# Patient Record
Sex: MALE | Race: WHITE | NOT HISPANIC OR LATINO | Employment: OTHER | URBAN - METROPOLITAN AREA
[De-identification: names, ages, dates, MRNs, and addresses within clinical notes are randomized per-mention and may not be internally consistent; named-entity substitution may affect disease eponyms.]

---

## 2017-01-02 ENCOUNTER — PATIENT OUTREACH (OUTPATIENT)
Dept: HEALTH INFORMATION MANAGEMENT | Facility: OTHER | Age: 77
End: 2017-01-02

## 2017-01-02 NOTE — PROGRESS NOTES
"1-17-16  Attempted to contact patient regarding TCM and discharge outreach.  He was gone to his dialysis treatment.  Spoke with his wife Crys.  States he is \"doing pretty well\".  Remains very tired.  They are both concerned about not having the Tobramycin yet.  They believe it is to be shipped to them tomorrow.  iGlberto is taking all the other medications as directed at discharge.  Confirmed appointment on Thursday 1-4-17 with Dana at the discharge clinic.  They will have no difficulties getting there.      "

## 2017-01-04 ENCOUNTER — OFFICE VISIT (OUTPATIENT)
Dept: MEDICAL GROUP | Facility: CLINIC | Age: 77
End: 2017-01-04
Payer: MEDICARE

## 2017-01-04 VITALS
OXYGEN SATURATION: 97 % | SYSTOLIC BLOOD PRESSURE: 118 MMHG | TEMPERATURE: 98.2 F | DIASTOLIC BLOOD PRESSURE: 64 MMHG | HEART RATE: 88 BPM | RESPIRATION RATE: 16 BRPM

## 2017-01-04 DIAGNOSIS — N18.6 ESRD ON DIALYSIS (HCC): ICD-10-CM

## 2017-01-04 DIAGNOSIS — Z09 HOSPITAL DISCHARGE FOLLOW-UP: ICD-10-CM

## 2017-01-04 DIAGNOSIS — Z99.2 ESRD ON DIALYSIS (HCC): ICD-10-CM

## 2017-01-04 DIAGNOSIS — D84.9 IMMUNOSUPPRESSED STATUS (HCC): ICD-10-CM

## 2017-01-04 DIAGNOSIS — J18.9 HCAP (HEALTHCARE-ASSOCIATED PNEUMONIA): ICD-10-CM

## 2017-01-04 PROCEDURE — 99496 TRANSJ CARE MGMT HIGH F2F 7D: CPT | Performed by: NURSE PRACTITIONER

## 2017-01-04 ASSESSMENT — ENCOUNTER SYMPTOMS
DEPRESSION: 0
DIARRHEA: 1
COUGH: 1
VOMITING: 0
WHEEZING: 0
SPUTUM PRODUCTION: 1
FOCAL WEAKNESS: 0
ABDOMINAL PAIN: 0
HEADACHES: 0
WEAKNESS: 1
NAUSEA: 0
DIZZINESS: 0
SORE THROAT: 0
FEVER: 0
HEMOPTYSIS: 0
DIAPHORESIS: 1
FALLS: 0
PALPITATIONS: 0

## 2017-01-04 ASSESSMENT — PATIENT HEALTH QUESTIONNAIRE - PHQ9: CLINICAL INTERPRETATION OF PHQ2 SCORE: 2

## 2017-01-04 NOTE — PATIENT INSTRUCTIONS
If you need further assistance, or have any questions; concerns or lingering symptoms before seeing your Primary Care Provider or specialist.     Do not hesitate to contact us.     Please contact us at the Post-Hospital Follow Up Program at [phone].   Our offices hours are Monday-Friday 8 am-5 pm.

## 2017-01-04 NOTE — PROGRESS NOTES
POST DISCHARGE CALL    Discharge Date:12/30/2016   Date of Outreach Call: 1/2/2017 10:46 AM  Now that you're home, how are you doing? Good  Comment:remains tired.  breathing fine.    Do you have questions about your medications? Yes  Comment:very concerned about not having the Tobramycin  yet.  It is being shipped to them.    Resource Provided: *    Did you fill your medications? Yes  Comment:they have all but the Tobramycin.    Resource Provided: *    Do you have a follow-up appointment scheduled?Yes  Comment:multiple.  with arthitis MD, ID, and pulmonary.   also infusion of Rituxin  Action Taken:*    Discharging Department: Telemetry 7  Select all areas that apply to this patient:   *    *  *    *   *   Transitional Care    While you had Home Health, did your clinical team act in a professional manner? *    Have you quit smoking because of the COPD Program? *  If you quit smoking prior to the program, are you still not smoking? *    Have you had a fever since you've left our care? *  Is the surgical site bleeding? *    Have you had a fall since you've been home? *  You will be receiving a phone call regarding additional aspects of your care. Please provide a good call back number: *    Number of Attempts: 1  Current or previous attempts completed within two business days of discharge? Yes  Provided education regarding treatment plan, medication, self-management, ADLs? Yes  Comment:patient and his wife are well versed in [facility]  diagnosis and medical plan.    Has patient completed Advance Directive? If yes, advise them to bring to appointment. Yes  Comment:on file  Care Manager phone number provided? Yes  Comment:discharge outreach [phone]  Is there anything else I can help you with? No  Comment:denies additional needs at this time.

## 2017-01-04 NOTE — PROGRESS NOTES
Subjective:     Gilberto Brown is a 76 y.o. male with underlying disease of Wegener's granulomatosis, immunosuppressed status, ESRD on HD MWF, who presents for Hospital Follow-up.  Chart and discharge summary reviewed.     HPI: Recently hospitalized for HCAP with recurrent pseudomonas PNA, hospitalized from 12/23 to 12/30, was receiving IV tobramycin while awaiting inhaled tobramycin but now pt has his inhaled tobromycin already. 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  Upcoming appiontment: 1/17 for rheumatologist; 1/19 pulomonologist; 1/25 ID.    Since returning home, patient reports feeling much better. Pt has been having 4th recurrent pna in a year and he is very familiar about his disease and on top of the treatment plan.      The patient has questions regarding hospitalization or discharge: no   The patient does not feel more weakness, the same fatigue and energy level; overall no difficulty taking care of self at home but sometimes needs help from his wife to push him up a little bit when he is climbing stairs. Declined HH and PT because pt has lived with his own disease on his own way. He is still trying to fight but at the same time enjoying his life. He has no fall incident and ambulating with cane without difficulty at this time.  Patient reports taking medications as instructed.    1. Hospital discharge follow-up    2. HCAP (healthcare-associated pneumonia)    3. Immunosuppressed status (HCC)    4. ESRD on dialysis (HCC)          Allergies:   Doxycycline; Erythromycin; and Rituximab    Social History:  Social History   Substance Use Topics   • Smoking status: Former Smoker -- 30 years     Types: Pipe     Quit date: 01/01/1990   • Smokeless tobacco: Former User     Types: Chew   • Alcohol Use: 4.8 oz/week     7 Glasses of wine, 1 Standard drinks or equivalent per week       Comment: Red wine nightly        ROS:  Review of Systems   Constitutional: Positive for malaise/fatigue and diaphoresis ( night sweat for years, pt reported the only possible explanation that he was told is a cyst/fibrosis in his lung.). Negative for fever.   HENT: Negative for congestion and sore throat.    Respiratory: Positive for cough (  residual coughing ) and sputum production. Negative for hemoptysis and wheezing. Shortness of breath:  on exertion but rest can allevaite that, no oxygen needed, sat 97% on room air today.    Cardiovascular: Negative for chest pain, palpitations and leg swelling.   Gastrointestinal: Positive for diarrhea ( comes and goes, old problem, no diarrhea today or yesterday). Negative for nausea, vomiting and abdominal pain.   Musculoskeletal: Negative for falls.   Skin: Negative for rash.   Neurological: Positive for weakness. Negative for dizziness, focal weakness and headaches.   Psychiatric/Behavioral: Negative for depression.        Objective:     Blood pressure 118/64, pulse 88, temperature 36.8 °C (98.2 °F), resp. rate 16, SpO2 97 %.     Physical Exam:  Physical Exam   Constitutional: He is oriented to person, place, and time and well-developed, well-nourished, and in no distress.   HENT:   Head: Normocephalic and atraumatic.   Eyes: Conjunctivae are normal.   Neck: Neck supple. No JVD present.   Cardiovascular: Normal rate and regular rhythm.    No murmur heard.  Pulmonary/Chest: Effort normal. No respiratory distress. He has no wheezes. He has rales.   Fine crackles at lower lung fields, diminished upper lung fields but clear   Abdominal: Soft. Bowel sounds are normal. He exhibits no distension. There is no tenderness. There is no rebound.   Musculoskeletal: Normal range of motion. He exhibits no edema or tenderness.   Muscle strength 5/5 at four extremities   Neurological: He is alert and oriented to person, place, and time. No cranial nerve deficit. Gait normal.   Gait stable  with cane   Skin: Skin is warm.   Vitals reviewed.          Assessment and Plan:     Hospital follow-up:   Hospitalization and results reviewed with patient. High risk conditions requiring teaching or care coordination were identified and addressed.The patient demonstrate understanding of admission and underlying conditions. The patient understands discharge instructions and when to seek medical attention. Medications reviewed including instructions regarding high risk medications, dosing and side effects.    The patient is able to safely adhere to ADL/IADL, treatment and medication regimen, self-manage of high-risk conditions? Yes   The patient requires physical therapy/home health/DME referral? No Declined  The patient requires referral to care coordination/behavioral health/social work?  No   Patient requires referral for pharmacy consult? No   Advance directive/POLST on file?  Yes   Counseled on advance directive?  No     2. 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  - Follow up with ID on 1/25. Pulmonologist on 1/19; PCP on 2/2/2017     3. Immunosuppressed status (HCC)  - secondary to his underlying disease of Wegener's granulomatosis.   - Specialist appointment as above, also rheumatologist on 1/19/2017  - on steroid (was treated with multiple treatments including CellCept, rituximab, and steroids. Most recent ANCA negative on 12/24/2016, has been in remission since last treatment of rituximab about a year ago.)    4. ESRD on HD MWF  - going to have HD after this visit around 11 o'clock    Medication Reconciliation  Medication list at end of encounter:   Current Outpatient Prescriptions   Medication Sig Dispense Refill   • ciprofloxacin (CIPRO) 500 MG Tab Take 1 Tab by mouth every 24 hours for 8 days. 8 Tab 0   • voriconazole  (VFEND) 200 MG Tab Take 1 Tab by mouth every 12 hours for 90 days. 180 Tab 0   • predniSONE (DELTASONE) 10 MG Tab TAKE 1 AND 1/2 TABLETS BY MOUTH EVERY DAY 45 Tab 2   • tobramycin 60 mg/mL in sterile water Inhale 5 mL by mouth 2 times a day. 280 mL 6   • sterile water SOLN 2.1 mL with sodium chloride 4 mEq/mL SOLN 3.6 mEq 3 mL by Nebulization route 2 Times a Day. 60 Ampule 6   • fluticasone-salmeterol (ADVAIR DISKUS) 500-50 MCG/DOSE AEROSOL POWDER, BREATH ACTIVATED Inhale 1 Puff by mouth 2 times a day. Rinse mouth after each use. 1 Inhaler 6   • ipratropium-albuterol (DUONEB) 0.5-2.5 (3) MG/3ML nebulizer solution 3 mL by Nebulization route 2 Times a Day for 90 days. Shortness of Breath 540 mL 0   • B Complex-C-Biotin-E-Min-FA (DIALYVITE 3000) 3 MG Tab Take 1 Tab by mouth every day.     • albuterol 108 (90 BASE) MCG/ACT Aero Soln inhalation aerosol Inhale 2 Puffs by mouth every four hours as needed for Shortness of Breath.     • ranitidine (ZANTAC) 150 MG Tab TAKE 1 TABLET BY MOUTH EVERY NIGHT AT BEDTIME 30 Tab 0   • finasteride (PROSCAR) 5 MG Tab Take 5 mg by mouth every day.     • simvastatin (ZOCOR) 40 MG TABS Take 40 mg by mouth every evening.     • epoetin lucina (EPOGEN,PROCRIT) 3000 UNIT/ML SOLN Inject 2,200 Units as instructed every Monday, Wednesday, and Friday. With dialysis     • Cholecalciferol (VITAMIN D) 2000 UNIT TABS Take 2,000 Units by mouth every day.     • sevelamer (RENAGEL) 800 MG TABS Take 800 mg by mouth 3 times a day, with meals.     • tamsulosin (FLOMAX) 0.4 MG capsule Take 0.4 mg by mouth every day.     • aspirin EC (ECOTRIN) 81 MG TBEC Take 81 mg by mouth every day.       No current facility-administered medications for this visit.       Primary care follow-up:  New health conditions identified during hospitalization? Yes   Labs/pathology/imaging requires future PCP follow-up?  No   Changes to medications during hospitalization or today? No     Recommended followup: No Follow-up on file. with  Christine Zamudio M.D.   Future Appointments       Provider Department [place]    1/17/2017 11:00 AM RN 1 Infusion Services [facility]    1/17/2017 3:00 PM Cintia Ariza M.D. [facility]     1/19/2017 10:40 AM A Rotation [facility]     1/25/2017 11:00 AM Ruma Bowser M.D. [address]    1/31/2017 11:00 AM RN 5 Infusion Services [facility]          Patient Instruction  Patient provided with educational material on discharge diagnosis and management of symptoms/red flags. Patient instructed to keep follow-up appointments and to bring written questions and and actual medications to each office visit. Patient instructed to call PCP/specialist with any problems/questions/concerns. Patient verbalizes understanding and has no further questions at this time.    Face-to-face transitional care management services with high medical decision complexity were provided.   Total time spent was 40 minutes with >50% of time spent on counseling and coordination of care.

## 2017-01-04 NOTE — MR AVS SNAPSHOT
Gilberto CHANCE Brown   2017 8:20 AM   Office Visit   MRN: [id number]    Department:  [facility]   Dept Phone:  [phone]    Description:  Male : 1940   Provider:  ELSI Munoz           Reason for Visit     Hospital Follow-up           Allergies as of 2017     Allergen Noted Reactions    Doxycycline 2014   Unspecified    Abdominal pain.  DFA=[id number]    Erythromycin 2014   Unspecified    Abdominal pain.  RXN=before     Rituximab 08/15/2016       Flu like syndrome      You were diagnosed with     Hospital discharge follow-up   [983009]       HCAP (healthcare-associated pneumonia)   [937060]       Immunosuppressed status (HCC)   [839001]       ESRD on dialysis ([facility])   [926929]         Vital Signs     Blood Pressure Pulse Temperature Respirations Oxygen Saturation       118/64 mmHg 88 36.8 °C (98.2 °F) 16 97%     Smoking Status                   Former Smoker           Basic Information     Date Of Birth Sex Race Ethnicity Preferred Language    1940 Male White Non- English      Your appointments     2017 11:00 AM   Est Chemo 3 with RN 1   Infusion Services ([facility])    [address]  [facility] [id number] [phone]            2017  3:00 PM   Follow Up Visit with Cintia Ariza M.D.   [facility] (--)    [address], [address]  [place] [address] [phone]           You will be receiving a confirmation call a few days before your appointment from our automated call confirmation system.            2017 10:40 AM   Established Patient Pul with A Rotation   [facility] (--)    [address]  [place] [address] [phone]            2017 11:00 AM   Follow Up Visit with Ruma Bowser M.D.   [facility] ([address] [place] [address])    [address]  [place] [address] [phone]           You will be receiving a confirmation call a few  days before your appointment from our automated call confirmation system.            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 Low  7/7/2011 - Present    Dysphagia  Medium  9/27/2011 - Present    Odynophagia R13.10 Medium  9/27/2011 - Present    Esophageal stricture K22.2 Medium  9/27/2011 - Present    Chronic respiratory infection J98.8 High  10/11/2011 - Present    Protein-calorie malnutrition, severe ([facility]) E43 Medium  10/11/2011 - Present    Debility R53.81 High  10/12/2011 - Present    HTN (hypertension) I10 Low  10/12/2011 - Present    Abnormal thyroid function test R94.6   10/12/2011 - Present    TIMOTHY (obstructive sleep apnea) G47.33   10/12/2011 - Present    Hyperlipidemia E78.5   10/12/2011 - Present    ESRD on dialysis ([facility]) N18.6, Z99.2 Medium  11/8/2011 - Present    Vitamin d deficiency    11/8/2011 - Present    Anemia of chronic renal failure, stage 5 (HCC) (Chronic) N18.5, D63.1 Medium  12/14/2011 - Present    BPH (benign prostatic hyperplasia) N40.0 Low  12/14/2011 - Present    GERD (gastroesophageal reflux disease) K21.9   12/14/2011 - Present    Weight loss R63.4   12/14/2011 - Present    Pneumonia J18.9 High  12/14/2011 - Present    Wegeners 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 Medium  12/14/2011 - Present    Adult failure to thrive R62.7 High  1/13/2012 - Present    Personal history of other malignant neoplasm of skin Z85.828   7/16/2013 - Present    AV block I44.30 Low  2/6/2014 - Present    Pacemaker Z95.0 Medium  2/6/2014 - Present    Immunosuppressed status (HCC) D89.9 Medium  2/6/2014 - Present    Hypotension I95.9 High  2/1/2015 - Present    Macrocytosis D75.89 Medium  2/1/2015 - Present    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   4/12/2016 -  Present    HCAP (healthcare-associated pneumonia) J18.9 High  10/4/2016 - Present    Advance care planning Z71.89 Low  10/5/2016 - Present    On rituximab therapy Z79.899   10/14/2016 - Present    Abnormal CT scan of lung R91.8   10/25/2016 - Present    Thrush B37.0   10/25/2016 - Present      Health Maintenance        Date Due Completion Dates    IMM DTaP/Tdap/Td Vaccine (1 - Tdap) 6/23/1958 ---    IMM ZOSTER VACCINE 6/23/1999 ---    IMM PNEUMOCOCCAL 65+ (ADULT) HIGH/HIGHEST RISK SERIES (2 of 2 - PCV13) 12/19/2012 12/19/2011    COLONOSCOPY 12/1/2025 12/1/2015 (Postponed)    Override on 12/1/2015: Postponed (Patient will discuss scheduling with PCP)            Current Immunizations     Influenza TIV (IM) 9/15/2016, 11/3/2014, 10/10/2013, 9/27/2011  3:15 AM    Pneumococcal polysaccharide vaccine (PPSV-23) 12/19/2011  4:47 PM    Tuberculin Skin Test  Incomplete      Below and/or attached are the medications your provider expects you to take. Review all of your home medications and newly ordered medications with your provider and/or pharmacist. Follow medication instructions as directed by your provider and/or pharmacist. Please keep your medication list with you and share with your provider. Update the information when medications are discontinued, doses are changed, or new medications (including over-the-counter products) are added; and carry medication information at all times in the event of emergency situations     Allergies:  DOXYCYCLINE - Unspecified     ERYTHROMYCIN - Unspecified     RITUXIMAB - (reactions not documented)               Medications  Valid as of: January 04, 2017 - 11:15 AM    Generic Name Brand Name Tablet Size Instructions for use    Albuterol Sulfate (Aero Soln) albuterol 108 (90 BASE) MCG/ACT Inhale 2 Puffs by mouth every four hours as needed for Shortness of Breath.        Aspirin (Tablet Delayed Response) ECOTRIN 81 MG Take 81 mg by mouth every day.        B Complex-C-Biotin-E-Min-FA (Tab)  DIALYVITE 3000 3 MG Take 1 Tab by mouth every day.        Cholecalciferol (Tab) vitamin D 2000 UNIT Take 2,000 Units by mouth every day.        Ciprofloxacin HCl (Tab) CIPRO 500 MG Take 1 Tab by mouth every 24 hours for 8 days.        Epoetin Jorgito (Solution) EPOGEN,PROCRIT 3000 UNIT/ML Inject 2,200 Units as instructed every Monday, Wednesday, and Friday. With dialysis        Finasteride (Tab) PROSCAR 5 MG Take 5 mg by mouth every day.        Fluticasone-Salmeterol (AEROSOL POWDER, BREATH ACTIVATED) ADVAIR 500-50 MCG/DOSE Inhale 1 Puff by mouth 2 times a day. Rinse mouth after each use.        Ipratropium-Albuterol (Solution) DUONEB 0.5-2.5 (3) MG/3ML 3 mL by Nebulization route 2 Times a Day for 90 days. Shortness of Breath        PredniSONE (Tab) DELTASONE 10 MG TAKE 1 AND 1/2 TABLETS BY MOUTH EVERY DAY        RaNITidine HCl (Tab) ZANTAC 150 MG TAKE 1 TABLET BY MOUTH EVERY NIGHT AT BEDTIME        Sevelamer HCl (Tab) RENAGEL 800 MG Take 800 mg by mouth 3 times a day, with meals.        Simvastatin (Tab) ZOCOR 40 MG Take 40 mg by mouth every evening.        sterile water SOLN 2.1 mL with sodium chloride 4 mEq/mL SOLN 3.6 mEq   3 mL by Nebulization route 2 Times a Day.        Tamsulosin HCl (Cap) FLOMAX 0.4 MG Take 0.4 mg by mouth every day.        tobramycin 60 mg/mL in sterile water CANELO  Inhale 5 mL by mouth 2 times a day.        Voriconazole (Tab) VFEND 200 MG Take 1 Tab by mouth every 12 hours for 90 days.        .                 Medicines prescribed today were sent to:     [facility] @ [facility] - [place], [place] - [address] AT [facility]    [address] [place] [address]    Phone: [phone] Fax: [phone]    Open 24 Hours?: No    [facility] [address] [place] [address] [place] [address] [place] AT [facility] RE[facility]    [address] [place], [place] [address] [address]    Phone: [phone] Fax: [phone]    Open 24 Hours?: No      Medication refill  instructions:       If your prescription bottle indicates you have medication refills left, it is not necessary to call your provider’s office. Please contact your pharmacy and they will refill your medication.    If your prescription bottle indicates you do not have any refills left, you may request refills at any time through one of the following ways: The online [employer] system (except Urgent Care), by calling your provider’s office, or by asking your pharmacy to contact your provider’s office with a refill request. Medication refills are processed only during regular business hours and may not be available until the next business day. Your provider may request additional information or to have a follow-up visit with you prior to refilling your medication.   *Please Note: Medication refills are assigned a new Rx number when refilled electronically. Your pharmacy may indicate that no refills were authorized even though a new prescription for the same medication is available at the pharmacy. Please request the medicine by name with the pharmacy before contacting your provider for a refill.        Instructions    If you need further assistance, or have any questions; concerns or lingering symptoms before seeing your Primary Care Provider or specialist.     Do not hesitate to contact us.     Please contact us at the Post-Hospital Follow Up Program at [phone].   Our offices hours are Monday-Friday 8 am-5 pm.             Other Notes About Your Plan     [facility] Ph. [phone] Fax. [phone]             [employer] Access Code: Activation code not generated  Current [employer] Status: Active

## 2017-01-10 ENCOUNTER — OFFICE VISIT (OUTPATIENT)
Dept: PULMONOLOGY | Facility: HOSPICE | Age: 77
End: 2017-01-10
Payer: MEDICARE

## 2017-01-10 DIAGNOSIS — M31.30 WEGENER'S DISEASE, PULMONARY: ICD-10-CM

## 2017-01-10 DIAGNOSIS — G47.33 OSA (OBSTRUCTIVE SLEEP APNEA): ICD-10-CM

## 2017-01-10 PROCEDURE — 94729 DIFFUSING CAPACITY: CPT | Performed by: INTERNAL MEDICINE

## 2017-01-10 PROCEDURE — 94726 PLETHYSMOGRAPHY LUNG VOLUMES: CPT | Performed by: INTERNAL MEDICINE

## 2017-01-10 PROCEDURE — 94060 EVALUATION OF WHEEZING: CPT | Performed by: INTERNAL MEDICINE

## 2017-01-10 ASSESSMENT — PULMONARY FUNCTION TESTS
FEV1/FVC: 73
FVC_PERCENT_PREDICTED: 60
FEV1_PERCENT_PREDICTED: 61
FEV1_PERCENT_PREDICTED: 61
FVC_PREDICTED: 3.56
FEV1: 1.57
FEV1/FVC_PERCENT_PREDICTED: 105
FEV1/FVC_PERCENT_PREDICTED: 102
FEV1: 1.55
FEV1/FVC_PERCENT_PREDICTED: 71
FVC: 2.15
FVC: 2.09
FEV1_PERCENT_CHANGE: 0
FVC_PERCENT_PREDICTED: 58
FEV1/FVC: 74.16
FEV1_PERCENT_CHANGE: -2
FEV1_PREDICTED: 2.53
FEV1/FVC_PERCENT_CHANGE: 0

## 2017-01-10 NOTE — MR AVS SNAPSHOT
Gilbertoayan Andrez   1/10/2017 9:00 AM   Appointment   MRN: [id number]    Department:  [facility]   Dept Phone:  [phone]    Description:  Male : 1940   Provider:  [id number]           Allergies as of 1/10/2017     Allergen Noted Reactions    Doxycycline 2014   Unspecified    Abdominal pain.  HWK=[id number]    Erythromycin 2014   Unspecified    Abdominal pain.  RXN=before     Rituximab 08/15/2016       Flu like syndrome      Vital Signs     Smoking Status                   Former Smoker           Basic Information     Date Of Birth Sex Race Ethnicity Preferred Language    1940 Male White Non- English      Your appointments     2017  3:00 PM   Follow Up Visit with Cintia Ariza M.D.   [facility] (--)    [address], [address]  [place] [address] [phone]           You will be receiving a confirmation call a few days before your appointment from our automated call confirmation system.            2017 10:40 AM   Established Patient Pul with A Rotation   [facility] (--)    [address]  [address]  [place] [id number] [phone]            2017  9:30 AM   Follow Up Visit with Ruma Bowser M.D.   [facility] ([address] [place] [address])    [address]  [place] [id number] [phone]           You will be receiving a confirmation call a few days before your appointment from our automated call confirmation system.              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 Low  2011 - Present    Dysphagia  Medium  2011 - Present    Odynophagia R13.10 Medium  2011 - Present    Esophageal stricture K22.2 Medium  2011 - Present    Chronic respiratory infection J98.8 High  10/11/2011 - Present    Protein-calorie malnutrition, severe (HCC) E43 Medium  10/11/2011  - Present    Debility R53.81 High  10/12/2011 - Present    HTN (hypertension) I10 Low  10/12/2011 - Present    Abnormal thyroid function test R94.6   10/12/2011 - Present    TIMOTHY (obstructive sleep apnea) G47.33   10/12/2011 - Present    Hyperlipidemia E78.5   10/12/2011 - Present    ESRD on dialysis (HCC) N18.6, Z99.2 Medium  11/8/2011 - Present    Vitamin d deficiency    11/8/2011 - Present    Anemia of chronic renal failure, stage 5 (HCC) (Chronic) N18.5, D63.1 Medium  12/14/2011 - Present    BPH (benign prostatic hyperplasia) N40.0 Low  12/14/2011 - Present    GERD (gastroesophageal reflux disease) K21.9   12/14/2011 - Present    Weight loss R63.4   12/14/2011 - Present    Pneumonia J18.9 High  12/14/2011 - Present    Wegeners 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 Medium  12/14/2011 - Present    Adult failure to thrive R62.7 High  1/13/2012 - Present    Personal history of other malignant neoplasm of skin Z85.828   7/16/2013 - Present    AV block I44.30 Low  2/6/2014 - Present    Pacemaker Z95.0 Medium  2/6/2014 - Present    Immunosuppressed status (HCC) D89.9 Medium  2/6/2014 - Present    Hypotension I95.9 High  2/1/2015 - Present    Macrocytosis D75.89 Medium  2/1/2015 - Present    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   4/12/2016 - Present    HCAP (healthcare-associated pneumonia) J18.9 High  10/4/2016 - Present    Advance care planning Z71.89 Low  10/5/2016 - Present    On rituximab therapy Z79.899   10/14/2016 - Present    Abnormal CT scan of lung R91.8   10/25/2016 - Present    Thrush B37.0   10/25/2016 - Present      Health Maintenance        Date Due Completion Dates    IMM DTaP/Tdap/Td Vaccine (1 - Tdap) 6/23/1958 ---    IMM ZOSTER VACCINE 6/23/1999 ---    IMM PNEUMOCOCCAL 65+ (ADULT) HIGH/HIGHEST RISK SERIES (2 of 2 - PCV13) 12/19/2012 12/19/2011    COLONOSCOPY 12/1/2025 12/1/2015 (Postponed)    Override on 12/1/2015: Postponed (Patient will discuss scheduling with PCP)            Current Immunizations     Influenza  TIV (IM) 9/15/2016, 11/3/2014, 10/10/2013, 9/27/2011  3:15 AM    Pneumococcal polysaccharide vaccine (PPSV-23) 12/19/2011  4:47 PM    Tuberculin Skin Test  Incomplete      Below and/or attached are the medications your provider expects you to take. Review all of your home medications and newly ordered medications with your provider and/or pharmacist. Follow medication instructions as directed by your provider and/or pharmacist. Please keep your medication list with you and share with your provider. Update the information when medications are discontinued, doses are changed, or new medications (including over-the-counter products) are added; and carry medication information at all times in the event of emergency situations     Allergies:  DOXYCYCLINE - Unspecified     ERYTHROMYCIN - Unspecified     RITUXIMAB - (reactions not documented)               Medications  Valid as of: January 10, 2017 -  9:59 AM    Generic Name Brand Name Tablet Size Instructions for use    Albuterol Sulfate (Aero Soln) albuterol 108 (90 BASE) MCG/ACT Inhale 2 Puffs by mouth every four hours as needed for Shortness of Breath.        Aspirin (Tablet Delayed Response) ECOTRIN 81 MG Take 81 mg by mouth every day.        B Complex-C-Biotin-E-Min-FA (Tab) DIALYVITE 3000 3 MG Take 1 Tab by mouth every day.        Cholecalciferol (Tab) vitamin D 2000 UNIT Take 2,000 Units by mouth every day.        Epoetin Jorgito (Solution) EPOGEN,PROCRIT 3000 UNIT/ML Inject 2,200 Units as instructed every Monday, Wednesday, and Friday. With dialysis        Finasteride (Tab) PROSCAR 5 MG Take 5 mg by mouth every day.        Fluticasone-Salmeterol (AEROSOL POWDER, BREATH ACTIVATED) ADVAIR 500-50 MCG/DOSE Inhale 1 Puff by mouth 2 times a day. Rinse mouth after each use.        PredniSONE (Tab) DELTASONE 10 MG TAKE 1 AND 1/2 TABLETS BY MOUTH EVERY DAY        RaNITidine HCl (Tab) ZANTAC 150 MG TAKE 1 TABLET BY MOUTH EVERY NIGHT AT BEDTIME        Sevelamer HCl (Tab)  RENAGEL 800 MG Take 800 mg by mouth 3 times a day, with meals.        Simvastatin (Tab) ZOCOR 40 MG Take 40 mg by mouth every evening.        sterile water SOLN 2.1 mL with sodium chloride 4 mEq/mL SOLN 3.6 mEq   3 mL by Nebulization route 2 Times a Day.        Tamsulosin HCl (Cap) FLOMAX 0.4 MG Take 0.4 mg by mouth every day.        tobramycin 60 mg/mL in sterile water CANELO  Inhale 5 mL by mouth 2 times a day.        Voriconazole (Tab) VFEND 200 MG Take 1 Tab by mouth every 12 hours for 90 days.        .                 Medicines prescribed today were sent to:     [facility] @ [facility] - [place], [place] - [address] AT [facility]    [address] [facility] [address] [facility] [address]    Phone: [phone] Fax: [phone]    Open 24 Hours?: No    [facility] [id number] [place] [address] - [address] [place] [address] AT [facility] & [address] [facility] [address]    Phone: [phone] Fax: [phone]    Open 24 Hours?: No      Medication refill instructions:       If your prescription bottle indicates you have medication refills left, it is not necessary to call your provider’s office. Please contact your pharmacy and they will refill your medication.    If your prescription bottle indicates you do not have any refills left, you may request refills at any time through one of the following ways: The online [email or web address] system (except Urgent Care), by calling your provider’s office, or by asking your pharmacy to contact your provider’s office with a refill request. Medication refills are processed only during regular business hours and may not be available until the next business day. Your provider may request additional information or to have a follow-up visit with you prior to refilling your medication.   *Please Note: Medication refills are assigned a new Rx number when refilled electronically. Your pharmacy may indicate that no refills were authorized even though a new  prescription for the same medication is available at the pharmacy. Please request the medicine by name with the pharmacy before contacting your provider for a refill.        Other Notes About Your Plan     [facility] Ph. [phone] Fax. [phone]             MyChart Access Code: Activation code not generated  Current MyChart Status: Active

## 2017-01-10 NOTE — PROCEDURES
Technician: Jaclyn Torres, RRT  Technician Comments:  Good patient effort & cooperation.  Test was performed on the [employer] Body Plethysmograph- Elite DX system.  The results of this test meet the ATS/ERS standards for acceptability and repeatability.    Predicted equations for Spirometry are NHanes, DLCO- [facility].  The DLCO was uncorrected for Hgb.  A bronchodilator of Ventolin HFA- 2puffs via spacer were administered.    The FVC is 2.09 L or 58%, FEV1 1.55 L or 61%, FEV1/FVC 74%. TLC 76%. DLCO 68%. No bronchodilator response.    Interpretation:  Mild restrictive ventilatory defect with total lung capacity 76%.  Mild diffusion impairment consistent with loss of alveolar capillary units, DLCO 68%.

## 2017-01-17 ENCOUNTER — OFFICE VISIT (OUTPATIENT)
Dept: RHEUMATOLOGY | Facility: PHYSICIAN GROUP | Age: 77
End: 2017-01-17
Payer: MEDICARE

## 2017-01-17 ENCOUNTER — APPOINTMENT (OUTPATIENT)
Dept: ONCOLOGY | Facility: MEDICAL CENTER | Age: 77
End: 2017-01-17
Attending: INTERNAL MEDICINE
Payer: MEDICARE

## 2017-01-17 ENCOUNTER — RX ONLY (OUTPATIENT)
Age: 77
Setting detail: RX ONLY
End: 2017-01-17

## 2017-01-17 VITALS — WEIGHT: 140 LBS | BODY MASS INDEX: 21.29 KG/M2 | SYSTOLIC BLOOD PRESSURE: 120 MMHG | DIASTOLIC BLOOD PRESSURE: 60 MMHG

## 2017-01-17 DIAGNOSIS — N18.6 ESRD ON DIALYSIS (HCC): ICD-10-CM

## 2017-01-17 DIAGNOSIS — G47.33 OSA (OBSTRUCTIVE SLEEP APNEA): ICD-10-CM

## 2017-01-17 DIAGNOSIS — I10 ESSENTIAL HYPERTENSION: ICD-10-CM

## 2017-01-17 DIAGNOSIS — Z95.0 PACEMAKER: ICD-10-CM

## 2017-01-17 DIAGNOSIS — M31.30 WEGENERS GRANULOMATOSIS: ICD-10-CM

## 2017-01-17 DIAGNOSIS — Z99.2 ESRD ON DIALYSIS (HCC): ICD-10-CM

## 2017-01-17 DIAGNOSIS — Z22.39 PSEUDOMONAS AERUGINOSA COLONIZATION: ICD-10-CM

## 2017-01-17 PROCEDURE — 99214 OFFICE O/P EST MOD 30 MIN: CPT | Performed by: INTERNAL MEDICINE

## 2017-01-17 NOTE — MR AVS SNAPSHOT
Gilberto Brown   2017 3:00 PM   Office Visit   MRN: [id number]    Department:  Rheumatology [facility]   Dept Phone:  [phone]    Description:  Male : 1940   Provider:  Cintia Ariza M.D.           Reason for Visit     Follow-Up           Allergies as of 2017     Allergen Noted Reactions    Doxycycline 2014   Unspecified    Abdominal pain.  MIF=[id number]    Erythromycin 2014   Unspecified    Abdominal pain.  RXN=before     Rituximab 08/15/2016       Flu like syndrome      You were diagnosed with     Wegeners granulomatosis (CMS-HCC)   [298939]         Vital Signs     Blood Pressure Weight Smoking Status             120/60 mmHg 63.504 kg (140 lb) Former Smoker         Basic Information     Date Of Birth Sex Race Ethnicity Preferred Language    1940 Male White Non- English      Your appointments     2017 10:40 AM   Established Patient Pul with A Rotation   [facility] (--)    [address]  [address]  [place] [id number] [phone]            2017  9:30 AM   Follow Up Visit with Ruma Bowser M.D.   [facility] ([address] [place] [address])    [address]  [place] [address] [phone]           You will be receiving a confirmation call a few days before your appointment from our automated call confirmation system.            2017  1:00 PM   Follow Up Visit with Cintia Ariza M.D.   [facility] (--)    [address]  [place] [address] [phone]           You will be receiving a confirmation call a few days before your appointment from our automated call confirmation system.              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 Low  2011 - Present    Dysphagia  Medium  2011 - Present    Odynophagia R13.10 Medium  2011 - Present    Esophageal stricture K22.2 Medium  9/27/2011 - Present    Chronic respiratory infection J98.8 High  10/11/2011 - Present    Protein-calorie malnutrition, severe (HCC) E43 Medium  10/11/2011 - Present    Debility R53.81 High  10/12/2011 - Present    HTN (hypertension) I10 Low  10/12/2011 - Present    Abnormal thyroid function test R94.6   10/12/2011 - Present    TIMOTHY (obstructive sleep apnea) G47.33   10/12/2011 - Present    Hyperlipidemia E78.5   10/12/2011 - Present    ESRD on dialysis (CMS-HCC) N18.6, Z99.2 Medium  11/8/2011 - Present    Vitamin d deficiency    11/8/2011 - Present    Anemia of chronic renal failure, stage 5 (CMS-HCC) (Chronic) N18.5, D63.1 Medium  12/14/2011 - Present    BPH (benign prostatic hyperplasia) N40.0 Low  12/14/2011 - Present    GERD (gastroesophageal reflux disease) K21.9   12/14/2011 - Present    Weight loss R63.4   12/14/2011 - Present    Pneumonia J18.9 High  12/14/2011 - Present    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 Medium  12/14/2011 - Present    Adult failure to thrive R62.7 High  1/13/2012 - Present    Personal history of other malignant neoplasm of skin Z85.828   7/16/2013 - Present    AV block I44.30 Low  2/6/2014 - Present    Pacemaker Z95.0 Medium  2/6/2014 - Present    Immunosuppressed status (HCC) D89.9 Medium  2/6/2014 - Present    Hypotension I95.9 High  2/1/2015 - Present    Macrocytosis D75.89 Medium  2/1/2015 - Present    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   4/12/2016 - Present    HCAP (healthcare-associated pneumonia) J18.9 High  10/4/2016 - Present    Advance care planning Z71.89 Low  10/5/2016 - Present    On rituximab therapy Z79.899   10/14/2016 - Present    Abnormal CT scan of lung R91.8   10/25/2016 - Present    Thrush B37.0   10/25/2016 - Present      Health Maintenance        Date Due Completion Dates    IMM DTaP/Tdap/Td Vaccine (1 - Tdap) 6/23/1958 ---    IMM ZOSTER VACCINE 6/23/1999 ---    IMM PNEUMOCOCCAL 65+ (ADULT) HIGH/HIGHEST RISK SERIES (2 of  2 - PCV13) 12/19/2012 12/19/2011    COLONOSCOPY 12/1/2025 12/1/2015 (Postponed)    Override on 12/1/2015: Postponed (Patient will discuss scheduling with PCP)            Current Immunizations     Influenza TIV (IM) 9/15/2016, 11/3/2014, 10/10/2013, 9/27/2011  3:15 AM    Pneumococcal polysaccharide vaccine (PPSV-23) 12/19/2011  4:47 PM    Tuberculin Skin Test  Incomplete      Below and/or attached are the medications your provider expects you to take. Review all of your home medications and newly ordered medications with your provider and/or pharmacist. Follow medication instructions as directed by your provider and/or pharmacist. Please keep your medication list with you and share with your provider. Update the information when medications are discontinued, doses are changed, or new medications (including over-the-counter products) are added; and carry medication information at all times in the event of emergency situations     Allergies:  DOXYCYCLINE - Unspecified     ERYTHROMYCIN - Unspecified     RITUXIMAB - (reactions not documented)               Medications  Valid as of: January 17, 2017 -  3:46 PM    Generic Name Brand Name Tablet Size Instructions for use    Albuterol Sulfate (Aero Soln) albuterol 108 (90 BASE) MCG/ACT Inhale 2 Puffs by mouth every four hours as needed for Shortness of Breath.        Aspirin (Tablet Delayed Response) ECOTRIN 81 MG Take 81 mg by mouth every day.        B Complex-C-Biotin-E-Min-FA (Tab) DIALYVITE 3000 3 MG Take 1 Tab by mouth every day.        Cholecalciferol (Tab) vitamin D 2000 UNIT Take 2,000 Units by mouth every day.        Epoetin Jorgito (Solution) EPOGEN,PROCRIT 3000 UNIT/ML Inject 2,200 Units as instructed every Monday, Wednesday, and Friday. With dialysis        Finasteride (Tab) PROSCAR 5 MG Take 5 mg by mouth every day.        Fluticasone-Salmeterol (AEROSOL POWDER, BREATH ACTIVATED) ADVAIR 500-50 MCG/DOSE Inhale 1 Puff by mouth 2 times a day. Rinse mouth after each  use.        PredniSONE (Tab) DELTASONE 10 MG TAKE 1 AND 1/2 TABLETS BY MOUTH EVERY DAY        RaNITidine HCl (Tab) ZANTAC 150 MG TAKE 1 TABLET BY MOUTH EVERY NIGHT AT BEDTIME        Sevelamer HCl (Tab) RENAGEL 800 MG Take 800 mg by mouth 3 times a day, with meals.        Simvastatin (Tab) ZOCOR 40 MG Take 40 mg by mouth every evening.        sterile water SOLN 2.1 mL with sodium chloride 4 mEq/mL SOLN 3.6 mEq   3 mL by Nebulization route 2 Times a Day.        Tamsulosin HCl (Cap) FLOMAX 0.4 MG Take 0.4 mg by mouth every day.        tobramycin 60 mg/mL in sterile water CANELO  Inhale 5 mL by mouth 2 times a day.        Voriconazole (Tab) VFEND 200 MG Take 1 Tab by mouth every 12 hours for 90 days.        .                 Medicines prescribed today were sent to:     [facility] @ [facility] - [place], [place] - [address] [facility]    [address] [facility] [address] [facility] [address]    Phone: [phone] Fax: [phone]    Open 24 Hours?: No    [facility] [address] [place] [address] AT [address] [address]    Phone: [phone] Fax: [phone]    Open 24 Hours?: No      Medication refill instructions:       If your prescription bottle indicates you have medication refills left, it is not necessary to call your provider’s office. Please contact your pharmacy and they will refill your medication.    If your prescription bottle indicates you do not have any refills left, you may request refills at any time through one of the following ways: The online [employer] system (except Urgent Care), by calling your provider’s office, or by asking your pharmacy to contact your provider’s office with a refill request. Medication refills are processed only during regular business hours and may not be available until the next business day. Your provider may request additional information or to have a follow-up visit with you prior  to refilling your medication.   *Please Note: Medication refills are assigned a new Rx number when refilled electronically. Your pharmacy may indicate that no refills were authorized even though a new prescription for the same medication is available at the pharmacy. Please request the medicine by name with the pharmacy before contacting your provider for a refill.        Your To Do List     Future Labs/Procedures Complete By Expires    ANTI-NEUTROPHIL CYTOPLASMIC AB  As directed 1/18/2018      Other Notes About Your Plan     [facility] Ph. [phone] Fax. [phone]             MyChart Access Code: Activation code not generated  Current Cross Current Status: Active

## 2017-01-17 NOTE — Clinical Note
[facility]   [address], [address]  [place] [address]  Phone: [phone]  Fax: [phone]              Encounter Date: 1/17/2017    Dear Dr. Forrest ref. provider found,    It was a pleasure seeing your patient, Gilberto Brown, on 1/17/2017. Diagnoses of Wegeners granulomatosis (CMS-[facility]), Pseudomonas aeruginosa colonization, Pacemaker, ESRD on dialysis (CMS-HCC), Essential hypertension, and TIMOTHY (obstructive sleep apnea) were pertinent to this visit.     Please find attached progress note which includes the history I obtained from Mr. Brown, my physical examination findings, my impression and recommendations.      Once again, it was a pleasure participating in your patient's care.  Please feel free to contact me if you have any questions or if I can be of any further assistance to your patients.      Sincerely,    Cintia Ariza M.D.  Electronically Signed          PROGRESS NOTE:  No notes on file

## 2017-01-18 NOTE — PROGRESS NOTES
Chief Complaint- joint pain    Subjective:   Gilberto Brown is a 76 y.o. male here today for follow up of rheumatological issues    Extremely complicated patient is being seen for Wegener's granulomatosis, Initially diagnosed Wegeners 9/2011 with renal biopsy with unexplained weight loss and loss of renal function over the course 2 weeks, had had 6 months of worsening renal function, currently on dialysis s/p bronchoscopy with dx bronchiectasis, COPD, started with Dr Rodriguez since October 2011and had been on CellCept 750 mg by mouth daily, patient had a negative ANCA test January 2015. Patient  status post rituximab infusions January 14, 2016 and January 28, 2016 and did really quite well with a negative ANCA May 2016 Lung involvement? Pulmonary states not classic for Wegeners? CT of sinuses  ENT Dr Quintanilla who found Wegeners in sinuses patient. Patient denies any fevers of unknown etiology, denies any conjunctivitis, denies any nasal ulcers or oral ulcers, denies any joint pain or swelling, denies any new rashes. Complicating factors, patient has a chronic lung infection of Pseudomonas with recent CT scans of lungs and chest x-ray of lungs indicating chronic colonization of Pseudomonas. Since last visit patient has been hospitalized 3 times for pneumonias requiring aggressive antibiotics therapy. Also of note patient continues to be on prednisone 15 mg by mouth daily.    S/p Cytoxan-n/v  Off of Cellcept 5/2016  Rituximab infusions 1/14/2016, 1/28/2016    GBM neg 1/2012  ANCA neg 1/2015; ANCA 1:20  11/2015; ANCA neg 2/2016; ANCA neg 5/2016; ANCA neg 12/2016  C3 90 normal 10/2011  C4 21 10/2011  OFELIA neg 10/2011  Uric acid 4.4 1/2016  Hep B neg 2/2015  Hep C neg 2/2015     Of note  Dr Coral Corona nephrology  Dr Juan F Rubio pulmonology [phone]  Dr Chavarria Cardiologist [phone]  Dr Nunez Opthalmologist [phone]         Current medicines (including changes today)  Current Outpatient Prescriptions    Medication Sig Dispense Refill   • voriconazole (VFEND) 200 MG Tab Take 1 Tab by mouth every 12 hours for 90 days. 180 Tab 0   • predniSONE (DELTASONE) 10 MG Tab TAKE 1 AND 1/2 TABLETS BY MOUTH EVERY DAY 45 Tab 2   • tobramycin 60 mg/mL in sterile water Inhale 5 mL by mouth 2 times a day. 280 mL 6   • sterile water SOLN 2.1 mL with sodium chloride 4 mEq/mL SOLN 3.6 mEq 3 mL by Nebulization route 2 Times a Day. 60 Ampule 6   • fluticasone-salmeterol (ADVAIR DISKUS) 500-50 MCG/DOSE AEROSOL POWDER, BREATH ACTIVATED Inhale 1 Puff by mouth 2 times a day. Rinse mouth after each use. 1 Inhaler 6   • B Complex-C-Biotin-E-Min-FA (DIALYVITE 3000) 3 MG Tab Take 1 Tab by mouth every day.     • ranitidine (ZANTAC) 150 MG Tab TAKE 1 TABLET BY MOUTH EVERY NIGHT AT BEDTIME 30 Tab 0   • finasteride (PROSCAR) 5 MG Tab Take 5 mg by mouth every day.     • simvastatin (ZOCOR) 40 MG TABS Take 40 mg by mouth every evening.     • epoetin lucina (EPOGEN,PROCRIT) 3000 UNIT/ML SOLN Inject 2,200 Units as instructed every Monday, Wednesday, and Friday. With dialysis     • Cholecalciferol (VITAMIN D) 2000 UNIT TABS Take 2,000 Units by mouth every day.     • sevelamer (RENAGEL) 800 MG TABS Take 800 mg by mouth 3 times a day, with meals.     • tamsulosin (FLOMAX) 0.4 MG capsule Take 0.4 mg by mouth every day.     • aspirin EC (ECOTRIN) 81 MG TBEC Take 81 mg by mouth every day.     • albuterol 108 (90 BASE) MCG/ACT Aero Soln inhalation aerosol Inhale 2 Puffs by mouth every four hours as needed for Shortness of Breath.       No current facility-administered medications for this visit.     He  has a past medical history of HTN; Dyslipidemia; Allergy; COPD; GERD (gastroesophageal reflux disease); Dialysis; Urinary bladder disorder; Snoring; Unspecified hemorrhagic conditions (CMS-HCC); Pneumonia (2011); Bronchitis; Chronic bronchitis with productive mucopurulent cough (CMS-HCC); Indigestion; ASTHMA; EMPHYSEMA; CA in situ resp sys; Other specified  disorder of intestines; Unspecified urinary incontinence; Sick sinus syndrome (CMS-HCC); Pacemaker (1988); Renal disorder; Wegener's disease, pulmonary (CMS-HCC); Hip pain; Heart burn; Pain (7/16/13); Sleep apnea; BPH (benign prostatic hyperplasia); Arthritis; Hypertension; Coughing blood (2011); Breath shortness; Pulmonary histoplasmosis (CMS-HCC); and TIMOTHY (obstructive sleep apnea).    ROS   Other than what is mentioned in HPI or physical exam, there is no history of headaches, double vision or blurred vision. No temporal tenderness or jaw claudication. No history of cataracts or glaucoma. No trouble swallowing difficulties or sore throats. No history of thyroid disease. No chest complaints including chest pain, cough or sputum production. No shortness of breath. No GI complaints including nausea, vomiting, change in bowel habits, or past peptic ulcer disease. No history of blood in the stools. No urinary complaints including dysuria or frequency. No history of rash including psoriasis. No history of alopecia, photosensitivity, oral ulcerations, Raynaud's phenomena, or swollen joints. No history of gout. No back complaints. No history of low blood counts.       Objective:     Blood pressure 120/60, weight 63.504 kg (140 lb). Body mass index is 21.29 kg/(m^2).   Physical Exam:  Constitutional: Alert and oriented X3, no distress, thin appearing but talkative, talks in full sentences without evidence of shortness of breath, wife in room with patient Skin: Warm, dry, good turgor, no rashes in visible areas.Eye: Equal, round and reactive, conjunctiva clear, lids normal, no conjunctivitis, no scleral injectionENMT: Lips without lesions, good dentition, oropharynx clear, no oropharyngeal ulcerations, no nasal ulcers, no oral ulcers, Neck: Trachea midline, no masses, no thyromegaly, no carotid bruits, no subclavian bruitsLymph: No supraclavicular lymphadenopathy, no cervical lymphadenopathy, no axillary  lymphadenopathy.Respiratory: Unlabored respiratory effort, mild crackles lower lung basesCardiovascular: Normal S1, S2, no murmur, no edema, pacemaker in left chestAbdomen: Soft, non-tender, no masses, no hepatosplenomegaly, no abdominal bruits, no inguinal bruitsPsych: Alert and oriented x3, normal affect and mood.Neuro: Cranial nerves 2-12 are grossly intactExt:Did not appreciate any joint effusions, no Raynaud's, patient had 2+ pulses both upper extremities radial and ulnar pulses and 1+ pulses bilateral lower extremities and bilateral dorsalis pedis pulses, patient had a vascular shunt in the right upper arm  No evidence of vasculitis noted in upper or lower extremities    Lab Results   Component Value Date/Time    HEPATITIS B CORS AB,IGM Negative 12/07/2016 07:55 AM    HEPATITIS B SURFACE ANTIGEN Negative 12/07/2016 07:55 AM     Lab Results   Component Value Date/Time    HEPATITIS C ANTIBODY Negative 12/07/2016 07:55 AM     Lab Results   Component Value Date/Time    SODIUM 131* 12/30/2016 02:26 AM    POTASSIUM 5.3 12/30/2016 02:26 AM    CHLORIDE 98 12/30/2016 02:26 AM    CO2 22 12/30/2016 02:26 AM    GLUCOSE 144* 12/30/2016 02:26 AM    BUN 61* 12/30/2016 02:26 AM    CREATININE 4.24* 12/30/2016 02:26 AM      Lab Results   Component Value Date/Time    WBC 5.2 12/30/2016 02:26 AM    RBC 3.02* 12/30/2016 02:26 AM    HEMOGLOBIN 9.3* 12/30/2016 02:26 AM    HEMATOCRIT 29.1* 12/30/2016 02:26 AM    MCV 96.4 12/30/2016 02:26 AM    MCH 30.8 12/30/2016 02:26 AM    MCHC 32.0* 12/30/2016 02:26 AM    MPV 9.1 12/30/2016 02:26 AM    NEUTROPHILS-POLYS 91.10* 12/24/2016 12:15 AM    LYMPHOCYTES 2.90* 12/24/2016 12:15 AM    MONOCYTES 5.00 12/24/2016 12:15 AM    EOSINOPHILS 0.10 12/24/2016 12:15 AM    BASOPHILS 0.10 12/24/2016 12:15 AM    HYPOCHROMIA 1+ 10/28/2011 05:35 AM    ANISOCYTOSIS 1+ 10/04/2016 09:36 AM      Lab Results   Component Value Date/Time    CALCIUM 9.7 12/30/2016 02:26 AM    AST(SGOT) 14 12/24/2016 12:15 AM     ALT(SGPT) 17 12/24/2016 12:15 AM    ALKALINE PHOSPHATASE 54 12/24/2016 12:15 AM    TOTAL BILIRUBIN 0.7 12/24/2016 12:15 AM    ALBUMIN 3.2 12/30/2016 02:26 AM    TOTAL PROTEIN 7.0 12/24/2016 12:15 AM     Lab Results   Component Value Date/Time    URIC ACID 4.4 01/21/2016 08:36 AM    ANTINUCLEAR ANTIBODY None Detected 10/02/2011 04:30 AM     Lab Results   Component Value Date/Time    C3 COMPLEMENT 90 10/02/2011 04:30 AM    COMPLEMENT C4 21 10/02/2011 04:30 AM     Lab Results   Component Value Date/Time    GBM AB IGG Negative 01/18/2012 03:50 AM    GBM AB IGA Negative 01/18/2012 03:50 AM    ANCA IGG <1:20 12/24/2016 12:15 AM    C3 COMPLEMENT 90 10/02/2011 04:30 AM     Lab Results   Component Value Date/Time    COLOR Lt. Yellow 12/24/2016 12:33 AM    SPECIFIC GRAVITY 1.007 12/24/2016 12:33 AM    PH 8.5* 12/24/2016 12:33 AM    GLUCOSE 70* 12/24/2016 12:33 AM    KETONES Negative 12/24/2016 12:33 AM    PROTEIN 50* 12/24/2016 12:33 AM     Lab Results   Component Value Date/Time    CPK TOTAL 18 09/27/2011 05:05 AM     Lab Results   Component Value Date/Time    FLUID TYPE Stool 09/22/2006 01:04 PM     Results for orders placed in visit on 05/02/13   CT-CHEST, HIGH RESOLUTION LUNG    Impression 1. Bilateral lower lobe bronchiectasis, grossly unchanged compared with the prior conventional chest CT 1/14/12.  2. Minimal bilateral lower lobe interstitial opacities as well as confluent opacities in those areas, greater on the left, suggesting active airspace disease and/or atelectasis.  3. Probable uncalcified left lower lobe nodule, an equivocal finding with high-resolution technique.  Conventional chest CT would be needed for confirmation.  Note that no similar lesion was identified on the prior chest scan 1/14/12.            INTERPRETING LOCATION: [address] [place] [address], [id number]     Results for orders placed during the hospital encounter of 11/04/16   CT-CHEST (THORAX) W/O    Impression 1.  Findings are consistent with interstitial  lung disease which is most prominent in the periphery of the lower pulmonary lobes bilaterally but also present medially and laterally within the right middle pulmonary lobe. Areas of consolidation near the   pleural surface are unchanged and likely due to fibrosis. Mild degree of honeycombing is present.    2.  Bronchiectasis is prominent in the lower pulmonary lobes and unchanged. Limited bronchiectasis is again noted medially in the right middle pulmonary lobe.    3.  No new consolidations or masses are identified.    4.  Minimal pleural thickening and loculated pleural fluid at the posterior costophrenic angle at the base of the right hemithorax. This is slightly more prominent than on the prior CT.    5.  Calcified granulomas are noted in within lymph nodes as well as the liver and spleen.           Assessment and Plan:     1. Wegeners granulomatosis (CMS-HCC)  Without symptoms and negative ANCA, currently in remission, at this point will treat predominantly with prednisone, patient has a chronic Pseudomonas colonization in lungs if put on any further immunosuppressive medications at risk of causing significant pneumonia issues. At this point, patient on prednisone per pulmonary for decreased inflammation from Pseudomonas, patient can decrease prednisone. We will recheck ANCA about March 2017 to monitor for any evidence of Wegener's, if ANCA positive then will adjust prednisone accordingly.  - ANTI-NEUTROPHIL CYTOPLASMIC AB; Future    2. Pseudomonas aeruginosa colonization  In process of getting doses of tobramycin, being followed by infectious disease and pulmonary.    3. Pacemaker  Stable    4. ESRD on dialysis (CMS-HCC)    5. Essential hypertension  May impact the type of medications we can use for this patient's arthritis. We will have to keep this under advisement.    6. TIMOTHY (obstructive sleep apnea)    Followup: Return in about 3 months (around 4/17/2017). or sooner prn    Patient was seen 30 minutes  face-to-face of which more than 50% of the time was spent counseling the patient (excluding time for procedures)  regarding  rheumatological condition and care. Therapy was discussed in detail.    Please note that this dictation was created using voice recognition software. I have made every reasonable attempt to correct obvious errors, but I expect that there are errors of grammar and possibly content that I did not discover before finalizing the note.

## 2017-01-19 ENCOUNTER — OFFICE VISIT (OUTPATIENT)
Dept: PULMONOLOGY | Facility: HOSPICE | Age: 77
End: 2017-01-19
Payer: MEDICARE

## 2017-01-19 VITALS
OXYGEN SATURATION: 97 % | RESPIRATION RATE: 16 BRPM | TEMPERATURE: 98.3 F | BODY MASS INDEX: 21.22 KG/M2 | HEIGHT: 68 IN | DIASTOLIC BLOOD PRESSURE: 60 MMHG | WEIGHT: 140 LBS | SYSTOLIC BLOOD PRESSURE: 110 MMHG | HEART RATE: 80 BPM

## 2017-01-19 DIAGNOSIS — R06.09 DYSPNEA ON EXERTION: ICD-10-CM

## 2017-01-19 DIAGNOSIS — J47.9 BRONCHIECTASIS WITHOUT COMPLICATION (HCC): ICD-10-CM

## 2017-01-19 DIAGNOSIS — A31.0 NON-TUBERCULOUS MYCOBACTERIAL PNEUMONIA (HCC): ICD-10-CM

## 2017-01-19 DIAGNOSIS — G47.33 OSA (OBSTRUCTIVE SLEEP APNEA): ICD-10-CM

## 2017-01-19 DIAGNOSIS — M31.30 WEGENER'S DISEASE, PULMONARY: ICD-10-CM

## 2017-01-19 PROCEDURE — 99214 OFFICE O/P EST MOD 30 MIN: CPT | Performed by: INTERNAL MEDICINE

## 2017-01-19 RX ORDER — IPRATROPIUM BROMIDE AND ALBUTEROL SULFATE 2.5; .5 MG/3ML; MG/3ML
3 SOLUTION RESPIRATORY (INHALATION) 4 TIMES DAILY
COMMUNITY
End: 2017-04-03 | Stop reason: SDUPTHER

## 2017-01-19 RX ORDER — SEVELAMER CARBONATE 800 MG/1
TABLET, FILM COATED ORAL
COMMUNITY
Start: 2017-01-05 | End: 2017-04-13

## 2017-01-19 RX ORDER — CIPROFLOXACIN 500 MG/1
250 TABLET, FILM COATED ORAL 2 TIMES DAILY
Qty: 28 TAB | Refills: 0 | Status: SHIPPED | OUTPATIENT
Start: 2017-01-19 | End: 2017-04-13

## 2017-01-19 NOTE — Clinical Note
Abisai Weeks M.D.  [facility]   [address] [place] [id number]  Phone: [phone] - Fax: [phone]           Encounter Date: 1/19/2017  Provider: Abisai Weeks M.D.  Location of Care: [facility]MON[facility]      Patient:   Gilberto Brown   MR Number: [id number]   YOB: 1940     PROGRESS NOTE:  No notes on file      Electronically signed by Abisai Weeks M.D.  on 01/19/2017    No Recipients

## 2017-01-19 NOTE — PROGRESS NOTES
Chief Complaint   Patient presents with   • Shortness of Breath       HPI:  The patient is a 76-year-old male who is followed  in our office  for    recurrent pneumonia and Wegener's granulomatosis.  He was diagnosed with this    condition 6-7 years ago and had a biopsy of his kidney that revealed    necrotizing granulomatous disease. He has renal failure and is on dialysis.  He has been treated with multiple treatments    including CellCept, rituximab, and steroids.  He has been in remission since    rituximab a little over a year ago, but had some intolerance with his fourth    dose.  Most recent ANCA titers have been negative. He continues on prednisone. He has been on and off    antibiotics as guided by physicians here locally, including Dr. Nunez and    Dr. Martinez in the past.  He has had multiple organisms isolated including pseudomonas.     Most recently, he had a right lower lobe pneumonia and had a two species of    Pseudomonas aeruginosa, one resistant to ticarcillin. 2 years ago he was using nebulized tobramycin but apparently the Pseudomonas became quickly resistant. The patient has been treated with colistin in the past. The patient was in    dialysis in December and went home afterwards and had some fevers and chills for   worsening shortness of breath and increasing sputum production without chest    pain, hemoptysis and presented for evaluation and found to have    radiographically worsening right lower lobe pneumonia and was admitted for    treatment.  He was started on Zosyn  as well as inhaled tobramycin.   AFB cultures were obtained and results are still pending.   The patient has a history of DANIEL disease treated 10 years ago at the [facility] in [place]. 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 Currently he is using nebulizer treatments and Advair as well as chest percussion with the VEST.  The patient gave me his complete history including a diagnosis of histoplasmosis at [facility]  "when he was in college. In 1985 he said he was diagnosed with \"an  thing\". At that time he says he was seen by the [facility]. He is also been seen at [facility] and by Dr. Lyman at [facility]. His treatment at [facility] last 18 months. He has seen ENT for his sinus disease.    On today's visit he actually has some improvement with decreased cough and sputum production. He is not having any fevers or chills since he was hospitalized. He is currently on voriconazole and inhaled CANELO.   He doesn't tolerate to intensive check his ANCA  q3 months.    His compliance report shows an average usage of 4 hours 28 minutes with resulting AHI of 0.9 events per hour. He is on auto CPAP in the range of 4-12 cm of water pressure. His median pressure is 10.3 cm of water. 95th percentile is 12.6 cm water pressure.    He uses a walker. He cannot carry heavy objects.        Past Medical History   Diagnosis Date   • HTN    • Dyslipidemia    • Allergy    • COPD    • GERD (gastroesophageal reflux disease)    • Dialysis      on hemodialysis   • Urinary bladder disorder    • Snoring    • Unspecified hemorrhagic conditions (CMS-[facility])      bruises easily   • Pneumonia 2011   • Bronchitis      chronic   • Chronic bronchitis with productive mucopurulent cough (CMS-[facility])    • Indigestion    • ASTHMA    • EMPHYSEMA    • CA in situ resp sys    • Other specified disorder of intestines    • Unspecified urinary incontinence    • Sick sinus syndrome (CMS-[facility])    • Pacemaker 1988   • Renal disorder      dialysis 3 days a week   • Wegener's disease, pulmonary (CMS-[facility])    • Hip pain    • Heart burn    • Pain 7/16/13     leg's and hand's   • Sleep apnea      uses cpap at noc   • BPH (benign prostatic hyperplasia)    • Arthritis      left knee, hands   • Hypertension    • Coughing blood 2011   • Breath shortness      WITH EXERTION   • Pulmonary histoplasmosis (CMS-[facility])    • TIMOTHY (obstructive sleep apnea)        ROS:  Constitutional: " Denies fevers, chills, night sweats, fatigue or weight loss at this time  Eyes: Denies vision loss, pain, drainage, double vision  Ears, Nose, Throat: Denies earache, tinnitus, hoarseness  Cardiovascular: Denies chest pain, tightness, palpitations  Respiratory: See history of present illness  Sleep: Denies, snoring, apnea  GI: Denies abdominal pain, nausea, vomiting, diarrhea  : Denies frequent urination, hematuria, painful urination  Musculoskeletal: Denies back pain, painful joints, sore muscles  Neurological: Denies headaches, seizures  Skin: Denies rashes, color changes  Psychiatric: Denies depression or thoughts of suicide  Hematologic: Denies bleeding tendency or clotting tendency  Allergic/Immunologic: Denies rhinitis, skin sensitivity    Social History     Social History   • Marital Status:      Spouse Name: N/A   • Number of Children: N/A   • Years of Education: N/A     Occupational History   • Not on file.     Social History Main Topics   • Smoking status: Former Smoker -- 30 years     Types: Pipe     Quit date: 01/01/1990   • Smokeless tobacco: Former User     Types: Chew   • Alcohol Use: 4.8 oz/week     7 Glasses of wine, 1 Standard drinks or equivalent per week      Comment: Red wine nightly   • Drug Use: No   • Sexual Activity:     Partners: Female     Other Topics Concern   • Not on file     Social History Narrative     Doxycycline; Erythromycin; and Rituximab  Current Outpatient Prescriptions on File Prior to Visit   Medication Sig Dispense Refill   • voriconazole (VFEND) 200 MG Tab Take 1 Tab by mouth every 12 hours for 90 days. 180 Tab 0   • predniSONE (DELTASONE) 10 MG Tab TAKE 1 AND 1/2 TABLETS BY MOUTH EVERY DAY 45 Tab 2   • tobramycin 60 mg/mL in sterile water Inhale 5 mL by mouth 2 times a day. 280 mL 6   • fluticasone-salmeterol (ADVAIR DISKUS) 500-50 MCG/DOSE AEROSOL POWDER, BREATH ACTIVATED Inhale 1 Puff by mouth 2 times a day. Rinse mouth after each use. 1 Inhaler 6   • B  "Complex-C-Biotin-E-Min-FA (DIALYVITE 3000) 3 MG Tab Take 1 Tab by mouth every day.     • albuterol 108 (90 BASE) MCG/ACT Aero Soln inhalation aerosol Inhale 2 Puffs by mouth every four hours as needed for Shortness of Breath.     • ranitidine (ZANTAC) 150 MG Tab TAKE 1 TABLET BY MOUTH EVERY NIGHT AT BEDTIME 30 Tab 0   • finasteride (PROSCAR) 5 MG Tab Take 5 mg by mouth every day.     • simvastatin (ZOCOR) 40 MG TABS Take 40 mg by mouth every evening.     • epoetin lucina (EPOGEN,PROCRIT) 3000 UNIT/ML SOLN Inject 2,200 Units as instructed every Monday, Wednesday, and Friday. With dialysis     • Cholecalciferol (VITAMIN D) 2000 UNIT TABS Take 2,000 Units by mouth every day.     • sevelamer (RENAGEL) 800 MG TABS Take 800 mg by mouth 3 times a day, with meals.     • tamsulosin (FLOMAX) 0.4 MG capsule Take 0.4 mg by mouth every day.     • aspirin EC (ECOTRIN) 81 MG TBEC Take 81 mg by mouth every day.     • sterile water SOLN 2.1 mL with sodium chloride 4 mEq/mL SOLN 3.6 mEq 3 mL by Nebulization route 2 Times a Day. 60 Ampule 6     No current facility-administered medications on file prior to visit.     Blood pressure 110/60, pulse 80, temperature 36.8 °C (98.3 °F), resp. rate 16, height 1.727 m (5' 7.99\"), weight 63.504 kg (140 lb), SpO2 97 %.    Physical Exam:    HEENT: PERRLA, EOMI, no scleral icterus, no nasal or oral lesions  Neck: No thyromegaly, no adenopathy, no bruits  Mallampatti: Grade II  Lungs: Distant breath sounds, he has no current wheezes or crackles  Heart: Regular rate and rhythm, no gallops or murmurs  Abdomen: Soft, benign, no organomegaly  Extremities: No clubbing, cyanosis, or edema  Neurologic: Intact    1. Wegener's disease, pulmonary (CMS-HCC)    2. Bronchiectasis without complication (CMS-HCC)    3. Non-tuberculous mycobacterial pneumonia (CMS-HCC)    4. TIMOTHY (obstructive sleep apnea)    5. Dyspnea on exertion        Continue inhaled CANELO for 28 day cycle and then off 28 days before restarting.  We " will give him a prescription for ciprofloxacin 250 mg twice a day in case he develops acute symptoms at home. The reduced doses because of his renal function.  We will continue vest therapy and flutter valve  Await final AFB culture results  Continue autoCPAP  Follow-up in 3 months or sooner if he develops symptoms

## 2017-01-19 NOTE — MR AVS SNAPSHOT
"        Gilberto Brown   2017 10:40 AM   Office Visit   MRN: [id number]    Department:  [facility]   Dept Phone:  [phone]    Description:  Male : 1940   Provider:  Abisai Weeks M.D.           Allergies as of 2017     Allergen Noted Reactions    Doxycycline 2014   Unspecified    Abdominal pain.  FHT=3301    Erythromycin 2014   Unspecified    Abdominal pain.  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[facility])   [226869]       Bronchiectasis without complication (CMS-HCC)   [562080]       Non-tuberculous mycobacterial pneumonia (CMS-HCC)   [584745]       TIMOTHY (obstructive sleep apnea)   [784045]       Dyspnea on exertion   [346983]         Vital Signs     Blood Pressure Pulse Temperature Respirations Height Weight    110/60 mmHg 80 36.8 °C (98.3 °F) 16 1.727 m (5' 7.99\") 63.504 kg (140 lb)    Body Mass Index Oxygen Saturation Smoking Status             21.29 kg/m2 97% Former Smoker         Basic Information     Date Of Birth Sex Race Ethnicity Preferred Language    1940 Male White Non- English      Your appointments     2017  9:30 AM   Follow Up Visit with Ruma Bowser M.D.   [address])    [address] [place] [address]  [facility] [address] [phone]           You will be receiving a confirmation call a few days before your appointment from our automated call confirmation system.            2017  3:00 PM   Established Patient Pul with Eric Vaughan M.D.   [facility] (--)    [address]  [address]  [facility] [address] [phone]            2017  1:00 PM   Follow Up Visit with Cintia Ariza M.D.   [facility] (--)    [address]  [facility] [address] [phone]           You will be receiving a confirmation call a few days before your appointment from our " automated call confirmation system.              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 Low  7/7/2011 - Present    Dysphagia  Medium  9/27/2011 - Present    Odynophagia R13.10 Medium  9/27/2011 - Present    Esophageal stricture K22.2 Medium  9/27/2011 - Present    Chronic respiratory infection J98.8 High  10/11/2011 - Present    Protein-calorie malnutrition, severe (HCC) E43 Medium  10/11/2011 - Present    Debility R53.81 High  10/12/2011 - Present    HTN (hypertension) I10 Low  10/12/2011 - Present    Abnormal thyroid function test R94.6   10/12/2011 - Present    TIMOTHY (obstructive sleep apnea) G47.33   10/12/2011 - Present    Hyperlipidemia E78.5   10/12/2011 - Present    ESRD on dialysis (CMS-HCC) N18.6, Z99.2 Medium  11/8/2011 - Present    Vitamin d deficiency    11/8/2011 - Present    Anemia of chronic renal failure, stage 5 (CMS-HCC) (Chronic) N18.5, D63.1 Medium  12/14/2011 - Present    BPH (benign prostatic hyperplasia) N40.0 Low  12/14/2011 - Present    GERD (gastroesophageal reflux disease) K21.9   12/14/2011 - Present    Weight loss R63.4   12/14/2011 - Present    Pneumonia J18.9 High  12/14/2011 - Present    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 Medium  12/14/2011 - Present    Adult failure to thrive R62.7 High  1/13/2012 - Present    Personal history of other malignant neoplasm of skin Z85.828   7/16/2013 - Present    AV block I44.30 Low  2/6/2014 - Present    Pacemaker Z95.0 Medium  2/6/2014 - Present    Immunosuppressed status (HCC) D89.9 Medium  2/6/2014 - Present    Hypotension I95.9 High  2/1/2015 - Present    Macrocytosis D75.89 Medium  2/1/2015 - Present    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   4/12/2016 - Present    HCAP (healthcare-associated pneumonia) J18.9 High  10/4/2016 - Present    Advance care planning Z71.89 Low  10/5/2016 - Present    Abnormal CT scan of lung R91.8    10/25/2016 - Present    Thrush B37.0   10/25/2016 - Present      Health Maintenance        Date Due Completion Dates    IMM DTaP/Tdap/Td Vaccine (1 - Tdap) 6/23/1958 ---    IMM ZOSTER VACCINE 6/23/1999 ---    IMM PNEUMOCOCCAL 65+ (ADULT) HIGH/HIGHEST RISK SERIES (2 of 2 - PCV13) 12/19/2012 12/19/2011    COLONOSCOPY 12/1/2025 12/1/2015 (Postponed)    Override on 12/1/2015: Postponed (Patient will discuss scheduling with PCP)            Current Immunizations     Influenza TIV (IM) 9/15/2016, 11/3/2014, 10/10/2013, 9/27/2011  3:15 AM    Pneumococcal polysaccharide vaccine (PPSV-23) 12/19/2011  4:47 PM    Tuberculin Skin Test  Incomplete      Below and/or attached are the medications your provider expects you to take. Review all of your home medications and newly ordered medications with your provider and/or pharmacist. Follow medication instructions as directed by your provider and/or pharmacist. Please keep your medication list with you and share with your provider. Update the information when medications are discontinued, doses are changed, or new medications (including over-the-counter products) are added; and carry medication information at all times in the event of emergency situations     Allergies:  DOXYCYCLINE - Unspecified     ERYTHROMYCIN - Unspecified     RITUXIMAB - (reactions not documented)               Medications  Valid as of: January 19, 2017 - 11:48 AM    Generic Name Brand Name Tablet Size Instructions for use    Albuterol Sulfate (Aero Soln) albuterol 108 (90 BASE) MCG/ACT Inhale 2 Puffs by mouth every four hours as needed for Shortness of Breath.        Aspirin (Tablet Delayed Response) ECOTRIN 81 MG Take 81 mg by mouth every day.        B Complex-C-Biotin-E-Min-FA (Tab) DIALYVITE 3000 3 MG Take 1 Tab by mouth every day.        Cholecalciferol (Tab) vitamin D 2000 UNIT Take 2,000 Units by mouth every day.        Ciprofloxacin HCl (Tab) CIPRO 500 MG Take 0.5 Tabs by mouth 2 times a day. Take as  prescribed until gone.        Epoetin Jorgito (Solution) EPOGEN,PROCRIT 3000 UNIT/ML Inject 2,200 Units as instructed every Monday, Wednesday, and Friday. With dialysis        Finasteride (Tab) PROSCAR 5 MG Take 5 mg by mouth every day.        Flunisolide   Inhale  by mouth.        Fluticasone-Salmeterol (AEROSOL POWDER, BREATH ACTIVATED) ADVAIR 500-50 MCG/DOSE Inhale 1 Puff by mouth 2 times a day. Rinse mouth after each use.        Ipratropium-Albuterol (Solution) DUONEB 0.5-2.5 (3) MG/3ML 3 mL by Nebulization route 4 times a day.        Nystatin (Suspension) MYCOSTATIN 817689 UNIT/ML SWISH AND SWALLOW 1 TEASPOONFUL QID        PredniSONE (Tab) DELTASONE 10 MG TAKE 1 AND 1/2 TABLETS BY MOUTH EVERY DAY        RaNITidine HCl (Tab) ZANTAC 150 MG TAKE 1 TABLET BY MOUTH EVERY NIGHT AT BEDTIME        Sevelamer Carbonate (Tab) RENVELA 800 MG         Sevelamer HCl (Tab) RENAGEL 800 MG Take 800 mg by mouth 3 times a day, with meals.        Simvastatin (Tab) ZOCOR 40 MG Take 40 mg by mouth every evening.        sterile water SOLN 2.1 mL with sodium chloride 4 mEq/mL SOLN 3.6 mEq   3 mL by Nebulization route 2 Times a Day.        Tamsulosin HCl (Cap) FLOMAX 0.4 MG Take 0.4 mg by mouth every day.        tobramycin 60 mg/mL in sterile water CANELO  Inhale 5 mL by mouth 2 times a day.        Voriconazole (Tab) VFEND 200 MG Take 1 Tab by mouth every 12 hours for 90 days.        .                 Medicines prescribed today were sent to:     [facility] @ [facility] [place], [place] - [address] AT [facility]    [address] [facility] [address] [facility] [address]    Phone: [phone] Fax: [phone]    Open 24 Hours?: No    [facility] [address] [place] [address] [place] - [address] [place] [address] [place] AT [address] [place] [address]    Phone: [phone] Fax: [phone]    Open 24 Hours?: No      Medication refill instructions:       If your prescription bottle indicates you have  medication refills left, it is not necessary to call your provider’s office. Please contact your pharmacy and they will refill your medication.    If your prescription bottle indicates you do not have any refills left, you may request refills at any time through one of the following ways: The online CatchFree system (except Urgent Care), by calling your provider’s office, or by asking your pharmacy to contact your provider’s office with a refill request. Medication refills are processed only during regular business hours and may not be available until the next business day. Your provider may request additional information or to have a follow-up visit with you prior to refilling your medication.   *Please Note: Medication refills are assigned a new Rx number when refilled electronically. Your pharmacy may indicate that no refills were authorized even though a new prescription for the same medication is available at the pharmacy. Please request the medicine by name with the pharmacy before contacting your provider for a refill.        Other Notes About Your Plan     [facility][note 3] Ph. [phone] Fax. [phone]             CatchFree Access Code: Activation code not generated  Current CatchFree Status: Active

## 2017-01-26 ENCOUNTER — OFFICE VISIT (OUTPATIENT)
Dept: INFECTIOUS DISEASES | Facility: MEDICAL CENTER | Age: 77
End: 2017-01-26
Payer: MEDICARE

## 2017-01-26 VITALS
SYSTOLIC BLOOD PRESSURE: 120 MMHG | HEART RATE: 93 BPM | TEMPERATURE: 97.8 F | DIASTOLIC BLOOD PRESSURE: 62 MMHG | BODY MASS INDEX: 22.35 KG/M2 | WEIGHT: 142.4 LBS | OXYGEN SATURATION: 90 % | HEIGHT: 67 IN

## 2017-01-26 DIAGNOSIS — J15.1 PNEUMONIA DUE TO PSEUDOMONAS SPECIES, UNSPECIFIED LATERALITY, UNSPECIFIED PART OF LUNG (HCC): ICD-10-CM

## 2017-01-26 DIAGNOSIS — J16.8 FUNGAL PNEUMONIA: ICD-10-CM

## 2017-01-26 DIAGNOSIS — M31.30 WEGENERS GRANULOMATOSIS: ICD-10-CM

## 2017-01-26 DIAGNOSIS — B49 FUNGAL PNEUMONIA: ICD-10-CM

## 2017-01-26 PROCEDURE — 4040F PNEUMOC VAC/ADMIN/RCVD: CPT | Performed by: INTERNAL MEDICINE

## 2017-01-26 PROCEDURE — G8482 FLU IMMUNIZE ORDER/ADMIN: HCPCS | Performed by: INTERNAL MEDICINE

## 2017-01-26 PROCEDURE — G8419 CALC BMI OUT NRM PARAM NOF/U: HCPCS | Performed by: INTERNAL MEDICINE

## 2017-01-26 PROCEDURE — 1101F PT FALLS ASSESS-DOCD LE1/YR: CPT | Performed by: INTERNAL MEDICINE

## 2017-01-26 PROCEDURE — 1111F DSCHRG MED/CURRENT MED MERGE: CPT | Performed by: INTERNAL MEDICINE

## 2017-01-26 PROCEDURE — G8432 DEP SCR NOT DOC, RNG: HCPCS | Performed by: INTERNAL MEDICINE

## 2017-01-26 PROCEDURE — 1036F TOBACCO NON-USER: CPT | Performed by: INTERNAL MEDICINE

## 2017-01-26 PROCEDURE — 99214 OFFICE O/P EST MOD 30 MIN: CPT | Performed by: INTERNAL MEDICINE

## 2017-01-26 NOTE — MR AVS SNAPSHOT
"        Gilberto Brown   2017 9:30 AM   Office Visit   MRN: [id number]    Department:  [facility]   Dept Phone:  [phone]    Description:  Male : 1940   Provider:  Ruma Bowser M.D.           Reason for Visit     Hospital Follow-up uri ESRD on HD admitted with suspected PNA      Allergies as of 2017     Allergen Noted Reactions    Doxycycline 2014   Unspecified    Abdominal pain.  BVG=2508    Erythromycin 2014   Unspecified    Abdominal pain.  RXN=before     Rituximab 08/15/2016       Flu like syndrome      You were diagnosed with     Pneumonia due to Pseudomonas species, unspecified laterality, unspecified part of lung (CMS-[facility])   [1560394]         Vital Signs     Blood Pressure Pulse Temperature Height Weight Body Mass Index    120/62 mmHg 93 36.6 °C (97.8 °F) 1.702 m (5' 7\") 64.592 kg (142 lb 6.4 oz) 22.30 kg/m2    Oxygen Saturation Smoking Status                90% Former Smoker          Basic Information     Date Of Birth Sex Race Ethnicity Preferred Language    1940 Male White Non- English      Your appointments     Mar 07, 2017 10:00 AM   Follow Up Visit with Christine Manzo M.D.   [address])    [address] [facility] [address]  [place] [address] [phone]           You will be receiving a confirmation call a few days before your appointment from our automated call confirmation system.            2017  3:00 PM   Established Patient Pul with Eric Vaughan M.D.   [facility]monary Medicine (--)    [address]  [address]  [place] [id number] [phone]            2017  1:00 PM   Follow Up Visit with Cintia Ariza M.D.   [facility] (--)    [address]  [place] [address] [phone]           You will be receiving a confirmation call a few days before your appointment from our automated call confirmation system.              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  7/7/2011 - Present    Dysphagia  Medium  9/27/2011 - Present    Odynophagia R13.10 Medium  9/27/2011 - Present    Esophageal stricture K22.2 Medium  9/27/2011 - Present    Chronic respiratory infection J98.8 High  10/11/2011 - Present    Protein-calorie malnutrition, severe (HCC) E43 Medium  10/11/2011 - Present    Debility R53.81 High  10/12/2011 - Present    HTN (hypertension) I10 Low  10/12/2011 - Present    Abnormal thyroid function test R94.6   10/12/2011 - Present    TIMOTHY (obstructive sleep apnea) G47.33   10/12/2011 - Present    Hyperlipidemia E78.5   10/12/2011 - Present    ESRD on dialysis (CMS-HCC) N18.6, Z99.2 Medium  11/8/2011 - Present    Vitamin d deficiency    11/8/2011 - Present    Anemia of chronic renal failure, stage 5 (CMS-HCC) (Chronic) N18.5, D63.1 Medium  12/14/2011 - Present    BPH (benign prostatic hyperplasia) N40.0 Low  12/14/2011 - Present    GERD (gastroesophageal reflux disease) K21.9   12/14/2011 - Present    Weight loss R63.4   12/14/2011 - Present    Pneumonia J18.9 High  12/14/2011 - Present    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 Medium  12/14/2011 - Present    Adult failure to thrive R62.7 High  1/13/2012 - Present    Personal history of other malignant neoplasm of skin Z85.828   7/16/2013 - Present    AV block I44.30 Low  2/6/2014 - Present    Pacemaker Z95.0 Medium  2/6/2014 - Present    Immunosuppressed status (HCC) D89.9 Medium  2/6/2014 - Present    Hypotension I95.9 High  2/1/2015 - Present    Macrocytosis D75.89 Medium  2/1/2015 - Present    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   4/12/2016 - Present    HCAP (healthcare-associated pneumonia) J18.9 High  10/4/2016 - Present    Advance care planning Z71.89 Low  10/5/2016 - Present    Abnormal CT scan of lung R91.8   10/25/2016 - Present    Thrush B37.0   10/25/2016 - Present      Health  Maintenance        Date Due Completion Dates    IMM DTaP/Tdap/Td Vaccine (1 - Tdap) 6/23/1958 ---    IMM ZOSTER VACCINE 6/23/1999 ---    IMM PNEUMOCOCCAL 65+ (ADULT) HIGH/HIGHEST RISK SERIES (2 of 2 - PCV13) 12/19/2012 12/19/2011    COLONOSCOPY 12/1/2025 12/1/2015 (Postponed)    Override on 12/1/2015: Postponed (Patient will discuss scheduling with PCP)            Current Immunizations     Influenza TIV (IM) 9/15/2016, 11/3/2014, 10/10/2013, 9/27/2011  3:15 AM    Pneumococcal polysaccharide vaccine (PPSV-23) 12/19/2011  4:47 PM    Tuberculin Skin Test  Incomplete      Below and/or attached are the medications your provider expects you to take. Review all of your home medications and newly ordered medications with your provider and/or pharmacist. Follow medication instructions as directed by your provider and/or pharmacist. Please keep your medication list with you and share with your provider. Update the information when medications are discontinued, doses are changed, or new medications (including over-the-counter products) are added; and carry medication information at all times in the event of emergency situations     Allergies:  DOXYCYCLINE - Unspecified     ERYTHROMYCIN - Unspecified     RITUXIMAB - (reactions not documented)               Medications  Valid as of: January 26, 2017 - 10:11 AM    Generic Name Brand Name Tablet Size Instructions for use    Albuterol Sulfate (Aero Soln) albuterol 108 (90 BASE) MCG/ACT Inhale 2 Puffs by mouth every four hours as needed for Shortness of Breath.        Aspirin (Tablet Delayed Response) ECOTRIN 81 MG Take 81 mg by mouth every day.        B Complex-C-Biotin-E-Min-FA (Tab) DIALYVITE 3000 3 MG Take 1 Tab by mouth every day.        Cholecalciferol (Tab) vitamin D 2000 UNIT Take 2,000 Units by mouth every day.        Ciprofloxacin HCl (Tab) CIPRO 500 MG Take 0.5 Tabs by mouth 2 times a day. Take as prescribed until gone.        Epoetin Jorgito (Solution) EPOGEN,PROCRIT 3000  UNIT/ML Inject 2,200 Units as instructed every Monday, Wednesday, and Friday. With dialysis        Finasteride (Tab) PROSCAR 5 MG Take 5 mg by mouth every day.        Flunisolide   Inhale  by mouth.        Fluticasone-Salmeterol (AEROSOL POWDER, BREATH ACTIVATED) ADVAIR 500-50 MCG/DOSE Inhale 1 Puff by mouth 2 times a day. Rinse mouth after each use.        Ipratropium-Albuterol (Solution) DUONEB 0.5-2.5 (3) MG/3ML 3 mL by Nebulization route 4 times a day.        Nystatin (Suspension) MYCOSTATIN 778349 UNIT/ML SWISH AND SWALLOW 1 TEASPOONFUL QID        PredniSONE (Tab) DELTASONE 10 MG TAKE 1 AND 1/2 TABLETS BY MOUTH EVERY DAY        RaNITidine HCl (Tab) ZANTAC 150 MG TAKE 1 TABLET BY MOUTH EVERY NIGHT AT BEDTIME        Sevelamer Carbonate (Tab) RENVELA 800 MG         Sevelamer HCl (Tab) RENAGEL 800 MG Take 800 mg by mouth 3 times a day, with meals.        Simvastatin (Tab) ZOCOR 40 MG Take 40 mg by mouth every evening.        sterile water SOLN 2.1 mL with sodium chloride 4 mEq/mL SOLN 3.6 mEq   3 mL by Nebulization route 2 Times a Day.        Tamsulosin HCl (Cap) FLOMAX 0.4 MG Take 0.4 mg by mouth every day.        tobramycin 60 mg/mL in sterile water CANELO  Inhale 5 mL by mouth 2 times a day.        Voriconazole (Tab) VFEND 200 MG Take 1 Tab by mouth every 12 hours for 90 days.        .                 Medicines prescribed today were sent to:     [facility] @ [facility] [place], [place] - [address] AT [facility]    [address] [facility] [address] [facility] [address]    Phone: [phone] Fax: [phone]    Open 24 Hours?: No    [facility] [address] [place] [address] [place] - [address] [place] [address] [place] AT [address] [address]    Phone: [phone] Fax: [phone]    Open 24 Hours?: No      Medication refill instructions:       If your prescription bottle indicates you have medication refills left, it is not necessary to call your provider’s office.  Please contact your pharmacy and they will refill your medication.    If your prescription bottle indicates you do not have any refills left, you may request refills at any time through one of the following ways: The online Sensdata system (except Urgent Care), by calling your provider’s office, or by asking your pharmacy to contact your provider’s office with a refill request. Medication refills are processed only during regular business hours and may not be available until the next business day. Your provider may request additional information or to have a follow-up visit with you prior to refilling your medication.   *Please Note: Medication refills are assigned a new Rx number when refilled electronically. Your pharmacy may indicate that no refills were authorized even though a new prescription for the same medication is available at the pharmacy. Please request the medicine by name with the pharmacy before contacting your provider for a refill.        Other Notes About Your Plan     [facility][note 3] Ph. [phone] Fax. [phone]             Sensdata Access Code: Activation code not generated  Current Sensdata Status: Active

## 2017-01-26 NOTE — PROGRESS NOTES
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  Stop date 1/7/2017. Cannot get til Jose 3-will dose IV tobra today to cover til then  Continue PO cipro- tolerated well and has tolerated in the past. 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 Continue voriconazole    Plan for 3 month course    Hospital records reviewed    Here for follow up. He continues on inhaled tobra being managed by . He will complete his course on 1/28 and then resume the course in March. He has completed cipro and continues on vori. He is tolerating it well aside from some visual haziness but he states it's not that worrisome. No diarrhea or fevers. Endorses night sweats x 2-3 months and chronic dry hack. Some voice change. He will follow up with Pulm in April      Past Medical History   Diagnosis Date   • HTN    • Dyslipidemia    • Allergy    • COPD    • GERD (gastroesophageal reflux disease)    • Dialysis      on hemodialysis   • Urinary bladder disorder    • Snoring    • Unspecified hemorrhagic conditions (CMS-HCC)      bruises easily   • Pneumonia 2011   • Bronchitis      chronic   • Chronic bronchitis with productive mucopurulent cough (CMS-HCC)    • Indigestion    • ASTHMA    • EMPHYSEMA    • CA in situ resp sys    • Other specified disorder of intestines    • Unspecified urinary incontinence    • Sick sinus syndrome (CMS-[facility])    • Pacemaker 1988   • Renal disorder      dialysis 3 days a week   • Wegener's disease, pulmonary (CMS-[facility])    • Hip pain    • Heart burn    • Pain 7/16/13     leg's and hand's   • Sleep apnea      uses cpap at noc   • BPH (benign  prostatic hyperplasia)    • Arthritis      left knee, hands   • Hypertension    • Coughing blood 2011   • Breath shortness      WITH EXERTION   • Pulmonary histoplasmosis (CMS-HCC)    • TIMOTHY (obstructive sleep apnea)        Allergies:   Allergies   Allergen Reactions   • Doxycycline Unspecified     Abdominal pain.  ECS=1233   • Erythromycin Unspecified     Abdominal pain.  RXN=before 2011   • Rituximab      Flu like syndrome         Medications:  Current Outpatient Prescriptions on File Prior to Visit   Medication Sig Dispense Refill   • voriconazole (VFEND) 200 MG Tab Take 1 Tab by mouth every 12 hours for 90 days. 180 Tab 0   • tobramycin 60 mg/mL in sterile water Inhale 5 mL by mouth 2 times a day. 280 mL 6   • ranitidine (ZANTAC) 150 MG Tab TAKE 1 TABLET BY MOUTH EVERY NIGHT AT BEDTIME 30 Tab 0   • aspirin EC (ECOTRIN) 81 MG TBEC Take 81 mg by mouth every day.     • nystatin (MYCOSTATIN) 399653 UNIT/ML Suspension SWISH AND SWALLOW 1 TEASPOONFUL QID  0   • RENVELA 800 MG Tab      • Flunisolide (AEROBID INH) Inhale  by mouth.     • ipratropium-albuterol (DUONEB) 0.5-2.5 (3) MG/3ML nebulizer solution 3 mL by Nebulization route 4 times a day.     • ciprofloxacin (CIPRO) 500 MG Tab Take 0.5 Tabs by mouth 2 times a day. Take as prescribed until gone. 28 Tab 0   • predniSONE (DELTASONE) 10 MG Tab TAKE 1 AND 1/2 TABLETS BY MOUTH EVERY DAY 45 Tab 2   • sterile water SOLN 2.1 mL with sodium chloride 4 mEq/mL SOLN 3.6 mEq 3 mL by Nebulization route 2 Times a Day. 60 Ampule 6   • fluticasone-salmeterol (ADVAIR DISKUS) 500-50 MCG/DOSE AEROSOL POWDER, BREATH ACTIVATED Inhale 1 Puff by mouth 2 times a day. Rinse mouth after each use. 1 Inhaler 6   • B Complex-C-Biotin-E-Min-FA (DIALYVITE 3000) 3 MG Tab Take 1 Tab by mouth every day.     • albuterol 108 (90 BASE) MCG/ACT Aero Soln inhalation aerosol Inhale 2 Puffs by mouth every four hours as needed for Shortness of Breath.     • finasteride (PROSCAR) 5 MG Tab Take 5 mg by mouth  "every day.     • simvastatin (ZOCOR) 40 MG TABS Take 40 mg by mouth every evening.     • epoetin lucina (EPOGEN,PROCRIT) 3000 UNIT/ML SOLN Inject 2,200 Units as instructed every Monday, Wednesday, and Friday. With dialysis     • Cholecalciferol (VITAMIN D) 2000 UNIT TABS Take 2,000 Units by mouth every day.     • sevelamer (RENAGEL) 800 MG TABS Take 800 mg by mouth 3 times a day, with meals.     • tamsulosin (FLOMAX) 0.4 MG capsule Take 0.4 mg by mouth every day.       No current facility-administered medications on file prior to visit.         ROS  As documented above in my HPI       Objective:     PE:  /62 mmHg  Pulse 93  Temp(Src) 36.6 °C (97.8 °F)  Ht 1.702 m (5' 7\")  Wt 64.592 kg (142 lb 6.4 oz)  BMI 22.30 kg/m2  SpO2 90%     Vital signs reviewed  Constitutional: patient is oriented to person, place, and time. Appears well-developed and well-nourished. No distress. Walks with wheeled walker  Eyes: Conjunctivae normal and EOM are normal. Pupils are equal, round, and reactive to light.   Mouth/Throat: Lips without lesions, good dentition, oropharynx is clear and moist.  Neck: Trachea midline. Normal range of motion. Neck supple. No masses  Cardiovascular: Normal rate, regular rhythm, normal heart sounds and intact distal pulses. No murmur, gallop, or friction rub. No edema.  Pulmonary/Chest: No respiratory distress. Unlabored respiratory effort, lungs clear to auscultation. No wheezes or rales.   Abdominal: Soft, non tender. BS + x 4. No masses or hepatosplenomegaly.   Musculoskeletal: Normal range of motion. No tenderness, swelling, erythema, deformity noted.  Neurological: alert and oriented to person, place, and time. No cranial nerve deficit. Coordination normal.   Skin: Skin is warm and dry. Good turgor. No rashes visable.  Psychiatric: Normal mood and affect. Behavior is normal.     LABS:  WBC   Date/Time Value Ref Range Status   12/30/2016 02:26 AM 5.2 4.8 - 10.8 K/uL Final   03/19/2012 12:00 AM " 7.6 4.0 - 10.5 x10E3/uL Final     RBC   Date/Time Value Ref Range Status   12/30/2016 02:26 AM 3.02* 4.70 - 6.10 M/uL Final   03/19/2012 12:00 AM 2.25* 4.20 - 6.00 x10E6/uL Final     HEMOGLOBIN   Date/Time Value Ref Range Status   12/30/2016 02:26 AM 9.3* 14.0 - 18.0 g/dL Final     HEMATOCRIT   Date/Time Value Ref Range Status   12/30/2016 02:26 AM 29.1* 42.0 - 52.0 % Final     MCV   Date/Time Value Ref Range Status   12/30/2016 02:26 AM 96.4 81.4 - 97.8 fL Final   03/19/2012 12:00 * 80 - 98 fL Final     MCH   Date/Time Value Ref Range Status   12/30/2016 02:26 AM 30.8 27.0 - 33.0 pg Final   03/19/2012 12:00 AM 33.8 27.0 - 34.0 pg Final     MCHC   Date/Time Value Ref Range Status   12/30/2016 02:26 AM 32.0* 33.7 - 35.3 g/dL Final   03/19/2012 12:00 AM 33.6 32.0 - 36.0 g/dL Final     MPV   Date/Time Value Ref Range Status   12/30/2016 02:26 AM 9.1 9.0 - 12.9 fL Final        SODIUM   Date/Time Value Ref Range Status   12/30/2016 02:26 * 135 - 145 mmol/L Final     POTASSIUM   Date/Time Value Ref Range Status   12/30/2016 02:26 AM 5.3 3.6 - 5.5 mmol/L Final     CHLORIDE   Date/Time Value Ref Range Status   12/30/2016 02:26 AM 98 96 - 112 mmol/L Final     CO2   Date/Time Value Ref Range Status   12/30/2016 02:26 AM 22 20 - 33 mmol/L Final     GLUCOSE   Date/Time Value Ref Range Status   12/30/2016 02:26 * 65 - 99 mg/dL Final     BUN   Date/Time Value Ref Range Status   12/30/2016 02:26 AM 61* 8 - 22 mg/dL Final     CREATININE   Date/Time Value Ref Range Status   12/30/2016 02:26 AM 4.24* 0.50 - 1.40 mg/dL Final   09/23/2008 09:36 AM 1.0 0.5 - 1.4 mg/dL Final     ALKALINE PHOSPHATASE   Date/Time Value Ref Range Status   12/24/2016 12:15 AM 54 30 - 99 U/L Final     AST(SGOT)   Date/Time Value Ref Range Status   12/24/2016 12:15 AM 14 12 - 45 U/L Final     ALT(SGPT)   Date/Time Value Ref Range Status   12/24/2016 12:15 AM 17 2 - 50 U/L Final     TOTAL BILIRUBIN   Date/Time Value Ref Range Status    12/24/2016 12:15 AM 0.7 0.1 - 1.5 mg/dL Final        CPK TOTAL   Date/Time Value Ref Range Status   09/27/2011 05:05 AM 18 0 - 154 U/L Final        MICRO:  BLOOD CULTURE   Date Value Ref Range Status   12/24/2016 No growth after 5 days of incubation.  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  Stop date 1/7/2017.   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 Continue voriconazole. Stop date in March 2017        Plan:     Problem List Items Addressed This Visit     Pneumonia - due to PSAR    Wegeners granulomatosis (CMS-HCC)      Other Visit Diagnoses     Fungal pneumonia     Likely due to aspergillus        Clinically at baseline  Continue inhaled tobra per pulm  Completed cipro  Continue voriconazole. Stop date in March 2017  FU with Pulm as scheduled    Follow up: 6 weeks, RTC sooner if needed. FU with PCP for ongoing chronic medical conditions.     Ruma Bowser M.D.  1/26/2017

## 2017-01-27 DIAGNOSIS — J47.9 BRONCHIECTASIS WITHOUT COMPLICATION (HCC): ICD-10-CM

## 2017-01-27 DIAGNOSIS — J47.0 BRONCHIECTASIS WITH ACUTE LOWER RESPIRATORY INFECTION (HCC): ICD-10-CM

## 2017-01-31 ENCOUNTER — APPOINTMENT (OUTPATIENT)
Dept: ONCOLOGY | Facility: MEDICAL CENTER | Age: 77
End: 2017-01-31
Attending: INTERNAL MEDICINE
Payer: MEDICARE

## 2017-02-01 ENCOUNTER — APPOINTMENT (OUTPATIENT)
Dept: ONCOLOGY | Facility: MEDICAL CENTER | Age: 77
End: 2017-02-01
Attending: INTERNAL MEDICINE
Payer: MEDICARE

## 2017-02-03 ENCOUNTER — HOSPITAL ENCOUNTER (OUTPATIENT)
Dept: LAB | Facility: MEDICAL CENTER | Age: 77
End: 2017-02-03
Attending: INTERNAL MEDICINE
Payer: MEDICARE

## 2017-02-03 DIAGNOSIS — J47.9 BRONCHIECTASIS WITHOUT COMPLICATION (HCC): ICD-10-CM

## 2017-02-03 LAB
ANION GAP SERPL CALC-SCNC: 15 MMOL/L (ref 0–11.9)
BUN SERPL-MCNC: 53 MG/DL (ref 8–22)
CALCIUM SERPL-MCNC: 9.5 MG/DL (ref 8.5–10.5)
CHLORIDE SERPL-SCNC: 100 MMOL/L (ref 96–112)
CO2 SERPL-SCNC: 24 MMOL/L (ref 20–33)
CREAT SERPL-MCNC: 3.87 MG/DL (ref 0.5–1.4)
GLUCOSE SERPL-MCNC: 162 MG/DL (ref 65–99)
POTASSIUM SERPL-SCNC: 3.6 MMOL/L (ref 3.6–5.5)
SODIUM SERPL-SCNC: 139 MMOL/L (ref 135–145)

## 2017-02-03 PROCEDURE — 36415 COLL VENOUS BLD VENIPUNCTURE: CPT

## 2017-02-03 PROCEDURE — 80048 BASIC METABOLIC PNL TOTAL CA: CPT

## 2017-02-08 DIAGNOSIS — M31.30 WEGENERS GRANULOMATOSIS: ICD-10-CM

## 2017-02-15 ENCOUNTER — OFFICE VISIT (OUTPATIENT)
Dept: PULMONOLOGY | Facility: HOSPICE | Age: 77
End: 2017-02-15
Payer: MEDICARE

## 2017-02-15 VITALS
OXYGEN SATURATION: 93 % | TEMPERATURE: 98.6 F | HEART RATE: 101 BPM | RESPIRATION RATE: 16 BRPM | SYSTOLIC BLOOD PRESSURE: 122 MMHG | HEIGHT: 67 IN | BODY MASS INDEX: 22.29 KG/M2 | DIASTOLIC BLOOD PRESSURE: 66 MMHG | WEIGHT: 142 LBS

## 2017-02-15 DIAGNOSIS — M31.30 WEGENER'S DISEASE, PULMONARY: ICD-10-CM

## 2017-02-15 DIAGNOSIS — J84.9 ILD (INTERSTITIAL LUNG DISEASE) (HCC): ICD-10-CM

## 2017-02-15 DIAGNOSIS — J47.9 BRONCHIECTASIS WITHOUT COMPLICATION (HCC): ICD-10-CM

## 2017-02-15 DIAGNOSIS — A31.0 MAIC (MYCOBACTERIUM AVIUM-INTRACELLULARE COMPLEX) (HCC): ICD-10-CM

## 2017-02-15 DIAGNOSIS — N18.6 ESRD (END STAGE RENAL DISEASE) (HCC): ICD-10-CM

## 2017-02-15 PROCEDURE — G8419 CALC BMI OUT NRM PARAM NOF/U: HCPCS | Performed by: INTERNAL MEDICINE

## 2017-02-15 PROCEDURE — G8432 DEP SCR NOT DOC, RNG: HCPCS | Performed by: INTERNAL MEDICINE

## 2017-02-15 PROCEDURE — 1101F PT FALLS ASSESS-DOCD LE1/YR: CPT | Performed by: INTERNAL MEDICINE

## 2017-02-15 PROCEDURE — 99214 OFFICE O/P EST MOD 30 MIN: CPT | Performed by: INTERNAL MEDICINE

## 2017-02-15 PROCEDURE — G8482 FLU IMMUNIZE ORDER/ADMIN: HCPCS | Performed by: INTERNAL MEDICINE

## 2017-02-15 PROCEDURE — 4040F PNEUMOC VAC/ADMIN/RCVD: CPT | Performed by: INTERNAL MEDICINE

## 2017-02-15 PROCEDURE — 1036F TOBACCO NON-USER: CPT | Performed by: INTERNAL MEDICINE

## 2017-02-15 RX ORDER — TOBRAMYCIN INHALATION SOLUTION 300 MG/5ML
INHALANT RESPIRATORY (INHALATION)
Refills: 6 | COMMUNITY
Start: 2017-02-07 | End: 2017-04-12

## 2017-02-15 RX ORDER — METOPROLOL SUCCINATE 25 MG/1
TABLET, EXTENDED RELEASE ORAL
Refills: 5 | COMMUNITY
Start: 2017-02-02 | End: 2017-05-13

## 2017-02-15 RX ORDER — CIPROFLOXACIN 250 MG/1
TABLET, FILM COATED ORAL
Refills: 3 | COMMUNITY
Start: 2017-01-19 | End: 2017-04-13

## 2017-02-15 RX ORDER — IBUPROFEN 200 MG
CAPSULE ORAL
Refills: 0 | COMMUNITY
Start: 2017-02-02 | End: 2017-04-13

## 2017-02-15 NOTE — PATIENT INSTRUCTIONS
1. We have scheduled a CT scan of the chest  2. We will arrange for the Aerobika device  3. Otherwise resume all treatments and medications as we discussed  4. Recommend follow-up in 3 months

## 2017-02-15 NOTE — MR AVS SNAPSHOT
"        Gilberto Brown   2/15/2017 9:40 AM   Office Visit   MRN: [id number]    Department:  [facility]   Dept Phone:  [phone]    Description:  Male : 1940   Provider:  Eric Vaughan M.D.           Reason for Visit     Follow-Up           Allergies as of 2/15/2017     Allergen Noted Reactions    Doxycycline 2014   Unspecified    Abdominal pain.  XHV=9963    Erythromycin 2014   Unspecified    Abdominal pain.  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HCC)   [564270]       ESRD (end stage renal disease) (CMS-HCC)   [392052]       Bronchiectasis without complication (CMS-HCC)   [395732]       MAIC (mycobacterium avium-intracellulare complex) (CMS-HCC)   [310478]       ILD (interstitial lung disease) (CMS-HCC)   [183932]         Vital Signs     Blood Pressure Pulse Temperature Respirations Height Weight    122/66 mmHg 101 37 °C (98.6 °F) 16 1.702 m (5' 7.01\") 64.411 kg (142 lb)    Body Mass Index Oxygen Saturation Smoking Status             22.24 kg/m2 93% Former Smoker         Basic Information     Date Of Birth Sex Race Ethnicity Preferred Language    1940 Male White Non- English      Your appointments     Mar 07, 2017 10:00 AM   Follow Up Visit with Christine Manzo M.D.   [address])    [address] [place] [address] [phone]           You will be receiving a confirmation call a few days before your appointment from our automated call confirmation system.            2017  1:00 PM   Follow Up Visit with Cintia Ariza M.D.   [facility] (--)    [address] [facility] [address]  [place] [address] [phone]           You will be receiving a confirmation call a few days before your appointment from our automated call confirmation system.              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 Low  7/7/2011 - Present    Dysphagia  Medium  9/27/2011 - Present    Odynophagia R13.10 Medium  9/27/2011 - Present    Esophageal stricture K22.2 Medium  9/27/2011 - Present    Chronic respiratory infection J98.8 High  10/11/2011 - Present    Protein-calorie malnutrition, severe (HCC) E43 Medium  10/11/2011 - Present    Debility R53.81 High  10/12/2011 - Present    HTN (hypertension) I10 Low  10/12/2011 - Present    Abnormal thyroid function test R94.6   10/12/2011 - Present    TIMOTHY (obstructive sleep apnea) G47.33   10/12/2011 - Present    Hyperlipidemia E78.5   10/12/2011 - Present    ESRD on dialysis (CMS-HCC) N18.6, Z99.2 Medium  11/8/2011 - Present    Vitamin d deficiency    11/8/2011 - Present    Anemia of chronic renal failure, stage 5 (CMS-HCC) (Chronic) N18.5, D63.1 Medium  12/14/2011 - Present    BPH (benign prostatic hyperplasia) N40.0 Low  12/14/2011 - Present    GERD (gastroesophageal reflux disease) K21.9   12/14/2011 - Present    Weight loss R63.4   12/14/2011 - Present    Pneumonia J18.9 High  12/14/2011 - Present    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 Medium  12/14/2011 - Present    Adult failure to thrive R62.7 High  1/13/2012 - Present    Personal history of other malignant neoplasm of skin Z85.828   7/16/2013 - Present    AV block I44.30 Low  2/6/2014 - Present    Pacemaker Z95.0 Medium  2/6/2014 - Present    Immunosuppressed status (HCC) D89.9 Medium  2/6/2014 - Present    Hypotension I95.9 High  2/1/2015 - Present    Macrocytosis D75.89 Medium  2/1/2015 - Present    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   4/12/2016 - Present    HCAP (healthcare-associated pneumonia) J18.9 High  10/4/2016 - Present    Advance care planning Z71.89 Low  10/5/2016 - Present    Abnormal CT scan of lung R91.8   10/25/2016 - Present    Thrush B37.0   10/25/2016 - Present      Health Maintenance        Date Due Completion Dates       IMM DTaP/Tdap/Td Vaccine (1 - Tdap) 6/23/1958 ---    IMM ZOSTER VACCINE 6/23/1999 ---    IMM PNEUMOCOCCAL 65+ (ADULT) HIGH/HIGHEST RISK SERIES (2 of 2 - PCV13) 12/19/2012 12/19/2011    COLONOSCOPY 12/1/2025 12/1/2015 (Postponed)    Override on 12/1/2015: Postponed (Patient will discuss scheduling with PCP)            Current Immunizations     Influenza TIV (IM) 9/15/2016, 11/3/2014, 10/10/2013, 9/27/2011  3:15 AM    Pneumococcal polysaccharide vaccine (PPSV-23) 12/19/2011  4:47 PM    Tuberculin Skin Test  Incomplete      Below and/or attached are the medications your provider expects you to take. Review all of your home medications and newly ordered medications with your provider and/or pharmacist. Follow medication instructions as directed by your provider and/or pharmacist. Please keep your medication list with you and share with your provider. Update the information when medications are discontinued, doses are changed, or new medications (including over-the-counter products) are added; and carry medication information at all times in the event of emergency situations     Allergies:  DOXYCYCLINE - Unspecified     ERYTHROMYCIN - Unspecified     RITUXIMAB - (reactions not documented)               Medications  Valid as of: February 15, 2017 - 10:20 AM    Generic Name Brand Name Tablet Size Instructions for use    Albuterol Sulfate (Aero Soln) albuterol 108 (90 BASE) MCG/ACT Inhale 2 Puffs by mouth every four hours as needed for Shortness of Breath.        Aspirin (Tablet Delayed Response) ECOTRIN 81 MG Take 81 mg by mouth every day.        B Complex-C-Biotin-E-Min-FA (Tab) DIALYVITE 3000 3 MG Take 1 Tab by mouth every day.        Bacitracin-Polymyxin B (Ointment) WAL-SPORIN 500-204457 UNIT/GM APPLY TID UTD        Cholecalciferol (Tab) vitamin D 2000 UNIT Take 2,000 Units by mouth every day.        Ciprofloxacin HCl (Tab) CIPRO 500 MG Take 0.5 Tabs by mouth 2 times a day. Take as prescribed until gone.         Ciprofloxacin HCl (Tab) CIPRO 250 MG TK 1 T PO BID FOR 7 DAYS        Epoetin Jorgito (Solution) EPOGEN,PROCRIT 3000 UNIT/ML Inject 2,200 Units as instructed every Monday, Wednesday, and Friday. With dialysis        Finasteride (Tab) PROSCAR 5 MG Take 5 mg by mouth every day.        Flunisolide   Inhale  by mouth.        Fluticasone-Salmeterol (AEROSOL POWDER, BREATH ACTIVATED) ADVAIR 500-50 MCG/DOSE Inhale 1 Puff by mouth 2 times a day. Rinse mouth after each use.        Ipratropium-Albuterol (Solution) DUONEB 0.5-2.5 (3) MG/3ML 3 mL by Nebulization route 4 times a day.        Metoprolol Succinate (TABLET SR 24 HR) TOPROL XL 25 MG TK 1 T PO BID        Nystatin (Suspension) MYCOSTATIN 880610 UNIT/ML SWISH AND SWALLOW 1 TEASPOONFUL QID        PredniSONE (Tab) DELTASONE 10 MG TAKE 1 AND 1/2 TABLETS BY MOUTH EVERY DAY        RaNITidine HCl (Tab) ZANTAC 150 MG TAKE 1 TABLET BY MOUTH EVERY NIGHT AT BEDTIME        Sevelamer Carbonate (Tab) RENVELA 800 MG         Sevelamer HCl (Tab) RENAGEL 800 MG Take 800 mg by mouth 3 times a day, with meals.        Simvastatin (Tab) ZOCOR 40 MG Take 40 mg by mouth every evening.        sterile water SOLN 2.1 mL with sodium chloride 4 mEq/mL SOLN 3.6 mEq   3 mL by Nebulization route 2 Times a Day.        Tamsulosin HCl (Cap) FLOMAX 0.4 MG Take 0.4 mg by mouth every day.        Tobramycin (Nebu Soln) CANELO 300 MG/5ML USE 1 VIAL VIA NEBULIZER BID        tobramycin 60 mg/mL in sterile water CANELO  Inhale 5 mL by mouth 2 times a day.        Voriconazole (Tab) VFEND 200 MG Take 1 Tab by mouth every 12 hours for 90 days.        .                 Medicines prescribed today were sent to:     [facility] @ [facility] - [place], [place] - [address] AT [facility]    [address] [facility] [address] [place] [address]    Phone: [phone] Fax: [phone]    Open 24 Hours?: No    [id number] - [place], [place] - [address] AT [facility] [address]  [place] [address]    Phone: [phone] Fax: [phone]    Open 24 Hours?: No      Medication refill instructions:       If your prescription bottle indicates you have medication refills left, it is not necessary to call your provider’s office. Please contact your pharmacy and they will refill your medication.    If your prescription bottle indicates you do not have any refills left, you may request refills at any time through one of the following ways: The online [employer] system (except Urgent Care), by calling your provider’s office, or by asking your pharmacy to contact your provider’s office with a refill request. Medication refills are processed only during regular business hours and may not be available until the next business day. Your provider may request additional information or to have a follow-up visit with you prior to refilling your medication.   *Please Note: Medication refills are assigned a new Rx number when refilled electronically. Your pharmacy may indicate that no refills were authorized even though a new prescription for the same medication is available at the pharmacy. Please request the medicine by name with the pharmacy before contacting your provider for a refill.        Other Notes About Your Plan     [facility] Ph. [phone] Fax. [phone]             P[employer]el Access Code: Activation code not generated  Current [employer] Status: Active

## 2017-02-15 NOTE — PROGRESS NOTES
Gilberto Brown is a 76 y.o. male here for Wegener's.    History of Present Illness:    The patient is a 76-year-old male who has Wegener's but complicated by end-stage renal disease and chronic immunosuppression. He is on Rituxan and prednisone. He has bronchiectasis. He has interstitial lung disease. He has a history of atypical mycobacterium. He currently is on inhaled tobramycin 28 days on and then 28 days off. 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 His most recent AFB sputum culture was negative for mycobacterium. His last CAT scan of the chest was in October which showed evidence of interstitial disease. For the bronchiectasis he is using saline nebulizers, the Vest, Advair, inhaled tobramycin, and the flutter device. He is using an Aerobika but unfortunately it is now broken. He needs a new one. He feels about the same. He is still having congestion. He feels like the inhaled tobramycin has helped prevent hospitalizations. He has a history of sleep apnea and is on an AutoPap machine. He averages about 4-6 hours a night with the machine. The average pressure is about 10 cm of water. The apnea hypopnea index is 1.0 per hour. His oxygen saturation on room air at rest is 93% today. He denies any new complaints. He continues his received dialysis.    Constitutional:  Negative for fever, chills, sweats, and fatigue.  Eyes:  Negative for eye pain and visual changes.  HENT:  Negative for tinnitus and hoarse voice.  Cardiovascular:  Negative for chest pain, leg swelling, syncope and orthopnea.  Respiratory:  See HPI for pertinent negatives  Sleep:  Negative for somnolence, loud snoring, sleep disturbance due to breathing, insomnia.  Gastrointestinal:  Negative for dysphagia, nausea and abdominal pain.  Heme/lymph:  Denies easy bruising, blood clots.  Musculoskeletal:  Negative for arthralgias, sore muscles and back pain.  Skin:  Negative for rash and color change.  Neurological:  Negative for  headaches, lightheadedness and weakness.  Psychiatric:  Denies depression.    Current Outpatient Prescriptions   Medication Sig Dispense Refill   • ciprofloxacin (CIPRO) 250 MG Tab TK 1 T PO BID FOR 7 DAYS  3   • tobramycin, PF, (CANELO) 300 MG/5ML Nebu Soln USE 1 VIAL VIA NEBULIZER BID  6   • metoprolol SR (TOPROL XL) 25 MG TABLET SR 24 HR TK 1 T PO BID  5   • Bacitracin-Polymyxin B (WAL-SPORIN) 500-152214 UNIT/GM Ointment APPLY TID UTD  0   • tobramycin 60 mg/mL in sterile water Inhale 5 mL by mouth 2 times a day. 280 mL 6   • nystatin (MYCOSTATIN) 785296 UNIT/ML Suspension SWISH AND SWALLOW 1 TEASPOONFUL QID  0   • RENVELA 800 MG Tab      • Flunisolide (AEROBID INH) Inhale  by mouth.     • ipratropium-albuterol (DUONEB) 0.5-2.5 (3) MG/3ML nebulizer solution 3 mL by Nebulization route 4 times a day.     • ciprofloxacin (CIPRO) 500 MG Tab Take 0.5 Tabs by mouth 2 times a day. Take as prescribed until gone. 28 Tab 0   • voriconazole (VFEND) 200 MG Tab Take 1 Tab by mouth every 12 hours for 90 days. 180 Tab 0   • predniSONE (DELTASONE) 10 MG Tab TAKE 1 AND 1/2 TABLETS BY MOUTH EVERY DAY 45 Tab 2   • sterile water SOLN 2.1 mL with sodium chloride 4 mEq/mL SOLN 3.6 mEq 3 mL by Nebulization route 2 Times a Day. 60 Ampule 6   • fluticasone-salmeterol (ADVAIR DISKUS) 500-50 MCG/DOSE AEROSOL POWDER, BREATH ACTIVATED Inhale 1 Puff by mouth 2 times a day. Rinse mouth after each use. 1 Inhaler 6   • B Complex-C-Biotin-E-Min-FA (DIALYVITE 3000) 3 MG Tab Take 1 Tab by mouth every day.     • albuterol 108 (90 BASE) MCG/ACT Aero Soln inhalation aerosol Inhale 2 Puffs by mouth every four hours as needed for Shortness of Breath.     • ranitidine (ZANTAC) 150 MG Tab TAKE 1 TABLET BY MOUTH EVERY NIGHT AT BEDTIME 30 Tab 0   • finasteride (PROSCAR) 5 MG Tab Take 5 mg by mouth every day.     • simvastatin (ZOCOR) 40 MG TABS Take 40 mg by mouth every evening.     • epoetin lucina (EPOGEN,PROCRIT) 3000 UNIT/ML SOLN Inject 2,200 Units as  instructed every Monday, Wednesday, and Friday. With dialysis     • Cholecalciferol (VITAMIN D) 2000 UNIT TABS Take 2,000 Units by mouth every day.     • sevelamer (RENAGEL) 800 MG TABS Take 800 mg by mouth 3 times a day, with meals.     • tamsulosin (FLOMAX) 0.4 MG capsule Take 0.4 mg by mouth every day.     • aspirin EC (ECOTRIN) 81 MG TBEC Take 81 mg by mouth every day.       No current facility-administered medications for this visit.       Social History   Substance Use Topics   • Smoking status: Former Smoker -- 30 years     Types: Pipe     Quit date: 01/01/1990   • Smokeless tobacco: Former User     Types: Chew   • Alcohol Use: 4.8 oz/week     7 Glasses of wine, 1 Standard drinks or equivalent per week      Comment: Red wine nightly       Past Medical History   Diagnosis Date   • HTN    • Dyslipidemia    • Allergy    • COPD    • GERD (gastroesophageal reflux disease)    • Dialysis      on hemodialysis   • Urinary bladder disorder    • Snoring    • Unspecified hemorrhagic conditions (CMS-[facility])      bruises easily   • Pneumonia 2011   • Bronchitis      chronic   • Chronic bronchitis with productive mucopurulent cough (CMS-[facility])    • Indigestion    • ASTHMA    • EMPHYSEMA    • CA in situ resp sys    • Other specified disorder of intestines    • Unspecified urinary incontinence    • Sick sinus syndrome (CMS-[facility])    • Pacemaker 1988   • Renal disorder      dialysis 3 days a week   • Wegener's disease, pulmonary (CMS-[facility])    • Hip pain    • Heart burn    • Pain 7/16/13     leg's and hand's   • Sleep apnea      uses cpap at noc   • BPH (benign prostatic hyperplasia)    • Arthritis      left knee, hands   • Hypertension    • Coughing blood 2011   • Breath shortness      WITH EXERTION   • Pulmonary histoplasmosis (CMS-[facility])    • TIMOTHY (obstructive sleep apnea)        Past Surgical History   Procedure Laterality Date   • Pacemaker insertion  4/2009   • Hernia repair  1990     right inguinal hernia   • Gastroscopy with  "balloon dilatation  9/28/2011     Performed by KT FERNANDEZ at SURGERY [facility]   • Cath placement  10/8/2011     Performed by NESSA JOHANSEN at [facility]   • Gastroscopy with biopsy  1/17/2012     Performed by ZURDO HARRISON at ENDOSCOPY [facility]   • Hip replacement, total       right   • Av fistula creation  3/8/2012     Performed by NESSA JOHANSEN at [facility]   • Recovery  4/19/2012     Performed by SURGERY, IR-RECOVERY at [facility] SAME DAY [facility]   • Av fistula revision  6/9/2012     Performed by NESSA JOHANSEN at [facility]   • Rotator cuff repair  2003     right   • Recovery  7/17/2013     Performed by Ir-Recovery Surgery at [facility] SAME DAY [facility]   • Bronchoscopy-endo  7/18/2013     Performed by Juan F Rubio M.D. at [facility]   • Recovery  12/17/2013     Performed by Ir-Recovery Surgery at [facility] SAME DAY [facility]   • Recovery  8/7/2014     Performed by Ir-Recovery Surgery at [facility]   • Recovery  10/3/2014     Performed by Ir-Recovery Surgery at SURGERY SAME DAY [facility]   • Recovery  2/26/2015     Performed by Ir-Recovery Surgery at [facility] SAME DAY [facility]   • Recovery  9/3/2015     Procedure: IR1 VASCULAR CASE-ELENO RIGHT DIALYSIS FISTULOGRAM, POSSIBLE INTERVENTION;  Surgeon: Recoveryonly Surgery;  Location: SURGERY PRE-POST PROC UNIT [facility];  Service:    • Av fistulogram Right 4/12/2016     Procedure: AV FISTULOGRAM , VENOPLASTY X 2;  Surgeon: Nessa Johansen M.D.;  Location: [facility];  Service:    • Tonsillectomy         Allergies:  Doxycycline; Erythromycin; and Rituximab    Family History   Problem Relation Age of Onset   • Stroke Father    • Heart Disease Father    • Cancer     • Stroke Mother        Physical Examination    Filed Vitals:    02/15/17 0940   Height: 1.702 m (5' 7.01\")   Weight: 64.411 kg (142 lb)   Weight % change since last entry.: 0 % "   BP: 122/66   Pulse: 101   BMI (Calculated): 22.24   Resp: 16   Temp: 37 °C (98.6 °F)       Physical Exam:  Constitutional:  Well developed and well nourished.  Head:  Normocephalic and atraumatic.  Nose:  Nose normal.  Mouth/Throat:  Oropharynx is clear and moist, no lesions.    Neck:  Normal range of motion.  Supple.  No JVD.  Cardiovascular:  Normal rate, regular rhythm, normal heart sounds. No edema  Pulmonary/Chest: No wheezing, rales or rhonchi.  Respiratory effort non labored  Musculoskeletal.  No muscular atrophy.  Lymphadenopathy:  No cervical or supraclavicular adenopathy  Neurological:  Alert and oriented.  Cranial nerves intact.  No focal deficits  Skin:  No rashes or ulcers.  Psyciatric:  Normal mood and affect.      Assessment and Plan:  1. Wegener's disease, pulmonary (CMS-[facility])  He is on Rituxan and prednisone    2. ESRD (end stage renal disease) (CMS-HCC)  Currently receiving dialysis    3. Bronchiectasis without complication (CMS-HCC)  He will continue with his regimen of inhaled saline nebulizers, inhaled tobramycin alternating every other month, Advair, chest percussion with the Vest, and the use of a flutter device. We will arrange for him to get a new Aerobika.  - DME OTHER    4. MAIC (mycobacterium avium-intracellulare complex) (CMS-HCC)  The patient has a history of atypical Mycobacterium to include DANIEL and Mycobacterium gordonae. I ordered repeat CT scan of the chest to see if there is any evidence of worsening reticulonodular infiltrates. If so then we may need to consider repeating a bronchoscopy for AFB cultures.  - CT-CHEST (THORAX) W/O; Future    5. ILD (interstitial lung disease) (CMS-HCC)  He has evidence of interstitial lung disease from his Wegener's. Again we will repeat the CT scan of the chest for follow-up. His last pulmonary function test was in January 2017 which showed a total lung capacity of 4.8 L which is 76% predicted and the diffusion capacity test is 68% predicted. We  will continue to watch his lung function over time.  - CT-CHEST (THORAX) W/O; Future        Followup Return in about 3 months (around 5/15/2017) for follow up visit with Eric Vaughan MD.

## 2017-02-23 ENCOUNTER — HOSPITAL ENCOUNTER (OUTPATIENT)
Dept: RADIOLOGY | Facility: MEDICAL CENTER | Age: 77
End: 2017-02-23
Attending: INTERNAL MEDICINE
Payer: MEDICARE

## 2017-02-23 DIAGNOSIS — J84.9 ILD (INTERSTITIAL LUNG DISEASE) (HCC): ICD-10-CM

## 2017-02-23 DIAGNOSIS — A31.0 MAIC (MYCOBACTERIUM AVIUM-INTRACELLULARE COMPLEX) (HCC): ICD-10-CM

## 2017-02-23 PROCEDURE — 71250 CT THORAX DX C-: CPT

## 2017-02-24 ENCOUNTER — PATIENT MESSAGE (OUTPATIENT)
Dept: PULMONOLOGY | Facility: HOSPICE | Age: 77
End: 2017-02-24

## 2017-02-24 ENCOUNTER — TELEPHONE (OUTPATIENT)
Dept: PULMONOLOGY | Facility: HOSPICE | Age: 77
End: 2017-02-24

## 2017-02-25 NOTE — TELEPHONE ENCOUNTER
Called in saline solution 7% 1 vial BID #60 with 6 refills. Pt is to take before he does CANELO NPPB. Replied to pts my chart message that this was called in. They will contact pt once this is filled.

## 2017-02-27 ENCOUNTER — TELEPHONE (OUTPATIENT)
Dept: NEPHROLOGY | Facility: MEDICAL CENTER | Age: 77
End: 2017-02-27

## 2017-02-27 NOTE — TELEPHONE ENCOUNTER
----- Message from Your Healthcare Team sent at 2/25/2017  3:58 AM PST -----  Regarding: Non-Urgent Medical Question  Contact: [phone]    [facility], Dr. Eric Vaughan, says update CTScan this week shows lungs stable or bit improved.  that report should be available on the [facility] system somewhere.  i begin second 28 day round of CANELO on March 1st.   will do the ANCA update test around March 14.

## 2017-03-06 DIAGNOSIS — M31.30 WEGENER'S GRANULOMATOSIS (GRANULOMATOSIS WITH POLYANGIITIS): ICD-10-CM

## 2017-03-07 ENCOUNTER — OFFICE VISIT (OUTPATIENT)
Dept: INFECTIOUS DISEASES | Facility: MEDICAL CENTER | Age: 77
End: 2017-03-07
Payer: MEDICARE

## 2017-03-07 ENCOUNTER — TELEPHONE (OUTPATIENT)
Dept: PULMONOLOGY | Facility: HOSPICE | Age: 77
End: 2017-03-07

## 2017-03-07 VITALS
SYSTOLIC BLOOD PRESSURE: 124 MMHG | HEART RATE: 89 BPM | TEMPERATURE: 97.7 F | OXYGEN SATURATION: 95 % | HEIGHT: 67 IN | DIASTOLIC BLOOD PRESSURE: 56 MMHG | BODY MASS INDEX: 21.66 KG/M2 | WEIGHT: 138 LBS

## 2017-03-07 DIAGNOSIS — B44.9 ASPERGILLUS PNEUMONIA (HCC): ICD-10-CM

## 2017-03-07 DIAGNOSIS — M31.30 WEGENERS GRANULOMATOSIS: Chronic | ICD-10-CM

## 2017-03-07 DIAGNOSIS — J15.1 PNEUMONIA OF BOTH LUNGS DUE TO PSEUDOMONAS SPECIES, UNSPECIFIED PART OF LUNG (HCC): Chronic | ICD-10-CM

## 2017-03-07 PROCEDURE — 1101F PT FALLS ASSESS-DOCD LE1/YR: CPT | Performed by: INTERNAL MEDICINE

## 2017-03-07 PROCEDURE — G8432 DEP SCR NOT DOC, RNG: HCPCS | Performed by: INTERNAL MEDICINE

## 2017-03-07 PROCEDURE — 4040F PNEUMOC VAC/ADMIN/RCVD: CPT | Performed by: INTERNAL MEDICINE

## 2017-03-07 PROCEDURE — G8482 FLU IMMUNIZE ORDER/ADMIN: HCPCS | Performed by: INTERNAL MEDICINE

## 2017-03-07 PROCEDURE — 99214 OFFICE O/P EST MOD 30 MIN: CPT | Performed by: INTERNAL MEDICINE

## 2017-03-07 PROCEDURE — G8419 CALC BMI OUT NRM PARAM NOF/U: HCPCS | Performed by: INTERNAL MEDICINE

## 2017-03-07 PROCEDURE — 1036F TOBACCO NON-USER: CPT | Performed by: INTERNAL MEDICINE

## 2017-03-07 NOTE — MR AVS SNAPSHOT
"Gilberto Brown   3/7/2017 10:00 AM   Office Visit   MRN: [id number]    Department:  [facility]   Dept Phone:  [phone]    Description:  Male : 1940   Provider:  Christine Manzo M.D.           Reason for Visit     Follow-Up Recurrent pneumonia due PSAR      Allergies as of 3/7/2017     Allergen Noted Reactions    Doxycycline 2014   Unspecified    Abdominal pain.  [id number]    Erythromycin 2014   Unspecified    Abdominal pain.  RXN=before     Rituximab 08/15/2016       Flu like syndrome      Vital Signs     Blood Pressure Pulse Temperature Height Weight Body Mass Index    124/56 mmHg 89 36.5 °C (97.7 °F) 1.702 m (5' 7\") 62.596 kg (138 lb) 21.61 kg/m2    Oxygen Saturation Smoking Status                95% Former Smoker          Basic Information     Date Of Birth Sex Race Ethnicity Preferred Language    1940 Male White Non- English      Your appointments     2017  1:00 PM   Follow Up Visit with Cintia Ariza M.D.   [facility] (--)    [address]  [facility] [address] [phone]           You will be receiving a confirmation call a few days before your appointment from our automated call confirmation system.            2017 11:45 AM   Follow Up Visit with Christine Manzo M.D.   [address])    [address] [place] [address] [phone]           You will be receiving a confirmation call a few days before your appointment from our automated call confirmation system.              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 Low  2011 - Present    Dysphagia  Medium  2011 - Present    Odynophagia R13.10 Medium  2011 - Present    Esophageal stricture K22.2 Medium  2011 - Present    Chronic respiratory infection J98.8 High  10/11/2011 - Present   "    Protein-calorie malnutrition, severe (HCC) E43 Medium  10/11/2011 - Present    Debility R53.81 High  10/12/2011 - Present    HTN (hypertension) I10 Low  10/12/2011 - Present    Abnormal thyroid function test R94.6   10/12/2011 - Present    TIMOTHY (obstructive sleep apnea) G47.33   10/12/2011 - Present    Hyperlipidemia E78.5   10/12/2011 - Present    ESRD on dialysis (CMS-HCC) N18.6, Z99.2 Medium  11/8/2011 - Present    Vitamin d deficiency    11/8/2011 - Present    Anemia of chronic renal failure, stage 5 (CMS-HCC) (Chronic) N18.5, D63.1 Medium  12/14/2011 - Present    BPH (benign prostatic hyperplasia) N40.0 Low  12/14/2011 - Present    GERD (gastroesophageal reflux disease) K21.9   12/14/2011 - Present    Weight loss R63.4   12/14/2011 - Present    Pneumonia J18.9 High  12/14/2011 - Present    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   4/12/2016 - Present    HCAP (healthcare-associated pneumonia) J18.9 High  10/4/2016 - Present    Advance care planning Z71.89 Low  10/5/2016 - Present    Abnormal CT scan of lung R91.8   10/25/2016 - Present    Thrush B37.0   10/25/2016 - Present      Health Maintenance        Date Due Completion Dates    IMM DTaP/Tdap/Td Vaccine (1 - Tdap) 6/23/1958 ---    IMM ZOSTER VACCINE 6/23/1999 ---    IMM PNEUMOCOCCAL 65+ (ADULT) HIGH/HIGHEST RISK SERIES (2 of 2 - PCV13) 12/19/2012 12/19/2011    COLONOSCOPY 12/1/2025 12/1/2015 (Postponed)    Override on 12/1/2015: Postponed (Patient will discuss scheduling with PCP)             Current Immunizations     Influenza TIV (IM) 9/15/2016, 11/3/2014, 10/10/2013, 9/27/2011  3:15 AM    Pneumococcal polysaccharide vaccine (PPSV-23) 12/19/2011  4:47 PM    Tuberculin Skin Test  Incomplete      Below and/or attached are the medications your provider expects you to take. Review all of your home medications and newly ordered medications with your provider and/or pharmacist. Follow medication instructions as directed by your provider and/or pharmacist. Please keep your medication list with you and share with your provider. Update the information when medications are discontinued, doses are changed, or new medications (including over-the-counter products) are added; and carry medication information at all times in the event of emergency situations     Allergies:  DOXYCYCLINE - Unspecified     ERYTHROMYCIN - Unspecified     RITUXIMAB - (reactions not documented)               Medications  Valid as of: March 07, 2017 - 10:23 AM    Generic Name Brand Name Tablet Size Instructions for use    Albuterol Sulfate (Aero Soln) albuterol 108 (90 BASE) MCG/ACT Inhale 2 Puffs by mouth every four hours as needed for Shortness of Breath.        Aspirin (Tablet Delayed Response) ECOTRIN 81 MG Take 81 mg by mouth every day.        B Complex-C-Biotin-E-Min-FA (Tab) DIALYVITE 3000 3 MG Take 1 Tab by mouth every day.        Bacitracin-Polymyxin B (Ointment) WAL-SPORIN 500-737053 UNIT/GM APPLY TID UTD        Cholecalciferol (Tab) vitamin D 2000 UNIT Take 2,000 Units by mouth every day.        Ciprofloxacin HCl (Tab) CIPRO 500 MG Take 0.5 Tabs by mouth 2 times a day. Take as prescribed until gone.        Ciprofloxacin HCl (Tab) CIPRO 250 MG TK 1 T PO BID FOR 7 DAYS        Epoetin Jorgito (Solution) EPOGEN,PROCRIT 3000 UNIT/ML Inject 2,200 Units as instructed every Monday, Wednesday, and Friday. With dialysis        Finasteride (Tab) PROSCAR 5 MG Take 5 mg by mouth every day.        Flunisolide   Inhale  by mouth.         Fluticasone-Salmeterol (AEROSOL POWDER, BREATH ACTIVATED) ADVAIR 500-50 MCG/DOSE Inhale 1 Puff by mouth 2 times a day. Rinse mouth after each use.        Ipratropium-Albuterol (Solution) DUONEB 0.5-2.5 (3) MG/3ML 3 mL by Nebulization route 4 times a day.        Metoprolol Succinate (TABLET SR 24 HR) TOPROL XL 25 MG TK 1 T PO BID        Nystatin (Suspension) MYCOSTATIN 273319 UNIT/ML SWISH AND SWALLOW 1 TEASPOONFUL QID        PredniSONE (Tab) DELTASONE 10 MG TAKE 1 AND 1/2 TABLETS BY MOUTH EVERY DAY        RaNITidine HCl (Tab) ZANTAC 150 MG TAKE 1 TABLET BY MOUTH EVERY NIGHT AT BEDTIME        Sevelamer Carbonate (Tab) RENVELA 800 MG         Sevelamer HCl (Tab) RENAGEL 800 MG Take 800 mg by mouth 3 times a day, with meals.        Simvastatin (Tab) ZOCOR 40 MG Take 40 mg by mouth every evening.        sterile water SOLN 2.1 mL with sodium chloride 4 mEq/mL SOLN 3.6 mEq   3 mL by Nebulization route 2 Times a Day.        Tamsulosin HCl (Cap) FLOMAX 0.4 MG Take 0.4 mg by mouth every day.        Tobramycin (Nebu Soln) CANELO 300 MG/5ML USE 1 VIAL VIA NEBULIZER BID        tobramycin 60 mg/mL in sterile water CANELO  Inhale 5 mL by mouth 2 times a day.        Voriconazole (Tab) VFEND 200 MG Take 1 Tab by mouth every 12 hours for 90 days.        .                 Medicines prescribed today were sent to:     [facility] @ [place] [facility], [place] - [address] [facility]    [address] [facility] [address]    Phone: [phone] Fax: [phone]    Open 24 Hours?: No    [facility] [address] [place] [address] [place] [address] [place] AT [address] [address]    Phone: [phone] Fax: [phone]    Open 24 Hours?: No      Medication refill instructions:       If your prescription bottle indicates you have medication refills left, it is not necessary to call your provider’s office. Please contact your pharmacy and they will refill your  medication.    If your prescription bottle indicates you do not have any refills left, you may request refills at any time through one of the following ways: The online [facility] system (except Urgent Care), by calling your provider’s office, or by asking your pharmacy to contact your provider’s office with a refill request. Medication refills are processed only during regular business hours and may not be available until the next business day. Your provider may request additional information or to have a follow-up visit with you prior to refilling your medication.   *Please Note: Medication refills are assigned a new Rx number when refilled electronically. Your pharmacy may indicate that no refills were authorized even though a new prescription for the same medication is available at the pharmacy. Please request the medicine by name with the pharmacy before contacting your provider for a refill.        Other Notes About Your Plan     [facility] Ph. [phone] Fax. [phone]             [employer] Access Code: Activation code not generated  Current [facility] Status: Active

## 2017-03-07 NOTE — PROGRESS NOTES
"Chief Complaint   Patient presents with   • Follow-Up     Recurrent pneumonia due PSAR       HISTORY OF PRESENT ILLNESS: Patient is a 76 y.o. male established patient who presents today for FU fungal PNA  h/oo Wegners, H/o DANIEL s/p treatment, H/o histo, ESRD on HD   C/s 12/6 (2 strains, both sensi to cipro) and 12/25 are PSAR  Continue inh samreen- intermediate to gent only.  Stop date 1/7/2017 and PO cipro-  Stop date also 1/7/2017  Sputum also with mold-likely Aspergillus. On voriconazole    Plan for 3 month course  1/26 Seen in follow up. On inhaled tobra being managed by . He will complete his course on 1/28 and then resume the course in March. He has completed cipro and continues on vori. He is tolerating it well aside from some visual haziness but he states it's not that worrisome. No diarrhea or fevers. Endorses night sweats x 2-3 months and chronic dry hack. Some voice change. He will follow up with Pulm in April    3/7  Here for f/u Aspergillus PNA-on 3 month course voriconazole-started vori end of Dec 2016  Starting to have some problems with \"flashing lights\"especially when its bright outside  On another round of samreen  Wants to know his \"longterm prognosis\"  Lungs OK-no hosp since last visit      Patient Active Problem List    Diagnosis Date Noted   • HCAP (healthcare-associated pneumonia) 10/04/2016     Priority: High   • Hypotension 02/01/2015     Priority: High   • Adult failure to thrive 01/13/2012     Priority: High   • Pneumonia 12/14/2011     Priority: High   • Debility 10/12/2011     Priority: High   • Chronic respiratory infection 10/11/2011     Priority: High   • Macrocytosis 02/01/2015     Priority: Medium   • Pacemaker 02/06/2014     Priority: Medium   • Immunosuppressed status (HCC) 02/06/2014     Priority: Medium   • Anemia of chronic renal failure, stage 5 (CMS-HCC) 12/14/2011     Priority: Medium   • Wegeners granulomatosis (CMS-HCC) 12/14/2011     Priority: Medium   • ESRD on dialysis " (CMS-HCC) 11/08/2011     Priority: Medium   • Protein-calorie malnutrition, severe (HCC) 10/11/2011     Priority: Medium   • Dysphagia 09/27/2011     Priority: Medium   • Odynophagia 09/27/2011     Priority: Medium   • Esophageal stricture 09/27/2011     Priority: Medium   • Advance care planning 10/05/2016     Priority: Low   • AV block 02/06/2014     Priority: Low   • BPH (benign prostatic hyperplasia) 12/14/2011     Priority: Low   • HTN (hypertension) 10/12/2011     Priority: Low   • AV block, 3rd degree ([facility]) 07/07/2011     Priority: Low   • Abnormal CT scan of lung 10/25/2016   • Thrush 10/25/2016   • AV fistula stenosis (CMS-HCC) 04/12/2016   • Personal history of other malignant neoplasm of skin 07/16/2013   • GERD (gastroesophageal reflux disease) 12/14/2011   • Weight loss 12/14/2011   • Vitamin d deficiency 11/08/2011   • Abnormal thyroid function test 10/12/2011   • TIMOTHY (obstructive sleep apnea) 10/12/2011   • Hyperlipidemia 10/12/2011   • Hip pain        Allergies:Doxycycline; Erythromycin; and Rituximab    Current Outpatient Prescriptions   Medication Sig Dispense Refill   • ciprofloxacin (CIPRO) 250 MG Tab TK 1 T PO BID FOR 7 DAYS  3   • tobramycin, PF, (CANELO) 300 MG/5ML Nebu Soln USE 1 VIAL VIA NEBULIZER BID  6   • metoprolol SR (TOPROL XL) 25 MG TABLET SR 24 HR TK 1 T PO BID  5   • Bacitracin-Polymyxin B (WAL-SPORIN) 500-058673 UNIT/GM Ointment APPLY TID UTD  0   • tobramycin 60 mg/mL in sterile water Inhale 5 mL by mouth 2 times a day. 280 mL 6   • nystatin (MYCOSTATIN) 943714 UNIT/ML Suspension SWISH AND SWALLOW 1 TEASPOONFUL QID  0   • RENVELA 800 MG Tab      • Flunisolide (AEROBID INH) Inhale  by mouth.     • ipratropium-albuterol (DUONEB) 0.5-2.5 (3) MG/3ML nebulizer solution 3 mL by Nebulization route 4 times a day.     • ciprofloxacin (CIPRO) 500 MG Tab Take 0.5 Tabs by mouth 2 times a day. Take as prescribed until gone. 28 Tab 0   • voriconazole (VFEND) 200 MG Tab Take 1 Tab by mouth every  12 hours for 90 days. 180 Tab 0   • predniSONE (DELTASONE) 10 MG Tab TAKE 1 AND 1/2 TABLETS BY MOUTH EVERY DAY 45 Tab 2   • sterile water SOLN 2.1 mL with sodium chloride 4 mEq/mL SOLN 3.6 mEq 3 mL by Nebulization route 2 Times a Day. 60 Ampule 6   • fluticasone-salmeterol (ADVAIR DISKUS) 500-50 MCG/DOSE AEROSOL POWDER, BREATH ACTIVATED Inhale 1 Puff by mouth 2 times a day. Rinse mouth after each use. 1 Inhaler 6   • B Complex-C-Biotin-E-Min-FA (DIALYVITE 3000) 3 MG Tab Take 1 Tab by mouth every day.     • albuterol 108 (90 BASE) MCG/ACT Aero Soln inhalation aerosol Inhale 2 Puffs by mouth every four hours as needed for Shortness of Breath.     • ranitidine (ZANTAC) 150 MG Tab TAKE 1 TABLET BY MOUTH EVERY NIGHT AT BEDTIME 30 Tab 0   • finasteride (PROSCAR) 5 MG Tab Take 5 mg by mouth every day.     • simvastatin (ZOCOR) 40 MG TABS Take 40 mg by mouth every evening.     • epoetin lucina (EPOGEN,PROCRIT) 3000 UNIT/ML SOLN Inject 2,200 Units as instructed every Monday, Wednesday, and Friday. With dialysis     • Cholecalciferol (VITAMIN D) 2000 UNIT TABS Take 2,000 Units by mouth every day.     • sevelamer (RENAGEL) 800 MG TABS Take 800 mg by mouth 3 times a day, with meals.     • tamsulosin (FLOMAX) 0.4 MG capsule Take 0.4 mg by mouth every day.     • aspirin EC (ECOTRIN) 81 MG TBEC Take 81 mg by mouth every day.       No current facility-administered medications for this visit.       Social History   Substance Use Topics   • Smoking status: Former Smoker -- 30 years     Types: Pipe     Quit date: 01/01/1990   • Smokeless tobacco: Former User     Types: Chew   • Alcohol Use: 4.8 oz/week     7 Glasses of wine, 1 Standard drinks or equivalent per week      Comment: Red wine nightly       Family History   Problem Relation Age of Onset   • Stroke Father    • Heart Disease Father    • Cancer     • Stroke Mother        ROS:  Review of Systems   Constitutional: Negative for fever, chills  Eyes: Negative for blurred vision.  "+scotomas  Respiratory: + for cough, sputum production, shortness of breath intermittent  Cardiovascular: Negative for chest pain, palpitations, orthopnea and leg swelling.    Skin: Negative for rash and itching.   a.    All other systems reviewed and are negative except as in HPI.      Exam:  Blood pressure 124/56, pulse 89, temperature 36.5 °C (97.7 °F), height 1.702 m (5' 7\"), weight 62.596 kg (138 lb), SpO2 95 %.  General:  Well nourished, well developed male in NAD. Frail. Pleasant and cooperative Walks with cane  Head: NCAT, Pupils are equal round reactive to light. Extraocular movements intact. Oropharynx is clear.  Neck: Supple.  No LAD  Pulmonary:  +rhonchi, no wheezing. Unlabored  Cardiovascular: Regular rate and rhythm without murmur. No ectopy  Abdominal: Soft, nontender, nondistended.   Skin: No rashes.ecchymosis  Extremities: no clubbing, cyanosis, or edema.  Neurologic: Alert and oriented x4. Speech is fluent without dysarthria. Cranial nerves intact. Moving all extremities. .  Please note that this dictation was created using voice recognition software. I have made every reasonable attempt to correct obvious errors, but I expect that there are errors of grammar and possibly content that I did not discover before finalizing the note.      Assessment/Plan:  1. 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 Pneumonia of both lungs due to Pseudomonas species, unspecified part of lung (CMS-HCC)      Chronically colonized with exacerbations-Not curable. Currently tolerating samreen. Future treatment options discussed if samreen fails   3. Wegeners granulomatosis (CMS-HCC)      Did not tolerate cellcept or rituximab-immunocompromised. Not able to clear pseudomonas due underlying lung disease        Christine Manzo M.D.      "

## 2017-03-08 NOTE — TELEPHONE ENCOUNTER
Faxed over chart note (12/13/16) and medication list to [employer] [phone].

## 2017-03-14 ENCOUNTER — HOSPITAL ENCOUNTER (OUTPATIENT)
Dept: LAB | Facility: MEDICAL CENTER | Age: 77
End: 2017-03-14
Attending: INTERNAL MEDICINE
Payer: MEDICARE

## 2017-03-14 ENCOUNTER — PATIENT MESSAGE (OUTPATIENT)
Dept: PULMONOLOGY | Facility: HOSPICE | Age: 77
End: 2017-03-14

## 2017-03-14 DIAGNOSIS — M31.30 WEGENERS GRANULOMATOSIS: ICD-10-CM

## 2017-03-14 PROCEDURE — 86255 FLUORESCENT ANTIBODY SCREEN: CPT

## 2017-03-14 PROCEDURE — 36415 COLL VENOUS BLD VENIPUNCTURE: CPT

## 2017-03-16 DIAGNOSIS — M31.30 WEGENER'S DISEASE, PULMONARY: ICD-10-CM

## 2017-03-16 LAB — ANCA IGG TITR SER IF: NORMAL {TITER}

## 2017-03-22 ENCOUNTER — TELEPHONE (OUTPATIENT)
Dept: NEPHROLOGY | Facility: MEDICAL CENTER | Age: 77
End: 2017-03-22

## 2017-03-22 RX ORDER — ASCORBIC ACID, THIAMINE, RIBOFLAVIN, NIACINAMIDE, PYRIDOXINE, FOLIC ACID, COBALAMIN, BIOTIN, PANTOTHENIC ACID 100; 1.5; 1.7; 20; 10; 1; 6; 300; 1 MG/1; MG/1; MG/1; MG/1; MG/1; MG/1; UG/1; UG/1; MG/1
TABLET, COATED ORAL
Qty: 90 TAB | Refills: 3 | Status: SHIPPED | OUTPATIENT
Start: 2017-03-22 | End: 2019-03-01 | Stop reason: CLARIF

## 2017-03-22 NOTE — TELEPHONE ENCOUNTER
Was the patient seen in the last year in this department? No  Chhaya PT     Does patient have an active prescription for medications requested? No     Received Request Via: Pharmacy   Drug Name -Dialyvite     Instructions-take 1 By mouth daily   Quantity-90 Max Refills-2  Day Supply-90

## 2017-03-29 ENCOUNTER — TELEPHONE (OUTPATIENT)
Dept: NEPHROLOGY | Facility: MEDICAL CENTER | Age: 77
End: 2017-03-29

## 2017-03-29 NOTE — TELEPHONE ENCOUNTER
Was the patient seen in the last year in this department? No  Kevenita PT     Does patient have an active prescription for medications requested? Yes     Received Request Via: Pharmacy

## 2017-04-03 DIAGNOSIS — J98.8 CHRONIC RESPIRATORY INFECTION: ICD-10-CM

## 2017-04-03 DIAGNOSIS — J44.9 CHRONIC OBSTRUCTIVE PULMONARY DISEASE, UNSPECIFIED COPD TYPE (HCC): ICD-10-CM

## 2017-04-03 RX ORDER — IPRATROPIUM BROMIDE AND ALBUTEROL SULFATE 2.5; .5 MG/3ML; MG/3ML
3 SOLUTION RESPIRATORY (INHALATION) 4 TIMES DAILY
Qty: 1080 ML | Refills: 3 | Status: SHIPPED | OUTPATIENT
Start: 2017-04-03 | End: 2018-01-10 | Stop reason: SDUPTHER

## 2017-04-03 RX ORDER — IPRATROPIUM BROMIDE AND ALBUTEROL SULFATE 2.5; .5 MG/3ML; MG/3ML
SOLUTION RESPIRATORY (INHALATION)
Qty: 3240 ML | Refills: 3 | OUTPATIENT
Start: 2017-04-03

## 2017-04-08 ENCOUNTER — PATIENT MESSAGE (OUTPATIENT)
Dept: INFECTIOUS DISEASES | Facility: MEDICAL CENTER | Age: 77
End: 2017-04-08

## 2017-04-10 NOTE — TELEPHONE ENCOUNTER
"From: Gilberto Brown  To: Christine Manzo M.D.  Sent: 4/8/2017 6:23 AM PDT  Subject: Non-Urgent Medical Question      completed the three months of VORICONAZOLE 200 MG TABLETS Friday, April 7.    comment : dialysis patients (a) take a \"binder\" with food, often RENAGEL, which written-cautions include no other medications within three hours because RENAGEL \"wipes them out\" ... and (b) dialysis process itself, removes many drugs from blood so timing of any medication needs allow time ( some say six hours ) for such as VORICONAZOLE to accommodate that time period.  "

## 2017-04-12 ENCOUNTER — APPOINTMENT (OUTPATIENT)
Dept: RADIOLOGY | Facility: MEDICAL CENTER | Age: 77
DRG: 193 | End: 2017-04-12
Attending: EMERGENCY MEDICINE
Payer: MEDICARE

## 2017-04-12 ENCOUNTER — HOSPITAL ENCOUNTER (INPATIENT)
Facility: MEDICAL CENTER | Age: 77
LOS: 1 days | DRG: 193 | End: 2017-04-14
Attending: EMERGENCY MEDICINE | Admitting: INTERNAL MEDICINE
Payer: MEDICARE

## 2017-04-12 DIAGNOSIS — R79.89 ELEVATED TROPONIN: ICD-10-CM

## 2017-04-12 DIAGNOSIS — R19.7 DIARRHEA, UNSPECIFIED TYPE: ICD-10-CM

## 2017-04-12 DIAGNOSIS — R41.82 ALTERED MENTAL STATUS, UNSPECIFIED ALTERED MENTAL STATUS TYPE: ICD-10-CM

## 2017-04-12 DIAGNOSIS — J18.9 PNEUMONIA DUE TO INFECTIOUS ORGANISM, UNSPECIFIED LATERALITY, UNSPECIFIED PART OF LUNG: ICD-10-CM

## 2017-04-12 LAB
ALBUMIN SERPL BCP-MCNC: 3.8 G/DL (ref 3.2–4.9)
ALBUMIN/GLOB SERPL: 1.5 G/DL
ALP SERPL-CCNC: 64 U/L (ref 30–99)
ALT SERPL-CCNC: 21 U/L (ref 2–50)
ANION GAP SERPL CALC-SCNC: 8 MMOL/L (ref 0–11.9)
APPEARANCE UR: CLEAR
APTT PPP: 26.3 SEC (ref 24.7–36)
AST SERPL-CCNC: 14 U/L (ref 12–45)
BASOPHILS # BLD AUTO: 0.3 % (ref 0–1.8)
BASOPHILS # BLD: 0.03 K/UL (ref 0–0.12)
BILIRUB SERPL-MCNC: 0.6 MG/DL (ref 0.1–1.5)
BILIRUB UR QL STRIP.AUTO: NEGATIVE
BUN SERPL-MCNC: 17 MG/DL (ref 8–22)
CALCIUM SERPL-MCNC: 8.9 MG/DL (ref 8.5–10.5)
CHLORIDE SERPL-SCNC: 98 MMOL/L (ref 96–112)
CHOLEST SERPL-MCNC: 166 MG/DL (ref 100–199)
CO2 SERPL-SCNC: 30 MMOL/L (ref 20–33)
COLOR UR: ABNORMAL
CREAT SERPL-MCNC: 1.53 MG/DL (ref 0.5–1.4)
EKG IMPRESSION: NORMAL
EOSINOPHIL # BLD AUTO: 0.08 K/UL (ref 0–0.51)
EOSINOPHIL NFR BLD: 0.9 % (ref 0–6.9)
EPI CELLS #/AREA URNS HPF: ABNORMAL /HPF
ERYTHROCYTE [DISTWIDTH] IN BLOOD BY AUTOMATED COUNT: 61.2 FL (ref 35.9–50)
GFR SERPL CREATININE-BSD FRML MDRD: 44 ML/MIN/1.73 M 2
GLOBULIN SER CALC-MCNC: 2.5 G/DL (ref 1.9–3.5)
GLUCOSE SERPL-MCNC: 80 MG/DL (ref 65–99)
GLUCOSE UR STRIP.AUTO-MCNC: ABNORMAL MG/DL
HCT VFR BLD AUTO: 36.7 % (ref 42–52)
HDLC SERPL-MCNC: 76 MG/DL
HGB BLD-MCNC: 11.4 G/DL (ref 14–18)
IMM GRANULOCYTES # BLD AUTO: 0.1 K/UL (ref 0–0.11)
IMM GRANULOCYTES NFR BLD AUTO: 1.1 % (ref 0–0.9)
INR PPP: 0.93 (ref 0.87–1.13)
KETONES UR STRIP.AUTO-MCNC: NEGATIVE MG/DL
LACTATE BLD-SCNC: 0.8 MMOL/L (ref 0.5–2)
LACTATE BLD-SCNC: 1.2 MMOL/L (ref 0.5–2)
LDLC SERPL CALC-MCNC: 58 MG/DL
LEUKOCYTE ESTERASE UR QL STRIP.AUTO: NEGATIVE
LYMPHOCYTES # BLD AUTO: 0.62 K/UL (ref 1–4.8)
LYMPHOCYTES NFR BLD: 6.9 % (ref 22–41)
MAGNESIUM SERPL-MCNC: 1.9 MG/DL (ref 1.5–2.5)
MCH RBC QN AUTO: 31.1 PG (ref 27–33)
MCHC RBC AUTO-ENTMCNC: 31.1 G/DL (ref 33.7–35.3)
MCV RBC AUTO: 100 FL (ref 81.4–97.8)
MICRO URNS: ABNORMAL
MONOCYTES # BLD AUTO: 0.76 K/UL (ref 0–0.85)
MONOCYTES NFR BLD AUTO: 8.4 % (ref 0–13.4)
NEUTROPHILS # BLD AUTO: 7.42 K/UL (ref 1.82–7.42)
NEUTROPHILS NFR BLD: 82.4 % (ref 44–72)
NITRITE UR QL STRIP.AUTO: NEGATIVE
NRBC # BLD AUTO: 0 K/UL
NRBC BLD AUTO-RTO: 0 /100 WBC
PH UR STRIP.AUTO: 8.5 [PH]
PHOSPHATE SERPL-MCNC: 2.7 MG/DL (ref 2.5–4.5)
PLATELET # BLD AUTO: 188 K/UL (ref 164–446)
PMV BLD AUTO: 8.5 FL (ref 9–12.9)
POTASSIUM SERPL-SCNC: 3.5 MMOL/L (ref 3.6–5.5)
PROT SERPL-MCNC: 6.3 G/DL (ref 6–8.2)
PROT UR QL STRIP: 30 MG/DL
PROTHROMBIN TIME: 12.8 SEC (ref 12–14.6)
RBC # BLD AUTO: 3.67 M/UL (ref 4.7–6.1)
RBC # URNS HPF: ABNORMAL /HPF
RBC UR QL AUTO: NEGATIVE
SODIUM SERPL-SCNC: 136 MMOL/L (ref 135–145)
SP GR UR STRIP.AUTO: 1.01
TRIGL SERPL-MCNC: 162 MG/DL (ref 0–149)
TROPONIN I SERPL-MCNC: 0.06 NG/ML (ref 0–0.04)
WBC # BLD AUTO: 9 K/UL (ref 4.8–10.8)
WBC #/AREA URNS HPF: ABNORMAL /HPF

## 2017-04-12 PROCEDURE — 74176 CT ABD & PELVIS W/O CONTRAST: CPT

## 2017-04-12 PROCEDURE — 85730 THROMBOPLASTIN TIME PARTIAL: CPT

## 2017-04-12 PROCEDURE — 83735 ASSAY OF MAGNESIUM: CPT

## 2017-04-12 PROCEDURE — 87086 URINE CULTURE/COLONY COUNT: CPT

## 2017-04-12 PROCEDURE — 87186 SC STD MICRODIL/AGAR DIL: CPT

## 2017-04-12 PROCEDURE — 87077 CULTURE AEROBIC IDENTIFY: CPT

## 2017-04-12 PROCEDURE — 85610 PROTHROMBIN TIME: CPT

## 2017-04-12 PROCEDURE — 94760 N-INVAS EAR/PLS OXIMETRY 1: CPT

## 2017-04-12 PROCEDURE — 80053 COMPREHEN METABOLIC PANEL: CPT

## 2017-04-12 PROCEDURE — 93005 ELECTROCARDIOGRAM TRACING: CPT | Performed by: EMERGENCY MEDICINE

## 2017-04-12 PROCEDURE — 36415 COLL VENOUS BLD VENIPUNCTURE: CPT

## 2017-04-12 PROCEDURE — 80061 LIPID PANEL: CPT

## 2017-04-12 PROCEDURE — 84484 ASSAY OF TROPONIN QUANT: CPT

## 2017-04-12 PROCEDURE — 71010 DX-CHEST-PORTABLE (1 VIEW): CPT

## 2017-04-12 PROCEDURE — 83605 ASSAY OF LACTIC ACID: CPT

## 2017-04-12 PROCEDURE — 700105 HCHG RX REV CODE 258: Performed by: EMERGENCY MEDICINE

## 2017-04-12 PROCEDURE — 84100 ASSAY OF PHOSPHORUS: CPT

## 2017-04-12 PROCEDURE — 81001 URINALYSIS AUTO W/SCOPE: CPT

## 2017-04-12 PROCEDURE — 99285 EMERGENCY DEPT VISIT HI MDM: CPT

## 2017-04-12 PROCEDURE — 85025 COMPLETE CBC W/AUTO DIFF WBC: CPT

## 2017-04-12 PROCEDURE — 87040 BLOOD CULTURE FOR BACTERIA: CPT | Mod: 91

## 2017-04-12 PROCEDURE — 70450 CT HEAD/BRAIN W/O DYE: CPT

## 2017-04-12 PROCEDURE — 96360 HYDRATION IV INFUSION INIT: CPT

## 2017-04-12 RX ORDER — AMOXICILLIN 250 MG
2 CAPSULE ORAL 2 TIMES DAILY
Status: DISCONTINUED | OUTPATIENT
Start: 2017-04-13 | End: 2017-04-14 | Stop reason: HOSPADM

## 2017-04-12 RX ORDER — BISACODYL 10 MG
10 SUPPOSITORY, RECTAL RECTAL
Status: DISCONTINUED | OUTPATIENT
Start: 2017-04-12 | End: 2017-04-14 | Stop reason: HOSPADM

## 2017-04-12 RX ORDER — LEVOFLOXACIN 5 MG/ML
500 INJECTION, SOLUTION INTRAVENOUS ONCE
Status: COMPLETED | OUTPATIENT
Start: 2017-04-13 | End: 2017-04-13

## 2017-04-12 RX ORDER — METOPROLOL SUCCINATE 25 MG/1
25 TABLET, EXTENDED RELEASE ORAL
Status: DISCONTINUED | OUTPATIENT
Start: 2017-04-13 | End: 2017-04-14 | Stop reason: HOSPADM

## 2017-04-12 RX ORDER — SEVELAMER HYDROCHLORIDE 800 MG/1
800 TABLET, FILM COATED ORAL
Status: DISCONTINUED | OUTPATIENT
Start: 2017-04-13 | End: 2017-04-14 | Stop reason: HOSPADM

## 2017-04-12 RX ORDER — POLYETHYLENE GLYCOL 3350 17 G/17G
1 POWDER, FOR SOLUTION ORAL
Status: DISCONTINUED | OUTPATIENT
Start: 2017-04-12 | End: 2017-04-14 | Stop reason: HOSPADM

## 2017-04-12 RX ORDER — SEVELAMER CARBONATE 800 MG/1
1 TABLET, FILM COATED ORAL DAILY
Status: DISCONTINUED | OUTPATIENT
Start: 2017-04-13 | End: 2017-04-13

## 2017-04-12 RX ORDER — HEPARIN SODIUM 5000 [USP'U]/ML
5000 INJECTION, SOLUTION INTRAVENOUS; SUBCUTANEOUS EVERY 8 HOURS
Status: DISCONTINUED | OUTPATIENT
Start: 2017-04-13 | End: 2017-04-14 | Stop reason: HOSPADM

## 2017-04-12 RX ORDER — ALBUTEROL SULFATE 90 UG/1
2 AEROSOL, METERED RESPIRATORY (INHALATION) EVERY 4 HOURS PRN
Status: DISCONTINUED | OUTPATIENT
Start: 2017-04-12 | End: 2017-04-14 | Stop reason: HOSPADM

## 2017-04-12 RX ORDER — IPRATROPIUM BROMIDE AND ALBUTEROL SULFATE 2.5; .5 MG/3ML; MG/3ML
3 SOLUTION RESPIRATORY (INHALATION) 4 TIMES DAILY
Status: DISCONTINUED | OUTPATIENT
Start: 2017-04-13 | End: 2017-04-14

## 2017-04-12 RX ORDER — ASPIRIN 81 MG/1
324 TABLET, CHEWABLE ORAL DAILY
Status: DISCONTINUED | OUTPATIENT
Start: 2017-04-13 | End: 2017-04-14 | Stop reason: HOSPADM

## 2017-04-12 RX ORDER — FINASTERIDE 5 MG/1
5 TABLET, FILM COATED ORAL DAILY
Status: DISCONTINUED | OUTPATIENT
Start: 2017-04-13 | End: 2017-04-14 | Stop reason: HOSPADM

## 2017-04-12 RX ORDER — SODIUM CHLORIDE 9 MG/ML
1000 INJECTION, SOLUTION INTRAVENOUS CONTINUOUS
Status: DISCONTINUED | OUTPATIENT
Start: 2017-04-12 | End: 2017-04-14

## 2017-04-12 RX ORDER — ASPIRIN 325 MG
325 TABLET ORAL DAILY
Status: DISCONTINUED | OUTPATIENT
Start: 2017-04-13 | End: 2017-04-14 | Stop reason: HOSPADM

## 2017-04-12 RX ORDER — TAMSULOSIN HYDROCHLORIDE 0.4 MG/1
0.4 CAPSULE ORAL DAILY
Status: DISCONTINUED | OUTPATIENT
Start: 2017-04-13 | End: 2017-04-14 | Stop reason: HOSPADM

## 2017-04-12 RX ORDER — ASCORBIC ACID, THIAMINE, RIBOFLAVIN, NIACINAMIDE, PYRIDOXINE, FOLIC ACID, COBALAMIN, BIOTIN, PANTOTHENIC ACID 100; 1.5; 1.7; 20; 10; 1; 6; 300; 1 MG/1; MG/1; MG/1; MG/1; MG/1; MG/1; UG/1; UG/1; MG/1
3 TABLET, COATED ORAL DAILY
Status: DISCONTINUED | OUTPATIENT
Start: 2017-04-13 | End: 2017-04-13

## 2017-04-12 RX ORDER — ASPIRIN 300 MG/1
300 SUPPOSITORY RECTAL DAILY
Status: DISCONTINUED | OUTPATIENT
Start: 2017-04-13 | End: 2017-04-14 | Stop reason: HOSPADM

## 2017-04-12 RX ORDER — RANITIDINE 150 MG/1
150 TABLET ORAL NIGHTLY
Status: DISCONTINUED | OUTPATIENT
Start: 2017-04-13 | End: 2017-04-14 | Stop reason: HOSPADM

## 2017-04-12 RX ORDER — SIMVASTATIN 40 MG
40 TABLET ORAL NIGHTLY
Status: DISCONTINUED | OUTPATIENT
Start: 2017-04-13 | End: 2017-04-14 | Stop reason: HOSPADM

## 2017-04-12 RX ORDER — ASCORBIC ACID, TOCOPHERYL ACID SUCCINATE, THIAMINE, RIBOFLAVIN, NIACINAMIDE, PYRIDOXINE, FOLIC ACID, COBALAMIN, BIOTIN, PANTOTHENIC ACID, ZINC, SELENIUM 100; 1.5; 1.7; 20; 25; 3; 1; 300; 10; 15; 30; 7 MG/1; MG/1; MG/1; MG/1; MG/1; MG/1; MG/1; UG/1; MG/1; MG/1; [IU]/1; UG/1
1 TABLET, COATED ORAL DAILY
Status: DISCONTINUED | OUTPATIENT
Start: 2017-04-13 | End: 2017-04-13

## 2017-04-12 RX ADMIN — SODIUM CHLORIDE 1000 ML: 9 INJECTION, SOLUTION INTRAVENOUS at 20:06

## 2017-04-12 ASSESSMENT — LIFESTYLE VARIABLES
TOTAL SCORE: 0
AVERAGE NUMBER OF DAYS PER WEEK YOU HAVE A DRINK CONTAINING ALCOHOL: 7
ON A TYPICAL DAY WHEN YOU DRINK ALCOHOL HOW MANY DRINKS DO YOU HAVE: .5
EVER HAD A DRINK FIRST THING IN THE MORNING TO STEADY YOUR NERVES TO GET RID OF A HANGOVER: NO
HAVE YOU EVER FELT YOU SHOULD CUT DOWN ON YOUR DRINKING: NO
HOW MANY TIMES IN THE PAST YEAR HAVE YOU HAD 5 OR MORE DRINKS IN A DAY: 0
TOTAL SCORE: 0
HAVE PEOPLE ANNOYED YOU BY CRITICIZING YOUR DRINKING: NO
EVER FELT BAD OR GUILTY ABOUT YOUR DRINKING: NO
DO YOU DRINK ALCOHOL: YES
TOTAL SCORE: 0
CONSUMPTION TOTAL: NEGATIVE

## 2017-04-12 ASSESSMENT — PAIN SCALES - GENERAL
PAINLEVEL_OUTOF10: 5
PAINLEVEL_OUTOF10: 5

## 2017-04-12 NOTE — IP AVS SNAPSHOT
4/14/2017    Gilberto Brown  [address] [place] [address]    Dear Gilberto:    [facility] wants to ensure your discharge home is safe and you or your loved ones have had all of your questions answered regarding your care after you leave the hospital.    Below is a list of resources and contact information should you have any questions regarding your hospital stay, follow-up instructions, or active medical symptoms.    Questions or Concerns Regarding… Contact   Medical Questions Related to Your Discharge  (7 days a week, 8am-5pm) Contact a Nurse Care Coordinator   [phone]   Medical Questions Not Related to Your Discharge  (24 hours a day / 7 days a week)  Contact the Nurse Health Line   [phone]    Medications or Discharge Instructions Refer to your discharge packet   or contact your [facility] Primary Care Provider   [phone]   Follow-up Appointment(s) Schedule your appointment via Nimbula   or contact Scheduling [phone]   Billing Review your statement via Nimbula  or contact Billing [phone]   Medical Records Review your records via Nimbula   or contact Medical Records [phone]     You may receive a telephone call within two days of discharge. This call is to make certain you understand your discharge instructions and have the opportunity to have any questions answered. You can also easily access your medical information, test results and upcoming appointments via the Nimbula free online health management tool. You can learn more and sign up at [employer]/Nimbula. For assistance setting up your Nimbula account, please call [phone].    Once again, we want to ensure your discharge home is safe and that you have a clear understanding of any next steps in your care. If you have any questions or concerns, please do not hesitate to contact us, we are here for you. Thank you for choosing [facility] for your healthcare needs.    Sincerely,    Your [facility] Healthcare Team

## 2017-04-12 NOTE — IP AVS SNAPSHOT
VALOREM Access Code: Activation code not generated  Current VALOREM Status: Active    [employer]  A secure, online tool to manage your health information     [employer] VALOREM® is a secure, online tool that connects you to your personalized health information from the privacy of your home -- day or night - making it very easy for you to manage your healthcare. Once the activation process is completed, you can even access your medical information using the VALOREM will, which is available for free in the Apple Will store or Google Play store.     VALOREM provides the following levels of access (as shown below):   My Chart Features   [facility] Primary Care Doctor [facility]  Specialists [facility]  Urgent  Care Non-[facility]  Primary Care  Doctor   Email your healthcare team securely and privately 24/7 X X X X   Manage appointments: schedule your next appointment; view details of past/upcoming appointments X      Request prescription refills. X      View recent personal medical records, including lab and immunizations X X X X   View health record, including health history, allergies, medications X X X X   Read reports about your outpatient visits, procedures, consult and ER notes X X X X   See your discharge summary, which is a recap of your hospital and/or ER visit that includes your diagnosis, lab results, and care plan. X X       How to register for VALOREM:  1. Go to  [email or web address].  2. Click on the Sign Up Now box, which takes you to the New Member Sign Up page. You will need to provide the following information:  a. Enter your VALOREM Access Code exactly as it appears at the top of this page. (You will not need to use this code after you’ve completed the sign-up process. If you do not sign up before the expiration date, you must request a new code.)   b. Enter your date of birth.   c. Enter your home email address.   d. Click Submit, and follow the next screen’s instructions.  3. Create a VALOREM ID. This will  be your Vitals [email or web address]) login ID and cannot be changed, so think of one that is secure and easy to remember.  4. Create a Vitals ([email or web address]) password. You can change your password at any time.  5. Enter your Password Reset Question and Answer. This can be used at a later time if you forget your password.   6. Enter your e-mail address. This allows you to receive e-mail notifications when new information is available in Vitals ([email or web address]).  7. Click Sign Up. You can now view your health information.    For assistance activating your Vitals ([email or web address]) account, call [phone]

## 2017-04-12 NOTE — IP AVS SNAPSHOT
" <p align=\"LEFT\"><IMG SRC=\"//[email or web address]\" alt=\"Image\" WIDTH=\"50%\" HEIGHT=\"200\" BORDER=\"\"></p>                   Name:Gilberto Brown  Medical Record Number:[id number]  CSN: [id number]    YOB: 1940   Age: 76 y.o.  Sex: male  HT:1.702 m (5' 7\") WT: 64.2 kg (141 lb 8.6 oz)          Admit Date: 4/12/2017     Discharge Date:   Today's Date: 4/14/2017  Attending Doctor:  Sarah Mc M.D.                  Allergies:  Doxycycline; Erythromycin; and Rituximab          Your appointments     Apr 18, 2017  1:00 PM   Follow Up Visit with Cintia Ariza M.D.   [facility] (--)    [address], [address]  [place] [address] [phone]           You will be receiving a confirmation call a few days before your appointment from our automated call confirmation system.            May 16, 2017  8:40 AM   Established Patient Pul with Eric Vaughan M.D.   [facility] (--)    [address]  [address]  [place] [address] [phone]            Jun 27, 2017 11:45 AM   Follow Up Visit with Christine Manzo M.D.   [address])    [address] [facility] [address] [facility] [address]  [place] [address] [phone]           You will be receiving a confirmation call a few days before your appointment from our automated call confirmation system.              Follow-up Information     1. Follow up with Christine Zamudio M.D.. Schedule an appointment as soon as possible for a visit in 2 weeks.    Specialty:  Internal Medicine    Why:  Hospital follow-up appointment with PCP    Contact information    [address]  [place] [address] [phone]          2. Follow up with Wilberto Salomon M.D.. Call in 1 week.    Specialty:  Nephrology    Contact information    [address]  [address]  [place] [address] [phone]           Medication List      Take these Medications        Instructions    albuterol 108 (90 BASE) MCG/ACT Aers inhalation aerosol    Inhale 2 " Puffs by mouth every four hours as needed for Shortness of Breath.   Dose:  2 Puff       aspirin EC 81 MG Tbec   Commonly known as:  ECOTRIN    Take 81 mg by mouth every day.   Dose:  81 mg       DIALYVITE TABLET Tabs    Dialyvite -nehro vit B complex-C-folic acid 1 tablet po QD Dialysis Pt       epoetin lucina 3000 UNIT/ML Soln   Commonly known as:  EPOGEN,PROCRIT    Inject 2,200 Units as instructed every Monday, Wednesday, and Friday. With dialysis   Dose:  2200 Units       finasteride 5 MG Tabs   Commonly known as:  PROSCAR    Take 5 mg by mouth every day.   Dose:  5 mg       fluticasone-salmeterol 500-50 MCG/DOSE Aepb   Commonly known as:  ADVAIR DISKUS    Inhale 1 Puff by mouth 2 times a day. Rinse mouth after each use.   Dose:  1 Puff       ipratropium-albuterol 0.5-2.5 (3) MG/3ML nebulizer solution   Commonly known as:  DUONEB    3 mL by Nebulization route 4 times a day.   Dose:  3 mL       levofloxacin 500 MG tablet   Commonly known as:  LEVAQUIN    Take 1 Tab by mouth every day.   Dose:  500 mg       metoprolol SR 25 MG Tb24   Commonly known as:  TOPROL XL    TK 1 T PO BID       predniSONE 10 MG Tabs   Commonly known as:  DELTASONE    TAKE 1 AND 1/2 TABLETS BY MOUTH EVERY DAY       ranitidine 150 MG Tabs   Commonly known as:  ZANTAC    TAKE 1 TABLET BY MOUTH EVERY NIGHT AT BEDTIME       sevelamer 800 MG Tabs   Commonly known as:  RENAGEL    Take 800 mg by mouth 3 times a day, with meals.   Dose:  800 mg       simvastatin 40 MG Tabs   Commonly known as:  ZOCOR    Take 40 mg by mouth every evening.   Dose:  40 mg       tamsulosin 0.4 MG capsule   Commonly known as:  FLOMAX    Take 0.4 mg by mouth every day.   Dose:  0.4 mg

## 2017-04-12 NOTE — IP AVS SNAPSHOT
" Home Care Instructions                                                                                                                  Name:Gilberto Brown  Medical Record Number:[id number]  CSN: [id number]    YOB: 1940   Age: 76 y.o.  Sex: male  HT:1.702 m (5' 7\") WT: 64.2 kg (141 lb 8.6 oz)          Admit Date: 4/12/2017     Discharge Date:   Today's Date: 4/14/2017  Attending Doctor:  Sarah Mc M.D.                  Allergies:  Doxycycline; Erythromycin; and Rituximab            Discharge Instructions       Discharge Instructions    Discharged to home by car with relative. Discharged via wheelchair, hospital escort: Yes.  Special equipment needed: Not Applicable    Be sure to schedule a follow-up appointment with your primary care doctor or any specialists as instructed.     Discharge Plan:   Influenza Vaccine Indication: Not indicated: Previously immunized this influenza season and > 8 years of age    I understand that a diet low in cholesterol, fat, and sodium is recommended for good health. Unless I have been given specific instructions below for another diet, I accept this instruction as my diet prescription.   Other diet: Renal    Special Instructions:Follow up with PCP as outpatient.  Follow up with nephrology.  Call to schedule your appointments.     · Is patient discharged on Warfarin / Coumadin?   No     · Is patient Post Blood Transfusion?  No    Depression / Suicide Risk    As you are discharged from this [facility] facility, it is important to learn how to keep safe from harming yourself.    Recognize the warning signs:  · Abrupt changes in personality, positive or negative- including increase in energy   · Giving away possessions  · Change in eating patterns- significant weight changes-  positive or negative  · Change in sleeping patterns- unable to sleep or sleeping all the time   · Unwillingness or inability to communicate  · Depression  · Unusual sadness, discouragement and " loneliness  · Talk of wanting to die  · Neglect of personal appearance   · Rebelliousness- reckless behavior  · Withdrawal from people/activities they love  · Confusion- inability to concentrate     If you or a loved one observes any of these behaviors or has concerns about self-harm, here's what you can do:  · Talk about it- your feelings and reasons for harming yourself  · Remove any means that you might use to hurt yourself (examples: pills, rope, extension cords, firearm)  · Get professional help from the community (Mental Health, Substance Abuse, psychological counseling)  · Do not be alone:Call your Safe Contact- someone whom you trust who will be there for you.  · Call your local CRISIS HOTLINE 185-5514 or 588-581-4772  · Call your local Children's Mobile Crisis Response Team [place] (437) 559-2999 or www.[employer]  · Call the toll free National Suicide Prevention Hotlines   · National Suicide Prevention Lifeline 994-239-OBZA (7529)  · [employer] Line Network 800-SUICIDE (218-8637)    Confusion  Confusion is the inability to think with your usual speed or clarity. Confusion may come on quickly or slowly over time. How quickly the confusion comes on depends on the cause. Confusion can be due to any number of causes.  CAUSES   · Concussion, head injury, or head trauma.  · Seizures.  · Stroke.  · Fever.  · Brain tumor.  · Age related decreased brain function (dementia).  · Heightened emotional states like rage or terror.  · Mental illness in which the person loses the ability to determine what is real and what is not (hallucinations).  · Infections such as a urinary tract infection (UTI).  · Toxic effects from alcohol, drugs, or prescription medicines.  · Dehydration and an imbalance of salts in the body (electrolytes).  · Lack of sleep.  · Low blood sugar (diabetes).  · Low levels of oxygen from conditions such as chronic lung disorders.  · Drug interactions or other medicine side  effects.  · Nutritional deficiencies, especially niacin, thiamine, vitamin C, or vitamin B.  · Sudden drop in body temperature (hypothermia).  · Change in routine, such as when traveling or hospitalized.  SIGNS AND SYMPTOMS   People often describe their thinking as cloudy or unclear when they are confused. Confusion can also include feeling disoriented. That means you are unaware of where or who you are. You may also not know what the date or time is. If confused, you may also have difficulty paying attention, remembering, and making decisions. Some people also act aggressively when they are confused.   DIAGNOSIS   The medical evaluation of confusion may include:  · Blood and urine tests.  · X-rays.  · Brain and nervous system tests.  · Analyzing your brain waves (electroencephalogram or EEG).  · Magnetic resonance imaging (MRI) of your head.  · Computed tomography (CT) scan of your head.  · Mental status tests in which your health care provider may ask many questions. Some of these questions may seem silly or strange, but they are a very important test to help diagnose and treat confusion.  TREATMENT   An admission to the hospital may not be needed, but a person with confusion should not be left alone. Stay with a family member or friend until the confusion clears. Avoid alcohol, pain relievers, or sedative drugs until you have fully recovered. Do not drive until directed by your health care provider.  HOME CARE INSTRUCTIONS   What family and friends can do:  · To find out if someone is confused, ask the person to state his or her name, age, and the date. If the person is unsure or answers incorrectly, he or she is confused.  · Always introduce yourself, no matter how well the person knows you.  · Often remind the person of his or her location.  · Place a calendar and clock near the confused person.  · Help the person with his or her medicines. You may want to use a pill box, an alarm as a reminder, or give the  person each dose as prescribed.  · Talk about current events and plans for the day.  · Try to keep the environment calm, quiet, and peaceful.  · Make sure the person keeps follow-up visits with his or her health care provider.  PREVENTION   Ways to prevent confusion:  · Avoid alcohol.  · Eat a balanced diet.  · Get enough sleep.  · Take medicine only as directed by your health care provider.  · Do not become isolated. Spend time with other people and make plans for your days.  · Keep careful watch on your blood sugar levels if you are diabetic.  SEEK IMMEDIATE MEDICAL CARE IF:   · You develop severe headaches, repeated vomiting, seizures, blackouts, or slurred speech.  · There is increasing confusion, weakness, numbness, restlessness, or personality changes.  · You develop a loss of balance, have marked dizziness, feel uncoordinated, or fall.  · You have delusions, hallucinations, or develop severe anxiety.  · Your family members think you need to be rechecked.     This information is not intended to replace advice given to you by your health care provider. Make sure you discuss any questions you have with your health care provider.     Document Released: 01/25/2006 Document Revised: 01/08/2016 Document Reviewed: 01/23/2015  [employer] Interactive Patient Education ©2016 [employer]      Your appointments     Apr 18, 2017  1:00 PM   Follow Up Visit with Cintia Ariza M.D.   [facility] (--)    [address], [address]  [facility] [id number] [phone]           You will be receiving a confirmation call a few days before your appointment from our automated call confirmation system.            May 16, 2017  8:40 AM   Established Patient Pul with Eric Vaughan M.D.   [facility] (--)    [address]  [address]  [facility] [address] [phone]            Jun 27, 2017 11:45 AM   Follow Up Visit with Christine Manzo M.D.   [facility] ([address] [place] [address])    [address]  [place] [id number] [phone]           You will be receiving a confirmation call a few days before your appointment from our automated call confirmation system.              Follow-up Information     1. Follow up with Christine Zamudio M.D.. Schedule an appointment as soon as possible for a visit in 2 weeks.    Specialty:  Internal Medicine    Why:  Hospital follow-up appointment with PCP    Contact information    [address]  [place] [address] [phone]          2. Follow up with Wilberto Salomon M.D.. Call in 1 week.    Specialty:  Nephrology    Contact information    [address]  [address]  [place] [address] [phone]           Discharge Medication Instructions:    Below are the medications your physician expects you to take upon discharge:    Review all your home medications and newly ordered medications with your doctor and/or pharmacist. Follow medication instructions as directed by your doctor and/or pharmacist.    Please keep your medication list with you and share with your physician.               Medication List      START taking these medications        Instructions    Morning Afternoon Evening Bedtime    levofloxacin 500 MG tablet   Commonly known as:  LEVAQUIN   Next Dose Due:  4/15        Take 1 Tab by mouth every day.   Dose:  500 mg                          CONTINUE taking these medications        Instructions    Morning Afternoon Evening Bedtime    albuterol 108 (90 BASE) MCG/ACT Aers inhalation aerosol   Next Dose Due:  As needed        Inhale 2 Puffs by mouth every four hours as needed for Shortness of Breath.   Dose:  2 Puff                        aspirin EC 81 MG Tbec   Commonly known as:  ECOTRIN   Next Dose Due:  4/15         Take 81 mg by mouth every day.   Dose:  81 mg                        DIALYVITE TABLET Tabs   Next Dose Due:  As previously directed         Dialyvite -nehro vit B complex-C-folic acid 1 tablet po QD Dialysis Pt                         epoetin lucina 3000 UNIT/ML Soln   Commonly known as:  EPOGEN,PROCRIT   Next Dose Due:  As previously directed         Inject 2,200 Units as instructed every Monday, Wednesday, and Friday. With dialysis   Dose:  2200 Units                        finasteride 5 MG Tabs   Last time this was given:  5 mg on 4/13/2017  8:24 AM   Commonly known as:  PROSCAR   Next Dose Due:  4/15        Take 5 mg by mouth every day.   Dose:  5 mg                        fluticasone-salmeterol 500-50 MCG/DOSE Aepb   Commonly known as:  ADVAIR DISKUS   Next Dose Due:  As previously directed         Inhale 1 Puff by mouth 2 times a day. Rinse mouth after each use.   Dose:  1 Puff                        ipratropium-albuterol 0.5-2.5 (3) MG/3ML nebulizer solution   Last time this was given:  3 mL on 4/14/2017  6:32 AM   Commonly known as:  DUONEB   Next Dose Due:  As previously directed         3 mL by Nebulization route 4 times a day.   Dose:  3 mL                        metoprolol SR 25 MG Tb24   Last time this was given:  25 mg on 4/13/2017  8:24 AM   Commonly known as:  TOPROL XL   Next Dose Due:  4/15         TK 1 T PO BID                        predniSONE 10 MG Tabs   Commonly known as:  DELTASONE   Next Dose Due:  4/15        TAKE 1 AND 1/2 TABLETS BY MOUTH EVERY DAY                        ranitidine 150 MG Tabs   Last time this was given:  150 mg on 4/13/2017 10:08 PM   Commonly known as:  ZANTAC   Next Dose Due:  As previously directed         TAKE 1 TABLET BY MOUTH EVERY NIGHT AT BEDTIME                        sevelamer 800 MG Tabs   Last time this was given:  800 mg on 4/14/2017 10:34 AM   Commonly known as:  RENAGEL   Next Dose Due:  As previously directed         Take 800 mg by mouth 3 times a day, with meals.   Dose:  800 mg                        simvastatin 40 MG Tabs   Last time this was given:  40 mg on 4/13/2017 10:06 PM   Commonly known as:  ZOCOR   Next Dose Due:  As previously directed         Take 40 mg by mouth  every evening.   Dose:  40 mg                        tamsulosin 0.4 MG capsule   Last time this was given:  0.4 mg on 4/13/2017  8:24 AM   Commonly known as:  FLOMAX   Next Dose Due:  4/15         Take 0.4 mg by mouth every day.   Dose:  0.4 mg                             Where to Get Your Medications      These medications were sent to [facility] @ [facility] [place] [facility] - [place], [place] - [address] [facility]  [address] [address], [place] [address]     Phone:  [phone]    - levofloxacin 500 MG tablet            Instructions           Diet / Nutrition:    Follow any diet instructions given to you by your doctor or the dietician, including how much salt (sodium) you are allowed each day.    If you are overweight, talk to your doctor about a weight reduction plan.    Activity:    Remain physically active following your doctor's instructions about exercise and activity.    Rest often.     Any time you become even a little tired or short of breath, SIT DOWN and rest.    Worsening Symptoms:    Report any of the following signs and symptoms to the doctor's office immediately:    *Pain of jaw, arm, or neck  *Chest pain not relieved by medication                               *Dizziness or loss of consciousness  *Difficulty breathing even when at rest   *More tired than usual                                       *Bleeding drainage or swelling of surgical site  *Swelling of feet, ankles, legs or stomach                 *Fever (>100ºF)  *Pink or blood tinged sputum  *Weight gain (3lbs/day or 5lbs /week)           *Shock from internal defibrillator (if applicable)  *Palpitations or irregular heartbeats                *Cool and/or numb extremities    Stroke Awareness    Common Risk Factors for Stroke include:    Age  Atrial Fibrillation  Carotid Artery Stenosis  Diabetes Mellitus  Excessive alcohol consumption  High blood pressure  Overweight   Physical inactivity  Smoking    Warning signs  and symptoms of a stroke include:    *Sudden numbness or weakness of the face, arm or leg (especially on one side of the body).  *Sudden confusion, trouble speaking or understanding.  *Sudden trouble seeing in one or both eyes.  *Sudden trouble walking, dizziness, loss of balance or coordination.Sudden severe headache with no known cause.    It is very important to get treatment quickly when a stroke occurs. If you experience any of the above warning signs, call 911 immediately.                   Disclaimer         Quit Smoking / Tobacco Use:    I understand the use of any tobacco products increases my chance of suffering from future heart disease or stroke and could cause other illnesses which may shorten my life. Quitting the use of tobacco products is the single most important thing I can do to improve my health. For further information on smoking / tobacco cessation call a Toll Free Quit Line at 1-654.757.3121 (*National Cancer [place]) or 1-207.920.9923 (American Lung Association) or you can access the web based program at www.lungTrustev.org.    [place] Tobacco Users Help Line:  (812) 342-1673       Toll Free: 1-725.100.2234  Quit Tobacco Program [facility] (041)498-8047    Crisis Hotline:    [place] Crisis Hotline:  4-937-WPXYZHZ or 1-876.671.9135    [place] Crisis Hotline:    1-275.593.4055 or 329-873-6341    Discharge Survey:   Thank you for choosing [facility]. We hope we did everything we could to make your hospital stay a pleasant one. You may be receiving a phone survey and we would appreciate your time and participation in answering the questions. Your input is very valuable to us in our efforts to improve our service to our patients and their families.        My signature on this form indicates that:    1. I have reviewed and understand the above information.  2. My questions regarding this information have been answered to my satisfaction.  3. I have formulated a plan with my  discharge nurse to obtain my prescribed medications for home.                  Disclaimer         __________________________________                     __________       ________                       Patient Signature                                                 Date                    Time

## 2017-04-13 ENCOUNTER — RESOLUTE PROFESSIONAL BILLING HOSPITAL PROF FEE (OUTPATIENT)
Dept: HOSPITALIST | Facility: MEDICAL CENTER | Age: 77
End: 2017-04-13
Payer: MEDICARE

## 2017-04-13 PROBLEM — R41.0 CONFUSION: Status: ACTIVE | Noted: 2017-04-13

## 2017-04-13 LAB
BASOPHILS # BLD AUTO: 0.3 % (ref 0–1.8)
BASOPHILS # BLD: 0.03 K/UL (ref 0–0.12)
CRP SERPL HS-MCNC: 3.38 MG/DL (ref 0–0.75)
EOSINOPHIL # BLD AUTO: 0.09 K/UL (ref 0–0.51)
EOSINOPHIL NFR BLD: 1 % (ref 0–6.9)
ERYTHROCYTE [DISTWIDTH] IN BLOOD BY AUTOMATED COUNT: 58.8 FL (ref 35.9–50)
ERYTHROCYTE [SEDIMENTATION RATE] IN BLOOD BY WESTERGREN METHOD: 48 MM/HOUR (ref 0–20)
HCT VFR BLD AUTO: 31.6 % (ref 42–52)
HGB BLD-MCNC: 10.1 G/DL (ref 14–18)
IMM GRANULOCYTES # BLD AUTO: 0.09 K/UL (ref 0–0.11)
IMM GRANULOCYTES NFR BLD AUTO: 1 % (ref 0–0.9)
LV EJECT FRACT  99904: 65
LV EJECT FRACT MOD 2C 99903: 79.46
LV EJECT FRACT MOD 4C 99902: 67.35
LV EJECT FRACT MOD BP 99901: 71.07
LYMPHOCYTES # BLD AUTO: 0.4 K/UL (ref 1–4.8)
LYMPHOCYTES NFR BLD: 4.4 % (ref 22–41)
MCH RBC QN AUTO: 31.1 PG (ref 27–33)
MCHC RBC AUTO-ENTMCNC: 32 G/DL (ref 33.7–35.3)
MCV RBC AUTO: 97.2 FL (ref 81.4–97.8)
MONOCYTES # BLD AUTO: 0.74 K/UL (ref 0–0.85)
MONOCYTES NFR BLD AUTO: 8.2 % (ref 0–13.4)
NEUTROPHILS # BLD AUTO: 7.71 K/UL (ref 1.82–7.42)
NEUTROPHILS NFR BLD: 85.1 % (ref 44–72)
NRBC # BLD AUTO: 0 K/UL
NRBC BLD AUTO-RTO: 0 /100 WBC
PLATELET # BLD AUTO: 177 K/UL (ref 164–446)
PMV BLD AUTO: 8.6 FL (ref 9–12.9)
RBC # BLD AUTO: 3.25 M/UL (ref 4.7–6.1)
TROPONIN I SERPL-MCNC: 0.07 NG/ML (ref 0–0.04)
WBC # BLD AUTO: 9.1 K/UL (ref 4.8–10.8)

## 2017-04-13 PROCEDURE — 94760 N-INVAS EAR/PLS OXIMETRY 1: CPT

## 2017-04-13 PROCEDURE — 99223 1ST HOSP IP/OBS HIGH 75: CPT | Performed by: INTERNAL MEDICINE

## 2017-04-13 PROCEDURE — G8978 MOBILITY CURRENT STATUS: HCPCS | Mod: CI

## 2017-04-13 PROCEDURE — 85025 COMPLETE CBC W/AUTO DIFF WBC: CPT

## 2017-04-13 PROCEDURE — 97161 PT EVAL LOW COMPLEX 20 MIN: CPT

## 2017-04-13 PROCEDURE — 770020 HCHG ROOM/CARE - TELE (206)

## 2017-04-13 PROCEDURE — 93306 TTE W/DOPPLER COMPLETE: CPT | Mod: 26 | Performed by: INTERNAL MEDICINE

## 2017-04-13 PROCEDURE — A9270 NON-COVERED ITEM OR SERVICE: HCPCS | Performed by: INTERNAL MEDICINE

## 2017-04-13 PROCEDURE — 700111 HCHG RX REV CODE 636 W/ 250 OVERRIDE (IP): Performed by: INTERNAL MEDICINE

## 2017-04-13 PROCEDURE — G8989 SELF CARE D/C STATUS: HCPCS | Mod: CI

## 2017-04-13 PROCEDURE — 86140 C-REACTIVE PROTEIN: CPT

## 2017-04-13 PROCEDURE — G8987 SELF CARE CURRENT STATUS: HCPCS | Mod: CI

## 2017-04-13 PROCEDURE — 36415 COLL VENOUS BLD VENIPUNCTURE: CPT

## 2017-04-13 PROCEDURE — G8988 SELF CARE GOAL STATUS: HCPCS | Mod: CI

## 2017-04-13 PROCEDURE — 94640 AIRWAY INHALATION TREATMENT: CPT

## 2017-04-13 PROCEDURE — 82784 ASSAY IGA/IGD/IGG/IGM EACH: CPT

## 2017-04-13 PROCEDURE — 85652 RBC SED RATE AUTOMATED: CPT

## 2017-04-13 PROCEDURE — 93306 TTE W/DOPPLER COMPLETE: CPT

## 2017-04-13 PROCEDURE — 700105 HCHG RX REV CODE 258: Performed by: EMERGENCY MEDICINE

## 2017-04-13 PROCEDURE — G8980 MOBILITY D/C STATUS: HCPCS | Mod: CI

## 2017-04-13 PROCEDURE — 700102 HCHG RX REV CODE 250 W/ 637 OVERRIDE(OP): Performed by: INTERNAL MEDICINE

## 2017-04-13 PROCEDURE — 84484 ASSAY OF TROPONIN QUANT: CPT

## 2017-04-13 PROCEDURE — 700101 HCHG RX REV CODE 250: Performed by: INTERNAL MEDICINE

## 2017-04-13 PROCEDURE — 87086 URINE CULTURE/COLONY COUNT: CPT

## 2017-04-13 PROCEDURE — 97165 OT EVAL LOW COMPLEX 30 MIN: CPT

## 2017-04-13 PROCEDURE — G8979 MOBILITY GOAL STATUS: HCPCS | Mod: CI

## 2017-04-13 RX ORDER — SODIUM CHLORIDE FOR INHALATION 7 %
4 VIAL, NEBULIZER (ML) INHALATION
Status: DISCONTINUED | OUTPATIENT
Start: 2017-04-13 | End: 2017-04-13

## 2017-04-13 RX ORDER — METRONIDAZOLE 500 MG/1
500 TABLET ORAL EVERY 8 HOURS
Status: DISCONTINUED | OUTPATIENT
Start: 2017-04-13 | End: 2017-04-14 | Stop reason: HOSPADM

## 2017-04-13 RX ORDER — BUDESONIDE AND FORMOTEROL FUMARATE DIHYDRATE 160; 4.5 UG/1; UG/1
2 AEROSOL RESPIRATORY (INHALATION)
Status: DISCONTINUED | OUTPATIENT
Start: 2017-04-13 | End: 2017-04-14 | Stop reason: HOSPADM

## 2017-04-13 RX ORDER — PREDNISONE 20 MG/1
20 TABLET ORAL
Status: DISCONTINUED | OUTPATIENT
Start: 2017-04-14 | End: 2017-04-14 | Stop reason: HOSPADM

## 2017-04-13 RX ORDER — LEVOFLOXACIN 5 MG/ML
250 INJECTION, SOLUTION INTRAVENOUS EVERY 24 HOURS
Status: DISCONTINUED | OUTPATIENT
Start: 2017-04-13 | End: 2017-04-14 | Stop reason: HOSPADM

## 2017-04-13 RX ADMIN — METOPROLOL SUCCINATE 25 MG: 25 TABLET, EXTENDED RELEASE ORAL at 08:24

## 2017-04-13 RX ADMIN — RENAGEL 800 MG: 800 TABLET ORAL at 15:06

## 2017-04-13 RX ADMIN — RENAGEL 800 MG: 800 TABLET ORAL at 18:42

## 2017-04-13 RX ADMIN — LEVOFLOXACIN 500 MG: 500 INJECTION, SOLUTION INTRAVENOUS at 02:15

## 2017-04-13 RX ADMIN — METRONIDAZOLE 500 MG: 500 TABLET ORAL at 15:06

## 2017-04-13 RX ADMIN — IPRATROPIUM BROMIDE AND ALBUTEROL SULFATE 3 ML: .5; 3 SOLUTION RESPIRATORY (INHALATION) at 06:46

## 2017-04-13 RX ADMIN — HEPARIN SODIUM 5000 UNITS: 5000 INJECTION, SOLUTION INTRAVENOUS; SUBCUTANEOUS at 22:06

## 2017-04-13 RX ADMIN — RANITIDINE HYDROCHLORIDE 150 MG: 150 TABLET, FILM COATED ORAL at 22:08

## 2017-04-13 RX ADMIN — HEPARIN SODIUM 5000 UNITS: 5000 INJECTION, SOLUTION INTRAVENOUS; SUBCUTANEOUS at 05:01

## 2017-04-13 RX ADMIN — IPRATROPIUM BROMIDE AND ALBUTEROL SULFATE 3 ML: .5; 3 SOLUTION RESPIRATORY (INHALATION) at 15:23

## 2017-04-13 RX ADMIN — TAMSULOSIN HYDROCHLORIDE 0.4 MG: 0.4 CAPSULE ORAL at 08:24

## 2017-04-13 RX ADMIN — FINASTERIDE 5 MG: 5 TABLET, FILM COATED ORAL at 08:24

## 2017-04-13 RX ADMIN — SODIUM CHLORIDE 1000 ML: 9 INJECTION, SOLUTION INTRAVENOUS at 08:24

## 2017-04-13 RX ADMIN — IPRATROPIUM BROMIDE AND ALBUTEROL SULFATE 3 ML: .5; 3 SOLUTION RESPIRATORY (INHALATION) at 19:23

## 2017-04-13 RX ADMIN — Medication 4 ML: at 19:23

## 2017-04-13 RX ADMIN — HEPARIN SODIUM 5000 UNITS: 5000 INJECTION, SOLUTION INTRAVENOUS; SUBCUTANEOUS at 15:07

## 2017-04-13 RX ADMIN — METRONIDAZOLE 500 MG: 500 TABLET ORAL at 22:06

## 2017-04-13 RX ADMIN — BUDESONIDE AND FORMOTEROL FUMARATE DIHYDRATE 2 PUFF: 160; 4.5 AEROSOL RESPIRATORY (INHALATION) at 19:22

## 2017-04-13 RX ADMIN — METRONIDAZOLE 500 MG: 500 TABLET ORAL at 05:01

## 2017-04-13 RX ADMIN — SIMVASTATIN 40 MG: 40 TABLET, FILM COATED ORAL at 22:06

## 2017-04-13 RX ADMIN — SIMVASTATIN 40 MG: 40 TABLET, FILM COATED ORAL at 02:15

## 2017-04-13 RX ADMIN — ASPIRIN 325 MG: 325 TABLET, COATED ORAL at 08:23

## 2017-04-13 RX ADMIN — VITAMIN D, TAB 1000IU (100/BT) 2000 UNITS: 25 TAB at 08:24

## 2017-04-13 ASSESSMENT — PAIN SCALES - GENERAL
PAINLEVEL_OUTOF10: 0

## 2017-04-13 ASSESSMENT — PATIENT HEALTH QUESTIONNAIRE - PHQ9
2. FEELING DOWN, DEPRESSED, IRRITABLE, OR HOPELESS: NOT AT ALL
1. LITTLE INTEREST OR PLEASURE IN DOING THINGS: NOT AT ALL
SUM OF ALL RESPONSES TO PHQ9 QUESTIONS 1 AND 2: 0
1. LITTLE INTEREST OR PLEASURE IN DOING THINGS: NOT AT ALL
2. FEELING DOWN, DEPRESSED, IRRITABLE, OR HOPELESS: NOT AT ALL
SUM OF ALL RESPONSES TO PHQ QUESTIONS 1-9: 0
SUM OF ALL RESPONSES TO PHQ QUESTIONS 1-9: 0
SUM OF ALL RESPONSES TO PHQ9 QUESTIONS 1 AND 2: 0

## 2017-04-13 ASSESSMENT — LIFESTYLE VARIABLES: EVER_SMOKED: YES

## 2017-04-13 ASSESSMENT — GAIT ASSESSMENTS
DISTANCE (FEET): 250
GAIT LEVEL OF ASSIST: SUPERVISED
ASSISTIVE DEVICE: FRONT WHEEL WALKER

## 2017-04-13 ASSESSMENT — ACTIVITIES OF DAILY LIVING (ADL): TOILETING: INDEPENDENT

## 2017-04-13 NOTE — PROGRESS NOTES
Pt arrived on floor. Placed on monitor. Assessed pt. Discussed plan of care. AOx4 Call light in reach. Fall precautions in place. Bed in lowest position, bed locked, RN and CNA numbers provided. Provided water. Hourly rounding in practice.

## 2017-04-13 NOTE — THERAPY
"Occupational Therapy Evaluation completed.   Functional Status:  Pt seen today for OT eval. Pt is currently at [facility] for basic self cares and functional mobility/txfs with FWW. Pt reports he feels back to baseline, just upset stomach, and denies any further deficits in ADLs at this time. Pt will have assist from his wife for IADLs as needed.  Plan of Care: Patient with no further skilled OT needs in the acute care setting at this time  Discharge Recommendations:  Equipment: No Equipment Needed. Post-acute therapy Discharge to home with outpatient or home health for additional skilled therapy services.    See \"Rehab Therapy-Acute\" Patient Summary Report for complete documentation.    "

## 2017-04-13 NOTE — ED NOTES
"Pt to triage via w/c.    Chief Complaint   Patient presents with   • Fatigue     dialysis today, slept the whole time, pt reports \"i can't stay awake.\"   • Abdominal Pain     started this afternoon; pain comes and goes.   • Nausea/Vomiting/Diarrhea     no blood in stool or vomit; pt reports he just finish dose of abx     Pt Informed regarding triage process and verbalized understanding to inform triage tech or RN for any changes in condition. Placed in [facility].       "

## 2017-04-13 NOTE — THERAPY
"Physical Therapy Evaluation completed.   Bed Mobility:  Supine to Sit: Supervised  Transfers: Sit to Stand: Supervised  Gait: Level Of Assist: Supervised with Front-Wheel Walker       Plan of Care: Patient with no further skilled PT needs in the acute care setting at this time  Discharge Recommendations: Equipment: No Equipment Needed.    See \"Rehab Therapy-Acute\" Patient Summary Report for complete documentation.     "

## 2017-04-13 NOTE — H&P
"HOSPITAL MEDICINE ADMISSION NOTE    Date of Service: 2017   Name: Gilberto Brown   : 1940  MRN: [id number]  Primary Care Physician: Christine Zamudio M.D.    Chief Complaint  Chief Complaint   Patient presents with   • Fatigue     dialysis today, slept the whole time, pt reports \"i can't stay awake.\"   • Abdominal Pain     started this afternoon; pain comes and goes.   • Nausea/Vomiting/Diarrhea     no blood in stool or vomit; pt reports he just finish dose of abx       History of Present Illness  Gilberto Brown is a 76 y.o. male with Wegener's ganulomatosis, bronchiectasis, end stage renal disease on MWF dialysis, presents with complaints of confusion, somnolence, malaise, abdominal pain, nausea and vomiting after dialysis session today. He was told by the dialysis nurse to go to the ED. His wife was at bedside. He mainly is concerned about being tired and somnolent all the time and his wife noticed he has been very weak after dialysis, that he can hardly stand up. His abdominal pain, nausea and vomiting had improved at the ED. He has had diarrhea of note however he had just completed tobramycin course for Pseudomonal pneumonia and bronchiectatic complications. He did complain of subjective fevers and productive coughing of clear sputum. Overall he said that his pneumonia was \"better\". He also complained of poor PO intake. He was not having any chest pain or acute dyspnea and did not complain of new or worsening exertional symptoms.  At the ED, he is afebrile and hemodynamically stable. Head CT showed no acute process other than sinusitis. CT Ab/P showed no acute intrabdominal process however did see lung markings that seemend to look worse than previous. Electrolytes stable on labs. Troponin slightly elevated at 0.06. Urinalysis not indicative of UTI. Nephrology was called and alerted about the admission. ED physician requested admission for altered mentationand elevated troponin.    Home " Medications  No current facility-administered medications on file prior to encounter.     Current Outpatient Prescriptions on File Prior to Encounter   Medication Sig Dispense Refill   • ipratropium-albuterol (DUONEB) 0.5-2.5 (3) MG/3ML nebulizer solution 3 mL by Nebulization route 4 times a day. 1080 mL 3   • B Complex-C-Folic Acid (DIALYVITE TABLET) Tab Dialyvite -nehro vit B complex-C-folic acid 1 tablet po QD Dialysis Pt 90 Tab 3   • predniSONE (DELTASONE) 10 MG Tab TAKE 1 AND 1/2 TABLETS BY MOUTH EVERY  Tab 0   • metoprolol SR (TOPROL XL) 25 MG TABLET SR 24 HR TK 1 T PO BID  5   • fluticasone-salmeterol (ADVAIR DISKUS) 500-50 MCG/DOSE AEROSOL POWDER, BREATH ACTIVATED Inhale 1 Puff by mouth 2 times a day. Rinse mouth after each use. 1 Inhaler 6   • albuterol 108 (90 BASE) MCG/ACT Aero Soln inhalation aerosol Inhale 2 Puffs by mouth every four hours as needed for Shortness of Breath.     • ranitidine (ZANTAC) 150 MG Tab TAKE 1 TABLET BY MOUTH EVERY NIGHT AT BEDTIME 30 Tab 0   • finasteride (PROSCAR) 5 MG Tab Take 5 mg by mouth every day.     • simvastatin (ZOCOR) 40 MG TABS Take 40 mg by mouth every evening.     • epoetin lucina (EPOGEN,PROCRIT) 3000 UNIT/ML SOLN Inject 2,200 Units as instructed every Monday, Wednesday, and Friday. With dialysis     • sevelamer (RENAGEL) 800 MG TABS Take 800 mg by mouth 3 times a day, with meals.     • tamsulosin (FLOMAX) 0.4 MG capsule Take 0.4 mg by mouth every day.     • aspirin EC (ECOTRIN) 81 MG TBEC Take 81 mg by mouth every day.         Allergies  Doxycycline; Erythromycin; and Rituximab    Past Medical and Surgical History  Past Medical History   Diagnosis Date   • HTN    • Dyslipidemia    • Allergy    • COPD    • GERD (gastroesophageal reflux disease)    • Dialysis      on hemodialysis   • Urinary bladder disorder    • Snoring    • Unspecified hemorrhagic conditions (CMS-HCC)      bruises easily   • Pneumonia 2011   • Bronchitis      chronic   • Chronic bronchitis  with productive mucopurulent cough (CMS-HCC)    • Indigestion    • ASTHMA    • EMPHYSEMA    • CA in situ resp sys    • Other specified disorder of intestines    • Unspecified urinary incontinence    • Sick sinus syndrome (CMS-HCC)    • Pacemaker 1988   • Renal disorder      dialysis 3 days a week   • Wegener's disease, pulmonary (CMS-HCC)    • Hip pain    • Heart burn    • Pain 7/16/13     leg's and hand's   • Sleep apnea      uses cpap at noc   • BPH (benign prostatic hyperplasia)    • Arthritis      left knee, hands   • Hypertension    • Coughing blood 2011   • Breath shortness      WITH EXERTION   • Pulmonary histoplasmosis (CMS-HCC)    • TIMOTHY (obstructive sleep apnea)      Past Surgical History   Procedure Laterality Date   • Pacemaker insertion  4/2009   • Hernia repair  1990     right inguinal hernia   • Gastroscopy with balloon dilatation  9/28/2011     Performed by KT FERNANDEZ at [facility]   • Cath placement  10/8/2011     Performed by NESSA JOHANSEN at [facility]   • Gastroscopy with biopsy  1/17/2012     Performed by ZURDO HARRISON at ENDOSCOPY [facility]   • Hip replacement, total       right   • Av fistula creation  3/8/2012     Performed by NESSA JOHANSEN at SURGERY [facility]   • Recovery  4/19/2012     Performed by SURGERY, IR-RECOVERY at [facility] SAME DAY [facility]   • Av fistula revision  6/9/2012     Performed by NESSA JOHANSEN at [facility]   • Rotator cuff repair  2003     right   • Recovery  7/17/2013     Performed by Ir-Recovery Surgery at [facility] SAME DAY [facility]   • Bronchoscopy-endo  7/18/2013     Performed by Juan F Rubio M.D. at [facility]   • Recovery  12/17/2013     Performed by Ir-Recovery Surgery at [facility] SAME DAY [facility]   • Recovery  8/7/2014     Performed by Ir-Recovery Surgery at [facility]   • Recovery  10/3/2014     Performed by Ir-Recovery Surgery at [facility]  DAY [facility]   • Recovery  2/26/2015     Performed by Ir-Recovery Surgery at SURGERY SAME DAY [facility]   • Recovery  9/3/2015     Procedure: IR1 VASCULAR CASE-ELENO RIGHT DIALYSIS FISTULOGRAM, POSSIBLE INTERVENTION;  Surgeon: Recoveryonly Surgery;  [place] [facility]  Service:    • Av fistulogram Right 4/12/2016     Procedure: AV FISTULOGRAM , VENOPLASTY X 2;  Surgeon: Nate Syed M.D.;  Location: SURGERY [facility];  Service:    • Tonsillectomy         Family History  Family History   Problem Relation Age of Onset   • Stroke Father    • Heart Disease Father    • Cancer     • Stroke Mother        Social History  Social History     Social History   • Marital Status:      Spouse Name: N/A   • Number of Children: N/A   • Years of Education: N/A     Occupational History   • Not on file.     Social History Main Topics   • Smoking status: Former Smoker -- 30 years     Types: Pipe     Quit date: 01/01/1990   • Smokeless tobacco: Former User     Types: Chew   • Alcohol Use: 4.8 oz/week     7 Glasses of wine, 1 Standard drinks or equivalent per week      Comment: Red wine nightly   • Drug Use: No   • Sexual Activity:     Partners: Female     Other Topics Concern   • Not on file     Social History Narrative       Review of Systems  GENERAL: Subjective fevers  HEENT: No nasal congestion, epistaxis. No hearing loss.  EYES: No vision loss.  SKIN: No new or obvious rashes.  RESPIRATORY: No shortness of breath, coughing, wheezing, hemoptysis.  CARDIOVASCULAR: No chest pain, dyspnea on exertion, palpitations, murmur or claudications.  GASTROINTESTINAL: No nausea, vomiting, diarrhea, constipation. No abdominal pain.  GENITOURINARY: No dysuria. No incontinence.  MUSOSKELETAL: No falls, myalgias or arthralgias.  NEUROLOGIC: No headaches, loss of consciousness or seizure activity.  PSYCHIATRIC: Stable mood. Not currently anxious or depressed.    Physical Exam  Filed Vitals:    04/12/17  1907 04/12/17 2000 04/12/17 2100 04/12/17 2200   BP:       Pulse: 66 90 92 101   Temp:       TempSrc:       Resp: 24 7 23 0   Height:       Weight:       SpO2: 76% 92% 91% 91%   No intake or output data in the 24 hours ending 04/13/17 0229  Vitals Reviewed  General/Constitutional: No acute distress.   Head: Normocephalic, atraumatic  ENT: Oral mucosa is moist. No obvious pharyngeal exudates  Eyes: Pink conjunctiva, no scleral icterus  Neck: Supple, no lymphadenopathy  Cardiovascular: Normal rate and regular rhythm. S1,2 noted. No murmurs, gallops or rubs.  Pulmonary: Occ wheezes and crackles  Abdominal: Soft, nontender, not distended, bowel sounds normoactive.  Musculoskeletal: No tenderness to palpation of chest wall.  Neurologic: Grossly nonfocal, moving all extremities.  Genitourinary: No gross hematuria  Skin: No obvious rash.  Psychiatric: Anxious  Labs Reviewed  Imaging reviewed       Labs  Lab Results   Component Value Date/Time    WBC 9.0 04/12/2017 07:04 PM    RBC 3.67* 04/12/2017 07:04 PM    HEMOGLOBIN 11.4* 04/12/2017 07:04 PM    HEMATOCRIT 36.7* 04/12/2017 07:04 PM    .0* 04/12/2017 07:04 PM    MCH 31.1 04/12/2017 07:04 PM    MCHC 31.1* 04/12/2017 07:04 PM    MPV 8.5* 04/12/2017 07:04 PM    NEUTROPHILS-POLYS 82.40* 04/12/2017 07:04 PM    LYMPHOCYTES 6.90* 04/12/2017 07:04 PM    MONOCYTES 8.40 04/12/2017 07:04 PM    EOSINOPHILS 0.90 04/12/2017 07:04 PM    BASOPHILS 0.30 04/12/2017 07:04 PM    HYPOCHROMIA 1+ 10/28/2011 05:35 AM    ANISOCYTOSIS 1+ 10/04/2016 09:36 AM      Lab Results   Component Value Date/Time    SODIUM 136 04/12/2017 07:04 PM    POTASSIUM 3.5* 04/12/2017 07:04 PM    CHLORIDE 98 04/12/2017 07:04 PM    CO2 30 04/12/2017 07:04 PM    GLUCOSE 80 04/12/2017 07:04 PM    BUN 17 04/12/2017 07:04 PM    CREATININE 1.53* 04/12/2017 07:04 PM      Lab Results   Component Value Date/Time    PT 12.8 04/12/2017 07:04 PM    INR 0.93 04/12/2017 07:04 PM        Imaging  Ct-abdomen-pelvis  W/o    4/12/2017 4/12/2017 7:09 PM HISTORY/REASON FOR EXAM:  Pain. TECHNIQUE/EXAM DESCRIPTION: CT scan of the abdomen and pelvis without contrast. Noncontrast helical scanning was obtained from the diaphragmatic domes through the pubic symphysis. Low dose optimization technique was utilized for this CT exam including automated exposure control and adjustment of the mA and/or kV according to patient size. COMPARISON: 12/12/2011 FINDINGS: Patchy bibasilar airspace disease has increased.. Some areas of bronchiectasis are also identified. Bibasilar pleural thickening is present posteriorly. CT Abdomen: The liver is normal in size and contains tiny calcifications which are likely healed granulomas. The spleen is normal in size and contains granulomas. The adrenal glands, pancreas, and gallbladder are normal and unchanged in appearance. The kidneys are unchanged in size. There is left lower pole renal parenchymal thinning which is stable. Exophytic cyst extending from the lower pole of the right kidney is also unchanged. No hydronephrosis is appreciated. No upper abdominal adenopathy or free fluid are observed. Peripheral calcification is present in the aorta. CT Pelvis: Surgical clips are present in the anterior right lower quadrant and right femoral region. No acute right lower quadrant inflammatory process is appreciated. Diverticula are present in the distal descending and sigmoid colon. No adenopathy or free fluid are observed. Degenerative changes are present in the bony structures.     4/12/2017  1.  Right basilar airspace disease and areas of bronchiectasis. Airspace disease is in a similar distribution to that seen on previous exam, however is overall more extensive. 2.   Healed granulomas in the liver and spleen. 3.  Stable exophytic right lower pole renal cyst. 4.  Colonic diverticula. No findings suspicious for diverticulitis appreciated. 5.  Atherosclerotic vascular disease.    Ct-head W/o    4/12/2017   4/12/2017 7:09 PM HISTORY/REASON FOR EXAM:  Altered Mental Status. Fatigue. Mental status change. TECHNIQUE/EXAM DESCRIPTION AND NUMBER OF VIEWS: CT of the head without contrast. The study was performed on a helical multidetector CT scanner. Contiguous 2.5 mm axial sections were obtained from the skull base through the vertex. Up to date radiation dose reduction adjustments have been utilized to meet ALARA standards for radiation dose reduction. COMPARISON:  9/22/2010 FINDINGS: The calvariae and skull base are unremarkable. There are no extraaxial fluid collections. The ventricular system and basal cisterns are within normal limits. There are areas of hypodensity in the white matter most consistent with small vessel ischemic change versus demyelination or gliosis. There are no hemorrhagic lesions. There are no mass effects or shift of midline structures. Paranasal sinus mucosal thickening especially sphenoid and left frontal air cells consistent with sinusitis. Marked arthritic changes involving the TMJs. Mastoids in the field of view are unremarkable.     4/12/2017  1.  White matter lucencies most consistent with small vessel ischemic change versus demyelination or gliosis. 2.  Otherwise, Head CT without contrast with no acute findings. No evidence of acute cerebral hemorrhage or mass lesion. 3. Sinusitis.    Dx-chest-portable (1 View)    4/12/2017 4/12/2017 7:30 PM HISTORY/REASON FOR EXAM:  Sepsis. TECHNIQUE/EXAM DESCRIPTION AND NUMBER OF VIEWS: Single portable view of the chest. COMPARISON: 12/24/2016 FINDINGS: Heart size is within normal limits.  Calcified granulomas are stable. Streaky linear opacities are present in the left retrocardiac region which may represent infiltrate or atelectasis. The left costophrenic angle is minimally blunted. No pleural abnormalities are noted. Pacemaker battery and leads are unchanged in position.    4/12/2017  Left retrocardiac opacities which may represent infiltrate or  atelectasis. Minimal left costophrenic angle blunting could indicate small pleural effusion.      Assessment and Plan  Admit to telemetry, observation but expect more than two midnight stay.    Toxic-metabolic encephalopathy  Multifactorial. Seems to be resolved as he is appropriate with me.     Deconditioning  Encourage proper sleep hygiene, encourage PO intake, PT/OT ordered.    Sinusitis  In the setting of Wegener's, on chronic prednisone. Obtain immunoglobulin studies which can be followed up in the outpatient.  Has doxycycline allergy. Will give levaquin.    Complicated pneumonia versus  Wegener's flare  Worsening lung findings on CT, subjective fevers, cough  Recent pseudomonal pneumonia sensitive to floxacin  On chronic prednisone.   Obtain ESR, CRP; meanwhile will cover both by increasing prednisone and giving Levaquin empirically.    Abdominal pain, nausea and vomiting resolved.  Diarrhea  Probably antibiotic associated. C. Diff studies pending. Meanwhile, will cover empirically with Flagyl.    ESRD on dialysis  Nephrology consulted and will follow. Currently stable. Renally dose medications    Pharmacologic DVT prophylaxis  Heparin SQ, low threshold to discontinue if he develops bleeding or hemoptysis. Currently no evidence.    I spent 73 minutes evaluating Gilberto Brown, reviewing the chart, vitals, labs and imaging, discussing the case with ED physician, medication reconciliation, placing orders and enacting the plan above. I spoke with him and his wife extensively.

## 2017-04-13 NOTE — ED PROVIDER NOTES
"ED Provider Note    Scribed for Eric Davidson M.D. by Luis Meadows. 4/12/2017, 6:54 PM.    Primary care provider: Christine Zamudio M.D.  Means of arrival: walk-in  History obtained from: Patient  History limited by: poor historian    CHIEF COMPLAINT  Chief Complaint   Patient presents with   • Fatigue     dialysis today, slept the whole time, pt reports \"i can't stay awake.\"   • Abdominal Pain     started this afternoon; pain comes and goes.   • Nausea/Vomiting/Diarrhea     no blood in stool or vomit; pt reports he just finish dose of abx       HPI  Gilberto Brown is a 76 y.o. Male, with a history of dialysis who presents to the Emergency Department complaining of chills and fever. His last recorded temperature at home was approximately 104 °F, onset yesterday. He also complains of fatigue worse than baseline, onset last night. Patient confirms chronic diarrhea, onset 6 months ago. Patient denied abdominal pain and then later confirmed abdominal pain. He reports he is nauseous and that he vomited in the parking lot of the ED just before entering the building.   HPI is limited secondary to poor historian.    REVIEW OF SYSTEMS  See HPI for further details.  ROS is limited secondary to poor historian.     PAST MEDICAL HISTORY   has a past medical history of HTN; Dyslipidemia; Allergy; COPD; GERD (gastroesophageal reflux disease); Dialysis; Urinary bladder disorder; Snoring; Unspecified hemorrhagic conditions (CMS-HCC); Pneumonia (2011); Bronchitis; Chronic bronchitis with productive mucopurulent cough (CMS-HCC); Indigestion; ASTHMA; EMPHYSEMA; CA in situ resp sys; Other specified disorder of intestines; Unspecified urinary incontinence; Sick sinus syndrome (CMS-HCC); Pacemaker (1988); Renal disorder; Wegener's disease, pulmonary (CMS-HCC); Hip pain; Heart burn; Pain (7/16/13); Sleep apnea; BPH (benign prostatic hyperplasia); Arthritis; Hypertension; Coughing blood (2011); Breath shortness; Pulmonary " "histoplasmosis (CMS-HCC); and TIMOTHY (obstructive sleep apnea).    SURGICAL HISTORY   has past surgical history that includes pacemaker insertion (4/2009); hernia repair (1990); gastroscopy with balloon dilatation (9/28/2011); cath placement (10/8/2011); gastroscopy with biopsy (1/17/2012); hip replacement, total; av fistula creation (3/8/2012); recovery (4/19/2012); av fistula revision (6/9/2012); rotator cuff repair (2003); recovery (7/17/2013); bronchoscopy-endo (7/18/2013); recovery (12/17/2013); recovery (8/7/2014); recovery (10/3/2014); recovery (2/26/2015); recovery (9/3/2015); av fistulogram (Right, 4/12/2016); and tonsillectomy.    SOCIAL HISTORY  Social History   Substance Use Topics   • Smoking status: Former Smoker -- 30 years     Types: Pipe     Quit date: 01/01/1990   • Smokeless tobacco: Former User     Types: Chew   • Alcohol Use: 4.8 oz/week     7 Glasses of wine, 1 Standard drinks or equivalent per week      Comment: Red wine nightly      History   Drug Use No       FAMILY HISTORY  Family History   Problem Relation Age of Onset   • Stroke Father    • Heart Disease Father    • Cancer     • Stroke Mother        CURRENT MEDICATIONS  Reviewed.  See Encounter Summary.     ALLERGIES  Allergies   Allergen Reactions   • Doxycycline Unspecified     Abdominal pain.  EIU=4728   • Erythromycin Unspecified     Abdominal pain.  RXN=before 2011   • Rituximab      Flu like syndrome       PHYSICAL EXAM  VITAL SIGNS: /64 mmHg  Pulse 94  Temp(Src) 37.3 °C (99.2 °F) (Temporal)  Resp 18  Ht 1.702 m (5' 7\")  Wt 62.596 kg (138 lb)  BMI 21.61 kg/m2  SpO2 95%    Constitutional: Alert in no apparent distress.  HENT: No signs of trauma, Bilateral external ears normal, Nose normal.   Eyes: Pupils are equal and reactive, Conjunctiva normal, Non-icteric.   Neck: Normal range of motion, No tenderness, Supple, No stridor.   Lymphatic: No lymphadenopathy noted.   Cardiovascular: Regular rate and rhythm, no murmurs. "   Thorax & Lungs: Normal breath sounds, No respiratory distress, No wheezing, No chest tenderness.   Abdomen: Bowel sounds normal, Soft, No tenderness, No masses, No pulsatile masses. No peritoneal signs.  Skin: Warm, Dry, No erythema, No rash.   Back: No bony tenderness, No CVA tenderness.   Extremities: Intact distal pulses, No edema, No tenderness, No cyanosis  Musculoskeletal: Good range of motion in all major joints. No tenderness to palpation or major deformities noted.   Neurologic: Alert , Normal motor function, Normal sensory function, No focal deficits noted.   Psychiatric: Affect normal, Judgment normal, Mood normal.     DIAGNOSTIC STUDIES / PROCEDURES     LABS  Results for orders placed or performed during the hospital encounter of 04/12/17   CBC WITH DIFFERENTIAL   Result Value Ref Range    WBC 9.0 4.8 - 10.8 K/uL    RBC 3.67 (L) 4.70 - 6.10 M/uL    Hemoglobin 11.4 (L) 14.0 - 18.0 g/dL    Hematocrit 36.7 (L) 42.0 - 52.0 %    .0 (H) 81.4 - 97.8 fL    MCH 31.1 27.0 - 33.0 pg    MCHC 31.1 (L) 33.7 - 35.3 g/dL    RDW 61.2 (H) 35.9 - 50.0 fL    Platelet Count 188 164 - 446 K/uL    MPV 8.5 (L) 9.0 - 12.9 fL    Neutrophils-Polys 82.40 (H) 44.00 - 72.00 %    Lymphocytes 6.90 (L) 22.00 - 41.00 %    Monocytes 8.40 0.00 - 13.40 %    Eosinophils 0.90 0.00 - 6.90 %    Basophils 0.30 0.00 - 1.80 %    Immature Granulocytes 1.10 (H) 0.00 - 0.90 %    Nucleated RBC 0.00 /100 WBC    Neutrophils (Absolute) 7.42 1.82 - 7.42 K/uL    Lymphs (Absolute) 0.62 (L) 1.00 - 4.80 K/uL    Monos (Absolute) 0.76 0.00 - 0.85 K/uL    Eos (Absolute) 0.08 0.00 - 0.51 K/uL    Baso (Absolute) 0.03 0.00 - 0.12 K/uL    Immature Granulocytes (abs) 0.10 0.00 - 0.11 K/uL    NRBC (Absolute) 0.00 K/uL   COMP METABOLIC PANEL   Result Value Ref Range    Sodium 136 135 - 145 mmol/L    Potassium 3.5 (L) 3.6 - 5.5 mmol/L    Chloride 98 96 - 112 mmol/L    Co2 30 20 - 33 mmol/L    Anion Gap 8.0 0.0 - 11.9    Glucose 80 65 - 99 mg/dL    Bun 17 8 - 22  mg/dL    Creatinine 1.53 (H) 0.50 - 1.40 mg/dL    Calcium 8.9 8.5 - 10.5 mg/dL    AST(SGOT) 14 12 - 45 U/L    ALT(SGPT) 21 2 - 50 U/L    Alkaline Phosphatase 64 30 - 99 U/L    Total Bilirubin 0.6 0.1 - 1.5 mg/dL    Albumin 3.8 3.2 - 4.9 g/dL    Total Protein 6.3 6.0 - 8.2 g/dL    Globulin 2.5 1.9 - 3.5 g/dL    A-G Ratio 1.5 g/dL   LACTIC ACID   Result Value Ref Range    Lactic Acid 1.2 0.5 - 2.0 mmol/L   LACTIC ACID   Result Value Ref Range    Lactic Acid 0.8 0.5 - 2.0 mmol/L   URINALYSIS   Result Value Ref Range    Micro Urine Req Microscopic     Color Lt. Yellow     Character Clear     Specific Gravity 1.007 <1.035    Ph 8.5 (A) 5.0-8.0    Glucose Trace (A) Negative mg/dL    Ketones Negative Negative mg/dL    Protein 30 (A) Negative mg/dL    Bilirubin Negative Negative    Nitrite Negative Negative    Leukocyte Esterase Negative Negative    Occult Blood Negative Negative   TROPONIN   Result Value Ref Range    Troponin I 0.06 (H) 0.00 - 0.04 ng/mL   PROTHROMBIN TIME   Result Value Ref Range    PT 12.8 12.0 - 14.6 sec    INR 0.93 0.87 - 1.13   APTT   Result Value Ref Range    APTT 26.3 24.7 - 36.0 sec   MAGNESIUM   Result Value Ref Range    Magnesium 1.9 1.5 - 2.5 mg/dL   PHOSPHORUS   Result Value Ref Range    Phosphorus 2.7 2.5 - 4.5 mg/dL   URINE MICROSCOPIC (W/UA)   Result Value Ref Range    WBC 0-2 (A) /hpf    RBC 0-2 (A) /hpf    Epithelial Cells Few /hpf   ESTIMATED GFR   Result Value Ref Range    GFR If  54 (A) >60 mL/min/1.73 m 2    GFR If Non  44 (A) >60 mL/min/1.73 m 2   LIPID PROFILE   Result Value Ref Range    Cholesterol,Tot 166 100 - 199 mg/dL    Triglycerides 162 (H) 0 - 149 mg/dL    HDL 76 >=40 mg/dL    LDL 58 <100 mg/dL   EKG (NOW)   Result Value Ref Range    Report       [facility] Emergency Dept.    Test Date:  2017-04-12  Pt Name:    ALETA BUCK                  Department: ER  MRN:        [id number]                      Room:       [facility]  Gender:      TONG                            Technician: [id number]  :        [id number]                   Requested By:RAMSEY CRUZ  Order #:    [id number]                    Reading MD:    Measurements  Intervals                                Axis  Rate:       95                           P:          102  WY:         140                          QRS:        8  QRSD:       140                          T:          10  QT:         436  QTc:        548    Interpretive Statements  UNKNOWN RHYTHM, IRREGULAR RATE    RIGHT ATRIAL ABNORMALITY  RIGHT BUNDLE BRANCH BLOCK  LATERAL INFARCT, OLD  ARTIFACT IN LEAD(S) I,II,aVR,aVL,aVF,V3,V4,V5,V6  Compared to ECG 10/04/2016 09:16:19  Atrial abnormality now present  Right bundle-branch block now present  Myocardial infarct finding now present  Sinus tachycardia  no longer present  Incomplete right bundle-branch block no longer present         All labs were reviewed by me.    EKG  12 Lead EKG interpreted by me to show:  Sinus rhythm  Rate 95  Axis: Normal  Intervals: Normal  Conduction: Right bundle branch block.   Poor baseline due to artifact  No acute ST-T wave changes  No prior EKG for comparison.     RADIOLOGY  DX-CHEST-PORTABLE (1 VIEW)   Final Result      Left retrocardiac opacities which may represent infiltrate or atelectasis. Minimal left costophrenic angle blunting could indicate small pleural effusion.      CT-ABDOMEN-PELVIS W/O   Final Result      1.  Right basilar airspace disease and areas of bronchiectasis. Airspace disease is in a similar distribution to that seen on previous exam, however is overall more extensive.      2.   Healed granulomas in the liver and spleen.      3.  Stable exophytic right lower pole renal cyst.      4.  Colonic diverticula. No findings suspicious for diverticulitis appreciated.      5.  Atherosclerotic vascular disease.      CT-HEAD W/O   Final Result      1.  White matter lucencies most consistent with small vessel ischemic change versus  demyelination or gliosis.      2.  Otherwise, Head CT without contrast with no acute findings. No evidence of acute cerebral hemorrhage or mass lesion.   3. Sinusitis.        The radiologist's interpretation of all radiological studies and images have been reviewed by me.    COURSE & MEDICAL DECISION MAKING  Pertinent Labs & Imaging studies reviewed. (See chart for details)        6:54 PM - Patient seen and examined at bedside. Patient will be treated with IV fluids for dehydration. Ordered CT head, Ct abdomen, DX chest, Troponin, CBC with differential, Lipid profile to evaluate his symptoms. Need to perform imaging test on his bowels. Will order stool samples and blood tests.     9:23 PM Patient reevaluated at bedside. Patient is resting and hydrated. Discussed the imaging and lab results with the patient. His Troponin is elevated, which could indicate heart damage. However, his Troponin has been elevated before. Discussed the patient being seen by the Cardiologist to further evaluate his case. Depending on how the patient is feeling     10:53 PM Paged Dr. Daniels (Hospitalist)    11:00 PM - I discussed the patient's case and the above findings with Dr. Daniels (Hospitalist) who agrees to admit the patient. He also recommends consulting with Nephrology in the event the patient requires dialysis.    11:10 PM Paged Nephrology    11:21 PM - repaged Nephrology    11:59 PM - I discussed the patient's case and the above findings with Dr. Corona (Nephology).     CRITICAL CARE  The very real possibility of a deterioration of this patient's condition required the highest level of my preparedness for sudden, emergent intervention.  I provided critical care services, which included medication orders, frequent reevaluations of the patient's condition and response to treatment, ordering and reviewing test results, and discussing the case with various consultants.  The critical care time associated with the care of the patient was  35 minutes. Review chart for interventions. This time is exclusive of any other billable procedures.     Decision Making:  This is a 76 y.o. year old male who presents with altered mentation, abdominal pain. Patient is a dialysis patient. Patient was at dialysis today Wednesday as he is a Monday Wednesday Friday dialysis schedule. Limited history from patient given the fact that he is a poor historian. Laboratory evaluation as noted above no significant changes from baseline overall though there is slight elevation in troponin. On chart review does reveal that the patient has had prior elevated troponins even double what it is today however given his vague complaint of abdominal pain as well as altered mentation I do believe there is a consideration of underlying ACS etiology for symptoms. Additionally the patient and wife note that he has had chronic diarrhea for approximately 6 months. Outpatient stool samples were ordered however he had not completed this as he was waiting to get off of this antibiotic regimen from his lung disease. Stool samples were given ordered some I although none provided. There is a possibility given his chronic antibiotics that he does have underlying C. diff. No significant evidence of toxic megacolon on CT imaging. CT imaging of his abdomen as well as his head are largely unremarkable. At this time the patient has been kept on maintenance fluids and will be evaluated will also service for ongoing inpatient care and ACS rule out. Nephrology has been counseled up for ongoing inpatient consultation if needed.    DISPOSITION:  Patient will be admitted to Dr. Daniels in critical condition.      FINAL IMPRESSION  1. Elevated troponin    2. Diarrhea, unspecified type    3. Altered mental status, unspecified altered mental status type    4. Pneumonia due to infectious organism, unspecified laterality, unspecified part of lung         Performed critical care time of 35 minutes. This time is  exclusive of any other billable procedures.      ILuis (Scribe), am scribing for, and in the presence of, Eric Davidson M.D..    Electronically signed by: Luis Meadows (Celestinaibjaney), 4/12/2017    IEric M.D. personally performed the services described in this documentation, as scribed by Luis Meadows in my presence, and it is both accurate and complete.    The note accurately reflects work and decisions made by me.  Eric Davidson  4/13/2017  1:38 AM

## 2017-04-13 NOTE — DISCHARGE PLANNING
Care Transition Team Assessment    Information Source  Orientation : Oriented x 4  Information Given By: Patient  Informant's Name:  (Gilberto)  Who is responsible for making decisions for patient? : Patient    Readmission Evaluation  Is this a readmission?: No    Elopement Risk  Legal Hold: No    Interdisciplinary Discharge Planning  Does Admitting Nurse Feel This Could be a Complex Discharge?: No  Primary Care Physician:  (Christine Zamudio MD)  Lives with - Patient's Self Care Capacity: Spouse  Support Systems: Spouse / Significant Other  Housing / Facility: 2 Story House  Do You Take your Prescribed Medications Regularly: Yes  Able to Return to Previous ADL's: Yes  Mobility Issues: Yes (cane and walker after Dialysis)  Prior Services: None  Patient Expects to be Discharged to::  (Home)  Assistance Needed: Unknown at this Time    Discharge Preparedness  What is your plan after discharge?: Uncertain - pending medical team collaboration  What are your discharge supports?: Spouse  Prior Functional Level: Ambulatory, Drives Self, Needs Assist with Activities of Daily Living, Uses Cane, Uses Walker    Functional Assesment  Prior Functional Level: Ambulatory, Drives Self, Needs Assist with Activities of Daily Living, Uses Cane, Uses Walker    Finances  Financial Barriers to Discharge: No  Prescription Coverage: Yes (Medicare/AARP)    Vision / Hearing Impairment  Vision Impairment :  (reading glasses)  Hearing Impairment : No    Values / Beliefs / Concerns  Values / Beliefs Concerns : No ([facility] on M-W-F)    Advance Directive  Advance Directive?: Living Will    Domestic Abuse  Have you ever been the victim of abuse or violence?: No    Psychological Assessment  History of Substance Abuse: None  History of Psychiatric Problems: No    Discharge Risks or Barriers  Discharge risks or barriers?: Complex medical needs  Patient risk factors: Vulnerable adult (fragile)    Anticipated Discharge Information  Anticipated  discharge disposition: Home  Discharge Address:  ([address])  Discharge Contact Phone Number:  (Crys (wife) [phone])

## 2017-04-13 NOTE — ED NOTES
Med rec complete per pt and wife at bedside. Pt completed 3 month treatment of Voriconazole approximately 3 days ago.

## 2017-04-13 NOTE — PROGRESS NOTES
Dr. Mc notified that patient has had formed stool, per microbiology, will not run c. Dif test on formed stool; order to cancel c. Dif test.  Charge rn, Margoth notified.

## 2017-04-13 NOTE — PROGRESS NOTES
2 RN skin assessment completed. Sacrum is red but blanching. Pt has an AV fistula on the upper right arm. Otherwise skin is clean dry and intact.

## 2017-04-13 NOTE — CARE PLAN
Problem: Communication  Goal: The ability to communicate needs accurately and effectively will improve  Intervention: Educate patient and significant other/support system about the plan of care, procedures, treatments, medications and allow for questions  [place] pt to floor. Discuss plan of care. Address concerns and questions.       Problem: Safety  Goal: Will remain free from injury  Intervention: Provide assistance with mobility  Educate pt regarding safety precautions and to call for assistance.       Problem: Mobility  Goal: Risk for activity intolerance will decrease  Intervention: Assess and monitor signs of activity intolerance  Assist pt with mobility.

## 2017-04-14 VITALS
DIASTOLIC BLOOD PRESSURE: 67 MMHG | OXYGEN SATURATION: 97 % | HEIGHT: 67 IN | WEIGHT: 141.54 LBS | RESPIRATION RATE: 16 BRPM | TEMPERATURE: 98.1 F | SYSTOLIC BLOOD PRESSURE: 143 MMHG | BODY MASS INDEX: 22.21 KG/M2 | HEART RATE: 94 BPM

## 2017-04-14 PROBLEM — R41.0 CONFUSION: Status: RESOLVED | Noted: 2017-04-13 | Resolved: 2017-04-14

## 2017-04-14 LAB
ANION GAP SERPL CALC-SCNC: 8 MMOL/L (ref 0–11.9)
ANION GAP SERPL CALC-SCNC: 9 MMOL/L (ref 0–11.9)
BACTERIA UR CULT: ABNORMAL
BACTERIA UR CULT: ABNORMAL
BUN SERPL-MCNC: 29 MG/DL (ref 8–22)
BUN SERPL-MCNC: 31 MG/DL (ref 8–22)
CALCIUM SERPL-MCNC: 8.4 MG/DL (ref 8.5–10.5)
CALCIUM SERPL-MCNC: 8.7 MG/DL (ref 8.5–10.5)
CHLORIDE SERPL-SCNC: 105 MMOL/L (ref 96–112)
CHLORIDE SERPL-SCNC: 107 MMOL/L (ref 96–112)
CO2 SERPL-SCNC: 21 MMOL/L (ref 20–33)
CO2 SERPL-SCNC: 24 MMOL/L (ref 20–33)
CREAT SERPL-MCNC: 3.07 MG/DL (ref 0.5–1.4)
CREAT SERPL-MCNC: 3.21 MG/DL (ref 0.5–1.4)
GFR SERPL CREATININE-BSD FRML MDRD: 19 ML/MIN/1.73 M 2
GFR SERPL CREATININE-BSD FRML MDRD: 20 ML/MIN/1.73 M 2
GLUCOSE SERPL-MCNC: 135 MG/DL (ref 65–99)
GLUCOSE SERPL-MCNC: 137 MG/DL (ref 65–99)
HAV IGM SERPL QL IA: NEGATIVE
HBV CORE IGM SER QL: NEGATIVE
HBV SURFACE AB SERPL IA-ACNC: <3.1 MIU/ML (ref 0–10)
HBV SURFACE AG SER QL: NEGATIVE
HCV AB SER QL: NEGATIVE
IGA SERPL-MCNC: 38 MG/DL (ref 68–408)
POTASSIUM SERPL-SCNC: 3.7 MMOL/L (ref 3.6–5.5)
POTASSIUM SERPL-SCNC: 3.8 MMOL/L (ref 3.6–5.5)
SIGNIFICANT IND 70042: ABNORMAL
SITE SITE: ABNORMAL
SODIUM SERPL-SCNC: 136 MMOL/L (ref 135–145)
SODIUM SERPL-SCNC: 138 MMOL/L (ref 135–145)
SOURCE SOURCE: ABNORMAL

## 2017-04-14 PROCEDURE — 99239 HOSP IP/OBS DSCHRG MGMT >30: CPT | Performed by: INTERNAL MEDICINE

## 2017-04-14 PROCEDURE — 700101 HCHG RX REV CODE 250: Performed by: INTERNAL MEDICINE

## 2017-04-14 PROCEDURE — 700111 HCHG RX REV CODE 636 W/ 250 OVERRIDE (IP): Performed by: INTERNAL MEDICINE

## 2017-04-14 PROCEDURE — 90471 IMMUNIZATION ADMIN: CPT

## 2017-04-14 PROCEDURE — 3E0234Z INTRODUCTION OF SERUM, TOXOID AND VACCINE INTO MUSCLE, PERCUTANEOUS APPROACH: ICD-10-PCS | Performed by: INTERNAL MEDICINE

## 2017-04-14 PROCEDURE — 86706 HEP B SURFACE ANTIBODY: CPT

## 2017-04-14 PROCEDURE — 90935 HEMODIALYSIS ONE EVALUATION: CPT

## 2017-04-14 PROCEDURE — 90670 PCV13 VACCINE IM: CPT | Performed by: INTERNAL MEDICINE

## 2017-04-14 PROCEDURE — 700102 HCHG RX REV CODE 250 W/ 637 OVERRIDE(OP): Performed by: INTERNAL MEDICINE

## 2017-04-14 PROCEDURE — 94640 AIRWAY INHALATION TREATMENT: CPT

## 2017-04-14 PROCEDURE — 700105 HCHG RX REV CODE 258: Performed by: EMERGENCY MEDICINE

## 2017-04-14 PROCEDURE — 80048 BASIC METABOLIC PNL TOTAL CA: CPT

## 2017-04-14 PROCEDURE — 94760 N-INVAS EAR/PLS OXIMETRY 1: CPT

## 2017-04-14 PROCEDURE — 80074 ACUTE HEPATITIS PANEL: CPT

## 2017-04-14 PROCEDURE — 5A1D00Z PERFORMANCE OF URINARY FILTRATION, SINGLE: ICD-10-PCS | Performed by: INTERNAL MEDICINE

## 2017-04-14 PROCEDURE — 36415 COLL VENOUS BLD VENIPUNCTURE: CPT

## 2017-04-14 PROCEDURE — A9270 NON-COVERED ITEM OR SERVICE: HCPCS | Performed by: INTERNAL MEDICINE

## 2017-04-14 RX ORDER — IPRATROPIUM BROMIDE AND ALBUTEROL SULFATE 2.5; .5 MG/3ML; MG/3ML
3 SOLUTION RESPIRATORY (INHALATION)
Status: DISCONTINUED | OUTPATIENT
Start: 2017-04-14 | End: 2017-04-14 | Stop reason: HOSPADM

## 2017-04-14 RX ORDER — LIDOCAINE AND PRILOCAINE 25; 25 MG/G; MG/G
CREAM TOPICAL ONCE
Status: DISCONTINUED | OUTPATIENT
Start: 2017-04-14 | End: 2017-04-14

## 2017-04-14 RX ORDER — HEPARIN SODIUM 1000 [USP'U]/ML
1500 INJECTION, SOLUTION INTRAVENOUS; SUBCUTANEOUS
Status: DISCONTINUED | OUTPATIENT
Start: 2017-04-14 | End: 2017-04-14 | Stop reason: HOSPADM

## 2017-04-14 RX ORDER — LIDOCAINE AND PRILOCAINE 25; 25 MG/G; MG/G
CREAM TOPICAL PRN
Status: DISCONTINUED | OUTPATIENT
Start: 2017-04-14 | End: 2017-04-14

## 2017-04-14 RX ORDER — LEVOFLOXACIN 500 MG/1
500 TABLET, FILM COATED ORAL DAILY
Qty: 5 TAB | Refills: 0 | Status: SHIPPED | OUTPATIENT
Start: 2017-04-14 | End: 2017-05-13

## 2017-04-14 RX ADMIN — LIDOCAINE AND PRILOCAINE 1 APPLICATION: 25; 25 CREAM TOPICAL at 11:45

## 2017-04-14 RX ADMIN — BUDESONIDE AND FORMOTEROL FUMARATE DIHYDRATE 2 PUFF: 160; 4.5 AEROSOL RESPIRATORY (INHALATION) at 06:34

## 2017-04-14 RX ADMIN — HEPARIN SODIUM 1500 UNITS: 1000 INJECTION, SOLUTION INTRAVENOUS; SUBCUTANEOUS at 12:31

## 2017-04-14 RX ADMIN — IPRATROPIUM BROMIDE AND ALBUTEROL SULFATE 3 ML: .5; 3 SOLUTION RESPIRATORY (INHALATION) at 06:32

## 2017-04-14 RX ADMIN — LEVOFLOXACIN 250 MG: 250 INJECTION, SOLUTION INTRAVENOUS at 00:00

## 2017-04-14 RX ADMIN — SODIUM CHLORIDE 1000 ML: 9 INJECTION, SOLUTION INTRAVENOUS at 01:35

## 2017-04-14 RX ADMIN — PNEUMOCOCCAL 13-VALENT CONJUGATE VACCINE 0.5 ML: 2.2; 2.2; 2.2; 2.2; 2.2; 4.4; 2.2; 2.2; 2.2; 2.2; 2.2; 2.2; 2.2 INJECTION, SUSPENSION INTRAMUSCULAR at 13:43

## 2017-04-14 RX ADMIN — EPOETIN ALFA 4000 UNITS: 4000 SOLUTION INTRAVENOUS; SUBCUTANEOUS at 13:00

## 2017-04-14 RX ADMIN — RENAGEL 800 MG: 800 TABLET ORAL at 10:34

## 2017-04-14 RX ADMIN — IPRATROPIUM BROMIDE AND ALBUTEROL SULFATE 3 ML: .5; 3 SOLUTION RESPIRATORY (INHALATION) at 12:54

## 2017-04-14 RX ADMIN — HEPARIN SODIUM 5000 UNITS: 5000 INJECTION, SOLUTION INTRAVENOUS; SUBCUTANEOUS at 05:08

## 2017-04-14 RX ADMIN — METRONIDAZOLE 500 MG: 500 TABLET ORAL at 05:07

## 2017-04-14 ASSESSMENT — PAIN SCALES - GENERAL
PAINLEVEL_OUTOF10: 0
PAINLEVEL_OUTOF10: 0

## 2017-04-14 ASSESSMENT — ENCOUNTER SYMPTOMS
DIZZINESS: 0
FEVER: 0
BLURRED VISION: 0
DIARRHEA: 0
COUGH: 0
CHILLS: 0
BACK PAIN: 0
HEADACHES: 0
NAUSEA: 0
SHORTNESS OF BREATH: 0

## 2017-04-14 ASSESSMENT — LIFESTYLE VARIABLES: EVER_SMOKED: YES

## 2017-04-14 NOTE — PROGRESS NOTES
Pt states he wants to forego AM meds and do dialysis first. Student RN called and left message for dialysis to confirm if they can do it AM or afternoon. Student RN and reference RN advised pt on both options to take meds prior or wait until after dialysis; pt stated he thinks dialysis will come in AM and then he'll take meds after. Pt states understanding delay of med pass.

## 2017-04-14 NOTE — DISCHARGE PLANNING
Patient is on hemodialysis at [facility] Mon, Wed, Fri at 1100. Clinic has been notified of hospitalization.

## 2017-04-14 NOTE — RESPIRATORY CARE
COPD EDUCATION by COPD CLINICAL EDUCATOR  4/14/2017 at 6:52 AM by Margoth Hobbs     Patient reviewed by COPD education team. Patient does not qualify for COPD program.

## 2017-04-14 NOTE — PROGRESS NOTES
Patient resting comfortably in bed. No needs at this time. Will continue to conduct hourly rounds.

## 2017-04-14 NOTE — PROGRESS NOTES
Nephrology Progress Note, Adult, Complex               Author: Ana Sarabia Date & Time created: 4/14/2017  11:24 AM     Interval History:  Doing better. Diarrhea and nausea and vomiting improved     Review of Systems:  Review of Systems   Constitutional: Positive for malaise/fatigue. Negative for fever and chills.   Eyes: Negative for blurred vision.   Respiratory: Negative for cough and shortness of breath.    Cardiovascular: Negative for chest pain.   Gastrointestinal: Negative for nausea and diarrhea.   Genitourinary: Positive for frequency. Negative for dysuria.   Musculoskeletal: Negative for back pain.   Skin: Negative for rash.   Neurological: Negative for dizziness and headaches.       Physical Exam:  Physical Exam   Constitutional: He is oriented to person, place, and time.   HENT:   Head: Normocephalic and atraumatic.   Eyes: EOM are normal.   Neck: Normal range of motion. Neck supple.   Cardiovascular: Normal rate.    Pulmonary/Chest: Effort normal and breath sounds normal.   Abdominal: Soft. Bowel sounds are normal.   Musculoskeletal: He exhibits no edema.   Neurological: He is alert and oriented to person, place, and time.   Skin: Skin is warm and dry.   Vitals reviewed.      Labs:        Invalid input(s): [id number]  Recent Labs      04/12/17 1904 04/13/17 0325   TROPONINI  0.06*  0.07*     Recent Labs      04/12/17 1904   SODIUM  136   POTASSIUM  3.5*   CHLORIDE  98   CO2  30   BUN  17   CREATININE  1.53*   MAGNESIUM  1.9   PHOSPHORUS  2.7   CALCIUM  8.9     Recent Labs      04/12/17   1904   ALTSGPT  21   ASTSGOT  14   ALKPHOSPHAT  64   TBILIRUBIN  0.6   GLUCOSE  80     Recent Labs      04/12/17 1904 04/13/17   0325   RBC  3.67*  3.25*   HEMOGLOBIN  11.4*  10.1*   HEMATOCRIT  36.7*  31.6*   PLATELETCT  188  177   PROTHROMBTM  12.8   --    APTT  26.3   --    INR  0.93   --      Recent Labs      04/12/17 1904 04/13/17   0325   WBC  9.0  9.1   NEUTSPOLYS  82.40*  85.10*   LYMPHOCYTES   6.90*  4.40*   MONOCYTES  8.40  8.20   EOSINOPHILS  0.90  1.00   BASOPHILS  0.30  0.30   ASTSGOT  14   --    ALTSGPT  21   --    ALKPHOSPHAT  64   --    TBILIRUBIN  0.6   --            Hemodynamics:  Temp (24hrs), Av.2 °C (98.9 °F), Min:36.8 °C (98.2 °F), Max:38 °C (100.4 °F)  Temperature: 37.2 °C (99 °F)  Pulse  Av.3  Min: 66  Max: 107   Blood Pressure : 131/77 mmHg     Respiratory:    Respiration: 16, Pulse Oximetry: 94 %, O2 Daily Delivery Respiratory : Room Air with O2 Available     Given By:: Mouthpiece, #MDI/DPI Given: MDI/DPI x 1, Work Of Breathing / Effort: Mild  RUL Breath Sounds: Crackles, RML Breath Sounds: Crackles, RLL Breath Sounds: Diminished, DEBORA Breath Sounds: Crackles, LLL Breath Sounds: Diminished  Fluids:    Intake/Output Summary (Last 24 hours) at 17 1124  Last data filed at 17 0500   Gross per 24 hour   Intake    600 ml   Output   1245 ml   Net   -645 ml     Weight: 64.2 kg (141 lb 8.6 oz)  GI/Nutrition:  Orders Placed This Encounter   Procedures   • DIET ORDER     Standing Status: Standing      Number of Occurrences: 1      Standing Expiration Date:      Order Specific Question:  Diet:     Answer:  Renal [8]     Medical Decision Making, by Problem:  There are no hospital problems to display for this patient.    Imp/Plan  1. ESRD- HD MWF. Plan treatment today  2. Anemia- Epogen with HD. Monitor H/H  3.Volume status- stable  4. Electrolytes-in acceptable range. Repeat BMP today    Labs reviewed and Medications reviewed

## 2017-04-14 NOTE — CONSULTS
DATE OF SERVICE:  04/13/2017    REQUESTING PHYSICIAN:  Consult at the request of Dr. Eric Davidson.    REASON FOR CONSULTATION:  To evaluate and provide dialysis for the patient   with end-stage renal disease.    HISTORY OF PRESENT ILLNESS:  The patient is an 76-year-old male patient of   mine with end-stage renal disease, on hemodialysis who has been receiving his   dialysis treatments on Monday, Wednesday, and Friday at [facility].  He was admitted yesterday with fever, chills, and fatigue as well as   chronic diarrhea.  Currently, patient is doing much better.  He no complaints,   no diarrhea, no shortness of breath, no chest pain, no headache, or   dizziness.  No fever or chills.  No edema.    REVIEW OF SYSTEMS:  All 12 points reveal negative except the history of   present illness per AMA and CMS criteria.    PAST MEDICAL HISTORY:  End-stage renal disease on hemodialysis, Wegener's   granulomatosis, chronic obstructive pulmonary disease, hyperlipidemia,   hypertension, pneumonia, sick sinus syndrome, sleep apnea, BPH, pulmonary   histoplasmosis, and obstructive sleep apnea.    PAST SURGICAL HISTORY:  Pacemaker placement, hernia repair, gastroscopy, AV   fistula creation, hip replacement, and bronchoscopy.    SOCIAL HISTORY:  No tobacco, alcohol, or drug use.    FAMILY HISTORY:  Positive for stroke, heart disease, and cancer.    ALLERGIES:  ALLERGIC TO DOXYCYCLINE ERYTHROMYCIN, AND RITUXIMAB.    OUTPATIENT MEDICATIONS:  Reviewed in the chart.    PHYSICAL EXAMINATION:  VITAL SIGNS:  Blood pressure 135/60, heart rate 80, and temperature 36.8   Celsius.  GENERAL APPEARANCE:  This is a well-developed and well-nourished male, in no   acute distress.  HEENT:  Normocephalic and atraumatic.  Pupils are equal, round, and reactive   to light.  Extraocular movements are intact.  Oropharynx is clear.  Moist   mucosa.  No erythema or exudate.  NECK:  Supple.  No lymphadenopathy and no thyromegaly  appreciated.  LUNGS:  Coarse breath sounds bilaterally.  Bilateral rhonchi.  HEART:  Regular rate.  No rubs or gallops.  ABDOMEN:  Soft, nontender, and distended.  Bowel sounds are present.  EXTREMITIES:  No cyanosis, clubbing, or edema.  NEUROLOGICAL:  Alert and oriented x3.  No focal deficits.    LABORATORY RESULTS:  Reviewed, revealed hemoglobin level 10.1.  Sodium 136,   potassium 3.5    ASSESSMENT AND PLAN:  The patient is a very pleasant 76-year-old male with   end-stage renal disease, on hemodialysis, admitted with fever and chills.  1.  End-stage renal disease.  We will continue dialysis per patient's schedule   on Monday, Wednesday, and Friday.  No need for emergent dialysis today.  2.  Electrolytes are well controlled.  3.  Hypertension.  Blood pressure is well controlled.  4.  Anemia.  Hemoglobin is at goal.    RECOMMENDATIONS:  Hemodialysis on Monday, Wednesday, and Friday, renal diet.    All medications to adjust renal doses.    Thank you for the consult.       ____________________________________     MD MIRIAM COSTA / SHARRON    DD:  04/13/2017 16:31:58  DT:  04/13/2017 20:46:09    D#:  [id number]  Job#:  [id number]

## 2017-04-14 NOTE — PROGRESS NOTES
Handoff report received. Assume patient care. Patient resting in bed. Patient not in pain. Patient not in distress, AAOX 4. Bed alarm is on. Safety precautions in place. Call light and personal belongings within reach. Educated to call for assistance if needed. Wife at bedside and patient is requesting tea. Will return with tea.

## 2017-04-14 NOTE — CARE PLAN
Problem: Communication  Goal: The ability to communicate needs accurately and effectively will improve  Intervention: Educate patient and significant other/support system about the plan of care, procedures, treatments, medications and allow for questions  Pt and Wife educated on unit protocols regarding personal medications and plan of care. Pt educated on calling for assistance and expressing needs and concerns.       Problem: Mobility  Goal: Risk for activity intolerance will decrease  Intervention: Provide rest periods  Pt educated and offered repositioning and its importance. Pt resting comfortably and expresses desire to be left alone to sleep.

## 2017-04-14 NOTE — PROGRESS NOTES
[facility]    Hemodialysis ordered by Dr. Sarabia.    Start time 1243.  End time 1544.      Net UF of 0 mL.      VSS throughout treatment, w/ no complaints.  RUE AVF w/ +B/T.  Pressure held for 10 minutes w/o S/S of bleed through or hematoma.  No S/S of infection.  Dressing CDI.       Report given to LIZETH Bermudez.  See dialysis flowsheet for further details.

## 2017-04-14 NOTE — PROGRESS NOTES
Discharge instructions reviewed with patient and his wife, states understanding, IV discontinued, heart monitor removed; patient ready for discharge.

## 2017-04-14 NOTE — DISCHARGE INSTRUCTIONS
Discharge Instructions    Discharged to home by car with relative. Discharged via wheelchair, hospital escort: Yes.  Special equipment needed: Not Applicable    Be sure to schedule a follow-up appointment with your primary care doctor or any specialists as instructed.     Discharge Plan:   Influenza Vaccine Indication: Not indicated: Previously immunized this influenza season and > 8 years of age    I understand that a diet low in cholesterol, fat, and sodium is recommended for good health. Unless I have been given specific instructions below for another diet, I accept this instruction as my diet prescription.   Other diet: Renal    Special Instructions:Follow up with PCP as outpatient.  Follow up with nephrology.  Call to schedule your appointments.     · Is patient discharged on Warfarin / Coumadin?   No     · Is patient Post Blood Transfusion?  No    Depression / Suicide Risk    As you are discharged from this [facility] facility, it is important to learn how to keep safe from harming yourself.    Recognize the warning signs:  · Abrupt changes in personality, positive or negative- including increase in energy   · Giving away possessions  · Change in eating patterns- significant weight changes-  positive or negative  · Change in sleeping patterns- unable to sleep or sleeping all the time   · Unwillingness or inability to communicate  · Depression  · Unusual sadness, discouragement and loneliness  · Talk of wanting to die  · Neglect of personal appearance   · Rebelliousness- reckless behavior  · Withdrawal from people/activities they love  · Confusion- inability to concentrate     If you or a loved one observes any of these behaviors or has concerns about self-harm, here's what you can do:  · Talk about it- your feelings and reasons for harming yourself  · Remove any means that you might use to hurt yourself (examples: pills, rope, extension cords, firearm)  · Get professional help from the community (Mental  Health, Substance Abuse, psychological counseling)  · Do not be alone:Call your Safe Contact- someone whom you trust who will be there for you.  · Call your local CRISIS HOTLINE 865-8719 or 264-194-1269  · Call your local Children's Mobile Crisis Response Team [place] (331) 858-2092 or www.[employer]  · Call the toll free National Suicide Prevention Hotlines   · National Suicide Prevention Lifeline 806-354-MCDP (4233)  · [employer] Hope Line Network 800-SUICIDE (724-0693)    Confusion  Confusion is the inability to think with your usual speed or clarity. Confusion may come on quickly or slowly over time. How quickly the confusion comes on depends on the cause. Confusion can be due to any number of causes.  CAUSES   · Concussion, head injury, or head trauma.  · Seizures.  · Stroke.  · Fever.  · Brain tumor.  · Age related decreased brain function (dementia).  · Heightened emotional states like rage or terror.  · Mental illness in which the person loses the ability to determine what is real and what is not (hallucinations).  · Infections such as a urinary tract infection (UTI).  · Toxic effects from alcohol, drugs, or prescription medicines.  · Dehydration and an imbalance of salts in the body (electrolytes).  · Lack of sleep.  · Low blood sugar (diabetes).  · Low levels of oxygen from conditions such as chronic lung disorders.  · Drug interactions or other medicine side effects.  · Nutritional deficiencies, especially niacin, thiamine, vitamin C, or vitamin B.  · Sudden drop in body temperature (hypothermia).  · Change in routine, such as when traveling or hospitalized.  SIGNS AND SYMPTOMS   People often describe their thinking as cloudy or unclear when they are confused. Confusion can also include feeling disoriented. That means you are unaware of where or who you are. You may also not know what the date or time is. If confused, you may also have difficulty paying attention, remembering, and making  decisions. Some people also act aggressively when they are confused.   DIAGNOSIS   The medical evaluation of confusion may include:  · Blood and urine tests.  · X-rays.  · Brain and nervous system tests.  · Analyzing your brain waves (electroencephalogram or EEG).  · Magnetic resonance imaging (MRI) of your head.  · Computed tomography (CT) scan of your head.  · Mental status tests in which your health care provider may ask many questions. Some of these questions may seem silly or strange, but they are a very important test to help diagnose and treat confusion.  TREATMENT   An admission to the hospital may not be needed, but a person with confusion should not be left alone. Stay with a family member or friend until the confusion clears. Avoid alcohol, pain relievers, or sedative drugs until you have fully recovered. Do not drive until directed by your health care provider.  HOME CARE INSTRUCTIONS   What family and friends can do:  · To find out if someone is confused, ask the person to state his or her name, age, and the date. If the person is unsure or answers incorrectly, he or she is confused.  · Always introduce yourself, no matter how well the person knows you.  · Often remind the person of his or her location.  · Place a calendar and clock near the confused person.  · Help the person with his or her medicines. You may want to use a pill box, an alarm as a reminder, or give the person each dose as prescribed.  · Talk about current events and plans for the day.  · Try to keep the environment calm, quiet, and peaceful.  · Make sure the person keeps follow-up visits with his or her health care provider.  PREVENTION   Ways to prevent confusion:  · Avoid alcohol.  · Eat a balanced diet.  · Get enough sleep.  · Take medicine only as directed by your health care provider.  · Do not become isolated. Spend time with other people and make plans for your days.  · Keep careful watch on your blood sugar levels if you are  diabetic.  SEEK IMMEDIATE MEDICAL CARE IF:   · You develop severe headaches, repeated vomiting, seizures, blackouts, or slurred speech.  · There is increasing confusion, weakness, numbness, restlessness, or personality changes.  · You develop a loss of balance, have marked dizziness, feel uncoordinated, or fall.  · You have delusions, hallucinations, or develop severe anxiety.  · Your family members think you need to be rechecked.     This information is not intended to replace advice given to you by your health care provider. Make sure you discuss any questions you have with your health care provider.     Document Released: 01/25/2006 Document Revised: 01/08/2016 Document Reviewed: 01/23/2015  [employer] Interactive Patient Education ©2016 [employer]

## 2017-04-15 ENCOUNTER — PATIENT OUTREACH (OUTPATIENT)
Dept: HEALTH INFORMATION MANAGEMENT | Facility: OTHER | Age: 77
End: 2017-04-15

## 2017-04-15 LAB
BACTERIA UR CULT: NORMAL
SIGNIFICANT IND 70042: NORMAL
SITE SITE: NORMAL
SOURCE SOURCE: NORMAL

## 2017-04-15 NOTE — PROGRESS NOTES
04/15/2017 1227 - Patient called back and informed that [facility] in [place] was able to contact [facility] and get his Rx transferred to [place]. No further questions.

## 2017-04-15 NOTE — PROGRESS NOTES
Called in rx to [facility] pharmacy on [address] [phone]    Levofloxacin 500mg 1 tab po daily, total #5 (5 days) no refills

## 2017-04-17 LAB
BACTERIA BLD CULT: NORMAL
BACTERIA BLD CULT: NORMAL
SIGNIFICANT IND 70042: NORMAL
SIGNIFICANT IND 70042: NORMAL
SITE SITE: NORMAL
SITE SITE: NORMAL
SOURCE SOURCE: NORMAL
SOURCE SOURCE: NORMAL

## 2017-04-17 NOTE — DISCHARGE SUMMARY
Hospital Medicine Discharge Note     Admit Date:  4/12/2017       Discharge Date:   4/14/2017  LOS: 1 day     Primary Care Provider:    Christine Zamudio M.D.    Attending Physician:  Sarah Mc     Discharge Diagnoses:   Generalized weakness   Nausea vomiting  Toxic metabolic encephalopathy  pneumonia    Chronic Medical Problems:  Asthma, Carolina's disease, ESRD, HTN     Consultants:      Nephrology: Dr. Corona     Studies:  Wbc 9.1, Hemoglobin 10.1, Hematocrit 31.6, Platelet 177, Sodium 136, Potassium 3.8, Chloride 107, Bicarb 21, Glucose 135, BUN 31, Creatinine 3.07.     Imaging/ Testing:      ECHOCARDIOGRAM COMP W/O CONT   Final Result   Compared to the images of the prior study done onLeft ventricular   ejection fraction is visually estimated to be 65%.  Mild concentric left ventricular hypertrophy.  Normal left ventricular size and systolic function.  Grade I diastolic dysfunction.  Mild aortic stenosis.  Transvalvular gradients are - Peak: 26 mmHg, Mean: 15 mmHg.  Trace aortic insufficiency.  Moderate tricuspid regurgitation.  Estimated right ventricular systolic pressure  is 55 mmHg.  Mild mitral regurgitation.  The aortic root is normal.   DX-CHEST-PORTABLE (1 VIEW)   Final Result      Left retrocardiac opacities which may represent infiltrate or atelectasis. Minimal left costophrenic angle blunting could indicate small pleural effusion.      CT-ABDOMEN-PELVIS W/O   Final Result      1.  Right basilar airspace disease and areas of bronchiectasis. Airspace disease is in a similar distribution to that seen on previous exam, however is overall more extensive.      2.   Healed granulomas in the liver and spleen.      3.  Stable exophytic right lower pole renal cyst.      4.  Colonic diverticula. No findings suspicious for diverticulitis appreciated.      5.  Atherosclerotic vascular disease.      CT-HEAD W/O   Final Result      1.  White matter lucencies most consistent with small vessel ischemic change versus  demyelination or gliosis.      2.  Otherwise, Head CT without contrast with no acute findings. No evidence of acute cerebral hemorrhage or mass lesion.   3. Sinusitis.            Procedures:        None    Hospital Summary (Brief Narrative):         For H and P on admission, please refer to full H and P dictated by Dr. Daniels    In brief, Gilberto Brown is a 76 y.o. male who was admitted 4/12/2017 after presenting to ER for generalized weakness and also some confusion. Pt was also found to have possible pneumonia, he also complained of some nausea or vomiting. Pt was started on hydration, for his ESRD nephrology was consulted and he was also started on Levaquin for his PNA. Pt symptoms improved, he is now feeling better, his confusion has resolved.   Pt wants to go home. He will be discharged home today.       Disposition:   Discharge home    Condition:  Stable    Activity:   As tolerated     Diet:   Heart Healthy Diet / Diabetic Diet    Discharge Medications:         levofloxacin 500 MG tablet   Commonly known as:  LEVAQUIN   Next Dose Due:  4/15    Take 1 Tab by mouth every day.   Dose:  500 mg         Instructions    albuterol 108 (90 BASE) MCG/ACT Aers inhalation aerosol   Next Dose Due:  As needed    Inhale 2 Puffs by mouth every four hours as needed for Shortness of Breath.   Dose:  2 Puff       aspirin EC 81 MG Tbec   Commonly known as:  ECOTRIN   Next Dose Due:  4/15     Take 81 mg by mouth every day.   Dose:  81 mg       DIALYVITE TABLET Tabs   Next Dose Due:  As previously directed     Dialyvite -nehro vit B complex-C-folic acid 1 tablet po QD Dialysis Pt       epoetin lucina 3000 UNIT/ML Soln   Commonly known as:  EPOGEN,PROCRIT   Next Dose Due:  As previously directed     Inject 2,200 Units as instructed every Monday, Wednesday, and Friday. With dialysis   Dose:  2200 Units       finasteride 5 MG Tabs   Last time this was given:  5 mg on 4/13/2017  8:24 AM   Commonly known as:  PROSCAR   Next Dose Due:   4/15    Take 5 mg by mouth every day.   Dose:  5 mg       fluticasone-salmeterol 500-50 MCG/DOSE Aepb   Commonly known as:  ADVAIR DISKUS   Next Dose Due:  As previously directed     Inhale 1 Puff by mouth 2 times a day. Rinse mouth after each use.   Dose:  1 Puff       ipratropium-albuterol 0.5-2.5 (3) MG/3ML nebulizer solution   Last time this was given:  3 mL on 4/14/2017 12:54 PM   Commonly known as:  DUONEB   Next Dose Due:  As previously directed     3 mL by Nebulization route 4 times a day.   Dose:  3 mL       metoprolol SR 25 MG Tb24   Last time this was given:  25 mg on 4/13/2017  8:24 AM   Commonly known as:  TOPROL XL   Next Dose Due:  4/15     TK 1 T PO BID       predniSONE 10 MG Tabs   Commonly known as:  DELTASONE   Next Dose Due:  4/15    TAKE 1 AND 1/2 TABLETS BY MOUTH EVERY DAY       ranitidine 150 MG Tabs   Last time this was given:  150 mg on 4/13/2017 10:08 PM   Commonly known as:  ZANTAC   Next Dose Due:  As previously directed     TAKE 1 TABLET BY MOUTH EVERY NIGHT AT BEDTIME       sevelamer 800 MG Tabs   Last time this was given:  800 mg on 4/14/2017 10:34 AM   Commonly known as:  RENAGEL   Next Dose Due:  As previously directed     Take 800 mg by mouth 3 times a day, with meals.   Dose:  800 mg       simvastatin 40 MG Tabs   Last time this was given:  40 mg on 4/13/2017 10:06 PM   Commonly known as:  ZOCOR   Next Dose Due:  As previously directed     Take 40 mg by mouth every evening.   Dose:  40 mg       tamsulosin 0.4 MG capsule   Last time this was given:  0.4 mg on 4/13/2017  8:24 AM   Commonly known as:  FLOMAX   Next Dose Due:  4/15     Take 0.4 mg by mouth every day.   Dose:  0.4 mg               Follow up appointment details :        Future Appointments  Date Time Provider Department Center   4/18/2017 1:00 PM Cintia Ariza M.D. [facility] None   5/16/2017 8:40 AM Eric Vaughan M.D. [facility] None   6/27/2017 11:45 AM Christine Manzo M.D. [facility] [address]         Instructions:      The were  given instructions to return to the ER if patient's condition worsens      Time Spent on Discharge:     Discharge instructions were discussed with the patient at bedside. Patient  expressed understanding and agreed to comply with all discharge instructions.    39 minutes were spent in the discharge planning and management of this  patient, including more than 50% of the time spent face to face in   Counseling.

## 2017-04-18 ENCOUNTER — OFFICE VISIT (OUTPATIENT)
Dept: RHEUMATOLOGY | Facility: PHYSICIAN GROUP | Age: 77
End: 2017-04-18
Payer: MEDICARE

## 2017-04-18 VITALS
RESPIRATION RATE: 16 BRPM | HEART RATE: 98 BPM | DIASTOLIC BLOOD PRESSURE: 60 MMHG | WEIGHT: 143 LBS | BODY MASS INDEX: 22.39 KG/M2 | OXYGEN SATURATION: 93 % | TEMPERATURE: 98.6 F | SYSTOLIC BLOOD PRESSURE: 120 MMHG

## 2017-04-18 DIAGNOSIS — Z22.39 PSEUDOMONAS AERUGINOSA COLONIZATION: ICD-10-CM

## 2017-04-18 DIAGNOSIS — Z95.0 PACEMAKER: ICD-10-CM

## 2017-04-18 DIAGNOSIS — M31.30 WEGENERS GRANULOMATOSIS: ICD-10-CM

## 2017-04-18 DIAGNOSIS — D84.9 IMMUNOSUPPRESSED STATUS (HCC): ICD-10-CM

## 2017-04-18 DIAGNOSIS — I48.91 ATRIAL FIBRILLATION, UNSPECIFIED TYPE (HCC): ICD-10-CM

## 2017-04-18 DIAGNOSIS — G47.33 OSA (OBSTRUCTIVE SLEEP APNEA): ICD-10-CM

## 2017-04-18 DIAGNOSIS — I10 ESSENTIAL HYPERTENSION: ICD-10-CM

## 2017-04-18 DIAGNOSIS — N18.6 ESRD ON DIALYSIS (HCC): ICD-10-CM

## 2017-04-18 DIAGNOSIS — Z99.2 ESRD ON DIALYSIS (HCC): ICD-10-CM

## 2017-04-18 PROCEDURE — 1111F DSCHRG MED/CURRENT MED MERGE: CPT | Performed by: INTERNAL MEDICINE

## 2017-04-18 PROCEDURE — 4040F PNEUMOC VAC/ADMIN/RCVD: CPT | Performed by: INTERNAL MEDICINE

## 2017-04-18 PROCEDURE — 1101F PT FALLS ASSESS-DOCD LE1/YR: CPT | Performed by: INTERNAL MEDICINE

## 2017-04-18 PROCEDURE — 99214 OFFICE O/P EST MOD 30 MIN: CPT | Performed by: INTERNAL MEDICINE

## 2017-04-18 PROCEDURE — G8420 CALC BMI NORM PARAMETERS: HCPCS | Performed by: INTERNAL MEDICINE

## 2017-04-18 PROCEDURE — G8432 DEP SCR NOT DOC, RNG: HCPCS | Performed by: INTERNAL MEDICINE

## 2017-04-18 PROCEDURE — 1036F TOBACCO NON-USER: CPT | Performed by: INTERNAL MEDICINE

## 2017-04-18 RX ORDER — PREDNISONE 1 MG/1
TABLET ORAL
Qty: 120 TAB | Refills: 2 | Status: SHIPPED | OUTPATIENT
Start: 2017-04-18 | End: 2017-07-22 | Stop reason: SDUPTHER

## 2017-04-18 NOTE — Clinical Note
[facility]   [address], [address]  [place] [address]  Phone: [phone]  Fax: [phone]              Encounter Date: 4/18/2017    Dear Dr. Forrest ref. provider found,    It was a pleasure seeing your patient, Gilberto Brown, on 4/18/2017. Diagnoses of Wegeners granulomatosis (CMS-[facility]), Immunosuppressed status ([facility]), ESRD on dialysis (CMS-[facility]), Pseudomonas aeruginosa colonization, Atrial fibrillation, unspecified type (CMS-[facility]), Pacemaker, TIMOTHY (obstructive sleep apnea), and Essential hypertension were pertinent to this visit.     Please find attached progress note which includes the history I obtained from Mr. Brown, my physical examination findings, my impression and recommendations.      Once again, it was a pleasure participating in your patient's care.  Please feel free to contact me if you have any questions or if I can be of any further assistance to your patients.      Sincerely,    Cintia Ariza M.D.  Electronically Signed          PROGRESS NOTE:  No notes on file

## 2017-04-18 NOTE — PROGRESS NOTES
Chief Complaint- joint pain    Subjective:   Gilberto Brown is a 76 y.o. male here today for follow up of rheumatological issues    Extremely complicated patient is being seen for Wegener's granulomatosis, Initially diagnosed Wegeners 9/2011 with renal biopsy with unexplained weight loss and loss of renal function over the course 2 weeks, had had 6 months of worsening renal function, currently on dialysis s/p bronchoscopy with dx bronchiectasis, COPD, started with Dr Rodriguez since October 2011and had been on CellCept 750 mg by mouth daily, patient had a negative ANCA test January 2015. 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  Patient denies any fevers of unknown etiology, denies any conjunctivitis, denies any nasal ulcers or oral ulcers, denies any joint pain or swelling, denies any new rashes. Complicating factors, patient has a chronic lung infection of Pseudomonas with recent CT scans of lungs and chest x-ray of lungs indicating chronic colonization of Pseudomonas. Unfortunately patient's had to be hospitalized many times now for treatment of lung infections, patient continues to be off of cytotoxic agents and continues to be on only prednisone 15 mg by mouth daily. Latest ANCA is negative March 2017. Patient also with atrial fibrillation and has a pacemaker in place. Patient states main complaint is feeling tired without a lot of energy.      S/p Cytoxan-n/v  Off of Cellcept 5/2016  Rituximab infusions 1/14/2016, 1/28/2016    GBM neg 1/2012  ANCA neg 1/2015; ANCA 1:20  11/2015; ANCA neg 2/2016; ANCA neg 5/2016; ANCA neg 12/2016; ANCA neg 3/2017  C3 90 normal 10/2011  C4 21 10/2011  OFELIA neg 10/2011  Uric acid 4.4 1/2016  Hep B neg 2/2015; Hep B neg 4/2017  Hep C neg 2/2015     Of note    [facility]  Dr Juan F Rubio pulmonology [phone]  Dr Chavarria Cardiologist [phone]  Dr Nunez Opthalmologist [phone]          Current medicines (including changes today)  Current Outpatient Prescriptions   Medication Sig Dispense Refill   • predniSONE (DELTASONE) 1 MG Tab 4 tabs po qday with one 10 mg tab prednisone qday 120 Tab 2   • levofloxacin (LEVAQUIN) 500 MG tablet Take 1 Tab by mouth every day. 5 Tab 0   • ipratropium-albuterol (DUONEB) 0.5-2.5 (3) MG/3ML nebulizer solution 3 mL by Nebulization route 4 times a day. 1080 mL 3   • B Complex-C-Folic Acid (DIALYVITE TABLET) Tab Dialyvite -nehro vit B complex-C-folic acid 1 tablet po QD Dialysis Pt 90 Tab 3   • predniSONE (DELTASONE) 10 MG Tab TAKE 1 AND 1/2 TABLETS BY MOUTH EVERY  Tab 0   • metoprolol SR (TOPROL XL) 25 MG TABLET SR 24 HR TK 1 T PO BID  5   • fluticasone-salmeterol (ADVAIR DISKUS) 500-50 MCG/DOSE AEROSOL POWDER, BREATH ACTIVATED Inhale 1 Puff by mouth 2 times a day. Rinse mouth after each use. 1 Inhaler 6   • ranitidine (ZANTAC) 150 MG Tab TAKE 1 TABLET BY MOUTH EVERY NIGHT AT BEDTIME 30 Tab 0   • finasteride (PROSCAR) 5 MG Tab Take 5 mg by mouth every day.     • simvastatin (ZOCOR) 40 MG TABS Take 40 mg by mouth every evening.     • epoetin lucina (EPOGEN,PROCRIT) 3000 UNIT/ML SOLN Inject 2,200 Units as instructed every Monday, Wednesday, and Friday. With dialysis     • sevelamer (RENAGEL) 800 MG TABS Take 800 mg by mouth 3 times a day, with meals.     • tamsulosin (FLOMAX) 0.4 MG capsule Take 0.4 mg by mouth every day.     • aspirin EC (ECOTRIN) 81 MG TBEC Take 81 mg by mouth every day.     • albuterol 108 (90 BASE) MCG/ACT Aero Soln inhalation aerosol Inhale 2 Puffs by mouth every four hours as needed for Shortness of Breath.       No current facility-administered medications for this visit.     He  has a past medical history of HTN; Dyslipidemia; Allergy; COPD; GERD (gastroesophageal reflux disease); Dialysis;  Urinary bladder disorder; Snoring; Unspecified hemorrhagic conditions (CMS-HCC); Pneumonia (2011); Bronchitis; Chronic bronchitis with productive mucopurulent cough (CMS-HCC); Indigestion; ASTHMA; EMPHYSEMA; CA in situ resp sys; Other specified disorder of intestines; Unspecified urinary incontinence; Sick sinus syndrome (CMS-HCC); Pacemaker (1988); Renal disorder; Wegener's disease, pulmonary (CMS-HCC); Hip pain; Heart burn; Pain (7/16/13); Sleep apnea; BPH (benign prostatic hyperplasia); Arthritis; Hypertension; Coughing blood (2011); Breath shortness; Pulmonary histoplasmosis (CMS-HCC); and TIMOTHY (obstructive sleep apnea).    ROS   Other than what is mentioned in HPI or physical exam, there is no history of headaches, double vision or blurred vision. No temporal tenderness or jaw claudication. No history of cataracts or glaucoma. No trouble swallowing difficulties or sore throats. No history of thyroid disease. No chest complaints including chest pain, cough or sputum production. No shortness of breath. No GI complaints including nausea, vomiting, change in bowel habits, or past peptic ulcer disease. No history of blood in the stools. No urinary complaints including dysuria or frequency. No history of rash including psoriasis. No history of alopecia, photosensitivity, oral ulcerations, Raynaud's phenomena, or swollen joints. No history of gout. No back complaints. No history of low blood counts.       Objective:     Blood pressure 120/60, pulse 98, temperature 37 °C (98.6 °F), resp. rate 16, weight 64.864 kg (143 lb), SpO2 93 %. Body mass index is 22.39 kg/(m^2).   Physical Exam:    Constitutional: Alert and oriented X3, no distress, thin appearing but talkative, talks in full sentences without evidence of shortness of breath Skin: Warm, dry, good turgor, no rashes in visible areas.Eye: Equal, round and reactive, patient has an injected right lateral conjunctiva secondary to broken blood vessel, no vision changes,  lids normal, no conjunctivitis, ENMT: Lips without lesions, good dentition, oropharynx clear, no oropharyngeal ulcerations, no nasal ulcers, no oral ulcers, Neck: Trachea midline, no masses, no thyromegaly, no carotid bruits, no subclavian bruitsLymph: No supraclavicular lymphadenopathy, no cervical lymphadenopathy, no axillary lymphadenopathy.Respiratory: Unlabored respiratory effort, mild crackles lower lung basesCardiovascular: Normal S1, S2, no murmur, no edema, pacemaker in left chestAbdomen: Soft, non-tender, no masses, no hepatosplenomegaly, no abdominal bruits, no inguinal bruitsPsych: Alert and oriented x3, normal affect and mood.Neuro: Cranial nerves 2-12 are grossly intactExt:Did not appreciate any joint effusions, no Raynaud's, patient had 2+ pulses both upper extremities radial and ulnar pulses and 1+ pulses bilateral lower extremities and bilateral dorsalis pedis pulses, patient had a vascular shunt in the right upper arm  No evidence of vasculitis noted in upper or lower extremities    Lab Results   Component Value Date/Time    HEPATITIS B CORS AB,IGM Negative 04/14/2017 12:30 PM    HEPATITIS B SURFACE ANTIGEN Negative 04/14/2017 12:30 PM     Lab Results   Component Value Date/Time    HEPATITIS C ANTIBODY Negative 04/14/2017 12:30 PM     Lab Results   Component Value Date/Time    SODIUM 136 04/14/2017 12:30 PM    POTASSIUM 3.8 04/14/2017 12:30 PM    CHLORIDE 107 04/14/2017 12:30 PM    CO2 21 04/14/2017 12:30 PM    GLUCOSE 135* 04/14/2017 12:30 PM    BUN 31* 04/14/2017 12:30 PM    CREATININE 3.07* 04/14/2017 12:30 PM      Lab Results   Component Value Date/Time    WBC 9.1 04/13/2017 03:25 AM    RBC 3.25* 04/13/2017 03:25 AM    HEMOGLOBIN 10.1* 04/13/2017 03:25 AM    HEMATOCRIT 31.6* 04/13/2017 03:25 AM    MCV 97.2 04/13/2017 03:25 AM    MCH 31.1 04/13/2017 03:25 AM    MCHC 32.0* 04/13/2017 03:25 AM    MPV 8.6* 04/13/2017 03:25 AM    NEUTROPHILS-POLYS 85.10* 04/13/2017 03:25 AM    LYMPHOCYTES 4.40*  04/13/2017 03:25 AM    MONOCYTES 8.20 04/13/2017 03:25 AM    EOSINOPHILS 1.00 04/13/2017 03:25 AM    BASOPHILS 0.30 04/13/2017 03:25 AM    HYPOCHROMIA 1+ 10/28/2011 05:35 AM    ANISOCYTOSIS 1+ 10/04/2016 09:36 AM      Lab Results   Component Value Date/Time    CALCIUM 8.4* 04/14/2017 12:30 PM    AST(SGOT) 14 04/12/2017 07:04 PM    ALT(SGPT) 21 04/12/2017 07:04 PM    ALKALINE PHOSPHATASE 64 04/12/2017 07:04 PM    TOTAL BILIRUBIN 0.6 04/12/2017 07:04 PM    ALBUMIN 3.8 04/12/2017 07:04 PM    TOTAL PROTEIN 6.3 04/12/2017 07:04 PM     Lab Results   Component Value Date/Time    URIC ACID 4.4 01/21/2016 08:36 AM    ANTINUCLEAR ANTIBODY None Detected 10/02/2011 04:30 AM     Lab Results   Component Value Date/Time    C3 COMPLEMENT 90 10/02/2011 04:30 AM    COMPLEMENT C4 21 10/02/2011 04:30 AM     Lab Results   Component Value Date/Time    GBM AB IGG Negative 01/18/2012 03:50 AM    GBM AB IGA Negative 01/18/2012 03:50 AM    ANCA IGG <1:20 03/14/2017 12:16 PM    C3 COMPLEMENT 90 10/02/2011 04:30 AM     Lab Results   Component Value Date/Time    SED RATE WESTERGREN 48* 04/13/2017 03:25 AM     Lab Results   Component Value Date/Time    CPK TOTAL 18 09/27/2011 05:05 AM     Lab Results   Component Value Date/Time    FLUID TYPE Stool 09/22/2006 01:04 PM      CT-CHEST, HIGH RESOLUTION LUNG    Impression 1. Bilateral lower lobe bronchiectasis, grossly unchanged compared with the prior conventional chest CT 1/14/12.  2. Minimal bilateral lower lobe interstitial opacities as well as confluent opacities in those areas, greater on the left, suggesting active airspace disease and/or atelectasis.  3. Probable uncalcified left lower lobe nodule, an equivocal finding with high-resolution technique.  Conventional chest CT would be needed for confirmation.  Note that no similar lesion was identified on the prior chest scan 1/14/12.            INTERPRETING LOCATION: [address] [place] [address], [id number]     Results for orders placed during the hospital  encounter of 02/23/17   CT-CHEST (THORAX) W/O    Impression 1.  Overall, there is no significant interval change in the diffuse peripheral subpleural interstitial lung disease with a basilar predominance.  2.  There is no significant change in posterior bibasilar bronchiectasis, medial segment right middle lobe and inferior lingular bronchiectasis. The basilar predominance suggests postinfectious or post aspiration change, while the right middle lobe and   lingular predominance may be sequela of previous DANIEL infection.  3.  There is slight improvement in basilar dependent airspace disease and right major fissure pleural thickening.  4.  No change in calcified granulomata in the lungs, mediastinum, hilar regions, liver and spleen consistent with the prior history of histoplasmosis.  5.  There is no evidence of new acute pneumonia.           Assessment and Plan:     1. Wegeners granulomatosis (CMS-HCC)  Currently in remission on prednisone at 15 mg by mouth daily, we will decrease prednisone to slightly down to 14 mg by mouth daily and try to find the lowest dose of prednisone maintaining Wegener's in remission. We will recheck ANCA end of May 2017.  - predniSONE (DELTASONE) 1 MG Tab; 4 tabs po qday with one 10 mg tab prednisone qday  Dispense: 120 Tab; Refill: 2  - ANTI-NEUTROPHIL CYTOPLASMIC AB; Future    2. Immunosuppressed status ([facility])  - predniSONE (DELTASONE) 1 MG Tab; 4 tabs po qday with one 10 mg tab prednisone qday  Dispense: 120 Tab; Refill: 2    3. ESRD on dialysis (CMS-HCC)  May impact the type of medications we can use for this patient's arthritis. We will have to keep this under advisement.    4. Pseudomonas aeruginosa colonization  Followed by Dr. Weeks in pulmonology, patient treated periodically with tobramycin    5. Atrial fibrillation, unspecified type (CMS-HCC)  With pacemaker in place    6. Pacemaker    7. TIMOTHY (obstructive sleep apnea)    8. Essential hypertension  May impact the type of  medications we can use for this patient's arthritis. We will have to keep this under advisement.    Followup: Return in about 6 weeks (around 5/30/2017). or sooner prn    Patient was seen 30 minutes face-to-face of which more than 50% of the time was spent counseling the patient (excluding time for procedures)  regarding  rheumatological condition and care. Therapy was discussed in detail.    Please note that this dictation was created using voice recognition software. I have made every reasonable attempt to correct obvious errors, but I expect that there are errors of grammar and possibly content that I did not discover before finalizing the note.

## 2017-04-18 NOTE — MR AVS SNAPSHOT
Gilberto Brown   2017 1:00 PM   Office Visit   MRN: [id number]    Department:  Rheumatology Med [facility]   Dept Phone:  [phone]    Description:  Male : 1940   Provider:  Cintia Ariza M.D.           Reason for Visit     Follow-Up follow up      Allergies as of 2017     Allergen Noted Reactions    Doxycycline 2014   Unspecified    Abdominal pain.  ZLA=[id number]    Erythromycin 2014   Unspecified    Abdominal pain.  RXN=before     Rituximab 08/15/2016       Flu like syndrome      You were diagnosed with     Wegeners granulomatosis (CMS-HCC)   [077343]       Immunosuppressed status (CMS-HCC)   [173793]       ESRD on dialysis (CMS-HCC)   [933674]       Pseudomonas aeruginosa colonization   [927658]       Atrial fibrillation, unspecified type (CMS-HCC)   [8266807]       Pacemaker   [651904]       TIMOTHY (obstructive sleep apnea)   [676803]       Essential hypertension   [4478824]         Vital Signs     Blood Pressure Pulse Temperature Respirations Weight Oxygen Saturation    120/60 mmHg 98 37 °C (98.6 °F) 16 64.864 kg (143 lb) 93%    Smoking Status                   Former Smoker           Basic Information     Date Of Birth Sex Race Ethnicity Preferred Language    1940 Male White Non- English      Your appointments     May 16, 2017  8:40 AM   Established Patient Pul with Eric Vaughan M.D.   [facility] Pulmonary Medicine (--)    [address]  [facility] [id number] [phone]            May 25, 2017  1:00 PM   Follow Up Visit with Cintia Ariza M.D.   [facility]-Arthritis (--)    [address]  [facility] [address] [phone]           You will be receiving a confirmation call a few days before your appointment from our automated call confirmation system.            2017 11:45 AM   Follow Up Visit with Christine Manzo M.D.   [facility] ([address] [place] [address])    [address] [facility] [address]  [facility] [address]      [phone]           You will be receiving a confirmation call a few days before your appointment from our automated call confirmation system.              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 Low  7/7/2011 - Present    Dysphagia  Medium  9/27/2011 - Present    Odynophagia R13.10 Medium  9/27/2011 - Present    Esophageal stricture K22.2 Medium  9/27/2011 - Present    Chronic respiratory infection J98.8 High  10/11/2011 - Present    Protein-calorie malnutrition, severe (HCC) E43 Medium  10/11/2011 - Present    Debility R53.81 High  10/12/2011 - Present    HTN (hypertension) I10 Low  10/12/2011 - Present    Abnormal thyroid function test R94.6   10/12/2011 - Present    TIMOTHY (obstructive sleep apnea) G47.33   10/12/2011 - Present    Hyperlipidemia E78.5   10/12/2011 - Present    ESRD on dialysis (CMS-HCC) N18.6, Z99.2 Medium  11/8/2011 - Present    Vitamin d deficiency    11/8/2011 - Present    Anemia of chronic renal failure, stage 5 (CMS-HCC) (Chronic) N18.5, D63.1 Medium  12/14/2011 - Present    BPH (benign prostatic hyperplasia) N40.0 Low  12/14/2011 - Present    GERD (gastroesophageal reflux disease) K21.9   12/14/2011 - Present    Weight loss R63.4   12/14/2011 - Present    Pneumonia J18.9 High  12/14/2011 - Present    Wegeners granulomatosis (CMS-[facility]) M31.30 Medium  12/14/2011 - Present    Adult failure to thrive R62.7 High  1/13/2012 - Present    Personal history of other malignant neoplasm of skin Z85.828   7/16/2013 - Present    AV block I44.30 Low  2/6/2014 - Present    Pacemaker Z95.0 Medium  2/6/2014 - Present    Immunosuppressed status (HCC) D89.9 Medium  2/6/2014 - Present    Hypotension I95.9 High  2/1/2015 - Present    Macrocytosis D75.89 Medium  2/1/2015 - Present    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   4/12/2016 - Present    HCAP (healthcare-associated pneumonia) J18.9 High   10/4/2016 - Present    Advance care planning Z71.89 Low  10/5/2016 - Present    Abnormal CT scan of lung R91.8   10/25/2016 - Present    Thrush B37.0   10/25/2016 - Present    Pseudomonas aeruginosa colonization Z22.39   4/18/2017 - Present    Atrial fibrillation (CMS-[facility]) I48.91   4/18/2017 - Present      Health Maintenance        Date Due Completion Dates    IMM DTaP/Tdap/Td Vaccine (1 - Tdap) 6/23/1958 ---    IMM ZOSTER VACCINE 6/23/1999 ---    COLONOSCOPY 12/1/2025 12/1/2015 (Postponed)    Override on 12/1/2015: Postponed (Patient will discuss scheduling with PCP)            Current Immunizations     13-VALENT PCV PREVNAR 4/14/2017  1:43 PM    Influenza TIV (IM) 9/15/2016, 11/3/2014, 10/10/2013, 9/27/2011  3:15 AM    Pneumococcal polysaccharide vaccine (PPSV-23) 12/19/2011  4:47 PM    Tuberculin Skin Test  Incomplete      Below and/or attached are the medications your provider expects you to take. Review all of your home medications and newly ordered medications with your provider and/or pharmacist. Follow medication instructions as directed by your provider and/or pharmacist. Please keep your medication list with you and share with your provider. Update the information when medications are discontinued, doses are changed, or new medications (including over-the-counter products) are added; and carry medication information at all times in the event of emergency situations     Allergies:  DOXYCYCLINE - Unspecified     ERYTHROMYCIN - Unspecified     RITUXIMAB - (reactions not documented)               Medications  Valid as of: April 18, 2017 -  2:11 PM    Generic Name Brand Name Tablet Size Instructions for use    Albuterol Sulfate (Aero Soln) albuterol 108 (90 BASE) MCG/ACT Inhale 2 Puffs by mouth every four hours as needed for Shortness of Breath.        Aspirin (Tablet Delayed Response) ECOTRIN 81 MG Take 81 mg by mouth every day.        B Complex-C-Folic Acid (Tab) DIALYVITE TABLET  Dialyvite -nehro vit B  complex-C-folic acid 1 tablet po QD Dialysis Pt        Epoetin Jorgito (Solution) EPOGEN,PROCRIT 3000 UNIT/ML Inject 2,200 Units as instructed every Monday, Wednesday, and Friday. With dialysis        Finasteride (Tab) PROSCAR 5 MG Take 5 mg by mouth every day.        Fluticasone-Salmeterol (AEROSOL POWDER, BREATH ACTIVATED) ADVAIR 500-50 MCG/DOSE Inhale 1 Puff by mouth 2 times a day. Rinse mouth after each use.        Ipratropium-Albuterol (Solution) DUONEB 0.5-2.5 (3) MG/3ML 3 mL by Nebulization route 4 times a day.        LevoFLOXacin (Tab) LEVAQUIN 500 MG Take 1 Tab by mouth every day.        Metoprolol Succinate (TABLET SR 24 HR) TOPROL XL 25 MG TK 1 T PO BID        PredniSONE (Tab) DELTASONE 10 MG TAKE 1 AND 1/2 TABLETS BY MOUTH EVERY DAY        PredniSONE (Tab) DELTASONE 1 MG 4 tabs po qday with one 10 mg tab prednisone qday        RaNITidine HCl (Tab) ZANTAC 150 MG TAKE 1 TABLET BY MOUTH EVERY NIGHT AT BEDTIME        Sevelamer HCl (Tab) RENAGEL 800 MG Take 800 mg by mouth 3 times a day, with meals.        Simvastatin (Tab) ZOCOR 40 MG Take 40 mg by mouth every evening.        Tamsulosin HCl (Cap) FLOMAX 0.4 MG Take 0.4 mg by mouth every day.        .                 Medicines prescribed today were sent to:     [facility] [place] - [address] [facility]    [address] [facility] [address] [facility] [address]    Phone: [phone] Fax: [phone]    Open 24 Hours?: No    [facility] [address] [place] [address] [place] - [address] [place] [address] [place] AT [facility]    [address] [place] [address] [place] [address]    Phone: [phone] Fax: [phone]    Open 24 Hours?: No      Medication refill instructions:       If your prescription bottle indicates you have medication refills left, it is not necessary to call your provider’s office. Please contact your pharmacy and they will refill your medication.    If your prescription bottle indicates you do not have any refills  left, you may request refills at any time through one of the following ways: The online [employer] system (except Urgent Care), by calling your provider’s office, or by asking your pharmacy to contact your provider’s office with a refill request. Medication refills are processed only during regular business hours and may not be available until the next business day. Your provider may request additional information or to have a follow-up visit with you prior to refilling your medication.   *Please Note: Medication refills are assigned a new Rx number when refilled electronically. Your pharmacy may indicate that no refills were authorized even though a new prescription for the same medication is available at the pharmacy. Please request the medicine by name with the pharmacy before contacting your provider for a refill.        Your To Do List     Future Labs/Procedures Complete By Expires    ANTI-NEUTROPHIL CYTOPLASMIC AB  As directed 4/19/2018      Other Notes About Your Plan     [facility] Ph. [phone] Fax. [phone]             [employer] Access Code: Activation code not generated  Current [employer] Status: Active

## 2017-05-13 ENCOUNTER — RESOLUTE PROFESSIONAL BILLING HOSPITAL PROF FEE (OUTPATIENT)
Dept: HOSPITALIST | Facility: MEDICAL CENTER | Age: 77
End: 2017-05-13
Payer: MEDICARE

## 2017-05-13 ENCOUNTER — APPOINTMENT (OUTPATIENT)
Dept: RADIOLOGY | Facility: MEDICAL CENTER | Age: 77
DRG: 871 | End: 2017-05-13
Attending: EMERGENCY MEDICINE
Payer: MEDICARE

## 2017-05-13 ENCOUNTER — HOSPITAL ENCOUNTER (INPATIENT)
Facility: MEDICAL CENTER | Age: 77
LOS: 4 days | DRG: 871 | End: 2017-05-17
Attending: EMERGENCY MEDICINE | Admitting: INTERNAL MEDICINE
Payer: MEDICARE

## 2017-05-13 DIAGNOSIS — N18.6 ESRD (END STAGE RENAL DISEASE) (HCC): ICD-10-CM

## 2017-05-13 DIAGNOSIS — J18.9 PNEUMONIA OF BOTH LOWER LOBES DUE TO INFECTIOUS ORGANISM: ICD-10-CM

## 2017-05-13 DIAGNOSIS — R50.9 FEVER, UNSPECIFIED FEVER CAUSE: ICD-10-CM

## 2017-05-13 LAB
ALBUMIN SERPL BCP-MCNC: 4 G/DL (ref 3.2–4.9)
ALBUMIN/GLOB SERPL: 1.4 G/DL
ALP SERPL-CCNC: 60 U/L (ref 30–99)
ALT SERPL-CCNC: 16 U/L (ref 2–50)
ANION GAP SERPL CALC-SCNC: 11 MMOL/L (ref 0–11.9)
APPEARANCE UR: CLEAR
AST SERPL-CCNC: 13 U/L (ref 12–45)
BASOPHILS # BLD AUTO: 0.4 % (ref 0–1.8)
BASOPHILS # BLD: 0.04 K/UL (ref 0–0.12)
BILIRUB SERPL-MCNC: 0.8 MG/DL (ref 0.1–1.5)
BILIRUB UR QL STRIP.AUTO: NEGATIVE
BNP SERPL-MCNC: 420 PG/ML (ref 0–100)
BUN SERPL-MCNC: 31 MG/DL (ref 8–22)
CALCIUM SERPL-MCNC: 9.2 MG/DL (ref 8.5–10.5)
CHLORIDE SERPL-SCNC: 98 MMOL/L (ref 96–112)
CO2 SERPL-SCNC: 31 MMOL/L (ref 20–33)
COLOR UR: YELLOW
CREAT SERPL-MCNC: 2.84 MG/DL (ref 0.5–1.4)
EOSINOPHIL # BLD AUTO: 0.03 K/UL (ref 0–0.51)
EOSINOPHIL NFR BLD: 0.3 % (ref 0–6.9)
EPI CELLS #/AREA URNS HPF: ABNORMAL /HPF
ERYTHROCYTE [DISTWIDTH] IN BLOOD BY AUTOMATED COUNT: 62.2 FL (ref 35.9–50)
FLUAV H1 2009 PAND RNA SPEC QL NAA+PROBE: NOT DETECTED
FLUAV RNA SPEC QL NAA+PROBE: NEGATIVE
FLUBV RNA SPEC QL NAA+PROBE: POSITIVE
GFR SERPL CREATININE-BSD FRML MDRD: 22 ML/MIN/1.73 M 2
GLOBULIN SER CALC-MCNC: 2.9 G/DL (ref 1.9–3.5)
GLUCOSE SERPL-MCNC: 124 MG/DL (ref 65–99)
GLUCOSE UR STRIP.AUTO-MCNC: NEGATIVE MG/DL
HCT VFR BLD AUTO: 35.7 % (ref 42–52)
HGB BLD-MCNC: 11.5 G/DL (ref 14–18)
IMM GRANULOCYTES # BLD AUTO: 0.1 K/UL (ref 0–0.11)
IMM GRANULOCYTES NFR BLD AUTO: 0.9 % (ref 0–0.9)
KETONES UR STRIP.AUTO-MCNC: NEGATIVE MG/DL
LACTATE BLD-SCNC: 1 MMOL/L (ref 0.5–2)
LACTATE BLD-SCNC: 1.2 MMOL/L (ref 0.5–2)
LACTATE BLD-SCNC: 2.2 MMOL/L (ref 0.5–2)
LEUKOCYTE ESTERASE UR QL STRIP.AUTO: NEGATIVE
LYMPHOCYTES # BLD AUTO: 0.36 K/UL (ref 1–4.8)
LYMPHOCYTES NFR BLD: 3.2 % (ref 22–41)
MCH RBC QN AUTO: 31.3 PG (ref 27–33)
MCHC RBC AUTO-ENTMCNC: 32.2 G/DL (ref 33.7–35.3)
MCV RBC AUTO: 97.3 FL (ref 81.4–97.8)
MICRO URNS: ABNORMAL
MONOCYTES # BLD AUTO: 0.51 K/UL (ref 0–0.85)
MONOCYTES NFR BLD AUTO: 4.5 % (ref 0–13.4)
NEUTROPHILS # BLD AUTO: 10.23 K/UL (ref 1.82–7.42)
NEUTROPHILS NFR BLD: 90.7 % (ref 44–72)
NITRITE UR QL STRIP.AUTO: NEGATIVE
NRBC # BLD AUTO: 0 K/UL
NRBC BLD AUTO-RTO: 0 /100 WBC
PH UR STRIP.AUTO: 8.5 [PH]
PLATELET # BLD AUTO: 228 K/UL (ref 164–446)
PMV BLD AUTO: 8.8 FL (ref 9–12.9)
POTASSIUM SERPL-SCNC: 3.9 MMOL/L (ref 3.6–5.5)
PROT SERPL-MCNC: 6.9 G/DL (ref 6–8.2)
PROT UR QL STRIP: 70 MG/DL
RBC # BLD AUTO: 3.67 M/UL (ref 4.7–6.1)
RBC # URNS HPF: ABNORMAL /HPF
RBC UR QL AUTO: NEGATIVE
SODIUM SERPL-SCNC: 140 MMOL/L (ref 135–145)
SP GR UR STRIP.AUTO: 1.01
TROPONIN I SERPL-MCNC: 0.14 NG/ML (ref 0–0.04)
TROPONIN I SERPL-MCNC: 0.15 NG/ML (ref 0–0.04)
WBC # BLD AUTO: 11.3 K/UL (ref 4.8–10.8)
WBC #/AREA URNS HPF: ABNORMAL /HPF

## 2017-05-13 PROCEDURE — 94667 MNPJ CHEST WALL 1ST: CPT

## 2017-05-13 PROCEDURE — 94640 AIRWAY INHALATION TREATMENT: CPT

## 2017-05-13 PROCEDURE — 96365 THER/PROPH/DIAG IV INF INIT: CPT

## 2017-05-13 PROCEDURE — 700105 HCHG RX REV CODE 258

## 2017-05-13 PROCEDURE — 96367 TX/PROPH/DG ADDL SEQ IV INF: CPT

## 2017-05-13 PROCEDURE — 83880 ASSAY OF NATRIURETIC PEPTIDE: CPT

## 2017-05-13 PROCEDURE — 84484 ASSAY OF TROPONIN QUANT: CPT

## 2017-05-13 PROCEDURE — 700101 HCHG RX REV CODE 250: Performed by: INTERNAL MEDICINE

## 2017-05-13 PROCEDURE — 700111 HCHG RX REV CODE 636 W/ 250 OVERRIDE (IP): Performed by: INTERNAL MEDICINE

## 2017-05-13 PROCEDURE — 83605 ASSAY OF LACTIC ACID: CPT | Mod: 91

## 2017-05-13 PROCEDURE — 71010 DX-CHEST-PORTABLE (1 VIEW): CPT

## 2017-05-13 PROCEDURE — 700101 HCHG RX REV CODE 250: Performed by: EMERGENCY MEDICINE

## 2017-05-13 PROCEDURE — 700102 HCHG RX REV CODE 250 W/ 637 OVERRIDE(OP): Performed by: INTERNAL MEDICINE

## 2017-05-13 PROCEDURE — 700111 HCHG RX REV CODE 636 W/ 250 OVERRIDE (IP): Performed by: EMERGENCY MEDICINE

## 2017-05-13 PROCEDURE — A9270 NON-COVERED ITEM OR SERVICE: HCPCS | Performed by: EMERGENCY MEDICINE

## 2017-05-13 PROCEDURE — A9270 NON-COVERED ITEM OR SERVICE: HCPCS | Performed by: INTERNAL MEDICINE

## 2017-05-13 PROCEDURE — 700102 HCHG RX REV CODE 250 W/ 637 OVERRIDE(OP): Performed by: PHARMACIST

## 2017-05-13 PROCEDURE — 87040 BLOOD CULTURE FOR BACTERIA: CPT

## 2017-05-13 PROCEDURE — 80053 COMPREHEN METABOLIC PANEL: CPT

## 2017-05-13 PROCEDURE — 700102 HCHG RX REV CODE 250 W/ 637 OVERRIDE(OP): Performed by: EMERGENCY MEDICINE

## 2017-05-13 PROCEDURE — 87186 SC STD MICRODIL/AGAR DIL: CPT

## 2017-05-13 PROCEDURE — 85025 COMPLETE CBC W/AUTO DIFF WBC: CPT

## 2017-05-13 PROCEDURE — 700105 HCHG RX REV CODE 258: Performed by: EMERGENCY MEDICINE

## 2017-05-13 PROCEDURE — 99223 1ST HOSP IP/OBS HIGH 75: CPT | Mod: AI | Performed by: INTERNAL MEDICINE

## 2017-05-13 PROCEDURE — 770020 HCHG ROOM/CARE - TELE (206)

## 2017-05-13 PROCEDURE — 87503 INFLUENZA DNA AMP PROB ADDL: CPT

## 2017-05-13 PROCEDURE — 96372 THER/PROPH/DIAG INJ SC/IM: CPT

## 2017-05-13 PROCEDURE — 700105 HCHG RX REV CODE 258: Performed by: INTERNAL MEDICINE

## 2017-05-13 PROCEDURE — 87086 URINE CULTURE/COLONY COUNT: CPT

## 2017-05-13 PROCEDURE — A9270 NON-COVERED ITEM OR SERVICE: HCPCS | Performed by: PHARMACIST

## 2017-05-13 PROCEDURE — 96361 HYDRATE IV INFUSION ADD-ON: CPT

## 2017-05-13 PROCEDURE — 99285 EMERGENCY DEPT VISIT HI MDM: CPT

## 2017-05-13 PROCEDURE — 81001 URINALYSIS AUTO W/SCOPE: CPT

## 2017-05-13 PROCEDURE — 87077 CULTURE AEROBIC IDENTIFY: CPT

## 2017-05-13 PROCEDURE — 87502 INFLUENZA DNA AMP PROBE: CPT

## 2017-05-13 RX ORDER — CIPROFLOXACIN 250 MG/1
250 TABLET, FILM COATED ORAL ONCE
Status: ON HOLD | COMMUNITY
Start: 2017-05-12 | End: 2017-05-17

## 2017-05-13 RX ORDER — ONDANSETRON 4 MG/1
4 TABLET, ORALLY DISINTEGRATING ORAL EVERY 4 HOURS PRN
Status: DISCONTINUED | OUTPATIENT
Start: 2017-05-13 | End: 2017-05-17 | Stop reason: HOSPADM

## 2017-05-13 RX ORDER — DOXYCYCLINE 100 MG/1
100 TABLET ORAL EVERY 12 HOURS
Status: DISCONTINUED | OUTPATIENT
Start: 2017-05-13 | End: 2017-05-17 | Stop reason: HOSPADM

## 2017-05-13 RX ORDER — FAMOTIDINE 20 MG/1
20 TABLET, FILM COATED ORAL 2 TIMES DAILY
Status: DISCONTINUED | OUTPATIENT
Start: 2017-05-13 | End: 2017-05-17 | Stop reason: HOSPADM

## 2017-05-13 RX ORDER — SIMVASTATIN 40 MG
40 TABLET ORAL NIGHTLY
Status: DISCONTINUED | OUTPATIENT
Start: 2017-05-13 | End: 2017-05-17 | Stop reason: HOSPADM

## 2017-05-13 RX ORDER — BISACODYL 10 MG
10 SUPPOSITORY, RECTAL RECTAL
Status: DISCONTINUED | OUTPATIENT
Start: 2017-05-13 | End: 2017-05-17 | Stop reason: HOSPADM

## 2017-05-13 RX ORDER — FINASTERIDE 5 MG/1
5 TABLET, FILM COATED ORAL DAILY
Status: DISCONTINUED | OUTPATIENT
Start: 2017-05-13 | End: 2017-05-17 | Stop reason: HOSPADM

## 2017-05-13 RX ORDER — ACETAMINOPHEN 325 MG/1
650 TABLET ORAL ONCE
Status: COMPLETED | OUTPATIENT
Start: 2017-05-13 | End: 2017-05-13

## 2017-05-13 RX ORDER — IPRATROPIUM BROMIDE AND ALBUTEROL SULFATE 2.5; .5 MG/3ML; MG/3ML
3 SOLUTION RESPIRATORY (INHALATION)
Status: DISCONTINUED | OUTPATIENT
Start: 2017-05-13 | End: 2017-05-15

## 2017-05-13 RX ORDER — SODIUM CHLORIDE 9 MG/ML
500 INJECTION, SOLUTION INTRAVENOUS
Status: DISCONTINUED | OUTPATIENT
Start: 2017-05-13 | End: 2017-05-17 | Stop reason: HOSPADM

## 2017-05-13 RX ORDER — ONDANSETRON 2 MG/ML
4 INJECTION INTRAMUSCULAR; INTRAVENOUS EVERY 4 HOURS PRN
Status: DISCONTINUED | OUTPATIENT
Start: 2017-05-13 | End: 2017-05-17 | Stop reason: HOSPADM

## 2017-05-13 RX ORDER — SEVELAMER HYDROCHLORIDE 800 MG/1
800 TABLET, FILM COATED ORAL
Status: DISCONTINUED | OUTPATIENT
Start: 2017-05-13 | End: 2017-05-17 | Stop reason: HOSPADM

## 2017-05-13 RX ORDER — OSELTAMIVIR PHOSPHATE 30 MG/1
30 CAPSULE ORAL
Status: COMPLETED | OUTPATIENT
Start: 2017-05-13 | End: 2017-05-15

## 2017-05-13 RX ORDER — CEFTRIAXONE 2 G/1
2 INJECTION, POWDER, FOR SOLUTION INTRAMUSCULAR; INTRAVENOUS ONCE
Status: COMPLETED | OUTPATIENT
Start: 2017-05-13 | End: 2017-05-13

## 2017-05-13 RX ORDER — POLYETHYLENE GLYCOL 3350 17 G/17G
1 POWDER, FOR SOLUTION ORAL
Status: DISCONTINUED | OUTPATIENT
Start: 2017-05-13 | End: 2017-05-17 | Stop reason: HOSPADM

## 2017-05-13 RX ORDER — AMOXICILLIN 250 MG
2 CAPSULE ORAL 2 TIMES DAILY
Status: DISCONTINUED | OUTPATIENT
Start: 2017-05-13 | End: 2017-05-17 | Stop reason: HOSPADM

## 2017-05-13 RX ORDER — ACETAMINOPHEN 325 MG/1
650 TABLET ORAL EVERY 6 HOURS PRN
Status: DISCONTINUED | OUTPATIENT
Start: 2017-05-13 | End: 2017-05-17 | Stop reason: HOSPADM

## 2017-05-13 RX ORDER — SODIUM CHLORIDE 9 MG/ML
30 INJECTION, SOLUTION INTRAVENOUS
Status: DISCONTINUED | OUTPATIENT
Start: 2017-05-13 | End: 2017-05-17 | Stop reason: HOSPADM

## 2017-05-13 RX ORDER — HEPARIN SODIUM 5000 [USP'U]/ML
5000 INJECTION, SOLUTION INTRAVENOUS; SUBCUTANEOUS EVERY 8 HOURS
Status: DISCONTINUED | OUTPATIENT
Start: 2017-05-13 | End: 2017-05-17 | Stop reason: HOSPADM

## 2017-05-13 RX ORDER — ALBUTEROL SULFATE 90 UG/1
2 AEROSOL, METERED RESPIRATORY (INHALATION) EVERY 4 HOURS PRN
Status: DISCONTINUED | OUTPATIENT
Start: 2017-05-13 | End: 2017-05-17 | Stop reason: HOSPADM

## 2017-05-13 RX ORDER — TAMSULOSIN HYDROCHLORIDE 0.4 MG/1
0.4 CAPSULE ORAL DAILY
Status: DISCONTINUED | OUTPATIENT
Start: 2017-05-13 | End: 2017-05-17 | Stop reason: HOSPADM

## 2017-05-13 RX ORDER — PREDNISONE 5 MG/1
15 TABLET ORAL
Status: DISCONTINUED | OUTPATIENT
Start: 2017-05-13 | End: 2017-05-17 | Stop reason: HOSPADM

## 2017-05-13 RX ORDER — BUDESONIDE AND FORMOTEROL FUMARATE DIHYDRATE 160; 4.5 UG/1; UG/1
2 AEROSOL RESPIRATORY (INHALATION)
Status: DISCONTINUED | OUTPATIENT
Start: 2017-05-13 | End: 2017-05-17 | Stop reason: HOSPADM

## 2017-05-13 RX ORDER — SODIUM CHLORIDE 9 MG/ML
30 INJECTION, SOLUTION INTRAVENOUS ONCE
Status: COMPLETED | OUTPATIENT
Start: 2017-05-13 | End: 2017-05-13

## 2017-05-13 RX ORDER — SODIUM CHLORIDE 9 MG/ML
INJECTION, SOLUTION INTRAVENOUS CONTINUOUS
Status: DISCONTINUED | OUTPATIENT
Start: 2017-05-13 | End: 2017-05-15

## 2017-05-13 RX ORDER — OSELTAMIVIR PHOSPHATE 75 MG/1
75 CAPSULE ORAL EVERY 12 HOURS
Status: DISCONTINUED | OUTPATIENT
Start: 2017-05-13 | End: 2017-05-13

## 2017-05-13 RX ORDER — PREDNISONE 1 MG/1
14 TABLET ORAL DAILY
Status: ON HOLD | COMMUNITY
End: 2018-01-12

## 2017-05-13 RX ORDER — SODIUM CHLORIDE 9 MG/ML
1000 INJECTION, SOLUTION INTRAVENOUS ONCE
Status: COMPLETED | OUTPATIENT
Start: 2017-05-13 | End: 2017-05-13

## 2017-05-13 RX ORDER — RANITIDINE 150 MG/1
150 TABLET ORAL 2 TIMES DAILY
Status: DISCONTINUED | OUTPATIENT
Start: 2017-05-13 | End: 2017-05-13

## 2017-05-13 RX ORDER — METOPROLOL SUCCINATE 25 MG/1
25 TABLET, EXTENDED RELEASE ORAL
Status: DISCONTINUED | OUTPATIENT
Start: 2017-05-13 | End: 2017-05-17 | Stop reason: HOSPADM

## 2017-05-13 RX ORDER — PREDNISONE 10 MG/1
14 TABLET ORAL DAILY
Status: ON HOLD | COMMUNITY
End: 2017-05-17

## 2017-05-13 RX ADMIN — SODIUM CHLORIDE 1000 ML: 9 INJECTION, SOLUTION INTRAVENOUS at 09:18

## 2017-05-13 RX ADMIN — SODIUM CHLORIDE 1000 ML: 9 INJECTION, SOLUTION INTRAVENOUS at 10:16

## 2017-05-13 RX ADMIN — FAMOTIDINE 20 MG: 20 TABLET, FILM COATED ORAL at 12:51

## 2017-05-13 RX ADMIN — IPRATROPIUM BROMIDE AND ALBUTEROL SULFATE 3 ML: .5; 3 SOLUTION RESPIRATORY (INHALATION) at 20:45

## 2017-05-13 RX ADMIN — OSELTAMIVIR PHOSPHATE 30 MG: 30 CAPSULE ORAL at 17:14

## 2017-05-13 RX ADMIN — ACETAMINOPHEN 650 MG: 325 TABLET, FILM COATED ORAL at 10:21

## 2017-05-13 RX ADMIN — DOXYCYCLINE 100 MG: 100 TABLET ORAL at 13:46

## 2017-05-13 RX ADMIN — ACETAMINOPHEN 650 MG: 325 TABLET, FILM COATED ORAL at 22:30

## 2017-05-13 RX ADMIN — IPRATROPIUM BROMIDE AND ALBUTEROL SULFATE 3 ML: .5; 3 SOLUTION RESPIRATORY (INHALATION) at 15:00

## 2017-05-13 RX ADMIN — SODIUM CHLORIDE: 9 INJECTION, SOLUTION INTRAVENOUS at 12:54

## 2017-05-13 RX ADMIN — RENAGEL 800 MG: 800 TABLET ORAL at 17:45

## 2017-05-13 RX ADMIN — TAMSULOSIN HYDROCHLORIDE 0.4 MG: 0.4 CAPSULE ORAL at 12:51

## 2017-05-13 RX ADMIN — HEPARIN SODIUM 5000 UNITS: 5000 INJECTION, SOLUTION INTRAVENOUS; SUBCUTANEOUS at 13:47

## 2017-05-13 RX ADMIN — HEPARIN SODIUM 5000 UNITS: 5000 INJECTION, SOLUTION INTRAVENOUS; SUBCUTANEOUS at 22:30

## 2017-05-13 RX ADMIN — FINASTERIDE 5 MG: 5 TABLET, FILM COATED ORAL at 12:51

## 2017-05-13 RX ADMIN — METOPROLOL SUCCINATE 25 MG: 25 TABLET, EXTENDED RELEASE ORAL at 12:51

## 2017-05-13 RX ADMIN — DOXYCYCLINE 100 MG: 100 TABLET ORAL at 22:30

## 2017-05-13 RX ADMIN — ASPIRIN 81 MG: 81 TABLET ORAL at 12:51

## 2017-05-13 RX ADMIN — FAMOTIDINE 20 MG: 20 TABLET, FILM COATED ORAL at 22:29

## 2017-05-13 RX ADMIN — PREDNISONE 15 MG: 5 TABLET ORAL at 13:46

## 2017-05-13 RX ADMIN — SIMVASTATIN 40 MG: 40 TABLET, FILM COATED ORAL at 22:30

## 2017-05-13 RX ADMIN — IPRATROPIUM BROMIDE AND ALBUTEROL SULFATE 3 ML: .5; 3 SOLUTION RESPIRATORY (INHALATION) at 12:02

## 2017-05-13 RX ADMIN — DOXYCYCLINE 100 MG: 100 INJECTION, POWDER, LYOPHILIZED, FOR SOLUTION INTRAVENOUS at 11:10

## 2017-05-13 RX ADMIN — CEFTRIAXONE SODIUM 2 G: 2 INJECTION, POWDER, FOR SOLUTION INTRAMUSCULAR; INTRAVENOUS at 10:17

## 2017-05-13 ASSESSMENT — COPD QUESTIONNAIRES
DO YOU EVER COUGH UP ANY MUCUS OR PHLEGM?: YES, EVERY DAY
COPD SCREENING SCORE: 8
DURING THE PAST 4 WEEKS HOW MUCH DID YOU FEEL SHORT OF BREATH: MOST  OR ALL OF THE TIME
HAVE YOU SMOKED AT LEAST 100 CIGARETTES IN YOUR ENTIRE LIFE: NO/DON'T KNOW

## 2017-05-13 ASSESSMENT — LIFESTYLE VARIABLES
HAVE PEOPLE ANNOYED YOU BY CRITICIZING YOUR DRINKING: NO
HOW MANY TIMES IN THE PAST YEAR HAVE YOU HAD 5 OR MORE DRINKS IN A DAY: 0
EVER_SMOKED: YES
ON A TYPICAL DAY WHEN YOU DRINK ALCOHOL HOW MANY DRINKS DO YOU HAVE: 1
ALCOHOL_USE: YES
EVER FELT BAD OR GUILTY ABOUT YOUR DRINKING: NO
TOTAL SCORE: 0
AVERAGE NUMBER OF DAYS PER WEEK YOU HAVE A DRINK CONTAINING ALCOHOL: 7
EVER HAD A DRINK FIRST THING IN THE MORNING TO STEADY YOUR NERVES TO GET RID OF A HANGOVER: NO
HAVE YOU EVER FELT YOU SHOULD CUT DOWN ON YOUR DRINKING: NO
CONSUMPTION TOTAL: NEGATIVE
TOTAL SCORE: 0
TOTAL SCORE: 0
EVER_SMOKED: YES

## 2017-05-13 ASSESSMENT — PAIN SCALES - GENERAL
PAINLEVEL_OUTOF10: 8
PAINLEVEL_OUTOF10: 7

## 2017-05-13 NOTE — IP AVS SNAPSHOT
" Home Care Instructions                                                                                                                  Name:Gilberto Brown  Medical Record Number:[id number]  CSN: [id number]    YOB: 1940   Age: 76 y.o.  Sex: male  HT:1.753 m (5' 9\") WT: 61.9 kg (136 lb 7.4 oz)          Admit Date: 5/13/2017     Discharge Date:   Today's Date: 5/17/2017  Attending Doctor:  Nyasia Forrester M.D.                  Allergies:  Doxycycline; Erythromycin; and Rituximab            Discharge Instructions       Discharge Instructions    Discharged to home by car with relative. Discharged via wheelchair, hospital escort: Yes.  Special equipment needed: Cane    Be sure to schedule a follow-up appointment with your primary care doctor or any specialists as instructed.     Discharge Plan:   Influenza Vaccine Indication: Not indicated: Previously immunized this influenza season and > 8 years of age    I understand that a diet low in cholesterol, fat, and sodium is recommended for good health. Unless I have been given specific instructions below for another diet, I accept this instruction as my diet prescription.   Other diet:     Special Instructions: None    · Is patient discharged on Warfarin / Coumadin?   No     · Is patient Post Blood Transfusion?  No    Depression / Suicide Risk    As you are discharged from this [facility] facility, it is important to learn how to keep safe from harming yourself.    Recognize the warning signs:  · Abrupt changes in personality, positive or negative- including increase in energy   · Giving away possessions  · Change in eating patterns- significant weight changes-  positive or negative  · Change in sleeping patterns- unable to sleep or sleeping all the time   · Unwillingness or inability to communicate  · Depression  · Unusual sadness, discouragement and loneliness  · Talk of wanting to die  · Neglect of personal appearance   · Rebelliousness- reckless " behavior  · Withdrawal from people/activities they love  · Confusion- inability to concentrate     If you or a loved one observes any of these behaviors or has concerns about self-harm, here's what you can do:  · Talk about it- your feelings and reasons for harming yourself  · Remove any means that you might use to hurt yourself (examples: pills, rope, extension cords, firearm)  · Get professional help from the community (Mental Health, Substance Abuse, psychological counseling)  · Do not be alone:Call your Safe Contact- someone whom you trust who will be there for you.  · Call your local CRISIS HOTLINE 210-5727 or 518-336-8636  · Call your local Children's Mobile Crisis Response Team [place] (252) 875-9618 or www.[employer]  · Call the toll free National Suicide Prevention Hotlines   · National Suicide Prevention Lifeline 252-933-DLME (8355)  · [employer] 800-SUICIDE (445-6518)        Your appointments     May 25, 2017  1:00 PM   Follow Up Visit with Cintia Ariza M.D.   [facility] (--)    [address], [address]  [place] [address] [phone]           You will be receiving a confirmation call a few days before your appointment from our automated call confirmation system.            Jun 27, 2017 11:45 AM   Follow Up Visit with Christine Manzo M.D.   [facility] ([address] [place] [address])    [address]  [place] [address] [phone]           You will be receiving a confirmation call a few days before your appointment from our automated call confirmation system.            Jun 28, 2017  1:40 PM   Established Patient Pul with Eric Vaughan M.D.   [facility] (--)    [address]  [address]  [place] [address] [phone]              Follow-up Information     1. Follow up with Christine Zamudio M.D.. Go on 5/26/2017.    Specialty:  Internal Medicine    Why:  Please arrive at 2:45 pm for your appointment.  Thank you.    Contact information    [address] [address]  [place] [address]  [phone]           Discharge Medication Instructions:    Below are the medications your physician expects you to take upon discharge:    Review all your home medications and newly ordered medications with your doctor and/or pharmacist. Follow medication instructions as directed by your doctor and/or pharmacist.    Please keep your medication list with you and share with your physician.               Medication List      START taking these medications        Instructions    Morning Afternoon Evening Bedtime    doxycycline monohydrate 100 MG tablet   Last time this was given:  100 mg on 5/17/2017 11:42 AM   Commonly known as:  ADOXA        Take 1 Tab by mouth every 12 hours for 5 days.   Dose:  100 mg                          CHANGE how you take these medications        Instructions    Morning Afternoon Evening Bedtime    predniSONE 1 MG Tabs   What changed:  Another medication with the same name was removed. Continue taking this medication, and follow the directions you see here.   Last time this was given:  15 mg on 5/17/2017 11:49 AM   Commonly known as:  DELTASONE        Take 14 mg by mouth every day. Pt takes x1 10mg tabs and x4 1mg tabs for total dose = 14mg QD   Dose:  14 mg                          CONTINUE taking these medications        Instructions    Morning Afternoon Evening Bedtime    albuterol 108 (90 BASE) MCG/ACT Aers inhalation aerosol   Last time this was given:  2 Puffs on 5/16/2017  9:44 PM        Inhale 2 Puffs by mouth every four hours as needed for Shortness of Breath.   Dose:  2 Puff                        aspirin EC 81 MG Tbec   Last time this was given:  81 mg on 5/17/2017 11:43 AM   Commonly known as:  ECOTRIN        Take 81 mg by mouth every day.   Dose:  81 mg                        DIALYVITE TABLET Tabs        Dialyvite -nehro vit B complex-C-folic acid 1 tablet po QD Dialysis Pt                        epoetin  lucina 3000 UNIT/ML Soln   Commonly known as:  EPOGEN,PROCRIT        Inject 2,200 Units as instructed every Monday, Wednesday, and Friday. With dialysis   Dose:  2200 Units                        finasteride 5 MG Tabs   Last time this was given:  5 mg on 5/17/2017 11:42 AM   Commonly known as:  PROSCAR        Take 5 mg by mouth every day.   Dose:  5 mg                        fluticasone-salmeterol 500-50 MCG/DOSE Aepb   Commonly known as:  ADVAIR DISKUS        Inhale 1 Puff by mouth 2 times a day. Rinse mouth after each use.   Dose:  1 Puff                        ipratropium-albuterol 0.5-2.5 (3) MG/3ML nebulizer solution   Last time this was given:  3 mL on 5/16/2017  7:15 PM   Commonly known as:  DUONEB        3 mL by Nebulization route 4 times a day.   Dose:  3 mL                        metoprolol 25 MG Tabs   Commonly known as:  LOPRESSOR        Take 25 mg by mouth 2 times a day.   Dose:  25 mg                        ranitidine 150 MG Tabs   Commonly known as:  ZANTAC        TAKE 1 TABLET BY MOUTH EVERY NIGHT AT BEDTIME                        sevelamer 800 MG Tabs   Last time this was given:  800 mg on 5/17/2017 11:44 AM   Commonly known as:  RENAGEL        Take 800 mg by mouth 3 times a day, with meals.   Dose:  800 mg                        simvastatin 40 MG Tabs   Last time this was given:  40 mg on 5/16/2017  9:43 PM   Commonly known as:  ZOCOR        Take 40 mg by mouth every evening.   Dose:  40 mg                        tamsulosin 0.4 MG capsule   Last time this was given:  0.4 mg on 5/17/2017 11:42 AM   Commonly known as:  FLOMAX        Take 0.4 mg by mouth every day.   Dose:  0.4 mg                          STOP taking these medications     ciprofloxacin 250 MG Tabs   Commonly known as:  CIPRO                    Where to Get Your Medications      These medications were sent to [id number] - [place] - [address] [facility] [address] AT [facility] [address]  [address]  [address]     Phone:  [phone]    - doxycycline monohydrate 100 MG tablet            Instructions           Diet / Nutrition:    Follow any diet instructions given to you by your doctor or the dietician, including how much salt (sodium) you are allowed each day.    If you are overweight, talk to your doctor about a weight reduction plan.    Activity:    Remain physically active following your doctor's instructions about exercise and activity.    Rest often.     Any time you become even a little tired or short of breath, SIT DOWN and rest.    Worsening Symptoms:    Report any of the following signs and symptoms to the doctor's office immediately:    *Pain of jaw, arm, or neck  *Chest pain not relieved by medication                               *Dizziness or loss of consciousness  *Difficulty breathing even when at rest   *More tired than usual                                       *Bleeding drainage or swelling of surgical site  *Swelling of feet, ankles, legs or stomach                 *Fever (>100ºF)  *Pink or blood tinged sputum  *Weight gain (3lbs/day or 5lbs /week)           *Shock from internal defibrillator (if applicable)  *Palpitations or irregular heartbeats                *Cool and/or numb extremities    Stroke Awareness    Common Risk Factors for Stroke include:    Age  Atrial Fibrillation  Carotid Artery Stenosis  Diabetes Mellitus  Excessive alcohol consumption  High blood pressure  Overweight   Physical inactivity  Smoking    Warning signs and symptoms of a stroke include:    *Sudden numbness or weakness of the face, arm or leg (especially on one side of the body).  *Sudden confusion, trouble speaking or understanding.  *Sudden trouble seeing in one or both eyes.  *Sudden trouble walking, dizziness, loss of balance or coordination.Sudden severe headache with no known cause.    It is very important to get treatment quickly when a stroke occurs. If you experience any of the above warning signs,  call [phone] immediately.                   Disclaimer         Quit Smoking / Tobacco Use:    I understand the use of any tobacco products increases my chance of suffering from future heart disease or stroke and could cause other illnesses which may shorten my life. Quitting the use of tobacco products is the single most important thing I can do to improve my health. For further information on smoking / tobacco cessation call a Toll Free Quit Line at 1-497.369.5197 (*National Cancer [place]) or 1-108.901.9273 (American Lung Association) or you can access the web based program at www.lungusa.org.    [place] Tobacco Users Help Line:  (570) 793-5276       Toll Free: 1-676.124.7859  Quit Tobacco Program [facility] (178)144-3253    Crisis Hotline:    [place] Crisis Hotline:  4-660-TQQJSBE or 1-928.577.1650    [place] Crisis Hotline:    1-470.207.6575 or 912-932-8827    Discharge Survey:   Thank you for choosing [facility]. We hope we did everything we could to make your hospital stay a pleasant one. You may be receiving a phone survey and we would appreciate your time and participation in answering the questions. Your input is very valuable to us in our efforts to improve our service to our patients and their families.        My signature on this form indicates that:    1. I have reviewed and understand the above information.  2. My questions regarding this information have been answered to my satisfaction.  3. I have formulated a plan with my discharge nurse to obtain my prescribed medications for home.                  Disclaimer         __________________________________                     __________       ________                       Patient Signature                                                 Date                    Time

## 2017-05-13 NOTE — ED PROVIDER NOTES
ED Provider Note    Scribed for Duglas Graham M.D. by Shayan Yates. 5/13/2017, 9:57 AM.    Primary care provider: Christine Zamudio M.D.  Means of arrival: Wheel Chair  History obtained from: Patient and patient's wife  History limited by: None    CHIEF COMPLAINT  Chief Complaint   Patient presents with   • Cough     x3 days, was productive with yellow mucus   • Fever     since las night       HPI  Gilberto Brown is a 76 y.o. male who presents to the Emergency Department for evaluation of fever which began last night. The patient has had a cough as well which began 3-4 days ago. This has been productive of yellow sputum. The patient was diagnosed with pneumonia 2 weeks ago, and has been taking Cipro for this. The patient is a dialysis patient, and has been compliant with treatment.     REVIEW OF SYSTEMS  Pertinent positives include fever, cough, sputum production. Pertinent negatives include no chest pain. As above, all other systems reviewed and are negative.   See HPI for further details.     PAST MEDICAL HISTORY   has a past medical history of HTN; Dyslipidemia; Allergy; COPD; GERD (gastroesophageal reflux disease); Dialysis; Urinary bladder disorder; Snoring; Unspecified hemorrhagic conditions; Pneumonia (2011); Bronchitis; Chronic bronchitis with productive mucopurulent cough (CMS-[facility]); Indigestion; ASTHMA; EMPHYSEMA; CA in situ resp sys; Other specified disorder of intestines; Unspecified urinary incontinence; Sick sinus syndrome (CMS-[facility]); Pacemaker (1988); Renal disorder; Wegener's disease, pulmonary (CMS-[facility]); Hip pain; Heart burn; Pain (7/16/13); Sleep apnea; BPH (benign prostatic hyperplasia); Arthritis; Hypertension; Coughing blood (2011); Breath shortness; Pulmonary histoplasmosis (CMS-[facility]); and TIMOTHY (obstructive sleep apnea).    SURGICAL HISTORY   has past surgical history that includes pacemaker insertion (4/2009); hernia repair (1990); gastroscopy with balloon dilatation (9/28/2011);  cath placement (10/8/2011); gastroscopy with biopsy (1/17/2012); hip replacement, total; av fistula creation (3/8/2012); recovery (4/19/2012); av fistula revision (6/9/2012); rotator cuff repair (2003); recovery (7/17/2013); bronchoscopy-endo (7/18/2013); recovery (12/17/2013); recovery (8/7/2014); recovery (10/3/2014); recovery (2/26/2015); recovery (9/3/2015); av fistulogram (Right, 4/12/2016); and tonsillectomy.    SOCIAL HISTORY  Social History   Substance Use Topics   • Smoking status: Former Smoker -- 30 years     Types: Pipe     Quit date: 01/01/1990   • Smokeless tobacco: Former User     Types: Chew   • Alcohol Use: 4.8 oz/week     7 Glasses of wine, 1 Standard drinks or equivalent per week      Comment: Red wine nightly      History   Drug Use No       FAMILY HISTORY  Family History   Problem Relation Age of Onset   • Stroke Father    • Heart Disease Father    • Cancer     • Stroke Mother        CURRENT MEDICATIONS  No current facility-administered medications on file prior to encounter.     Current Outpatient Prescriptions on File Prior to Encounter   Medication Sig Dispense Refill   • predniSONE (DELTASONE) 1 MG Tab 4 tabs po qday with one 10 mg tab prednisone qday 120 Tab 2   • levofloxacin (LEVAQUIN) 500 MG tablet Take 1 Tab by mouth every day. 5 Tab 0   • ipratropium-albuterol (DUONEB) 0.5-2.5 (3) MG/3ML nebulizer solution 3 mL by Nebulization route 4 times a day. 1080 mL 3   • B Complex-C-Folic Acid (DIALYVITE TABLET) Tab Dialyvite -nehro vit B complex-C-folic acid 1 tablet po QD Dialysis Pt 90 Tab 3   • predniSONE (DELTASONE) 10 MG Tab TAKE 1 AND 1/2 TABLETS BY MOUTH EVERY  Tab 0   • metoprolol SR (TOPROL XL) 25 MG TABLET SR 24 HR TK 1 T PO BID  5   • fluticasone-salmeterol (ADVAIR DISKUS) 500-50 MCG/DOSE AEROSOL POWDER, BREATH ACTIVATED Inhale 1 Puff by mouth 2 times a day. Rinse mouth after each use. 1 Inhaler 6   • albuterol 108 (90 BASE) MCG/ACT Aero Soln inhalation aerosol Inhale 2 Puffs  "by mouth every four hours as needed for Shortness of Breath.     • ranitidine (ZANTAC) 150 MG Tab TAKE 1 TABLET BY MOUTH EVERY NIGHT AT BEDTIME 30 Tab 0   • finasteride (PROSCAR) 5 MG Tab Take 5 mg by mouth every day.     • simvastatin (ZOCOR) 40 MG TABS Take 40 mg by mouth every evening.     • epoetin lucina (EPOGEN,PROCRIT) 3000 UNIT/ML SOLN Inject 2,200 Units as instructed every Monday, Wednesday, and Friday. With dialysis     • sevelamer (RENAGEL) 800 MG TABS Take 800 mg by mouth 3 times a day, with meals.     • tamsulosin (FLOMAX) 0.4 MG capsule Take 0.4 mg by mouth every day.     • aspirin EC (ECOTRIN) 81 MG TBEC Take 81 mg by mouth every day.         ALLERGIES  Allergies   Allergen Reactions   • Doxycycline Unspecified     Abdominal pain.  UQY=2004   • Erythromycin Unspecified     Abdominal pain.  RXN=before 2011   • Rituximab      Flu like syndrome       PHYSICAL EXAM  VITAL SIGNS: /59 mmHg  Pulse 98  Temp(Src) 38.6 °C (101.5 °F)  Resp 22  Ht 1.753 m (5' 9\")  Wt 62.596 kg (138 lb)  BMI 20.37 kg/m2  SpO2 98%  Vitals reviewed.  Constitutional: Alert in no apparent distress.  HENT: No signs of trauma, Bilateral external ears normal, Nose normal.   Eyes: Pupils are equal and reactive, Conjunctiva normal, Non-icteric.   Neck: Normal range of motion, No tenderness, Supple, No stridor.   Lymphatic: No lymphadenopathy noted.   Cardiovascular: Tachycardic, Regular rhythm, no murmurs.   Thorax & Lungs: Crackles in both bases, No respiratory distress, No wheezing, No chest tenderness.   Abdomen: Bowel sounds normal, Soft, No tenderness, No peritoneal signs, No masses, No pulsatile masses.   Skin: Warm, Dry, No erythema, No rash.   Back: No bony tenderness, No CVA tenderness.   Extremities: Intact distal pulses, No edema, No tenderness, No cyanosis  Musculoskeletal: Good range of motion in all major joints. No tenderness to palpation or major deformities noted.   Neurologic: Alert , Normal motor function, " "Normal sensory function, No focal deficits noted.   Psychiatric: Affect normal, Judgment normal, Mood normal.     DIAGNOSTIC STUDIES / PROCEDURES    LABS  Labs Reviewed   LACTIC ACID - Abnormal; Notable for the following:     Lactic Acid 2.2 (*)     All other components within normal limits   CBC WITH DIFFERENTIAL - Abnormal; Notable for the following:     WBC 11.3 (*)     RBC 3.67 (*)     Hemoglobin 11.5 (*)     Hematocrit 35.7 (*)     MCHC 32.2 (*)     RDW 62.2 (*)     MPV 8.8 (*)     Neutrophils-Polys 90.70 (*)     Lymphocytes 3.20 (*)     Neutrophils (Absolute) 10.23 (*)     Lymphs (Absolute) 0.36 (*)     All other components within normal limits   COMP METABOLIC PANEL - Abnormal; Notable for the following:     Glucose 124 (*)     Bun 31 (*)     Creatinine 2.84 (*)     All other components within normal limits   TROPONIN - Abnormal; Notable for the following:     Troponin I 0.14 (*)     All other components within normal limits   BTYPE NATRIURETIC PEPTIDE - Abnormal; Notable for the following:     B Natriuretic Peptide 420 (*)     All other components within normal limits   ESTIMATED GFR - Abnormal; Notable for the following:     GFR If  26 (*)     GFR If Non  22 (*)     All other components within normal limits   LACTIC ACID   BLOOD CULTURE    Narrative:     Per Hospital Policy: Only change Specimen Src: to \"Line\" if  specified by physician order.   URINALYSIS   URINE CULTURE(NEW)   BLOOD CULTURE   BLOOD CULTURE   BLOOD CULTURE   URINALYSIS   CULTURE RESPIRATORY W/ GRM STN   LACTIC ACID   INFLUENZA BY PCR, A/B/H1N1      All labs reviewed by me.      RADIOLOGY  DX-CHEST-PORTABLE (1 VIEW)   Final Result      1.  Cardiomegaly with mild interstitial edema.      2.  Bibasilar atelectasis and small bilateral pleural effusions.        The radiologist's interpretation of all radiological studies have been reviewed by me.    COURSE & MEDICAL DECISION MAKING  Nursing notes, VS, PMSFHx " reviewed in chart.  Differential diagnoses include but not limited to: pneumonia, bronchitis, CHF.      9:57 AM Patient seen and examined at bedside. Ordered for lactic acid, troponin, BNP, CBC with differentials, CMP, UA, urine culture, blood culture to evaluate. The patient will be resuscitated with 1,878 NS IV. Patient will be treated with 2g of Rocephin, 100mg Vibramycin in 100mL IVPB for his symptoms. The patient was informed that I would order for labs to evaluate his symptoms at this time. He was informed that he would likely need to be admitted to the hospital for IV antibiotics at this time, and he verbalizes understanding. Clinically the patient has pneumonia and has risk.      10:57 AM Paged Hospitalist.     11:11 AM Consulted with Dr. Barry, Hospitalist regarding patient's case. She was informed of the results of the patient's diagnostic studies, as documented above, and agrees to admit the patient to her services. She recommends consulting with Nephrology at this time.     11:13 AM Paged Dr. Corona, Nephrology.     11:32 AM Consulted with Dr. Corona, Nephrology regarding the patient's case. They agree to see the patient at this time.          FINAL IMPRESSION  1. Fever, unspecified fever cause    2. Pneumonia of both lower lobes due to infectious organism    3. ESRD (end stage renal disease) (CMS-[facility])          Shayan QUEZADA ([place]), am scribing for, and in the presence of, Duglas Graham M.D..    Electronically signed by: Shayan Yates ([place]), 5/13/2017    Duglsa QUEZADA M.D. personally performed the services described in this documentation, as scribed by Shayan Yates in my presence, and it is both accurate and complete.    The note accurately reflects work and decisions made by me.  Duglas Graham  5/13/2017  11:34 AM

## 2017-05-13 NOTE — ED NOTES
Wheeled to triage  Chief Complaint   Patient presents with   • Cough     x3 days, was productive with yellow mucus   • Fever     since las night     Sepsis score of 4, charge RN aware.  Pt had pneumonia 2 weeks ago.

## 2017-05-13 NOTE — IP AVS SNAPSHOT
" <p align=\"LEFT\"><IMG SRC=\"[email or web address]\" alt=\"Image\" WIDTH=\"50%\" HEIGHT=\"200\" BORDER=\"\"></p>                   Name:Gilberto Brown  Medical Record Number:[id number]  CSN: [id number]    YOB: 1940   Age: 76 y.o.  Sex: male  HT:1.753 m (5' 9\") WT: 61.9 kg (136 lb 7.4 oz)          Admit Date: 5/13/2017     Discharge Date:   Today's Date: 5/17/2017  Attending Doctor:  Nyasia Forrester M.D.                  Allergies:  Doxycycline; Erythromycin; and Rituximab          Your appointments     May 25, 2017  1:00 PM   Follow Up Visit with Cintia Ariza M.D.   [facility] (--)    [address] [facility] [address]  [facility] [address] [phone]           You will be receiving a confirmation call a few days before your appointment from our automated call confirmation system.            Jun 27, 2017 11:45 AM   Follow Up Visit with Christine Manzo M.D.   [address])    [address]  [facility] [address] [phone]           You will be receiving a confirmation call a few days before your appointment from our automated call confirmation system.            Jun 28, 2017  1:40 PM   Established Patient Pul with Eric Vaughan M.D.   [facility] (--)    [address]  [facility] [address] [phone]              Follow-up Information     1. Follow up with Christine Zamudio M.D.. Go on 5/26/2017.    Specialty:  Internal Medicine    Why:  Please arrive at 2:45 pm for your appointment. Thank you.    Contact information    [address] [facility] [address] [facility] [address]  [facility] [address] [phone]           Medication List      Take these Medications        Instructions    albuterol 108 (90 BASE) MCG/ACT Aers inhalation aerosol    Inhale 2 Puffs by mouth every four hours as needed for Shortness of Breath.   Dose:  2 Puff       aspirin EC 81 MG Tbec   Commonly known as:  ECOTRIN    Take 81 mg by mouth every day.   Dose:  81 mg       DIALYVITE TABLET " Tabs    Dialyvite -nehro vit B complex-C-folic acid 1 tablet po QD Dialysis Pt       doxycycline monohydrate 100 MG tablet   Commonly known as:  ADOXA    Take 1 Tab by mouth every 12 hours for 5 days.   Dose:  100 mg       epoetin lucina 3000 UNIT/ML Soln   Commonly known as:  EPOGEN,PROCRIT    Inject 2,200 Units as instructed every Monday, Wednesday, and Friday. With dialysis   Dose:  2200 Units       finasteride 5 MG Tabs   Commonly known as:  PROSCAR    Take 5 mg by mouth every day.   Dose:  5 mg       fluticasone-salmeterol 500-50 MCG/DOSE Aepb   Commonly known as:  ADVAIR DISKUS    Inhale 1 Puff by mouth 2 times a day. Rinse mouth after each use.   Dose:  1 Puff       ipratropium-albuterol 0.5-2.5 (3) MG/3ML nebulizer solution   Commonly known as:  DUONEB    3 mL by Nebulization route 4 times a day.   Dose:  3 mL       metoprolol 25 MG Tabs   Commonly known as:  LOPRESSOR    Take 25 mg by mouth 2 times a day.   Dose:  25 mg       predniSONE 1 MG Tabs   What changed:  Another medication with the same name was removed. Continue taking this medication, and follow the directions you see here.   Commonly known as:  DELTASONE    Take 14 mg by mouth every day. Pt takes x1 10mg tabs and x4 1mg tabs for total dose = 14mg QD   Dose:  14 mg       ranitidine 150 MG Tabs   Commonly known as:  ZANTAC    TAKE 1 TABLET BY MOUTH EVERY NIGHT AT BEDTIME       sevelamer 800 MG Tabs   Commonly known as:  RENAGEL    Take 800 mg by mouth 3 times a day, with meals.   Dose:  800 mg       simvastatin 40 MG Tabs   Commonly known as:  ZOCOR    Take 40 mg by mouth every evening.   Dose:  40 mg       tamsulosin 0.4 MG capsule   Commonly known as:  FLOMAX    Take 0.4 mg by mouth every day.   Dose:  0.4 mg

## 2017-05-13 NOTE — ED NOTES
Med rec complete per pt with wife at bedside  Allergies reviewed  Pt took one dose of Cipro 250mg last night as instructed  By his MD who told him to take one when his fever was >102  And then to come to the ER

## 2017-05-13 NOTE — H&P
PRIMARY CARE PHYSICIAN:  Christine Zamudio MD    NEPHROLOGIST:  uHma Corona MD    CHIEF COMPLAINT:  Cough, fever.    HISTORY OF PRESENT ILLNESS:  This is a 76-year-old male with a past medical   history significant for end-stage renal disease, on hemodialysis Monday,   Wednesday, Friday; Wegener's granulomatosis who presents today for evaluation   of fever of 105 that began yesterday.  The patient states that last night he   noticed he had a fever of 105.  At that time, he took Tylenol and he was   instructed by his pulmonologist to start on Cipro in cases of emergency, so he   took one dose of Cipro p.o. yesterday night.  This morning, he noted that he   continued to have a fever of 101.5 and decided to come to the ER for further   evaluation.  He complains of a wet cough, but denies any sputum production.    Denies any chest pain, but complains of shortness of breath associated with   coughing.  Complains of generalized fatigue and malaise.  He denies any   abdominal pain, dysuria, pyuria, hematuria, hematochezia, diarrhea,   constipation, any urinary or bowel symptoms.  Denies any recent sick contacts.    REVIEW OF SYSTEMS:  A comprehensive review of systems was conducted and all   pertinent positive and negative are documented in the HPI.    PAST MEDICAL HISTORY:  Significant for end-stage renal disease, on   hemodialysis Monday, Wednesday, Friday; Wegener's granulomatosis, COPD,   hyperlipidemia, hypertension, sick sinus syndrome, status post pacemaker   placement, history of histoplasmosis, obstructive sleep apnea, BPH, history of   pneumonia.    PAST SURGICAL HISTORY:  Significant for pacemaker placement, AV fistula   creation, right hip replacement, right rotator cuff repair, right inguinal   hernia repair, gastroscopy.    SOCIAL HISTORY:  The patient is a former smoker, used to smoke a pipe for   approximately 10 years, quit 40 years ago.  He drinks 1 glass of wine with   dinner every night.  Denies  any illicit drug use.    FAMILY HISTORY:  Significant for mother who had stroke and father who had MI   at age 90.    ALLERGIES:  THE PATIENT IS ALLERGIC TO DOXYCYCLINE, ERYTHROMYCIN, RITUXIMAB.    HOME MEDICATIONS:  Albuterol 2 puffs every 4 hours p.r.n., aspirin 81 mg   daily, vitamin B complex, folic acid 1 tablet daily, Epo 2200 units Monday,   Wednesday, and Friday with dialysis, Proscar 5 mg daily, Advair 1 puff twice a   day, DuoNeb q.i.d., metoprolol 25 mg twice a day, prednisone 10 mg daily,   Zantac 150 mg twice a day, sevelamer 800 mg t.i.d., Zocor 40 mg daily, Flomax   0.4 mg daily.    PHYSICAL EXAMINATION:  VITAL SIGNS:  Temperature of 38.1, heart rate of 105, respirations of 19,   blood pressure 143/59, O2 saturation 98% on 3 liters nasal cannula.  GENERAL:  This is a 76-year-old chronically ill-appearing male, in no acute   distress.  HEENT:  Normocephalic, atraumatic.  Pupils are equal and reactive to light.    Extraocular motions intact.  Moist oral mucosa noted.  No oral exudate or   erythema noted.  NECK:  Supple.  No lymphadenopathy or JVD noted.  CARDIOVASCULAR:  Regular rate and rhythm.  No murmurs, rubs or gallops noted.  LUNGS:  Coarse breath sounds diffusely, bibasilar rhonchi noted.  No wheezing   heard.  ABDOMEN:  Soft, nontender, nondistended.  Bowel sounds present.  Obese.  EXTREMITIES:  No clubbing, cyanosis or edema noted.  Right upper extremity   fistula noted.  NEUROLOGIC:  Alert, awake, oriented x3.  No focal deficits noted.  PSYCHIATRIC:  Normal mood, normal affect, normal judgment.    LABORATORY DATA:  WBC of 11.3, hemoglobin of 11.5, hematocrit 35.7, MCV of   97.3.  Lactic acid of 2.2.  Glucose of 124, BUN of 31, creatinine of 2.84.    Troponin 0.14.  BNP of 420.  Repeat lactic acid of 1.2.  GFR of 22.    IMAGING STUDIES:  Chest x-ray shows cardiomegaly with mild interstitial edema,   bibasilar atelectasis and small bilateral pleural effusion.    ASSESSMENT AND PLAN:  1.  For the  patient's healthcare-acquired pneumonia, blood, urine, and sputum   cultures are ordered.  The patient is started on doxycycline and ceftriaxone.    We will continue to monitor the patient's cultures and deescalate antibiotics   based on culture results.  2.  Sepsis secondary to healthcare-acquired pneumonia.  We will continue to   treat the patient with IV fluids and trend the patient's lactic acid and   antibiotic therapy as above.  3.  End-stage renal disease, on hemodialysis Monday, Wednesday, Friday.  Dr. Corona from nephrology has been consulted.  4.  Benign prostatic hypertrophy.  The patient continued on Proscar and   Flomax.  5.  Hyperlipidemia.  The patient continued on Zocor 40 mg daily.  6.  Chronic obstructive pulmonary disease.  The patient continued on home   regimen.  RT protocol is ordered.  7.  Hypertension.  The patient continued on metoprolol XL 25 mg daily with   holding parameters.    DISPOSITION:  Inpatient, the patient will likely require greater than 2   midnights of care.    PROPHYLAXIS:  The patient started on heparin subQ for DVT prophylaxis.  The   patient is on Zantac for GI prophylaxis.  Bowel protocol initiated.    CODE STATUS:  DNR.    Plan of care was discussed with the patient and wife at bedside and all   questions were answered.       ____________________________________     DAKSHA MONTENEGRO MD MS / NTS    DD:  05/13/2017 11:56:51  DT:  05/13/2017 12:22:06    D#:  [id number]  Job#:  [id number]

## 2017-05-13 NOTE — IP AVS SNAPSHOT
ioGenetics Access Code: Activation code not generated  Current ioGenetics Status: Active    [email or web address]  A secure, online tool to manage your health information     [employer]’s ioGenetics® is a secure, online tool that connects you to your personalized health information from the privacy of your home -- day or night - making it very easy for you to manage your healthcare. Once the activation process is completed, you can even access your medical information using the ioGenetics will, which is available for free in the Apple Will store or Google Play store.     ioGenetics provides the following levels of access (as shown below):   My Chart Features   [facility] Primary Care Doctor [facility]  Specialists [facility]  Urgent  Care Non-[facility]  Primary Care  Doctor   Email your healthcare team securely and privately 24/7 X X X X   Manage appointments: schedule your next appointment; view details of past/upcoming appointments X      Request prescription refills. X      View recent personal medical records, including lab and immunizations X X X X   View health record, including health history, allergies, medications X X X X   Read reports about your outpatient visits, procedures, consult and ER notes X X X X   See your discharge summary, which is a recap of your hospital and/or ER visit that includes your diagnosis, lab results, and care plan. X X       How to register for ioGenetics:  1. Go to  [email or web address].  2. Click on the Sign Up Now box, which takes you to the New Member Sign Up page. You will need to provide the following information:  a. Enter your ioGenetics Access Code exactly as it appears at the top of this page. (You will not need to use this code after you’ve completed the sign-up process. If you do not sign up before the expiration date, you must request a new code.)   b. Enter your date of birth.   c. Enter your home email address.   d. Click Submit, and follow the next screen’s instructions.  3. Create a ioGenetics ID. This will  be your [employer].com login ID and cannot be changed, so think of one that is secure and easy to remember.  4. Create a [employer]com password. You can change your password at any time.  5. Enter your Password Reset Question and Answer. This can be used at a later time if you forget your password.   6. Enter your e-mail address. This allows you to receive e-mail notifications when new information is available in [email or web address].  7. Click Sign Up. You can now view your health information.    For assistance activating your [employer] account, call [phone]

## 2017-05-13 NOTE — IP AVS SNAPSHOT
5/17/2017    Gilberto Brown  [address] [place] [address]    Dear Gilberto:    [facility] wants to ensure your discharge home is safe and you or your loved ones have had all of your questions answered regarding your care after you leave the hospital.    Below is a list of resources and contact information should you have any questions regarding your hospital stay, follow-up instructions, or active medical symptoms.    Questions or Concerns Regarding… Contact   Medical Questions Related to Your Discharge  (7 days a week, 8am-5pm) Contact a Nurse Care Coordinator   [phone]   Medical Questions Not Related to Your Discharge  (24 hours a day / 7 days a week)  Contact the Nurse Health Line   [phone]    Medications or Discharge Instructions Refer to your discharge packet   or contact your [facility] Primary Care Provider   [phone]   Follow-up Appointment(s) Schedule your appointment via iLoop Mobile   or contact Scheduling [phone]   Billing Review your statement via iLoop Mobile  or contact Billing [phone]   Medical Records Review your records via iLoop Mobile   or contact Medical Records [phone]     You may receive a telephone call within two days of discharge. This call is to make certain you understand your discharge instructions and have the opportunity to have any questions answered. You can also easily access your medical information, test results and upcoming appointments via the iLoop Mobile free online health management tool. You can learn more and sign up at [employer]/iLoop Mobile. For assistance setting up your iLoop Mobile account, please call [phone].    Once again, we want to ensure your discharge home is safe and that you have a clear understanding of any next steps in your care. If you have any questions or concerns, please do not hesitate to contact us, we are here for you. Thank you for choosing [facility] for your healthcare needs.    Sincerely,    Your [facility] Healthcare Team

## 2017-05-13 NOTE — H&P
PRIMARY CARE PHYSICIAN:  Crhistine Zamudio MD    NEPHROLOGIST:  Huma Corona MD    CHIEF COMPLAINT:  Cough, fever.    HISTORY OF PRESENT ILLNESS:  This is a 76-year-old male with a past medical   history significant for end-stage renal disease, on hemodialysis Monday,   Wednesday, Friday; Wegener's granulomatosis who presents today for evaluation   of fever of 105 that began yesterday.  The patient states that last night he   noticed he had a fever of 105.  At that time, he took Tylenol and he was   instructed by his pulmonologist to start on Cipro in cases of emergency, so he   took one dose of Cipro p.o. yesterday night.  This morning, he noted that he   continued to have a fever of 101.5 and decided to come to the ER for further   evaluation.  He complains of a wet cough, but denies any sputum production.    Denies any chest pain, but complains of shortness of breath associated with   coughing.  Complains of generalized fatigue and malaise.  He denies any   abdominal pain, dysuria, pyuria, hematuria, hematochezia, diarrhea,   constipation, any urinary or bowel symptoms.  Denies any recent sick contacts.    REVIEW OF SYSTEMS:  A comprehensive review of systems was conducted and all   pertinent positive and negative are documented in the HPI.    PAST MEDICAL HISTORY:  Significant for end-stage renal disease, on   hemodialysis Monday, Wednesday, Friday; Wegener's granulomatosis, COPD,   hyperlipidemia, hypertension, sick sinus syndrome, status post pacemaker   placement, history of histoplasmosis, obstructive sleep apnea, BPH, history of   pneumonia.    PAST SURGICAL HISTORY:  Significant for pacemaker placement, AV fistula   creation, right hip replacement, right rotator cuff repair, right inguinal   hernia repair, gastroscopy.    SOCIAL HISTORY:  The patient is a former smoker, used to smoke a pipe for   approximately 10 years, quit 40 years ago.  He drinks 1 glass of wine with   dinner every night.  Denies  any illicit drug use.    FAMILY HISTORY:  Significant for mother who had stroke and father who had MI   at age 90.    ALLERGIES:  THE PATIENT IS ALLERGIC TO DOXYCYCLINE, ERYTHROMYCIN, RITUXIMAB.    HOME MEDICATIONS:  Albuterol 2 puffs every 4 hours p.r.n., aspirin 81 mg   daily, vitamin B complex, folic acid 1 tablet daily, Epo 2200 units Monday,   Wednesday, and Friday with dialysis, Proscar 5 mg daily, Advair 1 puff twice a   day, DuoNeb q.i.d., metoprolol 25 mg twice a day, prednisone 10 mg daily,   Zantac 150 mg twice a day, sevelamer 800 mg t.i.d., Zocor 40 mg daily, Flomax   0.4 mg daily.    PHYSICAL EXAMINATION:  VITAL SIGNS:  Temperature of 38.1, heart rate of 105, respirations of 19,   blood pressure 143/59, O2 saturation 98% on 3 liters nasal cannula.  GENERAL:  This is a 76-year-old chronically ill-appearing male, in no acute   distress.  HEENT:  Normocephalic, atraumatic.  Pupils are equal and reactive to light.    Extraocular motions intact.  Moist oral mucosa noted.  No oral exudate or   erythema noted.  NECK:  Supple.  No lymphadenopathy or JVD noted.  CARDIOVASCULAR:  Regular rate and rhythm.  No murmurs, rubs or gallops noted.  LUNGS:  Coarse breath sounds diffusely, bibasilar rhonchi noted.  No wheezing   heard.  ABDOMEN:  Soft, nontender, nondistended.  Bowel sounds present.  Obese.  EXTREMITIES:  No clubbing, cyanosis or edema noted.  Right upper extremity   fistula noted.  NEUROLOGIC:  Alert, awake, oriented x3.  No focal deficits noted.  PSYCHIATRIC:  Normal mood, normal affect, normal judgment.    LABORATORY DATA:  WBC of 11.3, hemoglobin of 11.5, hematocrit 35.7, MCV of   97.3.  Lactic acid of 2.2.  Glucose of 124, BUN of 31, creatinine of 2.84.    Troponin 0.14.  BNP of 420.  Repeat lactic acid of 1.2.  GFR of 22.    IMAGING STUDIES:  Chest x-ray shows cardiomegaly with mild interstitial edema,   bibasilar atelectasis and small bilateral pleural effusion.    ASSESSMENT AND PLAN:  1.  For the  patient's healthcare-acquired pneumonia, blood, urine, and sputum   cultures are ordered.  The patient is started on doxycycline and ceftriaxone.    We will continue to monitor the patient's cultures and deescalate antibiotics   based on culture results.  2.  Sepsis secondary to healthcare-acquired pneumonia.  We will continue to   treat the patient with IV fluids and trend the patient's lactic acid and   antibiotic therapy as above.  3.  End-stage renal disease, on hemodialysis Monday, Wednesday, Friday.  Dr. Corona from nephrology has been consulted.  4.  Benign prostatic hypertrophy.  The patient continued on Proscar and   Flomax.  5.  Hyperlipidemia.  The patient continued on Zocor 40 mg daily.  6.  Chronic obstructive pulmonary disease.  The patient continued on home   regimen.  RT protocol is ordered.  7.  Hypertension.  The patient continued on metoprolol XL 25 mg daily with   holding parameters.  8. Influenza B. Patient started on Tamiflu.     DISPOSITION:  Inpatient, the patient will likely require greater than 2   midnights of care.    PROPHYLAXIS:  The patient started on heparin subQ for DVT prophylaxis.  The   patient is on Zantac for GI prophylaxis.  Bowel protocol initiated.    CODE STATUS:  DNR.    Plan of care was discussed with the patient and wife at bedside and all   questions were answered.       ____________________________________     DAKSHA MONTENEGRO MD MS / NTS    DD:  05/13/2017 11:56:51  DT:  05/13/2017 12:22:06    D#:  [id number]  Job#:  [id number]

## 2017-05-14 PROBLEM — J96.21 ACUTE ON CHRONIC RESPIRATORY FAILURE WITH HYPOXIA (HCC): Status: ACTIVE | Noted: 2017-05-14

## 2017-05-14 PROBLEM — J10.00 PNEUMONIA OF BOTH LUNGS DUE TO INFLUENZA A VIRUS: Status: ACTIVE | Noted: 2017-05-14

## 2017-05-14 PROBLEM — J44.9 COPD (CHRONIC OBSTRUCTIVE PULMONARY DISEASE) (HCC): Status: ACTIVE | Noted: 2017-05-14

## 2017-05-14 PROBLEM — A41.9 SEPSIS, UNSPECIFIED: Status: ACTIVE | Noted: 2017-05-14

## 2017-05-14 LAB
ANION GAP SERPL CALC-SCNC: 11 MMOL/L (ref 0–11.9)
BASOPHILS # BLD AUTO: 0.3 % (ref 0–1.8)
BASOPHILS # BLD: 0.03 K/UL (ref 0–0.12)
BUN SERPL-MCNC: 41 MG/DL (ref 8–22)
CALCIUM SERPL-MCNC: 7.9 MG/DL (ref 8.5–10.5)
CHLORIDE SERPL-SCNC: 107 MMOL/L (ref 96–112)
CO2 SERPL-SCNC: 22 MMOL/L (ref 20–33)
CREAT SERPL-MCNC: 3.46 MG/DL (ref 0.5–1.4)
CRP SERPL HS-MCNC: 18.38 MG/DL (ref 0–0.75)
EOSINOPHIL # BLD AUTO: 0.01 K/UL (ref 0–0.51)
EOSINOPHIL NFR BLD: 0.1 % (ref 0–6.9)
ERYTHROCYTE [DISTWIDTH] IN BLOOD BY AUTOMATED COUNT: 62.9 FL (ref 35.9–50)
GFR SERPL CREATININE-BSD FRML MDRD: 17 ML/MIN/1.73 M 2
GLUCOSE SERPL-MCNC: 104 MG/DL (ref 65–99)
HCT VFR BLD AUTO: 32.2 % (ref 42–52)
HGB BLD-MCNC: 10.1 G/DL (ref 14–18)
IMM GRANULOCYTES # BLD AUTO: 0.04 K/UL (ref 0–0.11)
IMM GRANULOCYTES NFR BLD AUTO: 0.5 % (ref 0–0.9)
LYMPHOCYTES # BLD AUTO: 0.23 K/UL (ref 1–4.8)
LYMPHOCYTES NFR BLD: 2.6 % (ref 22–41)
MCH RBC QN AUTO: 31.1 PG (ref 27–33)
MCHC RBC AUTO-ENTMCNC: 31.4 G/DL (ref 33.7–35.3)
MCV RBC AUTO: 99.1 FL (ref 81.4–97.8)
MONOCYTES # BLD AUTO: 0.4 K/UL (ref 0–0.85)
MONOCYTES NFR BLD AUTO: 4.5 % (ref 0–13.4)
NEUTROPHILS # BLD AUTO: 8.1 K/UL (ref 1.82–7.42)
NEUTROPHILS NFR BLD: 92 % (ref 44–72)
NRBC # BLD AUTO: 0 K/UL
NRBC BLD AUTO-RTO: 0 /100 WBC
PLATELET # BLD AUTO: 175 K/UL (ref 164–446)
PMV BLD AUTO: 8.4 FL (ref 9–12.9)
POTASSIUM SERPL-SCNC: 4 MMOL/L (ref 3.6–5.5)
RBC # BLD AUTO: 3.25 M/UL (ref 4.7–6.1)
SODIUM SERPL-SCNC: 140 MMOL/L (ref 135–145)
WBC # BLD AUTO: 8.8 K/UL (ref 4.8–10.8)

## 2017-05-14 PROCEDURE — A9270 NON-COVERED ITEM OR SERVICE: HCPCS | Performed by: INTERNAL MEDICINE

## 2017-05-14 PROCEDURE — 86140 C-REACTIVE PROTEIN: CPT

## 2017-05-14 PROCEDURE — 85025 COMPLETE CBC W/AUTO DIFF WBC: CPT

## 2017-05-14 PROCEDURE — 700101 HCHG RX REV CODE 250: Performed by: INTERNAL MEDICINE

## 2017-05-14 PROCEDURE — 700111 HCHG RX REV CODE 636 W/ 250 OVERRIDE (IP): Performed by: INTERNAL MEDICINE

## 2017-05-14 PROCEDURE — 94760 N-INVAS EAR/PLS OXIMETRY 1: CPT

## 2017-05-14 PROCEDURE — 700105 HCHG RX REV CODE 258: Performed by: INTERNAL MEDICINE

## 2017-05-14 PROCEDURE — 700102 HCHG RX REV CODE 250 W/ 637 OVERRIDE(OP): Performed by: INTERNAL MEDICINE

## 2017-05-14 PROCEDURE — 99233 SBSQ HOSP IP/OBS HIGH 50: CPT | Performed by: INTERNAL MEDICINE

## 2017-05-14 PROCEDURE — 80048 BASIC METABOLIC PNL TOTAL CA: CPT

## 2017-05-14 PROCEDURE — 94668 MNPJ CHEST WALL SBSQ: CPT

## 2017-05-14 PROCEDURE — 84145 PROCALCITONIN (PCT): CPT

## 2017-05-14 PROCEDURE — 36415 COLL VENOUS BLD VENIPUNCTURE: CPT

## 2017-05-14 PROCEDURE — 770020 HCHG ROOM/CARE - TELE (206)

## 2017-05-14 PROCEDURE — 94640 AIRWAY INHALATION TREATMENT: CPT

## 2017-05-14 RX ORDER — BENZONATATE 100 MG/1
100 CAPSULE ORAL 3 TIMES DAILY PRN
Status: DISCONTINUED | OUTPATIENT
Start: 2017-05-14 | End: 2017-05-17 | Stop reason: HOSPADM

## 2017-05-14 RX ORDER — ETHYL CHLORIDE 100 %
AEROSOL, SPRAY (ML) TOPICAL
Status: DISCONTINUED | OUTPATIENT
Start: 2017-05-14 | End: 2017-05-17 | Stop reason: HOSPADM

## 2017-05-14 RX ORDER — GUAIFENESIN 600 MG/1
600 TABLET, EXTENDED RELEASE ORAL EVERY 12 HOURS
Status: DISCONTINUED | OUTPATIENT
Start: 2017-05-14 | End: 2017-05-17 | Stop reason: HOSPADM

## 2017-05-14 RX ADMIN — HEPARIN SODIUM 5000 UNITS: 5000 INJECTION, SOLUTION INTRAVENOUS; SUBCUTANEOUS at 21:26

## 2017-05-14 RX ADMIN — BUDESONIDE AND FORMOTEROL FUMARATE DIHYDRATE 2 PUFF: 160; 4.5 AEROSOL RESPIRATORY (INHALATION) at 06:29

## 2017-05-14 RX ADMIN — IPRATROPIUM BROMIDE AND ALBUTEROL SULFATE 3 ML: .5; 3 SOLUTION RESPIRATORY (INHALATION) at 10:30

## 2017-05-14 RX ADMIN — TAMSULOSIN HYDROCHLORIDE 0.4 MG: 0.4 CAPSULE ORAL at 08:32

## 2017-05-14 RX ADMIN — BUDESONIDE AND FORMOTEROL FUMARATE DIHYDRATE 2 PUFF: 160; 4.5 AEROSOL RESPIRATORY (INHALATION) at 20:12

## 2017-05-14 RX ADMIN — IPRATROPIUM BROMIDE AND ALBUTEROL SULFATE 3 ML: .5; 3 SOLUTION RESPIRATORY (INHALATION) at 06:29

## 2017-05-14 RX ADMIN — HEPARIN SODIUM 5000 UNITS: 5000 INJECTION, SOLUTION INTRAVENOUS; SUBCUTANEOUS at 05:06

## 2017-05-14 RX ADMIN — BENZOCAINE AND MENTHOL 1 LOZENGE: 15; 3.6 LOZENGE ORAL at 12:22

## 2017-05-14 RX ADMIN — DOXYCYCLINE 100 MG: 100 TABLET ORAL at 21:25

## 2017-05-14 RX ADMIN — GUAIFENESIN 600 MG: 600 TABLET, EXTENDED RELEASE ORAL at 12:20

## 2017-05-14 RX ADMIN — PREDNISONE 15 MG: 5 TABLET ORAL at 12:20

## 2017-05-14 RX ADMIN — CEFTRIAXONE SODIUM 2 G: 2 INJECTION, POWDER, FOR SOLUTION INTRAMUSCULAR; INTRAVENOUS at 08:31

## 2017-05-14 RX ADMIN — ASPIRIN 81 MG: 81 TABLET ORAL at 08:31

## 2017-05-14 RX ADMIN — IPRATROPIUM BROMIDE AND ALBUTEROL SULFATE 3 ML: .5; 3 SOLUTION RESPIRATORY (INHALATION) at 14:16

## 2017-05-14 RX ADMIN — RENAGEL 800 MG: 800 TABLET ORAL at 12:20

## 2017-05-14 RX ADMIN — SODIUM CHLORIDE: 9 INJECTION, SOLUTION INTRAVENOUS at 21:33

## 2017-05-14 RX ADMIN — FINASTERIDE 5 MG: 5 TABLET, FILM COATED ORAL at 08:32

## 2017-05-14 RX ADMIN — IPRATROPIUM BROMIDE AND ALBUTEROL SULFATE 3 ML: .5; 3 SOLUTION RESPIRATORY (INHALATION) at 20:12

## 2017-05-14 RX ADMIN — SIMVASTATIN 40 MG: 40 TABLET, FILM COATED ORAL at 21:25

## 2017-05-14 RX ADMIN — ACETAMINOPHEN 650 MG: 325 TABLET, FILM COATED ORAL at 12:20

## 2017-05-14 RX ADMIN — METOPROLOL SUCCINATE 25 MG: 25 TABLET, EXTENDED RELEASE ORAL at 08:32

## 2017-05-14 RX ADMIN — RENAGEL 800 MG: 800 TABLET ORAL at 19:42

## 2017-05-14 RX ADMIN — HEPARIN SODIUM 5000 UNITS: 5000 INJECTION, SOLUTION INTRAVENOUS; SUBCUTANEOUS at 14:05

## 2017-05-14 RX ADMIN — BENZOCAINE AND MENTHOL 1 LOZENGE: 15; 3.6 LOZENGE ORAL at 18:20

## 2017-05-14 RX ADMIN — FAMOTIDINE 20 MG: 20 TABLET, FILM COATED ORAL at 08:31

## 2017-05-14 RX ADMIN — FAMOTIDINE 20 MG: 20 TABLET, FILM COATED ORAL at 21:26

## 2017-05-14 RX ADMIN — GUAIFENESIN 600 MG: 600 TABLET, EXTENDED RELEASE ORAL at 21:25

## 2017-05-14 RX ADMIN — SODIUM CHLORIDE: 9 INJECTION, SOLUTION INTRAVENOUS at 07:09

## 2017-05-14 RX ADMIN — DOXYCYCLINE 100 MG: 100 TABLET ORAL at 08:32

## 2017-05-14 ASSESSMENT — PAIN SCALES - GENERAL
PAINLEVEL_OUTOF10: 0

## 2017-05-14 ASSESSMENT — ENCOUNTER SYMPTOMS
SHORTNESS OF BREATH: 0
WHEEZING: 0
SPUTUM PRODUCTION: 1
BACK PAIN: 0
COUGH: 1
SENSORY CHANGE: 0
FOCAL WEAKNESS: 0
PALPITATIONS: 0
VOMITING: 0
ORTHOPNEA: 0
FEVER: 0
MYALGIAS: 0
ABDOMINAL PAIN: 0
CHILLS: 0
DIZZINESS: 0
NAUSEA: 0
EYES NEGATIVE: 1

## 2017-05-14 NOTE — PROGRESS NOTES
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 Labs consistent with ESRD. Lactate 2.2. Trop 0.14. . WBC 11.3. Flu B positive. CXR showed mild interstitial edema. Started on tamiflu, and empiric antibiotics with CTX and doxycycline.  Nephrology contacted for HD.     Interval Problem Update  5/14/2017 - uneventful night. VSS. Had fevers last night and this morning. On 2.5L O2 NC. Tachycardic. WBC now normal. Hgb 10.1. Na, K WNL. Crea 3.46. Trop 0.15.    > Seen and examined. Sitting at edge of bed. Still with productive cough. Breathing better. No nausea, vomiting, abd pain.    Consultants/Specialty  Nephrology.     Disposition  Monitor on telemetry.        ROS     Pertinent positives/negatives as mentioned above.     A complete review of systems was done. All other systems were negative.      Physical Exam Laboratory/Imaging   Filed Vitals:    05/14/17 0630 05/14/17 0820 05/14/17 1025 05/14/17 1200   BP:  138/69  122/66   Pulse: 94 103 97 114   Temp:  36.4 °C (97.5 °F)  38.7 °C (101.6 °F)   Resp: 18 18 17 18   Height:       Weight:       SpO2: 95% 88% 93% 97%     Physical Exam   Constitutional: He appears well-developed. No distress.   HENT:   Head: Normocephalic and atraumatic.   Mouth/Throat: Oropharynx is clear and moist. No oropharyngeal exudate.   Eyes: EOM are normal. Pupils are equal, round, and reactive to light. Right eye exhibits no discharge. Left eye exhibits no discharge. No scleral icterus.   Neck: Normal range of motion. Neck supple. No thyromegaly present.   Cardiovascular: Normal rate and regular rhythm.  Exam reveals no gallop and no friction rub.    No murmur heard.  Pulmonary/Chest: Effort normal. He has no wheezes. He has no rales. He exhibits no tenderness.   Diminished air entry B/L bases    Abdominal: Soft. Bowel sounds are normal. There is no tenderness. There is no rebound and no guarding.   Musculoskeletal: Normal range of motion. He exhibits no edema or tenderness.   Lymphadenopathy:     He has no cervical adenopathy.   Neurological: He is alert.   Skin: Skin is warm and dry. No rash noted. He is not diaphoretic. No erythema.   Psychiatric: He has a normal mood and affect. His behavior is normal. Thought content normal.   Vitals reviewed.   Lab Results   Component Value Date/Time    WBC 8.8 05/14/2017 03:21 AM    HEMOGLOBIN 10.1* 05/14/2017 03:21 AM    HEMATOCRIT 32.2* 05/14/2017 03:21 AM    PLATELET COUNT 175 05/14/2017 03:21 AM     Lab Results   Component Value Date/Time    SODIUM 140 05/14/2017 03:21 AM    POTASSIUM 4.0 05/14/2017 03:21 AM    CHLORIDE 107 05/14/2017 03:21 AM    CO2 22 05/14/2017 03:21 AM    GLUCOSE 104* 05/14/2017 03:21 AM    BUN 41* 05/14/2017 03:21 AM    CREATININE 3.46* 05/14/2017 03:21 AM      Assessment/Plan    Sepsis due to influenza B viral pneumonia, possible HCAP (CMS-HCC) (present on admission)  Assessment & Plan  - holding off on IVF as HD dependent. Continue to monitor hemodynamics closely. Trend WBC. Monitor for fevers.   - continue tamiflu for influenza B. Continue empiric antibiotics. Check CRP.     Pneumonia of both lungs due to influenza B , possible HCAP (present on admission)  Assessment & Plan  - continue tamiflu. Continue rocephin and doxycycline. Check CRP, and if low will consider stopping antibiotics. Repeat CXR in AM.  - continue RT protocol. Continue O2 to keep sats>88%. Start mucinex, and PRN tessalon for cough.     Acute on chronic respiratory failure with hypoxia due to influenza B, not due to sepsis(CMS-HCC) (present on admission)  Assessment & Plan  - continue RT protocol, O2. Continue home dose inhalers (symbicort).     Hyperlipidemia (present on admission)  Assessment & Plan  - continue statin.     HTN (hypertension) (present on  admission)  Assessment & Plan  - continue metoprolol.    TIMOTHY (obstructive sleep apnea) (present on admission)  Assessment & Plan  .    ESRD on dialysis (CMS-HCC) (present on admission)  Assessment & Plan  - nephrology on-board. HD while in-house.   - continue sevalamer.     BPH (benign prostatic hyperplasia) (present on admission)  Assessment & Plan  - continue flomax and finasteride.     Wegeners granulomatosis (CMS-HCC) (present on admission)  Assessment & Plan  - continue chronic prednisone. Continue HD per nephrology. Follows outpatient pulmonology as well.     COPD (chronic obstructive pulmonary disease) (CMS-HCC) (present on admission)  Assessment & Plan  - continue symbicort, prednisone, RT protocol. Continue O2.      Labs reviewed, Medications reviewed and Radiology images reviewed        DVT Prophylaxis: Heparin    Ulcer prophylaxis: Not indicated  Antibiotics: Treating active infection/contamination beyond 24 hours perioperative coverage  Assessed for rehab: Patient returned to prior level of function, rehabilitation not indicated at this time

## 2017-05-14 NOTE — ASSESSMENT & PLAN NOTE
- continue chronic prednisone. Continue HD per nephrology. Follows outpatient pulmonology as well.

## 2017-05-14 NOTE — ED NOTES
Update, on several of patient VS validated at 200 and 2100 pt resp showed 30-36 but resp are from 20 to 25 , pt was coughing during those moments which showed increase in resp

## 2017-05-14 NOTE — ASSESSMENT & PLAN NOTE
- continue tamiflu. Continue rocephin and doxycycline given high CRP, continue to trend CRP.   - continue RT protocol. Continue O2 to keep sats>88%. Continue mucinex, and PRN tessalon for cough.

## 2017-05-14 NOTE — PROGRESS NOTES
Nephrology Progress Note, Adult, Complex               Author: Huma Herzogeleanormelonie Date & Time created: 5/14/2017  2:10 PM     Interval History:  75 y/o male with ESRD/HD, [place]'s granulomatosis admitted with flu, PNA  Doing better  No fever/chills  Cough better    Review of Systems:  Review of Systems   Constitutional: Positive for malaise/fatigue. Negative for fever and chills.   HENT: Negative.    Eyes: Negative.    Respiratory: Positive for cough and sputum production. Negative for shortness of breath and wheezing.    Cardiovascular: Negative for chest pain, palpitations, orthopnea and leg swelling.   Gastrointestinal: Negative for nausea, vomiting and abdominal pain.   Genitourinary: Negative for dysuria.   Musculoskeletal: Negative for myalgias, back pain and joint pain.   Skin: Negative.    Neurological: Negative for dizziness, sensory change and focal weakness.       Physical Exam:  Physical Exam   Constitutional: He is oriented to person, place, and time. He appears well-developed and well-nourished. No distress.   HENT:   Head: Normocephalic and atraumatic.   Mouth/Throat: Oropharynx is clear and moist.   Eyes: Conjunctivae and EOM are normal. Pupils are equal, round, and reactive to light. No scleral icterus.   Neck: Normal range of motion. Neck supple. No thyromegaly present.   Cardiovascular: Normal rate and regular rhythm.  Exam reveals no gallop and no friction rub.    Pulmonary/Chest: Effort normal and breath sounds normal. No respiratory distress. He has no wheezes. He has no rales.   Coarse BS   Abdominal: Soft. Bowel sounds are normal. He exhibits no distension. There is no tenderness.   Musculoskeletal: He exhibits no edema.   Lymphadenopathy:     He has no cervical adenopathy.   Neurological: He is alert and oriented to person, place, and time. No cranial nerve deficit.   Skin: Skin is warm. No rash noted. No erythema.   Nursing note and vitals reviewed.      Labs:        Invalid input(s):  [id number]  Recent Labs      17   0910  17   2150   TROPONINI  0.14*  0.15*   BNPBTYPENAT  420*   --      Recent Labs      17   0917   0321   SODIUM  140  140   POTASSIUM  3.9  4.0   CHLORIDE  98  107   CO2  31  22   BUN  31*  41*   CREATININE  2.84*  3.46*   CALCIUM  9.2  7.9*     Recent Labs      17   0321   ALTSGPT  16   --    ASTSGOT  13   --    ALKPHOSPHAT  60   --    TBILIRUBIN  0.8   --    GLUCOSE  124*  104*     Recent Labs      17   0917   0321   RBC  3.67*  3.25*   HEMOGLOBIN  11.5*  10.1*   HEMATOCRIT  35.7*  32.2*   PLATELETCT  228  175     Recent Labs      17   0917   0321   WBC  11.3*  8.8   NEUTSPOLYS  90.70*  92.00*   LYMPHOCYTES  3.20*  2.60*   MONOCYTES  4.50  4.50   EOSINOPHILS  0.30  0.10   BASOPHILS  0.40  0.30   ASTSGOT  13   --    ALTSGPT  16   --    ALKPHOSPHAT  60   --    TBILIRUBIN  0.8   --            Hemodynamics:  Temp (24hrs), Av.9 °C (100.2 °F), Min:36.4 °C (97.5 °F), Max:38.7 °C (101.6 °F)  Temperature: (!) 38.7 °C (101.6 °F) (rn notified)  Pulse  Av.4  Min: 86  Max: 115Heart Rate (Monitored): (!) 116  Blood Pressure : 122/66 mmHg, NIBP: 133/57 mmHg     Respiratory:    Respiration: 18, Pulse Oximetry: 97 %, O2 Daily Delivery Respiratory : Silicone Nasal Cannula     Given By:: Mouthpiece, PEP/CPT Method: Positive Airway Pressure Device, Work Of Breathing / Effort: Mild  RUL Breath Sounds: Crackles, RML Breath Sounds: Crackles, RLL Breath Sounds: Diminished, DEBORA Breath Sounds: Crackles, LLL Breath Sounds: Diminished  Fluids:  No intake or output data in the 24 hours ending 17 1410  Weight: 62.8 kg (138 lb 7.2 oz)  GI/Nutrition:  Orders Placed This Encounter   Procedures   • Diet Order     Standing Status: Standing      Number of Occurrences: 1      Standing Expiration Date:      Order Specific Question:  Diet:     Answer:  Renal [8]     Order Specific Question:  Diet:     Answer:   Cardiac [6]     Medical Decision Making, by Problem:  Active Hospital Problems    Diagnosis   • Sepsis due to influenza B viral pneumonia, possible HCAP (CMS-HCC) [A41.9]   • Pneumonia of both lungs due to influenza B , possible HCAP [J11.00]   • Acute on chronic respiratory failure with hypoxia due to influenza B, not due to sepsis(CMS-HCC) [J96.21]   • COPD (chronic obstructive pulmonary disease) (CMS-HCC) [J44.9]   • BPH (benign prostatic hyperplasia) [N40.0]   • Wegeners granulomatosis (CMS-HCC) [M31.30]   • ESRD on dialysis (CMS-HCC) [N18.6, Z99.2]   • HTN (hypertension) [I10]   • TIMOTHY (obstructive sleep apnea) [G47.33]   • Hyperlipidemia [E78.5]       Labs reviewed, Medications reviewed and Radiology images reviewed                      Assessment and Plan    1.ESRD/HD MWF  2.HTN: BP well controlled  3.Electrolytes: well controlled.  4.Anemia: Hb at goal  5.PNA  6.Volume:well controlled    Recs: HD tomorrow, diet renal , all meds to renal doses

## 2017-05-14 NOTE — PROGRESS NOTES
2 RN skin assessment complete. Pt sacrum is red but blanching. Waffle cushion placed under pt. Pt Right knee is red but blanching. Scab on left lateral leg. Otherwise skin is clean dry and intact.

## 2017-05-14 NOTE — CARE PLAN
Problem: Communication  Goal: The ability to communicate needs accurately and effectively will improve  Intervention: Educate patient and significant other/support system about the plan of care, procedures, treatments, medications and allow for questions  [place] pt to floor. Discuss plan of care. Address concerns and questions.       Problem: Safety  Goal: Will remain free from injury  Intervention: Provide assistance with mobility  Educate pt regarding safety precautions and to call for assistance.       Problem: Pain Management  Goal: Pain level will decrease to patient’s comfort goal  Intervention: Follow pain managment plan developed in collaboration with patient and Interdisciplinary Team  Discuss pain management. Med per MAR.

## 2017-05-14 NOTE — PROGRESS NOTES
Handoff report received. Assume patient care. Patient resting in bed. Patient not in pain. Patient not in distress, AAOX 4. Bed alarm is on. Safety precautions in place. Call light and personal belongings within reach. Educated to call for assistance if needed. Hot tea provided to Patient.

## 2017-05-14 NOTE — ASSESSMENT & PLAN NOTE
- holding off on IVF as HD dependent and hemodynamically stable.   - continue tamiflu for influenza B. Continue antibiotics for post-influenza pneumonia as CRP high.

## 2017-05-14 NOTE — CARE PLAN
Problem: Oxygenation:  Goal: Maintain adequate oxygenation dependent on patient condition  2.5 L NC will titrate as tolerated             Problem: Bronchoconstriction:  Goal: Improve in air movement and diminished wheezing  Pt reassessed to DuoNeb BID which is his home regiment per chart              Problem: Hyperinflation:  Goal: Prevent or improve atelectasis  IS value -  1350  PEP Q4, pt has improving IS value with great effort with PEP therapy

## 2017-05-14 NOTE — ED NOTES
Spoke with param Magana nurse, already received report from day shift nurse, no questions at this time.

## 2017-05-14 NOTE — RESPIRATORY CARE
COPD Education by COPD Clinical Educator  5/14/2017 at 1:13 PM by Margoth Hobbs    Patient interviewed by COPD education team.  Patient refused full COPD program at this time, but agreed to short intervention.  A comprehensive packet including information about COPD, treatments, and smoking cessation given.

## 2017-05-14 NOTE — CONSULTS
DATE OF SERVICE:  05/13/2017    DATE OF SERVICE:  05/13/2017    REQUESTING PHYSICIAN:  Duglas Preciado MD    REASON FOR CONSULTATION:  To evaluate and provide dialysis for patient with   end-stage renal disease.    HISTORY OF PRESENT ILLNESS:  The patient is a 76-year-old male patient of mine   with end-stage renal disease, on hemodialysis, who has been receiving acute   treatments in [facility] on Monday, Wednesday, Friday.  The   patient suffers from Wegener's granulomatosis on immunosuppression, noticed   multiple hospitalization with upper respiratory tract infection.  Last   dialysis completed yesterday.  Last night, he experienced fever for 105, this   morning still 101, decided to come to the emergency room.  Also, complains of   worsening shortness of breath and cough with sputum production.  Currently, in   the emergency room, feels better, complains of fatigue, low energy level.  No   chest pain, no abdominal pain, no nausea or vomiting, no diarrhea, no edema,   no recent sick contacts.    REVIEW OF SYSTEMS:  All 12 points reveal negative except history of present   illness per CMS/AMA criteria.    PAST MEDICAL HISTORY:  End-stage renal disease, on hemodialysis, Wegener's   granulomatosis, chronic obstructive pulmonary disease, hyperlipidemia,   hypertension, sick sinus syndrome, histoplasmosis, obstructive sleep apnea,   benign prostatic hypertrophy, pneumonia.    PAST SURGICAL HISTORY:  Pacemaker placement, AV fistula creation, right hip   replacement, right rotator cuff repair, right inguinal hernia repair,   gastroscopy.    SOCIAL HISTORY:  Used to smoke tobacco, quit long time ago.  Drinks a glass of   wine with dinner.  No drug use.    FAMILY HISTORY:  Significant for stroke and myocardial infarction.    ALLERGIES:  ALLERGIC TO DOXYCYCLINE, ERYTHROMYCIN, AND RITUXIMAB.    OUTPATIENT MEDICATIONS:  Reviewed in the chart.    PHYSICAL EXAMINATION:  VITAL SIGNS:  Blood pressure 112/46,  heart rate 91, temperature 38.1 Celsius.  GENERAL APPEARANCE:  Well-developed, well-nourished male in no acute distress.  HEENT:  Normocephalic, atraumatic.  Pupils equal, round, reactive to light.    Extraocular movements intact.  Nares patent.  Oropharynx clear.  Moist mucosa,   no erythema or exudate.  NECK:  Supple, no lymphadenopathy, no thyromegaly appreciated.  LUNGS:  Coarse breath sounds bilaterally.  Bilateral rhonchi.  HEART:  Regular rate.  No rub or gallop.  ABDOMEN:  Soft, nontender, and nondistended.  Bowel sounds present.  EXTREMITIES:  No cyanosis, no clubbing, no edema.  NEUROLOGIC:  Alert, oriented x3, no focal deficits.    LABORATORY RESULTS:  Reviewed, revealed hemoglobin level 11.5.  Sodium 140,   potassium 3.9, BUN 31, creatinine 2.84.    ASSESSMENT AND PLAN:  The patient is a 77-year-old male with end-stage renal   disease, on hemodialysis, Wegener's granulomatosis admitted with pneumonia.  1.  End-stage renal disease.  We will continue dialysis per patient's schedule   Monday, Wednesday, Friday.  No need for emergent dialysis today.  2.  Electrolytes remains well controlled, to monitor.  3.  Hemoglobin.  4.  Anemia.  Hemoglobin level of 12, monitor.  5.  Volume overloaded.  6.  Hypertension.  Blood pressure remains well controlled.  7.  Pneumonia to adjust all antibiotics to renal doses.  8.  Recommendations dialysis Monday, Wednesday, Friday.    DIET:  Renal.    Home medications adjust to renal doses.  We will follow up patient closely.    Thank you for the consult.       ____________________________________     MD MIRIAM COSTA / SHARRON    DD:  05/13/2017 17:11:19  DT:  05/13/2017 19:37:16    D#:  [id number]  Job#:  [id number]

## 2017-05-14 NOTE — ED NOTES
Pt lying in supine position, cardiac monitor in place, sinus tach showing on monitor, pt has pain 4/10 abd/chest from coughing, pt has no complaints at this time and verbalizes understanding of poc and admission status.  Call bell within reach and bed in lowest position, resp even and non labored.

## 2017-05-14 NOTE — PROGRESS NOTES
Received report. Pt arrived on floor. Placed on monitor.  Assessed pt. Discussed plan of care and pain management. AOx4 Call light in reach. Fall precautions in place. Bed in lowest position, bed locked, RN and CNA numbers provided. Provided water. Hourly rounding in practice.

## 2017-05-15 ENCOUNTER — TELEPHONE (OUTPATIENT)
Dept: PULMONOLOGY | Facility: HOSPICE | Age: 77
End: 2017-05-15

## 2017-05-15 ENCOUNTER — APPOINTMENT (OUTPATIENT)
Dept: RADIOLOGY | Facility: MEDICAL CENTER | Age: 77
DRG: 871 | End: 2017-05-15
Attending: INTERNAL MEDICINE
Payer: MEDICARE

## 2017-05-15 LAB
ANION GAP SERPL CALC-SCNC: 13 MMOL/L (ref 0–11.9)
BACTERIA UR CULT: ABNORMAL
BACTERIA UR CULT: ABNORMAL
BASOPHILS # BLD AUTO: 0.2 % (ref 0–1.8)
BASOPHILS # BLD: 0.01 K/UL (ref 0–0.12)
BUN SERPL-MCNC: 50 MG/DL (ref 8–22)
CALCIUM SERPL-MCNC: 8 MG/DL (ref 8.5–10.5)
CHLORIDE SERPL-SCNC: 107 MMOL/L (ref 96–112)
CO2 SERPL-SCNC: 19 MMOL/L (ref 20–33)
CREAT SERPL-MCNC: 3.56 MG/DL (ref 0.5–1.4)
CRP SERPL HS-MCNC: 17.35 MG/DL (ref 0–0.75)
EOSINOPHIL # BLD AUTO: 0 K/UL (ref 0–0.51)
EOSINOPHIL NFR BLD: 0 % (ref 0–6.9)
ERYTHROCYTE [DISTWIDTH] IN BLOOD BY AUTOMATED COUNT: 61.8 FL (ref 35.9–50)
GFR SERPL CREATININE-BSD FRML MDRD: 17 ML/MIN/1.73 M 2
GLUCOSE SERPL-MCNC: 153 MG/DL (ref 65–99)
HBV SURFACE AG SER QL: NEGATIVE
HCT VFR BLD AUTO: 28.4 % (ref 42–52)
HGB BLD-MCNC: 8.9 G/DL (ref 14–18)
IMM GRANULOCYTES # BLD AUTO: 0.03 K/UL (ref 0–0.11)
IMM GRANULOCYTES NFR BLD AUTO: 0.6 % (ref 0–0.9)
LYMPHOCYTES # BLD AUTO: 0.16 K/UL (ref 1–4.8)
LYMPHOCYTES NFR BLD: 3.2 % (ref 22–41)
MCH RBC QN AUTO: 30.8 PG (ref 27–33)
MCHC RBC AUTO-ENTMCNC: 31.3 G/DL (ref 33.7–35.3)
MCV RBC AUTO: 98.3 FL (ref 81.4–97.8)
MONOCYTES # BLD AUTO: 0.31 K/UL (ref 0–0.85)
MONOCYTES NFR BLD AUTO: 6.1 % (ref 0–13.4)
NEUTROPHILS # BLD AUTO: 4.55 K/UL (ref 1.82–7.42)
NEUTROPHILS NFR BLD: 89.9 % (ref 44–72)
NRBC # BLD AUTO: 0 K/UL
NRBC BLD AUTO-RTO: 0 /100 WBC
PLATELET # BLD AUTO: 156 K/UL (ref 164–446)
PMV BLD AUTO: 9.2 FL (ref 9–12.9)
POTASSIUM SERPL-SCNC: 3.6 MMOL/L (ref 3.6–5.5)
POTASSIUM SERPL-SCNC: 4 MMOL/L (ref 3.6–5.5)
RBC # BLD AUTO: 2.89 M/UL (ref 4.7–6.1)
SIGNIFICANT IND 70042: ABNORMAL
SITE SITE: ABNORMAL
SODIUM SERPL-SCNC: 139 MMOL/L (ref 135–145)
SOURCE SOURCE: ABNORMAL
WBC # BLD AUTO: 5.1 K/UL (ref 4.8–10.8)

## 2017-05-15 PROCEDURE — 90935 HEMODIALYSIS ONE EVALUATION: CPT

## 2017-05-15 PROCEDURE — 700111 HCHG RX REV CODE 636 W/ 250 OVERRIDE (IP): Performed by: INTERNAL MEDICINE

## 2017-05-15 PROCEDURE — 700101 HCHG RX REV CODE 250: Performed by: INTERNAL MEDICINE

## 2017-05-15 PROCEDURE — 84132 ASSAY OF SERUM POTASSIUM: CPT

## 2017-05-15 PROCEDURE — 94668 MNPJ CHEST WALL SBSQ: CPT

## 2017-05-15 PROCEDURE — 5A1D60Z PERFORMANCE OF URINARY FILTRATION, MULTIPLE: ICD-10-PCS | Performed by: INTERNAL MEDICINE

## 2017-05-15 PROCEDURE — A9270 NON-COVERED ITEM OR SERVICE: HCPCS | Performed by: INTERNAL MEDICINE

## 2017-05-15 PROCEDURE — A9270 NON-COVERED ITEM OR SERVICE: HCPCS | Performed by: PHARMACIST

## 2017-05-15 PROCEDURE — 87340 HEPATITIS B SURFACE AG IA: CPT

## 2017-05-15 PROCEDURE — 94760 N-INVAS EAR/PLS OXIMETRY 1: CPT

## 2017-05-15 PROCEDURE — 99233 SBSQ HOSP IP/OBS HIGH 50: CPT | Performed by: INTERNAL MEDICINE

## 2017-05-15 PROCEDURE — 36415 COLL VENOUS BLD VENIPUNCTURE: CPT

## 2017-05-15 PROCEDURE — 700102 HCHG RX REV CODE 250 W/ 637 OVERRIDE(OP): Performed by: PHARMACIST

## 2017-05-15 PROCEDURE — 700102 HCHG RX REV CODE 250 W/ 637 OVERRIDE(OP): Performed by: INTERNAL MEDICINE

## 2017-05-15 PROCEDURE — 86140 C-REACTIVE PROTEIN: CPT

## 2017-05-15 PROCEDURE — 80048 BASIC METABOLIC PNL TOTAL CA: CPT

## 2017-05-15 PROCEDURE — 94640 AIRWAY INHALATION TREATMENT: CPT

## 2017-05-15 PROCEDURE — 85025 COMPLETE CBC W/AUTO DIFF WBC: CPT

## 2017-05-15 PROCEDURE — 700105 HCHG RX REV CODE 258: Performed by: INTERNAL MEDICINE

## 2017-05-15 PROCEDURE — 770020 HCHG ROOM/CARE - TELE (206)

## 2017-05-15 PROCEDURE — 71010 DX-CHEST-PORTABLE (1 VIEW): CPT

## 2017-05-15 RX ORDER — SODIUM CHLORIDE FOR INHALATION 7 %
4 VIAL, NEBULIZER (ML) INHALATION EVERY 4 HOURS PRN
Status: DISCONTINUED | OUTPATIENT
Start: 2017-05-15 | End: 2017-05-17 | Stop reason: HOSPADM

## 2017-05-15 RX ORDER — LIDOCAINE HYDROCHLORIDE 10 MG/ML
1 INJECTION, SOLUTION INFILTRATION; PERINEURAL PRN
Status: DISCONTINUED | OUTPATIENT
Start: 2017-05-15 | End: 2017-05-17 | Stop reason: HOSPADM

## 2017-05-15 RX ORDER — IPRATROPIUM BROMIDE AND ALBUTEROL SULFATE 2.5; .5 MG/3ML; MG/3ML
3 SOLUTION RESPIRATORY (INHALATION)
Status: DISCONTINUED | OUTPATIENT
Start: 2017-05-15 | End: 2017-05-17 | Stop reason: HOSPADM

## 2017-05-15 RX ORDER — FINASTERIDE 5 MG/1
5 TABLET, FILM COATED ORAL ONCE
Status: ACTIVE | OUTPATIENT
Start: 2017-05-15 | End: 2017-05-16

## 2017-05-15 RX ORDER — HEPARIN SODIUM 1000 [USP'U]/ML
1500 INJECTION, SOLUTION INTRAVENOUS; SUBCUTANEOUS
Status: DISCONTINUED | OUTPATIENT
Start: 2017-05-15 | End: 2017-05-17 | Stop reason: HOSPADM

## 2017-05-15 RX ADMIN — HEPARIN SODIUM 5000 UNITS: 5000 INJECTION, SOLUTION INTRAVENOUS; SUBCUTANEOUS at 05:35

## 2017-05-15 RX ADMIN — TAMSULOSIN HYDROCHLORIDE 0.4 MG: 0.4 CAPSULE ORAL at 10:03

## 2017-05-15 RX ADMIN — Medication: at 10:49

## 2017-05-15 RX ADMIN — BENZONATATE 100 MG: 100 CAPSULE ORAL at 22:21

## 2017-05-15 RX ADMIN — FAMOTIDINE 20 MG: 20 TABLET, FILM COATED ORAL at 22:21

## 2017-05-15 RX ADMIN — ALBUTEROL SULFATE 2 PUFF: 90 AEROSOL, METERED RESPIRATORY (INHALATION) at 02:54

## 2017-05-15 RX ADMIN — IPRATROPIUM BROMIDE AND ALBUTEROL SULFATE 3 ML: .5; 3 SOLUTION RESPIRATORY (INHALATION) at 06:39

## 2017-05-15 RX ADMIN — DOXYCYCLINE 100 MG: 100 TABLET ORAL at 22:20

## 2017-05-15 RX ADMIN — BUDESONIDE AND FORMOTEROL FUMARATE DIHYDRATE 2 PUFF: 160; 4.5 AEROSOL RESPIRATORY (INHALATION) at 22:21

## 2017-05-15 RX ADMIN — ASPIRIN 81 MG: 81 TABLET ORAL at 10:05

## 2017-05-15 RX ADMIN — METOPROLOL SUCCINATE 25 MG: 25 TABLET, EXTENDED RELEASE ORAL at 10:05

## 2017-05-15 RX ADMIN — OSELTAMIVIR PHOSPHATE 30 MG: 30 CAPSULE ORAL at 10:03

## 2017-05-15 RX ADMIN — FAMOTIDINE 20 MG: 20 TABLET, FILM COATED ORAL at 10:05

## 2017-05-15 RX ADMIN — BUDESONIDE AND FORMOTEROL FUMARATE DIHYDRATE 2 PUFF: 160; 4.5 AEROSOL RESPIRATORY (INHALATION) at 06:39

## 2017-05-15 RX ADMIN — DOXYCYCLINE 100 MG: 100 TABLET ORAL at 10:05

## 2017-05-15 RX ADMIN — HEPARIN SODIUM 5000 UNITS: 5000 INJECTION, SOLUTION INTRAVENOUS; SUBCUTANEOUS at 22:24

## 2017-05-15 RX ADMIN — IPRATROPIUM BROMIDE AND ALBUTEROL SULFATE 3 ML: .5; 3 SOLUTION RESPIRATORY (INHALATION) at 20:37

## 2017-05-15 RX ADMIN — SIMVASTATIN 40 MG: 40 TABLET, FILM COATED ORAL at 22:21

## 2017-05-15 RX ADMIN — CEFTRIAXONE SODIUM 2 G: 2 INJECTION, POWDER, FOR SOLUTION INTRAMUSCULAR; INTRAVENOUS at 10:03

## 2017-05-15 RX ADMIN — GUAIFENESIN 600 MG: 600 TABLET, EXTENDED RELEASE ORAL at 22:20

## 2017-05-15 RX ADMIN — HEPARIN SODIUM 1500 UNITS: 1000 INJECTION, SOLUTION INTRAVENOUS; SUBCUTANEOUS at 14:51

## 2017-05-15 RX ADMIN — BUDESONIDE AND FORMOTEROL FUMARATE DIHYDRATE 2 PUFF: 160; 4.5 AEROSOL RESPIRATORY (INHALATION) at 20:39

## 2017-05-15 RX ADMIN — GUAIFENESIN 600 MG: 600 TABLET, EXTENDED RELEASE ORAL at 10:05

## 2017-05-15 RX ADMIN — ACETAMINOPHEN 650 MG: 325 TABLET, FILM COATED ORAL at 18:03

## 2017-05-15 ASSESSMENT — ENCOUNTER SYMPTOMS
COUGH: 1
CHILLS: 0
VOMITING: 0
FEVER: 0
NAUSEA: 0
ABDOMINAL PAIN: 0

## 2017-05-15 ASSESSMENT — PAIN SCALES - GENERAL
PAINLEVEL_OUTOF10: 0

## 2017-05-15 NOTE — TELEPHONE ENCOUNTER
1. Caller Name: Gilberto                                         [facility] Back [facility]       Patient approves a detailed voicemail message: N\A    Patient called to inform Dr. Vaughan he is currently in the hospital on dialysis, flu and pneumonia. States has been there since three days and had to cancel appt for tomorrow and reschedule for June. [facility] just want to inform our clinic. If any questions to contact [facility] room 719

## 2017-05-15 NOTE — PROGRESS NOTES
Nephrology Progress Note, Adult, Complex               Author: Matty Najjar Date & Time created: 5/15/2017  1:47 PM     Interval History:  75 y/o male with ESRD/HD, [place]'s granulomatosis admitted with flu, PNA  Doing better  No fever/chills  Cough better  Seen and examined while getting HD.    Review of Systems:  Review of Systems   Constitutional: Negative for fever and chills.   Respiratory: Positive for cough.    Cardiovascular: Negative for chest pain and leg swelling.   Gastrointestinal: Negative for nausea, vomiting and abdominal pain.       Physical Exam:  Physical Exam   Constitutional: He appears well-developed and well-nourished. No distress.   HENT:   Head: Normocephalic and atraumatic.   Mouth/Throat: Oropharynx is clear and moist.   Cardiovascular: Normal rate and regular rhythm.  Exam reveals no gallop and no friction rub.    Pulmonary/Chest: Effort normal and breath sounds normal. No respiratory distress.   Abdominal: Soft. Bowel sounds are normal. He exhibits no distension. There is no tenderness.   Musculoskeletal: He exhibits no edema.   Neurological: He is alert. No cranial nerve deficit.   Nursing note and vitals reviewed.      Labs:        Invalid input(s): [id number]  Recent Labs      05/13/17   0910  05/13/17   2150   TROPONINI  0.14*  0.15*   BNPBTYPENAT  420*   --      Recent Labs      05/13/17   0912  05/14/17   0321  05/15/17   0222  05/15/17   1150   SODIUM  140  140  139   --    POTASSIUM  3.9  4.0  4.0  3.6   CHLORIDE  98  107  107   --    CO2  31  22  19*   --    BUN  31*  41*  50*   --    CREATININE  2.84*  3.46*  3.56*   --    CALCIUM  9.2  7.9*  8.0*   --      Recent Labs      05/13/17   0912  05/14/17   0321  05/15/17   0222   ALTSGPT  16   --    --    ASTSGOT  13   --    --    ALKPHOSPHAT  60   --    --    TBILIRUBIN  0.8   --    --    GLUCOSE  124*  104*  153*     Recent Labs      05/13/17   0912  05/14/17   0321  05/15/17   0222   RBC  3.67*  3.25*  2.89*   HEMOGLOBIN   11.5*  10.1*  8.9*   HEMATOCRIT  35.7*  32.2*  28.4*   PLATELETCT  228  175  156*     Recent Labs      17   0912  17   0321  05/15/17   0222   WBC  11.3*  8.8  5.1   NEUTSPOLYS  90.70*  92.00*  89.90*   LYMPHOCYTES  3.20*  2.60*  3.20*   MONOCYTES  4.50  4.50  6.10   EOSINOPHILS  0.30  0.10  0.00   BASOPHILS  0.40  0.30  0.20   ASTSGOT  13   --    --    ALTSGPT  16   --    --    ALKPHOSPHAT  60   --    --    TBILIRUBIN  0.8   --    --            Hemodynamics:  Temp (24hrs), Av.9 °C (98.5 °F), Min:36.4 °C (97.5 °F), Max:37.6 °C (99.6 °F)  Temperature: 36.4 °C (97.5 °F)  Pulse  Av  Min: 80  Max: 115  Blood Pressure : 136/86 mmHg     Respiratory:    Respiration: 18, Pulse Oximetry: 94 %, O2 Daily Delivery Respiratory : Silicone Nasal Cannula     Given By:: Mouthpiece, PEP/CPT Method: Positive Airway Pressure Device, Work Of Breathing / Effort: Mild  RUL Breath Sounds: Crackles, RML Breath Sounds: Crackles, RLL Breath Sounds: Diminished, DEBORA Breath Sounds: Crackles, LLL Breath Sounds: Diminished  Fluids:    Intake/Output Summary (Last 24 hours) at 05/15/17 1347  Last data filed at 05/15/17 1300   Gross per 24 hour   Intake    820 ml   Output    525 ml   Net    295 ml     Weight: 68.4 kg (150 lb 12.7 oz)  GI/Nutrition:  Orders Placed This Encounter   Procedures   • Diet Order     Standing Status: Standing      Number of Occurrences: 1      Standing Expiration Date:      Order Specific Question:  Diet:     Answer:  Renal [8]     Order Specific Question:  Diet:     Answer:  Cardiac [6]     Medical Decision Making, by Problem:  Active Hospital Problems    Diagnosis   • Sepsis due to influenza B viral pneumonia, possible HCAP (CMS-HCC) [A41.9]   • Pneumonia of both lungs due to influenza B , possible HCAP [J11.00]   • Acute on chronic respiratory failure with hypoxia due to influenza B, not due to sepsis(CMS-HCC) [J96.21]   • COPD (chronic obstructive pulmonary disease) (CMS-HCC) [J44.9]   • BPH (benign  prostatic hyperplasia) [N40.0]   • Wegeners granulomatosis (CMS-HCC) [M31.30]   • ESRD on dialysis (CMS-HCC) [N18.6, Z99.2]   • HTN (hypertension) [I10]   • TIMOTHY (obstructive sleep apnea) [G47.33]   • Hyperlipidemia [E78.5]       Labs reviewed and Medications reviewed                      Assessment and Plan    1.ESRD/HD MWF  2.HTN: BP well controlled  3.Electrolytes: well controlled.  4.Anemia: Hb at goal  5.PNA  6.Volume:well controlled    Recs: HD  diet renal   Renal dose all meds  Avoid nephrotoxins

## 2017-05-15 NOTE — PROGRESS NOTES
Hemodialysis ordered by Dr Najjar for 3hrs on 4k acid bath. Treatment initiated at 1151 and ended at 1451.   Pt tolerated tx well. Net UF 3000ml. Please see flowsheet for details.  Report given to staff RNFAY.    Pre and post tx, RUAAVF positive for bruit and thrill. Manual pressure held for 10mins; then sites covered with clean and dry pressure dressing, CDI. Primary RN and CNA asked to monitor access site for any breakthrough bleeding.

## 2017-05-15 NOTE — CARE PLAN
Problem: Communication  Goal: The ability to communicate needs accurately and effectively will improve  Intervention: Educate patient and significant other/support system about the plan of care, procedures, treatments, medications and allow for questions  Discuss plan of care. Address concerns and questions.       Problem: Safety  Goal: Will remain free from injury  Intervention: Provide assistance with mobility  Educate pt regarding safety precautions and to call for assistance.       Problem: Mobility  Goal: Risk for activity intolerance will decrease  Intervention: Assess and monitor signs of activity intolerance  Assist pt with mobility.

## 2017-05-15 NOTE — DISCHARGE PLANNING
Pt has OP hemodialysis at [facility], MWF 1130.  Clinic has been notified of hospitalization.

## 2017-05-15 NOTE — CARE PLAN
Problem: Infection  Goal: Will remain free from infection  Patient admitted with HCAP and is positive for Influenza B.  Afebrile.  Receiving IV and oral antibiotics.    Problem: Venous Thromboembolism (VTW)/Deep Vein Thrombosis (DVT) Prevention:  Goal: Patient will participate in Venous Thrombosis (VTE)/Deep Vein Thrombosis (DVT)Prevention Measures  Patient receiving heparin subq.    Problem: Bowel/Gastric:  Goal: Normal bowel function is maintained or improved  Patient reports normal bowel function with bowel movement yesterday.  Declines stool softeners.    Problem: Discharge Barriers/Planning  Goal: Patient’s continuum of care needs will be met  Patient has worsening pulmonary edema.  Not medically cleared for discharge at this time.  Receiving dialysis today.

## 2017-05-15 NOTE — TELEPHONE ENCOUNTER
Pt called under the impression that he had an appt tomorrow but I'm not showing a reflection of that in his appts, I called pt and left message on vm for him to cb [phone] so that we could further assist him.

## 2017-05-15 NOTE — PROGRESS NOTES
Hospital Medicine Interval Note  Date of Service:  5/15/2017    Chief Complaint  76/M with ESRD on HD, wegeners granulomatosis on immunosuppresion, COPD, HLD, HTN, SSS, TIMOTHY, admitted for fever, and worsening SOB, with cough productive of sputum. Labs consistent with ESRD. Lactate 2.2. Trop 0.14. . WBC 11.3. Flu B positive. CXR showed mild interstitial edema. Started on tamiflu, and empiric antibiotics with CTX and doxycycline.  Nephrology contacted for HD.     Interval Problem Update  5/15/2017 - no overnight events. Remains hemodynamically stable and afebrile. No fevers since yesterday afternoon. On 1L O2 NC. No leukocytosis. Hgb 8.9. Na, K WNL. Crea 3.56. CRP elevated at 17.35. Blood cultures remain negative. CXR showed increased pulmonary edema (my read).     > Seen and examined. Getting HD. (+) SOB, (+) productive cough with yellow sputum, but less frequent and violent. No nausea, vomiting, abd pain, CP.     Consultants/Specialty  Nephrology.     Disposition  Monitor on telemetry.        ROS     Pertinent positives/negatives as mentioned above.     A complete review of systems was done. All other systems were negative.      Physical Exam Laboratory/Imaging   Filed Vitals:    05/14/17 2200 05/15/17 0000 05/15/17 0420 05/15/17 0639   BP: 118/62 135/67 132/74    Pulse: 99 98 93 89   Temp: 37.6 °C (99.6 °F) 37 °C (98.6 °F) 37.1 °C (98.7 °F)    Resp: 20 18 18 17   Height:       Weight: 68.4 kg (150 lb 12.7 oz)      SpO2: 98% 97% 97% 95%     Physical Exam   Constitutional: He appears well-developed. No distress.   HENT:   Head: Normocephalic and atraumatic.   Mouth/Throat: Oropharynx is clear and moist. No oropharyngeal exudate.   Eyes: EOM are normal. Pupils are equal, round, and reactive to light. Right eye exhibits no discharge. Left eye exhibits no discharge. No scleral icterus.   Neck: Normal range of motion. Neck supple. No thyromegaly present.   Cardiovascular: Normal rate and regular rhythm.  Exam  reveals no gallop and no friction rub.    No murmur heard.  Pulmonary/Chest: Effort normal. He has no wheezes. He has no rales. He exhibits no tenderness.   Diminished air entry B/L bases   Abdominal: Soft. Bowel sounds are normal. There is no tenderness. There is no rebound and no guarding.   Musculoskeletal: Normal range of motion. He exhibits no edema or tenderness.   Lymphadenopathy:     He has no cervical adenopathy.   Neurological: He is alert.   Skin: Skin is warm and dry. No rash noted. He is not diaphoretic. No erythema.   Psychiatric: He has a normal mood and affect. His behavior is normal. Thought content normal.   Vitals reviewed.   Lab Results   Component Value Date/Time    WBC 5.1 05/15/2017 02:22 AM    HEMOGLOBIN 8.9* 05/15/2017 02:22 AM    HEMATOCRIT 28.4* 05/15/2017 02:22 AM    PLATELET COUNT 156* 05/15/2017 02:22 AM     Lab Results   Component Value Date/Time    SODIUM 139 05/15/2017 02:22 AM    POTASSIUM 4.0 05/15/2017 02:22 AM    CHLORIDE 107 05/15/2017 02:22 AM    CO2 19* 05/15/2017 02:22 AM    GLUCOSE 153* 05/15/2017 02:22 AM    BUN 50* 05/15/2017 02:22 AM    CREATININE 3.56* 05/15/2017 02:22 AM      Assessment/Plan    Sepsis due to influenza B viral pneumonia, possible HCAP (CMS-HCC) (present on admission)  Assessment & Plan  - holding off on IVF as HD dependent and hemodynamically stable.   - continue tamiflu for influenza B. Continue antibiotics for post-influenza pneumonia as CRP high.      Pneumonia of both lungs due to influenza B , post-influenza pneumonia/HCAP (present on admission)  Assessment & Plan  - continue tamiflu. Continue rocephin and doxycycline given high CRP, continue to trend CRP.   - continue RT protocol. Continue O2 to keep sats>88%. Continue mucinex, and PRN tessalon for cough.     Acute on chronic respiratory failure with hypoxia due to influenza B, not due to sepsis(CMS-HCC) (present on admission)  Assessment & Plan  - continue RT protocol, O2. Continue home dose  inhalers (symbicort).     Hyperlipidemia (present on admission)  Assessment & Plan  - continue statin.     HTN (hypertension) (present on admission)  Assessment & Plan  - continue metoprolol.    TIMOTHY (obstructive sleep apnea) (present on admission)  Assessment & Plan  .    ESRD on dialysis (CMS-HCC) (present on admission)  Assessment & Plan  - nephrology on-board. HD while in-house.   - continue sevalamer.     BPH (benign prostatic hyperplasia) (present on admission)  Assessment & Plan  - continue flomax and finasteride.     Wegeners granulomatosis (CMS-HCC) (present on admission)  Assessment & Plan  - continue chronic prednisone. Continue HD per nephrology. Follows outpatient pulmonology as well.     COPD (chronic obstructive pulmonary disease) (CMS-HCC) (present on admission)  Assessment & Plan  - continue symbicort, prednisone, RT protocol. Continue O2.        Labs reviewed, Medications reviewed and Radiology images reviewed        DVT Prophylaxis: Heparin    Ulcer prophylaxis: Not indicated  Antibiotics: Treating active infection/contamination beyond 24 hours perioperative coverage  Assessed for rehab: Patient returned to prior level of function, rehabilitation not indicated at this time

## 2017-05-15 NOTE — DISCHARGE PLANNING
Care Transition Team Assessment    Information Source  Orientation : Oriented x 4  Information Given By: Patient  Who is responsible for making decisions for patient? : Patient    Readmission Evaluation  Is this a readmission?: No    Elopement Risk  Legal Hold: No  Ambulatory or Self Mobile in Wheelchair: Yes  Disoriented: No  Psychiatric Symptoms: None  History of Wandering: No  Elopement this Admit: No  Vocalizing Wanting to Leave: No  Displays Behaviors, Body Language Wanting to Leave: No-Not at Risk for Elopement  Elopement Risk: Not at Risk for Elopement    Interdisciplinary Discharge Planning  Does Admitting Nurse Feel This Could be a Complex Discharge?: No  Primary Care Physician: Jules López  Lives with - Patient's Self Care Capacity: Spouse  Patient or legal guardian wants to designate a caregiver (see row info): No  Support Systems: Family Member(s), Friends / Neighbors, [employer] [facility] [employer] [facility]  Housing / Facility: 2 Story House  Do You Take your Prescribed Medications Regularly: Yes  Able to Return to Previous ADL's: Yes  Mobility Issues: Yes  Prior Services: None  Patient Expects to be Discharged to:: Home  Assistance Needed: Yes  Durable Medical Equipment: Walker, Home Oxygen    Discharge Preparedness  What is your plan after discharge?: Home with help  Prior Functional Level: Ambulatory, Independent with Activities of Daily Living, Independent with Medication Management  Difficulity with ADLs: None  Difficulity with IADLs: None    Functional Assesment  Prior Functional Level: Ambulatory, Independent with Activities of Daily Living, Independent with Medication Management    Finances  Financial Barriers to Discharge: No  Prescription Coverage: Yes    Vision / Hearing Impairment  Vision Impairment : No  Hearing Impairment : No    Values / Beliefs / Concerns  Values / Beliefs Concerns : No    Advance Directive  Advance Directive?: Living Will, DPOA for Health Care    Domestic Abuse  Have you ever been the  victim of abuse or violence?: No  Physical Abuse or Sexual Abuse: No  Verbal Abuse or Emotional Abuse: No  Possible Abuse Reported to:: Not Applicable    Psychological Assessment  History of Substance Abuse: None  History of Psychiatric Problems: No  Non-compliant with Treatment: No  Newly Diagnosed Illness: No         Anticipated Discharge Information  Anticipated discharge disposition: Home

## 2017-05-15 NOTE — PROGRESS NOTES
Received report. Assessed pt. Discussed plan of care with pt and wife. Discussed medications. AOx4 Call light in reach. Fall precautions in place. Bed in lowest position, bed locked, RN and CNA numbers provided. Provided water. Hourly rounding in practice.

## 2017-05-16 ENCOUNTER — APPOINTMENT (OUTPATIENT)
Dept: PULMONOLOGY | Facility: HOSPICE | Age: 77
End: 2017-05-16
Payer: MEDICARE

## 2017-05-16 LAB
ANION GAP SERPL CALC-SCNC: 7 MMOL/L (ref 0–11.9)
BASOPHILS # BLD AUTO: 0.3 % (ref 0–1.8)
BASOPHILS # BLD: 0.01 K/UL (ref 0–0.12)
BUN SERPL-MCNC: 26 MG/DL (ref 8–22)
CALCIUM SERPL-MCNC: 8.6 MG/DL (ref 8.5–10.5)
CHLORIDE SERPL-SCNC: 103 MMOL/L (ref 96–112)
CO2 SERPL-SCNC: 26 MMOL/L (ref 20–33)
CREAT SERPL-MCNC: 2.66 MG/DL (ref 0.5–1.4)
CRP SERPL HS-MCNC: 14.01 MG/DL (ref 0–0.75)
EOSINOPHIL # BLD AUTO: 0.04 K/UL (ref 0–0.51)
EOSINOPHIL NFR BLD: 1 % (ref 0–6.9)
ERYTHROCYTE [DISTWIDTH] IN BLOOD BY AUTOMATED COUNT: 60.3 FL (ref 35.9–50)
GFR SERPL CREATININE-BSD FRML MDRD: 23 ML/MIN/1.73 M 2
GLUCOSE SERPL-MCNC: 107 MG/DL (ref 65–99)
HCT VFR BLD AUTO: 31.7 % (ref 42–52)
HGB BLD-MCNC: 9.9 G/DL (ref 14–18)
IMM GRANULOCYTES # BLD AUTO: 0.02 K/UL (ref 0–0.11)
IMM GRANULOCYTES NFR BLD AUTO: 0.5 % (ref 0–0.9)
LYMPHOCYTES # BLD AUTO: 0.21 K/UL (ref 1–4.8)
LYMPHOCYTES NFR BLD: 5.5 % (ref 22–41)
MCH RBC QN AUTO: 30.4 PG (ref 27–33)
MCHC RBC AUTO-ENTMCNC: 31.2 G/DL (ref 33.7–35.3)
MCV RBC AUTO: 97.2 FL (ref 81.4–97.8)
MONOCYTES # BLD AUTO: 0.27 K/UL (ref 0–0.85)
MONOCYTES NFR BLD AUTO: 7.1 % (ref 0–13.4)
NEUTROPHILS # BLD AUTO: 3.27 K/UL (ref 1.82–7.42)
NEUTROPHILS NFR BLD: 85.6 % (ref 44–72)
NRBC # BLD AUTO: 0 K/UL
NRBC BLD AUTO-RTO: 0 /100 WBC
PLATELET # BLD AUTO: 154 K/UL (ref 164–446)
PMV BLD AUTO: 9.1 FL (ref 9–12.9)
POTASSIUM SERPL-SCNC: 3.8 MMOL/L (ref 3.6–5.5)
PROCALCITONIN SERPL-MCNC: 1.3 NG/ML
RBC # BLD AUTO: 3.26 M/UL (ref 4.7–6.1)
SODIUM SERPL-SCNC: 136 MMOL/L (ref 135–145)
WBC # BLD AUTO: 3.8 K/UL (ref 4.8–10.8)

## 2017-05-16 PROCEDURE — 85025 COMPLETE CBC W/AUTO DIFF WBC: CPT

## 2017-05-16 PROCEDURE — 700102 HCHG RX REV CODE 250 W/ 637 OVERRIDE(OP): Performed by: INTERNAL MEDICINE

## 2017-05-16 PROCEDURE — 36415 COLL VENOUS BLD VENIPUNCTURE: CPT

## 2017-05-16 PROCEDURE — A9270 NON-COVERED ITEM OR SERVICE: HCPCS | Performed by: INTERNAL MEDICINE

## 2017-05-16 PROCEDURE — 94760 N-INVAS EAR/PLS OXIMETRY 1: CPT

## 2017-05-16 PROCEDURE — 97161 PT EVAL LOW COMPLEX 20 MIN: CPT

## 2017-05-16 PROCEDURE — 700111 HCHG RX REV CODE 636 W/ 250 OVERRIDE (IP): Performed by: INTERNAL MEDICINE

## 2017-05-16 PROCEDURE — G8980 MOBILITY D/C STATUS: HCPCS | Mod: CI

## 2017-05-16 PROCEDURE — 770020 HCHG ROOM/CARE - TELE (206)

## 2017-05-16 PROCEDURE — G8979 MOBILITY GOAL STATUS: HCPCS | Mod: CI

## 2017-05-16 PROCEDURE — 700105 HCHG RX REV CODE 258: Performed by: INTERNAL MEDICINE

## 2017-05-16 PROCEDURE — 80048 BASIC METABOLIC PNL TOTAL CA: CPT

## 2017-05-16 PROCEDURE — 94640 AIRWAY INHALATION TREATMENT: CPT

## 2017-05-16 PROCEDURE — G8978 MOBILITY CURRENT STATUS: HCPCS | Mod: CI

## 2017-05-16 PROCEDURE — 99232 SBSQ HOSP IP/OBS MODERATE 35: CPT | Performed by: INTERNAL MEDICINE

## 2017-05-16 PROCEDURE — 700101 HCHG RX REV CODE 250: Performed by: INTERNAL MEDICINE

## 2017-05-16 PROCEDURE — 86140 C-REACTIVE PROTEIN: CPT

## 2017-05-16 RX ADMIN — GUAIFENESIN 600 MG: 600 TABLET, EXTENDED RELEASE ORAL at 21:43

## 2017-05-16 RX ADMIN — ASPIRIN 81 MG: 81 TABLET ORAL at 08:07

## 2017-05-16 RX ADMIN — BUDESONIDE AND FORMOTEROL FUMARATE DIHYDRATE 2 PUFF: 160; 4.5 AEROSOL RESPIRATORY (INHALATION) at 15:30

## 2017-05-16 RX ADMIN — TAMSULOSIN HYDROCHLORIDE 0.4 MG: 0.4 CAPSULE ORAL at 08:07

## 2017-05-16 RX ADMIN — IPRATROPIUM BROMIDE AND ALBUTEROL SULFATE 3 ML: .5; 3 SOLUTION RESPIRATORY (INHALATION) at 08:52

## 2017-05-16 RX ADMIN — SIMVASTATIN 40 MG: 40 TABLET, FILM COATED ORAL at 21:43

## 2017-05-16 RX ADMIN — CEFTRIAXONE SODIUM 2 G: 2 INJECTION, POWDER, FOR SOLUTION INTRAMUSCULAR; INTRAVENOUS at 08:07

## 2017-05-16 RX ADMIN — FAMOTIDINE 20 MG: 20 TABLET, FILM COATED ORAL at 21:43

## 2017-05-16 RX ADMIN — DOXYCYCLINE 100 MG: 100 TABLET ORAL at 21:43

## 2017-05-16 RX ADMIN — FAMOTIDINE 20 MG: 20 TABLET, FILM COATED ORAL at 08:08

## 2017-05-16 RX ADMIN — DOXYCYCLINE 100 MG: 100 TABLET ORAL at 08:08

## 2017-05-16 RX ADMIN — HEPARIN SODIUM 5000 UNITS: 5000 INJECTION, SOLUTION INTRAVENOUS; SUBCUTANEOUS at 06:07

## 2017-05-16 RX ADMIN — PREDNISONE 15 MG: 5 TABLET ORAL at 11:47

## 2017-05-16 RX ADMIN — RENAGEL 800 MG: 800 TABLET ORAL at 17:42

## 2017-05-16 RX ADMIN — BENZONATATE 100 MG: 100 CAPSULE ORAL at 22:00

## 2017-05-16 RX ADMIN — IPRATROPIUM BROMIDE AND ALBUTEROL SULFATE 3 ML: .5; 3 SOLUTION RESPIRATORY (INHALATION) at 19:15

## 2017-05-16 RX ADMIN — STANDARDIZED SENNA CONCENTRATE AND DOCUSATE SODIUM 2 TABLET: 8.6; 5 TABLET, FILM COATED ORAL at 08:08

## 2017-05-16 RX ADMIN — HEPARIN SODIUM 5000 UNITS: 5000 INJECTION, SOLUTION INTRAVENOUS; SUBCUTANEOUS at 21:43

## 2017-05-16 RX ADMIN — RENAGEL 800 MG: 800 TABLET ORAL at 11:47

## 2017-05-16 RX ADMIN — HEPARIN SODIUM 5000 UNITS: 5000 INJECTION, SOLUTION INTRAVENOUS; SUBCUTANEOUS at 14:16

## 2017-05-16 RX ADMIN — METOPROLOL SUCCINATE 25 MG: 25 TABLET, EXTENDED RELEASE ORAL at 08:07

## 2017-05-16 RX ADMIN — FINASTERIDE 5 MG: 5 TABLET, FILM COATED ORAL at 08:07

## 2017-05-16 RX ADMIN — ACETAMINOPHEN 650 MG: 325 TABLET, FILM COATED ORAL at 00:25

## 2017-05-16 RX ADMIN — ALBUTEROL SULFATE 2 PUFF: 90 AEROSOL, METERED RESPIRATORY (INHALATION) at 21:44

## 2017-05-16 RX ADMIN — GUAIFENESIN 600 MG: 600 TABLET, EXTENDED RELEASE ORAL at 08:07

## 2017-05-16 RX ADMIN — BUDESONIDE AND FORMOTEROL FUMARATE DIHYDRATE 2 PUFF: 160; 4.5 AEROSOL RESPIRATORY (INHALATION) at 19:16

## 2017-05-16 ASSESSMENT — ENCOUNTER SYMPTOMS
COUGH: 0
HEADACHES: 0
VOMITING: 0
WHEEZING: 0
SPUTUM PRODUCTION: 0
ABDOMINAL PAIN: 0
EYE PAIN: 0
WEIGHT LOSS: 0
SHORTNESS OF BREATH: 0
NAUSEA: 0
FEVER: 0
TREMORS: 0
CONSTIPATION: 0
DIZZINESS: 0
BLURRED VISION: 0
PALPITATIONS: 0
COUGH: 1
BACK PAIN: 0
WEAKNESS: 0
FALLS: 0
SPEECH CHANGE: 0
DEPRESSION: 0
HEARTBURN: 0
DIARRHEA: 0
CHILLS: 0
NECK PAIN: 0
SEIZURES: 0
PND: 0

## 2017-05-16 ASSESSMENT — GAIT ASSESSMENTS
DEVIATION: BRADYKINETIC
ASSISTIVE DEVICE: SINGLE POINT CANE
GAIT LEVEL OF ASSIST: SUPERVISED
DISTANCE (FEET): 50

## 2017-05-16 ASSESSMENT — PAIN SCALES - GENERAL
PAINLEVEL_OUTOF10: 0

## 2017-05-16 ASSESSMENT — LIFESTYLE VARIABLES: SUBSTANCE_ABUSE: 0

## 2017-05-16 NOTE — PROGRESS NOTES
Pt in no distress, sitting up in bed & reading handouts re: pneumonia. Bed in low and locked position. Call light and belongings within reach. Rounding hourly. No significant change in condition. No complaints of pain or discomfort. Will continue to monitor.

## 2017-05-16 NOTE — PROGRESS NOTES
Hospital Medicine Interval Note  Date of Service:  5/16/2017    Chief Complaint  76/M with ESRD on HD, wegeners granulomatosis on immunosuppresion, COPD, HLD, HTN, SSS, TIMOTHY, admitted for fever, and worsening SOB, with cough productive of sputum. Labs consistent with ESRD. Lactate 2.2. Trop 0.14. . WBC 11.3. Flu B positive. CXR showed mild interstitial edema. Started on tamiflu, and empiric antibiotics with CTX and doxycycline.  Nephrology contacted for HD.     Interval Problem Update  5/15/2017 - no overnight events. Remains hemodynamically stable and afebrile. No fevers since yesterday afternoon. On 1L O2 NC. No leukocytosis. Hgb 8.9. Na, K WNL. Crea 3.56. CRP elevated at 17.35. Blood cultures remain negative. CXR showed increased pulmonary edema (my read).     5/16 still SOB but otherwise stable, no fever.     > Seen and examined. Getting HD. (+) SOB, (+) productive cough with yellow sputum, but less frequent and violent. No nausea, vomiting, abd pain, CP.     Consultants/Specialty  Nephrology.     Disposition  Monitor on telemetry.        Review of Systems   Constitutional: Negative for fever, chills and weight loss.   HENT: Negative for congestion, ear discharge, ear pain, hearing loss and nosebleeds.    Eyes: Negative for blurred vision and pain.   Respiratory: Negative for cough, sputum production, shortness of breath and wheezing.    Cardiovascular: Negative for chest pain, palpitations, leg swelling and PND.   Gastrointestinal: Negative for heartburn, nausea, vomiting, abdominal pain, diarrhea and constipation.   Genitourinary: Negative for dysuria, frequency and hematuria.   Musculoskeletal: Negative for back pain, joint pain, falls and neck pain.   Skin: Negative for rash.   Neurological: Negative for dizziness, tremors, speech change, seizures, weakness and headaches.   Psychiatric/Behavioral: Negative for depression, suicidal ideas and substance abuse.        Pertinent positives/negatives as  mentioned above.     A complete review of systems was done. All other systems were negative.      Physical Exam Laboratory/Imaging   Filed Vitals:    05/16/17 0500 05/16/17 0744 05/16/17 0853 05/16/17 1200   BP: 106/58 113/63  103/64   Pulse: 84 91 90 83   Temp: 37.2 °C (99 °F) 37.3 °C (99.1 °F)  36.8 °C (98.2 °F)   Resp: 18 18 16 18   Height:       Weight:       SpO2: 97% 98% 98% 97%     Physical Exam   Constitutional: He appears well-developed and well-nourished. No distress.   HENT:   Head: Normocephalic and atraumatic.   Mouth/Throat: Oropharynx is clear and moist. No oropharyngeal exudate.   Eyes: Conjunctivae are normal. Pupils are equal, round, and reactive to light. Right eye exhibits no discharge. Left eye exhibits no discharge. No scleral icterus.   Neck: Neck supple.   Cardiovascular: Normal rate, regular rhythm and normal heart sounds.  Exam reveals no gallop.    No murmur heard.  Pulmonary/Chest: Effort normal. He has no wheezes. He exhibits no tenderness.   Diminished air entry B/L bases   Abdominal: Soft. Bowel sounds are normal. He exhibits no distension. There is no tenderness. There is no guarding.   Musculoskeletal: Normal range of motion. He exhibits no edema or tenderness.   Lymphadenopathy:     He has no cervical adenopathy.   Neurological: He is alert.   Skin: Skin is warm and dry. No rash noted. He is not diaphoretic. No erythema.   Psychiatric: He has a normal mood and affect. His behavior is normal. Thought content normal.   Vitals reviewed.   Lab Results   Component Value Date/Time    WBC 3.8* 05/16/2017 03:40 AM    HEMOGLOBIN 9.9* 05/16/2017 03:40 AM    HEMATOCRIT 31.7* 05/16/2017 03:40 AM    PLATELET COUNT 154* 05/16/2017 03:40 AM     Lab Results   Component Value Date/Time    SODIUM 136 05/16/2017 03:40 AM    POTASSIUM 3.8 05/16/2017 03:40 AM    CHLORIDE 103 05/16/2017 03:40 AM    CO2 26 05/16/2017 03:40 AM    GLUCOSE 107* 05/16/2017 03:40 AM    BUN 26* 05/16/2017 03:40 AM    CREATININE  2.66* 05/16/2017 03:40 AM      Assessment/Plan    Sepsis due to influenza B viral pneumonia, possible HCAP (CMS-HCC) (present on admission)  Assessment & Plan  - hemodynamically stable.    - finished tamiflu for influenza B. Continue antibiotics for post-influenza pneumonia as CRP high.      Pneumonia of both lungs due to influenza B , post-influenza pneumonia/HCAP (present on admission)  Assessment & Plan  - continue tamiflu. Continue rocephin and doxycycline given high CRP, continue to trend CRP. possible home tmr if stable  - continue RT protocol. Continue O2 to keep sats>88%. Continue mucinex, and PRN tessalon for cough.     Acute on chronic respiratory failure with hypoxia due to influenza B, not due to sepsis(CMS-HCC) (present on admission)  Assessment & Plan  - continue RT protocol, O2. Continue home dose inhalers (symbicort).     Hyperlipidemia (present on admission)  Assessment & Plan  - continue statin.     HTN (hypertension) (present on admission)  Assessment & Plan  - continue metoprolol.    TIMOTHY (obstructive sleep apnea) (present on admission)  Assessment & Plan  .stable    ESRD on dialysis (CMS-HCC) (present on admission)  Assessment & Plan  - nephrology on-board. HD while in-house.   - continue sevalamer.     BPH (benign prostatic hyperplasia) (present on admission)  Assessment & Plan  - continue flomax and finasteride.     Wegeners granulomatosis (CMS-HCC) (present on admission)  Assessment & Plan  - continue chronic prednisone. Continue HD per nephrology. Follows outpatient pulmonology as well.     COPD (chronic obstructive pulmonary disease) (CMS-HCC) (present on admission)  Assessment & Plan  - continue symbicort, prednisone, RT protocol. Continue O2.  Labs reviewed, Medications reviewed and Radiology images reviewed  Emmanuel catheter: No Emmanuel      DVT Prophylaxis: Heparin    Ulcer prophylaxis: Not indicated  Antibiotics: Treating active infection/contamination beyond 24 hours perioperative  coverage  Assessed for rehab: Patient returned to prior level of function, rehabilitation not indicated at this time    For complexity-based billing, please refer to the history, exam, and decison making above. In addition, I spent 35 minutes caring for the patient today. More than 50% of the time was spent counseling and coordinating care.    I have discussed with RN and LISA and SW and other consultants about patient's plan.

## 2017-05-16 NOTE — PROGRESS NOTES
Assumed care of patient.  Report obtained from previous shift nurse.  Patient with no apparent signs of distress noted.  Bed in low position. Call light and personal belongings within reach.  Patient remains in droplet isolation.

## 2017-05-16 NOTE — CARE PLAN
Problem: Safety  Goal: Will remain free from falls  Outcome: PROGRESSING AS EXPECTED  Call light within reach.  Patient calls for assistance.     Problem: Respiratory:  Goal: Respiratory status will improve  Outcome: PROGRESSING AS EXPECTED  Respiratory status monitored.

## 2017-05-16 NOTE — PROGRESS NOTES
Monitor Summary:    NSR  80-99    0.16 / 0.10 / 0.36    Rare PVC, Occ PAC &  14 beat run of VT Noted.

## 2017-05-16 NOTE — CARE PLAN
Problem: Safety  Goal: Will remain free from falls  Outcome: PROGRESSING AS EXPECTED  Intervention: Implement fall precautions  Safety precautions in place, skid-free socks on. Bed in low & locked position. Call light & personal belongings within reach.      Problem: Knowledge Deficit  Goal: Knowledge of disease process/condition, treatment plan, diagnostic tests, and medications will improve  Outcome: PROGRESSING AS EXPECTED  Intervention: Explain information regarding disease process/condition, treatment plan, diagnostic tests, and medications and document in education  POC discussed with pt. Given handouts re: pneumonia. Verbalized understanding.

## 2017-05-16 NOTE — THERAPY
"Physical Therapy Evaluation completed.   Bed Mobility:  Supine to Sit: Supervised  Transfers: Sit to Stand: Supervised  Gait: Level Of Assist: Supervised with Single Point Cane       Plan of Care: Patient with no further skilled PT needs in the acute care setting at this time  Discharge Recommendations: Equipment: No Equipment Needed. Post-acute therapy Currently anticipate no further skilled therapy needs once patient is discharged from the inpatient setting.    See \"Rehab Therapy-Acute\" Patient Summary Report for complete documentation.   Pt reprots he is at his baseline and feels he can go home and do all necessary mobility. pt has all needed equipmnet at home and has help from spouse as needed. pt plans to sleep on 1st floor of home for about a month until he feels stronger to do stairs. pt 93% w/o o2 at rest but then desat down to 86% w/o o2 with walking w/ SOB. pt will need o2 at home. pt emotional about loosing a lot of friends who also get dialysis with him and emotional support provided. no further need for skilled acute PT services.   "

## 2017-05-16 NOTE — PROGRESS NOTES
Nephrology Progress Note, Adult, Complex               Author: Fadi Najjar Date & Time created: 5/16/2017  12:52 PM     Interval History:  75 y/o male with ESRD/HD, [place]'s granulomatosis admitted with flu, PNA  Doing better  No fever/chills  Cough is better    Review of Systems:  Review of Systems   Constitutional: Negative for fever and chills.   Respiratory: Positive for cough.    Cardiovascular: Negative for chest pain and leg swelling.   Gastrointestinal: Negative for nausea, vomiting and abdominal pain.       Physical Exam:  Physical Exam   Constitutional: He appears well-developed and well-nourished. No distress.   HENT:   Head: Normocephalic and atraumatic.   Mouth/Throat: Oropharynx is clear and moist.   Cardiovascular: Normal rate and regular rhythm.  Exam reveals no gallop and no friction rub.    Pulmonary/Chest: Effort normal and breath sounds normal. No respiratory distress.   Abdominal: Soft. Bowel sounds are normal. He exhibits no distension. There is no tenderness.   Musculoskeletal: He exhibits no edema.   Neurological: He is alert. No cranial nerve deficit.   Nursing note and vitals reviewed.      Labs:        Invalid input(s): [id number]  Recent Labs      05/13/17   2150   TROPONINI  0.15*     Recent Labs      05/14/17   0321  05/15/17   0222  05/15/17   1150  05/16/17   0340   SODIUM  140  139   --   136   POTASSIUM  4.0  4.0  3.6  3.8   CHLORIDE  107  107   --   103   CO2  22  19*   --   26   BUN  41*  50*   --   26*   CREATININE  3.46*  3.56*   --   2.66*   CALCIUM  7.9*  8.0*   --   8.6     Recent Labs      05/14/17   0321  05/15/17   0222  05/16/17   0340   GLUCOSE  104*  153*  107*     Recent Labs      05/14/17   0321  05/15/17   0222  05/16/17   0340   RBC  3.25*  2.89*  3.26*   HEMOGLOBIN  10.1*  8.9*  9.9*   HEMATOCRIT  32.2*  28.4*  31.7*   PLATELETCT  175  156*  154*     Recent Labs      05/14/17   0321  05/15/17   0222  05/16/17   0340   WBC  8.8  5.1  3.8*   NEUTSPOLYS  92.00*   89.90*  85.60*   LYMPHOCYTES  2.60*  3.20*  5.50*   MONOCYTES  4.50  6.10  7.10   EOSINOPHILS  0.10  0.00  1.00   BASOPHILS  0.30  0.20  0.30           Hemodynamics:  Temp (24hrs), Av.9 °C (100.3 °F), Min:36.4 °C (97.5 °F), Max:40.4 °C (104.8 °F)  Temperature: 36.8 °C (98.2 °F)  Pulse  Av.5  Min: 80  Max: 115  Blood Pressure : 103/64 mmHg     Respiratory:    Respiration: 18, Pulse Oximetry: 97 %, O2 Daily Delivery Respiratory : Room Air with O2 Available     Given By:: Mouthpiece, Work Of Breathing / Effort: Mild  RUL Breath Sounds: Diminished, RML Breath Sounds: Diminished, RLL Breath Sounds: Diminished, DEBORA Breath Sounds: Diminished, LLL Breath Sounds: Diminished  Fluids:    Intake/Output Summary (Last 24 hours) at 17 1252  Last data filed at 17 0800   Gross per 24 hour   Intake    760 ml   Output   3700 ml   Net  -2940 ml     Weight: 65.3 kg (143 lb 15.4 oz)  GI/Nutrition:  Orders Placed This Encounter   Procedures   • Diet Order     Standing Status: Standing      Number of Occurrences: 1      Standing Expiration Date:      Order Specific Question:  Diet:     Answer:  Renal [8]     Order Specific Question:  Diet:     Answer:  Cardiac [6]     Medical Decision Making, by Problem:  Active Hospital Problems    Diagnosis   • Sepsis due to influenza B viral pneumonia, possible HCAP (CMS-HCC) [A41.9]   • Pneumonia of both lungs due to influenza B , possible HCAP [J11.00]   • Acute on chronic respiratory failure with hypoxia due to influenza B, not due to sepsis(CMS-HCC) [J96.21]   • COPD (chronic obstructive pulmonary disease) (CMS-HCC) [J44.9]   • BPH (benign prostatic hyperplasia) [N40.0]   • Wegeners granulomatosis (CMS-HCC) [M31.30]   • ESRD on dialysis (CMS-HCC) [N18.6, Z99.2]   • HTN (hypertension) [I10]   • TIMOTHY (obstructive sleep apnea) [G47.33]   • Hyperlipidemia [E78.5]       Labs reviewed and Medications reviewed                      Assessment and Plan    1.ESRD/HD MWF  2.HTN: BP well  controlled  3.Electrolytes: well controlled.  4.Anemia: Hb at goal  5.PNA  6.Volume:well controlled    Recs:   No acute need for HD  diet renal   Renal dose all meds  Avoid nephrotoxins

## 2017-05-16 NOTE — PROGRESS NOTES
Bedside report completed.  Assumed pt care. Patient sitting in chair, relaxed, awake, & in no apparent distress.  AAOx4. Not in pain. Alarms on, safety precautions in place. Call light & personal belongings within reach.  Educated to call for assistance when needed.

## 2017-05-16 NOTE — PROGRESS NOTES
Patient found sideways in bed.  Repositioned patient.  During repositioned, patient felt hot.  Took patient's temperature.  Temperature was 104.8F temporal.  Immediately administered Tylenol and removed covers.  Pt's blood pressure 131/66.  95% on room air.

## 2017-05-17 ENCOUNTER — PATIENT OUTREACH (OUTPATIENT)
Dept: HEALTH INFORMATION MANAGEMENT | Facility: OTHER | Age: 77
End: 2017-05-17

## 2017-05-17 VITALS
HEIGHT: 69 IN | HEART RATE: 94 BPM | TEMPERATURE: 98.8 F | DIASTOLIC BLOOD PRESSURE: 74 MMHG | OXYGEN SATURATION: 95 % | RESPIRATION RATE: 18 BRPM | BODY MASS INDEX: 20.21 KG/M2 | WEIGHT: 136.47 LBS | SYSTOLIC BLOOD PRESSURE: 126 MMHG

## 2017-05-17 LAB
ANION GAP SERPL CALC-SCNC: 9 MMOL/L (ref 0–11.9)
BASOPHILS # BLD AUTO: 0 % (ref 0–1.8)
BASOPHILS # BLD: 0 K/UL (ref 0–0.12)
BUN SERPL-MCNC: 40 MG/DL (ref 8–22)
CALCIUM SERPL-MCNC: 8.4 MG/DL (ref 8.5–10.5)
CHLORIDE SERPL-SCNC: 103 MMOL/L (ref 96–112)
CO2 SERPL-SCNC: 24 MMOL/L (ref 20–33)
CREAT SERPL-MCNC: 3.48 MG/DL (ref 0.5–1.4)
EOSINOPHIL # BLD AUTO: 0 K/UL (ref 0–0.51)
EOSINOPHIL NFR BLD: 0 % (ref 0–6.9)
ERYTHROCYTE [DISTWIDTH] IN BLOOD BY AUTOMATED COUNT: 60.1 FL (ref 35.9–50)
GFR SERPL CREATININE-BSD FRML MDRD: 17 ML/MIN/1.73 M 2
GLUCOSE SERPL-MCNC: 122 MG/DL (ref 65–99)
HCT VFR BLD AUTO: 29.5 % (ref 42–52)
HGB BLD-MCNC: 9.3 G/DL (ref 14–18)
IMM GRANULOCYTES # BLD AUTO: 0 K/UL (ref 0–0.11)
IMM GRANULOCYTES NFR BLD AUTO: 0 % (ref 0–0.9)
LYMPHOCYTES # BLD AUTO: 0.23 K/UL (ref 1–4.8)
LYMPHOCYTES NFR BLD: 9.5 % (ref 22–41)
MCH RBC QN AUTO: 30.4 PG (ref 27–33)
MCHC RBC AUTO-ENTMCNC: 31.5 G/DL (ref 33.7–35.3)
MCV RBC AUTO: 96.4 FL (ref 81.4–97.8)
MONOCYTES # BLD AUTO: 0.27 K/UL (ref 0–0.85)
MONOCYTES NFR BLD AUTO: 11.1 % (ref 0–13.4)
NEUTROPHILS # BLD AUTO: 1.93 K/UL (ref 1.82–7.42)
NEUTROPHILS NFR BLD: 79.4 % (ref 44–72)
NRBC # BLD AUTO: 0 K/UL
NRBC BLD AUTO-RTO: 0 /100 WBC
PLATELET # BLD AUTO: 157 K/UL (ref 164–446)
PMV BLD AUTO: 8.8 FL (ref 9–12.9)
POTASSIUM SERPL-SCNC: 4.2 MMOL/L (ref 3.6–5.5)
RBC # BLD AUTO: 3.06 M/UL (ref 4.7–6.1)
SODIUM SERPL-SCNC: 136 MMOL/L (ref 135–145)
WBC # BLD AUTO: 2.4 K/UL (ref 4.8–10.8)

## 2017-05-17 PROCEDURE — A9270 NON-COVERED ITEM OR SERVICE: HCPCS | Performed by: INTERNAL MEDICINE

## 2017-05-17 PROCEDURE — 80048 BASIC METABOLIC PNL TOTAL CA: CPT

## 2017-05-17 PROCEDURE — 700111 HCHG RX REV CODE 636 W/ 250 OVERRIDE (IP): Performed by: INTERNAL MEDICINE

## 2017-05-17 PROCEDURE — 36415 COLL VENOUS BLD VENIPUNCTURE: CPT

## 2017-05-17 PROCEDURE — 700102 HCHG RX REV CODE 250 W/ 637 OVERRIDE(OP): Performed by: INTERNAL MEDICINE

## 2017-05-17 PROCEDURE — 99239 HOSP IP/OBS DSCHRG MGMT >30: CPT | Performed by: INTERNAL MEDICINE

## 2017-05-17 PROCEDURE — 700105 HCHG RX REV CODE 258: Performed by: INTERNAL MEDICINE

## 2017-05-17 PROCEDURE — 90935 HEMODIALYSIS ONE EVALUATION: CPT

## 2017-05-17 PROCEDURE — 85025 COMPLETE CBC W/AUTO DIFF WBC: CPT

## 2017-05-17 PROCEDURE — 97535 SELF CARE MNGMENT TRAINING: CPT

## 2017-05-17 RX ORDER — DOXYCYCLINE 100 MG/1
100 TABLET ORAL EVERY 12 HOURS
Qty: 10 TAB | Refills: 0 | Status: SHIPPED | OUTPATIENT
Start: 2017-05-17 | End: 2017-05-23

## 2017-05-17 RX ADMIN — PREDNISONE 15 MG: 5 TABLET ORAL at 11:49

## 2017-05-17 RX ADMIN — METOPROLOL SUCCINATE 25 MG: 25 TABLET, EXTENDED RELEASE ORAL at 11:43

## 2017-05-17 RX ADMIN — CEFTRIAXONE SODIUM 2 G: 2 INJECTION, POWDER, FOR SOLUTION INTRAMUSCULAR; INTRAVENOUS at 11:48

## 2017-05-17 RX ADMIN — DOXYCYCLINE 100 MG: 100 TABLET ORAL at 11:42

## 2017-05-17 RX ADMIN — RENAGEL 800 MG: 800 TABLET ORAL at 11:44

## 2017-05-17 RX ADMIN — FAMOTIDINE 20 MG: 20 TABLET, FILM COATED ORAL at 11:42

## 2017-05-17 RX ADMIN — GUAIFENESIN 600 MG: 600 TABLET, EXTENDED RELEASE ORAL at 11:43

## 2017-05-17 RX ADMIN — FINASTERIDE 5 MG: 5 TABLET, FILM COATED ORAL at 11:42

## 2017-05-17 RX ADMIN — BENZOCAINE AND MENTHOL 1 LOZENGE: 15; 3.6 LOZENGE ORAL at 08:02

## 2017-05-17 RX ADMIN — RENAGEL 800 MG: 800 TABLET ORAL at 08:02

## 2017-05-17 RX ADMIN — BENZONATATE 100 MG: 100 CAPSULE ORAL at 03:49

## 2017-05-17 RX ADMIN — TAMSULOSIN HYDROCHLORIDE 0.4 MG: 0.4 CAPSULE ORAL at 11:42

## 2017-05-17 RX ADMIN — ERYTHROPOIETIN 3400 UNITS: 10000 INJECTION, SOLUTION INTRAVENOUS; SUBCUTANEOUS at 07:52

## 2017-05-17 RX ADMIN — HEPARIN SODIUM 5000 UNITS: 5000 INJECTION, SOLUTION INTRAVENOUS; SUBCUTANEOUS at 06:23

## 2017-05-17 RX ADMIN — HEPARIN SODIUM 1500 UNITS: 1000 INJECTION, SOLUTION INTRAVENOUS; SUBCUTANEOUS at 07:40

## 2017-05-17 RX ADMIN — ASPIRIN 81 MG: 81 TABLET ORAL at 11:43

## 2017-05-17 ASSESSMENT — PAIN SCALES - GENERAL
PAINLEVEL_OUTOF10: 0
PAINLEVEL_OUTOF10: 0

## 2017-05-17 ASSESSMENT — ENCOUNTER SYMPTOMS
FEVER: 0
NAUSEA: 0
VOMITING: 0
CHILLS: 0
COUGH: 1
ABDOMINAL PAIN: 0

## 2017-05-17 ASSESSMENT — ACTIVITIES OF DAILY LIVING (ADL): TOILETING: INDEPENDENT

## 2017-05-17 NOTE — DISCHARGE SUMMARY
[facility] Medicine Discharge Note     Admit Date:  5/13/2017       Discharge Date:   5/17/2017    Attending Physician:  Nyasia Forrester     Diagnoses (includes active and resolved):       Sepsis due to influenza B viral pneumonia, possible HCAP (CMS-HCC) POA: Yes    Pneumonia of both lungs due to influenza B , post-influenza pneumonia/HCAP POA: Yes    Acute on chronic respiratory failure with hypoxia due to influenza B, not due to sepsis(CMS-HCC) POA: Yes    HTN (hypertension) POA: Yes    TIMOTHY (obstructive sleep apnea) POA: Yes    ESRD on dialysis (CMS-HCC) POA: Yes    BPH (benign prostatic hyperplasia) POA: Yes    [place]ers granulomatosis (CMS-HCC) POA: Yes        COPD (chronic obstructive pulmonary disease) (CMS-HCC) POA: Yes    Hyperlipidemia POA: Yes      Chief Complain  Cough, fever.     Hosiery of Present Illness  This is a 76-year-old male with a past medical    history significant for end-stage renal disease, on hemodialysis Monday,    Wednesday, Friday; [place]'s granulomatosis who presents today for evaluation    of fever of 105  .   Detailed info pls see H&P by Dr. Barry    [facility] Summary (Brief Narrative):       Patient was admitted for pneumonia. The patient was treated with IV Rocephin and Tamiflu and also doxycycline.   Patient was also treated with hemodialysis. Patient also was treated with home steroid chronically. The patient was   monitored closely and improved clinically. The patient was discharged with oral antibiotics (see below) and is to   follow up closely with their PCP.        Consultants:      none    Procedures:        none    Discharge Medications:        (x)  Medication Reconciliation Completed       Medication List      START taking these medications       Instructions    doxycycline monohydrate 100 MG tablet   Last time this was given:  100 mg on 5/17/2017 11:42 AM   Commonly known as:  ADOXA    Take 1 Tab by mouth every 12 hours for 5 days.   Dose:  100 mg         CHANGE how you take these  medications       Instructions    predniSONE 1 MG Tabs   What changed:  Another medication with the same name was removed. Continue taking this medication, and follow the directions you see here.   Last time this was given:  15 mg on 5/17/2017 11:49 AM   Commonly known as:  DELTASONE    Take 14 mg by mouth every day. Pt takes x1 10mg tabs and x4 1mg tabs for total dose = 14mg QD   Dose:  14 mg         CONTINUE taking these medications       Instructions    albuterol 108 (90 BASE) MCG/ACT Aers inhalation aerosol   Last time this was given:  2 Puffs on 5/16/2017  9:44 PM    Inhale 2 Puffs by mouth every four hours as needed for Shortness of Breath.   Dose:  2 Puff       aspirin EC 81 MG Tbec   Last time this was given:  81 mg on 5/17/2017 11:43 AM   Commonly known as:  ECOTRIN    Take 81 mg by mouth every day.   Dose:  81 mg       DIALYVITE TABLET Tabs    Dialyvite -nehro vit B complex-C-folic acid 1 tablet po QD Dialysis Pt       epoetin lucina 3000 UNIT/ML Soln   Commonly known as:  EPOGEN,PROCRIT    Inject 2,200 Units as instructed every Monday, Wednesday, and Friday. With dialysis   Dose:  2200 Units       finasteride 5 MG Tabs   Last time this was given:  5 mg on 5/17/2017 11:42 AM   Commonly known as:  PROSCAR    Take 5 mg by mouth every day.   Dose:  5 mg       fluticasone-salmeterol 500-50 MCG/DOSE Aepb   Commonly known as:  ADVAIR DISKUS    Inhale 1 Puff by mouth 2 times a day. Rinse mouth after each use.   Dose:  1 Puff       ipratropium-albuterol 0.5-2.5 (3) MG/3ML nebulizer solution   Last time this was given:  3 mL on 5/16/2017  7:15 PM   Commonly known as:  DUONEB    3 mL by Nebulization route 4 times a day.   Dose:  3 mL       metoprolol 25 MG Tabs   Commonly known as:  LOPRESSOR    Take 25 mg by mouth 2 times a day.   Dose:  25 mg       ranitidine 150 MG Tabs   Commonly known as:  ZANTAC    TAKE 1 TABLET BY MOUTH EVERY NIGHT AT BEDTIME       sevelamer 800 MG Tabs   Last time this was given:  800 mg on  5/17/2017 11:44 AM   Commonly known as:  RENAGEL    Take 800 mg by mouth 3 times a day, with meals.   Dose:  800 mg       simvastatin 40 MG Tabs   Last time this was given:  40 mg on 5/16/2017  9:43 PM   Commonly known as:  ZOCOR    Take 40 mg by mouth every evening.   Dose:  40 mg       tamsulosin 0.4 MG capsule   Last time this was given:  0.4 mg on 5/17/2017 11:42 AM   Commonly known as:  FLOMAX    Take 0.4 mg by mouth every day.   Dose:  0.4 mg         STOP taking these medications          ciprofloxacin 250 MG Tabs   Commonly known as:  CIPRO             Disposition:   Discharge home    Diet:   Healthy    Activity:   As tolerated    Code status:   DNR    Primary Care Provider:    Christine Zamudio M.D.    Follow up appointment details :      PCP in 2 weeks  Christine Zamudio M.D.  [address]  [facility] [address]  [phone]    Schedule an appointment as soon as possible for a visit in 2 weeks  Follow Up    Future Appointments  Date Time Provider Department Center   5/25/2017 1:00 PM Cintia Ariza M.D. RHEU None   6/27/2017 11:45 AM Christine Manzo M.D. [facility] [address]   6/28/2017 1:40 PM Eric Vaughan M.D. PULM None       Pending Studies:        None    Time spent on discharge day patient visit: >35 minutes    #################################################    Interval history/exam for day of discharge:    Filed Vitals:    05/16/17 2300 05/17/17 0400 05/17/17 0705 05/17/17 1054   BP: 120/61 118/61 130/63 127/59   Pulse: 100 97 83 80   Temp: 37 °C (98.6 °F) 36.6 °C (97.8 °F) 37.1 °C (98.8 °F) 36.6 °C (97.8 °F)   Resp: 17 18 17 18   Height:       Weight:       SpO2: 93% 96% 92%      Weight/BMI: Body mass index is 20.14 kg/(m^2).  Pulse Oximetry: 92 %, O2 (LPM): 0, O2 Delivery: None (Room Air)    Gen: AAOx3, NAD  Eyes: PELLA  Neck: no JVD, no lymphadenopathy  Cardia: RRR, no mrg  Lungs: CTAB, no rales, rhonci or wheezing  Abd: NABS, soft, non extended, no mass  EXT: No C/C/E, peripheral pulse  2+ b/l  Neuro: CN II-XII intact, non focal, reflex 2+ symmetrical  Skin: Intact, no lesion, warm  Psych: Appropriate.    Most Recent Labs:    Lab Results   Component Value Date/Time    WBC 2.4* 05/17/2017 03:09 AM    RBC 3.06* 05/17/2017 03:09 AM    HEMOGLOBIN 9.3* 05/17/2017 03:09 AM    HEMATOCRIT 29.5* 05/17/2017 03:09 AM    MCV 96.4 05/17/2017 03:09 AM    MCH 30.4 05/17/2017 03:09 AM    MCHC 31.5* 05/17/2017 03:09 AM    MPV 8.8* 05/17/2017 03:09 AM    NEUTROPHILS-POLYS 79.40* 05/17/2017 03:09 AM    LYMPHOCYTES 9.50* 05/17/2017 03:09 AM    MONOCYTES 11.10 05/17/2017 03:09 AM    EOSINOPHILS 0.00 05/17/2017 03:09 AM    BASOPHILS 0.00 05/17/2017 03:09 AM    HYPOCHROMIA 1+ 10/28/2011 05:35 AM    ANISOCYTOSIS 1+ 10/04/2016 09:36 AM      Lab Results   Component Value Date/Time    SODIUM 136 05/17/2017 03:09 AM    POTASSIUM 4.2 05/17/2017 03:09 AM    CHLORIDE 103 05/17/2017 03:09 AM    CO2 24 05/17/2017 03:09 AM    GLUCOSE 122* 05/17/2017 03:09 AM    BUN 40* 05/17/2017 03:09 AM    CREATININE 3.48* 05/17/2017 03:09 AM      Lab Results   Component Value Date/Time    ALT(SGPT) 16 05/13/2017 09:12 AM    AST(SGOT) 13 05/13/2017 09:12 AM    ALKALINE PHOSPHATASE 60 05/13/2017 09:12 AM    TOTAL BILIRUBIN 0.8 05/13/2017 09:12 AM    DIRECT BILIRUBIN 0.1 01/08/2015 09:16 AM    LIPASE 35 11/01/2012 08:36 AM    ALBUMIN 4.0 05/13/2017 09:12 AM    GLOBULIN 2.9 05/13/2017 09:12 AM    PRE-ALBUMIN 19.7 01/15/2012 05:31 AM    INR 0.93 04/12/2017 07:04 PM    MACROCYTOSIS 1+ 10/04/2016 09:36 AM     Lab Results   Component Value Date/Time    PT 12.8 04/12/2017 07:04 PM    INR 0.93 04/12/2017 07:04 PM        Imaging/ Testing:      DX-CHEST-PORTABLE (1 VIEW)   Final Result      1.  Increased pulmonary edema   2.  Possible small BILATERAL pleural effusions   3.  Persistently enlarged cardiac silhouette      DX-CHEST-PORTABLE (1 VIEW)   Final Result      1.  Cardiomegaly with mild interstitial edema.      2.  Bibasilar atelectasis and small  bilateral pleural effusions.          Instructions:      The patient was instructed to return to the ER in the event of worsening symptoms. I have counseled the patient on the importance of compliance and the patient has agreed to proceed with all medical recommendations and follow up plan indicated above.   The patient understands that all medications come with benefits and risks. Risks may include permanent injury or death and these risks can be minimized with close reassessment and monitoring.

## 2017-05-17 NOTE — PROGRESS NOTES
Nephrology Progress Note, Adult, Complex               Author: Fadi Najjar Date & Time created: 5/17/2017  11:59 AM     Interval History:  75 y/o male with ESRD/HD, [place]'s granulomatosis admitted with flu, PNA  Doing better  No fever/chills  Cough is better  Seen and examined while getting HD.    Review of Systems:  Review of Systems   Constitutional: Negative for fever and chills.   Respiratory: Positive for cough.    Cardiovascular: Negative for chest pain and leg swelling.   Gastrointestinal: Negative for nausea, vomiting and abdominal pain.       Physical Exam:  Physical Exam   Constitutional: He appears well-developed and well-nourished. No distress.   HENT:   Head: Normocephalic and atraumatic.   Mouth/Throat: Oropharynx is clear and moist.   Cardiovascular: Normal rate and regular rhythm.  Exam reveals no gallop and no friction rub.    Pulmonary/Chest: Effort normal and breath sounds normal. No respiratory distress.   Abdominal: Soft. Bowel sounds are normal. He exhibits no distension. There is no tenderness.   Musculoskeletal: He exhibits no edema.   Neurological: He is alert. No cranial nerve deficit.   Nursing note and vitals reviewed.      Labs:        Invalid input(s): [id number]      Recent Labs      05/15/17   0222  05/15/17   1150  05/16/17   0340  05/17/17   0309   SODIUM  139   --   136  136   POTASSIUM  4.0  3.6  3.8  4.2   CHLORIDE  107   --   103  103   CO2  19*   --   26  24   BUN  50*   --   26*  40*   CREATININE  3.56*   --   2.66*  3.48*   CALCIUM  8.0*   --   8.6  8.4*     Recent Labs      05/15/17   0222  05/16/17   0340  05/17/17   0309   GLUCOSE  153*  107*  122*     Recent Labs      05/15/17   0222  05/16/17   0340  05/17/17   0309   RBC  2.89*  3.26*  3.06*   HEMOGLOBIN  8.9*  9.9*  9.3*   HEMATOCRIT  28.4*  31.7*  29.5*   PLATELETCT  156*  154*  157*     Recent Labs      05/15/17   0222  05/16/17   0340  05/17/17   0309   WBC  5.1  3.8*  2.4*   NEUTSPOLYS  89.90*  85.60*  79.40*    LYMPHOCYTES  3.20*  5.50*  9.50*   MONOCYTES  6.10  7.10  11.10   EOSINOPHILS  0.00  1.00  0.00   BASOPHILS  0.20  0.30  0.00           Hemodynamics:  Temp (24hrs), Av.9 °C (98.4 °F), Min:36.6 °C (97.8 °F), Max:37.2 °C (98.9 °F)  Temperature: 36.6 °C (97.8 °F)  Pulse  Av.4  Min: 80  Max: 115  Blood Pressure : 127/59 mmHg     Respiratory:    Respiration: 18, Pulse Oximetry: 92 %, O2 Daily Delivery Respiratory : Silicone Nasal Cannula     Given By:: Mouthpiece, #MDI/DPI Given: MDI/DPI x 1, Work Of Breathing / Effort: Mild  RUL Breath Sounds: Diminished, RML Breath Sounds: Diminished, RLL Breath Sounds: Diminished, DEBORA Breath Sounds: Diminished, LLL Breath Sounds: Diminished  Fluids:    Intake/Output Summary (Last 24 hours) at 17 1159  Last data filed at 17 1053   Gross per 24 hour   Intake    500 ml   Output   3500 ml   Net  -3000 ml     Weight: 61.9 kg (136 lb 7.4 oz)  GI/Nutrition:  Orders Placed This Encounter   Procedures   • Diet Order     Standing Status: Standing      Number of Occurrences: 1      Standing Expiration Date:      Order Specific Question:  Diet:     Answer:  Renal [8]     Order Specific Question:  Diet:     Answer:  Cardiac [6]     Medical Decision Making, by Problem:  Active Hospital Problems    Diagnosis   • Sepsis due to influenza B viral pneumonia, possible HCAP (CMS-HCC) [A41.9]   • Pneumonia of both lungs due to influenza B , possible HCAP [J11.00]   • Acute on chronic respiratory failure with hypoxia due to influenza B, not due to sepsis(CMS-HCC) [J96.21]   • COPD (chronic obstructive pulmonary disease) (CMS-HCC) [J44.9]   • BPH (benign prostatic hyperplasia) [N40.0]   • Wegeners granulomatosis (CMS-HCC) [M31.30]   • ESRD on dialysis (CMS-HCC) [N18.6, Z99.2]   • HTN (hypertension) [I10]   • TIMOTHY (obstructive sleep apnea) [G47.33]   • Hyperlipidemia [E78.5]       Labs reviewed and Medications reviewed                      Assessment and Plan    1.ESRD/HD MWF  2.HTN: BP  well controlled  3.Electrolytes: well controlled.  4.Anemia: Hb at goal  5.PNA  6.Volume:well controlled    Recs:   HD today  renal diet  Renal dose all meds  Avoid nephrotoxins

## 2017-05-17 NOTE — PROGRESS NOTES
HEMODIALYSIS NOTES:     HD today x 3 hours per Dr. Najjar. Initiated at 0738 and ended at 1038. Patient tolerated treatment. Afebrile, no SOB , slight cough noted. Please see paper flowsheet for details.    UF Net: 2,000 mL    Blood returned. Applied gauze and held R AVF site for 15 minutes. Verified no bleeding. Bruit and thrill present post dialysis. Instructions given to Primary RN that if bleeding occurs on the AVF site, change dressing and held the site with pressure. Report given to DORITA Marino RN.

## 2017-05-17 NOTE — THERAPY
Occupational Therapy Note: Pt is a 77 y/o male admitted with pneumonia. Plan for discharge this afternoon. Spouse to assist at discharge. Pt has all needed DME. Pt educated in / reviewed home safety, activity pacing, and DME for bathing. Pt verbalized understanding of all education and declined need for OT eval at this time. Pt denies concerns. OT eval not indicated/completed at this time as pt denies need and is to D/C home. Please re-order OT eval if pt condition changes or plan of care changes delaying discharge.

## 2017-05-17 NOTE — DISCHARGE INSTRUCTIONS
Discharge Instructions    Discharged to home by car with relative. Discharged via wheelchair, hospital escort: Yes.  Special equipment needed: Cane    Be sure to schedule a follow-up appointment with your primary care doctor or any specialists as instructed.     Discharge Plan:   Influenza Vaccine Indication: Not indicated: Previously immunized this influenza season and > 8 years of age    I understand that a diet low in cholesterol, fat, and sodium is recommended for good health. Unless I have been given specific instructions below for another diet, I accept this instruction as my diet prescription.   Other diet:     Special Instructions: None    · Is patient discharged on Warfarin / Coumadin?   No     · Is patient Post Blood Transfusion?  No    Depression / Suicide Risk    As you are discharged from this [facility], it is important to learn how to keep safe from harming yourself.    Recognize the warning signs:  · Abrupt changes in personality, positive or negative- including increase in energy   · Giving away possessions  · Change in eating patterns- significant weight changes-  positive or negative  · Change in sleeping patterns- unable to sleep or sleeping all the time   · Unwillingness or inability to communicate  · Depression  · Unusual sadness, discouragement and loneliness  · Talk of wanting to die  · Neglect of personal appearance   · Rebelliousness- reckless behavior  · Withdrawal from people/activities they love  · Confusion- inability to concentrate     If you or a loved one observes any of these behaviors or has concerns about self-harm, here's what you can do:  · Talk about it- your feelings and reasons for harming yourself  · Remove any means that you might use to hurt yourself (examples: pills, rope, extension cords, firearm)  · Get professional help from the community (Mental Health, Substance Abuse, psychological counseling)  · Do not be alone:Call your Safe Contact- someone whom you  trust who will be there for you.  · Call your local CRISIS HOTLINE 671-9961 or 795-115-4904  · Call your local Children's Mobile Crisis Response Team [place] (463) 385-4621 or www.[employer]  · Call the toll free National Suicide Prevention Hotlines   · National Suicide Prevention Lifeline 596-439-ZMWF (8709)  · National [employer] Line Network 800-SUICIDE (287-4402)

## 2017-05-17 NOTE — CARE PLAN
Problem: Communication  Goal: The ability to communicate needs accurately and effectively will improve  Outcome: PROGRESSING AS EXPECTED  Pat able to make needs known.  Call light within reach.  Calls appropriately.     Problem: Safety  Goal: Will remain free from falls  Outcome: PROGRESSING AS EXPECTED  Bed alarm in use. Patient assisted with ambulation.

## 2017-05-17 NOTE — PROGRESS NOTES
No complaints after dialysis. Up on the chair having conversations w/ visitor. Pt states ride home available this pm.

## 2017-05-17 NOTE — PROGRESS NOTES
SLOAN from Lab called with critical result of WBC 2.4 at 0432. Critical lab result read back to SLOAN.    ZAK Justin notified of critical lab result at 0435.  Critical lab result read back by  ZAK Justin.

## 2017-05-17 NOTE — PROGRESS NOTES
1500 No new complaints. States that wife will be in before 4pm. Voiced concerns about wife not feeling good d/t flu. Checked w/ pt if they have assistance at home if needed and pt said yes. Otherwise pt states readiness for discharge home.

## 2017-05-18 ENCOUNTER — PATIENT OUTREACH (OUTPATIENT)
Dept: HEALTH INFORMATION MANAGEMENT | Facility: OTHER | Age: 77
End: 2017-05-18

## 2017-05-18 NOTE — PROGRESS NOTES
"· Placed discharge outreach phone call to patient s/p hospital discharge 5/17/17.  Left voicemail with my contact information and instructions to return my phone call.    · 5/22/17 at 9:41 AM--Received phone call from patient s/p hospital discharge 5/17/17.  Spoke with pt and wife Crys.  Pt states that he is having a \"hard time recuperating\" from hospital admission.  Pt is interested in home health services.  He states that he is unable to get an appt with his PCP, Dr. Zamudio.  Scheduled patient for discharge clinic appointment on 5/23/17 at 11:20 AM.  Pt attends dialysis M-W-F.    "

## 2017-05-18 NOTE — PROGRESS NOTES
DC to home with wife in stable condition. VSS, afebrile. RA sats >95% on discharge. Pt states that he usually feels fatigue after dialysis and it's the norm for him. DC instructions given to wife and pt. Verbalized understanding.

## 2017-05-23 ENCOUNTER — HOSPITAL ENCOUNTER (OUTPATIENT)
Dept: LAB | Facility: MEDICAL CENTER | Age: 77
End: 2017-05-23
Attending: NURSE PRACTITIONER
Payer: MEDICARE

## 2017-05-23 ENCOUNTER — HOSPITAL ENCOUNTER (OUTPATIENT)
Dept: LAB | Facility: MEDICAL CENTER | Age: 77
End: 2017-05-23
Attending: INTERNAL MEDICINE
Payer: MEDICARE

## 2017-05-23 ENCOUNTER — OFFICE VISIT (OUTPATIENT)
Dept: MEDICAL GROUP | Facility: CLINIC | Age: 77
End: 2017-05-23
Payer: MEDICARE

## 2017-05-23 ENCOUNTER — HOME HEALTH ADMISSION (OUTPATIENT)
Dept: HOME HEALTH SERVICES | Facility: HOME HEALTHCARE | Age: 77
End: 2017-05-23
Payer: MEDICARE

## 2017-05-23 VITALS
RESPIRATION RATE: 16 BRPM | HEART RATE: 108 BPM | DIASTOLIC BLOOD PRESSURE: 56 MMHG | OXYGEN SATURATION: 88 % | BODY MASS INDEX: 21.66 KG/M2 | SYSTOLIC BLOOD PRESSURE: 100 MMHG | WEIGHT: 138 LBS | HEIGHT: 67 IN | TEMPERATURE: 98.1 F

## 2017-05-23 DIAGNOSIS — M31.30 WEGENERS GRANULOMATOSIS: ICD-10-CM

## 2017-05-23 DIAGNOSIS — J10.00 PNEUMONIA OF BOTH LUNGS DUE TO INFLUENZA A VIRUS, UNSPECIFIED PART OF LUNG: ICD-10-CM

## 2017-05-23 DIAGNOSIS — Z09 HOSPITAL DISCHARGE FOLLOW-UP: ICD-10-CM

## 2017-05-23 DIAGNOSIS — J96.21 ACUTE ON CHRONIC RESPIRATORY FAILURE WITH HYPOXIA (HCC): ICD-10-CM

## 2017-05-23 DIAGNOSIS — Z71.89 ADVANCE CARE PLANNING: ICD-10-CM

## 2017-05-23 DIAGNOSIS — R62.7 ADULT FAILURE TO THRIVE: ICD-10-CM

## 2017-05-23 PROBLEM — A41.9 SEPSIS, UNSPECIFIED: Status: RESOLVED | Noted: 2017-05-14 | Resolved: 2017-05-23

## 2017-05-23 LAB
ANION GAP SERPL CALC-SCNC: 15 MMOL/L (ref 0–11.9)
BASOPHILS # BLD AUTO: 0.4 % (ref 0–1.8)
BASOPHILS # BLD: 0.07 K/UL (ref 0–0.12)
BUN SERPL-MCNC: 38 MG/DL (ref 8–22)
CALCIUM SERPL-MCNC: 9.4 MG/DL (ref 8.5–10.5)
CHLORIDE SERPL-SCNC: 91 MMOL/L (ref 96–112)
CO2 SERPL-SCNC: 31 MMOL/L (ref 20–33)
CREAT SERPL-MCNC: 3.71 MG/DL (ref 0.5–1.4)
EOSINOPHIL # BLD AUTO: 0.06 K/UL (ref 0–0.51)
EOSINOPHIL NFR BLD: 0.4 % (ref 0–6.9)
ERYTHROCYTE [DISTWIDTH] IN BLOOD BY AUTOMATED COUNT: 59.9 FL (ref 35.9–50)
GFR SERPL CREATININE-BSD FRML MDRD: 16 ML/MIN/1.73 M 2
GLUCOSE SERPL-MCNC: 154 MG/DL (ref 65–99)
HCT VFR BLD AUTO: 36.7 % (ref 42–52)
HGB BLD-MCNC: 11.5 G/DL (ref 14–18)
IMM GRANULOCYTES # BLD AUTO: 0.19 K/UL (ref 0–0.11)
IMM GRANULOCYTES NFR BLD AUTO: 1.1 % (ref 0–0.9)
LYMPHOCYTES # BLD AUTO: 0.46 K/UL (ref 1–4.8)
LYMPHOCYTES NFR BLD: 2.7 % (ref 22–41)
MCH RBC QN AUTO: 30.3 PG (ref 27–33)
MCHC RBC AUTO-ENTMCNC: 31.3 G/DL (ref 33.7–35.3)
MCV RBC AUTO: 96.8 FL (ref 81.4–97.8)
MONOCYTES # BLD AUTO: 0.82 K/UL (ref 0–0.85)
MONOCYTES NFR BLD AUTO: 4.8 % (ref 0–13.4)
NEUTROPHILS # BLD AUTO: 15.37 K/UL (ref 1.82–7.42)
NEUTROPHILS NFR BLD: 90.6 % (ref 44–72)
NRBC # BLD AUTO: 0 K/UL
NRBC BLD AUTO-RTO: 0 /100 WBC
PLATELET # BLD AUTO: 347 K/UL (ref 164–446)
PMV BLD AUTO: 9.4 FL (ref 9–12.9)
POTASSIUM SERPL-SCNC: 3.7 MMOL/L (ref 3.6–5.5)
RBC # BLD AUTO: 3.79 M/UL (ref 4.7–6.1)
SODIUM SERPL-SCNC: 137 MMOL/L (ref 135–145)
WBC # BLD AUTO: 17 K/UL (ref 4.8–10.8)

## 2017-05-23 PROCEDURE — 80048 BASIC METABOLIC PNL TOTAL CA: CPT

## 2017-05-23 PROCEDURE — 87040 BLOOD CULTURE FOR BACTERIA: CPT

## 2017-05-23 PROCEDURE — 1111F DSCHRG MED/CURRENT MED MERGE: CPT | Performed by: NURSE PRACTITIONER

## 2017-05-23 PROCEDURE — 99215 OFFICE O/P EST HI 40 MIN: CPT | Performed by: NURSE PRACTITIONER

## 2017-05-23 PROCEDURE — 85025 COMPLETE CBC W/AUTO DIFF WBC: CPT

## 2017-05-23 PROCEDURE — 36415 COLL VENOUS BLD VENIPUNCTURE: CPT

## 2017-05-23 RX ORDER — DOXYCYCLINE HYCLATE 100 MG
100 TABLET ORAL 2 TIMES DAILY
COMMUNITY
End: 2017-05-23

## 2017-05-23 ASSESSMENT — ENCOUNTER SYMPTOMS
ABDOMINAL PAIN: 0
CHILLS: 0
HEARTBURN: 0
WHEEZING: 1
FOCAL WEAKNESS: 0
FEVER: 1
SHORTNESS OF BREATH: 1
SPUTUM PRODUCTION: 1
PALPITATIONS: 0
NAUSEA: 0
MYALGIAS: 0
DIZZINESS: 1
FALLS: 0
PHOTOPHOBIA: 1
CONSTIPATION: 1
HEADACHES: 0
VOMITING: 0
ROS GI COMMENTS: POOR APPETITE
COUGH: 1
EYE PAIN: 0
BLURRED VISION: 1
WEAKNESS: 1

## 2017-05-23 NOTE — PROGRESS NOTES
Subjective:     Gilberto Brown is a 76 y.o. male who presents for Hospital Follow-up.  Chart reviewed. Discharge summary available for review: Yes   Date of discharge 5/17/2017.  48- hour post discharge RN call completed on 5/22 (first attempt on 5/19) and documented in the medical record by Manuel Godoy.    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 Discharge with doxycycline through 5/22.  Future appointment: 5/25 Rheumatologist (follow up ANCA, order in place); 6/27 ID; 6/28 pulmonologist. 5/26 PCP (but pt will call to reschedule because he needs to have HD that day)    Since returning home, patient reports not feeling well. He has no energy, poor appetite , sob, persistent fever after discharge. Pt does not know that he is discharged with doxycycline so that he did not  medication until two days ago and he is taking it. He is noted to have saturation drop to 86-88% after walking from parking lot to clinic. He walks with walker due to the weakness and he reports that he feels that his leg would give up when he tries to get on and off his car. Otherwise, he is going ok with walker or cane at home. He has difficulty taking care of self at home now.    He is here with wife today. They have power of  document on file and discussed about advance directive before but never made the decision.     Patient Active Problem List    Diagnosis Date Noted   • Hypotension 02/01/2015     Priority: High   • Adult failure to thrive 01/13/2012     Priority: High   • Pneumonia 12/14/2011     Priority: High   • Debility 10/12/2011     Priority: High   • Chronic respiratory infection 10/11/2011     Priority: High   • Pneumonia of both lungs due to  influenza B , post-influenza pneumonia/HCAP 05/14/2017     Priority: Medium   • Acute on chronic respiratory failure with hypoxia due to influenza B, not due to sepsis(CMS-HCC) 05/14/2017     Priority: Medium   • Macrocytosis 02/01/2015     Priority: Medium   • Pacemaker 02/06/2014     Priority: Medium   • Immunosuppressed status ([facility]) 02/06/2014     Priority: Medium   • Anemia of chronic renal failure, stage 5 (CMS-HCC) 12/14/2011     Priority: Medium   • Protein-calorie malnutrition, severe ([facility]) 10/11/2011     Priority: Medium   • Dysphagia 09/27/2011     Priority: Medium   • Odynophagia 09/27/2011     Priority: Medium   • Esophageal stricture 09/27/2011     Priority: Medium   • COPD (chronic obstructive pulmonary disease) (CMS-HCC) 05/14/2017     Priority: Low   • Advance care planning 10/05/2016     Priority: Low   • AV block 02/06/2014     Priority: Low   • BPH (benign prostatic hyperplasia) 12/14/2011     Priority: Low   • Wegeners granulomatosis (CMS-HCC) 12/14/2011     Priority: Low   • ESRD on dialysis (CMS-HCC) 11/08/2011     Priority: Low   • HTN (hypertension) 10/12/2011     Priority: Low   • TIMOTHY (obstructive sleep apnea) 10/12/2011     Priority: Low   • AV block, 3rd degree ([facility]) 07/07/2011     Priority: Low   • 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   • Atrial fibrillation (CMS-HCC) 04/18/2017   • Abnormal CT scan of lung 10/25/2016   • Thrush 10/25/2016   • AV fistula stenosis (CMS-HCC) 04/12/2016   • Personal history of other malignant neoplasm of skin 07/16/2013   • GERD (gastroesophageal reflux disease) 12/14/2011   • Weight loss 12/14/2011   • Vitamin d deficiency 11/08/2011   • Abnormal thyroid function test 10/12/2011   • Hyperlipidemia 10/12/2011   • Hip pain          Allergies:   Doxycycline; Erythromycin; and Rituximab    Social History:  Social History   Substance Use Topics   • Smoking status: Former Smoker -- 30 years     Types: Pipe     Quit date: 01/01/1990   • Smokeless  "tobacco: Former User     Types: Chew   • Alcohol Use: 4.8 oz/week     7 Glasses of wine, 1 Standard drinks or equivalent per week      Comment: Red wine nightly        ROS:  Review of Systems   Constitutional: Positive for fever (still having fever everyday, last one this morning 101) and malaise/fatigue. Negative for chills.   HENT: Negative for hearing loss and tinnitus.    Eyes: Positive for blurred vision (thought to be voriconazole side effect. ) and photophobia. Negative for pain.   Respiratory: Positive for cough, sputum production, shortness of breath and wheezing.    Cardiovascular: Negative for chest pain, palpitations and leg swelling.   Gastrointestinal: Positive for constipation (has metamucil which not helps too much at this time but pt also has poor appetite). Negative for heartburn, nausea, vomiting and abdominal pain.        Poor appetite   Genitourinary: Negative for dysuria, urgency and frequency.   Musculoskeletal: Negative for myalgias and falls.   Skin: Negative for rash.   Neurological: Positive for dizziness and weakness. Negative for focal weakness and headaches.        Objective:     Blood pressure 100/56, pulse 108, temperature 36.7 °C (98.1 °F), resp. rate 16, height 1.702 m (5' 7\"), weight 62.596 kg (138 lb), SpO2 88 %.     Physical Exam:  Physical Exam   Constitutional: He is oriented to person, place, and time and well-developed, well-nourished, and in no distress.   HENT:   Head: Normocephalic and atraumatic.   Eyes: Conjunctivae are normal.   Neck: Neck supple. No JVD present.   Cardiovascular: Regular rhythm.    No murmur heard.  tachycardia   Pulmonary/Chest: Effort normal. No respiratory distress. He has wheezes ( upper lung fields ). He has rales (overall, diffused).   Hypoxia without oxygen   Abdominal: Soft. Bowel sounds are normal. He exhibits no distension. There is no tenderness. There is no rebound.   Musculoskeletal: Normal range of motion. He exhibits no edema or " tenderness.   Neurological: He is alert and oriented to person, place, and time.   Unsteady gait   Skin: Skin is dry.   Vitals reviewed.        Assessment and Plan:     1. Hospital discharge follow-up  Hospitalization and results reviewed with patient. High risk conditions requiring teaching or care coordination were identified and addressed.The patient demonstrate understanding of admission and underlying conditions. The patient understands discharge instructions and when to seek medical attention. Medications reviewed including instructions regarding high risk medications, dosing and side effects.    The patient is able to safely adhere to ADL/IADL, treatment and medication regimen, self-manage of high-risk conditions? No   The patient requires physical therapy/home health/DME referral? Yes HH  The patient requires referral to care coordination/behavioral health/social work?  No   Patient requires referral for pharmacy consult? No   Advance directive/POLST on file?  Yes Power of  only.   Required counseled on advance directive?  Yes  They are going to follow up with pulmonologist and rheumatologist. Guard prognosis. They are interested in filling out the POLST but would like to discuss with kids first. Blank form given to them.     - DME Oxygen New Set Up  - REFERRAL TO HOME HEALTH, RN, PT,  for advance care planning. Aide to help wife per pt's request.  - Instructed pt to call use back if he would like to have wheelchair. At this time, he is ok with walker or cane. He just needs extra help from getting on and off car. Will call back in the next few days for test report and follow up to see if he would like to have a wheelchair which is recommended given his weakness and progressive disease.     2. Pneumonia of both lungs due to influenza A virus, unspecified part of lung  - on doxycycline, has not completed the course, just picked up medication two days ago.   - DME Oxygen New Set Up  - CBC  WITH DIFFERENTIAL  - BASIC METABOLIC PANEL; Future  - BLOOD CULTURE; Future  - BLOOD CULTURE; Future  - CULTURE RESP W/O GRAM STAIN    3. Wegeners granulomatosis (CMS-HCC)  - DME Oxygen New Set Up  - CBC WITH DIFFERENTIAL  - BASIC METABOLIC PANEL; Future  - BLOOD CULTURE; Future  - BLOOD CULTURE; Future  - CULTURE RESP W/O GRAM STAIN  - ANCA order is printed and have pt have it done along with above labs. Follow up with rheumatologist on 5/25 and pulmonologist on 6/28.  - continue prednisone     4. Adult failure to thrive  - DME Oxygen New Set Up    5. Advance care planning  - POLST form given to pt. Wife reports that they will discuss with specialist. Instructed them to make another appointment with either PCP or our attending to fill out the POLST if they want. They verbalized understanding. Pt reports that he told his kids that if he got stroke, then go ahead to stop HD. Wife reports that if they are told that his lung condition is going to get worse and poor prognosis. They will also consider to stop HD. Pt was still holding hopes last visit but since he has been in and out hospital for 5-6 times in the past year, he feels the reality has pull him back.     6. Acute on chronic respiratory failure with hypoxia due to influenza B, not due to sepsis(CMS-HCC)  - DME Oxygen New Set Up  - Has both inhaler and nebulizer.         Medication Reconciliation  Medication list at end of encounter:   Current Outpatient Prescriptions   Medication Sig Dispense Refill   • doxycycline (VIBRAMYCIN) 100 MG Tab Take 100 mg by mouth 2 times a day.     • doxycycline monohydrate (ADOXA) 100 MG tablet Take 1 Tab by mouth every 12 hours for 5 days. 10 Tab 0   • metoprolol (LOPRESSOR) 25 MG Tab Take 25 mg by mouth 2 times a day.     • predniSONE (DELTASONE) 1 MG Tab Take 14 mg by mouth every day. Pt takes x1 10mg tabs and x4 1mg tabs for total dose = 14mg QD     • ipratropium-albuterol (DUONEB) 0.5-2.5 (3) MG/3ML nebulizer solution 3 mL  by Nebulization route 4 times a day. 1080 mL 3   • fluticasone-salmeterol (ADVAIR DISKUS) 500-50 MCG/DOSE AEROSOL POWDER, BREATH ACTIVATED Inhale 1 Puff by mouth 2 times a day. Rinse mouth after each use. 1 Inhaler 6   • albuterol 108 (90 BASE) MCG/ACT Aero Soln inhalation aerosol Inhale 2 Puffs by mouth every four hours as needed for Shortness of Breath.     • ranitidine (ZANTAC) 150 MG Tab TAKE 1 TABLET BY MOUTH EVERY NIGHT AT BEDTIME 30 Tab 0   • finasteride (PROSCAR) 5 MG Tab Take 5 mg by mouth every day.     • simvastatin (ZOCOR) 40 MG TABS Take 40 mg by mouth every evening.     • epoetin lucina (EPOGEN,PROCRIT) 3000 UNIT/ML SOLN Inject 2,200 Units as instructed every Monday, Wednesday, and Friday. With dialysis     • sevelamer (RENAGEL) 800 MG TABS Take 800 mg by mouth 3 times a day, with meals.     • tamsulosin (FLOMAX) 0.4 MG capsule Take 0.4 mg by mouth every day.     • aspirin EC (ECOTRIN) 81 MG TBEC Take 81 mg by mouth every day.     • B Complex-C-Folic Acid (DIALYVITE TABLET) Tab Dialyvite -nehro vit B complex-C-folic acid 1 tablet po QD Dialysis Pt 90 Tab 3     No current facility-administered medications for this visit.       Primary care follow-up:  New health conditions identified during hospitalization? Yes   Labs/pathology/imaging requires future PCP follow-up?  Yes   Changes to medications during hospitalization or today? No     Recommended followup: No Follow-up on file. with Christine Zamudio M.D.   Future Appointments       Provider Department [facility]    5/23/2017 11:20 AM ELSI Munoz [facility]    5/25/2017 1:00 PM Cintia Ariza M.D. [facility]     6/27/2017 11:45 AM Christine Manzo M.D. [address]    6/28/2017 1:40 PM Eric Vaughan M.D. [facility] Pulmonary Medicine           Patient Instruction  Patient offered educational material on discharge diagnosis and management of symptoms/red flags.  Patient instructed to keep follow-up appointments and to bring written questions and and actual medications to each office visit. Patient instructed to call PCP/specialist with any problems/questions/concerns. Patient verbalizes understanding and has no further questions at this time.    Face-to-face transitional care management services with high complexity medical decision making.

## 2017-05-23 NOTE — Clinical Note
Dr. House, Will need cosign for home health order and also supervising physician note. This pt has underlying disease of Wegeners granulomatosis, ESRD on HD, hospitalized for flu and pneumonia. Progressive disease, oxygen required.  Thank you so much. Dana

## 2017-05-23 NOTE — PROGRESS NOTES
POST DISCHARGE CALL MADE BY Manuel Godoy  Discharge Date:5/17/2017   Date of Outreach Call: 5/22/2017  9:41 AM  Now that you're home, how are you doing? Fair  Comment:Pt states he is having a hard time recuperating  from hospital admission, still feels weak.  Would like home  health services.  Dialysis M-W-F.  Do you have questions about your medications? No    Did you fill your medications? Yes  Comment:Pt states he is taking all meds as prescribed.    Do you have a follow-up appointment scheduled?Yes  Comment:Scheduled pt for discharge clinic appt on  5/23/17 at 11:20 AM.  Pt's wife Crys will drive and  accompany pt to appt.  Instructed pt and wife on location of  [facility].  Pt will also f/u at scheduled appt at [facility] on 5/25.    Discharging Department: Telemetry 7    Number of Attempts: 1  Current or previous attempts completed within two business days of discharge? Yes  Comment:Initial discharge outreach attempt made on  5/19/17.  Provided education regarding treatment plan, medication, self-management, ADLs? No  Comment:Pt states he understands his d/c instructions  and meds and has no questions.  Has patient completed Advance Directive? If yes, advise them to bring to appointment. Yes  Comment:adv dir onfile and scanned into [facility] record  Care Manager phone number provided? Yes  Comment:Nica at [phone]  Is there anything else I can help you with? No    Face to Face Supporting Documentation - Home Health    The encounter with this patient was in whole or in part the primary reason for home health admission.    Date of encounter:   Patient:                    MRN:                       YOB: 2018  Gilberto D Stephanie  [id number]  1940     Home health to see patient for:  Skilled Nursing care for assessment, interventions & education and Physical Therapy evaluation and treatment    Skilled need for:  Exacerbation of Chronic Disease State Wegeners granulomatosis     Skilled  nursing interventions to include:  Comment: respiratory care    Homebound status evidenced by:  Need the aid of supportive devices such as crutches, canes, wheelchairs or walkers or Needs the assistance of another person in order to leave the home. Leaving home requires a considerable and taxing effort. There is a normal inability to leave the home.    Community Physician to provide follow up care: Christine Zamudio M.D.     Optional Interventions? No      I certify the face to face encounter for this home health care referral meets the CMS requirements and the encounter/clinical assessment with the patient was, in whole, or in part, for the medical condition(s) listed above, which is the primary reason for home health care. Based on my clinical findings: the service(s) are medically necessary, support the need for home health care, and the homebound criteria are met.  I certify that this patient has had a face to face encounter by myself.  RADHA Munoz.P.R.N. - NPI: [id number]  Face to Face Note  -  Durable Medical Equipment    RADHA Munoz.P.R.N. - NPI: [id number]  I certify that this patient is under my care and that they had a durable medical equipment(DME)face to face encounter by myself that meets the physician DME face-to-face encounter requirements with this patient on:    Date of encounter:   Patient:                    MRN:                       YOB: 2018  Gilberto Brown  [id number]  1940     The encounter with the patient was in whole, or in part, for the following medical condition, which is the primary reason for durable medical equipment:  Other - Wegeners granulomatosis     I certify that, based on my findings, the following durable medical equipment is medically necessary:  Oxygen.    HOME O2 Saturation Measurements:(Values must be present for Home Oxygen orders)         ,     ,         My Clinical findings support the need for the above equipment due  to:  Abnormal Gait, Hypoxia    Supporting Symptoms: risk for fall, adult failure to thrive

## 2017-05-23 NOTE — PATIENT INSTRUCTIONS
If you need further assistance, or have any questions; concerns or lingering symptoms before seeing your Primary Care Provider or specialist.     Do not hesitate to contact us.     Please contact us at the Post-Hospital Follow Up Program at [phone].   Our offices hours are Monday-Friday 8 am-5 pm.

## 2017-05-23 NOTE — MR AVS SNAPSHOT
"        Gilberto Brown   2017 11:20 AM   Office Visit   MRN: [id number]    Department:  [facility]   Dept Phone:  [phone]    Description:  Male : 1940   Provider:  ELSI Munoz           Reason for Visit     Hospital Follow-up           Allergies as of 2017     Allergen Noted Reactions    Erythromycin 2014   Unspecified    Abdominal pain.  RXN=before     Rituximab 08/15/2016       Flu like syndrome      You were diagnosed with     Hospital discharge follow-up   [851822]       Pneumonia of both lungs due to influenza A virus, unspecified part of lung   [4212121]       Wegeners granulomatosis (CMS-HCC)   [011363]       Adult failure to thrive   [783.7.ICD-9-CM]         Vital Signs     Blood Pressure Pulse Temperature Respirations Height Weight    100/56 mmHg 108 36.7 °C (98.1 °F) 16 1.702 m (5' 7\") 62.596 kg (138 lb)    Body Mass Index Oxygen Saturation Smoking Status             21.61 kg/m2 88% Former Smoker         Basic Information     Date Of Birth Sex Race Ethnicity Preferred Language    1940 Male White Non- English      Your appointments     May 25, 2017  1:00 PM   Follow Up Visit with Cintia Ariza M.D.   [facility] (--)    [address], [address]  [facility] [address] [phone]           You will be receiving a confirmation call a few days before your appointment from our automated call confirmation system.            2017 11:45 AM   Follow Up Visit with Christine Manzo M.D.   [facility] ([address] [place] [address])    [address]  [place] [address] [phone]           You will be receiving a confirmation call a few days before your appointment from our automated call confirmation system.            2017  1:40 PM   Established Patient Pul with Eric Vaughan M.D.   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 Low  7/7/2011 - Present    Dysphagia  Medium  9/27/2011 - Present    Odynophagia R13.10 Medium  9/27/2011 - Present    Esophageal stricture K22.2 Medium  9/27/2011 - Present    Chronic respiratory infection J98.8 High  10/11/2011 - Present    Protein-calorie malnutrition, severe ([facility]) E43 Medium  10/11/2011 - Present    Debility R53.81 High  10/12/2011 - Present    HTN (hypertension) I10 Low  10/12/2011 - Present    Abnormal thyroid function test R94.6   10/12/2011 - Present    TIMOTHY (obstructive sleep apnea) G47.33 Low  10/12/2011 - Present    Hyperlipidemia E78.5   10/12/2011 - Present    ESRD on dialysis (CMS-HCC) N18.6, Z99.2 Low  11/8/2011 - Present    Vitamin d deficiency    11/8/2011 - Present    Anemia of chronic renal failure, stage 5 (CMS-HCC) (Chronic) N18.5, D63.1 Medium  12/14/2011 - Present    BPH (benign prostatic hyperplasia) N40.0 Low  12/14/2011 - Present    GERD (gastroesophageal reflux disease) K21.9   12/14/2011 - Present    Weight loss R63.4   12/14/2011 - Present    Pneumonia J18.9 High  12/14/2011 - Present    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 Low  12/14/2011 - Present    Adult failure to thrive R62.7 High  1/13/2012 - Present    Personal history of other malignant neoplasm of skin Z85.828   7/16/2013 - Present    AV block I44.30 Low  2/6/2014 - Present    Pacemaker Z95.0 Medium  2/6/2014 - Present    Immunosuppressed status (HCC) D89.9 Medium  2/6/2014 - Present    Hypotension I95.9 High  2/1/2015 - Present    Macrocytosis D75.89 Medium  2/1/2015 - Present    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   4/12/2016 - Present    Advance care planning Z71.89 Low  10/5/2016 - Present    Abnormal CT scan of lung R91.8   10/25/2016 - Present    Thrush B37.0   10/25/2016 - Present    Pseudomonas aeruginosa colonization Z22.39   4/18/2017 -  Present    Atrial fibrillation (CMS-HCC) I48.91   4/18/2017 - Present    Pneumonia of both lungs due to influenza B , post-influenza pneumonia/HCAP J11.00 Medium  5/14/2017 - Present    COPD (chronic obstructive pulmonary disease) (CMS-HCC) J44.9 Low  5/14/2017 - Present    Acute on chronic respiratory failure with hypoxia due to influenza B, not due to sepsis(CMS-HCC) J96.21 Medium  5/14/2017 - Present      Health Maintenance        Date Due Completion Dates    IMM DTaP/Tdap/Td Vaccine (1 - Tdap) 6/23/1958 ---    IMM ZOSTER VACCINE 6/23/1999 ---    COLONOSCOPY 12/1/2025 12/1/2015 (Postponed)    Override on 12/1/2015: Postponed (Patient will discuss scheduling with PCP)            Current Immunizations     13-VALENT PCV PREVNAR 4/14/2017  1:43 PM    Influenza TIV (IM) 9/15/2016, 11/3/2014, 10/10/2013, 9/27/2011  3:15 AM    Pneumococcal polysaccharide vaccine (PPSV-23) 12/19/2011  4:47 PM    Tuberculin Skin Test  Incomplete      Below and/or attached are the medications your provider expects you to take. Review all of your home medications and newly ordered medications with your provider and/or pharmacist. Follow medication instructions as directed by your provider and/or pharmacist. Please keep your medication list with you and share with your provider. Update the information when medications are discontinued, doses are changed, or new medications (including over-the-counter products) are added; and carry medication information at all times in the event of emergency situations     Allergies:  ERYTHROMYCIN - Unspecified     RITUXIMAB - (reactions not documented)               Medications  Valid as of: May 23, 2017 -  1:11 PM    Generic Name Brand Name Tablet Size Instructions for use    Albuterol Sulfate (Aero Soln) albuterol 108 (90 BASE) MCG/ACT Inhale 2 Puffs by mouth every four hours as needed for Shortness of Breath.        Aspirin (Tablet Delayed Response) ECOTRIN 81 MG Take 81 mg by mouth every day.        B  Complex-C-Folic Acid (Tab) DIALYVITE TABLET  Dialyvite -nehro vit B complex-C-folic acid 1 tablet po QD Dialysis Pt        Doxycycline Hyclate (Tab) VIBRAMYCIN 100 MG Take 100 mg by mouth 2 times a day.        Doxycycline Monohydrate (Tab) ADOXA 100 MG Take 1 Tab by mouth every 12 hours for 5 days.        Epoetin Jorgito (Solution) EPOGEN,PROCRIT 3000 UNIT/ML Inject 2,200 Units as instructed every Monday, Wednesday, and Friday. With dialysis        Finasteride (Tab) PROSCAR 5 MG Take 5 mg by mouth every day.        Fluticasone-Salmeterol (AEROSOL POWDER, BREATH ACTIVATED) ADVAIR 500-50 MCG/DOSE Inhale 1 Puff by mouth 2 times a day. Rinse mouth after each use.        Ipratropium-Albuterol (Solution) DUONEB 0.5-2.5 (3) MG/3ML 3 mL by Nebulization route 4 times a day.        Metoprolol Tartrate (Tab) LOPRESSOR 25 MG Take 25 mg by mouth 2 times a day.        PredniSONE (Tab) DELTASONE 1 MG Take 14 mg by mouth every day. Pt takes x1 10mg tabs and x4 1mg tabs for total dose = 14mg QD        RaNITidine HCl (Tab) ZANTAC 150 MG TAKE 1 TABLET BY MOUTH EVERY NIGHT AT BEDTIME        Sevelamer HCl (Tab) RENAGEL 800 MG Take 800 mg by mouth 3 times a day, with meals.        Simvastatin (Tab) ZOCOR 40 MG Take 40 mg by mouth every evening.        Tamsulosin HCl (Cap) FLOMAX 0.4 MG Take 0.4 mg by mouth every day.        .                 Medicines prescribed today were sent to:     [facility] [id number] [place] [address], [place] - [address] [place] [address] AT [address] [facility] [address]    Phone: [phone] Fax: [phone]    Open 24 Hours?: No      Medication refill instructions:       If your prescription bottle indicates you have medication refills left, it is not necessary to call your provider’s office. Please contact your pharmacy and they will refill your medication.    If your prescription bottle indicates you do not have any refills left, you may request refills at any time through one of the  "following ways: The online [employer] system (except Urgent Care), by calling your provider’s office, or by asking your pharmacy to contact your provider’s office with a refill request. Medication refills are processed only during regular business hours and may not be available until the next business day. Your provider may request additional information or to have a follow-up visit with you prior to refilling your medication.   *Please Note: Medication refills are assigned a new Rx number when refilled electronically. Your pharmacy may indicate that no refills were authorized even though a new prescription for the same medication is available at the pharmacy. Please request the medicine by name with the pharmacy before contacting your provider for a refill.        Your To Do List     Future Labs/Procedures Complete By Expires    BASIC METABOLIC PANEL  As directed 5/23/2018    BLOOD CULTURE  As directed 5/23/2018    Comments:    Per Hospital Policy: Only change Specimen Src: to \"Line\" if specified by physician order.        BLOOD CULTURE  As directed 5/23/2018    Comments:    Per Hospital Policy: Only change Specimen Src: to \"Line\" if specified by physician order.          Instructions    If you need further assistance, or have any questions; concerns or lingering symptoms before seeing your Primary Care Provider or specialist.     Do not hesitate to contact us.     Please contact us at the Post-Hospital Follow Up Program at [phone].   Our offices hours are Monday-Friday 8 am-5 pm.             Other Notes About Your Plan     [facility] [phone] Fax. [phone]             [employer] Access Code: Activation code not generated  Current [employer] Status: Active          "

## 2017-05-24 ENCOUNTER — HOSPITAL ENCOUNTER (OUTPATIENT)
Facility: MEDICAL CENTER | Age: 77
End: 2017-05-24
Attending: NURSE PRACTITIONER
Payer: MEDICARE

## 2017-05-24 PROCEDURE — 87181 SC STD AGAR DILUTION PER AGT: CPT

## 2017-05-24 PROCEDURE — 87070 CULTURE OTHR SPECIMN AEROBIC: CPT

## 2017-05-24 PROCEDURE — 87205 SMEAR GRAM STAIN: CPT

## 2017-05-24 PROCEDURE — 87077 CULTURE AEROBIC IDENTIFY: CPT

## 2017-05-24 PROCEDURE — 87186 SC STD MICRODIL/AGAR DIL: CPT

## 2017-05-25 ENCOUNTER — OFFICE VISIT (OUTPATIENT)
Dept: RHEUMATOLOGY | Facility: PHYSICIAN GROUP | Age: 77
End: 2017-05-25
Payer: MEDICARE

## 2017-05-25 ENCOUNTER — TELEPHONE (OUTPATIENT)
Dept: RHEUMATOLOGY | Facility: PHYSICIAN GROUP | Age: 77
End: 2017-05-25

## 2017-05-25 VITALS
OXYGEN SATURATION: 87 % | HEART RATE: 121 BPM | SYSTOLIC BLOOD PRESSURE: 110 MMHG | WEIGHT: 127.43 LBS | RESPIRATION RATE: 16 BRPM | BODY MASS INDEX: 19.95 KG/M2 | DIASTOLIC BLOOD PRESSURE: 50 MMHG | TEMPERATURE: 101.1 F

## 2017-05-25 DIAGNOSIS — J98.8 CHRONIC RESPIRATORY INFECTION: ICD-10-CM

## 2017-05-25 DIAGNOSIS — M31.30 WEGENER'S GRANULOMATOSIS: ICD-10-CM

## 2017-05-25 DIAGNOSIS — N18.6 ESRD ON DIALYSIS (HCC): ICD-10-CM

## 2017-05-25 DIAGNOSIS — I10 ESSENTIAL HYPERTENSION: ICD-10-CM

## 2017-05-25 DIAGNOSIS — Z99.2 ESRD ON DIALYSIS (HCC): ICD-10-CM

## 2017-05-25 DIAGNOSIS — M31.30 WEGENERS GRANULOMATOSIS: ICD-10-CM

## 2017-05-25 DIAGNOSIS — Z22.39 PSEUDOMONAS AERUGINOSA COLONIZATION: ICD-10-CM

## 2017-05-25 DIAGNOSIS — J44.9 CHRONIC OBSTRUCTIVE PULMONARY DISEASE, UNSPECIFIED COPD TYPE (HCC): ICD-10-CM

## 2017-05-25 LAB
ANCA IGG TITR SER IF: NORMAL {TITER}
GRAM STN SPEC: NORMAL
SIGNIFICANT IND 70042: NORMAL
SITE SITE: NORMAL
SOURCE SOURCE: NORMAL

## 2017-05-25 PROCEDURE — 1101F PT FALLS ASSESS-DOCD LE1/YR: CPT | Performed by: INTERNAL MEDICINE

## 2017-05-25 PROCEDURE — G8420 CALC BMI NORM PARAMETERS: HCPCS | Performed by: INTERNAL MEDICINE

## 2017-05-25 PROCEDURE — 99214 OFFICE O/P EST MOD 30 MIN: CPT | Performed by: INTERNAL MEDICINE

## 2017-05-25 PROCEDURE — 1036F TOBACCO NON-USER: CPT | Performed by: INTERNAL MEDICINE

## 2017-05-25 PROCEDURE — 4040F PNEUMOC VAC/ADMIN/RCVD: CPT | Performed by: INTERNAL MEDICINE

## 2017-05-25 PROCEDURE — 1111F DSCHRG MED/CURRENT MED MERGE: CPT | Performed by: INTERNAL MEDICINE

## 2017-05-25 PROCEDURE — G8432 DEP SCR NOT DOC, RNG: HCPCS | Performed by: INTERNAL MEDICINE

## 2017-05-25 RX ORDER — DOXYCYCLINE HYCLATE 100 MG
100 TABLET ORAL 2 TIMES DAILY
Status: ON HOLD | COMMUNITY
End: 2017-06-06

## 2017-05-25 NOTE — PROGRESS NOTES
Chief Complaint- joint pain    Subjective:   Gilberto Brown is a 76 y.o. male here today for follow up of rheumatological issues    Extremely complicated patient is being seen for Wegener's granulomatosis, Initially diagnosed Wegeners 9/2011 by Dr Rodriguez with renal biopsy with unexplained weight loss and loss of renal function over the course 2 weeks, had had 6 months of worsening renal function, currently on dialysis s/p bronchoscopy with dx bronchiectasis, COPD, Patient had been on CellCept 750 mg by mouth daily, patient had a negative ANCA test January 2015, however ANCA became positive again November 2015 Patient  status post rituximab infusions January 14, 2016 and January 28, 2016 and did really quite well with a negative ANCA May 2016  however patient subsequently 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 Patient denies any fevers of unknown etiology, denies any conjunctivitis, denies any nasal ulcers or oral ulcers, denies any joint pain or swelling, denies any new rashes. Complicating factors, patient has a chronic lung infection of Pseudomonas with recent CT scans of lungs and chest x-ray of lungs indicating chronic colonization of Pseudomonas. Unfortunately patient's had to be hospitalized many times now for treatment of lung infections, patient continues to be off of cytotoxic agents and continues to be on only prednisone 14 mg by mouth daily. Latest ANCA is negative March 2017 and a another ANCA is pending. Patient also with atrial fibrillation and has a pacemaker in place. Patient states main complaint is feeling tired without a lot of energy.      S/p Cytoxan-n/v  Off of Cellcept 5/2016  Rituximab infusions 1/14/2016, 1/28/2016    GBM neg 1/2012  ANCA neg 1/2015; ANCA 1:20  11/2015; ANCA neg 2/2016; ANCA neg 5/2016; ANCA neg 12/2016; ANCA neg 3/2017  C3 90 normal 10/2011  C4 21 10/2011  OFELIA neg 10/2011  Uric acid 4.4 1/2016  Hep B neg 2/2015; Hep  B neg 4/2017  Hep C neg 2/2015     Of note  Dr Coral Corona nephrology  Dr Juan F Rubio pulmonology [phone]  Dr Chavarria Cardiologist [phone]  Dr Nunez Opthalmologist [phone]           Current medicines (including changes today)  Current Outpatient Prescriptions   Medication Sig Dispense Refill   • doxycycline (VIBRAMYCIN) 100 MG Tab Take 100 mg by mouth 2 times a day.     • metoprolol (LOPRESSOR) 25 MG Tab Take 25 mg by mouth 2 times a day.     • predniSONE (DELTASONE) 1 MG Tab Take 14 mg by mouth every day. Pt takes x1 10mg tabs and x4 1mg tabs for total dose = 14mg QD     • ipratropium-albuterol (DUONEB) 0.5-2.5 (3) MG/3ML nebulizer solution 3 mL by Nebulization route 4 times a day. 1080 mL 3   • B Complex-C-Folic Acid (DIALYVITE TABLET) Tab Dialyvite -nehro vit B complex-C-folic acid 1 tablet po QD Dialysis Pt 90 Tab 3   • fluticasone-salmeterol (ADVAIR DISKUS) 500-50 MCG/DOSE AEROSOL POWDER, BREATH ACTIVATED Inhale 1 Puff by mouth 2 times a day. Rinse mouth after each use. 1 Inhaler 6   • albuterol 108 (90 BASE) MCG/ACT Aero Soln inhalation aerosol Inhale 2 Puffs by mouth every four hours as needed for Shortness of Breath.     • ranitidine (ZANTAC) 150 MG Tab TAKE 1 TABLET BY MOUTH EVERY NIGHT AT BEDTIME 30 Tab 0   • finasteride (PROSCAR) 5 MG Tab Take 5 mg by mouth every day.     • simvastatin (ZOCOR) 40 MG TABS Take 40 mg by mouth every evening.     • epoetin lucina (EPOGEN,PROCRIT) 3000 UNIT/ML SOLN Inject 2,200 Units as instructed every Monday, Wednesday, and Friday. With dialysis     • sevelamer (RENAGEL) 800 MG TABS Take 800 mg by mouth 3 times a day, with meals.     • tamsulosin (FLOMAX) 0.4 MG capsule Take 0.4 mg by mouth every day.     • aspirin EC (ECOTRIN) 81 MG TBEC Take 81 mg by mouth every day.       No current facility-administered medications for this visit.     He  has a past medical history of HTN; Dyslipidemia; Allergy; COPD; GERD (gastroesophageal reflux disease); Dialysis;  Urinary bladder disorder; Snoring; Unspecified hemorrhagic conditions; Pneumonia (2011); Bronchitis; Chronic bronchitis with productive mucopurulent cough (CMS-HCC); Indigestion; ASTHMA; EMPHYSEMA; CA in situ resp sys; Other specified disorder of intestines; Unspecified urinary incontinence; Sick sinus syndrome (CMS-HCC); Pacemaker (1988); Renal disorder; Wegener's disease, pulmonary (CMS-HCC); Hip pain; Heart burn; Pain (7/16/13); Sleep apnea; BPH (benign prostatic hyperplasia); Arthritis; Hypertension; Coughing blood (2011); Breath shortness; Pulmonary histoplasmosis (CMS-HCC); and TIMOTHY (obstructive sleep apnea).    ROS   Other than what is mentioned in HPI or physical exam, there is no history of headaches, double vision or blurred vision. No temporal tenderness or jaw claudication. No history of cataracts or glaucoma. No trouble swallowing difficulties or sore throats. No history of thyroid disease. No chest complaints including chest pain, cough or sputum production. No shortness of breath. No GI complaints including nausea, vomiting, change in bowel habits, or past peptic ulcer disease. No history of blood in the stools. No urinary complaints including dysuria or frequency. No history of rash including psoriasis. No history of alopecia, photosensitivity, oral ulcerations, Raynaud's phenomena, or swollen joints. No history of gout. No back complaints. No history of low blood counts.       Objective:     Blood pressure 110/50, pulse 121, temperature 38.4 °C (101.1 °F), resp. rate 16, weight 57.8 kg (127 lb 6.8 oz), SpO2 87 %. Body mass index is 19.95 kg/(m^2).   Physical Exam:  Constitutional: Alert and oriented X3, but very tired looking and less energy than previous visits, talks in full sentences without evidence of shortness of breath Skin: Warm, dry, good turgor, no rashes in visible areas.Eye: Equal, round and reactive, no vision changes, lids normal, no conjunctivitis, ENMT: Lips without lesions, good  dentition, oropharynx clear, no oropharyngeal ulcerations, no nasal ulcers, no oral ulcers, Neck: Trachea midline, no masses, no thyromegaly, no carotid bruits, no subclavian bruitsLymph: No supraclavicular lymphadenopathy, no cervical lymphadenopathy, no axillary lymphadenopathy.Respiratory: Unlabored respiratory effort, mild crackles lower lung basesCardiovascular: Normal S1, S2, no murmur, no edema, pacemaker in left chestAbdomen: Soft, non-tender, no masses, no hepatosplenomegaly, no abdominal bruits, no inguinal bruitsPsych: Alert and oriented x3, normal affect and mood.Neuro: Cranial nerves 2-12 are grossly intactExt:Did not appreciate any joint effusions, no Raynaud's, patient had 2+ pulses both upper extremities radial and ulnar pulses and 1+ pulses bilateral lower extremities and bilateral dorsalis pedis pulses, patient had a vascular shunt in the right upper arm  No evidence of vasculitis noted in upper or lower extremities, no nasal ulcers, no temporal artery enlargement or temporal artery bruits, no carotid bruits    Lab Results   Component Value Date/Time    HEPATITIS B CORS AB,IGM Negative 04/14/2017 12:30 PM    HEPATITIS B SURFACE ANTIGEN Negative 05/15/2017 09:58 AM     Lab Results   Component Value Date/Time    HEPATITIS C ANTIBODY Negative 04/14/2017 12:30 PM     Lab Results   Component Value Date/Time    SODIUM 137 05/23/2017 01:00 PM    POTASSIUM 3.7 05/23/2017 01:00 PM    CHLORIDE 91* 05/23/2017 01:00 PM    CO2 31 05/23/2017 01:00 PM    GLUCOSE 154* 05/23/2017 01:00 PM    BUN 38* 05/23/2017 01:00 PM    CREATININE 3.71* 05/23/2017 01:00 PM      Lab Results   Component Value Date/Time    WBC 17.0* 05/23/2017 01:00 PM    RBC 3.79* 05/23/2017 01:00 PM    HEMOGLOBIN 11.5* 05/23/2017 01:00 PM    HEMATOCRIT 36.7* 05/23/2017 01:00 PM    MCV 96.8 05/23/2017 01:00 PM    MCH 30.3 05/23/2017 01:00 PM    MCHC 31.3* 05/23/2017 01:00 PM    MPV 9.4 05/23/2017 01:00 PM    NEUTROPHILS-POLYS 90.60* 05/23/2017  01:00 PM    LYMPHOCYTES 2.70* 05/23/2017 01:00 PM    MONOCYTES 4.80 05/23/2017 01:00 PM    EOSINOPHILS 0.40 05/23/2017 01:00 PM    BASOPHILS 0.40 05/23/2017 01:00 PM    HYPOCHROMIA 1+ 10/28/2011 05:35 AM    ANISOCYTOSIS 1+ 10/04/2016 09:36 AM      Lab Results   Component Value Date/Time    CALCIUM 9.4 05/23/2017 01:00 PM    AST(SGOT) 13 05/13/2017 09:12 AM    ALT(SGPT) 16 05/13/2017 09:12 AM    ALKALINE PHOSPHATASE 60 05/13/2017 09:12 AM    TOTAL BILIRUBIN 0.8 05/13/2017 09:12 AM    ALBUMIN 4.0 05/13/2017 09:12 AM    TOTAL PROTEIN 6.9 05/13/2017 09:12 AM     Lab Results   Component Value Date/Time    URIC ACID 4.4 01/21/2016 08:36 AM    ANTINUCLEAR ANTIBODY None Detected 10/02/2011 04:30 AM     Lab Results   Component Value Date/Time    C3 COMPLEMENT 90 10/02/2011 04:30 AM    COMPLEMENT C4 21 10/02/2011 04:30 AM     Lab Results   Component Value Date/Time    GBM AB IGG Negative 01/18/2012 03:50 AM    GBM AB IGA Negative 01/18/2012 03:50 AM    ANCA IGG <1:20 03/14/2017 12:16 PM    C3 COMPLEMENT 90 10/02/2011 04:30 AM     Lab Results   Component Value Date/Time    SED RATE WESTERGREN 48* 04/13/2017 03:25 AM     Lab Results   Component Value Date/Time    CPK TOTAL 18 09/27/2011 05:05 AM     Lab Results   Component Value Date/Time    FLUID TYPE Stool 09/22/2006 01:04 PM      Results for orders placed in visit on 05/02/13   CT-CHEST, HIGH RESOLUTION LUNG    Impression 1. Bilateral lower lobe bronchiectasis, grossly unchanged compared with the prior conventional chest CT 1/14/12.  2. Minimal bilateral lower lobe interstitial opacities as well as confluent opacities in those areas, greater on the left, suggesting active airspace disease and/or atelectasis.  3. Probable uncalcified left lower lobe nodule, an equivocal finding with high-resolution technique.  Conventional chest CT would be needed for confirmation.  Note that no similar lesion was identified on the prior chest scan 1/14/12.            INTERPRETING LOCATION:  [address] [facility], [address]     Results for orders placed during the hospital encounter of 02/23/17   CT-CHEST (THORAX) W/O    Impression 1.  Overall, there is no significant interval change in the diffuse peripheral subpleural interstitial lung disease with a basilar predominance.  2.  There is no significant change in posterior bibasilar bronchiectasis, medial segment right middle lobe and inferior lingular bronchiectasis. The basilar predominance suggests postinfectious or post aspiration change, while the right middle lobe and   lingular predominance may be sequela of previous DANIEL infection.  3.  There is slight improvement in basilar dependent airspace disease and right major fissure pleural thickening.  4.  No change in calcified granulomata in the lungs, mediastinum, hilar regions, liver and spleen consistent with the prior history of histoplasmosis.  5.  There is no evidence of new acute pneumonia.           Assessment and Plan:     1. Wegeners granulomatosis (CMS-[facility])  Clinically seems to be in remission, currently on prednisone 14 mg by mouth daily, repeat ANCA pending if negative then we'll start decreasing prednisone even further    2. Pseudomonas aeruginosa colonization  On recurrent and continuous antibiotics, however infection seems to be getting worse despite of aggressive treatment    3. Chronic respiratory infection  With chronic colonization of Pseudomonas    4. Chronic obstructive pulmonary disease, unspecified COPD type (CMS-[facility])    5. ESRD on dialysis (CMS-HCC)  On dialysis 3 times a week    6. Essential hypertension  May impact the type of medications we can use for this patient's arthritis. We will have to keep this under advisement.    Followup: Return in about 3 months (around 8/25/2017). or sooner prn    Patient was seen 30 minutes face-to-face of which more than 50% of the time was spent counseling the patient (excluding time for procedures)  regarding  rheumatological condition and  care. Therapy was discussed in detail.    Please note that this dictation was created using voice recognition software. I have made every reasonable attempt to correct obvious errors, but I expect that there are errors of grammar and possibly content that I did not discover before finalizing the note.

## 2017-05-25 NOTE — MR AVS SNAPSHOT
Gilberto Brown   2017 1:00 PM   Office Visit   MRN: [id number]    Department:  Rheumatology Med [facility]   Dept Phone:  [phone]    Description:  Male : 1940   Provider:  Cintia Ariza M.D.           Reason for Visit     Follow-Up           Allergies as of 2017     Allergen Noted Reactions    Erythromycin 2014   Unspecified    Abdominal pain.  RXN=before     Rituximab 08/15/2016       Flu like syndrome      You were diagnosed with     Wegeners granulomatosis (CMS-HCC)   [065528]       Pseudomonas aeruginosa colonization   [989971]       Chronic respiratory infection   [386014]       Chronic obstructive pulmonary disease, unspecified COPD type (CMS-HCC)   [9046733]       ESRD on dialysis (CMS-HCC)   [561953]       Essential hypertension   [5818330]         Vital Signs     Blood Pressure Pulse Temperature Respirations Weight Oxygen Saturation    110/50 mmHg 121 38.4 °C (101.1 °F) 16 57.8 kg (127 lb 6.8 oz) 87%    Smoking Status                   Former Smoker           Basic Information     Date Of Birth Sex Race Ethnicity Preferred Language    1940 Male White Non- English      Your appointments     May 27, 2017  9:00 AM   SN-HH-[facility] START OF CARE with Beverley Peña R.N.   [facility] (--)    [address] [place] [address].  [place] [address]   [phone]            2017 11:45 AM   Follow Up Visit with Christine Manzo M.D.   [address])    [address] [facility] [address]  [place] [id number] [phone]           You will be receiving a confirmation call a few days before your appointment from our automated call confirmation system.            2017  1:40 PM   Established Patient Pul with Eric Vaughan M.D.   [facility] (--)    [address]  [address]  [place] [id number] [phone]            Aug 29, 2017  2:00 PM   Follow Up Visit with Cintia Ariza M.D.   [facility] (--)    [address]  [facility], [address]  [facility] [address] [phone]           You will be receiving a confirmation call a few days before your appointment from our automated call confirmation system.              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 Low  7/7/2011 - Present    Dysphagia  Medium  9/27/2011 - Present    Odynophagia R13.10 Medium  9/27/2011 - Present    Esophageal stricture K22.2 Medium  9/27/2011 - Present    Chronic respiratory infection J98.8 High  10/11/2011 - Present    Protein-calorie malnutrition, severe (HCC) E43 Medium  10/11/2011 - Present    Debility R53.81 High  10/12/2011 - Present    HTN (hypertension) I10 Low  10/12/2011 - Present    Abnormal thyroid function test R94.6   10/12/2011 - Present    TIMOTHY (obstructive sleep apnea) G47.33 Low  10/12/2011 - Present    Hyperlipidemia E78.5   10/12/2011 - Present    ESRD on dialysis (CMS-[facility]) N18.6, Z99.2 Low  11/8/2011 - Present    Vitamin d deficiency    11/8/2011 - Present    Anemia of chronic renal failure, stage 5 (CMS-HCC) (Chronic) N18.5, D63.1 Medium  12/14/2011 - Present    BPH (benign prostatic hyperplasia) N40.0 Low  12/14/2011 - Present    GERD (gastroesophageal reflux disease) K21.9   12/14/2011 - Present    Weight loss R63.4   12/14/2011 - Present    Pneumonia J18.9 High  12/14/2011 - Present    Wegeners granulomatosis (CMS-[facility]) M31.30 Low  12/14/2011 - Present    Adult failure to thrive R62.7 High  1/13/2012 - Present    Personal history of other malignant neoplasm of skin Z85.828   7/16/2013 - Present    AV block I44.30 Low  2/6/2014 - Present    Pacemaker Z95.0 Medium  2/6/2014 - Present    Immunosuppressed status (HCC) D89.9 Medium  2/6/2014 - Present    Hypotension I95.9 High  2/1/2015 - Present    Macrocytosis D75.89 Medium  2/1/2015 - Present    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   4/12/2016 - Present    Advance care  planning Z71.89 Low  10/5/2016 - Present    Abnormal CT scan of lung R91.8   10/25/2016 - Present    Thrush B37.0   10/25/2016 - Present    Pseudomonas aeruginosa colonization Z22.39   4/18/2017 - Present    Atrial fibrillation (CMS-HCC) I48.91   4/18/2017 - Present    Pneumonia of both lungs due to influenza B , post-influenza pneumonia/HCAP J11.00 Medium  5/14/2017 - Present    COPD (chronic obstructive pulmonary disease) (CMS-HCC) J44.9 Low  5/14/2017 - Present    Acute on chronic respiratory failure with hypoxia due to influenza B, not due to sepsis(CMS-HCC) J96.21 Medium  5/14/2017 - Present      Health Maintenance        Date Due Completion Dates    IMM DTaP/Tdap/Td Vaccine (1 - Tdap) 6/23/1958 ---    IMM ZOSTER VACCINE 6/23/1999 ---    COLONOSCOPY 12/1/2025 12/1/2015 (Postponed)    Override on 12/1/2015: Postponed (Patient will discuss scheduling with PCP)            Current Immunizations     13-VALENT PCV PREVNAR 4/14/2017  1:43 PM    Influenza TIV (IM) 9/15/2016, 11/3/2014, 10/10/2013, 9/27/2011  3:15 AM    Pneumococcal polysaccharide vaccine (PPSV-23) 12/19/2011  4:47 PM    Tuberculin Skin Test  Incomplete      Below and/or attached are the medications your provider expects you to take. Review all of your home medications and newly ordered medications with your provider and/or pharmacist. Follow medication instructions as directed by your provider and/or pharmacist. Please keep your medication list with you and share with your provider. Update the information when medications are discontinued, doses are changed, or new medications (including over-the-counter products) are added; and carry medication information at all times in the event of emergency situations     Allergies:  ERYTHROMYCIN - Unspecified     RITUXIMAB - (reactions not documented)               Medications  Valid as of: May 25, 2017 -  1:39 PM    Generic Name Brand Name Tablet Size Instructions for use    Albuterol Sulfate (Aero Soln)  albuterol 108 (90 BASE) MCG/ACT Inhale 2 Puffs by mouth every four hours as needed for Shortness of Breath.        Aspirin (Tablet Delayed Response) ECOTRIN 81 MG Take 81 mg by mouth every day.        B Complex-C-Folic Acid (Tab) DIALYVITE TABLET  Dialyvite -nehro vit B complex-C-folic acid 1 tablet po QD Dialysis Pt        Doxycycline Hyclate (Tab) VIBRAMYCIN 100 MG Take 100 mg by mouth 2 times a day.        Epoetin Jorgito (Solution) EPOGEN,PROCRIT 3000 UNIT/ML Inject 2,200 Units as instructed every Monday, Wednesday, and Friday. With dialysis        Finasteride (Tab) PROSCAR 5 MG Take 5 mg by mouth every day.        Fluticasone-Salmeterol (AEROSOL POWDER, BREATH ACTIVATED) ADVAIR 500-50 MCG/DOSE Inhale 1 Puff by mouth 2 times a day. Rinse mouth after each use.        Ipratropium-Albuterol (Solution) DUONEB 0.5-2.5 (3) MG/3ML 3 mL by Nebulization route 4 times a day.        Metoprolol Tartrate (Tab) LOPRESSOR 25 MG Take 25 mg by mouth 2 times a day.        PredniSONE (Tab) DELTASONE 1 MG Take 14 mg by mouth every day. Pt takes x1 10mg tabs and x4 1mg tabs for total dose = 14mg QD        RaNITidine HCl (Tab) ZANTAC 150 MG TAKE 1 TABLET BY MOUTH EVERY NIGHT AT BEDTIME        Sevelamer HCl (Tab) RENAGEL 800 MG Take 800 mg by mouth 3 times a day, with meals.        Simvastatin (Tab) ZOCOR 40 MG Take 40 mg by mouth every evening.        Tamsulosin HCl (Cap) FLOMAX 0.4 MG Take 0.4 mg by mouth every day.        .                 Medicines prescribed today were sent to:     [facility] [id number] [place] [address] [place] [address] [place] [address]    Phone: [phone] Fax: [phone]    Open 24 Hours?: No      Medication refill instructions:       If your prescription bottle indicates you have medication refills left, it is not necessary to call your provider’s office. Please contact your pharmacy and they will refill your medication.    If your prescription  bottle indicates you do not have any refills left, you may request refills at any time through one of the following ways: The online [employer] system (except Urgent Care), by calling your provider’s office, or by asking your pharmacy to contact your provider’s office with a refill request. Medication refills are processed only during regular business hours and may not be available until the next business day. Your provider may request additional information or to have a follow-up visit with you prior to refilling your medication.   *Please Note: Medication refills are assigned a new Rx number when refilled electronically. Your pharmacy may indicate that no refills were authorized even though a new prescription for the same medication is available at the pharmacy. Please request the medicine by name with the pharmacy before contacting your provider for a refill.        Other Notes About Your Plan     [facility] Ph. [phone] Fax. [phone]             [employer] Access Code: Activation code not generated  Current [employer] Status: Active

## 2017-05-25 NOTE — Clinical Note
[facility]   [address] [facility] [address] [facility], [address]  [place] [address]  Phone: [phone]  Fax: [phone]              Encounter Date: 5/25/2017    Dear Dr. Forrest ref. provider found,    It was a pleasure seeing your patient, Gilberto Brown, on 5/25/2017. Diagnoses of Wegeners granulomatosis (CMS-HCC), Pseudomonas aeruginosa colonization, Chronic respiratory infection, Chronic obstructive pulmonary disease, unspecified COPD type (CMS-HCC), ESRD on dialysis (CMS-HCC), and Essential hypertension were pertinent to this visit.     Please find attached progress note which includes the history I obtained from Mr. Brown, my physical examination findings, my impression and recommendations.      Once again, it was a pleasure participating in your patient's care.  Please feel free to contact me if you have any questions or if I can be of any further assistance to your patients.      Sincerely,    Cintia Ariza M.D.  Electronically Signed          PROGRESS NOTE:  No notes on file

## 2017-05-25 NOTE — TELEPHONE ENCOUNTER
Please notify patient that his ANCA has come back negative, please decrease his prednisone down to 13 mg  We will check again in one month, labs ordered in the computer.

## 2017-05-26 ENCOUNTER — TELEPHONE (OUTPATIENT)
Dept: MEDICAL GROUP | Facility: CLINIC | Age: 77
End: 2017-05-26

## 2017-05-26 NOTE — TELEPHONE ENCOUNTER
----- Message from ELSI Munoz sent at 5/26/2017  3:55 PM PDT -----  Result reviewed. Please call patient to let him know that his sputum culture shows light growth of pseudomonas again. 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  ID appointment is moved to from 6/27 to 6/6. Have pt to follow up with ID at this sooner appointment. Should he have worsening respiratory symptoms, he needs to go to ER.     Thanks  CLARA

## 2017-05-27 ENCOUNTER — HOME CARE VISIT (OUTPATIENT)
Dept: HOME HEALTH SERVICES | Facility: HOME HEALTHCARE | Age: 77
End: 2017-05-27
Payer: MEDICARE

## 2017-05-27 PROCEDURE — 665001 SOC-HOME HEALTH

## 2017-05-27 PROCEDURE — 665998 HH PPS REVENUE CREDIT

## 2017-05-27 PROCEDURE — G0162 HHC RN E&M PLAN SVS, 15 MIN: HCPCS

## 2017-05-27 PROCEDURE — 665999 HH PPS REVENUE DEBIT

## 2017-05-27 SDOH — ECONOMIC STABILITY: HOUSING INSECURITY: UNSAFE COOKING RANGE AREA: 0

## 2017-05-27 SDOH — ECONOMIC STABILITY: HOUSING INSECURITY: HOME SAFETY: F SMOKING MATERIALS PRESENT IN THE HOME.

## 2017-05-27 SDOH — ECONOMIC STABILITY: HOUSING INSECURITY: UNSAFE APPLIANCES: 0

## 2017-05-27 SDOH — ECONOMIC STABILITY: HOUSING INSECURITY
HOME SAFETY: PLACED O2 SIGN BY THE WINDOW- SHOWED HOW TO REFILL O2 TANK, HOW TO USE O2 TANK AND ABLE TO REPEAT BACK INSTRUCTIONOXYGEN SAFETY RISK ASSESSMENT PERFORMED. PATIENT DOES HAVE A NO SMOKING SIGN POSTED IN THE HOME. PATIENT DOES HAVE A WORKING FIRE EXTING

## 2017-05-27 SDOH — ECONOMIC STABILITY: HOUSING INSECURITY
HOME SAFETY: UISHER PRESENT IN THE HOME. SMOKE ALARMS ARE PRESENT AND FUNCTIONAL ON EACH LEVEL OF THE HOME. PATIENT DOES HAVE A FIRE ESCAPE PLAN DEVELOPED. PATIENT DOES NOT HAVE FLAMMABLE MATERIALS PRESENT IN THE HOME PRESENTING A FIRE HAZARD. NO EVIDENCE FOUND O

## 2017-05-27 ASSESSMENT — ENCOUNTER SYMPTOMS: SEVERE DYSPNEA: 1

## 2017-05-28 PROCEDURE — 665999 HH PPS REVENUE DEBIT

## 2017-05-28 PROCEDURE — 665998 HH PPS REVENUE CREDIT

## 2017-05-28 ASSESSMENT — ACTIVITIES OF DAILY LIVING (ADL)
HOME_HEALTH_OASIS: 01
OASIS_M1830: 02

## 2017-05-28 ASSESSMENT — ENCOUNTER SYMPTOMS
NAUSEA: DENIES ANY
SHORTNESS OF BREATH: T
VOMITING: DENIES ANY

## 2017-05-28 ASSESSMENT — PATIENT HEALTH QUESTIONNAIRE - PHQ9
2. FEELING DOWN, DEPRESSED, IRRITABLE, OR HOPELESS: 00
1. LITTLE INTEREST OR PLEASURE IN DOING THINGS: 00

## 2017-05-29 LAB
BACTERIA SPEC RESP CULT: ABNORMAL
ETEST SENSITIVITY ETEST: NORMAL
GRAM STN SPEC: ABNORMAL
SIGNIFICANT IND 70042: ABNORMAL
SITE SITE: ABNORMAL
SOURCE SOURCE: ABNORMAL

## 2017-05-29 PROCEDURE — 665998 HH PPS REVENUE CREDIT

## 2017-05-29 PROCEDURE — 665999 HH PPS REVENUE DEBIT

## 2017-05-30 ENCOUNTER — HOME CARE VISIT (OUTPATIENT)
Dept: HOME HEALTH SERVICES | Facility: HOME HEALTHCARE | Age: 77
End: 2017-05-30
Payer: MEDICARE

## 2017-05-30 PROCEDURE — 665999 HH PPS REVENUE DEBIT

## 2017-05-30 PROCEDURE — G0495 RN CARE TRAIN/EDU IN HH: HCPCS

## 2017-05-30 PROCEDURE — 665998 HH PPS REVENUE CREDIT

## 2017-05-31 VITALS
HEART RATE: 90 BPM | DIASTOLIC BLOOD PRESSURE: 62 MMHG | BODY MASS INDEX: 19.88 KG/M2 | RESPIRATION RATE: 20 BRPM | SYSTOLIC BLOOD PRESSURE: 113 MMHG | TEMPERATURE: 98.3 F | WEIGHT: 126.98 LBS

## 2017-05-31 PROCEDURE — 665998 HH PPS REVENUE CREDIT

## 2017-05-31 PROCEDURE — 665999 HH PPS REVENUE DEBIT

## 2017-05-31 SDOH — ECONOMIC STABILITY: HOUSING INSECURITY: UNSAFE COOKING RANGE AREA: 0

## 2017-05-31 SDOH — ECONOMIC STABILITY: HOUSING INSECURITY: UNSAFE APPLIANCES: 0

## 2017-05-31 ASSESSMENT — ENCOUNTER SYMPTOMS
SHORTNESS OF BREATH: T
NAUSEA: DENIES
VOMITING: DENIES

## 2017-06-01 ENCOUNTER — HOME CARE VISIT (OUTPATIENT)
Dept: HOME HEALTH SERVICES | Facility: HOME HEALTHCARE | Age: 77
End: 2017-06-01
Payer: MEDICARE

## 2017-06-01 VITALS
BODY MASS INDEX: 20.23 KG/M2 | DIASTOLIC BLOOD PRESSURE: 52 MMHG | TEMPERATURE: 98.2 F | SYSTOLIC BLOOD PRESSURE: 118 MMHG | RESPIRATION RATE: 21 BRPM | WEIGHT: 129.19 LBS

## 2017-06-01 PROCEDURE — 665997 HH PPS REVENUE ADJ

## 2017-06-01 PROCEDURE — 665998 HH PPS REVENUE CREDIT

## 2017-06-01 PROCEDURE — G0152 HHCP-SERV OF OT,EA 15 MIN: HCPCS

## 2017-06-01 PROCEDURE — G0299 HHS/HOSPICE OF RN EA 15 MIN: HCPCS

## 2017-06-01 PROCEDURE — 665999 HH PPS REVENUE DEBIT

## 2017-06-01 PROCEDURE — G0151 HHCP-SERV OF PT,EA 15 MIN: HCPCS

## 2017-06-02 VITALS
RESPIRATION RATE: 22 BRPM | TEMPERATURE: 98.6 F | DIASTOLIC BLOOD PRESSURE: 60 MMHG | SYSTOLIC BLOOD PRESSURE: 108 MMHG | WEIGHT: 126.98 LBS | BODY MASS INDEX: 19.93 KG/M2 | HEIGHT: 67 IN

## 2017-06-02 VITALS
SYSTOLIC BLOOD PRESSURE: 118 MMHG | TEMPERATURE: 98.2 F | DIASTOLIC BLOOD PRESSURE: 52 MMHG | RESPIRATION RATE: 19 BRPM | HEART RATE: 110 BPM

## 2017-06-02 VITALS
DIASTOLIC BLOOD PRESSURE: 52 MMHG | SYSTOLIC BLOOD PRESSURE: 118 MMHG | RESPIRATION RATE: 22 BRPM | HEART RATE: 98 BPM | TEMPERATURE: 98.2 F

## 2017-06-02 PROCEDURE — 665999 HH PPS REVENUE DEBIT

## 2017-06-02 PROCEDURE — 665998 HH PPS REVENUE CREDIT

## 2017-06-02 ASSESSMENT — ACTIVITIES OF DAILY LIVING (ADL)
HOUSEKEEPING_ASSISTANCE: 6
DRESSING_LB_ASSISTANCE: 0
TELEPHONE_ASSISTANCE: 0
EATING_ASSISTANCE: 0
MEAL_PREP_ASSISTANCE: 0
IADLS_COMMENTS: <!--EPICS-->SEE OT EVALUATION<!--EPICE-->
TRANSPORTATION_ASSISTANCE: 6
ADLS_COMMENTS: <!--EPICS-->SEE OT EVALUATION<!--EPICE-->
SHOPPING_ASSISTANCE: 6
LAUNDRY_ASSISTANCE: 1
TOILETING_ASSISTANCE: 0
GROOMING_ASSISTANCE: 0
BATHING_ASSISTANCE: 1
ORAL_CARE_ASSISTANCE: 0
DRESSING_UB_ASSISTANCE: 0

## 2017-06-03 PROCEDURE — 665998 HH PPS REVENUE CREDIT

## 2017-06-03 PROCEDURE — 665999 HH PPS REVENUE DEBIT

## 2017-06-04 PROCEDURE — 665998 HH PPS REVENUE CREDIT

## 2017-06-04 PROCEDURE — 665999 HH PPS REVENUE DEBIT

## 2017-06-04 SDOH — ECONOMIC STABILITY: HOUSING INSECURITY: UNSAFE COOKING RANGE AREA: 0

## 2017-06-04 SDOH — ECONOMIC STABILITY: HOUSING INSECURITY: UNSAFE APPLIANCES: 0

## 2017-06-04 ASSESSMENT — ENCOUNTER SYMPTOMS
RESPIRATORY SYMPTOMS COMMENTS: OXYGEN SAFETY RISK ASSESSMENT PERFORMED. PATIENT DOES HAVE A NO SMOKING SIGN POSTED IN THE HOME. PATIENT DOES HAVE A WORKING FIRE EXTINGUISHER PRESENT IN THE HOME. SMOKE ALARMS ARE PRESENT AND FUNCTIONAL ON EACH LEVEL OF THE HOME. PATIENT DOES HAVE A
NAUSEA: DENIES ANY

## 2017-06-05 ENCOUNTER — TELEPHONE (OUTPATIENT)
Dept: NEPHROLOGY | Facility: MEDICAL CENTER | Age: 77
End: 2017-06-05

## 2017-06-05 PROCEDURE — 665999 HH PPS REVENUE DEBIT

## 2017-06-05 PROCEDURE — 665998 HH PPS REVENUE CREDIT

## 2017-06-05 ASSESSMENT — ACTIVITIES OF DAILY LIVING (ADL): BATHING_ASSISTIVE_EQUIPMENT_NEEDED: GRAB BARS

## 2017-06-05 NOTE — TELEPHONE ENCOUNTER
1. Caller Name: [facility]                      Call Back Number: -    2. Message: Pt is requesting a letter for [employer] requesting a refund for fare. He is scheduled for flight to [place] for granddaughters wedding on 6/13 or 6/14 and is unable to fly due to health.     3. Patient approves office to leave a detailed voicemail/MyChart message: N/A

## 2017-06-06 ENCOUNTER — TELEPHONE (OUTPATIENT)
Dept: INFECTIOUS DISEASES | Facility: MEDICAL CENTER | Age: 77
End: 2017-06-06

## 2017-06-06 ENCOUNTER — HOME CARE VISIT (OUTPATIENT)
Dept: HOME HEALTH SERVICES | Facility: HOME HEALTHCARE | Age: 77
End: 2017-06-06
Payer: MEDICARE

## 2017-06-06 ENCOUNTER — OFFICE VISIT (OUTPATIENT)
Dept: INFECTIOUS DISEASES | Facility: MEDICAL CENTER | Age: 77
End: 2017-06-06
Payer: MEDICARE

## 2017-06-06 ENCOUNTER — HOSPITAL ENCOUNTER (INPATIENT)
Facility: MEDICAL CENTER | Age: 77
LOS: 6 days | DRG: 177 | End: 2017-06-12
Attending: INTERNAL MEDICINE | Admitting: HOSPITALIST
Payer: MEDICARE

## 2017-06-06 ENCOUNTER — RESOLUTE PROFESSIONAL BILLING HOSPITAL PROF FEE (OUTPATIENT)
Dept: HOSPITALIST | Facility: MEDICAL CENTER | Age: 77
End: 2017-06-06
Payer: MEDICARE

## 2017-06-06 VITALS
SYSTOLIC BLOOD PRESSURE: 90 MMHG | TEMPERATURE: 97.9 F | DIASTOLIC BLOOD PRESSURE: 40 MMHG | WEIGHT: 128.75 LBS | OXYGEN SATURATION: 93 % | BODY MASS INDEX: 20.21 KG/M2 | HEART RATE: 84 BPM | HEIGHT: 67 IN

## 2017-06-06 DIAGNOSIS — J98.8 CHRONIC RESPIRATORY INFECTION: ICD-10-CM

## 2017-06-06 DIAGNOSIS — J15.1 PNEUMONIA DUE TO PSEUDOMONAS SPECIES, UNSPECIFIED LATERALITY, UNSPECIFIED PART OF LUNG (HCC): ICD-10-CM

## 2017-06-06 PROCEDURE — 665998 HH PPS REVENUE CREDIT

## 2017-06-06 PROCEDURE — G8420 CALC BMI NORM PARAMETERS: HCPCS | Performed by: INTERNAL MEDICINE

## 2017-06-06 PROCEDURE — 700105 HCHG RX REV CODE 258: Performed by: INTERNAL MEDICINE

## 2017-06-06 PROCEDURE — 99215 OFFICE O/P EST HI 40 MIN: CPT | Performed by: INTERNAL MEDICINE

## 2017-06-06 PROCEDURE — 665999 HH PPS REVENUE DEBIT

## 2017-06-06 PROCEDURE — A9270 NON-COVERED ITEM OR SERVICE: HCPCS | Performed by: INTERNAL MEDICINE

## 2017-06-06 PROCEDURE — 700111 HCHG RX REV CODE 636 W/ 250 OVERRIDE (IP): Performed by: INTERNAL MEDICINE

## 2017-06-06 PROCEDURE — 700101 HCHG RX REV CODE 250: Performed by: INTERNAL MEDICINE

## 2017-06-06 PROCEDURE — 1036F TOBACCO NON-USER: CPT | Performed by: INTERNAL MEDICINE

## 2017-06-06 PROCEDURE — 770020 HCHG ROOM/CARE - TELE (206)

## 2017-06-06 PROCEDURE — 94760 N-INVAS EAR/PLS OXIMETRY 1: CPT

## 2017-06-06 PROCEDURE — 99223 1ST HOSP IP/OBS HIGH 75: CPT | Mod: AI | Performed by: INTERNAL MEDICINE

## 2017-06-06 PROCEDURE — 700102 HCHG RX REV CODE 250 W/ 637 OVERRIDE(OP): Performed by: INTERNAL MEDICINE

## 2017-06-06 PROCEDURE — 1111F DSCHRG MED/CURRENT MED MERGE: CPT | Performed by: INTERNAL MEDICINE

## 2017-06-06 PROCEDURE — 1101F PT FALLS ASSESS-DOCD LE1/YR: CPT | Performed by: INTERNAL MEDICINE

## 2017-06-06 PROCEDURE — 4040F PNEUMOC VAC/ADMIN/RCVD: CPT | Performed by: INTERNAL MEDICINE

## 2017-06-06 PROCEDURE — G8432 DEP SCR NOT DOC, RNG: HCPCS | Performed by: INTERNAL MEDICINE

## 2017-06-06 PROCEDURE — 94640 AIRWAY INHALATION TREATMENT: CPT

## 2017-06-06 RX ORDER — POLYETHYLENE GLYCOL 3350 17 G/17G
1 POWDER, FOR SOLUTION ORAL
Status: DISCONTINUED | OUTPATIENT
Start: 2017-06-06 | End: 2017-06-12 | Stop reason: HOSPADM

## 2017-06-06 RX ORDER — ONDANSETRON 2 MG/ML
4 INJECTION INTRAMUSCULAR; INTRAVENOUS EVERY 4 HOURS PRN
Status: DISCONTINUED | OUTPATIENT
Start: 2017-06-06 | End: 2017-06-12 | Stop reason: HOSPADM

## 2017-06-06 RX ORDER — BISACODYL 10 MG
10 SUPPOSITORY, RECTAL RECTAL
Status: DISCONTINUED | OUTPATIENT
Start: 2017-06-06 | End: 2017-06-12 | Stop reason: HOSPADM

## 2017-06-06 RX ORDER — AMOXICILLIN 250 MG
2 CAPSULE ORAL 2 TIMES DAILY
Status: DISCONTINUED | OUTPATIENT
Start: 2017-06-06 | End: 2017-06-12 | Stop reason: HOSPADM

## 2017-06-06 RX ORDER — BUDESONIDE AND FORMOTEROL FUMARATE DIHYDRATE 160; 4.5 UG/1; UG/1
2 AEROSOL RESPIRATORY (INHALATION) 2 TIMES DAILY
Status: DISCONTINUED | OUTPATIENT
Start: 2017-06-06 | End: 2017-06-12 | Stop reason: HOSPADM

## 2017-06-06 RX ORDER — FINASTERIDE 5 MG/1
5 TABLET, FILM COATED ORAL DAILY
Status: DISCONTINUED | OUTPATIENT
Start: 2017-06-07 | End: 2017-06-12 | Stop reason: HOSPADM

## 2017-06-06 RX ORDER — GUAIFENESIN/DEXTROMETHORPHAN 100-10MG/5
10 SYRUP ORAL EVERY 6 HOURS PRN
Status: DISCONTINUED | OUTPATIENT
Start: 2017-06-06 | End: 2017-06-12 | Stop reason: HOSPADM

## 2017-06-06 RX ORDER — VORICONAZOLE 200 MG/1
200 TABLET, FILM COATED ORAL EVERY 12 HOURS
Status: DISCONTINUED | OUTPATIENT
Start: 2017-06-06 | End: 2017-06-12 | Stop reason: HOSPADM

## 2017-06-06 RX ORDER — SIMVASTATIN 40 MG
40 TABLET ORAL NIGHTLY
Status: DISCONTINUED | OUTPATIENT
Start: 2017-06-06 | End: 2017-06-12 | Stop reason: HOSPADM

## 2017-06-06 RX ORDER — ENALAPRILAT 1.25 MG/ML
1.25 INJECTION INTRAVENOUS EVERY 6 HOURS PRN
Status: DISCONTINUED | OUTPATIENT
Start: 2017-06-06 | End: 2017-06-12 | Stop reason: HOSPADM

## 2017-06-06 RX ORDER — ONDANSETRON 4 MG/1
4 TABLET, ORALLY DISINTEGRATING ORAL EVERY 4 HOURS PRN
Status: DISCONTINUED | OUTPATIENT
Start: 2017-06-06 | End: 2017-06-12 | Stop reason: HOSPADM

## 2017-06-06 RX ORDER — TAMSULOSIN HYDROCHLORIDE 0.4 MG/1
0.4 CAPSULE ORAL DAILY
Status: DISCONTINUED | OUTPATIENT
Start: 2017-06-07 | End: 2017-06-12 | Stop reason: HOSPADM

## 2017-06-06 RX ORDER — FAMOTIDINE 20 MG/1
20 TABLET, FILM COATED ORAL 2 TIMES DAILY
Status: DISCONTINUED | OUTPATIENT
Start: 2017-06-06 | End: 2017-06-12 | Stop reason: HOSPADM

## 2017-06-06 RX ORDER — SEVELAMER HYDROCHLORIDE 800 MG/1
800 TABLET, FILM COATED ORAL
Status: DISCONTINUED | OUTPATIENT
Start: 2017-06-06 | End: 2017-06-12 | Stop reason: HOSPADM

## 2017-06-06 RX ORDER — IPRATROPIUM BROMIDE AND ALBUTEROL SULFATE 2.5; .5 MG/3ML; MG/3ML
3 SOLUTION RESPIRATORY (INHALATION) 4 TIMES DAILY
Status: DISCONTINUED | OUTPATIENT
Start: 2017-06-06 | End: 2017-06-07

## 2017-06-06 RX ORDER — ALBUTEROL SULFATE 90 UG/1
2 AEROSOL, METERED RESPIRATORY (INHALATION) EVERY 4 HOURS PRN
Status: DISCONTINUED | OUTPATIENT
Start: 2017-06-06 | End: 2017-06-12 | Stop reason: HOSPADM

## 2017-06-06 RX ORDER — HEPARIN SODIUM 5000 [USP'U]/ML
5000 INJECTION, SOLUTION INTRAVENOUS; SUBCUTANEOUS EVERY 8 HOURS
Status: DISCONTINUED | OUTPATIENT
Start: 2017-06-06 | End: 2017-06-12 | Stop reason: HOSPADM

## 2017-06-06 RX ADMIN — MEROPENEM 500 MG: 500 INJECTION, POWDER, FOR SOLUTION INTRAVENOUS at 17:22

## 2017-06-06 RX ADMIN — SIMVASTATIN 40 MG: 40 TABLET, FILM COATED ORAL at 21:59

## 2017-06-06 RX ADMIN — VORICONAZOLE 200 MG: 200 TABLET, FILM COATED ORAL at 21:58

## 2017-06-06 RX ADMIN — IPRATROPIUM BROMIDE AND ALBUTEROL SULFATE 3 ML: .5; 3 SOLUTION RESPIRATORY (INHALATION) at 19:39

## 2017-06-06 RX ADMIN — HEPARIN SODIUM 5000 UNITS: 5000 INJECTION, SOLUTION INTRAVENOUS; SUBCUTANEOUS at 21:58

## 2017-06-06 RX ADMIN — METOPROLOL TARTRATE 25 MG: 25 TABLET, FILM COATED ORAL at 21:59

## 2017-06-06 RX ADMIN — FAMOTIDINE 20 MG: 20 TABLET, FILM COATED ORAL at 21:00

## 2017-06-06 RX ADMIN — RENAGEL 800 MG: 800 TABLET ORAL at 17:22

## 2017-06-06 ASSESSMENT — COGNITIVE AND FUNCTIONAL STATUS - GENERAL
PERSONAL GROOMING: A LITTLE
SUGGESTED CMS G CODE MODIFIER DAILY ACTIVITY: CK
MOVING FROM LYING ON BACK TO SITTING ON SIDE OF FLAT BED: A LITTLE
HELP NEEDED FOR BATHING: A LITTLE
WALKING IN HOSPITAL ROOM: A LITTLE
TOILETING: A LITTLE
MOBILITY SCORE: 17
DAILY ACTIVITIY SCORE: 19
MOVING TO AND FROM BED TO CHAIR: A LITTLE
CLIMB 3 TO 5 STEPS WITH RAILING: A LOT
SUGGESTED CMS G CODE MODIFIER MOBILITY: CK
DRESSING REGULAR LOWER BODY CLOTHING: A LITTLE
DRESSING REGULAR UPPER BODY CLOTHING: A LITTLE
TURNING FROM BACK TO SIDE WHILE IN FLAT BAD: A LITTLE
STANDING UP FROM CHAIR USING ARMS: A LITTLE

## 2017-06-06 ASSESSMENT — PAIN SCALES - GENERAL
PAINLEVEL_OUTOF10: 0
PAINLEVEL_OUTOF10: 0

## 2017-06-06 ASSESSMENT — LIFESTYLE VARIABLES
DO YOU DRINK ALCOHOL: NO
EVER_SMOKED: YES

## 2017-06-06 NOTE — PROGRESS NOTES
Chief Complaint   Patient presents with   • Hospital Follow-up     Aspergillus pneumonia        HISTORY OF PRESENT ILLNESS:  Patient is a 76 y.o. male established patient who presents today for FU. He has a h/o Wegners, H/o DANIEL s/p treatment, H/o histo, fungal PNA and ESRD on HD.     Sputum C/s 12/6 (2 strains, both sensi to cipro) and 12/25/16 were PSAR. 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 However, he states he developed blurred vision while on voriconazole.    Pt last seen in the clinic in April. He is here today for worsening shortness of breath and cough. He was recently hospitalized in May for worsening SOB, productive cough, chills and fevers. He was found to have influenza B and treated with tamiflu, ceftriaxone and doxycycline. He was discharged to complete a course of doxy. Since then he continues to feel poorly with more trouble breathing, ongoing cough and decreased energy. He was started on home oxygen a few weeks ago and is now on 2 LNC all day and throughout the night, however he is not using supplemental oxygen at this time. Recent sputum cultures from 05/24/17 are growing MDR pseudomonas and mold. He is currently not only any abx but states he has inhaled tobramycin at home.      Patient Active Problem List    Diagnosis Date Noted   • Hypotension 02/01/2015     Priority: High   • Adult failure to thrive 01/13/2012     Priority: High   • Pneumonia 12/14/2011     Priority: High   • Debility 10/12/2011     Priority: High   • Chronic respiratory infection 10/11/2011     Priority: High   • Pneumonia of both lungs due to influenza B , post-influenza pneumonia/HCAP 05/14/2017     Priority: Medium   • Acute on chronic respiratory failure with hypoxia due to influenza B, not due to sepsis(CMS-[facility]) 05/14/2017     Priority: Medium   • Macrocytosis 02/01/2015     Priority: Medium   • Pacemaker 02/06/2014     Priority:  Medium   • Immunosuppressed status ([facility]) 02/06/2014     Priority: Medium   • Anemia of chronic renal failure, stage 5 (CMS-HCC) 12/14/2011     Priority: Medium   • Protein-calorie malnutrition, severe ([facility]) 10/11/2011     Priority: Medium   • Dysphagia 09/27/2011     Priority: Medium   • Odynophagia 09/27/2011     Priority: Medium   • Esophageal stricture 09/27/2011     Priority: Medium   • COPD (chronic obstructive pulmonary disease) (CMS-HCC) 05/14/2017     Priority: Low   • Advance care planning 10/05/2016     Priority: Low   • AV block 02/06/2014     Priority: Low   • BPH (benign prostatic hyperplasia) 12/14/2011     Priority: Low   • Wegeners granulomatosis (CMS-HCC) 12/14/2011     Priority: Low   • ESRD on dialysis (CMS-HCC) 11/08/2011     Priority: Low   • HTN (hypertension) 10/12/2011     Priority: Low   • TIMOTHY (obstructive sleep apnea) 10/12/2011     Priority: Low   • AV block, 3rd degree ([facility]) 07/07/2011     Priority: Low   • 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   • Atrial fibrillation (CMS-HCC) 04/18/2017   • Abnormal CT scan of lung 10/25/2016   • Thrush 10/25/2016   • 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     • metoprolol (LOPRESSOR) 25 MG Tab Take 25 mg by mouth 2 times a day.     • predniSONE (DELTASONE) 1 MG Tab Take 14 mg by mouth every day. Pt takes x1 10mg tabs and x4 1mg tabs for total dose = 14mg QD     • ipratropium-albuterol (DUONEB) 0.5-2.5 (3) MG/3ML nebulizer solution 3 mL by Nebulization route 4 times a day.  1080 mL 3   • B Complex-C-Folic Acid (DIALYVITE TABLET) Tab Dialyvite -nehro vit B complex-C-folic acid 1 tablet po QD Dialysis Pt 90 Tab 3   • fluticasone-salmeterol (ADVAIR DISKUS) 500-50 MCG/DOSE AEROSOL POWDER, BREATH ACTIVATED Inhale 1 Puff by mouth 2 times a day. Rinse mouth after each use. 1 Inhaler 6   • albuterol 108 (90 BASE) MCG/ACT Aero Soln inhalation aerosol Inhale 2 Puffs by mouth every four hours as needed for Shortness of Breath.     • ranitidine (ZANTAC) 150 MG Tab TAKE 1 TABLET BY MOUTH EVERY NIGHT AT BEDTIME 30 Tab 0   • finasteride (PROSCAR) 5 MG Tab Take 5 mg by mouth every day.     • simvastatin (ZOCOR) 40 MG TABS Take 40 mg by mouth every evening.     • epoetin lucina (EPOGEN,PROCRIT) 3000 UNIT/ML SOLN Inject 2,200 Units as instructed every Monday, Wednesday, and Friday. With dialysis     • sevelamer (RENAGEL) 800 MG TABS Take 800 mg by mouth 3 times a day, with meals.     • tamsulosin (FLOMAX) 0.4 MG capsule Take 0.4 mg by mouth every day.     • aspirin EC (ECOTRIN) 81 MG TBEC Take 81 mg by mouth every day.       No current facility-administered medications for this visit.       Social History   Substance Use Topics   • Smoking status: Former Smoker -- 30 years     Types: Pipe     Quit date: 01/01/1990   • Smokeless tobacco: Former User     Types: Chew   • Alcohol Use: 4.8 oz/week     7 Glasses of wine, 1 Standard drinks or equivalent per week      Comment: Red wine nightly       Family History   Problem Relation Age of Onset   • Stroke Father    • Heart Disease Father    • Cancer     • Stroke Mother        ROS:  Review of Systems   Constitutional: Negative for fever, but +chills, weakness  Eyes: Negative for blurred vision.  Respiratory: + for cough, sputum production, shortness of breath   Cardiovascular: Negative for chest pain, palpitations, orthopnea and leg swelling.    Skin: Negative for rash and itching.   All other systems reviewed and are negative except as in HPI.      Exam:  Blood  "pressure 90/40, pulse 84, temperature 36.6 °C (97.9 °F), height 1.702 m (5' 7.01\"), weight 58.4 kg (128 lb 12 oz), SpO2 93 %.  General:  Well developed male in NAD. Frail. Pleasant and cooperative, elderly, chronically ill appearing, but nontoxic  Head: NCAT, Pupils are equal round reactive to light. Extraocular movements intact. Oropharynx is clear.  Neck: Supple.  No LAD  Pulmonary:  +rhonchi and crackles bilaterally, no wheezing. Unlabored  Cardiovascular: Regular rate and rhythm without murmur. No ectopy  Abdominal: Soft, nontender, nondistended.   Skin: dry skin  Extremities: no clubbing, cyanosis, or edema.   Neurologic: Alert and oriented x4. Speech is fluent without dysarthria. Cranial nerves intact. Moving all extremities. .    MICRO:  5/24/17 sputum cx  Pseudomonas aeruginosa   Light growth   P.aeruginosa may develop resistance during prolonged therapy   with all antibiotics. Isolates that are initially susceptible   may become resistant within three to four days after   initiation of therapy. Testing of repeat isolates may be   warranted.         Respiratory Culture (Abnormal)     Mould   Light growth       PSEUDOMONAS AERUGINOSA      Antibiotic Sensitivity Microscan Unit Status     Amikacin Resistant >256 mcg/mL Final     Aztreonam Sensitive 4 mcg/mL Final     Ceftazidime Sensitive 8 mcg/mL Final     Ciprofloxacin Resistant 6 mcg/mL Final     Gentamicin Resistant 16 mcg/mL Final     Imipenem Sensitive 0.75 mcg/mL Final     Ticarcillin/CA Resistant >256 mcg/mL Final     Tobramycin Intermediate 8 mcg/mL Final                 Imaging:  CT a/p 04/12/17  Right basilar airspace disease and areas of bronchiectasis. Airspace disease is in a similar distribution to that seen on previous exam, however is overall more extensive.    Assessment/Plan:  1. Pneumonia due to Pseudomonas species, unspecified laterality, unspecified part of lung (CMS-[facility])     2. Chronic respiratory infection       Given clinical " presentation with worsening cough, shortness of breath and chills in the setting of recent MDR PSAR and mold in his respiratory cultures, pt will be admitted for IV abx (meropenem and inhaled tobramycin). He also needs a CT chest as last one done was back in April and showed progression of airspace disease; concern for worsening.  Recent sputum cultures from 05/24/17 with MDR PSAR and mold  Requested micro to ID mould and add zerbaxa and avicaz susceptibilities to PSAR  Spoke with Dr. Denson who has accepted the admission.     ID will follow inpatient.    >45 min spent face to face with patient, >50% of time spent counseling, coordinating care, reviewing records, discussing POC, educating patient and arranging hospital admission.      Ruma Bowser M.D.

## 2017-06-06 NOTE — IP AVS SNAPSHOT
" <p align=\"LEFT\"><IMG SRC=\"//[email or web address]\" alt=\"Image\" WIDTH=\"50%\" HEIGHT=\"200\" BORDER=\"\"></p>                   Name:Gilberto Brown  Medical Record Number:[id number]  CSN: [id number]    YOB: 1940   Age: 76 y.o.  Sex: male  HT:1.702 m (5' 7.01\") WT: 58.4 kg (128 lb 12 oz)          Admit Date: 6/6/2017     Discharge Date:   Today's Date: 6/12/2017  Attending Doctor:  Donald Beck M.D.                  Allergies:  Erythromycin and Rituximab          Your appointments     Jun 13, 2017  5:30 PM   New Antibiotics with INFUSION QUICK INJECT   [facility] ([facility])    [address] [address]  [place] [address]   [phone]            Jun 14, 2017  5:30 PM   Est Antibiotics with INFUSION QUICK INJECT   [facility] ([facility])    [address] [address]  [place] [address]   [phone]            Marcellus 15, 2017  5:30 PM   Est Antibiotics with INFUSION QUICK INJECT   [facility] ([facility])    [address] [address]  [place] [address]   [phone]            Jun 16, 2017  5:30 PM   Est Antibiotics with INFUSION QUICK INJECT   [facility] ([facility])    [address] [address]  [place] [address]   [phone]            Jun 17, 2017  5:30 PM   Est Antibiotics with RN 5   [facility] ([facility])    [address] [address]  [place] [address]   [phone]            Jun 18, 2017  5:30 PM   Est Antibiotics with RN 1   [facility] ([facility])    [address] [address]  [place] [address]   [phone]            Jun 19, 2017  5:00 PM   Est Antibiotics with RN 4   [facility] ([facility])    [address] [address]  [place] [address]   [phone]            Jun 20, 2017  5:00 PM   Est Antibiotics with RN 4   [facility] ([facility])    [address] [address]  [place] [address]   [phone]            Jun 21, 2017  5:00 PM   Est Antibiotics with RN 6   [facility] ([facility])    [address] [address]  [place] [address]   [phone]      "     Jun 22, 2017  5:00 PM   Est Antibiotics with RN 6   [facility] ([facility])    [address] [facility] [address]  [place] [address]   [phone]            Jun 23, 2017  6:00 PM   Est Antibiotics with RN 1   [facility] ([facility])    [address] [facility] [address]  [place] [address]   [phone]            Jun 24, 2017  5:00 PM   Est Antibiotics with RN 1   [facility] ([facility])    [address] [facility] [address]  [place] [address]   [phone]            Jun 25, 2017  5:00 PM   Est Antibiotics with RN 1   [facility] ([facility])    [address] [facility] [address]  [place] [address]   [phone]            Jun 26, 2017  5:00 PM   Est Antibiotics with INFUSION QUICK INJECT   [facility] ([facility])    [address] [facility] [address]  [place] [address]   [phone]            Jun 27, 2017  5:00 PM   Est Antibiotics with INFUSION QUICK INJECT   [facility] ([facility])    [address]  [place] [address]   [phone]            Jun 28, 2017  5:00 PM   Est Antibiotics with INFUSION QUICK INJECT   [facility] ([facility])    [address] [facility] [address]  [place] [address]   [phone]            Jun 29, 2017  5:00 PM   Est Antibiotics with RN 6   [facility] ([facility])    [address] [facility] [address]  [place] [address]   [phone]            Jun 30, 2017  5:00 PM   Est Antibiotics with RN 6   [facility] ([facility])    [address] [facility] [address]  [place] [address]   [phone]            Jul 01, 2017  5:00 PM   Est Antibiotics with RN 1   [facility] ([facility])    [address] [facility] [address]  [place] [address]   [phone]            Jul 02, 2017  5:00 PM   Est Antibiotics with RN 1   [facility] ([facility])    [address] [facility] [address]  [place] [address]   [phone]            Jul 03, 2017  5:00 PM   Est Antibiotics with RN 6   [facility] ([facility])    [address] [facility] [address]  [place] [address]   [phone]            Jul 04, 2017  5:00 PM   Est Antibiotics with  RN 6   Infusion [facility] ([facility])    [address] [facility] [address]  [facility] [address] [phone]            Aug 22, 2017  8:00 AM   Established Patient Pul with Eric Vaughan M.D.   [facility] (--)    [address]  [address]  [facility] [address] [phone]            Aug 29, 2017  2:00 PM   Follow Up Visit with Cintia Ariza M.D.   [facility] (--)    [address]  [facility] [address] [phone]           You will be receiving a confirmation call a few days before your appointment from our automated call confirmation system.              Follow-up Information     1. Follow up with Christine Zamudio M.D..    Specialty:  Internal Medicine    Contact information    [address] [facility]  [facility] [address]  [phone]           Medication List      Take these Medications        Instructions    albuterol 108 (90 BASE) MCG/ACT Aers inhalation aerosol    Inhale 2 Puffs by mouth every four hours as needed for Shortness of Breath.   Dose:  2 Puff       aspirin EC 81 MG Tbec   Commonly known as:  ECOTRIN    Take 81 mg by mouth every day.   Dose:  81 mg       colistimethate 150 MG Solr   Commonly known as:  COLY-MYCIN M    Inhale 150 mg by mouth 2 Times a Day for 28 days.   Dose:  150 mg       DIALYVITE TABLET Tabs    Dialyvite -nehro vit B complex-C-folic acid 1 tablet po QD Dialysis Pt       epoetin lucina 3000 UNIT/ML Soln   Commonly known as:  EPOGEN,PROCRIT    Inject 2,200 Units as instructed every Monday, Wednesday, and Friday. With dialysis   Dose:  2200 Units       finasteride 5 MG Tabs   Commonly known as:  PROSCAR    Take 5 mg by mouth every day.   Dose:  5 mg       fluticasone-salmeterol 500-50 MCG/DOSE Aepb   Commonly known as:  ADVAIR DISKUS    Inhale 1 Puff by mouth 2 times a day. Rinse mouth after each use.   Dose:  1 Puff       ipratropium-albuterol 0.5-2.5 (3) MG/3ML nebulizer solution   Commonly known as:  DUONEB    3 mL by Nebulization route  4 times a day.   Dose:  3 mL       metoprolol 25 MG Tabs   Commonly known as:  LOPRESSOR    Take 25 mg by mouth 2 times a day.   Dose:  25 mg       non-formulary med    Inhale 2 L by mouth every bedtime. OXYGEN AT 2L/NC AS NEEDED AND AT NIGHT  Indications: copd   Dose:  2 L       NS SOLN 100 mL with meropenem 500 MG SOLR 500 mg    500 mg by Intravenous route every 24 hours for 28 days.   Dose:  500 mg       predniSONE 1 MG Tabs   Commonly known as:  DELTASONE    Take 14 mg by mouth every day. Pt takes x1 10mg tabs and x4 1mg tabs for total dose = 14mg QD   Dose:  14 mg       ranitidine 150 MG Tabs   Commonly known as:  ZANTAC    TAKE 1 TABLET BY MOUTH EVERY NIGHT AT BEDTIME       sevelamer 800 MG Tabs   Commonly known as:  RENAGEL    Take 800 mg by mouth 3 times a day, with meals.   Dose:  800 mg       simvastatin 40 MG Tabs   Commonly known as:  ZOCOR    Take 40 mg by mouth every evening.   Dose:  40 mg       sterile water Soln    10 mL by Injection route 2 Times a Day for 28 days.   Dose:  10 mL       tamsulosin 0.4 MG capsule   Commonly known as:  FLOMAX    Take 0.4 mg by mouth every day.   Dose:  0.4 mg       voriconazole 200 MG Tabs   Commonly known as:  VFEND    Take 1 Tab by mouth every 12 hours for 28 days.   Dose:  200 mg

## 2017-06-06 NOTE — PROGRESS NOTES
2 RN skin check with Shakila STANLEY. Patient with abrasion/scab on right shoulder. Bilateral heels dry, flaky. Bottom red, blanching.

## 2017-06-06 NOTE — CONSULTS
ID consult from Dr Ferreira  RE: PNA  76 y.o. male who presented today for FU in clinic.  C/o malaise and worsening SOB and cough  h/o Wegners, H/o DANIEL s/p treatment, H/o histo, fungal PNA and ESRD on HD.   Recent sputum cultures from 05/24/17 with MDR PSAR and mold  Requested micro to ID mold and add Zerbaxa and Avicaz susceptibilities to PSAR  Start meropenem and inh colistin (Intermed resistance to tobra) for PSAR  Start voriconazole for mold pending ID  Full consult to follow

## 2017-06-06 NOTE — PROGRESS NOTES
Patient arrived to floor, direct admit. Consent for treatment signed, Dr. Ferreira notified of arrival. Patient placed on telemetry monitor, oriented to room. Vital signs obtained. Awaiting further orders from MD.

## 2017-06-06 NOTE — TELEPHONE ENCOUNTER
Called and spoke with pt's wife. Informed her of the direct admit room number to take her  to. She is to report to room T-822. She verbally understood all information/instructions and will comply with all given. CORY

## 2017-06-06 NOTE — IP AVS SNAPSHOT
" Home Care Instructions                                                                                                                  Name:Gilberto Brown  Medical Record Number:[id number]  CSN: [id number]    YOB: 1940   Age: 76 y.o.  Sex: male  HT:1.702 m (5' 7.01\") WT: 58.4 kg (128 lb 12 oz)          Admit Date: 6/6/2017     Discharge Date:   Today's Date: 6/12/2017  Attending Doctor:  Donald Beck M.D.                  Allergies:  Erythromycin and Rituximab            Discharge Instructions       Discharge Instructions    Discharged to home by car with relative. Discharged via wheelchair, hospital escort: Refused.  Special equipment needed: Not Applicable    Be sure to schedule a follow-up appointment with your primary care doctor or any specialists as instructed.     Discharge Plan:   Diet Plan: Discussed  Activity Level: Discussed  Confirmed Follow up Appointment: Appointment Scheduled  Confirmed Symptoms Management: Discussed  Medication Reconciliation Updated: Yes  Influenza Vaccine Indication: Not indicated: Previously immunized this influenza season and > 8 years of age    I understand that a diet low in cholesterol, fat, and sodium is recommended for good health. Unless I have been given specific instructions below for another diet, I accept this instruction as my diet prescription.   Other diet: Regular with supplements    Special Instructions: None    · Is patient discharged on Warfarin / Coumadin?   No     · Is patient Post Blood Transfusion?  No    Depression / Suicide Risk    As you are discharged from this [facility] facility, it is important to learn how to keep safe from harming yourself.    Recognize the warning signs:  · Abrupt changes in personality, positive or negative- including increase in energy   · Giving away possessions  · Change in eating patterns- significant weight changes-  positive or negative  · Change in sleeping patterns- unable to sleep or sleeping all the " time   · Unwillingness or inability to communicate  · Depression  · Unusual sadness, discouragement and loneliness  · Talk of wanting to die  · Neglect of personal appearance   · Rebelliousness- reckless behavior  · Withdrawal from people/activities they love  · Confusion- inability to concentrate     If you or a loved one observes any of these behaviors or has concerns about self-harm, here's what you can do:  · Talk about it- your feelings and reasons for harming yourself  · Remove any means that you might use to hurt yourself (examples: pills, rope, extension cords, firearm)  · Get professional help from the community (Mental Health, Substance Abuse, psychological counseling)  · Do not be alone:Call your Safe Contact- someone whom you trust who will be there for you.  · Call your local CRISIS HOTLINE 410-8815 or 088-016-1693  · Call your local Children's Mobile Crisis Response Team [place] (131) 454-1571 or www.[employer]  · Call the toll free National Suicide Prevention Hotlines   · National Suicide Prevention Lifeline 380-481-KRRQ (3791)  · National Hope Line Network 800-SUICIDE (508-1449)              Your appointments     Jun 13, 2017  5:30 PM   New Antibiotics with INFUSION QUICK INJECT   Infusion Services ([facility])    [id number]  [place] [address]   [phone]            Jun 14, 2017  5:30 PM   Est Antibiotics with INFUSION QUICK INJECT   Infusion Services ([facility])    [address]  [place] [address]   [phone]            Marcellus 15, 2017  5:30 PM   Est Antibiotics with INFUSION QUICK INJECT   Infusion Services ([facility])    [address]  [place] [address]   [phone]            Jun 16, 2017  5:30 PM   Est Antibiotics with INFUSION QUICK INJECT   Infusion Services ([facility])    [address]  [place] [address]   [phone]            Jun 17, 2017  5:30 PM   Est Antibiotics with RN 5   Infusion Services ([facility])    [address] [facility]  [address]  [place] [address]   [phone]            Jun 18, 2017  5:30 PM   Est Antibiotics with RN 1   Infusion Services ([facility])    [address]  [place] [address]   [phone]            Jun 19, 2017  5:00 PM   Est Antibiotics with RN 4   Infusion Services ([facility])    [address]  [place] [address]   [phone]            Jun 20, 2017  5:00 PM   Est Antibiotics with RN 4   Infusion Services ([facility])    [address] [facility] [address]  [place] [address]   [phone]            Jun 21, 2017  5:00 PM   Est Antibiotics with RN 6   Infusion Services ([facility])    [address] [facility] [address]  [place] [address]   [phone]            Jun 22, 2017  5:00 PM   Est Antibiotics with RN 6   Infusion Services ([facility])    [address] [facility] [address]  [place] [address]   [phone]            Jun 23, 2017  6:00 PM   Est Antibiotics with RN 1   Infusion Services ([facility])    [address]  [place] [address]   [phone]            Jun 24, 2017  5:00 PM   Est Antibiotics with RN 1   Infusion Services ([facility])    [address] [facility] [address]  [place] [address]   [phone]            Jun 25, 2017  5:00 PM   Est Antibiotics with RN 1   Infusion Services ([facility])    [address] [facility] [address]  [place] [address]   [phone]            Jun 26, 2017  5:00 PM   Est Antibiotics with INFUSION QUICK INJECT   Infusion Services ([facility])    [address] [facility] [address]  [place] [address]   [phone]            Jun 27, 2017  5:00 PM   Est Antibiotics with INFUSION QUICK INJECT   Infusion Services ([facility])    [address] [facility] [address]  [place] [address]   [phone]            Jun 28, 2017  5:00 PM   Est Antibiotics with INFUSION QUICK INJECT   Infusion Services ([facility])    [address] [facility] [address]  [place] [address]   [phone]            Jun 29, 2017  5:00 PM   Est Antibiotics with RN 6   Infusion Services ([facility])    [address] [facility] [address]  [place] [address]   [phone]             Jun 30, 2017  5:00 PM   Est Antibiotics with RN 6   Infusion Services ([facility])    [address] [facility] [address]  [place] [address]   [phone]            Jul 01, 2017  5:00 PM   Est Antibiotics with RN 1   Infusion Services ([facility])    [address] [facility] [address]  [place] [address]   [phone]            Jul 02, 2017  5:00 PM   Est Antibiotics with RN 1   Infusion Services ([facility])    [address]  [place] [address]   [phone]            Jul 03, 2017  5:00 PM   Est Antibiotics with RN 6   Infusion Services ([facility])    [address]  [place] [address]   [phone]            Jul 04, 2017  5:00 PM   Est Antibiotics with RN 6   Infusion Services ([facility])    [address]  [place] [address]   [phone]            Aug 22, 2017  8:00 AM   Established Patient Pul with Eric Vaughan M.D.   [facility] (--)    [address]  [place] [id number] [phone]            Aug 29, 2017  2:00 PM   Follow Up Visit with Cintia Ariza M.D.   [facility] (--)    [address]  [place] [address] [phone]           You will be receiving a confirmation call a few days before your appointment from our automated call confirmation system.              Follow-up Information     1. Follow up with Christine Zamudio M.D..    Specialty:  Internal Medicine    Contact information    [address]  [place] [address]  [phone]           Discharge Medication Instructions:    Below are the medications your physician expects you to take upon discharge:    Review all your home medications and newly ordered medications with your doctor and/or pharmacist. Follow medication instructions as directed by your doctor and/or pharmacist.    Please keep your medication list with you and share with your physician.               Medication List      START taking these medications        Instructions    Morning Afternoon Evening  Bedtime    colistimethate 150 MG Solr   Last time this was given:  150 mg on 6/12/2017  6:56 AM   Commonly known as:  COLY-MYCIN M        Inhale 150 mg by mouth 2 Times a Day for 28 days.   Dose:  150 mg                        NS SOLN 100 mL with meropenem 500 MG SOLR 500 mg   Last time this was given:  500 mg on 6/11/2017  7:58 PM        500 mg by Intravenous route every 24 hours for 28 days.   Dose:  500 mg                        sterile water Soln        10 mL by Injection route 2 Times a Day for 28 days.   Dose:  10 mL                        voriconazole 200 MG Tabs   Last time this was given:  200 mg on 6/12/2017 10:13 AM   Commonly known as:  VFEND        Take 1 Tab by mouth every 12 hours for 28 days.   Dose:  200 mg                          CONTINUE taking these medications        Instructions    Morning Afternoon Evening Bedtime    albuterol 108 (90 BASE) MCG/ACT Aers inhalation aerosol        Inhale 2 Puffs by mouth every four hours as needed for Shortness of Breath.   Dose:  2 Puff                        aspirin EC 81 MG Tbec   Last time this was given:  81 mg on 6/12/2017 10:14 AM   Commonly known as:  ECOTRIN        Take 81 mg by mouth every day.   Dose:  81 mg                        DIALYVITE TABLET Tabs        Dialyvite -nehro vit B complex-C-folic acid 1 tablet po QD Dialysis Pt                        epoetin lucina 3000 UNIT/ML Soln   Last time this was given:  3,000 Units on 6/12/2017  7:20 AM   Commonly known as:  EPOGEN,PROCRIT        Inject 2,200 Units as instructed every Monday, Wednesday, and Friday. With dialysis   Dose:  2200 Units                        finasteride 5 MG Tabs   Last time this was given:  5 mg on 6/12/2017  9:00 AM   Commonly known as:  PROSCAR        Take 5 mg by mouth every day.   Dose:  5 mg                        fluticasone-salmeterol 500-50 MCG/DOSE Aepb   Commonly known as:  ADVAIR DISKUS        Inhale 1 Puff by mouth 2 times a day. Rinse mouth after each use.   Dose:  1  Puff                        ipratropium-albuterol 0.5-2.5 (3) MG/3ML nebulizer solution   Last time this was given:  3 mL on 6/12/2017  2:37 PM   Commonly known as:  DUONEB        3 mL by Nebulization route 4 times a day.   Dose:  3 mL                        metoprolol 25 MG Tabs   Last time this was given:  25 mg on 6/11/2017  7:59 PM   Commonly known as:  LOPRESSOR        Take 25 mg by mouth 2 times a day.   Dose:  25 mg                        non-formulary med        Inhale 2 L by mouth every bedtime. OXYGEN AT 2L/NC AS NEEDED AND AT NIGHT  Indications: copd   Dose:  2 L                        predniSONE 1 MG Tabs   Last time this was given:  14 mg on 6/11/2017 10:37 AM   Commonly known as:  DELTASONE        Take 14 mg by mouth every day. Pt takes x1 10mg tabs and x4 1mg tabs for total dose = 14mg QD   Dose:  14 mg                        ranitidine 150 MG Tabs   Commonly known as:  ZANTAC        TAKE 1 TABLET BY MOUTH EVERY NIGHT AT BEDTIME                        sevelamer 800 MG Tabs   Last time this was given:  800 mg on 6/12/2017 12:48 PM   Commonly known as:  RENAGEL        Take 800 mg by mouth 3 times a day, with meals.   Dose:  800 mg                        simvastatin 40 MG Tabs   Last time this was given:  40 mg on 6/11/2017  7:59 PM   Commonly known as:  ZOCOR        Take 40 mg by mouth every evening.   Dose:  40 mg                        tamsulosin 0.4 MG capsule   Last time this was given:  0.4 mg on 6/12/2017 10:14 AM   Commonly known as:  FLOMAX        Take 0.4 mg by mouth every day.   Dose:  0.4 mg                             Where to Get Your Medications      These medications were sent to [id number] - [place] - [address] [facility] [address]  [facility] [place] [address]     Phone:  [phone]    - sterile water Soln  - voriconazole 200 MG Tabs      Information about where to get these medications is not yet available     ! Ask your nurse or  doctor about these medications    - colistimethate 150 MG Solr  - NS SOLN 100 mL with meropenem 500 MG SOLR 500 mg            Instructions           Diet / Nutrition:    Follow any diet instructions given to you by your doctor or the dietician, including how much salt (sodium) you are allowed each day.    If you are overweight, talk to your doctor about a weight reduction plan.    Activity:    Remain physically active following your doctor's instructions about exercise and activity.    Rest often.     Any time you become even a little tired or short of breath, SIT DOWN and rest.    Worsening Symptoms:    Report any of the following signs and symptoms to the doctor's office immediately:    *Pain of jaw, arm, or neck  *Chest pain not relieved by medication                               *Dizziness or loss of consciousness  *Difficulty breathing even when at rest   *More tired than usual                                       *Bleeding drainage or swelling of surgical site  *Swelling of feet, ankles, legs or stomach                 *Fever (>100ºF)  *Pink or blood tinged sputum  *Weight gain (3lbs/day or 5lbs /week)           *Shock from internal defibrillator (if applicable)  *Palpitations or irregular heartbeats                *Cool and/or numb extremities    Stroke Awareness    Common Risk Factors for Stroke include:    Age  Atrial Fibrillation  Carotid Artery Stenosis  Diabetes Mellitus  Excessive alcohol consumption  High blood pressure  Overweight   Physical inactivity  Smoking    Warning signs and symptoms of a stroke include:    *Sudden numbness or weakness of the face, arm or leg (especially on one side of the body).  *Sudden confusion, trouble speaking or understanding.  *Sudden trouble seeing in one or both eyes.  *Sudden trouble walking, dizziness, loss of balance or coordination.Sudden severe headache with no known cause.    It is very important to get treatment quickly when a stroke occurs. If you experience  any of the above warning signs, call 911 immediately.                   Disclaimer         Quit Smoking / Tobacco Use:    I understand the use of any tobacco products increases my chance of suffering from future heart disease or stroke and could cause other illnesses which may shorten my life. Quitting the use of tobacco products is the single most important thing I can do to improve my health. For further information on smoking / tobacco cessation call a Toll Free Quit Line at 1-483.789.1767 (*National Cancer [employer]) or 1-966.648.2932 (American Lung Association) or you can access the web based program at www.lungusa.org.    [place] Tobacco Users Help Line:  (412) 264-5025       Toll Free: 1-866.330.8516  Quit Tobacco Program [facility] (747)218-4429    Crisis Hotline:    [place] Crisis Hotline:  1-059-XLPUQZN or 1-460.162.5816    [place] Crisis Hotline:    1-528.537.5380 or 984-222-4132    Discharge Survey:   Thank you for choosing [facility]. We hope we did everything we could to make your hospital stay a pleasant one. You may be receiving a phone survey and we would appreciate your time and participation in answering the questions. Your input is very valuable to us in our efforts to improve our service to our patients and their families.        My signature on this form indicates that:    1. I have reviewed and understand the above information.  2. My questions regarding this information have been answered to my satisfaction.  3. I have formulated a plan with my discharge nurse to obtain my prescribed medications for home.                  Disclaimer         __________________________________                     __________       ________                       Patient Signature                                                 Date                    Time

## 2017-06-06 NOTE — DISCHARGE PLANNING
[facility]. Please write home care orders upon discharge to home for continuum of care.[facility] at  /[phone] if there are any questions.

## 2017-06-06 NOTE — IP AVS SNAPSHOT
[employer] Access Code: Activation code not generated  Current [employer] Status: Active    [employer]  A secure, online tool to manage your health information     [employer]® is a secure, online tool that connects you to your personalized health information from the privacy of your home -- day or night - making it very easy for you to manage your healthcare. Once the activation process is completed, you can even access your medical information using the [employer] will, which is available for free in the Apple Will store or Google Play store.     [employer] provides the following levels of access (as shown below):   My Chart Features   [facility] Primary Care Doctor [facility]  Specialists [facility]  Urgent  Care Non-[facility]  Primary Care  Doctor   Email your healthcare team securely and privately 24/7 X X X X   Manage appointments: schedule your next appointment; view details of past/upcoming appointments X      Request prescription refills. X      View recent personal medical records, including lab and immunizations X X X X   View health record, including health history, allergies, medications X X X X   Read reports about your outpatient visits, procedures, consult and ER notes X X X X   See your discharge summary, which is a recap of your hospital and/or ER visit that includes your diagnosis, lab results, and care plan. X X       How to register for [employer]:  1. Go to  [email or web address].  2. Click on the Sign Up Now box, which takes you to the New Member Sign Up page. You will need to provide the following information:  a. Enter your [employer] Access Code exactly as it appears at the top of this page. (You will not need to use this code after you’ve completed the sign-up process. If you do not sign up before the expiration date, you must request a new code.)   b. Enter your date of birth.   c. Enter your home email address.   d. Click Submit, and follow the next screen’s instructions.  3. Create a [employer] ID. This will  be your "[employer] login ID and cannot be changed, so think of one that is secure and easy to remember.  4. Create a "[employer] password. You can change your password at any time.  5. Enter your Password Reset Question and Answer. This can be used at a later time if you forget your password.   6. Enter your e-mail address. This allows you to receive e-mail notifications when new information is available in "[employer].  7. Click Sign Up. You can now view your health information.    For assistance activating your "[employer] account, call [phone]

## 2017-06-06 NOTE — MR AVS SNAPSHOT
"Modestoayan Brown   2017 9:45 AM   Office Visit   MRN: [id number]    Department:  [facility]   Dept Phone:  [phone]    Description:  Male : 1940   Provider:  Ruma Bowser M.D.           Reason for Visit     Hospital Follow-up Aspergillus pneumonia       Allergies as of 2017     Allergen Noted Reactions    Erythromycin 2014   Unspecified    Abdominal pain.  RXN=before     Rituximab 08/15/2016       Flu like syndrome      You were diagnosed with     Pneumonia due to Pseudomonas species, unspecified laterality, unspecified part of lung (CMS-[facility])   [[id number]]       Chronic respiratory infection   [[id number]]         Vital Signs     Blood Pressure Pulse Temperature Height Weight Body Mass Index    90/40 mmHg 84 36.6 °C (97.9 °F) 1.702 m (5' 7.01\") 58.4 kg (128 lb 12 oz) 20.16 kg/m2    Oxygen Saturation Smoking Status                93% Former Smoker          Basic Information     Date Of Birth Sex Race Ethnicity Preferred Language    1940 Male White Non- English      Your appointments     2017  2:00 PM   PT-HH-HOME VISIT with Kasia Pérez P.T.A.   [facility] (--)    [address]  [place] [address]   [phone]            2017 To Be Determined   MSW-HH-HOME VISIT with BAILEY Young   [facility] (--)    [address]  [place] [address]   [phone]            2017 To Be Determined   SN-HH-HOME VISIT with Molina Campbell R.N.   [facility] (--)    [address]  [place] [address]   [phone]            2017  3:30 PM   PT-HH-HOME VISIT with Kasia Pérez P.T.A.   [facility] (--)    [address]  [place] [address]   [phone]            2017 To Be Determined   SN-HH-HOME VISIT with [facility] TEAM ADWOA   [facility] (--)    [address]  [place] [address]   [phone]            2017 To Be Determined   PT-HH-HOME VISIT with Kasia Pérez P.T.A.   "   [facility] (--)    [address]  [place] [address]   [phone]            Marcellus 15, 2017 To Be Determined   SN-HH-HOME VISIT with LiREBECCA Hunter.NVeronika   [facility] (--)    [address]  [place] [address]   [phone]            Marcellus 15, 2017 To Be Determined   PT-HH-HOME VISIT with Kasia Pérez P.T.A.   [facility] (--)    [address]  [place] [address]   [phone]            Jun 20, 2017 To Be Determined   PT-HH-HOME VISIT with Kasia Pérez P.T.A.   [facility] (--)    [address]  [place] [address]   [phone]            Jun 20, 2017 To Be Determined   SN-HH-HOME VISIT with [facility] DULCE CampbellNVeronika   [facility] (--)    [address]  [place] [address]   [phone]            Jun 22, 2017 To Be Determined   SN-HH-HOME VISIT with Liwayway Adrian R.NVeronika   [facility] (--)    [address]  [place] [address]   [phone]            Jun 23, 2017  1:00 PM   EO-NJ-RTHAQLOIFT DISCHARGE with Toña Shah P.T.   [facility] (--)    [address]  [place] [address]   [phone]            Jun 29, 2017 To Be Determined   SN-HH-HOME VISIT with Liwayway Adrian R.NVeronika   [facility] (--)    [address]  [place] [address]   [phone]            Jul 06, 2017 To Be Determined   SN-HH-HOME VISIT with Liwayway Adrian R.N.   [facility] (--)    [address]  [place] [address]   [phone]            Jul 13, 2017 To Be Determined   [facility]   [facility] (--)    [address]  [facility] [address]   [phone]            Jul 20, 2017 To Be Determined   SN-HH-HOME VISIT with [facility] (--)    [address]  [facility] [address]   [phone]            Aug 22, 2017  8:00 AM   Established Patient Pul with Eric Vaughan M.D.   [facility] (--)    [address]  [facility] [address]    [phone]            Aug 29, 2017  2:00 PM   Follow Up Visit with Cintia Ariza M.D.   [facility] (--)    [address], [address]  [place] [address] [phone]           You will be receiving a confirmation call a few days before your appointment from our automated call confirmation system.              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 Low  7/7/2011 - Present    Dysphagia  Medium  9/27/2011 - Present    Odynophagia R13.10 Medium  9/27/2011 - Present    Esophageal stricture K22.2 Medium  9/27/2011 - Present    Chronic respiratory infection J98.8 High  10/11/2011 - Present    Protein-calorie malnutrition, severe (HCC) E43 Medium  10/11/2011 - Present    Debility R53.81 High  10/12/2011 - Present    HTN (hypertension) I10 Low  10/12/2011 - Present    Abnormal thyroid function test R94.6   10/12/2011 - Present    TIMOTHY (obstructive sleep apnea) G47.33 Low  10/12/2011 - Present    Hyperlipidemia E78.5   10/12/2011 - Present    ESRD on dialysis (CMS-HCC) N18.6, Z99.2 Low  11/8/2011 - Present    Vitamin d deficiency    11/8/2011 - Present    Anemia of chronic renal failure, stage 5 (CMS-HCC) (Chronic) N18.5, D63.1 Medium  12/14/2011 - Present    BPH (benign prostatic hyperplasia) N40.0 Low  12/14/2011 - Present    GERD (gastroesophageal reflux disease) K21.9   12/14/2011 - Present    Weight loss R63.4   12/14/2011 - Present    Pneumonia J18.9 High  12/14/2011 - Present    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 Low  12/14/2011 - Present    Adult failure to thrive R62.7 High  1/13/2012 - Present    Personal history of other malignant neoplasm of skin Z85.828   7/16/2013 - Present    AV block I44.30 Low  2/6/2014 - Present    Pacemaker Z95.0 Medium  2/6/2014 - Present    Immunosuppressed status (HCC) D89.9 Medium  2/6/2014 - Present    Hypotension I95.9 High  2/1/2015 -  Present    Macrocytosis D75.89 Medium  2/1/2015 - Present    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   4/12/2016 - Present    Advance care planning Z71.89 Low  10/5/2016 - Present    Abnormal CT scan of lung R91.8   10/25/2016 - Present    Thrush B37.0   10/25/2016 - Present    Pseudomonas aeruginosa colonization Z22.39   4/18/2017 - Present    Atrial fibrillation (CMS-HCC) I48.91   4/18/2017 - Present    Pneumonia of both lungs due to influenza B , post-influenza pneumonia/HCAP J11.00 Medium  5/14/2017 - Present    COPD (chronic obstructive pulmonary disease) (CMS-HCC) J44.9 Low  5/14/2017 - Present    Acute on chronic respiratory failure with hypoxia due to influenza B, not due to sepsis(CMS-HCC) J96.21 Medium  5/14/2017 - Present      Health Maintenance        Date Due Completion Dates    IMM DTaP/Tdap/Td Vaccine (1 - Tdap) 6/23/1958 ---    IMM ZOSTER VACCINE 6/23/1999 ---    COLONOSCOPY 12/1/2025 12/1/2015 (Postponed)    Override on 12/1/2015: Postponed (Patient will discuss scheduling with PCP)            Current Immunizations     13-VALENT PCV PREVNAR 4/14/2017  1:43 PM    Influenza TIV (IM) 9/15/2016, 11/3/2014, 10/10/2013, 9/27/2011  3:15 AM    Pneumococcal polysaccharide vaccine (PPSV-23) 12/19/2011  4:47 PM    Tuberculin Skin Test  Incomplete      Below and/or attached are the medications your provider expects you to take. Review all of your home medications and newly ordered medications with your provider and/or pharmacist. Follow medication instructions as directed by your provider and/or pharmacist. Please keep your medication list with you and share with your provider. Update the information when medications are discontinued, doses are changed, or new medications (including over-the-counter products) are added; and carry medication information at all times in the event of emergency situations     Allergies:  ERYTHROMYCIN - Unspecified     RITUXIMAB - (reactions not documented)               Medications   Valid as of: June 06, 2017 - 11:44 AM    Generic Name Brand Name Tablet Size Instructions for use    Albuterol Sulfate (Aero Soln) albuterol 108 (90 BASE) MCG/ACT Inhale 2 Puffs by mouth every four hours as needed for Shortness of Breath.        Aspirin (Tablet Delayed Response) ECOTRIN 81 MG Take 81 mg by mouth every day.        B Complex-C-Folic Acid (Tab) DIALYVITE TABLET  Dialyvite -nehro vit B complex-C-folic acid 1 tablet po QD Dialysis Pt        Doxycycline Hyclate (Tab) VIBRAMYCIN 100 MG Take 100 mg by mouth 2 times a day.        Epoetin Jorgito (Solution) EPOGEN,PROCRIT 3000 UNIT/ML Inject 2,200 Units as instructed every Monday, Wednesday, and Friday. With dialysis        Finasteride (Tab) PROSCAR 5 MG Take 5 mg by mouth every day.        Fluticasone-Salmeterol (AEROSOL POWDER, BREATH ACTIVATED) ADVAIR 500-50 MCG/DOSE Inhale 1 Puff by mouth 2 times a day. Rinse mouth after each use.        Ipratropium-Albuterol (Solution) DUONEB 0.5-2.5 (3) MG/3ML 3 mL by Nebulization route 4 times a day.        Metoprolol Tartrate (Tab) LOPRESSOR 25 MG Take 25 mg by mouth 2 times a day.        PredniSONE (Tab) DELTASONE 1 MG Take 14 mg by mouth every day. Pt takes x1 10mg tabs and x4 1mg tabs for total dose = 14mg QD        RaNITidine HCl (Tab) ZANTAC 150 MG TAKE 1 TABLET BY MOUTH EVERY NIGHT AT BEDTIME        Sevelamer HCl (Tab) RENAGEL 800 MG Take 800 mg by mouth 3 times a day, with meals.        Simvastatin (Tab) ZOCOR 40 MG Take 40 mg by mouth every evening.        Tamsulosin HCl (Cap) FLOMAX 0.4 MG Take 0.4 mg by mouth every day.        .                 Medicines prescribed today were sent to:     [id number] - [place] - [address] [place] [address] [place] AT [address] [place] [address]    Phone: [phone] Fax: [phone]    Open 24 Hours?: No      Medication refill instructions:       If your prescription bottle indicates you have medication refills left, it is not  necessary to call your provider’s office. Please contact your pharmacy and they will refill your medication.    If your prescription bottle indicates you do not have any refills left, you may request refills at any time through one of the following ways: The online [employer] system (except Urgent Care), by calling your provider’s office, or by asking your pharmacy to contact your provider’s office with a refill request. Medication refills are processed only during regular business hours and may not be available until the next business day. Your provider may request additional information or to have a follow-up visit with you prior to refilling your medication.   *Please Note: Medication refills are assigned a new Rx number when refilled electronically. Your pharmacy may indicate that no refills were authorized even though a new prescription for the same medication is available at the pharmacy. Please request the medicine by name with the pharmacy before contacting your provider for a refill.        Other Notes About Your Plan     [facility] Ph. [phone] Fax. [phone]             [employer] Access Code: Activation code not generated  Current [employer] Status: Active

## 2017-06-06 NOTE — PROGRESS NOTES
Direct admit from [facility]. Accepted by Dr. Enio Wilcox for drug resistant pseudomonas PNA.  ADT signed & held @ 1100, needs to be released upon pt arrival.  No written orders received.  Pt coming by private car.

## 2017-06-06 NOTE — IP AVS SNAPSHOT
6/12/2017    Gilberto Brown  [address] [place] [address]    Dear Gilberto:    [facility] wants to ensure your discharge home is safe and you or your loved ones have had all of your questions answered regarding your care after you leave the hospital.    Below is a list of resources and contact information should you have any questions regarding your hospital stay, follow-up instructions, or active medical symptoms.    Questions or Concerns Regarding… Contact   Medical Questions Related to Your Discharge  (7 days a week, 8am-5pm) Contact a Nurse Care Coordinator   [phone]   Medical Questions Not Related to Your Discharge  (24 hours a day / 7 days a week)  Contact the Nurse Health Line   [phone]    Medications or Discharge Instructions Refer to your discharge packet   or contact your [facility] Primary Care Provider   [phone]   Follow-up Appointment(s) Schedule your appointment via [employer]   or contact Scheduling [phone]   Billing Review your statement via [employer]  or contact Billing [phone]   Medical Records Review your records via [employer]   or contact Medical Records [phone]     You may receive a telephone call within two days of discharge. This call is to make certain you understand your discharge instructions and have the opportunity to have any questions answered. You can also easily access your medical information, test results and upcoming appointments via the [employer] free online health management tool. You can learn more and sign up at [employer]. For assistance setting up your [employer] account, please call [phone].    Once again, we want to ensure your discharge home is safe and that you have a clear understanding of any next steps in your care. If you have any questions or concerns, please do not hesitate to contact us, we are here for you. Thank you for choosing [facility] for your healthcare needs.    Sincerely,    Your [facility] Healthcare Team

## 2017-06-07 LAB
ANION GAP SERPL CALC-SCNC: 13 MMOL/L (ref 0–11.9)
BASOPHILS # BLD AUTO: 0.3 % (ref 0–1.8)
BASOPHILS # BLD: 0.03 K/UL (ref 0–0.12)
BUN SERPL-MCNC: 62 MG/DL (ref 8–22)
CALCIUM SERPL-MCNC: 8.6 MG/DL (ref 8.5–10.5)
CHLORIDE SERPL-SCNC: 102 MMOL/L (ref 96–112)
CO2 SERPL-SCNC: 26 MMOL/L (ref 20–33)
CREAT SERPL-MCNC: 3.06 MG/DL (ref 0.5–1.4)
EOSINOPHIL # BLD AUTO: 0.14 K/UL (ref 0–0.51)
EOSINOPHIL NFR BLD: 1.6 % (ref 0–6.9)
ERYTHROCYTE [DISTWIDTH] IN BLOOD BY AUTOMATED COUNT: 61.5 FL (ref 35.9–50)
GFR SERPL CREATININE-BSD FRML MDRD: 20 ML/MIN/1.73 M 2
GLUCOSE SERPL-MCNC: 89 MG/DL (ref 65–99)
HCT VFR BLD AUTO: 27 % (ref 42–52)
HGB BLD-MCNC: 8.5 G/DL (ref 14–18)
IMM GRANULOCYTES # BLD AUTO: 0.16 K/UL (ref 0–0.11)
IMM GRANULOCYTES NFR BLD AUTO: 1.8 % (ref 0–0.9)
LYMPHOCYTES # BLD AUTO: 0.93 K/UL (ref 1–4.8)
LYMPHOCYTES NFR BLD: 10.7 % (ref 22–41)
MCH RBC QN AUTO: 30.5 PG (ref 27–33)
MCHC RBC AUTO-ENTMCNC: 31.5 G/DL (ref 33.7–35.3)
MCV RBC AUTO: 96.8 FL (ref 81.4–97.8)
MONOCYTES # BLD AUTO: 0.65 K/UL (ref 0–0.85)
MONOCYTES NFR BLD AUTO: 7.5 % (ref 0–13.4)
NEUTROPHILS # BLD AUTO: 6.75 K/UL (ref 1.82–7.42)
NEUTROPHILS NFR BLD: 78.1 % (ref 44–72)
NRBC # BLD AUTO: 0 K/UL
NRBC BLD AUTO-RTO: 0 /100 WBC
PLATELET # BLD AUTO: 308 K/UL (ref 164–446)
PMV BLD AUTO: 8.8 FL (ref 9–12.9)
POTASSIUM SERPL-SCNC: 3.5 MMOL/L (ref 3.6–5.5)
POTASSIUM SERPL-SCNC: 3.5 MMOL/L (ref 3.6–5.5)
RBC # BLD AUTO: 2.79 M/UL (ref 4.7–6.1)
SODIUM SERPL-SCNC: 141 MMOL/L (ref 135–145)
WBC # BLD AUTO: 8.7 K/UL (ref 4.8–10.8)

## 2017-06-07 PROCEDURE — A9270 NON-COVERED ITEM OR SERVICE: HCPCS | Performed by: INTERNAL MEDICINE

## 2017-06-07 PROCEDURE — 700101 HCHG RX REV CODE 250: Performed by: INTERNAL MEDICINE

## 2017-06-07 PROCEDURE — 700102 HCHG RX REV CODE 250 W/ 637 OVERRIDE(OP): Performed by: INTERNAL MEDICINE

## 2017-06-07 PROCEDURE — 84132 ASSAY OF SERUM POTASSIUM: CPT

## 2017-06-07 PROCEDURE — 85025 COMPLETE CBC W/AUTO DIFF WBC: CPT

## 2017-06-07 PROCEDURE — 36415 COLL VENOUS BLD VENIPUNCTURE: CPT

## 2017-06-07 PROCEDURE — 770020 HCHG ROOM/CARE - TELE (206)

## 2017-06-07 PROCEDURE — 87186 SC STD MICRODIL/AGAR DIL: CPT

## 2017-06-07 PROCEDURE — 5A1D60Z PERFORMANCE OF URINARY FILTRATION, MULTIPLE: ICD-10-PCS | Performed by: HOSPITALIST

## 2017-06-07 PROCEDURE — 87070 CULTURE OTHR SPECIMN AEROBIC: CPT

## 2017-06-07 PROCEDURE — 80048 BASIC METABOLIC PNL TOTAL CA: CPT

## 2017-06-07 PROCEDURE — 700102 HCHG RX REV CODE 250 W/ 637 OVERRIDE(OP): Performed by: HOSPITALIST

## 2017-06-07 PROCEDURE — 94640 AIRWAY INHALATION TREATMENT: CPT

## 2017-06-07 PROCEDURE — 700111 HCHG RX REV CODE 636 W/ 250 OVERRIDE (IP): Performed by: INTERNAL MEDICINE

## 2017-06-07 PROCEDURE — A9270 NON-COVERED ITEM OR SERVICE: HCPCS | Performed by: HOSPITALIST

## 2017-06-07 PROCEDURE — 700105 HCHG RX REV CODE 258: Performed by: INTERNAL MEDICINE

## 2017-06-07 PROCEDURE — 90935 HEMODIALYSIS ONE EVALUATION: CPT

## 2017-06-07 PROCEDURE — 87077 CULTURE AEROBIC IDENTIFY: CPT

## 2017-06-07 PROCEDURE — 87205 SMEAR GRAM STAIN: CPT

## 2017-06-07 PROCEDURE — 665999 HH PPS REVENUE DEBIT

## 2017-06-07 PROCEDURE — 700101 HCHG RX REV CODE 250

## 2017-06-07 PROCEDURE — 665998 HH PPS REVENUE CREDIT

## 2017-06-07 PROCEDURE — 99233 SBSQ HOSP IP/OBS HIGH 50: CPT | Performed by: HOSPITALIST

## 2017-06-07 PROCEDURE — 94760 N-INVAS EAR/PLS OXIMETRY 1: CPT

## 2017-06-07 RX ORDER — HEPARIN SODIUM 1000 [USP'U]/ML
1500 INJECTION, SOLUTION INTRAVENOUS; SUBCUTANEOUS
Status: DISCONTINUED | OUTPATIENT
Start: 2017-06-07 | End: 2017-06-12 | Stop reason: HOSPADM

## 2017-06-07 RX ORDER — LIDOCAINE HYDROCHLORIDE 10 MG/ML
1 INJECTION, SOLUTION INFILTRATION; PERINEURAL PRN
Status: DISCONTINUED | OUTPATIENT
Start: 2017-06-07 | End: 2017-06-12 | Stop reason: HOSPADM

## 2017-06-07 RX ORDER — IPRATROPIUM BROMIDE AND ALBUTEROL SULFATE 2.5; .5 MG/3ML; MG/3ML
3 SOLUTION RESPIRATORY (INHALATION)
Status: DISCONTINUED | OUTPATIENT
Start: 2017-06-07 | End: 2017-06-12

## 2017-06-07 RX ORDER — ACETAMINOPHEN 325 MG/1
650 TABLET ORAL EVERY 6 HOURS
Status: DISCONTINUED | OUTPATIENT
Start: 2017-06-07 | End: 2017-06-08

## 2017-06-07 RX ADMIN — HEPARIN SODIUM 1500 UNITS: 1000 INJECTION, SOLUTION INTRAVENOUS; SUBCUTANEOUS at 17:36

## 2017-06-07 RX ADMIN — MEROPENEM 500 MG: 500 INJECTION, POWDER, FOR SOLUTION INTRAVENOUS at 12:02

## 2017-06-07 RX ADMIN — BUDESONIDE AND FORMOTEROL FUMARATE DIHYDRATE 2 PUFF: 160; 4.5 AEROSOL RESPIRATORY (INHALATION) at 07:25

## 2017-06-07 RX ADMIN — COLISTIMETHATE SODIUM 150 MG: 150 INJECTION, POWDER, LYOPHILIZED, FOR SOLUTION INTRAMUSCULAR; INTRAVENOUS at 21:19

## 2017-06-07 RX ADMIN — ASPIRIN 81 MG: 81 TABLET ORAL at 09:00

## 2017-06-07 RX ADMIN — HEPARIN SODIUM 5000 UNITS: 5000 INJECTION, SOLUTION INTRAVENOUS; SUBCUTANEOUS at 05:09

## 2017-06-07 RX ADMIN — SENNOSIDES AND DOCUSATE SODIUM 2 TABLET: 8.6; 5 TABLET ORAL at 21:14

## 2017-06-07 RX ADMIN — IPRATROPIUM BROMIDE AND ALBUTEROL SULFATE 3 ML: .5; 3 SOLUTION RESPIRATORY (INHALATION) at 07:24

## 2017-06-07 RX ADMIN — IPRATROPIUM BROMIDE AND ALBUTEROL SULFATE 3 ML: .5; 3 SOLUTION RESPIRATORY (INHALATION) at 14:24

## 2017-06-07 RX ADMIN — MEROPENEM 500 MG: 500 INJECTION, POWDER, FOR SOLUTION INTRAVENOUS at 05:08

## 2017-06-07 RX ADMIN — VORICONAZOLE 200 MG: 200 TABLET, FILM COATED ORAL at 21:13

## 2017-06-07 RX ADMIN — IPRATROPIUM BROMIDE AND ALBUTEROL SULFATE 3 ML: .5; 3 SOLUTION RESPIRATORY (INHALATION) at 21:19

## 2017-06-07 RX ADMIN — SIMVASTATIN 40 MG: 40 TABLET, FILM COATED ORAL at 21:28

## 2017-06-07 RX ADMIN — METOPROLOL TARTRATE 25 MG: 25 TABLET, FILM COATED ORAL at 21:14

## 2017-06-07 RX ADMIN — ACETAMINOPHEN 650 MG: 325 TABLET, FILM COATED ORAL at 23:00

## 2017-06-07 RX ADMIN — BUDESONIDE AND FORMOTEROL FUMARATE DIHYDRATE 2 PUFF: 160; 4.5 AEROSOL RESPIRATORY (INHALATION) at 21:20

## 2017-06-07 RX ADMIN — RENAGEL 800 MG: 800 TABLET ORAL at 08:39

## 2017-06-07 RX ADMIN — TAMSULOSIN HYDROCHLORIDE 0.4 MG: 0.4 CAPSULE ORAL at 09:00

## 2017-06-07 RX ADMIN — FAMOTIDINE 20 MG: 20 TABLET, FILM COATED ORAL at 21:14

## 2017-06-07 RX ADMIN — FAMOTIDINE 20 MG: 20 TABLET, FILM COATED ORAL at 09:00

## 2017-06-07 RX ADMIN — PREDNISONE 14 MG: 10 TABLET ORAL at 09:00

## 2017-06-07 RX ADMIN — IPRATROPIUM BROMIDE AND ALBUTEROL SULFATE 3 ML: .5; 3 SOLUTION RESPIRATORY (INHALATION) at 10:35

## 2017-06-07 RX ADMIN — MEROPENEM 500 MG: 500 INJECTION, POWDER, FOR SOLUTION INTRAVENOUS at 21:18

## 2017-06-07 RX ADMIN — COLISTIMETHATE SODIUM 150 MG: 150 INJECTION, POWDER, LYOPHILIZED, FOR SOLUTION INTRAMUSCULAR; INTRAVENOUS at 07:23

## 2017-06-07 RX ADMIN — MEROPENEM 500 MG: 500 INJECTION, POWDER, FOR SOLUTION INTRAVENOUS at 00:46

## 2017-06-07 RX ADMIN — HEPARIN SODIUM 5000 UNITS: 5000 INJECTION, SOLUTION INTRAVENOUS; SUBCUTANEOUS at 21:15

## 2017-06-07 ASSESSMENT — ENCOUNTER SYMPTOMS
CARDIOVASCULAR NEGATIVE: 1
CHILLS: 0
WHEEZING: 0
DOUBLE VISION: 0
SHORTNESS OF BREATH: 1
BLOOD IN STOOL: 0
HEADACHES: 0
HEARTBURN: 0
CONSTIPATION: 0
FEVER: 0
FOCAL WEAKNESS: 0
ABDOMINAL PAIN: 0
PALPITATIONS: 0
EYES NEGATIVE: 1
NAUSEA: 0
BRUISES/BLEEDS EASILY: 0
SEIZURES: 0
DIARRHEA: 0
LOSS OF CONSCIOUSNESS: 0
MUSCULOSKELETAL NEGATIVE: 1
COUGH: 1
DIAPHORESIS: 0
DIZZINESS: 0
PSYCHIATRIC NEGATIVE: 1
NERVOUS/ANXIOUS: 0
WEAKNESS: 1
VOMITING: 0
HEMOPTYSIS: 0
GASTROINTESTINAL NEGATIVE: 1

## 2017-06-07 ASSESSMENT — PAIN SCALES - GENERAL
PAINLEVEL_OUTOF10: 0
PAINLEVEL_OUTOF10: 0

## 2017-06-07 NOTE — RESPIRATORY CARE
COPD EDUCATION by COPD CLINICAL EDUCATOR  6/7/2017 at 6:43 AM by Margoth Hobbs     Patient reviewed by COPD education team. Patient does not qualify for COPD program.

## 2017-06-07 NOTE — DIETARY
Nutrition Services: Poor PO, Supplements on Nutrition Admit Screen   76 year old male with admit dx of multi drug resistant pseudomonas PNA, pseudomonas pneumonia  Past Pertinent Med Hx: pseudomonas pulmonary infections, anemia of chronic disease, CKD on dialysis, severe protein-calorie malnutrition, sick sinus syndrome, GERD, vitamin D deficiency, chronic atrial fibrillation, essential HTN, obstructive sleep apnea, Wegener's granulomatosis, benign prostatic hypertrophy    Pt states he is eating quite well. Pt states his dry wt for dialysis is 128 lbs (58.2 kg). Pt states he usually drinks Boost 1-2 times per day, added BID. Obtained food and snack preferences, added snacks. No PO documented in chart yet.     Ht: 170.2 cm   Wt 6/6: 62.3 kg via bed scale  BMI: 21.51  Diet: Regular  Pertinent Labs: K 3.5, BUN 62, Creatinine 3.06  Pertinent Meds: pepcid, proscar, merrem, lopressor, deltasone, pericolace, renagel, zocor, vfend  Fluids: No IV fluids noted in MAR  Skin: No skin breakdown noted in chart  GI: Last BM 6/6    PLAN/RECOMMEND -   1) Nutrition rep to see patient daily for meal and snack preferences.  2) Encourage PO  3) Weekly weights to monitor fluid and nutrition status  4) Please document PO intake for each meal as percentage of meals consumed    RD following

## 2017-06-07 NOTE — CARE PLAN
Problem: Safety  Goal: Will remain free from injury  Outcome: PROGRESSING AS EXPECTED  Intervention: Educate patient and significant other/support system about adaptive mobility strategies and safe transfers  Pt aware of need to use call light , not getting out of bed without assistance, pt remaining injury free      Problem: Venous Thromboembolism (VTW)/Deep Vein Thrombosis (DVT) Prevention:  Goal: Patient will participate in Venous Thrombosis (VTE)/Deep Vein Thrombosis (DVT)Prevention Measures  Outcome: PROGRESSING AS EXPECTED  Pt on sq heparin and educted on importance of moving feet ankles while in bed

## 2017-06-07 NOTE — CARE PLAN
Problem: Bronchoconstriction:  Goal: Improve in air movement and diminished wheezing  Outcome: PROGRESSING AS EXPECTED  Continue with bronchodilators as indicated.

## 2017-06-07 NOTE — CONSULTS
DATE OF SERVICE:  06/07/2017    REQUESTING PHYSICIAN:  Danielle Ferreira MD    REASON FOR CONSULTATION:  Management of chronic kidney disease assessing the   need for urgent dialysis.    HISTORY OF PRESENT ILLNESS:  The patient is a very pleasant but unfortunate   76-year-old gentleman who is very well known to our service.  He has end-stage   renal disease secondary to Wegener's granulomatosis, he undergoes   hemodialysis on Monday, Wednesday, and Friday at [facility], recently had   multiple hospitalizations for refractory Pseudomonas pneumonia, patient   presented to the hospital yesterday with shortness of breath.  Patient has   been admitted for adjusting his antibiotic treatment to an IV treatment.    Patient has no fever, no chills.  He is feeling slightly weak, dyspnea on   exertion and worsening shortness of breath.  He also has chronic cough with   sputum production occasionally.    Patient has no nausea, no vomiting.  He has decreased p.o. intake.    Patient's last dialysis was on Monday, 06/06/2017.    PAST MEDICAL HISTORY:  1.  Wegener granulomatosis.  2.  End-stage renal disease.  3.  Malnutrition.  4.  Atelectasis.  5.  Anemia.    ALLERGIES:  The list was reviewed.    HOME MEDICATIONS:  Reviewed.    SOCIAL HISTORY:  Patient is .  He lives with his wife.    FAMILY HISTORY:  No known renal disease.    REVIEW OF SYSTEMS:  All systems were reviewed, total of 10 were negative   except outlined acidosis.    PHYSICAL EXAMINATION:  GENERAL:  Patient is in no apparent distress.  VITAL SIGNS:  Show blood pressure of 108/54, heart rate was 72.  HEENT:  Normocephalic, atraumatic.  Sclerae is anicteric.  NECK:  No lymphadenopathy.  CHEST:  Normal.  LUNGS:  Clear to auscultation.  HEART:  S1, S2.  ABDOMEN:  Soft, nontender.  EXTREMITIES:  There is no lower extremity edema.  SKIN:  No skin rashes.  NEUROLOGIC:  Patient is alert and oriented x3.    LABORATORY DATA:  His labs were reviewed.    ASSESSMENT AND  PLAN:  1.  End-stage renal disease.  His last dialysis was on Monday, 06/05/2017.  2.  Recurrent pneumonia.  3.  Anemia.  4.  Hypokalemia.    PLAN:  1.  We will plan on dialysis to manage his uremia.  2.  As for the anemia, we will check iron panel and we will continue patient   on erythropoietin.  3.  Renal dose all medications.  4.  Avoid nephrotoxins.  5.  Prognosis is guarded.    Plan discussed in detail with Dr. Abraham from the infectious disease service.       ____________________________________     FADI NAJJAR, MD FN / SHARRON    DD:  06/07/2017 11:06:05  DT:  06/07/2017 15:49:43    D#:  [id number]  Job#:  [id number]

## 2017-06-07 NOTE — DISCHARGE PLANNING
Care Transition Team Assessment    SW met with pt at bedside to complete assessment.  Pt had recent admission here 5/13/17 to 5/17/17 for HCAP.  Pt was discharged home and was on service with [facility] for PT/OT.  Pt states he has completes his therapy and states he does NOT need home health resumed upon discharge.  Pt plans to d/c home with spouse when medically cleared.       Information Source  Orientation : Oriented x 4  Information Given By: Patient    Readmission Evaluation  Is this a readmission?: Yes - unplanned readmission    Elopement Risk  Legal Hold: No  Ambulatory or Self Mobile in Wheelchair: Yes  Disoriented: No  Psychiatric Symptoms: None  History of Wandering: No  Elopement this Admit: No  Vocalizing Wanting to Leave: No  Displays Behaviors, Body Language Wanting to Leave: No-Not at Risk for Elopement  Elopement Risk: Not at Risk for Elopement    Interdisciplinary Discharge Planning  Does Admitting Nurse Feel This Could be a Complex Discharge?: Yes  Primary Care Physician: Dr. Christine Zamudio   Lives with - Patient's Self Care Capacity: Spouse  Support Systems: Spouse / Significant Other, Family Member(s), Friends / Neighbors  Housing / Facility: [address]  Do You Take your Prescribed Medications Regularly: Yes  Able to Return to Previous ADL's: Future Time w/Therapy  Mobility Issues: Yes  Prior Services: Home With Outpatient Therapy, Skilled Home Health Services ([facility])  Patient Expects to be Discharged to:: Home  Assistance Needed: Unknown at this Time  Durable Medical Equipment: Home Oxygen, Walker (2L O2 nocturnal )  DME Provider / Phone: [facility]     Discharge Preparedness  What is your plan after discharge?: Home with help  Prior Functional Level: Ambulatory, Independent with Activities of Daily Living, Independent with Medication Management, Uses Walker    Functional Assesment  Prior Functional Level: Ambulatory, Independent with Activities of Daily Living,  Independent with Medication Management, Uses Walker    Finances  Financial Barriers to Discharge: No  Prescription Coverage: Yes    Vision / Hearing Impairment  Vision Impairment : No  Hearing Impairment : Yes  Hearing Impairment: Both Ears  Does Pt Need Special Equipment for the Hearing Impaired?: No    Values / Beliefs / Concerns  Values / Beliefs Concerns : No    Advance Directive  Advance Directive?: DPOA for Health Care    Domestic Abuse  Have you ever been the victim of abuse or violence?: No  Physical Abuse or Sexual Abuse: No  Verbal Abuse or Emotional Abuse: No  Possible Abuse Reported to:: Not Applicable    Psychological Assessment  History of Substance Abuse: None  History of Psychiatric Problems: No  Non-compliant with Treatment: No  Newly Diagnosed Illness: No         Anticipated Discharge Information  Anticipated discharge disposition: Home  Discharge Address: [place] [address]  Discharge Contact Phone Number: [phone]

## 2017-06-07 NOTE — H&P
IDENTIFICATION:  The patient is a 76-year-old male patient of Dr. Christine Zamudio as well as infectious disease, Dr. Manzo.    CHIEF COMPLAINT:  Refractory pseudomonas pneumonia.    HISTORY OF PRESENT ILLNESS:  The patient is a 76-year-old male who has had   pneumonia with pseudomonas in culture which has become a chronic problem.  He   was followed up with infectious disease for treatment of this and sputum   culture from 05/24/2017 did again grow pseudomonas in culture and patient was   having increasing shortness of breath, so he did follow up with infectious   disease today who did recommend admission to the hospital for intravenous   antibiotic therapy.  Patient denies any fevers or chills; however, he is   feeling weaker with worsening shortness of breath, mostly with exertion and   cough which he does produce some sputum occasionally.  He denies any   lightheadedness, dizziness, chest pain, chest palpitations, swelling in his   legs, rash, itching, diarrhea, or dysuria.    REVIEW OF SYSTEMS:  A 10-point review of systems is reviewed and otherwise   negative.    PAST MEDICAL HISTORY:  Pseudomonas pulmonary infections, anemia of chronic   disease, chronic kidney disease with patient on dialysis with nephrology, Dr. Corona, severe protein-calorie malnutrition, sick sinus syndrome with pacemaker   in place, gastroesophageal reflux disease, vitamin D deficiency, chronic   atrial fibrillation, essential hypertension, and obstructive sleep apnea,   Wegener's granulomatosis, and benign prostatic hypertrophy.    OUTPATIENT MEDICATIONS:  Metoprolol 25 mg twice daily, prednisone 14 mg daily,   albuterol 2 puffs every 4 hours as needed for shortness of breath, Proscar 5   mg daily, Zocor 40 mg daily,  Epogen every Monday, Wednesday, Friday with   dialysis, Renagel 800 mg 3 times daily, Flomax 0.4 mg daily, aspirin 81 mg   daily, DuoNeb nebulizer solution 4 times daily, multivitamin once daily,   Advair Diskus  500/50 mcg 1 puff twice daily, and Zantac 150 mg daily.    DRUG ALLERGIES:  ERYTHROMYCIN AND RITUXIMAB.    SOCIAL HISTORY:  Patient smoked a pipe and states he stopped smoking in the   1990s, he no longer smokes.  He does drink 1 glass of wine per night and   denies any drug use.    FAMILY HISTORY:  Father had a stroke and heart disease.    PHYSICAL EXAMINATION:  VITAL SIGNS:  Temperature 98.1, blood pressure 113/67, heart rate 103,   respiratory rate 18, oxygen saturation 94% on room air.  GENERAL:  The patient is a very pleasant gentleman who is very thin and has   diffuse muscle wasting.  He is sitting comfortably in no apparent distress.  HEENT:  Head atraumatic.  Sclerae clear, conjunctivae pink.  Mucous membranes   moist.  NECK:  Supple.  No jugular venous distention.  Trachea midline.  No anterior,   posterior cervical or supraclavicular lymphadenopathy.  HEART:  Regular rate and rhythm with no murmur.  Point of maximal impulse is   diffuse.  LUNGS:  Clear to auscultation bilaterally.  Good breath sounds to bases.  CHEST:  Symmetric with respiration.  ABDOMEN:  Soft, nontender, nondistended with normoactive bowel sounds and no   palpable hepatosplenomegaly.  EXTREMITIES:  No clubbing, cyanosis or edema.  Pedal pulses 2+ bilaterally.  NEUROLOGIC:  Patient is alert and oriented x3, and moving all 4 extremities   equally.    LABORATORY DATA:  Sputum culture from 05/24/2017 did grow Pseudomonas   aeruginosa.    ASSESSMENT AND PLAN:  1.  Resistant pseudomonas pulmonary infection.  The patient will be admitted   to the hospital.  Infectious disease, Dr. Manzo has been consulted on the   case and will see the patient.  Acutely, he will be started on meropenem   intravenously.  2.  Chronic respiratory infection.  I will continue the patient on prednisone.  3.  Mold, which did grow on patient's previous sputum culture.  Patient will   be started on voriconazole for coverage of fungal infection as well.  4.  End-stage  renal disease, on dialysis Monday, Wednesday, Friday.    Nephrology, Dr. Corona has been consulted on the case.  Patient will be set for   dialysis while in the hospital and continue his sevelamer while in house.  5.  Hypertension.  I will continue his metoprolol and monitor blood pressure.  6.  Enlarged prostate.  We will continue his finasteride to assist with his   bladder emptying.  7.  Patient's code status is Do Not Resuscitate in compliance with his wishes.    Patient will need greater than 2 midnights stay for treatment and workup of   his complicated pulmonary infection.       ____________________________________     MD YARELIS MONTERO / SHARRON    DD:  06/06/2017 17:33:52  DT:  06/06/2017 19:46:21    D#:  [id number]  Job#:  [id number]

## 2017-06-08 LAB
GRAM STN SPEC: NORMAL
SIGNIFICANT IND 70042: NORMAL
SITE SITE: NORMAL
SOURCE SOURCE: NORMAL

## 2017-06-08 PROCEDURE — 94760 N-INVAS EAR/PLS OXIMETRY 1: CPT

## 2017-06-08 PROCEDURE — A9270 NON-COVERED ITEM OR SERVICE: HCPCS | Performed by: INTERNAL MEDICINE

## 2017-06-08 PROCEDURE — 700105 HCHG RX REV CODE 258: Performed by: INTERNAL MEDICINE

## 2017-06-08 PROCEDURE — 99233 SBSQ HOSP IP/OBS HIGH 50: CPT | Performed by: HOSPITALIST

## 2017-06-08 PROCEDURE — 700111 HCHG RX REV CODE 636 W/ 250 OVERRIDE (IP): Performed by: INTERNAL MEDICINE

## 2017-06-08 PROCEDURE — 770020 HCHG ROOM/CARE - TELE (206)

## 2017-06-08 PROCEDURE — 665999 HH PPS REVENUE DEBIT

## 2017-06-08 PROCEDURE — 700102 HCHG RX REV CODE 250 W/ 637 OVERRIDE(OP): Performed by: INTERNAL MEDICINE

## 2017-06-08 PROCEDURE — 700101 HCHG RX REV CODE 250

## 2017-06-08 PROCEDURE — 665998 HH PPS REVENUE CREDIT

## 2017-06-08 PROCEDURE — 94640 AIRWAY INHALATION TREATMENT: CPT

## 2017-06-08 RX ORDER — ACETAMINOPHEN 325 MG/1
650 TABLET ORAL EVERY 4 HOURS PRN
Status: DISCONTINUED | OUTPATIENT
Start: 2017-06-08 | End: 2017-06-12 | Stop reason: HOSPADM

## 2017-06-08 RX ADMIN — IPRATROPIUM BROMIDE AND ALBUTEROL SULFATE 3 ML: .5; 3 SOLUTION RESPIRATORY (INHALATION) at 19:43

## 2017-06-08 RX ADMIN — VORICONAZOLE 200 MG: 200 TABLET, FILM COATED ORAL at 21:06

## 2017-06-08 RX ADMIN — VORICONAZOLE 200 MG: 200 TABLET, FILM COATED ORAL at 11:44

## 2017-06-08 RX ADMIN — HEPARIN SODIUM 5000 UNITS: 5000 INJECTION, SOLUTION INTRAVENOUS; SUBCUTANEOUS at 05:17

## 2017-06-08 RX ADMIN — BUDESONIDE AND FORMOTEROL FUMARATE DIHYDRATE 2 PUFF: 160; 4.5 AEROSOL RESPIRATORY (INHALATION) at 12:14

## 2017-06-08 RX ADMIN — PREDNISONE 14 MG: 10 TABLET ORAL at 11:46

## 2017-06-08 RX ADMIN — FAMOTIDINE 20 MG: 20 TABLET, FILM COATED ORAL at 11:46

## 2017-06-08 RX ADMIN — RENAGEL 800 MG: 800 TABLET ORAL at 07:24

## 2017-06-08 RX ADMIN — HEPARIN SODIUM 5000 UNITS: 5000 INJECTION, SOLUTION INTRAVENOUS; SUBCUTANEOUS at 21:05

## 2017-06-08 RX ADMIN — COLISTIMETHATE SODIUM 150 MG: 150 INJECTION, POWDER, LYOPHILIZED, FOR SOLUTION INTRAMUSCULAR; INTRAVENOUS at 19:43

## 2017-06-08 RX ADMIN — METOPROLOL TARTRATE 25 MG: 25 TABLET, FILM COATED ORAL at 21:06

## 2017-06-08 RX ADMIN — IPRATROPIUM BROMIDE AND ALBUTEROL SULFATE 3 ML: .5; 3 SOLUTION RESPIRATORY (INHALATION) at 12:18

## 2017-06-08 RX ADMIN — COLISTIMETHATE SODIUM 150 MG: 150 INJECTION, POWDER, LYOPHILIZED, FOR SOLUTION INTRAMUSCULAR; INTRAVENOUS at 12:19

## 2017-06-08 RX ADMIN — METOPROLOL TARTRATE 25 MG: 25 TABLET, FILM COATED ORAL at 11:48

## 2017-06-08 RX ADMIN — HEPARIN SODIUM 5000 UNITS: 5000 INJECTION, SOLUTION INTRAVENOUS; SUBCUTANEOUS at 16:53

## 2017-06-08 RX ADMIN — VORICONAZOLE 200 MG: 200 TABLET, FILM COATED ORAL at 11:51

## 2017-06-08 RX ADMIN — MEROPENEM 500 MG: 500 INJECTION, POWDER, FOR SOLUTION INTRAVENOUS at 21:05

## 2017-06-08 RX ADMIN — RENAGEL 800 MG: 800 TABLET ORAL at 16:53

## 2017-06-08 RX ADMIN — TAMSULOSIN HYDROCHLORIDE 0.4 MG: 0.4 CAPSULE ORAL at 11:43

## 2017-06-08 RX ADMIN — IPRATROPIUM BROMIDE AND ALBUTEROL SULFATE 3 ML: .5; 3 SOLUTION RESPIRATORY (INHALATION) at 16:58

## 2017-06-08 RX ADMIN — BUDESONIDE AND FORMOTEROL FUMARATE DIHYDRATE 2 PUFF: 160; 4.5 AEROSOL RESPIRATORY (INHALATION) at 19:43

## 2017-06-08 RX ADMIN — FINASTERIDE 5 MG: 5 TABLET, FILM COATED ORAL at 11:44

## 2017-06-08 RX ADMIN — FAMOTIDINE 20 MG: 20 TABLET, FILM COATED ORAL at 21:06

## 2017-06-08 RX ADMIN — SIMVASTATIN 40 MG: 40 TABLET, FILM COATED ORAL at 21:05

## 2017-06-08 RX ADMIN — ASPIRIN 81 MG: 81 TABLET ORAL at 11:45

## 2017-06-08 RX ADMIN — FINASTERIDE 5 MG: 5 TABLET, FILM COATED ORAL at 11:53

## 2017-06-08 ASSESSMENT — ENCOUNTER SYMPTOMS
EYES NEGATIVE: 1
DEPRESSION: 0
WEAKNESS: 1
ABDOMINAL PAIN: 0
CONSTIPATION: 0
GASTROINTESTINAL NEGATIVE: 1
CARDIOVASCULAR NEGATIVE: 1
DIAPHORESIS: 0
FLANK PAIN: 0
BACK PAIN: 0
TREMORS: 0
EYE DISCHARGE: 0
BRUISES/BLEEDS EASILY: 0
HEMOPTYSIS: 0
FEVER: 1
DIZZINESS: 0
ORTHOPNEA: 0
SPUTUM PRODUCTION: 0
COUGH: 1
NAUSEA: 0
VOMITING: 0
CLAUDICATION: 0
WEIGHT LOSS: 0
FEVER: 0
DIARRHEA: 0
CHILLS: 0
TINGLING: 0
SPUTUM PRODUCTION: 1
PHOTOPHOBIA: 0
NECK PAIN: 0
MUSCULOSKELETAL NEGATIVE: 1
MYALGIAS: 0
PSYCHIATRIC NEGATIVE: 1
EYE PAIN: 0
SHORTNESS OF BREATH: 1

## 2017-06-08 ASSESSMENT — LIFESTYLE VARIABLES: SUBSTANCE_ABUSE: 0

## 2017-06-08 ASSESSMENT — PAIN SCALES - GENERAL
PAINLEVEL_OUTOF10: 0
PAINLEVEL_OUTOF10: 0

## 2017-06-08 NOTE — PROGRESS NOTES
Infectious Disease Progress Note    Author: Christine Manzo M.D. Date & Time created: 2017  1:56 PM    Chief Complaint:  FU PNA    Interval History:  76-year-old white male with Wegener's granulomatosis admitted with worsening shortness of breath, weakness, fever, and cough.     Tmax 101.6, WBC 8.7 comfortable at rest. Denies SE abx.  Labs Reviewed, Medications Reviewed and Radiology Reviewed.    Review of Systems:  Review of Systems   Constitutional: Positive for fever and malaise/fatigue. Negative for chills.   Respiratory: Positive for cough and sputum production. Negative for hemoptysis.    Gastrointestinal: Negative for nausea, vomiting, abdominal pain and diarrhea.   Skin: Negative for rash.       Hemodynamics:  Temp (24hrs), Av.6 °C (99.6 °F), Min:36.8 °C (98.2 °F), Max:38.7 °C (101.6 °F)  Temperature: 36.8 °C (98.2 °F)  Pulse  Av.7  Min: 76  Max: 116   Blood Pressure : 102/54 mmHg       Physical Exam:  Physical Exam   Constitutional: He is oriented to person, place, and time. He appears well-developed. No distress.   Chronically ill   HENT:   Head: Normocephalic and atraumatic.   Eyes: EOM are normal. Pupils are equal, round, and reactive to light.   Cardiovascular: Normal rate.    Murmur heard.  Pulmonary/Chest: Effort normal. No respiratory distress. He has no wheezes. He has rales.   Abdominal: Soft. He exhibits no distension. There is no tenderness.   Musculoskeletal: He exhibits no edema.   RUE AVF +thrill and bruit   Neurological: He is alert and oriented to person, place, and time.   Skin: No rash noted.   Nursing note and vitals reviewed.      Meds:    Current facility-administered medications:   •  acetaminophen (TYLENOL) tablet 650 mg, 650 mg, Oral, Q4HRS PRN, Donald Beck M.D.  •  ipratropium-albuterol (DUONEB) nebulizer solution 3 mL, 3 mL, Nebulization, 4X/DAY (RT), Mario Lundberg, PHARMD, 3 mL at 17 1218  •  lidocaine (XYLOCAINE) 2 % jelly, , Topical, PRN, Pluen Ziu,  M.D.  •  lidocaine (XYLOCAINE) 1 % solution, 1 mL, Intradermal, PRN, Fadi Najjar, M.D.  •  epoetin lucina (EPOGEN,PROCRIT) injection 3,000 Units, 3,000 Units, Subcutaneous, MO, WE + FR, Fadi Najjar, M.D.  •  heparin 1000 units/mL injection 1,500 Units, 1,500 Units, Intravenous, DIALYSIS PRN, Fadi Najjar, M.D., 1,500 Units at 06/07/17 1736  •  voriconazole (VFEND) tablet 200 mg, 200 mg, Oral, Q12HRS, Christine Manzo M.D., 200 mg at 06/08/17 1151  •  [COMPLETED] meropenem (MERREM) 500 mg in  mL IVPB, 500 mg, Intravenous, Q6HRS, Stopped at 06/07/17 1232 **FOLLOWED BY** meropenem (MERREM) 500 mg in  mL IVPB, 500 mg, Intravenous, Q24HRS, Christine Manzo M.D., Stopped at 06/07/17 2148  •  colistimethate (COLY-MYCIN M) 150 mg, 150 mg, Inhalation, BID (RT), Christine Manzo M.D., 150 mg at 06/08/17 1219  •  albuterol inhaler 2 Puff, 2 Puff, Inhalation, Q4HRS PRN, Danielle Ferreira M.D.  •  aspirin EC (ECOTRIN) tablet 81 mg, 81 mg, Oral, DAILY, Danielle Ferreira M.D., 81 mg at 06/08/17 1145  •  finasteride (PROSCAR) tablet 5 mg, 5 mg, Oral, DAILY, Danielle Ferreira M.D., 5 mg at 06/08/17 1153  •  budesonide-formoterol (SYMBICORT) 160-4.5 MCG/ACT inhaler 2 Puff, 2 Puff, Inhalation, BID, Danielle Ferreira M.D., 2 Puff at 06/08/17 1214  •  metoprolol (LOPRESSOR) tablet 25 mg, 25 mg, Oral, BID, Danielle Ferreira M.D., 25 mg at 06/08/17 1148  •  predniSONE (DELTASONE) tablet 14 mg, 14 mg, Oral, DAILY, Danielle Ferreira M.D., 14 mg at 06/08/17 1146  •  famotidine (PEPCID) tablet 20 mg, 20 mg, Oral, BID, Danielle Ferreira M.D., 20 mg at 06/08/17 1146  •  sevelamer (RENAGEL) tablet 800 mg, 800 mg, Oral, TID WITH MEALS, Danielle Ferreira M.D., 800 mg at 06/08/17 0724  •  simvastatin (ZOCOR) tablet 40 mg, 40 mg, Oral, Nightly, Danielle Ferreira M.D., 40 mg at 06/07/17 2128  •  tamsulosin (FLOMAX) capsule 0.4 mg, 0.4 mg, Oral, DAILY, Danielle Ferreira M.D., 0.4 mg at 06/08/17 1143  •  senna-docusate (PERICOLACE or SENOKOT S) 8.6-50 MG  per tablet 2 Tab, 2 Tab, Oral, BID, 2 Tab at 06/07/17 2114 **AND** polyethylene glycol/lytes (MIRALAX) PACKET 1 Packet, 1 Packet, Oral, QDAY PRN **AND** magnesium hydroxide (MILK OF MAGNESIA) suspension 30 mL, 30 mL, Oral, QDAY PRN **AND** bisacodyl (DULCOLAX) suppository 10 mg, 10 mg, Rectal, QDAY PRN, Danielle Ferreira M.D.  •  enalaprilat (VASOTEC) injection 1.25 mg, 1.25 mg, Intravenous, Q6HRS PRN, Danielle Ferreira M.D.  •  ondansetron (ZOFRAN) syringe/vial injection 4 mg, 4 mg, Intravenous, Q4HRS PRN, Danielle Ferreira M.D.  •  ondansetron (ZOFRAN ODT) dispertab 4 mg, 4 mg, Oral, Q4HRS PRN, Danielle Ferreira M.D.  •  guaifenesin DM (ROBITUSSIN DM) 100-10 MG/5ML syrup 10 mL, 10 mL, Oral, Q6HRS PRN, Danielle Ferreira M.D.  •  heparin injection 5,000 Units, 5,000 Units, Subcutaneous, Q8HRS, Danielle Ferreira M.D., 5,000 Units at 06/08/17 0517    Labs:  Recent Labs      06/07/17 0229   WBC  8.7   RBC  2.79*   HEMOGLOBIN  8.5*   HEMATOCRIT  27.0*   MCV  96.8   MCH  30.5   RDW  61.5*   PLATELETCT  308   MPV  8.8*   NEUTSPOLYS  78.10*   LYMPHOCYTES  10.70*   MONOCYTES  7.50   EOSINOPHILS  1.60   BASOPHILS  0.30     Recent Labs      06/07/17 0229 06/07/17   1017   SODIUM  141   --    POTASSIUM  3.5*  3.5*   CHLORIDE  102   --    CO2  26   --    GLUCOSE  89   --    BUN  62*   --      Recent Labs      06/07/17 0229   CREATININE  3.06*       Imaging:  Dx-chest-portable (1 View)  5/15/2017  1.  Increased pulmonary edema 2.  Possible small BILATERAL pleural effusions 3.  Persistently enlarged cardiac silhouette    Micro:  BLOOD CULTURE   Date Value Ref Range Status   05/23/2017 No growth after 5 days of incubation.  Final   12/30/2013 Final report  Final      Results     Procedure Component Value Units Date/Time    CULTURE RESPIRATORY W/ GRM STN [[id number]] Collected:  06/07/17 1428    Order Status:  Completed Specimen Information:  Respirate from Sputum Updated:  06/08/17 1114     Significant Indicator NEG      Source RESP       Site SPUTUM      Respiratory Culture Light growth usual upper respiratory matt      Gram Stain Result --      Result:        Few WBCs.  Rare epithelial cells.  Few mixed bacteria, no predominant organism seen.  Specimen Quality Score: 1+      Narrative:      [id number] TING DIANNE C    GRAM STAIN [[id number]] Collected:  06/07/17 1428    Order Status:  Completed Specimen Information:  Respirate Updated:  06/08/17 1022     Significant Indicator .      Source RESP      Site SPUTUM      Gram Stain Result --      Result:        Few WBCs.  Rare epithelial cells.  Few mixed bacteria, no predominant organism seen.  Specimen Quality Score: 1+      Narrative:      [id number] TING DIANNE C    AFB CULTURE [[id number]]     Order Status:  No result Specimen Information:  Sputum from Sputum     AFB CULTURE [[id number]]     Order Status:  No result Specimen Information:  Sputum from Sputum     AFB CULTURE [[id number]]     Order Status:  No result Specimen Information:  Sputum from Sputum           Assessment:  Active Hospital Problems    Diagnosis   • *Pneumonia of both lungs due to Pseudomonas species (CMS-HCC) [J15.1]   • Atrial fibrillation (CMS-HCC) [I48.91]   • Protein-calorie malnutrition, severe ([facility]) [E43]   • ESRD on dialysis (CMS-HCC) [N18.6, Z99.2]       Plan:  PNA due to MDR pseudomonas  Resp improved at rest  Remains febrile  CT chest  Tunneled catheter as ESRD  Continue meropenem and inhaled colistin    Fungal PNA, IPA vs CPA  Continue voriconazole  Check CT chest    ESRD on HD  Dose adjusted abx  No PICC in extremity    Wgeners granulomatosis  Immunocompromised  Chronically colonized with multiple pathogens  Poor longterm prognosis    H/O MAC, pulmomary  He is concerned about relapse of his MAC  Check sputum AFB X3  No treament at this time      Discussed with internal Medicine/Kiran

## 2017-06-08 NOTE — CONSULTS
DATE OF SERVICE:  06/07/2017    REASON FOR CONSULT:  Refractory pneumonia.    CONSULTING PHYSICIAN:  Dr. Ferreira.    HISTORY OF PRESENT ILLNESS:  This is a very pleasant 76-year-old white male   who is well known to the ID service from multiple admissions due to pneumonia.    He has severe underlying Carolina's granulomatosis and as a result has chronic   lung disease, and end-stage renal disease, on hemodialysis.  He has grown   multiple pathogens in the past including multidrug resistant pseudomonas,   mold, and MAC.  He recently completed a course of treatment in May for   influenza B when he was treated with Tamiflu, ceftriaxone and doxycycline.  As   far as that treatment, he continued to feel poorly, sputum grew a multidrug   resistant pseudomonas and mold.  He was seen in clinic and sent to the   hospital for admission due to worsening dyspnea and fever.  Unfortunately, those culture plates were thrown away   and additional testing was not done.  He is currently requesting discharge by   Saturday as he has family events that he wishes to attend.  He is currently   denying any fever, chills, nausea, vomiting or diarrhea.  He does have   persistent productive cough that is worse in the morning.  No hemoptysis.    Generalized malaise and dyspnea on exertion.    REVIEW OF SYSTEMS:  Negative except for stated in the HPI.    ALLERGIES:  HE IS INTOLERANT TO ERYTHROMYCIN AND RITUXIMAB.    PAST MEDICAL HISTORY:  Carolina's granulomatosis, recurrent pneumonias including   pseudomonas, mold, and MAC.  He was previously treated for MAC for 18 months   at [facility]; end-stage renal disease, on hemodialysis, pacemaker for   sick sinus syndrome, atrial fibrillation, hypertension, and hyperlipidemia.    FAMILY HISTORY:  Stroke and heart disease.    SOCIAL HISTORY:  He used to smoke a pipe, but he quit that in the 90s.  He   does drink 1 glass of wine per evening.  Denies binge drinking, no illicit   drug use.  No new animal  exposures or travel.    PHYSICAL EXAMINATION:  GENERAL:  He is a well-nourished, well-developed male, chronically ill, but in   no acute distress.  VITAL SIGNS:  Temperature is 97.8, blood pressure 108/54, pulse 86, and oxygen   saturation is 92-96% on no oxygen.  He weighs 62 kilos.  HEENT:  Normocephalic and atraumatic.  Pupils are equal, round, and reactive   to light.  Extraocular movements are intact.  Oropharynx is clear.  NECK:  Supple.  CARDIOVASCULAR:  Regular rate and rhythm.  Able to auscultate murmurs.  He   does have ectopy.  CHEST:  Has scattered rhonchi bilaterally.  No wheezing.  Respirations are   unlabored.  ABDOMEN:  Soft, nontender, and nondistended.  EXTREMITIES:  Show AV fistula on the right, positive thrill, positive bruit.  NEUROLOGIC:  He is awake, alert, and oriented.  Speech is fluent without   dysarthria.  Cranial nerves are intact.    CURRENT LABORATORY DATA:  White blood cell count last month was 17, on   admission was 8.7, H and H 8.5 and 27, and platelets of 308.  Sodium 141,   potassium 3.5, chloride 102, bicarbonate 26, glucose 89, BUN 62, and   creatinine 3.  Most recent sputum on May 24th, he did have respiratory matt,   pseudomonas light growth, mold light growth.  Pseudomonas was _____ to Cipro,   gentamicin, intermediate to tobramycin, resistant to Timentin, questionable   susceptibility to aztreonam, Ceptaz and meropenem.    Chest x-ray was not done yet.    ASSESSMENT AND PLAN:  A 76-year-old white male with known Wegener's   granulomatosis, chronic colonization with resistant pseudomonas presents with   worsening shortness of breath, weakness, fever, and cough.  He is concerned about   relapse of his MAC in addition to his other known pathogens.  He has been   started on meropenem and inhaled colistin due to concern for subclinical   extended spectrum beta lactamases and he will likely fail beta-lactam therapy.    Add voriconazole to cover for mold.  We will repeat the  sputum culture to   see if we need additional susceptibility testing.  In addition to that, we   will check sputum for AFB to see if he has had a relapse of his MAC.  We will   repeat CT scan of the chest, unclear duration of therapy at this time, but if   he does require treatment for the pseudomonas he will need a tunneled PICC   line placement in anticipation of a prolonged course of IV antibiotics in   addition to the inhaled colistin.  Further recommendations per culture results   and clinical course.  Case was discussed with internal medicine.    Thank you and we will follow with you.       ____________________________________     MD BARI HOUSTON / SHARRON    DD:  06/07/2017 14:14:44  DT:  06/07/2017 18:49:22    D#:  [id number]  Job#:  [id number]

## 2017-06-08 NOTE — ASSESSMENT & PLAN NOTE
-metoprolol 25 mg po BID continued  -rate controlled  -no anticoagulation with wegener's as high chance of bleeding

## 2017-06-08 NOTE — CARE PLAN
Problem: Infection  Goal: Will remain free from infection  Outcome: NOT MET  Intervention: Assess for removal of potential routes of infection, such as IV, central line, intra-arterial or urinary catheters  Pt spiked fever this evening hospitalist notified , gave tylenol no orders received for blood cultures at this time will continue to monitor

## 2017-06-08 NOTE — PROGRESS NOTES
Spoke with hospitalist orders for tylenol received, no orders for  Blood cultures at this time will monitor patient condition

## 2017-06-08 NOTE — PROGRESS NOTES
[facility] Hospitalist Progress Note    Date of Service: 2017    Chief Complaint  76 y.o. male admitted 2017 with shortness of breath    Interval Problem Update  Mr Brown is a 76 year old male with recurrent pneumonia with resistant pseudomonas and he is once again become extremely sick with it, he had fevers in the 103 range at home, resolved now with antibiotic management. He would like to get home by the weekend for his family reunion.     Consultants/Specialty  ID    Disposition  Home when stable with OP antibiotic management long term        Review of Systems   Constitutional: Negative for fever, chills and diaphoresis.   HENT: Negative.    Eyes: Negative.  Negative for double vision.   Respiratory: Positive for cough and shortness of breath. Negative for hemoptysis and wheezing.    Cardiovascular: Negative.  Negative for chest pain, palpitations and leg swelling.   Gastrointestinal: Negative.  Negative for heartburn, nausea, vomiting, abdominal pain, diarrhea, constipation and blood in stool.   Genitourinary: Negative.  Negative for urgency, frequency and hematuria.   Musculoskeletal: Negative.  Negative for joint pain.   Skin: Negative.  Negative for itching and rash.   Neurological: Positive for weakness. Negative for dizziness, focal weakness, seizures, loss of consciousness and headaches.   Endo/Heme/Allergies: Negative.  Does not bruise/bleed easily.   Psychiatric/Behavioral: Negative.  Negative for suicidal ideas. The patient is not nervous/anxious.       Physical Exam  Laboratory/Imaging   Hemodynamics  Temp (24hrs), Av.6 °C (97.9 °F), Min:36.2 °C (97.2 °F), Max:36.9 °C (98.4 °F)   Temperature: 36.6 °C (97.9 °F)  Pulse  Av.4  Min: 76  Max: 103    Blood Pressure : 113/55 mmHg      Respiratory      Respiration: 16, Pulse Oximetry: 95 %, O2 Daily Delivery Respiratory : Room Air with O2 Available     Given By:: Mouthpiece, #MDI/DPI Given: MDI/DPI x 1, Work Of Breathing / Effort: Mild  RUL  Breath Sounds: Clear, RML Breath Sounds: Clear, RLL Breath Sounds: Fine Crackles, DEBORA Breath Sounds: Clear, LLL Breath Sounds: Fine Crackles    Fluids  No intake or output data in the 24 hours ending 06/07/17 1738    Nutrition  Orders Placed This Encounter   Procedures   • Diet Order     Standing Status: Standing      Number of Occurrences: 1      Standing Expiration Date:      Order Specific Question:  Diet:     Answer:  Regular [1]     Physical Exam   Constitutional: He is oriented to person, place, and time. He appears well-developed and well-nourished. He is not intubated.   HENT:   Head: Normocephalic and atraumatic.   Right Ear: External ear normal.   Left Ear: External ear normal.   Nose: Nose normal.   Mouth/Throat: Oropharynx is clear and moist.   Eyes: Conjunctivae and EOM are normal. Pupils are equal, round, and reactive to light.   Neck: Normal range of motion. Neck supple. No JVD present. No thyromegaly present.   Cardiovascular: Normal rate and regular rhythm.  Exam reveals no gallop and no friction rub.    Murmur heard.  Pulmonary/Chest: Accessory muscle usage present. No apnea, no tachypnea and no bradypnea. He is not intubated. No respiratory distress. He has decreased breath sounds in the right middle field, the right lower field, the left middle field and the left lower field. He has rales in the right middle field, the right lower field, the left middle field and the left lower field. He exhibits no tenderness.   Abdominal: He exhibits no distension and no mass. There is no tenderness. There is no rebound and no guarding.   Genitourinary:   Emmanuel catheter   Musculoskeletal: Normal range of motion. He exhibits no edema or tenderness.   Lymphadenopathy:     He has no cervical adenopathy.   Neurological: He is alert and oriented to person, place, and time. He has normal reflexes. No cranial nerve deficit.   Skin: Skin is warm and dry. No rash noted. No erythema. No pallor.   Psychiatric: He has a  normal mood and affect. His behavior is normal. Judgment and thought content normal.   Nursing note and vitals reviewed.      Recent Labs      06/07/17 0229   WBC  8.7   RBC  2.79*   HEMOGLOBIN  8.5*   HEMATOCRIT  27.0*   MCV  96.8   MCH  30.5   MCHC  31.5*   RDW  61.5*   PLATELETCT  308   MPV  8.8*     Recent Labs      06/07/17 0229 06/07/17   1017   SODIUM  141   --    POTASSIUM  3.5*  3.5*   CHLORIDE  102   --    CO2  26   --    GLUCOSE  89   --    BUN  62*   --    CREATININE  3.06*   --    CALCIUM  8.6   --                       Assessment/Plan     * Pneumonia of both lungs due to Pseudomonas species (CMS-[facility]) (present on admission)  Assessment & Plan  Patient has recurrent pseudomonas pneumonia and the patient is with meropenem, voriconazole and colistimethate for antibiotic management    Protein-calorie malnutrition, severe (HCC) (present on admission)  Assessment & Plan  Nutritional support    Atrial fibrillation (CMS-[facility]) (present on admission)  Assessment & Plan  Rate control with metoprolol    ESRD on dialysis (CMS-[facility]) (present on admission)  Assessment & Plan  Continued with HD    EKG reviewed, Radiology images reviewed, Labs reviewed and Medications reviewed  Emmanuel catheter: No Emmanuel      DVT Prophylaxis: Heparin    Ulcer prophylaxis: Yes  Antibiotics: Treating active infection/contamination beyond 24 hours perioperative coverage  Assessed for rehab: Patient was assess for and/or received rehabilitation services during this hospitalization        I spent a total of 40 minutes during this clinical encounter of which > 50% was devoted to counseling and coordinating care including review of records, pertinent lab data and studies, as well as discussing diagnostic evaluation and work up, planned therapeutic interventions and future disposition of care. Where indicated, the assessment and plan reflect discussion of patient with consultants, other healthcare providers, family members, and additional  research needed to obtain further information in formulating the plan of care of this patient.

## 2017-06-08 NOTE — PROGRESS NOTES
Went into patients room to answer call light and check on him as his heart rate was 115- found to have fever of 101.6 hospitlaist paged awaiting call back

## 2017-06-08 NOTE — ASSESSMENT & PLAN NOTE
-cultures are positive for pseudomonas  -patient ready for discharge  -has PICC  -has infusion orders  -no spot at this time on infusion center

## 2017-06-08 NOTE — PROGRESS NOTES
Hospital Medicine Progress Note, Adult, Complex               Author: Matty Najjar Date & Time created: 2017  2:15 PM     Interval History:  Pt with ESRD, recurrent pneumonia  doin better today    Review of Systems:  Review of Systems   Constitutional: Negative for fever and chills.   Cardiovascular: Negative for chest pain and leg swelling.   Gastrointestinal: Negative for nausea and vomiting.       Physical Exam:  Physical Exam   Constitutional: He is oriented to person, place, and time. He appears well-developed and well-nourished. No distress.   Eyes: Right eye exhibits no discharge. Left eye exhibits no discharge. No scleral icterus.   Cardiovascular: Regular rhythm.    Pulmonary/Chest: Effort normal. He has no rales.   Musculoskeletal: He exhibits no edema.   Neurological: He is alert and oriented to person, place, and time.   Skin: He is not diaphoretic.       Labs:        Invalid input(s): [id number]      Recent Labs      17   1017   SODIUM  141   --    POTASSIUM  3.5*  3.5*   CHLORIDE  102   --    CO2  26   --    BUN  62*   --    CREATININE  3.06*   --    CALCIUM  8.6   --      Recent Labs      17   GLUCOSE  89     Recent Labs      17   RBC  2.79*   HEMOGLOBIN  8.5*   HEMATOCRIT  27.0*   PLATELETCT  308     Recent Labs      17   WBC  8.7   NEUTSPOLYS  78.10*   LYMPHOCYTES  10.70*   MONOCYTES  7.50   EOSINOPHILS  1.60   BASOPHILS  0.30           Hemodynamics:  Temp (24hrs), Av.6 °C (99.6 °F), Min:36.8 °C (98.2 °F), Max:38.7 °C (101.6 °F)  Temperature: 36.8 °C (98.2 °F)  Pulse  Av.7  Min: 76  Max: 116   Blood Pressure : 102/54 mmHg     Respiratory:    Respiration: 18, Pulse Oximetry: 91 %, O2 Daily Delivery Respiratory : Room Air with O2 Available     Given By:: Mouthpiece, #MDI/DPI Given: MDI/DPI x 1, Work Of Breathing / Effort: Mild  RUL Breath Sounds: Clear, RML Breath Sounds: Clear, RLL Breath Sounds: Fine Crackles, DEBORA Breath  Sounds: Clear, LLL Breath Sounds: Clear  Fluids:    Intake/Output Summary (Last 24 hours) at 06/08/17 1415  Last data filed at 06/08/17 0000   Gross per 24 hour   Intake    400 ml   Output   2525 ml   Net  -2125 ml     Weight: 60.8 kg (134 lb 0.6 oz)  GI/Nutrition:  Orders Placed This Encounter   Procedures   • Diet Order     Standing Status: Standing      Number of Occurrences: 1      Standing Expiration Date:      Order Specific Question:  Diet:     Answer:  Regular [1]     Medical Decision Making, by Problem:  Active Hospital Problems    Diagnosis   • *Pneumonia of both lungs due to Pseudomonas species (CMS-HCC) [J15.1]   • Atrial fibrillation (CMS-HCC) [I48.91]   • Protein-calorie malnutrition, severe ([facility]) [E43]   • ESRD on dialysis (CMS-HCC) [N18.6, Z99.2]       Labs reviewed and Medications reviewed                  1 ESRD: had HD yesterday  2 recurrent pneumonia: needd long term IV atb, OK for PICC line  3 Anemia  4 hypokalemia  no acute need for HD today  Renal dose all meds  Avoid nephrotoxins  Prognosis guarded.

## 2017-06-09 ENCOUNTER — APPOINTMENT (OUTPATIENT)
Dept: RADIOLOGY | Facility: MEDICAL CENTER | Age: 77
DRG: 177 | End: 2017-06-09
Attending: HOSPITALIST
Payer: MEDICARE

## 2017-06-09 LAB
INR BLD: 0.9
RHODAMINE-AURAMINE STN SPEC: NORMAL
SIGNIFICANT IND 70042: NORMAL
SITE SITE: NORMAL
SOURCE SOURCE: NORMAL

## 2017-06-09 PROCEDURE — 87015 SPECIMEN INFECT AGNT CONCNTJ: CPT

## 2017-06-09 PROCEDURE — 90935 HEMODIALYSIS ONE EVALUATION: CPT

## 2017-06-09 PROCEDURE — 700111 HCHG RX REV CODE 636 W/ 250 OVERRIDE (IP): Performed by: RADIOLOGY

## 2017-06-09 PROCEDURE — 87116 MYCOBACTERIA CULTURE: CPT

## 2017-06-09 PROCEDURE — 700102 HCHG RX REV CODE 250 W/ 637 OVERRIDE(OP): Performed by: INTERNAL MEDICINE

## 2017-06-09 PROCEDURE — 94640 AIRWAY INHALATION TREATMENT: CPT

## 2017-06-09 PROCEDURE — 36558 INSERT TUNNELED CV CATH: CPT

## 2017-06-09 PROCEDURE — B548ZZA ULTRASONOGRAPHY OF SUPERIOR VENA CAVA, GUIDANCE: ICD-10-PCS | Performed by: RADIOLOGY

## 2017-06-09 PROCEDURE — 700111 HCHG RX REV CODE 636 W/ 250 OVERRIDE (IP): Performed by: INTERNAL MEDICINE

## 2017-06-09 PROCEDURE — 77001 FLUOROGUIDE FOR VEIN DEVICE: CPT

## 2017-06-09 PROCEDURE — 99153 MOD SED SAME PHYS/QHP EA: CPT

## 2017-06-09 PROCEDURE — 87206 SMEAR FLUORESCENT/ACID STAI: CPT

## 2017-06-09 PROCEDURE — 700101 HCHG RX REV CODE 250

## 2017-06-09 PROCEDURE — 76937 US GUIDE VASCULAR ACCESS: CPT

## 2017-06-09 PROCEDURE — 700111 HCHG RX REV CODE 636 W/ 250 OVERRIDE (IP)

## 2017-06-09 PROCEDURE — 665999 HH PPS REVENUE DEBIT

## 2017-06-09 PROCEDURE — B518YZA FLUOROSCOPY OF SUPERIOR VENA CAVA USING OTHER CONTRAST, GUIDANCE: ICD-10-PCS | Performed by: RADIOLOGY

## 2017-06-09 PROCEDURE — 99232 SBSQ HOSP IP/OBS MODERATE 35: CPT | Performed by: HOSPITALIST

## 2017-06-09 PROCEDURE — 85610 PROTHROMBIN TIME: CPT

## 2017-06-09 PROCEDURE — 770020 HCHG ROOM/CARE - TELE (206)

## 2017-06-09 PROCEDURE — A9270 NON-COVERED ITEM OR SERVICE: HCPCS | Performed by: INTERNAL MEDICINE

## 2017-06-09 PROCEDURE — 700105 HCHG RX REV CODE 258: Performed by: INTERNAL MEDICINE

## 2017-06-09 PROCEDURE — 02HV33Z INSERTION OF INFUSION DEVICE INTO SUPERIOR VENA CAVA, PERCUTANEOUS APPROACH: ICD-10-PCS | Performed by: RADIOLOGY

## 2017-06-09 PROCEDURE — 94760 N-INVAS EAR/PLS OXIMETRY 1: CPT

## 2017-06-09 PROCEDURE — 665998 HH PPS REVENUE CREDIT

## 2017-06-09 RX ORDER — ONDANSETRON 2 MG/ML
4 INJECTION INTRAMUSCULAR; INTRAVENOUS PRN
Status: ACTIVE | OUTPATIENT
Start: 2017-06-09 | End: 2017-06-09

## 2017-06-09 RX ORDER — LIDOCAINE HYDROCHLORIDE 10 MG/ML
INJECTION, SOLUTION INFILTRATION; PERINEURAL
Status: COMPLETED
Start: 2017-06-09 | End: 2017-06-09

## 2017-06-09 RX ORDER — LIDOCAINE HYDROCHLORIDE AND EPINEPHRINE BITARTRATE 20; .01 MG/ML; MG/ML
INJECTION, SOLUTION SUBCUTANEOUS
Status: COMPLETED
Start: 2017-06-09 | End: 2017-06-09

## 2017-06-09 RX ORDER — MIDAZOLAM HYDROCHLORIDE 1 MG/ML
INJECTION INTRAMUSCULAR; INTRAVENOUS
Status: COMPLETED
Start: 2017-06-09 | End: 2017-06-09

## 2017-06-09 RX ORDER — CEFAZOLIN SODIUM 1 G/3ML
INJECTION, POWDER, FOR SOLUTION INTRAMUSCULAR; INTRAVENOUS
Status: COMPLETED
Start: 2017-06-09 | End: 2017-06-09

## 2017-06-09 RX ORDER — SODIUM CHLORIDE 9 MG/ML
500 INJECTION, SOLUTION INTRAVENOUS
Status: ACTIVE | OUTPATIENT
Start: 2017-06-09 | End: 2017-06-09

## 2017-06-09 RX ORDER — MIDAZOLAM HYDROCHLORIDE 1 MG/ML
.5-2 INJECTION INTRAMUSCULAR; INTRAVENOUS PRN
Status: ACTIVE | OUTPATIENT
Start: 2017-06-09 | End: 2017-06-09

## 2017-06-09 RX ADMIN — MIDAZOLAM HYDROCHLORIDE 0.5 MG: 1 INJECTION, SOLUTION INTRAMUSCULAR; INTRAVENOUS at 10:43

## 2017-06-09 RX ADMIN — MIDAZOLAM HYDROCHLORIDE 1 MG: 1 INJECTION, SOLUTION INTRAMUSCULAR; INTRAVENOUS at 10:33

## 2017-06-09 RX ADMIN — LIDOCAINE HYDROCHLORIDE AND EPINEPHRINE: 20; 10 INJECTION, SOLUTION INFILTRATION; PERINEURAL at 09:30

## 2017-06-09 RX ADMIN — RENAGEL 800 MG: 800 TABLET ORAL at 12:42

## 2017-06-09 RX ADMIN — IPRATROPIUM BROMIDE AND ALBUTEROL SULFATE 3 ML: .5; 3 SOLUTION RESPIRATORY (INHALATION) at 08:57

## 2017-06-09 RX ADMIN — SIMVASTATIN 40 MG: 40 TABLET, FILM COATED ORAL at 21:47

## 2017-06-09 RX ADMIN — IPRATROPIUM BROMIDE AND ALBUTEROL SULFATE 3 ML: .5; 3 SOLUTION RESPIRATORY (INHALATION) at 15:22

## 2017-06-09 RX ADMIN — CEFAZOLIN 1000 MG: 1 INJECTION, POWDER, FOR SOLUTION INTRAVENOUS at 10:27

## 2017-06-09 RX ADMIN — FENTANYL CITRATE 25 MCG: 50 INJECTION, SOLUTION INTRAMUSCULAR; INTRAVENOUS at 10:46

## 2017-06-09 RX ADMIN — VORICONAZOLE 200 MG: 200 TABLET, FILM COATED ORAL at 21:47

## 2017-06-09 RX ADMIN — FINASTERIDE 5 MG: 5 TABLET, FILM COATED ORAL at 08:31

## 2017-06-09 RX ADMIN — HEPARIN SODIUM 1500 UNITS: 1000 INJECTION, SOLUTION INTRAVENOUS; SUBCUTANEOUS at 12:43

## 2017-06-09 RX ADMIN — PREDNISONE 14 MG: 10 TABLET ORAL at 08:31

## 2017-06-09 RX ADMIN — BUDESONIDE AND FORMOTEROL FUMARATE DIHYDRATE 2 PUFF: 160; 4.5 AEROSOL RESPIRATORY (INHALATION) at 19:58

## 2017-06-09 RX ADMIN — FAMOTIDINE 20 MG: 20 TABLET, FILM COATED ORAL at 21:47

## 2017-06-09 RX ADMIN — COLISTIMETHATE SODIUM 150 MG: 150 INJECTION, POWDER, LYOPHILIZED, FOR SOLUTION INTRAMUSCULAR; INTRAVENOUS at 09:07

## 2017-06-09 RX ADMIN — MIDAZOLAM 1 MG: 1 INJECTION INTRAMUSCULAR; INTRAVENOUS at 10:33

## 2017-06-09 RX ADMIN — FAMOTIDINE 20 MG: 20 TABLET, FILM COATED ORAL at 08:32

## 2017-06-09 RX ADMIN — TAMSULOSIN HYDROCHLORIDE 0.4 MG: 0.4 CAPSULE ORAL at 08:30

## 2017-06-09 RX ADMIN — RENAGEL 800 MG: 800 TABLET ORAL at 16:59

## 2017-06-09 RX ADMIN — EPOETIN ALFA 3000 UNITS: 3000 SOLUTION INTRAVENOUS; SUBCUTANEOUS at 12:44

## 2017-06-09 RX ADMIN — ASPIRIN 81 MG: 81 TABLET ORAL at 08:32

## 2017-06-09 RX ADMIN — LIDOCAINE HYDROCHLORIDE: 10 INJECTION, SOLUTION INFILTRATION; PERINEURAL at 09:30

## 2017-06-09 RX ADMIN — IPRATROPIUM BROMIDE AND ALBUTEROL SULFATE 3 ML: .5; 3 SOLUTION RESPIRATORY (INHALATION) at 19:58

## 2017-06-09 RX ADMIN — METOPROLOL TARTRATE 25 MG: 25 TABLET, FILM COATED ORAL at 21:48

## 2017-06-09 RX ADMIN — BUDESONIDE AND FORMOTEROL FUMARATE DIHYDRATE 2 PUFF: 160; 4.5 AEROSOL RESPIRATORY (INHALATION) at 08:32

## 2017-06-09 RX ADMIN — HEPARIN SODIUM 5000 UNITS: 5000 INJECTION, SOLUTION INTRAVENOUS; SUBCUTANEOUS at 21:46

## 2017-06-09 RX ADMIN — MEROPENEM 500 MG: 500 INJECTION, POWDER, FOR SOLUTION INTRAVENOUS at 21:46

## 2017-06-09 RX ADMIN — VORICONAZOLE 200 MG: 200 TABLET, FILM COATED ORAL at 08:31

## 2017-06-09 RX ADMIN — COLISTIMETHATE SODIUM 150 MG: 150 INJECTION, POWDER, LYOPHILIZED, FOR SOLUTION INTRAMUSCULAR; INTRAVENOUS at 19:58

## 2017-06-09 RX ADMIN — HEPARIN SODIUM 5000 UNITS: 5000 INJECTION, SOLUTION INTRAVENOUS; SUBCUTANEOUS at 14:35

## 2017-06-09 ASSESSMENT — ENCOUNTER SYMPTOMS
GASTROINTESTINAL NEGATIVE: 1
CARDIOVASCULAR NEGATIVE: 1
DIARRHEA: 0
COUGH: 1
SORE THROAT: 0
DEPRESSION: 0
SPEECH CHANGE: 0
FALLS: 0
CHILLS: 0
FEVER: 0
NERVOUS/ANXIOUS: 0
STRIDOR: 0
VOMITING: 0
HEADACHES: 0
INSOMNIA: 0
WHEEZING: 0
SEIZURES: 0
FOCAL WEAKNESS: 0
BLURRED VISION: 0
DOUBLE VISION: 0
MUSCULOSKELETAL NEGATIVE: 1
EYES NEGATIVE: 1
HEARTBURN: 0
PALPITATIONS: 0
BRUISES/BLEEDS EASILY: 0
SHORTNESS OF BREATH: 1
NAUSEA: 0
ABDOMINAL PAIN: 0
SPUTUM PRODUCTION: 1
PSYCHIATRIC NEGATIVE: 1
HEMOPTYSIS: 0

## 2017-06-09 ASSESSMENT — PAIN SCALES - GENERAL
PAINLEVEL_OUTOF10: 0

## 2017-06-09 NOTE — PROGRESS NOTES
Assumed care at 1915. Bedside report received from Day RN Los. Patient's chart and MAR reviewed. 12 hour chart check complete. Pt is awake in bed. Wife at bedside. Patient was updated on plan of care for the night. Questions answered and concerns addressed.  Pt denies any additional needs at this time. White board updated. Call light, phone and personal belongings within reach. Vital signs stable

## 2017-06-09 NOTE — PROGRESS NOTES
[facility] Hospitalist Progress Note    Date of Service: 2017    Chief Complaint  76 y.o. male admitted 2017 with shortness of breath    Interval Problem Update  Mr Brown is a 76 year old male with recurrent pneumonia with resistant pseudomonas pneumonia. Today we were able to get him long term access with a central PICC line that does not  Run through the arm after several discussions between radiology and nephrology. He is positive on his cultures as expected for pseudomonas.     Consultants/Specialty  ID    Disposition  Home when stable with OP antibiotic management long term        Review of Systems   Constitutional: Positive for malaise/fatigue. Negative for fever and chills.   HENT: Negative.  Negative for sore throat.    Eyes: Negative.  Negative for blurred vision and double vision.   Respiratory: Positive for cough and shortness of breath. Negative for wheezing and stridor.    Cardiovascular: Negative.  Negative for chest pain and palpitations.   Gastrointestinal: Negative.  Negative for heartburn, nausea, vomiting and abdominal pain.   Genitourinary: Negative.  Negative for dysuria, urgency and frequency.   Musculoskeletal: Negative.  Negative for joint pain and falls.   Skin: Negative.  Negative for itching and rash.   Neurological: Negative for speech change, focal weakness, seizures and headaches.   Endo/Heme/Allergies: Negative.  Does not bruise/bleed easily.   Psychiatric/Behavioral: Negative.  Negative for depression and suicidal ideas. The patient is not nervous/anxious and does not have insomnia.       Physical Exam  Laboratory/Imaging   Hemodynamics  Temp (24hrs), Av.2 °C (97.2 °F), Min:31.8 °C (89.3 °F), Max:37.6 °C (99.6 °F)   Temperature: 36.4 °C (97.6 °F)  Pulse  Av.7  Min: 76  Max: 116 Heart Rate (Monitored): 66  Blood Pressure : 109/55 mmHg, NIBP: 107/48 mmHg      Respiratory      Respiration: 14, Pulse Oximetry: 96 %, O2 Daily Delivery Respiratory : Silicone Nasal Cannula      Given By:: Mouthpiece, #MDI/DPI Given: MDI/DPI x 1, Work Of Breathing / Effort: Mild;Moderate  RUL Breath Sounds: Clear, RML Breath Sounds: Clear, RLL Breath Sounds: Diminished, DEBORA Breath Sounds: Clear, LLL Breath Sounds: Diminished    Fluids    Intake/Output Summary (Last 24 hours) at 06/09/17 1532  Last data filed at 06/08/17 1600   Gross per 24 hour   Intake    120 ml   Output    200 ml   Net    -80 ml       Nutrition  Orders Placed This Encounter   Procedures   • DIET ORDER     Standing Status: Standing      Number of Occurrences: 1      Standing Expiration Date:      Order Specific Question:  Diet:     Answer:  Regular [1]     Physical Exam   Constitutional: He is oriented to person, place, and time. He appears well-developed and well-nourished. He is not intubated.   HENT:   Head: Normocephalic and atraumatic.   Right Ear: External ear normal.   Left Ear: External ear normal.   Eyes: Conjunctivae and EOM are normal. Pupils are equal, round, and reactive to light. No scleral icterus.   Neck: Normal range of motion. Neck supple. No JVD present. No thyromegaly present.   Cardiovascular: Normal rate and regular rhythm.    Murmur heard.  Pulmonary/Chest: Accessory muscle usage present. No apnea, no tachypnea and no bradypnea. He is not intubated. He has decreased breath sounds in the right upper field, the right middle field, the right lower field, the left upper field, the left middle field and the left lower field. He has rhonchi in the right upper field, the right middle field, the right lower field, the left upper field, the left middle field and the left lower field. He has no rales. He exhibits no tenderness.   Abdominal: Soft. Bowel sounds are normal. He exhibits no distension and no mass. There is no guarding.   Genitourinary:   Emmanuel catheter   Musculoskeletal: Normal range of motion. He exhibits no edema or tenderness.   Lymphadenopathy:     He has no cervical adenopathy.   Neurological: He is alert  and oriented to person, place, and time. He has normal reflexes. He displays normal reflexes. He exhibits normal muscle tone.   Skin: Skin is warm and dry. No rash noted. No erythema. No pallor.   Psychiatric: He has a normal mood and affect. His behavior is normal. Judgment and thought content normal.   Nursing note and vitals reviewed.      Recent Labs      06/07/17 0229   WBC  8.7   RBC  2.79*   HEMOGLOBIN  8.5*   HEMATOCRIT  27.0*   MCV  96.8   MCH  30.5   MCHC  31.5*   RDW  61.5*   PLATELETCT  308   MPV  8.8*     Recent Labs      06/07/17 0229 06/07/17   1017   SODIUM  141   --    POTASSIUM  3.5*  3.5*   CHLORIDE  102   --    CO2  26   --    GLUCOSE  89   --    BUN  62*   --    CREATININE  3.06*   --    CALCIUM  8.6   --                       Assessment/Plan     * Pneumonia of both lungs due to Pseudomonas species (CMS-[facility]) (present on admission)  Assessment & Plan  -cultures are positive for pseudomonas again  -will need long term colistimethate and meropenem   -PICC placed by radiology in central location adn thus will have no impact on his future fistula site  -RT protocol    Protein-calorie malnutrition, severe (HCC) (present on admission)  Assessment & Plan  -optimize nutritional intake    Atrial fibrillation (CMS-HCC) (present on admission)  Assessment & Plan  -metoprolol 25 mg po BID  -rate controlled at mid 80's  -not anticoagulated with high risk for bleeding    ESRD on dialysis (CMS-HCC) (present on admission)  Assessment & Plan  -Continued with HD today  -remains on monitoring of phosphorus and magnesium as well as potassium      EKG reviewed, Radiology images reviewed, Labs reviewed and Medications reviewed  Emmanuel catheter: No Emmanuel      DVT Prophylaxis: Heparin    Ulcer prophylaxis: Yes  Antibiotics: Treating active infection/contamination beyond 24 hours perioperative coverage  Assessed for rehab: Patient was assess for and/or received rehabilitation services during this hospitalization         I spent a total of 42 minutes during this clinical encounter of which > 50% was devoted to counseling and coordinating care including review of records, pertinent lab data and studies, as well as discussing diagnostic evaluation and work up, planned therapeutic interventions and future disposition of care. Where indicated, the assessment and plan reflect discussion of patient with consultants, other healthcare providers, family members, and additional research needed to obtain further information in formulating the plan of care of this patient.

## 2017-06-09 NOTE — PROGRESS NOTES
Hospital Medicine Progress Note, Adult, Complex               Author: Matty Najjar Date & Time created: 2017  1:56 PM     Interval History:  Pt with ESRD, recurrent pneumonia  doin better today  Had PICC line placed  Seen and examined while getting HD.    Review of Systems:  Review of Systems   Constitutional: Negative for fever and chills.   Cardiovascular: Negative for chest pain and leg swelling.   Gastrointestinal: Negative for nausea and vomiting.       Physical Exam:  Physical Exam   Constitutional: He is oriented to person, place, and time. He appears well-developed and well-nourished. No distress.   Eyes: Right eye exhibits no discharge. Left eye exhibits no discharge. No scleral icterus.   Cardiovascular: Regular rhythm.    Pulmonary/Chest: Effort normal. He has no rales.   Musculoskeletal: He exhibits no edema.   Neurological: He is alert and oriented to person, place, and time.   Skin: He is not diaphoretic.       Labs:        Invalid input(s): [id number]      Recent Labs      17   1017   SODIUM  141   --    POTASSIUM  3.5*  3.5*   CHLORIDE  102   --    CO2  26   --    BUN  62*   --    CREATININE  3.06*   --    CALCIUM  8.6   --      Recent Labs      17   GLUCOSE  89     Recent Labs      17   RBC  2.79*   HEMOGLOBIN  8.5*   HEMATOCRIT  27.0*   PLATELETCT  308     Recent Labs      17   WBC  8.7   NEUTSPOLYS  78.10*   LYMPHOCYTES  10.70*   MONOCYTES  7.50   EOSINOPHILS  1.60   BASOPHILS  0.30           Hemodynamics:  Temp (24hrs), Av.2 °C (97.1 °F), Min:31.8 °C (89.3 °F), Max:37.6 °C (99.6 °F)  Temperature: 36.9 °C (98.4 °F)  Pulse  Av  Min: 76  Max: 116Heart Rate (Monitored): 66  Blood Pressure : (!) 98/57 mmHg, NIBP: 107/48 mmHg     Respiratory:    Respiration: 18, Pulse Oximetry: 96 %, O2 Daily Delivery Respiratory : Room Air with O2 Available     Given By:: Mouthpiece, #MDI/DPI Given: MDI/DPI x 1, Work Of Breathing / Effort:  Mild  RUL Breath Sounds: Clear, RML Breath Sounds: Clear, RLL Breath Sounds: Diminished, DEBORA Breath Sounds: Clear, LLL Breath Sounds: Diminished  Fluids:    Intake/Output Summary (Last 24 hours) at 06/09/17 1356  Last data filed at 06/08/17 1600   Gross per 24 hour   Intake    120 ml   Output    200 ml   Net    -80 ml     Weight: 61.8 kg (136 lb 3.9 oz)  GI/Nutrition:  Orders Placed This Encounter   Procedures   • DIET ORDER     Standing Status: Standing      Number of Occurrences: 1      Standing Expiration Date:      Order Specific Question:  Diet:     Answer:  Regular [1]     Medical Decision Making, by Problem:  Active Hospital Problems    Diagnosis   • *Pneumonia of both lungs due to Pseudomonas species (CMS-HCC) [J15.1]   • Atrial fibrillation (CMS-HCC) [I48.91]   • Protein-calorie malnutrition, severe ([facility]) [E43]   • ESRD on dialysis (CMS-HCC) [N18.6, Z99.2]       Labs reviewed and Medications reviewed                  1 ESRD: HD  2 recurrent pneumonia.  3 Anemia  4 hypokalemia  HD today  Renal dose all meds  Avoid nephrotoxins  Prognosis guarded.

## 2017-06-09 NOTE — FLOWSHEET NOTE
06/08/17 1944   Interdisciplinary Plan of Care-Goals (Indications)   Obstructive Ventilatory Defect or Pulmonary Disease without Obvious Obstruction History / Diagnosis   Interdisciplinary Plan of Care-Outcomes    Bronchodilator Outcome Patient at Stable Baseline   Education   Education Yes - Pt. / Family has been Instructed in use of Respiratory Equipment   RT Assessment of Delivered Medications   Evaluation of Medication Delivery Daily Yes-- Pt /Family has been Instructed in use of Respiratory Medications and Adverse Reactions   SVN Group   #SVN Performed Yes   Given By: Mouthpiece   MDI/DPI Group   #MDI/DPI Given MDI/DPI x 1   Respiratory WDL   Respiratory (WDL) WDL   Chest Exam   Work Of Breathing / Effort Mild   Respiration 18   Pulse 93   Breath Sounds   Pre/Post Intervention Pre Intervention Assessment   RUL Breath Sounds Clear   RML Breath Sounds Clear   RLL Breath Sounds Diminished   DEBORA Breath Sounds Clear   LLL Breath Sounds Diminished   Oximetry   #Pulse Oximetry (Single Determination) Yes   Oxygen   Pulse Oximetry 93 %   O2 (LPM) 0   O2 (FiO2) 21   O2 Daily Delivery Respiratory  Room Air with O2 Available

## 2017-06-09 NOTE — PROGRESS NOTES
[facility] Progress Note    Hemodialysis treatment ordered today per Dr. Najjar x 3 hours. Treatment initiated at 1340, ended at 1640.     Patient tolerated tx; see paper flow sheet for details.     Net UF 1000 mL.     Needles removed from access site. Dressings applied and sites held x 10 minutes; verified no bleeding. Positive bruit/thrill post tx. Staff RN instructed to monitor AVF for breakthrough bleeding. Should breakthrough bleeding occur staff RN instructed to apply pressure to access sites until bleeding resolved. Notify Dialysis and Nephrologist for follow-up.    Report given to Primary RN.

## 2017-06-09 NOTE — DISCHARGE PLANNING
"KARLO received orders for outpatient infusion center.  KARLO faxed orders to [facility] and called and spoke to Daiana.  Daiana states they are completely overbooked and \"not accepting new patients.\"  Daiana requested pt be referred to [facility].  KARLO informed Daiana that pt has Medicare insurance which will not cover home infusion.  Daiana states she has referred case to her supervisor Elena Fontenot who is working on finding appointment for pt.  Daiana requested KARLO contact Elena tomorrow AM to inquire about appointment.  Pt expected to d/c home tomorrow if infusion appointment arranged for Sunday.     Plan: KARLO to call Infusion Center supervisor Elena carvajal [phone] tomorrow AM for appointment time.   "

## 2017-06-09 NOTE — PROGRESS NOTES
[facility] Hospitalist Progress Note    Date of Service: 2017    Chief Complaint  76 y.o. male admitted 2017 with shortness of breath    Interval Problem Update  Mr Brown is a 76 year old male with recurrent pneumonia with resistant pseudomonas pneumonia. He is now improved with the colistimethate management, will need long term antibiotics and will need a tunneled catheter.     Consultants/Specialty  ID    Disposition  Home when stable with OP antibiotic management long term        Review of Systems   Constitutional: Negative for weight loss and diaphoresis.   HENT: Negative.  Negative for ear discharge and nosebleeds.    Eyes: Negative.  Negative for photophobia, pain and discharge.   Respiratory: Positive for cough and shortness of breath. Negative for hemoptysis and sputum production.    Cardiovascular: Negative.  Negative for orthopnea, claudication and leg swelling.   Gastrointestinal: Negative.  Negative for vomiting, abdominal pain, diarrhea, constipation and melena.   Genitourinary: Negative.  Negative for dysuria and flank pain.   Musculoskeletal: Negative.  Negative for myalgias, back pain and neck pain.   Skin: Negative.  Negative for itching.   Neurological: Positive for weakness. Negative for dizziness, tingling and tremors.   Endo/Heme/Allergies: Negative.  Does not bruise/bleed easily.   Psychiatric/Behavioral: Negative.  Negative for depression, suicidal ideas and substance abuse.      Physical Exam  Laboratory/Imaging   Hemodynamics  Temp (24hrs), Av.5 °C (99.5 °F), Min:36.8 °C (98.2 °F), Max:38.7 °C (101.6 °F)   Temperature: 37.2 °C (98.9 °F)  Pulse  Av.6  Min: 76  Max: 116    Blood Pressure : 103/53 mmHg      Respiratory      Respiration: 18, Pulse Oximetry: 93 %, O2 Daily Delivery Respiratory : Room Air with O2 Available     Given By:: Mouthpiece, #MDI/DPI Given: MDI/DPI x 1, Work Of Breathing / Effort: Mild  RUL Breath Sounds: Clear, RML Breath Sounds: Clear, RLL Breath Sounds:  Fine Crackles, DEBORA Breath Sounds: Clear, LLL Breath Sounds: Clear    Fluids    Intake/Output Summary (Last 24 hours) at 06/08/17 1741  Last data filed at 06/08/17 1600   Gross per 24 hour   Intake    880 ml   Output   2725 ml   Net  -1845 ml       Nutrition  Orders Placed This Encounter   Procedures   • Diet Order     Standing Status: Standing      Number of Occurrences: 1      Standing Expiration Date:      Order Specific Question:  Diet:     Answer:  Regular [1]     Physical Exam   Constitutional: He is oriented to person, place, and time. He appears well-developed and well-nourished. No distress. He is not intubated.   HENT:   Head: Normocephalic and atraumatic.   Right Ear: External ear normal.   Left Ear: External ear normal.   Eyes: Conjunctivae and EOM are normal. Pupils are equal, round, and reactive to light. Right eye exhibits no discharge. Left eye exhibits no discharge.   Neck: Normal range of motion. Neck supple. No tracheal deviation present.   Cardiovascular: Normal rate and regular rhythm.  Exam reveals no gallop and no friction rub.    Murmur heard.  Pulmonary/Chest: Accessory muscle usage present. No stridor. No apnea, no tachypnea and no bradypnea. He is not intubated. No respiratory distress. He has decreased breath sounds in the right upper field, the right middle field, the right lower field, the left upper field, the left middle field and the left lower field. He has rhonchi in the right upper field, the right middle field, the right lower field, the left upper field, the left middle field and the left lower field.   Abdominal: Soft. Bowel sounds are normal. He exhibits no distension. There is no tenderness. There is no rebound.   Genitourinary:   Emmanuel catheter   Musculoskeletal: Normal range of motion. He exhibits no edema or tenderness.   Neurological: He is alert and oriented to person, place, and time. He has normal reflexes. No cranial nerve deficit. Coordination normal.   Skin: Skin is  warm and dry. He is not diaphoretic.   Psychiatric: He has a normal mood and affect. His behavior is normal. Judgment and thought content normal.   Nursing note and vitals reviewed.      Recent Labs      06/07/17 0229   WBC  8.7   RBC  2.79*   HEMOGLOBIN  8.5*   HEMATOCRIT  27.0*   MCV  96.8   MCH  30.5   MCHC  31.5*   RDW  61.5*   PLATELETCT  308   MPV  8.8*     Recent Labs      06/07/17 0229 06/07/17   1017   SODIUM  141   --    POTASSIUM  3.5*  3.5*   CHLORIDE  102   --    CO2  26   --    GLUCOSE  89   --    BUN  62*   --    CREATININE  3.06*   --    CALCIUM  8.6   --                       Assessment/Plan     * Pneumonia of both lungs due to Pseudomonas species (CMS-HCC) (present on admission)  Assessment & Plan  -reccurent Pseudomonas with resistance  -on colistimethate and meropenem   -on COPD management  -will need a tunneled port    Protein-calorie malnutrition, severe ([facility]) (present on admission)  Assessment & Plan  -continued with nutritional support    Atrial fibrillation (CMS-HCC) (present on admission)  Assessment & Plan  -metoprolol 25 mg po BID    ESRD on dialysis (CMS-HCC) (present on admission)  Assessment & Plan  On HD  -no PICC  -follow lytes      EKG reviewed, Radiology images reviewed, Labs reviewed and Medications reviewed  Emmanuel catheter: No Emmanuel      DVT Prophylaxis: Heparin    Ulcer prophylaxis: Yes  Antibiotics: Treating active infection/contamination beyond 24 hours perioperative coverage  Assessed for rehab: Patient was assess for and/or received rehabilitation services during this hospitalization        I spent a total of 38 minutes during this clinical encounter of which > 50% was devoted to counseling and coordinating care including review of records, pertinent lab data and studies, as well as discussing diagnostic evaluation and work up, planned therapeutic interventions and future disposition of care. Where indicated, the assessment and plan reflect discussion of patient with  consultants, other healthcare providers, family members, and additional research needed to obtain further information in formulating the plan of care of this patient.

## 2017-06-09 NOTE — CARE PLAN
Problem: Venous Thromboembolism (VTW)/Deep Vein Thrombosis (DVT) Prevention:  Goal: Patient will participate in Venous Thrombosis (VTE)/Deep Vein Thrombosis (DVT)Prevention Measures  Outcome: PROGRESSING AS EXPECTED  Pt. Educated on prescribed heparin.    Problem: Knowledge Deficit  Goal: Knowledge of disease process/condition, treatment plan, diagnostic tests, and medications will improve  Outcome: PROGRESSING AS EXPECTED  Pt educated regarding plan of care for the night, activity, diet, and medications. Verbalized understanding.

## 2017-06-09 NOTE — PROGRESS NOTES
IR Note:  Dr Fong completed placement of tunneled central venous catheter.  Left IJ accessed.  Patient tolerated procedure well and remained hemodynamically stable.  Post procedure, report given to LIZETH Lopez and patient taken to room in stable condition.      BARD Power[employer] 6F REF [id number] at 28cm

## 2017-06-09 NOTE — PROGRESS NOTES
Infectious Disease Progress Note    Author: Christine Manzo M.D. Date & Time created: 2017  2:07 PM    Chief Complaint:  FU PNA    Interval History:  76-year-old white male with Wegener's granulomatosis admitted with worsening shortness of breath, weakness, fever, and cough.     Tmax 101.6, WBC 8.7 comfortable at rest. Denies SE abx.   Tmax 99.6, WBC not done Getting tunneled PICC today  Labs Reviewed, Medications Reviewed and Radiology Reviewed.    Review of Systems:  Review of Systems   Constitutional: Positive for malaise/fatigue. Negative for fever and chills.   Respiratory: Positive for cough and sputum production. Negative for hemoptysis.    Gastrointestinal: Negative for nausea, vomiting, abdominal pain and diarrhea.   Skin: Negative for rash.       Hemodynamics:  Temp (24hrs), Av.2 °C (97.1 °F), Min:31.8 °C (89.3 °F), Max:37.6 °C (99.6 °F)  Temperature: 36.9 °C (98.4 °F)  Pulse  Av  Min: 76  Max: 116Heart Rate (Monitored): 66  Blood Pressure : (!) 98/57 mmHg, NIBP: 107/48 mmHg       Physical Exam:  Physical Exam   Constitutional: He is oriented to person, place, and time. He appears well-developed. No distress.   Chronically ill   HENT:   Head: Normocephalic and atraumatic.   Eyes: EOM are normal. Pupils are equal, round, and reactive to light.   Cardiovascular: Normal rate.    Murmur heard.  Pulmonary/Chest: Effort normal. No respiratory distress. He has no wheezes. He has rales.   Abdominal: Soft. He exhibits no distension. There is no tenderness.   Musculoskeletal: He exhibits no edema.   RUE AVF +thrill and bruit   Neurological: He is alert and oriented to person, place, and time.   Skin: No rash noted.   Nursing note and vitals reviewed.      Meds:    Current facility-administered medications:   •  acetaminophen (TYLENOL) tablet 650 mg, 650 mg, Oral, Q4HRS PRN, Donald Beck M.D.  •  ipratropium-albuterol (DUONEB) nebulizer solution 3 mL, 3 mL, Nebulization, 4X/DAY (RT), Mario FERNANDEZ  Lundberg, PHARMD, Stopped at 06/09/17 1100  •  lidocaine (XYLOCAINE) 2 % jelly, , Topical, PRN, Sarah Mc M.D.  •  lidocaine (XYLOCAINE) 1 % solution, 1 mL, Intradermal, PRN, Fadi Najjar, M.D.  •  epoetin lucina (EPOGEN,PROCRIT) injection 3,000 Units, 3,000 Units, Subcutaneous, MO, WE + FR, Fadi Najjar, M.D., 3,000 Units at 06/09/17 1244  •  heparin 1000 units/mL injection 1,500 Units, 1,500 Units, Intravenous, DIALYSIS PRN, Fadi Najjar, M.D., 1,500 Units at 06/09/17 1243  •  voriconazole (VFEND) tablet 200 mg, 200 mg, Oral, Q12HRS, Christine Manzo M.D., 200 mg at 06/09/17 0831  •  [COMPLETED] meropenem (MERREM) 500 mg in  mL IVPB, 500 mg, Intravenous, Q6HRS, Stopped at 06/07/17 1232 **FOLLOWED BY** meropenem (MERREM) 500 mg in  mL IVPB, 500 mg, Intravenous, Q24HRS, Christine Manzo M.D., Stopped at 06/08/17 2135  •  colistimethate (COLY-MYCIN M) 150 mg, 150 mg, Inhalation, BID (RT), Christine Manzo M.D., 150 mg at 06/09/17 0907  •  albuterol inhaler 2 Puff, 2 Puff, Inhalation, Q4HRS PRN, Danielle Ferreira M.D.  •  aspirin EC (ECOTRIN) tablet 81 mg, 81 mg, Oral, DAILY, Danielle Ferreira M.D., 81 mg at 06/09/17 0832  •  finasteride (PROSCAR) tablet 5 mg, 5 mg, Oral, DAILY, Danielle Ferreira M.D., 5 mg at 06/09/17 0831  •  budesonide-formoterol (SYMBICORT) 160-4.5 MCG/ACT inhaler 2 Puff, 2 Puff, Inhalation, BID, Danielle Ferreira M.D., 2 Puff at 06/09/17 0832  •  metoprolol (LOPRESSOR) tablet 25 mg, 25 mg, Oral, BID, Danielle Ferreira M.D., Stopped at 06/09/17 0900  •  predniSONE (DELTASONE) tablet 14 mg, 14 mg, Oral, DAILY, Danielle Ferreira M.D., 14 mg at 06/09/17 0831  •  famotidine (PEPCID) tablet 20 mg, 20 mg, Oral, BID, Danielle Ferreira M.D., 20 mg at 06/09/17 0832  •  sevelamer (RENAGEL) tablet 800 mg, 800 mg, Oral, TID WITH MEALS, Danielle Ferreira M.D., 800 mg at 06/09/17 1242  •  simvastatin (ZOCOR) tablet 40 mg, 40 mg, Oral, Nightly, Danielle Ferreira M.D., 40 mg at 06/08/17 2105  •  tamsulosin (FLOMAX)  capsule 0.4 mg, 0.4 mg, Oral, DAILY, Danielle Ferreira M.D., 0.4 mg at 06/09/17 0830  •  senna-docusate (PERICOLACE or SENOKOT S) 8.6-50 MG per tablet 2 Tab, 2 Tab, Oral, BID, 2 Tab at 06/07/17 2114 **AND** polyethylene glycol/lytes (MIRALAX) PACKET 1 Packet, 1 Packet, Oral, QDAY PRN **AND** magnesium hydroxide (MILK OF MAGNESIA) suspension 30 mL, 30 mL, Oral, QDAY PRN **AND** bisacodyl (DULCOLAX) suppository 10 mg, 10 mg, Rectal, QDAY PRN, Danielle Ferreira M.D.  •  enalaprilat (VASOTEC) injection 1.25 mg, 1.25 mg, Intravenous, Q6HRS PRN, Danielle Ferreira M.D.  •  ondansetron (ZOFRAN) syringe/vial injection 4 mg, 4 mg, Intravenous, Q4HRS PRN, Danielle Ferreira M.D.  •  ondansetron (ZOFRAN ODT) dispertab 4 mg, 4 mg, Oral, Q4HRS PRN, Danielle Ferreira M.D.  •  guaifenesin DM (ROBITUSSIN DM) 100-10 MG/5ML syrup 10 mL, 10 mL, Oral, Q6HRS PRN, Danielle Ferreira M.D.  •  heparin injection 5,000 Units, 5,000 Units, Subcutaneous, Q8HRS, Danielle Ferreira M.D., Stopped at 06/09/17 0600    Labs:  Recent Labs      06/07/17   0229   WBC  8.7   RBC  2.79*   HEMOGLOBIN  8.5*   HEMATOCRIT  27.0*   MCV  96.8   MCH  30.5   RDW  61.5*   PLATELETCT  308   MPV  8.8*   NEUTSPOLYS  78.10*   LYMPHOCYTES  10.70*   MONOCYTES  7.50   EOSINOPHILS  1.60   BASOPHILS  0.30     Recent Labs      06/07/17   0229 06/07/17   1017   SODIUM  141   --    POTASSIUM  3.5*  3.5*   CHLORIDE  102   --    CO2  26   --    GLUCOSE  89   --    BUN  62*   --      Recent Labs      06/07/17   0229   CREATININE  3.06*       Imaging:  Dx-chest-portable (1 View)  5/15/2017  1.  Increased pulmonary edema 2.  Possible small BILATERAL pleural effusions 3.  Persistently enlarged cardiac silhouette    Micro:  BLOOD CULTURE   Date Value Ref Range Status   05/23/2017 No growth after 5 days of incubation.  Final   12/30/2013 Final report  Final      Results     Procedure Component Value Units Date/Time    CULTURE RESPIRATORY W/ GRM STN [[id number]]  (Abnormal) Collected:  06/07/17 1428     Order Status:  Completed Specimen Information:  Respirate from Sputum Updated:  06/09/17 0938     Significant Indicator POS (POS)      Source RESP      Site SPUTUM      Respiratory Culture        Result:        Light growth usual upper respiratory matt , including yeast. (A)     Gram Stain Result --      Result:        Few WBCs.  Rare epithelial cells.  Few mixed bacteria, no predominant organism seen.  Specimen Quality Score: 1+       Respiratory Culture -- (A)      Result:        Pseudomonas species  Rare growth      Narrative:      [id number] TING DIANNE C    AFB CULTURE [[id number]] Collected:  06/09/17 0840    Order Status:  Completed Specimen Information:  Sputum from Sputum Updated:  06/09/17 0848    Narrative:      [id number] HOANG DUNN    GRAM STAIN [[id number]] Collected:  06/07/17 1428    Order Status:  Completed Specimen Information:  Respirate Updated:  06/08/17 1022     Significant Indicator .      Source RESP      Site SPUTUM      Gram Stain Result --      Result:        Few WBCs.  Rare epithelial cells.  Few mixed bacteria, no predominant organism seen.  Specimen Quality Score: 1+      Narrative:      [id number] TING DIANNE C    AFB CULTURE [[id number]]     Order Status:  No result Specimen Information:  Sputum from Sputum     AFB CULTURE [[id number]]     Order Status:  No result Specimen Information:  Sputum from Sputum           Assessment:  Active Hospital Problems    Diagnosis   • *Pneumonia of both lungs due to Pseudomonas species (CMS-HCC) [J15.1]   • Atrial fibrillation (CMS-HCC) [I48.91]   • Protein-calorie malnutrition, severe ([facility]) [E43]   • ESRD on dialysis (CMS-HCC) [N18.6, Z99.2]       Plan:  PNA due to MDR pseudomonas  Resp improved at rest  Remains febrile  CT chest  Tunneled catheter to be placed today  Continue meropenem and inhaled colistin for 4 week course  Stop date    Fungal PNA, IPA vs CPA  Continue voriconazole  Check CT chest    ESRD on HD  Dose adjusted abx  No  PICC in extremity    Wgeners granulomatosis  Immunocompromised  Chronically colonized with multiple pathogens  Poor longterm prognosis    H/O MAC, pulmomary  He is concerned about relapse of his MAC  Check sputum AFB X3  No treatment recommended at this time      Discussed with Nephrology

## 2017-06-09 NOTE — OR SURGEON
Immediate Post- Operative Note        PostOp Diagnosis: NEEDS LONG TERM ANTIBIOTICS      Procedure(s):TUNNELLED LEFT IJ PICC      Estimated Blood Loss: Less than 5 ml        Complications: None            6/9/2017     11:02 AM     Kaushik Fong

## 2017-06-10 PROBLEM — M31.30 WEGENER'S GRANULOMATOSIS: Status: ACTIVE | Noted: 2017-06-10

## 2017-06-10 LAB
BACTERIA SPEC RESP CULT: ABNORMAL
BACTERIA SPEC RESP CULT: ABNORMAL
GRAM STN SPEC: ABNORMAL
SIGNIFICANT IND 70042: ABNORMAL
SITE SITE: ABNORMAL
SOURCE SOURCE: ABNORMAL

## 2017-06-10 PROCEDURE — A9270 NON-COVERED ITEM OR SERVICE: HCPCS | Performed by: INTERNAL MEDICINE

## 2017-06-10 PROCEDURE — 700101 HCHG RX REV CODE 250

## 2017-06-10 PROCEDURE — 665999 HH PPS REVENUE DEBIT

## 2017-06-10 PROCEDURE — 700111 HCHG RX REV CODE 636 W/ 250 OVERRIDE (IP): Performed by: INTERNAL MEDICINE

## 2017-06-10 PROCEDURE — 700105 HCHG RX REV CODE 258: Performed by: INTERNAL MEDICINE

## 2017-06-10 PROCEDURE — 700102 HCHG RX REV CODE 250 W/ 637 OVERRIDE(OP): Performed by: INTERNAL MEDICINE

## 2017-06-10 PROCEDURE — 94760 N-INVAS EAR/PLS OXIMETRY 1: CPT

## 2017-06-10 PROCEDURE — 770020 HCHG ROOM/CARE - TELE (206)

## 2017-06-10 PROCEDURE — 700105 HCHG RX REV CODE 258

## 2017-06-10 PROCEDURE — 665998 HH PPS REVENUE CREDIT

## 2017-06-10 PROCEDURE — 99232 SBSQ HOSP IP/OBS MODERATE 35: CPT | Performed by: HOSPITALIST

## 2017-06-10 PROCEDURE — 94640 AIRWAY INHALATION TREATMENT: CPT

## 2017-06-10 RX ORDER — SODIUM CHLORIDE 9 MG/ML
INJECTION, SOLUTION INTRAVENOUS
Status: COMPLETED
Start: 2017-06-10 | End: 2017-06-10

## 2017-06-10 RX ORDER — VORICONAZOLE 200 MG/1
200 TABLET, FILM COATED ORAL EVERY 12 HOURS
Qty: 28 TAB | Refills: 0 | Status: SHIPPED | OUTPATIENT
Start: 2017-06-10 | End: 2017-06-10

## 2017-06-10 RX ORDER — VORICONAZOLE 200 MG/1
200 TABLET, FILM COATED ORAL EVERY 12 HOURS
Qty: 56 TAB | Refills: 0 | Status: SHIPPED | OUTPATIENT
Start: 2017-06-10 | End: 2017-07-08

## 2017-06-10 RX ADMIN — VORICONAZOLE 200 MG: 200 TABLET, FILM COATED ORAL at 09:31

## 2017-06-10 RX ADMIN — VORICONAZOLE 200 MG: 200 TABLET, FILM COATED ORAL at 20:47

## 2017-06-10 RX ADMIN — HEPARIN SODIUM 5000 UNITS: 5000 INJECTION, SOLUTION INTRAVENOUS; SUBCUTANEOUS at 21:15

## 2017-06-10 RX ADMIN — BUDESONIDE AND FORMOTEROL FUMARATE DIHYDRATE 2 PUFF: 160; 4.5 AEROSOL RESPIRATORY (INHALATION) at 08:43

## 2017-06-10 RX ADMIN — HEPARIN SODIUM 5000 UNITS: 5000 INJECTION, SOLUTION INTRAVENOUS; SUBCUTANEOUS at 05:44

## 2017-06-10 RX ADMIN — FINASTERIDE 5 MG: 5 TABLET, FILM COATED ORAL at 09:29

## 2017-06-10 RX ADMIN — MEROPENEM 500 MG: 500 INJECTION, POWDER, FOR SOLUTION INTRAVENOUS at 20:47

## 2017-06-10 RX ADMIN — PREDNISONE 14 MG: 10 TABLET ORAL at 09:30

## 2017-06-10 RX ADMIN — BUDESONIDE AND FORMOTEROL FUMARATE DIHYDRATE 2 PUFF: 160; 4.5 AEROSOL RESPIRATORY (INHALATION) at 18:40

## 2017-06-10 RX ADMIN — IPRATROPIUM BROMIDE AND ALBUTEROL SULFATE 3 ML: .5; 3 SOLUTION RESPIRATORY (INHALATION) at 08:43

## 2017-06-10 RX ADMIN — IPRATROPIUM BROMIDE AND ALBUTEROL SULFATE 3 ML: .5; 3 SOLUTION RESPIRATORY (INHALATION) at 18:40

## 2017-06-10 RX ADMIN — TAMSULOSIN HYDROCHLORIDE 0.4 MG: 0.4 CAPSULE ORAL at 09:29

## 2017-06-10 RX ADMIN — RENAGEL 800 MG: 800 TABLET ORAL at 09:30

## 2017-06-10 RX ADMIN — COLISTIMETHATE SODIUM 150 MG: 150 INJECTION, POWDER, LYOPHILIZED, FOR SOLUTION INTRAMUSCULAR; INTRAVENOUS at 08:43

## 2017-06-10 RX ADMIN — SIMVASTATIN 40 MG: 40 TABLET, FILM COATED ORAL at 20:47

## 2017-06-10 RX ADMIN — ASPIRIN 81 MG: 81 TABLET ORAL at 09:29

## 2017-06-10 RX ADMIN — IPRATROPIUM BROMIDE AND ALBUTEROL SULFATE 3 ML: .5; 3 SOLUTION RESPIRATORY (INHALATION) at 10:55

## 2017-06-10 RX ADMIN — METOPROLOL TARTRATE 25 MG: 25 TABLET, FILM COATED ORAL at 20:47

## 2017-06-10 RX ADMIN — COLISTIMETHATE SODIUM 150 MG: 150 INJECTION, POWDER, LYOPHILIZED, FOR SOLUTION INTRAMUSCULAR; INTRAVENOUS at 18:39

## 2017-06-10 RX ADMIN — HEPARIN SODIUM 5000 UNITS: 5000 INJECTION, SOLUTION INTRAVENOUS; SUBCUTANEOUS at 13:36

## 2017-06-10 RX ADMIN — METOPROLOL TARTRATE 25 MG: 25 TABLET, FILM COATED ORAL at 09:31

## 2017-06-10 RX ADMIN — FAMOTIDINE 20 MG: 20 TABLET, FILM COATED ORAL at 09:29

## 2017-06-10 RX ADMIN — IPRATROPIUM BROMIDE AND ALBUTEROL SULFATE 3 ML: .5; 3 SOLUTION RESPIRATORY (INHALATION) at 16:00

## 2017-06-10 RX ADMIN — SODIUM CHLORIDE: 9 INJECTION, SOLUTION INTRAVENOUS at 21:00

## 2017-06-10 RX ADMIN — RENAGEL 800 MG: 800 TABLET ORAL at 16:55

## 2017-06-10 RX ADMIN — FAMOTIDINE 20 MG: 20 TABLET, FILM COATED ORAL at 20:47

## 2017-06-10 ASSESSMENT — ENCOUNTER SYMPTOMS
VOMITING: 0
COUGH: 1
EYES NEGATIVE: 1
CARDIOVASCULAR NEGATIVE: 1
GASTROINTESTINAL NEGATIVE: 1
TREMORS: 0
EYE PAIN: 0
NAUSEA: 0
PSYCHIATRIC NEGATIVE: 1
WEAKNESS: 1
WEIGHT LOSS: 0
HEADACHES: 0
FLANK PAIN: 0
EYE DISCHARGE: 0
NECK PAIN: 0
CONSTIPATION: 0
HEMOPTYSIS: 0
MUSCULOSKELETAL NEGATIVE: 1
TINGLING: 0
SPUTUM PRODUCTION: 1
ABDOMINAL PAIN: 0
SPUTUM PRODUCTION: 0
MYALGIAS: 0
SHORTNESS OF BREATH: 1
BRUISES/BLEEDS EASILY: 0
BACK PAIN: 0
DIARRHEA: 0
FEVER: 0
DIZZINESS: 0
FALLS: 0
DEPRESSION: 0
CHILLS: 0

## 2017-06-10 ASSESSMENT — PAIN SCALES - GENERAL
PAINLEVEL_OUTOF10: 0

## 2017-06-10 ASSESSMENT — LIFESTYLE VARIABLES: SUBSTANCE_ABUSE: 0

## 2017-06-10 NOTE — PROGRESS NOTES
Hospital Medicine Progress Note, Adult, Complex               Author: Fadi Najjar Date & Time created: 6/10/2017  12:23 PM     Interval History:  Pt with ESRD, recurrent pneumonia  doing better today      Review of Systems:  Review of Systems   Constitutional: Negative for fever and chills.   Cardiovascular: Negative for chest pain and leg swelling.   Gastrointestinal: Negative for nausea and vomiting.       Physical Exam:  Physical Exam   Constitutional: He is oriented to person, place, and time. He appears well-developed and well-nourished. No distress.   Eyes: Right eye exhibits no discharge. Left eye exhibits no discharge. No scleral icterus.   Cardiovascular: Regular rhythm.    Pulmonary/Chest: Effort normal. He has no rales.   Musculoskeletal: He exhibits no edema.   Neurological: He is alert and oriented to person, place, and time.   Skin: He is not diaphoretic.       Labs:        Invalid input(s): [id number]      No results for input(s): SODIUM, POTASSIUM, CHLORIDE, CO2, BUN, CREATININE, MAGNESIUM, PHOSPHORUS, CALCIUM in the last 72 hours.  No results for input(s): ALTSGPT, ASTSGOT, ALKPHOSPHAT, TBILIRUBIN, DBILIRUBIN, GAMMAGT, AMYLASE, LIPASE, ALB, PREALBUMIN, GLUCOSE in the last 72 hours.  No results for input(s): RBC, HEMOGLOBIN, HEMATOCRIT, PLATELETCT, PROTHROMBTM, APTT, INR, IRON, FERRITIN, TOTIRONBC in the last 72 hours.            Hemodynamics:  Temp (24hrs), Av.9 °C (98.4 °F), Min:36.7 °C (98 °F), Max:37.1 °C (98.8 °F)  Temperature: 36.9 °C (98.4 °F)  Pulse  Av.2  Min: 76  Max: 116   Blood Pressure : (!) 99/54 mmHg     Respiratory:    Respiration: 16, Pulse Oximetry: 93 %, O2 Daily Delivery Respiratory : Room Air with O2 Available     Given By:: Mouthpiece, #MDI/DPI Given: MDI/DPI x 1, Work Of Breathing / Effort: Mild  RUL Breath Sounds: Clear, RML Breath Sounds: Clear, RLL Breath Sounds: Diminished, DEBORA Breath Sounds: Clear, LLL Breath Sounds: Diminished  Fluids:    Intake/Output  Summary (Last 24 hours) at 06/10/17 1223  Last data filed at 06/10/17 0546   Gross per 24 hour   Intake    500 ml   Output   1725 ml   Net  -1225 ml     Weight: 58.4 kg (128 lb 12 oz)  GI/Nutrition:  Orders Placed This Encounter   Procedures   • DIET ORDER     Standing Status: Standing      Number of Occurrences: 1      Standing Expiration Date:      Order Specific Question:  Diet:     Answer:  Regular [1]     Medical Decision Making, by Problem:  Active Hospital Problems    Diagnosis   • *Pneumonia of both lungs due to Pseudomonas species (CMS-HCC) [J15.1]   • Atrial fibrillation (CMS-HCC) [I48.91]   • Protein-calorie malnutrition, severe ([facility]) [E43]   • ESRD on dialysis (CMS-HCC) [N18.6, Z99.2]       Labs reviewed and Medications reviewed                  1 ESRD: HD  2 recurrent pneumonia.  3 Anemia  4 hypokalemia  No need for HD today  Renal dose all meds  Avoid nephrotoxins  Prognosis guarded.

## 2017-06-10 NOTE — PROGRESS NOTES
Infectious Disease Progress Note    Author: Christine Manzo M.D. Date & Time created: 6/10/2017  2:40 PM    Chief Complaint:  FU PNA    Interval History:  76-year-old white male with Wegener's granulomatosis admitted with worsening shortness of breath, weakness, fever, and cough.     Tmax 101.6, WBC 8.7 comfortable at rest. Denies SE abx.   Tmax 99.6, WBC not done Getting tunneled PICC today  6/10 AF SOB with getting out of bed today-wants to know if he should wear a mask around his grandchildren  Labs Reviewed, Medications Reviewed and Radiology Reviewed.    Review of Systems:  Review of Systems   Constitutional: Positive for malaise/fatigue. Negative for fever and chills.   Respiratory: Positive for cough, sputum production and shortness of breath. Negative for hemoptysis.         Decreased cough  SOB with activity   Gastrointestinal: Negative for nausea, vomiting, abdominal pain and diarrhea.   Skin: Negative for rash.       Hemodynamics:  Temp (24hrs), Av.9 °C (98.4 °F), Min:36.7 °C (98 °F), Max:37.1 °C (98.8 °F)  Temperature: 37.1 °C (98.7 °F)  Pulse  Av.4  Min: 76  Max: 116   Blood Pressure : 100/55 mmHg       Physical Exam:  Physical Exam   Constitutional: He is oriented to person, place, and time. He appears well-developed. No distress.   Chronically ill   HENT:   Head: Normocephalic and atraumatic.   Eyes: EOM are normal. Pupils are equal, round, and reactive to light.   Cardiovascular: Normal rate.    Murmur heard.  Pulmonary/Chest: Effort normal. No respiratory distress. He has no wheezes. He has rales.   Abdominal: Soft. He exhibits no distension. There is no tenderness.   Musculoskeletal: He exhibits no edema.   RUE AVF +thrill and bruit   Neurological: He is alert and oriented to person, place, and time.   Skin: No rash noted.   Nursing note and vitals reviewed.      Meds:    Current facility-administered medications:   •  acetaminophen (TYLENOL) tablet 650 mg, 650 mg, Oral, Q4HRS  PRN, Donald Beck M.D.  •  ipratropium-albuterol (DUONEB) nebulizer solution 3 mL, 3 mL, Nebulization, 4X/DAY (RT), Mario Lundberg, MAGDALENE, 3 mL at 06/10/17 1055  •  lidocaine (XYLOCAINE) 2 % jelly, , Topical, PRN, Sarah Mc M.D.  •  lidocaine (XYLOCAINE) 1 % solution, 1 mL, Intradermal, PRN, Fadi Najjar, M.D.  •  epoetin lucina (EPOGEN,PROCRIT) injection 3,000 Units, 3,000 Units, Subcutaneous, MO, WE + FR, Fadi Najjar, M.D., 3,000 Units at 06/09/17 1244  •  heparin 1000 units/mL injection 1,500 Units, 1,500 Units, Intravenous, DIALYSIS PRN, Fadi Najjar, M.D., 1,500 Units at 06/09/17 1243  •  voriconazole (VFEND) tablet 200 mg, 200 mg, Oral, Q12HRS, Christine Manzo M.D., 200 mg at 06/10/17 0931  •  [COMPLETED] meropenem (MERREM) 500 mg in  mL IVPB, 500 mg, Intravenous, Q6HRS, Stopped at 06/07/17 1232 **FOLLOWED BY** meropenem (MERREM) 500 mg in  mL IVPB, 500 mg, Intravenous, Q24HRS, Christine Manzo M.D., Stopped at 06/09/17 2216  •  colistimethate (COLY-MYCIN M) 150 mg, 150 mg, Inhalation, BID (RT), Christine Manzo M.D., 150 mg at 06/10/17 0843  •  albuterol inhaler 2 Puff, 2 Puff, Inhalation, Q4HRS PRN, Danielle Ferreira M.D.  •  aspirin EC (ECOTRIN) tablet 81 mg, 81 mg, Oral, DAILY, Danielle Ferreira M.D., 81 mg at 06/10/17 0929  •  finasteride (PROSCAR) tablet 5 mg, 5 mg, Oral, DAILY, Danielle Ferreira M.D., 5 mg at 06/10/17 0929  •  budesonide-formoterol (SYMBICORT) 160-4.5 MCG/ACT inhaler 2 Puff, 2 Puff, Inhalation, BID, Danielle Ferreira M.D., 2 Puff at 06/10/17 0843  •  metoprolol (LOPRESSOR) tablet 25 mg, 25 mg, Oral, BID, Danielle Ferreira M.D., 25 mg at 06/10/17 0931  •  predniSONE (DELTASONE) tablet 14 mg, 14 mg, Oral, DAILY, Danielle Ferreira M.D., 14 mg at 06/10/17 0930  •  famotidine (PEPCID) tablet 20 mg, 20 mg, Oral, BID, Danielle Ferreira M.D., 20 mg at 06/10/17 0929  •  sevelamer (RENAGEL) tablet 800 mg, 800 mg, Oral, TID WITH MEALS, Danielle Ferreira M.D., 800 mg at 06/10/17 0930  •   simvastatin (ZOCOR) tablet 40 mg, 40 mg, Oral, Nightly, Dainelle Ferreira M.D., 40 mg at 06/09/17 2147  •  tamsulosin (FLOMAX) capsule 0.4 mg, 0.4 mg, Oral, DAILY, Danielle Ferreira M.D., 0.4 mg at 06/10/17 0929  •  senna-docusate (PERICOLACE or SENOKOT S) 8.6-50 MG per tablet 2 Tab, 2 Tab, Oral, BID, 2 Tab at 06/07/17 2114 **AND** polyethylene glycol/lytes (MIRALAX) PACKET 1 Packet, 1 Packet, Oral, QDAY PRN **AND** magnesium hydroxide (MILK OF MAGNESIA) suspension 30 mL, 30 mL, Oral, QDAY PRN **AND** bisacodyl (DULCOLAX) suppository 10 mg, 10 mg, Rectal, QDAY PRN, Danielle Ferreira M.D.  •  enalaprilat (VASOTEC) injection 1.25 mg, 1.25 mg, Intravenous, Q6HRS PRN, Danielle Ferreira M.D.  •  ondansetron (ZOFRAN) syringe/vial injection 4 mg, 4 mg, Intravenous, Q4HRS PRN, Danielle Ferreira M.D.  •  ondansetron (ZOFRAN ODT) dispertab 4 mg, 4 mg, Oral, Q4HRS PRN, Danielle Ferreira M.D.  •  guaifenesin DM (ROBITUSSIN DM) 100-10 MG/5ML syrup 10 mL, 10 mL, Oral, Q6HRS PRN, Danielle Ferreira M.D.  •  heparin injection 5,000 Units, 5,000 Units, Subcutaneous, Q8HRS, Danielle Ferreira M.D., 5,000 Units at 06/10/17 1336    Labs:  No results for input(s): WBC, RBC, HEMOGLOBIN, HEMATOCRIT, MCV, MCH, RDW, PLATELETCT, MPV, NEUTSPOLYS, LYMPHOCYTES, MONOCYTES, EOSINOPHILS, BASOPHILS, RBCMORPHOLO in the last 72 hours.  No results for input(s): SODIUM, POTASSIUM, CHLORIDE, CO2, GLUCOSE, BUN, CPKTOTAL in the last 72 hours.  No results for input(s): ALBUMIN, TBILIRUBIN, ALKPHOSPHAT, TOTPROTEIN, ALTSGPT, ASTSGOT, CREATININE in the last 72 hours.    Imaging:  Dx-chest-portable (1 View)  5/15/2017  1.  Increased pulmonary edema 2.  Possible small BILATERAL pleural effusions 3.  Persistently enlarged cardiac silhouette    Micro:  BLOOD CULTURE   Date Value Ref Range Status   05/23/2017 No growth after 5 days of incubation.  Final   12/30/2013 Final report  Final      Results     Procedure Component Value Units Date/Time    CULTURE RESPIRATORY W/ GRM STN  [[id number]]  (Abnormal)  (Susceptibility) Collected:  06/07/17 1428    Order Status:  Completed Specimen Information:  Respirate from Sputum Updated:  06/10/17 0929     Gram Stain Result --      Result:        Few WBCs.  Rare epithelial cells.  Few mixed bacteria, no predominant organism seen.  Specimen Quality Score: 1+       Significant Indicator POS (POS)      Source RESP      Site SPUTUM      Respiratory Culture        Result:        Light growth usual upper respiratory matt , including yeast. (A)     Respiratory Culture -- (A)      Result:        Pseudomonas aeruginosa  Rare growth  P.aeruginosa may develop resistance during prolonged therapy  with all antibiotics. Isolates that are initially susceptible  may become resistant within three to four days after  initiation of therapy. Testing of repeat isolates may be  warranted.      Narrative:      [id number] TING DIANNE C    Culture & Susceptibility     PSEUDOMONAS AERUGINOSA     Antibiotic Sensitivity Microscan Unit Status    Amikacin Sensitive <=16 mcg/mL Final    Cefepime Intermediate 16 mcg/mL Final    Ceftazidime Sensitive 4 mcg/mL Final    Ciprofloxacin Sensitive <=1 mcg/mL Final    Gentamicin Sensitive <=4 mcg/mL Final    Imipenem Sensitive <=1 mcg/mL Final    Meropenem Sensitive <=1 mcg/mL Final    Pip/Tazobactam Sensitive <=16 mcg/mL Final    Tobramycin Sensitive <=4 mcg/mL Final                       ACID FAST STAIN [[id number]] Collected:  06/09/17 0840    Order Status:  Completed Specimen Information:  Respirate Updated:  06/09/17 1754     Significant Indicator NEG      Source RESP      Site SPUTUM      AFB Smear Results No acid fast bacilli seen.     Narrative:      [id number] BOLTONSA DUNN    AFB CULTURE [[id number]] Collected:  06/09/17 0840    Order Status:  Completed Specimen Information:  Respirate from Sputum Updated:  06/09/17 1754     Significant Indicator NEG      Source RESP      Site SPUTUM      AFB Culture Culture in progress.       AFB Smear Results No acid fast bacilli seen.     Narrative:      [id number] HOANG DUNN    GRAM STAIN [[id number]] Collected:  06/07/17 1428    Order Status:  Completed Specimen Information:  Respirate Updated:  06/08/17 1022     Significant Indicator .      Source RESP      Site SPUTUM      Gram Stain Result --      Result:        Few WBCs.  Rare epithelial cells.  Few mixed bacteria, no predominant organism seen.  Specimen Quality Score: 1+      Narrative:      [id number] TING DIANNE C    AFB CULTURE [[id number]]     Order Status:  No result Specimen Information:  Sputum from Sputum     AFB CULTURE [[id number]]     Order Status:  No result Specimen Information:  Sputum from Sputum           Assessment:  Active Hospital Problems    Diagnosis   • *Pneumonia of both lungs due to Pseudomonas species (CMS-HCC) [J15.1]   • Atrial fibrillation (CMS-HCC) [I48.91]   • Protein-calorie malnutrition, severe ([facility]) [E43]   • ESRD on dialysis (CMS-HCC) [N18.6, Z99.2]       Plan:  PNA due to MDR pseudomonas  Resp improved at rest-poor exercise tolerance  Afebrile  CT chest  Tunneled catheter placed   Continue meropenem and inhaled colistin for 4 week course  Stop date 7/4/2017  Orders done and scanned 6/9    Fungal PNA, IPA vs CPA  Continue voriconazole  Check CT chest when feasible    ESRD on HD  Dose adjusted abx  Tunneled cath placed    Wegeners granulomatosis  Immunocompromised  Chronically colonized with multiple pathogens  Poor longterm prognosis    H/O MAC, pulmomary  He is concerned about relapse of his MAC  Check sputum AFB X3-2 neg so far  No treatment recommended      Discussed with DOTTIE/Kiran

## 2017-06-10 NOTE — CARE PLAN
Problem: Safety  Goal: Will remain free from injury  Bed locked and in lowest position. Patient up self, refusing bed alarm. Treaded socks. Call light and belongings within reach. Patient educated to call for assistance. Pt verbalized understanding. Hourly rounding in place.     Problem: Knowledge Deficit  Goal: Knowledge of disease process/condition, treatment plan, diagnostic tests, and medications will improve  Discussed POC and medications with patient, pt. verbalized understanding.

## 2017-06-10 NOTE — PROGRESS NOTES
Assumed care at 1915. Bedside report received from Day RN Jessica. Patient's chart and MAR reviewed. 12 hour chart check complete. Pt is asleep in bed. White board updated. Call light, phone and personal belongings within reach. Vital signs stable

## 2017-06-10 NOTE — CARE PLAN
Problem: Venous Thromboembolism (VTW)/Deep Vein Thrombosis (DVT) Prevention:  Goal: Patient will participate in Venous Thrombosis (VTE)/Deep Vein Thrombosis (DVT)Prevention Measures  Outcome: PROGRESSING AS EXPECTED  Pt. Educated on prescribed heparin medication.     Problem: Knowledge Deficit  Goal: Knowledge of disease process/condition, treatment plan, diagnostic tests, and medications will improve  Outcome: PROGRESSING AS EXPECTED  Pt educated regarding plan of care for the night, activity, diet, and medications. Verbalized understanding.

## 2017-06-10 NOTE — PROGRESS NOTES
Patient's pharmacy called and said they do not carry the inhalation form of colistimathate. Will update Dr. Beck. Also page sent out to on-call  regarding status on out patient antibiotic infusions.

## 2017-06-10 NOTE — PROGRESS NOTES
Bedside report received. No overnight events. Patient A&O x 4. RA. No complaints of pain or SOB at this time. Patient has many questions regarding out patient antibiotic therapy. POC discussed with patient. Pt verbalizes understanding. Call light and belongings with in reach. Bed locked and in lowest position, alarm and fall precautions in place.

## 2017-06-10 NOTE — PROGRESS NOTES
[facility] Hospitalist Progress Note    Date of Service: 6/10/2017    Chief Complaint  76 y.o. male admitted 2017 with shortness of breath    Interval Problem Update  Mr Brown is a 76 year old male with recurrent pneumonia with resistant pseudomonas pneumonia. He is ready to transition out of the acute care setting. He has to make a decision into home with outpatient infusion and dialysis or LTAC where it will be done for him but he will remain an inpatient.     Consultants/Specialty  ID    Disposition  Home when stable with OP antibiotic management long term        Review of Systems   Constitutional: Positive for malaise/fatigue. Negative for chills and weight loss.   HENT: Negative.  Negative for hearing loss and nosebleeds.    Eyes: Negative.  Negative for pain and discharge.   Respiratory: Positive for cough and shortness of breath. Negative for sputum production.    Cardiovascular: Negative.  Negative for chest pain and leg swelling.   Gastrointestinal: Negative.  Negative for nausea, vomiting, abdominal pain, diarrhea and constipation.   Genitourinary: Negative.  Negative for hematuria and flank pain.   Musculoskeletal: Negative.  Negative for myalgias, back pain, joint pain, falls and neck pain.   Skin: Negative.    Neurological: Positive for weakness. Negative for dizziness, tingling, tremors and headaches.   Endo/Heme/Allergies: Negative.  Does not bruise/bleed easily.   Psychiatric/Behavioral: Negative.  Negative for depression, suicidal ideas and substance abuse.      Physical Exam  Laboratory/Imaging   Hemodynamics  Temp (24hrs), Av.9 °C (98.4 °F), Min:36.7 °C (98 °F), Max:37.1 °C (98.8 °F)   Temperature: 37.1 °C (98.7 °F)  Pulse  Av.4  Min: 76  Max: 116    Blood Pressure : 100/55 mmHg      Respiratory      Respiration: 18, Pulse Oximetry: 96 %, O2 Daily Delivery Respiratory : Room Air with O2 Available     Given By:: Mouthpiece, #MDI/DPI Given: MDI/DPI x 1, Work Of Breathing / Effort: Mild  RUL  Breath Sounds: Clear, RML Breath Sounds: Clear, RLL Breath Sounds: Diminished, DEBORA Breath Sounds: Clear, LLL Breath Sounds: Diminished    Fluids    Intake/Output Summary (Last 24 hours) at 06/10/17 1522  Last data filed at 06/10/17 0546   Gross per 24 hour   Intake    500 ml   Output   1725 ml   Net  -1225 ml       Nutrition  Orders Placed This Encounter   Procedures   • DIET ORDER     Standing Status: Standing      Number of Occurrences: 1      Standing Expiration Date:      Order Specific Question:  Diet:     Answer:  Regular [1]     Physical Exam   Constitutional: He is oriented to person, place, and time. He appears well-developed and well-nourished. No distress. He is not intubated.   HENT:   Head: Normocephalic and atraumatic.   Right Ear: External ear normal.   Left Ear: External ear normal.   Eyes: Conjunctivae and EOM are normal. Pupils are equal, round, and reactive to light.   Neck: Normal range of motion. Neck supple. No tracheal deviation present.   Cardiovascular: Normal rate and regular rhythm.    Murmur heard.  Pulmonary/Chest: Accessory muscle usage present. No stridor. No apnea, no tachypnea and no bradypnea. He is not intubated. No respiratory distress. He has decreased breath sounds in the right upper field, the right middle field, the right lower field, the left upper field, the left middle field and the left lower field. He has no wheezes. He has rhonchi in the right upper field, the right middle field, the right lower field, the left upper field, the left middle field and the left lower field.   Abdominal: Soft. Bowel sounds are normal. He exhibits no distension. There is no tenderness. There is no rebound.   Genitourinary:   Emmanuel catheter   Musculoskeletal: Normal range of motion. He exhibits no edema or tenderness.   Neurological: He is alert and oriented to person, place, and time. He has normal reflexes. No cranial nerve deficit. Coordination normal.   Skin: Skin is warm and dry. He is  not diaphoretic.   Psychiatric: He has a normal mood and affect. His behavior is normal. Judgment and thought content normal.   Nursing note and vitals reviewed.                               Assessment/Plan     * Pneumonia of both lungs due to Pseudomonas species (CMS-[facility]) (present on admission)  Assessment & Plan  -cultures are positive for pseudomonas  -needs colistimethate and meropenem for 4 more weeks  -patient deciding if he can do this at home or if he wants to do it in LTAC      Protein-calorie malnutrition, severe ([facility]) (present on admission)  Assessment & Plan  -continued with nutritional support    Atrial fibrillation (CMS-[facility]) (present on admission)  Assessment & Plan  -metoprolol 25 mg po BID and optimized rate control for now    Wegener's granulomatosis (CMS-HCC)  Assessment & Plan  -continues to be severely immunocompromised     ESRD on dialysis (CMS-HCC) (present on admission)  Assessment & Plan  -remains on chronic HD and he does not think he can do HD and infusion clinic as outpatient      EKG reviewed, Radiology images reviewed, Labs reviewed and Medications reviewed  Emmanuel catheter: No Emmanuel      DVT Prophylaxis: Heparin    Ulcer prophylaxis: Yes  Antibiotics: Treating active infection/contamination beyond 24 hours perioperative coverage  Assessed for rehab: Patient was assess for and/or received rehabilitation services during this hospitalization        I spent a total of 42 minutes during this clinical encounter of which > 50% was devoted to counseling and coordinating care including review of records, pertinent lab data and studies, as well as discussing diagnostic evaluation and work up, planned therapeutic interventions and future disposition of care. Where indicated, the assessment and plan reflect discussion of patient with consultants, other healthcare providers, family members, and additional research needed to obtain further information in formulating the plan of care of this patient.

## 2017-06-11 PROCEDURE — 700111 HCHG RX REV CODE 636 W/ 250 OVERRIDE (IP): Performed by: INTERNAL MEDICINE

## 2017-06-11 PROCEDURE — 770020 HCHG ROOM/CARE - TELE (206)

## 2017-06-11 PROCEDURE — 665998 HH PPS REVENUE CREDIT

## 2017-06-11 PROCEDURE — 99233 SBSQ HOSP IP/OBS HIGH 50: CPT | Performed by: HOSPITALIST

## 2017-06-11 PROCEDURE — 700101 HCHG RX REV CODE 250

## 2017-06-11 PROCEDURE — 665999 HH PPS REVENUE DEBIT

## 2017-06-11 PROCEDURE — A9270 NON-COVERED ITEM OR SERVICE: HCPCS | Performed by: INTERNAL MEDICINE

## 2017-06-11 PROCEDURE — 700102 HCHG RX REV CODE 250 W/ 637 OVERRIDE(OP): Performed by: INTERNAL MEDICINE

## 2017-06-11 PROCEDURE — 700105 HCHG RX REV CODE 258: Performed by: INTERNAL MEDICINE

## 2017-06-11 PROCEDURE — 94640 AIRWAY INHALATION TREATMENT: CPT

## 2017-06-11 PROCEDURE — 94760 N-INVAS EAR/PLS OXIMETRY 1: CPT

## 2017-06-11 RX ADMIN — MEROPENEM 500 MG: 500 INJECTION, POWDER, FOR SOLUTION INTRAVENOUS at 19:58

## 2017-06-11 RX ADMIN — HEPARIN SODIUM 5000 UNITS: 5000 INJECTION, SOLUTION INTRAVENOUS; SUBCUTANEOUS at 19:58

## 2017-06-11 RX ADMIN — PREDNISONE 14 MG: 10 TABLET ORAL at 10:37

## 2017-06-11 RX ADMIN — METOPROLOL TARTRATE 25 MG: 25 TABLET, FILM COATED ORAL at 19:59

## 2017-06-11 RX ADMIN — SENNOSIDES AND DOCUSATE SODIUM 2 TABLET: 8.6; 5 TABLET ORAL at 10:42

## 2017-06-11 RX ADMIN — RENAGEL 800 MG: 800 TABLET ORAL at 12:30

## 2017-06-11 RX ADMIN — HEPARIN SODIUM 5000 UNITS: 5000 INJECTION, SOLUTION INTRAVENOUS; SUBCUTANEOUS at 06:03

## 2017-06-11 RX ADMIN — IPRATROPIUM BROMIDE AND ALBUTEROL SULFATE 3 ML: .5; 3 SOLUTION RESPIRATORY (INHALATION) at 19:38

## 2017-06-11 RX ADMIN — BUDESONIDE AND FORMOTEROL FUMARATE DIHYDRATE 2 PUFF: 160; 4.5 AEROSOL RESPIRATORY (INHALATION) at 06:21

## 2017-06-11 RX ADMIN — COLISTIMETHATE SODIUM 150 MG: 150 INJECTION, POWDER, LYOPHILIZED, FOR SOLUTION INTRAMUSCULAR; INTRAVENOUS at 06:21

## 2017-06-11 RX ADMIN — RENAGEL 800 MG: 800 TABLET ORAL at 08:13

## 2017-06-11 RX ADMIN — COLISTIMETHATE SODIUM 150 MG: 150 INJECTION, POWDER, LYOPHILIZED, FOR SOLUTION INTRAMUSCULAR; INTRAVENOUS at 19:38

## 2017-06-11 RX ADMIN — FINASTERIDE 5 MG: 5 TABLET, FILM COATED ORAL at 10:38

## 2017-06-11 RX ADMIN — METOPROLOL TARTRATE 25 MG: 25 TABLET, FILM COATED ORAL at 10:38

## 2017-06-11 RX ADMIN — IPRATROPIUM BROMIDE AND ALBUTEROL SULFATE 3 ML: .5; 3 SOLUTION RESPIRATORY (INHALATION) at 10:15

## 2017-06-11 RX ADMIN — IPRATROPIUM BROMIDE AND ALBUTEROL SULFATE 3 ML: .5; 3 SOLUTION RESPIRATORY (INHALATION) at 14:21

## 2017-06-11 RX ADMIN — SIMVASTATIN 40 MG: 40 TABLET, FILM COATED ORAL at 19:59

## 2017-06-11 RX ADMIN — VORICONAZOLE 200 MG: 200 TABLET, FILM COATED ORAL at 19:58

## 2017-06-11 RX ADMIN — ASPIRIN 81 MG: 81 TABLET ORAL at 10:36

## 2017-06-11 RX ADMIN — FAMOTIDINE 20 MG: 20 TABLET, FILM COATED ORAL at 10:38

## 2017-06-11 RX ADMIN — VORICONAZOLE 200 MG: 200 TABLET, FILM COATED ORAL at 10:38

## 2017-06-11 RX ADMIN — HEPARIN SODIUM 5000 UNITS: 5000 INJECTION, SOLUTION INTRAVENOUS; SUBCUTANEOUS at 14:42

## 2017-06-11 RX ADMIN — IPRATROPIUM BROMIDE AND ALBUTEROL SULFATE 3 ML: .5; 3 SOLUTION RESPIRATORY (INHALATION) at 06:21

## 2017-06-11 RX ADMIN — RENAGEL 800 MG: 800 TABLET ORAL at 17:25

## 2017-06-11 RX ADMIN — BUDESONIDE AND FORMOTEROL FUMARATE DIHYDRATE 2 PUFF: 160; 4.5 AEROSOL RESPIRATORY (INHALATION) at 19:38

## 2017-06-11 RX ADMIN — FAMOTIDINE 20 MG: 20 TABLET, FILM COATED ORAL at 19:59

## 2017-06-11 RX ADMIN — TAMSULOSIN HYDROCHLORIDE 0.4 MG: 0.4 CAPSULE ORAL at 10:37

## 2017-06-11 ASSESSMENT — ENCOUNTER SYMPTOMS
SENSORY CHANGE: 0
DOUBLE VISION: 0
SHORTNESS OF BREATH: 1
ORTHOPNEA: 0
PHOTOPHOBIA: 0
VOMITING: 0
MYALGIAS: 0
MUSCULOSKELETAL NEGATIVE: 1
DEPRESSION: 0
STRIDOR: 0
SPUTUM PRODUCTION: 1
ABDOMINAL PAIN: 0
DIARRHEA: 0
BLOOD IN STOOL: 0
FEVER: 0
CARDIOVASCULAR NEGATIVE: 1
FOCAL WEAKNESS: 0
PSYCHIATRIC NEGATIVE: 1
SPEECH CHANGE: 0
EYES NEGATIVE: 1
CHILLS: 0
SPUTUM PRODUCTION: 0
BACK PAIN: 0
PALPITATIONS: 0
INSOMNIA: 0
COUGH: 1
WEAKNESS: 1
GASTROINTESTINAL NEGATIVE: 1
NAUSEA: 0
HEMOPTYSIS: 0
BRUISES/BLEEDS EASILY: 0

## 2017-06-11 NOTE — DISCHARGE PLANNING
Medical SW    Referral: Valente received report from Saturday Chelsy Victor. Valente waiting for ID Md to complete orders for OP Infusion.     Intervention: Valente spoke to Dr Beck regarding above. Dr. Manzo completed the order and it is attached to the media tab.    aVlente called [facility] and spoke to , Melvin. He indicates he can support established pts on the weekends. If a person needs to get authorization and an appointment, they have to contact the call center (Bed day) which is open M-F only. Pt may be able to take 1st dose in hospital then acquire appointment for Tuesday for second dose.     Valente updated Dr Beck as above. Pt will have to wait until Monday to d/c.           Plan: Valente to assist w/ d/c planning as needed.

## 2017-06-11 NOTE — PROGRESS NOTES
[facility] Hospitalist Progress Note    Date of Service: 2017    Chief Complaint  76 y.o. male admitted 2017 with shortness of breath    Interval Problem Update  Mr Brown is a 76 year old male with recurrent pneumonia with resistant pseudomonas pneumonia. Patient needs 4 more weeks of IV meropenem therapy, also needs colistimethate and voriconazole. He will need insurance approval for the antibiotics, will need insurance approval for the infusion. He will get early dialysis and we will have  in am work with insurance once they are open to get everything arranged.     Consultants/Specialty  ID    Disposition  Home when stable with OP antibiotic management long term        Review of Systems   Constitutional: Positive for malaise/fatigue. Negative for fever and chills.   HENT: Negative.  Negative for congestion.    Eyes: Negative.  Negative for double vision and photophobia.   Respiratory: Positive for cough and shortness of breath. Negative for hemoptysis, sputum production and stridor.    Cardiovascular: Negative.  Negative for palpitations and orthopnea.   Gastrointestinal: Negative.  Negative for nausea, vomiting, abdominal pain, diarrhea, blood in stool and melena.   Genitourinary: Negative.  Negative for dysuria, urgency and hematuria.   Musculoskeletal: Negative.  Negative for myalgias and back pain.   Skin: Negative.  Negative for itching and rash.   Neurological: Positive for weakness. Negative for sensory change, speech change and focal weakness.   Endo/Heme/Allergies: Negative.  Does not bruise/bleed easily.   Psychiatric/Behavioral: Negative.  Negative for depression and suicidal ideas. The patient does not have insomnia.       Physical Exam  Laboratory/Imaging   Hemodynamics  Temp (24hrs), Av.2 °C (98.9 °F), Min:36.6 °C (97.8 °F), Max:37.3 °C (99.2 °F)   Temperature: 37.3 °C (99.2 °F)  Pulse  Av.6  Min: 76  Max: 116    Blood Pressure : 143/65 mmHg      Respiratory       Respiration: 20, Pulse Oximetry: 95 %, O2 Daily Delivery Respiratory : Room Air with O2 Available     Given By:: Mouthpiece, #MDI/DPI Given: MDI/DPI x 1, Work Of Breathing / Effort: Mild  RUL Breath Sounds: Clear, RML Breath Sounds: Rhonchi, RLL Breath Sounds: Crackles, DEBORA Breath Sounds: Clear, LLL Breath Sounds: Crackles    Fluids  No intake or output data in the 24 hours ending 06/11/17 1318    Nutrition  Orders Placed This Encounter   Procedures   • DIET ORDER     Standing Status: Standing      Number of Occurrences: 1      Standing Expiration Date:      Order Specific Question:  Diet:     Answer:  Regular [1]     Physical Exam   Constitutional: He is oriented to person, place, and time. He appears well-developed and well-nourished. He is not intubated.   HENT:   Head: Normocephalic and atraumatic.   Right Ear: External ear normal.   Left Ear: External ear normal.   Nose: Nose normal.   Mouth/Throat: Oropharynx is clear and moist.   Eyes: Conjunctivae and EOM are normal. Pupils are equal, round, and reactive to light. No scleral icterus.   Neck: Normal range of motion. Neck supple. No JVD present. No thyromegaly present.   Cardiovascular: Normal rate and regular rhythm.  Exam reveals no gallop and no friction rub.    Murmur heard.  Pulmonary/Chest: Accessory muscle usage present. No stridor. No apnea, no tachypnea and no bradypnea. He is not intubated. No respiratory distress. He has decreased breath sounds in the right upper field, the right middle field, the right lower field, the left upper field, the left middle field and the left lower field. He has rhonchi in the right upper field, the right middle field, the right lower field, the left upper field, the left middle field and the left lower field. He exhibits no tenderness.   Abdominal: Soft. Bowel sounds are normal. He exhibits no distension and no mass. There is no tenderness.   Genitourinary:   Emmanuel catheter   Musculoskeletal: Normal range of motion. He  exhibits no edema or tenderness.   Lymphadenopathy:     He has no cervical adenopathy.   Neurological: He is alert and oriented to person, place, and time. He has normal reflexes. He displays normal reflexes. He exhibits normal muscle tone.   Skin: Skin is warm and dry. No rash noted. No erythema. No pallor.   Psychiatric: He has a normal mood and affect. His behavior is normal. Judgment and thought content normal.   Nursing note and vitals reviewed.                               Assessment/Plan     * Pneumonia of both lungs due to Pseudomonas species (CMS-HCC) (present on admission)  Assessment & Plan  -cultures are positive for pseudomonas  -needs colistimethate and meropenem for 4 more weeks, the meropenem needs to be approved by insurance and thus will not be available today, the colistimethate may be an out of town pharmacy and we will have to have  work on the details through insurance        Protein-calorie malnutrition, severe ([facility]) (present on admission)  Assessment & Plan  -eating better    Atrial fibrillation (CMS-HCC) (present on admission)  Assessment & Plan  -metoprolol 25 mg po BID continued    Wegener's granulomatosis (CMS-HCC)  Assessment & Plan  -remains on chronic prednisone therapy 14 mg every day    ESRD on dialysis (CMS-HCC) (present on admission)  Assessment & Plan  -HD early in the am tomorrow      EKG reviewed, Radiology images reviewed, Labs reviewed and Medications reviewed  Emmanuel catheter: No Emmanuel      DVT Prophylaxis: Heparin    Ulcer prophylaxis: Yes  Antibiotics: Treating active infection/contamination beyond 24 hours perioperative coverage  Assessed for rehab: Patient was assess for and/or received rehabilitation services during this hospitalization        I spent a total of 42 minutes during this clinical encounter of which > 50% was devoted to counseling and coordinating care including review of records, pertinent lab data and studies, as well as discussing diagnostic  evaluation and work up, planned therapeutic interventions and future disposition of care. Where indicated, the assessment and plan reflect discussion of patient with consultants, other healthcare providers, family members, and additional research needed to obtain further information in formulating the plan of care of this patient.

## 2017-06-11 NOTE — CARE PLAN
Problem: Bronchoconstriction:  Goal: Improve in air movement and diminished wheezing  Outcome: PROGRESSING AS EXPECTED

## 2017-06-11 NOTE — PROGRESS NOTES
Infectious Disease Progress Note    Author: Christine Manzo M.D. Date & Time created: 2017  1:46 PM    Chief Complaint:  FU PNA    Interval History:  76-year-old white male with Wegener's granulomatosis admitted with worsening shortness of breath, weakness, fever to 103, and cough.     Tmax 101.6, WBC 8.7 comfortable at rest. Denies SE abx.   Tmax 99.6, WBC not done Getting tunneled PICC today  6/10 AF SOB with getting out of bed today-wants to know if he should wear a mask around his grandchildren   AF sleeping  Labs Reviewed, Medications Reviewed and Radiology Reviewed.    Review of Systems:  Review of Systems   Constitutional: Negative for fever and chills.   Respiratory: Positive for cough, sputum production and shortness of breath. Negative for hemoptysis.           SOB with activity   Gastrointestinal: Negative for nausea, vomiting, abdominal pain and diarrhea.   Skin: Negative for rash.       Hemodynamics:  Temp (24hrs), Av.1 °C (98.8 °F), Min:36.6 °C (97.8 °F), Max:37.3 °C (99.2 °F)  Temperature: 36.8 °C (98.2 °F)  Pulse  Av.8  Min: 76  Max: 116   Blood Pressure : 140/65 mmHg       Physical Exam:  Physical Exam   Constitutional: He appears well-developed. No distress.   Chronically ill   HENT:   Head: Normocephalic and atraumatic.   Cardiovascular: Normal rate.    Murmur heard.  Pulmonary/Chest: Effort normal. No respiratory distress. He has no wheezes. He has rales.   Abdominal: Soft. He exhibits no distension. There is no tenderness.   Musculoskeletal: He exhibits no edema.   RUE AVF +thrill and bruit   Neurological:   sleepy   Skin: No rash noted.   Nursing note and vitals reviewed.      Meds:    Current facility-administered medications:   •  acetaminophen (TYLENOL) tablet 650 mg, 650 mg, Oral, Q4HRS PRN, Donald Beck M.D.  •  ipratropium-albuterol (DUONEB) nebulizer solution 3 mL, 3 mL, Nebulization, 4X/DAY (RT), Mario Lundberg, PHARMD, 3 mL at 17 1015  •  lidocaine  (XYLOCAINE) 2 % jelly, , Topical, PRN, Sarah Mc M.D.  •  lidocaine (XYLOCAINE) 1 % solution, 1 mL, Intradermal, PRN, Fadi Najjar, M.D.  •  epoetin lucina (EPOGEN,PROCRIT) injection 3,000 Units, 3,000 Units, Subcutaneous, MO, WE + FR, Fadi Najjar, M.D., 3,000 Units at 06/09/17 1244  •  heparin 1000 units/mL injection 1,500 Units, 1,500 Units, Intravenous, DIALYSIS PRN, Fadi Najjar, M.D., 1,500 Units at 06/09/17 1243  •  voriconazole (VFEND) tablet 200 mg, 200 mg, Oral, Q12HRS, Christine Manzo M.D., 200 mg at 06/11/17 1038  •  [COMPLETED] meropenem (MERREM) 500 mg in  mL IVPB, 500 mg, Intravenous, Q6HRS, Stopped at 06/07/17 1232 **FOLLOWED BY** meropenem (MERREM) 500 mg in  mL IVPB, 500 mg, Intravenous, Q24HRS, Christine Manzo M.D., Stopped at 06/10/17 2117  •  colistimethate (COLY-MYCIN M) 150 mg, 150 mg, Inhalation, BID (RT), Christine Manzo M.D., 150 mg at 06/11/17 0621  •  albuterol inhaler 2 Puff, 2 Puff, Inhalation, Q4HRS PRN, Danielle Ferreira M.D.  •  aspirin EC (ECOTRIN) tablet 81 mg, 81 mg, Oral, DAILY, Danielle Ferreira M.D., 81 mg at 06/11/17 1036  •  finasteride (PROSCAR) tablet 5 mg, 5 mg, Oral, DAILY, Danielle Ferreira M.D., 5 mg at 06/11/17 1038  •  budesonide-formoterol (SYMBICORT) 160-4.5 MCG/ACT inhaler 2 Puff, 2 Puff, Inhalation, BID, Danielle Ferreira M.D., 2 Puff at 06/11/17 0621  •  metoprolol (LOPRESSOR) tablet 25 mg, 25 mg, Oral, BID, Danielle Ferreira M.D., 25 mg at 06/11/17 1038  •  predniSONE (DELTASONE) tablet 14 mg, 14 mg, Oral, DAILY, Danielle Ferreira M.D., 14 mg at 06/11/17 1037  •  famotidine (PEPCID) tablet 20 mg, 20 mg, Oral, BID, Danielle Ferreira M.D., 20 mg at 06/11/17 1038  •  sevelamer (RENAGEL) tablet 800 mg, 800 mg, Oral, TID WITH MEALS, Danielle Ferreira M.D., 800 mg at 06/11/17 1230  •  simvastatin (ZOCOR) tablet 40 mg, 40 mg, Oral, Nightly, Danielle Ferreira M.D., 40 mg at 06/10/17 2047  •  tamsulosin (FLOMAX) capsule 0.4 mg, 0.4 mg, Oral, DAILY, Danielle Ferreira  M.D., 0.4 mg at 06/11/17 1037  •  senna-docusate (PERICOLACE or SENOKOT S) 8.6-50 MG per tablet 2 Tab, 2 Tab, Oral, BID, 2 Tab at 06/11/17 1042 **AND** polyethylene glycol/lytes (MIRALAX) PACKET 1 Packet, 1 Packet, Oral, QDAY PRN **AND** magnesium hydroxide (MILK OF MAGNESIA) suspension 30 mL, 30 mL, Oral, QDAY PRN **AND** bisacodyl (DULCOLAX) suppository 10 mg, 10 mg, Rectal, QDAY PRN, Danielle Ferreira M.D.  •  enalaprilat (VASOTEC) injection 1.25 mg, 1.25 mg, Intravenous, Q6HRS PRN, Danielle Ferreira M.D.  •  ondansetron (ZOFRAN) syringe/vial injection 4 mg, 4 mg, Intravenous, Q4HRS PRN, Danielle Ferreira M.D.  •  ondansetron (ZOFRAN ODT) dispertab 4 mg, 4 mg, Oral, Q4HRS PRN, Danielle Ferreira M.D.  •  guaifenesin DM (ROBITUSSIN DM) 100-10 MG/5ML syrup 10 mL, 10 mL, Oral, Q6HRS PRN, Danielle Ferreira M.D.  •  heparin injection 5,000 Units, 5,000 Units, Subcutaneous, Q8HRS, Danielle Ferreira M.D., 5,000 Units at 06/11/17 0603    Labs:  No results for input(s): WBC, RBC, HEMOGLOBIN, HEMATOCRIT, MCV, MCH, RDW, PLATELETCT, MPV, NEUTSPOLYS, LYMPHOCYTES, MONOCYTES, EOSINOPHILS, BASOPHILS, RBCMORPHOLO in the last 72 hours.  No results for input(s): SODIUM, POTASSIUM, CHLORIDE, CO2, GLUCOSE, BUN, CPKTOTAL in the last 72 hours.  No results for input(s): ALBUMIN, TBILIRUBIN, ALKPHOSPHAT, TOTPROTEIN, ALTSGPT, ASTSGOT, CREATININE in the last 72 hours.    Imaging:  Dx-chest-portable (1 View)  5/15/2017  1.  Increased pulmonary edema 2.  Possible small BILATERAL pleural effusions 3.  Persistently enlarged cardiac silhouette    Micro:  BLOOD CULTURE   Date Value Ref Range Status   05/23/2017 No growth after 5 days of incubation.  Final   12/30/2013 Final report  Final      Results     Procedure Component Value Units Date/Time    CULTURE RESPIRATORY W/ GRM STN [[id number]]  (Abnormal)  (Susceptibility) Collected:  06/07/17 1428    Order Status:  Completed Specimen Information:  Respirate from Sputum Updated:  06/10/17 0929     Gram Stain  Result --      Result:        Few WBCs.  Rare epithelial cells.  Few mixed bacteria, no predominant organism seen.  Specimen Quality Score: 1+       Significant Indicator POS (POS)      Source RESP      Site SPUTUM      Respiratory Culture        Result:        Light growth usual upper respiratory matt , including yeast. (A)     Respiratory Culture -- (A)      Result:        Pseudomonas aeruginosa  Rare growth  P.aeruginosa may develop resistance during prolonged therapy  with all antibiotics. Isolates that are initially susceptible  may become resistant within three to four days after  initiation of therapy. Testing of repeat isolates may be  warranted.      Narrative:      [id number] [facility]    Culture & Susceptibility     PSEUDOMONAS AERUGINOSA     Antibiotic Sensitivity Microscan Unit Status    Amikacin Sensitive <=16 mcg/mL Final    Cefepime Intermediate 16 mcg/mL Final    Ceftazidime Sensitive 4 mcg/mL Final    Ciprofloxacin Sensitive <=1 mcg/mL Final    Gentamicin Sensitive <=4 mcg/mL Final    Imipenem Sensitive <=1 mcg/mL Final    Meropenem Sensitive <=1 mcg/mL Final    Pip/Tazobactam Sensitive <=16 mcg/mL Final    Tobramycin Sensitive <=4 mcg/mL Final                       ACID FAST STAIN [[id number]] Collected:  06/09/17 0840    Order Status:  Completed Specimen Information:  Respirate Updated:  06/09/17 1754     Significant Indicator NEG      Source RESP      Site SPUTUM      AFB Smear Results No acid fast bacilli seen.     Narrative:      [id number] HOANG DUNN    AFB CULTURE [[id number]] Collected:  06/09/17 0840    Order Status:  Completed Specimen Information:  Respirate from Sputum Updated:  06/09/17 1754     Significant Indicator NEG      Source RESP      Site SPUTUM      AFB Culture Culture in progress.      AFB Smear Results No acid fast bacilli seen.     Narrative:      [id number] HOANG DUNN    GRAM STAIN [[id number]] Collected:  06/07/17 1428    Order Status:  Completed  Specimen Information:  Respirate Updated:  06/08/17 1022     Significant Indicator .      Source RESP      Site SPUTUM      Gram Stain Result --      Result:        Few WBCs.  Rare epithelial cells.  Few mixed bacteria, no predominant organism seen.  Specimen Quality Score: 1+      Narrative:      [id number] TING DIANNE C    AFB CULTURE [[id number]]     Order Status:  No result Specimen Information:  Sputum from Sputum     AFB CULTURE [[id number]]     Order Status:  No result Specimen Information:  Sputum from Sputum           Assessment:  Active Hospital Problems    Diagnosis   • *Pneumonia of both lungs due to Pseudomonas species (CMS-HCC) [J15.1]   • Atrial fibrillation (CMS-HCC) [I48.91]   • Protein-calorie malnutrition, severe ([facility]) [E43]   • ESRD on dialysis (CMS-HCC) [N18.6, Z99.2]       Plan:  PNA due to MDR pseudomonas  Resp status improved at rest-poor exercise tolerance  Afebrile  Continue meropenem and inhaled colistin for 4 week course  Stop date 7/4/2017  Orders done and scanned 6/9    Fungal PNA, IPA vs CPA  Continue voriconazole  Check CT chest when feasible    ESRD on HD  Dose adjusted abx  Tunneled cath placed    Wegeners granulomatosis  Immunocompromised  Chronically colonized with multiple pathogens  Poor longterm prognosis    H/O MAC, pulmomary  He is concerned about relapse of his MAC  Check sputum AFB X3-2 neg so far  No treatment recommended      Discussed with DOTTIE/Kiran

## 2017-06-11 NOTE — PROGRESS NOTES
Assumed care at 1900. Bedside report received from Raimundo. Patient's chart and MAR reviewed. Pt complains of chronic joint pain at this time. Pt is A & O 4. Patient was updated on plan of care for the day. Questions answered and concerns addressed.  Pt denies any additional needs at this time. White board updated. Call light, phone and personal belongings within reach.

## 2017-06-11 NOTE — CARE PLAN
Problem: Safety  Goal: Will remain free from falls  Intervention: Assess risk factors for falls  Pt educated on the importance fall prevention methods, such as treaded sock and the bed alarm. Pt stated they will use the call light prior to any attempts of ambulation. Ambulatory ability assessed, treaded socks in place, bed locked and in low position, frequent trips to bathroom offered, and call light and phone within reach.      Problem: Knowledge Deficit  Goal: Knowledge of disease process/condition, treatment plan, diagnostic tests, and medications will improve  Outcome: PROGRESSING AS EXPECTED  Pt educated regarding plan of care and medications. All questions answered.

## 2017-06-11 NOTE — CARE PLAN
Problem: Safety  Goal: Will remain free from injury  Fall risk assessed every shift and fall precautions in place. Verbalized understanding.     Problem: Knowledge Deficit  Goal: Knowledge of disease process/condition, treatment plan, diagnostic tests, and medications will improve  Intervention: Assess knowledge level of disease process/condition, treatment plan, diagnostic tests, and medications  Pt educated regarding activity, diet, meds and plan of care. Verbalized understanding.

## 2017-06-12 VITALS
TEMPERATURE: 98.4 F | RESPIRATION RATE: 16 BRPM | OXYGEN SATURATION: 95 % | WEIGHT: 128.75 LBS | SYSTOLIC BLOOD PRESSURE: 126 MMHG | HEART RATE: 96 BPM | HEIGHT: 67 IN | DIASTOLIC BLOOD PRESSURE: 61 MMHG | BODY MASS INDEX: 20.21 KG/M2

## 2017-06-12 LAB
ANION GAP SERPL CALC-SCNC: 13 MMOL/L (ref 0–11.9)
BUN SERPL-MCNC: 80 MG/DL (ref 8–22)
CALCIUM SERPL-MCNC: 9.6 MG/DL (ref 8.5–10.5)
CHLORIDE SERPL-SCNC: 103 MMOL/L (ref 96–112)
CO2 SERPL-SCNC: 18 MMOL/L (ref 20–33)
CREAT SERPL-MCNC: 4.59 MG/DL (ref 0.5–1.4)
GFR SERPL CREATININE-BSD FRML MDRD: 12 ML/MIN/1.73 M 2
GLUCOSE SERPL-MCNC: 97 MG/DL (ref 65–99)
POTASSIUM SERPL-SCNC: 6.2 MMOL/L (ref 3.6–5.5)
SODIUM SERPL-SCNC: 134 MMOL/L (ref 135–145)

## 2017-06-12 PROCEDURE — 700111 HCHG RX REV CODE 636 W/ 250 OVERRIDE (IP): Performed by: INTERNAL MEDICINE

## 2017-06-12 PROCEDURE — 665999 HH PPS REVENUE DEBIT

## 2017-06-12 PROCEDURE — A9270 NON-COVERED ITEM OR SERVICE: HCPCS | Performed by: INTERNAL MEDICINE

## 2017-06-12 PROCEDURE — 99239 HOSP IP/OBS DSCHRG MGMT >30: CPT | Performed by: HOSPITALIST

## 2017-06-12 PROCEDURE — 80048 BASIC METABOLIC PNL TOTAL CA: CPT

## 2017-06-12 PROCEDURE — 90935 HEMODIALYSIS ONE EVALUATION: CPT

## 2017-06-12 PROCEDURE — 94760 N-INVAS EAR/PLS OXIMETRY 1: CPT

## 2017-06-12 PROCEDURE — 700105 HCHG RX REV CODE 258: Performed by: INTERNAL MEDICINE

## 2017-06-12 PROCEDURE — 665998 HH PPS REVENUE CREDIT

## 2017-06-12 PROCEDURE — 700101 HCHG RX REV CODE 250

## 2017-06-12 PROCEDURE — 700102 HCHG RX REV CODE 250 W/ 637 OVERRIDE(OP): Performed by: INTERNAL MEDICINE

## 2017-06-12 PROCEDURE — 94640 AIRWAY INHALATION TREATMENT: CPT

## 2017-06-12 RX ORDER — IPRATROPIUM BROMIDE AND ALBUTEROL SULFATE 2.5; .5 MG/3ML; MG/3ML
3 SOLUTION RESPIRATORY (INHALATION)
Status: DISCONTINUED | OUTPATIENT
Start: 2017-06-13 | End: 2017-06-12 | Stop reason: HOSPADM

## 2017-06-12 RX ORDER — WATER 10 ML/10ML
10 INJECTION INTRAMUSCULAR; INTRAVENOUS; SUBCUTANEOUS 2 TIMES DAILY
Qty: 560 ML | Refills: 0 | Status: SHIPPED | OUTPATIENT
Start: 2017-06-12 | End: 2017-07-10

## 2017-06-12 RX ADMIN — MEROPENEM 500 MG: 500 INJECTION, POWDER, FOR SOLUTION INTRAVENOUS at 16:54

## 2017-06-12 RX ADMIN — BUDESONIDE AND FORMOTEROL FUMARATE DIHYDRATE 2 PUFF: 160; 4.5 AEROSOL RESPIRATORY (INHALATION) at 09:00

## 2017-06-12 RX ADMIN — EPOETIN ALFA 3000 UNITS: 3000 SOLUTION INTRAVENOUS; SUBCUTANEOUS at 07:20

## 2017-06-12 RX ADMIN — TAMSULOSIN HYDROCHLORIDE 0.4 MG: 0.4 CAPSULE ORAL at 10:14

## 2017-06-12 RX ADMIN — HEPARIN SODIUM 5000 UNITS: 5000 INJECTION, SOLUTION INTRAVENOUS; SUBCUTANEOUS at 05:11

## 2017-06-12 RX ADMIN — COLISTIMETHATE SODIUM 150 MG: 150 INJECTION, POWDER, LYOPHILIZED, FOR SOLUTION INTRAMUSCULAR; INTRAVENOUS at 06:56

## 2017-06-12 RX ADMIN — ASPIRIN 81 MG: 81 TABLET ORAL at 10:14

## 2017-06-12 RX ADMIN — FINASTERIDE 5 MG: 5 TABLET, FILM COATED ORAL at 09:00

## 2017-06-12 RX ADMIN — IPRATROPIUM BROMIDE AND ALBUTEROL SULFATE 3 ML: .5; 3 SOLUTION RESPIRATORY (INHALATION) at 14:37

## 2017-06-12 RX ADMIN — VORICONAZOLE 200 MG: 200 TABLET, FILM COATED ORAL at 10:13

## 2017-06-12 RX ADMIN — SENNOSIDES AND DOCUSATE SODIUM 2 TABLET: 8.6; 5 TABLET ORAL at 10:15

## 2017-06-12 RX ADMIN — RENAGEL 800 MG: 800 TABLET ORAL at 12:48

## 2017-06-12 RX ADMIN — FAMOTIDINE 20 MG: 20 TABLET, FILM COATED ORAL at 10:14

## 2017-06-12 RX ADMIN — HEPARIN SODIUM 1500 UNITS: 1000 INJECTION, SOLUTION INTRAVENOUS; SUBCUTANEOUS at 06:05

## 2017-06-12 RX ADMIN — IPRATROPIUM BROMIDE AND ALBUTEROL SULFATE 3 ML: .5; 3 SOLUTION RESPIRATORY (INHALATION) at 06:56

## 2017-06-12 ASSESSMENT — ENCOUNTER SYMPTOMS
PND: 0
NECK PAIN: 0
MYALGIAS: 0
NAUSEA: 0
TINGLING: 0
COUGH: 1
PALPITATIONS: 0
WEAKNESS: 1
SORE THROAT: 0
SENSORY CHANGE: 0
VOMITING: 0
MUSCULOSKELETAL NEGATIVE: 1
EYE DISCHARGE: 0
BLURRED VISION: 0
GASTROINTESTINAL NEGATIVE: 1
CARDIOVASCULAR NEGATIVE: 1
PSYCHIATRIC NEGATIVE: 1
COUGH: 0
HEMOPTYSIS: 0
SHORTNESS OF BREATH: 0
FEVER: 0
DIZZINESS: 0
EYES NEGATIVE: 1
FLANK PAIN: 0
HEADACHES: 0
BACK PAIN: 0
TREMORS: 0
FOCAL WEAKNESS: 0
ABDOMINAL PAIN: 0
ORTHOPNEA: 0
SHORTNESS OF BREATH: 1
EYE PAIN: 0
WHEEZING: 0
CHILLS: 0
WEIGHT LOSS: 0
DIAPHORESIS: 0
BRUISES/BLEEDS EASILY: 0

## 2017-06-12 ASSESSMENT — COPD QUESTIONNAIRES
DO YOU EVER COUGH UP ANY MUCUS OR PHLEGM?: YES, A FEW DAYS A WEEK OR MONTH
HAVE YOU SMOKED AT LEAST 100 CIGARETTES IN YOUR ENTIRE LIFE: YES
DURING THE PAST 4 WEEKS HOW MUCH DID YOU FEEL SHORT OF BREATH: SOME OF THE TIME
COPD SCREENING SCORE: 6

## 2017-06-12 ASSESSMENT — PAIN SCALES - GENERAL: PAINLEVEL_OUTOF10: 0

## 2017-06-12 ASSESSMENT — LIFESTYLE VARIABLES: SUBSTANCE_ABUSE: 0

## 2017-06-12 NOTE — PROGRESS NOTES
Nephrology Progress Note, Adult, Complex               Author: Huma Chloe Date & Time created: 6/12/2017  11:54 AM     Interval History:  Pt with ESRD/HD, recurrent pneumonia  doing better today  awaiting insurance approval for infusion center  Completed HD this morning -tolerated well  Review of Systems:  Review of Systems   Constitutional: Positive for malaise/fatigue. Negative for fever and chills.   HENT: Negative for congestion, nosebleeds and sore throat.    Eyes: Negative.    Respiratory: Negative for cough, hemoptysis, shortness of breath and wheezing.    Cardiovascular: Negative for chest pain, palpitations, orthopnea and leg swelling.   Gastrointestinal: Negative for nausea, vomiting and abdominal pain.   Musculoskeletal: Negative for myalgias, back pain and joint pain.   Skin: Negative for itching and rash.   Neurological: Negative for dizziness, sensory change, focal weakness and headaches.       Physical Exam:  Physical Exam   Constitutional: He is oriented to person, place, and time. He appears well-developed and well-nourished. No distress.   HENT:   Head: Normocephalic and atraumatic.   Mouth/Throat: Oropharynx is clear and moist.   Eyes: Conjunctivae and EOM are normal. Pupils are equal, round, and reactive to light. Right eye exhibits no discharge. Left eye exhibits no discharge. No scleral icterus.   Neck: Normal range of motion. Neck supple.   Cardiovascular: Regular rhythm.  Exam reveals no gallop and no friction rub.    Pulmonary/Chest: Effort normal and breath sounds normal. No respiratory distress. He has no wheezes. He has no rales.   Abdominal: Soft. Bowel sounds are normal. He exhibits no distension. There is no tenderness.   Musculoskeletal: He exhibits no edema.   Neurological: He is alert and oriented to person, place, and time.   Skin: He is not diaphoretic.       Labs:        Invalid input(s): [id number]      Recent Labs      06/12/17   0605   SODIUM  134*   POTASSIUM  6.2*    CHLORIDE  103   CO2  18*   BUN  80*   CREATININE  4.59*   CALCIUM  9.6     Recent Labs      17   0605   GLUCOSE  97     No results for input(s): RBC, HEMOGLOBIN, HEMATOCRIT, PLATELETCT, PROTHROMBTM, APTT, INR, IRON, FERRITIN, TOTIRONBC in the last 72 hours.            Hemodynamics:  Temp (24hrs), Av.9 °C (98.4 °F), Min:36.7 °C (98.1 °F), Max:37.2 °C (98.9 °F)  Temperature: 36.7 °C (98.1 °F)  Pulse  Av.1  Min: 76  Max: 116   Blood Pressure : 126/59 mmHg     Respiratory:    Respiration: 20, Pulse Oximetry: 95 %, O2 Daily Delivery Respiratory : Room Air with O2 Available     Given By:: Mouthpiece, #MDI/DPI Given: MDI/DPI x 1, Work Of Breathing / Effort: Mild  RUL Breath Sounds: Coarse Crackles, RML Breath Sounds: Diminished, RLL Breath Sounds: Diminished, DEBORA Breath Sounds: Coarse Crackles, LLL Breath Sounds: Diminished  Fluids:    Intake/Output Summary (Last 24 hours) at 17 1154  Last data filed at 17 0900   Gross per 24 hour   Intake    480 ml   Output   1000 ml   Net   -520 ml        GI/Nutrition:  Orders Placed This Encounter   Procedures   • DIET ORDER     Standing Status: Standing      Number of Occurrences: 1      Standing Expiration Date:      Order Specific Question:  Diet:     Answer:  Regular [1]     Medical Decision Making, by Problem:  Active Hospital Problems    Diagnosis   • *Pneumonia of both lungs due to Pseudomonas species (CMS-HCC) [J15.1]   • Atrial fibrillation (CMS-HCC) [I48.91]   • Protein-calorie malnutrition, severe ([facility]) [E43]   • ESRD on dialysis (CMS-HCC) [N18.6, Z99.2]       Labs reviewed and Medications reviewed                  1 ESRD/HD -MWF  2 recurrent pneumonia.  3 Anemia ; Hb to monitor, epogen with HD  4 hyperkalemia ;correcting with HD    Recs;  HD MWF  Renal dose all meds  Avoid nephrotoxins  Prognosis guarded.

## 2017-06-12 NOTE — DISCHARGE INSTRUCTIONS
Discharge Instructions    Discharged to home by car with relative. Discharged via wheelchair, hospital escort: Refused.  Special equipment needed: Not Applicable    Be sure to schedule a follow-up appointment with your primary care doctor or any specialists as instructed.     Discharge Plan:   Diet Plan: Discussed  Activity Level: Discussed  Confirmed Follow up Appointment: Appointment Scheduled  Confirmed Symptoms Management: Discussed  Medication Reconciliation Updated: Yes  Influenza Vaccine Indication: Not indicated: Previously immunized this influenza season and > 8 years of age    I understand that a diet low in cholesterol, fat, and sodium is recommended for good health. Unless I have been given specific instructions below for another diet, I accept this instruction as my diet prescription.   Other diet: Regular with supplements    Special Instructions: None    · Is patient discharged on Warfarin / Coumadin?   No     · Is patient Post Blood Transfusion?  No    Depression / Suicide Risk    As you are discharged from this [facility] facility, it is important to learn how to keep safe from harming yourself.    Recognize the warning signs:  · Abrupt changes in personality, positive or negative- including increase in energy   · Giving away possessions  · Change in eating patterns- significant weight changes-  positive or negative  · Change in sleeping patterns- unable to sleep or sleeping all the time   · Unwillingness or inability to communicate  · Depression  · Unusual sadness, discouragement and loneliness  · Talk of wanting to die  · Neglect of personal appearance   · Rebelliousness- reckless behavior  · Withdrawal from people/activities they love  · Confusion- inability to concentrate     If you or a loved one observes any of these behaviors or has concerns about self-harm, here's what you can do:  · Talk about it- your feelings and reasons for harming yourself  · Remove any means that you might use to  hurt yourself (examples: pills, rope, extension cords, firearm)  · Get professional help from the community (Mental Health, Substance Abuse, psychological counseling)  · Do not be alone:Call your Safe Contact- someone whom you trust who will be there for you.  · Call your local CRISIS HOTLINE 661-9090 or 107-076-3203  · Call your local Children's Mobile Crisis Response Team [place] (176) 737-0635 or www.CollegeSolved  · Call the toll free National Suicide Prevention Hotlines   · National Suicide Prevention Lifeline 107-617-KGKR (9006)  · National Hope Line Network 800-SUICIDE (539-1397)

## 2017-06-12 NOTE — PROGRESS NOTES
HD treatment today per routine order.Stat BMP drawn prior to start of treatment,took 2 hours for result.Run patient on a 3 K bath to start with and 1 K bath in the last hour of treatment,Dr Corona notified.Net UF removed 1000 ml.Report given to primary Rn.

## 2017-06-12 NOTE — PROGRESS NOTES
Hospital Medicine Progress Note, Adult, Complex               Author: Fadi Najjar Date & Time created: 2017  6:29 PM     Interval History:  Pt with ESRD, recurrent pneumonia  doing better today  awaiting insurance approval for infusion center    Review of Systems:  Review of Systems   Constitutional: Negative for fever and chills.   Cardiovascular: Negative for chest pain and leg swelling.   Gastrointestinal: Negative for nausea and vomiting.       Physical Exam:  Physical Exam   Constitutional: He is oriented to person, place, and time. He appears well-developed and well-nourished. No distress.   Eyes: Right eye exhibits no discharge. Left eye exhibits no discharge. No scleral icterus.   Cardiovascular: Regular rhythm.    Pulmonary/Chest: Effort normal. He has no rales.   Musculoskeletal: He exhibits no edema.   Neurological: He is alert and oriented to person, place, and time.   Skin: He is not diaphoretic.       Labs:        Invalid input(s): [id number]      No results for input(s): SODIUM, POTASSIUM, CHLORIDE, CO2, BUN, CREATININE, MAGNESIUM, PHOSPHORUS, CALCIUM in the last 72 hours.  No results for input(s): ALTSGPT, ASTSGOT, ALKPHOSPHAT, TBILIRUBIN, DBILIRUBIN, GAMMAGT, AMYLASE, LIPASE, ALB, PREALBUMIN, GLUCOSE in the last 72 hours.  No results for input(s): RBC, HEMOGLOBIN, HEMATOCRIT, PLATELETCT, PROTHROMBTM, APTT, INR, IRON, FERRITIN, TOTIRONBC in the last 72 hours.            Hemodynamics:  Temp (24hrs), Av °C (98.6 °F), Min:36.6 °C (97.8 °F), Max:37.3 °C (99.2 °F)  Temperature: 36.9 °C (98.4 °F)  Pulse  Av.8  Min: 76  Max: 116   Blood Pressure : 130/65 mmHg     Respiratory:    Respiration: 20, Pulse Oximetry: 94 %, O2 Daily Delivery Respiratory : Room Air with O2 Available     Given By:: Mouthpiece, #MDI/DPI Given: MDI/DPI x 1, Work Of Breathing / Effort: Mild  RUL Breath Sounds: Coarse Crackles, RML Breath Sounds: Diminished, RLL Breath Sounds: Diminished, DEBORA Breath Sounds: Coarse  Crackles, LLL Breath Sounds: Diminished  Fluids:    Intake/Output Summary (Last 24 hours) at 06/11/17 1829  Last data filed at 06/11/17 1400   Gross per 24 hour   Intake    720 ml   Output      0 ml   Net    720 ml        GI/Nutrition:  Orders Placed This Encounter   Procedures   • DIET ORDER     Standing Status: Standing      Number of Occurrences: 1      Standing Expiration Date:      Order Specific Question:  Diet:     Answer:  Regular [1]     Medical Decision Making, by Problem:  Active Hospital Problems    Diagnosis   • *Pneumonia of both lungs due to Pseudomonas species (CMS-HCC) [J15.1]   • Atrial fibrillation (CMS-HCC) [I48.91]   • Protein-calorie malnutrition, severe ([facility]) [E43]   • ESRD on dialysis (CMS-HCC) [N18.6, Z99.2]       Labs reviewed and Medications reviewed                  1 ESRD: HD  2 recurrent pneumonia.  3 Anemia  4 hypokalemia  No need for HD today  Renal dose all meds  Avoid nephrotoxins  Prognosis guarded.  D/W Dr Beck

## 2017-06-12 NOTE — CARE PLAN
Problem: Nutritional:  Goal: Achieve adequate nutritional intake  Patient will consume >50% of meals   Outcome: MET Date Met:  06/12/17

## 2017-06-12 NOTE — PROGRESS NOTES
[facility] Hospitalist Progress Note    Date of Service: 2017    Chief Complaint  76 y.o. male admitted 2017 with shortness of breath    Interval Problem Update  Mr Brown is a 76 year old male with recurrent pneumonia with resistant pseudomonas pneumonia. PICC was placed, we are trying to discharge him since Friday, there is no spot in the infusion clinic and he needs 28 days OP meropenem. He also needs colistimethate and voriconazole. Pending discharge home, he had HD this am.     Consultants/Specialty  ID    Disposition  Home when stable with OP antibiotic management long term        Review of Systems   Constitutional: Negative for fever, weight loss and diaphoresis.   HENT: Negative.  Negative for nosebleeds and sore throat.    Eyes: Negative for blurred vision, pain and discharge.   Respiratory: Positive for cough and shortness of breath. Negative for wheezing.    Cardiovascular: Negative.  Negative for chest pain, palpitations, leg swelling and PND.   Gastrointestinal: Negative.  Negative for nausea, vomiting and abdominal pain.   Genitourinary: Negative.  Negative for dysuria and flank pain.   Musculoskeletal: Negative.  Negative for myalgias, back pain and neck pain.   Skin: Negative.    Neurological: Positive for weakness. Negative for dizziness, tingling, tremors and headaches.   Endo/Heme/Allergies: Negative.  Does not bruise/bleed easily.   Psychiatric/Behavioral: Negative.  Negative for suicidal ideas and substance abuse.      Physical Exam  Laboratory/Imaging   Hemodynamics  Temp (24hrs), Av.9 °C (98.5 °F), Min:36.7 °C (98.1 °F), Max:37.2 °C (98.9 °F)   Temperature: 36.9 °C (98.4 °F)  Pulse  Av.3  Min: 76  Max: 116    Blood Pressure : 126/61 mmHg      Respiratory      Respiration: 16, Pulse Oximetry: 95 %, O2 Daily Delivery Respiratory : Room Air with O2 Available     Given By:: Mouthpiece, #MDI/DPI Given: MDI/DPI x 1, Work Of Breathing / Effort: Mild  RUL Breath Sounds: Coarse Crackles,  RML Breath Sounds: Diminished, RLL Breath Sounds: Diminished, DEBORA Breath Sounds: Coarse Crackles, LLL Breath Sounds: Diminished    Fluids    Intake/Output Summary (Last 24 hours) at 06/12/17 1516  Last data filed at 06/12/17 0900   Gross per 24 hour   Intake      0 ml   Output   1000 ml   Net  -1000 ml       Nutrition  Orders Placed This Encounter   Procedures   • DIET ORDER     Standing Status: Standing      Number of Occurrences: 1      Standing Expiration Date:      Order Specific Question:  Diet:     Answer:  Regular [1]     Physical Exam   Constitutional: He is oriented to person, place, and time. He appears well-developed and well-nourished. No distress. He is not intubated.   HENT:   Head: Normocephalic and atraumatic.   Right Ear: External ear normal.   Left Ear: External ear normal.   Eyes: Conjunctivae and EOM are normal. Pupils are equal, round, and reactive to light. Right eye exhibits no discharge. Left eye exhibits no discharge. No scleral icterus.   Neck: Normal range of motion. Neck supple. No JVD present. No thyromegaly present.   Cardiovascular: Normal rate and regular rhythm.    Murmur heard.  Pulmonary/Chest: Accessory muscle usage present. No apnea, no tachypnea and no bradypnea. He is not intubated. No respiratory distress. He has decreased breath sounds in the right upper field, the right middle field, the right lower field, the left upper field, the left middle field and the left lower field. He has no wheezes. He has rhonchi in the right upper field, the right middle field, the right lower field, the left upper field, the left middle field and the left lower field. He has no rales.   Abdominal: Soft. Bowel sounds are normal. He exhibits no distension. There is no rebound and no guarding.   Genitourinary:   Emmanuel catheter   Musculoskeletal: Normal range of motion. He exhibits no edema or tenderness.   Neurological: He is alert and oriented to person, place, and time. He has normal reflexes.  No cranial nerve deficit. He exhibits normal muscle tone. Coordination normal.   Skin: Skin is warm and dry. He is not diaphoretic.   Psychiatric: He has a normal mood and affect. His behavior is normal. Judgment and thought content normal.   Nursing note and vitals reviewed.          Recent Labs      06/12/17   0605   SODIUM  134*   POTASSIUM  6.2*   CHLORIDE  103   CO2  18*   GLUCOSE  97   BUN  80*   CREATININE  4.59*   CALCIUM  9.6                      Assessment/Plan     * Pneumonia of both lungs due to Pseudomonas species (CMS-HCC) (present on admission)  Assessment & Plan  -cultures are positive for pseudomonas  -patient ready for discharge  -has PICC  -has infusion orders  -no spot at this time on infusion center        Protein-calorie malnutrition, severe ([facility]) (present on admission)  Assessment & Plan  -nutritional support    Atrial fibrillation (CMS-HCC) (present on admission)  Assessment & Plan  -metoprolol 25 mg po BID continued  -rate controlled  -no anticoagulation with wegener's as high chance of bleeding    Wegener's granulomatosis (CMS-HCC)  Assessment & Plan  -continued with prednisone 14 mg daily    ESRD on dialysis (CMS-HCC) (present on admission)  Assessment & Plan  -HD three times weekly      EKG reviewed, Radiology images reviewed, Labs reviewed and Medications reviewed  Emmanuel catheter: No Emmanuel      DVT Prophylaxis: Heparin    Ulcer prophylaxis: Yes  Antibiotics: Treating active infection/contamination beyond 24 hours perioperative coverage  Assessed for rehab: Patient was assess for and/or received rehabilitation services during this hospitalization        I spent a total of 42 minutes during this clinical encounter of which > 50% was devoted to counseling and coordinating care including review of records, pertinent lab data and studies, as well as discussing diagnostic evaluation and work up, planned therapeutic interventions and future disposition of care. Where indicated, the assessment  and plan reflect discussion of patient with consultants, other healthcare providers, family members, and additional research needed to obtain further information in formulating the plan of care of this patient.

## 2017-06-13 ENCOUNTER — OUTPATIENT INFUSION SERVICES (OUTPATIENT)
Dept: ONCOLOGY | Facility: MEDICAL CENTER | Age: 77
End: 2017-06-13
Attending: INTERNAL MEDICINE
Payer: MEDICARE

## 2017-06-13 VITALS
BODY MASS INDEX: 20.73 KG/M2 | OXYGEN SATURATION: 93 % | WEIGHT: 132.06 LBS | RESPIRATION RATE: 18 BRPM | HEART RATE: 108 BPM | HEIGHT: 67 IN | DIASTOLIC BLOOD PRESSURE: 55 MMHG | SYSTOLIC BLOOD PRESSURE: 139 MMHG | TEMPERATURE: 98.5 F

## 2017-06-13 DIAGNOSIS — J15.1 PNEUMONIA OF BOTH LUNGS DUE TO PSEUDOMONAS SPECIES, UNSPECIFIED PART OF LUNG (HCC): ICD-10-CM

## 2017-06-13 LAB
ANISOCYTOSIS BLD QL SMEAR: ABNORMAL
BASOPHILS # BLD AUTO: 0.2 % (ref 0–1.8)
BASOPHILS # BLD: 0.02 K/UL (ref 0–0.12)
BUN BLD-MCNC: 62 MG/DL (ref 8–22)
CA-I BLD ISE-SCNC: 1.19 MMOL/L (ref 1.1–1.3)
CHLORIDE BLD-SCNC: 105 MMOL/L (ref 96–112)
CO2 BLD-SCNC: 25 MMOL/L (ref 20–33)
COMMENT 1642: NORMAL
CREAT BLD-MCNC: 4 MG/DL (ref 0.5–1.4)
EOSINOPHIL # BLD AUTO: 0.04 K/UL (ref 0–0.51)
EOSINOPHIL NFR BLD: 0.4 % (ref 0–6.9)
ERYTHROCYTE [DISTWIDTH] IN BLOOD BY AUTOMATED COUNT: 66.1 FL (ref 35.9–50)
GLUCOSE BLD-MCNC: 116 MG/DL (ref 65–99)
HCT VFR BLD AUTO: 29 % (ref 42–52)
HGB BLD-MCNC: 9.1 G/DL (ref 14–18)
IMM GRANULOCYTES # BLD AUTO: 0.16 K/UL (ref 0–0.11)
IMM GRANULOCYTES NFR BLD AUTO: 1.7 % (ref 0–0.9)
LYMPHOCYTES # BLD AUTO: 0.61 K/UL (ref 1–4.8)
LYMPHOCYTES NFR BLD: 6.4 % (ref 22–41)
MACROCYTES BLD QL SMEAR: ABNORMAL
MCH RBC QN AUTO: 31.1 PG (ref 27–33)
MCHC RBC AUTO-ENTMCNC: 31.4 G/DL (ref 33.7–35.3)
MCV RBC AUTO: 99 FL (ref 81.4–97.8)
MICROCYTES BLD QL SMEAR: ABNORMAL
MONOCYTES # BLD AUTO: 0.4 K/UL (ref 0–0.85)
MONOCYTES NFR BLD AUTO: 4.2 % (ref 0–13.4)
MORPHOLOGY BLD-IMP: NORMAL
NEUTROPHILS # BLD AUTO: 8.28 K/UL (ref 1.82–7.42)
NEUTROPHILS NFR BLD: 87.1 % (ref 44–72)
NRBC # BLD AUTO: 0 K/UL
NRBC BLD AUTO-RTO: 0 /100 WBC
OVALOCYTES BLD QL SMEAR: NORMAL
PLATELET # BLD AUTO: 264 K/UL (ref 164–446)
PLATELET BLD QL SMEAR: NORMAL
PMV BLD AUTO: 9 FL (ref 9–12.9)
POIKILOCYTOSIS BLD QL SMEAR: NORMAL
POTASSIUM BLD-SCNC: 6.2 MMOL/L (ref 3.6–5.5)
RBC # BLD AUTO: 2.93 M/UL (ref 4.7–6.1)
RBC BLD AUTO: PRESENT
SODIUM BLD-SCNC: 137 MMOL/L (ref 135–145)
WBC # BLD AUTO: 9.5 K/UL (ref 4.8–10.8)

## 2017-06-13 PROCEDURE — 36593 DECLOT VASCULAR DEVICE: CPT

## 2017-06-13 PROCEDURE — 700111 HCHG RX REV CODE 636 W/ 250 OVERRIDE (IP): Performed by: INTERNAL MEDICINE

## 2017-06-13 PROCEDURE — 96365 THER/PROPH/DIAG IV INF INIT: CPT

## 2017-06-13 PROCEDURE — 85025 COMPLETE CBC W/AUTO DIFF WBC: CPT

## 2017-06-13 PROCEDURE — 665999 HH PPS REVENUE DEBIT

## 2017-06-13 PROCEDURE — 700105 HCHG RX REV CODE 258: Performed by: INTERNAL MEDICINE

## 2017-06-13 PROCEDURE — 36415 COLL VENOUS BLD VENIPUNCTURE: CPT

## 2017-06-13 PROCEDURE — 700111 HCHG RX REV CODE 636 W/ 250 OVERRIDE (IP)

## 2017-06-13 PROCEDURE — 665998 HH PPS REVENUE CREDIT

## 2017-06-13 PROCEDURE — 80047 BASIC METABLC PNL IONIZED CA: CPT

## 2017-06-13 RX ADMIN — MEROPENEM 1 G: 1 INJECTION, POWDER, FOR SOLUTION INTRAVENOUS at 18:05

## 2017-06-13 RX ADMIN — HEPARIN 500 UNITS: 100 SYRINGE at 19:16

## 2017-06-13 RX ADMIN — ALTEPLASE 2 MG: 2.2 INJECTION, POWDER, LYOPHILIZED, FOR SOLUTION INTRAVENOUS at 18:23

## 2017-06-13 RX ADMIN — ALTEPLASE 2 MG: 2.2 INJECTION, POWDER, LYOPHILIZED, FOR SOLUTION INTRAVENOUS at 18:41

## 2017-06-13 ASSESSMENT — PAIN SCALES - GENERAL: PAINLEVEL: 3=SLIGHT PAIN

## 2017-06-13 NOTE — PROGRESS NOTES
Pt discharged to home PT states they understand discharge instructions with no further question. Tele monitor and PIV removed. All belongings with pt. Discharged by transport via wheelchair. Called pharmacy to reverify that patient is able to receive his medications.

## 2017-06-13 NOTE — DISCHARGE SUMMARY
DATE OF ADMISSION:  6/6/2017    DATE OF DISCHARGE:  6/12/2017    PRINCIPAL DIAGNOSES:  1.  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  2.  Wegener's granulomatosis.  3.  Atrial fibrillation.  4.  End-stage renal disease, on hemodialysis.  5.  Protein-calorie malnutrition, severe.  6.  Hyponatremia.  7.  Hyperkalemia fluctuating with hypokalemia depending on dialysis.  8.  Elevated anion gap.  9.  Normochromic normocytic anemia.    CONSULTATIONS DONE ON THIS HOSPITAL STAY:  Include ID consultation with Dr. Christine Manzo and then nephrology consultation, Dr. Fadi Najjar.    INTERVENTIONS DONE ON THIS HOSPITAL STAY:  Tunneled left IJ PICC line   placement in the left side of the chest wall.    BLOOD OR BLOOD PRODUCTS RECEIVED ON THIS HOSPITAL STAY:  None.    DIAGNOSTIC EVALUATIONS DONE ON THIS HOSPITAL STAY:  Includes a chest x-ray   after a Marks/Groshong placement which shows placement of left internal   jugular 6-Belizean PICC tunneled catheter, which is in appropriate location and   may be utilized immediately.    COURSE OF HOSPITALIZATION:  The patient is a 76-year-old gentleman who was   admitted by my partner, Dr. Danielle Ferreira, from Dr. Manzo's office.  The   patient is well known to Dr. Manzo, with the patient having recurrent   episodes of resistant pseudomonas infection.  The patient was seen by home   health and the patient was not doing well, so they recommend that the patient   follow up with primary care.  The patient then had sputum cultures done as an   outpatient and his sputum cultures once again came back with pseudomonas   species as well as yeast and the patient thus was referred to Dr. Manzo's   office.  Once Dr. Manzo saw the patient, she immediately recommended that he   be admitted to the hospital and was sent over for admission.  The patient was   admitted to the hospital by Dr. Ferreira.  Please refer to Dr. Ferreira's H and P   for complete details.  Patient was  begun on meropenem as well as   colistimethate as well as yeast coverage with voriconazole.  The patient   started to improve.  The patient at this point has been doing the   colistimethate inhalation twice daily, the meropenem daily and the patient's   condition at this point slowly got to the point where he is back to his   baseline.  At this point, Dr. Manzo is recommending an additional 28 days of   the antibiotic treatment as an outpatient.  We have at this point set the   patient up with a tunneled chest wall catheter with his dialysis, a PICC line   of regular nature is not feasible and the patient has been set up with   outpatient infusion clinic as well.  I have spoken with his pharmacy and   arranged for his medications as an outpatient.  The patient at this point does   have end-stage renal disease.  He is on dialysis every Monday, Wednesday,   Friday.  He was continued with this while in house and he will resume this as   an outpatient.  Blood pressure was well managed.  He does have chronic   prostate issues with treatment with finasteride and this has been stable.  The   patient does have at this point fluctuating potassium and sodium levels as   well as CO2 levels with his dialysis, but overall they range in about the same   level.  There has been no major fluctuations of his electrolytes.  Overall,   patient is at this point stabilized, but he will need continued outpatient 28   days of management and will be discharged home with outpatient management and   followups.    DISPOSITION:  At this point, discharge home.    DIET:  At this point, regular.    ACTIVITIES:  As tolerated.    FOLLOWUP:  Followups will be with his primary care physician, Dr. Zamudio in   7-10 days and then patient will follow up with Dr. Christine Manzo in her   office in about 2 weeks and then reappointments will be made by Dr. Manzo.    MEDICATIONS:  The patient's medications at the time of discharge will include    colistimethate 150 mg nebulized twice daily, then he should continue with   meropenem 500 mg IV every 24 hours and this is for 28 days and then   voriconazole 200 mg every 12 hours for 28 days, albuterol 2 puffs inhaled   every 4 hours p.r.n. for shortness of breath, aspirin 81 mg p.o. daily,   multivitamin daily, epoetin 2200 units subcutaneously every Monday, Wednesday,   and Friday with dialysis, finasteride 5 mg daily, Advair 500/50 inhaled twice   daily, DuoNeb 3 mL nebulized 4 times daily, metoprolol 25 mg twice daily,   oxygen 2 liters by nasal cannula at nighttime, prednisone 14 mg every day,   Zantac 1 tablet by mouth at bedtime and then Renagel 800 mg 3 times daily with   meals, simvastatin 40 mg daily, and Flomax 0.4 mg p.o. daily.    Patient is not on anticoagulation despite of atrial fibrillation as with his   Wegener's granulomatosis, he is at very high risk of bleeding.    DISCHARGE INSTRUCTIONS:  At this point, patient is advised no smoking, no   alcohol, no caffeine, wear seatbelt in a motorized vehicle, take medications   as prescribed, keep appointments as scheduled.  If symptoms worsen or new ones   develop, call the primary care physician's office, 911, or the nearest urgent   care facility.    This discharge process today lasted 75 minutes and this included arranging of   his antibiotics for outpatient management, discussion with ,   pharmacy, respiratory, his outpatient infusion services, his outpatient   pharmacy and family.       ____________________________________     MD ANA FERNANDEZ / SHARRON    DD:  06/12/2017 16:09:06  DT:  06/12/2017 23:58:28    D#:  [id number]  Job#:  [id number]    cc: WILL VELAZQUEZ MD, WILL DESIR MD, GOLDY ARREDONDO MD

## 2017-06-14 ENCOUNTER — PATIENT MESSAGE (OUTPATIENT)
Dept: INFECTIOUS DISEASES | Facility: MEDICAL CENTER | Age: 77
End: 2017-06-14

## 2017-06-14 ENCOUNTER — OUTPATIENT INFUSION SERVICES (OUTPATIENT)
Dept: ONCOLOGY | Facility: MEDICAL CENTER | Age: 77
End: 2017-06-14
Attending: INTERNAL MEDICINE
Payer: MEDICARE

## 2017-06-14 VITALS
OXYGEN SATURATION: 91 % | TEMPERATURE: 99.2 F | RESPIRATION RATE: 18 BRPM | DIASTOLIC BLOOD PRESSURE: 55 MMHG | SYSTOLIC BLOOD PRESSURE: 118 MMHG | HEART RATE: 109 BPM

## 2017-06-14 PROCEDURE — 700111 HCHG RX REV CODE 636 W/ 250 OVERRIDE (IP): Performed by: INTERNAL MEDICINE

## 2017-06-14 PROCEDURE — 700105 HCHG RX REV CODE 258: Performed by: INTERNAL MEDICINE

## 2017-06-14 PROCEDURE — 96365 THER/PROPH/DIAG IV INF INIT: CPT

## 2017-06-14 PROCEDURE — 665999 HH PPS REVENUE DEBIT

## 2017-06-14 PROCEDURE — 665998 HH PPS REVENUE CREDIT

## 2017-06-14 PROCEDURE — 700111 HCHG RX REV CODE 636 W/ 250 OVERRIDE (IP)

## 2017-06-14 RX ADMIN — MEROPENEM 1 G: 1 INJECTION, POWDER, FOR SOLUTION INTRAVENOUS at 17:47

## 2017-06-14 RX ADMIN — HEPARIN 1000 UNITS: 100 SYRINGE at 18:22

## 2017-06-14 ASSESSMENT — PAIN SCALES - GENERAL: PAINLEVEL: 2=MINIMAL-SLIGHT

## 2017-06-14 NOTE — TELEPHONE ENCOUNTER
From: Gilberto Brown  To: Christine Manzo M.D.  Sent: 6/14/2017 8:25 AM PDT  Subject: Prescription Question      Dr. Beck prescribed 28 days of Colistimethate 150 mg inj vial to be reconstituted w/ sterile water 2X day. Please write prescription for 4 syringe needles to mix per day - 112 total. Minimum 3 ml size syringe, and we need with needles to woods caps.  Unable to reach Dr. Beck.  [facility] [phone]

## 2017-06-14 NOTE — PROGRESS NOTES
Pt arrived to [facility], ambulatory with cane and son, for new Meropenem infusion. Pt with L-tunneled PICC line. Line flushed, no blood return noted from either lumen. Order entered for alteplase. Call placed to MD regarding labs from 6/12, orders received for repeat BMP and call with results. Labs drawn peripherally with 23g butterfly needle. Call placed to MD regarding potassium of 6.2. Per MD, she is going to place a call to Dr. Corona (nephrology) as patient is on hemodialysis MW, to determine a plan. Call received from Dr. Corona stating that patient's potassium will be corrected in dialysis tomorrow. Meropenem infused with no s/sx of adverse reaction. Alteplase instilled in both lumens, successful blood return noted x2 lumens. PICC flushed and heparin locked per policy. Pt left [facility] with no s/sx of distress. Follow up appointment confirmed.

## 2017-06-15 ENCOUNTER — OUTPATIENT INFUSION SERVICES (OUTPATIENT)
Dept: ONCOLOGY | Facility: MEDICAL CENTER | Age: 77
End: 2017-06-15
Attending: INTERNAL MEDICINE
Payer: MEDICARE

## 2017-06-15 VITALS
HEART RATE: 106 BPM | TEMPERATURE: 98.2 F | OXYGEN SATURATION: 92 % | RESPIRATION RATE: 18 BRPM | BODY MASS INDEX: 21 KG/M2 | DIASTOLIC BLOOD PRESSURE: 52 MMHG | WEIGHT: 132.06 LBS | SYSTOLIC BLOOD PRESSURE: 121 MMHG

## 2017-06-15 PROCEDURE — 700105 HCHG RX REV CODE 258: Performed by: INTERNAL MEDICINE

## 2017-06-15 PROCEDURE — 96365 THER/PROPH/DIAG IV INF INIT: CPT

## 2017-06-15 PROCEDURE — 665998 HH PPS REVENUE CREDIT

## 2017-06-15 PROCEDURE — 700111 HCHG RX REV CODE 636 W/ 250 OVERRIDE (IP)

## 2017-06-15 PROCEDURE — 700111 HCHG RX REV CODE 636 W/ 250 OVERRIDE (IP): Performed by: INTERNAL MEDICINE

## 2017-06-15 PROCEDURE — 665999 HH PPS REVENUE DEBIT

## 2017-06-15 RX ADMIN — HEPARIN: 100 SYRINGE at 18:10

## 2017-06-15 RX ADMIN — MEROPENEM 1 G: 1 INJECTION, POWDER, FOR SOLUTION INTRAVENOUS at 17:36

## 2017-06-15 ASSESSMENT — PAIN SCALES - GENERAL: PAINLEVEL: 3=SLIGHT PAIN

## 2017-06-15 NOTE — PROGRESS NOTES
"Pt ambulated into department using his cane for his daily Meropenem infusion for Pneumonia. Pt told RN that he had just finished dialysis, and reported feeling \"exhausted.\" Declined having any other new symptoms since yesterday. Tunneled IJ had + brisk, blood return prior from both lumens, flushed well. Meropenem infused as prescribed, Pt tolerated well. IJ had + blood return from both lumens after, flushed per [facility] policy (Heparin instilled), swab caps applied. Future appointments confirmed with Pt prior to leaving, left department with wife appearing in NAD.  "

## 2017-06-16 ENCOUNTER — OUTPATIENT INFUSION SERVICES (OUTPATIENT)
Dept: ONCOLOGY | Facility: MEDICAL CENTER | Age: 77
End: 2017-06-16
Attending: INTERNAL MEDICINE
Payer: MEDICARE

## 2017-06-16 VITALS
OXYGEN SATURATION: 93 % | BODY MASS INDEX: 21 KG/M2 | HEART RATE: 83 BPM | RESPIRATION RATE: 16 BRPM | TEMPERATURE: 98.8 F | DIASTOLIC BLOOD PRESSURE: 50 MMHG | SYSTOLIC BLOOD PRESSURE: 107 MMHG | WEIGHT: 132.06 LBS

## 2017-06-16 PROCEDURE — 700105 HCHG RX REV CODE 258

## 2017-06-16 PROCEDURE — 700111 HCHG RX REV CODE 636 W/ 250 OVERRIDE (IP)

## 2017-06-16 PROCEDURE — 665998 HH PPS REVENUE CREDIT

## 2017-06-16 PROCEDURE — 96365 THER/PROPH/DIAG IV INF INIT: CPT

## 2017-06-16 PROCEDURE — 665999 HH PPS REVENUE DEBIT

## 2017-06-16 RX ORDER — LOPERAMIDE HYDROCHLORIDE 2 MG/1
CAPSULE ORAL
COMMUNITY
Start: 2017-05-18 | End: 2018-04-13

## 2017-06-16 RX ORDER — AMOXICILLIN 500 MG/1
CAPSULE ORAL
Refills: 1 | COMMUNITY
Start: 2017-04-21 | End: 2017-06-16

## 2017-06-16 RX ORDER — METOPROLOL SUCCINATE 25 MG/1
TABLET, EXTENDED RELEASE ORAL
Refills: 5 | COMMUNITY
Start: 2017-04-30 | End: 2017-06-16

## 2017-06-16 RX ORDER — LEVOFLOXACIN 500 MG/1
TABLET, FILM COATED ORAL
Refills: 0 | COMMUNITY
Start: 2017-04-15 | End: 2017-06-16

## 2017-06-16 RX ORDER — TOBRAMYCIN INHALATION SOLUTION 300 MG/5ML
INHALANT RESPIRATORY (INHALATION)
Refills: 6 | COMMUNITY
Start: 2017-04-06 | End: 2017-06-16

## 2017-06-16 RX ORDER — SEVELAMER CARBONATE 800 MG/1
TABLET, FILM COATED ORAL
COMMUNITY
Start: 2017-06-05 | End: 2017-06-16

## 2017-06-16 RX ADMIN — MEROPENEM 1 G: 1 INJECTION, POWDER, FOR SOLUTION INTRAVENOUS at 17:51

## 2017-06-16 RX ADMIN — HEPARIN 500 UNITS: 100 SYRINGE at 18:28

## 2017-06-16 RX ADMIN — HEPARIN 500 UNITS: 100 SYRINGE at 18:27

## 2017-06-16 NOTE — PROGRESS NOTES
Patient presents for IV antibiotics.  Upper right chest tunneled PICC flushes well with blood return from both lumens. Meropenem infused as ordered, line flushed clear. PICC flushed per protocol and site secured. Patient returns tomorrow and released in no acute distress with his wife.

## 2017-06-17 ENCOUNTER — OUTPATIENT INFUSION SERVICES (OUTPATIENT)
Dept: ONCOLOGY | Facility: MEDICAL CENTER | Age: 77
End: 2017-06-17
Attending: INTERNAL MEDICINE
Payer: MEDICARE

## 2017-06-17 VITALS
HEART RATE: 118 BPM | TEMPERATURE: 98.6 F | RESPIRATION RATE: 19 BRPM | DIASTOLIC BLOOD PRESSURE: 55 MMHG | SYSTOLIC BLOOD PRESSURE: 127 MMHG | BODY MASS INDEX: 21 KG/M2 | WEIGHT: 132.06 LBS | OXYGEN SATURATION: 92 %

## 2017-06-17 DIAGNOSIS — J15.1 PNEUMONIA OF BOTH LUNGS DUE TO PSEUDOMONAS SPECIES, UNSPECIFIED PART OF LUNG (HCC): ICD-10-CM

## 2017-06-17 PROCEDURE — 700111 HCHG RX REV CODE 636 W/ 250 OVERRIDE (IP)

## 2017-06-17 PROCEDURE — 36593 DECLOT VASCULAR DEVICE: CPT

## 2017-06-17 PROCEDURE — 665998 HH PPS REVENUE CREDIT

## 2017-06-17 PROCEDURE — 665999 HH PPS REVENUE DEBIT

## 2017-06-17 PROCEDURE — 700111 HCHG RX REV CODE 636 W/ 250 OVERRIDE (IP): Performed by: INTERNAL MEDICINE

## 2017-06-17 PROCEDURE — 96365 THER/PROPH/DIAG IV INF INIT: CPT

## 2017-06-17 PROCEDURE — 700105 HCHG RX REV CODE 258: Performed by: INTERNAL MEDICINE

## 2017-06-17 RX ADMIN — HEPARIN 500 UNITS: 100 SYRINGE at 18:55

## 2017-06-17 RX ADMIN — ALTEPLASE 2 MG: 2.2 INJECTION, POWDER, LYOPHILIZED, FOR SOLUTION INTRAVENOUS at 17:51

## 2017-06-17 RX ADMIN — HEPARIN 500 UNITS: 100 SYRINGE at 18:57

## 2017-06-17 RX ADMIN — MEROPENEM 1 G: 1 INJECTION, POWDER, FOR SOLUTION INTRAVENOUS at 17:37

## 2017-06-17 RX ADMIN — ALTEPLASE 2 MG: 2.2 INJECTION, POWDER, LYOPHILIZED, FOR SOLUTION INTRAVENOUS at 17:52

## 2017-06-17 NOTE — PROGRESS NOTES
Pt returns for daily IV Meropenem for pneumonia, wife escorting. Left chest double lumen tunneled PICC in place, both lumens flushed with sluggish blood return observed. Meropenem infused as prescribed. Dressing to site changed in sterile field. Unable to observe blood return at completion of infusion in either lumen. Pt had dialysis today and c/o profound fatigue. Educated pt that alteplase will need to be instilled tomorrow if satisfactory blood return to either lumen cannot be visualized. Pt verbalized understanding. Lines both flushed with 20 ml NS and heparin instilled per [facility] policy. Pt discharged home under care of SO in no apparent distress. Returns tomorrow.

## 2017-06-18 ENCOUNTER — OUTPATIENT INFUSION SERVICES (OUTPATIENT)
Dept: ONCOLOGY | Facility: MEDICAL CENTER | Age: 77
End: 2017-06-18
Attending: INTERNAL MEDICINE
Payer: MEDICARE

## 2017-06-18 VITALS
RESPIRATION RATE: 18 BRPM | DIASTOLIC BLOOD PRESSURE: 60 MMHG | BODY MASS INDEX: 21 KG/M2 | TEMPERATURE: 99.1 F | OXYGEN SATURATION: 91 % | HEART RATE: 108 BPM | SYSTOLIC BLOOD PRESSURE: 124 MMHG | WEIGHT: 132.06 LBS

## 2017-06-18 PROCEDURE — 700111 HCHG RX REV CODE 636 W/ 250 OVERRIDE (IP)

## 2017-06-18 PROCEDURE — 665998 HH PPS REVENUE CREDIT

## 2017-06-18 PROCEDURE — 665999 HH PPS REVENUE DEBIT

## 2017-06-18 PROCEDURE — 700105 HCHG RX REV CODE 258

## 2017-06-18 PROCEDURE — 700105 HCHG RX REV CODE 258: Performed by: INTERNAL MEDICINE

## 2017-06-18 PROCEDURE — 96365 THER/PROPH/DIAG IV INF INIT: CPT

## 2017-06-18 PROCEDURE — 700111 HCHG RX REV CODE 636 W/ 250 OVERRIDE (IP): Performed by: INTERNAL MEDICINE

## 2017-06-18 RX ADMIN — MEROPENEM 1 G: 1 INJECTION, POWDER, FOR SOLUTION INTRAVENOUS at 17:34

## 2017-06-18 RX ADMIN — HEPARIN 500 UNITS: 100 SYRINGE at 18:07

## 2017-06-18 ASSESSMENT — PAIN SCALES - GENERAL: PAINLEVEL: 2=MINIMAL-SLIGHT

## 2017-06-18 NOTE — PROGRESS NOTES
Pt returns for daily IV Meropenem for pneumonia, wife escorting. Left chest double lumen tunneled PICC in place, both lumens flush easily no blood return observed. Discussed with pt plan of care and pt amenable to PIV being started for abx to be administered while alteplase is instilled. IV access established. Meropenem infused as prescribed. Alteplase instilled and allowed to dwell for 1 hour. Blood return verified to both lumens however, only scant amt. Chest x-ray reviewed and central line appears to be curled near the end.  Both lumens flushed with 20 ml NS and heparin instilled per [facility] policy. Discussed with pt that placement appears certain however, if blood return remains an issue, MD will need to be called to discuss plan of care. Pt verbalized understanding. Pt discharged home under care of SO in no apparent distress. Returns tomorrow.

## 2017-06-19 ENCOUNTER — OUTPATIENT INFUSION SERVICES (OUTPATIENT)
Dept: ONCOLOGY | Facility: MEDICAL CENTER | Age: 77
End: 2017-06-19
Attending: INTERNAL MEDICINE
Payer: MEDICARE

## 2017-06-19 VITALS
OXYGEN SATURATION: 91 % | SYSTOLIC BLOOD PRESSURE: 95 MMHG | BODY MASS INDEX: 20.35 KG/M2 | TEMPERATURE: 97.5 F | HEART RATE: 113 BPM | HEIGHT: 67 IN | WEIGHT: 129.63 LBS | DIASTOLIC BLOOD PRESSURE: 50 MMHG | RESPIRATION RATE: 18 BRPM

## 2017-06-19 DIAGNOSIS — J15.1 PNEUMONIA OF BOTH LUNGS DUE TO PSEUDOMONAS SPECIES, UNSPECIFIED PART OF LUNG (HCC): ICD-10-CM

## 2017-06-19 LAB
ALBUMIN SERPL BCP-MCNC: 3.3 G/DL (ref 3.2–4.9)
ALP SERPL-CCNC: 65 U/L (ref 30–99)
ALT SERPL-CCNC: 12 U/L (ref 2–50)
AST SERPL-CCNC: 19 U/L (ref 12–45)
BASOPHILS # BLD AUTO: 0.1 % (ref 0–1.8)
BASOPHILS # BLD: 0.01 K/UL (ref 0–0.12)
BILIRUB CONJ SERPL-MCNC: <0.1 MG/DL (ref 0.1–0.5)
BILIRUB INDIRECT SERPL-MCNC: NORMAL MG/DL (ref 0–1)
BILIRUB SERPL-MCNC: 0.4 MG/DL (ref 0.1–1.5)
BUN SERPL-MCNC: 20 MG/DL (ref 8–22)
CALCIUM SERPL-MCNC: 8.5 MG/DL (ref 8.5–10.5)
CHLORIDE SERPL-SCNC: 96 MMOL/L (ref 96–112)
CO2 SERPL-SCNC: 29 MMOL/L (ref 20–33)
CREAT SERPL-MCNC: 1.56 MG/DL (ref 0.5–1.4)
EOSINOPHIL # BLD AUTO: 0.71 K/UL (ref 0–0.51)
EOSINOPHIL NFR BLD: 10.2 % (ref 0–6.9)
ERYTHROCYTE [DISTWIDTH] IN BLOOD BY AUTOMATED COUNT: 63.6 FL (ref 35.9–50)
GFR SERPL CREATININE-BSD FRML MDRD: 43 ML/MIN/1.73 M 2
GLUCOSE SERPL-MCNC: 134 MG/DL (ref 65–99)
HCT VFR BLD AUTO: 26.8 % (ref 42–52)
HGB BLD-MCNC: 8.8 G/DL (ref 14–18)
IMM GRANULOCYTES # BLD AUTO: 0.07 K/UL (ref 0–0.11)
IMM GRANULOCYTES NFR BLD AUTO: 1 % (ref 0–0.9)
INR PPP: 0.96 (ref 0.87–1.13)
LYMPHOCYTES # BLD AUTO: 0.42 K/UL (ref 1–4.8)
LYMPHOCYTES NFR BLD: 6 % (ref 22–41)
MCH RBC QN AUTO: 31.4 PG (ref 27–33)
MCHC RBC AUTO-ENTMCNC: 32.8 G/DL (ref 33.7–35.3)
MCV RBC AUTO: 95.7 FL (ref 81.4–97.8)
MONOCYTES # BLD AUTO: 0.57 K/UL (ref 0–0.85)
MONOCYTES NFR BLD AUTO: 8.2 % (ref 0–13.4)
NEUTROPHILS # BLD AUTO: 5.21 K/UL (ref 1.82–7.42)
NEUTROPHILS NFR BLD: 74.5 % (ref 44–72)
NRBC # BLD AUTO: 0 K/UL
NRBC BLD AUTO-RTO: 0 /100 WBC
PHOSPHATE SERPL-MCNC: 2.8 MG/DL (ref 2.5–4.5)
PLATELET # BLD AUTO: 244 K/UL (ref 164–446)
PMV BLD AUTO: 8.7 FL (ref 9–12.9)
POTASSIUM SERPL-SCNC: 3 MMOL/L (ref 3.6–5.5)
PROT SERPL-MCNC: 6 G/DL (ref 6–8.2)
PROTHROMBIN TIME: 13.1 SEC (ref 12–14.6)
RBC # BLD AUTO: 2.8 M/UL (ref 4.7–6.1)
SODIUM SERPL-SCNC: 133 MMOL/L (ref 135–145)
WBC # BLD AUTO: 7 K/UL (ref 4.8–10.8)

## 2017-06-19 PROCEDURE — 700105 HCHG RX REV CODE 258: Performed by: INTERNAL MEDICINE

## 2017-06-19 PROCEDURE — 700105 HCHG RX REV CODE 258

## 2017-06-19 PROCEDURE — 85610 PROTHROMBIN TIME: CPT

## 2017-06-19 PROCEDURE — 85025 COMPLETE CBC W/AUTO DIFF WBC: CPT

## 2017-06-19 PROCEDURE — 96365 THER/PROPH/DIAG IV INF INIT: CPT

## 2017-06-19 PROCEDURE — 36415 COLL VENOUS BLD VENIPUNCTURE: CPT

## 2017-06-19 PROCEDURE — 665999 HH PPS REVENUE DEBIT

## 2017-06-19 PROCEDURE — 700111 HCHG RX REV CODE 636 W/ 250 OVERRIDE (IP): Performed by: INTERNAL MEDICINE

## 2017-06-19 PROCEDURE — 80069 RENAL FUNCTION PANEL: CPT

## 2017-06-19 PROCEDURE — 665998 HH PPS REVENUE CREDIT

## 2017-06-19 PROCEDURE — 80076 HEPATIC FUNCTION PANEL: CPT | Mod: 91

## 2017-06-19 PROCEDURE — 700111 HCHG RX REV CODE 636 W/ 250 OVERRIDE (IP)

## 2017-06-19 RX ADMIN — MEROPENEM 1 G: 1 INJECTION, POWDER, FOR SOLUTION INTRAVENOUS at 18:04

## 2017-06-19 RX ADMIN — HEPARIN 500 UNITS: 100 SYRINGE at 18:04

## 2017-06-19 NOTE — PROGRESS NOTES
Pt returns to infusion center for IV antibiotics.  Lt chest power line in place; blood return noted with multiple flushes and pt repositioning.  Pt tolerated infusion without incident.  Power line flushed with saline and heparin per policy, orange caps placed.  Pt left infusion center ambulatory with cane and in good condition.  Returns daily.

## 2017-06-20 ENCOUNTER — HOSPITAL ENCOUNTER (OUTPATIENT)
Dept: RADIOLOGY | Facility: MEDICAL CENTER | Age: 77
End: 2017-06-20
Attending: INTERNAL MEDICINE
Payer: MEDICARE

## 2017-06-20 ENCOUNTER — OUTPATIENT INFUSION SERVICES (OUTPATIENT)
Dept: ONCOLOGY | Facility: MEDICAL CENTER | Age: 77
End: 2017-06-20
Attending: INTERNAL MEDICINE
Payer: MEDICARE

## 2017-06-20 VITALS
RESPIRATION RATE: 18 BRPM | HEART RATE: 105 BPM | SYSTOLIC BLOOD PRESSURE: 109 MMHG | BODY MASS INDEX: 20.61 KG/M2 | OXYGEN SATURATION: 93 % | WEIGHT: 129.63 LBS | DIASTOLIC BLOOD PRESSURE: 55 MMHG | TEMPERATURE: 98.6 F

## 2017-06-20 DIAGNOSIS — J15.1 PNEUMONIA OF BOTH LUNGS DUE TO PSEUDOMONAS SPECIES, UNSPECIFIED PART OF LUNG (HCC): ICD-10-CM

## 2017-06-20 PROCEDURE — 700111 HCHG RX REV CODE 636 W/ 250 OVERRIDE (IP)

## 2017-06-20 PROCEDURE — 700111 HCHG RX REV CODE 636 W/ 250 OVERRIDE (IP): Performed by: INTERNAL MEDICINE

## 2017-06-20 PROCEDURE — 700105 HCHG RX REV CODE 258: Performed by: INTERNAL MEDICINE

## 2017-06-20 PROCEDURE — 96365 THER/PROPH/DIAG IV INF INIT: CPT | Mod: XU

## 2017-06-20 PROCEDURE — 665999 HH PPS REVENUE DEBIT

## 2017-06-20 PROCEDURE — 700105 HCHG RX REV CODE 258

## 2017-06-20 PROCEDURE — 665998 HH PPS REVENUE CREDIT

## 2017-06-20 RX ORDER — LIDOCAINE HYDROCHLORIDE 10 MG/ML
INJECTION, SOLUTION INFILTRATION; PERINEURAL
Status: DISPENSED
Start: 2017-06-20 | End: 2017-06-20

## 2017-06-20 RX ADMIN — HEPARIN 500 UNITS: 100 SYRINGE at 17:41

## 2017-06-20 RX ADMIN — MEROPENEM 1 G: 1 INJECTION, POWDER, FOR SOLUTION INTRAVENOUS at 17:09

## 2017-06-20 NOTE — OR SURGEON
Immediate Post- Operative Note        PostOp Diagnosis: displaced SBCC      Procedure(s): SBCC replacement      Estimated Blood Loss: Less than 5 ml        Complications: None            6/20/2017     12:21 PM     Aaron Montemayor

## 2017-06-20 NOTE — PROGRESS NOTES
"Pt ambulated into department using his cane with wife \"Crys\" for his daily Meropenem infusion for Pneumonia. Pt told RN that he had just finished dialysis, and appeared extremely fatigued. Denied having any new or acute symptoms. RN noticed a 1 cm dark and raised area of skin on Pt's left lateral neck. RN asked Pt what this was from, and both Pt and wife weren't sure, reported it grew very quickly in the last week. RN encouraged wife and Pt to call his dermatologist tomorrow to get it evaluated. Tunneled IJ did not have blood return from either lumen. Negar STANLEY attempted to get blood return with repositioning, and was unsuccessful. Primary RN called PICC nurse and asked if she could come to department to evaluate line. Ivon APONTE PICC RN spent 20 minutes troubleshooting PICC, and eventually determined that Pt would need to get a PICC check with possible exchange. Ivon APONTE RN called on-call MD Dr. Manzo and received orders to do procedure tomorrow (6/20/17). RN started PIV, blood drawn per today's weekly orders and for tomorrow's procedure. Meropenem infused as prescribed, Pt tolerated well. IV removed per Pt's request, bleeding controlled with gauze and coban. IJ flushed per [facility] policy (Heparin instilled), swab caps applied. Future appointments confirmed with Pt prior to leaving, left department using [facility] wheelchair with wife appearing in NAD.  "

## 2017-06-21 ENCOUNTER — OUTPATIENT INFUSION SERVICES (OUTPATIENT)
Dept: ONCOLOGY | Facility: MEDICAL CENTER | Age: 77
End: 2017-06-21
Attending: INTERNAL MEDICINE
Payer: MEDICARE

## 2017-06-21 VITALS
HEART RATE: 105 BPM | DIASTOLIC BLOOD PRESSURE: 47 MMHG | TEMPERATURE: 97 F | RESPIRATION RATE: 20 BRPM | OXYGEN SATURATION: 92 % | SYSTOLIC BLOOD PRESSURE: 97 MMHG

## 2017-06-21 PROCEDURE — 665998 HH PPS REVENUE CREDIT

## 2017-06-21 PROCEDURE — 665999 HH PPS REVENUE DEBIT

## 2017-06-21 PROCEDURE — 700111 HCHG RX REV CODE 636 W/ 250 OVERRIDE (IP)

## 2017-06-21 PROCEDURE — 96365 THER/PROPH/DIAG IV INF INIT: CPT

## 2017-06-21 PROCEDURE — 700105 HCHG RX REV CODE 258

## 2017-06-21 PROCEDURE — 700105 HCHG RX REV CODE 258: Performed by: INTERNAL MEDICINE

## 2017-06-21 PROCEDURE — 700111 HCHG RX REV CODE 636 W/ 250 OVERRIDE (IP): Performed by: INTERNAL MEDICINE

## 2017-06-21 RX ADMIN — MEROPENEM 1 G: 1 INJECTION, POWDER, FOR SOLUTION INTRAVENOUS at 17:05

## 2017-06-21 RX ADMIN — HEPARIN 300 UNITS: 100 SYRINGE at 17:46

## 2017-06-21 RX ADMIN — HEPARIN 300 UNITS: 100 SYRINGE at 17:45

## 2017-06-21 ASSESSMENT — PAIN SCALES - GENERAL: PAINLEVEL: 4=SLIGHT-MODERATE PAIN

## 2017-06-21 NOTE — PROGRESS NOTES
Pt ambulated into department using his cane with wife Crys for his daily Meropenem infusion for Pneumonia. Pt and wife reported that Pt had a PICC exchange this morning. Pt stated that he continues to be extremely fatigued, denied having any GI distress at this time. RN observed that the skin tag that had developed on Pt's left lateral neck was now removed. RN asked Pt and wife if it was removed in IR during the procedure, and neither were aware of when it was removed. Pt's wife Crys said that Pt is still supposed to see his Dermatologist every 3 months, so they will keep their appointment. Tunneled IJ had + brisk, blood return prior from both lumens, flushed well. Meropenem infused as prescribed, Pt tolerated well. IJ had + blood return from both lumens after, flushed per [facility] policy (Heparin instilled), swab caps applied. Future appointments confirmed with Pt prior to leaving, left department with angel Spnagler appearing in stable condition.

## 2017-06-22 ENCOUNTER — OUTPATIENT INFUSION SERVICES (OUTPATIENT)
Dept: ONCOLOGY | Facility: MEDICAL CENTER | Age: 77
End: 2017-06-22
Attending: INTERNAL MEDICINE
Payer: MEDICARE

## 2017-06-22 ENCOUNTER — PATIENT MESSAGE (OUTPATIENT)
Dept: INFECTIOUS DISEASES | Facility: MEDICAL CENTER | Age: 77
End: 2017-06-22

## 2017-06-22 VITALS
TEMPERATURE: 98.2 F | HEART RATE: 104 BPM | SYSTOLIC BLOOD PRESSURE: 99 MMHG | RESPIRATION RATE: 18 BRPM | DIASTOLIC BLOOD PRESSURE: 46 MMHG | WEIGHT: 129.63 LBS | BODY MASS INDEX: 20.61 KG/M2 | OXYGEN SATURATION: 91 %

## 2017-06-22 PROCEDURE — 665999 HH PPS REVENUE DEBIT

## 2017-06-22 PROCEDURE — 665998 HH PPS REVENUE CREDIT

## 2017-06-22 PROCEDURE — 700111 HCHG RX REV CODE 636 W/ 250 OVERRIDE (IP)

## 2017-06-22 PROCEDURE — 96365 THER/PROPH/DIAG IV INF INIT: CPT

## 2017-06-22 PROCEDURE — 700105 HCHG RX REV CODE 258

## 2017-06-22 RX ADMIN — HEPARIN 500 UNITS: 100 SYRINGE at 17:34

## 2017-06-22 RX ADMIN — MEROPENEM 1 G: 1 INJECTION, POWDER, FOR SOLUTION INTRAVENOUS at 16:57

## 2017-06-22 ASSESSMENT — PAIN SCALES - GENERAL: PAINLEVEL: NO PAIN

## 2017-06-22 NOTE — PROGRESS NOTES
"Patient arrived ambulatory with a cane to the [facility] for Meropenem. Reviewed labs, vital signs, and orders. Patient states, \" He just came from dialysis, .7 removed, BP low during treatment\". Patient drinking water, sitting comfortably in the chair. Obtained blood return from both lumens of the left chest double lumen PICC. Administered IV antibiotic, patient tolerated with no issue or reaction. Patient and spouse aware of upcoming appointment date and time. PICC line flushed with saline and heparin per protocol, orange protective cap placed to each lumen.  Re-checked vital signs, see doc flowsheets. Patient utilized wheelchair to transfer from [facility] to car left at 1800.  "

## 2017-06-23 ENCOUNTER — OUTPATIENT INFUSION SERVICES (OUTPATIENT)
Dept: ONCOLOGY | Facility: MEDICAL CENTER | Age: 77
End: 2017-06-23
Attending: INTERNAL MEDICINE
Payer: MEDICARE

## 2017-06-23 VITALS
DIASTOLIC BLOOD PRESSURE: 43 MMHG | SYSTOLIC BLOOD PRESSURE: 92 MMHG | WEIGHT: 129.63 LBS | TEMPERATURE: 98.7 F | OXYGEN SATURATION: 92 % | BODY MASS INDEX: 20.61 KG/M2 | RESPIRATION RATE: 18 BRPM | HEART RATE: 97 BPM

## 2017-06-23 PROCEDURE — 700105 HCHG RX REV CODE 258: Performed by: INTERNAL MEDICINE

## 2017-06-23 PROCEDURE — 96365 THER/PROPH/DIAG IV INF INIT: CPT

## 2017-06-23 PROCEDURE — 700111 HCHG RX REV CODE 636 W/ 250 OVERRIDE (IP): Performed by: INTERNAL MEDICINE

## 2017-06-23 PROCEDURE — 700105 HCHG RX REV CODE 258

## 2017-06-23 PROCEDURE — 665999 HH PPS REVENUE DEBIT

## 2017-06-23 PROCEDURE — 700111 HCHG RX REV CODE 636 W/ 250 OVERRIDE (IP)

## 2017-06-23 PROCEDURE — 665998 HH PPS REVENUE CREDIT

## 2017-06-23 RX ADMIN — MEROPENEM 1 G: 1 INJECTION, POWDER, FOR SOLUTION INTRAVENOUS at 18:10

## 2017-06-23 RX ADMIN — HEPARIN 500 UNITS: 100 SYRINGE at 18:45

## 2017-06-23 ASSESSMENT — PAIN SCALES - GENERAL: PAINLEVEL: NO PAIN

## 2017-06-23 NOTE — PROGRESS NOTES
Gilberto returns for IVABX. Meropenem infused as ordered. Gilberto tolerated well and without incident. Tunneled PICC line in good condition and has positive blood return. PICC line flushed with saline and heparin per protocol. Gilberto DC'd home in good condition. Returns daily.

## 2017-06-23 NOTE — TELEPHONE ENCOUNTER
From: Gilberto Brown  To: Christine Manzo M.D.  Sent: 6/22/2017 7:51 AM PDT  Subject: Procedure Question      .a change is plaguing me. simply walking is difficult re energy. get very short winded rapidly, breathing in gasps. weakness in muscles. cough returned yesterday. nose began running this morning. we are about [facility] 28 days. can someone in your office give me a look-over?

## 2017-06-24 ENCOUNTER — OUTPATIENT INFUSION SERVICES (OUTPATIENT)
Dept: ONCOLOGY | Facility: MEDICAL CENTER | Age: 77
End: 2017-06-24
Attending: INTERNAL MEDICINE
Payer: MEDICARE

## 2017-06-24 VITALS
DIASTOLIC BLOOD PRESSURE: 61 MMHG | SYSTOLIC BLOOD PRESSURE: 133 MMHG | TEMPERATURE: 99 F | HEART RATE: 118 BPM | OXYGEN SATURATION: 91 % | RESPIRATION RATE: 18 BRPM

## 2017-06-24 PROCEDURE — 700111 HCHG RX REV CODE 636 W/ 250 OVERRIDE (IP): Performed by: INTERNAL MEDICINE

## 2017-06-24 PROCEDURE — 665998 HH PPS REVENUE CREDIT

## 2017-06-24 PROCEDURE — 96365 THER/PROPH/DIAG IV INF INIT: CPT

## 2017-06-24 PROCEDURE — 700105 HCHG RX REV CODE 258

## 2017-06-24 PROCEDURE — 700105 HCHG RX REV CODE 258: Performed by: INTERNAL MEDICINE

## 2017-06-24 PROCEDURE — 665999 HH PPS REVENUE DEBIT

## 2017-06-24 PROCEDURE — 700111 HCHG RX REV CODE 636 W/ 250 OVERRIDE (IP)

## 2017-06-24 RX ADMIN — MEROPENEM 1 G: 1 INJECTION, POWDER, FOR SOLUTION INTRAVENOUS at 16:56

## 2017-06-24 RX ADMIN — HEPARIN 500 UNITS: 100 SYRINGE at 17:28

## 2017-06-24 ASSESSMENT — PAIN SCALES - GENERAL: PAINLEVEL: NO PAIN

## 2017-06-25 ENCOUNTER — OUTPATIENT INFUSION SERVICES (OUTPATIENT)
Dept: ONCOLOGY | Facility: MEDICAL CENTER | Age: 77
End: 2017-06-25
Attending: INTERNAL MEDICINE
Payer: MEDICARE

## 2017-06-25 VITALS
DIASTOLIC BLOOD PRESSURE: 46 MMHG | TEMPERATURE: 98.6 F | SYSTOLIC BLOOD PRESSURE: 117 MMHG | OXYGEN SATURATION: 90 % | RESPIRATION RATE: 18 BRPM | HEART RATE: 101 BPM

## 2017-06-25 PROCEDURE — 700105 HCHG RX REV CODE 258: Performed by: INTERNAL MEDICINE

## 2017-06-25 PROCEDURE — 96365 THER/PROPH/DIAG IV INF INIT: CPT

## 2017-06-25 PROCEDURE — 665999 HH PPS REVENUE DEBIT

## 2017-06-25 PROCEDURE — 700105 HCHG RX REV CODE 258

## 2017-06-25 PROCEDURE — 700111 HCHG RX REV CODE 636 W/ 250 OVERRIDE (IP): Performed by: INTERNAL MEDICINE

## 2017-06-25 PROCEDURE — 700111 HCHG RX REV CODE 636 W/ 250 OVERRIDE (IP)

## 2017-06-25 PROCEDURE — 665998 HH PPS REVENUE CREDIT

## 2017-06-25 RX ADMIN — MEROPENEM 1 G: 1 INJECTION, POWDER, FOR SOLUTION INTRAVENOUS at 14:52

## 2017-06-25 RX ADMIN — HEPARIN 1000 UNITS: 100 SYRINGE at 15:26

## 2017-06-25 ASSESSMENT — PAIN SCALES - GENERAL: PAINLEVEL: NO PAIN

## 2017-06-25 NOTE — PROGRESS NOTES
"Pt is here for his scheduled IVABX. Fatigued   - has dialysis MWF in addition to daily antibiotic infusion. Fragile - ambulates using the cane. He tries to walk as much as he tolerates to keep his strength up. No oxygen in use. No cough. Breathing non-labored at rest. Wife present. No new concerns. Blood return verifed from both PICC lumens - IVABX delivered via \"red\" lumen. Heparin locked per protocol. Discharged home to self care. Returns tomorrow.   "

## 2017-06-26 ENCOUNTER — OUTPATIENT INFUSION SERVICES (OUTPATIENT)
Dept: ONCOLOGY | Facility: MEDICAL CENTER | Age: 77
End: 2017-06-26
Attending: INTERNAL MEDICINE
Payer: MEDICARE

## 2017-06-26 VITALS
RESPIRATION RATE: 18 BRPM | BODY MASS INDEX: 20.83 KG/M2 | OXYGEN SATURATION: 90 % | DIASTOLIC BLOOD PRESSURE: 44 MMHG | WEIGHT: 129.63 LBS | HEART RATE: 98 BPM | SYSTOLIC BLOOD PRESSURE: 100 MMHG | HEIGHT: 66 IN | TEMPERATURE: 98.2 F

## 2017-06-26 DIAGNOSIS — J15.1 PNEUMONIA OF BOTH LUNGS DUE TO PSEUDOMONAS SPECIES, UNSPECIFIED PART OF LUNG (HCC): ICD-10-CM

## 2017-06-26 LAB
ALBUMIN SERPL BCP-MCNC: 3.5 G/DL (ref 3.2–4.9)
ALP SERPL-CCNC: 78 U/L (ref 30–99)
ALT SERPL-CCNC: 41 U/L (ref 2–50)
AST SERPL-CCNC: 44 U/L (ref 12–45)
BASOPHILS # BLD AUTO: 0.4 % (ref 0–1.8)
BASOPHILS # BLD: 0.03 K/UL (ref 0–0.12)
BILIRUB CONJ SERPL-MCNC: <0.1 MG/DL (ref 0.1–0.5)
BILIRUB INDIRECT SERPL-MCNC: NORMAL MG/DL (ref 0–1)
BILIRUB SERPL-MCNC: 0.4 MG/DL (ref 0.1–1.5)
BUN SERPL-MCNC: 18 MG/DL (ref 8–22)
CALCIUM SERPL-MCNC: 9 MG/DL (ref 8.5–10.5)
CHLORIDE SERPL-SCNC: 98 MMOL/L (ref 96–112)
CO2 SERPL-SCNC: 33 MMOL/L (ref 20–33)
CREAT SERPL-MCNC: 1.46 MG/DL (ref 0.5–1.4)
EOSINOPHIL # BLD AUTO: 0.32 K/UL (ref 0–0.51)
EOSINOPHIL NFR BLD: 4 % (ref 0–6.9)
ERYTHROCYTE [DISTWIDTH] IN BLOOD BY AUTOMATED COUNT: 62.2 FL (ref 35.9–50)
GFR SERPL CREATININE-BSD FRML MDRD: 47 ML/MIN/1.73 M 2
GLUCOSE SERPL-MCNC: 93 MG/DL (ref 65–99)
HCT VFR BLD AUTO: 27.4 % (ref 42–52)
HGB BLD-MCNC: 8.8 G/DL (ref 14–18)
IMM GRANULOCYTES # BLD AUTO: 0.09 K/UL (ref 0–0.11)
IMM GRANULOCYTES NFR BLD AUTO: 1.1 % (ref 0–0.9)
LYMPHOCYTES # BLD AUTO: 0.64 K/UL (ref 1–4.8)
LYMPHOCYTES NFR BLD: 7.9 % (ref 22–41)
MCH RBC QN AUTO: 31.3 PG (ref 27–33)
MCHC RBC AUTO-ENTMCNC: 32.1 G/DL (ref 33.7–35.3)
MCV RBC AUTO: 97.5 FL (ref 81.4–97.8)
MONOCYTES # BLD AUTO: 0.69 K/UL (ref 0–0.85)
MONOCYTES NFR BLD AUTO: 8.5 % (ref 0–13.4)
NEUTROPHILS # BLD AUTO: 6.32 K/UL (ref 1.82–7.42)
NEUTROPHILS NFR BLD: 78.1 % (ref 44–72)
NRBC # BLD AUTO: 0 K/UL
NRBC BLD AUTO-RTO: 0 /100 WBC
PHOSPHATE SERPL-MCNC: 2.3 MG/DL (ref 2.5–4.5)
PLATELET # BLD AUTO: 277 K/UL (ref 164–446)
PMV BLD AUTO: 8.9 FL (ref 9–12.9)
POTASSIUM SERPL-SCNC: 3.4 MMOL/L (ref 3.6–5.5)
PROT SERPL-MCNC: 6.2 G/DL (ref 6–8.2)
RBC # BLD AUTO: 2.81 M/UL (ref 4.7–6.1)
SODIUM SERPL-SCNC: 139 MMOL/L (ref 135–145)
WBC # BLD AUTO: 8.1 K/UL (ref 4.8–10.8)

## 2017-06-26 PROCEDURE — 80076 HEPATIC FUNCTION PANEL: CPT | Mod: 91

## 2017-06-26 PROCEDURE — 665999 HH PPS REVENUE DEBIT

## 2017-06-26 PROCEDURE — 700111 HCHG RX REV CODE 636 W/ 250 OVERRIDE (IP)

## 2017-06-26 PROCEDURE — 700105 HCHG RX REV CODE 258: Performed by: INTERNAL MEDICINE

## 2017-06-26 PROCEDURE — 80069 RENAL FUNCTION PANEL: CPT

## 2017-06-26 PROCEDURE — 85025 COMPLETE CBC W/AUTO DIFF WBC: CPT

## 2017-06-26 PROCEDURE — 700105 HCHG RX REV CODE 258

## 2017-06-26 PROCEDURE — 96365 THER/PROPH/DIAG IV INF INIT: CPT

## 2017-06-26 PROCEDURE — 36592 COLLECT BLOOD FROM PICC: CPT

## 2017-06-26 PROCEDURE — 700111 HCHG RX REV CODE 636 W/ 250 OVERRIDE (IP): Performed by: INTERNAL MEDICINE

## 2017-06-26 PROCEDURE — 665998 HH PPS REVENUE CREDIT

## 2017-06-26 RX ADMIN — MEROPENEM 1 G: 1 INJECTION, POWDER, FOR SOLUTION INTRAVENOUS at 16:59

## 2017-06-26 RX ADMIN — HEPARIN 500 UNITS: 100 SYRINGE at 17:34

## 2017-06-26 NOTE — PROGRESS NOTES
Pt presented to infusion center for daily Meropenem. Pt reports no significant changes since yesterday. Left IJ tunneled PICC line in place, both lumens flushed and brisk blood return observed. Meropenem infused with no s/s of adverse reaction. PICC line flushed, brisk blood return again observed, heparinized per protocol, swab caps placed. Has next appt, left on foot in care of wife in good spirits.

## 2017-06-27 ENCOUNTER — OUTPATIENT INFUSION SERVICES (OUTPATIENT)
Dept: ONCOLOGY | Facility: MEDICAL CENTER | Age: 77
End: 2017-06-27
Attending: INTERNAL MEDICINE
Payer: MEDICARE

## 2017-06-27 VITALS
SYSTOLIC BLOOD PRESSURE: 112 MMHG | TEMPERATURE: 98.3 F | OXYGEN SATURATION: 90 % | HEART RATE: 110 BPM | RESPIRATION RATE: 18 BRPM | DIASTOLIC BLOOD PRESSURE: 50 MMHG

## 2017-06-27 PROCEDURE — 700105 HCHG RX REV CODE 258

## 2017-06-27 PROCEDURE — 700111 HCHG RX REV CODE 636 W/ 250 OVERRIDE (IP)

## 2017-06-27 PROCEDURE — 665998 HH PPS REVENUE CREDIT

## 2017-06-27 PROCEDURE — 96365 THER/PROPH/DIAG IV INF INIT: CPT

## 2017-06-27 PROCEDURE — 665999 HH PPS REVENUE DEBIT

## 2017-06-27 PROCEDURE — 700111 HCHG RX REV CODE 636 W/ 250 OVERRIDE (IP): Performed by: INTERNAL MEDICINE

## 2017-06-27 PROCEDURE — 700105 HCHG RX REV CODE 258: Performed by: INTERNAL MEDICINE

## 2017-06-27 RX ADMIN — HEPARIN: 100 SYRINGE at 17:30

## 2017-06-27 RX ADMIN — MEROPENEM 1 G: 1 INJECTION, POWDER, FOR SOLUTION INTRAVENOUS at 16:52

## 2017-06-27 ASSESSMENT — PAIN SCALES - GENERAL: PAINLEVEL: NO PAIN

## 2017-06-27 NOTE — PROGRESS NOTES
Pt is here for his scheduled IVABX. Exhausted - receives dialysis MWF/IVABX daily. Wife present. Labs drawn. Infusion completed without an incident. Pt sleeps through his infusion. Line heparin locked per protocol. Escorted out with a wheel chair. Discharged home to self care. Returns daily.

## 2017-06-28 ENCOUNTER — OUTPATIENT INFUSION SERVICES (OUTPATIENT)
Dept: ONCOLOGY | Facility: MEDICAL CENTER | Age: 77
End: 2017-06-28
Attending: INTERNAL MEDICINE
Payer: MEDICARE

## 2017-06-28 VITALS
TEMPERATURE: 98.6 F | SYSTOLIC BLOOD PRESSURE: 100 MMHG | DIASTOLIC BLOOD PRESSURE: 49 MMHG | RESPIRATION RATE: 18 BRPM | OXYGEN SATURATION: 90 % | HEART RATE: 105 BPM

## 2017-06-28 PROCEDURE — 665999 HH PPS REVENUE DEBIT

## 2017-06-28 PROCEDURE — 700111 HCHG RX REV CODE 636 W/ 250 OVERRIDE (IP)

## 2017-06-28 PROCEDURE — 700105 HCHG RX REV CODE 258

## 2017-06-28 PROCEDURE — 700105 HCHG RX REV CODE 258: Performed by: INTERNAL MEDICINE

## 2017-06-28 PROCEDURE — 665998 HH PPS REVENUE CREDIT

## 2017-06-28 PROCEDURE — 700111 HCHG RX REV CODE 636 W/ 250 OVERRIDE (IP): Performed by: INTERNAL MEDICINE

## 2017-06-28 PROCEDURE — 96365 THER/PROPH/DIAG IV INF INIT: CPT

## 2017-06-28 RX ADMIN — MEROPENEM 1 G: 1 INJECTION, POWDER, FOR SOLUTION INTRAVENOUS at 17:15

## 2017-06-28 RX ADMIN — HEPARIN 500 UNITS: 100 SYRINGE at 17:55

## 2017-06-28 ASSESSMENT — PAIN SCALES - GENERAL: PAINLEVEL: NO PAIN

## 2017-06-29 ENCOUNTER — OUTPATIENT INFUSION SERVICES (OUTPATIENT)
Dept: ONCOLOGY | Facility: MEDICAL CENTER | Age: 77
End: 2017-06-29
Attending: INTERNAL MEDICINE
Payer: MEDICARE

## 2017-06-29 VITALS
OXYGEN SATURATION: 90 % | DIASTOLIC BLOOD PRESSURE: 61 MMHG | SYSTOLIC BLOOD PRESSURE: 137 MMHG | HEART RATE: 102 BPM | TEMPERATURE: 98.6 F | RESPIRATION RATE: 18 BRPM

## 2017-06-29 PROCEDURE — 700111 HCHG RX REV CODE 636 W/ 250 OVERRIDE (IP)

## 2017-06-29 PROCEDURE — 665998 HH PPS REVENUE CREDIT

## 2017-06-29 PROCEDURE — 700105 HCHG RX REV CODE 258: Performed by: INTERNAL MEDICINE

## 2017-06-29 PROCEDURE — 96365 THER/PROPH/DIAG IV INF INIT: CPT

## 2017-06-29 PROCEDURE — 700111 HCHG RX REV CODE 636 W/ 250 OVERRIDE (IP): Performed by: INTERNAL MEDICINE

## 2017-06-29 PROCEDURE — 665999 HH PPS REVENUE DEBIT

## 2017-06-29 PROCEDURE — 700105 HCHG RX REV CODE 258

## 2017-06-29 RX ADMIN — HEPARIN 500 UNITS: 100 SYRINGE at 17:47

## 2017-06-29 RX ADMIN — MEROPENEM 1 G: 1 INJECTION, POWDER, FOR SOLUTION INTRAVENOUS at 17:01

## 2017-06-29 RX ADMIN — HEPARIN 500 UNITS: 100 SYRINGE at 17:45

## 2017-06-29 ASSESSMENT — PAIN SCALES - GENERAL: PAINLEVEL: NO PAIN

## 2017-06-29 NOTE — PROGRESS NOTES
Gilberto returns for IVABX. Meropenem infused as ordered. Gilberto tolerated well and without incident. Tunneled PICC line in good condition and has positive blood return. PICC line flushed with saline and heparin per protocol. Gilberto DC'd home with wife in good condition. Returns daily.

## 2017-06-30 ENCOUNTER — OUTPATIENT INFUSION SERVICES (OUTPATIENT)
Dept: ONCOLOGY | Facility: MEDICAL CENTER | Age: 77
End: 2017-06-30
Attending: INTERNAL MEDICINE
Payer: MEDICARE

## 2017-06-30 VITALS
TEMPERATURE: 98.8 F | DIASTOLIC BLOOD PRESSURE: 50 MMHG | RESPIRATION RATE: 20 BRPM | SYSTOLIC BLOOD PRESSURE: 104 MMHG | OXYGEN SATURATION: 92 % | HEART RATE: 101 BPM

## 2017-06-30 PROCEDURE — 700105 HCHG RX REV CODE 258

## 2017-06-30 PROCEDURE — 665998 HH PPS REVENUE CREDIT

## 2017-06-30 PROCEDURE — 700111 HCHG RX REV CODE 636 W/ 250 OVERRIDE (IP)

## 2017-06-30 PROCEDURE — 700105 HCHG RX REV CODE 258: Performed by: INTERNAL MEDICINE

## 2017-06-30 PROCEDURE — 700111 HCHG RX REV CODE 636 W/ 250 OVERRIDE (IP): Performed by: INTERNAL MEDICINE

## 2017-06-30 PROCEDURE — 665999 HH PPS REVENUE DEBIT

## 2017-06-30 PROCEDURE — 96365 THER/PROPH/DIAG IV INF INIT: CPT

## 2017-06-30 RX ADMIN — HEPARIN 300 UNITS: 100 SYRINGE at 18:01

## 2017-06-30 RX ADMIN — MEROPENEM 1 G: 1 INJECTION, POWDER, FOR SOLUTION INTRAVENOUS at 17:21

## 2017-06-30 RX ADMIN — HEPARIN 300 UNITS: 100 SYRINGE at 18:00

## 2017-06-30 ASSESSMENT — PAIN SCALES - GENERAL: PAINLEVEL: 6=MODERATE PAIN

## 2017-06-30 NOTE — PROGRESS NOTES
Pt returns for daily IV Meropenem for pneumonia, wife escorting. Left chest double lumen tunneled PICC in place, both lumens flushed with brisk blood return observed. Meropenem infused as prescribed. Lines both flushed with 20 ml NS and heparin instilled per [facility] policy. Pt discharged home under care of SO in no apparent distress. Returns tomorrow.

## 2017-07-01 ENCOUNTER — OUTPATIENT INFUSION SERVICES (OUTPATIENT)
Dept: ONCOLOGY | Facility: MEDICAL CENTER | Age: 77
End: 2017-07-01
Attending: INTERNAL MEDICINE
Payer: MEDICARE

## 2017-07-01 VITALS
WEIGHT: 129.63 LBS | RESPIRATION RATE: 18 BRPM | BODY MASS INDEX: 20.71 KG/M2 | DIASTOLIC BLOOD PRESSURE: 49 MMHG | HEART RATE: 105 BPM | OXYGEN SATURATION: 90 % | TEMPERATURE: 98.2 F | SYSTOLIC BLOOD PRESSURE: 119 MMHG

## 2017-07-01 PROCEDURE — 700105 HCHG RX REV CODE 258

## 2017-07-01 PROCEDURE — 700105 HCHG RX REV CODE 258: Performed by: INTERNAL MEDICINE

## 2017-07-01 PROCEDURE — 96365 THER/PROPH/DIAG IV INF INIT: CPT

## 2017-07-01 PROCEDURE — 700111 HCHG RX REV CODE 636 W/ 250 OVERRIDE (IP): Performed by: INTERNAL MEDICINE

## 2017-07-01 PROCEDURE — 665999 HH PPS REVENUE DEBIT

## 2017-07-01 PROCEDURE — 665998 HH PPS REVENUE CREDIT

## 2017-07-01 PROCEDURE — 700111 HCHG RX REV CODE 636 W/ 250 OVERRIDE (IP)

## 2017-07-01 RX ADMIN — MEROPENEM 1 G: 1 INJECTION, POWDER, FOR SOLUTION INTRAVENOUS at 17:09

## 2017-07-01 ASSESSMENT — PAIN SCALES - GENERAL: PAINLEVEL: 5=MODERATE PAIN

## 2017-07-01 NOTE — PROGRESS NOTES
"Pt to infusion services ambulatory per self with cane and accompanied by spouse.  Pt here for scheduled IV abx for pneumonia.  Pt comes from dialysis today and O2 sat = 89% upon arrival.  Pt tells me he wears O2 at noc at home and prn, however did not bring portable oxygen with him today and states \"it's too heavy\".  Pt tells me they have oxygen for him at dialysis and requesting O2 at 2 lpm via NC today.  Oxygen applied and O2 sat = 97% on 2 lpm via nc.  Pt encouraged to bring portable oxygen with him for future appointments and verbalizes understanding.  Assessment complete.  Pt with left chest dual lumen tunneled PICC.  PICC flushed with normal saline.  Both lumens of PICC flush easily; + blood return verified from both lumens.  IV Meropenem administered per MD orders.  IV Meropenem infusing at this time.  Pt resting in chair.  Spouse at chairside.  Call light at hand.  "

## 2017-07-01 NOTE — PROGRESS NOTES
IV Meropenem infusion completed without incident.  Line flushed clear with normal saline  Both lumens of dual lumen PICC flushed with normal saline and heparinized per policy for PICC with clamps.  Oxygen removed and O2 sat checked on room air.  O2 sat = 92% on RA.  Pt released to self care and care of spouse in no apparent distress after completion of treatment, ambulatory with cane.  Pt returns daily; next appointment scheduled.

## 2017-07-02 ENCOUNTER — OUTPATIENT INFUSION SERVICES (OUTPATIENT)
Dept: ONCOLOGY | Facility: MEDICAL CENTER | Age: 77
End: 2017-07-02
Attending: INTERNAL MEDICINE
Payer: MEDICARE

## 2017-07-02 VITALS
DIASTOLIC BLOOD PRESSURE: 59 MMHG | RESPIRATION RATE: 18 BRPM | WEIGHT: 129.63 LBS | OXYGEN SATURATION: 90 % | BODY MASS INDEX: 20.71 KG/M2 | SYSTOLIC BLOOD PRESSURE: 135 MMHG | TEMPERATURE: 98.4 F | HEART RATE: 98 BPM

## 2017-07-02 PROCEDURE — 665999 HH PPS REVENUE DEBIT

## 2017-07-02 PROCEDURE — 700105 HCHG RX REV CODE 258: Performed by: INTERNAL MEDICINE

## 2017-07-02 PROCEDURE — 96365 THER/PROPH/DIAG IV INF INIT: CPT

## 2017-07-02 PROCEDURE — 700111 HCHG RX REV CODE 636 W/ 250 OVERRIDE (IP): Performed by: INTERNAL MEDICINE

## 2017-07-02 PROCEDURE — 700105 HCHG RX REV CODE 258

## 2017-07-02 PROCEDURE — 700111 HCHG RX REV CODE 636 W/ 250 OVERRIDE (IP)

## 2017-07-02 PROCEDURE — 665998 HH PPS REVENUE CREDIT

## 2017-07-02 RX ADMIN — HEPARIN 500 UNITS: 100 SYRINGE at 17:37

## 2017-07-02 RX ADMIN — MEROPENEM 1 G: 1 INJECTION, POWDER, FOR SOLUTION INTRAVENOUS at 17:07

## 2017-07-02 ASSESSMENT — PAIN SCALES - GENERAL: PAINLEVEL: 5=MODERATE PAIN

## 2017-07-02 NOTE — PROGRESS NOTES
"Pt ambulated into department for his daily Meropenem infusion for Pneumonia. Pt appeared to have more energy today, stated it was because it was his day \"off\" of dialysis. Reported that his \"breathing\" and cough is getting better. Left chest tunneled PICC had + blood return prior from both lumens, flushed briskly. Meropenem infused as prescribed, Pt tolerated well. PICC had + blood return from both lumens after, flushed per [facility] policy (heparin instilled in both lumens), swab caps applied, lines clamped. Future appointments confirmed with Pt prior to leaving, left department with wife \"Crys\" appearing in good spirits and NAD.  "

## 2017-07-03 ENCOUNTER — OUTPATIENT INFUSION SERVICES (OUTPATIENT)
Dept: ONCOLOGY | Facility: MEDICAL CENTER | Age: 77
End: 2017-07-03
Attending: INTERNAL MEDICINE
Payer: MEDICARE

## 2017-07-03 ENCOUNTER — HOSPITAL ENCOUNTER (OUTPATIENT)
Facility: MEDICAL CENTER | Age: 77
End: 2017-07-03
Attending: INTERNAL MEDICINE | Admitting: INTERNAL MEDICINE
Payer: MEDICARE

## 2017-07-03 ENCOUNTER — OFFICE VISIT (OUTPATIENT)
Dept: INFECTIOUS DISEASES | Facility: MEDICAL CENTER | Age: 77
End: 2017-07-03
Payer: MEDICARE

## 2017-07-03 VITALS
HEART RATE: 98 BPM | WEIGHT: 131.39 LBS | DIASTOLIC BLOOD PRESSURE: 56 MMHG | RESPIRATION RATE: 18 BRPM | SYSTOLIC BLOOD PRESSURE: 111 MMHG | TEMPERATURE: 97.5 F | BODY MASS INDEX: 20.62 KG/M2 | HEIGHT: 67 IN | OXYGEN SATURATION: 91 %

## 2017-07-03 VITALS
SYSTOLIC BLOOD PRESSURE: 120 MMHG | HEIGHT: 67 IN | OXYGEN SATURATION: 94 % | HEART RATE: 84 BPM | DIASTOLIC BLOOD PRESSURE: 62 MMHG | WEIGHT: 132.4 LBS | RESPIRATION RATE: 20 BRPM | BODY MASS INDEX: 20.78 KG/M2 | TEMPERATURE: 98.4 F

## 2017-07-03 DIAGNOSIS — J15.1 PNEUMONIA OF BOTH LUNGS DUE TO PSEUDOMONAS SPECIES, UNSPECIFIED PART OF LUNG (HCC): ICD-10-CM

## 2017-07-03 DIAGNOSIS — J15.1 PNEUMONIA OF BOTH LUNGS DUE TO PSEUDOMONAS SPECIES, UNSPECIFIED PART OF LUNG (HCC): Primary | ICD-10-CM

## 2017-07-03 LAB
ALBUMIN SERPL BCP-MCNC: 3.3 G/DL (ref 3.2–4.9)
ALP SERPL-CCNC: 82 U/L (ref 30–99)
ALT SERPL-CCNC: 20 U/L (ref 2–50)
ANISOCYTOSIS BLD QL SMEAR: ABNORMAL
AST SERPL-CCNC: 25 U/L (ref 12–45)
BASOPHILS # BLD AUTO: 0.2 % (ref 0–1.8)
BASOPHILS # BLD: 0.01 K/UL (ref 0–0.12)
BILIRUB CONJ SERPL-MCNC: <0.1 MG/DL (ref 0.1–0.5)
BILIRUB INDIRECT SERPL-MCNC: NORMAL MG/DL (ref 0–1)
BILIRUB SERPL-MCNC: 0.4 MG/DL (ref 0.1–1.5)
BUN SERPL-MCNC: 21 MG/DL (ref 8–22)
CALCIUM SERPL-MCNC: 8.5 MG/DL (ref 8.5–10.5)
CHLORIDE SERPL-SCNC: 98 MMOL/L (ref 96–112)
CO2 SERPL-SCNC: 33 MMOL/L (ref 20–33)
COMMENT 1642: NORMAL
CREAT SERPL-MCNC: 1.52 MG/DL (ref 0.5–1.4)
EOSINOPHIL # BLD AUTO: 0.11 K/UL (ref 0–0.51)
EOSINOPHIL NFR BLD: 1.7 % (ref 0–6.9)
ERYTHROCYTE [DISTWIDTH] IN BLOOD BY AUTOMATED COUNT: 65.9 FL (ref 35.9–50)
GFR SERPL CREATININE-BSD FRML MDRD: 45 ML/MIN/1.73 M 2
GLUCOSE SERPL-MCNC: 116 MG/DL (ref 65–99)
HCT VFR BLD AUTO: 28.9 % (ref 42–52)
HGB BLD-MCNC: 9.2 G/DL (ref 14–18)
IMM GRANULOCYTES # BLD AUTO: 0.03 K/UL (ref 0–0.11)
IMM GRANULOCYTES NFR BLD AUTO: 0.5 % (ref 0–0.9)
LYMPHOCYTES # BLD AUTO: 0.53 K/UL (ref 1–4.8)
LYMPHOCYTES NFR BLD: 8.2 % (ref 22–41)
MCH RBC QN AUTO: 31.1 PG (ref 27–33)
MCHC RBC AUTO-ENTMCNC: 31.8 G/DL (ref 33.7–35.3)
MCV RBC AUTO: 97.6 FL (ref 81.4–97.8)
MICROCYTES BLD QL SMEAR: ABNORMAL
MONOCYTES # BLD AUTO: 0.46 K/UL (ref 0–0.85)
MONOCYTES NFR BLD AUTO: 7.1 % (ref 0–13.4)
MORPHOLOGY BLD-IMP: NORMAL
NEUTROPHILS # BLD AUTO: 5.36 K/UL (ref 1.82–7.42)
NEUTROPHILS NFR BLD: 82.3 % (ref 44–72)
NRBC # BLD AUTO: 0 K/UL
NRBC BLD AUTO-RTO: 0 /100 WBC
OVALOCYTES BLD QL SMEAR: NORMAL
PHOSPHATE SERPL-MCNC: 3 MG/DL (ref 2.5–4.5)
PLATELET # BLD AUTO: 299 K/UL (ref 164–446)
PLATELET BLD QL SMEAR: NORMAL
PMV BLD AUTO: 8.9 FL (ref 9–12.9)
POIKILOCYTOSIS BLD QL SMEAR: NORMAL
POTASSIUM SERPL-SCNC: 3.7 MMOL/L (ref 3.6–5.5)
PROT SERPL-MCNC: 6 G/DL (ref 6–8.2)
RBC # BLD AUTO: 2.96 M/UL (ref 4.7–6.1)
RBC BLD AUTO: PRESENT
SODIUM SERPL-SCNC: 138 MMOL/L (ref 135–145)
WBC # BLD AUTO: 6.5 K/UL (ref 4.8–10.8)

## 2017-07-03 PROCEDURE — 700105 HCHG RX REV CODE 258: Performed by: INTERNAL MEDICINE

## 2017-07-03 PROCEDURE — 36592 COLLECT BLOOD FROM PICC: CPT

## 2017-07-03 PROCEDURE — 80076 HEPATIC FUNCTION PANEL: CPT | Mod: 59

## 2017-07-03 PROCEDURE — 665999 HH PPS REVENUE DEBIT

## 2017-07-03 PROCEDURE — 665998 HH PPS REVENUE CREDIT

## 2017-07-03 PROCEDURE — 99214 OFFICE O/P EST MOD 30 MIN: CPT | Performed by: INTERNAL MEDICINE

## 2017-07-03 PROCEDURE — 700111 HCHG RX REV CODE 636 W/ 250 OVERRIDE (IP)

## 2017-07-03 PROCEDURE — 700111 HCHG RX REV CODE 636 W/ 250 OVERRIDE (IP): Performed by: INTERNAL MEDICINE

## 2017-07-03 PROCEDURE — 80069 RENAL FUNCTION PANEL: CPT

## 2017-07-03 PROCEDURE — 96365 THER/PROPH/DIAG IV INF INIT: CPT

## 2017-07-03 PROCEDURE — 85025 COMPLETE CBC W/AUTO DIFF WBC: CPT

## 2017-07-03 RX ADMIN — MEROPENEM 1 G: 1 INJECTION, POWDER, FOR SOLUTION INTRAVENOUS at 17:23

## 2017-07-03 RX ADMIN — HEPARIN 600 UNITS: 100 SYRINGE at 18:02

## 2017-07-03 ASSESSMENT — PAIN SCALES - GENERAL: PAINLEVEL: 6=MODERATE PAIN

## 2017-07-03 NOTE — PROGRESS NOTES
"Pt ambulated into department for his daily Meropenem infusion for Pneumonia. Pt declined having any new symptoms since yesterday's infusion. Reported that he is more \"worn out\" today, and stated this is typical for him to feel like this the day before he is due for dialysis. Left chest tunneled PICC had + blood return prior from both lumens, flushed briskly. Meropenem infused as prescribed, Pt tolerated well. PICC had + blood return from both lumens after, flushed per [facility] policy (heparin instilled in both lumens), swab caps applied, lines clamped. Future appointments confirmed with Pt prior to leaving, left department with wife \"Crys\" appearing in good spirits and NAD.  "

## 2017-07-03 NOTE — PROGRESS NOTES
Chief Complaint   Patient presents with   • Follow-Up     PICC check.       HISTORY OF PRESENT ILLNESS:  Patient is a 76 y.o. male established patient who presents today for FU. He has a h/o Wegners, H/o DANIEL s/p treatment, H/o histo, fungal PNA and ESRD on HD.     Sputum C/s 12/6 (2 strains, both sensi to cipro) and 12/25/16 were PSAR. 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 However, he states he developed blurred vision while on voriconazole.    Pt here for follow up. He was recently hospitalized from clinic for acute exacerbation. He continues on meropenem through 7/4/17, inhaled colistin through 7/10/17 and voriconazole through 7/08/17. He has been tolerating the medications. Coughing has improved but he continues to feel SOB with exertion. He has trouble walking from his living room to his kitchen without having to stop and catch his breath. No abd pain or diarrhea. No fevers or chills.     Patient Active Problem List    Diagnosis Date Noted   • Pneumonia of both lungs due to Pseudomonas species (CMS-HCC) 06/06/2017     Priority: High   • Hypotension 02/01/2015     Priority: High   • Adult failure to thrive 01/13/2012     Priority: High   • Pneumonia 12/14/2011     Priority: High   • Debility 10/12/2011     Priority: High   • Chronic respiratory infection 10/11/2011     Priority: High   • Wegener's granulomatosis (CMS-HCC) 06/10/2017     Priority: Medium   • Pneumonia of both lungs due to influenza B , post-influenza pneumonia/HCAP 05/14/2017     Priority: Medium   • Acute on chronic respiratory failure with hypoxia due to influenza B, not due to sepsis(CMS-HCC) 05/14/2017     Priority: Medium   • Atrial fibrillation (CMS-HCC) 04/18/2017     Priority: Medium   • Macrocytosis 02/01/2015     Priority: Medium   • Pacemaker 02/06/2014     Priority: Medium   • Immunosuppressed status (HCC) 02/06/2014     Priority: Medium   • Anemia  of chronic renal failure, stage 5 (CMS-HCC) 12/14/2011     Priority: Medium   • Protein-calorie malnutrition, severe ([facility]) 10/11/2011     Priority: Medium   • Dysphagia 09/27/2011     Priority: Medium   • Odynophagia 09/27/2011     Priority: Medium   • Esophageal stricture 09/27/2011     Priority: Medium   • COPD (chronic obstructive pulmonary disease) (CMS-HCC) 05/14/2017     Priority: Low   • Advance care planning 10/05/2016     Priority: Low   • AV block 02/06/2014     Priority: Low   • BPH (benign prostatic hyperplasia) 12/14/2011     Priority: Low   • Wegeners granulomatosis (CMS-HCC) 12/14/2011     Priority: Low   • ESRD on dialysis (CMS-HCC) 11/08/2011     Priority: Low   • HTN (hypertension) 10/12/2011     Priority: Low   • TIMOTHY (obstructive sleep apnea) 10/12/2011     Priority: Low   • AV block, 3rd degree ([facility]) 07/07/2011     Priority: Low   • 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   • Abnormal CT scan of lung 10/25/2016   • Thrush 10/25/2016   • AV fistula stenosis (CMS-HCC) 04/12/2016   • Personal history of other malignant neoplasm of skin 07/16/2013   • GERD (gastroesophageal reflux disease) 12/14/2011   • Weight loss 12/14/2011   • Vitamin d deficiency 11/08/2011   • Abnormal thyroid function test 10/12/2011   • Hyperlipidemia 10/12/2011   • Hip pain        Allergies:Doxycycline; Erythromycin; and Rituximab    Current Outpatient Prescriptions   Medication Sig Dispense Refill   • loperamide (IMODIUM) 2 MG Cap      • colistimethate (COLY-MYCIN M) 150 MG Recon Soln Inhale 150 mg by mouth 2 Times a Day for 28 days. 8400 mg 0   • Water For Injection Sterile (STERILE WATER) Solution 10 mL by Injection route 2 Times a Day for 28 days. 560 mL 0   • voriconazole (VFEND) 200 MG Tab Take 1 Tab by mouth every 12 hours for 28 days. 56 Tab 0   • non-formulary med Inhale 2 L by mouth every bedtime. OXYGEN AT 2L/NC AS NEEDED AND AT NIGHT  Indications: copd     • metoprolol (LOPRESSOR) 25 MG Tab Take 25 mg by  mouth 2 times a day.     • predniSONE (DELTASONE) 1 MG Tab Take 14 mg by mouth every day. Pt takes x1 10mg tabs and x4 1mg tabs for total dose = 14mg QD     • ipratropium-albuterol (DUONEB) 0.5-2.5 (3) MG/3ML nebulizer solution 3 mL by Nebulization route 4 times a day. 1080 mL 3   • B Complex-C-Folic Acid (DIALYVITE TABLET) Tab Dialyvite -nehro vit B complex-C-folic acid 1 tablet po QD Dialysis Pt 90 Tab 3   • fluticasone-salmeterol (ADVAIR DISKUS) 500-50 MCG/DOSE AEROSOL POWDER, BREATH ACTIVATED Inhale 1 Puff by mouth 2 times a day. Rinse mouth after each use. 1 Inhaler 6   • albuterol 108 (90 BASE) MCG/ACT Aero Soln inhalation aerosol Inhale 2 Puffs by mouth every four hours as needed for Shortness of Breath.     • ranitidine (ZANTAC) 150 MG Tab TAKE 1 TABLET BY MOUTH EVERY NIGHT AT BEDTIME 30 Tab 0   • finasteride (PROSCAR) 5 MG Tab Take 5 mg by mouth every day.     • simvastatin (ZOCOR) 40 MG TABS Take 40 mg by mouth every evening.     • epoetin lucina (EPOGEN,PROCRIT) 3000 UNIT/ML SOLN Inject 2,200 Units as instructed every Monday, Wednesday, and Friday. With dialysis     • sevelamer (RENAGEL) 800 MG TABS Take 800 mg by mouth 3 times a day, with meals.     • tamsulosin (FLOMAX) 0.4 MG capsule Take 0.4 mg by mouth every day.     • aspirin EC (ECOTRIN) 81 MG TBEC Take 81 mg by mouth every day.       No current facility-administered medications for this visit.     Facility-Administered Medications Ordered in Other Visits   Medication Dose Route Frequency Provider Last Rate Last Dose   • meropenem (MERREM) 1 g in  mL IVPB  1,000 mg Intravenous Q24HRS Christine Manzo M.D.           Social History   Substance Use Topics   • Smoking status: Former Smoker -- 30 years     Types: Pipe     Quit date: 01/01/1990   • Smokeless tobacco: Former User     Types: Chew   • Alcohol Use: 4.8 oz/week     7 Glasses of wine, 1 Standard drinks or equivalent per week      Comment: Red wine nightly       Family History   Problem  "Relation Age of Onset   • Stroke Father    • Heart Disease Father    • Cancer     • Stroke Mother        ROS:  Review of Systems   Constitutional: Negative for fever, and chills, weakness  Eyes: Negative for blurred vision.  Respiratory: + for cough, sputum production, shortness of breath   Cardiovascular: Negative for chest pain, palpitations, orthopnea and leg swelling.    Skin: Negative for rash and itching.   All other systems reviewed and are negative except as in HPI.      Exam:  Blood pressure 120/62, pulse 84, temperature 36.9 °C (98.4 °F), resp. rate 20, height 1.702 m (5' 7\"), weight 60.056 kg (132 lb 6.4 oz), SpO2 94 %.  General:  Well developed male in NAD. Frail. Pleasant and cooperative, elderly, chronically ill appearing, but nontoxic  Head: NCAT, Pupils are equal round reactive to light. Extraocular movements intact. Oropharynx is clear.  Neck: Supple.  No LAD  Pulmonary:  +few rhonchi on the right, clear on left side, no wheezing. Unlabored. Left upper chest tunneled PICC nontender  Cardiovascular: Regular rate and rhythm without murmur. No ectopy  Abdominal: Soft, nontender, nondistended.   Skin: dry skin  Extremities: no clubbing, cyanosis, or edema.   Neurologic: Alert and oriented x4. Speech is fluent without dysarthria. Cranial nerves intact. Moving all extremities. .    MICRO:  6/7 sputum cx  PSEUDOMONAS AERUGINOSA      Antibiotic Sensitivity Microscan Unit Status     Amikacin Sensitive <=16 mcg/mL Final     Cefepime Intermediate 16 mcg/mL Final     Ceftazidime Sensitive 4 mcg/mL Final     Ciprofloxacin Sensitive <=1 mcg/mL Final     Gentamicin Sensitive <=4 mcg/mL Final     Imipenem Sensitive <=1 mcg/mL Final     Meropenem Sensitive <=1 mcg/mL Final     Pip/Tazobactam Sensitive <=16 mcg/mL Final     Tobramycin Sensitive <=4 mcg/mL Final                 5/24/17 sputum cx  Pseudomonas aeruginosa   Light growth   P.aeruginosa may develop resistance during prolonged therapy   with all " antibiotics. Isolates that are initially susceptible   may become resistant within three to four days after   initiation of therapy. Testing of repeat isolates may be   warranted.         Respiratory Culture (Abnormal)     Mould   Light growth       PSEUDOMONAS AERUGINOSA      Antibiotic Sensitivity Microscan Unit Status     Amikacin Resistant >256 mcg/mL Final     Aztreonam Sensitive 4 mcg/mL Final     Ceftazidime Sensitive 8 mcg/mL Final     Ciprofloxacin Resistant 6 mcg/mL Final     Gentamicin Resistant 16 mcg/mL Final     Imipenem Sensitive 0.75 mcg/mL Final     Ticarcillin/CA Resistant >256 mcg/mL Final     Tobramycin Intermediate 8 mcg/mL Final                 Imaging:  CT a/p 04/12/17  Right basilar airspace disease and areas of bronchiectasis. Airspace disease is in a similar distribution to that seen on previous exam, however is overall more extensive.    Assessment/Plan:  1. Pneumonia of both lungs due to Pseudomonas species, unspecified part of lung (CMS-HCC)  IR-CVC TUNNEL WITH PORT REMOVAL   Continue elisa through 7/4/17  Continue inhaled colistin through 7/10/17  Continue voriconazole through 7/8/17.  DC tunneled PICC after IV abx course. Order placed  FU with pulm as scheduled     FU ID clinic as needed        Ruma oBwser M.D.

## 2017-07-04 ENCOUNTER — OUTPATIENT INFUSION SERVICES (OUTPATIENT)
Dept: ONCOLOGY | Facility: MEDICAL CENTER | Age: 77
End: 2017-07-04
Attending: INTERNAL MEDICINE
Payer: MEDICARE

## 2017-07-04 VITALS
BODY MASS INDEX: 20.57 KG/M2 | HEART RATE: 97 BPM | SYSTOLIC BLOOD PRESSURE: 109 MMHG | RESPIRATION RATE: 18 BRPM | OXYGEN SATURATION: 92 % | WEIGHT: 131.39 LBS | DIASTOLIC BLOOD PRESSURE: 52 MMHG | TEMPERATURE: 98.3 F

## 2017-07-04 PROCEDURE — 700105 HCHG RX REV CODE 258

## 2017-07-04 PROCEDURE — 700111 HCHG RX REV CODE 636 W/ 250 OVERRIDE (IP)

## 2017-07-04 PROCEDURE — 665999 HH PPS REVENUE DEBIT

## 2017-07-04 PROCEDURE — 96365 THER/PROPH/DIAG IV INF INIT: CPT

## 2017-07-04 PROCEDURE — 665998 HH PPS REVENUE CREDIT

## 2017-07-04 PROCEDURE — 700105 HCHG RX REV CODE 258: Performed by: INTERNAL MEDICINE

## 2017-07-04 PROCEDURE — 700111 HCHG RX REV CODE 636 W/ 250 OVERRIDE (IP): Performed by: INTERNAL MEDICINE

## 2017-07-04 RX ADMIN — MEROPENEM 1 G: 1 INJECTION, POWDER, FOR SOLUTION INTRAVENOUS at 17:02

## 2017-07-04 RX ADMIN — HEPARIN 500 UNITS: 100 SYRINGE at 17:39

## 2017-07-04 ASSESSMENT — PAIN SCALES - GENERAL: PAINLEVEL: 5=MODERATE PAIN

## 2017-07-04 NOTE — PROGRESS NOTES
Late entry for 1805:  Pt to infusion services ambulatory per self with cane and accompanied by spouse.  Pt here for scheduled IV abx.  Plan of care reviewed.  Pt verbalizes understanding.  Pt reports having dialysis today.  Pt also reports seeing Dr. Bowser today and tells me appointment is scheduled for tunneled PICC removal next week on 07/11/17.  Pt reports + fatigue.  Pt with left chest dual lumen tunneled PICC.  Both lumens of PICC flushed with normal saline.  Both lumens flush easily; + blood return verified from each lumen.  Labs drawn per MD orders.  Blood samples sent to lab.  IV Meropenem administered per MD orders.  IV Meropenem infusion completed without incident.  No adverse effects observed or expressed.  Line flushed clear with normal saline.  PICC flushed with normal saline and heparin per policy for PICC with clamps.  Pt released to self care and care of spouse in no apparent distress after completion of treatment, ambulatory with cane.  Pt returns tomorrow; appointment scheduled.

## 2017-07-05 PROCEDURE — 665998 HH PPS REVENUE CREDIT

## 2017-07-05 PROCEDURE — 665999 HH PPS REVENUE DEBIT

## 2017-07-05 NOTE — PROGRESS NOTES
Patient presents for last dose of Meropenem IV antibiotics. Reviewed plan of care, patient and wife verbalize understanding. Patient states that he is getting his tunneled PICC removed 7/11/2017, confirmed appointment in Epic. Infusion completed with no new patient complaints, line flushed clear. PICC flushed per protocol and site secured. Patient released in no acute distress with his wife.

## 2017-07-06 PROCEDURE — 665998 HH PPS REVENUE CREDIT

## 2017-07-06 PROCEDURE — 665999 HH PPS REVENUE DEBIT

## 2017-07-07 PROCEDURE — 665998 HH PPS REVENUE CREDIT

## 2017-07-07 PROCEDURE — 665999 HH PPS REVENUE DEBIT

## 2017-07-08 ENCOUNTER — PATIENT MESSAGE (OUTPATIENT)
Dept: PULMONOLOGY | Facility: HOSPICE | Age: 77
End: 2017-07-08

## 2017-07-08 PROCEDURE — 665998 HH PPS REVENUE CREDIT

## 2017-07-08 PROCEDURE — 665999 HH PPS REVENUE DEBIT

## 2017-07-09 PROCEDURE — 665999 HH PPS REVENUE DEBIT

## 2017-07-09 PROCEDURE — 665998 HH PPS REVENUE CREDIT

## 2017-07-10 PROCEDURE — 665998 HH PPS REVENUE CREDIT

## 2017-07-10 PROCEDURE — 665999 HH PPS REVENUE DEBIT

## 2017-07-10 NOTE — TELEPHONE ENCOUNTER
From: Gilberto Brown  To: Eric Vaughan M.D.  Sent: 7/8/2017 7:28 AM PDT  Subject: Non-Urgent Medical Question    for Sobia Guevara : i see you 13 July. 28th day Infusion completed 4 July re Infectious Diseases. catheter be removed Tuesday July 11. see Dr. Vaughan August 22nd. this was 4th round at knocking-down Pseudomonas, this time with recurring mould. 10 pneumonia and 9 hospital stays within 6 - 7 months. Infectious Diseases asked what Pulmonary saying. Nephrology wondering what Pulmonary doing.. what is next? see you 13 July.

## 2017-07-10 NOTE — TELEPHONE ENCOUNTER
This patient needs follow up with MD only... Does Dr. Vaughan or any other pulmonary MD have openings? Patient is currently scheduled with Sobia 7/13/17.

## 2017-07-11 ENCOUNTER — HOSPITAL ENCOUNTER (OUTPATIENT)
Dept: RADIOLOGY | Facility: MEDICAL CENTER | Age: 77
End: 2017-07-11
Attending: INTERNAL MEDICINE
Payer: MEDICARE

## 2017-07-11 ENCOUNTER — HOSPITAL ENCOUNTER (OUTPATIENT)
Dept: LAB | Facility: MEDICAL CENTER | Age: 77
End: 2017-07-11
Attending: INTERNAL MEDICINE
Payer: MEDICARE

## 2017-07-11 DIAGNOSIS — J15.1 PNEUMONIA OF BOTH LUNGS DUE TO PSEUDOMONAS SPECIES, UNSPECIFIED PART OF LUNG (HCC): ICD-10-CM

## 2017-07-11 DIAGNOSIS — M31.30 WEGENER'S GRANULOMATOSIS: ICD-10-CM

## 2017-07-11 PROCEDURE — 665999 HH PPS REVENUE DEBIT

## 2017-07-11 PROCEDURE — 700101 HCHG RX REV CODE 250

## 2017-07-11 PROCEDURE — 665998 HH PPS REVENUE CREDIT

## 2017-07-11 PROCEDURE — 36589 REMOVAL TUNNELED CV CATH: CPT

## 2017-07-11 RX ORDER — BUPIVACAINE HYDROCHLORIDE AND EPINEPHRINE 5; 5 MG/ML; UG/ML
INJECTION, SOLUTION EPIDURAL; INTRACAUDAL; PERINEURAL
Status: DISPENSED
Start: 2017-07-11 | End: 2017-07-11

## 2017-07-11 NOTE — TELEPHONE ENCOUNTER
Opening for Dr. Vaughan Friday 7/14/17 at 8:40    I spoke to the wife explained we will like him to see Dr. Vaughan on Friday she stated he wasn't home and to call his cell phone number conformed number listed. I spoke to the patient he agreed to come in Friday and see Dr. Vaughan he did inform me he is currently goes to dialysis on Fridays. I advised the patient if the OV  will interfere with dialysis he stated no he will call to schedule it at a later time and he will make it in Friday with Dr. Vaughan.

## 2017-07-12 PROCEDURE — 665998 HH PPS REVENUE CREDIT

## 2017-07-12 PROCEDURE — 665999 HH PPS REVENUE DEBIT

## 2017-07-13 ENCOUNTER — APPOINTMENT (OUTPATIENT)
Dept: PULMONOLOGY | Facility: HOSPICE | Age: 77
End: 2017-07-13
Payer: MEDICARE

## 2017-07-13 LAB — ANCA IGG TITR SER IF: NORMAL {TITER}

## 2017-07-13 PROCEDURE — 665998 HH PPS REVENUE CREDIT

## 2017-07-13 PROCEDURE — 665999 HH PPS REVENUE DEBIT

## 2017-07-14 ENCOUNTER — OFFICE VISIT (OUTPATIENT)
Dept: PULMONOLOGY | Facility: HOSPICE | Age: 77
End: 2017-07-14
Payer: MEDICARE

## 2017-07-14 VITALS
OXYGEN SATURATION: 92 % | BODY MASS INDEX: 20.56 KG/M2 | DIASTOLIC BLOOD PRESSURE: 60 MMHG | TEMPERATURE: 98.1 F | HEART RATE: 96 BPM | RESPIRATION RATE: 16 BRPM | WEIGHT: 131 LBS | SYSTOLIC BLOOD PRESSURE: 116 MMHG | HEIGHT: 67 IN

## 2017-07-14 DIAGNOSIS — G47.33 OBSTRUCTIVE SLEEP APNEA: ICD-10-CM

## 2017-07-14 DIAGNOSIS — M31.30 WEGENER'S DISEASE, PULMONARY: ICD-10-CM

## 2017-07-14 DIAGNOSIS — J47.9 BRONCHIECTASIS WITHOUT COMPLICATION (HCC): ICD-10-CM

## 2017-07-14 PROCEDURE — 99214 OFFICE O/P EST MOD 30 MIN: CPT | Performed by: INTERNAL MEDICINE

## 2017-07-14 PROCEDURE — 665999 HH PPS REVENUE DEBIT

## 2017-07-14 PROCEDURE — 665998 HH PPS REVENUE CREDIT

## 2017-07-14 RX ORDER — SEVELAMER CARBONATE 800 MG/1
TABLET, FILM COATED ORAL
COMMUNITY
Start: 2017-07-10 | End: 2018-04-13

## 2017-07-14 NOTE — MR AVS SNAPSHOT
"Gilberto Brown   2017 8:40 AM   Office Visit   MRN: [id number]    Department:  [facility]   Dept Phone:  [phone]    Description:  Male : 1940   Provider:  Eric Vaughan M.D.           Reason for Visit     Follow-Up Was in Hospital      Allergies as of 2017     Allergen Noted Reactions    Erythromycin 2014   Unspecified    Abdominal pain.  RXN=before     Rituximab 08/15/2016       Flu like syndrome      You were diagnosed with     Bronchiectasis without complication (CMS-HCC)   [653267]       Wegener's disease, pulmonary (CMS-HCC)   [280763]       Obstructive sleep apnea   [111617]         Vital Signs     Blood Pressure Pulse Temperature Respirations Height Weight    116/60 mmHg 96 36.7 °C (98.1 °F) 16 1.702 m (5' 7.01\") 59.421 kg (131 lb)    Body Mass Index Oxygen Saturation Smoking Status             20.51 kg/m2 92% Former Smoker         Basic Information     Date Of Birth Sex Race Ethnicity Preferred Language    1940 Male White Non- English      Your appointments     Aug 22, 2017  8:00 AM   Established Patient Pul with Eric Vaughan M.D.   [facility] (--)    [address]  [address]  [facility] [address] [phone]            Aug 29, 2017  2:00 PM   Follow Up Visit with Cintia Ariza M.D.   [facility] (--)    [address], [address]  [facility] [address] [phone]           You will be receiving a confirmation call a few days before your appointment from our automated call confirmation system.              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 Low  2011 - Present    Dysphagia  Medium  2011 - Present    Odynophagia R13.10 Medium  2011 - Present    Esophageal stricture K22.2 Medium  2011 - Present    Chronic respiratory infection J98.8 High  10/11/2011 - Present   " Protein-calorie malnutrition, severe (HCC) E43 Medium  10/11/2011 - Present    Debility R53.81 High  10/12/2011 - Present    HTN (hypertension) I10 Low  10/12/2011 - Present    Abnormal thyroid function test R94.6   10/12/2011 - Present    TIMOTHY (obstructive sleep apnea) G47.33 Low  10/12/2011 - Present    Hyperlipidemia E78.5   10/12/2011 - Present    ESRD on dialysis (CMS-HCC) N18.6, Z99.2 Low  11/8/2011 - Present    Vitamin d deficiency    11/8/2011 - Present    Anemia of chronic renal failure, stage 5 (CMS-HCC) (Chronic) N18.5, D63.1 Medium  12/14/2011 - Present    BPH (benign prostatic hyperplasia) N40.0 Low  12/14/2011 - Present    GERD (gastroesophageal reflux disease) K21.9   12/14/2011 - Present    Weight loss R63.4   12/14/2011 - Present    Pneumonia J18.9 High  12/14/2011 - Present    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 Low  12/14/2011 - Present    Adult failure to thrive R62.7 High  1/13/2012 - Present    Personal history of other malignant neoplasm of skin Z85.828   7/16/2013 - Present    AV block I44.30 Low  2/6/2014 - Present    Pacemaker Z95.0 Medium  2/6/2014 - Present    Immunosuppressed status ([facility]) D89.9 Medium  2/6/2014 - Present    Hypotension I95.9 High  2/1/2015 - Present    Macrocytosis D75.89 Medium  2/1/2015 - Present    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   4/12/2016 - Present    Advance care planning Z71.89 Low  10/5/2016 - Present    Abnormal CT scan of lung R91.8   10/25/2016 - Present    Thrush B37.0   10/25/2016 - Present    Pseudomonas aeruginosa colonization Z22.39   4/18/2017 - Present    Atrial fibrillation (CMS-HCC) I48.91 Medium  4/18/2017 - Present    Pneumonia of both lungs due to influenza B , post-influenza pneumonia/HCAP J11.00 Medium  5/14/2017 - Present    COPD (chronic obstructive pulmonary disease) (CMS-HCC) J44.9 Low  5/14/2017 - Present    Acute on chronic respiratory failure with hypoxia due to influenza B, not due to sepsis(CMS-[facility]) J96.21 Medium  5/14/2017 -  Present    Pneumonia of both lungs due to Pseudomonas species (CMS-HCC) J15.1 High  6/6/2017 - Present    Wegener's granulomatosis (CMS-HCC) M31.30 Medium  6/10/2017 - Present      Health Maintenance        Date Due Completion Dates    IMM DTaP/Tdap/Td Vaccine (1 - Tdap) 6/23/1958 ---    IMM ZOSTER VACCINE 6/23/1999 ---    IMM INFLUENZA (1) 9/1/2017 9/15/2016, 11/3/2014, 10/10/2013, 9/27/2011    COLONOSCOPY 12/1/2025 12/1/2015 (Postponed)    Override on 12/1/2015: Postponed (Patient will discuss scheduling with PCP)            Current Immunizations     13-VALENT PCV PREVNAR 4/14/2017  1:43 PM    Influenza TIV (IM) 9/15/2016, 11/3/2014, 10/10/2013, 9/27/2011  3:15 AM    Pneumococcal polysaccharide vaccine (PPSV-23) 12/19/2011  4:47 PM    Tuberculin Skin Test  Incomplete      Below and/or attached are the medications your provider expects you to take. Review all of your home medications and newly ordered medications with your provider and/or pharmacist. Follow medication instructions as directed by your provider and/or pharmacist. Please keep your medication list with you and share with your provider. Update the information when medications are discontinued, doses are changed, or new medications (including over-the-counter products) are added; and carry medication information at all times in the event of emergency situations     Allergies:  ERYTHROMYCIN - Unspecified     RITUXIMAB - (reactions not documented)               Medications  Valid as of: July 14, 2017 -  9:23 AM    Generic Name Brand Name Tablet Size Instructions for use    Albuterol Sulfate (Aero Soln) albuterol 108 (90 BASE) MCG/ACT Inhale 2 Puffs by mouth every four hours as needed for Shortness of Breath.        Aspirin (Tablet Delayed Response) ECOTRIN 81 MG Take 81 mg by mouth every day.        B Complex-C-Folic Acid (Tab) DIALYVITE TABLET  Dialyvite -nehro vit B complex-C-folic acid 1 tablet po QD Dialysis Pt        Epoetin Jorgito (Solution)  EPOGEN,PROCRIT 3000 UNIT/ML Inject 2,200 Units as instructed every Monday, Wednesday, and Friday. With dialysis        Finasteride (Tab) PROSCAR 5 MG Take 5 mg by mouth every day.        Fluticasone-Salmeterol (AEROSOL POWDER, BREATH ACTIVATED) ADVAIR 500-50 MCG/DOSE Inhale 1 Puff by mouth 2 times a day. Rinse mouth after each use.        Ipratropium-Albuterol (Solution) DUONEB 0.5-2.5 (3) MG/3ML 3 mL by Nebulization route 4 times a day.        Loperamide HCl (Cap) IMODIUM 2 MG         Metoprolol Tartrate (Tab) LOPRESSOR 25 MG Take 25 mg by mouth 2 times a day.        non-formulary med non-formulary med  Inhale 2 L by mouth every bedtime. OXYGEN AT 2L/NC AS NEEDED AND AT NIGHT  Indications: copd        PredniSONE (Tab) DELTASONE 1 MG Take 14 mg by mouth every day. Pt takes x1 10mg tabs and x4 1mg tabs for total dose = 14mg QD        RaNITidine HCl (Tab) ZANTAC 150 MG TAKE 1 TABLET BY MOUTH EVERY NIGHT AT BEDTIME        Sevelamer Carbonate (Tab) RENVELA 800 MG         Sevelamer HCl (Tab) RENAGEL 800 MG Take 800 mg by mouth 3 times a day, with meals.        Simvastatin (Tab) ZOCOR 40 MG Take 40 mg by mouth every evening.        Tamsulosin HCl (Cap) FLOMAX 0.4 MG Take 0.4 mg by mouth every day.        .                 Medicines prescribed today were sent to:     [facility] [address] [place] [address] AT [address] [address]    Phone: [phone] Fax: [phone]    Open 24 Hours?: No      Medication refill instructions:       If your prescription bottle indicates you have medication refills left, it is not necessary to call your provider’s office. Please contact your pharmacy and they will refill your medication.    If your prescription bottle indicates you do not have any refills left, you may request refills at any time through one of the following ways: The online [employer] system (except Urgent Care), by calling your provider’s office, or by  asking your pharmacy to contact your provider’s office with a refill request. Medication refills are processed only during regular business hours and may not be available until the next business day. Your provider may request additional information or to have a follow-up visit with you prior to refilling your medication.   *Please Note: Medication refills are assigned a new Rx number when refilled electronically. Your pharmacy may indicate that no refills were authorized even though a new prescription for the same medication is available at the pharmacy. Please request the medicine by name with the pharmacy before contacting your provider for a refill.        Your To Do List     Future Labs/Procedures Complete By Expires    CT-CHEST (THORAX) W/O  As directed 7/14/2018      Instructions    1. We have scheduled a CT scan of the chest  2. Recommend using the flutter device 4 times a day  3. Recommend using the chest percussion 4 times a day  4. Recommend using saline nebulizers at least twice a day  5. We have made adjustments to your CPAP machine  6. Recommend follow-up in 4 weeks       Other Notes About Your Plan     [facility] [phone] Fax. [phone]             [email or web address] Access Code: Activation code not generated  Current [employer] Status: Active

## 2017-07-14 NOTE — PROGRESS NOTES
Gilberto Brown is a 77 y.o. male here for Wegener's, bronchiectasis and sleep apnea.    History of Present Illness:    The patient is a 77-year-old male who has Wegener's granulomatosis and this is complicated by end-stage renal disease and chronic immunosuppression. He is on Rituxan and prednisone. He has bronchiectasis and there is evidence of interstitial lung disease. He also has a history of atypical mycobacterium. He was on inhaled tobramycin but he developed resistant Pseudomonas and now is taking inhaled colistin. He's had frequent infections. He was unable to use the chest percussive device because he had a catheter. In addition to that he was not using his flutter device. He was using his nebulizer with duo nebs and his Advair but was not compliant using his saline nebulizers. He receives dialysis. He has a AutoPap machine between 4 and 12 cm water but he complains that the pressure is too high and wants to lower the pressure. His last CT scan of the chest was done in February 2017 and overall was not significantly changed from the CT scan of the chest done in November 2016 with evidence of diffuse peripheral subpleural interstitial densities with a basilar predominance. In addition the patient has posterior bibasilar bronchiectasis and medial segment right middle lobe and inferior lingular bronchiectasis. He has calcified granulomata in the lungs mediastinum and hilar regions as well as liver and spleen. His last pulmonary function test showed an FEV1 of 1.57 L which is 61% predicted, total lung capacity of 4.88 L which is 76% predicted and diffusion capacity of 68% predicted.    Constitutional:  Negative for fever, chills, sweats, and fatigue.  Eyes:  Negative for eye pain and visual changes.  HENT:  Negative for tinnitus and hoarse voice.  Cardiovascular:  Negative for chest pain, leg swelling, syncope and orthopnea.  Respiratory:  See HPI for pertinent negatives  Sleep:  Negative for somnolence, loud  snoring, sleep disturbance due to breathing, insomnia.  Gastrointestinal:  Negative for dysphagia, nausea and abdominal pain.  Heme/lymph:  Denies easy bruising, blood clots.  Musculoskeletal:  Negative for arthralgias, sore muscles and back pain.  Skin:  Negative for rash and color change.  Neurological:  Negative for headaches, lightheadedness and weakness.  Psychiatric:  Denies depression.    Current Outpatient Prescriptions   Medication Sig Dispense Refill   • RENVELA 800 MG Tab      • loperamide (IMODIUM) 2 MG Cap      • non-formulary med Inhale 2 L by mouth every bedtime. OXYGEN AT 2L/NC AS NEEDED AND AT NIGHT  Indications: copd     • metoprolol (LOPRESSOR) 25 MG Tab Take 25 mg by mouth 2 times a day.     • predniSONE (DELTASONE) 1 MG Tab Take 14 mg by mouth every day. Pt takes x1 10mg tabs and x4 1mg tabs for total dose = 14mg QD     • ipratropium-albuterol (DUONEB) 0.5-2.5 (3) MG/3ML nebulizer solution 3 mL by Nebulization route 4 times a day. 1080 mL 3   • B Complex-C-Folic Acid (DIALYVITE TABLET) Tab Dialyvite -nehro vit B complex-C-folic acid 1 tablet po QD Dialysis Pt 90 Tab 3   • fluticasone-salmeterol (ADVAIR DISKUS) 500-50 MCG/DOSE AEROSOL POWDER, BREATH ACTIVATED Inhale 1 Puff by mouth 2 times a day. Rinse mouth after each use. 1 Inhaler 6   • albuterol 108 (90 BASE) MCG/ACT Aero Soln inhalation aerosol Inhale 2 Puffs by mouth every four hours as needed for Shortness of Breath.     • ranitidine (ZANTAC) 150 MG Tab TAKE 1 TABLET BY MOUTH EVERY NIGHT AT BEDTIME 30 Tab 0   • finasteride (PROSCAR) 5 MG Tab Take 5 mg by mouth every day.     • simvastatin (ZOCOR) 40 MG TABS Take 40 mg by mouth every evening.     • epoetin lucina (EPOGEN,PROCRIT) 3000 UNIT/ML SOLN Inject 2,200 Units as instructed every Monday, Wednesday, and Friday. With dialysis     • sevelamer (RENAGEL) 800 MG TABS Take 800 mg by mouth 3 times a day, with meals.     • tamsulosin (FLOMAX) 0.4 MG capsule Take 0.4 mg by mouth every day.     •  aspirin EC (ECOTRIN) 81 MG TBEC Take 81 mg by mouth every day.       No current facility-administered medications for this visit.       Social History   Substance Use Topics   • Smoking status: Former Smoker -- 30 years     Types: Pipe     Quit date: 01/01/1990   • Smokeless tobacco: Former User     Types: Chew   • Alcohol Use: 4.8 oz/week     7 Glasses of wine, 1 Standard drinks or equivalent per week      Comment: Red wine nightly       Past Medical History   Diagnosis Date   • HTN    • Dyslipidemia    • Allergy    • COPD    • GERD (gastroesophageal reflux disease)    • Dialysis      on hemodialysis   • Urinary bladder disorder    • Snoring    • Unspecified hemorrhagic conditions      bruises easily   • Pneumonia 2011   • Bronchitis      chronic   • Chronic bronchitis with productive mucopurulent cough (CMS-[facility])    • Indigestion    • ASTHMA    • EMPHYSEMA    • CA in situ resp sys    • Other specified disorder of intestines    • Unspecified urinary incontinence    • Sick sinus syndrome (CMS-[facility])    • Pacemaker 1988   • Renal disorder      dialysis 3 days a week   • Wegener's disease, pulmonary (CMS-[facility])    • Hip pain    • Heart burn    • Pain 7/16/13     leg's and hand's   • Sleep apnea      uses cpap at noc   • BPH (benign prostatic hyperplasia)    • Arthritis      left knee, hands   • Hypertension    • Coughing blood 2011   • Breath shortness      WITH EXERTION   • Pulmonary histoplasmosis (CMS-[facility])    • TIMOTHY (obstructive sleep apnea)        Past Surgical History   Procedure Laterality Date   • Pacemaker insertion  4/2009   • Hernia repair  1990     right inguinal hernia   • Gastroscopy with balloon dilatation  9/28/2011     Performed by KT FERNANDEZ at SURGERY [facility] ORS   • Cath placement  10/8/2011     Performed by NESSA JOHANSEN at SURGERY [facility] ORS   • Gastroscopy with biopsy  1/17/2012     Performed by ZURDO HARRISON at ENDOSCOPY [facility] ORS   • Hip replacement, total        "right   • Av fistula creation  3/8/2012     Performed by NESSA SYED at SURGERY [facility] ORS   • Recovery  4/19/2012     Performed by SURGERY, IR-RECOVERY at SURGERY SAME DAY [facility]   • Av fistula revision  6/9/2012     Performed by NESSA SYED at SURGERY [facility]   • Rotator cuff repair  2003     right   • Recovery  7/17/2013     Performed by Ir-Recovery Surgery at SURGERY SAME DAY [facility]   • Bronchoscopy-endo  7/18/2013     Performed by Juan F Rubio M.D. at SURGERY [facility] ORS   • Recovery  12/17/2013     Performed by Ir-Recovery Surgery at SURGERY SAME DAY [facility] ORS   • Recovery  8/7/2014     Performed by Ir-Recovery Surgery at SURGERY [facility]   • Recovery  10/3/2014     Performed by Ir-Recovery Surgery at SURGERY SAME DAY [facility] ORS   • Recovery  2/26/2015     Performed by Ir-Recovery Surgery at SURGERY SAME DAY [facility] ORS   • Recovery  9/3/2015     Procedure: IR1 VASCULAR CASE-ELENO RIGHT DIALYSIS FISTULOGRAM, POSSIBLE INTERVENTION;  Surgeon: Mayi Surgery;  Location: SURGERY PRE-POST PROC UNIT [facility];  Service:    • Av fistulogram Right 4/12/2016     Procedure: AV FISTULOGRAM , VENOPLASTY X 2;  Surgeon: Nessa Syed M.D.;  Location: SURGERY [facility];  Service:    • Tonsillectomy         Allergies:  Erythromycin and Rituximab    Family History   Problem Relation Age of Onset   • Stroke Father    • Heart Disease Father    • Cancer     • Stroke Mother        Physical Examination    Filed Vitals:    07/14/17 0838   Height: 1.702 m (5' 7.01\")   Weight: 59.421 kg (131 lb)   Weight % change since last entry.: 0 %   BP: 116/60   Pulse: 96   BMI (Calculated): 20.51   Resp: 16   Temp: 36.7 °C (98.1 °F)   O2 sat % room air: 92 %       Physical Exam:  Constitutional:  Well developed and well nourished.  Head:  Normocephalic and atraumatic.  Nose:  Nose normal.  Mouth/Throat:  Oropharynx is clear and moist, no lesions.    Neck:  Normal range of motion.  " Supple.  No JVD.  Cardiovascular:  Normal rate, regular rhythm, normal heart sounds. No edema  Pulmonary/Chest: No wheezing, rales or rhonchi.  Respiratory effort non labored  Musculoskeletal.  No muscular atrophy.  Lymphadenopathy:  No cervical or supraclavicular adenopathy  Neurological:  Alert and oriented.  Cranial nerves intact.  No focal deficits  Skin:  No rashes or ulcers.  Psyciatric:  Normal mood and affect.    Assessment and Plan:  1. Bronchiectasis without complication (CMS-HCC)  The patient continues to gets bronchiectasis exacerbations and infections with pseudomonas. The Pseudomonas is now multidrug resistant. The patient has not been compliant with pulmonary clearance regimen and has not been doing the saline nebulizers, flutter or the percussion vest. I think this is one of the main reasons that the patient continues to gets recurring infections. I have strongly counseled the patient about the importance of using the flutter, the percussion vest and the saline nebulizers. We will go ahead and request that he receive another flutter device from his DME company. He can continue using the DuoNeb's and the Advair.  - DME OTHER    2. Wegener's disease, pulmonary (CMS-HCC)  I have ordered a repeat CT scan of the chest to follow-up on his Wegener's disease. He is on prednisone and Rituxan at this time.  - CT-CHEST (THORAX) W/O; Future    3. Obstructive sleep apnea  The patient complains of intolerance to the air pressure on his machine. I have made an adjustment to decrease his AutoPap settings from between 4 and 12 seconds water down to between 4 and 10 cm of water.  - DME CPAP        Followup Return in about 4 weeks (around 8/11/2017) for follow up visit with Eric Vaughan MD.

## 2017-07-14 NOTE — PATIENT INSTRUCTIONS
1. We have scheduled a CT scan of the chest  2. Recommend using the flutter device 4 times a day  3. Recommend using the chest percussion 4 times a day  4. Recommend using saline nebulizers at least twice a day  5. We have made adjustments to your CPAP machine  6. Recommend follow-up in 4 weeks

## 2017-07-15 PROCEDURE — 665999 HH PPS REVENUE DEBIT

## 2017-07-15 PROCEDURE — 665998 HH PPS REVENUE CREDIT

## 2017-07-16 PROCEDURE — 665998 HH PPS REVENUE CREDIT

## 2017-07-16 PROCEDURE — 665999 HH PPS REVENUE DEBIT

## 2017-07-17 PROCEDURE — 665999 HH PPS REVENUE DEBIT

## 2017-07-17 PROCEDURE — 665998 HH PPS REVENUE CREDIT

## 2017-07-18 PROCEDURE — 665999 HH PPS REVENUE DEBIT

## 2017-07-18 PROCEDURE — 665998 HH PPS REVENUE CREDIT

## 2017-07-19 PROCEDURE — 665998 HH PPS REVENUE CREDIT

## 2017-07-19 PROCEDURE — 665999 HH PPS REVENUE DEBIT

## 2017-07-20 ENCOUNTER — TELEPHONE (OUTPATIENT)
Dept: PULMONOLOGY | Facility: HOSPICE | Age: 77
End: 2017-07-20

## 2017-07-20 DIAGNOSIS — G47.33 OBSTRUCTIVE SLEEP APNEA: ICD-10-CM

## 2017-07-20 PROCEDURE — 665998 HH PPS REVENUE CREDIT

## 2017-07-20 PROCEDURE — 665999 HH PPS REVENUE DEBIT

## 2017-07-20 NOTE — TELEPHONE ENCOUNTER
[employer] [employer] called and stated that the order you sent for the CPAP says for the 02 bleed -in that it has a range 4-10 and it cannot be that.  It needs to be a set number.  It is sitting on your desk so you can see it.  Please call Lian at [phone].  Thanks.

## 2017-07-20 NOTE — TELEPHONE ENCOUNTER
This is NOT the o2 bleed in this is the autoPAP range.  Per the notes it is a pressure change not the bleed in    Please sign the corrected order

## 2017-07-21 PROCEDURE — 665998 HH PPS REVENUE CREDIT

## 2017-07-21 PROCEDURE — 665999 HH PPS REVENUE DEBIT

## 2017-07-22 PROCEDURE — 665999 HH PPS REVENUE DEBIT

## 2017-07-22 PROCEDURE — 665998 HH PPS REVENUE CREDIT

## 2017-07-23 PROCEDURE — 665998 HH PPS REVENUE CREDIT

## 2017-07-23 PROCEDURE — 665999 HH PPS REVENUE DEBIT

## 2017-07-24 PROCEDURE — 665999 HH PPS REVENUE DEBIT

## 2017-07-24 PROCEDURE — 665998 HH PPS REVENUE CREDIT

## 2017-07-25 PROCEDURE — 665998 HH PPS REVENUE CREDIT

## 2017-07-25 PROCEDURE — 665999 HH PPS REVENUE DEBIT

## 2017-07-25 PROCEDURE — 665997 HH PPS REVENUE ADJ

## 2017-07-26 RX ORDER — ETHYL CHLORIDE 100 %
AEROSOL, SPRAY (ML) TOPICAL
Qty: 103.5 ML | Refills: 11 | Status: SHIPPED | OUTPATIENT
Start: 2017-07-26 | End: 2018-04-13

## 2017-07-27 ENCOUNTER — HOSPITAL ENCOUNTER (OUTPATIENT)
Dept: RADIOLOGY | Facility: MEDICAL CENTER | Age: 77
End: 2017-07-27
Attending: INTERNAL MEDICINE
Payer: MEDICARE

## 2017-07-27 DIAGNOSIS — M31.30 WEGENER'S DISEASE, PULMONARY: ICD-10-CM

## 2017-07-27 PROCEDURE — 71250 CT THORAX DX C-: CPT

## 2017-07-28 ENCOUNTER — PATIENT MESSAGE (OUTPATIENT)
Dept: PULMONOLOGY | Facility: HOSPICE | Age: 77
End: 2017-07-28

## 2017-07-28 DIAGNOSIS — J44.9 CHRONIC OBSTRUCTIVE PULMONARY DISEASE, UNSPECIFIED COPD TYPE (HCC): ICD-10-CM

## 2017-07-28 DIAGNOSIS — R06.89 CHRONIC RESPIRATORY INSUFFICIENCY: ICD-10-CM

## 2017-08-01 ENCOUNTER — TELEPHONE (OUTPATIENT)
Dept: PULMONOLOGY | Facility: HOSPICE | Age: 77
End: 2017-08-01

## 2017-08-01 NOTE — TELEPHONE ENCOUNTER
[facility] does not carry Aerobika device. I will forward to [facility] and advise patient

## 2017-08-04 LAB
MYCOBACTERIUM SPEC CULT: NORMAL
RHODAMINE-AURAMINE STN SPEC: NORMAL
SIGNIFICANT IND 70042: NORMAL
SITE SITE: NORMAL
SOURCE SOURCE: NORMAL

## 2017-08-10 ENCOUNTER — PATIENT MESSAGE (OUTPATIENT)
Dept: PULMONOLOGY | Facility: HOSPICE | Age: 77
End: 2017-08-10

## 2017-08-11 ENCOUNTER — PATIENT MESSAGE (OUTPATIENT)
Dept: PULMONOLOGY | Facility: HOSPICE | Age: 77
End: 2017-08-11

## 2017-08-14 ENCOUNTER — PATIENT MESSAGE (OUTPATIENT)
Dept: PULMONOLOGY | Facility: HOSPICE | Age: 77
End: 2017-08-14

## 2017-08-18 ENCOUNTER — TELEPHONE (OUTPATIENT)
Dept: RHEUMATOLOGY | Facility: PHYSICIAN GROUP | Age: 77
End: 2017-08-18

## 2017-08-18 NOTE — TELEPHONE ENCOUNTER
Call patient in regards to prednisone dosing, currently at prednisone 13 mg by mouth daily, patient with bad Pseudomonas infection and ANCA has been negative will decrease prednisone down to 12 mg by mouth daily.

## 2017-08-22 ENCOUNTER — OFFICE VISIT (OUTPATIENT)
Dept: PULMONOLOGY | Facility: HOSPICE | Age: 77
End: 2017-08-22
Payer: MEDICARE

## 2017-08-22 VITALS
TEMPERATURE: 98.4 F | HEART RATE: 94 BPM | RESPIRATION RATE: 16 BRPM | BODY MASS INDEX: 20.56 KG/M2 | HEIGHT: 67 IN | OXYGEN SATURATION: 95 % | SYSTOLIC BLOOD PRESSURE: 120 MMHG | DIASTOLIC BLOOD PRESSURE: 72 MMHG | WEIGHT: 131 LBS

## 2017-08-22 DIAGNOSIS — J47.9 BRONCHIECTASIS WITHOUT COMPLICATION (HCC): ICD-10-CM

## 2017-08-22 DIAGNOSIS — J84.9 ILD (INTERSTITIAL LUNG DISEASE) (HCC): ICD-10-CM

## 2017-08-22 DIAGNOSIS — G47.33 OBSTRUCTIVE SLEEP APNEA: ICD-10-CM

## 2017-08-22 PROCEDURE — 99214 OFFICE O/P EST MOD 30 MIN: CPT | Performed by: INTERNAL MEDICINE

## 2017-08-22 RX ORDER — METOPROLOL SUCCINATE 25 MG/1
TABLET, EXTENDED RELEASE ORAL
Status: ON HOLD | COMMUNITY
Start: 2017-08-16 | End: 2018-01-12

## 2017-08-22 RX ORDER — SODIUM CHLORIDE FOR INHALATION 7 %
VIAL, NEBULIZER (ML) INHALATION
Refills: 6 | Status: ON HOLD | COMMUNITY
Start: 2017-08-11 | End: 2018-01-12

## 2017-08-22 NOTE — MR AVS SNAPSHOT
"Gilberto Brown   2017 8:00 AM   Office Visit   MRN: [id number]    Department:  [facility]   Dept Phone:  [phone]    Description:  Male : 1940   Provider:  Eric Vaughan M.D.           Reason for Visit     Results CT      Allergies as of 2017     Allergen Noted Reactions    Erythromycin 2014   Unspecified    Abdominal pain.  RXN=before     Rituximab 08/15/2016       Flu like syndrome      You were diagnosed with     Bronchiectasis without complication (CMS-HCC)   [270700]       ILD (interstitial lung disease) (CMS-HCC)   [625035]       Obstructive sleep apnea   [457394]         Vital Signs     Blood Pressure Pulse Temperature Respirations Height Weight    120/72 mmHg 94 36.9 °C (98.4 °F) 16 1.702 m (5' 7\") 59.421 kg (131 lb)    Body Mass Index Oxygen Saturation Smoking Status             20.51 kg/m2 95% Former Smoker         Basic Information     Date Of Birth Sex Race Ethnicity Preferred Language    1940 Male White Non- English      Your appointments     Aug 29, 2017  2:00 PM   Follow Up Visit with Cintia Ariza M.D.   [facility] (--)    [address], [address]  [place] [address] [phone]           You will be receiving a confirmation call a few days before your appointment from our automated call confirmation system.            2017  1:40 PM   Established Patient Pul with Eric Vaughan M.D.   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 Low  2011 - Present    Dysphagia  Medium  2011 - Present    Odynophagia R13.10 Medium  2011 - Present    Esophageal stricture K22.2 Medium  2011 - Present    Chronic respiratory infection J98.8 High  10/11/2011 - Present    Protein-calorie " malnutrition, severe (HCC) E43 Medium  10/11/2011 - Present    Debility R53.81 High  10/12/2011 - Present    HTN (hypertension) I10 Low  10/12/2011 - Present    Abnormal thyroid function test R94.6   10/12/2011 - Present    TIMOTHY (obstructive sleep apnea) G47.33 Low  10/12/2011 - Present    Hyperlipidemia E78.5   10/12/2011 - Present    ESRD on dialysis (CMS-HCC) N18.6, Z99.2 Low  11/8/2011 - Present    Vitamin d deficiency    11/8/2011 - Present    Anemia of chronic renal failure, stage 5 (CMS-HCC) (Chronic) N18.5, D63.1 Medium  12/14/2011 - Present    BPH (benign prostatic hyperplasia) N40.0 Low  12/14/2011 - Present    GERD (gastroesophageal reflux disease) K21.9   12/14/2011 - Present    Weight loss R63.4   12/14/2011 - Present    Pneumonia J18.9 High  12/14/2011 - Present    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 Low  12/14/2011 - Present    Adult failure to thrive R62.7 High  1/13/2012 - Present    Personal history of other malignant neoplasm of skin Z85.828   7/16/2013 - Present    AV block I44.30 Low  2/6/2014 - Present    Pacemaker Z95.0 Medium  2/6/2014 - Present    Immunosuppressed status (HCC) D89.9 Medium  2/6/2014 - Present    Hypotension I95.9 High  2/1/2015 - Present    Macrocytosis D75.89 Medium  2/1/2015 - Present    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   4/12/2016 - Present    Advance care planning Z71.89 Low  10/5/2016 - Present    Abnormal CT scan of lung R91.8   10/25/2016 - Present    Thrush B37.0   10/25/2016 - Present    Pseudomonas aeruginosa colonization Z22.39   4/18/2017 - Present    Atrial fibrillation (CMS-HCC) I48.91 Medium  4/18/2017 - Present    Pneumonia of both lungs due to influenza B , post-influenza pneumonia/HCAP J11.00 Medium  5/14/2017 - Present    COPD (chronic obstructive pulmonary disease) (CMS-HCC) J44.9 Low  5/14/2017 - Present    Acute on chronic respiratory failure with hypoxia due to influenza B, not due to sepsis(CMS-[facility]) J96.21 Medium  5/14/2017 - Present     Pneumonia of both lungs due to Pseudomonas species (CMS-HCC) J15.1 High  6/6/2017 - Present    Wegener's granulomatosis (CMS-HCC) M31.30 Medium  6/10/2017 - Present      Health Maintenance        Date Due Completion Dates    IMM DTaP/Tdap/Td Vaccine (1 - Tdap) 6/23/1958 ---    IMM ZOSTER VACCINE 6/23/1999 ---    IMM INFLUENZA (1) 9/1/2017 9/15/2016, 11/3/2014, 10/10/2013, 9/27/2011    COLONOSCOPY 12/1/2025 12/1/2015 (Postponed)    Override on 12/1/2015: Postponed (Patient will discuss scheduling with PCP)            Current Immunizations     13-VALENT PCV PREVNAR 4/14/2017  1:43 PM    Influenza TIV (IM) 9/15/2016, 11/3/2014, 10/10/2013, 9/27/2011  3:15 AM    Pneumococcal polysaccharide vaccine (PPSV-23) 12/19/2011  4:47 PM    Tuberculin Skin Test  Incomplete      Below and/or attached are the medications your provider expects you to take. Review all of your home medications and newly ordered medications with your provider and/or pharmacist. Follow medication instructions as directed by your provider and/or pharmacist. Please keep your medication list with you and share with your provider. Update the information when medications are discontinued, doses are changed, or new medications (including over-the-counter products) are added; and carry medication information at all times in the event of emergency situations     Allergies:  ERYTHROMYCIN - Unspecified     RITUXIMAB - (reactions not documented)               Medications  Valid as of: August 22, 2017 -  8:25 AM    Generic Name Brand Name Tablet Size Instructions for use    Albuterol Sulfate (Aero Soln) albuterol 108 (90 Base) MCG/ACT Inhale 2 Puffs by mouth every four hours as needed for Shortness of Breath.        Aspirin (Tablet Delayed Response) ECOTRIN 81 MG Take 81 mg by mouth every day.        B Complex-C-Folic Acid (Tab) DIALYVITE TABLET  Dialyvite -nehro vit B complex-C-folic acid 1 tablet po QD Dialysis Pt        Epoetin Jorgito (Solution) EPOGEN,PROCRIT 3000  UNIT/ML Inject 2,200 Units as instructed every Monday, Wednesday, and Friday. With dialysis        Ethyl Chloride (Aerosol) ethyl chloride  APPLY TO AV-FISTULA PRIORTO HEMODIALYSIS        Finasteride (Tab) PROSCAR 5 MG Take 5 mg by mouth every day.        Fluticasone-Salmeterol (AEROSOL POWDER, BREATH ACTIVATED) ADVAIR 500-50 MCG/DOSE Inhale 1 Puff by mouth 2 times a day. Rinse mouth after each use.        Ipratropium-Albuterol (Solution) DUONEB 0.5-2.5 (3) MG/3ML 3 mL by Nebulization route 4 times a day.        Loperamide HCl (Cap) IMODIUM 2 MG         Metoprolol Succinate (TABLET SR 24 HR) TOPROL XL 25 MG         Metoprolol Tartrate (Tab) LOPRESSOR 25 MG Take 25 mg by mouth 2 times a day.        non-formulary med non-formulary med  Inhale 2 L by mouth every bedtime. OXYGEN AT 2L/NC AS NEEDED AND AT NIGHT  Indications: copd        PredniSONE (Tab) DELTASONE 1 MG Take 14 mg by mouth every day. Pt takes x1 10mg tabs and x4 1mg tabs for total dose = 14mg QD        PredniSONE (Tab) DELTASONE 1 MG Take 3 tabs with one 10 mg tab of prednisone every day        PredniSONE (Tab) DELTASONE 10 MG 1 tab po qday with 2 one mg tabs for a total 12 mgpo qday        RaNITidine HCl (Tab) ZANTAC 150 MG TAKE 1 TABLET BY MOUTH EVERY NIGHT AT BEDTIME        Sevelamer Carbonate (Tab) RENVELA 800 MG         Sevelamer HCl (Tab) RENAGEL 800 MG Take 800 mg by mouth 3 times a day, with meals.        Simvastatin (Tab) ZOCOR 40 MG Take 40 mg by mouth every evening.        Sodium Chloride (Nebu Soln) HYPER-SAL 7 % INHALE THE CONTENTS OF ONE VIAL (4ML) TWICE A DAY VIA NEBULIZER        Tamsulosin HCl (Cap) FLOMAX 0.4 MG Take 0.4 mg by mouth every day.        .                 Medicines prescribed today were sent to:     [id number] - [place], [place] [address] [place] AT [address] [place] [address]    Phone: [phone] Fax: [phone]    Open 24 Hours?: No    [id number] -  [place], [place] - [address]    [address] [place] [address]    Phone: [phone] Fax: [phone]    Open 24 Hours?: No      Medication refill instructions:       If your prescription bottle indicates you have medication refills left, it is not necessary to call your provider’s office. Please contact your pharmacy and they will refill your medication.    If your prescription bottle indicates you do not have any refills left, you may request refills at any time through one of the following ways: The online [employer] system (except Urgent Care), by calling your provider’s office, or by asking your pharmacy to contact your provider’s office with a refill request. Medication refills are processed only during regular business hours and may not be available until the next business day. Your provider may request additional information or to have a follow-up visit with you prior to refilling your medication.   *Please Note: Medication refills are assigned a new Rx number when refilled electronically. Your pharmacy may indicate that no refills were authorized even though a new prescription for the same medication is available at the pharmacy. Please request the medicine by name with the pharmacy before contacting your provider for a refill.        Other Notes About Your Plan     [facility] Ph. [phone] Fax. [phone]             [employer] Access Code: Activation code not generated  Current [employer] Status: Active

## 2017-08-22 NOTE — PATIENT INSTRUCTIONS
1.  Continue with the flutter device, saline nebulizers and the percussion vest  2. Recommended continuing with inhalers to include Advair and DuoNeb  3. Recommend continuing with CPAP at nighttime  4. Recommend continuing follow-up with renal and rheumatology

## 2017-08-22 NOTE — PROGRESS NOTES
Gilberto Brown is a 77 y.o. male here for Wegener's.    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 He is on chronic dialysis. He was on Rituxan and prednisone but now he is just taking 12 mg of prednisone. He has bronchiectasis and interstitial lung disease. We did a repeat CT scan of the chest which showed stable findings. There was no significant interval change in the diffuse bilateral subpleural interstitial lung disease with a basilar predominance. Bronchiectasis is noted. Some emphysematous changes were also noted. The patient is now using the flutter device, percussion vest and saline nebulizers on a consistent basis. He has a history of multidrug-resistant Pseudomonas. He's been on tobramycin nebulizers and for some time was on colistin nebulizers. He is now off of all antibiotics and nebulizers. His last pulmonary function test was in January 2017 which showed a diffusion capacity of 60% predicted and total lung capacity is 76% predicted. He uses oxygen as needed but he is using it at nighttime with his CPAP. Last visit we adjusted his AutoPap to between 4 and 10 cm of water. He did not bring his data card in today for my review.    Constitutional:  Negative for fever, chills, sweats, and fatigue.  Eyes:  Negative for eye pain and visual changes.  HENT:  Negative for tinnitus and hoarse voice.  Cardiovascular:  Negative for chest pain, leg swelling, syncope and orthopnea.  Respiratory:  See HPI for pertinent negatives  Sleep:  Negative for somnolence, loud snoring, sleep disturbance due to breathing, insomnia.  Gastrointestinal:  Negative for dysphagia, nausea and abdominal pain.  Heme/lymph:  Denies easy bruising, blood clots.  Musculoskeletal:  Negative for arthralgias, sore muscles and back pain.  Skin:  Negative for rash and color change.  Neurological:  Negative for headaches, lightheadedness and weakness.  Psychiatric:  Denies  depression.    Current Outpatient Prescriptions   Medication Sig Dispense Refill   • metoprolol SR (TOPROL XL) 25 MG TABLET SR 24 HR      • sodium chloride (HYPER-SAL) 7 % Nebu Soln INHALE THE CONTENTS OF ONE VIAL (4ML) TWICE A DAY VIA NEBULIZER  6   • predniSONE (DELTASONE) 10 MG Tab 1 tab po qday with 2 one mg tabs for a total 12 mgpo qday 90 Tab 0   • ethyl chloride Aerosol APPLY TO AV-FISTULA PRIORTO HEMODIALYSIS 103.5 mL 11   • predniSONE (DELTASONE) 1 MG Tab Take 3 tabs with one 10 mg tab of prednisone every day 90 Tab 0   • RENVELA 800 MG Tab      • loperamide (IMODIUM) 2 MG Cap      • non-formulary med Inhale 2 L by mouth every bedtime. OXYGEN AT 2L/NC AS NEEDED AND AT NIGHT  Indications: copd     • metoprolol (LOPRESSOR) 25 MG Tab Take 25 mg by mouth 2 times a day.     • predniSONE (DELTASONE) 1 MG Tab Take 14 mg by mouth every day. Pt takes x1 10mg tabs and x4 1mg tabs for total dose = 14mg QD     • ipratropium-albuterol (DUONEB) 0.5-2.5 (3) MG/3ML nebulizer solution 3 mL by Nebulization route 4 times a day. 1080 mL 3   • B Complex-C-Folic Acid (DIALYVITE TABLET) Tab Dialyvite -nehro vit B complex-C-folic acid 1 tablet po QD Dialysis Pt 90 Tab 3   • fluticasone-salmeterol (ADVAIR DISKUS) 500-50 MCG/DOSE AEROSOL POWDER, BREATH ACTIVATED Inhale 1 Puff by mouth 2 times a day. Rinse mouth after each use. 1 Inhaler 6   • albuterol 108 (90 BASE) MCG/ACT Aero Soln inhalation aerosol Inhale 2 Puffs by mouth every four hours as needed for Shortness of Breath.     • ranitidine (ZANTAC) 150 MG Tab TAKE 1 TABLET BY MOUTH EVERY NIGHT AT BEDTIME 30 Tab 0   • finasteride (PROSCAR) 5 MG Tab Take 5 mg by mouth every day.     • simvastatin (ZOCOR) 40 MG TABS Take 40 mg by mouth every evening.     • epoetin lucina (EPOGEN,PROCRIT) 3000 UNIT/ML SOLN Inject 2,200 Units as instructed every Monday, Wednesday, and Friday. With dialysis     • sevelamer (RENAGEL) 800 MG TABS Take 800 mg by mouth 3 times a day, with meals.     •  tamsulosin (FLOMAX) 0.4 MG capsule Take 0.4 mg by mouth every day.     • aspirin EC (ECOTRIN) 81 MG TBEC Take 81 mg by mouth every day.       No current facility-administered medications for this visit.       Social History   Substance Use Topics   • Smoking status: Former Smoker -- 30 years     Types: Pipe     Quit date: 01/01/1990   • Smokeless tobacco: Former User     Types: Chew   • Alcohol Use: 4.8 oz/week     7 Glasses of wine, 1 Standard drinks or equivalent per week      Comment: Red wine nightly       Past Medical History   Diagnosis Date   • HTN    • Dyslipidemia    • Allergy    • COPD    • GERD (gastroesophageal reflux disease)    • Dialysis      on hemodialysis   • Urinary bladder disorder    • Snoring    • Unspecified hemorrhagic conditions      bruises easily   • Pneumonia 2011   • Bronchitis      chronic   • Chronic bronchitis with productive mucopurulent cough (CMS-HCC)    • Indigestion    • ASTHMA    • EMPHYSEMA    • CA in situ resp sys    • Other specified disorder of intestines    • Unspecified urinary incontinence    • Sick sinus syndrome (CMS-[facility])    • Pacemaker 1988   • Renal disorder      dialysis 3 days a week   • Wegener's disease, pulmonary (CMS-[facility])    • Hip pain    • Heart burn    • Pain 7/16/13     leg's and hand's   • Sleep apnea      uses cpap at noc   • BPH (benign prostatic hyperplasia)    • Arthritis      left knee, hands   • Hypertension    • Coughing blood 2011   • Breath shortness      WITH EXERTION   • Pulmonary histoplasmosis (CMS-[facility])    • TIMOTHY (obstructive sleep apnea)        Past Surgical History   Procedure Laterality Date   • Pacemaker insertion  4/2009   • Hernia repair  1990     right inguinal hernia   • Gastroscopy with balloon dilatation  9/28/2011     Performed by KT FERNANDEZ at SURGERY [facility] ORS   • Cath placement  10/8/2011     Performed by NESSA JOHANSEN at [facility] ORS   • Gastroscopy with biopsy  1/17/2012     Performed by HUNTER  "ZURDO GARCIA at ENDOSCOPY [facility] ORS   • Hip replacement, total       right   • Av fistula creation  3/8/2012     Performed by NESSA JOHANSEN at SURGERY [facility]   • Recovery  4/19/2012     Performed by SURGERY, IR-RECOVERY at SURGERY SAME DAY [facility] ORS   • Av fistula revision  6/9/2012     Performed by NESSA JOHANSEN at SURGERY [facility] ORS   • Rotator cuff repair  2003     right   • Recovery  7/17/2013     Performed by Ir-Recovery Surgery at SURGERY SAME DAY [facility] ORS   • Bronchoscopy-endo  7/18/2013     Performed by Juan F Rubio M.D. at SURGERY [facility]   • Recovery  12/17/2013     Performed by Ir-Recovery Surgery at SURGERY SAME DAY [facility] ORS   • Recovery  8/7/2014     Performed by Ir-Recovery Surgery at SURGERY [facility]   • Recovery  10/3/2014     Performed by Ir-Recovery Surgery at SURGERY SAME DAY [facility] ORS   • Recovery  2/26/2015     Performed by Ir-Recovery Surgery at SURGERY SAME DAY [facility] ORS   • Recovery  9/3/2015     Procedure: IR1 VASCULAR CASE-ELENO RIGHT DIALYSIS FISTULOGRAM, POSSIBLE INTERVENTION;  Surgeon: Recoveryonly Surgery;  [place] [facility]  [place]:    • Av fistulogram Right 4/12/2016     Procedure: AV FISTULOGRAM , VENOPLASTY X 2;  Surgeon: Nessa Johansen M.D.;  Location: SURGERY [facility];  Service:    • Tonsillectomy         Allergies:  Erythromycin and Rituximab    Family History   Problem Relation Age of Onset   • Stroke Father    • Heart Disease Father    • Cancer     • Stroke Mother        Physical Examination    Filed Vitals:    08/22/17 0757   Height: 1.702 m (5' 7\")   Weight: 59.421 kg (131 lb)   Weight % change since last entry.: 0 %   BP: 120/72   Pulse: 94   BMI (Calculated): 20.52   Resp: 16   Temp: 36.9 °C (98.4 °F)   O2 sat % room air: 95 %       Physical Exam:  Constitutional:  Well developed and well nourished.  Head:  Normocephalic and atraumatic.  Nose:  Nose normal.  Mouth/Throat:  " Oropharynx is clear and moist, no lesions.    Neck:  Normal range of motion.  Supple.  No JVD.  Cardiovascular:  Normal rate, regular rhythm, normal heart sounds. No edema  Pulmonary/Chest: No wheezing, rales or rhonchi.  Respiratory effort non labored  Musculoskeletal.  No muscular atrophy.  Lymphadenopathy:  No cervical or supraclavicular adenopathy  Neurological:  Alert and oriented.  Cranial nerves intact.  No focal deficits  Skin:  No rashes or ulcers.  Psyciatric:  Normal mood and affect.    Assessment and Plan:  1. Bronchiectasis without complication (CMS-HCC)  The patient has severe bilateral bronchiectasis. He seems to be doing much better in regards to his pulmonary hygiene and is doing the flutter, saline nebulizers and percussion vest. He is taking his inhalers. We are holding off on all antibiotics at this time because of his multidrug resistant Pseudomonas.    2. ILD (interstitial lung disease) (CMS-HCC)  CT scan of the chest is stable. He is on prednisone 12 mg daily for his Wegener's. He is now off of Rituxan. We'll have to wait and see how he does over time to see if the prednisone will hold his Wegener's by itself or he'll need additional immunosuppression.    3. Obstructive sleep apnea  I've encouraged patient to continue with the AutoPap. I would like to see his data card. He is using oxygen at night.      Recommend follow-up in 3 months    Followup No Follow-up on file.

## 2017-08-29 ENCOUNTER — APPOINTMENT (OUTPATIENT)
Dept: RHEUMATOLOGY | Facility: PHYSICIAN GROUP | Age: 77
End: 2017-08-29
Payer: MEDICARE

## 2017-09-05 ENCOUNTER — OFFICE VISIT (OUTPATIENT)
Dept: RHEUMATOLOGY | Facility: PHYSICIAN GROUP | Age: 77
End: 2017-09-05
Payer: MEDICARE

## 2017-09-05 VITALS
OXYGEN SATURATION: 93 % | DIASTOLIC BLOOD PRESSURE: 50 MMHG | RESPIRATION RATE: 14 BRPM | SYSTOLIC BLOOD PRESSURE: 120 MMHG | WEIGHT: 135 LBS | HEART RATE: 88 BPM | BODY MASS INDEX: 21.14 KG/M2 | TEMPERATURE: 98.8 F

## 2017-09-05 DIAGNOSIS — Z22.39 PSEUDOMONAS AERUGINOSA COLONIZATION: ICD-10-CM

## 2017-09-05 DIAGNOSIS — M31.30 WEGENER'S GRANULOMATOSIS: ICD-10-CM

## 2017-09-05 DIAGNOSIS — Z95.0 PACEMAKER: ICD-10-CM

## 2017-09-05 DIAGNOSIS — I10 ESSENTIAL HYPERTENSION: ICD-10-CM

## 2017-09-05 DIAGNOSIS — N18.6 ESRD ON DIALYSIS (HCC): ICD-10-CM

## 2017-09-05 DIAGNOSIS — Z99.2 ESRD ON DIALYSIS (HCC): ICD-10-CM

## 2017-09-05 DIAGNOSIS — I48.91 ATRIAL FIBRILLATION, UNSPECIFIED TYPE (HCC): ICD-10-CM

## 2017-09-05 PROCEDURE — 99214 OFFICE O/P EST MOD 30 MIN: CPT | Performed by: INTERNAL MEDICINE

## 2017-09-05 NOTE — LETTER
[facility]   [address], [address]  [place] [address]  Phone: [phone]  Fax: [phone]              Encounter Date: 9/5/2017    Dear Dr. Forrest ref. provider found,    It was a pleasure seeing your patient, Gilberto Brown, on 9/5/2017. Diagnoses of Wegener's granulomatosis (CMS-[facility]), Pseudomonas aeruginosa colonization, ESRD on dialysis (CMS-[facility]), Essential hypertension, Atrial fibrillation, unspecified type (CMS-HCC), and Pacemaker were pertinent to this visit.     Please find attached progress note which includes the history I obtained from Mr. Brown, my physical examination findings, my impression and recommendations.      Once again, it was a pleasure participating in your patient's care.  Please feel free to contact me if you have any questions or if I can be of any further assistance to your patients.      Sincerely,    Cintia Ariza M.D.  Electronically Signed          PROGRESS NOTE:  No notes on file

## 2017-09-05 NOTE — PROGRESS NOTES
Chief Complaint- joint pain    Subjective:   Gilberto Brown is a 77 y.o. male here today for follow up of rheumatological issues    Extremely complicated patient is being seen for Wegener's granulomatosis, Initially diagnosed Wegeners 9/2011 by Dr Rodriguez with renal biopsy with unexplained weight loss and loss of renal function over the course 2 weeks, had had 6 months of worsening renal function, currently on dialysis s/p bronchoscopy with dx bronchiectasis, COPD, Patient had been on CellCept 750 mg by mouth daily, patient had a negative ANCA test January 2015, however ANCA became positive again November 2015 Patient  status post rituximab infusions January 14, 2016 and January 28, 2016 and did really quite well with a negative ANCA May 2016  however patient subsequently 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 Patient now continues to be on prednisone at 12 mg by mouth daily and continues to have a negative ANCA. We are monitoring the ANCA on a regular basis and dropping the prednisone a little bit at the time to balance the chronic Pseudomonas infection and keeping the Wegener's under control. Patient denies any fevers of unknown etiology, denies any conjunctivitis, denies any nasal ulcers or oral ulcers, denies any joint pain or swelling, denies any new rashes. Recent CT scans of lungs and chest x-ray of lungs indicating chronic colonization of Pseudomonas. Unfortunately patient's had to be hospitalized many times now for treatment of lung infections, patient continues to be off of cytotoxic agents and continues to be on only prednisone 12 mg by mouth daily. Latest ANCA is negative July 2017 and a another ANCA  Pending 1/2018. Patient also with atrial fibrillation and has a pacemaker in place. Patient states main complaint is feeling tired without a lot of energy.  Patient denies any ear infections or ear pain, states he is not sure if he's got a sore in his nose  but they're not recurrent and not multiple, no oral ulcers, no hemoptysis.        S/p Cytoxan-n/v  Off of Cellcept 5/2016  Rituximab infusions 1/14/2016, 1/28/2016     GBM neg 1/2012  ANCA neg 1/2015; ANCA 1:20  11/2015; ANCA neg 2/2016; ANCA neg 5/2016; ANCA neg 12/2016; ANCA neg 3/2017; ANCA neg 7/2017  C3 90 normal 10/2011  C4 21 10/2011  OFELIA neg 10/2011  Uric acid 4.4 1/2016  Hep B neg 2/2015; Hep B neg 4/2017  Hep C neg 2/2015      Of note  Dr Coral Corona nephrology  Dr Vaughan pulmonology   Dr Chavarria Cardiologist [phone]  Dr Nunez Opthalmologist [phone]              Current medicines (including changes today)  Current Outpatient Prescriptions   Medication Sig Dispense Refill   • metoprolol SR (TOPROL XL) 25 MG TABLET SR 24 HR      • sodium chloride (HYPER-SAL) 7 % Nebu Soln INHALE THE CONTENTS OF ONE VIAL (4ML) TWICE A DAY VIA NEBULIZER  6   • predniSONE (DELTASONE) 10 MG Tab 1 tab po qday with 2 one mg tabs for a total 12 mgpo qday 90 Tab 0   • ethyl chloride Aerosol APPLY TO AV-FISTULA PRIORTO HEMODIALYSIS 103.5 mL 11   • predniSONE (DELTASONE) 1 MG Tab Take 3 tabs with one 10 mg tab of prednisone every day 90 Tab 0   • RENVELA 800 MG Tab      • non-formulary med Inhale 2 L by mouth every bedtime. OXYGEN AT 2L/NC AS NEEDED AND AT NIGHT  Indications: copd     • predniSONE (DELTASONE) 1 MG Tab Take 14 mg by mouth every day. Pt takes x1 10mg tabs and x4 1mg tabs for total dose = 14mg QD     • ipratropium-albuterol (DUONEB) 0.5-2.5 (3) MG/3ML nebulizer solution 3 mL by Nebulization route 4 times a day. 1080 mL 3   • B Complex-C-Folic Acid (DIALYVITE TABLET) Tab Dialyvite -nehro vit B complex-C-folic acid 1 tablet po QD Dialysis Pt 90 Tab 3   • fluticasone-salmeterol (ADVAIR DISKUS) 500-50 MCG/DOSE AEROSOL POWDER, BREATH ACTIVATED Inhale 1 Puff by mouth 2 times a day. Rinse mouth after each use. 1 Inhaler 6   • albuterol 108 (90 BASE) MCG/ACT Aero Soln inhalation aerosol Inhale 2 Puffs by mouth  every four hours as needed for Shortness of Breath.     • ranitidine (ZANTAC) 150 MG Tab TAKE 1 TABLET BY MOUTH EVERY NIGHT AT BEDTIME 30 Tab 0   • finasteride (PROSCAR) 5 MG Tab Take 5 mg by mouth every day.     • simvastatin (ZOCOR) 40 MG TABS Take 40 mg by mouth every evening.     • epoetin lucina (EPOGEN,PROCRIT) 3000 UNIT/ML SOLN Inject 2,200 Units as instructed every Monday, Wednesday, and Friday. With dialysis     • tamsulosin (FLOMAX) 0.4 MG capsule Take 0.4 mg by mouth every day.     • aspirin EC (ECOTRIN) 81 MG TBEC Take 81 mg by mouth every day.     • loperamide (IMODIUM) 2 MG Cap      • metoprolol (LOPRESSOR) 25 MG Tab Take 25 mg by mouth 2 times a day.     • sevelamer (RENAGEL) 800 MG TABS Take 800 mg by mouth 3 times a day, with meals.       No current facility-administered medications for this visit.      He  has a past medical history of Allergy; Arthritis; ASTHMA; BPH (benign prostatic hyperplasia); Breath shortness; Bronchitis; CA in situ resp sys; Chronic bronchitis with productive mucopurulent cough (CMS-[facility]); COPD; Coughing blood (2011); Dialysis; Dyslipidemia; EMPHYSEMA; GERD (gastroesophageal reflux disease); Heart burn; Hip pain; HTN; Hypertension; Indigestion; TIMOTHY (obstructive sleep apnea); Other specified disorder of intestines; Pacemaker (1988); Pain (7/16/13); Pneumonia (2011); Pulmonary histoplasmosis (CMS-HCC); Renal disorder; Sick sinus syndrome (CMS-HCC); Sleep apnea; Snoring; Unspecified hemorrhagic conditions; Unspecified urinary incontinence; Urinary bladder disorder; and Wegener's disease, pulmonary (CMS-HCC).    ROS   Other than what is mentioned in HPI or physical exam, there is no history of headaches, double vision or blurred vision. No temporal tenderness or jaw claudication. No history of cataracts or glaucoma. No trouble swallowing difficulties or sore throats. No history of thyroid disease. No chest complaints including chest pain, cough or sputum production. No shortness of  breath. No GI complaints including nausea, vomiting, change in bowel habits, or past peptic ulcer disease. No history of blood in the stools. No urinary complaints including dysuria or frequency. No history of rash including psoriasis. No history of alopecia, photosensitivity, oral ulcerations, Raynaud's phenomena, or swollen joints. No history of gout. No back complaints. No history of low blood counts.       Objective:     Blood pressure 120/50, pulse 88, temperature 37.1 °C (98.8 °F), resp. rate 14, weight 61.2 kg (135 lb), SpO2 93 %. Body mass index is 21.14 kg/m².   Physical Exam:  Constitutional: Alert and oriented X3, but very tired looking and less energy than previous visits, talks in full sentences without evidence of shortness of breath Skin: Warm, dry, good turgor, no rashes in visible areas.Eye: Equal, round and reactive, no vision changes, lids normal, no conjunctivitis, ENMT: Lips without lesions, good dentition, oropharynx clear, no oropharyngeal ulcerations, no nasal ulcers, no oral ulcers, Neck: Trachea midline, no masses, no thyromegaly, no carotid bruits, no subclavian bruitsLymph: No supraclavicular lymphadenopathy, no cervical lymphadenopathy, no axillary lymphadenopathy.Respiratory: Unlabored respiratory effort, mild crackles lower lung basesCardiovascular: Normal S1, S2, no murmur, no edema, pacemaker in left chestAbdomen: Soft, non-tender, no masses, no hepatosplenomegaly, no abdominal bruits, no inguinal bruitsPsych: Alert and oriented x3, normal affect and mood.Neuro: Cranial nerves 2-12 are grossly intactExt:Did not appreciate any joint effusions, no Raynaud's, patient had 2+ pulses both upper extremities radial and ulnar pulses and 1+ pulses bilateral lower extremities and bilateral dorsalis pedis pulses, patient had a vascular shunt in the right upper arm  No evidence of vasculitis noted in upper or lower extremities, no nasal ulcers, no temporal artery enlargement or temporal artery  bruits, no carotid bruits. Patient is post dialysis and has dressing on his shunt on his right upper arm.    Lab Results   Component Value Date/Time    HEPBCORIGM Negative 04/14/2017 12:30 PM    HEPBSAG Negative 05/15/2017 09:58 AM     Lab Results   Component Value Date/Time    HEPCAB Negative 04/14/2017 12:30 PM     Lab Results   Component Value Date/Time    SODIUM 138 07/03/2017 05:15 PM    POTASSIUM 3.7 07/03/2017 05:15 PM    CHLORIDE 98 07/03/2017 05:15 PM    CO2 33 07/03/2017 05:15 PM    GLUCOSE 116 (H) 07/03/2017 05:15 PM    BUN 21 07/03/2017 05:15 PM    CREATININE 1.52 (H) 07/03/2017 05:15 PM    CREATININE 1.0 09/23/2008 09:36 AM      Lab Results   Component Value Date/Time    WBC 6.5 07/03/2017 05:15 PM    WBC 7.6 03/19/2012 12:00 AM    RBC 2.96 (L) 07/03/2017 05:15 PM    RBC 2.25 (LL) 03/19/2012 12:00 AM    HEMOGLOBIN 9.2 (L) 07/03/2017 05:15 PM    HEMATOCRIT 28.9 (L) 07/03/2017 05:15 PM    MCV 97.6 07/03/2017 05:15 PM     (H) 03/19/2012 12:00 AM    MCH 31.1 07/03/2017 05:15 PM    MCH 33.8 03/19/2012 12:00 AM    MCHC 31.8 (L) 07/03/2017 05:15 PM    MPV 8.9 (L) 07/03/2017 05:15 PM    NEUTSPOLYS 82.30 (H) 07/03/2017 05:15 PM    LYMPHOCYTES 8.20 (L) 07/03/2017 05:15 PM    MONOCYTES 7.10 07/03/2017 05:15 PM    EOSINOPHILS 1.70 07/03/2017 05:15 PM    BASOPHILS 0.20 07/03/2017 05:15 PM    HYPOCHROMIA 1+ 10/28/2011 05:35 AM    ANISOCYTOSIS 1+ 07/03/2017 05:15 PM      Lab Results   Component Value Date/Time    CALCIUM 8.5 07/03/2017 05:15 PM    ASTSGOT 25 07/03/2017 05:15 PM    ALTSGPT 20 07/03/2017 05:15 PM    ALKPHOSPHAT 82 07/03/2017 05:15 PM    TBILIRUBIN 0.4 07/03/2017 05:15 PM    ALBUMIN 3.3 07/03/2017 05:15 PM    ALBUMIN 1.28 (L) 10/02/2011 04:30 AM    TOTPROTEIN 6.0 07/03/2017 05:15 PM    TOTPROTEIN 4.50 (L) 10/02/2011 04:30 AM     Lab Results   Component Value Date/Time    URICACID 4.4 01/21/2016 08:36 AM    ANTINUCAB None Detected 10/02/2011 04:30 AM     Lab Results   Component Value Date/Time     L9JXFGSPLGE 90 10/02/2011 04:30 AM    Z6ZAOGOIFYW 21 10/02/2011 04:30 AM     Lab Results   Component Value Date/Time    AGBMAB Negative 01/18/2012 03:50 AM    GBMABA Negative 01/18/2012 03:50 AM    ANCAIGG <1:20 07/11/2017 03:38 PM    [id number] 90 10/02/2011 04:30 AM     Lab Results   Component Value Date/Time    CPKTOTAL 18 09/27/2011 05:05 AM     Lab Results   Component Value Date/Time    FLTYPE Stool 09/22/2006 01:04 PM      Results for orders placed in visit on 05/02/13   CT-CHEST, HIGH RESOLUTION LUNG    Impression 1. Bilateral lower lobe bronchiectasis, grossly unchanged compared with the prior conventional chest CT 1/14/12.  2. Minimal bilateral lower lobe interstitial opacities as well as confluent opacities in those areas, greater on the left, suggesting active airspace disease and/or atelectasis.  3. Probable uncalcified left lower lobe nodule, an equivocal finding with high-resolution technique.  Conventional chest CT would be needed for confirmation.  Note that no similar lesion was identified on the prior chest scan 1/14/12.            INTERPRETING LOCATION: [address] [place] [address], [facility]     Results for orders placed during the hospital encounter of 07/27/17   CT-CHEST (THORAX) W/O    Impression 1.  There is no significant interval change in the diffuse bilateral subpleural interstitial lung disease with a basilar predominance.  2.  Bibasilar and medial segment right middle lobe bronchiectasis are again seen with minimal fluid now seen on the left within the dilated bronchi. This is most consistent with postinflammatory/infectious change.  3.  There is underlying emphysema/COPD with upper lobe paraseptal blebs.  4.  There is evidence of previous granulomatous disease with calcified granulomata, calcified nodes, and calcifications in the liver and spleen. This is consistent with the history of pulmonary histoplasmosis.  5.  There are no new suspicious findings.  6.  Enthesopathy again noted in the  thoracic spine.         Assessment and Plan:     1. Wegener's granulomatosis (CMS-HCC)  Seems to be in remission currently on prednisone at 12 mg by mouth every day, patient spent 12 mg by mouth daily for the last month, we are decreasing extremely slowly and using the ANCA levels as our monitor in addition to any other symptoms. Difficult to assert if any issues with kidneys as patient is already on dialysis, and difficult to evaluate if lungs are involved with chronic Pseudomonas infection. We'll have to determine with symptoms such as nasal ulcers otitis media complaints fevers of unknown etiology.  We will recheck ANCA about February 2018 and continued to taper prednisone extremely slowly.  - ANTI-NEUTROPHIL CYTOPLASMIC AB W/RFLX; Future    2. Pseudomonas aeruginosa colonization  Followed by pulmonology Dr Vaughan    3. ESRD on dialysis (CMS-HCC)  This may impact what medications we can use to treat this patient's rheumatological disease, we will have to continue to monitor closely.    4. Essential hypertension  May impact the type of medications we can use for this patient's arthritis. We will have to keep this under advisement.    5. Atrial fibrillation, unspecified type (CMS-HCC)    6. Pacemaker    Followup: Return in about 6 months (around 3/5/2018). or sooner prn    Patient was seen 30 minutes face-to-face of which more than 50% of the time was spent counseling the patient (excluding time for procedures)  regarding  rheumatological condition and care. Therapy was discussed in detail.    Please note that this dictation was created using voice recognition software. I have made every reasonable attempt to correct obvious errors, but I expect that there are errors of grammar and possibly content that I did not discover before finalizing the note.

## 2017-10-19 DIAGNOSIS — M31.30 WEGENER'S DISEASE, PULMONARY: ICD-10-CM

## 2017-10-24 ENCOUNTER — HOSPITAL ENCOUNTER (OUTPATIENT)
Dept: RADIOLOGY | Facility: MEDICAL CENTER | Age: 77
End: 2017-10-24
Attending: INTERNAL MEDICINE
Payer: MEDICARE

## 2017-10-24 DIAGNOSIS — M31.30 WEGENER'S DISEASE, PULMONARY: ICD-10-CM

## 2017-10-24 PROCEDURE — 71020 DX-CHEST-2 VIEWS: CPT

## 2017-10-25 DIAGNOSIS — G47.33 OSA (OBSTRUCTIVE SLEEP APNEA): ICD-10-CM

## 2017-11-02 DIAGNOSIS — M31.30 WEGENERS GRANULOMATOSIS: ICD-10-CM

## 2017-11-02 DIAGNOSIS — R91.8 ABNORMAL CT SCAN OF LUNG: ICD-10-CM

## 2017-11-02 DIAGNOSIS — R06.09 DYSPNEA ON EXERTION: ICD-10-CM

## 2017-11-02 NOTE — TELEPHONE ENCOUNTER
Caller Name: Gilberto Brown                 Call Back Number: [phone] (home) [phone] (work)        Patient approves a detailed voicemail message: N\A    Have we ever prescribed this med? Yes.  If yes, what date? 10/25/16    Last OV: 8/22/17- Eric Vaughan     Next OV: 11/28/17- Eric Vaughan     DX: Copd    Medications: Advair     Current Outpatient Prescriptions   Medication Sig Dispense Refill   • metoprolol SR (TOPROL XL) 25 MG TABLET SR 24 HR      • sodium chloride (HYPER-SAL) 7 % Nebu Soln INHALE THE CONTENTS OF ONE VIAL (4ML) TWICE A DAY VIA NEBULIZER  6   • predniSONE (DELTASONE) 10 MG Tab 1 tab po qday with 2 one mg tabs for a total 12 mgpo qday 90 Tab 0   • ethyl chloride Aerosol APPLY TO AV-FISTULA PRIORTO HEMODIALYSIS 103.5 mL 11   • predniSONE (DELTASONE) 1 MG Tab Take 3 tabs with one 10 mg tab of prednisone every day 90 Tab 0   • RENVELA 800 MG Tab      • loperamide (IMODIUM) 2 MG Cap      • non-formulary med Inhale 2 L by mouth every bedtime. OXYGEN AT 2L/NC AS NEEDED AND AT NIGHT  Indications: copd     • metoprolol (LOPRESSOR) 25 MG Tab Take 25 mg by mouth 2 times a day.     • predniSONE (DELTASONE) 1 MG Tab Take 14 mg by mouth every day. Pt takes x1 10mg tabs and x4 1mg tabs for total dose = 14mg QD     • ipratropium-albuterol (DUONEB) 0.5-2.5 (3) MG/3ML nebulizer solution 3 mL by Nebulization route 4 times a day. 1080 mL 3   • B Complex-C-Folic Acid (DIALYVITE TABLET) Tab Dialyvite -nehro vit B complex-C-folic acid 1 tablet po QD Dialysis Pt 90 Tab 3   • fluticasone-salmeterol (ADVAIR DISKUS) 500-50 MCG/DOSE AEROSOL POWDER, BREATH ACTIVATED Inhale 1 Puff by mouth 2 times a day. Rinse mouth after each use. 1 Inhaler 6   • albuterol 108 (90 BASE) MCG/ACT Aero Soln inhalation aerosol Inhale 2 Puffs by mouth every four hours as needed for Shortness of Breath.     • ranitidine (ZANTAC) 150 MG Tab TAKE 1 TABLET BY MOUTH EVERY NIGHT AT BEDTIME 30 Tab 0   • finasteride (PROSCAR) 5 MG Tab Take 5 mg by  mouth every day.     • simvastatin (ZOCOR) 40 MG TABS Take 40 mg by mouth every evening.     • epoetin lucina (EPOGEN,PROCRIT) 3000 UNIT/ML SOLN Inject 2,200 Units as instructed every Monday, Wednesday, and Friday. With dialysis     • sevelamer (RENAGEL) 800 MG TABS Take 800 mg by mouth 3 times a day, with meals.     • tamsulosin (FLOMAX) 0.4 MG capsule Take 0.4 mg by mouth every day.     • aspirin EC (ECOTRIN) 81 MG TBEC Take 81 mg by mouth every day.       No current facility-administered medications for this visit.

## 2017-11-28 ENCOUNTER — OFFICE VISIT (OUTPATIENT)
Dept: PULMONOLOGY | Facility: HOSPICE | Age: 77
End: 2017-11-28
Payer: MEDICARE

## 2017-11-28 VITALS
TEMPERATURE: 97.7 F | HEART RATE: 80 BPM | DIASTOLIC BLOOD PRESSURE: 64 MMHG | RESPIRATION RATE: 16 BRPM | BODY MASS INDEX: 21.19 KG/M2 | SYSTOLIC BLOOD PRESSURE: 138 MMHG | OXYGEN SATURATION: 96 % | WEIGHT: 135 LBS | HEIGHT: 67 IN

## 2017-11-28 DIAGNOSIS — J47.9 BRONCHIECTASIS WITHOUT COMPLICATION (HCC): ICD-10-CM

## 2017-11-28 DIAGNOSIS — J84.9 ILD (INTERSTITIAL LUNG DISEASE) (HCC): ICD-10-CM

## 2017-11-28 DIAGNOSIS — G47.33 OBSTRUCTIVE SLEEP APNEA: ICD-10-CM

## 2017-11-28 PROCEDURE — 99214 OFFICE O/P EST MOD 30 MIN: CPT | Performed by: INTERNAL MEDICINE

## 2017-11-28 NOTE — PROGRESS NOTES
Gilberto Brown is a 77 y.o. male here for Wegener's and bronchiectasis.    History of Present Illness:    The patient is a 77-year-old male who has severe Wegener's granulomatosis. He has end-stage renal disease on dialysis. He has interstitial lung disease and bronchiectasis. 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 She is being followed by rheumatology and renal. He is on prednisone 12 mg daily at this time and previously he was on Rituxan. The last CT scan of the chest was in July which showed stable interstitial disease and bronchiectasis. His last pulmonary function tests were in January 2017 which showed a diffusion capacity of 60% and a total lung capacity of 76%. He also has sleep apnea for which she is on CPAP. He is on an AutoPap between 4 and 10 cm water. He did not bring his bradycardia and for my review today. His oxygen saturation today is 96% on room air. His temperature is 97.7. He is doing airway clearance with nebulized 7% saline twice a day and is also doing DuoNeb nebs 3 times a day he uses a flutter device as well as the chest percussion at least twice a day. He is also taking Advair and Flonase. I'm overall his pulmonary status has been relatively stable. She does have a propensity of 4 pseudomonal infections and pneumonia but lately he has done fairly well. At one point we were doing nebulized aminoglycosides but given the results of his sensitivity panels it was decided to stop doing antibiotic nebulization. He has no new complaints today. He is getting his dialysis 3 times a week. He says he typically gets a fever at the end of dialysis which lasts for about 4 hours and then it breaks.    Constitutional:  Negative for fever, chills, sweats, and fatigue.  Eyes:  Negative for eye pain and visual changes.  HENT:  Negative for tinnitus and hoarse voice.  Cardiovascular:  Negative for chest pain, leg swelling, syncope and orthopnea.  Respiratory:  See HPI  for pertinent negatives  Sleep:  Negative for somnolence, loud snoring, sleep disturbance due to breathing, insomnia.  Gastrointestinal:  Negative for dysphagia, nausea and abdominal pain.  Heme/lymph:  Denies easy bruising, blood clots.  Musculoskeletal:  Negative for arthralgias, sore muscles and back pain.  Skin:  Negative for rash and color change.  Neurological:  Negative for headaches, lightheadedness and weakness.  Psychiatric:  Denies depression.    Current Outpatient Prescriptions   Medication Sig Dispense Refill   • predniSONE (DELTASONE) 10 MG Tab One tablet by mouth daily with  Two 1 mg tablets for a total of 12 mg by mouth daily 90 Tab 0   • lidocaine (XYLOCAINE) 2 % Gel APPLY TO AFFECTED AREA DAILY AS NEEDED. 2 Bottle 11   • ADVAIR DISKUS 500-50 MCG/DOSE AEROSOL POWDER, BREATH ACTIVATED INHALE 1 DOSE BY MOUTH TWICE DAILY. RINSE MOUTH AFTER USE 1 Inhaler 5   • metoprolol SR (TOPROL XL) 25 MG TABLET SR 24 HR      • sodium chloride (HYPER-SAL) 7 % Nebu Soln INHALE THE CONTENTS OF ONE VIAL (4ML) TWICE A DAY VIA NEBULIZER  6   • predniSONE (DELTASONE) 10 MG Tab 1 tab po qday with 2 one mg tabs for a total 12 mgpo qday 90 Tab 0   • ethyl chloride Aerosol APPLY TO AV-FISTULA PRIORTO HEMODIALYSIS 103.5 mL 11   • predniSONE (DELTASONE) 1 MG Tab Take 3 tabs with one 10 mg tab of prednisone every day 90 Tab 0   • RENVELA 800 MG Tab      • loperamide (IMODIUM) 2 MG Cap      • non-formulary med Inhale 2 L by mouth every bedtime. OXYGEN AT 2L/NC AS NEEDED AND AT NIGHT  Indications: copd     • metoprolol (LOPRESSOR) 25 MG Tab Take 25 mg by mouth 2 times a day.     • predniSONE (DELTASONE) 1 MG Tab Take 14 mg by mouth every day. Pt takes x1 10mg tabs and x4 1mg tabs for total dose = 14mg QD     • ipratropium-albuterol (DUONEB) 0.5-2.5 (3) MG/3ML nebulizer solution 3 mL by Nebulization route 4 times a day. 1080 mL 3   • B Complex-C-Folic Acid (DIALYVITE TABLET) Tab Dialyvite -nehro vit B complex-C-folic acid 1 tablet  po QD Dialysis Pt 90 Tab 3   • albuterol 108 (90 BASE) MCG/ACT Aero Soln inhalation aerosol Inhale 2 Puffs by mouth every four hours as needed for Shortness of Breath.     • ranitidine (ZANTAC) 150 MG Tab TAKE 1 TABLET BY MOUTH EVERY NIGHT AT BEDTIME 30 Tab 0   • finasteride (PROSCAR) 5 MG Tab Take 5 mg by mouth every day.     • simvastatin (ZOCOR) 40 MG TABS Take 40 mg by mouth every evening.     • epoetin lucina (EPOGEN,PROCRIT) 3000 UNIT/ML SOLN Inject 2,200 Units as instructed every Monday, Wednesday, and Friday. With dialysis     • sevelamer (RENAGEL) 800 MG TABS Take 800 mg by mouth 3 times a day, with meals.     • tamsulosin (FLOMAX) 0.4 MG capsule Take 0.4 mg by mouth every day.     • aspirin EC (ECOTRIN) 81 MG TBEC Take 81 mg by mouth every day.       No current facility-administered medications for this visit.        Social History   Substance Use Topics   • Smoking status: Former Smoker     Years: 30.00     Types: Pipe     Quit date: 1/1/1990   • Smokeless tobacco: Former User     Types: Chew   • Alcohol use 4.8 oz/week     7 Glasses of wine, 1 Standard drinks or equivalent per week      Comment: Red wine nightly       Past Medical History:   Diagnosis Date   • Allergy    • Arthritis     left knee, hands   • ASTHMA    • BPH (benign prostatic hyperplasia)    • Breath shortness     WITH EXERTION   • Bronchitis     chronic   • CA in situ resp sys    • Chronic bronchitis with productive mucopurulent cough (CMS-[facility])    • COPD    • Coughing blood 2011   • Dialysis     on hemodialysis   • Dyslipidemia    • EMPHYSEMA    • GERD (gastroesophageal reflux disease)    • Heart burn    • Hip pain    • HTN    • Hypertension    • Indigestion    • TIMOTHY (obstructive sleep apnea)    • Other specified disorder of intestines    • Pacemaker 1988   • Pain 7/16/13    leg's and hand's   • Pneumonia 2011   • Pulmonary histoplasmosis (CMS-[facility])    • Renal disorder     dialysis 3 days a week   • Sick sinus syndrome (CMS-[facility])    • Sleep  apnea     uses cpap at [facility]   • Snoring    • Unspecified hemorrhagic conditions     bruises easily   • Unspecified urinary incontinence    • Urinary bladder disorder    • Wegener's disease, pulmonary (CMS-[facility])        Past Surgical History:   Procedure Laterality Date   • AV FISTULOGRAM Right 4/12/2016    Procedure: AV FISTULOGRAM , VENOPLASTY X 2;  Surgeon: Nessa Johansen M.D.;  Location: SURGERY [facility];  Service:    • RECOVERY  9/3/2015    Procedure: IR1 VASCULAR CASE-JOHANSEN RIGHT DIALYSIS FISTULOGRAM, POSSIBLE INTERVENTION;  Surgeon: Recoveryonly Surgery;  Location: SURGERY PRE-POST PROC UNIT [facility];  Service:    • RECOVERY  2/26/2015    Performed by Ir-Recovery Surgery at SURGERY SAME DAY [facility]   • RECOVERY  10/3/2014    Performed by Ir-Recovery Surgery at [facility]ERY [facility]   • RECOVERY  8/7/2014    Performed by Ir-Recovery Surgery at SURGERY [facility]   • RECOVERY  12/17/2013    Performed by Ir-Recovery Surgery at SURGERY SAME DAY [facility]   • BRONCHOSCOPY-ENDO  7/18/2013    Performed by Juan F Rubio M.D. at SURGERY [facility]   • RECOVERY  7/17/2013    Performed by Ir-Recovery Surgery at SURGERY SAME DAY [facility]   • AV FISTULA REVISION  6/9/2012    Performed by NESSA JOHANSEN at SURGERY [facility]   • RECOVERY  4/19/2012    Performed by SURGERY, IR-RECOVERY at [facility] SAME DAY [facility]   • AV FISTULA CREATION  3/8/2012    Performed by NESSA JOHANSEN at SURGERY [facility]   • GASTROSCOPY WITH BIOPSY  1/17/2012    Performed by ZURDO HARRISON at ENDOSCOPY [facility]   • CATH PLACEMENT  10/8/2011    Performed by NESSA JOHANSEN at SURGERY [facility]   • GASTROSCOPY WITH BALLOON DILATATION  9/28/2011    Performed by KT FERNANDEZ at [facility]   • PACEMAKER INSERTION  4/2009   • ROTATOR CUFF REPAIR  2003    right   • HERNIA REPAIR  1990    right inguinal hernia   • HIP REPLACEMENT, TOTAL      right   •  "TONSILLECTOMY         Allergies:  Erythromycin and Rituximab    Family History   Problem Relation Age of Onset   • Stroke Father    • Heart Disease Father    • Cancer     • Stroke Mother        Physical Examination    Vitals:    11/28/17 1343   Height: 1.702 m (5' 7\")   Weight: 61.2 kg (135 lb)   Weight % change since last entry.: 0 %   BP: 138/64   Pulse: 80   BMI (Calculated): 21.14   Resp: 16   Temp: 36.5 °C (97.7 °F)   O2 sat % room air: 96 %       Physical Exam:  Constitutional:  Well developed and well nourished.  Head:  Normocephalic and atraumatic.  Nose:  Nose normal.  Mouth/Throat:  Oropharynx is clear and moist, no lesions.    Neck:  Normal range of motion.  Supple.  No JVD.  Cardiovascular:  Normal rate, regular rhythm, normal heart sounds. No edema  Pulmonary/Chest:Some loose rhonchi and some basilar crackles. Respiratory effort non labored  Musculoskeletal.  No muscular atrophy.  Lymphadenopathy:  No cervical or supraclavicular adenopathy  Neurological:  Alert and oriented.  Cranial nerves intact.  No focal deficits  Skin:  No rashes or ulcers.  Psyciatric:  Normal mood and affect.    Assessment and Plan:  1. ILD (interstitial lung disease) (CMS-[facility])  The patient is interstitial lung disease related to Wegener's granulomatosis. Overall his disease is relatively stable and he continues on prednisone 12 mg daily. His last CT scan of the chest was in July and was stable and clinically he has not had any exacerbations or infections since that time. He is being followed closely by rheumatology. He was previously on Rituxan and the plan is to wean down on the prednisone slowly as closely monitored by rheumatology.    2. Bronchiectasis without complication (CMS-[facility])  Patient has an aggressive airway clearance regimen and I would like him to continue with the 7% nebulized saline, percussion vest, flutter device and doing nebs as well as continue with the Advair and Flonase. He has chronic colonization of " multidrug-resistant pseudomonas and the best option at this point is aggressive airway clearance rather than antibiotic therapy.    3. Obstructive sleep apnea  The patient will continue AutoPap at the current settings.        Followup Return in about 3 months (around 2/28/2018) for follow up with the pulmonary physician.

## 2017-12-07 ENCOUNTER — APPOINTMENT (RX ONLY)
Dept: URBAN - METROPOLITAN AREA CLINIC 20 | Facility: CLINIC | Age: 77
Setting detail: DERMATOLOGY
End: 2017-12-07

## 2017-12-07 DIAGNOSIS — L57.0 ACTINIC KERATOSIS: ICD-10-CM

## 2017-12-07 DIAGNOSIS — L60.3 NAIL DYSTROPHY: ICD-10-CM

## 2017-12-07 PROBLEM — C44.319 BASAL CELL CARCINOMA OF SKIN OF OTHER PARTS OF FACE: Status: ACTIVE | Noted: 2017-12-07

## 2017-12-07 PROCEDURE — ? OBSERVATION

## 2017-12-07 PROCEDURE — 17003 DESTRUCT PREMALG LES 2-14: CPT

## 2017-12-07 PROCEDURE — ? COUNSELING

## 2017-12-07 PROCEDURE — 99213 OFFICE O/P EST LOW 20 MIN: CPT | Mod: 25

## 2017-12-07 PROCEDURE — ? LIQUID NITROGEN

## 2017-12-07 PROCEDURE — 17000 DESTRUCT PREMALG LESION: CPT

## 2017-12-07 ASSESSMENT — LOCATION ZONE DERM
LOCATION ZONE: EAR
LOCATION ZONE: HAND
LOCATION ZONE: FINGERNAIL

## 2017-12-07 ASSESSMENT — LOCATION SIMPLE DESCRIPTION DERM
LOCATION SIMPLE: LEFT THUMB
LOCATION SIMPLE: RIGHT EAR
LOCATION SIMPLE: LEFT EAR
LOCATION SIMPLE: LEFT HAND
LOCATION SIMPLE: RIGHT HAND

## 2017-12-07 ASSESSMENT — LOCATION DETAILED DESCRIPTION DERM
LOCATION DETAILED: LEFT THUMBNAIL
LOCATION DETAILED: LEFT RADIAL DORSAL HAND
LOCATION DETAILED: RIGHT SUPERIOR HELIX
LOCATION DETAILED: LEFT SUPERIOR HELIX
LOCATION DETAILED: RIGHT THUMBNAIL

## 2017-12-07 NOTE — PROCEDURE: LIQUID NITROGEN
Post-Care Instructions: I reviewed with the patient in detail post-care instructions. Patient is to wear sunprotection, and avoid picking at any of the treated lesions. Pt may apply Vaseline to crusted or scabbing areas.
Detail Level: Detailed
Duration Of Freeze Thaw-Cycle (Seconds): 3
Consent: The patient's consent was obtained including but not limited to risks of crusting, scabbing, blistering, scarring, darker or lighter pigmentary change, recurrence, incomplete removal and infection.
Render Post-Care Instructions In Note?: no

## 2017-12-12 DIAGNOSIS — J47.9 BRONCHIECTASIS WITHOUT COMPLICATION (HCC): ICD-10-CM

## 2017-12-12 RX ORDER — SODIUM CHLORIDE FOR INHALATION 7 %
4 VIAL, NEBULIZER (ML) INHALATION 2 TIMES DAILY
Qty: 240 ML | Refills: 2 | Status: SHIPPED | OUTPATIENT
Start: 2017-12-12 | End: 2018-03-26 | Stop reason: SDUPTHER

## 2017-12-12 NOTE — TELEPHONE ENCOUNTER
Have we ever prescribed this med? Yes.  If yes, what date? 2017    Last OV: 17-FORT    Next OV: 18-A ROT     DX: BRONCHIECTASIS     Medications:   Requested Prescriptions     Pending Prescriptions Disp Refills   • sodium chloride (HYPER-SAL) 7 % Nebu Soln  6   • sodium chloride (HYPER-SAL) 7 % Nebu Soln 240 mL 2     Si mL by Nebulization route 2 Times a Day.

## 2018-01-10 DIAGNOSIS — J98.8 CHRONIC RESPIRATORY INFECTION: ICD-10-CM

## 2018-01-11 ENCOUNTER — PATIENT MESSAGE (OUTPATIENT)
Dept: RHEUMATOLOGY | Facility: PHYSICIAN GROUP | Age: 78
End: 2018-01-11

## 2018-01-11 DIAGNOSIS — J98.8 CHRONIC RESPIRATORY INFECTION: ICD-10-CM

## 2018-01-11 RX ORDER — IPRATROPIUM BROMIDE AND ALBUTEROL SULFATE 2.5; .5 MG/3ML; MG/3ML
SOLUTION RESPIRATORY (INHALATION)
Qty: 1080 ML | Refills: 3 | Status: SHIPPED | OUTPATIENT
Start: 2018-01-11 | End: 2019-03-01 | Stop reason: CLARIF

## 2018-01-11 RX ORDER — IPRATROPIUM BROMIDE AND ALBUTEROL SULFATE 2.5; .5 MG/3ML; MG/3ML
SOLUTION RESPIRATORY (INHALATION)
Qty: 3240 ML | Refills: 3 | OUTPATIENT
Start: 2018-01-11

## 2018-01-11 NOTE — TELEPHONE ENCOUNTER
Have we ever prescribed this med? Yes.  If yes, what date? 4/3/17    Last OV: 11/28/17    Next OV: 6/21/18    DX: Chronic respiratory infection (J98.8)    Medications: ipratropium-albuterol (DUONEB) 0.5-2.5 (3) MG/3ML nebulizer solution

## 2018-01-11 NOTE — TELEPHONE ENCOUNTER
Please Deny. ipratropium-albuterol (DUONEB) 0.5-2.5 (3) MG/3ML nebulizer solution was already filled today

## 2018-01-12 ENCOUNTER — APPOINTMENT (OUTPATIENT)
Dept: RADIOLOGY | Facility: MEDICAL CENTER | Age: 78
End: 2018-01-12
Attending: SURGERY
Payer: MEDICARE

## 2018-01-12 ENCOUNTER — HOSPITAL ENCOUNTER (OUTPATIENT)
Facility: MEDICAL CENTER | Age: 78
End: 2018-01-12
Attending: SURGERY | Admitting: SURGERY
Payer: MEDICARE

## 2018-01-12 VITALS
DIASTOLIC BLOOD PRESSURE: 61 MMHG | SYSTOLIC BLOOD PRESSURE: 117 MMHG | OXYGEN SATURATION: 95 % | BODY MASS INDEX: 21.18 KG/M2 | HEART RATE: 101 BPM | HEIGHT: 67 IN | WEIGHT: 134.92 LBS | TEMPERATURE: 97.3 F | RESPIRATION RATE: 16 BRPM

## 2018-01-12 DIAGNOSIS — T82.858A: ICD-10-CM

## 2018-01-12 LAB
ANION GAP SERPL CALC-SCNC: 11 MMOL/L (ref 0–11.9)
BASOPHILS # BLD AUTO: 0.1 % (ref 0–1.8)
BASOPHILS # BLD: 0.01 K/UL (ref 0–0.12)
BUN SERPL-MCNC: 55 MG/DL (ref 8–22)
CALCIUM SERPL-MCNC: 8.9 MG/DL (ref 8.5–10.5)
CHLORIDE SERPL-SCNC: 101 MMOL/L (ref 96–112)
CO2 SERPL-SCNC: 27 MMOL/L (ref 20–33)
CREAT SERPL-MCNC: 3.75 MG/DL (ref 0.5–1.4)
EOSINOPHIL # BLD AUTO: 0.02 K/UL (ref 0–0.51)
EOSINOPHIL NFR BLD: 0.3 % (ref 0–6.9)
ERYTHROCYTE [DISTWIDTH] IN BLOOD BY AUTOMATED COUNT: 58.5 FL (ref 35.9–50)
GLUCOSE SERPL-MCNC: 80 MG/DL (ref 65–99)
HCT VFR BLD AUTO: 33.5 % (ref 42–52)
HGB BLD-MCNC: 10.7 G/DL (ref 14–18)
IMM GRANULOCYTES # BLD AUTO: 0.07 K/UL (ref 0–0.11)
IMM GRANULOCYTES NFR BLD AUTO: 1 % (ref 0–0.9)
LYMPHOCYTES # BLD AUTO: 0.47 K/UL (ref 1–4.8)
LYMPHOCYTES NFR BLD: 6.9 % (ref 22–41)
MCH RBC QN AUTO: 31.1 PG (ref 27–33)
MCHC RBC AUTO-ENTMCNC: 31.9 G/DL (ref 33.7–35.3)
MCV RBC AUTO: 97.4 FL (ref 81.4–97.8)
MONOCYTES # BLD AUTO: 0.46 K/UL (ref 0–0.85)
MONOCYTES NFR BLD AUTO: 6.7 % (ref 0–13.4)
NEUTROPHILS # BLD AUTO: 5.83 K/UL (ref 1.82–7.42)
NEUTROPHILS NFR BLD: 85 % (ref 44–72)
NRBC # BLD AUTO: 0 K/UL
NRBC BLD-RTO: 0 /100 WBC
PLATELET # BLD AUTO: 256 K/UL (ref 164–446)
PMV BLD AUTO: 8.6 FL (ref 9–12.9)
POTASSIUM SERPL-SCNC: 4.2 MMOL/L (ref 3.6–5.5)
RBC # BLD AUTO: 3.44 M/UL (ref 4.7–6.1)
SODIUM SERPL-SCNC: 139 MMOL/L (ref 135–145)
WBC # BLD AUTO: 6.9 K/UL (ref 4.8–10.8)

## 2018-01-12 PROCEDURE — 85025 COMPLETE CBC W/AUTO DIFF WBC: CPT

## 2018-01-12 PROCEDURE — 700111 HCHG RX REV CODE 636 W/ 250 OVERRIDE (IP)

## 2018-01-12 PROCEDURE — 160002 HCHG RECOVERY MINUTES (STAT)

## 2018-01-12 PROCEDURE — 80048 BASIC METABOLIC PNL TOTAL CA: CPT

## 2018-01-12 PROCEDURE — 36901 INTRO CATH DIALYSIS CIRCUIT: CPT

## 2018-01-12 RX ORDER — HYDROCODONE BITARTRATE AND ACETAMINOPHEN 5; 325 MG/1; MG/1
1 TABLET ORAL EVERY 4 HOURS PRN
Status: DISCONTINUED | OUTPATIENT
Start: 2018-01-12 | End: 2018-01-12 | Stop reason: HOSPADM

## 2018-01-12 RX ORDER — ONDANSETRON 2 MG/ML
4 INJECTION INTRAMUSCULAR; INTRAVENOUS EVERY 4 HOURS PRN
Status: DISCONTINUED | OUTPATIENT
Start: 2018-01-12 | End: 2018-01-12 | Stop reason: HOSPADM

## 2018-01-12 RX ORDER — HEPARIN SODIUM,PORCINE 1000/ML
VIAL (ML) INJECTION
Status: COMPLETED
Start: 2018-01-12 | End: 2018-01-12

## 2018-01-12 RX ORDER — ONDANSETRON 2 MG/ML
4 INJECTION INTRAMUSCULAR; INTRAVENOUS PRN
Status: DISCONTINUED | OUTPATIENT
Start: 2018-01-12 | End: 2018-01-12

## 2018-01-12 RX ORDER — MORPHINE SULFATE 4 MG/ML
1-2 INJECTION, SOLUTION INTRAMUSCULAR; INTRAVENOUS
Status: DISCONTINUED | OUTPATIENT
Start: 2018-01-12 | End: 2018-01-12 | Stop reason: HOSPADM

## 2018-01-12 RX ORDER — PROTAMINE SULFATE 10 MG/ML
20 INJECTION, SOLUTION INTRAVENOUS ONCE
Status: COMPLETED | OUTPATIENT
Start: 2018-01-12 | End: 2018-01-12

## 2018-01-12 RX ORDER — CEFAZOLIN SODIUM 1 G/3ML
INJECTION, POWDER, FOR SOLUTION INTRAMUSCULAR; INTRAVENOUS
Status: COMPLETED
Start: 2018-01-12 | End: 2018-01-12

## 2018-01-12 RX ORDER — HEPARIN SODIUM 1000 [USP'U]/ML
3000 INJECTION, SOLUTION INTRAVENOUS; SUBCUTANEOUS ONCE
Status: COMPLETED | OUTPATIENT
Start: 2018-01-12 | End: 2018-01-12

## 2018-01-12 RX ORDER — MIDAZOLAM HYDROCHLORIDE 1 MG/ML
INJECTION INTRAMUSCULAR; INTRAVENOUS
Status: COMPLETED
Start: 2018-01-12 | End: 2018-01-12

## 2018-01-12 RX ORDER — ACETAMINOPHEN 325 MG/1
325-650 TABLET ORAL EVERY 6 HOURS PRN
Status: DISCONTINUED | OUTPATIENT
Start: 2018-01-12 | End: 2018-01-12 | Stop reason: HOSPADM

## 2018-01-12 RX ORDER — PROTAMINE SULFATE 10 MG/ML
INJECTION, SOLUTION INTRAVENOUS
Status: COMPLETED
Start: 2018-01-12 | End: 2018-01-12

## 2018-01-12 RX ORDER — MIDAZOLAM HYDROCHLORIDE 1 MG/ML
.5-2 INJECTION INTRAMUSCULAR; INTRAVENOUS PRN
Status: DISCONTINUED | OUTPATIENT
Start: 2018-01-12 | End: 2018-01-12 | Stop reason: HOSPADM

## 2018-01-12 RX ADMIN — CEFAZOLIN 1 G: 330 INJECTION, POWDER, FOR SOLUTION INTRAMUSCULAR; INTRAVENOUS at 16:20

## 2018-01-12 RX ADMIN — MIDAZOLAM 2 MG: 1 INJECTION INTRAMUSCULAR; INTRAVENOUS at 16:20

## 2018-01-12 RX ADMIN — PROTAMINE SULFATE 20 MG: 10 INJECTION, SOLUTION INTRAVENOUS at 16:57

## 2018-01-12 RX ADMIN — FENTANYL CITRATE 25 MCG: 50 INJECTION, SOLUTION INTRAMUSCULAR; INTRAVENOUS at 16:20

## 2018-01-12 RX ADMIN — MIDAZOLAM HYDROCHLORIDE 2 MG: 1 INJECTION INTRAMUSCULAR; INTRAVENOUS at 16:20

## 2018-01-12 RX ADMIN — HEPARIN SODIUM 3000 UNITS: 1000 INJECTION, SOLUTION INTRAVENOUS; SUBCUTANEOUS at 16:34

## 2018-01-12 ASSESSMENT — PAIN SCALES - GENERAL
PAINLEVEL_OUTOF10: 0

## 2018-01-12 NOTE — TELEPHONE ENCOUNTER
Called patient to assess symptoms. He has a history of GPA.  Overall he notes increasing fatigue and he is feeling more if the side effects from dialysis.  Currently he denies fevers.  However as noted in his MyChart message he noted a mass on the inside of his nose.  He is wondering if he needs to be seen sooner.  Advised that we will inform Dr. Ariza upon her return

## 2018-01-13 NOTE — OR SURGEON
Immediate Post OP Note    PreOp Diagnosis: Fistula stenosis.  PostOp Diagnosis: Same.    Procedure(s):  VASCULAR CASE-ELENO-RIGHT ARM DIALYSIS FISTULOGRAM AND VENOPLASTY OF STENOSIS.    Surgeon:  Nate Syed    Type of Anesthesia:  IV sedation for 45 minutes and local with 1% Lidocaine.    IR Staff:    Specimens:  None.    Estimated Blood Loss: 5ml.    Contrast: 36ml Visipaque.    Findings: Stenosis above anastomosis, treated with venoplasty.    Complications: None.    Dictated, # [id number].        1/12/2018 5:02 PM Nate Syed

## 2018-01-13 NOTE — DISCHARGE INSTRUCTIONS
ACTIVITY: Rest and take it easy for the first 24 hours.  A responsible adult is recommended to remain with you during that time.  It is normal to feel sleepy.  We encourage you to not do anything that requires balance, judgment or coordination.    MILD FLU-LIKE SYMPTOMS ARE NORMAL. YOU MAY EXPERIENCE GENERALIZED MUSCLE ACHES, THROAT IRRITATION, HEADACHE AND/OR SOME NAUSEA.    FOR 24 HOURS DO NOT:  Drive, operate machinery or run household appliances.  Drink beer or alcoholic beverages.   Make important decisions or sign legal documents.    SPECIAL INSTRUCTIONS: No lifting using right arm next 48 hours. May use fistula for dialysis.     DIET: To avoid nausea, slowly advance diet as tolerated, avoiding spicy or greasy foods for the first day.  Add more substantial food to your diet according to your physician's instructions.  Babies can be fed formula or breast milk as soon as they are hungry.  INCREASE FLUIDS AND FIBER TO AVOID CONSTIPATION.    SURGICAL DRESSING/BATHING: May remove dressing in 48 hours. May shower in 48 hours. Do not submerge site for 3 days.      FOLLOW-UP APPOINTMENT:  A follow-up appointment should be arranged with your doctor; call to schedule.    You should CALL YOUR PHYSICIAN if you develop:  Fever greater than 101 degrees F.  Pain not relieved by medication, or persistent nausea or vomiting.  Excessive bleeding (blood soaking through dressing) or unexpected drainage from the wound.  Extreme redness or swelling around the incision site, drainage of pus or foul smelling drainage.  Inability to urinate or empty your bladder within 8 hours.  Problems with breathing or chest pain.    You should call 911 if you develop problems with breathing or chest pain.  If you are unable to contact your doctor or surgical center, you should go to the nearest emergency room or urgent care center.  Physician's telephone #: [phone]     If any questions arise, call your doctor.  If your doctor is not available,  please feel free to call the Surgical Center at [phone].  The Center is open Monday through Friday from 7AM to 7PM.  You can also call the HEALTH HOTLINE open 24 hours/day, 7 days/week and speak to a nurse at [phone], or toll free at [phone].    A registered nurse may call you a few days after your surgery to see how you are doing after your procedure.    MEDICATIONS: Resume taking daily medication.  Take prescribed pain medication with food.  If no medication is prescribed, you may take non-aspirin pain medication if needed.  PAIN MEDICATION CAN BE VERY CONSTIPATING.  Take a stool softener or laxative such as senokot, pericolace, or milk of magnesia if needed.  If your physician has prescribed pain medication that includes Acetaminophen (Tylenol), do not take additional Acetaminophen (Tylenol) while taking the prescribed medication.    Depression / Suicide Risk    As you are discharged from this [facility] facility, it is important to learn how to keep safe from harming yourself.    Recognize the warning signs:  · Abrupt changes in personality, positive or negative- including increase in energy   · Giving away possessions  · Change in eating patterns- significant weight changes-  positive or negative  · Change in sleeping patterns- unable to sleep or sleeping all the time   · Unwillingness or inability to communicate  · Depression  · Unusual sadness, discouragement and loneliness  · Talk of wanting to die  · Neglect of personal appearance   · Rebelliousness- reckless behavior  · Withdrawal from people/activities they love  · Confusion- inability to concentrate     If you or a loved one observes any of these behaviors or has concerns about self-harm, here's what you can do:  · Talk about it- your feelings and reasons for harming yourself  · Remove any means that you might use to hurt yourself (examples: pills, rope, extension cords, firearm)  · Get professional help from the community (Mental  Health, Substance Abuse, psychological counseling)  · Do not be alone:Call your Safe Contact- someone whom you trust who will be there for you.  · Call your local CRISIS HOTLINE 261-3549 or 999-568-2495  · Call your local Children's Mobile Crisis Response Team [place] (315) 558-5102 or www.[employer]  · Call the toll free National Suicide Prevention Hotlines   · National Suicide Prevention Lifeline 494-758-ZMHS (8096)  · National Hope Line Network 800-SUICIDE (348-2844)

## 2018-01-13 NOTE — PROGRESS NOTES
IR Nursing Note:    Report received from Dov STANLEY  Patient underwent a Right fistulogram with angioplasty by Dr. Syed.  Patient was placed in a Supine position. Vitals were taken every 5 minutes and remained stable during procedure (see doc flow sheet for results).  CO2 waveform capnography was monitored and remained 24 throughout procedure. 1 suture was placed in Fistula by Dr. Syed.  There was a large hematoma post procedure.   A Tegaderm and guaze dressing was placed over surgical site. Report called to Romi STANLEY. Pt transported via gurney with RN to PPU -4 in a stable condition. Wife at bedside.  Dr. Syed updated patients wife.

## 2018-01-13 NOTE — OP REPORT
DATE OF SERVICE:  01/12/2018    SURGEON:  Nate Syed MD    ANESTHESIA:  Intravenous sedation for 45 minutes and local anesthesia with 3   mL of 1% lidocaine solution.    PREOPERATIVE DIAGNOSIS:  Right upper extremity dialysis fistula stenosis.    POSTOPERATIVE DIAGNOSIS:  Right upper extremity dialysis fistula stenosis.    PROCEDURE:  1.  Percutaneous cannulation of right upper arm arteriovenous fistula.  2.  Dialysis fistulogram.  3.  Percutaneous, transluminal venoplasty of proximal fistula stenosis using   6x4 mm drug-coated InPact balloon followed by 8x40 mm [place] angioplasty   balloon.    INDICATION FOR PROCEDURE:  The patient is a pleasant gentleman with multiple   medical problems who has been dialyzed via right upper extremity arteriovenous   fistula.  He has been requiring intervention for the fistula stenosis with the  last intervention was slightly more than a year ago.  Recently, there had been   problem with fistula use.  Discussion was made with patient.  He would like   to undergo dialysis fistulogram with possible intervention, fully   understanding all risks.    PROCEDURE:  Informed consent was obtained.  Patient was taken to   interventional radiology suite and was placed in the supine position.  He was   given Ancef intravenously.  A time-out procedure was done.  Intravenous   sedation was performed with fentanyl and Versed.  Patient was closely   monitored.    Next, his right upper extremity was sterilely prepped and draped in the normal   fashion.  The fistula was cannulated above the anastomosis and directed toward   the anastomosis, using a micro puncture kit.  A dialysis fistulogram was   performed through the microcatheter.  The microcatheter was upsized to a   6-[place] sheath.  Over the guidewire, percutaneous, transluminal venoplasty of   the fistula stenosis above the anastomosis was performed using 6x40 mm   drug-coated InPact balloon after the patient was given heparin 3000 units    intravenously.  Following this, angioplasty of the same lesion was performed   using an 8x40 mm angioplasty balloon.  A followup angiogram was obtained and   showed complete restoration of luminal patency with preservation of flow   through the remaining fistula.  The 6-Sami were removed and hemostasis was   achieved by placing a purse string at the puncture site using 4-0 nylon suture.    Patient was also given protamine 20 mg intravenously.  Sterile dressing was   applied.    IMPRESSION:   Patent right upper extremity arteriovenous fistula with   moderate-to-severe stenosis a few centimeters above the anastomosis.  This was   successfully treated with percutaneous, transluminal venoplasty using 6x40 mm   drug-coated InPact and 8x40 mm [place] angioplasty balloons. The remaining   fistula is patent with one area of mild stenosis seen at the junction of the   mid to upper arm area.  The anastomosis appears to be widely patent.    ESTIMATED BLOOD LOSS:  5 mL.    CONTRAST USED:  36 mL Visipaque.    COUNTS:  Sponge and instrument count was correct x2.    Patient was then awakened and taken to recovery area in stable condition with   excellent thrills over the fistula and palpable distal radial pulses.    I met with patient's wife postoperatively and informed her of the   intraoperative findings and what were done.       ____________________________________     MD ESMER JOHNSON / SHARRON    DD:  01/12/2018 17:13:48  DT:  01/12/2018 17:38:35    D#:  [id number]  Job#:  [id number]

## 2018-01-13 NOTE — PROGRESS NOTES
IR RN note:    Site Marked and Confirmed with MD, patient and RN pre procedure   Left arm fistulogram with interventions by MD Syed assisted by RT Martin,Left arm fistula access site;  +2 RUE pulses pre procedure   Report and medications given to RN Ivon

## 2018-01-13 NOTE — PROGRESS NOTES
Report received and Pt admitted to PPU 4 s/p fistulagram. Site CDI with no s/s of bleeding or hematoma; site bruised. Pt attached to all leads and monitors. VSS with no complaints of pain or nausea. Fluids and food offered. Orders reviewed.  Family at bedside.

## 2018-01-13 NOTE — PROGRESS NOTES
Pt. fistula site CDI with no s/s of bleeding or hematoma; bruising present and has not grown in size. Pt. Meets discharge criteria. Pt. And responsible adult given discharge instructions. Pt. And responsible adult educated and all questions answered. Signed copy on chart. All lines and drains DC'd. Pt. Belongings with Pt and Pt. Transported via wheel chair to car with escort.

## 2018-01-15 ENCOUNTER — TELEPHONE (OUTPATIENT)
Dept: RHEUMATOLOGY | Facility: PHYSICIAN GROUP | Age: 78
End: 2018-01-15

## 2018-01-15 NOTE — TELEPHONE ENCOUNTER
Please notify patient that we received his message regarding mass on his nose, he needs to be seen by dermatology to see if the mass is to be removed, please see if he can get his primary care doctor to put a referral in for dermatology for him.

## 2018-01-15 NOTE — TELEPHONE ENCOUNTER
"Regarding: Non-Urgent Medical Question  Contact: [phone]  ----- Message from Sweta Rosales M.D. sent at 1/11/2018  5:29 PM PST -----       ----- Message from Gilberto Brown to Cintia Ariza M.D. sent at 1/11/2018  9:42 AM -----   yesterday dialysis nurse asked about \"scratches\" on left side outside my nose.  i told her probably oxygen line at night.  actually, inside that side of nose has appeared  a \"growth\" as has appeared before.  i have been thru 2 weeks cold or flu with twice throwing up, both ends, but no fever.  i do my 4 hours daily of nebulizers/etc.     lungs remain reasonable.  usual tiredness.  i assume is a cold/flu but wonder now about nose.  Dr. Vaughan left [place] and went to [place].  my appointment with you is 6 March.  and when i have trouble as throwing up ... it is evening of dialysis.  usual tiredness but most dialysis  folk are so.  "

## 2018-01-18 ENCOUNTER — OFFICE VISIT (OUTPATIENT)
Dept: URGENT CARE | Facility: PHYSICIAN GROUP | Age: 78
End: 2018-01-18
Payer: MEDICARE

## 2018-01-18 VITALS
HEART RATE: 114 BPM | SYSTOLIC BLOOD PRESSURE: 124 MMHG | DIASTOLIC BLOOD PRESSURE: 58 MMHG | HEIGHT: 68 IN | TEMPERATURE: 97.9 F | BODY MASS INDEX: 20.31 KG/M2 | OXYGEN SATURATION: 95 % | WEIGHT: 134 LBS
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
## 2018-01-18 DIAGNOSIS — S00.81XA FOREHEAD ABRASION, INITIAL ENCOUNTER: ICD-10-CM

## 2018-01-18 DIAGNOSIS — S51.811A SKIN TEAR OF FOREARM WITHOUT COMPLICATION, RIGHT, INITIAL ENCOUNTER: ICD-10-CM

## 2018-01-18 PROCEDURE — 90471 IMMUNIZATION ADMIN: CPT | Performed by: NURSE PRACTITIONER

## 2018-01-18 PROCEDURE — 90714 TD VACC NO PRESV 7 YRS+ IM: CPT | Performed by: NURSE PRACTITIONER

## 2018-01-18 PROCEDURE — 99214 OFFICE O/P EST MOD 30 MIN: CPT | Mod: 25 | Performed by: NURSE PRACTITIONER

## 2018-01-18 RX ORDER — CEPHALEXIN 500 MG/1
500 CAPSULE ORAL 2 TIMES DAILY
Qty: 20 CAP | Refills: 0 | Status: SHIPPED | OUTPATIENT
Start: 2018-01-18 | End: 2018-04-13

## 2018-01-18 ASSESSMENT — ENCOUNTER SYMPTOMS
SENSORY CHANGE: 0
CHILLS: 0
DIZZINESS: 0
MYALGIAS: 0
TINGLING: 0
HEADACHES: 0
BLURRED VISION: 0
FEVER: 0
DOUBLE VISION: 0

## 2018-01-19 NOTE — PROGRESS NOTES
Subjective:      Gilberto Brown is a 77 y.o. male who presents with Fall (tripped and fell, hit head on furness, no loc. Ripped skin of R forearm)            HPI New problem. 77 year old male presenting with skin tear and multiple forehead abrasions after a fall. He denies arm pain, LOC, headache or dizziness. 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 No active bleeding at this time.   Erythromycin and Rituximab  Current Outpatient Prescriptions on File Prior to Visit   Medication Sig Dispense Refill   • ipratropium-albuterol (DUONEB) 0.5-2.5 (3) MG/3ML nebulizer solution USE 3 ML VIA NEBULIZER FOUR TIMES DAILY 1080 mL 3   • sodium chloride (HYPER-SAL) 7 % Nebu Soln 4 mL by Nebulization route 2 Times a Day. 240 mL 2   • lidocaine (XYLOCAINE) 2 % Gel APPLY TO AFFECTED AREA DAILY AS NEEDED. 2 Bottle 11   • ADVAIR DISKUS 500-50 MCG/DOSE AEROSOL POWDER, BREATH ACTIVATED INHALE 1 DOSE BY MOUTH TWICE DAILY. RINSE MOUTH AFTER USE 1 Inhaler 5   • predniSONE (DELTASONE) 10 MG Tab 1 tab po qday with 2 one mg tabs for a total 12 mgpo qday 90 Tab 0   • ethyl chloride Aerosol APPLY TO AV-FISTULA PRIORTO HEMODIALYSIS 103.5 mL 11   • RENVELA 800 MG Tab      • non-formulary med Inhale 2 L by mouth every bedtime. OXYGEN AT 2L/NC AS NEEDED AND AT NIGHT  Indications: copd     • metoprolol (LOPRESSOR) 25 MG Tab Take 25 mg by mouth 2 times a day.     • B Complex-C-Folic Acid (DIALYVITE TABLET) Tab Dialyvite -nehro vit B complex-C-folic acid 1 tablet po QD Dialysis Pt 90 Tab 3   • albuterol 108 (90 BASE) MCG/ACT Aero Soln inhalation aerosol Inhale 2 Puffs by mouth every four hours as needed for Shortness of Breath.     • ranitidine (ZANTAC) 150 MG Tab TAKE 1 TABLET BY MOUTH EVERY NIGHT AT BEDTIME 30 Tab 0   • finasteride (PROSCAR) 5 MG Tab Take 5 mg by mouth every day.     • simvastatin (ZOCOR) 40 MG TABS Take 40 mg by mouth every evening.     • epoetin lucina (EPOGEN,PROCRIT) 3000  "UNIT/ML SOLN Inject 2,200 Units as instructed every Monday, Wednesday, and Friday. With dialysis     • sevelamer (RENAGEL) 800 MG TABS Take 800 mg by mouth 3 times a day, with meals.     • tamsulosin (FLOMAX) 0.4 MG capsule Take 0.4 mg by mouth every day.     • aspirin EC (ECOTRIN) 81 MG TBEC Take 81 mg by mouth every day.     • loperamide (IMODIUM) 2 MG Cap        No current facility-administered medications on file prior to visit.      Social History     Social History   • Marital status:      Spouse name: N/A   • Number of children: N/A   • Years of education: N/A     Occupational History   • Not on file.     Social History Main Topics   • Smoking status: Former Smoker     Years: 30.00     Types: Pipe     Quit date: 1/1/1990   • Smokeless tobacco: Former User     Types: Chew   • Alcohol use 4.8 oz/week     7 Glasses of wine, 1 Standard drinks or equivalent per week      Comment: Red wine nightly   • Drug use: No   • Sexual activity: Yes     Partners: Female     Other Topics Concern   • Not on file     Social History Narrative   • No narrative on file     family history includes Heart Disease in his father; Stroke in his father and mother.      Review of Systems   Constitutional: Negative for chills and fever.   Eyes: Negative for blurred vision and double vision.   Musculoskeletal: Negative for myalgias.   Skin: Negative for itching and rash.        Skin tear/abrasions   Neurological: Negative for dizziness, tingling, sensory change and headaches.          Objective:     /58   Pulse (!) 114   Temp 36.6 °C (97.9 °F)   Ht 1.715 m (5' 7.5\")   Wt 60.8 kg (134 lb)   SpO2 95%   BMI 20.68 kg/m²      Physical Exam   Constitutional: He is oriented to person, place, and time. He appears well-developed and well-nourished. No distress.   Cardiovascular: Normal rate and regular rhythm.    Murmur heard.  Pulmonary/Chest: Effort normal and breath sounds normal. No respiratory distress.   Musculoskeletal: " Normal range of motion.   Neurological: He is alert and oriented to person, place, and time.   Skin: Skin is warm and dry.   Large skin tear to right proximal forearm. Skin tear to base of 5th digit right hand. 3 abrasions to forehead.   Nursing note and vitals reviewed.              Assessment/Plan:     1. Skin tear of forearm without complication, right, initial encounter  cephALEXin (KEFLEX) 500 MG Cap    REFERRAL TO WOUND CLINIC    TD Preservative Free =>8yo IM   2. Forehead abrasion, initial encounter     3. Fall, initial encounter       Wound care done by me.  Cut dead skin from tear, irrigated with NS and dressed with non adherent gauze and coban.  Abrasions cleaned and polysporin applied.  Tetanus updated here in clinic.

## 2018-01-23 ENCOUNTER — NON-PROVIDER VISIT (OUTPATIENT)
Dept: WOUND CARE | Facility: MEDICAL CENTER | Age: 78
End: 2018-01-23
Attending: NURSE PRACTITIONER
Payer: MEDICARE

## 2018-01-23 PROCEDURE — 99203 OFFICE O/P NEW LOW 30 MIN: CPT

## 2018-01-23 PROCEDURE — 99213 OFFICE O/P EST LOW 20 MIN: CPT

## 2018-01-23 NOTE — CERTIFICATION
Advanced Wound Care  [facility]  [address]  [address]  [place] [address]  [phone] Fax: [phone]      Initial Evaluation  For Certification Period: 01/23/18 - 04/13/18      Referring Physician: ZAK Welsh at urgent care  Primary Physician: Christine Zamudio MD       Consulting Physicians: n/a        Wound(s): Right lateral arm large skin tear  Start of Care: 01/23/18       Subjective:        HPI: 78 y/o gentleman who fell at home and sustained a right lateral arm skin tear approx 1 week ago.  He was seen in urgent care on 1/18/18 and prescribed keflex for 10 days which he is half way through.  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  He is not diabetic.  He is on long term prednisone.      Pain:   Pt reports pain with palpation/cleansing of wound which subsides immediately after.         Past Medical History:  Past Medical History:   Diagnosis Date   • Allergy    • Arthritis     left knee, hands   • ASTHMA    • BPH (benign prostatic hyperplasia)    • Breath shortness     WITH EXERTION   • Bronchitis     chronic   • CA in situ resp sys    • Chronic bronchitis with productive mucopurulent cough (CMS-[facility])    • COPD    • Coughing blood 2011   • Dialysis     on hemodialysis   • Dyslipidemia    • EMPHYSEMA    • GERD (gastroesophageal reflux disease)    • Heart burn    • Hip pain    • HTN    • Hypertension    • Indigestion    • TIMOTHY (obstructive sleep apnea)    • Other specified disorder of intestines    • Pacemaker 1988   • Pain 7/16/13    leg's and hand's   • Pneumonia 2011   • Pulmonary histoplasmosis (CMS-[facility])    • Renal disorder     dialysis 3 days a week   • Sick sinus syndrome (CMS-[facility])    • Sleep apnea     uses cpap at [facility]   • Snoring    • Unspecified hemorrhagic conditions     bruises easily   • Unspecified urinary incontinence    • Urinary bladder disorder    • Wegener's disease, pulmonary  (CMS-[facility])        Current Medications:    Current Outpatient Prescriptions:   •  cephALEXin (KEFLEX) 500 MG Cap, Take 1 Cap by mouth 2 times a day., Disp: 20 Cap, Rfl: 0  •  ipratropium-albuterol (DUONEB) 0.5-2.5 (3) MG/3ML nebulizer solution, USE 3 ML VIA NEBULIZER FOUR TIMES DAILY, Disp: 1080 mL, Rfl: 3  •  sodium chloride (HYPER-SAL) 7 % Nebu Soln, 4 mL by Nebulization route 2 Times a Day., Disp: 240 mL, Rfl: 2  •  lidocaine (XYLOCAINE) 2 % Gel, APPLY TO AFFECTED AREA DAILY AS NEEDED., Disp: 2 Bottle, Rfl: 11  •  ADVAIR DISKUS 500-50 MCG/DOSE AEROSOL POWDER, BREATH ACTIVATED, INHALE 1 DOSE BY MOUTH TWICE DAILY. RINSE MOUTH AFTER USE, Disp: 1 Inhaler, Rfl: 5  •  predniSONE (DELTASONE) 10 MG Tab, 1 tab po qday with 2 one mg tabs for a total 12 mgpo qday, Disp: 90 Tab, Rfl: 0  •  ethyl chloride Aerosol, APPLY TO AV-FISTULA PRIORTO HEMODIALYSIS, Disp: 103.5 mL, Rfl: 11  •  RENVELA 800 MG Tab, , Disp: , Rfl:   •  loperamide (IMODIUM) 2 MG Cap, , Disp: , Rfl:   •  non-formulary med, Inhale 2 L by mouth every bedtime. OXYGEN AT 2L/NC AS NEEDED AND AT NIGHT  Indications: copd, Disp: , Rfl:   •  metoprolol (LOPRESSOR) 25 MG Tab, Take 25 mg by mouth 2 times a day., Disp: , Rfl:   •  B Complex-C-Folic Acid (DIALYVITE TABLET) Tab, Dialyvite -nehro vit B complex-C-folic acid 1 tablet po QD Dialysis Pt, Disp: 90 Tab, Rfl: 3  •  albuterol 108 (90 BASE) MCG/ACT Aero Soln inhalation aerosol, Inhale 2 Puffs by mouth every four hours as needed for Shortness of Breath., Disp: , Rfl:   •  ranitidine (ZANTAC) 150 MG Tab, TAKE 1 TABLET BY MOUTH EVERY NIGHT AT BEDTIME, Disp: 30 Tab, Rfl: 0  •  finasteride (PROSCAR) 5 MG Tab, Take 5 mg by mouth every day., Disp: , Rfl:   •  simvastatin (ZOCOR) 40 MG TABS, Take 40 mg by mouth every evening., Disp: , Rfl:   •  epoetin lucina (EPOGEN,PROCRIT) 3000 UNIT/ML SOLN, Inject 2,200 Units as instructed every Monday, Wednesday, and Friday. With dialysis, Disp: , Rfl:   •  sevelamer (RENAGEL) 800 MG  TABS, Take 800 mg by mouth 3 times a day, with meals., Disp: , Rfl:   •  tamsulosin (FLOMAX) 0.4 MG capsule, Take 0.4 mg by mouth every day., Disp: , Rfl:   •  aspirin EC (ECOTRIN) 81 MG TBEC, Take 81 mg by mouth every day., Disp: , Rfl:     Allergies:   Erythromycin and Rituximab    Past Surgical History:   Past Surgical History:   Procedure Laterality Date   • AV FISTULOGRAM Right 4/12/2016    Procedure: AV FISTULOGRAM , VENOPLASTY X 2;  Surgeon: Nessa Johansen M.D.;  Location: SURGERY [facility];  Service:    • RECOVERY  9/3/2015    Procedure: IR1 VASCULAR CASE-ELENO RIGHT DIALYSIS FISTULOGRAM, POSSIBLE INTERVENTION;  Surgeon: Recoveryonly Surgery;  [place] [facility] [place]:    • RECOVERY  2/26/2015    Performed by Ir-Recovery Surgery at SURGERY SAME [facility]   • RECOVERY  10/3/2014    Performed by Ir-Recovery Surgery at [facility]   • RECOVERY  8/7/2014    Performed by Ir-Recovery Surgery at [facility]   • RECOVERY  12/17/2013    Performed by Ir-Recovery Surgery at [facility] SAME DAY [facility]   • BRONCHOSCOPY-ENDO  7/18/2013    Performed by Juan F Rubio M.D. at [facility]   • RECOVERY  7/17/2013    Performed by Ir-Recovery Surgery at [facility] SAME DAY [facility]   • AV FISTULA REVISION  6/9/2012    Performed by NESSA JOHANSEN at SURGERY [facility]   • RECOVERY  4/19/2012    Performed by SURGERY, IR-RECOVERY at [facility] SAME DAY [facility]   • AV FISTULA CREATION  3/8/2012    Performed by NESSA JOHANSEN at SURGERY [facility]   • GASTROSCOPY WITH BIOPSY  1/17/2012    Performed by ZURDO HARRISON at ENDOSCOPY [facility]   • CATH PLACEMENT  10/8/2011    Performed by NESSA JOHANSEN at [facility]   • GASTROSCOPY WITH BALLOON DILATATION  9/28/2011    Performed by KT FERNANDEZ at [facility]   • PACEMAKER INSERTION  4/2009   • ROTATOR CUFF REPAIR  2003    right   •  HERNIA REPAIR  1990    right inguinal hernia   • HIP REPLACEMENT, TOTAL      right   • TONSILLECTOMY         Social History:    Social History     Social History   • Marital status:      Spouse name: N/A   • Number of children: N/A   • Years of education: N/A     Occupational History   • Not on file.     Social History Main Topics   • Smoking status: Former Smoker     Years: 30.00     Types: Pipe     Quit date: 1/1/1990   • Smokeless tobacco: Former User     Types: Chew   • Alcohol use 4.8 oz/week     7 Glasses of wine, 1 Standard drinks or equivalent per week      Comment: Red wine nightly   • Drug use: No   • Sexual activity: Yes     Partners: Female     Other Topics Concern   • Not on file     Social History Narrative   • No narrative on file           Objective:      Tests and Measures:  01/23/18: Easily palpable Right radial pulse.  Fistula noted to JAMES.  Thrill palpable.  R hand warm.    Orthotic, protective, supportive devices: cane    Fall Risk Assessment (yajaira all that apply with an X): Completed upon initial eval 01/23/18: high fall risk.              X 65 years or older                X Fall within the last 2 years, uses   X Ambulatory devices (cane)  Loss of protective sensation in feet,    Use of prostethic/orthotic, years               Presence of lower extremity/foot/toe amputation              X Taking medication that increases risk (per facility policy)       Wound Characteristics                                                    Location: Right lateral arm   Initial Evaluation  Date: 01/23/18   Tissue Type and %: 100% red moist partial thickness   Periwound: Bruising, fragile   Drainage: Mod sang   Exposed structures none   Wound Edges:   Intact, friable   Odor: none   S&S of Infection:   none   Edema: none   Sensation: intact               Measurements: Right lateral arm Initial Evaluation  Date: 01/23/18   Length (cm) 11.2   Width (cm) 3.4   Depth (cm) 0.1   Area (cm2) 38.08cm2    Tract/undermine none            Procedures:     Debridement :  none   Cleansed with:  NS/gauze                                                                    Periwound protected with: no sting skin prep   Primary dressing: steve   Secondary Dressing: large silicone border dressing x2   Other: pronet #6     Patient Education: POC taught to pt and spouse.  Dressing selection and rationale for same taught.  Advised to keep dressing CDI and let it be changed only at AWC (unless soiled or falling off at home) taught, including cover when showering.  S/sx developing infection taught, when to go to  vs contact AWC.  Pt is half way through course of keflex.  Advised to finish all doses as ordered.  He denies side effects.  Pt and spouse express understanding of instructions.    Professional Collaboration: Initial eval sent to Dr. Zamudio, PCP per [facility].        Assessment:      Wound etiology: trauma    Wound Progress:  Initial eval    Rationale for Treatment:Steve to combat inflammation and promote epithelialization.  Silicone foam dressing to absorb drainage and for gentle adhesive.  Pronet to secure all without being tight on wound or fistula.     Patient tolerance/compliance: Pt and spouse agreeable with POC.    Complicating factors: hx autoimmune disorder, chronic prednisone use    Need for ongoing Advanced Wound Care services: continued skilled wound care for debridement as needed, dressing management and skilled clinical observation to prevent complications and expedite healing.    Plan:      Treatment Plan and Recommendations:  Diagnosis/ICD10: S51.811A skin tear right forearm     Procedures/CPT: Initial eval level 3    Frequency: 2x/week      Treatment Goals: STG 2 Weeks  LTG 4 Weeks   Granulation Tissue: n/a n/a   Decrease Necrotic Tissue to: n/a n/a   Wound Phase:  proliferation proliferation   Decrease Size by: 25% 75%   Periwound:  intact intact   Decrease tracts/undermining by: n/a n/a   Decrease  Pain:  5/10 3/10       At the time of each visit a thorough assessment of the patient is completed to assure the  appropriateness of our plan of care.  The dressings or modalities may need to be adapted   from the original plan to address any significant changes in the wound environment.          Clinician Signature:_______________________________Date__________________      Physician Signature:______________________________Date:__________________

## 2018-01-30 ENCOUNTER — NON-PROVIDER VISIT (OUTPATIENT)
Dept: WOUND CARE | Facility: MEDICAL CENTER | Age: 78
End: 2018-01-30
Attending: NURSE PRACTITIONER
Payer: MEDICARE

## 2018-01-30 PROCEDURE — 99213 OFFICE O/P EST LOW 20 MIN: CPT

## 2018-01-30 NOTE — WOUND TEAM
"Advanced Wound Care  [facility]  [address]  [address]  [place] [address]  [phone] Fax: [phone]      Encounter note  For Certification Period: 01/23/18 - 04/13/18      Referring Physician: ZAK Welsh at urgent care  Primary Physician: Christine Zamudio MD       Consulting Physicians: n/a        Wound(s): Right lateral arm large skin tear  Start of Care: 01/23/18       Subjective:        HPI: 78 y/o gentleman who fell at home and sustained a right lateral arm skin tear approx 1 week ago.  He was seen in urgent care on 1/18/18 and prescribed keflex for 10 days which he is half way through.  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  He is not diabetic.  He is on long term prednisone.      1/30/2018: Pt hasmedical history of heart and lung problems and possible strokes and states he was told by an MD that he has outlived his prognosis and  therefore has decided that he will \"not go to the hospital unless my temperature is over 100.4\" so he can not live the rest of his life In the hospital.       Pain:   Pt reports pain with palpation/cleansing of wound which subsides immediately after.       Past Medical History:  Past Medical History:   Diagnosis Date   • Allergy    • Arthritis     left knee, hands   • ASTHMA    • BPH (benign prostatic hyperplasia)    • Breath shortness     WITH EXERTION   • Bronchitis     chronic   • CA in situ resp sys    • Chronic bronchitis with productive mucopurulent cough (CMS-HCC)    • COPD    • Coughing blood 2011   • Dialysis     on hemodialysis   • Dyslipidemia    • EMPHYSEMA    • GERD (gastroesophageal reflux disease)    • Heart burn    • Hip pain    • HTN    • Hypertension    • Indigestion    • TIMOTHY (obstructive sleep apnea)    • Other specified disorder of intestines    • Pacemaker 1988   • Pain 7/16/13    leg's and hand's   • Pneumonia 2011   • Pulmonary " histoplasmosis (CMS-HCC)    • Renal disorder     dialysis 3 days a week   • Sick sinus syndrome (CMS-HCC)    • Sleep apnea     uses cpap at noc   • Snoring    • Unspecified hemorrhagic conditions     bruises easily   • Unspecified urinary incontinence    • Urinary bladder disorder    • Wegener's disease, pulmonary (CMS-HCC)        Current Medications:    Current Outpatient Prescriptions:   •  cephALEXin (KEFLEX) 500 MG Cap, Take 1 Cap by mouth 2 times a day., Disp: 20 Cap, Rfl: 0  •  ipratropium-albuterol (DUONEB) 0.5-2.5 (3) MG/3ML nebulizer solution, USE 3 ML VIA NEBULIZER FOUR TIMES DAILY, Disp: 1080 mL, Rfl: 3  •  sodium chloride (HYPER-SAL) 7 % Nebu Soln, 4 mL by Nebulization route 2 Times a Day., Disp: 240 mL, Rfl: 2  •  lidocaine (XYLOCAINE) 2 % Gel, APPLY TO AFFECTED AREA DAILY AS NEEDED., Disp: 2 Bottle, Rfl: 11  •  ADVAIR DISKUS 500-50 MCG/DOSE AEROSOL POWDER, BREATH ACTIVATED, INHALE 1 DOSE BY MOUTH TWICE DAILY. RINSE MOUTH AFTER USE, Disp: 1 Inhaler, Rfl: 5  •  predniSONE (DELTASONE) 10 MG Tab, 1 tab po qday with 2 one mg tabs for a total 12 mgpo qday, Disp: 90 Tab, Rfl: 0  •  ethyl chloride Aerosol, APPLY TO AV-FISTULA PRIORTO HEMODIALYSIS, Disp: 103.5 mL, Rfl: 11  •  RENVELA 800 MG Tab, , Disp: , Rfl:   •  loperamide (IMODIUM) 2 MG Cap, , Disp: , Rfl:   •  non-formulary med, Inhale 2 L by mouth every bedtime. OXYGEN AT 2L/NC AS NEEDED AND AT NIGHT  Indications: copd, Disp: , Rfl:   •  metoprolol (LOPRESSOR) 25 MG Tab, Take 25 mg by mouth 2 times a day., Disp: , Rfl:   •  B Complex-C-Folic Acid (DIALYVITE TABLET) Tab, Dialyvite -nehro vit B complex-C-folic acid 1 tablet po QD Dialysis Pt, Disp: 90 Tab, Rfl: 3  •  albuterol 108 (90 BASE) MCG/ACT Aero Soln inhalation aerosol, Inhale 2 Puffs by mouth every four hours as needed for Shortness of Breath., Disp: , Rfl:   •  ranitidine (ZANTAC) 150 MG Tab, TAKE 1 TABLET BY MOUTH EVERY NIGHT AT BEDTIME, Disp: 30 Tab, Rfl: 0  •  finasteride (PROSCAR) 5 MG Tab,  Take 5 mg by mouth every day., Disp: , Rfl:   •  simvastatin (ZOCOR) 40 MG TABS, Take 40 mg by mouth every evening., Disp: , Rfl:   •  epoetin lucina (EPOGEN,PROCRIT) 3000 UNIT/ML SOLN, Inject 2,200 Units as instructed every Monday, Wednesday, and Friday. With dialysis, Disp: , Rfl:   •  sevelamer (RENAGEL) 800 MG TABS, Take 800 mg by mouth 3 times a day, with meals., Disp: , Rfl:   •  tamsulosin (FLOMAX) 0.4 MG capsule, Take 0.4 mg by mouth every day., Disp: , Rfl:   •  aspirin EC (ECOTRIN) 81 MG TBEC, Take 81 mg by mouth every day., Disp: , Rfl:     Allergies:   Erythromycin and Rituximab    Past Surgical History:   Past Surgical History:   Procedure Laterality Date   • AV FISTULOGRAM Right 4/12/2016    Procedure: AV FISTULOGRAM , VENOPLASTY X 2;  Surgeon: Nessa Syed M.D.;  Location: [facility];  Service:    • RECOVERY  9/3/2015    Procedure: IR1 VASCULAR CASE-ELENO RIGHT DIALYSIS FISTULOGRAM, POSSIBLE INTERVENTION;  Surgeon: Recoveryonly Surgery;  Location: SURGERY PRE-POST PROC UNIT [facility];  Service:    • RECOVERY  2/26/2015    Performed by Ir-Recovery Surgery at SURGERY [facility]   • RECOVERY  10/3/2014    Performed by Ir-Recovery Surgery at [facility]   • RECOVERY  8/7/2014    Performed by Ir-Recovery Surgery at [facility]   • RECOVERY  12/17/2013    Performed by Ir-Recovery Surgery at [facility] SAME DAY [facility]   • BRONCHOSCOPY-ENDO  7/18/2013    Performed by Juan F Rubio M.D. at [facility]   • RECOVERY  7/17/2013    Performed by Ir-Recovery Surgery at [facility] SAME DAY [facility]   • AV FISTULA REVISION  6/9/2012    Performed by NESSA SYED at [facility]   • RECOVERY  4/19/2012    Performed by SURGERY, IR-RECOVERY at [facility] SAME DAY [facility]   • AV FISTULA CREATION  3/8/2012    Performed by NESSA SYED at [facility]   • GASTROSCOPY WITH BIOPSY  1/17/2012    Performed by ZURDO HARRISON  at ENDOSCOPY [facility]   • CATH PLACEMENT  10/8/2011    Performed by NESSA JOHANSEN at SURGERY [facility]   • GASTROSCOPY WITH BALLOON DILATATION  9/28/2011    Performed by KT FERNANDEZ at SURGERY [facility]   • PACEMAKER INSERTION  4/2009   • ROTATOR CUFF REPAIR  2003    right   • HERNIA REPAIR  1990    right inguinal hernia   • HIP REPLACEMENT, TOTAL      right   • TONSILLECTOMY         Social History:    Social History     Social History   • Marital status:      Spouse name: N/A   • Number of children: N/A   • Years of education: N/A     Occupational History   • Not on file.     Social History Main Topics   • Smoking status: Former Smoker     Years: 30.00     Types: Pipe     Quit date: 1/1/1990   • Smokeless tobacco: Former User     Types: Chew   • Alcohol use 4.8 oz/week     7 Glasses of wine, 1 Standard drinks or equivalent per week      Comment: Red wine nightly   • Drug use: No   • Sexual activity: Yes     Partners: Female     Other Topics Concern   • Not on file     Social History Narrative   • No narrative on file         Objective:      Tests and Measures: 1/30/2018= Vitals: Temp oral 98.4, , /71, 93% RA, pupils reactive and equal, speaks clearly,  01/23/18: Easily palpable Right radial pulse.  Fistula noted to JAMES.  Thrill palpable.  R hand warm.    Orthotic, protective, supportive devices: cane    Fall Risk Assessment (yajaira all that apply with an X): Completed upon initial eval 01/23/18: high fall risk.              X 65 years or older                X Fall within the last 2 years, uses   X Ambulatory devices (cane)  Loss of protective sensation in feet,    Use of prostethic/orthotic, years               Presence of lower extremity/foot/toe amputation              X Taking medication that increases risk (per facility policy)       Wound Characteristics                                                    Location: Right lateral arm   Initial  "Evaluation  Date: 01/23/18 Encounter note:  Date: 1/30/2018   Tissue Type and %: 100% red moist partial thickness 40% red viable tissue, 30% yellow marbled adherent, 20% black eschar/crust to perimeter   Periwound: Bruising, fragile Bruising, intact, erythema   Drainage: Mod sang scant   Exposed structures none none   Wound Edges:   Intact, friable Closed by eschar/crust   Odor: none none   S&S of Infection:   None none   Edema: None local   Sensation: intact Intact, hypersensitive.               Measurements: Right lateral arm Initial Evaluation  Date: 01/23/18   Length (cm) 11.2   Width (cm) 3.4   Depth (cm) 0.1   Area (cm2) 38.08cm2   Tract/undermine none          Procedures:     Debridement :  none   Cleansed with:  NS/gauze                                                                    Periwound protected with: no sting skin prep, zinc barrier paste   Primary dressing: medihoney colloid non-ad   Secondary Dressing: large silicone border dressing x2   Other: none this time (prior: pronet #6)     Patient Education: 1/30/2018: educated pt on wound status and POC.  Wound very dry with crust and non viable tissue to outer edges that could not be removed due to pain. Discussed with Vielka options for new dressing selection and chose medihoney colloid. Educated pt and wife on Rational for dressing selection and function. Pt wanted covered with ad foam as well. Educated pt to leave CDI and continue 2x weekly. If problems with drainage, ok to replace with moist gauze until next visit.   Pt expressed symptoms of TIA to staff including dizziness and double vision after lunch and directly before this visit. Vitals obtained and APRN notified. ZAK Joy assessed pt and directed pt to go to ER; pt refused stating that he has already outlived all of his medical problems and diagnosis and, \"If i'm having a TIA, then I'm having a TIA and that's the way it goes.\" pt confided that if he dies while being busy living " his life, then that's ok but he doesn't want to do it in a hospital. Pt given choice to also contact primary or cardiac doctor. Pt wife contacted cardiac dr and came back into the room that office stated they would not see him for TIA and directed him to ER.pt left clinic in good spirits, walking by self A&O x 4.     Previous: Dressing selection and rationale for same taught.  Advised to keep dressing CDI and let it be changed only at A[facility] (unless soiled or falling off at home) taught, including cover when showering.  S/sx developing infection taught, when to go to  vs contact AWC.  Pt is half way through course of keflex.  Advised to finish all doses as ordered.  He denies side effects.  Pt and spouse express understanding of instructions.      Professional Collaboration: ZAK Joy for assessment of clinical presentations of TIA; instructed pt to go to ER. Pt refused.        Assessment:      Wound etiology: trauma    Wound Progress:  Initial eval    Rationale for Treatment:Pratibha to combat inflammation and promote epithelialization.  Silicone foam dressing to absorb drainage and for gentle adhesive.  Pronet to secure all without being tight on wound or fistula.     Patient tolerance/compliance: Pt and spouse agreeable with POC.    Complicating factors: hx autoimmune disorder, chronic prednisone use    Need for ongoing Advanced Wound Care services: continued skilled wound care for debridement as needed, dressing management and skilled clinical observation to prevent complications and expedite healing.    Plan:      Treatment Plan and Recommendations:  Diagnosis/ICD10: S51.811A skin tear right forearm     Procedures/CPT: Initial eval level 3    Frequency: 2x/week      Treatment Goals: STG 2 Weeks  LTG 4 Weeks   Granulation Tissue: n/a n/a   Decrease Necrotic Tissue to: n/a n/a   Wound Phase:  proliferation proliferation   Decrease Size by: 25% 75%   Periwound:  intact intact   Decrease tracts/undermining  by: n/a n/a   Decrease Pain:  5/10 3/10       At the time of each visit a thorough assessment of the patient is completed to assure the  appropriateness of our plan of care.  The dressings or modalities may need to be adapted   from the original plan to address any significant changes in the wound environment.          Clinician Signature:_______________________________Date__________________      Physician Signature:______________________________Date:__________________

## 2018-01-31 ENCOUNTER — PATIENT MESSAGE (OUTPATIENT)
Dept: INFECTIOUS DISEASES | Facility: MEDICAL CENTER | Age: 78
End: 2018-01-31

## 2018-01-31 DIAGNOSIS — M31.30 WEGENER'S GRANULOMATOSIS: ICD-10-CM

## 2018-01-31 NOTE — TELEPHONE ENCOUNTER
Patient needs to be evaluated by PCP. If any further neurological symptoms, such as dizziness, light headedness, syncope, vision changes, severe HA, numbness and tingling, difficulty speaking or swallowing he needs to go to the ER.

## 2018-02-01 ENCOUNTER — NON-PROVIDER VISIT (OUTPATIENT)
Dept: WOUND CARE | Facility: MEDICAL CENTER | Age: 78
End: 2018-02-01
Attending: INTERNAL MEDICINE
Payer: MEDICARE

## 2018-02-01 ENCOUNTER — APPOINTMENT (OUTPATIENT)
Dept: WOUND CARE | Facility: MEDICAL CENTER | Age: 78
End: 2018-02-01
Attending: NURSE PRACTITIONER
Payer: MEDICARE

## 2018-02-01 ENCOUNTER — APPOINTMENT (RX ONLY)
Dept: URBAN - METROPOLITAN AREA CLINIC 36 | Facility: CLINIC | Age: 78
Setting detail: DERMATOLOGY
End: 2018-02-01

## 2018-02-01 DIAGNOSIS — L57.0 ACTINIC KERATOSIS: ICD-10-CM

## 2018-02-01 PROBLEM — C44.91 BASAL CELL CARCINOMA OF SKIN, UNSPECIFIED: Status: ACTIVE | Noted: 2018-02-01

## 2018-02-01 PROCEDURE — 17000 DESTRUCT PREMALG LESION: CPT

## 2018-02-01 PROCEDURE — 97597 DBRDMT OPN WND 1ST 20 CM/<: CPT

## 2018-02-01 PROCEDURE — 17003 DESTRUCT PREMALG LES 2-14: CPT

## 2018-02-01 PROCEDURE — 11900 INJECT SKIN LESIONS </W 7: CPT | Mod: 59

## 2018-02-01 PROCEDURE — ? LIQUID NITROGEN

## 2018-02-01 PROCEDURE — 97602 WOUND(S) CARE NON-SELECTIVE: CPT

## 2018-02-01 PROCEDURE — ? INJECTION

## 2018-02-01 ASSESSMENT — LOCATION SIMPLE DESCRIPTION DERM
LOCATION SIMPLE: RIGHT FOREHEAD
LOCATION SIMPLE: LEFT CHEEK
LOCATION SIMPLE: LEFT FOREHEAD
LOCATION SIMPLE: NECK

## 2018-02-01 ASSESSMENT — LOCATION ZONE DERM
LOCATION ZONE: FACE
LOCATION ZONE: NECK

## 2018-02-01 ASSESSMENT — LOCATION DETAILED DESCRIPTION DERM
LOCATION DETAILED: RIGHT MEDIAL FOREHEAD
LOCATION DETAILED: RIGHT SUPERIOR FOREHEAD
LOCATION DETAILED: LEFT INFERIOR PREAURICULAR CHEEK
LOCATION DETAILED: RIGHT SUPERIOR LATERAL NECK
LOCATION DETAILED: RIGHT FOREHEAD
LOCATION DETAILED: LEFT LATERAL FOREHEAD

## 2018-02-01 ASSESSMENT — TOTAL NUMBER OF LESIONS: # OF LESIONS?: 7

## 2018-02-01 NOTE — PROCEDURE: INJECTION
Render J-Code Information In Note?: yes
Units: mg
Consent: The risks of the medication was reviewed with the patient.
units
Route: IL
Dose Administered (Numbers Only - Mg, G, Mcg, Units, Cc): 0.2
Treatment Number: 2
Dose Administered (Numbers Only - Mg, G, Mcg, Units, Cc): 0
Post-Care Instructions: I reviewed with the patient in detail post-care instructions. Patient understands to keep the injection sites clean and call the clinic if there is any redness, swelling or pain.
Detail Level: None
Medication (1) And Associated J-Code Units: Bleomycin, 15 units
Procedure Information: Please note that the numeric value listed in the Medication (1) and associated J-code units and Medication (2) and associated J-code units variables are j-code amounts and do not represent either the concentration or the total amount of the medications injected.  I strongly recommend selecting no to the Render J-code information in note question. This will allow your note to be more clear. If you are billing j-codes with your injection codes you need to document the total amount of the medication injected. This amount should match the j-code units. For example, if you are injecting Triamcinolone 40mg as an intramuscular injection you would select 40 for the dose field and mg for the units. This would allow you to document  with 4 units (40mg = 10mg x 4). The total volume is not used to calculate j-codes only the amount of the medication administered.

## 2018-02-01 NOTE — PROCEDURE: LIQUID NITROGEN
Detail Level: Detailed
Render Post-Care Instructions In Note?: no
Duration Of Freeze Thaw-Cycle (Seconds): 8
Post-Care Instructions: I reviewed with the patient in detail post-care instructions. Patient is to wear sunprotection, and avoid picking at any of the treated lesions. Pt may apply Vaseline to crusted or scabbing areas.
Number Of Freeze-Thaw Cycles: 1 freeze-thaw cycle
Consent: The patient's consent was obtained including but not limited to risks of crusting, scabbing, blistering, scarring, darker or lighter pigmentary change, recurrence, incomplete removal and infection.

## 2018-02-01 NOTE — WOUND TEAM
"Advanced Wound Care  [facility]  [address]  [address]  [place] [address]  [phone] Fax: [phone]      Encounter note  For Certification Period: 01/23/18 - 04/13/18      Referring Physician: ZAK Welsh at urgent care  Primary Physician: Christine Zamudio MD       Consulting Physicians: n/a        Wound(s): Right lateral arm large skin tear  Start of Care: 01/23/18       Subjective:        HPI: 78 y/o gentleman who fell at home and sustained a right lateral arm skin tear approx 1 week ago.  He was seen in urgent care on 1/18/18 and prescribed keflex for 10 days which he is half way through.  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  He is not diabetic.  He is on long term prednisone.      1/30/2018: Pt hasmedical history of heart and lung problems and possible strokes and states he was told by an MD that he has outlived his prognosis and  therefore has decided that he will \"not go to the hospital unless my temperature is over 100.4\" so he can not live the rest of his life In the hospital.       Pain:   Pt reports pain with palpation/cleansing of wound which subsides immediately after.         Past Medical History:  Past Medical History:   Diagnosis Date   • Allergy    • Arthritis     left knee, hands   • ASTHMA    • BPH (benign prostatic hyperplasia)    • Breath shortness     WITH EXERTION   • Bronchitis     chronic   • CA in situ resp sys    • Chronic bronchitis with productive mucopurulent cough (CMS-HCC)    • COPD    • Coughing blood 2011   • Dialysis     on hemodialysis   • Dyslipidemia    • EMPHYSEMA    • GERD (gastroesophageal reflux disease)    • Heart burn    • Hip pain    • HTN    • Hypertension    • Indigestion    • TIMOTHY (obstructive sleep apnea)    • Other specified disorder of intestines    • Pacemaker 1988   • Pain 7/16/13    leg's and hand's   • Pneumonia 2011   • Pulmonary " histoplasmosis (CMS-HCC)    • Renal disorder     dialysis 3 days a week   • Sick sinus syndrome (CMS-HCC)    • Sleep apnea     uses cpap at noc   • Snoring    • Unspecified hemorrhagic conditions     bruises easily   • Unspecified urinary incontinence    • Urinary bladder disorder    • Wegener's disease, pulmonary (CMS-HCC)        Current Medications:    Current Outpatient Prescriptions:   •  cephALEXin (KEFLEX) 500 MG Cap, Take 1 Cap by mouth 2 times a day., Disp: 20 Cap, Rfl: 0  •  ipratropium-albuterol (DUONEB) 0.5-2.5 (3) MG/3ML nebulizer solution, USE 3 ML VIA NEBULIZER FOUR TIMES DAILY, Disp: 1080 mL, Rfl: 3  •  sodium chloride (HYPER-SAL) 7 % Nebu Soln, 4 mL by Nebulization route 2 Times a Day., Disp: 240 mL, Rfl: 2  •  lidocaine (XYLOCAINE) 2 % Gel, APPLY TO AFFECTED AREA DAILY AS NEEDED., Disp: 2 Bottle, Rfl: 11  •  ADVAIR DISKUS 500-50 MCG/DOSE AEROSOL POWDER, BREATH ACTIVATED, INHALE 1 DOSE BY MOUTH TWICE DAILY. RINSE MOUTH AFTER USE, Disp: 1 Inhaler, Rfl: 5  •  predniSONE (DELTASONE) 10 MG Tab, 1 tab po qday with 2 one mg tabs for a total 12 mgpo qday, Disp: 90 Tab, Rfl: 0  •  ethyl chloride Aerosol, APPLY TO AV-FISTULA PRIORTO HEMODIALYSIS, Disp: 103.5 mL, Rfl: 11  •  RENVELA 800 MG Tab, , Disp: , Rfl:   •  loperamide (IMODIUM) 2 MG Cap, , Disp: , Rfl:   •  non-formulary med, Inhale 2 L by mouth every bedtime. OXYGEN AT 2L/NC AS NEEDED AND AT NIGHT  Indications: copd, Disp: , Rfl:   •  metoprolol (LOPRESSOR) 25 MG Tab, Take 25 mg by mouth 2 times a day., Disp: , Rfl:   •  B Complex-C-Folic Acid (DIALYVITE TABLET) Tab, Dialyvite -nehro vit B complex-C-folic acid 1 tablet po QD Dialysis Pt, Disp: 90 Tab, Rfl: 3  •  albuterol 108 (90 BASE) MCG/ACT Aero Soln inhalation aerosol, Inhale 2 Puffs by mouth every four hours as needed for Shortness of Breath., Disp: , Rfl:   •  ranitidine (ZANTAC) 150 MG Tab, TAKE 1 TABLET BY MOUTH EVERY NIGHT AT BEDTIME, Disp: 30 Tab, Rfl: 0  •  finasteride (PROSCAR) 5 MG Tab,  Take 5 mg by mouth every day., Disp: , Rfl:   •  simvastatin (ZOCOR) 40 MG TABS, Take 40 mg by mouth every evening., Disp: , Rfl:   •  epoetin lucina (EPOGEN,PROCRIT) 3000 UNIT/ML SOLN, Inject 2,200 Units as instructed every Monday, Wednesday, and Friday. With dialysis, Disp: , Rfl:   •  sevelamer (RENAGEL) 800 MG TABS, Take 800 mg by mouth 3 times a day, with meals., Disp: , Rfl:   •  tamsulosin (FLOMAX) 0.4 MG capsule, Take 0.4 mg by mouth every day., Disp: , Rfl:   •  aspirin EC (ECOTRIN) 81 MG TBEC, Take 81 mg by mouth every day., Disp: , Rfl:     Allergies:   Erythromycin and Rituximab    Past Surgical History:   Past Surgical History:   Procedure Laterality Date   • AV FISTULOGRAM Right 4/12/2016    Procedure: AV FISTULOGRAM , VENOPLASTY X 2;  Surgeon: Nessa Syed M.D.;  Location: [facility];  Service:    • RECOVERY  9/3/2015    Procedure: IR1 VASCULAR CASE-ELENO RIGHT DIALYSIS FISTULOGRAM, POSSIBLE INTERVENTION;  Surgeon: Recoveryonly Surgery;  [place] [facility] [facility];  Service:    • RECOVERY  2/26/2015    Performed by Ir-Recovery Surgery at SURGERY SAME DAY [facility]   • RECOVERY  10/3/2014    Performed by Ir-Recovery Surgery at [facility] SAME DAY [facility]   • RECOVERY  8/7/2014    Performed by Ir-Recovery Surgery at [facility]   • RECOVERY  12/17/2013    Performed by Ir-Recovery Surgery at [facility] SAME DAY [facility]   • BRONCHOSCOPY-ENDO  7/18/2013    Performed by Juan F Rubio M.D. at [facility]   • RECOVERY  7/17/2013    Performed by Ir-Recovery Surgery at [facility] SAME DAY [facility]   • AV FISTULA REVISION  6/9/2012    Performed by NESSA SYED at [facility]   • RECOVERY  4/19/2012    Performed by SURGERY, IR-RECOVERY at [facility] SAME DAY [facility]   • AV FISTULA CREATION  3/8/2012    Performed by NESSA SYED at [facility]   • GASTROSCOPY WITH BIOPSY  1/17/2012    Performed by ZURDO HARRISON  at ENDOSCOPY [facility]   • CATH PLACEMENT  10/8/2011    Performed by NESSA JOHANSEN at SURGERY [facility]   • GASTROSCOPY WITH BALLOON DILATATION  9/28/2011    Performed by KT FERNANDEZ at SURGERY [facility]   • PACEMAKER INSERTION  4/2009   • ROTATOR CUFF REPAIR  2003    right   • HERNIA REPAIR  1990    right inguinal hernia   • HIP REPLACEMENT, TOTAL      right   • TONSILLECTOMY         Social History:    Social History     Social History   • Marital status:      Spouse name: N/A   • Number of children: N/A   • Years of education: N/A     Occupational History   • Not on file.     Social History Main Topics   • Smoking status: Former Smoker     Years: 30.00     Types: Pipe     Quit date: 1/1/1990   • Smokeless tobacco: Former User     Types: Chew   • Alcohol use 4.8 oz/week     7 Glasses of wine, 1 Standard drinks or equivalent per week      Comment: Red wine nightly   • Drug use: No   • Sexual activity: Yes     Partners: Female     Other Topics Concern   • Not on file     Social History Narrative   • No narrative on file         Objective:      Tests and Measures: 1/30/2018= Vitals: Temp oral 98.4, , /71, 93% RA, pupils reactive and equal, speaks clearly,  01/23/18: Easily palpable Right radial pulse.  Fistula noted to JAMES.  Thrill palpable.  R hand warm.    Orthotic, protective, supportive devices: cane    Fall Risk Assessment (yajaira all that apply with an X): Completed upon initial eval 01/23/18: high fall risk.              X 65 years or older                X Fall within the last 2 years, uses   X Ambulatory devices (cane)  Loss of protective sensation in feet,    Use of prostethic/orthotic, years               Presence of lower extremity/foot/toe amputation              X Taking medication that increases risk (per facility policy)       Wound Characteristics                                                    Location: Right lateral arm   Initial  "Evaluation  Date: 01/23/18 Encounter note:  Date: 1/30/2018 Encounter Date: 02/01/18   Tissue Type and %: 100% red moist partial thickness 40% red viable tissue, 30% yellow marbled adherent, 20% black eschar/crust to perimeter 80% moist red with 20% marbled yellow    Periwound: Bruising, fragile Bruising, intact, erythema Fragile, mark, very mild maceration   Drainage: Mod sang Scant Mod ss; honey   Exposed structures None None none   Wound Edges:   Intact, friable Closed by eschar/crust open   Odor: None None none   S&S of Infection:   None None none   Edema: None Local none   Sensation: intact Intact, hypersensitive. intact               Measurements: Right lateral arm Initial Evaluation  Date: 01/23/18 Encounter Date: 02/01/18   Length (cm) 11.2 10   Width (cm) 3.4 3   Depth (cm) 0.1 0.1   Area (cm2) 38.08cm2 30cm2   Tract/undermine None none            Procedures:     Debridement :  none   Cleansed with:  NS/gauze                                                                    Periwound protected with: no sting skin prep   Primary dressing: medihoney colloid    Secondary Dressing: silicone border adhesive dressing x2   Other:      Patient Education: POC and wound progress reviewed with pt.  He reports he did not see a doctor after the TIA symptoms he experienced during his last [facility] visit.  Today he is wearing a mask, reports a fever of \"only 99\" and states \"I know it's my pseudomonas problem back and there's only two drips left they can give me.\".  Pt declines to seek further treatment at this time.  I encouraged him that he may respond better to treatment if it is done sooner than later, and he responds that he knows.      Professional Collaboration: None today    Assessment:      Wound etiology: trauma    Wound Progress:  Improving, measures smaller, more viable tissue.    Rationale for Treatment: Medihoney colloid to provide moist wound environment, and facilitate autolytic debridement.    Patient " tolerance/compliance: Pt and spouse agreeable with POC.    Complicating factors: hx autoimmune disorder, chronic prednisone use    Need for ongoing Advanced Wound Care services: continued skilled wound care for debridement as needed, dressing management and skilled clinical observation to prevent complications and expedite healing.    Plan:      Treatment Plan and Recommendations:  Diagnosis/ICD10: S51.811A skin tear right forearm     Procedures/CPT: selective debridement [address]3339    Frequency: 2x/week      Treatment Goals: STG 2 Weeks  LTG 4 Weeks   Granulation Tissue: n/a n/a   Decrease Necrotic Tissue to: n/a n/a   Wound Phase:  proliferation proliferation   Decrease Size by: 25% 75%   Periwound:  intact intact   Decrease tracts/undermining by: n/a n/a   Decrease Pain:  5/10 3/10       At the time of each visit a thorough assessment of the patient is completed to assure the  appropriateness of our plan of care.  The dressings or modalities may need to be adapted   from the original plan to address any significant changes in the wound environment.          Clinician Signature:_______________________________Date__________________      Physician Signature:______________________________Date:__________________

## 2018-02-06 ENCOUNTER — NON-PROVIDER VISIT (OUTPATIENT)
Dept: WOUND CARE | Facility: MEDICAL CENTER | Age: 78
End: 2018-02-06
Attending: INTERNAL MEDICINE
Payer: MEDICARE

## 2018-02-06 PROCEDURE — 97597 DBRDMT OPN WND 1ST 20 CM/<: CPT

## 2018-02-06 PROCEDURE — 97602 WOUND(S) CARE NON-SELECTIVE: CPT

## 2018-02-06 NOTE — WOUND TEAM
"Advanced Wound Care  [facility]  [address]  [address]  [place] [address]  [phone] Fax: [phone]      Encounter note  For Certification Period: 01/23/18 - 04/13/18      Referring Physician: ZAK Welsh at urgent care  Primary Physician: Christine Zamudio MD       Consulting Physicians: n/a        Wound(s): Right lateral arm large skin tear  Start of Care: 01/23/18       Subjective:        HPI: 78 y/o gentleman who fell at home and sustained a right lateral arm skin tear approx 1 week ago.  He was seen in urgent care on 1/18/18 and prescribed keflex for 10 days which he is half way through.  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  He is not diabetic.  He is on long term prednisone.      1/30/2018: Pt hasmedical history of heart and lung problems and possible strokes and states he was told by an MD that he has outlived his prognosis and  therefore has decided that he will \"not go to the hospital unless my temperature is over 100.4\" so he can not live the rest of his life In the hospital.       Pain:   Pt reports pain with palpation/cleansing of wound which subsides immediately after.         Past Medical History:      Current Medications:    Allergies:   Erythromycin and Rituximab    Past Surgical History:       Social History:          Objective:      Tests and Measures: 1/30/2018= Vitals: Temp oral 98.4, , /71, 93% RA, pupils reactive and equal, speaks clearly,  01/23/18: Easily palpable Right radial pulse.  Fistula noted to JAMES.  Thrill palpable.  R hand warm.    Orthotic, protective, supportive devices: cane    Fall Risk Assessment (yajaira all that apply with an X): Completed upon initial eval 01/23/18: high fall risk.              X 65 years or older                X Fall within the last 2 years, uses   X Ambulatory devices (cane)  Loss of protective sensation in feet,    Use of " prostethic/orthotic, years               Presence of lower extremity/foot/toe amputation              X Taking medication that increases risk (per facility policy)       Wound Characteristics                                                    Location: Right lateral arm   Initial Evaluation  Date: 01/23/18 Encounter note:  Date: 1/30/2018 Encounter Date: 02/06/18   Tissue Type and %: 100% red moist partial thickness 40% red viable tissue, 30% yellow marbled adherent, 20% black eschar/crust to perimeter 80% moist red with 20% marbled yellow    Periwound: Bruising, fragile Bruising, intact, erythema Fragile, mark, very mild maceration   Drainage: Mod sang Scant Mod ss; honey   Exposed structures None None none   Wound Edges:   Intact, friable Closed by eschar/crust open   Odor: None None none   S&S of Infection:   None None none   Edema: None Local none   Sensation: intact Intact, hypersensitive. intact               Measurements: Right lateral arm Initial Evaluation  Date: 01/23/18 Encounter Date: 02/01/18   Length (cm) 11.2 10   Width (cm) 3.4 3   Depth (cm) 0.1 0.1   Area (cm2) 38.08cm2 30cm2   Tract/undermine None none        Procedures:     Debridement :  Non select with cotton tip applicator to remove biofilm and honey   Cleansed with:  NS/gauze                                                                    Periwound protected with: no sting skin prep   Primary dressing: medihoney colloid    Secondary Dressing: silicone border adhesive dressing-large   Other:      Patient Education:  Educated pt on wound status and POC. Wound continuing to resolve at a good rate. Smaller with viable tissue. Responding well to honey, so will continue. Continue to come 2x week. Pt and wife verbalized agreement and understanding.    Prior: POC and wound progress reviewed with pt.  He reports he did not see a doctor after the TIA symptoms he experienced during his last [facility] visit.  Today he is wearing a mask, reports a  "fever of \"only 99\" and states \"I know it's my pseudomonas problem back and there's only two drips left they can give me.\".  Pt declines to seek further treatment at this time.  I encouraged him that he may respond better to treatment if it is done sooner than later, and he responds that he knows.      Professional Collaboration: None today    Assessment:      Wound etiology: trauma    Wound Progress:  Improving, measures smaller, more viable tissue.    Rationale for Treatment: Medihoney colloid to provide moist wound environment, and facilitate autolytic debridement.    Patient tolerance/compliance: Pt and spouse agreeable with POC.    Complicating factors: hx autoimmune disorder, chronic prednisone use    Need for ongoing Advanced Wound Care services: continued skilled wound care for debridement as needed, dressing management and skilled clinical observation to prevent complications and expedite healing.    Plan:      Treatment Plan and Recommendations:  Diagnosis/ICD10: S51.811A skin tear right forearm     Procedures/CPT: selective debridement [address]95    Frequency: 2x/week      Treatment Goals: STG 2 Weeks  LTG 4 Weeks   Granulation Tissue: n/a n/a   Decrease Necrotic Tissue to: n/a n/a   Wound Phase:  proliferation proliferation   Decrease Size by: 25% 75%   Periwound:  intact intact   Decrease tracts/undermining by: n/a n/a   Decrease Pain:  5/10 3/10       At the time of each visit a thorough assessment of the patient is completed to assure the  appropriateness of our plan of care.  The dressings or modalities may need to be adapted   from the original plan to address any significant changes in the wound environment.          Clinician Signature:_______________________________Date__________________      Physician Signature:______________________________Date:__________________      "

## 2018-02-08 ENCOUNTER — HOSPITAL ENCOUNTER (OUTPATIENT)
Dept: LAB | Facility: MEDICAL CENTER | Age: 78
End: 2018-02-08
Attending: INTERNAL MEDICINE
Payer: MEDICARE

## 2018-02-08 ENCOUNTER — NON-PROVIDER VISIT (OUTPATIENT)
Dept: WOUND CARE | Facility: MEDICAL CENTER | Age: 78
End: 2018-02-08
Attending: INTERNAL MEDICINE
Payer: MEDICARE

## 2018-02-08 DIAGNOSIS — M31.30 WEGENER'S GRANULOMATOSIS: ICD-10-CM

## 2018-02-08 PROCEDURE — 97602 WOUND(S) CARE NON-SELECTIVE: CPT

## 2018-02-08 PROCEDURE — 36415 COLL VENOUS BLD VENIPUNCTURE: CPT

## 2018-02-08 PROCEDURE — 86255 FLUORESCENT ANTIBODY SCREEN: CPT

## 2018-02-08 NOTE — WOUND TEAM
"Advanced Wound Care  [facility]  [address]  [address]  [place] [address]  [phone] Fax: [phone]    Encounter note  For Certification Period: 01/23/18 - 04/13/18      Referring Physician: ZAK Welsh at urgent care  Primary Physician: Christine Zamudio MD       Consulting Physicians: n/a        Wound(s): Right lateral arm large skin tear  Start of Care: 01/23/18       Subjective:        HPI: 78 y/o gentleman who fell at home and sustained a right lateral arm skin tear approx 1 week ago.  He was seen in urgent care on 1/18/18 and prescribed keflex for 10 days which he is half way through.  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  He is not diabetic.  He is on long term prednisone.      1/30/2018: Pt hasmedical history of heart and lung problems and possible strokes and states he was told by an MD that he has outlived his prognosis and  therefore has decided that he will \"not go to the hospital unless my temperature is over 100.4\" so he can not live the rest of his life In the hospital.     Pain:   Pt reports itching       Allergies: Erythromycin and Rituximab      Objective:      Tests and Measures: 1/30/2018= Vitals: Temp oral 98.4, , /71, 93% RA, pupils reactive and equal, speaks clearly,  01/23/18: Easily palpable Right radial pulse.  Fistula noted to JAMES.  Thrill palpable.  R hand warm.    Orthotic, protective, supportive devices: cane    Fall Risk Assessment (yajaira all that apply with an X): Completed upon initial eval 01/23/18: high fall risk.              X 65 years or older                X Fall within the last 2 years, uses   X Ambulatory devices (cane)  Loss of protective sensation in feet,    Use of prostethic/orthotic, years               Presence of lower extremity/foot/toe amputation              X Taking medication that increases risk (per facility policy)       Wound " Characteristics                                                    Location: Right lateral arm   Initial Evaluation  Date: 01/23/18 Encounter note:  Date: 1/30/2018 Encounter Date: 02/08/18   Tissue Type and %: 100% red moist partial thickness 40% red viable tissue, 30% yellow marbled adherent, 20% black eschar/crust to perimeter 90% moist red with 10% marbled yellow. Small distal area still open - skin bridge in between.    Periwound: Bruising, fragile Bruising, intact, erythema Fragile, mark, very mild maceration   Drainage: Mod sang Scant Minimal ss; honey   Exposed structures None None None   Wound Edges:   Intact, friable Closed by eschar/crust Open   Odor: None None None   S&S of Infection:   None None None   Edema: None Local None   Sensation: intact Intact, hypersensitive. Intact               Measurements: Right lateral arm Initial Evaluation  Date: 01/23/18 Encounter Date: 02/08/18  Main / Distal   Length (cm) 11.2 3.5    /  0.1   Width (cm) 3.4 1.4    /  0.1   Depth (cm) 0.1 <0.1  / <0.1   Area (cm2) 38.08cm2 4.9    /  0.01 cm2   Tract/undermine None None        Procedures:     Debridement: Non selective with NS and gauze to remove biofilm from wound bed   Cleansed with:  NS/gauze; no rinse foam cleanser to arm                                                                   Periwound protected with: no sting skin prep   Primary dressing: medihoney colloid    Secondary Dressing: silicone border adhesive dressing-medium   Other:      Patient Education:  Educated pt on wound status and POC. Wound significantly smaller per measurements today. Responding well to honey, so will continue. Continue to come 2x week. Pt and wife verbalized agreement and understanding.    Professional Collaboration: None today      Assessment:      Wound etiology: trauma    Wound Progress:  Improving, measures significantly smaller, more viable tissue.    Rationale for Treatment: Medihoney colloid to provide moist wound  environment, and facilitate autolytic debridement.    Patient tolerance/compliance: Pt and spouse agreeable with POC.    Complicating factors: hx autoimmune disorder, chronic prednisone use    Need for ongoing Advanced Wound Care services: continued skilled wound care for debridement as needed, dressing management and skilled clinical observation to prevent complications and expedite healing.    Plan:      Treatment Plan and Recommendations:  Diagnosis/ICD10: S51.811A skin tear right forearm     Procedures/CPT: selective debridement [address]    Frequency: 2x/week      Treatment Goals: STG 2 Weeks  LTG 4 Weeks   Granulation Tissue: n/a n/a   Decrease Necrotic Tissue to: n/a n/a   Wound Phase:  proliferation proliferation   Decrease Size by: 25% 75%   Periwound:  intact intact   Decrease tracts/undermining by: n/a n/a   Decrease Pain:  5/10 3/10       At the time of each visit a thorough assessment of the patient is completed to assure the  appropriateness of our plan of care.  The dressings or modalities may need to be adapted   from the original plan to address any significant changes in the wound environment.          Clinician Signature:_______________________________Date__________________      Physician Signature:______________________________Date:__________________

## 2018-02-10 LAB — ANCA IGG TITR SER IF: NORMAL {TITER}

## 2018-02-13 ENCOUNTER — NON-PROVIDER VISIT (OUTPATIENT)
Dept: WOUND CARE | Facility: MEDICAL CENTER | Age: 78
End: 2018-02-13
Attending: INTERNAL MEDICINE
Payer: MEDICARE

## 2018-02-13 ENCOUNTER — PATIENT MESSAGE (OUTPATIENT)
Dept: PULMONOLOGY | Facility: HOSPICE | Age: 78
End: 2018-02-13

## 2018-02-13 PROCEDURE — 97597 DBRDMT OPN WND 1ST 20 CM/<: CPT

## 2018-02-13 NOTE — WOUND TEAM
"Advanced Wound Care  [facility]  [address]  [address]  [place] [address]  [phone] Fax: [phone]    Encounter Note  For Certification Period: 01/23/18 - 04/13/18      Referring Physician: ZAK Welsh at urgent care  Primary Physician: Christine Zamudio MD       Consulting Physicians: n/a        Wound(s): Right lateral arm large skin tear  Start of Care: 01/23/18       Subjective:        HPI: 78 y/o gentleman who fell at home and sustained a right lateral arm skin tear approx 1 week ago.  He was seen in urgent care on 1/18/18 and prescribed keflex for 10 days which he is half way through.  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  He is not diabetic.  He is on long term prednisone.      1/30/2018: Pt has medical history of heart and lung problems and possible strokes and states he was told by an MD that he has outlived his prognosis and  therefore has decided that he will \"not go to the hospital unless my temperature is over 100.4\" so he can not live the rest of his life In the hospital.     Pain:   Pt reports itching       Allergies: Erythromycin and Rituximab      Objective:      Tests and Measures: 1/30/2018= Vitals: Temp oral 98.4, , /71, 93% RA, pupils reactive and equal, speaks clearly,  01/23/18: Easily palpable Right radial pulse.  Fistula noted to JAMES.  Thrill palpable.  R hand warm.    Orthotic, protective, supportive devices: cane    Fall Risk Assessment (yajaira all that apply with an X): Completed upon initial eval 01/23/18: high fall risk.              X 65 years or older                X Fall within the last 2 years, uses   X Ambulatory devices (cane)  Loss of protective sensation in feet,    Use of prostethic/orthotic, years               Presence of lower extremity/foot/toe amputation              X Taking medication that increases risk (per facility policy)       Wound " Characteristics                                                    Location: Right lateral arm   Initial Evaluation  Date: 01/23/18 Encounter note:  Date: 1/30/2018 Encounter Date: 02/13/18   Tissue Type and %: 100% red moist partial thickness 40% red viable tissue, 30% yellow marbled adherent, 20% black eschar/crust to perimeter 100% epthithelialized   Periwound: Bruising, fragile Bruising, intact, erythema Fragile   Drainage: Mod sang Scant Honey   Exposed structures None None None   Wound Edges:   Intact, friable Closed by eschar/crust Closed   Odor: None None None   S&S of Infection:   None None None   Edema: None Local None   Sensation: intact Intact, hypersensitive. Intact               Measurements: Right lateral arm Initial Evaluation  Date: 01/23/18 Encounter Date: 02/13/18  Main / Distal   Length (cm) 11.2 Resolved   Width (cm) 3.4    Depth (cm) 0.1    Area (cm2) 38.08cm2    Tract/undermine None         Procedures:     Debridement: CSWD with forceps to remove adhesive crusts around the resolved wounds ~1.5 cm2 removed.    Cleansed with:  NS/gauze; no rinse foam cleanser to arm to clean off drainage.                                                                   Periwound protected with: no sting skin prep   Primary dressing: Adhesive foam for protection   Secondary Dressing:    Other:      Patient Education:  Educated pt on wound status and POC. Wounds are resolved today with thin epithelial tissue. Discussed that we will place an adhesive foam over the new skin to protect it. Educated patient that he can remove the dressing tomorrow or the next day and shower if he'd like. Asked that he come back in one week for a skin check. Pt and wife verbalized agreement and understanding.    Professional Collaboration: None today      Assessment:      Wound etiology: trauma    Wound Progress:  Wounds resolved today    Rationale for Treatment: Adhesive foam for protection    Patient tolerance/compliance: Pt and  spouse agreeable with POC.    Complicating factors: hx autoimmune disorder, chronic prednisone use    Need for ongoing Advanced Wound Care services: continued skilled wound care for debridement as needed, dressing management and skilled clinical observation to prevent complications and expedite healing.    Plan:      Treatment Plan and Recommendations:  Diagnosis/ICD10: S51.811A skin tear right forearm     Procedures/CPT: selective debridement 70387    Frequency: 2x/week      Treatment Goals: STG 2 Weeks  LTG 4 Weeks   Granulation Tissue: n/a n/a   Decrease Necrotic Tissue to: n/a n/a   Wound Phase:  proliferation proliferation   Decrease Size by: 25% 75%   Periwound:  intact intact   Decrease tracts/undermining by: n/a n/a   Decrease Pain:  5/10 3/10       At the time of each visit a thorough assessment of the patient is completed to assure the  appropriateness of our plan of care.  The dressings or modalities may need to be adapted   from the original plan to address any significant changes in the wound environment.          Clinician Signature:_______________________________Date__________________      Physician Signature:______________________________Date:__________________

## 2018-02-13 NOTE — TELEPHONE ENCOUNTER
"From: Gilberto Brown  To: ELSI Aldrich  Sent: 2/13/2018 2:42 PM PST  Subject: Non-Urgent Medical Question      my insurance company has stopped covering ADVAIR DISKUS 500/50 ... year 2018. is there another drug which does same or similar? Dr. Eric Vaughan has left [facility]. who do i ask ?    ADVAIR is one of four morning and night per Dr. Vaughan. 1) saline nebulizer, 2) Ipard+Abuter. nebulizer,  3) vest \"which shakes me like a paint can\" and 4) ADVAIR.    my next appointment is with whomever in June. ADVAIR lasts one month. i just paid $165.00 for one month supply.   "

## 2018-02-20 ENCOUNTER — NON-PROVIDER VISIT (OUTPATIENT)
Dept: WOUND CARE | Facility: MEDICAL CENTER | Age: 78
End: 2018-02-20
Attending: INTERNAL MEDICINE
Payer: MEDICARE

## 2018-02-20 DIAGNOSIS — S51.812A SKIN TEAR OF FOREARM WITHOUT COMPLICATION, LEFT, INITIAL ENCOUNTER: ICD-10-CM

## 2018-02-20 PROCEDURE — 97602 WOUND(S) CARE NON-SELECTIVE: CPT

## 2018-02-20 NOTE — WOUND TEAM
"Advanced Wound Care  [facility]  [address]  [address]  [place] [address]  [phone] Fax: [phone]    Encounter Note  For Certification Period: 01/23/18 - 04/13/18      Referring Physician: ZAK Welsh at urgent care  Primary Physician: Christine Zamudio MD       Consulting Physicians: n/a        Wound(s): Right lateral arm large skin tear (resolved), Left forearm skin tear  Start of Care: 01/23/18       Subjective:        HPI: 76 y/o gentleman who fell at home and sustained a right lateral arm skin tear approx 1 week ago.  He was seen in urgent care on 1/18/18 and prescribed keflex for 10 days which he is half way through.  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  He is not diabetic.  He is on long term prednisone.      1/30/2018: Pt has medical history of heart and lung problems and possible strokes and states he was told by an MD that he has outlived his prognosis and  therefore has decided that he will \"not go to the hospital unless my temperature is over 100.4\" so he can not live the rest of his life In the hospital.     Pain:   Pt reports pain with treatment of Left forearm wound which subsides afterward.     Allergies: Erythromycin and Rituximab      Objective:      Tests and Measures: 1/30/2018= Vitals: Temp oral 98.4, , /71, 93% RA, pupils reactive and equal, speaks clearly,  01/23/18: Easily palpable Right radial pulse.  Fistula noted to JAMES.  Thrill palpable.  R hand warm.    Orthotic, protective, supportive devices: cane    Fall Risk Assessment (yajaira all that apply with an X): Completed upon initial eval 01/23/18: high fall risk.         Wound Characteristics                                                    Location: Right lateral arm   Initial Evaluation  Date: 01/23/18 Encounter note:  Date: 1/30/2018 Encounter Date: 02/13/18 Encounter Date: 02/20/18   Tissue Type and %: " 100% red moist partial thickness 40% red viable tissue, 30% yellow marbled adherent, 20% black eschar/crust to perimeter 100% epthithelialized Remains resolved   Periwound: Bruising, fragile Bruising, intact, erythema Fragile    Drainage: Mod sang Scant Honey    Exposed structures None None None    Wound Edges:   Intact, friable Closed by eschar/crust Closed    Odor: None None None    S&S of Infection:   None None None    Edema: None Local None    Sensation: intact Intact, hypersensitive. Intact                Measurements: Right lateral arm Initial Evaluation  Date: 01/23/18 Encounter Date: 02/13/18  Main / Distal Encounter Date: 02/20/18   Length (cm) 11.2 Resolved Remains resolved   Width (cm) 3.4     Depth (cm) 0.1     Area (cm2) 38.08cm2     Tract/undermine None         Wound Characteristics                                                    Location: left forearm   Initial Evaluation  Date: 02/20/18   Tissue Type and %: Skin tear without flap - partial thickness moist red   Periwound: Intact, bruising   Drainage: Min to mod sang   Exposed structures none   Wound Edges:   open   Odor: none   S&S of Infection:   none   Edema: none   Sensation: Intact, painful               Measurements: left forearm Initial Evaluation  Date: 02/20/18   Length (cm) 1.1   Width (cm) 1.6   Depth (cm) <0.1   Area (cm2) 1.76cm2   Tract/undermine none          Procedures:     Debridement: nonselective using ns/gauze to remove drainage   Cleansed with:  NS/gauze                                                                Periwound protected with: no sting skin prep   Primary dressing: adaptic   Secondary Dressing: medium silicone adhesive foam   Other:      Patient Education:  Had planned skin check and discharge today, but pt has new skin tear to left forearm and prefers to have it cared for here (option given for him to do his own care at home).  S/sx infection to monitor for reviewed.  Need to keep area CDI, cover with bathing  taught.  Pt expresses understanding of instructions.     Professional Collaboration: ZAK Orozco re: order for new wound care  Assessment:      Wound etiology: trauma    Wound Progress:  Right arm wound resolved.  New left forearm wound.    Rationale for Treatment: Adhesive foam for protection.  Adaptic to prevent sticking.    Patient tolerance/compliance: Pt agreeable with POC.    Complicating factors: hx autoimmune disorder, chronic prednisone use    Need for ongoing Advanced Wound Care services: continued skilled wound care for debridement as needed, dressing management and skilled clinical observation to prevent complications and expedite healing.    Plan:      Treatment Plan and Recommendations:  Diagnosis/ICD10: S51.811A skin tear right forearm     Procedures/CPT: nonselective debridement 00346    Frequency: 1x/week      Treatment Goals: STG 2 Weeks  LTG 4 Weeks   Granulation Tissue: n/a n/a   Decrease Necrotic Tissue to: n/a n/a   Wound Phase:  proliferation proliferation   Decrease Size by: 25% 75%   Periwound:  intact intact   Decrease tracts/undermining by: n/a n/a   Decrease Pain:  5/10 3/10       At the time of each visit a thorough assessment of the patient is completed to assure the  appropriateness of our plan of care.  The dressings or modalities may need to be adapted   from the original plan to address any significant changes in the wound environment.          Clinician Signature:_______________________________Date__________________      Physician Signature:______________________________Date:__________________

## 2018-02-27 ENCOUNTER — TELEPHONE (OUTPATIENT)
Dept: PULMONOLOGY | Facility: HOSPICE | Age: 78
End: 2018-02-27

## 2018-02-27 NOTE — TELEPHONE ENCOUNTER
MEDICATION PRIOR AUTHORIZATION NEEDED:    1. Name of Medication: Advair 500/50 mcg    2. Requested By (Name of Pharmacy): [facility]     3. Is insurance on file current? yes    4. What is the name & phone number of the 3rd party payor? [employer] [phone]

## 2018-03-03 NOTE — TELEPHONE ENCOUNTER
DOCUMENTATION OF PRIOR AUTH STATUS    1. Medication name and dose: Advair 500/50 mcg    2. Name and Phone # of Prescription coverage company: [employer] [phone]    3. Date Prior Auth was submitted: 2/28/2018    4. What information was given to obtain insurance decision: Clinical notes    5. Prior Auth letter Approved or Denied: Denied-APPEAL SENT 3/2/2018    6. Pharmacy notified: No    7. Patient notified: No

## 2018-03-06 ENCOUNTER — OFFICE VISIT (OUTPATIENT)
Dept: RHEUMATOLOGY | Facility: PHYSICIAN GROUP | Age: 78
End: 2018-03-06
Payer: MEDICARE

## 2018-03-06 VITALS
TEMPERATURE: 98.4 F | HEART RATE: 96 BPM | SYSTOLIC BLOOD PRESSURE: 130 MMHG | BODY MASS INDEX: 22.22 KG/M2 | OXYGEN SATURATION: 94 % | DIASTOLIC BLOOD PRESSURE: 60 MMHG | RESPIRATION RATE: 14 BRPM | WEIGHT: 144 LBS

## 2018-03-06 DIAGNOSIS — M31.30 WEGENER'S GRANULOMATOSIS: ICD-10-CM

## 2018-03-06 DIAGNOSIS — Z99.2 ESRD ON DIALYSIS (HCC): ICD-10-CM

## 2018-03-06 DIAGNOSIS — Z79.01 CHRONIC ANTICOAGULATION: ICD-10-CM

## 2018-03-06 DIAGNOSIS — I10 ESSENTIAL HYPERTENSION: ICD-10-CM

## 2018-03-06 DIAGNOSIS — Z22.39 PSEUDOMONAS AERUGINOSA COLONIZATION: ICD-10-CM

## 2018-03-06 DIAGNOSIS — I48.91 ATRIAL FIBRILLATION, UNSPECIFIED TYPE (HCC): ICD-10-CM

## 2018-03-06 DIAGNOSIS — Z95.0 PACEMAKER: ICD-10-CM

## 2018-03-06 DIAGNOSIS — J44.9 CHRONIC OBSTRUCTIVE PULMONARY DISEASE, UNSPECIFIED COPD TYPE (HCC): ICD-10-CM

## 2018-03-06 DIAGNOSIS — N18.6 ESRD ON DIALYSIS (HCC): ICD-10-CM

## 2018-03-06 PROCEDURE — 99214 OFFICE O/P EST MOD 30 MIN: CPT | Performed by: INTERNAL MEDICINE

## 2018-03-06 NOTE — LETTER
[facility]   [address], [address]  [place] [address]  Phone: [phone]  Fax: [phone]              Encounter Date: 3/6/2018    Dear Dr. Forrest ref. provider found,    It was a pleasure seeing your patient, Gilberto Brown, on 3/6/2018. Diagnoses of Wegener's granulomatosis (CMS-[facility]), Pseudomonas aeruginosa colonization, Atrial fibrillation, unspecified type (CMS-[facility]), Chronic anticoagulation, Pacemaker, Chronic obstructive pulmonary disease, unspecified COPD type (CMS-HCC), ESRD on dialysis (CMS-HCC), and Essential hypertension were pertinent to this visit.     Please find attached progress note which includes the history I obtained from Mr. Brown, my physical examination findings, my impression and recommendations.      Once again, it was a pleasure participating in your patient's care.  Please feel free to contact me if you have any questions or if I can be of any further assistance to your patients.      Sincerely,    Cintia Ariza M.D.  Electronically Signed          PROGRESS NOTE:  No notes on file

## 2018-03-07 NOTE — PROGRESS NOTES
Chief Complaint- joint pain    Subjective:   Gilberto Brown is a 77 y.o. 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     Recent labs indicate a negative ANCA July 2017 where patient prednisone was decreased from 13 mg by mouth daily to 12 mg by mouth daily, and outpatient recently again status post ANCA level which was negative February 2018.  We are monitoring the ANCA on a regular basis and dropping the prednisone a little bit at the time to balance the chronic Pseudomonas infection and keeping the Wegener's under control.     Patient denies any fevers of unknown etiology, denies any conjunctivitis, denies any nasal ulcers or oral ulcers, denies any joint pain or swelling, denies any new rashes. Recent CT scans of lungs and chest x-ray of lungs indicating chronic colonization of Pseudomonas. Unfortunately patient's had to be hospitalized many times now for treatment of lung infections, patient continues to be off of cytotoxic agents and continues to be on only prednisone 12 mg by mouth daily.    Additional comorbidities include atrial fibrillation and has a pacemaker in place. Patient also recently diagnosed with a CVA and is now on several toe for  anticoagulation. Patient is also on oxygen at home at 3-1/2 L/m.      S/p Cytoxan-n/v  Off of Cellcept 5/2016  Rituximab infusions 1/14/2016, 1/28/2016-patient developed severe infection and was hospitalized     GBM neg 1/2012  ANCA neg 1/2015; ANCA 1:20  11/2015; ANCA neg 2/2016; ANCA neg 5/2016; ANCA neg 12/2016; ANCA neg 3/2017; ANCA neg 7/2017; ANCA neg 2/2018  C3 90 normal 10/2011  C4 21 10/2011  OFELIA neg 10/2011  Uric acid 4.4 1/2016  Hep B neg 2/2015; Hep B neg 4/2017  Hep C neg 2/2015      Of note  Dr Coral Corona nephrology  Dr Vaughan pulmonology   Dr Chavarria Cardiologist [phone]  Dr Nunez Opthalmologist [phone]      Current medicines (including changes today)  Current Outpatient Prescriptions   Medication Sig Dispense Refill   • predniSONE (DELTASONE) 10 MG Tab TAKE 1 TABLET BY MOUTH DAILY WITH TWO 1MG TABLETS FOR A TOTAL OF 12MG DAILY 90 Tab 0   • predniSONE (DELTASONE) 1 MG Tab TAKE 2 TABLETS BY MOUTH WITH 1 TABLET OF THE 10 MG FOR A TOTAL OF 12 MG EVERY  Tab 0   • ipratropium-albuterol (DUONEB) 0.5-2.5 (3) MG/3ML nebulizer solution USE 3 ML VIA NEBULIZER FOUR TIMES DAILY 1080 mL 3   • lidocaine (XYLOCAINE) 2 % Gel APPLY TO AFFECTED AREA DAILY AS NEEDED. 2 Bottle 11   • ethyl chloride Aerosol APPLY TO AV-FISTULA PRIORTO HEMODIALYSIS 103.5 mL 11   • non-formulary med Inhale 2 L by mouth every bedtime. OXYGEN AT 2L/NC AS NEEDED AND AT NIGHT  Indications: copd     • metoprolol (LOPRESSOR) 25 MG Tab Take 25 mg by mouth 2 times a day.     • B Complex-C-Folic Acid (DIALYVITE TABLET) Tab Dialyvite -nehro vit B complex-C-folic acid 1 tablet po QD Dialysis Pt 90 Tab 3   • albuterol 108 (90 BASE) MCG/ACT Aero Soln inhalation aerosol Inhale 2 Puffs by mouth every four hours as needed for Shortness of Breath.     • ranitidine (ZANTAC) 150 MG Tab TAKE 1 TABLET BY MOUTH EVERY NIGHT AT BEDTIME 30 Tab 0   • finasteride (PROSCAR) 5 MG Tab Take 5 mg by mouth every day.     • simvastatin (ZOCOR) 40 MG  TABS Take 40 mg by mouth every evening.     • epoetin lucina (EPOGEN,PROCRIT) 3000 UNIT/ML SOLN Inject 2,200 Units as instructed every Monday, Wednesday, and Friday. With dialysis     • sevelamer (RENAGEL) 800 MG TABS Take 800 mg by mouth 3 times a day, with meals.     • tamsulosin (FLOMAX) 0.4 MG capsule Take 0.4 mg by mouth every day.     • aspirin EC (ECOTRIN) 81 MG TBEC Take 81 mg by mouth every day.     • cephALEXin (KEFLEX) 500 MG Cap Take 1 Cap by mouth 2 times a day. 20 Cap 0   • sodium chloride (HYPER-SAL) 7 % Nebu Soln 4 mL by Nebulization route 2 Times a Day. 240 mL 2   • ADVAIR DISKUS 500-50 MCG/DOSE AEROSOL POWDER, BREATH ACTIVATED INHALE 1 DOSE BY MOUTH TWICE DAILY. RINSE MOUTH AFTER USE 1 Inhaler 5   • RENVELA 800 MG Tab      • loperamide (IMODIUM) 2 MG Cap        No current facility-administered medications for this visit.      He  has a past medical history of Allergy; Arthritis; ASTHMA; BPH (benign prostatic hyperplasia); Breath shortness; Bronchitis; CA in situ resp sys; Chronic bronchitis with productive mucopurulent cough (CMS-HCC); COPD; Coughing blood (2011); Dialysis; Dyslipidemia; EMPHYSEMA; GERD (gastroesophageal reflux disease); Heart burn; Hip pain; HTN; Hypertension; Indigestion; TIMOTHY (obstructive sleep apnea); Other specified disorder of intestines; Pacemaker (1988); Pain (7/16/13); Pneumonia (2011); Pulmonary histoplasmosis (CMS-HCC); Renal disorder; Sick sinus syndrome (CMS-HCC); Sleep apnea; Snoring; Unspecified hemorrhagic conditions; Unspecified urinary incontinence; Urinary bladder disorder; and Wegener's disease, pulmonary (CMS-HCC).    ROS   Other than what is mentioned in HPI or physical exam, there is no history of headaches, double vision or blurred vision. No temporal tenderness or jaw claudication. No history of cataracts or glaucoma. No trouble swallowing difficulties or sore throats. No history of thyroid disease. No chest complaints including chest pain, cough or sputum  production. No shortness of breath. No GI complaints including nausea, vomiting, change in bowel habits, or past peptic ulcer disease. No history of blood in the stools. No urinary complaints including dysuria or frequency. No history of rash including psoriasis. No history of alopecia, photosensitivity, oral ulcerations, Raynaud's phenomena, or swollen joints. No history of gout. No back complaints. No history of low blood counts.       Objective:     Blood pressure 130/60, pulse 96, temperature 36.9 °C (98.4 °F), resp. rate 14, weight 65.3 kg (144 lb), SpO2 94 %. Body mass index is 22.22 kg/m².   Physical Exam:  Constitutional: Alert and oriented X3, patient complaining of fatigue however is able to talk in full sentences without evidence of shortness of breath Skin: Warm, dry, good turgor, no rashes in visible areas.Eye: Equal, round and reactive, no vision changes, lids normal, no conjunctivitis, ENMT: Lips without lesions, good dentition, oropharynx clear, no oropharyngeal ulcerations, no nasal ulcers, no oral ulcers, Neck: Trachea midline, no masses, no thyromegaly, no carotid bruits, no subclavian bruitsLymph: No supraclavicular lymphadenopathy, no cervical lymphadenopathy, no axillary lymphadenopathy.Respiratory: Unlabored respiratory effort, mild crackles lower lung basesCardiovascular: Normal S1, S2, no murmur, no edema, pacemaker in left chestAbdomen: Soft, non-tender, no masses, no hepatosplenomegaly, no abdominal bruits, no inguinal bruitsPsych: Alert and oriented x3, normal affect and mood.Neuro: Cranial nerves 2-12 are grossly intactExt:Did not appreciate any joint effusions, no Raynaud's, patient had 2+ pulses both upper extremities radial and ulnar pulses and 1+ pulses bilateral lower extremities and bilateral dorsalis pedis pulses, patient had a vascular shunt in the right upper arm  No evidence of vasculitis noted in upper or lower extremities, no nasal ulcers, no temporal artery enlargement or  temporal artery bruits, no carotid bruits. Patient is post dialysis and has dressing on his shunt on his right upper arm.    Lab Results   Component Value Date/Time    HEPBCORIGM Negative 04/14/2017 12:30 PM    HEPBSAG Negative 05/15/2017 09:58 AM     Lab Results   Component Value Date/Time    HEPCAB Negative 04/14/2017 12:30 PM     Lab Results   Component Value Date/Time    SODIUM 139 01/12/2018 03:30 PM    POTASSIUM 4.2 01/12/2018 03:30 PM    CHLORIDE 101 01/12/2018 03:30 PM    CO2 27 01/12/2018 03:30 PM    GLUCOSE 80 01/12/2018 03:30 PM    BUN 55 (H) 01/12/2018 03:30 PM    CREATININE 3.75 (H) 01/12/2018 03:30 PM    CREATININE 1.0 09/23/2008 09:36 AM      Lab Results   Component Value Date/Time    WBC 6.9 01/12/2018 03:30 PM    WBC 7.6 03/19/2012 12:00 AM    RBC 3.44 (L) 01/12/2018 03:30 PM    RBC 2.25 (LL) 03/19/2012 12:00 AM    HEMOGLOBIN 10.7 (L) 01/12/2018 03:30 PM    HEMATOCRIT 33.5 (L) 01/12/2018 03:30 PM    MCV 97.4 01/12/2018 03:30 PM     (H) 03/19/2012 12:00 AM    MCH 31.1 01/12/2018 03:30 PM    MCH 33.8 03/19/2012 12:00 AM    MCHC 31.9 (L) 01/12/2018 03:30 PM    MPV 8.6 (L) 01/12/2018 03:30 PM    NEUTSPOLYS 85.00 (H) 01/12/2018 03:30 PM    LYMPHOCYTES 6.90 (L) 01/12/2018 03:30 PM    MONOCYTES 6.70 01/12/2018 03:30 PM    EOSINOPHILS 0.30 01/12/2018 03:30 PM    BASOPHILS 0.10 01/12/2018 03:30 PM    HYPOCHROMIA 1+ 10/28/2011 05:35 AM    ANISOCYTOSIS 1+ 07/03/2017 05:15 PM      Lab Results   Component Value Date/Time    CALCIUM 8.9 01/12/2018 03:30 PM    ASTSGOT 25 07/03/2017 05:15 PM    ALTSGPT 20 07/03/2017 05:15 PM    ALKPHOSPHAT 82 07/03/2017 05:15 PM    TBILIRUBIN 0.4 07/03/2017 05:15 PM    ALBUMIN 3.3 07/03/2017 05:15 PM    ALBUMIN 1.28 (L) 10/02/2011 04:30 AM    TOTPROTEIN 6.0 07/03/2017 05:15 PM    TOTPROTEIN 4.50 (L) 10/02/2011 04:30 AM     Lab Results   Component Value Date/Time    URICACID 4.4 01/21/2016 08:36 AM    ANTINUCAB None Detected 10/02/2011 04:30 AM     Lab Results   Component  Value Date/Time    A1DRIKMHZXR 90 10/02/2011 04:30 AM    Z0ZFPFSDBMI 21 10/02/2011 04:30 AM     Lab Results   Component Value Date/Time    AGBMAB Negative 01/18/2012 03:50 AM    GBMABA Negative 01/18/2012 03:50 AM    ANCAIGG <1:20 02/08/2018 12:52 PM    A6OWIYPJTJA 90 10/02/2011 04:30 AM     Lab Results   Component Value Date/Time    SEDRATEWES 48 (H) 04/13/2017 03:25 AM     Lab Results   Component Value Date/Time    CPKTOTAL 18 09/27/2011 05:05 AM     Lab Results   Component Value Date/Time    FLTYPE Stool 09/22/2006 01:04 PM      Results for orders placed in visit on 05/02/13   CT-CHEST, HIGH RESOLUTION LUNG    Impression 1. Bilateral lower lobe bronchiectasis, grossly unchanged compared with the prior conventional chest CT 1/14/12.  2. Minimal bilateral lower lobe interstitial opacities as well as confluent opacities in those areas, greater on the left, suggesting active airspace disease and/or atelectasis.  3. Probable uncalcified left lower lobe nodule, an equivocal finding with high-resolution technique.  Conventional chest CT would be needed for confirmation.  Note that no similar lesion was identified on the prior chest scan 1/14/12.            INTERPRETING LOCATION: [address] [place] [address], [id number]     Results for orders placed during the hospital encounter of 07/27/17   CT-CHEST (THORAX) W/O    Impression 1.  There is no significant interval change in the diffuse bilateral subpleural interstitial lung disease with a basilar predominance.  2.  Bibasilar and medial segment right middle lobe bronchiectasis are again seen with minimal fluid now seen on the left within the dilated bronchi. This is most consistent with postinflammatory/infectious change.  3.  There is underlying emphysema/COPD with upper lobe paraseptal blebs.  4.  There is evidence of previous granulomatous disease with calcified granulomata, calcified nodes, and calcifications in the liver and spleen. This is consistent with the history of  pulmonary histoplasmosis.  5.  There are no new suspicious findings.  6.  Enthesopathy again noted in the thoracic spine.         Assessment and Plan:     1. Wegener's granulomatosis (CMS-HCC)  Looks as if in remission, we will decrease prednisone down to 11 mg by mouth daily and recheck ANCA again in 3 months with the possibility of lowering prednisone even further.  - ANTI-NEUTROPHIL CYTOPLASMIC AB W/RFLX; Future    2. Pseudomonas aeruginosa colonization  Followed by pulmonology, patient has a full course of pulmonary care that he does to help prevent exacerbation    3. Atrial fibrillation, unspecified type (CMS-HCC)  Patient has a pacemaker and now is on Xeralto for chronic anticoagulation    4. Chronic anticoagulation  This will impact with kind of medications we can use for this patient's arthritis, NSAIDs are contraindicated because of increased risk of bleeding while on chronic anticoagulation    5. Pacemaker  Followed by cardiology    6. Chronic obstructive pulmonary disease, unspecified COPD type (CMS-HCC)  Followed by pulmonology    7. ESRD on dialysis (CMS-HCC)  Followed by nephrology Dr Corona    8. Essential hypertension  May impact the type of medications we can use for this patient's arthritis. We will have to keep this under advisement.    Followup: Return in about 3 months (around 6/6/2018). or sooner prn    Patient was seen 30 minutes face-to-face of which more than 50% of the time was spent counseling the patient (excluding time for procedures)  regarding  rheumatological condition and care. Therapy was discussed in detail.    Please note that this dictation was created using voice recognition software. I have made every reasonable attempt to correct obvious errors, but I expect that there are errors of grammar and possibly content that I did not discover before finalizing the note.

## 2018-03-14 NOTE — TELEPHONE ENCOUNTER
I called and let the patient know that we sent Breo to his pharmacy for him to try since Advair was denied by his insurance.

## 2018-03-14 NOTE — TELEPHONE ENCOUNTER
DOCUMENTATION OF PRIOR AUTH STATUS    1. Medication name and dose: Advair 500/50 mcg    2. Name and Phone # of Prescription coverage company: [employer] [phone]    3. Date Prior Auth was submitted: Appeal sent 3/2/2018    4. What information was given to obtain insurance decision: Clinical notes    5. Prior Auth letter Approved or Denied: Denied    6. Pharmacy notified: Yes    7. Patient notified: Yes        The Advair appeal was denied.  The patient must first try:  Anoro Ellipta, Bevespi, Breo, Serevent, or Stiolto.  Dx is COPD.  Please advise, thank you.

## 2018-03-26 DIAGNOSIS — J47.9 BRONCHIECTASIS WITHOUT COMPLICATION (HCC): ICD-10-CM

## 2018-03-26 RX ORDER — SODIUM CHLORIDE FOR INHALATION 7 %
4 VIAL, NEBULIZER (ML) INHALATION 2 TIMES DAILY
Qty: 240 ML | Refills: 3 | Status: SHIPPED | OUTPATIENT
Start: 2018-03-26 | End: 2018-06-22 | Stop reason: SDUPTHER

## 2018-03-27 ENCOUNTER — APPOINTMENT (RX ONLY)
Dept: URBAN - METROPOLITAN AREA CLINIC 36 | Facility: CLINIC | Age: 78
Setting detail: DERMATOLOGY
End: 2018-03-27

## 2018-03-27 PROBLEM — C44.91 BASAL CELL CARCINOMA OF SKIN, UNSPECIFIED: Status: ACTIVE | Noted: 2018-03-27

## 2018-03-27 PROCEDURE — 11900 INJECT SKIN LESIONS </W 7: CPT

## 2018-03-27 PROCEDURE — ? INJECTION

## 2018-03-27 NOTE — PROCEDURE: INJECTION
Units: mg
Detail Level: None
Render J-Code Information In Note?: yes
Medication (1) And Associated J-Code Units: Bleomycin, 15 units
units
Dose Administered (Numbers Only - Mg, G, Mcg, Units, Cc): 0
Procedure Information: Please note that the numeric value listed in the Medication (1) and associated J-code units and Medication (2) and associated J-code units variables are j-code amounts and do not represent either the concentration or the total amount of the medications injected.  I strongly recommend selecting no to the Render J-code information in note question. This will allow your note to be more clear. If you are billing j-codes with your injection codes you need to document the total amount of the medication injected. This amount should match the j-code units. For example, if you are injecting Triamcinolone 40mg as an intramuscular injection you would select 40 for the dose field and mg for the units. This would allow you to document  with 4 units (40mg = 10mg x 4). The total volume is not used to calculate j-codes only the amount of the medication administered.
Treatment Number: 3
Post-Care Instructions: I reviewed with the patient in detail post-care instructions. Patient understands to keep the injection sites clean and call the clinic if there is any redness, swelling or pain.
Dose Administered (Numbers Only - Mg, G, Mcg, Units, Cc): 0.2
Route: IL
Consent: The risks of the medication was reviewed with the patient.

## 2018-04-13 ENCOUNTER — HOSPITAL ENCOUNTER (OUTPATIENT)
Facility: MEDICAL CENTER | Age: 78
End: 2018-04-14
Attending: EMERGENCY MEDICINE | Admitting: HOSPITALIST
Payer: MEDICARE

## 2018-04-13 ENCOUNTER — APPOINTMENT (OUTPATIENT)
Dept: RADIOLOGY | Facility: MEDICAL CENTER | Age: 78
End: 2018-04-13
Attending: EMERGENCY MEDICINE
Payer: MEDICARE

## 2018-04-13 ENCOUNTER — RESOLUTE PROFESSIONAL BILLING HOSPITAL PROF FEE (OUTPATIENT)
Dept: HOSPITALIST | Facility: MEDICAL CENTER | Age: 78
End: 2018-04-13
Payer: MEDICARE

## 2018-04-13 DIAGNOSIS — R07.9 CHEST PAIN, UNSPECIFIED TYPE: ICD-10-CM

## 2018-04-13 LAB
ALBUMIN SERPL BCP-MCNC: 4.1 G/DL (ref 3.2–4.9)
ALBUMIN/GLOB SERPL: 1.4 G/DL
ALP SERPL-CCNC: 72 U/L (ref 30–99)
ALT SERPL-CCNC: 14 U/L (ref 2–50)
ANION GAP SERPL CALC-SCNC: 12 MMOL/L (ref 0–11.9)
AST SERPL-CCNC: 15 U/L (ref 12–45)
BASOPHILS # BLD AUTO: 0.3 % (ref 0–1.8)
BASOPHILS # BLD: 0.02 K/UL (ref 0–0.12)
BILIRUB SERPL-MCNC: 0.4 MG/DL (ref 0.1–1.5)
BLOOD CULTURE HOLD CXBCH: NORMAL
BNP SERPL-MCNC: 182 PG/ML (ref 0–100)
BUN SERPL-MCNC: 23 MG/DL (ref 8–22)
CALCIUM SERPL-MCNC: 9 MG/DL (ref 8.5–10.5)
CHLORIDE SERPL-SCNC: 96 MMOL/L (ref 96–112)
CHOLEST SERPL-MCNC: 146 MG/DL (ref 100–199)
CO2 SERPL-SCNC: 30 MMOL/L (ref 20–33)
CREAT SERPL-MCNC: 1.87 MG/DL (ref 0.5–1.4)
EKG IMPRESSION: NORMAL
EOSINOPHIL # BLD AUTO: 0.04 K/UL (ref 0–0.51)
EOSINOPHIL NFR BLD: 0.5 % (ref 0–6.9)
ERYTHROCYTE [DISTWIDTH] IN BLOOD BY AUTOMATED COUNT: 62.7 FL (ref 35.9–50)
GLOBULIN SER CALC-MCNC: 2.9 G/DL (ref 1.9–3.5)
GLUCOSE SERPL-MCNC: 104 MG/DL (ref 65–99)
HCT VFR BLD AUTO: 36.5 % (ref 42–52)
HDLC SERPL-MCNC: 84 MG/DL
HGB BLD-MCNC: 11.7 G/DL (ref 14–18)
IMM GRANULOCYTES # BLD AUTO: 0.08 K/UL (ref 0–0.11)
IMM GRANULOCYTES NFR BLD AUTO: 1 % (ref 0–0.9)
LDLC SERPL CALC-MCNC: 42 MG/DL
LYMPHOCYTES # BLD AUTO: 0.52 K/UL (ref 1–4.8)
LYMPHOCYTES NFR BLD: 6.8 % (ref 22–41)
MCH RBC QN AUTO: 32.1 PG (ref 27–33)
MCHC RBC AUTO-ENTMCNC: 32.1 G/DL (ref 33.7–35.3)
MCV RBC AUTO: 100.3 FL (ref 81.4–97.8)
MONOCYTES # BLD AUTO: 0.56 K/UL (ref 0–0.85)
MONOCYTES NFR BLD AUTO: 7.3 % (ref 0–13.4)
NEUTROPHILS # BLD AUTO: 6.44 K/UL (ref 1.82–7.42)
NEUTROPHILS NFR BLD: 84.1 % (ref 44–72)
NRBC # BLD AUTO: 0 K/UL
NRBC BLD-RTO: 0 /100 WBC
PLATELET # BLD AUTO: 233 K/UL (ref 164–446)
PMV BLD AUTO: 8.6 FL (ref 9–12.9)
POTASSIUM SERPL-SCNC: 3.3 MMOL/L (ref 3.6–5.5)
PROT SERPL-MCNC: 7 G/DL (ref 6–8.2)
RBC # BLD AUTO: 3.64 M/UL (ref 4.7–6.1)
SODIUM SERPL-SCNC: 138 MMOL/L (ref 135–145)
TRIGL SERPL-MCNC: 100 MG/DL (ref 0–149)
TROPONIN I SERPL-MCNC: 0.05 NG/ML (ref 0–0.04)
TSH SERPL DL<=0.005 MIU/L-ACNC: 1.18 UIU/ML (ref 0.38–5.33)
WBC # BLD AUTO: 7.7 K/UL (ref 4.8–10.8)

## 2018-04-13 PROCEDURE — 80053 COMPREHEN METABOLIC PANEL: CPT

## 2018-04-13 PROCEDURE — 84484 ASSAY OF TROPONIN QUANT: CPT | Mod: 91

## 2018-04-13 PROCEDURE — 36415 COLL VENOUS BLD VENIPUNCTURE: CPT

## 2018-04-13 PROCEDURE — 99220 PR INITIAL OBSERVATION CARE,LEVL III: CPT | Performed by: HOSPITALIST

## 2018-04-13 PROCEDURE — 99285 EMERGENCY DEPT VISIT HI MDM: CPT

## 2018-04-13 PROCEDURE — 93005 ELECTROCARDIOGRAM TRACING: CPT | Performed by: EMERGENCY MEDICINE

## 2018-04-13 PROCEDURE — 85025 COMPLETE CBC W/AUTO DIFF WBC: CPT

## 2018-04-13 PROCEDURE — G0378 HOSPITAL OBSERVATION PER HR: HCPCS

## 2018-04-13 PROCEDURE — 71045 X-RAY EXAM CHEST 1 VIEW: CPT

## 2018-04-13 PROCEDURE — 84443 ASSAY THYROID STIM HORMONE: CPT

## 2018-04-13 PROCEDURE — 306588 SLEEVE,VASO CALF MED: Performed by: HOSPITALIST

## 2018-04-13 PROCEDURE — 80061 LIPID PANEL: CPT

## 2018-04-13 PROCEDURE — 700102 HCHG RX REV CODE 250 W/ 637 OVERRIDE(OP): Performed by: HOSPITALIST

## 2018-04-13 PROCEDURE — A9270 NON-COVERED ITEM OR SERVICE: HCPCS | Performed by: HOSPITALIST

## 2018-04-13 PROCEDURE — 83880 ASSAY OF NATRIURETIC PEPTIDE: CPT

## 2018-04-13 PROCEDURE — 93005 ELECTROCARDIOGRAM TRACING: CPT

## 2018-04-13 RX ORDER — FINASTERIDE 5 MG/1
5 TABLET, FILM COATED ORAL DAILY
Status: DISCONTINUED | OUTPATIENT
Start: 2018-04-14 | End: 2018-04-14 | Stop reason: HOSPADM

## 2018-04-13 RX ORDER — MORPHINE SULFATE 4 MG/ML
2-4 INJECTION, SOLUTION INTRAMUSCULAR; INTRAVENOUS
Status: DISCONTINUED | OUTPATIENT
Start: 2018-04-13 | End: 2018-04-14 | Stop reason: HOSPADM

## 2018-04-13 RX ORDER — PREDNISONE 10 MG/1
10 TABLET ORAL DAILY
Status: DISCONTINUED | OUTPATIENT
Start: 2018-04-14 | End: 2018-04-14 | Stop reason: HOSPADM

## 2018-04-13 RX ORDER — SEVELAMER CARBONATE 800 MG/1
800 TABLET, FILM COATED ORAL
Status: DISCONTINUED | OUTPATIENT
Start: 2018-04-14 | End: 2018-04-14 | Stop reason: HOSPADM

## 2018-04-13 RX ORDER — PREDNISONE 10 MG/1
10 TABLET ORAL DAILY
COMMUNITY
End: 2018-12-10 | Stop reason: SDUPTHER

## 2018-04-13 RX ORDER — BISACODYL 10 MG
10 SUPPOSITORY, RECTAL RECTAL
Status: DISCONTINUED | OUTPATIENT
Start: 2018-04-13 | End: 2018-04-14 | Stop reason: HOSPADM

## 2018-04-13 RX ORDER — IPRATROPIUM BROMIDE AND ALBUTEROL SULFATE 2.5; .5 MG/3ML; MG/3ML
3 SOLUTION RESPIRATORY (INHALATION)
Status: DISCONTINUED | OUTPATIENT
Start: 2018-04-13 | End: 2018-04-14 | Stop reason: HOSPADM

## 2018-04-13 RX ORDER — SIMVASTATIN 20 MG
40 TABLET ORAL NIGHTLY
Status: DISCONTINUED | OUTPATIENT
Start: 2018-04-13 | End: 2018-04-14 | Stop reason: HOSPADM

## 2018-04-13 RX ORDER — POLYETHYLENE GLYCOL 3350 17 G/17G
1 POWDER, FOR SOLUTION ORAL
Status: DISCONTINUED | OUTPATIENT
Start: 2018-04-13 | End: 2018-04-14 | Stop reason: HOSPADM

## 2018-04-13 RX ORDER — PREDNISONE 1 MG/1
1 TABLET ORAL DAILY
COMMUNITY
End: 2018-11-27

## 2018-04-13 RX ORDER — AMOXICILLIN 250 MG
2 CAPSULE ORAL 2 TIMES DAILY
Status: DISCONTINUED | OUTPATIENT
Start: 2018-04-14 | End: 2018-04-14 | Stop reason: HOSPADM

## 2018-04-13 RX ORDER — TAMSULOSIN HYDROCHLORIDE 0.4 MG/1
0.4 CAPSULE ORAL DAILY
Status: DISCONTINUED | OUTPATIENT
Start: 2018-04-14 | End: 2018-04-14 | Stop reason: HOSPADM

## 2018-04-13 RX ORDER — BUDESONIDE AND FORMOTEROL FUMARATE DIHYDRATE 160; 4.5 UG/1; UG/1
2 AEROSOL RESPIRATORY (INHALATION)
Status: DISCONTINUED | OUTPATIENT
Start: 2018-04-14 | End: 2018-04-14 | Stop reason: HOSPADM

## 2018-04-13 RX ORDER — NITROGLYCERIN 0.4 MG/1
0.4 TABLET SUBLINGUAL
Status: DISCONTINUED | OUTPATIENT
Start: 2018-04-13 | End: 2018-04-14 | Stop reason: HOSPADM

## 2018-04-13 RX ADMIN — METOPROLOL TARTRATE 25 MG: 25 TABLET, FILM COATED ORAL at 23:34

## 2018-04-13 RX ADMIN — SIMVASTATIN 40 MG: 20 TABLET, FILM COATED ORAL at 23:35

## 2018-04-13 ASSESSMENT — LIFESTYLE VARIABLES
AVERAGE NUMBER OF DAYS PER WEEK YOU HAVE A DRINK CONTAINING ALCOHOL: 4
ALCOHOL_USE: YES
EVER FELT BAD OR GUILTY ABOUT YOUR DRINKING: NO
ON A TYPICAL DAY WHEN YOU DRINK ALCOHOL HOW MANY DRINKS DO YOU HAVE: .5
CONSUMPTION TOTAL: NEGATIVE
HAVE YOU EVER FELT YOU SHOULD CUT DOWN ON YOUR DRINKING: NO
EVER_SMOKED: YES
TOTAL SCORE: 0
TOTAL SCORE: 0
CONSUMPTION TOTAL: NEGATIVE
HAVE PEOPLE ANNOYED YOU BY CRITICIZING YOUR DRINKING: NO
TOTAL SCORE: 0
EVER FELT BAD OR GUILTY ABOUT YOUR DRINKING: NO
TOTAL SCORE: 0
TOTAL SCORE: 0
EVER_SMOKED: UNABLE TO EVALUATE AT THIS TIME - NEEDS ASSESSMENT PRIOR TO DISCHARGE
HOW MANY TIMES IN THE PAST YEAR HAVE YOU HAD 5 OR MORE DRINKS IN A DAY: 0
HAVE YOU EVER FELT YOU SHOULD CUT DOWN ON YOUR DRINKING: NO
HAVE PEOPLE ANNOYED YOU BY CRITICIZING YOUR DRINKING: NO
ON A TYPICAL DAY WHEN YOU DRINK ALCOHOL HOW MANY DRINKS DO YOU HAVE: .5
EVER HAD A DRINK FIRST THING IN THE MORNING TO STEADY YOUR NERVES TO GET RID OF A HANGOVER: NO
AVERAGE NUMBER OF DAYS PER WEEK YOU HAVE A DRINK CONTAINING ALCOHOL: 4
EVER HAD A DRINK FIRST THING IN THE MORNING TO STEADY YOUR NERVES TO GET RID OF A HANGOVER: NO
DO YOU DRINK ALCOHOL: YES
HOW MANY TIMES IN THE PAST YEAR HAVE YOU HAD 5 OR MORE DRINKS IN A DAY: 0
TOTAL SCORE: 0

## 2018-04-13 ASSESSMENT — COPD QUESTIONNAIRES
HAVE YOU SMOKED AT LEAST 100 CIGARETTES IN YOUR ENTIRE LIFE: YES
COPD SCREENING SCORE: 10
HAVE YOU SMOKED AT LEAST 100 CIGARETTES IN YOUR ENTIRE LIFE: YES
DO YOU EVER COUGH UP ANY MUCUS OR PHLEGM?: YES, EVERY DAY
DURING THE PAST 4 WEEKS HOW MUCH DID YOU FEEL SHORT OF BREATH: MOST  OR ALL OF THE TIME
DO YOU EVER COUGH UP ANY MUCUS OR PHLEGM?: YES, EVERY DAY
COPD SCREENING SCORE: 10
DURING THE PAST 4 WEEKS HOW MUCH DID YOU FEEL SHORT OF BREATH: MOST  OR ALL OF THE TIME

## 2018-04-13 ASSESSMENT — PAIN SCALES - GENERAL
PAINLEVEL_OUTOF10: 0
PAINLEVEL_OUTOF10: 6

## 2018-04-13 ASSESSMENT — PATIENT HEALTH QUESTIONNAIRE - PHQ9
1. LITTLE INTEREST OR PLEASURE IN DOING THINGS: NOT AT ALL
SUM OF ALL RESPONSES TO PHQ9 QUESTIONS 1 AND 2: 0

## 2018-04-14 ENCOUNTER — APPOINTMENT (OUTPATIENT)
Dept: RADIOLOGY | Facility: MEDICAL CENTER | Age: 78
End: 2018-04-14
Attending: HOSPITALIST
Payer: MEDICARE

## 2018-04-14 ENCOUNTER — PATIENT OUTREACH (OUTPATIENT)
Dept: HEALTH INFORMATION MANAGEMENT | Facility: OTHER | Age: 78
End: 2018-04-14

## 2018-04-14 VITALS
BODY MASS INDEX: 20.78 KG/M2 | RESPIRATION RATE: 16 BRPM | TEMPERATURE: 98.1 F | OXYGEN SATURATION: 96 % | DIASTOLIC BLOOD PRESSURE: 60 MMHG | HEIGHT: 68 IN | SYSTOLIC BLOOD PRESSURE: 128 MMHG | WEIGHT: 137.13 LBS | HEART RATE: 76 BPM

## 2018-04-14 PROBLEM — R07.9 CHEST PAIN: Status: ACTIVE | Noted: 2018-04-14

## 2018-04-14 PROBLEM — R07.9 CHEST PAIN: Status: RESOLVED | Noted: 2018-04-14 | Resolved: 2018-04-14

## 2018-04-14 LAB
EKG IMPRESSION: NORMAL
TROPONIN I SERPL-MCNC: 0.06 NG/ML (ref 0–0.04)
TROPONIN I SERPL-MCNC: 0.06 NG/ML (ref 0–0.04)

## 2018-04-14 PROCEDURE — 700111 HCHG RX REV CODE 636 W/ 250 OVERRIDE (IP)

## 2018-04-14 PROCEDURE — 36415 COLL VENOUS BLD VENIPUNCTURE: CPT

## 2018-04-14 PROCEDURE — 700102 HCHG RX REV CODE 250 W/ 637 OVERRIDE(OP): Performed by: NURSE PRACTITIONER

## 2018-04-14 PROCEDURE — 84484 ASSAY OF TROPONIN QUANT: CPT

## 2018-04-14 PROCEDURE — 700111 HCHG RX REV CODE 636 W/ 250 OVERRIDE (IP): Performed by: HOSPITALIST

## 2018-04-14 PROCEDURE — A9502 TC99M TETROFOSMIN: HCPCS

## 2018-04-14 PROCEDURE — 93005 ELECTROCARDIOGRAM TRACING: CPT | Performed by: HOSPITALIST

## 2018-04-14 PROCEDURE — A9270 NON-COVERED ITEM OR SERVICE: HCPCS | Performed by: NURSE PRACTITIONER

## 2018-04-14 PROCEDURE — 93010 ELECTROCARDIOGRAM REPORT: CPT | Performed by: INTERNAL MEDICINE

## 2018-04-14 PROCEDURE — 700102 HCHG RX REV CODE 250 W/ 637 OVERRIDE(OP): Performed by: HOSPITALIST

## 2018-04-14 PROCEDURE — A9270 NON-COVERED ITEM OR SERVICE: HCPCS | Performed by: HOSPITALIST

## 2018-04-14 PROCEDURE — 302135 SEQUENTIAL COMPRESSION MACHINE: Performed by: HOSPITALIST

## 2018-04-14 PROCEDURE — G0378 HOSPITAL OBSERVATION PER HR: HCPCS

## 2018-04-14 RX ORDER — POTASSIUM CHLORIDE 20 MEQ/1
40 TABLET, EXTENDED RELEASE ORAL ONCE
Status: COMPLETED | OUTPATIENT
Start: 2018-04-14 | End: 2018-04-14

## 2018-04-14 RX ORDER — REGADENOSON 0.08 MG/ML
INJECTION, SOLUTION INTRAVENOUS
Status: COMPLETED
Start: 2018-04-14 | End: 2018-04-14

## 2018-04-14 RX ADMIN — ASPIRIN 81 MG: 81 TABLET, COATED ORAL at 12:08

## 2018-04-14 RX ADMIN — TAMSULOSIN HYDROCHLORIDE 0.4 MG: 0.4 CAPSULE ORAL at 12:09

## 2018-04-14 RX ADMIN — BUDESONIDE AND FORMOTEROL FUMARATE DIHYDRATE 2 PUFF: 160; 4.5 AEROSOL RESPIRATORY (INHALATION) at 12:27

## 2018-04-14 RX ADMIN — REGADENOSON 0.4 MG: 0.08 INJECTION, SOLUTION INTRAVENOUS at 09:54

## 2018-04-14 RX ADMIN — FINASTERIDE 5 MG: 5 TABLET, FILM COATED ORAL at 12:09

## 2018-04-14 RX ADMIN — METOPROLOL TARTRATE 25 MG: 25 TABLET, FILM COATED ORAL at 12:08

## 2018-04-14 RX ADMIN — PREDNISONE 10 MG: 10 TABLET ORAL at 12:10

## 2018-04-14 RX ADMIN — POTASSIUM CHLORIDE 40 MEQ: 1500 TABLET, EXTENDED RELEASE ORAL at 12:08

## 2018-04-14 RX ADMIN — SEVELAMER CARBONATE 800 MG: 800 TABLET, FILM COATED ORAL at 14:09

## 2018-04-14 ASSESSMENT — ENCOUNTER SYMPTOMS
DIZZINESS: 0
TINGLING: 0
CHILLS: 0
HEADACHES: 0
SHORTNESS OF BREATH: 1
DEPRESSION: 0
WHEEZING: 0
VOMITING: 0
FEVER: 0
MYALGIAS: 0
PALPITATIONS: 0
COUGH: 0
DIARRHEA: 0
NAUSEA: 0
FOCAL WEAKNESS: 0
ABDOMINAL PAIN: 0
PHOTOPHOBIA: 0
SORE THROAT: 0

## 2018-04-14 ASSESSMENT — PAIN SCALES - GENERAL
PAINLEVEL_OUTOF10: 0
PAINLEVEL_OUTOF10: 0

## 2018-04-14 NOTE — ED NOTES
Pt to [facility] 21 via w/c accompanied by spouse.  Pt assisted to change into a gown.  Up for ERP evaluation.

## 2018-04-14 NOTE — PROGRESS NOTES
Bedside report received 0226. POC discussed with pt; Pt denies CP, No overnight cardiac events; Pending stress test; Encouraged oral hygiene Pt declined assistance  all questions answered at this time.

## 2018-04-14 NOTE — DISCHARGE INSTRUCTIONS
Discharge Instructions    Discharged to home by car with relative. Discharged via wheelchair, hospital escort: Yes.  Special equipment needed: Not Applicable    Be sure to schedule a follow-up appointment with your primary care doctor or any specialists as instructed.     Discharge Plan:   Influenza Vaccine Indication: Not indicated: Previously immunized this influenza season and > 8 years of age    I understand that a diet low in cholesterol, fat, and sodium is recommended for good health. Unless I have been given specific instructions below for another diet, I accept this instruction as my diet prescription.   Other diet: Renal approved    Special Instructions: Followup: With PCP   Instructions:   -Please take all medications as prescribed   -Given instructions to return to the ER if any new or worsening symptoms, worsening condition, or failure to improve   -Call PCP for followup   -No smoking, no alcohol, no caffeine   -Encourage risk factor reduction with tobacco and alcohol abstinence, diet changes, weight loss, and exercise.      · Is patient discharged on Warfarin / Coumadin?   No     Depression / Suicide Risk    As you are discharged from this [facility] facility, it is important to learn how to keep safe from harming yourself.    Recognize the warning signs:  · Abrupt changes in personality, positive or negative- including increase in energy   · Giving away possessions  · Change in eating patterns- significant weight changes-  positive or negative  · Change in sleeping patterns- unable to sleep or sleeping all the time   · Unwillingness or inability to communicate  · Depression  · Unusual sadness, discouragement and loneliness  · Talk of wanting to die  · Neglect of personal appearance   · Rebelliousness- reckless behavior  · Withdrawal from people/activities they love  · Confusion- inability to concentrate     If you or a loved one observes any of these behaviors or has concerns about self-harm, here's  what you can do:  · Talk about it- your feelings and reasons for harming yourself  · Remove any means that you might use to hurt yourself (examples: pills, rope, extension cords, firearm)  · Get professional help from the community (Mental Health, Substance Abuse, psychological counseling)  · Do not be alone:Call your Safe Contact- someone whom you trust who will be there for you.  · Call your local CRISIS HOTLINE 179-5131 or 252-320-3862  · Call your local Children's Mobile Crisis Response Team [place] (413) 626-0087 or [employer]  · Call the toll free National Suicide Prevention Hotlines   · National Suicide Prevention Lifeline 591-952-MLMB (5898)  · [employer] 800-SUICIDE (868-9652)  ·   Chest Pain Observation  It is often hard to give a specific diagnosis for the cause of chest pain. Among other possibilities your symptoms might be caused by inadequate oxygen delivery to your heart (angina). Angina that is not treated or evaluated can lead to a heart attack (myocardial infarction) or death.  Blood tests, electrocardiograms, and X-rays may have been done to help determine a possible cause of your chest pain. After evaluation and observation, your health care provider has determined that it is unlikely your pain was caused by an unstable condition that requires hospitalization. However, a full evaluation of your pain may need to be completed, with additional diagnostic testing as directed. It is very important to keep your follow-up appointments. Not keeping your follow-up appointments could result in permanent heart damage, disability, or death. If there is any problem keeping your follow-up appointments, you must call your health care provider.  HOME CARE INSTRUCTIONS   Due to the slight chance that your pain could be angina, it is important to follow your health care provider's treatment plan and also maintain a healthy lifestyle:  · Maintain or work toward achieving a healthy  weight.  · Stay physically active and exercise regularly.  · Decrease your salt intake.  · Eat a balanced, healthy diet. Talk to a dietitian to learn about heart-healthy foods.  · Increase your fiber intake by including whole grains, vegetables, fruits, and nuts in your diet.  · Avoid situations that cause stress, anger, or depression.  · Take medicines as advised by your health care provider. Report any side effects to your health care provider. Do not stop medicines or adjust the dosages on your own.  · Quit smoking. Do not use nicotine patches or gum until you check with your health care provider.  · Keep your blood pressure, blood sugar, and cholesterol levels within normal limits.  · Limit alcohol intake to no more than 1 drink per day for women who are not pregnant and 2 drinks per day for men.  · Do not abuse drugs.  SEEK IMMEDIATE MEDICAL CARE IF:  You have severe chest pain or pressure which may include symptoms such as:  · You feel pain or pressure in your arms, neck, jaw, or back.  · You have severe back or abdominal pain, feel sick to your stomach (nauseous), or throw up (vomit).  · You are sweating profusely.  · You are having a fast or irregular heartbeat.  · You feel short of breath while at rest.  · You notice increasing shortness of breath during rest, sleep, or with activity.  · You have chest pain that does not get better after rest or after taking your usual medicine.  · You wake from sleep with chest pain.  · You are unable to sleep because you cannot breathe.  · You develop a frequent cough or you are coughing up blood.  · You feel dizzy, faint, or experience extreme fatigue.  · You develop severe weakness, dizziness, fainting, or chills.  Any of these symptoms may represent a serious problem that is an emergency. Do not wait to see if the symptoms will go away. Call your local emergency services (911 in the [place] Do not drive yourself to the hospital.  MAKE SURE YOU:  · Understand these  instructions.  · Will watch your condition.  · Will get help right away if you are not doing well or get worse.     This information is not intended to replace advice given to you by your health care provider. Make sure you discuss any questions you have with your health care provider.     Document Released: 01/20/2012 Document Revised: 12/23/2014 Document Reviewed: 06/19/2014  [employer] Interactive Patient Education ©2016 [employer]

## 2018-04-14 NOTE — ED NOTES
Rpt called to CDU receiving Rn. Pt alert and oriented x 4, VSS. No medical complaints at this time. RN to transport pt to floor via bed and fifi

## 2018-04-14 NOTE — PROGRESS NOTES
Admission assessment completed, #2 of 3 troponin sent. Denies chest pain at this time. Oriented to call light and plan of care including safety/lab collections/AM stress test. Will call for assistance OOB. Urinal at bedside. NPO till stress test in Am.

## 2018-04-14 NOTE — ASSESSMENT & PLAN NOTE
Patient has significant pulmonary disease including Wegener's granulomatosis, history of histoplasmosis, recent pseudomonal infection of the lung. I will continue his home prednisone and provide him with respiratory protocol as needed. He currently has no acute exacerbation.

## 2018-04-14 NOTE — RESPIRATORY CARE
COPD EDUCATION by COPD CLINICAL EDUCATOR  4/14/2018 at 7:04 AM by Therese Bowen     Patient reviewed by COPD education team. Patient does not qualify for COPD program.

## 2018-04-14 NOTE — DISCHARGE SUMMARY
Hospital Medicine Discharge Note     Patient ID:  Gilberto Brown  [id number]  77 y.o.male  1940    Admit Date:  4/13/2018       Discharge Date:   4/14/2018    Primary Care Provider: Christine Zamudio M.D.    Admitting Physician: Deandra Dietz M.D.  Discharging Physician: Gordon Servin M.D.    Chief Complaint: Chest pain     Discharge Diagnoses:   Principal Problem:    Chest pain- resolved  Active Problems:    HTN (hypertension)- continue management follow-up with PCP    ESRD on dialysis (CMS-HCC)- continue dialysis Monday Wednesday and Friday follow-up with PCP and nephrology    COPD (chronic obstructive pulmonary disease) (CMS-HCC)- continue outpatient management follow-up with PCP and pulmonology    Hyperlipidemia- continue statin therapy.      Chronic Medical Problems:  Past Medical History:   Diagnosis Date   • Allergy    • Arthritis     left knee, hands   • ASTHMA    • BPH (benign prostatic hyperplasia)    • Breath shortness     WITH EXERTION   • Bronchitis     chronic   • CA in situ resp sys    • Chronic bronchitis with productive mucopurulent cough (CMS-HCC)    • COPD    • Coughing blood 2011   • Dialysis     on hemodialysis   • Dyslipidemia    • EMPHYSEMA    • GERD (gastroesophageal reflux disease)    • Heart burn    • Hip pain    • HTN    • Hypertension    • Indigestion    • TIMOTHY (obstructive sleep apnea)    • Other specified disorder of intestines    • Pacemaker 1988   • Pain 7/16/13    leg's and hand's   • Pneumonia 2011   • Pulmonary histoplasmosis (CMS-HCC)    • Renal disorder     dialysis 3 days a week   • Sick sinus syndrome (CMS-HCC)    • Sleep apnea     uses cpap at [facility]   • Snoring    • Unspecified hemorrhagic conditions     bruises easily   • Unspecified urinary incontinence    • Urinary bladder disorder    • Wegener's disease, pulmonary (CMS-HCC)        Code Status: Full Code    Hospital Summary:       Please refer to H&P by Gordon Servin M.D. on 4/13/2018 for full  details.      In brief, Gilberto Borwn is a 77 y.o. male who was admitted 4/13/2018 for chest pain. Patient has known medical history of asthma, BPH, bronchitis, dialysis Monday Wednesday Friday, dyslipidemia, emphysema, hypertension, obstructive sleep apnea, pacemaker, sick sinus syndrome, Wegener's disease. Patient states he is taking a nap and was awoken with centralized nonradiating chest pain. Patient normally follows with [facility] with Dr. Chavarria. Patient describes pain as sharp 10/10 intensity, there was associated with shortness of breath. Patient has been followed by infectious disease for a pseudomonal lung infection with associated chronic cough.   Patient was admitted to CDU for further workup and monitoring. Patient was placed on telemetry monitoring with no acute cardiac events noted. Patient was provided oxygen therapy as needed as well as antiemetics and analgesics. EKG on admission shows quite a bit of artifact, however no acute ST changes are noted. Chest x-ray shows no  acute findings. CBC on admission showed anemia which is likely chronic due to patient's end-stage renal disease. BMP shows slight hypokalemia which was replaced as well as elevated creatinine and BUN which is likely chronic for the patient due to his end-stage renal disease on dialysis. Lipid panel obtained. TSH within normal limits. Troponin remained stable. Patient underwent nuclear medicine cardiac stress test that showed no evidence of significant jeopardized viable myocardium or prior myocardial infarction.   On exam patient states he has no further chest pain, shortness of breath, dizziness, nausea or vomiting. Patient is maintaining oxygen saturation on room air. Patient's lungs are clear bilaterally on auscultation. Patient is up ambulating on his own independently. Patient will follow-up with [facility]ology and Dr. Chavarria for further workup. Patient will be discharged on aspirin therapy. Patient will  continue his dialysis Monday Wednesday Friday and will follow-up with nephrology as needed.     Therefore, he is discharged in good and stable condition with close outpatient follow-up.    Consultants:      None     Studies:    Imaging/ Testing:      NM-CARDIAC STRESS TEST   Final Result   Myocardial Perfusion   Report   NUCLEAR IMAGING INTERPRETATION   No evidence of significant jeopardized viable myocardium or prior myocardial    infarction.   Normal left ventricular size, ejection fraction, and wall motion.   DX-CHEST-PORTABLE (1 VIEW)   Final Result         1.  No new infiltrates or consolidations identified.      2.  Lower lung atelectatic versus fibrotic densities again noted.      3.  Mild blunting of costophrenic angles again noted and unchanged.            Laboratory:   Recent Labs      04/13/18   1810   WBC  7.7   RBC  3.64*   HEMOGLOBIN  11.7*   HEMATOCRIT  36.5*   MCV  100.3*   MCH  32.1   MCHC  32.1*   RDW  62.7*   PLATELETCT  233   MPV  8.6*       Recent Labs      04/13/18   1810   SODIUM  138   POTASSIUM  3.3*   CHLORIDE  96   CO2  30   GLUCOSE  104*   BUN  23*   CREATININE  1.87*   CALCIUM  9.0       Recent Labs      04/13/18   1810   ALTSGPT  14   ASTSGOT  15   ALKPHOSPHAT  72   TBILIRUBIN  0.4   GLUCOSE  104*        Recent Labs      04/13/18   1810   BNPBTYPENAT  182*       Recent Labs      04/13/18   2320   TRIGLYCERIDE  100   HDL  84   LDL  42       Recent Labs      04/13/18   1810  04/13/18   2320  04/14/18   0350   TROPONINI  0.05*  0.06*  0.06*       Recent Labs      04/13/18   1810   TSHULTRASEN  1.180         Procedures/Surgeries:        NM Cardiac Stress Test     Disposition:    Discharge home    Condition:  Stable    Instructions:   Activity: As tolerated.  Diet: Cardiac   Followup: With PCP   Instructions:  -Please take all medications as prescribed   -Given instructions to return to the ER if any new or worsening symptoms, worsening condition, or failure to improve  -Call PCP for  followup  -No smoking, no alcohol, no caffeine  -Encourage risk factor reduction with tobacco and alcohol abstinence, diet changes, weight loss, and exercise.     Follow-Up  Christine Zamudio M.D.  [address] [place] [address]  [place] [address]  [phone]      Please follow up with your Primary Care Provider, office is closed over weekend.    Future Appointments  Date Time Provider Department Center   6/7/2018 1:30 PM Cintia Ariza M.D. [facility] None   6/21/2018 11:40 AM [facility] None       Discharge Medications:             Medication List      CONTINUE taking these medications      Instructions   aspirin EC 81 MG Tbec  Commonly known as:  ECOTRIN   Take 81 mg by mouth every day.  Dose:  81 mg     DIALYVITE TABLET Tabs   Dialyvite -nehro vit B complex-C-folic acid 1 tablet po QD Dialysis Pt     epoetin lucina 3000 UNIT/ML Soln  Commonly known as:  EPOGEN,PROCRIT   Inject 2,200 Units as instructed every Monday, Wednesday, and Friday. With dialysis  Dose:  2200 Units     finasteride 5 MG Tabs  Commonly known as:  PROSCAR   Take 5 mg by mouth every day.  Dose:  5 mg     Fluticasone Furoate-Vilanterol 200-25 MCG/INH Aepb  Commonly known as:  BREO ELLIPTA   Inhale 1 Puff by mouth every day. Rinse mouth after use.  Dose:  1 Puff     ipratropium-albuterol 0.5-2.5 (3) MG/3ML nebulizer solution  Commonly known as:  DUONEB   USE 3 ML VIA NEBULIZER FOUR TIMES DAILY     metoprolol 25 MG Tabs  Commonly known as:  LOPRESSOR   Take 25 mg by mouth 2 times a day.  Dose:  25 mg     * predniSONE 10 MG Tabs  Commonly known as:  DELTASONE   Take 10 mg by mouth every day. Take with 1mg tab every day for total daily dose of 11mg.  Dose:  10 mg     * predniSONE 1 MG Tabs  Commonly known as:  DELTASONE   Take 1 mg by mouth every day. Take with 10mg tab every day for total daily dose of 11mg.  Dose:  1 mg     sevelamer 800 MG Tabs  Commonly known as:  RENAGEL   Take 800 mg by mouth 3 times a day, with meals.  Dose:  800 mg     simvastatin  40 MG Tabs  Commonly known as:  ZOCOR   Take 40 mg by mouth every evening.  Dose:  40 mg     sodium chloride 7 % Nebu  Commonly known as:  HYPER-SAL   4 mL by Nebulization route 2 Times a Day.  Dose:  4 mL     tamsulosin 0.4 MG capsule  Commonly known as:  FLOMAX   Take 0.4 mg by mouth every day.  Dose:  0.4 mg     vitamin D 1000 UNIT Tabs  Commonly known as:  cholecalciferol   Take 2,000 Units by mouth every day.  Dose:  2000 Units        * This list has 2 medication(s) that are the same as other medications prescribed for you. Read the directions carefully, and ask your doctor or other care provider to review them with you.                  Please CC the above physicians    ELSI Granados  4/14/2018  1:05 PM

## 2018-04-14 NOTE — PROGRESS NOTES
Pt continues to deny chest pain, sleeping intermittently between nursing interventions. Troponin #3 sent with AM labs. Safety maintained

## 2018-04-14 NOTE — H&P
Hospital Medicine History and Physical    Date of Service  4/13/2018    Chief Complaint  Chief Complaint   Patient presents with   • Chest Pain       History of Presenting Illness  77 y.o. male who presented on 4/13/2018 with chest pain. The patient states that he had just completed dialysis and eaten lunch when he decided take a nap in the afternoon. He was awoken out of sleep at around 4:00 yesterday with a centralized nonradiating chest pain. He had a similar occurrence in January but did not seek medical assistance at that time. He was previously followed by Dr. Chavarria of cardiology at [facility]. He describes the pain as sharp, with a 10/10 intensity, associated shortness of breath although it did not appear to be worse than usual, without any diaphoresis, palpitation, nausea, or lightheadedness. It lasted for 3-4 minutes and there were no alleviating or aggravating factors. He went to an urgent care who directed him to the emergency room. Otherwise, patient has been in his usual state of health which is rather poor, he has had a chronic cough since diagnosed with a pseudomonal lung infection. He has been followed by infectious disease for this. He denies any fevers, chills, nausea, vomiting, diarrhea or dysuria. He states that he had a stress test 4 years ago and does not know the results but was never told that it was abnormal. His maternal lineage is positive for coronary artery disease. His paternal grandfather had a CVA when he was elderly. Patient also reports occasional headaches. They're primarily located in the left occipital region and are intermittent.        Primary Care Physician  Christine Zamudio M.D.    Consultants  None    Code Status  Full    Review of Systems  Review of Systems   Constitutional: Negative for chills and fever.   HENT: Negative for congestion and sore throat.    Eyes: Negative for photophobia.   Respiratory: Positive for shortness of breath (chronic). Negative for  cough and wheezing.    Cardiovascular: Positive for chest pain. Negative for palpitations.   Gastrointestinal: Negative for abdominal pain, diarrhea, nausea and vomiting.   Genitourinary: Negative for dysuria.   Musculoskeletal: Negative for myalgias.   Skin: Negative.    Neurological: Negative for dizziness, tingling, focal weakness and headaches.   Psychiatric/Behavioral: Negative for depression and suicidal ideas.        Past Medical History  Past Medical History:   Diagnosis Date   • Pain 7/16/13    leg's and hand's   • Pneumonia 2011   • Coughing blood 2011   • Pacemaker 1988   • Allergy    • Arthritis     left knee, hands   • ASTHMA    • BPH (benign prostatic hyperplasia)    • Breath shortness     WITH EXERTION   • Bronchitis     chronic   • CA in situ resp sys    • Chronic bronchitis with productive mucopurulent cough (CMS-HCC)    • COPD    • Dialysis     on hemodialysis   • Dyslipidemia    • EMPHYSEMA    • GERD (gastroesophageal reflux disease)    • Heart burn    • Hip pain    • HTN    • Hypertension    • Indigestion    • TIMOTHY (obstructive sleep apnea)    • Other specified disorder of intestines    • Pulmonary histoplasmosis (CMS-HCC)    • Renal disorder     dialysis 3 days a week   • Sick sinus syndrome (CMS-[facility])    • Sleep apnea     uses cpap at [facility]   • Snoring    • Unspecified hemorrhagic conditions     bruises easily   • Unspecified urinary incontinence    • Urinary bladder disorder    • Wegener's disease, pulmonary (CMS-HCC)        Surgical History  Past Surgical History:   Procedure Laterality Date   • AV FISTULOGRAM Right 4/12/2016    Procedure: AV FISTULOGRAM , VENOPLASTY X 2;  Surgeon: Nate Syed M.D.;  Location: SURGERY [facility];  Service:    • RECOVERY  9/3/2015    Procedure: IR1 VASCULAR CASE-ELENO RIGHT DIALYSIS FISTULOGRAM, POSSIBLE INTERVENTION;  Surgeon: Recoveryonly Surgery;  Location: SURGERY PRE-POST PROC UNIT [facility];  Service:    • RECOVERY  2/26/2015    Performed by  Ir-Recovery Surgery at SURGERY SAME DAY [facility] ORS   • RECOVERY  10/3/2014    Performed by Ir-Recovery Surgery at SURGERY SAME DAY [facility]   • RECOVERY  8/7/2014    Performed by Ir-Recovery Surgery at SURGERY [facility] ORS   • RECOVERY  12/17/2013    Performed by Ir-Recovery Surgery at SURGERY SAME DAY [facility]   • BRONCHOSCOPY-ENDO  7/18/2013    Performed by JuanF Rubio M.D. at SURGERY [facility]   • RECOVERY  7/17/2013    Performed by Ir-Recovery Surgery at SURGERY SAME DAY [facility]   • AV FISTULA REVISION  6/9/2012    Performed by NESSA JOHANSEN at SURGERY [facility] ORS   • RECOVERY  4/19/2012    Performed by SURGERY, IR-RECOVERY at SURGERY SAME DAY [facility]   • AV FISTULA CREATION  3/8/2012    Performed by NESSA JOHANSEN at SURGERY [facility] ORS   • GASTROSCOPY WITH BIOPSY  1/17/2012    Performed by ZURDO HARRISON at [facility] ORS   • CATH PLACEMENT  10/8/2011    Performed by NESSA JOHANSEN at [facility]   • GASTROSCOPY WITH BALLOON DILATATION  9/28/2011    Performed by KT FERNANDEZ at [facility]   • PACEMAKER INSERTION  4/2009   • ROTATOR CUFF REPAIR  2003    right   • HERNIA REPAIR  1990    right inguinal hernia   • HIP REPLACEMENT, TOTAL      right   • TONSILLECTOMY         Medications  No current facility-administered medications on file prior to encounter.      Current Outpatient Prescriptions on File Prior to Encounter   Medication Sig Dispense Refill   • sodium chloride (HYPER-SAL) 7 % Nebu Soln 4 mL by Nebulization route 2 Times a Day. 240 mL 3   • Fluticasone Furoate-Vilanterol (BREO ELLIPTA) 200-25 MCG/INH AEROSOL POWDER, BREATH ACTIVATED Inhale 1 Puff by mouth every day. Rinse mouth after use. 1 Each 11   • ipratropium-albuterol (DUONEB) 0.5-2.5 (3) MG/3ML nebulizer solution USE 3 ML VIA NEBULIZER FOUR TIMES DAILY 1080 mL 3   • metoprolol (LOPRESSOR) 25 MG Tab Take 25 mg by mouth 2 times a day.     • B  Complex-C-Folic Acid (DIALYVITE TABLET) Tab Dialyvite -nehro vit B complex-C-folic acid 1 tablet po QD Dialysis Pt 90 Tab 3   • finasteride (PROSCAR) 5 MG Tab Take 5 mg by mouth every day.     • simvastatin (ZOCOR) 40 MG TABS Take 40 mg by mouth every evening.     • epoetin lucina (EPOGEN,PROCRIT) 3000 UNIT/ML SOLN Inject 2,200 Units as instructed every Monday, Wednesday, and Friday. With dialysis     • sevelamer (RENAGEL) 800 MG TABS Take 800 mg by mouth 3 times a day, with meals.     • tamsulosin (FLOMAX) 0.4 MG capsule Take 0.4 mg by mouth every day.     • aspirin EC (ECOTRIN) 81 MG TBEC Take 81 mg by mouth every day.         Family History  Family History   Problem Relation Age of Onset   • Stroke Father    • Heart Disease Father    • Stroke Mother    • Cancer         Social History  Social History   Substance Use Topics   • Smoking status: Former Smoker     Years: 30.00     Types: Pipe     Quit date: 1990   • Smokeless tobacco: Former User     Types: Chew   • Alcohol use 4.8 oz/week     7 Glasses of wine, 1 Standard drinks or equivalent per week      Comment: Red wine nightly       Allergies  Allergies   Allergen Reactions   • Erythromycin Unspecified     Abdominal pain.  RXN=before    • Rituximab      Flu like syndrome        Physical Exam  Laboratory   Hemodynamics  Temp (24hrs), Av.2 °C (99 °F), Min:37.2 °C (99 °F), Max:37.2 °C (99 °F)   Temperature: 37.2 °C (99 °F)  Pulse  Av.3  Min: 80  Max: 104 Heart Rate (Monitored): 83  Blood Pressure : 115/50, NIBP: 122/52      Respiratory      Respiration: 18, Pulse Oximetry: 96 %             Physical Exam   Constitutional: He is oriented to person, place, and time. No distress.   HENT:   Head: Normocephalic and atraumatic.   Right Ear: External ear normal.   Left Ear: External ear normal.   Eyes: EOM are normal. Right eye exhibits no discharge. Left eye exhibits no discharge.   Neck: Neck supple. No JVD present.   Cardiovascular: Normal rate, regular  rhythm and normal heart sounds.    Pulmonary/Chest: Effort normal and breath sounds normal. No respiratory distress. He exhibits no tenderness.   Poor air movement   Abdominal: Soft. Bowel sounds are normal. He exhibits no distension. There is no tenderness.   Musculoskeletal: He exhibits no edema.   Neurological: He is alert and oriented to person, place, and time. No cranial nerve deficit.   Skin: Skin is dry. He is not diaphoretic. No erythema.   Psychiatric: He has a normal mood and affect. His behavior is normal.   Nursing note and vitals reviewed.    Capillary refill less than 3 seconds, distal pulses intact    Recent Labs      04/13/18   1810   WBC  7.7   RBC  3.64*   HEMOGLOBIN  11.7*   HEMATOCRIT  36.5*   MCV  100.3*   MCH  32.1   MCHC  32.1*   RDW  62.7*   PLATELETCT  233   MPV  8.6*     Recent Labs      04/13/18   1810   SODIUM  138   POTASSIUM  3.3*   CHLORIDE  96   CO2  30   GLUCOSE  104*   BUN  23*   CREATININE  1.87*   CALCIUM  9.0     Recent Labs      04/13/18   1810   ALTSGPT  14   ASTSGOT  15   ALKPHOSPHAT  72   TBILIRUBIN  0.4   GLUCOSE  104*         Recent Labs      04/13/18   1810   BNPBTYPENAT  182*         Lab Results   Component Value Date    TROPONINI 0.05 (H) 04/13/2018       Imaging  Dx-chest-portable (1 View)    Result Date: 4/13/2018 4/13/2018 7:12 PM HISTORY/REASON FOR EXAM:  Chest pain history of atrial fibrillation TECHNIQUE/EXAM DESCRIPTION AND NUMBER OF VIEWS: Single portable view of the chest. COMPARISON: 10/24/2017 FINDINGS: Cardiac pacemaker and pacing wires are noted. Appearance of the cardiac silhouette appears  unchanged compared to the previous exam.  No new pleural fluid collection is identified compared to the prior exam. No new infiltrates or consolidations  have developed in the lungs compared to prior examination. Overall appearance of the lungs is unchanged compared to prior exam. No new pneumothorax or pleural fluid collection is identified.     1.  No new infiltrates  or consolidations identified. 2.  Lower lung atelectatic versus fibrotic densities again noted. 3.  Mild blunting of costophrenic angles again noted and unchanged.     Assessment/Plan     Anticipate that patient will need less than 2 midnights for management of the discussed medical issues.    * Chest pain   Assessment & Plan    Patient will be admitted to the telemetry floor for close monitoring of cardiac dysrhythmias. I will trend his troponin levels and obtain serial EKGs. I will continue his home aspirin and statin and provide him with as needed morphine, oxygen, and nitroglycerin. If his troponin levels remain negative by the morning, and will further risk stratify this patient with a nuclear medicine stress test.        Hyperlipidemia- (present on admission)   Assessment & Plan    Treatment as noted above for chest pain, continue home Zocor.        COPD (chronic obstructive pulmonary disease) (CMS-HCC)- (present on admission)   Assessment & Plan    Patient has significant pulmonary disease including Wegener's granulomatosis, history of histoplasmosis, recent pseudomonal infection of the lung. I will continue his home prednisone and provide him with respiratory protocol as needed. He currently has no acute exacerbation.        ESRD on dialysis (CMS-HCC)- (present on admission)   Assessment & Plan    Patient is on dialysis on an outpatient basis, if he will stay here beyond his next scheduled dialysis session, then we will plan to consult nephrology for inpatient dialysis. Otherwise out patient follow-up once medically stable for discharge.        HTN (hypertension)- (present on admission)   Assessment & Plan    This is chronic, currently blood pressure is well controlled on home Lopressor which I will continue.          Prophylaxis: Sequential compression devices for DVT prophylaxis, no PPI indicated, bowel protocol

## 2018-04-14 NOTE — ED PROVIDER NOTES
ED Provider Note    Scribed for Maged Robertson M.D. by Kirstie Rubio. 4/13/2018  6:50 PM    Primary care provider: Christine Zamudio M.D.  Means of arrival: Walk-in   History obtained from: Patient  History limited by: None    CHIEF COMPLAINT  Chief Complaint   Patient presents with   • Chest Pain     HPI  Gilberto Brown is a 77 y.o. male with history of atrial fibrillation and COPD presents to the Emergency Department for evaluation of chest pain. Patient reports he began having chest pain approximately three hours ago. Chest pain is located to center of chest. Patient reports  has had chronic cough and sputum production. Patient reports he has pace maker and has history of Wegeners disease. He was at dialysis today before chest pain began. Denies fever, abdominal pain, nausea, vomiting, or leg swelling.     REVIEW OF SYSTEMS  Pertinent negatives include no fever, abdominal pain, nausea, vomiting, or leg swelling.. As above, all other systems reviewed and are negative. C  See HPI for further details.     PAST MEDICAL HISTORY   has a past medical history of Allergy; Arthritis; ASTHMA; BPH (benign prostatic hyperplasia); Breath shortness; Bronchitis; CA in situ resp sys; Chronic bronchitis with productive mucopurulent cough (CMS-[facility]); COPD; Coughing blood (2011); Dialysis; Dyslipidemia; EMPHYSEMA; GERD (gastroesophageal reflux disease); Heart burn; Hip pain; HTN; Hypertension; Indigestion; TIMOTHY (obstructive sleep apnea); Other specified disorder of intestines; Pacemaker (1988); Pain (7/16/13); Pneumonia (2011); Pulmonary histoplasmosis (CMS-HCC); Renal disorder; Sick sinus syndrome (CMS-HCC); Sleep apnea; Snoring; Unspecified hemorrhagic conditions; Unspecified urinary incontinence; Urinary bladder disorder; and Wegener's disease, pulmonary ([facility]).    SURGICAL HISTORY   has a past surgical history that includes pacemaker insertion (4/2009); hernia repair (1990); gastroscopy with balloon dilatation  (9/28/2011); cath placement (10/8/2011); gastroscopy with biopsy (1/17/2012); hip replacement, total; av fistula creation (3/8/2012); recovery (4/19/2012); av fistula revision (6/9/2012); rotator cuff repair (2003); recovery (7/17/2013); bronchoscopy-endo (7/18/2013); recovery (12/17/2013); recovery (8/7/2014); recovery (10/3/2014); recovery (2/26/2015); recovery (9/3/2015); av fistulogram (Right, 4/12/2016); and tonsillectomy.    SOCIAL HISTORY  Social History   Substance Use Topics   • Smoking status: Former Smoker     Years: 30.00     Types: Pipe     Quit date: 1/1/1990   • Smokeless tobacco: Former User     Types: Chew   • Alcohol use 4.8 oz/week     7 Glasses of wine, 1 Standard drinks or equivalent per week      Comment: Red wine nightly      History   Drug Use No     FAMILY HISTORY  Family History   Problem Relation Age of Onset   • Stroke Father    • Heart Disease Father    • Stroke Mother    • Cancer       CURRENT MEDICATIONS  Home Medications     Reviewed by Jolly Quiroga R.N. (Registered Nurse) on 04/13/18 at 1821  Med List Status: Unable to Obtain   Medication Last Dose Status   ADVAIR DISKUS 500-50 MCG/DOSE AEROSOL POWDER, BREATH ACTIVATED 1/18/2018 Active   albuterol 108 (90 BASE) MCG/ACT Aero Soln inhalation aerosol 3/6/2018 Active   aspirin EC (ECOTRIN) 81 MG TBEC 3/6/2018 Active   B Complex-C-Folic Acid (DIALYVITE TABLET) Tab 3/6/2018 Active   cephALEXin (KEFLEX) 500 MG Cap  Active   epoetin lucina (EPOGEN,PROCRIT) 3000 UNIT/ML SOLN 3/6/2018 Active   ethyl chloride Aerosol 3/6/2018 Active   finasteride (PROSCAR) 5 MG Tab 3/6/2018 Active   Fluticasone Furoate-Vilanterol (BREO ELLIPTA) 200-25 MCG/INH AEROSOL POWDER, BREATH ACTIVATED  Active   ipratropium-albuterol (DUONEB) 0.5-2.5 (3) MG/3ML nebulizer solution 3/6/2018 Active   lidocaine (XYLOCAINE) 2 % Gel 3/6/2018 Active   loperamide (IMODIUM) 2 MG Cap prn Active   metoprolol (LOPRESSOR) 25 MG Tab 3/6/2018 Active   non-formulary med 3/6/2018 Active  "  predniSONE (DELTASONE) 1 MG Tab 3/6/2018 Active   predniSONE (DELTASONE) 10 MG Tab 3/6/2018 Active   ranitidine (ZANTAC) 150 MG Tab 3/6/2018 Active   RENVELA 800 MG Tab 1/18/2018 Active   sevelamer (RENAGEL) 800 MG TABS 3/6/2018 Active   simvastatin (ZOCOR) 40 MG TABS 3/6/2018 Active   sodium chloride (HYPER-SAL) 7 % Nebu Soln  Active   tamsulosin (FLOMAX) 0.4 MG capsule 3/6/2018 Active                ALLERGIES  Allergies   Allergen Reactions   • Erythromycin Unspecified     Abdominal pain.  RXN=before 2011   • Rituximab      Flu like syndrome     PHYSICAL EXAM  VITAL SIGNS: /50   Pulse 87   Temp 37.2 °C (99 °F)   Resp 18   Ht 1.727 m (5' 8\")   Wt 65.3 kg (144 lb)   SpO2 98%   BMI 21.90 kg/m²   Constitutional: Well developed, Well nourished, No acute distress, Non-toxic appearance.   HENT: Normocephalic, Atraumatic, Bilateral external ears normal, Oropharynx is clear mucous membranes are moist. No oral exudates or nasal discharge.   Eyes: Pupils are equal round and reactive, EOMI, Conjunctiva normal, No discharge.   Neck: Normal range of motion, No tenderness, Supple, No stridor. No meningismus.  Lymphatic: No lymphadenopathy noted.   Cardiovascular: Regular rate and rhythm, Holosystolic ejection murmur left sternal border does not radiate to carotid. murmur rub or gallop.  Thorax & Lungs: Clear breath sounds bilaterally without wheezes, rhonchi or rales. There is no chest wall tenderness.   Abdomen: Soft non-tender non-distended. There is no rebound or guarding. No organomegaly is appreciated. Bowel sounds are normal.  Skin: Normal without rash.   Back: No CVA or spinal tenderness.   Extremities: Fistula to right brachium of right arm, palpable thrill, Intact distal pulses, No edema, No tenderness, No cyanosis, No clubbing. Capillary refill is less than 2 seconds.  Musculoskeletal: Good range of motion in all major joints. No tenderness to palpation or major deformities noted.   Neurologic: Alert & " oriented x 3, Normal motor function, Normal sensory function, No focal deficits noted. Reflexes are normal.  Psychiatric: Affect normal, Judgment normal, Mood normal. There is no suicidal ideation or patient reported hallucinations.     DIAGNOSTIC STUDIES / PROCEDURES    LABS  Labs Reviewed   CBC WITH DIFFERENTIAL - Abnormal; Notable for the following:        Result Value    RBC 3.64 (*)     Hemoglobin 11.7 (*)     Hematocrit 36.5 (*)     .3 (*)     MCHC 32.1 (*)     RDW 62.7 (*)     MPV 8.6 (*)     Neutrophils-Polys 84.10 (*)     Lymphocytes 6.80 (*)     Immature Granulocytes 1.00 (*)     Lymphs (Absolute) 0.52 (*)     All other components within normal limits   COMP METABOLIC PANEL   BTYPE NATRIURETIC PEPTIDE   TROPONIN      All labs reviewed by me.    EKG Interpretation:  EKG Interpretation    Interpreted by me    Rhythm: normal sinus   Rate: normal  Axis: normal  Ectopy: none  Conduction: normal  ST Segments: no acute change  T Waves: no acute change  Q Waves: none    Clinical Impression: no acute changes and normal EKG    RADIOLOGY  DX-CHEST-PORTABLE (1 VIEW)   Final Result         1.  No new infiltrates or consolidations identified.      2.  Lower lung atelectatic versus fibrotic densities again noted.      3.  Mild blunting of costophrenic angles again noted and unchanged.        The radiologist's interpretation of all radiological studies have been reviewed by me.    COURSE & MEDICAL DECISION MAKING  Nursing notes, VS, PMSFHx reviewed in chart.    6:50 PM Patient seen and examined at bedside. Ordered for CBC with differential, CMP, B Type Natriuretic Peptide, Troponin, EKG to evaluate.      Patient has an initial EKG that's nondiagnostic showing no evidence of dysrhythmia or acute ischemic changes  Chest x-ray shows no evidence of infiltrate, pulmonary edema, effusion    The patient has blood cell count that is normal at 7.7, neutrophil shift of 84% but no evidence of pneumonia on chest x-ray. Anion  gap slightly elevated at 12 with a low potassium at 3.3 which will be corrected on the floor.    I have continued concerns about this patient's cardiac condition and believe he needs rule out myocardial infarction admission    Spoke with Dr. Shaw approximately 8:05 PM for this admission and patient is aware of this plan of care and I remains chest pain-free    DISPOSITION:  Patient will be discharged home in stable condition.    FINAL IMPRESSION  1. Chest pain, unspecified type          I, Kirstie Rubio (Scribe), am scribing for, and in the presence of, Maged Robertson M.D..    Electronically signed by: Kirstie Rubio (Scribe), 4/13/2018    I, Maged Robertson M.D. personally performed the services described in this documentation, as scribed by Kirstie Rubio in my presence, and it is both accurate and complete.    The note accurately reflects work and decisions made by me.  Maged Robertson  4/13/2018  8:10 PM

## 2018-04-14 NOTE — DISCHARGE PLANNING
This CM spoke to the pt and offered an Advance Directives packet.  Pt refuse the packet and stated that he already has advance directives in place.  No safety or other issues identified at this time.  This does not appear to be a difficult discharge.    Care Transition Team Assessment    Information Source  Orientation : Oriented x 4  Information Given By: Patient    Readmission Evaluation  Is this a readmission?: No    Elopement Risk  Legal Hold: No  Ambulatory or Self Mobile in Wheelchair: Yes  Elopement Risk: Not at Risk for Elopement    Interdisciplinary Discharge Planning  Does Admitting Nurse Feel This Could be a Complex Discharge?: No  Lives with - Patient's Self Care Capacity: Spouse  Support Systems: Family Member(s), Spouse / Significant Other  Housing / Facility: [address]  Do You Take your Prescribed Medications Regularly: Yes  Able to Return to Previous ADL's: Yes  Mobility Issues: No  Patient Expects to be Discharged to:: Home  Assistance Needed: No  Durable Medical Equipment: Walker, Other - Specify (CPAP)    Discharge Preparedness  What is your plan after discharge?: Home with help  What are your discharge supports?: Spouse  Prior Functional Level: Ambulatory, Independent with Activities of Daily Living  Difficulity with ADLs: None    Functional Assesment  Prior Functional Level: Ambulatory, Independent with Activities of Daily Living    Finances  Financial Barriers to Discharge: No  Prescription Coverage: Yes         Values / Beliefs / Concerns  Values / Beliefs Concerns : No    Advance Directive  Advance Directive?: Living Will    Domestic Abuse  Have you ever been the victim of abuse or violence?: No         Discharge Risks or Barriers  Discharge risks or barriers?: No    Anticipated Discharge Information  Anticipated discharge disposition: Home

## 2018-04-14 NOTE — ASSESSMENT & PLAN NOTE
This is chronic, currently blood pressure is well controlled on home Lopressor which I will continue.

## 2018-04-14 NOTE — ASSESSMENT & PLAN NOTE
Patient is on dialysis on an outpatient basis, if he will stay here beyond his next scheduled dialysis session, then we will plan to consult nephrology for inpatient dialysis. Otherwise out patient follow-up once medically stable for discharge.

## 2018-04-14 NOTE — ASSESSMENT & PLAN NOTE
Patient will be admitted to the telemetry floor for close monitoring of cardiac dysrhythmias. I will trend his troponin levels and obtain serial EKGs. I will continue his home aspirin and statin and provide him with as needed morphine, oxygen, and nitroglycerin. If his troponin levels remain negative by the morning, and will further risk stratify this patient with a nuclear medicine stress test.

## 2018-04-14 NOTE — PROGRESS NOTES
Pt dc'd home. IV and monitor removed; Pt left unit via wheelchair with tech. Personal belongings with pt when leaving unit. Pt given discharge instructions prior to leaving unit including prescription and when to visit with physician; verbalizes understanding. Copy of discharge instructions with pt and in the chart.

## 2018-04-18 ENCOUNTER — TELEPHONE (OUTPATIENT)
Dept: NEPHROLOGY | Facility: MEDICAL CENTER | Age: 78
End: 2018-04-18

## 2018-04-18 NOTE — TELEPHONE ENCOUNTER
Nellie Corona,    Pt called saying that you were going to refer him over to Dr. Merino (cardiology). He called their office to try and schedule an appointment but there isn't a referral placed. Please advise.    Thank you!

## 2018-05-08 ENCOUNTER — APPOINTMENT (RX ONLY)
Dept: URBAN - METROPOLITAN AREA CLINIC 36 | Facility: CLINIC | Age: 78
Setting detail: DERMATOLOGY
End: 2018-05-08

## 2018-05-08 PROBLEM — C44.629 SQUAMOUS CELL CARCINOMA OF SKIN OF LEFT UPPER LIMB, INCLUDING SHOULDER: Status: ACTIVE | Noted: 2018-05-08

## 2018-05-08 PROCEDURE — 17272 DSTR MAL LES S/N/H/F/G 1.1-2: CPT

## 2018-05-08 PROCEDURE — ? CURETTAGE AND DESTRUCTION WITH PATHOLOGY

## 2018-05-08 PROCEDURE — ? BIOPSY BY SHAVE METHOD

## 2018-05-08 NOTE — PROCEDURE: BIOPSY BY SHAVE METHOD
X Size Of Lesion In Cm: 0
Notification Instructions: Patient will be notified of biopsy results. However, patient instructed to call the office if not contacted within 2 weeks.
Was A Bandage Applied: Yes
Lab: [id number]
Silver Nitrate Text: The wound bed was treated with silver nitrate after the biopsy was performed.
Consent: Written consent was obtained and risks were reviewed including but not limited to scarring, infection, bleeding, scabbing, incomplete removal, nerve damage and allergy to anesthesia.
Destruction After The Procedure: No
Wound Care: Aquaphor
Anesthesia Volume In Cc: 2.5
Post-Care Instructions: I reviewed with the patient in detail post-care instructions. Patient is to keep the biopsy site dry overnight, and then apply bacitracin twice daily until healed. Patient may apply hydrogen peroxide soaks to remove any crusting.
Biopsy Type: H and E
Lab Facility: 
Anesthesia Type: 1% lidocaine with 1:100,000 epinephrine and 408mcg clindamycin/ml and a 1:10 solution of 8.4% sodium bicarbonate
Billing Type: Third-Party Bill
Dressing: bandage
Curettage Text: The wound bed was treated with curettage after the biopsy was performed.
Electrodesiccation And Curettage Text: The wound bed was treated with electrodesiccation and curettage after the biopsy was performed.
Electrodesiccation Text: The wound bed was treated with electrodesiccation after the biopsy was performed.
Cryotherapy Text: The wound bed was treated with cryotherapy after the biopsy was performed.
Biopsy Method: Personna blade
Detail Level: Detailed
Type Of Destruction Used: Curettage
Hemostasis: Drysol

## 2018-05-08 NOTE — PROCEDURE: CURETTAGE AND DESTRUCTION WITH PATHOLOGY
Number Of Curettages: 3
Lab Facility: 
Bill As A Line Item Or As Units: Line Item
Post-Care Instructions: I reviewed with the patient in detail post-care instructions. Patient is to keep the area dry for 48 hours, and not to engage in any swimming until the area is healed. Should the patient develop any fevers, chills, bleeding, severe pain patient will contact the office immediately.
Anesthesia Volume In Cc: 4
What Was Performed First?: Curettage
Histology Text: Following the procedure a portion of the curetted material was sent for histologic evaluation.
Size Of Lesion After Curettage: 1.3
Bill For Surgical Tray: no
Additional Information: (Optional): The wound was cleaned, and a pressure dressing was applied.  The patient received detailed post-op instructions.
Biopsy Type: H and E
Anesthesia Type: 1% lidocaine with epinephrine and a 1:10 solution of 8.4% sodium bicarbonate
Consent was obtained from the patient. The risks, benefits and alternatives to therapy were discussed in detail. Specifically, the risks of infection, scarring, bleeding, prolonged wound healing, nerve injury, incomplete removal, allergy to anesthesia and recurrence were addressed. Alternatives to ED&C, such as: surgical removal and XRT were also discussed.  Prior to the procedure, the treatment site was clearly identified and confirmed by the patient. All components of Universal Protocol/PAUSE Rule completed.
Size Of Lesion In Cm: 1
Billing Type: Third-Party Bill
Cautery Type: electrodesiccation
Lab: [id number]
Detail Level: Detailed

## 2018-05-09 DIAGNOSIS — Z01.812 PRE-OPERATIVE LABORATORY EXAMINATION: ICD-10-CM

## 2018-05-09 LAB
ANION GAP SERPL CALC-SCNC: 13 MMOL/L (ref 0–11.9)
BASOPHILS # BLD AUTO: 0.3 % (ref 0–1.8)
BASOPHILS # BLD: 0.03 K/UL (ref 0–0.12)
BUN SERPL-MCNC: 71 MG/DL (ref 8–22)
CALCIUM SERPL-MCNC: 9.8 MG/DL (ref 8.5–10.5)
CHLORIDE SERPL-SCNC: 100 MMOL/L (ref 96–112)
CO2 SERPL-SCNC: 28 MMOL/L (ref 20–33)
CREAT SERPL-MCNC: 4.91 MG/DL (ref 0.5–1.4)
EOSINOPHIL # BLD AUTO: 0.01 K/UL (ref 0–0.51)
EOSINOPHIL NFR BLD: 0.1 % (ref 0–6.9)
ERYTHROCYTE [DISTWIDTH] IN BLOOD BY AUTOMATED COUNT: 63.5 FL (ref 35.9–50)
GLUCOSE SERPL-MCNC: 187 MG/DL (ref 65–99)
HCT VFR BLD AUTO: 37.2 % (ref 42–52)
HGB BLD-MCNC: 11.5 G/DL (ref 14–18)
IMM GRANULOCYTES # BLD AUTO: 0.07 K/UL (ref 0–0.11)
IMM GRANULOCYTES NFR BLD AUTO: 0.6 % (ref 0–0.9)
LYMPHOCYTES # BLD AUTO: 0.3 K/UL (ref 1–4.8)
LYMPHOCYTES NFR BLD: 2.6 % (ref 22–41)
MCH RBC QN AUTO: 31.2 PG (ref 27–33)
MCHC RBC AUTO-ENTMCNC: 30.9 G/DL (ref 33.7–35.3)
MCV RBC AUTO: 100.8 FL (ref 81.4–97.8)
MONOCYTES # BLD AUTO: 0.32 K/UL (ref 0–0.85)
MONOCYTES NFR BLD AUTO: 2.8 % (ref 0–13.4)
NEUTROPHILS # BLD AUTO: 10.8 K/UL (ref 1.82–7.42)
NEUTROPHILS NFR BLD: 93.6 % (ref 44–72)
NRBC # BLD AUTO: 0 K/UL
NRBC BLD-RTO: 0 /100 WBC
PLATELET # BLD AUTO: 239 K/UL (ref 164–446)
PMV BLD AUTO: 8.8 FL (ref 9–12.9)
POTASSIUM SERPL-SCNC: 4.5 MMOL/L (ref 3.6–5.5)
RBC # BLD AUTO: 3.69 M/UL (ref 4.7–6.1)
SODIUM SERPL-SCNC: 141 MMOL/L (ref 135–145)
WBC # BLD AUTO: 11.5 K/UL (ref 4.8–10.8)

## 2018-05-09 PROCEDURE — 80048 BASIC METABOLIC PNL TOTAL CA: CPT

## 2018-05-09 PROCEDURE — 36415 COLL VENOUS BLD VENIPUNCTURE: CPT

## 2018-05-09 PROCEDURE — 85025 COMPLETE CBC W/AUTO DIFF WBC: CPT

## 2018-05-10 ENCOUNTER — APPOINTMENT (OUTPATIENT)
Dept: RADIOLOGY | Facility: MEDICAL CENTER | Age: 78
End: 2018-05-10
Attending: SURGERY
Payer: MEDICARE

## 2018-05-10 ENCOUNTER — HOSPITAL ENCOUNTER (OUTPATIENT)
Facility: MEDICAL CENTER | Age: 78
End: 2018-05-10
Attending: SURGERY | Admitting: SURGERY
Payer: MEDICARE

## 2018-05-10 VITALS
HEIGHT: 68 IN | HEART RATE: 99 BPM | DIASTOLIC BLOOD PRESSURE: 52 MMHG | WEIGHT: 139.55 LBS | BODY MASS INDEX: 21.15 KG/M2 | OXYGEN SATURATION: 97 % | TEMPERATURE: 97.2 F | RESPIRATION RATE: 18 BRPM | SYSTOLIC BLOOD PRESSURE: 106 MMHG

## 2018-05-10 DIAGNOSIS — T82.858A: ICD-10-CM

## 2018-05-10 LAB
ANION GAP SERPL CALC-SCNC: 10 MMOL/L (ref 0–11.9)
BUN SERPL-MCNC: 35 MG/DL (ref 8–22)
CALCIUM SERPL-MCNC: 9.1 MG/DL (ref 8.5–10.5)
CHLORIDE SERPL-SCNC: 100 MMOL/L (ref 96–112)
CO2 SERPL-SCNC: 28 MMOL/L (ref 20–33)
CREAT SERPL-MCNC: 3.25 MG/DL (ref 0.5–1.4)
GLUCOSE SERPL-MCNC: 110 MG/DL (ref 65–99)
INR BLD: 1
POTASSIUM SERPL-SCNC: 3.8 MMOL/L (ref 3.6–5.5)
SODIUM SERPL-SCNC: 138 MMOL/L (ref 135–145)

## 2018-05-10 PROCEDURE — 700117 HCHG RX CONTRAST REV CODE 255: Performed by: SURGERY

## 2018-05-10 PROCEDURE — 700111 HCHG RX REV CODE 636 W/ 250 OVERRIDE (IP): Performed by: SURGERY

## 2018-05-10 PROCEDURE — 85610 PROTHROMBIN TIME: CPT

## 2018-05-10 PROCEDURE — 700111 HCHG RX REV CODE 636 W/ 250 OVERRIDE (IP)

## 2018-05-10 PROCEDURE — 80048 BASIC METABOLIC PNL TOTAL CA: CPT

## 2018-05-10 PROCEDURE — 99153 MOD SED SAME PHYS/QHP EA: CPT

## 2018-05-10 PROCEDURE — 700101 HCHG RX REV CODE 250

## 2018-05-10 PROCEDURE — 160002 HCHG RECOVERY MINUTES (STAT)

## 2018-05-10 RX ORDER — MIDAZOLAM HYDROCHLORIDE 1 MG/ML
INJECTION INTRAMUSCULAR; INTRAVENOUS
Status: COMPLETED
Start: 2018-05-10 | End: 2018-05-10

## 2018-05-10 RX ORDER — HEPARIN SODIUM,PORCINE 1000/ML
VIAL (ML) INJECTION
Status: COMPLETED
Start: 2018-05-10 | End: 2018-05-10

## 2018-05-10 RX ORDER — ACETAMINOPHEN 325 MG/1
325-650 TABLET ORAL EVERY 6 HOURS PRN
Status: DISCONTINUED | OUTPATIENT
Start: 2018-05-10 | End: 2018-05-10 | Stop reason: HOSPADM

## 2018-05-10 RX ORDER — IODIXANOL 270 MG/ML
40 INJECTION, SOLUTION INTRAVASCULAR ONCE
Status: COMPLETED | OUTPATIENT
Start: 2018-05-10 | End: 2018-05-10

## 2018-05-10 RX ORDER — HYDROCODONE BITARTRATE AND ACETAMINOPHEN 5; 325 MG/1; MG/1
1 TABLET ORAL EVERY 4 HOURS PRN
Status: DISCONTINUED | OUTPATIENT
Start: 2018-05-10 | End: 2018-05-10 | Stop reason: HOSPADM

## 2018-05-10 RX ORDER — ONDANSETRON 2 MG/ML
4 INJECTION INTRAMUSCULAR; INTRAVENOUS EVERY 4 HOURS PRN
Status: DISCONTINUED | OUTPATIENT
Start: 2018-05-10 | End: 2018-05-10 | Stop reason: HOSPADM

## 2018-05-10 RX ORDER — CEFAZOLIN SODIUM 1 G/3ML
INJECTION, POWDER, FOR SOLUTION INTRAMUSCULAR; INTRAVENOUS
Status: COMPLETED
Start: 2018-05-10 | End: 2018-05-10

## 2018-05-10 RX ORDER — LIDOCAINE HYDROCHLORIDE 20 MG/ML
INJECTION, SOLUTION INFILTRATION; PERINEURAL
Status: COMPLETED
Start: 2018-05-10 | End: 2018-05-10

## 2018-05-10 RX ORDER — HEPARIN SODIUM 1000 [USP'U]/ML
3000 INJECTION, SOLUTION INTRAVENOUS; SUBCUTANEOUS ONCE
Status: COMPLETED | OUTPATIENT
Start: 2018-05-10 | End: 2018-05-10

## 2018-05-10 RX ORDER — MORPHINE SULFATE 4 MG/ML
1-2 INJECTION, SOLUTION INTRAMUSCULAR; INTRAVENOUS
Status: DISCONTINUED | OUTPATIENT
Start: 2018-05-10 | End: 2018-05-10 | Stop reason: HOSPADM

## 2018-05-10 RX ORDER — MIDAZOLAM HYDROCHLORIDE 1 MG/ML
.5-2 INJECTION INTRAMUSCULAR; INTRAVENOUS PRN
Status: DISCONTINUED | OUTPATIENT
Start: 2018-05-10 | End: 2018-05-10 | Stop reason: HOSPADM

## 2018-05-10 RX ORDER — ONDANSETRON 2 MG/ML
4 INJECTION INTRAMUSCULAR; INTRAVENOUS PRN
Status: DISCONTINUED | OUTPATIENT
Start: 2018-05-10 | End: 2018-05-10 | Stop reason: HOSPADM

## 2018-05-10 RX ADMIN — CEFAZOLIN 1000 MG: 330 INJECTION, POWDER, FOR SOLUTION INTRAMUSCULAR; INTRAVENOUS at 11:12

## 2018-05-10 RX ADMIN — FENTANYL CITRATE 25 MCG: 50 INJECTION, SOLUTION INTRAMUSCULAR; INTRAVENOUS at 11:02

## 2018-05-10 RX ADMIN — FENTANYL CITRATE 50 MCG: 50 INJECTION, SOLUTION INTRAMUSCULAR; INTRAVENOUS at 11:31

## 2018-05-10 RX ADMIN — FENTANYL CITRATE 25 MCG: 50 INJECTION, SOLUTION INTRAMUSCULAR; INTRAVENOUS at 11:20

## 2018-05-10 RX ADMIN — IODIXANOL 40 ML: 270 INJECTION, SOLUTION INTRAVASCULAR at 11:42

## 2018-05-10 RX ADMIN — MIDAZOLAM 2 MG: 1 INJECTION INTRAMUSCULAR; INTRAVENOUS at 11:02

## 2018-05-10 RX ADMIN — HEPARIN SODIUM 3000 UNITS: 1000 INJECTION, SOLUTION INTRAVENOUS; SUBCUTANEOUS at 11:35

## 2018-05-10 RX ADMIN — MIDAZOLAM 1 MG: 1 INJECTION INTRAMUSCULAR; INTRAVENOUS at 11:31

## 2018-05-10 RX ADMIN — LIDOCAINE HYDROCHLORIDE: 20 INJECTION, SOLUTION INFILTRATION; PERINEURAL at 11:02

## 2018-05-10 RX ADMIN — MIDAZOLAM 1 MG: 1 INJECTION INTRAMUSCULAR; INTRAVENOUS at 11:20

## 2018-05-10 ASSESSMENT — PAIN SCALES - GENERAL
PAINLEVEL_OUTOF10: 0

## 2018-05-10 NOTE — PROGRESS NOTES
IR Procedure RN's Note:    RIGHT arm AV fistulogram with possible intervention done by Dr. Syed; RIGHT existing AV graft access site; pt assessed upon arrival, pt A/Ox4, some bruit felt over the fistula, pt reports using home O2 NOC at 4L; pt appears comfortable throughout the procedure with no signs of distress or discomfort; ETCO2 monitored, and reading was intermittent, baseline reading was 13 mmHg; an angioplasty was performed in the existing fistula; pt lethargic and pt kept on 4L NC O2 post procedure and during transport; telephone report given to Giuseppe; [facility]

## 2018-05-10 NOTE — OR NURSING
1200 patient arrived from IR s/p right arm AV fistulogram placement , surgical dressing clean dry intact, patient awake, denies pain, s.o.b, plan of care discussed with patient and family agreeable.  1308 criteria met to dc patient home.  1312 discharge instructions given to patient, patient verbalize understanding of the orders, iv discontinued tip intact surgical site dressing clean, vss, no c/o s.o.b, denies cp.  1325 patient escorted via w/c with all her personal belongings.

## 2018-05-10 NOTE — OP REPORT
DATE OF SERVICE:  05/10/2018    SURGEON:  Nate Syed MD    ANESTHESIA:  Intravenous sedation with fentanyl and Versed and local   anesthesia with 2 mL _ of 1% lidocaine solution.    PREOPERATIVE DIAGNOSIS:  Dialysis fistula dysfunction.    POSTOPERATIVE DIAGNOSIS:  Dialysis fistula dysfunction.    PROCEDURES:  1.  Percutaneous cannulation of right upper extremity arteriovenous fistula   under ultrasound guidance.  2.  Dialysis fistulogram.  3.  Percutaneous, transluminal venoplasty of fistula stenosis above the   anastomosis using 7x40 mm angioplasty balloon.    INDICATION FOR PROCEDURE:  The patient is a pleasant gentleman with multiple   medical problems including renal failure, who has been dialyzed via right   upper extremity arteriovenous fistula, which was created many years ago.    Patient had history of fistula stenosis, which has had to be dilated on a   regular basis.  He was doing well, but recently developed problem with a   fistula flow.  Discussion was made with patient.  He would like to undergo   dialysis fistulogram with possible intervention, fully understanding all   risks.    DESCRIPTION OF PROCEDURE:  Informed consent was obtained.  Patient was taken   to the interventional radiology suite and was placed in the supine position.    He was given Ancef intravenously.  He was given Ancef intravenously.  Time-out   procedure was done.  Intravenous sedation was performed with fentanyl and   Versed.  The patient was closely monitored.    Next, his right upper extremity was sterilely prepped and draped in the normal   fashion.  Under ultrasound guidance, the fistula was percutaneously   cannulated above the anastomosis, directed toward the anastomosis.  This was   done using a micropuncture kit.  A dialysis fistulogram was obtained through   the microcatheter.  The microcatheter was upsized to a 6-Lao sheath.    Patient was given heparin 3000 units intravenously.  After the heparin was   allowed  to circulate systemically for 3 minutes, over the guidewire,   percutaneous, transluminal venoplasty of the fistula stenosis, as above, the   anastomosis was performed using a 7x40 mm angioplasty balloon.  A followup   fistulogram was obtained and showed restoration of luminal patency with no   significant recoil stenosis seen.    The 6-Cymro sheath was removed and hemostasis was achieved by placing a   pursestring at the puncture site using 4-0 nylon suture.  Sterile dressing was   applied.    IMPRESSION:  Patent right upper extremity arteriovenous fistula with severe stenosis   seen above the anastomosis.  This was successfully treated with percutaneous,   transluminal venoplasty using 7x40 mm angioplasty balloon.  There was no   central venous stenosis seen.    ESTIMATED BLOOD LOSS:  5 mL.    CONTRAST USED:  40 mL of Visipaque.    Sponge and instrument counts were correct x2.    The patient was then awakened and was taken to recovery area in stable   condition with a good thrill over the fistula and intact neurovascular   examination of the right hand.       ____________________________________     MD ESMER JOHNSON / SHARRON    DD:  05/10/2018 12:03:14  DT:  05/10/2018 12:39:38    D#:  [id number]  Job#:  [id number]

## 2018-05-10 NOTE — OR SURGEON
Immediate Post OP Note    PreOp Diagnosis: Fistula dysfunction.    PostOp Diagnosis: Same.    Procedure(s):  VASCULAR CASE-DR. SYED-RIGHT ARM DIALYSIS FISTULOGRAM WITH VENOPLASTY.    Surgeon:  Nate Syed MD  [facility]    Type of Anesthesia:  IV sedation for 1 hour and local with 2ml 1% Lidocaine.    IR Staff:    Specimens removed if any:  None.    Estimated Blood Loss: 5ml.    Contrast: 40ml Visipaque.    Findings: Stenosis above anastomosis.    Complications: None.    Dictated, #[id number].        5/10/2018 11:52 AM Nate Syed M.D.

## 2018-05-10 NOTE — DISCHARGE INSTRUCTIONS
ACTIVITY: Rest and take it easy for the first 24 hours.  A responsible adult is recommended to remain with you during that time.  It is normal to feel sleepy.  We encourage you to not do anything that requires balance, judgment or coordination.    MILD FLU-LIKE SYMPTOMS ARE NORMAL. YOU MAY EXPERIENCE GENERALIZED MUSCLE ACHES, THROAT IRRITATION, HEADACHE AND/OR SOME NAUSEA.    FOR 24 HOURS DO NOT:  Drive, operate machinery or run household appliances.  Drink beer or alcoholic beverages.   Make important decisions or sign legal documents.    SPECIAL INSTRUCTIONS: )1) Keep right arm straight and elevated.  No lifting using right arm next 48 hours.   2)May remove dressing after 48 hours.   3) Follow up with Dr. Syed as needed. Call [phone]   4)If you are on Metformin, please arrange for creatinine level q 48 hours after angiogram: Metformin should be held at least 2 days after this test, if resumed, please call [facility] surgeon with results prior to starting medication  5)Resume your home medications     DIET: To avoid nausea, slowly advance diet as tolerated, avoiding spicy or greasy foods for the first day.  Add more substantial food to your diet according to your physician's instructions.  Babies can be fed formula or breast milk as soon as they are hungry.  INCREASE FLUIDS AND FIBER TO AVOID CONSTIPATION.    SURGICAL DRESSING/BATHING: remove dressing in 48 hours.    FOLLOW-UP APPOINTMENT:  A follow-up appointment should be arranged with your doctor in [phone]; call to schedule.    You should CALL YOUR PHYSICIAN if you develop:  Fever greater than 101 degrees F.  Pain not relieved by medication, or persistent nausea or vomiting.  Excessive bleeding (blood soaking through dressing) or unexpected drainage from the wound.  Extreme redness or swelling around the incision site, drainage of pus or foul smelling drainage.  Inability to urinate or empty your bladder within 8 hours.  Problems with breathing or chest  pain.    You should call 911 if you develop problems with breathing or chest pain.  If you are unable to contact your doctor or surgical center, you should go to the nearest emergency room or urgent care center.  Physician's telephone #: [phone]    If any questions arise, call your doctor.  If your doctor is not available, please feel free to call the Surgical Center at [phone].  The Center is open Monday through Friday from 7AM to 7PM.  You can also call the HEALTH HOTLINE open 24 hours/day, 7 days/week and speak to a nurse at [phone], or toll free at [phone].    A registered nurse may call you a few days after your surgery to see how you are doing after your procedure.    MEDICATIONS: Resume taking daily medication.  Take prescribed pain medication with food.  If no medication is prescribed, you may take non-aspirin pain medication if needed.  PAIN MEDICATION CAN BE VERY CONSTIPATING.  Take a stool softener or laxative such as senokot, pericolace, or milk of magnesia if needed.  Fistulogram, Care After  Refer to this sheet in the next few weeks. These instructions provide you with information on caring for yourself after your procedure. Your health care provider may also give you more specific instructions. Your treatment has been planned according to current medical practices, but problems sometimes occur. Call your health care provider if you have any problems or questions after your procedure.  WHAT TO EXPECT AFTER THE PROCEDURE  After your procedure, it is typical to have the following:  · A small amount of discomfort in the area where the catheters were placed.  · A small amount of bruising around the fistula.  · Sleepiness and fatigue.  HOME CARE INSTRUCTIONS  · Rest at home for the day following your procedure.  · Do not drive or operate heavy machinery while taking pain medicine.  · Take medicines only as directed by your health care provider.  · Do not take baths, swim, or use a hot  tub until your health care provider approves. You may shower 24 hours after the procedure or as directed by your health care provider.  · There are many different ways to close and cover an incision, including stitches, skin glue, and adhesive strips. Follow your health care provider's instructions on:  ¨ Incision care.  ¨ Bandage (dressing) changes and removal.  ¨ Incision closure removal.  · Monitor your dialysis fistula carefully.  SEEK MEDICAL CARE IF:  · You have drainage, redness, swelling, or pain at your catheter site.  · You have a fever.  · You have chills.  SEEK IMMEDIATE MEDICAL CARE IF:  · You feel weak.  · You have trouble balancing.  · You have trouble moving your arms or legs.  · You have problems with your speech or vision.  · You can no longer feel a vibration or buzz when you put your fingers over your dialysis fistula.  · The limb that was used for the procedure:  ¨ Swells.  ¨ Is painful.  ¨ Is cold.  ¨ Is discolored, such as blue or pale white.     This information is not intended to replace advice given to you by your health care provider. Make sure you discuss any questions you have with your health care provider.     Document Released: 05/04/2015 Document Reviewed: 05/04/2015  [employer] Interactive Patient Education ©2016 [employer]      If your physician has prescribed pain medication that includes Acetaminophen (Tylenol), do not take additional Acetaminophen (Tylenol) while taking the prescribed medication.    Depression / Suicide Risk    As you are discharged from this [facility] facility, it is important to learn how to keep safe from harming yourself.    Recognize the warning signs:  · Abrupt changes in personality, positive or negative- including increase in energy   · Giving away possessions  · Change in eating patterns- significant weight changes-  positive or negative  · Change in sleeping patterns- unable to sleep or sleeping all the time   · Unwillingness or inability to  communicate  · Depression  · Unusual sadness, discouragement and loneliness  · Talk of wanting to die  · Neglect of personal appearance   · Rebelliousness- reckless behavior  · Withdrawal from people/activities they love  · Confusion- inability to concentrate     If you or a loved one observes any of these behaviors or has concerns about self-harm, here's what you can do:  · Talk about it- your feelings and reasons for harming yourself  · Remove any means that you might use to hurt yourself (examples: pills, rope, extension cords, firearm)  · Get professional help from the community (Mental Health, Substance Abuse, psychological counseling)  · Do not be alone:Call your Safe Contact- someone whom you trust who will be there for you.  · Call your local CRISIS HOTLINE 909-9999 or 825-815-1415  · Call your local Children's Mobile Crisis Response Team [place] (197) 475-8458 or www.[employer]  · Call the toll free National Suicide Prevention Hotlines   · National Suicide Prevention Lifeline 866-376-JKPN (5654)  · National Hope Line Network 800-SUICIDE (452-5843)

## 2018-05-16 DIAGNOSIS — M31.30 WEGENER'S DISEASE, PULMONARY: ICD-10-CM

## 2018-05-26 ENCOUNTER — HOSPITAL ENCOUNTER (OUTPATIENT)
Dept: LAB | Facility: MEDICAL CENTER | Age: 78
End: 2018-05-26
Attending: INTERNAL MEDICINE
Payer: MEDICARE

## 2018-05-26 ENCOUNTER — OFFICE VISIT (OUTPATIENT)
Dept: URGENT CARE | Facility: PHYSICIAN GROUP | Age: 78
End: 2018-05-26
Payer: MEDICARE

## 2018-05-26 VITALS
DIASTOLIC BLOOD PRESSURE: 64 MMHG | TEMPERATURE: 98.9 F | HEART RATE: 96 BPM | HEIGHT: 68 IN | BODY MASS INDEX: 21.22 KG/M2 | WEIGHT: 140 LBS | SYSTOLIC BLOOD PRESSURE: 118 MMHG | OXYGEN SATURATION: 94 %

## 2018-05-26 DIAGNOSIS — Z48.02 VISIT FOR SUTURE REMOVAL: ICD-10-CM

## 2018-05-26 DIAGNOSIS — M31.30 WEGENER'S DISEASE, PULMONARY: ICD-10-CM

## 2018-05-26 PROCEDURE — 36415 COLL VENOUS BLD VENIPUNCTURE: CPT

## 2018-05-26 PROCEDURE — 86255 FLUORESCENT ANTIBODY SCREEN: CPT

## 2018-05-26 PROCEDURE — 99212 OFFICE O/P EST SF 10 MIN: CPT | Performed by: NURSE PRACTITIONER

## 2018-05-28 NOTE — PROGRESS NOTES
Chief Complaint   Patient presents with   • Suture / Staple Removal     right arm       HISTORY OF PRESENT ILLNESS: Patient is a 77 y.o. male who presents to urgent care today requesting to have one suture removed from his right arm. He has a history of ESRD and had his fistula reevaluated over one week ago. The suture was placed during the procedure and was told to follow up to have suture removed. He denies pain, erythema, swelling, drainage, fever, chills, or any complaints today.     Patient Active Problem List    Diagnosis Date Noted   • Pneumonia of both lungs due to Pseudomonas species ([facility]) 06/06/2017     Priority: High   • Hypotension 02/01/2015     Priority: High   • Adult failure to thrive 01/13/2012     Priority: High   • Pneumonia 12/14/2011     Priority: High   • Debility 10/12/2011     Priority: High   • Chronic respiratory infection 10/11/2011     Priority: High   • Wegener's granulomatosis ([facility]) 06/10/2017     Priority: Medium   • Pneumonia of both lungs due to influenza B , post-influenza pneumonia/HCAP 05/14/2017     Priority: Medium   • Acute on chronic respiratory failure with hypoxia due to influenza B, not due to sepsis(CMS-HCC) 05/14/2017     Priority: Medium   • Atrial fibrillation ([facility]) 04/18/2017     Priority: Medium   • Macrocytosis 02/01/2015     Priority: Medium   • Pacemaker 02/06/2014     Priority: Medium   • Immunosuppressed status ([facility]) 02/06/2014     Priority: Medium   • Anemia of chronic renal failure, stage 5 ([facility]) 12/14/2011     Priority: Medium   • Protein-calorie malnutrition, severe ([facility]) 10/11/2011     Priority: Medium   • Dysphagia 09/27/2011     Priority: Medium   • Odynophagia 09/27/2011     Priority: Medium   • Esophageal stricture 09/27/2011     Priority: Medium   • COPD (chronic obstructive pulmonary disease) ([facility]) 05/14/2017     Priority: Low   • Advance care planning 10/05/2016     Priority: Low   • AV block 02/06/2014     Priority: Low   • BPH (benign prostatic  hyperplasia) 12/14/2011     Priority: Low   • Wegeners granulomatosis (HCC) 12/14/2011     Priority: Low   • ESRD on dialysis (HCC) 11/08/2011     Priority: Low   • HTN (hypertension) 10/12/2011     Priority: Low   • TIMOTHY (obstructive sleep apnea) 10/12/2011     Priority: Low   • AV block, 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) 07/07/2011     Priority: Low   • 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   • Abnormal CT scan of lung 10/25/2016   • Thrush 10/25/2016   • AV fistula stenosis ([facility]) 04/12/2016   • Personal history of other malignant neoplasm of skin 07/16/2013   • GERD (gastroesophageal reflux disease) 12/14/2011   • Weight loss 12/14/2011   • Vitamin d deficiency 11/08/2011   • Abnormal thyroid function test 10/12/2011   • Hyperlipidemia 10/12/2011   • Hip pain        Allergies:Erythromycin and Rituximab    Current Outpatient Prescriptions Ordered in [facility]   Medication Sig Dispense Refill   • predniSONE (DELTASONE) 10 MG Tab Take 10 mg by mouth every day. Take with 1mg tab every day for total daily dose of 11mg.     • predniSONE (DELTASONE) 1 MG Tab Take 1 mg by mouth every day. Take with 10mg tab every day for total daily dose of 11mg.     • vitamin D (CHOLECALCIFEROL) 1000 UNIT Tab Take 2,000 Units by mouth every day.     • sodium chloride (HYPER-SAL) 7 % Nebu Soln 4 mL by Nebulization route 2 Times a Day. 240 mL 3   • Fluticasone Furoate-Vilanterol (BREO ELLIPTA) 200-25 MCG/INH AEROSOL POWDER, BREATH ACTIVATED Inhale 1 Puff by mouth every day. Rinse mouth after use. 1 Each 11   • ipratropium-albuterol (DUONEB) 0.5-2.5 (3) MG/3ML nebulizer solution USE 3 ML VIA NEBULIZER FOUR TIMES DAILY 1080 mL 3   • metoprolol (LOPRESSOR) 25 MG Tab Take 25 mg by mouth 2 times a day.     • B Complex-C-Folic Acid (DIALYVITE TABLET) Tab Dialyvite -nehro vit B complex-C-folic acid 1 tablet po QD Dialysis Pt 90 Tab 3   • finasteride (PROSCAR) 5 MG Tab Take 5 mg by mouth every day.     • simvastatin (ZOCOR) 40 MG TABS Take 40 mg by mouth every  evening.     • epoetin lucina (EPOGEN,PROCRIT) 3000 UNIT/ML SOLN Inject 2,200 Units as instructed every Monday, Wednesday, and Friday. With dialysis     • sevelamer (RENAGEL) 800 MG TABS Take 800 mg by mouth 3 times a day, with meals.     • tamsulosin (FLOMAX) 0.4 MG capsule Take 0.4 mg by mouth every day.     • aspirin EC (ECOTRIN) 81 MG TBEC Take 81 mg by mouth every day.       No current [facility]-ordered facility-administered medications on file.        Past Medical History:   Diagnosis Date   • A-fib ([facility])        • Anemia    • Arthritis     arms, legs, shoulders   • ASTHMA    • BPH (benign prostatic hyperplasia)    • Breath shortness    • Bronchitis     chronic   • Cataract     removed bilat   • COPD    • Dialysis     [facility] ,    • Dyslipidemia    • EMPHYSEMA    • GERD (gastroesophageal reflux disease)    • Hypertension    • Oxygen decrease     O2 3liters @  and dialysis   • Pacemaker    • Pain 2018    shoulders/hips, 5/10   • Pneumonia    • Pseudomonas pneumonia ([facility])    • Pulmonary histoplasmosis ([facility])    • Renal disorder     CKD,    • Sick sinus syndrome ([facility])    • Sleep apnea     uses cpap at [facility]   • Snoring    • Stroke ([facility]) 2018   • Unspecified hemorrhagic conditions     bruises easily, fragile skin   • Wegener's disease, pulmonary ([facility])        Social History   Substance Use Topics   • Smoking status: Former Smoker     Years: 30.00     Types: Pipe     Quit date: 1990   • Smokeless tobacco: Former User     Types: Chew   • Alcohol use 4.8 oz/week     7 Glasses of wine, 1 Standard drinks or equivalent per week      Comment: Red wine nightly       Family Status   Relation Status   • Father    • Mother    •       Family History   Problem Relation Age of Onset   • Stroke Father    • Heart Disease Father    • Stroke Mother    • Cancer         ROS:  Review of Systems   Constitutional: Negative for fever, chills, weight loss, malaise, and fatigue.   HENT:  "Negative for ear pain, nosebleeds, congestion, sore throat and neck pain.    Eyes: Negative for vision changes.   Neuro: Negative for headache, sensory changes, weakness, seizure, LOC.   Cardiovascular: Negative for chest pain, palpitations, orthopnea and leg swelling.   Respiratory: Negative for cough, sputum production, shortness of breath and wheezing.   Gastrointestinal: Negative for abdominal pain, nausea, vomiting or diarrhea.   Genitourinary: Negative for dysuria, urgency and frequency.  Musculoskeletal: Negative for falls, neck pain, back pain, joint pain, myalgias.   Skin: Positive for incisional site with one suture to right AC. Negative for rash, diaphoresis.     Exam:  Blood pressure 118/64, pulse 96, temperature 37.2 °C (98.9 °F), height 1.727 m (5' 8\"), weight 63.5 kg (140 lb), SpO2 94 %.  General: well-nourished, well-developed male in NAD  Head: normocephalic, atraumatic  Eyes: PERRLA, no conjunctival injection, acuity grossly intact, lids normal.  Ears: normal shape and symmetry, gross auditory acuity is intact.  Nose: symmetrical without tenderness, no discharge.  Mouth/Throat: reasonable hygiene, no erythema, exudates or tonsillar enlargement.  Neck: no masses, range of motion within normal limits, no tracheal deviation. No obvious thyroid enlargement.   Lymph: no cervical adenopathy. No supraclavicular adenopathy.   Neuro: alert and oriented. Cranial nerves 1-12 grossly intact. No sensory deficit.   Cardiovascular: regular rate and rhythm. No edema.  Pulmonary: no distress. Chest is symmetrical with respiration, no wheezes, crackles, or rhonchi.   Musculoskeletal: no clubbing, appropriate muscle tone, gait is stable.  Skin: warm, no clubbing, no cyanosis, no rashes. There is a fistula to patient's right upper extremity, bruits are present, there is one healed incisional site to the area with 1 suture present. There is no associated erythema, swelling, drainage, or tenderness.  Psych: appropriate " mood, affect, judgement.         Assessment/Plan:  1. Visit for suture removal         Suture removed without difficulty, edges remained approximated. Wound care discussed with patient.  Supportive care, differential diagnoses, and indications for immediate follow-up discussed with patient.   Pathogenesis of diagnosis discussed including typical length and natural progression.   Instructed to return to clinic or nearest emergency department for any change in condition, further concerns, or worsening of symptoms.  Patient states understanding of the plan of care and discharge instructions.  Instructed to make an appointment, for follow up, with his primary care provider.        Please note that this dictation was created using voice recognition software. I have made every reasonable attempt to correct obvious errors, but I expect that there are errors of grammar and possibly content that I did not discover before finalizing the note.      INEZ Mandel.

## 2018-05-29 ENCOUNTER — OFFICE VISIT (OUTPATIENT)
Dept: PULMONOLOGY | Facility: HOSPICE | Age: 78
End: 2018-05-29
Payer: MEDICARE

## 2018-05-29 VITALS
OXYGEN SATURATION: 96 % | BODY MASS INDEX: 21.22 KG/M2 | HEART RATE: 98 BPM | HEIGHT: 68 IN | RESPIRATION RATE: 16 BRPM | TEMPERATURE: 98.4 F | DIASTOLIC BLOOD PRESSURE: 60 MMHG | SYSTOLIC BLOOD PRESSURE: 102 MMHG | WEIGHT: 140 LBS

## 2018-05-29 DIAGNOSIS — M31.30 WEGENER'S GRANULOMATOSIS: ICD-10-CM

## 2018-05-29 DIAGNOSIS — J47.9 BRONCHIECTASIS WITHOUT COMPLICATION (HCC): ICD-10-CM

## 2018-05-29 DIAGNOSIS — G47.33 OSA (OBSTRUCTIVE SLEEP APNEA): ICD-10-CM

## 2018-05-29 LAB — ANCA IGG TITR SER IF: NORMAL {TITER}

## 2018-05-29 PROCEDURE — 99214 OFFICE O/P EST MOD 30 MIN: CPT | Performed by: INTERNAL MEDICINE

## 2018-05-29 NOTE — PROGRESS NOTES
CC:  Chief Complaint   Patient presents with   • Follow-Up     History of John vasculitis with restrictive lung disease secondary to that came today for follow-up    HPI:   The patient is a 77 y.o. 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 The patient has bronchiectasis and pulmonary fibrosis secondary to his vasculitis. He used to have recurrent pulmonary infection with hospitalizations secondary to that however for the last 6 months or so he has been doing frequent hypersaline nebulizers and percussion therapy with percussion vest and steroid inhaler. He did not have any recent hospitalization for lung infections for the past 6 months. However he was hospitalized for issues related to his hemodialysis catheter.    Continues to have chronic cough productive of white-yellow sputum. Continue to be very short of breath on exertion however there was no exertional hypoxemia in our clinic today. Continue to take prednisone 11 mg a day.  ROS:   Constitutional: Denies fevers, chills, night sweats  Eyes: Denies vision loss, pain, drainage, double vision  Ears, Nose, Throat: Denies earache, difficulty hearing, tinnitus, nasal congestion, hoarseness  Cardiovascular: Denies chest pain, tightness, palpitations, orthopnea or edema  Respiratory: Please see history of present illness  Sleep: The patient has sleep apnea. Did not bring his CPAP machine card with him today. Not sure if he is compliant with CPAP. He told me he uses oxygen at night.  GI: Denies heartburn, dysphagia, nausea, abdominal pain, diarrhea or constipation  : Denies frequent urination, hematuria, discharge or painful urination  Musculoskeletal: Denies back pain, painful joints, sore muscles  Neurological: Denies weakness or headaches  Skin: No rashes  All other ROS were negative except what mentioned in the HPI     Past Medical History:  Past Medical History:   Diagnosis Date   • A-fib ([facility])        • Anemia    • Arthritis      arms, legs, shoulders   • ASTHMA    • BPH (benign prostatic hyperplasia)    • Breath shortness    • Bronchitis     chronic   • Cataract     removed bilat   • COPD    • Dialysis     [facility] M-W-F,    • Dyslipidemia    • EMPHYSEMA    • GERD (gastroesophageal reflux disease)    • Hypertension    • Oxygen decrease     O2 3liters @ HS and dialysis   • Pacemaker 1988   • Pain 05/09/2018    shoulders/hips, 5/10   • Pneumonia 2017   • Pseudomonas pneumonia (HCC)    • Pulmonary histoplasmosis (HCC)    • Renal disorder     CKD,    • Sick sinus syndrome (HCC)    • Sleep apnea     uses cpap at noc   • Snoring    • Stroke (HCC) 02/2018   • Unspecified hemorrhagic conditions     bruises easily, fragile skin   • Wegener's disease, pulmonary (HCC)                Social History:  Social History     Social History   • Marital status:      Spouse name: N/A   • Number of children: N/A   • Years of education: N/A     Occupational History   • Not on file.     Social History Main Topics   • Smoking status: Former Smoker     Years: 30.00     Types: Pipe     Quit date: 1/1/1990   • Smokeless tobacco: Former User     Types: Chew   • Alcohol use 4.8 oz/week     7 Glasses of wine, 1 Standard drinks or equivalent per week      Comment: Red wine nightly   • Drug use: No   • Sexual activity: Yes     Partners: Female     Other Topics Concern   • Not on file     Social History Narrative   • No narrative on file             Family History:  Family History   Problem Relation Age of Onset   • Stroke Father    • Heart Disease Father    • Stroke Mother    • Cancer         Current Outpatient Prescriptions on File Prior to Visit   Medication Sig Dispense Refill   • predniSONE (DELTASONE) 10 MG Tab Take 10 mg by mouth every day. Take with 1mg tab every day for total daily dose of 11mg.     • predniSONE (DELTASONE) 1 MG Tab Take 1 mg by mouth every day. Take with 10mg tab every day for total daily dose of 11mg.     • vitamin D  "(CHOLECALCIFEROL) 1000 UNIT Tab Take 2,000 Units by mouth every day.     • sodium chloride (HYPER-SAL) 7 % Nebu Soln 4 mL by Nebulization route 2 Times a Day. 240 mL 3   • Fluticasone Furoate-Vilanterol (BREO ELLIPTA) 200-25 MCG/INH AEROSOL POWDER, BREATH ACTIVATED Inhale 1 Puff by mouth every day. Rinse mouth after use. 1 Each 11   • ipratropium-albuterol (DUONEB) 0.5-2.5 (3) MG/3ML nebulizer solution USE 3 ML VIA NEBULIZER FOUR TIMES DAILY 1080 mL 3   • metoprolol (LOPRESSOR) 25 MG Tab Take 25 mg by mouth 2 times a day.     • B Complex-C-Folic Acid (DIALYVITE TABLET) Tab Dialyvite -nehro vit B complex-C-folic acid 1 tablet po QD Dialysis Pt 90 Tab 3   • finasteride (PROSCAR) 5 MG Tab Take 5 mg by mouth every day.     • simvastatin (ZOCOR) 40 MG TABS Take 40 mg by mouth every evening.     • epoetin lucina (EPOGEN,PROCRIT) 3000 UNIT/ML SOLN Inject 2,200 Units as instructed every Monday, Wednesday, and Friday. With dialysis     • sevelamer (RENAGEL) 800 MG TABS Take 800 mg by mouth 3 times a day, with meals.     • tamsulosin (FLOMAX) 0.4 MG capsule Take 0.4 mg by mouth every day.     • aspirin EC (ECOTRIN) 81 MG TBEC Take 81 mg by mouth every day.       No current facility-administered medications on file prior to visit.        Allergies:   Erythromycin and Rituximab        Vitals:    05/29/18 1430   Height: 1.727 m (5' 8\")   Weight: 63.5 kg (140 lb)   Weight % change since last entry.: 0 %   BP: 102/60   Pulse: 98   BMI (Calculated): 21.29   Resp: 16   Temp: 36.9 °C (98.4 °F)   O2 sat % room air: 96 %           Physical Exam:  Appearance:  in no acute distress  HEENT: Normocephalic, atraumatic, white sclera, PERRLA, oropharynx clear  Neck: No adenopathy or masses  Respiratory: no intercostal retractions or accessory muscle use  Lungs auscultation: Clear to auscultation bilaterally  Cardiovascular: Regular rate rhythm. No murmurs, rubs or gallops.  No LE edema  Abdomen: soft, nondistended  Gait: Normal  Digits: No " clubbing, cyanosis  Motor: No focal deficits  Orientation: Oriented to time, person and place      DATA:    Labs:  Lab Results   Component Value Date/Time    WBC 11.5 (H) 05/09/2018 09:03 AM    WBC 7.6 03/19/2012 12:00 AM    RBC 3.69 (L) 05/09/2018 09:03 AM    RBC 2.25 (LL) 03/19/2012 12:00 AM    HEMOGLOBIN 11.5 (L) 05/09/2018 09:03 AM    HEMATOCRIT 37.2 (L) 05/09/2018 09:03 AM    .8 (H) 05/09/2018 09:03 AM     (H) 03/19/2012 12:00 AM    MCH 31.2 05/09/2018 09:03 AM    MCH 33.8 03/19/2012 12:00 AM    MCHC 30.9 (L) 05/09/2018 09:03 AM    MPV 8.8 (L) 05/09/2018 09:03 AM    NEUTSPOLYS 93.60 (H) 05/09/2018 09:03 AM    LYMPHOCYTES 2.60 (L) 05/09/2018 09:03 AM    MONOCYTES 2.80 05/09/2018 09:03 AM    EOSINOPHILS 0.10 05/09/2018 09:03 AM    BASOPHILS 0.30 05/09/2018 09:03 AM    HYPOCHROMIA 1+ 10/28/2011 05:35 AM    ANISOCYTOSIS 1+ 07/03/2017 05:15 PM      Lab Results   Component Value Date/Time    SODIUM 138 05/10/2018 09:40 AM    POTASSIUM 3.8 05/10/2018 09:40 AM    CHLORIDE 100 05/10/2018 09:40 AM    CO2 28 05/10/2018 09:40 AM    GLUCOSE 110 (H) 05/10/2018 09:40 AM    BUN 35 (H) 05/10/2018 09:40 AM    CREATININE 3.25 (H) 05/10/2018 09:40 AM    CREATININE 1.0 09/23/2008 09:36 AM                Diagnosis:  1. Wegener's granulomatosis (HCC)     2. TIMOTHY (obstructive sleep apnea)  REFERRAL TO OTHER   3. Bronchiectasis without complication (HCC)          Assessment and Plan   The patient seems to be at baseline in terms of bronchiectasis and chronic pulmonary infections.  Will continue hypertonic saline nebulizers and percussion vest. The patient also will continue Breo inhaler.  I'm going to refer the patient to the sleep clinic for further evaluation and management of his sleep apnea  Continue steroids, and does 11 mg a day for John granulomatosis                    Return in about 6 months (around 11/29/2018).        This note was created using voice recognition software. I apologize for any overlooked  obvious grammar or  vocabulary mistake

## 2018-06-06 ENCOUNTER — HOSPITAL ENCOUNTER (EMERGENCY)
Facility: MEDICAL CENTER | Age: 78
End: 2018-06-06
Attending: EMERGENCY MEDICINE
Payer: MEDICARE

## 2018-06-06 ENCOUNTER — APPOINTMENT (OUTPATIENT)
Dept: RADIOLOGY | Facility: MEDICAL CENTER | Age: 78
End: 2018-06-06
Attending: EMERGENCY MEDICINE
Payer: MEDICARE

## 2018-06-06 VITALS
HEART RATE: 98 BPM | HEIGHT: 68 IN | WEIGHT: 140 LBS | BODY MASS INDEX: 21.22 KG/M2 | SYSTOLIC BLOOD PRESSURE: 120 MMHG | OXYGEN SATURATION: 91 % | RESPIRATION RATE: 18 BRPM | TEMPERATURE: 98.6 F | DIASTOLIC BLOOD PRESSURE: 43 MMHG

## 2018-06-06 DIAGNOSIS — R07.89 CHEST WALL PAIN: Primary | ICD-10-CM

## 2018-06-06 DIAGNOSIS — B02.9 HERPES ZOSTER WITHOUT COMPLICATION: ICD-10-CM

## 2018-06-06 DIAGNOSIS — N18.9 CHRONIC RENAL FAILURE, UNSPECIFIED CKD STAGE: ICD-10-CM

## 2018-06-06 LAB
ALBUMIN SERPL BCP-MCNC: 3.8 G/DL (ref 3.2–4.9)
ALBUMIN/GLOB SERPL: 1.4 G/DL
ALP SERPL-CCNC: 69 U/L (ref 30–99)
ALT SERPL-CCNC: 12 U/L (ref 2–50)
ANION GAP SERPL CALC-SCNC: 12 MMOL/L (ref 0–11.9)
AST SERPL-CCNC: 8 U/L (ref 12–45)
BASOPHILS # BLD AUTO: 0.4 % (ref 0–1.8)
BASOPHILS # BLD: 0.04 K/UL (ref 0–0.12)
BILIRUB SERPL-MCNC: 0.6 MG/DL (ref 0.1–1.5)
BLOOD CULTURE HOLD CXBCH: NORMAL
BUN SERPL-MCNC: 24 MG/DL (ref 8–22)
CALCIUM SERPL-MCNC: 9.2 MG/DL (ref 8.5–10.5)
CHLORIDE SERPL-SCNC: 96 MMOL/L (ref 96–112)
CO2 SERPL-SCNC: 31 MMOL/L (ref 20–33)
CREAT SERPL-MCNC: 2.14 MG/DL (ref 0.5–1.4)
EKG IMPRESSION: NORMAL
EOSINOPHIL # BLD AUTO: 0.08 K/UL (ref 0–0.51)
EOSINOPHIL NFR BLD: 0.8 % (ref 0–6.9)
ERYTHROCYTE [DISTWIDTH] IN BLOOD BY AUTOMATED COUNT: 58.4 FL (ref 35.9–50)
GLOBULIN SER CALC-MCNC: 2.8 G/DL (ref 1.9–3.5)
GLUCOSE SERPL-MCNC: 89 MG/DL (ref 65–99)
HCT VFR BLD AUTO: 35.1 % (ref 42–52)
HGB BLD-MCNC: 11.1 G/DL (ref 14–18)
IMM GRANULOCYTES # BLD AUTO: 0.07 K/UL (ref 0–0.11)
IMM GRANULOCYTES NFR BLD AUTO: 0.7 % (ref 0–0.9)
LYMPHOCYTES # BLD AUTO: 0.62 K/UL (ref 1–4.8)
LYMPHOCYTES NFR BLD: 6.3 % (ref 22–41)
MCH RBC QN AUTO: 31.4 PG (ref 27–33)
MCHC RBC AUTO-ENTMCNC: 31.6 G/DL (ref 33.7–35.3)
MCV RBC AUTO: 99.4 FL (ref 81.4–97.8)
MONOCYTES # BLD AUTO: 0.76 K/UL (ref 0–0.85)
MONOCYTES NFR BLD AUTO: 7.7 % (ref 0–13.4)
NEUTROPHILS # BLD AUTO: 8.3 K/UL (ref 1.82–7.42)
NEUTROPHILS NFR BLD: 84.1 % (ref 44–72)
NRBC # BLD AUTO: 0 K/UL
NRBC BLD-RTO: 0 /100 WBC
PLATELET # BLD AUTO: 214 K/UL (ref 164–446)
PMV BLD AUTO: 8.6 FL (ref 9–12.9)
POTASSIUM SERPL-SCNC: 3.3 MMOL/L (ref 3.6–5.5)
PROT SERPL-MCNC: 6.6 G/DL (ref 6–8.2)
RBC # BLD AUTO: 3.53 M/UL (ref 4.7–6.1)
SODIUM SERPL-SCNC: 139 MMOL/L (ref 135–145)
TROPONIN I SERPL-MCNC: 0.07 NG/ML (ref 0–0.04)
WBC # BLD AUTO: 9.9 K/UL (ref 4.8–10.8)

## 2018-06-06 PROCEDURE — 71045 X-RAY EXAM CHEST 1 VIEW: CPT

## 2018-06-06 PROCEDURE — A9270 NON-COVERED ITEM OR SERVICE: HCPCS | Performed by: EMERGENCY MEDICINE

## 2018-06-06 PROCEDURE — 84484 ASSAY OF TROPONIN QUANT: CPT

## 2018-06-06 PROCEDURE — 85025 COMPLETE CBC W/AUTO DIFF WBC: CPT

## 2018-06-06 PROCEDURE — 80053 COMPREHEN METABOLIC PANEL: CPT

## 2018-06-06 PROCEDURE — 700102 HCHG RX REV CODE 250 W/ 637 OVERRIDE(OP): Performed by: EMERGENCY MEDICINE

## 2018-06-06 PROCEDURE — 93005 ELECTROCARDIOGRAM TRACING: CPT

## 2018-06-06 PROCEDURE — 99284 EMERGENCY DEPT VISIT MOD MDM: CPT

## 2018-06-06 PROCEDURE — 93005 ELECTROCARDIOGRAM TRACING: CPT | Performed by: EMERGENCY MEDICINE

## 2018-06-06 PROCEDURE — 94760 N-INVAS EAR/PLS OXIMETRY 1: CPT

## 2018-06-06 RX ORDER — TRAMADOL HYDROCHLORIDE 50 MG/1
50 TABLET ORAL ONCE
Status: COMPLETED | OUTPATIENT
Start: 2018-06-06 | End: 2018-06-06

## 2018-06-06 RX ORDER — TRAMADOL HYDROCHLORIDE 50 MG/1
50-100 TABLET ORAL EVERY 12 HOURS PRN
Qty: 20 TAB | Refills: 0 | Status: SHIPPED | OUTPATIENT
Start: 2018-06-06 | End: 2018-06-11

## 2018-06-06 RX ORDER — VALACYCLOVIR HYDROCHLORIDE 500 MG/1
500 TABLET, FILM COATED ORAL ONCE
Status: COMPLETED | OUTPATIENT
Start: 2018-06-06 | End: 2018-06-06

## 2018-06-06 RX ORDER — VALACYCLOVIR HYDROCHLORIDE 500 MG/1
500 TABLET, FILM COATED ORAL DAILY
Qty: 9 TAB | Refills: 0 | Status: SHIPPED | OUTPATIENT
Start: 2018-06-07 | End: 2018-06-16

## 2018-06-06 RX ADMIN — TRAMADOL HYDROCHLORIDE 50 MG: 50 TABLET, COATED ORAL at 22:15

## 2018-06-06 RX ADMIN — VALACYCLOVIR HYDROCHLORIDE 500 MG: 500 TABLET, FILM COATED ORAL at 22:15

## 2018-06-06 ASSESSMENT — PAIN SCALES - GENERAL: PAINLEVEL_OUTOF10: 10

## 2018-06-07 ENCOUNTER — OFFICE VISIT (OUTPATIENT)
Dept: RHEUMATOLOGY | Facility: PHYSICIAN GROUP | Age: 78
End: 2018-06-07
Payer: MEDICARE

## 2018-06-07 ENCOUNTER — PATIENT OUTREACH (OUTPATIENT)
Dept: HEALTH INFORMATION MANAGEMENT | Facility: OTHER | Age: 78
End: 2018-06-07

## 2018-06-07 VITALS
TEMPERATURE: 98.2 F | HEART RATE: 92 BPM | DIASTOLIC BLOOD PRESSURE: 70 MMHG | WEIGHT: 138 LBS | RESPIRATION RATE: 14 BRPM | SYSTOLIC BLOOD PRESSURE: 128 MMHG | OXYGEN SATURATION: 92 % | BODY MASS INDEX: 20.98 KG/M2

## 2018-06-07 DIAGNOSIS — J15.1 PNEUMONIA OF BOTH LUNGS DUE TO PSEUDOMONAS SPECIES, UNSPECIFIED PART OF LUNG (HCC): ICD-10-CM

## 2018-06-07 DIAGNOSIS — B02.9 HERPES ZOSTER WITHOUT COMPLICATION: ICD-10-CM

## 2018-06-07 DIAGNOSIS — I48.91 ATRIAL FIBRILLATION, UNSPECIFIED TYPE (HCC): ICD-10-CM

## 2018-06-07 DIAGNOSIS — G89.29 CHRONIC RIGHT SHOULDER PAIN: ICD-10-CM

## 2018-06-07 DIAGNOSIS — Z99.2 ESRD ON DIALYSIS (HCC): ICD-10-CM

## 2018-06-07 DIAGNOSIS — J44.9 CHRONIC OBSTRUCTIVE PULMONARY DISEASE, UNSPECIFIED COPD TYPE (HCC): ICD-10-CM

## 2018-06-07 DIAGNOSIS — M31.30 WEGENER'S GRANULOMATOSIS: ICD-10-CM

## 2018-06-07 DIAGNOSIS — M25.511 CHRONIC RIGHT SHOULDER PAIN: ICD-10-CM

## 2018-06-07 DIAGNOSIS — N18.6 ESRD ON DIALYSIS (HCC): ICD-10-CM

## 2018-06-07 PROCEDURE — 99214 OFFICE O/P EST MOD 30 MIN: CPT | Mod: 25 | Performed by: INTERNAL MEDICINE

## 2018-06-07 PROCEDURE — 20605 DRAIN/INJ JOINT/BURSA W/O US: CPT | Performed by: INTERNAL MEDICINE

## 2018-06-07 RX ORDER — METHYLPREDNISOLONE ACETATE 40 MG/ML
40 INJECTION, SUSPENSION INTRA-ARTICULAR; INTRALESIONAL; INTRAMUSCULAR; SOFT TISSUE ONCE
Status: COMPLETED | OUTPATIENT
Start: 2018-06-07 | End: 2018-06-07

## 2018-06-07 RX ADMIN — METHYLPREDNISOLONE ACETATE 20 MG: 40 INJECTION, SUSPENSION INTRA-ARTICULAR; INTRALESIONAL; INTRAMUSCULAR; SOFT TISSUE at 13:56

## 2018-06-07 NOTE — LETTER
[facility]   [address], [address]  [place] [address]  Phone: [phone]  Fax: [phone]              Encounter Date: 6/7/2018    Dear Dr. Forrest ref. provider found,    It was a pleasure seeing your patient, Gilberto Brown, on 6/7/2018. Diagnoses of Wegener's granulomatosis (HCC), Herpes zoster without complication, Chronic right shoulder pain, Pneumonia of both lungs due to Pseudomonas species, unspecified part of lung (HCC), ESRD on dialysis (HCC), Atrial fibrillation, unspecified type (HCC), and Chronic obstructive pulmonary disease, unspecified COPD type (HCC) were pertinent to this visit.     Please find attached progress note which includes the history I obtained from Mr. Brown, my physical examination findings, my impression and recommendations.      Once again, it was a pleasure participating in your patient's care.  Please feel free to contact me if you have any questions or if I can be of any further assistance to your patients.      Sincerely,    Cintia Ariza M.D.  Electronically Signed          PROGRESS NOTE:  No notes on file

## 2018-06-07 NOTE — PROGRESS NOTES
Chief Complaint- joint pain    Subjective:   Gilberto Brown is a 77 y.o. male here today for follow up of rheumatological issues    This is a follow-up visit for this complicated patient who is being followed for Wegener's granulomatosis initially diagnosed with Wegener's granulomatosis in September 2011 with symptoms of progressive renal insufficiency status post renal biopsy that confirmed the Wegener's granulomatosis. Patient currently is on dialysis permanently and has chronic bronchiectasis with Pseudomonas colonization of the lungs, patient is status post CellCept and rituximab and Cytoxan in the past with rituximab causing exacerbation of Pseudomonas currently patient is on prednisone 11 mg by mouth daily only with thin ANCA negative.       Recent ANCA is negative May 2018 currently on prednisone 11 mg by mouth daily recent evaluation by pulmonology feels that patient's lung status is fairly stable. Patient denies any hemoptysis, denies any vision changes, denies any unexplained weight loss, denies any fevers of unknown etiology.     Since last visit patient has now developed shingles which she is on acyclovir for shingles manifestations predominantly on the left arm and trunk area.    Today patient also complains of pain in his right shoulder patient does have a history of rotator cuff tear and repair on the right shoulder with exacerbation now of pain in that right shoulder without any history of trauma, we discussed the possibility of doing a cortisone injection.     Additional comorbidities include atrial fibrillation with a pacemaker in place also recent diagnosis CVA and is on permanent anticoagulation. Patient also on oxygen at home 3 L per minute.      S/p Cytoxan-n/v  Off of Cellcept 5/2016  Rituximab infusions 1/14/2016, 1/28/2016-patient developed severe infection and was hospitalized     GBM neg 1/2012  ANCA neg 1/2015; ANCA 1:20  11/2015; ANCA neg 2/2016; ANCA neg 5/2016; ANCA neg 12/2016;  ANCA neg 3/2017; ANCA neg 7/2017; ANCA neg 2/2018; ANCA neg 5/2018  C3 90 normal 10/2011  C4 21 10/2011  OFELIA neg 10/2011  Uric acid 4.4 1/2016  Hep B neg 2/2015; Hep B neg 4/2017  Hep C neg 2/2015      Of note  Dr Coral Corona nephrology  Dr Gutierrez pulmonology   Dr Chavarria Cardiologist [phone]  Dr Nunez Opthalmologist [phone]       Current medicines (including changes today)  Current Outpatient Prescriptions   Medication Sig Dispense Refill   • tramadol (ULTRAM) 50 MG Tab Take 1-2 Tabs by mouth every 12 hours as needed for Moderate Pain for up to 5 days. 20 Tab 0   • valACYclovir (VALTREX) 500 MG Tab Take 1 Tab by mouth every day for 9 days. 9 Tab 0   • predniSONE (DELTASONE) 10 MG Tab One tab by mouth daily with one tablet of 1 mg tablet for a total of 11 mg by mouth daily 90 Tab 0   • apixaban (ELIQUIS) 2.5mg Tab Take 2.5 mg by mouth 2 Times a Day.     • predniSONE (DELTASONE) 10 MG Tab Take 10 mg by mouth every day. Take with 1mg tab every day for total daily dose of 11mg.     • predniSONE (DELTASONE) 1 MG Tab Take 1 mg by mouth every day. Take with 10mg tab every day for total daily dose of 11mg.     • vitamin D (CHOLECALCIFEROL) 1000 UNIT Tab Take 5,000 Units by mouth every day.     • sodium chloride (HYPER-SAL) 7 % Nebu Soln 4 mL by Nebulization route 2 Times a Day. 240 mL 3   • Fluticasone Furoate-Vilanterol (BREO ELLIPTA) 200-25 MCG/INH AEROSOL POWDER, BREATH ACTIVATED Inhale 1 Puff by mouth every day. Rinse mouth after use. 1 Each 11   • ipratropium-albuterol (DUONEB) 0.5-2.5 (3) MG/3ML nebulizer solution USE 3 ML VIA NEBULIZER FOUR TIMES DAILY 1080 mL 3   • metoprolol (LOPRESSOR) 25 MG Tab Take 25 mg by mouth 2 times a day.     • B Complex-C-Folic Acid (DIALYVITE TABLET) Tab Dialyvite -nehro vit B complex-C-folic acid 1 tablet po QD Dialysis Pt 90 Tab 3   • finasteride (PROSCAR) 5 MG Tab Take 5 mg by mouth every day.     • simvastatin (ZOCOR) 40 MG TABS Take 40 mg by mouth every evening.     •  epoetin lucina (EPOGEN,PROCRIT) 3000 UNIT/ML SOLN Inject 2,200 Units as instructed every Monday, Wednesday, and Friday. With dialysis     • sevelamer (RENAGEL) 800 MG TABS Take 800 mg by mouth 3 times a day, with meals.     • tamsulosin (FLOMAX) 0.4 MG capsule Take 0.4 mg by mouth every day.     • aspirin EC (ECOTRIN) 81 MG TBEC Take 81 mg by mouth every day.       No current facility-administered medications for this visit.      He  has a past medical history of A-fib ([facility]); Anemia; Arthritis; ASTHMA; BPH (benign prostatic hyperplasia); Breath shortness; Bronchitis; Cataract; COPD; Dialysis; Dyslipidemia; EMPHYSEMA; GERD (gastroesophageal reflux disease); Hypertension; Oxygen decrease; Pacemaker (1988); Pain (05/09/2018); Pneumonia (2017); Pseudomonas pneumonia ([facility]); Pulmonary histoplasmosis ([facility]); Renal disorder; Sick sinus syndrome ([facility]); Sleep apnea; Snoring; Stroke ([facility]) (02/2018); Unspecified hemorrhagic conditions; and Wegener's disease, pulmonary ([facility]).    ROS   Other than what is mentioned in HPI or physical exam, there is no history of headaches, double vision or blurred vision. No temporal tenderness or jaw claudication. No history of cataracts or glaucoma. No trouble swallowing difficulties or sore throats.  No chest complaints including chest pain, cough or sputum production. No shortness of breath. No GI complaints including nausea, vomiting, change in bowel habits, or past peptic ulcer disease. No history of blood in the stools. No urinary complaints including dysuria or frequency. No history of alopecia, photosensitivity, oral ulcerations, Raynaud's phenomena.       Objective:     Blood pressure 128/70, pulse 92, temperature 36.8 °C (98.2 °F), resp. rate 14, weight 62.6 kg (138 lb), SpO2 92 %. Body mass index is 20.98 kg/m².   Physical Exam:    Constitutional: Alert and oriented X3, no distress, patient is talkative with good eye contact, wife is in the room with patient, patient talks in full  sentences without shortness of breath.Skin: Warm, dry, good turgor, no rashes in visible areas.Eye: Equal, round and reactive, conjunctiva clear, lids normal EOM intact, did not appreciate any iritis uveitis ENMT: Lips without lesions, good dentition, no oropharyngeal ulcers, moist buccal mucosa, pinna without deformityNeck: Trachea midline, no masses, no thyromegaly.Lymph:  No cervical lymphadenopathy, no axillary lymphadenopathy, no supraclavicular lymphadenopathyRespiratory: Unlabored respiratory effort, patient tends to have a wet cough secondary to chronic infection with Pseudomonas.Cardiovascular: Normal S1, S2, no murmur, no edema, pacemaker in left chest.Abdomen: Soft, non-tender, no masses, no hepatosplenomegaly.Psych: Alert and oriented x3, normal affect and mood.Neuro: Cranial nerves 2-12 are grossly intact, no loss of sensation LEExt:no joint laxity noted in bilateral arms, no joint laxity noted in bilateral legs, no joint effusions, no Raynaud's, there are good pulses 2+ both upper extremities radial and ulnar pulses and 1+ pulses bilateral lower extremities and dorsalis pedis, patient has a vascular shunt in the right upper arm no evidence of vasculitis noted in upper or lower extremities no nasal ulcers, no temporal artery enlargement or temporal artery bruits, no carotid bruits, patient has tenderness to palpation on that right shoulder, well-healed scar secondary to previous rotator cuff tear repair, no espinoza effusions, tenderness along the supraspinatus tendon and humeral joint      Lab Results   Component Value Date/Time    HEPBCORIGM Negative 04/14/2017 12:30 PM    HEPBSAG Negative 05/15/2017 09:58 AM     Lab Results   Component Value Date/Time    HEPCAB Negative 04/14/2017 12:30 PM     Lab Results   Component Value Date/Time    SODIUM 139 06/06/2018 09:17 PM    POTASSIUM 3.3 (L) 06/06/2018 09:17 PM    CHLORIDE 96 06/06/2018 09:17 PM    CO2 31 06/06/2018 09:17 PM    GLUCOSE 89 06/06/2018 09:17 PM     BUN 24 (H) 06/06/2018 09:17 PM    CREATININE 2.14 (H) 06/06/2018 09:17 PM    CREATININE 1.0 09/23/2008 09:36 AM      Lab Results   Component Value Date/Time    WBC 9.9 06/06/2018 09:17 PM    WBC 7.6 03/19/2012 12:00 AM    RBC 3.53 (L) 06/06/2018 09:17 PM    RBC 2.25 (LL) 03/19/2012 12:00 AM    HEMOGLOBIN 11.1 (L) 06/06/2018 09:17 PM    HEMATOCRIT 35.1 (L) 06/06/2018 09:17 PM    MCV 99.4 (H) 06/06/2018 09:17 PM     (H) 03/19/2012 12:00 AM    MCH 31.4 06/06/2018 09:17 PM    MCH 33.8 03/19/2012 12:00 AM    MCHC 31.6 (L) 06/06/2018 09:17 PM    MPV 8.6 (L) 06/06/2018 09:17 PM    NEUTSPOLYS 84.10 (H) 06/06/2018 09:17 PM    LYMPHOCYTES 6.30 (L) 06/06/2018 09:17 PM    MONOCYTES 7.70 06/06/2018 09:17 PM    EOSINOPHILS 0.80 06/06/2018 09:17 PM    BASOPHILS 0.40 06/06/2018 09:17 PM    HYPOCHROMIA 1+ 10/28/2011 05:35 AM    ANISOCYTOSIS 1+ 07/03/2017 05:15 PM      Lab Results   Component Value Date/Time    CALCIUM 9.2 06/06/2018 09:17 PM    ASTSGOT 8 (L) 06/06/2018 09:17 PM    ALTSGPT 12 06/06/2018 09:17 PM    ALKPHOSPHAT 69 06/06/2018 09:17 PM    TBILIRUBIN 0.6 06/06/2018 09:17 PM    ALBUMIN 3.8 06/06/2018 09:17 PM    ALBUMIN 1.28 (L) 10/02/2011 04:30 AM    TOTPROTEIN 6.6 06/06/2018 09:17 PM    TOTPROTEIN 4.50 (L) 10/02/2011 04:30 AM     Lab Results   Component Value Date/Time    URICACID 4.4 01/21/2016 08:36 AM    ANTINUCAB None Detected 10/02/2011 04:30 AM     Lab Results   Component Value Date/Time    C7NDZUKAPFS 90 10/02/2011 04:30 AM    H9JNAKGKZAR 21 10/02/2011 04:30 AM     Lab Results   Component Value Date/Time    AGBMAB Negative 01/18/2012 03:50 AM    GBMABA Negative 01/18/2012 03:50 AM    ANCAIGG <1:20 05/26/2018 12:05 PM    [id number] 90 10/02/2011 04:30 AM     Lab Results   Component Value Date/Time    SEDRATEWES 48 (H) 04/13/2017 03:25 AM     Lab Results   Component Value Date/Time    CPKTOTAL 18 09/27/2011 05:05 AM     Lab Results   Component Value Date/Time    FLTYPE Stool 09/22/2006 01:04 PM      Results for orders placed in visit on 05/02/13   CT-CHEST, HIGH RESOLUTION LUNG    Impression 1. Bilateral lower lobe bronchiectasis, grossly unchanged compared with the prior conventional chest CT 1/14/12.  2. Minimal bilateral lower lobe interstitial opacities as well as confluent opacities in those areas, greater on the left, suggesting active airspace disease and/or atelectasis.  3. Probable uncalcified left lower lobe nodule, an equivocal finding with high-resolution technique.  Conventional chest CT would be needed for confirmation.  Note that no similar lesion was identified on the prior chest scan 1/14/12.            INTERPRETING LOCATION: [address] [place] [address], [id number]     Results for orders placed during the hospital encounter of 07/27/17   CT-CHEST (THORAX) W/O    Impression 1.  There is no significant interval change in the diffuse bilateral subpleural interstitial lung disease with a basilar predominance.  2.  Bibasilar and medial segment right middle lobe bronchiectasis are again seen with minimal fluid now seen on the left within the dilated bronchi. This is most consistent with postinflammatory/infectious change.  3.  There is underlying emphysema/COPD with upper lobe paraseptal blebs.  4.  There is evidence of previous granulomatous disease with calcified granulomata, calcified nodes, and calcifications in the liver and spleen. This is consistent with the history of pulmonary histoplasmosis.  5.  There are no new suspicious findings.  6.  Enthesopathy again noted in the thoracic spine.         Assessment and Plan:     1. Wegener's granulomatosis (HCC)  ANCA negative, we will decrease prednisone from 11 mg by mouth daily to 10 mg by mouth daily continue to monitor ANCA every 6 months or sooner when necessary any symptoms  - methylPREDNISolone acetate (DEPO-MEDROL) injection 40 mg; 1 mL by Intra-articular route Once.  - ANTI-NEUTROPHIL CYTOPLASMIC AB W/RFLX; Future    2. Herpes zoster without  complication  Currently on acyclovir for treatment  - methylPREDNISolone acetate (DEPO-MEDROL) injection 40 mg; 1 mL by Intra-articular route Once.  - ANTI-NEUTROPHIL CYTOPLASMIC AB W/RFLX; Future    3. Chronic right shoulder pain  From rotator cuff tear probably exacerbation, today we'll do a right shoulder cortisone injection  - methylPREDNISolone acetate (DEPO-MEDROL) injection 40 mg; 1 mL by Intra-articular route Once.    4. Pneumonia of both lungs due to Pseudomonas species, unspecified part of lung (HCC)  Colonization, followed by pulmonology and on intermittent antibiotics    5. ESRD on dialysis (HCC)  May impact the type of medications we can use for this patient's arthritis. We will have to keep this under advisement.    6. Atrial fibrillation, unspecified type ([facility])  With pacemaker in place    7. Chronic obstructive pulmonary disease, unspecified COPD type ([facility])  Followed by pulmonology      Procedure: Right shoulder cortisone injection    The procedure was explained to the patient in detail, all questions were answered, verbal consent to do procedure was obtained. Risks and benefits were reviewed with patient and patient states understanding including risks of steroid injections including bleeding, infections, subcutaneous lipolysis and/or hypopigmentation at site of injection, and risk of avascular necrosis. Verbal consent was again obtained. The patient was positioned appropriately. Anatomical landmarks were identified.    Lateral aspect of right shoulder was prepped with betadine x 3, local anesthetic with ethyl chloride and 2% Xylocaine lot #[id number], Exp July 2019 and using sterile technique,  injected 20 mg of Depomedrol Lot #[id number], Exp March 2019    EBL 0  The patient tolerated the procedure well, was observed in the office and there were no complications     Patient was asked to rest right shoulder for 3 days, patient states understanding and states will comply.    Followup: Return in about 6  months (around 12/7/2018). or sooner power Andrez was seen 30 minutes face-to-face of which more than 50% of the time was spent counseling the patient (excluding time for procedures)  regarding  rheumatological condition and care. Therapy was discussed in detail.    Please note that this dictation was created using voice recognition software. I have made every reasonable attempt to correct obvious errors, but I expect that there are errors of grammar and possibly content that I did not discover before finalizing the note.

## 2018-06-07 NOTE — ED NOTES
Pt tx to red 10 in w/c ,  Mask on.  Placed in bed.  Placed in doplet isolation and no pregnant person notification.

## 2018-06-07 NOTE — ED TRIAGE NOTES
"Gilberto Brown  77 y.o.  Chief Complaint   Patient presents with   • Chest Pain     LEFT-sided radiating to LUE, reproducible, worsening x 2-3 days, rates pain 10/10   • Chronic Kidney Disease     dialysis M-W-F, normal dialysis session today per patient     EKG completed prior to triage per protocol.    Ambulatory to triage with steady gait with straight cane for above. Patient states \"I think I have a shingles thing. I've had shingles in the past and it felt exactly like this.\" States that symptoms first started Monday at dialysis and have been steadily worsening since.    Hx. Murmur, a-fib on Eliquis    Cardiologist Dr. Aura KING AV fistulsa + thrill + bruit    Charge LIZETH Navarro notified of patient.    Triage process explained to patient, apologized for wait time, and placed in [facility].  "

## 2018-06-07 NOTE — PROGRESS NOTES
Placed discharge outreach phone call to patient s/p ER discharge 6/6/18.  Left voicemail providing my contact information and instructions to call with any questions or concerns.

## 2018-06-07 NOTE — ED PROVIDER NOTES
ED Provider Note    CHIEF COMPLAINT  Chief Complaint   Patient presents with   • Chest Pain     LEFT-sided radiating to LUE, reproducible, worsening x 2-3 days, rates pain 10/10   • Chronic Kidney Disease     dialysis M-W-F, normal dialysis session today per patient       HPI  Gilberto Brown is a 77 y.o. male who presents left-sided chest pain radiating to left back and left arm.  Pain was intermittent starting on Sunday, now constant.  No associated shortness of breath.  He is developed a rash associated with the pain and states the discomfort is similar to prior episode of shingles.  He states his cardiologist told him to be checked out.  Patient is now had the symptoms for 3 days.  No fever, no shortness of breath.  He undergoes kidney dialysis, most recent treatment was today.  Patient is requesting something for his pain.      REVIEW OF SYSTEMS    Constitutional: No fever  Respiratory: No acute shortness of breath  Cardiac: No exertional chest pain, no syncope  Gastrointestinal: No acute abdominal pain  Musculoskeletal: Left arm, back and chest wall pain  Neurologic: No headache  Skin: One-sided rash, previous history of shingles  Renal: Patient on dialysis 3 times a week     All other systems are negative.       PAST MEDICAL HISTORY  Past Medical History:   Diagnosis Date   • A-fib ([facility])        • Anemia    • Arthritis     arms, legs, shoulders   • ASTHMA    • BPH (benign prostatic hyperplasia)    • Breath shortness    • Bronchitis     chronic   • Cataract     removed bilat   • COPD    • Dialysis     [facility] M-W-F,    • Dyslipidemia    • EMPHYSEMA    • GERD (gastroesophageal reflux disease)    • Hypertension    • Oxygen decrease     O2 3liters @ HS and dialysis   • Pacemaker 1988   • Pain 05/09/2018    shoulders/hips, 5/10   • Pneumonia 2017   • Pseudomonas pneumonia ([facility])    • Pulmonary histoplasmosis ([facility])    • Renal disorder     CKD,    • Sick sinus syndrome ([facility])    • Sleep apnea      uses cpap at [facility]   • Snoring    • Stroke (HCC) 02/2018   • Unspecified hemorrhagic conditions     bruises easily, fragile skin   • Wegener's disease, pulmonary (HCC)        FAMILY HISTORY  Family History   Problem Relation Age of Onset   • Stroke Father    • Heart Disease Father    • Stroke Mother    • Cancer         SOCIAL HISTORY  Social History     Social History   • Marital status:      Spouse name: N/A   • Number of children: N/A   • Years of education: N/A     Social History Main Topics   • Smoking status: Former Smoker     Years: 30.00     Types: Pipe     Quit date: 1/1/1990   • Smokeless tobacco: Former User     Types: Chew   • Alcohol use 4.8 oz/week     7 Glasses of wine, 1 Standard drinks or equivalent per week      Comment: 1/2 glass Red wine nightly   • Drug use: No   • Sexual activity: Yes     Partners: Female     Other Topics Concern   • Not on file     Social History Narrative   • No narrative on file       SURGICAL HISTORY  Past Surgical History:   Procedure Laterality Date   • AV FISTULOGRAM Right 4/12/2016    Procedure: AV FISTULOGRAM , VENOPLASTY X 2;  Surgeon: Nate Syed M.D.;  Location: [facility];  Service:    • RECOVERY  9/3/2015    Procedure: IR1 VASCULAR CASE-SYED RIGHT DIALYSIS FISTULOGRAM, POSSIBLE INTERVENTION;  Surgeon: Mayi Surgery;  Location: SURGERY PRE-POST PROC UNIT [facility];  Service:    • RECOVERY  2/26/2015    Performed by Ir[facility]Rec[facility] Surgery at [facility]ERY [facility]   • RECOVERY  10/3/2014    Performed by [facility] at [facility]   • RECOVERY  8/7/2014    Performed by [facility] Surgery at [facility]   • RECOVERY  12/17/2013    Performed by [facility] Surgery at [facility]ERY [facility] DAY [facility]   • BRONCHOSCOPY-ENDO  7/18/2013    Performed by Juan F Rubio M.D. at [facility]   • RECOVERY  7/17/2013    Performed by [facility] at [facility]   • AV FISTULA  REVISION  6/9/2012    Performed by NESSA JOHANSEN at SURGERY [facility]   • RECOVERY  4/19/2012    Performed by SURGERY, IR-RECOVERY at SURGERY SAME DAY [facility]S   • AV FISTULA CREATION  3/8/2012    Performed by NESSA JOHANSEN at SURGERY [facility]   • GASTROSCOPY WITH BIOPSY  1/17/2012    Performed by ZURDO HARRISON at ENDOSCOPY [facility] ORS   • CATH PLACEMENT  10/8/2011    Performed by NESSA JOHANSEN at SURGERY [facility]S   • GASTROSCOPY WITH BALLOON DILATATION  9/28/2011    Performed by KT FERNANDEZ at SURGERY [facility]   • PACEMAKER INSERTION  4/2009   • ROTATOR CUFF REPAIR  2003    right   • HERNIA REPAIR  1990    right inguinal hernia   • HIP REPLACEMENT, TOTAL      right   • TONSILLECTOMY         CURRENT MEDICATIONS  Home Medications     Reviewed by Tomas Dillard R.N. (Registered Nurse) on 06/06/18 at 2134  Med List Status: Partial   Medication Last Dose Status   apixaban (ELIQUIS) 2.5mg Tab 5/29/2018 Active   aspirin EC (ECOTRIN) 81 MG TBEC 5/29/2018 Active   B Complex-C-Folic Acid (DIALYVITE TABLET) Tab 5/29/2018 Active   epoetin lucina (EPOGEN,PROCRIT) 3000 UNIT/ML SOLN 5/29/2018 Active   finasteride (PROSCAR) 5 MG Tab 5/29/2018 Active   Fluticasone Furoate-Vilanterol (BREO ELLIPTA) 200-25 MCG/INH AEROSOL POWDER, BREATH ACTIVATED 5/29/2018 Active   ipratropium-albuterol (DUONEB) 0.5-2.5 (3) MG/3ML nebulizer solution 5/29/2018 Active   metoprolol (LOPRESSOR) 25 MG Tab 5/29/2018 Active   predniSONE (DELTASONE) 1 MG Tab 5/29/2018 Active   predniSONE (DELTASONE) 10 MG Tab 5/29/2018 Active   predniSONE (DELTASONE) 10 MG Tab  Active   sevelamer (RENAGEL) 800 MG TABS 5/29/2018 Active   simvastatin (ZOCOR) 40 MG TABS 5/29/2018 Active   sodium chloride (HYPER-SAL) 7 % Nebu Soln 5/29/2018 Active   tamsulosin (FLOMAX) 0.4 MG capsule 5/29/2018 Active   vitamin D (CHOLECALCIFEROL) 1000 UNIT Tab 5/29/2018 Active                ALLERGIES  Allergies   Allergen Reactions   •  "Erythromycin Unspecified     Abdominal pain.  RXN=before 2011   • Rituximab      Flu like syndrome       PHYSICAL EXAM  VITAL SIGNS: /43   Pulse 98   Temp 37 °C (98.6 °F)   Resp (!) 21   Ht 1.727 m (5' 8\")   Wt 63.5 kg (140 lb)   SpO2 97%   BMI 21.29 kg/m²   Constitutional:  Non-toxic appearance.   HENT: No facial swelling  Eyes: Anicteric, no conjunctivitis.     Cardiovascular: Normal heart rate, irregular rhythm  Pulmonary:  No wheezing, No rales.   Gastrointestinal: Soft, No tenderness, No distention  Skin: Warm, Dry, No cyanosis.  Patchy erythematous raised rash which is tender starting at the midline upper thoracic region extending laterally and around the chest wall to the front.  2 small similar dots of rash to the posterior left arm.  Neurologic: Speech is clear, follows commands, facial expression is symmetrical.  And sensation intact both arms  Psychiatric: Affect normal,  Mood normal.   Musculoskeletal: Chest wall pain to palpation in the area of rash    EKG/Labs  Results for orders placed or performed during the hospital encounter of 06/06/18   CBC WITH DIFFERENTIAL   Result Value Ref Range    WBC 9.9 4.8 - 10.8 K/uL    RBC 3.53 (L) 4.70 - 6.10 M/uL    Hemoglobin 11.1 (L) 14.0 - 18.0 g/dL    Hematocrit 35.1 (L) 42.0 - 52.0 %    MCV 99.4 (H) 81.4 - 97.8 fL    MCH 31.4 27.0 - 33.0 pg    MCHC 31.6 (L) 33.7 - 35.3 g/dL    RDW 58.4 (H) 35.9 - 50.0 fL    Platelet Count 214 164 - 446 K/uL    MPV 8.6 (L) 9.0 - 12.9 fL    Neutrophils-Polys 84.10 (H) 44.00 - 72.00 %    Lymphocytes 6.30 (L) 22.00 - 41.00 %    Monocytes 7.70 0.00 - 13.40 %    Eosinophils 0.80 0.00 - 6.90 %    Basophils 0.40 0.00 - 1.80 %    Immature Granulocytes 0.70 0.00 - 0.90 %    Nucleated RBC 0.00 /100 WBC    Neutrophils (Absolute) 8.30 (H) 1.82 - 7.42 K/uL    Lymphs (Absolute) 0.62 (L) 1.00 - 4.80 K/uL    Monos (Absolute) 0.76 0.00 - 0.85 K/uL    Eos (Absolute) 0.08 0.00 - 0.51 K/uL    Baso (Absolute) 0.04 0.00 - 0.12 K/uL    " Immature Granulocytes (abs) 0.07 0.00 - 0.11 K/uL    NRBC (Absolute) 0.00 K/uL   COMP METABOLIC PANEL   Result Value Ref Range    Sodium 139 135 - 145 mmol/L    Potassium 3.3 (L) 3.6 - 5.5 mmol/L    Chloride 96 96 - 112 mmol/L    Co2 31 20 - 33 mmol/L    Anion Gap 12.0 (H) 0.0 - 11.9    Glucose 89 65 - 99 mg/dL    Bun 24 (H) 8 - 22 mg/dL    Creatinine 2.14 (H) 0.50 - 1.40 mg/dL    Calcium 9.2 8.5 - 10.5 mg/dL    AST(SGOT) 8 (L) 12 - 45 U/L    ALT(SGPT) 12 2 - 50 U/L    Alkaline Phosphatase 69 30 - 99 U/L    Total Bilirubin 0.6 0.1 - 1.5 mg/dL    Albumin 3.8 3.2 - 4.9 g/dL    Total Protein 6.6 6.0 - 8.2 g/dL    Globulin 2.8 1.9 - 3.5 g/dL    A-G Ratio 1.4 g/dL   TROPONIN   Result Value Ref Range    Troponin I 0.07 (H) 0.00 - 0.04 ng/mL   ESTIMATED GFR   Result Value Ref Range    GFR If  36 (A) >60 mL/min/1.73 m 2    GFR If Non African American 30 (A) >60 mL/min/1.73 m 2   BLOOD CULTURE,HOLD   Result Value Ref Range    Blood Culture Hold Collected    EKG (NOW)   Result Value Ref Range    Report       [facility] Emergency Dept.    Test Date:  2018  Pt Name:    ALETA BUCK                  Department: ER  MRN:        [id number]                      Room:  Gender:     Male                         Technician: [id number]  :        [id number]                   Requested By:ER TRIAGE PROTOCOL  Order #:    [id number]                    Reading MD: DARIUSZ MCKEON MD    Measurements  Intervals                                Axis  Rate:       92                           P:          60  FL:         140                          QRS:        -16  QRSD:       126                          T:          38  QT:         388  QTc:        481    Interpretive Statements  SINUS RHYTHM  ATRIAL PREMATURE COMPLEX  RIGHT BUNDLE BRANCH BLOCK  Compared to ECG 2018 01:28:51  Atrial premature complex(es) now present    Electronically Signed On 2018 21:29:32 PDT by DARIUSZ MCKEON MD            RADIOLOGY/PROCEDURES  DX-CHEST-PORTABLE (1 VIEW)   Final Result      No acute cardiopulmonary abnormality.            COURSE & MEDICAL DECISION MAKING  Pertinent Labs & Imaging studies reviewed. (See chart for details)  Patient has negative troponin, although slightly elevated at 0.07, this appears to be the patient's baseline compared to previous values.  EKG negative for acute ischemic change.  Patient's distribution of pain appears to be across 2 dermatomes extending from the back to the chest and down the left arm.  Plan is for Valtrex, also tramadol for pain.  Patient was consented for pain medication.  I discussed dosing of both medications with clinical pharmacist, he will be given Valtrex 500 mg once a day for renal dosing for the next 10 days.  Patient prescribed tramadol for pain to be taken 1-2 tablets every 12 hours as needed for pain.  He stated he would return if worse and is planning on following up with primary doctor for recheck    FINAL IMPRESSION     1. Chest wall pain    2. Herpes zoster without complication    3. Chronic renal failure, unspecified CKD stage                    Electronically signed by: Vinh Miller, 6/6/2018 9:40 PM

## 2018-06-22 DIAGNOSIS — J47.9 BRONCHIECTASIS WITHOUT COMPLICATION (HCC): ICD-10-CM

## 2018-06-22 RX ORDER — SODIUM CHLORIDE FOR INHALATION 7 %
VIAL, NEBULIZER (ML) INHALATION
Qty: 240 ML | Refills: 4 | Status: SHIPPED | OUTPATIENT
Start: 2018-06-22 | End: 2018-11-21 | Stop reason: SDUPTHER

## 2018-06-22 NOTE — TELEPHONE ENCOUNTER
Have we ever prescribed this med? Yes.  If yes, what date? 03/26/2018    Last OV: 11/28/2017 - Dr. Vaughan     Next OV: 11/27/2018 - Maty Sprague    DX: Bronchiectasis    Medications: Sodium Chloride

## 2018-07-12 ENCOUNTER — APPOINTMENT (RX ONLY)
Dept: URBAN - METROPOLITAN AREA CLINIC 36 | Facility: CLINIC | Age: 78
Setting detail: DERMATOLOGY
End: 2018-07-12

## 2018-07-12 DIAGNOSIS — Z48.817 ENCOUNTER FOR SURGICAL AFTERCARE FOLLOWING SURGERY ON THE SKIN AND SUBCUTANEOUS TISSUE: ICD-10-CM

## 2018-07-12 PROCEDURE — ? DRESSING CHANGE (GLOBAL PERIOD)

## 2018-07-12 PROCEDURE — 99024 POSTOP FOLLOW-UP VISIT: CPT

## 2018-07-12 ASSESSMENT — LOCATION SIMPLE DESCRIPTION DERM: LOCATION SIMPLE: NECK

## 2018-07-12 ASSESSMENT — LOCATION ZONE DERM: LOCATION ZONE: NECK

## 2018-07-12 ASSESSMENT — LOCATION DETAILED DESCRIPTION DERM: LOCATION DETAILED: LEFT SUPERIOR LATERAL NECK

## 2018-07-12 NOTE — PROCEDURE: DRESSING CHANGE (GLOBAL PERIOD)
Add 65872 Cpt? (Important Note: In 2017 The Use Of 23136 Is Being Tracked By Cms To Determine Future Global Period Reimbursement For Global Periods): yes
Detail Level: Detailed

## 2018-07-26 ENCOUNTER — APPOINTMENT (RX ONLY)
Dept: URBAN - METROPOLITAN AREA CLINIC 36 | Facility: CLINIC | Age: 78
Setting detail: DERMATOLOGY
End: 2018-07-26

## 2018-07-26 DIAGNOSIS — D485 NEOPLASM OF UNCERTAIN BEHAVIOR OF SKIN: ICD-10-CM

## 2018-07-26 PROBLEM — Z85.828 PERSONAL HISTORY OF OTHER MALIGNANT NEOPLASM OF SKIN: Status: ACTIVE | Noted: 2018-07-26

## 2018-07-26 PROBLEM — C44.41 BASAL CELL CARCINOMA OF SKIN OF SCALP AND NECK: Status: ACTIVE | Noted: 2018-07-26

## 2018-07-26 PROBLEM — D48.5 NEOPLASM OF UNCERTAIN BEHAVIOR OF SKIN: Status: ACTIVE | Noted: 2018-07-26

## 2018-07-26 PROCEDURE — 11100: CPT | Mod: 59

## 2018-07-26 PROCEDURE — ? EXCISION

## 2018-07-26 PROCEDURE — 88331 PATH CONSLTJ SURG 1 BLK 1SPC: CPT

## 2018-07-26 PROCEDURE — ? BIOPSY BY SHAVE METHOD

## 2018-07-26 PROCEDURE — ? BIOPSY BY SHAVE METHOD WITH FROZEN SECTION

## 2018-07-26 PROCEDURE — 11622 EXC S/N/H/F/G MAL+MRG 1.1-2: CPT

## 2018-07-26 PROCEDURE — 11101: CPT

## 2018-07-26 PROCEDURE — 13121 CMPLX RPR S/A/L 2.6-7.5 CM: CPT

## 2018-07-26 ASSESSMENT — LOCATION SIMPLE DESCRIPTION DERM
LOCATION SIMPLE: SCALP
LOCATION SIMPLE: RIGHT TEMPLE

## 2018-07-26 ASSESSMENT — LOCATION ZONE DERM
LOCATION ZONE: SCALP
LOCATION ZONE: FACE

## 2018-07-26 ASSESSMENT — LOCATION DETAILED DESCRIPTION DERM
LOCATION DETAILED: RIGHT MID TEMPLE
LOCATION DETAILED: LEFT INFERIOR POSTAURICULAR SKIN

## 2018-07-26 NOTE — PROCEDURE: BIOPSY BY SHAVE METHOD WITH FROZEN SECTION
Gross Description: 1 cm x 0.3 mm shave specimen
Comment: Confirms BCC Dx
Hemostasis: Drysol
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 Retraction artifact and palisading are noted.
Frozen Path Diagnosis: BCC
Accession #: [id number]
Consent: Written consent was obtained and risks were reviewed including but not limited to scarring, infection, bleeding, scabbing, incomplete removal, nerve damage and allergy to anesthesia.
Biopsy Method: Dermablade
Billing Type: Third-Party Bill
Additional Anesthesia Volume In Cc (Will Not Render If 0): 0
Anesthesia Volume In Cc: 0.5
Bill For Surgical Tray: no
Anesthesia Type: 1% lidocaine with epinephrine
Number Of Blocks: 1
Depth Of Biopsy: dermis
Detail Level: Detailed
Post-Care Instructions: I reviewed with the patient in detail post-care instructions. Patient is to keep the biopsy site dry overnight, and then apply bacitracin twice daily until healed. Patient may apply hydrogen peroxide soaks to remove any crusting.
Notification Instructions: Patient will be notified of biopsy results. However, patient instructed to call the office if not contacted within 2 weeks.
Biopsy Type: Frozen Section
Wound Care: Petrolatum
Processing Note: The specimen was frozen in the cryostat, sectioned and stained.

## 2018-07-26 NOTE — PROCEDURE: EXCISION
Pre-Excision Curettage Text (Leave Blank If You Do Not Want): Prior to drawing the surgical margin the visible lesion was removed with electrodesiccation and curettage to clearly define the lesion size.
V-Y Plasty Text: The defect edges were debeveled with a #15 scalpel blade.  Given the location of the defect, shape of the defect and the proximity to free margins an V-Y advancement flap was deemed most appropriate.  Using a sterile surgical marker, an appropriate advancement flap was drawn incorporating the defect and placing the expected incisions within the relaxed skin tension lines where possible.    The area thus outlined was incised deep to adipose tissue with a #15 scalpel blade.  The skin margins were undermined to an appropriate distance in all directions utilizing iris scissors.
Size Of Lesion In Cm: 0.8
Primary Defect Width (In Cm): 1.4
Complex Repair And Transposition Flap Text: The defect edges were debeveled with a #15 scalpel blade.  The primary defect was closed partially with a complex linear closure.  Given the location of the remaining defect, shape of the defect and the proximity to free margins a transposition flap was deemed most appropriate for complete closure of the defect.  Using a sterile surgical marker, an appropriate advancement flap was drawn incorporating the defect and placing the expected incisions within the relaxed skin tension lines where possible.    The area thus outlined was incised deep to adipose tissue with a #15 scalpel blade.  The skin margins were undermined to an appropriate distance in all directions utilizing iris scissors.
Complex Repair And Double Advancement Flap Text: The defect edges were debeveled with a #15 scalpel blade.  The primary defect was closed partially with a complex linear closure.  Given the location of the remaining defect, shape of the defect and the proximity to free margins a double advancement flap was deemed most appropriate for complete closure of the defect.  Using a sterile surgical marker, an appropriate advancement flap was drawn incorporating the defect and placing the expected incisions within the relaxed skin tension lines where possible.    The area thus outlined was incised deep to adipose tissue with a #15 scalpel blade.  The skin margins were undermined to an appropriate distance in all directions utilizing iris scissors.
Elliptical Excision Additional Text (Leave Blank If You Do Not Want): The margin was drawn around the clinically apparent lesion.  An elliptical shape was then drawn on the skin incorporating the lesion and margins.  Incisions were then made along these lines to the appropriate tissue plane and the lesion was extirpated.
Primary Defect Length (In Cm): 3.7
Complex Repair And Dermal Autograft Text: The defect edges were debeveled with a #15 scalpel blade.  The primary defect was closed partially with a complex linear closure.  Given the location of the defect, shape of the defect and the proximity to free margins an dermal autograft was deemed most appropriate to repair the remaining defect.  The graft was trimmed to fit the size of the remaining defect.  The graft was then placed in the primary defect, oriented appropriately, and sutured into place.
Lip Wedge Excision Repair Text: Given the location of the defect and the proximity to free margins a full thickness wedge repair was deemed most appropriate.  Using a sterile surgical marker, the appropriate repair was drawn incorporating the defect and placing the expected incisions perpendicular to the vermilion border.  The vermilion border was also meticulously outlined to ensure appropriate reapproximation during the repair.  The area thus outlined was incised through and through with a #15 scalpel blade.  The muscularis and dermis were reaproximated with deep sutures following hemostasis. Care was taken to realign the vermilion border before proceeding with the superficial closure.  Once the vermilion was realigned the superfical and mucosal closure was finished.
Banner Transposition Flap Text: The defect edges were debeveled with a #15 scalpel blade.  Given the location of the defect and the proximity to free margins a Banner transposition flap was deemed most appropriate.  Using a sterile surgical marker, an appropriate flap drawn around the defect. The area thus outlined was incised deep to adipose tissue with a #15 scalpel blade.  The skin margins were undermined to an appropriate distance in all directions utilizing iris scissors.
Dermal Autograft Text: The defect edges were debeveled with a #15 scalpel blade.  Given the location of the defect, shape of the defect and the proximity to free margins a dermal autograft was deemed most appropriate.  Using a sterile surgical marker, the primary defect shape was transferred to the donor site. The area thus outlined was incised deep to adipose tissue with a #15 scalpel blade.  The harvested graft was then trimmed of adipose and epidermal tissue until only dermis was left.  The skin graft was then placed in the primary defect and oriented appropriately.
Complex Repair And Dorsal Nasal Flap Text: The defect edges were debeveled with a #15 scalpel blade.  The primary defect was closed partially with a complex linear closure.  Given the location of the remaining defect, shape of the defect and the proximity to free margins a dorsal nasal flap was deemed most appropriate for complete closure of the defect.  Using a sterile surgical marker, an appropriate flap was drawn incorporating the defect and placing the expected incisions within the relaxed skin tension lines where possible.    The area thus outlined was incised deep to adipose tissue with a #15 scalpel blade.  The skin margins were undermined to an appropriate distance in all directions utilizing iris scissors.
Posterior Auricular Interpolation Flap Text: A decision was made to reconstruct the defect utilizing an interpolation axial flap and a staged reconstruction.  A telfa template was made of the defect.  This telfa template was then used to outline the posterior auricular interpolation flap.  The donor area for the pedicle flap was then injected with anesthesia.  The flap was excised through the skin and subcutaneous tissue down to the layer of the underlying musculature.  The pedicle flap was carefully excised within this deep plane to maintain its blood supply.  The edges of the donor site were undermined.   The donor site was closed in a primary fashion.  The pedicle was then rotated into position and sutured.  Once the tube was sutured into place, adequate blood supply was confirmed with blanching and refill.  The pedicle was then wrapped with xeroform gauze and dressed appropriately with a telfa and gauze bandage to ensure continued blood supply and protect the attached pedicle.
Show Curettage Variables: Yes
Modified Advancement Flap Text: The defect edges were debeveled with a #15 scalpel blade.  Given the location of the defect, shape of the defect and the proximity to free margins a modified advancement flap was deemed most appropriate.  Using a sterile surgical marker, an appropriate advancement flap was drawn incorporating the defect and placing the expected incisions within the relaxed skin tension lines where possible.    The area thus outlined was incised deep to adipose tissue with a #15 scalpel blade.  The skin margins were undermined to an appropriate distance in all directions utilizing iris scissors.
Partial Purse String (Intermediate) Text: Given the location of the defect and the characteristics of the surrounding skin an intermediate purse string closure was deemed most appropriate.  Undermining was performed circumferentially around the surgical defect.  A purse string suture was then placed and tightened. Wound tension of the circular defect prevented complete closure of the wound.
Hatchet Flap Text: The defect edges were debeveled with a #15 scalpel blade.  Given the location of the defect, shape of the defect and the proximity to free margins a hatchet flap was deemed most appropriate.  Using a sterile surgical marker, an appropriate hatchet flap was drawn incorporating the defect and placing the expected incisions within the relaxed skin tension lines where possible.    The area thus outlined was incised deep to adipose tissue with a #15 scalpel blade.  The skin margins were undermined to an appropriate distance in all directions utilizing iris scissors.
Mastoid Interpolation Flap Text: A decision was made to reconstruct the defect utilizing an interpolation axial flap and a staged reconstruction.  A telfa template was made of the defect.  This telfa template was then used to outline the mastoid interpolation flap.  The donor area for the pedicle flap was then injected with anesthesia.  The flap was excised through the skin and subcutaneous tissue down to the layer of the underlying musculature.  The pedicle flap was carefully excised within this deep plane to maintain its blood supply.  The edges of the donor site were undermined.   The donor site was closed in a primary fashion.  The pedicle was then rotated into position and sutured.  Once the tube was sutured into place, adequate blood supply was confirmed with blanching and refill.  The pedicle was then wrapped with xeroform gauze and dressed appropriately with a telfa and gauze bandage to ensure continued blood supply and protect the attached pedicle.
Advancement Flap (Double) Text: The defect edges were debeveled with a #15 scalpel blade.  Given the location of the defect and the proximity to free margins a double advancement flap was deemed most appropriate.  Using a sterile surgical marker, the appropriate advancement flaps were drawn incorporating the defect and placing the expected incisions within the relaxed skin tension lines where possible.    The area thus outlined was incised deep to adipose tissue with a #15 scalpel blade.  The skin margins were undermined to an appropriate distance in all directions utilizing iris scissors.
Render The Repair Note As A Separate Paragraph: No
Estimated Blood Loss (Cc): minimal
Purse String (Simple) Text: Given the location of the defect and the characteristics of the surrounding skin a purse string simple closure was deemed most appropriate.  Undermining was performed circumferentially around the surgical defect.  A purse string suture was then placed and tightened.
Fusiform Excision Additional Text (Leave Blank If You Do Not Want): The margin was drawn around the clinically apparent lesion.  A fusiform shape was then drawn on the skin incorporating the lesion and margins.  Incisions were then made along these lines to the appropriate tissue plane and the lesion was extirpated.
Island Pedicle Flap Text: The defect edges were debeveled with a #15 scalpel blade.  Given the location of the defect, shape of the defect and the proximity to free margins an island pedicle advancement flap was deemed most appropriate.  Using a sterile surgical marker, an appropriate advancement flap was drawn incorporating the defect, outlining the appropriate donor tissue and placing the expected incisions within the relaxed skin tension lines where possible.    The area thus outlined was incised deep to adipose tissue with a #15 scalpel blade.  The skin margins were undermined to an appropriate distance in all directions around the primary defect and laterally outward around the island pedicle utilizing iris scissors.  There was minimal undermining beneath the pedicle flap.
Slit Excision Additional Text (Leave Blank If You Do Not Want): A linear line was drawn on the skin overlying the lesion. An incision was made slowly until the lesion was visualized.  Once visualized, the lesion was removed with blunt dissection.
Complex Repair And V-Y Plasty Text: The defect edges were debeveled with a #15 scalpel blade.  The primary defect was closed partially with a complex linear closure.  Given the location of the remaining defect, shape of the defect and the proximity to free margins a V-Y plasty was deemed most appropriate for complete closure of the defect.  Using a sterile surgical marker, an appropriate advancement flap was drawn incorporating the defect and placing the expected incisions within the relaxed skin tension lines where possible.    The area thus outlined was incised deep to adipose tissue with a #15 scalpel blade.  The skin margins were undermined to an appropriate distance in all directions utilizing iris scissors.
Island Pedicle Flap With Canthal Suspension Text: The defect edges were debeveled with a #15 scalpel blade.  Given the location of the defect, shape of the defect and the proximity to free margins an island pedicle advancement flap was deemed most appropriate.  Using a sterile surgical marker, an appropriate advancement flap was drawn incorporating the defect, outlining the appropriate donor tissue and placing the expected incisions within the relaxed skin tension lines where possible. The area thus outlined was incised deep to adipose tissue with a #15 scalpel blade.  The skin margins were undermined to an appropriate distance in all directions around the primary defect and laterally outward around the island pedicle utilizing iris scissors.  There was minimal undermining beneath the pedicle flap. A suspension suture was placed in the canthal tendon to prevent tension and prevent ectropion.
Complex Repair And Epidermal Autograft Text: The defect edges were debeveled with a #15 scalpel blade.  The primary defect was closed partially with a complex linear closure.  Given the location of the defect, shape of the defect and the proximity to free margins an epidermal autograft was deemed most appropriate to repair the remaining defect.  The graft was trimmed to fit the size of the remaining defect.  The graft was then placed in the primary defect, oriented appropriately, and sutured into place.
Suture Removal: 7 days
Bi-Rhombic Flap Text: The defect edges were debeveled with a #15 scalpel blade.  Given the location of the defect and the proximity to free margins a bi-rhombic flap was deemed most appropriate.  Using a sterile surgical marker, an appropriate rhombic flap was drawn incorporating the defect. The area thus outlined was incised deep to adipose tissue with a #15 scalpel blade.  The skin margins were undermined to an appropriate distance in all directions utilizing iris scissors.
Epidermal Sutures: 4-0 Chromic Gut
Detail Level: Detailed
Anesthesia Volume In Cc: 7
Post-Care Instructions: I reviewed with the patient in detail post-care instructions. Patient is not to engage in any heavy lifting, exercise, or swimming for the next 14 days. Should the patient develop any fevers, chills, bleeding, severe pain patient will contact the office immediately.
Cheek Interpolation Flap Text: A decision was made to reconstruct the defect utilizing an interpolation axial flap and a staged reconstruction.  A telfa template was made of the defect.  This telfa template was then used to outline the Cheek Interpolation flap.  The donor area for the pedicle flap was then injected with anesthesia.  The flap was excised through the skin and subcutaneous tissue down to the layer of the underlying musculature.  The interpolation flap was carefully excised within this deep plane to maintain its blood supply.  The edges of the donor site were undermined.   The donor site was closed in a primary fashion.  The pedicle was then rotated into position and sutured.  Once the tube was sutured into place, adequate blood supply was confirmed with blanching and refill.  The pedicle was then wrapped with xeroform gauze and dressed appropriately with a telfa and gauze bandage to ensure continued blood supply and protect the attached pedicle.
Complex Repair And M Plasty Text: The defect edges were debeveled with a #15 scalpel blade.  The primary defect was closed partially with a complex linear closure.  Given the location of the remaining defect, shape of the defect and the proximity to free margins an M plasty was deemed most appropriate for complete closure of the defect.  Using a sterile surgical marker, an appropriate advancement flap was drawn incorporating the defect and placing the expected incisions within the relaxed skin tension lines where possible.    The area thus outlined was incised deep to adipose tissue with a #15 scalpel blade.  The skin margins were undermined to an appropriate distance in all directions utilizing iris scissors.
Melolabial Transposition Flap Text: The defect edges were debeveled with a #15 scalpel blade.  Given the location of the defect and the proximity to free margins a melolabial flap was deemed most appropriate.  Using a sterile surgical marker, an appropriate melolabial transposition flap was drawn incorporating the defect.    The area thus outlined was incised deep to adipose tissue with a #15 scalpel blade.  The skin margins were undermined to an appropriate distance in all directions utilizing iris scissors.
Composite Graft Text: The defect edges were debeveled with a #15 scalpel blade.  Given the location of the defect, shape of the defect, the proximity to free margins and the fact the defect was full thickness a composite graft was deemed most appropriate.  The defect was outline and then transferred to the donor site.  A full thickness graft was then excised from the donor site. The graft was then placed in the primary defect, oriented appropriately and then sutured into place.  The secondary defect was then repaired using a primary closure.
Lazy S Complex Repair Preamble Text (Leave Blank If You Do Not Want): Extensive wide undermining was performed.
Complex Repair And Ftsg Text: The defect edges were debeveled with a #15 scalpel blade.  The primary defect was closed partially with a complex linear closure.  Given the location of the defect, shape of the defect and the proximity to free margins a full thickness skin graft was deemed most appropriate to repair the remaining defect.  The graft was trimmed to fit the size of the remaining defect.  The graft was then placed in the primary defect, oriented appropriately, and sutured into place.
Excision Depth: adipose tissue
Interpolation Flap Text: A decision was made to reconstruct the defect utilizing an interpolation axial flap and a staged reconstruction.  A telfa template was made of the defect.  This telfa template was then used to outline the interpolation flap.  The donor area for the pedicle flap was then injected with anesthesia.  The flap was excised through the skin and subcutaneous tissue down to the layer of the underlying musculature.  The interpolation flap was carefully excised within this deep plane to maintain its blood supply.  The edges of the donor site were undermined.   The donor site was closed in a primary fashion.  The pedicle was then rotated into position and sutured.  Once the tube was sutured into place, adequate blood supply was confirmed with blanching and refill.  The pedicle was then wrapped with xeroform gauze and dressed appropriately with a telfa and gauze bandage to ensure continued blood supply and protect the attached pedicle.
Tissue Cultured Epidermal Autograft Text: The defect edges were debeveled with a #15 scalpel blade.  Given the location of the defect, shape of the defect and the proximity to free margins a tissue cultured epidermal autograft was deemed most appropriate.  The graft was then trimmed to fit the size of the defect.  The graft was then placed in the primary defect and oriented appropriately.
V-Y Flap Text: The defect edges were debeveled with a #15 scalpel blade.  Given the location of the defect, shape of the defect and the proximity to free margins a V-Y flap was deemed most appropriate.  Using a sterile surgical marker, an appropriate advancement flap was drawn incorporating the defect and placing the expected incisions within the relaxed skin tension lines where possible.    The area thus outlined was incised deep to adipose tissue with a #15 scalpel blade.  The skin margins were undermined to an appropriate distance in all directions utilizing iris scissors.
Epidermal Closure: running cuticular
Crescentic Intermediate Repair Preamble Text (Leave Blank If You Do Not Want): Undermining was performed with blunt dissection.
Deep Sutures: 4-0 Vicryl
Paramedian Forehead Flap Text: A decision was made to reconstruct the defect utilizing an interpolation axial flap and a staged reconstruction.  A telfa template was made of the defect.  This telfa template was then used to outline the paramedian forehead pedicle flap.  The donor area for the pedicle flap was then injected with anesthesia.  The flap was excised through the skin and subcutaneous tissue down to the layer of the underlying musculature.  The pedicle flap was carefully excised within this deep plane to maintain its blood supply.  The edges of the donor site were undermined.   The donor site was closed in a primary fashion.  The pedicle was then rotated into position and sutured.  Once the tube was sutured into place, adequate blood supply was confirmed with blanching and refill.  The pedicle was then wrapped with xeroform gauze and dressed appropriately with a telfa and gauze bandage to ensure continued blood supply and protect the attached pedicle.
Path Notes (To The Dermatopathologist): Please check margins.
Curvilinear Excision Additional Text (Leave Blank If You Do Not Want): The margin was drawn around the clinically apparent lesion.  A curvilinear shape was then drawn on the skin incorporating the lesion and margins.  Incisions were then made along these lines to the appropriate tissue plane and the lesion was extirpated.
Bilobed Flap Text: The defect edges were debeveled with a #15 scalpel blade.  Given the location of the defect and the proximity to free margins a bilobe flap was deemed most appropriate.  Using a sterile surgical marker, an appropriate bilobe flap drawn around the defect.    The area thus outlined was incised deep to adipose tissue with a #15 scalpel blade.  The skin margins were undermined to an appropriate distance in all directions utilizing iris scissors.
Wound Care: Petrolatum
Complex Repair And Skin Substitute Graft Text: The defect edges were debeveled with a #15 scalpel blade.  The primary defect was closed partially with a complex linear closure.  Given the location of the remaining defect, shape of the defect and the proximity to free margins a skin substitute graft was deemed most appropriate to repair the remaining defect.  The graft was trimmed to fit the size of the remaining defect.  The graft was then placed in the primary defect, oriented appropriately, and sutured into place.
Partial Purse String (Simple) Text: Given the location of the defect and the characteristics of the surrounding skin a simple purse string closure was deemed most appropriate.  Undermining was performed circumferentially around the surgical defect.  A purse string suture was then placed and tightened. Wound tension of the circular defect prevented complete closure of the wound.
Burow's Advancement Flap Text: The defect edges were debeveled with a #15 scalpel blade.  Given the location of the defect and the proximity to free margins a Burow's advancement flap was deemed most appropriate.  Using a sterile surgical marker, the appropriate advancement flap was drawn incorporating the defect and placing the expected incisions within the relaxed skin tension lines where possible.    The area thus outlined was incised deep to adipose tissue with a #15 scalpel blade.  The skin margins were undermined to an appropriate distance in all directions utilizing iris scissors.
Second Skin Substitute Units (Will Override Primary Defect Units If Greater Than 0): 0
Complex Repair And Single Advancement Flap Text: The defect edges were debeveled with a #15 scalpel blade.  The primary defect was closed partially with a complex linear closure.  Given the location of the remaining defect, shape of the defect and the proximity to free margins a single advancement flap was deemed most appropriate for complete closure of the defect.  Using a sterile surgical marker, an appropriate advancement flap was drawn incorporating the defect and placing the expected incisions within the relaxed skin tension lines where possible.    The area thus outlined was incised deep to adipose tissue with a #15 scalpel blade.  The skin margins were undermined to an appropriate distance in all directions utilizing iris scissors.
Advancement Flap (Single) Text: The defect edges were debeveled with a #15 scalpel blade.  Given the location of the defect and the proximity to free margins a single advancement flap was deemed most appropriate.  Using a sterile surgical marker, an appropriate advancement flap was drawn incorporating the defect and placing the expected incisions within the relaxed skin tension lines where possible.    The area thus outlined was incised deep to adipose tissue with a #15 scalpel blade.  The skin margins were undermined to an appropriate distance in all directions utilizing iris scissors.
Melolabial Interpolation Flap Text: A decision was made to reconstruct the defect utilizing an interpolation axial flap and a staged reconstruction.  A telfa template was made of the defect.  This telfa template was then used to outline the melolabial interpolation flap.  The donor area for the pedicle flap was then injected with anesthesia.  The flap was excised through the skin and subcutaneous tissue down to the layer of the underlying musculature.  The pedicle flap was carefully excised within this deep plane to maintain its blood supply.  The edges of the donor site were undermined.   The donor site was closed in a primary fashion.  The pedicle was then rotated into position and sutured.  Once the tube was sutured into place, adequate blood supply was confirmed with blanching and refill.  The pedicle was then wrapped with xeroform gauze and dressed appropriately with a telfa and gauze bandage to ensure continued blood supply and protect the attached pedicle.
Mucosal Advancement Flap Text: Given the location of the defect, shape of the defect and the proximity to free margins a mucosal advancement flap was deemed most appropriate. Incisions were made with a 15 blade scalpel in the appropriate fashion along the cutaneous vermilion border and the mucosal lip. The remaining actinically damaged mucosal tissue was excised.  The mucosal advancement flap was then elevated to the gingival sulcus with care taken to preserve the neurovascular structures and advanced into the primary defect. Care was taken to ensure that precise realignment of the vermilion border was achieved.
S Plasty Text: Given the location and shape of the defect, and the orientation of relaxed skin tension lines, an S-plasty was deemed most appropriate for repair.  Using a sterile surgical marker, the appropriate outline of the S-plasty was drawn, incorporating the defect and placing the expected incisions within the relaxed skin tension lines where possible.  The area thus outlined was incised deep to adipose tissue with a #15 scalpel blade.  The skin margins were undermined to an appropriate distance in all directions utilizing iris scissors. The skin flaps were advanced over the defect.  The opposing margins were then approximated with interrupted buried subcutaneous sutures.
Complex Repair And Rotation Flap Text: The defect edges were debeveled with a #15 scalpel blade.  The primary defect was closed partially with a complex linear closure.  Given the location of the remaining defect, shape of the defect and the proximity to free margins a rotation flap was deemed most appropriate for complete closure of the defect.  Using a sterile surgical marker, an appropriate advancement flap was drawn incorporating the defect and placing the expected incisions within the relaxed skin tension lines where possible.    The area thus outlined was incised deep to adipose tissue with a #15 scalpel blade.  The skin margins were undermined to an appropriate distance in all directions utilizing iris scissors.
O-L Flap Text: The defect edges were debeveled with a #15 scalpel blade.  Given the location of the defect, shape of the defect and the proximity to free margins an O-L flap was deemed most appropriate.  Using a sterile surgical marker, an appropriate advancement flap was drawn incorporating the defect and placing the expected incisions within the relaxed skin tension lines where possible.    The area thus outlined was incised deep to adipose tissue with a #15 scalpel blade.  The skin margins were undermined to an appropriate distance in all directions utilizing iris scissors.
Billing Type: Third-Party Bill
Cartilage Graft Text: The defect edges were debeveled with a #15 scalpel blade.  Given the location of the defect, shape of the defect, the fact the defect involved a full thickness cartilage defect a cartilage graft was deemed most appropriate.  An appropriate donor site was identified, cleansed, and anesthetized. The cartilage graft was then harvested and transferred to the recipient site, oriented appropriately and then sutured into place.  The secondary defect was then repaired using a primary closure.
Cheek-To-Nose Interpolation Flap Text: A decision was made to reconstruct the defect utilizing an interpolation axial flap and a staged reconstruction.  A telfa template was made of the defect.  This telfa template was then used to outline the Cheek-To-Nose Interpolation flap.  The donor area for the pedicle flap was then injected with anesthesia.  The flap was excised through the skin and subcutaneous tissue down to the layer of the underlying musculature.  The interpolation flap was carefully excised within this deep plane to maintain its blood supply.  The edges of the donor site were undermined.   The donor site was closed in a primary fashion.  The pedicle was then rotated into position and sutured.  Once the tube was sutured into place, adequate blood supply was confirmed with blanching and refill.  The pedicle was then wrapped with xeroform gauze and dressed appropriately with a telfa and gauze bandage to ensure continued blood supply and protect the attached pedicle.
Skin Substitute Text: The defect edges were debeveled with a #15 scalpel blade.  Given the location of the defect, shape of the defect and the proximity to free margins a skin substitute graft was deemed most appropriate.  The graft material was trimmed to fit the size of the defect. The graft was then placed in the primary defect and oriented appropriately.
Additional Anesthesia Volume In Cc: 6
Previous Accession (Optional): [id number]
Rhombic Flap Text: The defect edges were debeveled with a #15 scalpel blade.  Given the location of the defect and the proximity to free margins a rhombic flap was deemed most appropriate.  Using a sterile surgical marker, an appropriate rhombic flap was drawn incorporating the defect.    The area thus outlined was incised deep to adipose tissue with a #15 scalpel blade.  The skin margins were undermined to an appropriate distance in all directions utilizing iris scissors.
Saucerization Excision Additional Text (Leave Blank If You Do Not Want): The margin was drawn around the clinically apparent lesion.  Incisions were then made along these lines, in a tangential fashion, to the appropriate tissue plane and the lesion was extirpated.
Keystone Flap Text: The defect edges were debeveled with a #15 scalpel blade.  Given the location of the defect, shape of the defect a keystone flap was deemed most appropriate.  Using a sterile surgical marker, an appropriate keystone flap was drawn incorporating the defect, outlining the appropriate donor tissue and placing the expected incisions within the relaxed skin tension lines where possible. The area thus outlined was incised deep to adipose tissue with a #15 scalpel blade.  The skin margins were undermined to an appropriate distance in all directions around the primary defect and laterally outward around the flap utilizing iris scissors.
W Plasty Text: The lesion was extirpated to the level of the fat with a #15 scalpel blade.  Given the location of the defect, shape of the defect and the proximity to free margins a W-plasty was deemed most appropriate for repair.  Using a sterile surgical marker, the appropriate transposition arms of the W-plasty were drawn incorporating the defect and placing the expected incisions within the relaxed skin tension lines where possible.    The area thus outlined was incised deep to adipose tissue with a #15 scalpel blade.  The skin margins were undermined to an appropriate distance in all directions utilizing iris scissors.  The opposing transposition arms were then transposed into place in opposite direction and anchored with interrupted buried subcutaneous sutures.
Island Pedicle Flap-Requiring Vessel Identification Text: The defect edges were debeveled with a #15 scalpel blade.  Given the location of the defect, shape of the defect and the proximity to free margins an island pedicle advancement flap was deemed most appropriate.  Using a sterile surgical marker, an appropriate advancement flap was drawn, based on the axial vessel mentioned above, incorporating the defect, outlining the appropriate donor tissue and placing the expected incisions within the relaxed skin tension lines where possible.    The area thus outlined was incised deep to adipose tissue with a #15 scalpel blade.  The skin margins were undermined to an appropriate distance in all directions around the primary defect and laterally outward around the island pedicle utilizing iris scissors.  There was minimal undermining beneath the pedicle flap.
Complex Repair And O-T Advancement Flap Text: The defect edges were debeveled with a #15 scalpel blade.  The primary defect was closed partially with a complex linear closure.  Given the location of the remaining defect, shape of the defect and the proximity to free margins an O-T advancement flap was deemed most appropriate for complete closure of the defect.  Using a sterile surgical marker, an appropriate advancement flap was drawn incorporating the defect and placing the expected incisions within the relaxed skin tension lines where possible.    The area thus outlined was incised deep to adipose tissue with a #15 scalpel blade.  The skin margins were undermined to an appropriate distance in all directions utilizing iris scissors.
Star Wedge Flap Text: The defect edges were debeveled with a #15 scalpel blade.  Given the location of the defect, shape of the defect and the proximity to free margins a star wedge flap was deemed most appropriate.  Using a sterile surgical marker, an appropriate rotation flap was drawn incorporating the defect and placing the expected incisions within the relaxed skin tension lines where possible. The area thus outlined was incised deep to adipose tissue with a #15 scalpel blade.  The skin margins were undermined to an appropriate distance in all directions utilizing iris scissors.
Xenograft Text: The defect edges were debeveled with a #15 scalpel blade.  Given the location of the defect, shape of the defect and the proximity to free margins a xenograft was deemed most appropriate.  The graft was then trimmed to fit the size of the defect.  The graft was then placed in the primary defect and oriented appropriately.
Complex Repair And Split-Thickness Skin Graft Text: The defect edges were debeveled with a #15 scalpel blade.  The primary defect was closed partially with a complex linear closure.  Given the location of the defect, shape of the defect and the proximity to free margins a split thickness skin graft was deemed most appropriate to repair the remaining defect.  The graft was trimmed to fit the size of the remaining defect.  The graft was then placed in the primary defect, oriented appropriately, and sutured into place.
Split-Thickness Skin Graft Text: The defect edges were debeveled with a #15 scalpel blade.  Given the location of the defect, shape of the defect and the proximity to free margins a split thickness skin graft was deemed most appropriate.  Using a sterile surgical marker, the primary defect shape was transferred to the donor site. The split thickness graft was then harvested.  The skin graft was then placed in the primary defect and oriented appropriately.
Helical Rim Advancement Flap Text: The defect edges were debeveled with a #15 blade scalpel.  Given the location of the defect and the proximity to free margins (helical rim) a double helical rim advancement flap was deemed most appropriate.  Using a sterile surgical marker, the appropriate advancement flaps were drawn incorporating the defect and placing the expected incisions between the helical rim and antihelix where possible.  The area thus outlined was incised through and through with a #15 scalpel blade.  With a skin hook and iris scissors, the flaps were gently and sharply undermined and freed up.
Advancement-Rotation Flap Text: The defect edges were debeveled with a #15 scalpel blade.  Given the location of the defect, shape of the defect and the proximity to free margins an advancement-rotation flap was deemed most appropriate.  Using a sterile surgical marker, an appropriate flap was drawn incorporating the defect and placing the expected incisions within the relaxed skin tension lines where possible. The area thus outlined was incised deep to adipose tissue with a #15 scalpel blade.  The skin margins were undermined to an appropriate distance in all directions utilizing iris scissors.
Complex Repair And W Plasty Text: The defect edges were debeveled with a #15 scalpel blade.  The primary defect was closed partially with a complex linear closure.  Given the location of the remaining defect, shape of the defect and the proximity to free margins a W plasty was deemed most appropriate for complete closure of the defect.  Using a sterile surgical marker, an appropriate advancement flap was drawn incorporating the defect and placing the expected incisions within the relaxed skin tension lines where possible.    The area thus outlined was incised deep to adipose tissue with a #15 scalpel blade.  The skin margins were undermined to an appropriate distance in all directions utilizing iris scissors.
Anesthesia Type: 1% lidocaine with epinephrine and a 1:10 solution of 8.4% sodium bicarbonate
Bilobed Transposition Flap Text: The defect edges were debeveled with a #15 scalpel blade.  Given the location of the defect and the proximity to free margins a bilobed transposition flap was deemed most appropriate.  Using a sterile surgical marker, an appropriate bilobe flap drawn around the defect.    The area thus outlined was incised deep to adipose tissue with a #15 scalpel blade.  The skin margins were undermined to an appropriate distance in all directions utilizing iris scissors.
Positioning (Leave Blank If You Do Not Want): The patient was placed in a comfortable position exposing the surgical site.
Spiral Flap Text: The defect edges were debeveled with a #15 scalpel blade.  Given the location of the defect, shape of the defect and the proximity to free margins a spiral flap was deemed most appropriate.  Using a sterile surgical marker, an appropriate rotation flap was drawn incorporating the defect and placing the expected incisions within the relaxed skin tension lines where possible. The area thus outlined was incised deep to adipose tissue with a #15 scalpel blade.  The skin margins were undermined to an appropriate distance in all directions utilizing iris scissors.
No Repair - Repaired With Adjacent Surgical Defect Text (Leave Blank If You Do Not Want): After the excision the defect was repaired concurrently with another surgical defect which was in close approximation.
Repair Type: Complex
Complex Repair And Bilobe Flap Text: The defect edges were debeveled with a #15 scalpel blade.  The primary defect was closed partially with a complex linear closure.  Given the location of the remaining defect, shape of the defect and the proximity to free margins a bilobe flap was deemed most appropriate for complete closure of the defect.  Using a sterile surgical marker, an appropriate advancement flap was drawn incorporating the defect and placing the expected incisions within the relaxed skin tension lines where possible.    The area thus outlined was incised deep to adipose tissue with a #15 scalpel blade.  The skin margins were undermined to an appropriate distance in all directions utilizing iris scissors.
Crescentic Advancement Flap Text: The defect edges were debeveled with a #15 scalpel blade.  Given the location of the defect and the proximity to free margins a crescentic advancement flap was deemed most appropriate.  Using a sterile surgical marker, the appropriate advancement flap was drawn incorporating the defect and placing the expected incisions within the relaxed skin tension lines where possible.    The area thus outlined was incised deep to adipose tissue with a #15 scalpel blade.  The skin margins were undermined to an appropriate distance in all directions utilizing iris scissors.
Size Of Margin In Cm: 0.3
Complex Repair And Melolabial Flap Text: The defect edges were debeveled with a #15 scalpel blade.  The primary defect was closed partially with a complex linear closure.  Given the location of the remaining defect, shape of the defect and the proximity to free margins a melolabial flap was deemed most appropriate for complete closure of the defect.  Using a sterile surgical marker, an appropriate advancement flap was drawn incorporating the defect and placing the expected incisions within the relaxed skin tension lines where possible.    The area thus outlined was incised deep to adipose tissue with a #15 scalpel blade.  The skin margins were undermined to an appropriate distance in all directions utilizing iris scissors.
Complex Repair And Double M Plasty Text: The defect edges were debeveled with a #15 scalpel blade.  The primary defect was closed partially with a complex linear closure.  Given the location of the remaining defect, shape of the defect and the proximity to free margins a double M plasty was deemed most appropriate for complete closure of the defect.  Using a sterile surgical marker, an appropriate advancement flap was drawn incorporating the defect and placing the expected incisions within the relaxed skin tension lines where possible.    The area thus outlined was incised deep to adipose tissue with a #15 scalpel blade.  The skin margins were undermined to an appropriate distance in all directions utilizing iris scissors.
Anesthesia Type: 1% lidocaine with epinephrine
Excisional Biopsy Additional Text (Leave Blank If You Do Not Want): The margin was drawn around the clinically apparent lesion. An elliptical shape was then drawn on the skin incorporating the lesion and margins.  Incisions were then made along these lines to the appropriate tissue plane and the lesion was extirpated.
Epidermal Closure Graft Donor Site (Optional): simple interrupted
Complex Repair And Xenograft Text: The defect edges were debeveled with a #15 scalpel blade.  The primary defect was closed partially with a complex linear closure.  Given the location of the defect, shape of the defect and the proximity to free margins a xenograft was deemed most appropriate to repair the remaining defect.  The graft was trimmed to fit the size of the remaining defect.  The graft was then placed in the primary defect, oriented appropriately, and sutured into place.
Complex Repair And Z Plasty Text: The defect edges were debeveled with a #15 scalpel blade.  The primary defect was closed partially with a complex linear closure.  Given the location of the remaining defect, shape of the defect and the proximity to free margins a Z plasty was deemed most appropriate for complete closure of the defect.  Using a sterile surgical marker, an appropriate advancement flap was drawn incorporating the defect and placing the expected incisions within the relaxed skin tension lines where possible.    The area thus outlined was incised deep to adipose tissue with a #15 scalpel blade.  The skin margins were undermined to an appropriate distance in all directions utilizing iris scissors.
Scalpel Size: 15 blade
Muscle Hinge Flap Text: The defect edges were debeveled with a #15 scalpel blade.  Given the size, depth and location of the defect and the proximity to free margins a muscle hinge flap was deemed most appropriate.  Using a sterile surgical marker, an appropriate hinge flap was drawn incorporating the defect. The area thus outlined was incised with a #15 scalpel blade.  The skin margins were undermined to an appropriate distance in all directions utilizing iris scissors.
H Plasty Text: Given the location of the defect, shape of the defect and the proximity to free margins a H-plasty was deemed most appropriate for repair.  Using a sterile surgical marker, the appropriate advancement arms of the H-plasty were drawn incorporating the defect and placing the expected incisions within the relaxed skin tension lines where possible. The area thus outlined was incised deep to adipose tissue with a #15 scalpel blade. The skin margins were undermined to an appropriate distance in all directions utilizing iris scissors.  The opposing advancement arms were then advanced into place in opposite direction and anchored with interrupted buried subcutaneous sutures.
Repair Performed By Another Provider Text (Leave Blank If You Do Not Want): After the tissue was excised the defect was repaired by another provider.
Consent was obtained from the patient. The risks and benefits to therapy were discussed in detail. Specifically, the risks of infection, scarring, bleeding, prolonged wound healing, incomplete removal, allergy to anesthesia, nerve injury and recurrence were addressed. Prior to the procedure, the treatment site was clearly identified and confirmed by the patient. All components of Universal Protocol/PAUSE Rule completed.
O-T Advancement Flap Text: The defect edges were debeveled with a #15 scalpel blade.  Given the location of the defect, shape of the defect and the proximity to free margins an O-T advancement flap was deemed most appropriate.  Using a sterile surgical marker, an appropriate advancement flap was drawn incorporating the defect and placing the expected incisions within the relaxed skin tension lines where possible.    The area thus outlined was incised deep to adipose tissue with a #15 scalpel blade.  The skin margins were undermined to an appropriate distance in all directions utilizing iris scissors.
Ftsg Text: The defect edges were debeveled with a #15 scalpel blade.  Given the location of the defect, shape of the defect and the proximity to free margins a full thickness skin graft was deemed most appropriate.  Using a sterile surgical marker, the primary defect shape was transferred to the donor site. The area thus outlined was incised deep to adipose tissue with a #15 scalpel blade.  The harvested graft was then trimmed of adipose tissue until only dermis and epidermis was left.  The skin margins of the secondary defect were undermined to an appropriate distance in all directions utilizing iris scissors.  The secondary defect was closed with interrupted buried subcutaneous sutures.  The skin edges were then re-apposed with running  sutures.  The skin graft was then placed in the primary defect and oriented appropriately.
Home Suture Removal Text: Patient was provided a home suture removal kit and will remove their sutures at home.  If they have any questions or difficulties they will call the office.
Bilateral Helical Rim Advancement Flap Text: The defect edges were debeveled with a #15 blade scalpel.  Given the location of the defect and the proximity to free margins (helical rim) a bilateral helical rim advancement flap was deemed most appropriate.  Using a sterile surgical marker, the appropriate advancement flaps were drawn incorporating the defect and placing the expected incisions between the helical rim and antihelix where possible.  The area thus outlined was incised through and through with a #15 scalpel blade.  With a skin hook and iris scissors, the flaps were gently and sharply undermined and freed up.
Complex Repair And Modified Advancement Flap Text: The defect edges were debeveled with a #15 scalpel blade.  The primary defect was closed partially with a complex linear closure.  Given the location of the remaining defect, shape of the defect and the proximity to free margins a modified advancement flap was deemed most appropriate for complete closure of the defect.  Using a sterile surgical marker, an appropriate advancement flap was drawn incorporating the defect and placing the expected incisions within the relaxed skin tension lines where possible.    The area thus outlined was incised deep to adipose tissue with a #15 scalpel blade.  The skin margins were undermined to an appropriate distance in all directions utilizing iris scissors.
Ear Star Wedge Flap Text: The defect edges were debeveled with a #15 blade scalpel.  Given the location of the defect and the proximity to free margins (helical rim) an ear star wedge flap was deemed most appropriate.  Using a sterile surgical marker, the appropriate flap was drawn incorporating the defect and placing the expected incisions between the helical rim and antihelix where possible.  The area thus outlined was incised through and through with a #15 scalpel blade.
Z Plasty Text: The lesion was extirpated to the level of the fat with a #15 scalpel blade.  Given the location of the defect, shape of the defect and the proximity to free margins a Z-plasty was deemed most appropriate for repair.  Using a sterile surgical marker, the appropriate transposition arms of the Z-plasty were drawn incorporating the defect and placing the expected incisions within the relaxed skin tension lines where possible.    The area thus outlined was incised deep to adipose tissue with a #15 scalpel blade.  The skin margins were undermined to an appropriate distance in all directions utilizing iris scissors.  The opposing transposition arms were then transposed into place in opposite direction and anchored with interrupted buried subcutaneous sutures.
Trilobed Flap Text: The defect edges were debeveled with a #15 scalpel blade.  Given the location of the defect and the proximity to free margins a trilobed flap was deemed most appropriate.  Using a sterile surgical marker, an appropriate trilobed flap drawn around the defect.    The area thus outlined was incised deep to adipose tissue with a #15 scalpel blade.  The skin margins were undermined to an appropriate distance in all directions utilizing iris scissors.
Complex Repair And A-T Advancement Flap Text: The defect edges were debeveled with a #15 scalpel blade.  The primary defect was closed partially with a complex linear closure.  Given the location of the remaining defect, shape of the defect and the proximity to free margins an A-T advancement flap was deemed most appropriate for complete closure of the defect.  Using a sterile surgical marker, an appropriate advancement flap was drawn incorporating the defect and placing the expected incisions within the relaxed skin tension lines where possible.    The area thus outlined was incised deep to adipose tissue with a #15 scalpel blade.  The skin margins were undermined to an appropriate distance in all directions utilizing iris scissors.
Lab Facility: 
Graft Donor Site Bandage (Optional-Leave Blank If You Don't Want In Note): Steri-strips and a pressure bandage were applied to the donor site.
Hemostasis: Electrocautery
Double Island Pedicle Flap Text: The defect edges were debeveled with a #15 scalpel blade.  Given the location of the defect, shape of the defect and the proximity to free margins a double island pedicle advancement flap was deemed most appropriate.  Using a sterile surgical marker, an appropriate advancement flap was drawn incorporating the defect, outlining the appropriate donor tissue and placing the expected incisions within the relaxed skin tension lines where possible.    The area thus outlined was incised deep to adipose tissue with a #15 scalpel blade.  The skin margins were undermined to an appropriate distance in all directions around the primary defect and laterally outward around the island pedicle utilizing iris scissors.  There was minimal undermining beneath the pedicle flap.
Alar Island Pedicle Flap Text: The defect edges were debeveled with a #15 scalpel blade.  Given the location of the defect, shape of the defect and the proximity to the alar rim an island pedicle advancement flap was deemed most appropriate.  Using a sterile surgical marker, an appropriate advancement flap was drawn incorporating the defect, outlining the appropriate donor tissue and placing the expected incisions within the nasal ala running parallel to the alar rim. The area thus outlined was incised with a #15 scalpel blade.  The skin margins were undermined minimally to an appropriate distance in all directions around the primary defect and laterally outward around the island pedicle utilizing iris scissors.  There was minimal undermining beneath the pedicle flap.
Mercedes Flap Text: The defect edges were debeveled with a #15 scalpel blade.  Given the location of the defect, shape of the defect and the proximity to free margins a Mercedes flap was deemed most appropriate.  Using a sterile surgical marker, an appropriate advancement flap was drawn incorporating the defect and placing the expected incisions within the relaxed skin tension lines where possible. The area thus outlined was incised deep to adipose tissue with a #15 scalpel blade.  The skin margins were undermined to an appropriate distance in all directions utilizing iris scissors.
Perilesional Excision Additional Text (Leave Blank If You Do Not Want): The margin was drawn around the clinically apparent lesion. Incisions were then made along these lines to the appropriate tissue plane and the lesion was extirpated.
O-T Plasty Text: The defect edges were debeveled with a #15 scalpel blade.  Given the location of the defect, shape of the defect and the proximity to free margins an O-T plasty was deemed most appropriate.  Using a sterile surgical marker, an appropriate O-T plasty was drawn incorporating the defect and placing the expected incisions within the relaxed skin tension lines where possible.    The area thus outlined was incised deep to adipose tissue with a #15 scalpel blade.  The skin margins were undermined to an appropriate distance in all directions utilizing iris scissors.
Transposition Flap Text: The defect edges were debeveled with a #15 scalpel blade.  Given the location of the defect and the proximity to free margins a transposition flap was deemed most appropriate.  Using a sterile surgical marker, an appropriate transposition flap was drawn incorporating the defect.    The area thus outlined was incised deep to adipose tissue with a #15 scalpel blade.  The skin margins were undermined to an appropriate distance in all directions utilizing iris scissors.
Lab: [id number]
Anesthesia Volume In Cc: 6.5
Epidermal Autograft Text: The defect edges were debeveled with a #15 scalpel blade.  Given the location of the defect, shape of the defect and the proximity to free margins an epidermal autograft was deemed most appropriate.  Using a sterile surgical marker, the primary defect shape was transferred to the donor site. The epidermal graft was then harvested.  The skin graft was then placed in the primary defect and oriented appropriately.
Complex Repair And Rhombic Flap Text: The defect edges were debeveled with a #15 scalpel blade.  The primary defect was closed partially with a complex linear closure.  Given the location of the remaining defect, shape of the defect and the proximity to free margins a rhombic flap was deemed most appropriate for complete closure of the defect.  Using a sterile surgical marker, an appropriate advancement flap was drawn incorporating the defect and placing the expected incisions within the relaxed skin tension lines where possible.    The area thus outlined was incised deep to adipose tissue with a #15 scalpel blade.  The skin margins were undermined to an appropriate distance in all directions utilizing iris scissors.
Complex Repair And Tissue Cultured Epidermal Autograft Text: The defect edges were debeveled with a #15 scalpel blade.  The primary defect was closed partially with a complex linear closure.  Given the location of the defect, shape of the defect and the proximity to free margins an tissue cultured epidermal autograft was deemed most appropriate to repair the remaining defect.  The graft was trimmed to fit the size of the remaining defect.  The graft was then placed in the primary defect, oriented appropriately, and sutured into place.
Dorsal Nasal Flap Text: The defect edges were debeveled with a #15 scalpel blade.  Given the location of the defect and the proximity to free margins a dorsal nasal flap was deemed most appropriate.  Using a sterile surgical marker, an appropriate dorsal nasal flap was drawn around the defect.    The area thus outlined was incised deep to adipose tissue with a #15 scalpel blade.  The skin margins were undermined to an appropriate distance in all directions utilizing iris scissors.
O-Z Plasty Text: The defect edges were debeveled with a #15 scalpel blade.  Given the location of the defect, shape of the defect and the proximity to free margins an O-Z plasty (double transposition flap) was deemed most appropriate.  Using a sterile surgical marker, the appropriate transposition flaps were drawn incorporating the defect and placing the expected incisions within the relaxed skin tension lines where possible.    The area thus outlined was incised deep to adipose tissue with a #15 scalpel blade.  The skin margins were undermined to an appropriate distance in all directions utilizing iris scissors.  Hemostasis was achieved with electrocautery.  The flaps were then transposed into place, one clockwise and the other counterclockwise, and anchored with interrupted buried subcutaneous sutures.
Purse String (Intermediate) Text: Given the location of the defect and the characteristics of the surrounding skin a purse string intermediate closure was deemed most appropriate.  Undermining was performed circumfirentially around the surgical defect.  A purse string suture was then placed and tightened.
A-T Advancement Flap Text: The defect edges were debeveled with a #15 scalpel blade.  Given the location of the defect, shape of the defect and the proximity to free margins an A-T advancement flap was deemed most appropriate.  Using a sterile surgical marker, an appropriate advancement flap was drawn incorporating the defect and placing the expected incisions within the relaxed skin tension lines where possible.    The area thus outlined was incised deep to adipose tissue with a #15 scalpel blade.  The skin margins were undermined to an appropriate distance in all directions utilizing iris scissors.
Complex Repair And O-L Flap Text: The defect edges were debeveled with a #15 scalpel blade.  The primary defect was closed partially with a complex linear closure.  Given the location of the remaining defect, shape of the defect and the proximity to free margins an O-L flap was deemed most appropriate for complete closure of the defect.  Using a sterile surgical marker, an appropriate flap was drawn incorporating the defect and placing the expected incisions within the relaxed skin tension lines where possible.    The area thus outlined was incised deep to adipose tissue with a #15 scalpel blade.  The skin margins were undermined to an appropriate distance in all directions utilizing iris scissors.
Dressing: dry sterile dressing
Excision Method: Fusiform
Rotation Flap Text: The defect edges were debeveled with a #15 scalpel blade.  Given the location of the defect, shape of the defect and the proximity to free margins a rotation flap was deemed most appropriate.  Using a sterile surgical marker, an appropriate rotation flap was drawn incorporating the defect and placing the expected incisions within the relaxed skin tension lines where possible.    The area thus outlined was incised deep to adipose tissue with a #15 scalpel blade.  The skin margins were undermined to an appropriate distance in all directions utilizing iris scissors.

## 2018-07-26 NOTE — PROCEDURE: BIOPSY BY SHAVE METHOD
Wound Care: Aquaphor
Anesthesia Type: 1% lidocaine with 1:100,000 epinephrine and 408mcg clindamycin/ml and a 1:10 solution of 8.4% sodium bicarbonate
Depth Of Biopsy: dermis
Lab Facility: 
Billing Type: Third-Party Bill
Detail Level: Detailed
Additional Anesthesia Volume In Cc (Will Not Render If 0): 0
Consent: Written consent was obtained and risks were reviewed including but not limited to scarring, infection, bleeding, scabbing, incomplete removal, nerve damage and allergy to anesthesia.
Curettage Text: The wound bed was treated with curettage after the biopsy was performed.
Biopsy Method: Dermablade
Cryotherapy Text: The wound bed was treated with cryotherapy after the biopsy was performed.
Electrodesiccation Text: The wound bed was treated with electrodesiccation after the biopsy was performed.
Type Of Destruction Used: Curettage
Render Post-Care Instructions In Note?: no
Anesthesia Volume In Cc: 2.5
Silver Nitrate Text: The wound bed was treated with silver nitrate after the biopsy was performed.
Biopsy Type: H and E
Post-Care Instructions: I reviewed with the patient in detail post-care instructions. Patient is to keep the biopsy site dry overnight, and then apply bacitracin twice daily until healed. Patient may apply hydrogen peroxide soaks to remove any crusting.
Electrodesiccation And Curettage Text: The wound bed was treated with electrodesiccation and curettage after the biopsy was performed.
Hemostasis: Drysol
Was A Bandage Applied: Yes
Dressing: bandage
Notification Instructions: Patient will be notified of biopsy results. However, patient instructed to call the office if not contacted within 2 weeks.
Lab: [id number]

## 2018-08-28 ENCOUNTER — APPOINTMENT (RX ONLY)
Dept: URBAN - METROPOLITAN AREA CLINIC 36 | Facility: CLINIC | Age: 78
Setting detail: DERMATOLOGY
End: 2018-08-28

## 2018-08-28 PROBLEM — C44.329 SQUAMOUS CELL CARCINOMA OF SKIN OF OTHER PARTS OF FACE: Status: ACTIVE | Noted: 2018-08-28

## 2018-08-28 PROCEDURE — ? OBSERVATION

## 2018-09-04 ENCOUNTER — APPOINTMENT (RX ONLY)
Dept: URBAN - METROPOLITAN AREA CLINIC 36 | Facility: CLINIC | Age: 78
Setting detail: DERMATOLOGY
End: 2018-09-04

## 2018-09-04 PROBLEM — C44.329 SQUAMOUS CELL CARCINOMA OF SKIN OF OTHER PARTS OF FACE: Status: ACTIVE | Noted: 2018-09-04

## 2018-09-04 PROCEDURE — 14041 TIS TRNFR F/C/C/M/N/A/G/H/F: CPT

## 2018-09-04 PROCEDURE — ? MOHS SURGERY

## 2018-09-04 PROCEDURE — ? PRESCRIPTION

## 2018-09-04 PROCEDURE — 17311 MOHS 1 STAGE H/N/HF/G: CPT

## 2018-09-04 PROCEDURE — 17312 MOHS ADDL STAGE: CPT

## 2018-09-04 RX ORDER — TRAMADOL HYDROCHLORIDE 50 MG/1
TABLET, FILM COATED ORAL
Qty: 20 | Refills: 0

## 2018-09-04 NOTE — PROCEDURE: MOHS SURGERY
Primary Defect Width In Cm (Final Defect Size - Required For Flaps/Grafts): 2.2
Donor Site Anesthesia Volume In Cc: 0
Subsequent Stages Histo Method Verbiage: Using a similar technique to that described above, a thin layer of tissue was removed from all areas where tumor was visible on the previous stage.  The tissue was again oriented, mapped, dyed, and processed as above.
Cartilage Fenestration Performed?: No
Consent (Near Eyelid Margin)/Introductory Paragraph: The rationale for Mohs was explained to the patient and consent was obtained. The risks, benefits and alternatives to therapy were discussed in detail. Specifically, the risks of ectropion or eyelid deformity, infection, scarring, bleeding, prolonged wound healing, incomplete removal, allergy to anesthesia, nerve injury and recurrence were addressed. Prior to the procedure, the treatment site was clearly identified and confirmed by the patient. All components of Universal Protocol/PAUSE Rule completed.
Referred To Oculoplastics For Closure Text (Leave Blank If You Do Not Want): After obtaining clear surgical margins the patient was sent to oculoplastics for surgical repair.  The patient understands they will receive post-surgical care and follow-up from the referring physician's office.
Hemostasis: Electrocautery
Mauc Instructions: By selecting yes to the question below the MAUC number will be added into the note.  This will be calculated automatically based on the diagnosis chosen, the size entered, the body zone selected (H,M,L) and the specific indications you chose. You will also have the option to override the Mohs AUC if you disagree with the automatically calculated number and this option is found in the Case Summary tab.
Inflammation Suggestive Of Cancer Camouflage Histology Text: There was a dense lymphocytic infiltrate which prevented adequate histologic evaluation of adjacent structures.
Melolabial Transposition Flap Text: The defect edges were debeveled with a #15 scalpel blade.  Given the location of the defect and the proximity to free margins a melolabial flap was deemed most appropriate.  Using a sterile surgical marker, an appropriate melolabial transposition flap was drawn incorporating the defect.    The area thus outlined was incised deep to adipose tissue with a #15 scalpel blade.  The skin margins were undermined to an appropriate distance in all directions utilizing iris scissors.
Repair Anesthesia Type: 1% lidocaine with 1:100,000 epinephrine and 408mcg clindamycin/ml and a 1:10 solution of 8.4% sodium bicarbonate
Graft Cartilage Fenestration Text: The cartilage was fenestrated with a 2mm punch biopsy to help facilitate graft survival and healing.
Cheiloplasty (Complex) Text: A decision was made to reconstruct the defect with a  cheiloplasty.  The defect was undermined extensively.  Additional obicularis oris muscle was excised with a 15 blade scalpel.  The defect was converted into a full thickness wedge to facilite a better cosmetic result.  Small vessels were then tied off with 5-0 monocyrl. The obicularis oris, superficial fascia, adipose and dermis were then reapproximated.  After the deeper layers were approximated the epidermis was reapproximated with particular care given to realign the vermilion border.
Mercedes Flap Text: The defect edges were debeveled with a #15 scalpel blade.  Given the location of the defect, shape of the defect and the proximity to free margins a Mercedes flap was deemed most appropriate.  Using a sterile surgical marker, an appropriate advancement flap was drawn incorporating the defect and placing the expected incisions within the relaxed skin tension lines where possible. The area thus outlined was incised deep to adipose tissue with a #15 scalpel blade.  The skin margins were undermined to an appropriate distance in all directions utilizing iris scissors.
Double Island Pedicle Flap Text: The defect edges were debeveled with a #15 scalpel blade.  Given the location of the defect, shape of the defect and the proximity to free margins a double island pedicle advancement flap was deemed most appropriate.  Using a sterile surgical marker, an appropriate advancement flap was drawn incorporating the defect, outlining the appropriate donor tissue and placing the expected incisions within the relaxed skin tension lines where possible.    The area thus outlined was incised deep to adipose tissue with a #15 scalpel blade.  The skin margins were undermined to an appropriate distance in all directions around the primary defect and laterally outward around the island pedicle utilizing iris scissors.  There was minimal undermining beneath the pedicle flap.
Stage 4: Additional Anesthesia Type: 1% lidocaine with epinephrine
Paramedian Forehead Flap Text: A decision was made to reconstruct the defect utilizing an interpolation axial flap and a staged reconstruction.  A telfa template was made of the defect.  This telfa template was then used to outline the paramedian forehead pedicle flap.  The donor area for the pedicle flap was then injected with anesthesia.  The flap was excised through the skin and subcutaneous tissue down to the layer of the underlying musculature.  The pedicle flap was carefully excised within this deep plane to maintain its blood supply.  The edges of the donor site were undermined.   The donor site was closed in a primary fashion.  The pedicle was then rotated into position and sutured.  Once the tube was sutured into place, adequate blood supply was confirmed with blanching and refill.  The pedicle was then wrapped with xeroform gauze and dressed appropriately with a telfa and gauze bandage to ensure continued blood supply and protect the attached pedicle.
Ear Star Wedge Flap Text: The defect edges were debeveled with a #15 blade scalpel.  Given the location of the defect and the proximity to free margins (helical rim) an ear star wedge flap was deemed most appropriate.  Using a sterile surgical marker, the appropriate flap was drawn incorporating the defect and placing the expected incisions between the helical rim and antihelix where possible.  The area thus outlined was incised through and through with a #15 scalpel blade.
Estimated Blood Loss (Cc): less than 5 cc
Localized Dermabrasion With Wire Brush Text: The patient was draped in routine manner.  Localized dermabrasion using 3 x 17 mm wire brush was performed in routine manner to papillary dermis. This spot dermabrasion is being performed to complete skin cancer reconstruction. It also will eliminate the other sun damaged precancerous cells that are known to be part of the regional effect of a lifetime's worth of sun exposure. This localized dermabrasion is therapeutic and should not be considered cosmetic in any regard.
Bcc Infiltrative Histology Text: There were numerous aggregates of basaloid cells demonstrating an infiltrative pattern.
A-T Advancement Flap Text: The defect edges were debeveled with a #15 scalpel blade.  Given the location of the defect, shape of the defect and the proximity to free margins an A-T advancement flap was deemed most appropriate.  Using a sterile surgical marker, an appropriate advancement flap was drawn incorporating the defect and placing the expected incisions within the relaxed skin tension lines where possible.    The area thus outlined was incised deep to adipose tissue with a #15 scalpel blade.  The skin margins were undermined to an appropriate distance in all directions utilizing iris scissors.
Consent (Scalp)/Introductory Paragraph: The rationale for Mohs was explained to the patient and consent was obtained. The risks, benefits and alternatives to therapy were discussed in detail. Specifically, the risks of changes in hair growth pattern secondary to repair, infection, scarring, bleeding, prolonged wound healing, incomplete removal, allergy to anesthesia, nerve injury and recurrence were addressed. Prior to the procedure, the treatment site was clearly identified and confirmed by the patient. All components of Universal Protocol/PAUSE Rule completed.
Consent 2/Introductory Paragraph: Mohs surgery was explained to the patient and consent was obtained. The risks, benefits and alternatives to therapy were discussed in detail. Specifically, the risks of infection, scarring, bleeding, prolonged wound healing, incomplete removal, allergy to anesthesia, nerve injury and recurrence were addressed. Prior to the procedure, the treatment site was clearly identified and confirmed by the patient. All components of Universal Protocol/PAUSE Rule completed.
Spiral Flap Text: The defect edges were debeveled with a #15 scalpel blade.  Given the location of the defect, shape of the defect and the proximity to free margins a spiral flap was deemed most appropriate.  Using a sterile surgical marker, an appropriate rotation flap was drawn incorporating the defect and placing the expected incisions within the relaxed skin tension lines where possible. The area thus outlined was incised deep to adipose tissue with a #15 scalpel blade.  The skin margins were undermined to an appropriate distance in all directions utilizing iris scissors.
Show Special Stain Variables In The Stage Tabs: Yes
Wound Care: Aquaphor
V-Y Flap Text: The defect edges were debeveled with a #15 scalpel blade.  Given the location of the defect, shape of the defect and the proximity to free margins a V-Y flap was deemed most appropriate.  Using a sterile surgical marker, an appropriate advancement flap was drawn incorporating the defect and placing the expected incisions within the relaxed skin tension lines where possible.    The area thus outlined was incised deep to adipose tissue with a #15 scalpel blade.  The skin margins were undermined to an appropriate distance in all directions utilizing iris scissors.
Repair Anesthesia Method: local infiltration
Manual Repair Warning Statement: We plan on removing the manually selected variable below in favor of our much easier automatic structured text blocks found in the previous tab. We decided to do this to help make the flow better and give you the full power of structured data. Manual selection is never going to be ideal in our platform and I would encourage you to avoid using manual selection from this point on, especially since I will be sunsetting this feature. It is important that you do one of two things with the customized text below. First, you can save all of the text in a word file so you can have it for future reference. Second, transfer the text to the appropriate area in the Library tab. Lastly, if there is a flap or graft type which we do not have you need to let us know right away so I can add it in before the variable is hidden. No need to panic, we plan to give you roughly 6 months to make the change.
Unique Flap 3 Text: The defect edges were debeveled with a #15 scalpel blade.  Given the location of the defect, shape of the defect and the proximity to free margins a Mercedes (double advancement flap) was deemed most appropriate.  Using a sterile surgical marker, the appropriate transposition flaps were drawn incorporating the defect and placing the expected incisions within the relaxed skin tension lines where possible.    The area thus outlined was incised deep to adipose tissue with a #15 scalpel blade.  The skin margins were undermined to an appropriate distance in all directions utilizing iris scissors.  Hemostasis was achieved with electrocautery.  The flaps were then advanced into the defect and anchored with interrupted buried subcutaneous sutures.
O-T Plasty Text: The defect edges were debeveled with a #15 scalpel blade.  Given the location of the defect, shape of the defect and the proximity to free margins an O-T plasty was deemed most appropriate.  Using a sterile surgical marker, an appropriate O-T plasty was drawn incorporating the defect and placing the expected incisions within the relaxed skin tension lines where possible.    The area thus outlined was incised deep to adipose tissue with a #15 scalpel blade.  The skin margins were undermined to an appropriate distance in all directions utilizing iris scissors.
Helical Rim Advancement Flap Text: The defect edges were debeveled with a #15 blade scalpel.  Given the location of the defect and the proximity to free margins (helical rim) a double helical rim advancement flap was deemed most appropriate.  Using a sterile surgical marker, the appropriate advancement flaps were drawn incorporating the defect and placing the expected incisions between the helical rim and antihelix where possible.  The area thus outlined was incised through and through with a #15 scalpel blade.  With a skin hook and iris scissors, the flaps were gently and sharply undermined and freed up.
Alternatives Discussed Intro (Do Not Add Period): I discussed alternative treatments to Mohs surgery and specifically discussed the risks and benefits of
Previous Accession (Optional): [id number]
Full Thickness Lip Wedge Repair (Flap) Text: Given the location of the defect and the proximity to free margins a full thickness wedge repair was deemed most appropriate.  Using a sterile surgical marker, the appropriate repair was drawn incorporating the defect and placing the expected incisions perpendicular to the vermilion border.  The vermilion border was also meticulously outlined to ensure appropriate reapproximation during the repair.  The area thus outlined was incised through and through with a #15 scalpel blade.  The muscularis and dermis were reaproximated with deep sutures following hemostasis. Care was taken to realign the vermilion border before proceeding with the superficial closure.  Once the vermilion was realigned the superfical and mucosal closure was finished.
Repair Performed By Another Provider Text (Leave Blank If You Do Not Want): After obtaining clear surgical margins the defect was repaired by another provider.
Stage 1: Number Of Blocks?: 1
W Plasty Text: The lesion was extirpated to the level of the fat with a #15 scalpel blade.  Given the location of the defect, shape of the defect and the proximity to free margins a W-plasty was deemed most appropriate for repair.  Using a sterile surgical marker, the appropriate transposition arms of the W-plasty were drawn incorporating the defect and placing the expected incisions within the relaxed skin tension lines where possible.    The area thus outlined was incised deep to adipose tissue with a #15 scalpel blade.  The skin margins were undermined to an appropriate distance in all directions utilizing iris scissors.  The opposing transposition arms were then transposed into place in opposite direction and anchored with interrupted buried subcutaneous sutures.
Crescentic Complex Repair Preamble Text (Leave Blank If You Do Not Want): Extensive wide undermining was performed at least 2 cm in all directions.
Epidermal Closure: running cuticular
Surgical Defect Length In Cm (Optional): 2
Mohs Method Verbiage: An incision at a 45 degree angle following the standard Mohs approach was done and the specimen was harvested as a microscopic controlled layer.
Skin Substitute Text: The defect edges were debeveled with a #15 scalpel blade.  Given the location of the defect, shape of the defect and the proximity to free margins a skin substitute graft was deemed most appropriate.  The graft material was trimmed to fit the size of the defect. The graft was then placed in the primary defect and oriented appropriately.
Location Indication Override (Is Already Calculated Based On Selected Body Location): Area H
Rhombic Flap Text: The defect edges were debeveled with a #15 scalpel blade.  Given the location of the defect and the proximity to free margins a rhombic flap was deemed most appropriate.  Using a sterile surgical marker, an appropriate rhombic flap was drawn incorporating the defect.    The area thus outlined was incised deep to adipose tissue with a #15 scalpel blade.  The skin margins were undermined to an appropriate distance in all directions utilizing iris scissors.
Interpolation Flap Text: A decision was made to reconstruct the defect utilizing an interpolation axial flap and a staged reconstruction.  A telfa template was made of the defect.  This telfa template was then used to outline the interpolation flap.  The donor area for the pedicle flap was then injected with anesthesia.  The flap was excised through the skin and subcutaneous tissue down to the layer of the underlying musculature.  The interpolation flap was carefully excised within this deep plane to maintain its blood supply.  The edges of the donor site were undermined.   The donor site was closed in a primary fashion.  The pedicle was then rotated into position and sutured.  Once the tube was sutured into place, adequate blood supply was confirmed with blanching and refill.  The pedicle was then wrapped with xeroform gauze and dressed appropriately with a telfa and gauze bandage to ensure continued blood supply and protect the attached pedicle.
Advancement Flap (Double) Text: The defect edges were debeveled with a #15 scalpel blade.  Given the location of the defect and the proximity to free margins a double advancement flap was deemed most appropriate.  Using a sterile surgical marker, the appropriate advancement flaps were drawn incorporating the defect and placing the expected incisions within the relaxed skin tension lines where possible.    The area thus outlined was incised deep to adipose tissue with a #15 scalpel blade.  The skin margins were undermined to an appropriate distance in all directions utilizing iris scissors.
Complex Repair And Flap Additional Text (Will Appearing After The Standard Complex Repair Text): The complex repair was not sufficient to completely close the primary defect. The remaining additional defect was repaired with the flap mentioned below.
Unique Flap 1 Name: Myocutaneous Island pedicle Flap
Transposition Flap Text: The defect edges were debeveled with a #15 scalpel blade.  Given the location of the defect and the proximity to free margins a transposition flap was deemed most appropriate.  Using a sterile surgical marker, an appropriate transposition flap was drawn incorporating the defect.    The area thus outlined was incised deep to adipose tissue with a #15 scalpel blade.  The skin margins were undermined to an appropriate distance in all directions utilizing iris scissors.
Referred To Plastics For Closure Text (Leave Blank If You Do Not Want): After obtaining clear surgical margins the patient was sent to plastics for surgical repair.  The patient understands they will receive post-surgical care and follow-up from the referring physician's office.
Trilobed Flap Text: The defect edges were debeveled with a #15 scalpel blade.  Given the location of the defect and the proximity to free margins a trilobed flap was deemed most appropriate.  Using a sterile surgical marker, an appropriate trilobed flap drawn around the defect.    The area thus outlined was incised deep to adipose tissue with a #15 scalpel blade.  The skin margins were undermined to an appropriate distance in all directions utilizing iris scissors.
Unique Flap 2 Text: A decision was made to reconstruct the defect utilizing a Peng Flap (Bilateral Advancement Rotation Flap). Given the location of the defect and the proximity to free margins, this flap was deemed most appropriate.  Using a sterile surgical marker, the appropriate rotation flaps were drawn incorporating the defect and placing the expected incisions within the relaxed skin tension lines where possible.    The area thus outlined was incised deep to adipose tissue with a #15 scalpel blade.  The skin margins were undermined to an appropriate distance in all directions utilizing iris scissors.
Epidermal Autograft Text: The defect edges were debeveled with a #15 scalpel blade.  Given the location of the defect, shape of the defect and the proximity to free margins an epidermal autograft was deemed most appropriate.  Using a sterile surgical marker, the primary defect shape was transferred to the donor site. The epidermal graft was then harvested.  The skin graft was then placed in the primary defect and oriented appropriately.
Consent (Nose)/Introductory Paragraph: The rationale for Mohs was explained to the patient and consent was obtained. The risks, benefits and alternatives to therapy were discussed in detail. Specifically, the risks of nasal deformity, changes in the flow of air through the nose, infection, scarring, bleeding, prolonged wound healing, incomplete removal, allergy to anesthesia, nerve injury and recurrence were addressed. Prior to the procedure, the treatment site was clearly identified and confirmed by the patient. All components of Universal Protocol/PAUSE Rule completed.
Unique Flap 2 Name: Peng Flap
Detail Level: Detailed
Purse String (Simple) Text: Given the location of the defect and the characteristics of the surrounding skin a purse string closure was deemed most appropriate.  Undermining was performed circumfirentially around the surgical defect.  A purse string suture was then placed and tightened.
Anesthesia Volume In Cc: 6
Island Pedicle Flap With Canthal Suspension Text: The defect edges were debeveled with a #15 scalpel blade.  Given the location of the defect, shape of the defect and the proximity to free margins an island pedicle advancement flap was deemed most appropriate.  Using a sterile surgical marker, an appropriate advancement flap was drawn incorporating the defect, outlining the appropriate donor tissue and placing the expected incisions within the relaxed skin tension lines where possible. The area thus outlined was incised deep to adipose tissue with a #15 scalpel blade.  The skin margins were undermined to an appropriate distance in all directions around the primary defect and laterally outward around the island pedicle utilizing iris scissors.  There was minimal undermining beneath the pedicle flap. A suspension suture was placed in the canthal tendon to prevent tension and prevent ectropion.
H Plasty Text: Given the location of the defect, shape of the defect and the proximity to free margins a H-plasty was deemed most appropriate for repair.  Using a sterile surgical marker, the appropriate advancement arms of the H-plasty were drawn incorporating the defect and placing the expected incisions within the relaxed skin tension lines where possible. The area thus outlined was incised deep to adipose tissue with a #15 scalpel blade. The skin margins were undermined to an appropriate distance in all directions utilizing iris scissors.  The opposing advancement arms were then advanced into place in opposite direction and anchored with interrupted buried subcutaneous sutures.
Unique Flap 4 Name: Banner Flap
Mucosal Advancement Flap Text: Given the location of the defect, shape of the defect and the proximity to free margins a mucosal advancement flap was deemed most appropriate. Incisions were made with a 15 blade scalpel in the appropriate fashion along the cutaneous vermilion border and the mucosal lip. The remaining actinically damaged mucosal tissue was excised.  The mucosal advancement flap was then elevated to the gingival sulcus with care taken to preserve the neurovascular structures and advanced into the primary defect. Care was taken to ensure that precise realignment of the vermilion border was achieved.
Consent Type: Consent 1 (Standard)
Cheek Interpolation Flap Text: A decision was made to reconstruct the defect utilizing an interpolation axial flap and a staged reconstruction.  A telfa template was made of the defect.  This telfa template was then used to outline the Cheek Interpolation flap.  The donor area for the pedicle flap was then injected with anesthesia.  The flap was excised through the skin and subcutaneous tissue down to the layer of the underlying musculature.  The interpolation flap was carefully excised within this deep plane to maintain its blood supply.  The edges of the donor site were undermined.   The donor site was closed in a primary fashion.  The pedicle was then rotated into position and sutured.  Once the tube was sutured into place, adequate blood supply was confirmed with blanching and refill.  The pedicle was then wrapped with xeroform gauze and dressed appropriately with a telfa and gauze bandage to ensure continued blood supply and protect the attached pedicle.
Lazy S Intermediate Repair Preamble Text (Leave Blank If You Do Not Want): Undermining was performed with blunt dissection.
Suture Removal: 7 days
Banner Transposition Flap Text: The defect edges were debeveled with a #15 scalpel blade.  Given the location of the defect and the proximity to free margins a Banner transposition flap was deemed most appropriate.  Using a sterile surgical marker, an appropriate flap drawn around the defect. The area thus outlined was incised deep to adipose tissue with a #15 scalpel blade.  The skin margins were undermined to an appropriate distance in all directions utilizing iris scissors.
Cartilage Graft Text: The defect edges were debeveled with a #15 scalpel blade.  Given the location of the defect, shape of the defect, the fact the defect involved a full thickness cartilage defect a cartilage graft was deemed most appropriate.  An appropriate donor site was identified, cleansed, and anesthetized. The cartilage graft was then harvested and transferred to the recipient site, oriented appropriately and then sutured into place.  The secondary defect was then repaired using a primary closure.
Closure 3 Information: This tab is for additional flaps and grafts above and beyond our usual structured repairs.  Please note if you enter information here it will not currently bill and you will need to add the billing information manually.
Mastoid Interpolation Flap Text: A decision was made to reconstruct the defect utilizing an interpolation axial flap and a staged reconstruction.  A telfa template was made of the defect.  This telfa template was then used to outline the mastoid interpolation flap.  The donor area for the pedicle flap was then injected with anesthesia.  The flap was excised through the skin and subcutaneous tissue down to the layer of the underlying musculature.  The pedicle flap was carefully excised within this deep plane to maintain its blood supply.  The edges of the donor site were undermined.   The donor site was closed in a primary fashion.  The pedicle was then rotated into position and sutured.  Once the tube was sutured into place, adequate blood supply was confirmed with blanching and refill.  The pedicle was then wrapped with xeroform gauze and dressed appropriately with a telfa and gauze bandage to ensure continued blood supply and protect the attached pedicle.
Surgeon/Pathologist Verbiage (Will Incorporate Name Of Surgeon From Intro If Not Blank): operated in two distinct and integrated capacities as the surgeon and pathologist.
Medical Necessity Statement: Based on my medical judgement, Mohs surgery is the most appropriate treatment for this cancer compared to other treatments.
Unna Boot Text: An Unna boot was placed to help immobilize the limb and facilitate more rapid healing.
Split-Thickness Skin Graft Text: The defect edges were debeveled with a #15 scalpel blade.  Given the location of the defect, shape of the defect and the proximity to free margins a split thickness skin graft was deemed most appropriate.  Using a sterile surgical marker, the primary defect shape was transferred to the donor site. The split thickness graft was then harvested.  The skin graft was then placed in the primary defect and oriented appropriately.
Consent (Temporal Branch)/Introductory Paragraph: The rationale for Mohs was explained to the patient and consent was obtained. The risks, benefits and alternatives to therapy were discussed in detail. Specifically, the risks of damage to the temporal branch of the facial nerve, infection, scarring, bleeding, prolonged wound healing, incomplete removal, allergy to anesthesia, and recurrence were addressed. Prior to the procedure, the treatment site was clearly identified and confirmed by the patient. All components of Universal Protocol/PAUSE Rule completed.
O-L Flap Text: The defect edges were debeveled with a #15 scalpel blade.  Given the location of the defect, shape of the defect and the proximity to free margins an O-L flap was deemed most appropriate.  Using a sterile surgical marker, an appropriate advancement flap was drawn incorporating the defect and placing the expected incisions within the relaxed skin tension lines where possible.    The area thus outlined was incised deep to adipose tissue with a #15 scalpel blade.  The skin margins were undermined to an appropriate distance in all directions utilizing iris scissors.
Anesthesia Type: 0.5% lidocaine with 1:200,000 epinephrine and a 1:10 solution of 8.4% sodium bicarbonate and 408mcg clindamycin/ml
Partial Purse String (Intermediate) Text: Given the location of the defect and the characteristics of the surrounding skin an intermediate purse string closure was deemed most appropriate.  Undermining was performed circumfirentially around the surgical defect.  A purse string suture was then placed and tightened. Wound tension only allowed a partial closure of the circular defect.
S Plasty Text: Given the location and shape of the defect, and the orientation of relaxed skin tension lines, an S-plasty was deemed most appropriate for repair.  Using a sterile surgical marker, the appropriate outline of the S-plasty was drawn, incorporating the defect and placing the expected incisions within the relaxed skin tension lines where possible.  The area thus outlined was incised deep to adipose tissue with a #15 scalpel blade.  The skin margins were undermined to an appropriate distance in all directions utilizing iris scissors. The skin flaps were advanced over the defect.  The opposing margins were then approximated with interrupted buried subcutaneous sutures.
Consent (Lip)/Introductory Paragraph: The rationale for Mohs was explained to the patient and consent was obtained. The risks, benefits and alternatives to therapy were discussed in detail. Specifically, the risks of lip deformity, changes in the oral aperture, infection, scarring, bleeding, prolonged wound healing, incomplete removal, allergy to anesthesia, nerve injury and recurrence were addressed. Prior to the procedure, the treatment site was clearly identified and confirmed by the patient. All components of Universal Protocol/PAUSE Rule completed.
Area L Indication Text: Tumors in this location are included in Area L (trunk and extremities).  Mohs surgery is indicated for larger tumors, or tumors with aggressive histologic features, in these anatomic locations.
Modified Advancement Flap Text: The defect edges were debeveled with a #15 scalpel blade.  Given the location of the defect, shape of the defect and the proximity to free margins a modified advancement flap was deemed most appropriate.  Using a sterile surgical marker, an appropriate advancement flap was drawn incorporating the defect and placing the expected incisions within the relaxed skin tension lines where possible.    The area thus outlined was incised deep to adipose tissue with a #15 scalpel blade.  The skin margins were undermined to an appropriate distance in all directions utilizing iris scissors.
O-Z Plasty Text: The defect edges were debeveled with a #15 scalpel blade.  Given the location of the defect, shape of the defect and the proximity to free margins an O-Z plasty (double transposition flap) was deemed most appropriate.  Using a sterile surgical marker, the appropriate transposition flaps were drawn incorporating the defect and placing the expected incisions within the relaxed skin tension lines where possible.    The area thus outlined was incised deep to adipose tissue with a #15 scalpel blade.  The skin margins were undermined to an appropriate distance in all directions utilizing iris scissors.  Hemostasis was achieved with electrocautery.  The flaps were then transposed into place, one clockwise and the other counterclockwise, and anchored with interrupted buried subcutaneous sutures.
Z Plasty Text: The lesion was extirpated to the level of the fat with a #15 scalpel blade.  Given the location of the defect, shape of the defect and the proximity to free margins a Z-plasty was deemed most appropriate for repair.  Using a sterile surgical marker, the appropriate transposition arms of the Z-plasty were drawn incorporating the defect and placing the expected incisions within the relaxed skin tension lines where possible.    The area thus outlined was incised deep to adipose tissue with a #15 scalpel blade.  The skin margins were undermined to an appropriate distance in all directions utilizing iris scissors.  The opposing transposition arms were then transposed into place in opposite direction and anchored with interrupted buried subcutaneous sutures.
Consent (Spinal Accessory)/Introductory Paragraph: The rationale for Mohs was explained to the patient and consent was obtained. The risks, benefits and alternatives to therapy were discussed in detail. Specifically, the risks of damage to the spinal accessory nerve, infection, scarring, bleeding, prolonged wound healing, incomplete removal, allergy to anesthesia, and recurrence were addressed. Prior to the procedure, the treatment site was clearly identified and confirmed by the patient. All components of Universal Protocol/PAUSE Rule completed.
Dressing: dry sterile dressing
Posterior Auricular Interpolation Flap Text: A decision was made to reconstruct the defect utilizing an interpolation axial flap and a staged reconstruction.  A telfa template was made of the defect.  This telfa template was then used to outline the posterior auricular interpolation flap.  The donor area for the pedicle flap was then injected with anesthesia.  The flap was excised through the skin and subcutaneous tissue down to the layer of the underlying musculature.  The pedicle flap was carefully excised within this deep plane to maintain its blood supply.  The edges of the donor site were undermined.   The donor site was closed in a primary fashion.  The pedicle was then rotated into position and sutured.  Once the tube was sutured into place, adequate blood supply was confirmed with blanching and refill.  The pedicle was then wrapped with xeroform gauze and dressed appropriately with a telfa and gauze bandage to ensure continued blood supply and protect the attached pedicle.
Epidermal Closure Graft Donor Site (Optional): simple interrupted
No Repair - Repaired With Adjacent Surgical Defect Text (Leave Blank If You Do Not Want): After obtaining clear surgical margins the defect was repaired concurrently with another surgical defect which was in close approximation.
O-T Advancement Flap Text: The defect edges were debeveled with a #15 scalpel blade.  Given the location of the defect, shape of the defect and the proximity to free margins an O-T advancement flap was deemed most appropriate.  Using a sterile surgical marker, an appropriate advancement flap was drawn incorporating the defect and placing the expected incisions within the relaxed skin tension lines where possible.    The area thus outlined was incised deep to adipose tissue with a #15 scalpel blade.  The skin margins were undermined to an appropriate distance in all directions utilizing iris scissors.
Consent 3/Introductory Paragraph: I gave the patient a chance to ask questions they had about the procedure.  Following this I explained the Mohs procedure and consent was obtained. The risks, benefits and alternatives to therapy were discussed in detail. Specifically, the risks of infection, scarring, bleeding, prolonged wound healing, incomplete removal, allergy to anesthesia, nerve injury and recurrence were addressed. Prior to the procedure, the treatment site was clearly identified and confirmed by the patient. All components of Universal Protocol/PAUSE Rule completed.
Graft Donor Site Epidermal Sutures (Optional): 5-0 Ethibond
Flap Type: Burow's Advancement Flap
Referred To Mid-Level For Closure Text (Leave Blank If You Do Not Want): After obtaining clear surgical margins the patient was sent to a mid-level provider for surgical repair.  The patient understands they will receive post-surgical care and follow-up from the mid-level provider.
Muscle Hinge Flap Text: The defect edges were debeveled with a #15 scalpel blade.  Given the size, depth and location of the defect and the proximity to free margins a muscle hinge flap was deemed most appropriate.  Using a sterile surgical marker, an appropriate hinge flap was drawn incorporating the defect. The area thus outlined was incised with a #15 scalpel blade.  The skin margins were undermined to an appropriate distance in all directions utilizing iris scissors.
Rotation Flap Text: The defect edges were debeveled with a #15 scalpel blade.  Given the location of the defect, shape of the defect and the proximity to free margins a rotation flap was deemed most appropriate.  Using a sterile surgical marker, an appropriate rotation flap was drawn incorporating the defect and placing the expected incisions within the relaxed skin tension lines where possible.    The area thus outlined was incised deep to adipose tissue with a #15 scalpel blade.  The skin margins were undermined to an appropriate distance in all directions utilizing iris scissors.
Advancement-Rotation Flap Text: The defect edges were debeveled with a #15 scalpel blade.  Given the location of the defect, shape of the defect and the proximity to free margins an advancement-rotation flap was deemed most appropriate.  Using a sterile surgical marker, an appropriate flap was drawn incorporating the defect and placing the expected incisions within the relaxed skin tension lines where possible. The area thus outlined was incised deep to adipose tissue with a #15 scalpel blade.  The skin margins were undermined to an appropriate distance in all directions utilizing iris scissors.
Area H Indication Text: Tumors in this location are included in Area H (eyelids, eyebrows, nose, lips, chin, ear, pre-auricular, post-auricular, temple, genitalia, hands, feet, ankles and areola).  Tissue conservation is critical in these anatomic locations.
Tissue Cultured Epidermal Autograft Text: The defect edges were debeveled with a #15 scalpel blade.  Given the location of the defect, shape of the defect and the proximity to free margins a tissue cultured epidermal autograft was deemed most appropriate.  The graft was then trimmed to fit the size of the defect.  The graft was then placed in the primary defect and oriented appropriately.
Tarsorrhaphy Text: A tarsorrhaphy was performed using Frost sutures.
Eye Protection Verbiage: Before proceeding with the stage, a plastic scleral shield was inserted. The globe was anesthetized with a few drops of 1% lidocaine with 1:100,000 epinephrine. Then, an appropriate sized scleral shield was chosen and coated with lacrilube ointment. The shield was gently inserted and left in place for the duration of each stage. After the stage was completed, the shield was gently removed.
Ear Wedge Repair Text: A wedge excision was completed by carrying down an excision through the full thickness of the ear and cartilage with an inward facing Burow's triangle. The wound was then closed in a layered fashion.
Secondary Defect Length In Cm (Required For Flaps): 3.9
Same Histology In Subsequent Stages Text: The pattern and morphology of the tumor is as described in the first stage.
Dermal Autograft Text: The defect edges were debeveled with a #15 scalpel blade.  Given the location of the defect, shape of the defect and the proximity to free margins a dermal autograft was deemed most appropriate.  Using a sterile surgical marker, the primary defect shape was transferred to the donor site. The area thus outlined was incised deep to adipose tissue with a #15 scalpel blade.  The harvested graft was then trimmed of adipose and epidermal tissue until only dermis was left.  The skin graft was then placed in the primary defect and oriented appropriately.
Consent (Ear)/Introductory Paragraph: The rationale for Mohs was explained to the patient and consent was obtained. The risks, benefits and alternatives to therapy were discussed in detail. Specifically, the risks of ear deformity, infection, scarring, bleeding, prolonged wound healing, incomplete removal, allergy to anesthesia, nerve injury and recurrence were addressed. Prior to the procedure, the treatment site was clearly identified and confirmed by the patient. All components of Universal Protocol/PAUSE Rule completed.
Deep Sutures: 5-0 Vicryl
Referred To Otolaryngology For Closure Text (Leave Blank If You Do Not Want): After obtaining clear surgical margins the patient was sent to otolaryngology for surgical repair.  The patient understands they will receive post-surgical care and follow-up from the referring physician's office.
Graft Donor Site Bandage (Optional-Leave Blank If You Don't Want In Note): Aquaphor and telefa placed on wound. Pressure dressing applied to donor site
Bcc Histology Text: There were numerous aggregates of basaloid cells.
Crescentic Advancement Flap Text: The defect edges were debeveled with a #15 scalpel blade.  Given the location of the defect and the proximity to free margins a crescentic advancement flap was deemed most appropriate.  Using a sterile surgical marker, the appropriate advancement flap was drawn incorporating the defect and placing the expected incisions within the relaxed skin tension lines where possible.    The area thus outlined was incised deep to adipose tissue with a #15 scalpel blade.  The skin margins were undermined to an appropriate distance in all directions utilizing iris scissors.
Purse String (Intermediate) Text: Given the location of the defect and the characteristics of the surrounding skin a purse string intermediate closure was deemed most appropriate.  Undermining was performed circumfirentially around the surgical defect.  A purse string suture was then placed and tightened.
Island Pedicle Flap-Requiring Vessel Identification Text: The defect edges were debeveled with a #15 scalpel blade.  Given the location of the defect, shape of the defect and the proximity to free margins an island pedicle advancement flap was deemed most appropriate.  Using a sterile surgical marker, an appropriate advancement flap was drawn, based on the axial vessel mentioned above, incorporating the defect, outlining the appropriate donor tissue and placing the expected incisions within the relaxed skin tension lines where possible.    The area thus outlined was incised deep to adipose tissue with a #15 scalpel blade.  The skin margins were undermined to an appropriate distance in all directions around the primary defect and laterally outward around the island pedicle utilizing iris scissors.  There was minimal undermining beneath the pedicle flap.
Cheiloplasty (Less Than 50%) Text: A decision was made to reconstruct the defect with a  cheiloplasty.  The defect was undermined extensively.  Additional obicularis oris muscle was excised with a 15 blade scalpel.  The defect was converted into a full thickness wedge, of less than 50% of the vertical height of the lip, to facilite a better cosmetic result.  Small vessels were then tied off with 5-0 monocyrl. The obicularis oris, superficial fascia, adipose and dermis were then reapproximated.  After the deeper layers were approximated the epidermis was reapproximated with particular care given to realign the vermilion border.
Unique Flap 4 Text: The defect edges were debeveled with a #15 scalpel blade.  Given the location of the defect and the proximity to free margins a Banner transposition flap was deemed most appropriate.  Using a sterile surgical marker, an appropriate Banner transposition flap was drawn incorporating the defect.    The area thus outlined was incised deep to adipose tissue with a #15 scalpel blade.  The skin margins were undermined to an appropriate distance in all directions utilizing iris scissors.
Bilateral Helical Rim Advancement Flap Text: The defect edges were debeveled with a #15 blade scalpel.  Given the location of the defect and the proximity to free margins (helical rim) a bilateral helical rim advancement flap was deemed most appropriate.  Using a sterile surgical marker, the appropriate advancement flaps were drawn incorporating the defect and placing the expected incisions between the helical rim and antihelix where possible.  The area thus outlined was incised through and through with a #15 scalpel blade.  With a skin hook and iris scissors, the flaps were gently and sharply undermined and freed up.
Star Wedge Flap Text: The defect edges were debeveled with a #15 scalpel blade.  Given the location of the defect, shape of the defect and the proximity to free margins a star wedge flap was deemed most appropriate.  Using a sterile surgical marker, an appropriate rotation flap was drawn incorporating the defect and placing the expected incisions within the relaxed skin tension lines where possible. The area thus outlined was incised deep to adipose tissue with a #15 scalpel blade.  The skin margins were undermined to an appropriate distance in all directions utilizing iris scissors.
Secondary Intention Text (Leave Blank If You Do Not Want): The defect will heal with secondary intention.
Consent 1/Introductory Paragraph: The rationale for Mohs was explained to the patient and consent was obtained. The risks, benefits and alternatives to therapy were discussed in detail. Specifically, the risks of infection, scarring, bleeding, prolonged wound healing, incomplete removal, allergy to anesthesia, nerve injury and recurrence were addressed. Prior to the procedure, the treatment site was clearly identified and confirmed by the patient. All components of Universal Protocol/PAUSE Rule completed.
Ftsg Text: The defect edges were debeveled with a #15 scalpel blade.  Given the location of the defect, shape of the defect and the proximity to free margins a full thickness skin graft was deemed most appropriate.  Using a sterile surgical marker, the primary defect shape was transferred to the donor site. The area thus outlined was incised deep to adipose tissue with a #15 scalpel blade.  The harvested graft was then trimmed of adipose tissue until only dermis and epidermis was left.  The skin margins of the secondary defect were undermined to an appropriate distance in all directions utilizing iris scissors.  The secondary defect was closed with interrupted buried subcutaneous sutures.  The skin edges were then re-apposed with running  sutures.  The skin graft was then placed in the primary defect and oriented appropriately.
Melolabial Interpolation Flap Text: A decision was made to reconstruct the defect utilizing an interpolation axial flap and a staged reconstruction.  A telfa template was made of the defect.  This telfa template was then used to outline the melolabial interpolation flap.  The donor area for the pedicle flap was then injected with anesthesia.  The flap was excised through the skin and subcutaneous tissue down to the layer of the underlying musculature.  The pedicle flap was carefully excised within this deep plane to maintain its blood supply.  The edges of the donor site were undermined.   The donor site was closed in a primary fashion.  The pedicle was then rotated into position and sutured.  Once the tube was sutured into place, adequate blood supply was confirmed with blanching and refill.  The pedicle was then wrapped with xeroform gauze and dressed appropriately with a telfa and gauze bandage to ensure continued blood supply and protect the attached pedicle.
Postop Diagnosis: same
Epidermal Sutures: 5-0 Ethilon
V-Y Plasty Text: The defect edges were debeveled with a #15 scalpel blade.  Given the location of the defect, shape of the defect and the proximity to free margins an V-Y advancement flap was deemed most appropriate.  Using a sterile surgical marker, an appropriate advancement flap was drawn incorporating the defect and placing the expected incisions within the relaxed skin tension lines where possible.    The area thus outlined was incised deep to adipose tissue with a #15 scalpel blade.  The skin margins were undermined to an appropriate distance in all directions utilizing iris scissors.
Mohs Histo Method Verbiage: Each section was then chromacoded and processed in the Mohs lab using the Mohs protocol and submitted for frozen section.
Bilobed Flap Text: The defect edges were debeveled with a #15 scalpel blade.  Given the location of the defect and the proximity to free margins a bilobe flap was deemed most appropriate.  Using a sterile surgical marker, an appropriate bilobe flap drawn around the defect.    The area thus outlined was incised deep to adipose tissue with a #15 scalpel blade.  The skin margins were undermined to an appropriate distance in all directions utilizing iris scissors.
Keystone Flap Text: The defect edges were debeveled with a #15 scalpel blade.  Given the location of the defect, shape of the defect a keystone flap was deemed most appropriate.  Using a sterile surgical marker, an appropriate keystone flap was drawn incorporating the defect, outlining the appropriate donor tissue and placing the expected incisions within the relaxed skin tension lines where possible. The area thus outlined was incised deep to adipose tissue with a #15 scalpel blade.  The skin margins were undermined to an appropriate distance in all directions around the primary defect and laterally outward around the flap utilizing iris scissors.
Surgeon Performing Repair (Optional): Janak
Mohs Case Number: [id number]
Repair Type: Flap
Advancement Flap (Single) Text: The defect edges were debeveled with a #15 scalpel blade.  Given the location of the defect and the proximity to free margins a single advancement flap was deemed most appropriate.  Using a sterile surgical marker, an appropriate advancement flap was drawn incorporating the defect and placing the expected incisions within the relaxed skin tension lines where possible.    The area thus outlined was incised deep to adipose tissue with a #15 scalpel blade.  The skin margins were undermined to an appropriate distance in all directions utilizing iris scissors.
Donor Site Anesthesia Type: same as repair anesthesia
Post-Care Instructions: I reviewed with the patient in detail post-care instructions. Patient is not to engage in any heavy lifting, exercise, or swimming for the next 14 days. Should the patient develop any fevers, chills, bleeding, severe pain patient will contact the office immediately.
Area M Indication Text: Tumors in this location are included in Area M (cheek, forehead, scalp, neck, jawline and pretibial skin).  Mohs surgery is indicated for tumors in these anatomic locations.
Bilobed Transposition Flap Text: The defect edges were debeveled with a #15 scalpel blade.  Given the location of the defect and the proximity to free margins a bilobed transposition flap was deemed most appropriate.  Using a sterile surgical marker, an appropriate bilobe flap drawn around the defect.    The area thus outlined was incised deep to adipose tissue with a #15 scalpel blade.  The skin margins were undermined to an appropriate distance in all directions utilizing iris scissors.
Graft Basting Suture (Optional): 5-0 Fast Absorbing Gut
Mohs Rapid Report Verbiage: The area of clinically evident tumor was marked with skin marking ink and appropriately hatched.  The initial incision was made following the Mohs approach through the skin.  The specimen was taken to the lab, divided into the necessary number of pieces, chromacoded and processed according to the Mohs protocol.  This was repeated in successive stages until a tumor free defect was achieved.
Dorsal Nasal Flap Text: The defect edges were debeveled with a #15 scalpel blade.  Given the location of the defect and the proximity to free margins a dorsal nasal flap,based upon the glabellar folds, was deemed most appropriate.  Using a sterile surgical marker, an appropriate dorsal nasal flap was drawn around the defect.    The area thus outlined was incised deep to adipose tissue with a #15 scalpel blade.  The skin margins were undermined to an appropriate distance in all directions utilizing iris scissors.
Closure 2 Information: This tab is for additional flaps and grafts, including complex repair and grafts and complex repair and flaps. You can also specify a different location for the additional defect, if the location is the same you do not need to select a new one. We will insert the automated text for the repair you select below just as we do for solitary flaps and grafts. Please note that at this time if you select a location with a different insurance zone you will need to override the ICD10 and CPT if appropriate.
Composite Graft Text: The defect edges were debeveled with a #15 scalpel blade.  Given the location of the defect, shape of the defect, the proximity to free margins and the fact the defect was full thickness a composite graft was deemed most appropriate.  The defect was outline and then transferred to the donor site.  A full thickness graft was then excised from the donor site. The graft was then placed in the primary defect, oriented appropriately and then sutured into place.  The secondary defect was then repaired using a primary closure.
Xenograft Text: The defect edges were debeveled with a #15 scalpel blade.  Given the location of the defect, shape of the defect and the proximity to free margins a xenograft was deemed most appropriate.  The graft was then trimmed to fit the size of the defect.  The graft was then placed in the primary defect and oriented appropriately.
Complex Repair And Graft Additional Text (Will Appearing After The Standard Complex Repair Text): The complex repair was not sufficient to completely close the primary defect. The remaining additional defect was repaired with the graft mentioned below.
Referred To Asc For Closure Text (Leave Blank If You Do Not Want): After obtaining clear surgical margins the patient was sent to an ASC for surgical repair.  The patient understands they will receive post-surgical care and follow-up from the ASC physician.
Cheek-To-Nose Interpolation Flap Text: A decision was made to reconstruct the defect utilizing an interpolation axial flap and a staged reconstruction.  A telfa template was made of the defect.  This telfa template was then used to outline the Cheek-To-Nose Interpolation flap.  The donor area for the pedicle flap was then injected with anesthesia.  The flap was excised through the skin and subcutaneous tissue down to the layer of the underlying musculature.  The interpolation flap was carefully excised within this deep plane to maintain its blood supply.  The edges of the donor site were undermined.   The donor site was closed in a primary fashion.  The pedicle was then rotated into position and sutured.  Once the tube was sutured into place, adequate blood supply was confirmed with blanching and refill.  The pedicle was then wrapped with xeroform gauze and dressed appropriately with a telfa and gauze bandage to ensure continued blood supply and protect the attached pedicle.
Hatchet Flap Text: The defect edges were debeveled with a #15 scalpel blade.  Given the location of the defect, shape of the defect and the proximity to free margins a hatchet flap based from the glabella was deemed most appropriate.  Using a sterile surgical marker, an appropriate glabellar hatchet flap was drawn incorporating the defect and placing the expected incisions within the relaxed skin tension lines where possible.    The area thus outlined was incised deep to adipose tissue with a #15 scalpel blade.  The skin margins were undermined to an appropriate distance in all directions utilizing iris scissors.
Partial Purse String (Simple) Text: Given the location of the defect and the characteristics of the surrounding skin a simple purse string closure was deemed most appropriate.  Undermining was performed circumfirentially around the surgical defect.  A purse string suture was then placed and tightened. Wound tension only allowed a partial closure of the circular defect.
Home Suture Removal Text: Patient was provided instructions on removing sutures and will remove their sutures at home.  If they have any questions or difficulties they will call the office.
Burow's Advancement Flap Text: The defect edges were debeveled with a #15 scalpel blade.  Given the location of the defect and the proximity to free margins a Burow's advancement flap was deemed most appropriate.  Using a sterile surgical marker, the appropriate advancement flap was drawn incorporating the defect and placing the expected incisions within the relaxed skin tension lines where possible.    The area thus outlined was incised deep to adipose tissue with a #15 scalpel blade.  The skin margins were undermined to an appropriate distance in all directions utilizing iris scissors.
Non-Graft Cartilage Fenestration Text: The cartilage was fenestrated with a 2mm punch biopsy to help facilitate healing.
No Residual Tumor Seen Histology Text: There were no malignant cells seen in the sections examined.
Wound Care (No Sutures): Petrolatum
Bi-Rhombic Flap Text: The defect edges were debeveled with a #15 scalpel blade.  Given the location of the defect and the proximity to free margins a bi-rhombic flap was deemed most appropriate.  Using a sterile surgical marker, an appropriate rhombic flap was drawn incorporating the defect. The area thus outlined was incised deep to adipose tissue with a #15 scalpel blade.  The skin margins were undermined to an appropriate distance in all directions utilizing iris scissors.
Consent (Marginal Mandibular)/Introductory Paragraph: The rationale for Mohs was explained to the patient and consent was obtained. The risks, benefits and alternatives to therapy were discussed in detail. Specifically, the risks of damage to the marginal mandibular branch of the facial nerve, infection, scarring, bleeding, prolonged wound healing, incomplete removal, allergy to anesthesia, and recurrence were addressed. Prior to the procedure, the treatment site was clearly identified and confirmed by the patient. All components of Universal Protocol/PAUSE Rule completed.
Unique Flap 1 Text: A decision was made to reconstruct the defect utilizing a myocutaneous Island pedicle Flap based on the levator labii superioris muscle.  A telfa template was made of the defect.  This telfa template was then used to outline the myocutaneous flap, based along the meilolabial fold.  The donor area for the pedicle flap was then injected with anesthesia.  The flap was excised through the skin and subcutaneous tissue down to the layer of the underlying musculature.  The myocutaneous flap was carefully excised within this deep plane to maintain its blood supply. Based on the muscle. The edges of the donor site were undermined.   The donor site was closed in a primary fashion to the point of transposition.  The pedicle was then transposed into position and sutured.  Once the flap was sutured into place, adequate blood supply was confirmed with blanching and refill.
Island Pedicle Flap Text: The defect edges were debeveled with a #15 scalpel blade.  Given the location of the defect, shape of the defect and the proximity to free margins an island pedicle advancement flap was deemed most appropriate.  Using a sterile surgical marker, an appropriate advancement flap was drawn incorporating the defect, outlining the appropriate donor tissue and placing the expected incisions within the relaxed skin tension lines where possible.    The area thus outlined was incised deep to adipose tissue with a #15 scalpel blade.  The skin margins were undermined to an appropriate distance in all directions around the primary defect and laterally outward around the island pedicle utilizing iris scissors.  There was minimal undermining beneath the pedicle flap.
Unique Flap 3 Name: Mercedes Flap
Alar Island Pedicle Flap Text: The defect edges were debeveled with a #15 scalpel blade.  Given the location of the defect, shape of the defect and the proximity to the alar rim an island pedicle advancement flap was deemed most appropriate.  Using a sterile surgical marker, an appropriate advancement flap was drawn incorporating the defect, outlining the appropriate donor tissue and placing the expected incisions within the nasal ala running parallel to the alar rim. The area thus outlined was incised with a #15 scalpel blade.  The skin margins were undermined minimally to an appropriate distance in all directions around the primary defect and laterally outward around the island pedicle utilizing iris scissors.  There was minimal undermining beneath the pedicle flap.

## 2018-09-11 ENCOUNTER — APPOINTMENT (RX ONLY)
Dept: URBAN - METROPOLITAN AREA CLINIC 36 | Facility: CLINIC | Age: 78
Setting detail: DERMATOLOGY
End: 2018-09-11

## 2018-09-11 DIAGNOSIS — Z48.02 ENCOUNTER FOR REMOVAL OF SUTURES: ICD-10-CM

## 2018-09-11 PROCEDURE — 99024 POSTOP FOLLOW-UP VISIT: CPT

## 2018-09-11 PROCEDURE — ? SUTURE REMOVAL (GLOBAL PERIOD)

## 2018-09-11 ASSESSMENT — LOCATION DETAILED DESCRIPTION DERM: LOCATION DETAILED: RIGHT MID TEMPLE

## 2018-09-11 ASSESSMENT — LOCATION ZONE DERM: LOCATION ZONE: FACE

## 2018-09-11 ASSESSMENT — LOCATION SIMPLE DESCRIPTION DERM: LOCATION SIMPLE: RIGHT TEMPLE

## 2018-09-11 NOTE — PROCEDURE: SUTURE REMOVAL (GLOBAL PERIOD)
Add 90311 Cpt? (Important Note: In 2017 The Use Of 28626 Is Being Tracked By Cms To Determine Future Global Period Reimbursement For Global Periods): yes
Body Location Override (Optional - Billing Will Still Be Based On Selected Body Map Location If Applicable): right mid temple
Detail Level: Detailed

## 2018-09-19 RX ORDER — SEVELAMER CARBONATE 800 MG/1
TABLET, FILM COATED ORAL
Qty: 270 TAB | Refills: 7 | Status: SHIPPED | OUTPATIENT
Start: 2018-09-19 | End: 2019-11-30 | Stop reason: SDUPTHER

## 2018-10-31 NOTE — ADDENDUM NOTE
Addended by: ROSY DUKES on: 6/23/2017 03:46 PM     Modules accepted: Medications     Detail Level: Zone

## 2018-11-02 RX ORDER — TAMSULOSIN HYDROCHLORIDE 0.4 MG/1
0.4 CAPSULE ORAL DAILY
Qty: 90 CAP | Refills: 3 | Status: SHIPPED | OUTPATIENT
Start: 2018-11-02 | End: 2019-11-01 | Stop reason: SDUPTHER

## 2018-11-21 DIAGNOSIS — J47.9 BRONCHIECTASIS WITHOUT COMPLICATION (HCC): ICD-10-CM

## 2018-11-21 RX ORDER — SODIUM CHLORIDE FOR INHALATION 7 %
VIAL, NEBULIZER (ML) INHALATION
Qty: 240 ML | Refills: 11 | Status: SHIPPED | OUTPATIENT
Start: 2018-11-21 | End: 2019-11-25 | Stop reason: SDUPTHER

## 2018-11-21 NOTE — TELEPHONE ENCOUNTER
Have we ever prescribed this med? Yes.  If yes, what date? 06/22/2018    Last OV: 05/29/2018 - Dr. Gutierrez    Next OV: 11/27/2018 - Maty Sprague    DX: Bronchiectasis    Medications: Sodium Chloride

## 2018-11-27 ENCOUNTER — OFFICE VISIT (OUTPATIENT)
Dept: PULMONOLOGY | Facility: HOSPICE | Age: 78
End: 2018-11-27
Payer: MEDICARE

## 2018-11-27 VITALS
RESPIRATION RATE: 16 BRPM | SYSTOLIC BLOOD PRESSURE: 124 MMHG | HEIGHT: 68 IN | HEART RATE: 91 BPM | DIASTOLIC BLOOD PRESSURE: 60 MMHG | OXYGEN SATURATION: 95 % | BODY MASS INDEX: 21.98 KG/M2 | WEIGHT: 145 LBS | TEMPERATURE: 98.4 F

## 2018-11-27 DIAGNOSIS — M31.30 WEGENER'S GRANULOMATOSIS: ICD-10-CM

## 2018-11-27 DIAGNOSIS — Z79.01 CHRONIC ANTICOAGULATION: ICD-10-CM

## 2018-11-27 DIAGNOSIS — Z87.891 FORMER SMOKER: ICD-10-CM

## 2018-11-27 DIAGNOSIS — Z95.0 PACEMAKER: ICD-10-CM

## 2018-11-27 DIAGNOSIS — N18.6 ESRD ON DIALYSIS (HCC): Chronic | ICD-10-CM

## 2018-11-27 DIAGNOSIS — J44.9 CHRONIC OBSTRUCTIVE PULMONARY DISEASE, UNSPECIFIED COPD TYPE (HCC): Chronic | ICD-10-CM

## 2018-11-27 DIAGNOSIS — Z22.39 PSEUDOMONAS AERUGINOSA COLONIZATION: Chronic | ICD-10-CM

## 2018-11-27 DIAGNOSIS — D84.9 IMMUNOSUPPRESSED STATUS (HCC): Chronic | ICD-10-CM

## 2018-11-27 DIAGNOSIS — R91.8 ABNORMAL CT SCAN OF LUNG: Chronic | ICD-10-CM

## 2018-11-27 DIAGNOSIS — J47.1 BRONCHIECTASIS WITH ACUTE EXACERBATION (HCC): ICD-10-CM

## 2018-11-27 DIAGNOSIS — I48.91 ATRIAL FIBRILLATION, UNSPECIFIED TYPE (HCC): ICD-10-CM

## 2018-11-27 DIAGNOSIS — G47.33 OSA (OBSTRUCTIVE SLEEP APNEA): Chronic | ICD-10-CM

## 2018-11-27 DIAGNOSIS — Z99.2 ESRD ON DIALYSIS (HCC): Chronic | ICD-10-CM

## 2018-11-27 DIAGNOSIS — J84.9 ILD (INTERSTITIAL LUNG DISEASE) (HCC): ICD-10-CM

## 2018-11-27 PROBLEM — J10.00 PNEUMONIA OF BOTH LUNGS DUE TO INFLUENZA A VIRUS: Status: RESOLVED | Noted: 2017-05-14 | Resolved: 2018-11-27

## 2018-11-27 PROBLEM — J15.1 PNEUMONIA OF BOTH LUNGS DUE TO PSEUDOMONAS SPECIES (HCC): Status: RESOLVED | Noted: 2017-06-06 | Resolved: 2018-11-27

## 2018-11-27 PROBLEM — J96.21 ACUTE ON CHRONIC RESPIRATORY FAILURE WITH HYPOXIA (HCC): Status: RESOLVED | Noted: 2017-05-14 | Resolved: 2018-11-27

## 2018-11-27 PROCEDURE — 99214 OFFICE O/P EST MOD 30 MIN: CPT | Performed by: NURSE PRACTITIONER

## 2018-11-27 RX ORDER — ALBUTEROL SULFATE 90 UG/1
2 AEROSOL, METERED RESPIRATORY (INHALATION) EVERY 6 HOURS PRN
Status: ON HOLD | COMMUNITY
End: 2019-03-03

## 2018-11-27 RX ORDER — CIPROFLOXACIN 500 MG/1
500 TABLET, FILM COATED ORAL 2 TIMES DAILY
Qty: 28 TAB | Refills: 0 | Status: ON HOLD | OUTPATIENT
Start: 2018-11-27 | End: 2019-02-24

## 2018-11-27 RX ORDER — RANITIDINE 150 MG/1
150 TABLET ORAL 2 TIMES DAILY PRN
COMMUNITY
End: 2019-07-24

## 2018-12-09 ENCOUNTER — PATIENT MESSAGE (OUTPATIENT)
Dept: PULMONOLOGY | Facility: HOSPICE | Age: 78
End: 2018-12-09

## 2018-12-10 RX ORDER — PREDNISONE 10 MG/1
TABLET ORAL
Qty: 90 TAB | Refills: 0 | Status: SHIPPED | OUTPATIENT
Start: 2018-12-10 | End: 2019-01-22

## 2018-12-10 NOTE — TELEPHONE ENCOUNTER
From: Gilberto Brown  To: ELSI Aldrich  Sent: 12/9/2018 2:34 PM PST  Subject: Prescription Question      Maty ... i will need prescription(s)for new CPAP pieces. did you talk with your lady? Gilberto Brown

## 2018-12-10 NOTE — TELEPHONE ENCOUNTER
Was the patient seen in the last year in this department? Yes  [facility]     Does patient have an active prescription for medications requested? No     Received Request Via: Pharmacy

## 2018-12-11 ENCOUNTER — APPOINTMENT (OUTPATIENT)
Dept: RHEUMATOLOGY | Facility: MEDICAL CENTER | Age: 78
End: 2018-12-11
Payer: MEDICARE

## 2018-12-14 DIAGNOSIS — Z22.39 PSEUDOMONAS AERUGINOSA COLONIZATION: ICD-10-CM

## 2018-12-18 ENCOUNTER — APPOINTMENT (OUTPATIENT)
Dept: RHEUMATOLOGY | Facility: MEDICAL CENTER | Age: 78
End: 2018-12-18
Payer: MEDICARE

## 2018-12-18 ENCOUNTER — APPOINTMENT (RX ONLY)
Dept: URBAN - METROPOLITAN AREA CLINIC 4 | Facility: CLINIC | Age: 78
Setting detail: DERMATOLOGY
End: 2018-12-18

## 2018-12-18 DIAGNOSIS — D485 NEOPLASM OF UNCERTAIN BEHAVIOR OF SKIN: ICD-10-CM

## 2018-12-18 PROBLEM — D48.5 NEOPLASM OF UNCERTAIN BEHAVIOR OF SKIN: Status: ACTIVE | Noted: 2018-12-18

## 2018-12-18 PROBLEM — C44.329 SQUAMOUS CELL CARCINOMA OF SKIN OF OTHER PARTS OF FACE: Status: ACTIVE | Noted: 2018-12-18

## 2018-12-18 PROCEDURE — 13132 CMPLX RPR F/C/C/M/N/AX/G/H/F: CPT

## 2018-12-18 PROCEDURE — 11100: CPT | Mod: 59

## 2018-12-18 PROCEDURE — 88331 PATH CONSLTJ SURG 1 BLK 1SPC: CPT | Mod: 59

## 2018-12-18 PROCEDURE — ? MOHS SURGERY

## 2018-12-18 PROCEDURE — ? BIOPSY BY SHAVE METHOD WITH FROZEN SECTION

## 2018-12-18 PROCEDURE — 17312 MOHS ADDL STAGE: CPT

## 2018-12-18 PROCEDURE — 17311 MOHS 1 STAGE H/N/HF/G: CPT

## 2018-12-18 ASSESSMENT — LOCATION SIMPLE DESCRIPTION DERM: LOCATION SIMPLE: LEFT CHEEK

## 2018-12-18 ASSESSMENT — LOCATION ZONE DERM: LOCATION ZONE: FACE

## 2018-12-18 ASSESSMENT — LOCATION DETAILED DESCRIPTION DERM: LOCATION DETAILED: LEFT INFERIOR PREAURICULAR CHEEK

## 2018-12-18 NOTE — PROCEDURE: BIOPSY BY SHAVE METHOD WITH FROZEN SECTION
Processing Note: The specimen was frozen in the cryostat, sectioned and stained.
Biopsy Method: Dermablade
Anesthesia Volume In Cc: 0.5
Gross Description: 1 cm x 0.3 mm shave specimen
Depth Of Biopsy: dermis
Bill 97438 For Specimen Handling/Conveyance To Laboratory?: no
Post-Care Instructions: I reviewed with the patient in detail post-care instructions. Patient is to keep the biopsy site dry overnight, and then apply bacitracin twice daily until healed. Patient may apply hydrogen peroxide soaks to remove any crusting.
Size Of Lesion In Cm (Optional): 1
Hemostasis: Drysol
Billing Type: Third-Party Bill
Accession #: [id number]
Microscopic Description: Specimen clearly demonstrates 2 malignant tumor types: Basal Cell Carcinoma and Squamous Cell Carcinoma.
Notification Instructions: Patient will be notified of biopsy results. However, patient instructed to call the office if not contacted within 2 weeks.
Biopsy Type: Frozen Section
Anesthesia Type: 1% lidocaine with epinephrine
Consent: Written consent was obtained and risks were reviewed including but not limited to scarring, infection, bleeding, scabbing, incomplete removal, nerve damage and allergy to anesthesia.
Detail Level: Detailed
Comment: Mohs to be performed now.
Additional Anesthesia Volume In Cc (Will Not Render If 0): 0
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
Wound Care: Petrolatum

## 2018-12-18 NOTE — PROCEDURE: MOHS SURGERY
Advancement Flap (Single) Text: The defect edges were debeveled with a #15 scalpel blade.  Given the location of the defect and the proximity to free margins a single advancement flap was deemed most appropriate.  Using a sterile surgical marker, an appropriate advancement flap was drawn incorporating the defect and placing the expected incisions within the relaxed skin tension lines where possible.    The area thus outlined was incised deep to adipose tissue with a #15 scalpel blade.  The skin margins were undermined to an appropriate distance in all directions utilizing iris scissors.
Show Special Stain Variables In The Stage Tabs: Yes
V-Y Plasty Text: The defect edges were debeveled with a #15 scalpel blade.  Given the location of the defect, shape of the defect and the proximity to free margins an V-Y advancement flap was deemed most appropriate.  Using a sterile surgical marker, an appropriate advancement flap was drawn incorporating the defect and placing the expected incisions within the relaxed skin tension lines where possible.    The area thus outlined was incised deep to adipose tissue with a #15 scalpel blade.  The skin margins were undermined to an appropriate distance in all directions utilizing iris scissors.
Asc Procedure Text (F): After obtaining clear surgical margins the patient was sent to an ASC for surgical repair.  The patient understands they will receive post-surgical care and follow-up from the ASC physician.
Unique Flap 3 Text: The defect edges were debeveled with a #15 scalpel blade.  Given the location of the defect, shape of the defect and the proximity to free margins a Mercedes (double advancement flap) was deemed most appropriate.  Using a sterile surgical marker, the appropriate transposition flaps were drawn incorporating the defect and placing the expected incisions within the relaxed skin tension lines where possible.    The area thus outlined was incised deep to adipose tissue with a #15 scalpel blade.  The skin margins were undermined to an appropriate distance in all directions utilizing iris scissors.  Hemostasis was achieved with electrocautery.  The flaps were then advanced into the defect and anchored with interrupted buried subcutaneous sutures.
Consent (Ear)/Introductory Paragraph: The rationale for Mohs was explained to the patient and consent was obtained. The risks, benefits and alternatives to therapy were discussed in detail. Specifically, the risks of ear deformity, infection, scarring, bleeding, prolonged wound healing, incomplete removal, allergy to anesthesia, nerve injury and recurrence were addressed. Prior to the procedure, the treatment site was clearly identified and confirmed by the patient. All components of Universal Protocol/PAUSE Rule completed.
Cartilage Fenestration Performed?: No
Lazy S Complex Repair Preamble Text (Leave Blank If You Do Not Want): Extensive wide undermining was performed at least 2 cm in all directions.
Stage 1: Number Of Blocks?: 1
Helical Rim Advancement Flap Text: The defect edges were debeveled with a #15 blade scalpel.  Given the location of the defect and the proximity to free margins (helical rim) a double helical rim advancement flap was deemed most appropriate.  Using a sterile surgical marker, the appropriate advancement flaps were drawn incorporating the defect and placing the expected incisions between the helical rim and antihelix where possible.  The area thus outlined was incised through and through with a #15 scalpel blade.  With a skin hook and iris scissors, the flaps were gently and sharply undermined and freed up.
Skin Substitute Text: The defect edges were debeveled with a #15 scalpel blade.  Given the location of the defect, shape of the defect and the proximity to free margins a skin substitute graft was deemed most appropriate.  The graft material was trimmed to fit the size of the defect. The graft was then placed in the primary defect and oriented appropriately.
Stage 9: Additional Anesthesia Type: 1% lidocaine with epinephrine
Donor Site Anesthesia Volume In Cc: 0
Cheek Interpolation Flap Text: A decision was made to reconstruct the defect utilizing an interpolation axial flap and a staged reconstruction.  A telfa template was made of the defect.  This telfa template was then used to outline the Cheek Interpolation flap.  The donor area for the pedicle flap was then injected with anesthesia.  The flap was excised through the skin and subcutaneous tissue down to the layer of the underlying musculature.  The interpolation flap was carefully excised within this deep plane to maintain its blood supply.  The edges of the donor site were undermined.   The donor site was closed in a primary fashion.  The pedicle was then rotated into position and sutured.  Once the tube was sutured into place, adequate blood supply was confirmed with blanching and refill.  The pedicle was then wrapped with xeroform gauze and dressed appropriately with a telfa and gauze bandage to ensure continued blood supply and protect the attached pedicle.
Otolaryngologist Procedure Text (D): After obtaining clear surgical margins the patient was sent to otolaryngology for surgical repair.  The patient understands they will receive post-surgical care and follow-up from the referring physician's office.
A-T Advancement Flap Text: The defect edges were debeveled with a #15 scalpel blade.  Given the location of the defect, shape of the defect and the proximity to free margins an A-T advancement flap was deemed most appropriate.  Using a sterile surgical marker, an appropriate advancement flap was drawn incorporating the defect and placing the expected incisions within the relaxed skin tension lines where possible.    The area thus outlined was incised deep to adipose tissue with a #15 scalpel blade.  The skin margins were undermined to an appropriate distance in all directions utilizing iris scissors.
Bilobed Flap Text: The defect edges were debeveled with a #15 scalpel blade.  Given the location of the defect and the proximity to free margins a bilobe flap was deemed most appropriate.  Using a sterile surgical marker, an appropriate bilobe flap drawn around the defect.    The area thus outlined was incised deep to adipose tissue with a #15 scalpel blade.  The skin margins were undermined to an appropriate distance in all directions utilizing iris scissors.
Detail Level: Detailed
Purse String (Intermediate) Text: Given the location of the defect and the characteristics of the surrounding skin a purse string intermediate closure was deemed most appropriate.  Undermining was performed circumfirentially around the surgical defect.  A purse string suture was then placed and tightened.
Oculoplastic Surgeon Procedure Text (A): After obtaining clear surgical margins the patient was sent to oculoplastics for surgical repair.  The patient understands they will receive post-surgical care and follow-up from the referring physician's office.
Lazy S Intermediate Repair Preamble Text (Leave Blank If You Do Not Want): Undermining was performed with blunt dissection.
Provider Procedure Text (D): After obtaining clear surgical margins the defect was repaired by another provider.
Advancement-Rotation Flap Text: The defect edges were debeveled with a #15 scalpel blade.  Given the location of the defect, shape of the defect and the proximity to free margins an advancement-rotation flap was deemed most appropriate.  Using a sterile surgical marker, an appropriate flap was drawn incorporating the defect and placing the expected incisions within the relaxed skin tension lines where possible. The area thus outlined was incised deep to adipose tissue with a #15 scalpel blade.  The skin margins were undermined to an appropriate distance in all directions utilizing iris scissors.
Mastoid Interpolation Flap Text: A decision was made to reconstruct the defect utilizing an interpolation axial flap and a staged reconstruction.  A telfa template was made of the defect.  This telfa template was then used to outline the mastoid interpolation flap.  The donor area for the pedicle flap was then injected with anesthesia.  The flap was excised through the skin and subcutaneous tissue down to the layer of the underlying musculature.  The pedicle flap was carefully excised within this deep plane to maintain its blood supply.  The edges of the donor site were undermined.   The donor site was closed in a primary fashion.  The pedicle was then rotated into position and sutured.  Once the tube was sutured into place, adequate blood supply was confirmed with blanching and refill.  The pedicle was then wrapped with xeroform gauze and dressed appropriately with a telfa and gauze bandage to ensure continued blood supply and protect the attached pedicle.
Area L Indication Text: Tumors in this location are included in Area L (trunk and extremities).  Mohs surgery is indicated for larger tumors, or tumors with aggressive histologic features, in these anatomic locations.
Surgeon Performing Repair (Optional): Janak
Complex Repair And Flap Additional Text (Will Appearing After The Standard Complex Repair Text): The complex repair was not sufficient to completely close the primary defect. The remaining additional defect was repaired with the flap mentioned below.
Secondary Intention Text (Leave Blank If You Do Not Want): The defect will heal with secondary intention.
Island Pedicle Flap With Canthal Suspension Text: The defect edges were debeveled with a #15 scalpel blade.  Given the location of the defect, shape of the defect and the proximity to free margins an island pedicle advancement flap was deemed most appropriate.  Using a sterile surgical marker, an appropriate advancement flap was drawn incorporating the defect, outlining the appropriate donor tissue and placing the expected incisions within the relaxed skin tension lines where possible. The area thus outlined was incised deep to adipose tissue with a #15 scalpel blade.  The skin margins were undermined to an appropriate distance in all directions around the primary defect and laterally outward around the island pedicle utilizing iris scissors.  There was minimal undermining beneath the pedicle flap. A suspension suture was placed in the canthal tendon to prevent tension and prevent ectropion.
Mid-Level Procedure Text (B): After obtaining clear surgical margins the patient was sent to a mid-level provider for surgical repair.  The patient understands they will receive post-surgical care and follow-up from the mid-level provider.
Estimated Blood Loss (Cc): less than 5 cc
Anesthesia Volume In Cc: 6
Ear Wedge Repair Text: A wedge excision was completed by carrying down an excision through the full thickness of the ear and cartilage with an inward facing Burow's triangle. The wound was then closed in a layered fashion.
Inflammation Suggestive Of Cancer Camouflage Histology Text: There was a dense lymphocytic infiltrate which prevented adequate histologic evaluation of adjacent structures.
Alternatives Discussed Intro (Do Not Add Period): I discussed alternative treatments to Mohs surgery and specifically discussed the risks and benefits of
Epidermal Closure: running cuticular
Primary Defect Length In Cm (Final Defect Size - Required For Flaps/Grafts): 3
Rotation Flap Text: The defect edges were debeveled with a #15 scalpel blade.  Given the location of the defect, shape of the defect and the proximity to free margins a rotation flap was deemed most appropriate.  Using a sterile surgical marker, an appropriate rotation flap was drawn incorporating the defect and placing the expected incisions within the relaxed skin tension lines where possible.    The area thus outlined was incised deep to adipose tissue with a #15 scalpel blade.  The skin margins were undermined to an appropriate distance in all directions utilizing iris scissors.
Mohs Histo Method Verbiage: Each section was then chromacoded and processed in the Mohs lab using the Mohs protocol and submitted for frozen section.
Keystone Flap Text: The defect edges were debeveled with a #15 scalpel blade.  Given the location of the defect, shape of the defect a keystone flap was deemed most appropriate.  Using a sterile surgical marker, an appropriate keystone flap was drawn incorporating the defect, outlining the appropriate donor tissue and placing the expected incisions within the relaxed skin tension lines where possible. The area thus outlined was incised deep to adipose tissue with a #15 scalpel blade.  The skin margins were undermined to an appropriate distance in all directions around the primary defect and laterally outward around the flap utilizing iris scissors.
Unique Flap 3 Name: Mercedes Flap
Location Indication Override (Is Already Calculated Based On Selected Body Location): Area H
Referred To Plastics For Closure Text (Leave Blank If You Do Not Want): After obtaining clear surgical margins the patient was sent to plastics for surgical repair.  The patient understands they will receive post-surgical care and follow-up from the referring physician's office.
Consent (Temporal Branch)/Introductory Paragraph: The rationale for Mohs was explained to the patient and consent was obtained. The risks, benefits and alternatives to therapy were discussed in detail. Specifically, the risks of damage to the temporal branch of the facial nerve, infection, scarring, bleeding, prolonged wound healing, incomplete removal, allergy to anesthesia, and recurrence were addressed. Prior to the procedure, the treatment site was clearly identified and confirmed by the patient. All components of Universal Protocol/PAUSE Rule completed.
Muscle Hinge Flap Text: The defect edges were debeveled with a #15 scalpel blade.  Given the size, depth and location of the defect and the proximity to free margins a muscle hinge flap was deemed most appropriate.  Using a sterile surgical marker, an appropriate hinge flap was drawn incorporating the defect. The area thus outlined was incised with a #15 scalpel blade.  The skin margins were undermined to an appropriate distance in all directions utilizing iris scissors.
Cartilage Graft Text: The defect edges were debeveled with a #15 scalpel blade.  Given the location of the defect, shape of the defect, the fact the defect involved a full thickness cartilage defect a cartilage graft was deemed most appropriate.  An appropriate donor site was identified, cleansed, and anesthetized. The cartilage graft was then harvested and transferred to the recipient site, oriented appropriately and then sutured into place.  The secondary defect was then repaired using a primary closure.
Mohs Rapid Report Verbiage: The area of clinically evident tumor was marked with skin marking ink and appropriately hatched.  The initial incision was made following the Mohs approach through the skin.  The specimen was taken to the lab, divided into the necessary number of pieces, chromacoded and processed according to the Mohs protocol.  This was repeated in successive stages until a tumor free defect was achieved.
Stage 2: Additional Anesthesia Type: 1% lidocaine with 1:100,000 epinephrine and 408mcg clindamycin/ml and a 1:10 solution of 8.4% sodium bicarbonate
H Plasty Text: Given the location of the defect, shape of the defect and the proximity to free margins a H-plasty was deemed most appropriate for repair.  Using a sterile surgical marker, the appropriate advancement arms of the H-plasty were drawn incorporating the defect and placing the expected incisions within the relaxed skin tension lines where possible. The area thus outlined was incised deep to adipose tissue with a #15 scalpel blade. The skin margins were undermined to an appropriate distance in all directions utilizing iris scissors.  The opposing advancement arms were then advanced into place in opposite direction and anchored with interrupted buried subcutaneous sutures.
Closure 2 Information: This tab is for additional flaps and grafts, including complex repair and grafts and complex repair and flaps. You can also specify a different location for the additional defect, if the location is the same you do not need to select a new one. We will insert the automated text for the repair you select below just as we do for solitary flaps and grafts. Please note that at this time if you select a location with a different insurance zone you will need to override the ICD10 and CPT if appropriate.
Simple / Intermediate / Complex Repair - Final Wound Length In Cm: 4.4
Advancement Flap (Double) Text: The defect edges were debeveled with a #15 scalpel blade.  Given the location of the defect and the proximity to free margins a double advancement flap was deemed most appropriate.  Using a sterile surgical marker, the appropriate advancement flaps were drawn incorporating the defect and placing the expected incisions within the relaxed skin tension lines where possible.    The area thus outlined was incised deep to adipose tissue with a #15 scalpel blade.  The skin margins were undermined to an appropriate distance in all directions utilizing iris scissors.
Unna Boot Text: An Unna boot was placed to help immobilize the limb and facilitate more rapid healing.
Deep Sutures: 5-0 Vicryl
Consent (Nose)/Introductory Paragraph: The rationale for Mohs was explained to the patient and consent was obtained. The risks, benefits and alternatives to therapy were discussed in detail. Specifically, the risks of nasal deformity, changes in the flow of air through the nose, infection, scarring, bleeding, prolonged wound healing, incomplete removal, allergy to anesthesia, nerve injury and recurrence were addressed. Prior to the procedure, the treatment site was clearly identified and confirmed by the patient. All components of Universal Protocol/PAUSE Rule completed.
Tissue Cultured Epidermal Autograft Text: The defect edges were debeveled with a #15 scalpel blade.  Given the location of the defect, shape of the defect and the proximity to free margins a tissue cultured epidermal autograft was deemed most appropriate.  The graft was then trimmed to fit the size of the defect.  The graft was then placed in the primary defect and oriented appropriately.
Bilateral Helical Rim Advancement Flap Text: The defect edges were debeveled with a #15 blade scalpel.  Given the location of the defect and the proximity to free margins (helical rim) a bilateral helical rim advancement flap was deemed most appropriate.  Using a sterile surgical marker, the appropriate advancement flaps were drawn incorporating the defect and placing the expected incisions between the helical rim and antihelix where possible.  The area thus outlined was incised through and through with a #15 scalpel blade.  With a skin hook and iris scissors, the flaps were gently and sharply undermined and freed up.
M-Plasty Complex Repair Preamble Text (Leave Blank If You Do Not Want): Extensive wide undermining was performed.
Graft Basting Suture (Optional): 5-0 Fast Absorbing Gut
Anesthesia Type: 1% lidocaine with 1:100,000 epinephrine and a 1:10 solution of 8.4% sodium bicarbonate
O-T Advancement Flap Text: The defect edges were debeveled with a #15 scalpel blade.  Given the location of the defect, shape of the defect and the proximity to free margins an O-T advancement flap was deemed most appropriate.  Using a sterile surgical marker, an appropriate advancement flap was drawn incorporating the defect and placing the expected incisions within the relaxed skin tension lines where possible.    The area thus outlined was incised deep to adipose tissue with a #15 scalpel blade.  The skin margins were undermined to an appropriate distance in all directions utilizing iris scissors.
Cheek-To-Nose Interpolation Flap Text: A decision was made to reconstruct the defect utilizing an interpolation axial flap and a staged reconstruction.  A telfa template was made of the defect.  This telfa template was then used to outline the Cheek-To-Nose Interpolation flap.  The donor area for the pedicle flap was then injected with anesthesia.  The flap was excised through the skin and subcutaneous tissue down to the layer of the underlying musculature.  The interpolation flap was carefully excised within this deep plane to maintain its blood supply.  The edges of the donor site were undermined.   The donor site was closed in a primary fashion.  The pedicle was then rotated into position and sutured.  Once the tube was sutured into place, adequate blood supply was confirmed with blanching and refill.  The pedicle was then wrapped with xeroform gauze and dressed appropriately with a telfa and gauze bandage to ensure continued blood supply and protect the attached pedicle.
Post-Care Instructions: I reviewed with the patient in detail post-care instructions. Patient is not to engage in any heavy lifting, exercise, or swimming for the next 14 days. Should the patient develop any fevers, chills, bleeding, severe pain patient will contact the office immediately.
Bilobed Transposition Flap Text: The defect edges were debeveled with a #15 scalpel blade.  Given the location of the defect and the proximity to free margins a bilobed transposition flap was deemed most appropriate.  Using a sterile surgical marker, an appropriate bilobe flap drawn around the defect.    The area thus outlined was incised deep to adipose tissue with a #15 scalpel blade.  The skin margins were undermined to an appropriate distance in all directions utilizing iris scissors.
Partial Purse String (Simple) Text: Given the location of the defect and the characteristics of the surrounding skin a simple purse string closure was deemed most appropriate.  Undermining was performed circumfirentially around the surgical defect.  A purse string suture was then placed and tightened. Wound tension only allowed a partial closure of the circular defect.
Dressing: dry sterile dressing
Repair Anesthesia Method: local infiltration
Mercedes Flap Text: The defect edges were debeveled with a #15 scalpel blade.  Given the location of the defect, shape of the defect and the proximity to free margins a Mercedes flap was deemed most appropriate.  Using a sterile surgical marker, an appropriate advancement flap was drawn incorporating the defect and placing the expected incisions within the relaxed skin tension lines where possible. The area thus outlined was incised deep to adipose tissue with a #15 scalpel blade.  The skin margins were undermined to an appropriate distance in all directions utilizing iris scissors.
Posterior Auricular Interpolation Flap Text: A decision was made to reconstruct the defect utilizing an interpolation axial flap and a staged reconstruction.  A telfa template was made of the defect.  This telfa template was then used to outline the posterior auricular interpolation flap.  The donor area for the pedicle flap was then injected with anesthesia.  The flap was excised through the skin and subcutaneous tissue down to the layer of the underlying musculature.  The pedicle flap was carefully excised within this deep plane to maintain its blood supply.  The edges of the donor site were undermined.   The donor site was closed in a primary fashion.  The pedicle was then rotated into position and sutured.  Once the tube was sutured into place, adequate blood supply was confirmed with blanching and refill.  The pedicle was then wrapped with xeroform gauze and dressed appropriately with a telfa and gauze bandage to ensure continued blood supply and protect the attached pedicle.
Paramedian Forehead Flap Text: A decision was made to reconstruct the defect utilizing an interpolation axial flap and a staged reconstruction.  A telfa template was made of the defect.  This telfa template was then used to outline the paramedian forehead pedicle flap.  The donor area for the pedicle flap was then injected with anesthesia.  The flap was excised through the skin and subcutaneous tissue down to the layer of the underlying musculature.  The pedicle flap was carefully excised within this deep plane to maintain its blood supply.  The edges of the donor site were undermined.   The donor site was closed in a primary fashion.  The pedicle was then rotated into position and sutured.  Once the tube was sutured into place, adequate blood supply was confirmed with blanching and refill.  The pedicle was then wrapped with xeroform gauze and dressed appropriately with a telfa and gauze bandage to ensure continued blood supply and protect the attached pedicle.
Mauc Instructions: By selecting yes to the question below the MAUC number will be added into the note.  This will be calculated automatically based on the diagnosis chosen, the size entered, the body zone selected (H,M,L) and the specific indications you chose. You will also have the option to override the Mohs AUC if you disagree with the automatically calculated number and this option is found in the Case Summary tab.
Initial Size Of Lesion: 0.8
Eye Protection Verbiage: Before proceeding with the stage, a plastic scleral shield was inserted. The globe was anesthetized with a few drops of 1% lidocaine with 1:100,000 epinephrine. Then, an appropriate sized scleral shield was chosen and coated with lacrilube ointment. The shield was gently inserted and left in place for the duration of each stage. After the stage was completed, the shield was gently removed.
Modified Advancement Flap Text: The defect edges were debeveled with a #15 scalpel blade.  Given the location of the defect, shape of the defect and the proximity to free margins a modified advancement flap was deemed most appropriate.  Using a sterile surgical marker, an appropriate advancement flap was drawn incorporating the defect and placing the expected incisions within the relaxed skin tension lines where possible.    The area thus outlined was incised deep to adipose tissue with a #15 scalpel blade.  The skin margins were undermined to an appropriate distance in all directions utilizing iris scissors.
Alar Island Pedicle Flap Text: The defect edges were debeveled with a #15 scalpel blade.  Given the location of the defect, shape of the defect and the proximity to the alar rim an island pedicle advancement flap was deemed most appropriate.  Using a sterile surgical marker, an appropriate advancement flap was drawn incorporating the defect, outlining the appropriate donor tissue and placing the expected incisions within the nasal ala running parallel to the alar rim. The area thus outlined was incised with a #15 scalpel blade.  The skin margins were undermined minimally to an appropriate distance in all directions around the primary defect and laterally outward around the island pedicle utilizing iris scissors.  There was minimal undermining beneath the pedicle flap.
Complex Repair And Graft Additional Text (Will Appearing After The Standard Complex Repair Text): The complex repair was not sufficient to completely close the primary defect. The remaining additional defect was repaired with the graft mentioned below.
No Repair - Repaired With Adjacent Surgical Defect Text (Leave Blank If You Do Not Want): After obtaining clear surgical margins the defect was repaired concurrently with another surgical defect which was in close approximation.
Consent 1/Introductory Paragraph: The rationale for Mohs was explained to the patient and consent was obtained. The risks, benefits and alternatives to therapy were discussed in detail. Specifically, the risks of infection, scarring, bleeding, prolonged wound healing, incomplete removal, allergy to anesthesia, nerve injury and recurrence were addressed. Prior to the procedure, the treatment site was clearly identified and confirmed by the patient. All components of Universal Protocol/PAUSE Rule completed.
Spiral Flap Text: The defect edges were debeveled with a #15 scalpel blade.  Given the location of the defect, shape of the defect and the proximity to free margins a spiral flap was deemed most appropriate.  Using a sterile surgical marker, an appropriate rotation flap was drawn incorporating the defect and placing the expected incisions within the relaxed skin tension lines where possible. The area thus outlined was incised deep to adipose tissue with a #15 scalpel blade.  The skin margins were undermined to an appropriate distance in all directions utilizing iris scissors.
Full Thickness Lip Wedge Repair (Flap) Text: Given the location of the defect and the proximity to free margins a full thickness wedge repair was deemed most appropriate.  Using a sterile surgical marker, the appropriate repair was drawn incorporating the defect and placing the expected incisions perpendicular to the vermilion border.  The vermilion border was also meticulously outlined to ensure appropriate reapproximation during the repair.  The area thus outlined was incised through and through with a #15 scalpel blade.  The muscularis and dermis were reaproximated with deep sutures following hemostasis. Care was taken to realign the vermilion border before proceeding with the superficial closure.  Once the vermilion was realigned the superfical and mucosal closure was finished.
Primary Defect Width In Cm (Final Defect Size - Required For Flaps/Grafts): 2
Closure 4 Information: This tab is for additional flaps and grafts above and beyond our usual structured repairs.  Please note if you enter information here it will not currently bill and you will need to add the billing information manually.
O-T Plasty Text: The defect edges were debeveled with a #15 scalpel blade.  Given the location of the defect, shape of the defect and the proximity to free margins an O-T plasty was deemed most appropriate.  Using a sterile surgical marker, an appropriate O-T plasty was drawn incorporating the defect and placing the expected incisions within the relaxed skin tension lines where possible.    The area thus outlined was incised deep to adipose tissue with a #15 scalpel blade.  The skin margins were undermined to an appropriate distance in all directions utilizing iris scissors.
Unique Flap 4 Name: Banner Flap
Mohs Case Number: [id number]
Epidermal Closure Graft Donor Site (Optional): simple interrupted
Consent (Marginal Mandibular)/Introductory Paragraph: The rationale for Mohs was explained to the patient and consent was obtained. The risks, benefits and alternatives to therapy were discussed in detail. Specifically, the risks of damage to the marginal mandibular branch of the facial nerve, infection, scarring, bleeding, prolonged wound healing, incomplete removal, allergy to anesthesia, and recurrence were addressed. Prior to the procedure, the treatment site was clearly identified and confirmed by the patient. All components of Universal Protocol/PAUSE Rule completed.
Melolabial Transposition Flap Text: The defect edges were debeveled with a #15 scalpel blade.  Given the location of the defect and the proximity to free margins a melolabial flap was deemed most appropriate.  Using a sterile surgical marker, an appropriate melolabial transposition flap was drawn incorporating the defect.    The area thus outlined was incised deep to adipose tissue with a #15 scalpel blade.  The skin margins were undermined to an appropriate distance in all directions utilizing iris scissors.
Composite Graft Text: The defect edges were debeveled with a #15 scalpel blade.  Given the location of the defect, shape of the defect, the proximity to free margins and the fact the defect was full thickness a composite graft was deemed most appropriate.  The defect was outline and then transferred to the donor site.  A full thickness graft was then excised from the donor site. The graft was then placed in the primary defect, oriented appropriately and then sutured into place.  The secondary defect was then repaired using a primary closure.
Rhombic Flap Text: The defect edges were debeveled with a #15 scalpel blade.  Given the location of the defect and the proximity to free margins a rhombic flap was deemed most appropriate.  Using a sterile surgical marker, an appropriate rhombic flap was drawn incorporating the defect.    The area thus outlined was incised deep to adipose tissue with a #15 scalpel blade.  The skin margins were undermined to an appropriate distance in all directions utilizing iris scissors.
Donor Site Anesthesia Type: same as repair anesthesia
Home Suture Removal Text: Patient was provided instructions on removing sutures and will remove their sutures at home.  If they have any questions or difficulties they will call the office.
Burow's Advancement Flap Text: The defect edges were debeveled with a #15 scalpel blade.  Given the location of the defect and the proximity to free margins a Burow's advancement flap was deemed most appropriate.  Using a sterile surgical marker, the appropriate advancement flap was drawn incorporating the defect and placing the expected incisions within the relaxed skin tension lines where possible.    The area thus outlined was incised deep to adipose tissue with a #15 scalpel blade.  The skin margins were undermined to an appropriate distance in all directions utilizing iris scissors.
W Plasty Text: The lesion was extirpated to the level of the fat with a #15 scalpel blade.  Given the location of the defect, shape of the defect and the proximity to free margins a W-plasty was deemed most appropriate for repair.  Using a sterile surgical marker, the appropriate transposition arms of the W-plasty were drawn incorporating the defect and placing the expected incisions within the relaxed skin tension lines where possible.    The area thus outlined was incised deep to adipose tissue with a #15 scalpel blade.  The skin margins were undermined to an appropriate distance in all directions utilizing iris scissors.  The opposing transposition arms were then transposed into place in opposite direction and anchored with interrupted buried subcutaneous sutures.
Ear Star Wedge Flap Text: The defect edges were debeveled with a #15 blade scalpel.  Given the location of the defect and the proximity to free margins (helical rim) an ear star wedge flap was deemed most appropriate.  Using a sterile surgical marker, the appropriate flap was drawn incorporating the defect and placing the expected incisions between the helical rim and antihelix where possible.  The area thus outlined was incised through and through with a #15 scalpel blade.
Consent (Lip)/Introductory Paragraph: The rationale for Mohs was explained to the patient and consent was obtained. The risks, benefits and alternatives to therapy were discussed in detail. Specifically, the risks of lip deformity, changes in the oral aperture, infection, scarring, bleeding, prolonged wound healing, incomplete removal, allergy to anesthesia, nerve injury and recurrence were addressed. Prior to the procedure, the treatment site was clearly identified and confirmed by the patient. All components of Universal Protocol/PAUSE Rule completed.
Xenograft Text: The defect edges were debeveled with a #15 scalpel blade.  Given the location of the defect, shape of the defect and the proximity to free margins a xenograft was deemed most appropriate.  The graft was then trimmed to fit the size of the defect.  The graft was then placed in the primary defect and oriented appropriately.
Information: Selecting Yes will display possible errors in your note based on the variables you have selected. This validation is only offered as a suggestion for you. PLEASE NOTE THAT THE VALIDATION TEXT WILL BE REMOVED WHEN YOU FINALIZE YOUR NOTE. IF YOU WANT TO FAX A PRELIMINARY NOTE YOU WILL NEED TO TOGGLE THIS TO 'NO' IF YOU DO NOT WANT IT IN YOUR FAXED NOTE.
Unique Flap 4 Text: The defect edges were debeveled with a #15 scalpel blade.  Given the location of the defect and the proximity to free margins a Banner transposition flap was deemed most appropriate.  Using a sterile surgical marker, an appropriate Banner transposition flap was drawn incorporating the defect.    The area thus outlined was incised deep to adipose tissue with a #15 scalpel blade.  The skin margins were undermined to an appropriate distance in all directions utilizing iris scissors.
O-L Flap Text: The defect edges were debeveled with a #15 scalpel blade.  Given the location of the defect, shape of the defect and the proximity to free margins an O-L flap was deemed most appropriate.  Using a sterile surgical marker, an appropriate advancement flap was drawn incorporating the defect and placing the expected incisions within the relaxed skin tension lines where possible.    The area thus outlined was incised deep to adipose tissue with a #15 scalpel blade.  The skin margins were undermined to an appropriate distance in all directions utilizing iris scissors.
Area H Indication Text: Tumors in this location are included in Area H (eyelids, eyebrows, nose, lips, chin, ear, pre-auricular, post-auricular, temple, genitalia, hands, feet, ankles and areola).  Tissue conservation is critical in these anatomic locations.
Interpolation Flap Text: A decision was made to reconstruct the defect utilizing an interpolation axial flap and a staged reconstruction.  A telfa template was made of the defect.  This telfa template was then used to outline the interpolation flap.  The donor area for the pedicle flap was then injected with anesthesia.  The flap was excised through the skin and subcutaneous tissue down to the layer of the underlying musculature.  The interpolation flap was carefully excised within this deep plane to maintain its blood supply.  The edges of the donor site were undermined.   The donor site was closed in a primary fashion.  The pedicle was then rotated into position and sutured.  Once the tube was sutured into place, adequate blood supply was confirmed with blanching and refill.  The pedicle was then wrapped with xeroform gauze and dressed appropriately with a telfa and gauze bandage to ensure continued blood supply and protect the attached pedicle.
Trilobed Flap Text: The defect edges were debeveled with a #15 scalpel blade.  Given the location of the defect and the proximity to free margins a trilobed flap was deemed most appropriate.  Using a sterile surgical marker, an appropriate trilobed flap drawn around the defect.    The area thus outlined was incised deep to adipose tissue with a #15 scalpel blade.  The skin margins were undermined to an appropriate distance in all directions utilizing iris scissors.
Partial Purse String (Intermediate) Text: Given the location of the defect and the characteristics of the surrounding skin an intermediate purse string closure was deemed most appropriate.  Undermining was performed circumfirentially around the surgical defect.  A purse string suture was then placed and tightened. Wound tension only allowed a partial closure of the circular defect.
Graft Donor Site Epidermal Sutures (Optional): 5-0 Ethibond
Hemostasis: Electrocautery
Mucosal Advancement Flap Text: Given the location of the defect, shape of the defect and the proximity to free margins a mucosal advancement flap was deemed most appropriate. Incisions were made with a 15 blade scalpel in the appropriate fashion along the cutaneous vermilion border and the mucosal lip. The remaining actinically damaged mucosal tissue was excised.  The mucosal advancement flap was then elevated to the gingival sulcus with care taken to preserve the neurovascular structures and advanced into the primary defect. Care was taken to ensure that precise realignment of the vermilion border was achieved.
Cheiloplasty (Less Than 50%) Text: A decision was made to reconstruct the defect with a  cheiloplasty.  The defect was undermined extensively.  Additional obicularis oris muscle was excised with a 15 blade scalpel.  The defect was converted into a full thickness wedge, of less than 50% of the vertical height of the lip, to facilite a better cosmetic result.  Small vessels were then tied off with 5-0 monocyrl. The obicularis oris, superficial fascia, adipose and dermis were then reapproximated.  After the deeper layers were approximated the epidermis was reapproximated with particular care given to realign the vermilion border.
Same Histology In Subsequent Stages Text: The pattern and morphology of the tumor is as described in the first stage.
Mohs Method Verbiage: An incision at a 45 degree angle following the standard Mohs approach was done and the specimen was harvested as a microscopic controlled layer.
Postop Diagnosis: same
Double Island Pedicle Flap Text: The defect edges were debeveled with a #15 scalpel blade.  Given the location of the defect, shape of the defect and the proximity to free margins a double island pedicle advancement flap was deemed most appropriate.  Using a sterile surgical marker, an appropriate advancement flap was drawn incorporating the defect, outlining the appropriate donor tissue and placing the expected incisions within the relaxed skin tension lines where possible.    The area thus outlined was incised deep to adipose tissue with a #15 scalpel blade.  The skin margins were undermined to an appropriate distance in all directions around the primary defect and laterally outward around the island pedicle utilizing iris scissors.  There was minimal undermining beneath the pedicle flap.
Unique Flap 1 Name: Myocutaneous Island pedicle Flap
Consent 2/Introductory Paragraph: Mohs surgery was explained to the patient and consent was obtained. The risks, benefits and alternatives to therapy were discussed in detail. Specifically, the risks of infection, scarring, bleeding, prolonged wound healing, incomplete removal, allergy to anesthesia, nerve injury and recurrence were addressed. Prior to the procedure, the treatment site was clearly identified and confirmed by the patient. All components of Universal Protocol/PAUSE Rule completed.
Star Wedge Flap Text: The defect edges were debeveled with a #15 scalpel blade.  Given the location of the defect, shape of the defect and the proximity to free margins a star wedge flap was deemed most appropriate.  Using a sterile surgical marker, an appropriate rotation flap was drawn incorporating the defect and placing the expected incisions within the relaxed skin tension lines where possible. The area thus outlined was incised deep to adipose tissue with a #15 scalpel blade.  The skin margins were undermined to an appropriate distance in all directions utilizing iris scissors.
Ftsg Text: The defect edges were debeveled with a #15 scalpel blade.  Given the location of the defect, shape of the defect and the proximity to free margins a full thickness skin graft was deemed most appropriate.  Using a sterile surgical marker, the primary defect shape was transferred to the donor site. The area thus outlined was incised deep to adipose tissue with a #15 scalpel blade.  The harvested graft was then trimmed of adipose tissue until only dermis and epidermis was left.  The skin margins of the secondary defect were undermined to an appropriate distance in all directions utilizing iris scissors.  The secondary defect was closed with interrupted buried subcutaneous sutures.  The skin edges were then re-apposed with running  sutures.  The skin graft was then placed in the primary defect and oriented appropriately.
Repair Type: Complex Repair
O-Z Plasty Text: The defect edges were debeveled with a #15 scalpel blade.  Given the location of the defect, shape of the defect and the proximity to free margins an O-Z plasty (double transposition flap) was deemed most appropriate.  Using a sterile surgical marker, the appropriate transposition flaps were drawn incorporating the defect and placing the expected incisions within the relaxed skin tension lines where possible.    The area thus outlined was incised deep to adipose tissue with a #15 scalpel blade.  The skin margins were undermined to an appropriate distance in all directions utilizing iris scissors.  Hemostasis was achieved with electrocautery.  The flaps were then transposed into place, one clockwise and the other counterclockwise, and anchored with interrupted buried subcutaneous sutures.
Unique Flap 1 Text: A decision was made to reconstruct the defect utilizing a myocutaneous Island pedicle Flap based on the levator labii superioris muscle.  A telfa template was made of the defect.  This telfa template was then used to outline the myocutaneous flap, based along the meilolabial fold.  The donor area for the pedicle flap was then injected with anesthesia.  The flap was excised through the skin and subcutaneous tissue down to the layer of the underlying musculature.  The myocutaneous flap was carefully excised within this deep plane to maintain its blood supply. Based on the muscle. The edges of the donor site were undermined.   The donor site was closed in a primary fashion to the point of transposition.  The pedicle was then transposed into position and sutured.  Once the flap was sutured into place, adequate blood supply was confirmed with blanching and refill.
Epidermal Autograft Text: The defect edges were debeveled with a #15 scalpel blade.  Given the location of the defect, shape of the defect and the proximity to free margins an epidermal autograft was deemed most appropriate.  Using a sterile surgical marker, the primary defect shape was transferred to the donor site. The epidermal graft was then harvested.  The skin graft was then placed in the primary defect and oriented appropriately.
Bcc Histology Text: There were numerous aggregates of basaloid cells.
Consent (Spinal Accessory)/Introductory Paragraph: The rationale for Mohs was explained to the patient and consent was obtained. The risks, benefits and alternatives to therapy were discussed in detail. Specifically, the risks of damage to the spinal accessory nerve, infection, scarring, bleeding, prolonged wound healing, incomplete removal, allergy to anesthesia, and recurrence were addressed. Prior to the procedure, the treatment site was clearly identified and confirmed by the patient. All components of Universal Protocol/PAUSE Rule completed.
Consent (Near Eyelid Margin)/Introductory Paragraph: The rationale for Mohs was explained to the patient and consent was obtained. The risks, benefits and alternatives to therapy were discussed in detail. Specifically, the risks of ectropion or eyelid deformity, infection, scarring, bleeding, prolonged wound healing, incomplete removal, allergy to anesthesia, nerve injury and recurrence were addressed. Prior to the procedure, the treatment site was clearly identified and confirmed by the patient. All components of Universal Protocol/PAUSE Rule completed.
Bi-Rhombic Flap Text: The defect edges were debeveled with a #15 scalpel blade.  Given the location of the defect and the proximity to free margins a bi-rhombic flap was deemed most appropriate.  Using a sterile surgical marker, an appropriate rhombic flap was drawn incorporating the defect. The area thus outlined was incised deep to adipose tissue with a #15 scalpel blade.  The skin margins were undermined to an appropriate distance in all directions utilizing iris scissors.
Dermal Autograft Text: The defect edges were debeveled with a #15 scalpel blade.  Given the location of the defect, shape of the defect and the proximity to free margins a dermal autograft was deemed most appropriate.  Using a sterile surgical marker, the primary defect shape was transferred to the donor site. The area thus outlined was incised deep to adipose tissue with a #15 scalpel blade.  The harvested graft was then trimmed of adipose and epidermal tissue until only dermis was left.  The skin graft was then placed in the primary defect and oriented appropriately.
Bcc Infiltrative Histology Text: There were numerous aggregates of basaloid cells demonstrating an infiltrative pattern.
Crescentic Advancement Flap Text: The defect edges were debeveled with a #15 scalpel blade.  Given the location of the defect and the proximity to free margins a crescentic advancement flap was deemed most appropriate.  Using a sterile surgical marker, the appropriate advancement flap was drawn incorporating the defect and placing the expected incisions within the relaxed skin tension lines where possible.    The area thus outlined was incised deep to adipose tissue with a #15 scalpel blade.  The skin margins were undermined to an appropriate distance in all directions utilizing iris scissors.
Z Plasty Text: The lesion was extirpated to the level of the fat with a #15 scalpel blade.  Given the location of the defect, shape of the defect and the proximity to free margins a Z-plasty was deemed most appropriate for repair.  Using a sterile surgical marker, the appropriate transposition arms of the Z-plasty were drawn incorporating the defect and placing the expected incisions within the relaxed skin tension lines where possible.    The area thus outlined was incised deep to adipose tissue with a #15 scalpel blade.  The skin margins were undermined to an appropriate distance in all directions utilizing iris scissors.  The opposing transposition arms were then transposed into place in opposite direction and anchored with interrupted buried subcutaneous sutures.
Consent Type: Consent 1 (Standard)
Surgical Defect Width In Cm (Optional): 1.6
Banner Transposition Flap Text: The defect edges were debeveled with a #15 scalpel blade.  Given the location of the defect and the proximity to free margins a Banner transposition flap was deemed most appropriate.  Using a sterile surgical marker, an appropriate flap drawn around the defect. The area thus outlined was incised deep to adipose tissue with a #15 scalpel blade.  The skin margins were undermined to an appropriate distance in all directions utilizing iris scissors.
Purse String (Simple) Text: Given the location of the defect and the characteristics of the surrounding skin a purse string closure was deemed most appropriate.  Undermining was performed circumfirentially around the surgical defect.  A purse string suture was then placed and tightened.
Consent (Scalp)/Introductory Paragraph: The rationale for Mohs was explained to the patient and consent was obtained. The risks, benefits and alternatives to therapy were discussed in detail. Specifically, the risks of changes in hair growth pattern secondary to repair, infection, scarring, bleeding, prolonged wound healing, incomplete removal, allergy to anesthesia, nerve injury and recurrence were addressed. Prior to the procedure, the treatment site was clearly identified and confirmed by the patient. All components of Universal Protocol/PAUSE Rule completed.
Wound Care: Aquaphor
Melolabial Interpolation Flap Text: A decision was made to reconstruct the defect utilizing an interpolation axial flap and a staged reconstruction.  A telfa template was made of the defect.  This telfa template was then used to outline the melolabial interpolation flap.  The donor area for the pedicle flap was then injected with anesthesia.  The flap was excised through the skin and subcutaneous tissue down to the layer of the underlying musculature.  The pedicle flap was carefully excised within this deep plane to maintain its blood supply.  The edges of the donor site were undermined.   The donor site was closed in a primary fashion.  The pedicle was then rotated into position and sutured.  Once the tube was sutured into place, adequate blood supply was confirmed with blanching and refill.  The pedicle was then wrapped with xeroform gauze and dressed appropriately with a telfa and gauze bandage to ensure continued blood supply and protect the attached pedicle.
Area M Indication Text: Tumors in this location are included in Area M (cheek, forehead, scalp, neck, jawline and pretibial skin).  Mohs surgery is indicated for tumors in these anatomic locations.
Manual Repair Warning Statement: We plan on removing the manually selected variable below in favor of our much easier automatic structured text blocks found in the previous tab. We decided to do this to help make the flow better and give you the full power of structured data. Manual selection is never going to be ideal in our platform and I would encourage you to avoid using manual selection from this point on, especially since I will be sunsetting this feature. It is important that you do one of two things with the customized text below. First, you can save all of the text in a word file so you can have it for future reference. Second, transfer the text to the appropriate area in the Library tab. Lastly, if there is a flap or graft type which we do not have you need to let us know right away so I can add it in before the variable is hidden. No need to panic, we plan to give you roughly 6 months to make the change.
V-Y Flap Text: The defect edges were debeveled with a #15 scalpel blade.  Given the location of the defect, shape of the defect and the proximity to free margins a V-Y flap was deemed most appropriate.  Using a sterile surgical marker, an appropriate advancement flap was drawn incorporating the defect and placing the expected incisions within the relaxed skin tension lines where possible.    The area thus outlined was incised deep to adipose tissue with a #15 scalpel blade.  The skin margins were undermined to an appropriate distance in all directions utilizing iris scissors.
Epidermal Sutures: 5-0 Ethilon
Dorsal Nasal Flap Text: The defect edges were debeveled with a #15 scalpel blade.  Given the location of the defect and the proximity to free margins a dorsal nasal flap,based upon the glabellar folds, was deemed most appropriate.  Using a sterile surgical marker, an appropriate dorsal nasal flap was drawn around the defect.    The area thus outlined was incised deep to adipose tissue with a #15 scalpel blade.  The skin margins were undermined to an appropriate distance in all directions utilizing iris scissors.
Localized Dermabrasion With Wire Brush Text: The patient was draped in routine manner.  Localized dermabrasion using 3 x 17 mm wire brush was performed in routine manner to papillary dermis. This spot dermabrasion is being performed to complete skin cancer reconstruction. It also will eliminate the other sun damaged precancerous cells that are known to be part of the regional effect of a lifetime's worth of sun exposure. This localized dermabrasion is therapeutic and should not be considered cosmetic in any regard.
Non-Graft Cartilage Fenestration Text: The cartilage was fenestrated with a 2mm punch biopsy to help facilitate healing.
Graft Cartilage Fenestration Text: The cartilage was fenestrated with a 2mm punch biopsy to help facilitate graft survival and healing.
Island Pedicle Flap Text: The defect edges were debeveled with a #15 scalpel blade.  Given the location of the defect, shape of the defect and the proximity to free margins an island pedicle advancement flap was deemed most appropriate.  Using a sterile surgical marker, an appropriate advancement flap was drawn incorporating the defect, outlining the appropriate donor tissue and placing the expected incisions within the relaxed skin tension lines where possible.    The area thus outlined was incised deep to adipose tissue with a #15 scalpel blade.  The skin margins were undermined to an appropriate distance in all directions around the primary defect and laterally outward around the island pedicle utilizing iris scissors.  There was minimal undermining beneath the pedicle flap.
Wound Care (No Sutures): Petrolatum
Tarsorrhaphy Text: A tarsorrhaphy was performed using Frost sutures.
No Residual Tumor Seen Histology Text: There were no malignant cells seen in the sections examined.
Medical Necessity Statement: Based on my medical judgement, Mohs surgery is the most appropriate treatment for this cancer compared to other treatments.
Surgeon/Pathologist Verbiage (Will Incorporate Name Of Surgeon From Intro If Not Blank): operated in two distinct and integrated capacities as the surgeon and pathologist.
Hatchet Flap Text: The defect edges were debeveled with a #15 scalpel blade.  Given the location of the defect, shape of the defect and the proximity to free margins a hatchet flap based from the glabella was deemed most appropriate.  Using a sterile surgical marker, an appropriate glabellar hatchet flap was drawn incorporating the defect and placing the expected incisions within the relaxed skin tension lines where possible.    The area thus outlined was incised deep to adipose tissue with a #15 scalpel blade.  The skin margins were undermined to an appropriate distance in all directions utilizing iris scissors.
Cheiloplasty (Complex) Text: A decision was made to reconstruct the defect with a  cheiloplasty.  The defect was undermined extensively.  Additional obicularis oris muscle was excised with a 15 blade scalpel.  The defect was converted into a full thickness wedge to facilite a better cosmetic result.  Small vessels were then tied off with 5-0 monocyrl. The obicularis oris, superficial fascia, adipose and dermis were then reapproximated.  After the deeper layers were approximated the epidermis was reapproximated with particular care given to realign the vermilion border.
Unique Flap 2 Name: Peng Flap
Suture Removal: 7 days
Island Pedicle Flap-Requiring Vessel Identification Text: The defect edges were debeveled with a #15 scalpel blade.  Given the location of the defect, shape of the defect and the proximity to free margins an island pedicle advancement flap was deemed most appropriate.  Using a sterile surgical marker, an appropriate advancement flap was drawn, based on the axial vessel mentioned above, incorporating the defect, outlining the appropriate donor tissue and placing the expected incisions within the relaxed skin tension lines where possible.    The area thus outlined was incised deep to adipose tissue with a #15 scalpel blade.  The skin margins were undermined to an appropriate distance in all directions around the primary defect and laterally outward around the island pedicle utilizing iris scissors.  There was minimal undermining beneath the pedicle flap.
Split-Thickness Skin Graft Text: The defect edges were debeveled with a #15 scalpel blade.  Given the location of the defect, shape of the defect and the proximity to free margins a split thickness skin graft was deemed most appropriate.  Using a sterile surgical marker, the primary defect shape was transferred to the donor site. The split thickness graft was then harvested.  The skin graft was then placed in the primary defect and oriented appropriately.
Consent 3/Introductory Paragraph: I gave the patient a chance to ask questions they had about the procedure.  Following this I explained the Mohs procedure and consent was obtained. The risks, benefits and alternatives to therapy were discussed in detail. Specifically, the risks of infection, scarring, bleeding, prolonged wound healing, incomplete removal, allergy to anesthesia, nerve injury and recurrence were addressed. Prior to the procedure, the treatment site was clearly identified and confirmed by the patient. All components of Universal Protocol/PAUSE Rule completed.
Transposition Flap Text: The defect edges were debeveled with a #15 scalpel blade.  Given the location of the defect and the proximity to free margins a transposition flap was deemed most appropriate.  Using a sterile surgical marker, an appropriate transposition flap was drawn incorporating the defect.    The area thus outlined was incised deep to adipose tissue with a #15 scalpel blade.  The skin margins were undermined to an appropriate distance in all directions utilizing iris scissors.
Subsequent Stages Histo Method Verbiage: Using a similar technique to that described above, a thin layer of tissue was removed from all areas where tumor was visible on the previous stage.  The tissue was again oriented, mapped, dyed, and processed as above.
S Plasty Text: Given the location and shape of the defect, and the orientation of relaxed skin tension lines, an S-plasty was deemed most appropriate for repair.  Using a sterile surgical marker, the appropriate outline of the S-plasty was drawn, incorporating the defect and placing the expected incisions within the relaxed skin tension lines where possible.  The area thus outlined was incised deep to adipose tissue with a #15 scalpel blade.  The skin margins were undermined to an appropriate distance in all directions utilizing iris scissors. The skin flaps were advanced over the defect.  The opposing margins were then approximated with interrupted buried subcutaneous sutures.
Unique Flap 2 Text: A decision was made to reconstruct the defect utilizing a Peng Flap (Bilateral Advancement Rotation Flap). Given the location of the defect and the proximity to free margins, this flap was deemed most appropriate.  Using a sterile surgical marker, the appropriate rotation flaps were drawn incorporating the defect and placing the expected incisions within the relaxed skin tension lines where possible.    The area thus outlined was incised deep to adipose tissue with a #15 scalpel blade.  The skin margins were undermined to an appropriate distance in all directions utilizing iris scissors.
Previous Accession (Optional): [id number]
Graft Donor Site Bandage (Optional-Leave Blank If You Don't Want In Note): Aquaphor and telefa placed on wound. Pressure dressing applied to donor site

## 2018-12-20 ENCOUNTER — PATIENT MESSAGE (OUTPATIENT)
Dept: PULMONOLOGY | Facility: HOSPICE | Age: 78
End: 2018-12-20

## 2018-12-20 ENCOUNTER — HOSPITAL ENCOUNTER (OUTPATIENT)
Dept: LAB | Facility: MEDICAL CENTER | Age: 78
End: 2018-12-20
Attending: INTERNAL MEDICINE
Payer: MEDICARE

## 2018-12-20 DIAGNOSIS — B02.9 HERPES ZOSTER WITHOUT COMPLICATION: ICD-10-CM

## 2018-12-20 DIAGNOSIS — M31.30 WEGENER'S GRANULOMATOSIS: ICD-10-CM

## 2018-12-20 PROCEDURE — 36415 COLL VENOUS BLD VENIPUNCTURE: CPT

## 2018-12-20 PROCEDURE — 86255 FLUORESCENT ANTIBODY SCREEN: CPT

## 2018-12-20 NOTE — TELEPHONE ENCOUNTER
From: Gilberto Brown  To: ELSI Aldrich  Sent: 12/20/2018 3:55 PM PST  Subject: Non-Urgent Medical Question      had blood drawn today re WEGENER'S DISEASE at [facility] for Dr. Ariza.

## 2018-12-22 LAB — ANCA IGG TITR SER IF: NORMAL {TITER}

## 2018-12-27 ENCOUNTER — APPOINTMENT (RX ONLY)
Dept: URBAN - METROPOLITAN AREA CLINIC 36 | Facility: CLINIC | Age: 78
Setting detail: DERMATOLOGY
End: 2018-12-27

## 2018-12-27 DIAGNOSIS — Z48.02 ENCOUNTER FOR REMOVAL OF SUTURES: ICD-10-CM

## 2018-12-27 PROCEDURE — 99024 POSTOP FOLLOW-UP VISIT: CPT

## 2018-12-27 PROCEDURE — ? SUTURE REMOVAL (GLOBAL PERIOD)

## 2018-12-27 ASSESSMENT — LOCATION SIMPLE DESCRIPTION DERM: LOCATION SIMPLE: LEFT EAR

## 2018-12-27 ASSESSMENT — LOCATION DETAILED DESCRIPTION DERM: LOCATION DETAILED: LEFT TRAGUS

## 2018-12-27 ASSESSMENT — LOCATION ZONE DERM: LOCATION ZONE: EAR

## 2018-12-27 NOTE — PROCEDURE: SUTURE REMOVAL (GLOBAL PERIOD)
Detail Level: Detailed
Add 67210 Cpt? (Important Note: In 2017 The Use Of 14128 Is Being Tracked By Cms To Determine Future Global Period Reimbursement For Global Periods): yes

## 2018-12-28 NOTE — PATIENT COMMUNICATION
INEZ Hunter.   You 14 hours ago (4:46 PM)      Is this an FYI or does something need to be done?

## 2019-01-22 ENCOUNTER — OFFICE VISIT (OUTPATIENT)
Dept: RHEUMATOLOGY | Facility: MEDICAL CENTER | Age: 79
End: 2019-01-22
Payer: MEDICARE

## 2019-01-22 VITALS
DIASTOLIC BLOOD PRESSURE: 58 MMHG | RESPIRATION RATE: 16 BRPM | HEART RATE: 106 BPM | BODY MASS INDEX: 21.44 KG/M2 | SYSTOLIC BLOOD PRESSURE: 130 MMHG | WEIGHT: 141 LBS | TEMPERATURE: 98.7 F | OXYGEN SATURATION: 93 %

## 2019-01-22 DIAGNOSIS — G62.9 NEUROPATHY: ICD-10-CM

## 2019-01-22 DIAGNOSIS — M31.30 WEGENER'S GRANULOMATOSIS: Chronic | ICD-10-CM

## 2019-01-22 DIAGNOSIS — N18.6 ESRD ON DIALYSIS (HCC): Chronic | ICD-10-CM

## 2019-01-22 DIAGNOSIS — J44.9 CHRONIC OBSTRUCTIVE PULMONARY DISEASE, UNSPECIFIED COPD TYPE (HCC): Chronic | ICD-10-CM

## 2019-01-22 DIAGNOSIS — Z79.52 LONG TERM CURRENT USE OF SYSTEMIC STEROIDS: ICD-10-CM

## 2019-01-22 DIAGNOSIS — Z95.0 PACEMAKER: ICD-10-CM

## 2019-01-22 DIAGNOSIS — G47.33 OSA (OBSTRUCTIVE SLEEP APNEA): Chronic | ICD-10-CM

## 2019-01-22 DIAGNOSIS — M54.2 CERVICALGIA: ICD-10-CM

## 2019-01-22 DIAGNOSIS — Z99.2 ESRD ON DIALYSIS (HCC): Chronic | ICD-10-CM

## 2019-01-22 PROCEDURE — 99214 OFFICE O/P EST MOD 30 MIN: CPT | Performed by: INTERNAL MEDICINE

## 2019-01-22 RX ORDER — PREDNISONE 1 MG/1
TABLET ORAL
Qty: 270 TAB | Refills: 1 | Status: SHIPPED | OUTPATIENT
Start: 2019-01-22 | End: 2019-03-01 | Stop reason: CLARIF

## 2019-01-22 NOTE — LETTER
[facility]   [address] [place] [address], [address]  [place] [address]  Phone: [phone]  Fax: [phone]              Encounter Date: 1/22/2019    Dear Dr. Forrest ref. provider found,    It was a pleasure seeing your patient, Gilberto Brown, on 1/22/2019. Diagnoses of Wegener's granulomatosis (HCC), Long term current use of systemic steroids, Neuropathy (HCC), Cervicalgia, Chronic obstructive pulmonary disease, unspecified COPD type (HCC), ESRD on dialysis (HCC), TIMOTHY (obstructive sleep apnea), and Pacemaker were pertinent to this visit.     Please find attached progress note which includes the history I obtained from Mr. Brown, my physical examination findings, my impression and recommendations.      Once again, it was a pleasure participating in your patient's care.  Please feel free to contact me if you have any questions or if I can be of any further assistance to your patients.      Sincerely,    Cintia Ariza M.D.  Electronically Signed          PROGRESS NOTE:  No notes on file

## 2019-01-23 NOTE — PROGRESS NOTES
Chief Complaint- joint pain    Subjective:   Gilberto Brown is a 78 y.o. male here today for follow up of rheumatological issues    This is a follow-up visit for this complicated patient who we see in this clinic for Wegener's granulomatosis initially diagnosed in September 2011 with progressive renal insufficiency status post renal biopsy confirming Wegener's granulomatosis.  Patient is currently on dialysis permanently and continues to be on prednisone at 10 mg p.o. daily.  Of note we have tried other medications such as Cytoxan, CellCept rituximab all of which resulted in severe infections.  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  Patient does have a long history of lung problems since childhood status post whooping cough per patient report.        Patient also complaining today of numbness in his right hand palmar aspect all fingers      Additional comorbidities include atrial fibrillation with a pacemaker in place also recent diagnosis CVA and is on permanent anticoagulation. Patient also on oxygen at home 3 L per minute.  Patient also with history of rotator cuff tear and right shoulder in the past.     S/p Cytoxan-n/v  Off of Cellcept 5/2016  Rituximab infusions 1/14/2016, 1/28/2016-patient developed severe infection and was hospitalized     GBM neg 1/2012  ANCA neg 1/2015; ANCA 1:20  11/2015; ANCA neg 2/2016; ANCA neg 5/2016; ANCA neg 12/2016; ANCA neg 3/2017; ANCA neg 7/2017; ANCA neg 2/2018; ANCA neg 5/2018; ANCA neg 12/2018  C3 90 normal 10/2011  C4 21 10/2011  OFELIA neg 10/2011  Uric acid 4.4 1/2016  Hep B neg 2/2015; Hep B neg 4/2017  Hep C neg 2/2015      Of note  Dr Coral Corona nephrology  Dr Gutierrez pulmonology   Dr Chavarria Cardiologist [phone]  Dr Nunez Opthalmologist [phone]        Current medicines (including changes today)  Current Outpatient Prescriptions   Medication Sig Dispense Refill   • predniSONE (DELTASONE) 1 MG Tab 9  tabs po qam 270 Tab 1   • predniSONE (DELTASONE) 10 MG Tab 1 tab po qday 90 Tab 0   • albuterol (PROAIR HFA) 108 (90 Base) MCG/ACT Aero Soln inhalation aerosol Inhale 2 Puffs by mouth every 6 hours as needed for Shortness of Breath.     • sodium chloride (HYPER-SAL) 7 % Nebu Soln INHALE 1 VIAL VIA NEBULIZER TWICE DAILY 240 mL 11   • tamsulosin (FLOMAX) 0.4 MG capsule Take 1 Cap by mouth every day. 90 Cap 3   • sevelamer carbonate (RENVELA) 800 MG Tab tablet TAKE 1 TABLET BY MOUTH THREE TIMES DAILY WITH MEALS 270 Tab 7   • apixaban (ELIQUIS) 2.5mg Tab Take 2.5 mg by mouth 2 Times a Day.     • Fluticasone Furoate-Vilanterol (BREO ELLIPTA) 200-25 MCG/INH AEROSOL POWDER, BREATH ACTIVATED Inhale 1 Puff by mouth every day. Rinse mouth after use. 1 Each 11   • ipratropium-albuterol (DUONEB) 0.5-2.5 (3) MG/3ML nebulizer solution USE 3 ML VIA NEBULIZER FOUR TIMES DAILY 1080 mL 3   • metoprolol (LOPRESSOR) 25 MG Tab Take 25 mg by mouth 2 times a day.     • B Complex-C-Folic Acid (DIALYVITE TABLET) Tab Dialyvite -nehro vit B complex-C-folic acid 1 tablet po QD Dialysis Pt 90 Tab 3   • finasteride (PROSCAR) 5 MG Tab Take 5 mg by mouth every day.     • simvastatin (ZOCOR) 40 MG TABS Take 40 mg by mouth every evening.     • epoetin lucina (EPOGEN,PROCRIT) 3000 UNIT/ML SOLN Inject 2,200 Units as instructed every Monday, Wednesday, and Friday. With dialysis     • aspirin EC (ECOTRIN) 81 MG TBEC Take 81 mg by mouth every day.     • raNITidine (ZANTAC) 150 MG Tab Take 150 mg by mouth 2 times a day.     • ciprofloxacin (CIPRO) 500 MG Tab Take 1 Tab by mouth 2 times a day. (Patient not taking: Reported on 1/22/2019) 28 Tab 0     No current facility-administered medications for this visit.      He  has a past medical history of A-fib (HCC); Anemia; Arthritis; ASTHMA; BPH (benign prostatic hyperplasia); Breath shortness; Bronchitis; Cataract; COPD; Dialysis; Dyslipidemia; EMPHYSEMA; GERD (gastroesophageal reflux disease); Hypertension;  Oxygen decrease; Pacemaker (1988); Pain (05/09/2018); Pneumonia (2017); Pseudomonas pneumonia (HCC); Pulmonary histoplasmosis (HCC); Renal disorder; Sick sinus syndrome (HCC); Sleep apnea; Snoring; Stroke ([facility]) (02/2018); Unspecified hemorrhagic conditions; and Wegener's disease, pulmonary ([facility]).    ROS   Other than what is mentioned in HPI or physical exam, there is no history of headaches, double vision or blurred vision. No temporal tenderness or jaw claudication. No history of cataracts or glaucoma. No trouble swallowing difficulties or sore throats.  No chest complaints including chest pain, cough or sputum production. No GI complaints including nausea, vomiting, change in bowel habits, or past peptic ulcer disease. No history of blood in the stools. No urinary complaints including dysuria or frequency. No history of alopecia, photosensitivity, oral ulcerations, Raynaud's phenomena.       Objective:     Blood pressure 130/58, pulse (!) 106, temperature 37.1 °C (98.7 °F), temperature source Temporal, resp. rate 16, weight 64 kg (141 lb), SpO2 93 %. Body mass index is 21.44 kg/m².   Physical Exam:    Constitutional: Alert and oriented X3, patient is talkative with good eye contact.Skin: Warm, dry, good turgor, no rashes in visible areas.Eye: Equal, round and reactive, conjunctiva clear, lids normal EOM intactENMT: Lips without lesions, good dentition, no oropharyngeal ulcers, moist buccal mucosa, pinna without deformityNeck: Trachea midline, no masses, no thyromegaly.Lymph:  No cervical lymphadenopathy, no axillary lymphadenopathy, no supraclavicular lymphadenopathyRespiratory: Unlabored respiratory effort, patient with a chronic cough nonproductive.Cardiovascular: Normal S1, S2, no murmur, no edema.Abdomen: Soft, non-tender, no masses, no hepatosplenomegaly.Psych: Alert and oriented x3, normal affect and mood.Neuro: Cranial nerves 2-12 are grossly intact, no loss of sensation LEExt:no joint laxity noted in  bilateral arms, no joint laxity noted in bilateral legs, 1+ pulses in lower extremities 2+ pulses in bilateral upper extremities, patient has a vascular shunt in the right upper arm no evidence of vasculitis in the lower upper extremities no nasal ulcers no temporal artery enlargement or temporal artery bruits no carotid bruits    Lab Results   Component Value Date/Time    HEPBCORIGM Negative 04/14/2017 12:30 PM    HEPBSAG Negative 05/15/2017 09:58 AM     Lab Results   Component Value Date/Time    HEPCAB Negative 04/14/2017 12:30 PM     Lab Results   Component Value Date/Time    SODIUM 139 06/06/2018 09:17 PM    POTASSIUM 3.3 (L) 06/06/2018 09:17 PM    CHLORIDE 96 06/06/2018 09:17 PM    CO2 31 06/06/2018 09:17 PM    GLUCOSE 89 06/06/2018 09:17 PM    BUN 24 (H) 06/06/2018 09:17 PM    CREATININE 2.14 (H) 06/06/2018 09:17 PM    CREATININE 1.0 09/23/2008 09:36 AM      Lab Results   Component Value Date/Time    WBC 9.9 06/06/2018 09:17 PM    WBC 7.6 03/19/2012 12:00 AM    RBC 3.53 (L) 06/06/2018 09:17 PM    RBC 2.25 (LL) 03/19/2012 12:00 AM    HEMOGLOBIN 11.1 (L) 06/06/2018 09:17 PM    HEMATOCRIT 35.1 (L) 06/06/2018 09:17 PM    MCV 99.4 (H) 06/06/2018 09:17 PM     (H) 03/19/2012 12:00 AM    MCH 31.4 06/06/2018 09:17 PM    MCH 33.8 03/19/2012 12:00 AM    MCHC 31.6 (L) 06/06/2018 09:17 PM    MPV 8.6 (L) 06/06/2018 09:17 PM    NEUTSPOLYS 84.10 (H) 06/06/2018 09:17 PM    LYMPHOCYTES 6.30 (L) 06/06/2018 09:17 PM    MONOCYTES 7.70 06/06/2018 09:17 PM    EOSINOPHILS 0.80 06/06/2018 09:17 PM    BASOPHILS 0.40 06/06/2018 09:17 PM    HYPOCHROMIA 1+ 10/28/2011 05:35 AM    ANISOCYTOSIS 1+ 07/03/2017 05:15 PM      Lab Results   Component Value Date/Time    CALCIUM 9.2 06/06/2018 09:17 PM    ASTSGOT 8 (L) 06/06/2018 09:17 PM    ALTSGPT 12 06/06/2018 09:17 PM    ALKPHOSPHAT 69 06/06/2018 09:17 PM    TBILIRUBIN 0.6 06/06/2018 09:17 PM    ALBUMIN 3.8 06/06/2018 09:17 PM    ALBUMIN 1.28 (L) 10/02/2011 04:30 AM    TOTPROTEIN 6.6  06/06/2018 09:17 PM    TOTPROTEIN 4.50 (L) 10/02/2011 04:30 AM     Lab Results   Component Value Date/Time    URICACID 4.4 01/21/2016 08:36 AM    ANTINUCAB None Detected 10/02/2011 04:30 AM     Lab Results   Component Value Date/Time    S5SILDCWFJA 90 10/02/2011 04:30 AM    D5UFWFGWNCT 21 10/02/2011 04:30 AM     Lab Results   Component Value Date/Time    AGBMAB Negative 01/18/2012 03:50 AM    GBMABA Negative 01/18/2012 03:50 AM    ANCAIGG <1:20 12/20/2018 02:59 PM    H9PHJSEMIDW 90 10/02/2011 04:30 AM     Lab Results   Component Value Date/Time    CPKTOTAL 18 09/27/2011 05:05 AM     Lab Results   Component Value Date/Time    FLTYPE Stool 09/22/2006 01:04 PM      Results for orders placed in visit on 05/02/13   CT-CHEST, HIGH RESOLUTION LUNG    Impression 1. Bilateral lower lobe bronchiectasis, grossly unchanged compared with the prior conventional chest CT 1/14/12.  2. Minimal bilateral lower lobe interstitial opacities as well as confluent opacities in those areas, greater on the left, suggesting active airspace disease and/or atelectasis.  3. Probable uncalcified left lower lobe nodule, an equivocal finding with high-resolution technique.  Conventional chest CT would be needed for confirmation.  Note that no similar lesion was identified on the prior chest scan 1/14/12.            INTERPRETING LOCATION: [address] [place] [address], [facility]     Results for orders placed during the hospital encounter of 07/27/17   CT-CHEST (THORAX) W/O    Impression 1.  There is no significant interval change in the diffuse bilateral subpleural interstitial lung disease with a basilar predominance.  2.  Bibasilar and medial segment right middle lobe bronchiectasis are again seen with minimal fluid now seen on the left within the dilated bronchi. This is most consistent with postinflammatory/infectious change.  3.  There is underlying emphysema/COPD with upper lobe paraseptal blebs.  4.  There is evidence of previous granulomatous disease  with calcified granulomata, calcified nodes, and calcifications in the liver and spleen. This is consistent with the history of pulmonary histoplasmosis.  5.  There are no new suspicious findings.  6.  Enthesopathy again noted in the thoracic spine.         Assessment and Plan:     1. Wegener's granulomatosis (HCC)  ANCA negative, currently on prednisone at 10 mg p.o. daily, decrease prednisone to 9 mg p.o. daily recheck ANCA in 2 months if negative then drop prednisone further.  Of note we have tried DMARDS in the past resulting in significant infection and hospitalization for the patient  - predniSONE (DELTASONE) 1 MG Tab; 9 tabs po qam  Dispense: 270 Tab; Refill: 1  - Anti-Neutrophil Cytoplasmic Antibodies Screen; Future  - DX-CERVICAL SPINE-2 OR 3 VIEWS; Future    2. Long term current use of systemic steroids  Currently on prednisone at 10 mg p.o. Daily, will decrease to 9 mg p.o. daily, anticipate decreasing further  - predniSONE (DELTASONE) 1 MG Tab; 9 tabs po qam  Dispense: 270 Tab; Refill: 1  - Anti-Neutrophil Cytoplasmic Antibodies Screen; Future  - DX-CERVICAL SPINE-2 OR 3 VIEWS; Future    3. Neuropathy (HCC)  Of right hand palmar aspect, offered EMG/NCV patient declined will do C-spine x-ray for evaluation of spinal stenosis and neural foraminal impingement  - Anti-Neutrophil Cytoplasmic Antibodies Screen; Future  - DX-CERVICAL SPINE-2 OR 3 VIEWS; Future    4. Cervicalgia  To C-spine x-ray for evaluation of neural foraminal impingement or spinal stenosis causing numbness in right hand  - Anti-Neutrophil Cytoplasmic Antibodies Screen; Future  - DX-CERVICAL SPINE-2 OR 3 VIEWS; Future    5. Chronic obstructive pulmonary disease, unspecified COPD type ([facility])  Followed by pulmonology has a follow-up pending this month  - Anti-Neutrophil Cytoplasmic Antibodies Screen; Future  - DX-CERVICAL SPINE-2 OR 3 VIEWS; Future    6. ESRD on dialysis ([facility])    7. TIMOTHY (obstructive sleep apnea)    8. Pacemaker    Followup: No  Follow-up on file. or sooner power FLETCHER Stephanie was seen 30 minutes face-to-face of which more than 50% of the time was spent counseling the patient (excluding time for procedures)  regarding  rheumatological condition and care. Therapy was discussed in detail.    Please note that this dictation was created using voice recognition software. I have made every reasonable attempt to correct obvious errors, but I expect that there are errors of grammar and possibly content that I did not discover before finalizing the note.

## 2019-01-24 ENCOUNTER — OFFICE VISIT (OUTPATIENT)
Dept: PULMONOLOGY | Facility: HOSPICE | Age: 79
End: 2019-01-24
Payer: MEDICARE

## 2019-01-24 VITALS
DIASTOLIC BLOOD PRESSURE: 60 MMHG | SYSTOLIC BLOOD PRESSURE: 118 MMHG | HEART RATE: 108 BPM | RESPIRATION RATE: 16 BRPM | TEMPERATURE: 98.6 F | OXYGEN SATURATION: 94 % | WEIGHT: 141.8 LBS | HEIGHT: 68 IN | BODY MASS INDEX: 21.49 KG/M2

## 2019-01-24 DIAGNOSIS — J47.9 BRONCHIECTASIS WITHOUT COMPLICATION (HCC): ICD-10-CM

## 2019-01-24 DIAGNOSIS — J84.9 ILD (INTERSTITIAL LUNG DISEASE) (HCC): ICD-10-CM

## 2019-01-24 DIAGNOSIS — J41.1 MUCOPURULENT CHRONIC BRONCHITIS (HCC): Chronic | ICD-10-CM

## 2019-01-24 PROCEDURE — 99214 OFFICE O/P EST MOD 30 MIN: CPT | Performed by: INTERNAL MEDICINE

## 2019-01-24 RX ORDER — AMOXICILLIN 500 MG/1
CAPSULE ORAL
Refills: 1 | COMMUNITY
Start: 2018-12-20 | End: 2019-01-01

## 2019-01-24 ASSESSMENT — ENCOUNTER SYMPTOMS
COUGH: 1
SHORTNESS OF BREATH: 1
GASTROINTESTINAL NEGATIVE: 1
PSYCHIATRIC NEGATIVE: 1
MUSCULOSKELETAL NEGATIVE: 1
CARDIOVASCULAR NEGATIVE: 1
SPUTUM PRODUCTION: 1
EYES NEGATIVE: 1
WEAKNESS: 1

## 2019-01-24 ASSESSMENT — PATIENT HEALTH QUESTIONNAIRE - PHQ9: CLINICAL INTERPRETATION OF PHQ2 SCORE: 0

## 2019-01-24 NOTE — PROGRESS NOTES
"Pulmonary Clinic follow up    Date of Service: 1/24/2019    Reason for follow up:  Follow up ILD    Problem List Items Addressed This Visit     COPD (chronic obstructive pulmonary disease) (HCC) (Chronic)     Secondary to underlying bronchiectasis which is from histoplasmosis and DANIEL infection  Doing well on regimen of Breo qday, vest bid, duonebs and saline bid  Has previously been on tobramycin nebs  Given sputum culture orders if + signs and symptoms of infection    No signs of ILD secondary to wegeners in his imaging           Other Visit Diagnoses     Bronchiectasis without complication (HCC)        Relevant Orders    CULTURE RESP W/O GRAM STAIN            History of Present Illness: Gilberto Brown is a 78 y.o. male with a past medical history of Wegener's granulomatosis initially diagnosed in September 2011 with progressive renal insufficiency status post renal biopsy confirming Wegener's granulomatosis  No lung disease associated with wegeners  Does have sinus disease  Seen last in 11/27/2018  ANCA negative since 2015  Failed cellcept, rituxan and cytoxan had severe infections resulting in hospitalization  Also has HD on dialysis * 8 years  Afib and hx of CVA  DANIEL dx 2008 and Rx 18 months   Pulmonary histoplasmosisin 1962 and has had bronchiectasis since then  He has ILD documented in his hx but did not appreciate UIp or NSIP pattern  He has recurrent pseudomonas infection with colonization  He has a dx of COPD secondary to bronchiectasis  He has sleep apnea - on CPAP    He is on Breo, vest therapy bid, saline nebs bid, duonebs bid and flutter valve    \"GBM neg 1/2012  ANCA neg 1/2015; ANCA 1:20  11/2015; ANCA neg 2/2016; ANCA neg 5/2016; ANCA neg 12/2016; ANCA neg 3/2017; ANCA neg 7/2017; ANCA neg 2/2018; ANCA neg 5/2018; ANCA neg 12/2018\" from Dr. Ariza's note  12/20/18 ANCA <1:20     Being slowly tapered off prednsione   Last PFTs in 2017 restriction - TLC 76% and DLCO 60%  CT chest 2017  b/l " bronchiectasis and scarring in the lower lobes. No evidence of UIP or NSIP       Review of Systems   HENT: Negative.    Eyes: Negative.    Respiratory: Positive for cough, sputum production and shortness of breath.    Cardiovascular: Negative.    Gastrointestinal: Negative.    Genitourinary: Negative.    Musculoskeletal: Negative.    Skin: Negative.    Neurological: Positive for weakness.   Endo/Heme/Allergies: Negative.    Psychiatric/Behavioral: Negative.        Current Outpatient Prescriptions on File Prior to Visit   Medication Sig Dispense Refill   • predniSONE (DELTASONE) 1 MG Tab 9 tabs po qam 270 Tab 1   • raNITidine (ZANTAC) 150 MG Tab Take 150 mg by mouth 2 times a day.     • albuterol (PROAIR HFA) 108 (90 Base) MCG/ACT Aero Soln inhalation aerosol Inhale 2 Puffs by mouth every 6 hours as needed for Shortness of Breath.     • ciprofloxacin (CIPRO) 500 MG Tab Take 1 Tab by mouth 2 times a day. 28 Tab 0   • sodium chloride (HYPER-SAL) 7 % Nebu Soln INHALE 1 VIAL VIA NEBULIZER TWICE DAILY 240 mL 11   • tamsulosin (FLOMAX) 0.4 MG capsule Take 1 Cap by mouth every day. 90 Cap 3   • sevelamer carbonate (RENVELA) 800 MG Tab tablet TAKE 1 TABLET BY MOUTH THREE TIMES DAILY WITH MEALS 270 Tab 7   • apixaban (ELIQUIS) 2.5mg Tab Take 2.5 mg by mouth 2 Times a Day.     • Fluticasone Furoate-Vilanterol (BREO ELLIPTA) 200-25 MCG/INH AEROSOL POWDER, BREATH ACTIVATED Inhale 1 Puff by mouth every day. Rinse mouth after use. 1 Each 11   • ipratropium-albuterol (DUONEB) 0.5-2.5 (3) MG/3ML nebulizer solution USE 3 ML VIA NEBULIZER FOUR TIMES DAILY 1080 mL 3   • metoprolol (LOPRESSOR) 25 MG Tab Take 25 mg by mouth 2 times a day.     • B Complex-C-Folic Acid (DIALYVITE TABLET) Tab Dialyvite -nehro vit B complex-C-folic acid 1 tablet po QD Dialysis Pt 90 Tab 3   • finasteride (PROSCAR) 5 MG Tab Take 5 mg by mouth every day.     • simvastatin (ZOCOR) 40 MG TABS Take 40 mg by mouth every evening.     • epoetin lucina (EPOGEN,PROCRIT)  3000 UNIT/ML SOLN Inject 2,200 Units as instructed every Monday, Wednesday, and Friday. With dialysis     • aspirin EC (ECOTRIN) 81 MG TBEC Take 81 mg by mouth every day.       No current facility-administered medications on file prior to visit.        Social History   Substance Use Topics   • Smoking status: Former Smoker     Years: 30.00     Types: Pipe     Quit date: 1/1/1990   • Smokeless tobacco: Former User     Types: Chew   • Alcohol use 4.8 oz/week     7 Glasses of wine, 1 Standard drinks or equivalent per week      Comment: 1/2 glass Red wine nightly        Past Medical History:   Diagnosis Date   • A-fib ([facility])        • Anemia    • Arthritis     arms, legs, shoulders   • ASTHMA    • BPH (benign prostatic hyperplasia)    • Breath shortness    • Bronchitis     chronic   • Cataract     removed bilat   • COPD    • Dialysis     [facility] M-W-F,    • Dyslipidemia    • EMPHYSEMA    • GERD (gastroesophageal reflux disease)    • Hypertension    • Oxygen decrease     O2 3liters @  and dialysis   • Pacemaker 1988   • Pain 05/09/2018    shoulders/hips, 5/10   • Pneumonia 2017   • Pseudomonas pneumonia ([facility])    • Pulmonary histoplasmosis ([facility])    • Renal disorder     CKD,    • Sick sinus syndrome ([facility])    • Sleep apnea     uses cpap at noc   • Snoring    • Stroke ([facility]) 02/2018   • Unspecified hemorrhagic conditions     bruises easily, fragile skin   • Wegener's disease, pulmonary ([facility])        Past Surgical History:   Procedure Laterality Date   • AV FISTULOGRAM Right 4/12/2016    Procedure: AV FISTULOGRAM , VENOPLASTY X 2;  Surgeon: Nate Syed M.D.;  Location: SURGERY [facility];  Service:    • RECOVERY  9/3/2015    Procedure: IR1 VASCULAR CASE-ELENO RIGHT DIALYSIS FISTULOGRAM, POSSIBLE INTERVENTION;  Surgeon: Recoveryonly Surgery;  Location: SURGERY PRE-POST PROC UNIT [facility];  Service:    • RECOVERY  2/26/2015    Performed by Ir-Recovery Surgery at SURGERY SAME DAY [facility]   •  "RECOVERY  10/3/2014    Performed by Ir-Recovery Surgery at SURGERY SAME DAY [facility] ORS   • RECOVERY  8/7/2014    Performed by Ir-Recovery Surgery at SURGERY [facility] ORS   • RECOVERY  12/17/2013    Performed by Ir-Recovery Surgery at SURGERY SAME DAY [facility] ORS   • BRONCHOSCOPY-ENDO  7/18/2013    Performed by Juan F Rubio M.D. at [facility]   • RECOVERY  7/17/2013    Performed by Ir-Recovery Surgery at SURGERY SAME DAY [facility] ORS   • AV FISTULA REVISION  6/9/2012    Performed by NESSA JOHANSEN at SURGERY [facility] ORS   • RECOVERY  4/19/2012    Performed by SURGERY, IR-RECOVERY at SURGERY SAME DAY [facility] ORS   • AV FISTULA CREATION  3/8/2012    Performed by NESSA JOHANSEN at SURGERY [facility]   • GASTROSCOPY WITH BIOPSY  1/17/2012    Performed by ZURDO HARRISON at [facility]   • CATH PLACEMENT  10/8/2011    Performed by NESSA JOHANSEN at [facility]   • GASTROSCOPY WITH BALLOON DILATATION  9/28/2011    Performed by KT FERNANDEZ at [facility]   • PACEMAKER INSERTION  4/2009   • ROTATOR CUFF REPAIR  2003    right   • HERNIA REPAIR  1990    right inguinal hernia   • HIP REPLACEMENT, TOTAL      right   • TONSILLECTOMY         Allergies: Erythromycin and Rituximab    Family History   Problem Relation Age of Onset   • Stroke Father    • Heart Disease Father    • Stroke Mother    • Cancer Unknown        Vitals:    01/24/19 1446   Height: 1.727 m (5' 8\")   Weight: 64.3 kg (141 lb 12.8 oz)   Weight % change since last entry.: 0 %   BP: 118/60   Pulse: (!) 108   BMI (Calculated): 21.56   Resp: 16   Temp: 37 °C (98.6 °F)   TempSrc: Temporal       Physical Examination  Physical Exam   Constitutional: He is oriented to person, place, and time and well-developed, well-nourished, and in no distress. No distress.   HENT:   Head: Normocephalic and atraumatic.   Right Ear: External ear normal.   Left Ear: External ear normal.   Nose: Nose " normal.   Mouth/Throat: Oropharynx is clear and moist. No oropharyngeal exudate.   Eyes: Pupils are equal, round, and reactive to light. Conjunctivae and EOM are normal.   Neck: Normal range of motion. Neck supple. No JVD present. No tracheal deviation present. No thyromegaly present.   Cardiovascular: Normal rate, regular rhythm and intact distal pulses.  Exam reveals no gallop and no friction rub.    Murmur heard.  Pulmonary/Chest: Effort normal and breath sounds normal. No stridor. No respiratory distress. He has no wheezes. He has no rales. He exhibits no tenderness.   Abdominal: Soft. Bowel sounds are normal. He exhibits no distension and no mass.   Musculoskeletal: Normal range of motion. He exhibits no edema or deformity.   Lymphadenopathy:     He has no cervical adenopathy.   Neurological: He is alert and oriented to person, place, and time. No cranial nerve deficit. He exhibits normal muscle tone. Gait normal. Coordination normal. GCS score is 15.   Skin: No rash noted. He is not diaphoretic.   Multiple echymosis on his hands   Psychiatric: Mood, memory, affect and judgment normal.        Abhijit Rowe MD MPH [facility]  [facility]

## 2019-01-25 NOTE — ASSESSMENT & PLAN NOTE
Secondary to underlying bronchiectasis which is from histoplasmosis and DANIEL infection  Doing well on regimen of Breo qday, vest bid, duonebs and saline bid  Has previously been on tobramycin nebs  Given sputum culture orders if + signs and symptoms of infection    No signs of ILD secondary to wegeners in his imaging

## 2019-02-05 ENCOUNTER — HOSPITAL ENCOUNTER (OUTPATIENT)
Dept: RADIOLOGY | Facility: MEDICAL CENTER | Age: 79
End: 2019-02-05
Attending: INTERNAL MEDICINE
Payer: MEDICARE

## 2019-02-05 DIAGNOSIS — G62.9 NEUROPATHY: ICD-10-CM

## 2019-02-05 DIAGNOSIS — M31.30 WEGENER'S GRANULOMATOSIS: Chronic | ICD-10-CM

## 2019-02-05 DIAGNOSIS — M54.2 CERVICALGIA: ICD-10-CM

## 2019-02-05 DIAGNOSIS — Z79.52 LONG TERM CURRENT USE OF SYSTEMIC STEROIDS: ICD-10-CM

## 2019-02-05 DIAGNOSIS — J44.9 CHRONIC OBSTRUCTIVE PULMONARY DISEASE, UNSPECIFIED COPD TYPE (HCC): Chronic | ICD-10-CM

## 2019-02-05 PROCEDURE — 72040 X-RAY EXAM NECK SPINE 2-3 VW: CPT

## 2019-02-22 ENCOUNTER — APPOINTMENT (OUTPATIENT)
Dept: RADIOLOGY | Facility: MEDICAL CENTER | Age: 79
DRG: 871 | End: 2019-02-22
Attending: EMERGENCY MEDICINE
Payer: MEDICARE

## 2019-02-22 ENCOUNTER — HOSPITAL ENCOUNTER (INPATIENT)
Facility: MEDICAL CENTER | Age: 79
LOS: 2 days | DRG: 871 | End: 2019-02-24
Attending: EMERGENCY MEDICINE | Admitting: FAMILY MEDICINE
Payer: MEDICARE

## 2019-02-22 PROBLEM — L03.90 CELLULITIS: Status: ACTIVE | Noted: 2019-02-22

## 2019-02-22 LAB
ALBUMIN SERPL BCP-MCNC: 3.2 G/DL (ref 3.2–4.9)
ALBUMIN/GLOB SERPL: 1.1 G/DL
ALP SERPL-CCNC: 58 U/L (ref 30–99)
ALT SERPL-CCNC: 18 U/L (ref 2–50)
ANION GAP SERPL CALC-SCNC: 8 MMOL/L (ref 0–11.9)
AST SERPL-CCNC: 17 U/L (ref 12–45)
BASOPHILS # BLD AUTO: 0.4 % (ref 0–1.8)
BASOPHILS # BLD: 0.03 K/UL (ref 0–0.12)
BILIRUB SERPL-MCNC: 0.8 MG/DL (ref 0.1–1.5)
BUN SERPL-MCNC: 10 MG/DL (ref 8–22)
CALCIUM SERPL-MCNC: 8.7 MG/DL (ref 8.4–10.2)
CHLORIDE SERPL-SCNC: 97 MMOL/L (ref 96–112)
CO2 SERPL-SCNC: 30 MMOL/L (ref 20–33)
CREAT SERPL-MCNC: 2.05 MG/DL (ref 0.5–1.4)
EOSINOPHIL # BLD AUTO: 0.13 K/UL (ref 0–0.51)
EOSINOPHIL NFR BLD: 1.6 % (ref 0–6.9)
ERYTHROCYTE [DISTWIDTH] IN BLOOD BY AUTOMATED COUNT: 60.7 FL (ref 35.9–50)
FLUAV RNA SPEC QL NAA+PROBE: NEGATIVE
FLUBV RNA SPEC QL NAA+PROBE: NEGATIVE
GLOBULIN SER CALC-MCNC: 2.8 G/DL (ref 1.9–3.5)
GLUCOSE SERPL-MCNC: 115 MG/DL (ref 65–99)
HCT VFR BLD AUTO: 34.5 % (ref 42–52)
HGB BLD-MCNC: 10.8 G/DL (ref 14–18)
IMM GRANULOCYTES # BLD AUTO: 0.03 K/UL (ref 0–0.11)
IMM GRANULOCYTES NFR BLD AUTO: 0.4 % (ref 0–0.9)
LACTATE BLD-SCNC: 1.6 MMOL/L (ref 0.5–2)
LYMPHOCYTES # BLD AUTO: 0.48 K/UL (ref 1–4.8)
LYMPHOCYTES NFR BLD: 6 % (ref 22–41)
MCH RBC QN AUTO: 30.9 PG (ref 27–33)
MCHC RBC AUTO-ENTMCNC: 31.3 G/DL (ref 33.7–35.3)
MCV RBC AUTO: 98.9 FL (ref 81.4–97.8)
MONOCYTES # BLD AUTO: 0.8 K/UL (ref 0–0.85)
MONOCYTES NFR BLD AUTO: 10 % (ref 0–13.4)
NEUTROPHILS # BLD AUTO: 6.55 K/UL (ref 1.82–7.42)
NEUTROPHILS NFR BLD: 81.6 % (ref 44–72)
NRBC # BLD AUTO: 0 K/UL
NRBC BLD-RTO: 0 /100 WBC
PLATELET # BLD AUTO: 192 K/UL (ref 164–446)
PMV BLD AUTO: 8.4 FL (ref 9–12.9)
POTASSIUM SERPL-SCNC: 3.7 MMOL/L (ref 3.6–5.5)
PROT SERPL-MCNC: 6 G/DL (ref 6–8.2)
RBC # BLD AUTO: 3.49 M/UL (ref 4.7–6.1)
SODIUM SERPL-SCNC: 135 MMOL/L (ref 135–145)
WBC # BLD AUTO: 8 K/UL (ref 4.8–10.8)

## 2019-02-22 PROCEDURE — 99285 EMERGENCY DEPT VISIT HI MDM: CPT

## 2019-02-22 PROCEDURE — 770006 HCHG ROOM/CARE - MED/SURG/GYN SEMI*

## 2019-02-22 PROCEDURE — 700102 HCHG RX REV CODE 250 W/ 637 OVERRIDE(OP)

## 2019-02-22 PROCEDURE — 71045 X-RAY EXAM CHEST 1 VIEW: CPT

## 2019-02-22 PROCEDURE — 80053 COMPREHEN METABOLIC PANEL: CPT

## 2019-02-22 PROCEDURE — 700105 HCHG RX REV CODE 258: Performed by: EMERGENCY MEDICINE

## 2019-02-22 PROCEDURE — 83605 ASSAY OF LACTIC ACID: CPT

## 2019-02-22 PROCEDURE — 87502 INFLUENZA DNA AMP PROBE: CPT

## 2019-02-22 PROCEDURE — 99221 1ST HOSP IP/OBS SF/LOW 40: CPT | Mod: AI | Performed by: FAMILY MEDICINE

## 2019-02-22 PROCEDURE — A9270 NON-COVERED ITEM OR SERVICE: HCPCS

## 2019-02-22 PROCEDURE — 85025 COMPLETE CBC W/AUTO DIFF WBC: CPT

## 2019-02-22 PROCEDURE — 36415 COLL VENOUS BLD VENIPUNCTURE: CPT

## 2019-02-22 PROCEDURE — 87040 BLOOD CULTURE FOR BACTERIA: CPT

## 2019-02-22 PROCEDURE — 96365 THER/PROPH/DIAG IV INF INIT: CPT

## 2019-02-22 PROCEDURE — 700111 HCHG RX REV CODE 636 W/ 250 OVERRIDE (IP): Performed by: EMERGENCY MEDICINE

## 2019-02-22 RX ORDER — AMOXICILLIN 250 MG
2 CAPSULE ORAL 2 TIMES DAILY
Status: DISCONTINUED | OUTPATIENT
Start: 2019-02-22 | End: 2019-02-24 | Stop reason: HOSPADM

## 2019-02-22 RX ORDER — SEVELAMER CARBONATE 800 MG/1
800 TABLET, FILM COATED ORAL
Status: DISCONTINUED | OUTPATIENT
Start: 2019-02-23 | End: 2019-02-24 | Stop reason: HOSPADM

## 2019-02-22 RX ORDER — ACETAMINOPHEN 325 MG/1
975 TABLET ORAL ONCE
Status: COMPLETED | OUTPATIENT
Start: 2019-02-22 | End: 2019-02-22

## 2019-02-22 RX ORDER — BISACODYL 10 MG
10 SUPPOSITORY, RECTAL RECTAL
Status: DISCONTINUED | OUTPATIENT
Start: 2019-02-22 | End: 2019-02-24 | Stop reason: HOSPADM

## 2019-02-22 RX ORDER — ROSUVASTATIN CALCIUM 20 MG/1
20 TABLET, COATED ORAL EVERY EVENING
Status: ON HOLD | COMMUNITY
End: 2019-08-01

## 2019-02-22 RX ORDER — TAMSULOSIN HYDROCHLORIDE 0.4 MG/1
0.4 CAPSULE ORAL DAILY
Status: DISCONTINUED | OUTPATIENT
Start: 2019-02-23 | End: 2019-02-24 | Stop reason: HOSPADM

## 2019-02-22 RX ORDER — SODIUM CHLORIDE FOR INHALATION 7 %
4 VIAL, NEBULIZER (ML) INHALATION 2 TIMES DAILY
Status: DISCONTINUED | OUTPATIENT
Start: 2019-02-22 | End: 2019-02-23

## 2019-02-22 RX ORDER — ACETAMINOPHEN 325 MG/1
650 TABLET ORAL EVERY 6 HOURS PRN
Status: DISCONTINUED | OUTPATIENT
Start: 2019-02-22 | End: 2019-02-24 | Stop reason: HOSPADM

## 2019-02-22 RX ORDER — DOXYCYCLINE 100 MG/1
TABLET ORAL
Status: COMPLETED
Start: 2019-02-22 | End: 2019-02-22

## 2019-02-22 RX ORDER — FINASTERIDE 5 MG/1
5 TABLET, FILM COATED ORAL DAILY
Status: DISCONTINUED | OUTPATIENT
Start: 2019-02-23 | End: 2019-02-24 | Stop reason: HOSPADM

## 2019-02-22 RX ORDER — POLYETHYLENE GLYCOL 3350 17 G/17G
1 POWDER, FOR SOLUTION ORAL
Status: DISCONTINUED | OUTPATIENT
Start: 2019-02-22 | End: 2019-02-24 | Stop reason: HOSPADM

## 2019-02-22 RX ORDER — PIPERACILLIN SODIUM, TAZOBACTAM SODIUM 4; .5 G/20ML; G/20ML
INJECTION, POWDER, LYOPHILIZED, FOR SOLUTION INTRAVENOUS
Status: COMPLETED
Start: 2019-02-22 | End: 2019-02-22

## 2019-02-22 RX ORDER — RANITIDINE 150 MG/1
150 TABLET ORAL 2 TIMES DAILY
Status: DISCONTINUED | OUTPATIENT
Start: 2019-02-22 | End: 2019-02-23

## 2019-02-22 RX ORDER — DOXYCYCLINE 100 MG/1
100 TABLET ORAL EVERY 12 HOURS
Status: DISCONTINUED | OUTPATIENT
Start: 2019-02-22 | End: 2019-02-24 | Stop reason: HOSPADM

## 2019-02-22 RX ORDER — IPRATROPIUM BROMIDE AND ALBUTEROL SULFATE 2.5; .5 MG/3ML; MG/3ML
3 SOLUTION RESPIRATORY (INHALATION)
Status: DISCONTINUED | OUTPATIENT
Start: 2019-02-23 | End: 2019-02-24 | Stop reason: HOSPADM

## 2019-02-22 RX ORDER — ACETAMINOPHEN 500 MG
TABLET ORAL
Status: COMPLETED
Start: 2019-02-22 | End: 2019-02-22

## 2019-02-22 RX ORDER — SIMVASTATIN 20 MG
40 TABLET ORAL NIGHTLY
Status: DISCONTINUED | OUTPATIENT
Start: 2019-02-22 | End: 2019-02-23

## 2019-02-22 RX ADMIN — DOXYCYCLINE 100 MG: 100 TABLET, FILM COATED ORAL at 23:27

## 2019-02-22 RX ADMIN — ACETAMINOPHEN 1000 MG: 500 TABLET, FILM COATED ORAL at 21:39

## 2019-02-22 RX ADMIN — ACETAMINOPHEN 1000 MG: 325 TABLET ORAL at 21:39

## 2019-02-22 RX ADMIN — PIPERACILLIN AND TAZOBACTAM 4.5 G: 4; .5 INJECTION, POWDER, LYOPHILIZED, FOR SOLUTION INTRAVENOUS; PARENTERAL at 22:29

## 2019-02-23 PROBLEM — E87.1 HYPONATREMIA: Status: ACTIVE | Noted: 2019-02-23

## 2019-02-23 PROBLEM — I67.9 CEREBROVASCULAR DISEASE: Status: ACTIVE | Noted: 2018-02-01

## 2019-02-23 PROBLEM — E87.6 HYPOKALEMIA: Status: ACTIVE | Noted: 2019-02-23

## 2019-02-23 PROBLEM — A41.9 SEPSIS (HCC): Status: ACTIVE | Noted: 2019-02-23

## 2019-02-23 PROBLEM — I49.5 SICK SINUS SYNDROME (HCC): Status: ACTIVE | Noted: 2019-02-23

## 2019-02-23 LAB
ALBUMIN SERPL BCP-MCNC: 2.8 G/DL (ref 3.2–4.9)
ALBUMIN/GLOB SERPL: 1.1 G/DL
ALP SERPL-CCNC: 51 U/L (ref 30–99)
ALT SERPL-CCNC: 16 U/L (ref 2–50)
ANION GAP SERPL CALC-SCNC: 9 MMOL/L (ref 0–11.9)
APPEARANCE UR: CLEAR
AST SERPL-CCNC: 11 U/L (ref 12–45)
BASOPHILS # BLD AUTO: 0.3 % (ref 0–1.8)
BASOPHILS # BLD: 0.02 K/UL (ref 0–0.12)
BILIRUB SERPL-MCNC: 1.3 MG/DL (ref 0.1–1.5)
BILIRUB UR QL STRIP.AUTO: NEGATIVE
BUN SERPL-MCNC: 12 MG/DL (ref 8–22)
CALCIUM SERPL-MCNC: 8.3 MG/DL (ref 8.4–10.2)
CHLORIDE SERPL-SCNC: 97 MMOL/L (ref 96–112)
CO2 SERPL-SCNC: 28 MMOL/L (ref 20–33)
COLOR UR: YELLOW
CREAT SERPL-MCNC: 2.38 MG/DL (ref 0.5–1.4)
EOSINOPHIL # BLD AUTO: 0.11 K/UL (ref 0–0.51)
EOSINOPHIL NFR BLD: 1.9 % (ref 0–6.9)
EPI CELLS #/AREA URNS HPF: ABNORMAL /HPF
ERYTHROCYTE [DISTWIDTH] IN BLOOD BY AUTOMATED COUNT: 61 FL (ref 35.9–50)
GLOBULIN SER CALC-MCNC: 2.6 G/DL (ref 1.9–3.5)
GLUCOSE SERPL-MCNC: 91 MG/DL (ref 65–99)
GLUCOSE UR STRIP.AUTO-MCNC: NEGATIVE MG/DL
HCT VFR BLD AUTO: 32.1 % (ref 42–52)
HGB BLD-MCNC: 10 G/DL (ref 14–18)
IMM GRANULOCYTES # BLD AUTO: 0.03 K/UL (ref 0–0.11)
IMM GRANULOCYTES NFR BLD AUTO: 0.5 % (ref 0–0.9)
KETONES UR STRIP.AUTO-MCNC: NEGATIVE MG/DL
LACTATE BLD-SCNC: 1.2 MMOL/L (ref 0.5–2)
LACTATE BLD-SCNC: 1.5 MMOL/L (ref 0.5–2)
LACTATE BLD-SCNC: 1.6 MMOL/L (ref 0.5–2)
LACTATE BLD-SCNC: 1.9 MMOL/L (ref 0.5–2)
LACTATE BLD-SCNC: 2.3 MMOL/L (ref 0.5–2)
LEUKOCYTE ESTERASE UR QL STRIP.AUTO: NEGATIVE
LYMPHOCYTES # BLD AUTO: 0.27 K/UL (ref 1–4.8)
LYMPHOCYTES NFR BLD: 4.6 % (ref 22–41)
MCH RBC QN AUTO: 31.3 PG (ref 27–33)
MCHC RBC AUTO-ENTMCNC: 31.2 G/DL (ref 33.7–35.3)
MCV RBC AUTO: 100.3 FL (ref 81.4–97.8)
MICRO URNS: ABNORMAL
MONOCYTES # BLD AUTO: 0.28 K/UL (ref 0–0.85)
MONOCYTES NFR BLD AUTO: 4.7 % (ref 0–13.4)
NEUTROPHILS # BLD AUTO: 5.19 K/UL (ref 1.82–7.42)
NEUTROPHILS NFR BLD: 88 % (ref 44–72)
NITRITE UR QL STRIP.AUTO: NEGATIVE
NRBC # BLD AUTO: 0 K/UL
NRBC BLD-RTO: 0 /100 WBC
PH UR STRIP.AUTO: 8.5 [PH]
PLATELET # BLD AUTO: 200 K/UL (ref 164–446)
PMV BLD AUTO: 9 FL (ref 9–12.9)
POTASSIUM SERPL-SCNC: 3.5 MMOL/L (ref 3.6–5.5)
PROT SERPL-MCNC: 5.4 G/DL (ref 6–8.2)
PROT UR QL STRIP: NEGATIVE MG/DL
RBC # BLD AUTO: 3.2 M/UL (ref 4.7–6.1)
RBC # URNS HPF: ABNORMAL /HPF
RBC UR QL AUTO: ABNORMAL
SODIUM SERPL-SCNC: 134 MMOL/L (ref 135–145)
SP GR UR STRIP.AUTO: 1.01
WBC # BLD AUTO: 5.9 K/UL (ref 4.8–10.8)
WBC #/AREA URNS HPF: ABNORMAL /HPF

## 2019-02-23 PROCEDURE — 83605 ASSAY OF LACTIC ACID: CPT

## 2019-02-23 PROCEDURE — 700102 HCHG RX REV CODE 250 W/ 637 OVERRIDE(OP): Performed by: FAMILY MEDICINE

## 2019-02-23 PROCEDURE — 700101 HCHG RX REV CODE 250: Performed by: FAMILY MEDICINE

## 2019-02-23 PROCEDURE — A9270 NON-COVERED ITEM OR SERVICE: HCPCS | Performed by: FAMILY MEDICINE

## 2019-02-23 PROCEDURE — 87205 SMEAR GRAM STAIN: CPT

## 2019-02-23 PROCEDURE — 80053 COMPREHEN METABOLIC PANEL: CPT

## 2019-02-23 PROCEDURE — 85025 COMPLETE CBC W/AUTO DIFF WBC: CPT

## 2019-02-23 PROCEDURE — 700105 HCHG RX REV CODE 258: Performed by: FAMILY MEDICINE

## 2019-02-23 PROCEDURE — 87186 SC STD MICRODIL/AGAR DIL: CPT

## 2019-02-23 PROCEDURE — 99232 SBSQ HOSP IP/OBS MODERATE 35: CPT | Performed by: HOSPITALIST

## 2019-02-23 PROCEDURE — 94667 MNPJ CHEST WALL 1ST: CPT

## 2019-02-23 PROCEDURE — 94640 AIRWAY INHALATION TREATMENT: CPT

## 2019-02-23 PROCEDURE — 87086 URINE CULTURE/COLONY COUNT: CPT

## 2019-02-23 PROCEDURE — 87077 CULTURE AEROBIC IDENTIFY: CPT

## 2019-02-23 PROCEDURE — 87040 BLOOD CULTURE FOR BACTERIA: CPT

## 2019-02-23 PROCEDURE — 700111 HCHG RX REV CODE 636 W/ 250 OVERRIDE (IP): Performed by: FAMILY MEDICINE

## 2019-02-23 PROCEDURE — 81001 URINALYSIS AUTO W/SCOPE: CPT

## 2019-02-23 PROCEDURE — 99221 1ST HOSP IP/OBS SF/LOW 40: CPT | Performed by: INTERNAL MEDICINE

## 2019-02-23 PROCEDURE — 87070 CULTURE OTHR SPECIMN AEROBIC: CPT

## 2019-02-23 PROCEDURE — 94760 N-INVAS EAR/PLS OXIMETRY 1: CPT

## 2019-02-23 PROCEDURE — 94669 MECHANICAL CHEST WALL OSCILL: CPT

## 2019-02-23 PROCEDURE — 770006 HCHG ROOM/CARE - MED/SURG/GYN SEMI*

## 2019-02-23 PROCEDURE — 87181 SC STD AGAR DILUTION PER AGT: CPT

## 2019-02-23 RX ORDER — FAMOTIDINE 20 MG/1
20 TABLET, FILM COATED ORAL DAILY
Status: DISCONTINUED | OUTPATIENT
Start: 2019-02-23 | End: 2019-02-24 | Stop reason: HOSPADM

## 2019-02-23 RX ORDER — SODIUM CHLORIDE FOR INHALATION 7 %
4 VIAL, NEBULIZER (ML) INHALATION
Status: DISCONTINUED | OUTPATIENT
Start: 2019-02-23 | End: 2019-02-24 | Stop reason: HOSPADM

## 2019-02-23 RX ORDER — SODIUM CHLORIDE 9 MG/ML
500 INJECTION, SOLUTION INTRAVENOUS
Status: DISCONTINUED | OUTPATIENT
Start: 2019-02-23 | End: 2019-02-24 | Stop reason: HOSPADM

## 2019-02-23 RX ORDER — ROSUVASTATIN CALCIUM 10 MG/1
20 TABLET, COATED ORAL EVERY EVENING
Status: DISCONTINUED | OUTPATIENT
Start: 2019-02-23 | End: 2019-02-24 | Stop reason: HOSPADM

## 2019-02-23 RX ORDER — BUDESONIDE AND FORMOTEROL FUMARATE DIHYDRATE 160; 4.5 UG/1; UG/1
2 AEROSOL RESPIRATORY (INHALATION)
Status: DISCONTINUED | OUTPATIENT
Start: 2019-02-23 | End: 2019-02-23

## 2019-02-23 RX ADMIN — ACETAMINOPHEN 650 MG: 325 TABLET, FILM COATED ORAL at 14:17

## 2019-02-23 RX ADMIN — METOPROLOL TARTRATE 25 MG: 25 TABLET ORAL at 08:37

## 2019-02-23 RX ADMIN — SEVELAMER CARBONATE 800 MG: 800 TABLET, FILM COATED ORAL at 12:43

## 2019-02-23 RX ADMIN — IPRATROPIUM BROMIDE AND ALBUTEROL SULFATE 3 ML: .5; 3 SOLUTION RESPIRATORY (INHALATION) at 15:21

## 2019-02-23 RX ADMIN — IPRATROPIUM BROMIDE AND ALBUTEROL SULFATE 3 ML: .5; 3 SOLUTION RESPIRATORY (INHALATION) at 03:09

## 2019-02-23 RX ADMIN — STANDARDIZED SENNA CONCENTRATE AND DOCUSATE SODIUM 2 TABLET: 8.6; 5 TABLET, FILM COATED ORAL at 08:36

## 2019-02-23 RX ADMIN — SEVELAMER CARBONATE 800 MG: 800 TABLET, FILM COATED ORAL at 08:36

## 2019-02-23 RX ADMIN — DOXYCYCLINE 100 MG: 100 TABLET, FILM COATED ORAL at 08:35

## 2019-02-23 RX ADMIN — FAMOTIDINE 20 MG: 20 TABLET, FILM COATED ORAL at 08:36

## 2019-02-23 RX ADMIN — METOPROLOL TARTRATE 25 MG: 25 TABLET ORAL at 18:06

## 2019-02-23 RX ADMIN — IPRATROPIUM BROMIDE AND ALBUTEROL SULFATE 3 ML: .5; 3 SOLUTION RESPIRATORY (INHALATION) at 10:36

## 2019-02-23 RX ADMIN — DOXYCYCLINE 100 MG: 100 TABLET, FILM COATED ORAL at 18:06

## 2019-02-23 RX ADMIN — ROSUVASTATIN CALCIUM 20 MG: 10 TABLET, FILM COATED ORAL at 18:05

## 2019-02-23 RX ADMIN — Medication 4 ML: at 10:37

## 2019-02-23 RX ADMIN — PIPERACILLIN AND TAZOBACTAM 4.5 G: 4; .5 INJECTION, POWDER, LYOPHILIZED, FOR SOLUTION INTRAVENOUS; PARENTERAL at 18:05

## 2019-02-23 RX ADMIN — TAMSULOSIN HYDROCHLORIDE 0.4 MG: 0.4 CAPSULE ORAL at 08:35

## 2019-02-23 RX ADMIN — APIXABAN 2.5 MG: 5 TABLET, FILM COATED ORAL at 08:36

## 2019-02-23 RX ADMIN — SEVELAMER CARBONATE 800 MG: 800 TABLET, FILM COATED ORAL at 18:06

## 2019-02-23 RX ADMIN — ASPIRIN 81 MG: 81 TABLET, COATED ORAL at 08:35

## 2019-02-23 RX ADMIN — FINASTERIDE 5 MG: 5 TABLET, FILM COATED ORAL at 08:36

## 2019-02-23 RX ADMIN — PIPERACILLIN AND TAZOBACTAM 4.5 G: 4; .5 INJECTION, POWDER, LYOPHILIZED, FOR SOLUTION INTRAVENOUS; PARENTERAL at 06:18

## 2019-02-23 RX ADMIN — ROSUVASTATIN CALCIUM 20 MG: 10 TABLET, FILM COATED ORAL at 08:35

## 2019-02-23 ASSESSMENT — COGNITIVE AND FUNCTIONAL STATUS - GENERAL
SUGGESTED CMS G CODE MODIFIER MOBILITY: CJ
CLIMB 3 TO 5 STEPS WITH RAILING: A LOT
DAILY ACTIVITIY SCORE: 24
STANDING UP FROM CHAIR USING ARMS: A LITTLE
SUGGESTED CMS G CODE MODIFIER DAILY ACTIVITY: CH
MOBILITY SCORE: 20
WALKING IN HOSPITAL ROOM: A LITTLE

## 2019-02-23 ASSESSMENT — COPD QUESTIONNAIRES
DO YOU EVER COUGH UP ANY MUCUS OR PHLEGM?: YES, EVERY DAY
HAVE YOU SMOKED AT LEAST 100 CIGARETTES IN YOUR ENTIRE LIFE: YES
COPD SCREENING SCORE: 9
DURING THE PAST 4 WEEKS HOW MUCH DID YOU FEEL SHORT OF BREATH: MOST  OR ALL OF THE TIME

## 2019-02-23 ASSESSMENT — ENCOUNTER SYMPTOMS
NAUSEA: 0
NECK PAIN: 0
DEPRESSION: 0
HEADACHES: 0
INSOMNIA: 0
SORE THROAT: 0
DIZZINESS: 0
ABDOMINAL PAIN: 0
COUGH: 0
BACK PAIN: 0
TINGLING: 0
FEVER: 0
VOMITING: 0
SHORTNESS OF BREATH: 0
EYE PAIN: 0
CHILLS: 0
PALPITATIONS: 0
BLURRED VISION: 0

## 2019-02-23 ASSESSMENT — PATIENT HEALTH QUESTIONNAIRE - PHQ9
1. LITTLE INTEREST OR PLEASURE IN DOING THINGS: NOT AT ALL
SUM OF ALL RESPONSES TO PHQ9 QUESTIONS 1 AND 2: 0
2. FEELING DOWN, DEPRESSED, IRRITABLE, OR HOPELESS: NOT AT ALL

## 2019-02-23 ASSESSMENT — LIFESTYLE VARIABLES
ALCOHOL_USE: NO
EVER_SMOKED: YES

## 2019-02-23 NOTE — ASSESSMENT & PLAN NOTE
cxray with new infiltrates and he has a cough  Pt has history of colonization with pseudomonas  Continue zosyn, doxycyline

## 2019-02-23 NOTE — FLOWSHEET NOTE
02/23/19 1521   Events/Summary/Plan   Events/Summary/Plan Started lydia tx given   Interdisciplinary Plan of Care-Goals (Indications)   Bronchodilator Indications History / Diagnosis   Bronchopulmonary Hygiene Indications Difficulty with Secretion Clearance   Interdisciplinary Plan of Care-Outcomes    Bronchodilator Outcome Patient at Stable Baseline   Bronchopulmonary Hygiene Outcome Patient at Stable Baseline   Education   Education Yes - Pt. / Family has been Instructed in use of Respiratory Medications and Adverse Reactions;Yes - Pt. / Family has been Instructed in use of Respiratory Equipment   RT Assessment of Delivered Medications   Evaluation of Medication Delivery Daily Yes-- Pt /Family has been Instructed in use of Respiratory Medications and Adverse Reactions   SVN Group   #SVN Performed Yes   Given By: Mouthpiece   PEP/CPT Group   PEP/CPT/Airway Clearance Therapy Yes   #PEP/CPT (Manual) Initial Initial   #CPT (Mechanical) Yes   PEP/CPT Method Percussion/Vibration   CPT Settings Yes   Chest Exam   Respiration 18   Pulse 100   Breath Sounds   RUL Breath Sounds Clear   RML Breath Sounds Clear;Diminished   RLL Breath Sounds Diminished   DEBORA Breath Sounds Clear   LLL Breath Sounds Diminished   Secretions   Cough Non Productive   Oximetry   #Pulse Oximetry (Single Determination) Yes   Oxygen   Pulse Oximetry 90 %   O2 (LPM) 0   O2 Daily Delivery Respiratory  Room Air with O2 Available

## 2019-02-23 NOTE — PROGRESS NOTES
Patient transported to room 310-2 via gurney from ED. Patient ambulated to hospital bed.  POC discussed with patient; no questions or needs at this time. Bed locked in lowest position and call light is within reach; patient calls appropriately.

## 2019-02-23 NOTE — CONSULTS
DATE OF SERVICE:  02/23/2019    NEPHROLOGY CONSULTATION    REQUESTING PHYSICIAN:  Dick Lowery MD    REASON FOR CONSULTATION:  To evaluate and provide dialysis for patient with   end-stage renal disease.    HISTORY OF PRESENT ILLNESS:  The patient is a 78-year-old male with end-stage   renal disease, on hemodialysis, patient of mine who has been receiving acute   dialysis treatments on Monday, Wednesday, and Friday in [facility]   [facility].  During his session yesterday noticed redness around the right upper   extremity AV fistula, also found to have elevated temperature.  Patient   admitted that he has not been feeling well over the past couple of days and   experienced some fever and chills as well as shortness of breath and cough.    Currently, complaining of fatigue, still with cough and shortness of breath.    The erythema over AV fistula is improving.    REVIEW OF SYSTEMS:  GENERAL:  Positive for fever, chills, and fatigue.  HEENT:  No nosebleeds.  No sinus pain.  No sore throat.  No double or blurry   vision.  No eye pain.  NECK:  No pain, no stiffness.  RESPIRATORY:  Positive for cough and shortness of breath.  No hemoptysis.  CARDIOVASCULAR:  No chest pain, no palpitations, no edema.  GASTROINTESTINAL:  No abdominal pain, no nausea or vomiting, no diarrhea.  All other systems reviewed, all negative.    PAST MEDICAL HISTORY:  End-stage renal disease, on hemodialysis, history of   vasculitis, chronic bronchitis, chronic obstructive pulmonary disease,   cataract, emphysema, hypertension, histoplasmosis, pseudomonas pneumonia, and   Wegener's granulomatosis.    PAST SURGICAL HISTORY:  AV fistula creation, bronchoscopy, hip replacement,   hernia repair, pacemaker insertion, tonsillectomy, and gastroscopy.    FAMILY HISTORY:  No history of kidney disease.    SOCIAL HISTORY:  No tobacco, alcohol, or drug use.  , lives with his   wife in [place].    ALLERGIES:  ALLERGIC TO ERYTHROMYCIN AND  RITUXIMAB.    OUTPATIENT MEDICATIONS:  Reviewed in the chart.    PHYSICAL EXAMINATION:  VITAL SIGNS:  Blood pressure 112/49, heart rate 96, temperature 37.8 Celsius.  GENERAL APPEARANCE:  Well-developed, well-nourished male in no acute distress.  HEENT:  Normocephalic, atraumatic.  Pupils are equal, round, and reactive to   light.  Extraocular movements are intact.  Nares patent.  Oropharynx is clear   and moist mucosa, no erythema or exudate.  NECK:  Supple, no lymphadenopathy, no thyromegaly appreciated.  LUNGS:  Coarse breath sounds bilaterally, rhonchi.  No wheezes.  HEART:  Regular rate.  No rub or gallop.  ABDOMEN:  Soft, nontender, nondistended.  Bowel sounds present.  EXTREMITIES:  No cyanosis, clubbing, no edema.  Right upper extremity AV   fistula with good thrill, less erythema.    LABORATORY RESULTS:  Hemoglobin level 10.0.  Sodium 134, potassium 3.5, BUN   12, and creatinine 2.38.  Lactic acid 2.3.    ASSESSMENT AND PLAN:  The patient is a 78-year-old male with end-stage renal   disease, on hemodialysis, admitted with fever, chills, cough, shortness of   breath, and arteriovenous fistula erythema.  1.  End-stage renal disease.  We will continue dialysis per patient's schedule   Monday, Wednesday, and Friday.  2.  Electrolytes remain well controlled.  3.  Hypertension.  Blood pressure is well controlled.  4.  Anemia.  Hemoglobin at goal.  We will continue Epogen with dialysis.  5.  Upper respiratory tract infection.  To adjust antibiotics to renal doses.    RECOMMENDATIONS:  Renal diet.  All medications adjust to renal doses.  We will   follow the patient closely.  Hemodialysis on Monday, Wednesday, and Friday.    Thank you for the consult.       ____________________________________     MD MIRIAM COSTA / SHARRON    DD:  02/23/2019 14:27:01  DT:  02/23/2019 15:04:27    D#:  [id number]  Job#:  [id number]

## 2019-02-23 NOTE — ASSESSMENT & PLAN NOTE
This is sepsis (without associated acute organ dysfunction).   Present on admission  Treating pneumonia  Treating possible infection of fistula that has rapidly improved.   Iv fluids on hold given renal failure  IV antibiotics

## 2019-02-23 NOTE — PROGRESS NOTES
Report received from Elise STANLEY. Assumed care of pt. A/O x4. VSS. Responds appropriately. Denies pain, having mild SOB at rest with moist productive cough. Assessment completed. Explained importance of calling before getting OOB. Call light and belongings within reach. Bed in the lowest position. Treaded socks in place. Hourly rounding in progress. Safety precautions in place

## 2019-02-23 NOTE — ED PROVIDER NOTES
ED Provider Note    CHIEF COMPLAINT  Chief Complaint   Patient presents with   • Vascular Access Problem       HPI  Gilberto Brown is a 78 y.o. male who presents for evaluation of fever, cough, redness around his right upper extremity AV fistula.  Patient has a complex and extensive medical history as listed below including end-stage renal disease.  He is followed by Dr. Corona with nephrology.  He is dialyzed Monday Wednesday Friday.  He had a normal uncomplicated dialysis session today.  The analysis nurses however were worried about some subtle erythema on the proximal right fistula.  He also reports having fever and cough.  His nephrologist recommended he come into the emergency department.  No associated chest pain.  No numbness weakness tingling  REVIEW OF SYSTEMS  See HPI for further details.  Positive for cough fevers congestion all other systems are negative.     PAST MEDICAL HISTORY  Past Medical History:   Diagnosis Date   • Pain 05/09/2018    shoulders/hips, 5/10   • Stroke ([facility]) 02/2018   • Pneumonia 2017   • Pacemaker 1988   • A-fib ([facility])        • Anemia    • Arthritis     arms, legs, shoulders   • ASTHMA    • BPH (benign prostatic hyperplasia)    • Breath shortness    • Bronchitis     chronic   • Cataract     removed bilat   • COPD    • Dialysis     [facility] M-W-F,    • Dyslipidemia    • EMPHYSEMA    • GERD (gastroesophageal reflux disease)    • Hypertension    • Oxygen decrease     O2 3liters @ HS and dialysis   • Pseudomonas pneumonia ([facility])    • Pulmonary histoplasmosis ([facility])    • Renal disorder     CKD,    • Sick sinus syndrome ([facility])    • Sleep apnea     uses cpap at [facility]   • Snoring    • Unspecified hemorrhagic conditions     bruises easily, fragile skin   • Wegener's disease, pulmonary ([facility])        FAMILY HISTORY  Noncontributory    SOCIAL HISTORY  Social History     Social History   • Marital status:      Spouse name: N/A   • Number of children: N/A   • Years of education:  N/A     Social History Main Topics   • Smoking status: Former Smoker     Years: 30.00     Types: Pipe     Quit date: 1/1/1990   • Smokeless tobacco: Former User     Types: Chew   • Alcohol use 4.8 oz/week     7 Glasses of wine, 1 Standard drinks or equivalent per week      Comment: 1/2 glass Red wine nightly   • Drug use: No   • Sexual activity: Yes     Partners: Female     Other Topics Concern   • Not on file     Social History Narrative   • No narrative on file      former smoker  SURGICAL HISTORY  Past Surgical History:   Procedure Laterality Date   • AV FISTULOGRAM Right 4/12/2016    Procedure: AV FISTULOGRAM , VENOPLASTY X 2;  Surgeon: Nessa Syed M.D.;  Location: SURGERY [facility];  Service:    • RECOVERY  9/3/2015    Procedure: IR1 VASCULAR CASE-ELENO RIGHT DIALYSIS FISTULOGRAM, POSSIBLE INTERVENTION;  Surgeon: Recoveryonly Surgery;  Location: SURGERY PRE-POST PROC UNIT [facility];  Service:    • RECOVERY  2/26/2015    Performed by Ir-Recovery Surgery at SURGERY SAME DAY [facility]   • RECOVERY  10/3/2014    Performed by Ir-Recovery Surgery at SURGERY SAME DAY [facility]   • RECOVERY  8/7/2014    Performed by Ir-Recovery Surgery at SURGERY [facility]   • RECOVERY  12/17/2013    Performed by Ir-Recovery Surgery at SURGERY SAME DAY [facility]   • BRONCHOSCOPY-ENDO  7/18/2013    Performed by Juan F Rubio M.D. at [facility]   • RECOVERY  7/17/2013    Performed by Ir-Recovery Surgery at SURGERY SAME DAY [facility]   • AV FISTULA REVISION  6/9/2012    Performed by NESSA SYED at SURGERY [facility]   • RECOVERY  4/19/2012    Performed by SURGERY, IR-RECOVERY at [facility] SAME DAY [facility]   • AV FISTULA CREATION  3/8/2012    Performed by NESSA SYED at SURGERY [facility]   • GASTROSCOPY WITH BIOPSY  1/17/2012    Performed by ZURDO HARRISON at ENDOSCOPY [facility]   • CATH PLACEMENT  10/8/2011    Performed by NESSA SYED at [facility] ORS   • GASTROSCOPY WITH BALLOON DILATATION  9/28/2011    Performed by KT FERNANDEZ at SURGERY [facility] ORS   • PACEMAKER INSERTION  4/2009   • ROTATOR CUFF REPAIR  2003    right   • HERNIA REPAIR  1990    right inguinal hernia   • HIP REPLACEMENT, TOTAL      right   • TONSILLECTOMY         CURRENT MEDICATIONS  Home Medications     Reviewed by Nick Clark R.N. (Registered Nurse) on 02/22/19 at 2041  Med List Status: Partial   Medication Last Dose Status   albuterol (PROAIR HFA) 108 (90 Base) MCG/ACT Aero Soln inhalation aerosol  Active   apixaban (ELIQUIS) 2.5mg Tab  Active   aspirin EC (ECOTRIN) 81 MG TBEC  Active   B Complex-C-Folic Acid (DIALYVITE TABLET) Tab  Active   ciprofloxacin (CIPRO) 500 MG Tab  Active   epoetin lucina (EPOGEN,PROCRIT) 3000 UNIT/ML SOLN  Active   finasteride (PROSCAR) 5 MG Tab  Active   Fluticasone Furoate-Vilanterol (BREO ELLIPTA) 200-25 MCG/INH AEROSOL POWDER, BREATH ACTIVATED  Active   ipratropium-albuterol (DUONEB) 0.5-2.5 (3) MG/3ML nebulizer solution  Active   lidocaine (XYLOCAINE) 2 % Gel  Active   metoprolol (LOPRESSOR) 25 MG Tab  Active   predniSONE (DELTASONE) 1 MG Tab  Active   raNITidine (ZANTAC) 150 MG Tab  Active   sevelamer carbonate (RENVELA) 800 MG Tab tablet  Active   simvastatin (ZOCOR) 40 MG TABS  Active   sodium chloride (HYPER-SAL) 7 % Nebu Soln  Active   tamsulosin (FLOMAX) 0.4 MG capsule  Active                ALLERGIES  Allergies   Allergen Reactions   • Erythromycin Unspecified     Abdominal pain.  RXN=before 2011   • Rituximab      Flu like syndrome       PHYSICAL EXAM  VITAL SIGNS: /53   Pulse (!) 114   Temp (!) 38.3 °C (100.9 °F) (Temporal)   Resp 20   Ht 1.727 m (5' 8\")   Wt 66.7 kg (147 lb 0.8 oz)   SpO2 93%   BMI 22.36 kg/m²       Constitutional: Patient appears chronically ill  HENT: Normocephalic, Atraumatic, Bilateral external ears normal, Oropharynx moist, No oral exudates, Nose normal.   Eyes: PERRLA, EOMI, " Conjunctiva normal, No discharge.   Neck: Normal range of motion, No tenderness, Supple, No stridor.   Lymphatic: No lymphadenopathy noted.   Cardiovascular: Tachycardic, Normal rhythm, No murmurs, No rubs, No gallops.   Thorax & Lungs: Bilateral rhonchi no wheezing o chest tenderness.   Abdomen: Bowel sounds normal, Soft, No tenderness, No masses, No pulsatile masses.   Skin: Right upper extremity AV fistula has a good thrill.  No active bleeding.  The proximal portion has a subtle 2 x 6 cm area of erythema no pus  Back: No tenderness, No CVA tenderness.   Extremities: Intact distal pulses, No edema, No tenderness, No cyanosis, No clubbing.   Musculoskeletal: Good range of motion in all major joints. No tenderness to palpation or major deformities noted.   Neurologic: Alert & oriented x 3, Normal motor function, Normal sensory function, No focal deficits noted.   Psychiatric: Affect normal, Judgment normal, Mood normal.     Results for orders placed or performed during the hospital encounter of 02/22/19   Lactic acid (lactate)   Result Value Ref Range    Lactic Acid 1.6 0.5 - 2.0 mmol/L   CBC WITH DIFFERENTIAL   Result Value Ref Range    WBC 8.0 4.8 - 10.8 K/uL    RBC 3.49 (L) 4.70 - 6.10 M/uL    Hemoglobin 10.8 (L) 14.0 - 18.0 g/dL    Hematocrit 34.5 (L) 42.0 - 52.0 %    MCV 98.9 (H) 81.4 - 97.8 fL    MCH 30.9 27.0 - 33.0 pg    MCHC 31.3 (L) 33.7 - 35.3 g/dL    RDW 60.7 (H) 35.9 - 50.0 fL    Platelet Count 192 164 - 446 K/uL    MPV 8.4 (L) 9.0 - 12.9 fL    Neutrophils-Polys 81.60 (H) 44.00 - 72.00 %    Lymphocytes 6.00 (L) 22.00 - 41.00 %    Monocytes 10.00 0.00 - 13.40 %    Eosinophils 1.60 0.00 - 6.90 %    Basophils 0.40 0.00 - 1.80 %    Immature Granulocytes 0.40 0.00 - 0.90 %    Nucleated RBC 0.00 /100 WBC    Neutrophils (Absolute) 6.55 1.82 - 7.42 K/uL    Lymphs (Absolute) 0.48 (L) 1.00 - 4.80 K/uL    Monos (Absolute) 0.80 0.00 - 0.85 K/uL    Eos (Absolute) 0.13 0.00 - 0.51 K/uL    Baso (Absolute) 0.03 0.00 -  0.12 K/uL    Immature Granulocytes (abs) 0.03 0.00 - 0.11 K/uL    NRBC (Absolute) 0.00 K/uL   COMP METABOLIC PANEL   Result Value Ref Range    Sodium 135 135 - 145 mmol/L    Potassium 3.7 3.6 - 5.5 mmol/L    Chloride 97 96 - 112 mmol/L    Co2 30 20 - 33 mmol/L    Anion Gap 8.0 0.0 - 11.9    Glucose 115 (H) 65 - 99 mg/dL    Bun 10 8 - 22 mg/dL    Creatinine 2.05 (H) 0.50 - 1.40 mg/dL    Calcium 8.7 8.4 - 10.2 mg/dL    AST(SGOT) 17 12 - 45 U/L    ALT(SGPT) 18 2 - 50 U/L    Alkaline Phosphatase 58 30 - 99 U/L    Total Bilirubin 0.8 0.1 - 1.5 mg/dL    Albumin 3.2 3.2 - 4.9 g/dL    Total Protein 6.0 6.0 - 8.2 g/dL    Globulin 2.8 1.9 - 3.5 g/dL    A-G Ratio 1.1 g/dL   Influenza A/B By PCR (Adult - Flu Only)   Result Value Ref Range    Influenza virus A RNA Negative Negative    Influenza virus B, PCR Negative Negative   ESTIMATED GFR   Result Value Ref Range    GFR If  38 (A) >60 mL/min/1.73 m 2    GFR If Non  31 (A) >60 mL/min/1.73 m 2      RADIOLOGY/PROCEDURES  DX-CHEST-PORTABLE (1 VIEW)   Final Result         1.  Hazy interstitial bilateral pulmonary infiltrates, new since prior.   2.  Cardiomegaly            COURSE & MEDICAL DECISION MAKING  Pertinent Labs & Imaging studies reviewed. (See chart for details)  Patient was immediately brought back.  Septic protocol was initiated.  Blood cultures are pending.  Lactic acid is reassuring.  He has no leukocytosis but does have a left shift.  Metabolic panel reveals stable chronic renal insufficiency.  Influenza a and B are both negative.  He was given Tylenol for fever.  He was placed on some supplemental oxygen.  Chest x-ray demonstrates bilateral hazy infiltrates in his lung exam is very concerning for developing pneumonia.  He is in and out of dialysis but has not been recently hospitalized but will be given IV Zosyn.  We also need to keep a close eye on the subtle redness around his AV fistula but I see no obvious abscess and no convincing  evidence of significant cellulitis to cause fever.  The patient will be admitted to internal medicine    FINAL IMPRESSION  1.  Healthcare associated pneumonia without sepsis    Admission         Electronically signed by: Eric Lange, 2/22/2019 9:40 PM

## 2019-02-23 NOTE — CARE PLAN
Problem: Safety  Goal: Will remain free from injury  Outcome: PROGRESSING AS EXPECTED  Patient calls appropriately, bed in low position, all needs met, no injury    Problem: Respiratory:  Goal: Respiratory status will improve  Outcome: PROGRESSING AS EXPECTED  Has productive cough, being treated for pneumonia with IV antibiotics using 1.5 L/min NC

## 2019-02-23 NOTE — ED TRIAGE NOTES
"Pt bib family with multiple complaints. Pt reports that he has \"just not been feeling well\" for a while. Pt states he has been feeling more fatigues and tired lately.     Pt also reporting that during dialysis today he developed right arm numbness and tingling which has not subsided.   "

## 2019-02-23 NOTE — FLOWSHEET NOTE
02/23/19 1038   Events/Summary/Plan   Events/Summary/Plan Tx given   Non-Invasive Resp Device Site Inspection Completed Intact   Interdisciplinary Plan of Care-Goals (Indications)   Bronchodilator Indications History / Diagnosis   Interdisciplinary Plan of Care-Outcomes    Bronchodilator Outcome Patient at Stable Baseline   Education   Education Yes - Pt. / Family has been Instructed in use of Respiratory Medications and Adverse Reactions;Yes - Pt. / Family has been Instructed in use of Respiratory Equipment   RT Assessment of Delivered Medications   Evaluation of Medication Delivery Daily Yes-- Pt /Family has been Instructed in use of Respiratory Medications and Adverse Reactions   SVN Group   #SVN Performed Yes   Given By: Mouthpiece   Respiratory WDL   Respiratory (WDL) X   Chest Exam   Respiration 18   Pulse 81   Breath Sounds   RUL Breath Sounds Clear;Diminished   RML Breath Sounds Diminished;Crackles   RLL Breath Sounds Diminished   DEBORA Breath Sounds Clear;Diminished   LLL Breath Sounds Diminished   Secretions   Cough Non Productive;Congested   Oximetry   #Pulse Oximetry (Single Determination) Yes   Oxygen   Pulse Oximetry 98 %   O2 (LPM) 1.5   O2 Daily Delivery Respiratory  Silicone Nasal Cannula

## 2019-02-23 NOTE — PROGRESS NOTES
[facility] Medicine Daily Progress Note    Date of Service  2/23/2019    Chief Complaint  78 y.o. male admitted 2/22/2019 with fevers during dialysis.    Hospital Course    He was admitted with evidence for sepsis, pneumonia and possible fistula infection of the right arm.       Interval Problem Update  He says his arm looks better. His cough is about the same. I added the sepsis protocol- this patient met criteria with a temp of 100.9 and a heart rate of 92 on admission.     I reviewed his cxr  1.  Hazy interstitial bilateral pulmonary infiltrates, new since prior.   2.  Cardiomegaly           Consultants/Specialty  I contacted nephrology for consultation today.     Code Status  DNR    Disposition  Home once improved.     Review of Systems  Review of Systems   Constitutional: Negative for chills and fever.   HENT: Negative for sore throat.    Eyes: Negative for blurred vision and pain.   Respiratory: Negative for cough and shortness of breath.    Cardiovascular: Negative for chest pain and palpitations.   Gastrointestinal: Negative for abdominal pain, nausea and vomiting.   Genitourinary: Negative for dysuria and urgency.   Musculoskeletal: Negative for back pain and neck pain.   Skin: Negative for itching and rash.   Neurological: Negative for dizziness, tingling and headaches.   Psychiatric/Behavioral: Negative for depression. The patient does not have insomnia.    All other systems reviewed and are negative.       Physical Exam  Temp:  [36.7 °C (98.1 °F)-38.3 °C (100.9 °F)] 37.1 °C (98.8 °F)  Pulse:  [] 108  Resp:  [18-20] 18  BP: (109-121)/(51-59) 121/51  SpO2:  [93 %-98 %] 95 %    Physical Exam   Constitutional: He is oriented to person, place, and time. He appears well-developed and well-nourished. No distress.   Patient seen and examined  Plan discussed with RN   ALMAT:   Right Ear: External ear normal.   Left Ear: External ear normal.   Nose: Nose normal.   Eyes: Right eye exhibits no discharge. Left eye  exhibits no discharge. No scleral icterus.   Neck: No JVD present. No tracheal deviation present.   Cardiovascular: Normal rate, normal heart sounds and intact distal pulses.    No murmur heard.  Cap refill 2sec  Pulses 2+ throughout     Pulmonary/Chest: Effort normal. No respiratory distress. He has no wheezes. He has rales.   Abdominal: Soft. Bowel sounds are normal. He exhibits no distension. There is no tenderness. There is no guarding.   Musculoskeletal: He exhibits no edema or tenderness.   Right arm fistula- not infected.    Neurological: He is alert and oriented to person, place, and time.   Skin: Skin is warm and dry. He is not diaphoretic. No erythema.   Normal skin color   Psychiatric: He has a normal mood and affect. His behavior is normal.   Nursing note and vitals reviewed.      Fluids    Intake/Output Summary (Last 24 hours) at 02/23/19 0855  Last data filed at 02/23/19 0659   Gross per 24 hour   Intake              100 ml   Output              250 ml   Net             -150 ml       Laboratory  Recent Labs      02/22/19 2053 02/23/19   0140   WBC  8.0  5.9   RBC  3.49*  3.20*   HEMOGLOBIN  10.8*  10.0*   HEMATOCRIT  34.5*  32.1*   MCV  98.9*  100.3*   MCH  30.9  31.3   MCHC  31.3*  31.2*   RDW  60.7*  61.0*   PLATELETCT  192  200   MPV  8.4*  9.0     Recent Labs      02/22/19 2053 02/23/19   0140   SODIUM  135  134*   POTASSIUM  3.7  3.5*   CHLORIDE  97  97   CO2  30  28   GLUCOSE  115*  91   BUN  10  12   CREATININE  2.05*  2.38*   CALCIUM  8.7  8.3*                   Imaging  DX-CHEST-PORTABLE (1 VIEW)   Final Result         1.  Hazy interstitial bilateral pulmonary infiltrates, new since prior.   2.  Cardiomegaly           Assessment/Plan  Pneumonia- (present on admission)   Assessment & Plan    cxray with new infiltrates and he has a cough  Pt has history of colonization with pseudomonas  Continue zosyn, doxycyline         Wegener's granulomatosis ([facility])- (present on admission)   Assessment &  Plan    Chronic with recurrent infections and renal failure on dialysis. . Continue immunosuppression.      Atrial fibrillation ([facility])- (present on admission)   Assessment & Plan    Rate controlled  Lopressor  On eliquis     Immunosuppressed status ([facility])- (present on admission)   Assessment & Plan    Recurrent infections.      Anemia of chronic renal failure, stage 5 ([facility])- (present on admission)   Assessment & Plan    Stable will trend. Epogen per nephrology     Protein-calorie malnutrition, severe ([facility])- (present on admission)   Assessment & Plan    Nutrition following.      Esophageal stricture- (present on admission)   Assessment & Plan    ?recurrent aspiration? slp eval     Sepsis ([facility])   Assessment & Plan    This is sepsis (without associated acute organ dysfunction).   Present on admission  Treating pneumonia  Treating possible infection of fistula that has rapidly improved.   Iv fluids on hold given renal failure  IV antibiotics       Cellulitis   Assessment & Plan    Pt has mild erythema on the right arm over his AV fistula  He is on zosyn  He is on doxycycline     GERD (gastroesophageal reflux disease)- (present on admission)   Assessment & Plan    zantac     COPD (chronic obstructive pulmonary disease) ([facility])- (present on admission)   Assessment & Plan    Chronic  Not in acute exacerbation  Continue with home meds     BPH (benign prostatic hyperplasia)- (present on admission)   Assessment & Plan    Flomax, proscar     ESRD on dialysis ([facility])- (present on admission)   Assessment & Plan    Nephrology consulted for management.      TIMOTHY (obstructive sleep apnea)- (present on admission)   Assessment & Plan    cpap     HTN (hypertension)- (present on admission)   Assessment & Plan    Continue meds          VTE prophylaxis: heparin

## 2019-02-23 NOTE — H&P
DATE OF ADMISSION:  02/22/2019    HISTORY OF PRESENT ILLNESS:  This is a 78-year-old male who was sent from a   dialysis center with a question of cellulitis in the right arm.  Patient   states that for the past couple of days has not felt good.  He has had chills   all day today.  Patient also states that has had fever.  Fever as per wife has   been 100.  Patient states that in the dialysis they noticed that his right   arm was a bit red and the staff at dialysis contacted the physician, Dr. Corona.    Her recommendation was to come to the emergency room.  Patient also states   that he has chronic cough and he has not seen anything different than before   except that he has been more short of breath with minimal exertions.    PAST MEDICAL HISTORY:  Wegener's granulomatosis, COPD, atrial fibrillation,   BPH, hyperlipidemia and end-stage renal disease, on dialysis.    PAST SURGICAL HISTORY:  Pacemaker placement, hip replacement and AV fistula   placement.    SOCIAL HISTORY:  Denies any tobacco.  Drinks occasional alcohol.    MEDICATIONS:  Lidocaine gel as needed, albuterol p.r.n., prednisone 9 mg p.o.   daily, ranitidine 150 mg p.o. 2 times a day, sodium chloride inhalation   nebulizer twice daily, Flomax 0.4 mg p.o. daily, ____ 1 tablet p.o. with meals   3 times a day, Eliquis 2.5 mg p.o. 2 times a day, Breo 1 puff daily, DuoNeb   q.6 hours, Lopressor 25 mg p.o. b.i.d., finasteride 5 mg p.o. daily,   simvastatin 40 mg p.o. daily, aspirin 81 mg p.o. daily.    REVIEW OF SYSTEMS:  All 14 systems reviewed, all of them negative except what   I mentioned in the history of present illness.    PHYSICAL EXAMINATION:  VITAL SIGNS:  Temperature 38.3, pulse of 95, respiratory rate of 20, blood   pressure of 116/53, O2 saturation is 93%.  GENERAL APPEARANCE:  Patient is awake, alert, oriented x3.  NEUROLOGIC:  Cranial nerves II-XII intact.  HEAD AND NECK:  Neck is supple.  Lips are dry.  No jugular venous distention    noted.  CARDIOVASCULAR:  S1, S2 is regular.  There is III/VI systolic murmur noted.  LUNGS:  Minor rhonchi noted diffusely.  No wheezing noted.  ABDOMEN:  Soft and nontender.  LOWER EXTREMITIES:  No edema or rash noted.    LABORATORY DATA:  WBC of 8000, H and H 10.8 and 34.5, platelets 192,000.    Sodium is 135, potassium 3.7, chloride 97, bicarbonate of 30, glucose of 115,   BUN of 10, creatinine of 2.05.    IMAGING:  Chest x-ray was done and the impression is hazy interstitial   bilateral pulmonary infiltrates, new since prior study.  Cardiomegaly is   noted.    ASSESSMENT AND PLAN:  This is a 78-year-old male with pneumonia.  1.  Patient is being admitted.  2.  Patient has history of colonization with Pseudomonas.  3.  Start the patient on Zosyn 2.25 g IV q.8 hours, low dose because of the   renal failure.  4.  Also, start the patient on doxycycline 100 mg p.o. b.i.d.    Question of cellulitis of the right arm.  Patient does have minor erythema on   the right arm; however, patient needs to be on Zosyn and doxycycline.  We will   monitor.    For atrial fibrillation:  1.  Continue with the Lopressor for rate control.  2.  Eliquis.    For chronic obstructive pulmonary disease:  1.  Chronic.  2.  Patient is not in acute exacerbation at this time.  3.  Continue with the home medications.    For chronic renal failure, patient is on dialysis.    CODE STATUS:  Discussed with the patient.  Patient would like to be DNR/DNI.       ____________________________________     Ray Bernstein MD    [facility]    DD:  02/22/2019 23:32:00  DT:  02/23/2019 01:44:31    D#:  [id number]  Job#:  [id number]

## 2019-02-24 ENCOUNTER — APPOINTMENT (OUTPATIENT)
Dept: RADIOLOGY | Facility: MEDICAL CENTER | Age: 79
DRG: 871 | End: 2019-02-24
Attending: HOSPITALIST
Payer: MEDICARE

## 2019-02-24 VITALS
HEART RATE: 93 BPM | BODY MASS INDEX: 22.32 KG/M2 | RESPIRATION RATE: 16 BRPM | OXYGEN SATURATION: 94 % | HEIGHT: 68 IN | WEIGHT: 147.27 LBS | DIASTOLIC BLOOD PRESSURE: 50 MMHG | SYSTOLIC BLOOD PRESSURE: 94 MMHG | TEMPERATURE: 98.5 F

## 2019-02-24 LAB
ALBUMIN SERPL BCP-MCNC: 2.6 G/DL (ref 3.2–4.9)
ALBUMIN/GLOB SERPL: 1 G/DL
ALP SERPL-CCNC: 47 U/L (ref 30–99)
ALT SERPL-CCNC: 14 U/L (ref 2–50)
ANION GAP SERPL CALC-SCNC: 9 MMOL/L (ref 0–11.9)
AST SERPL-CCNC: 11 U/L (ref 12–45)
BASOPHILS # BLD AUTO: 0.3 % (ref 0–1.8)
BASOPHILS # BLD: 0.02 K/UL (ref 0–0.12)
BILIRUB SERPL-MCNC: 0.9 MG/DL (ref 0.1–1.5)
BNP SERPL-MCNC: 332 PG/ML (ref 0–100)
BUN SERPL-MCNC: 26 MG/DL (ref 8–22)
CALCIUM SERPL-MCNC: 8 MG/DL (ref 8.4–10.2)
CHLORIDE SERPL-SCNC: 99 MMOL/L (ref 96–112)
CO2 SERPL-SCNC: 26 MMOL/L (ref 20–33)
CREAT SERPL-MCNC: 4.21 MG/DL (ref 0.5–1.4)
EOSINOPHIL # BLD AUTO: 0.24 K/UL (ref 0–0.51)
EOSINOPHIL NFR BLD: 3.6 % (ref 0–6.9)
ERYTHROCYTE [DISTWIDTH] IN BLOOD BY AUTOMATED COUNT: 60.8 FL (ref 35.9–50)
GLOBULIN SER CALC-MCNC: 2.7 G/DL (ref 1.9–3.5)
GLUCOSE SERPL-MCNC: 102 MG/DL (ref 65–99)
GRAM STN SPEC: NORMAL
HCT VFR BLD AUTO: 32 % (ref 42–52)
HGB BLD-MCNC: 9.8 G/DL (ref 14–18)
IMM GRANULOCYTES # BLD AUTO: 0.03 K/UL (ref 0–0.11)
IMM GRANULOCYTES NFR BLD AUTO: 0.5 % (ref 0–0.9)
LYMPHOCYTES # BLD AUTO: 0.15 K/UL (ref 1–4.8)
LYMPHOCYTES NFR BLD: 2.3 % (ref 22–41)
MCH RBC QN AUTO: 30.8 PG (ref 27–33)
MCHC RBC AUTO-ENTMCNC: 30.6 G/DL (ref 33.7–35.3)
MCV RBC AUTO: 100.6 FL (ref 81.4–97.8)
MONOCYTES # BLD AUTO: 0.46 K/UL (ref 0–0.85)
MONOCYTES NFR BLD AUTO: 6.9 % (ref 0–13.4)
NEUTROPHILS # BLD AUTO: 5.75 K/UL (ref 1.82–7.42)
NEUTROPHILS NFR BLD: 86.4 % (ref 44–72)
NRBC # BLD AUTO: 0 K/UL
NRBC BLD-RTO: 0 /100 WBC
PLATELET # BLD AUTO: 175 K/UL (ref 164–446)
PMV BLD AUTO: 8.8 FL (ref 9–12.9)
POTASSIUM SERPL-SCNC: 4.4 MMOL/L (ref 3.6–5.5)
PROT SERPL-MCNC: 5.3 G/DL (ref 6–8.2)
RBC # BLD AUTO: 3.18 M/UL (ref 4.7–6.1)
SIGNIFICANT IND 70042: NORMAL
SITE SITE: NORMAL
SODIUM SERPL-SCNC: 134 MMOL/L (ref 135–145)
SOURCE SOURCE: NORMAL
WBC # BLD AUTO: 6.7 K/UL (ref 4.8–10.8)

## 2019-02-24 PROCEDURE — 80053 COMPREHEN METABOLIC PANEL: CPT

## 2019-02-24 PROCEDURE — 83880 ASSAY OF NATRIURETIC PEPTIDE: CPT

## 2019-02-24 PROCEDURE — 85025 COMPLETE CBC W/AUTO DIFF WBC: CPT

## 2019-02-24 PROCEDURE — 700102 HCHG RX REV CODE 250 W/ 637 OVERRIDE(OP): Performed by: FAMILY MEDICINE

## 2019-02-24 PROCEDURE — A9270 NON-COVERED ITEM OR SERVICE: HCPCS | Performed by: FAMILY MEDICINE

## 2019-02-24 PROCEDURE — 94760 N-INVAS EAR/PLS OXIMETRY 1: CPT

## 2019-02-24 PROCEDURE — 94640 AIRWAY INHALATION TREATMENT: CPT

## 2019-02-24 PROCEDURE — 71045 X-RAY EXAM CHEST 1 VIEW: CPT

## 2019-02-24 PROCEDURE — 700105 HCHG RX REV CODE 258: Performed by: FAMILY MEDICINE

## 2019-02-24 PROCEDURE — 700101 HCHG RX REV CODE 250: Performed by: FAMILY MEDICINE

## 2019-02-24 PROCEDURE — 700111 HCHG RX REV CODE 636 W/ 250 OVERRIDE (IP): Performed by: FAMILY MEDICINE

## 2019-02-24 PROCEDURE — 99232 SBSQ HOSP IP/OBS MODERATE 35: CPT | Performed by: INTERNAL MEDICINE

## 2019-02-24 PROCEDURE — 94668 MNPJ CHEST WALL SBSQ: CPT

## 2019-02-24 PROCEDURE — 99239 HOSP IP/OBS DSCHRG MGMT >30: CPT | Performed by: HOSPITALIST

## 2019-02-24 RX ADMIN — IPRATROPIUM BROMIDE AND ALBUTEROL SULFATE 3 ML: .5; 3 SOLUTION RESPIRATORY (INHALATION) at 07:25

## 2019-02-24 RX ADMIN — Medication 4 ML: at 07:31

## 2019-02-24 RX ADMIN — FAMOTIDINE 20 MG: 20 TABLET, FILM COATED ORAL at 05:47

## 2019-02-24 RX ADMIN — DOXYCYCLINE 100 MG: 100 TABLET, FILM COATED ORAL at 05:43

## 2019-02-24 RX ADMIN — TAMSULOSIN HYDROCHLORIDE 0.4 MG: 0.4 CAPSULE ORAL at 05:43

## 2019-02-24 RX ADMIN — PIPERACILLIN AND TAZOBACTAM 4.5 G: 4; .5 INJECTION, POWDER, LYOPHILIZED, FOR SOLUTION INTRAVENOUS; PARENTERAL at 05:42

## 2019-02-24 RX ADMIN — SEVELAMER CARBONATE 800 MG: 800 TABLET, FILM COATED ORAL at 13:13

## 2019-02-24 RX ADMIN — ASPIRIN 81 MG: 81 TABLET, COATED ORAL at 05:47

## 2019-02-24 RX ADMIN — FINASTERIDE 5 MG: 5 TABLET, FILM COATED ORAL at 05:47

## 2019-02-24 RX ADMIN — METOPROLOL TARTRATE 25 MG: 25 TABLET ORAL at 05:47

## 2019-02-24 RX ADMIN — SEVELAMER CARBONATE 800 MG: 800 TABLET, FILM COATED ORAL at 08:37

## 2019-02-24 RX ADMIN — APIXABAN 2.5 MG: 5 TABLET, FILM COATED ORAL at 05:43

## 2019-02-24 ASSESSMENT — ENCOUNTER SYMPTOMS
FEVER: 0
PALPITATIONS: 0
VOMITING: 0
SPUTUM PRODUCTION: 1
DIZZINESS: 0
NAUSEA: 0
BACK PAIN: 0
WHEEZING: 0
HEADACHES: 0
FOCAL WEAKNESS: 0
CHILLS: 0
ORTHOPNEA: 1
HEMOPTYSIS: 0
DIARRHEA: 0
COUGH: 1
ABDOMINAL PAIN: 0
SENSORY CHANGE: 0
MYALGIAS: 0

## 2019-02-24 NOTE — DISCHARGE SUMMARY
Discharge Summary    CHIEF COMPLAINT ON ADMISSION  Chief Complaint   Patient presents with   • Vascular Access Problem       Reason for Admission  Problem with Fistula, Difficulty B*     Admission Date  2/22/2019    CODE STATUS  Prior    HPI & HOSPITAL COURSE  This is a 78 y.o. male here with fevers during dialysis. He has a history of Wegners Granulomatosis and recurrent pneumonia with pseudomonas. He was admitted with evidence for sepsis, pneumonia and a possible fistula infection of the right arm.  He was placed onto empiric antibiotics and improved rapidly over his hospital course. His arm erythema completely resolved. He wlil be discharged on a course of inhaled tobramycin given all his cultures were negative here and his history as above. He will follow up with his PCP and nephrology.     Therefore, he is discharged in good and stable condition to home with close outpatient follow-up.    The patient met 2-midnight criteria for an inpatient stay at the time of discharge.    Discharge Date  2/24/2019    FOLLOW UP ITEMS POST DISCHARGE  none    DISCHARGE DIAGNOSES  Active Problems:    Pneumonia POA: Yes      Overview: Hx of MAC and pseudomonas          Esophageal stricture POA: Yes    Protein-calorie malnutrition, severe (HCC) POA: Yes    Anemia of chronic renal failure, stage 5 (HCC) (Chronic) POA: Yes    Immunosuppressed status (HCC) (Chronic) POA: Yes    Atrial fibrillation (HCC) POA: Yes    Wegener's granulomatosis (HCC) (Chronic) POA: Yes    HTN (hypertension) POA: Yes    TIMOTHY (obstructive sleep apnea) (Chronic) POA: Yes    ESRD on dialysis (HCC) (Chronic) POA: Yes    BPH (benign prostatic hyperplasia) POA: Yes    COPD (chronic obstructive pulmonary disease) (HCC) (Chronic) POA: Yes    GERD (gastroesophageal reflux disease) POA: Yes    Cellulitis POA: Unknown    Hyponatremia POA: Unknown    Hypokalemia POA: Unknown    Sick sinus syndrome (HCC) POA: Unknown    Cerebrovascular disease POA: Unknown    Sepsis  ([facility]) POA: Unknown  Resolved Problems:    ILD (interstitial lung disease) ([facility]) POA: Yes      Overview: Patient does not have UIP or NSIP      FOLLOW UP  Future Appointments  Date Time Provider Department Center   3/26/2019 1:00 PM Cintia Ariza M.D. [facility] None   4/18/2019 2:30 PM Abhijit Rowe M.D. [facility] None     Christine Zamudio M.D.  [address]  [facility] [address]  [phone]          Huma Corona M.D.  [address]  [place] [address]  [phone]            MEDICATIONS ON DISCHARGE     Medication List      START taking these medications      Instructions   tobramycin 60 mg/mL in sterile water   Inhale 5 mL by mouth every day for 26 days.  Dose:  300 mg        CONTINUE taking these medications      Instructions   aspirin EC 81 MG Tbec  Commonly known as:  ECOTRIN   Take 81 mg by mouth every day.  Dose:  81 mg     CRESTOR 20 MG Tabs  Generic drug:  rosuvastatin   Take 20 mg by mouth every evening.  Dose:  20 mg     DIALYVITE TABLET Tabs   Dialyvite -nehro vit B complex-C-folic acid 1 tablet po QD Dialysis Pt     ELIQUIS 2.5mg Tabs  Generic drug:  apixaban   Take 2.5 mg by mouth every day.  Dose:  2.5 mg     epoetin lucina 3000 UNIT/ML Soln  Commonly known as:  EPOGEN,PROCRIT   Inject 2,200 Units as instructed every Monday, Wednesday, and Friday. With dialysis  Dose:  2200 Units     finasteride 5 MG Tabs  Commonly known as:  PROSCAR   Take 5 mg by mouth every day.  Dose:  5 mg     Fluticasone Furoate-Vilanterol 200-25 MCG/INH Aepb  Commonly known as:  BREO ELLIPTA   Inhale 1 Puff by mouth every day. Rinse mouth after use.  Dose:  1 Puff     ipratropium-albuterol 0.5-2.5 (3) MG/3ML nebulizer solution  Commonly known as:  DUONEB   USE 3 ML VIA NEBULIZER FOUR TIMES DAILY     lidocaine 2 % Gel  Commonly known as:  XYLOCAINE      metoprolol 25 MG Tabs  Commonly known as:  LOPRESSOR   Take 25 mg by mouth every day.  Dose:  25 mg     predniSONE 1 MG Tabs  Commonly known as:  DELTASONE   9  tabs po qam     PROAIR  (90 Base) MCG/ACT Aers inhalation aerosol  Generic drug:  albuterol   Inhale 2 Puffs by mouth every 6 hours as needed for Shortness of Breath.  Dose:  2 Puff     raNITidine 150 MG Tabs  Commonly known as:  ZANTAC   Take 150 mg by mouth 2 times a day.  Dose:  150 mg     sevelamer carbonate 800 MG Tabs tablet  Commonly known as:  RENVELA   Doctor's comments:  **Patient requests 90 days supply**  TAKE 1 TABLET BY MOUTH THREE TIMES DAILY WITH MEALS     sodium chloride 7 % Nebu  Commonly known as:  HYPER-SAL   INHALE 1 VIAL VIA NEBULIZER TWICE DAILY     tamsulosin 0.4 MG capsule  Commonly known as:  FLOMAX   Take 1 Cap by mouth every day.  Dose:  0.4 mg     vitamin D 1000 UNIT Tabs  Commonly known as:  cholecalciferol   Take 1,000 Units by mouth every day.  Dose:  1000 Units        STOP taking these medications    ciprofloxacin 500 MG Tabs  Commonly known as:  CIPRO            Allergies  Allergies   Allergen Reactions   • Erythromycin Unspecified     Abdominal pain.  RXN=before 2011   • Rituximab      Flu like syndrome       DIET  No orders of the defined types were placed in this encounter.      ACTIVITY  As tolerated.  Weight bearing as tolerated    CONSULTATIONS  Dr Corona- nephrology    PROCEDURES  none    LABORATORY  Lab Results   Component Value Date    SODIUM 134 (L) 02/24/2019    POTASSIUM 4.4 02/24/2019    CHLORIDE 99 02/24/2019    CO2 26 02/24/2019    GLUCOSE 102 (H) 02/24/2019    BUN 26 (H) 02/24/2019    CREATININE 4.21 (H) 02/24/2019    CREATININE 1.0 09/23/2008        Lab Results   Component Value Date    WBC 6.7 02/24/2019    WBC 7.6 03/19/2012    HEMOGLOBIN 9.8 (L) 02/24/2019    HEMATOCRIT 32.0 (L) 02/24/2019    PLATELETCT 175 02/24/2019        Total time of the discharge process exceeds 35 minutes.

## 2019-02-24 NOTE — PROGRESS NOTES
2100 Assessment completed, pt is a&o x4, declines any pain or discomfort at this time. PIV from wriste that is infiltrated has been removed. This RN attempted to start a new IV but vein rolled, pt requested new PIV to be placed in the morning. Current 20G PIV has IV abx running with blood return and no discomfort. Pt's VSS, safety precautions are in place, will continue to monitor. Per report and patient, patient wears 3L at hs, O2 was increased.     2210 IV antibiotics dose is complete, PIV flushed, blood return still noted. Pt declined any further needs at this time. Safety precautions are in place, will continue to monitor.

## 2019-02-24 NOTE — PROGRESS NOTES
Report received from Jessica STANLEY. Plan of care discussed. Patient resting comfortably in bed with IV abx running. Safety precautions in place.     Pt c/o pain on IV site, site is swollen, changed IV abx to other IV site, will replaced PIV.

## 2019-02-24 NOTE — DISCHARGE INSTRUCTIONS
Discharge Instructions    Discharged to home by car with relative. Discharged via wheelchair, hospital escort: Yes.  Special equipment needed: Not Applicable    Be sure to schedule a follow-up appointment with your primary care doctor or any specialists as instructed.     Discharge Plan:   Diet Plan: Discussed  Activity Level: Discussed  Confirmed Follow up Appointment: Appointment Scheduled  Confirmed Symptoms Management: Discussed  Medication Reconciliation Updated: Yes  Influenza Vaccine Indication: Not indicated: Previously immunized this influenza season and > 8 years of age    I understand that a diet low in cholesterol, fat, and sodium is recommended for good health. Unless I have been given specific instructions below for another diet, I accept this instruction as my diet prescription.   Other diet: heart healthy, renal diet    Special Instructions: None    · Is patient discharged on Warfarin / Coumadin?   No       Pneumocystis Pneumonia  Pneumocystis pneumonia is an infection that affects the lungs. The infection is extremely rare in healthy people. It most frequently occurs in people whose natural disease-fighting system (immune system) is weak.  What are the causes?  This condition is caused by breathing in a fungus called Pneumocystis jiroveci.  What are the signs or symptoms?  Common symptoms of this condition include:  · Fever.  · Mild or dry cough.  · Shortness of breath, especially with any physical activity.  · Rapid breathing.  How is this diagnosed?  This condition may be diagnosed with tests, such as:  · A test in which a sample of fluid or tissue from your lungs is looked at under a microscope (immunologic stain). To get the sample, you may be asked to cough into a tissue. Or, a small, flexible tube will be guided to your lungs through your nose or mouth to get the sample (bronchoscopy).  · A chest X-ray.  · Blood tests.  How is this treated?  This condition is treated with antibiotic medicine.  Sometimes it is also treated with a medicine to reduce inflammation (steroid). Treatment may begin before test results confirm your diagnosis. This is because the condition is very serious. Treatment may take several weeks.  Follow these instructions at home:  · Take over-the-counter and prescription medicines only as told by your health care provider.  · Take your antibiotic medicine as told by your health care provider. Do not stop taking the antibiotic even if you start to feel better.  · Do not smoke. Smoking can make your condition worse.  · Keep all follow-up visits as told by your health care provider. This is important.  How is this prevented?  If you are at high risk for getting this condition again, your health care provider may prescribe an antibiotic to prevent this from happening.  Contact a health care provider if:  · Any of your symptoms are getting worse instead of better.  · You have nausea or vomiting.  · You have diarrhea.  · You have a skin rash.  This information is not intended to replace advice given to you by your health care provider. Make sure you discuss any questions you have with your health care provider.  Document Released: 03/09/2004 Document Revised: 09/29/2017 Document Reviewed: 09/29/2017  [employer] Interactive Patient Education © 2017 [employer]  Depression / Suicide Risk    As you are discharged from this [facility] facility, it is important to learn how to keep safe from harming yourself.    Recognize the warning signs:  · Abrupt changes in personality, positive or negative- including increase in energy   · Giving away possessions  · Change in eating patterns- significant weight changes-  positive or negative  · Change in sleeping patterns- unable to sleep or sleeping all the time   · Unwillingness or inability to communicate  · Depression  · Unusual sadness, discouragement and loneliness  · Talk of wanting to die  · Neglect of personal appearance   · Rebelliousness-  reckless behavior  · Withdrawal from people/activities they love  · Confusion- inability to concentrate     If you or a loved one observes any of these behaviors or has concerns about self-harm, here's what you can do:  · Talk about it- your feelings and reasons for harming yourself  · Remove any means that you might use to hurt yourself (examples: pills, rope, extension cords, firearm)  · Get professional help from the community (Mental Health, Substance Abuse, psychological counseling)  · Do not be alone:Call your Safe Contact- someone whom you trust who will be there for you.  · Call your local CRISIS HOTLINE 109-5257 or 940-956-8190  · Call your local Children's Mobile Crisis Response Team [place] (874) 080-6004 or www.[employer]  · Call the toll free National Suicide Prevention Hotlines   · National Suicide Prevention Lifeline 770-381-WDGK (5266)  · National Hope Line Network 800-SUICIDE (015-1189)

## 2019-02-24 NOTE — PROGRESS NOTES
Medicated pt per MAR, declined any pain or discomfort. PIV has blood return and is flushing, IV abx running. Safety precautions are in place, will continue to monitor.     Report given to Martin STANLEY. Plan of care discussed. Patient resting comfortably in bed. Safety precautions in place.

## 2019-02-24 NOTE — PROGRESS NOTES
Pt resting in bed comfortably, assisted patient reposition in bed, VSS, pt stated he was drinking hot tea previously provided by this RN, declined any further needs at this time. Safety precautions in place, will continue to monitor.

## 2019-02-24 NOTE — FLOWSHEET NOTE
02/23/19 1948   Events/Summary/Plan   Events/Summary/Plan CPT, tx given   Non-Invasive Resp Device Site Inspection Completed Intact   Interdisciplinary Plan of Care-Goals (Indications)   Bronchodilator Indications History / Diagnosis   Bronchopulmonary Hygiene Indications Difficulty with Secretion Clearance   Interdisciplinary Plan of Care-Outcomes    Bronchodilator Outcome Patient at Stable Baseline   Bronchopulmonary Hygiene Outcome Patient Subjective Impression of Less Retention and Improved Clearance   Education   Education Yes - Pt. / Family has been Instructed in use of Respiratory Medications and Adverse Reactions;Yes - Pt. / Family has been Instructed in use of Respiratory Equipment   RT Assessment of Delivered Medications   Evaluation of Medication Delivery Daily Yes-- Pt /Family has been Instructed in use of Respiratory Medications and Adverse Reactions   SVN Group   #SVN Performed Yes   Given By: Mouthpiece   PEP/CPT Group   PEP/CPT/Airway Clearance Therapy Yes   #PEP/CPT (Manual) Initial Initial   #CPT (Mechanical) Yes   PEP/CPT Method Percussion/Vibration   Chest Exam   Respiration 18   Pulse 80   Breath Sounds   RUL Breath Sounds Clear   RML Breath Sounds Clear;Diminished   RLL Breath Sounds Clear   DEBORA Breath Sounds Diminished   Secretions   Cough Non Productive;Congested   Oximetry   #Pulse Oximetry (Single Determination) Yes   Oxygen   Pulse Oximetry 97 %   O2 (LPM) 1.5   O2 Daily Delivery Respiratory  Silicone Nasal Cannula

## 2019-02-24 NOTE — CARE PLAN
Problem: Knowledge Deficit  Goal: Knowledge of disease process/condition, treatment plan, diagnostic tests, and medications will improve  Outcome: PROGRESSING AS EXPECTED  Discussed plan of care including IV antibiotics, PIV site, vital sign monitoring, and chest X-ray ordered. Allowed time for questions, pt verbalized understanding, will continue to monitor.     Problem: Pain Management  Goal: Pain level will decrease to patient's comfort goal   02/23/19 2100   OTHER   Pain Rating Scale (NPRS) 0       Problem: Psychosocial Needs:  Goal: Level of anxiety will decrease  Outcome: PROGRESSING AS EXPECTED  Patient was calm, cooperative, and did not show any signs of anxiety.

## 2019-02-24 NOTE — PROGRESS NOTES
Patient is sleeping in bed comfortably, respirations are even and unlabored. Safety precautions in place, will continue to monitor.

## 2019-02-24 NOTE — FLOWSHEET NOTE
02/24/19 0721   Events/Summary/Plan   Events/Summary/Plan svn and sod chloride given   Interdisciplinary Plan of Care-Goals (Indications)   Bronchodilator Indications History / Diagnosis   Bronchopulmonary Hygiene Indications Difficulty with Secretion Clearance   Interdisciplinary Plan of Care-Outcomes    Bronchodilator Outcome Patient at Stable Baseline   Bronchopulmonary Hygiene Outcome Patient Subjective Impression of Less Retention and Improved Clearance   Education   Education Yes - Pt. / Family has been Instructed in use of Respiratory Medications and Adverse Reactions   SVN Group   #SVN Performed Yes   Given By: Mouthpiece   PEP/CPT Group   PEP/CPT/Airway Clearance Therapy Yes   #PEP/CPT (Manual) Subsequent Subsequent   PEP/CPT Method (metaneb)   Respiratory WDL   Respiratory (WDL) X   Chest Exam   Work Of Breathing / Effort Mild;Shallow   Respiration 18  (post 18)   Pulse 82  (post 81)   Heart Rate (Monitored) 82   Breath Sounds   Pre/Post Intervention Pre Intervention Assessment  (increased aeration following svn)   RUL Breath Sounds Coarse Crackles   RML Breath Sounds Coarse Crackles   RLL Breath Sounds Coarse Crackles   DEBORA Breath Sounds Coarse Crackles   LLL Breath Sounds Coarse Crackles   Secretions   Cough Non Productive;Congested   Oximetry   #Pulse Oximetry (Single Determination) Yes   Oxygen   Home O2 Use Prior To Admission? Yes   Home O2 LPM Flow 3 LPM   Home O2 Delivery Method Nasal Cannula   Home O2 Frequency of Use Continuous   Pulse Oximetry 99 %   O2 (LPM) 3   O2 Daily Delivery Respiratory  Silicone Nasal Cannula

## 2019-02-25 LAB
BACTERIA UR CULT: NORMAL
SIGNIFICANT IND 70042: NORMAL
SITE SITE: NORMAL
SOURCE SOURCE: NORMAL

## 2019-02-26 ENCOUNTER — APPOINTMENT (RX ONLY)
Dept: URBAN - METROPOLITAN AREA CLINIC 36 | Facility: CLINIC | Age: 79
Setting detail: DERMATOLOGY
End: 2019-02-26

## 2019-02-26 PROBLEM — C44.629 SQUAMOUS CELL CARCINOMA OF SKIN OF LEFT UPPER LIMB, INCLUDING SHOULDER: Status: ACTIVE | Noted: 2019-02-26

## 2019-02-26 PROCEDURE — 12032 INTMD RPR S/A/T/EXT 2.6-7.5: CPT

## 2019-02-26 PROCEDURE — ? EXCISION

## 2019-02-26 PROCEDURE — 11602 EXC TR-EXT MAL+MARG 1.1-2 CM: CPT

## 2019-02-26 NOTE — PROCEDURE: EXCISION
Z Plasty Text: The lesion was extirpated to the level of the fat with a #15 scalpel blade.  Given the location of the defect, shape of the defect and the proximity to free margins a Z-plasty was deemed most appropriate for repair.  Using a sterile surgical marker, the appropriate transposition arms of the Z-plasty were drawn incorporating the defect and placing the expected incisions within the relaxed skin tension lines where possible.    The area thus outlined was incised deep to adipose tissue with a #15 scalpel blade.  The skin margins were undermined to an appropriate distance in all directions utilizing iris scissors.  The opposing transposition arms were then transposed into place in opposite direction and anchored with interrupted buried subcutaneous sutures.
Skin Substitute Units (Will Override Primary Defect Units If Greater Than 0): 0
Crescentic Intermediate Repair Preamble Text (Leave Blank If You Do Not Want): Undermining was performed with blunt dissection.
Show Additional Anesthesia Variables: Yes
Complex Repair And Dermal Autograft Text: The defect edges were debeveled with a #15 scalpel blade.  The primary defect was closed partially with a complex linear closure.  Given the location of the defect, shape of the defect and the proximity to free margins an dermal autograft was deemed most appropriate to repair the remaining defect.  The graft was trimmed to fit the size of the remaining defect.  The graft was then placed in the primary defect, oriented appropriately, and sutured into place.
Billing Type: Third-Party Bill
Epidermal Closure: running subcuticular
Complex Repair And Modified Advancement Flap Text: The defect edges were debeveled with a #15 scalpel blade.  The primary defect was closed partially with a complex linear closure.  Given the location of the remaining defect, shape of the defect and the proximity to free margins a modified advancement flap was deemed most appropriate for complete closure of the defect.  Using a sterile surgical marker, an appropriate advancement flap was drawn incorporating the defect and placing the expected incisions within the relaxed skin tension lines where possible.    The area thus outlined was incised deep to adipose tissue with a #15 scalpel blade.  The skin margins were undermined to an appropriate distance in all directions utilizing iris scissors.
Anesthesia Type: 1% lidocaine with epinephrine and a 1:10 solution of 8.4% sodium bicarbonate
O-T Advancement Flap Text: The defect edges were debeveled with a #15 scalpel blade.  Given the location of the defect, shape of the defect and the proximity to free margins an O-T advancement flap was deemed most appropriate.  Using a sterile surgical marker, an appropriate advancement flap was drawn incorporating the defect and placing the expected incisions within the relaxed skin tension lines where possible.    The area thus outlined was incised deep to adipose tissue with a #15 scalpel blade.  The skin margins were undermined to an appropriate distance in all directions utilizing iris scissors.
Ear Star Wedge Flap Text: The defect edges were debeveled with a #15 blade scalpel.  Given the location of the defect and the proximity to free margins (helical rim) an ear star wedge flap was deemed most appropriate.  Using a sterile surgical marker, the appropriate flap was drawn incorporating the defect and placing the expected incisions between the helical rim and antihelix where possible.  The area thus outlined was incised through and through with a #15 scalpel blade.
Complex Repair And Tissue Cultured Epidermal Autograft Text: The defect edges were debeveled with a #15 scalpel blade.  The primary defect was closed partially with a complex linear closure.  Given the location of the defect, shape of the defect and the proximity to free margins an tissue cultured epidermal autograft was deemed most appropriate to repair the remaining defect.  The graft was trimmed to fit the size of the remaining defect.  The graft was then placed in the primary defect, oriented appropriately, and sutured into place.
Anesthesia Type: 1% lidocaine with epinephrine
Anesthesia Volume In Cc: 10
Cheek Interpolation Flap Text: A decision was made to reconstruct the defect utilizing an interpolation axial flap and a staged reconstruction.  A telfa template was made of the defect.  This telfa template was then used to outline the Cheek Interpolation flap.  The donor area for the pedicle flap was then injected with anesthesia.  The flap was excised through the skin and subcutaneous tissue down to the layer of the underlying musculature.  The interpolation flap was carefully excised within this deep plane to maintain its blood supply.  The edges of the donor site were undermined.   The donor site was closed in a primary fashion.  The pedicle was then rotated into position and sutured.  Once the tube was sutured into place, adequate blood supply was confirmed with blanching and refill.  The pedicle was then wrapped with xeroform gauze and dressed appropriately with a telfa and gauze bandage to ensure continued blood supply and protect the attached pedicle.
O-L Flap Text: The defect edges were debeveled with a #15 scalpel blade.  Given the location of the defect, shape of the defect and the proximity to free margins an O-L flap was deemed most appropriate.  Using a sterile surgical marker, an appropriate advancement flap was drawn incorporating the defect and placing the expected incisions within the relaxed skin tension lines where possible.    The area thus outlined was incised deep to adipose tissue with a #15 scalpel blade.  The skin margins were undermined to an appropriate distance in all directions utilizing iris scissors.
Render Path Notes In Note?: no
Complex Repair And Xenograft Text: The defect edges were debeveled with a #15 scalpel blade.  The primary defect was closed partially with a complex linear closure.  Given the location of the defect, shape of the defect and the proximity to free margins a xenograft was deemed most appropriate to repair the remaining defect.  The graft was trimmed to fit the size of the remaining defect.  The graft was then placed in the primary defect, oriented appropriately, and sutured into place.
Complex Repair And A-T Advancement Flap Text: The defect edges were debeveled with a #15 scalpel blade.  The primary defect was closed partially with a complex linear closure.  Given the location of the remaining defect, shape of the defect and the proximity to free margins an A-T advancement flap was deemed most appropriate for complete closure of the defect.  Using a sterile surgical marker, an appropriate advancement flap was drawn incorporating the defect and placing the expected incisions within the relaxed skin tension lines where possible.    The area thus outlined was incised deep to adipose tissue with a #15 scalpel blade.  The skin margins were undermined to an appropriate distance in all directions utilizing iris scissors.
Repair Type: Purse String Closure (Intermediate)
Banner Transposition Flap Text: The defect edges were debeveled with a #15 scalpel blade.  Given the location of the defect and the proximity to free margins a Banner transposition flap was deemed most appropriate.  Using a sterile surgical marker, an appropriate flap drawn around the defect. The area thus outlined was incised deep to adipose tissue with a #15 scalpel blade.  The skin margins were undermined to an appropriate distance in all directions utilizing iris scissors.
Anesthesia Volume In Cc: 7
V-Y Flap Text: The defect edges were debeveled with a #15 scalpel blade.  Given the location of the defect, shape of the defect and the proximity to free margins a V-Y flap was deemed most appropriate.  Using a sterile surgical marker, an appropriate advancement flap was drawn incorporating the defect and placing the expected incisions within the relaxed skin tension lines where possible.    The area thus outlined was incised deep to adipose tissue with a #15 scalpel blade.  The skin margins were undermined to an appropriate distance in all directions utilizing iris scissors.
Additional Epidermal Closure (Optional): simple interrupted
Bilobed Flap Text: The defect edges were debeveled with a #15 scalpel blade.  Given the location of the defect and the proximity to free margins a bilobe flap was deemed most appropriate.  Using a sterile surgical marker, an appropriate bilobe flap drawn around the defect.    The area thus outlined was incised deep to adipose tissue with a #15 scalpel blade.  The skin margins were undermined to an appropriate distance in all directions utilizing iris scissors.
Complex Repair And O-T Advancement Flap Text: The defect edges were debeveled with a #15 scalpel blade.  The primary defect was closed partially with a complex linear closure.  Given the location of the remaining defect, shape of the defect and the proximity to free margins an O-T advancement flap was deemed most appropriate for complete closure of the defect.  Using a sterile surgical marker, an appropriate advancement flap was drawn incorporating the defect and placing the expected incisions within the relaxed skin tension lines where possible.    The area thus outlined was incised deep to adipose tissue with a #15 scalpel blade.  The skin margins were undermined to an appropriate distance in all directions utilizing iris scissors.
Cheek-To-Nose Interpolation Flap Text: A decision was made to reconstruct the defect utilizing an interpolation axial flap and a staged reconstruction.  A telfa template was made of the defect.  This telfa template was then used to outline the Cheek-To-Nose Interpolation flap.  The donor area for the pedicle flap was then injected with anesthesia.  The flap was excised through the skin and subcutaneous tissue down to the layer of the underlying musculature.  The interpolation flap was carefully excised within this deep plane to maintain its blood supply.  The edges of the donor site were undermined.   The donor site was closed in a primary fashion.  The pedicle was then rotated into position and sutured.  Once the tube was sutured into place, adequate blood supply was confirmed with blanching and refill.  The pedicle was then wrapped with xeroform gauze and dressed appropriately with a telfa and gauze bandage to ensure continued blood supply and protect the attached pedicle.
Interpolation Flap Text: A decision was made to reconstruct the defect utilizing an interpolation axial flap and a staged reconstruction.  A telfa template was made of the defect.  This telfa template was then used to outline the interpolation flap.  The donor area for the pedicle flap was then injected with anesthesia.  The flap was excised through the skin and subcutaneous tissue down to the layer of the underlying musculature.  The interpolation flap was carefully excised within this deep plane to maintain its blood supply.  The edges of the donor site were undermined.   The donor site was closed in a primary fashion.  The pedicle was then rotated into position and sutured.  Once the tube was sutured into place, adequate blood supply was confirmed with blanching and refill.  The pedicle was then wrapped with xeroform gauze and dressed appropriately with a telfa and gauze bandage to ensure continued blood supply and protect the attached pedicle.
Complex Repair And Skin Substitute Graft Text: The defect edges were debeveled with a #15 scalpel blade.  The primary defect was closed partially with a complex linear closure.  Given the location of the remaining defect, shape of the defect and the proximity to free margins a skin substitute graft was deemed most appropriate to repair the remaining defect.  The graft was trimmed to fit the size of the remaining defect.  The graft was then placed in the primary defect, oriented appropriately, and sutured into place.
Complex Repair And O-L Flap Text: The defect edges were debeveled with a #15 scalpel blade.  The primary defect was closed partially with a complex linear closure.  Given the location of the remaining defect, shape of the defect and the proximity to free margins an O-L flap was deemed most appropriate for complete closure of the defect.  Using a sterile surgical marker, an appropriate flap was drawn incorporating the defect and placing the expected incisions within the relaxed skin tension lines where possible.    The area thus outlined was incised deep to adipose tissue with a #15 scalpel blade.  The skin margins were undermined to an appropriate distance in all directions utilizing iris scissors.
Path Notes (To The Dermatopathologist): Please check margins.
Advancement-Rotation Flap Text: The defect edges were debeveled with a #15 scalpel blade.  Given the location of the defect, shape of the defect and the proximity to free margins an advancement-rotation flap was deemed most appropriate.  Using a sterile surgical marker, an appropriate flap was drawn incorporating the defect and placing the expected incisions within the relaxed skin tension lines where possible. The area thus outlined was incised deep to adipose tissue with a #15 scalpel blade.  The skin margins were undermined to an appropriate distance in all directions utilizing iris scissors.
Bilobed Transposition Flap Text: The defect edges were debeveled with a #15 scalpel blade.  Given the location of the defect and the proximity to free margins a bilobed transposition flap was deemed most appropriate.  Using a sterile surgical marker, an appropriate bilobe flap drawn around the defect.    The area thus outlined was incised deep to adipose tissue with a #15 scalpel blade.  The skin margins were undermined to an appropriate distance in all directions utilizing iris scissors.
Intermediate / Complex Repair - Final Wound Length In Cm: 3.3
Melolabial Interpolation Flap Text: A decision was made to reconstruct the defect utilizing an interpolation axial flap and a staged reconstruction.  A telfa template was made of the defect.  This telfa template was then used to outline the melolabial interpolation flap.  The donor area for the pedicle flap was then injected with anesthesia.  The flap was excised through the skin and subcutaneous tissue down to the layer of the underlying musculature.  The pedicle flap was carefully excised within this deep plane to maintain its blood supply.  The edges of the donor site were undermined.   The donor site was closed in a primary fashion.  The pedicle was then rotated into position and sutured.  Once the tube was sutured into place, adequate blood supply was confirmed with blanching and refill.  The pedicle was then wrapped with xeroform gauze and dressed appropriately with a telfa and gauze bandage to ensure continued blood supply and protect the attached pedicle.
Mercedes Flap Text: The defect edges were debeveled with a #15 scalpel blade.  Given the location of the defect, shape of the defect and the proximity to free margins a Mercedes flap was deemed most appropriate.  Using a sterile surgical marker, an appropriate advancement flap was drawn incorporating the defect and placing the expected incisions within the relaxed skin tension lines where possible. The area thus outlined was incised deep to adipose tissue with a #15 scalpel blade.  The skin margins were undermined to an appropriate distance in all directions utilizing iris scissors.
Lab: [id number]
Complex Repair And Bilobe Flap Text: The defect edges were debeveled with a #15 scalpel blade.  The primary defect was closed partially with a complex linear closure.  Given the location of the remaining defect, shape of the defect and the proximity to free margins a bilobe flap was deemed most appropriate for complete closure of the defect.  Using a sterile surgical marker, an appropriate advancement flap was drawn incorporating the defect and placing the expected incisions within the relaxed skin tension lines where possible.    The area thus outlined was incised deep to adipose tissue with a #15 scalpel blade.  The skin margins were undermined to an appropriate distance in all directions utilizing iris scissors.
Wound Care: Petrolatum
Trilobed Flap Text: The defect edges were debeveled with a #15 scalpel blade.  Given the location of the defect and the proximity to free margins a trilobed flap was deemed most appropriate.  Using a sterile surgical marker, an appropriate trilobed flap drawn around the defect.    The area thus outlined was incised deep to adipose tissue with a #15 scalpel blade.  The skin margins were undermined to an appropriate distance in all directions utilizing iris scissors.
Curvilinear Excision Additional Text (Leave Blank If You Do Not Want): The margin was drawn around the clinically apparent lesion.  A curvilinear shape was then drawn on the skin incorporating the lesion and margins.  Incisions were then made along these lines to the appropriate tissue plane and the lesion was extirpated.
Additional Anesthesia Volume In Cc: 6
Deep Sutures: 4-0 PDO
Modified Advancement Flap Text: The defect edges were debeveled with a #15 scalpel blade.  Given the location of the defect, shape of the defect and the proximity to free margins a modified advancement flap was deemed most appropriate.  Using a sterile surgical marker, an appropriate advancement flap was drawn incorporating the defect and placing the expected incisions within the relaxed skin tension lines where possible.    The area thus outlined was incised deep to adipose tissue with a #15 scalpel blade.  The skin margins were undermined to an appropriate distance in all directions utilizing iris scissors.
Dorsal Nasal Flap Text: The defect edges were debeveled with a #15 scalpel blade.  Given the location of the defect and the proximity to free margins a dorsal nasal flap was deemed most appropriate.  Using a sterile surgical marker, an appropriate dorsal nasal flap was drawn around the defect.    The area thus outlined was incised deep to adipose tissue with a #15 scalpel blade.  The skin margins were undermined to an appropriate distance in all directions utilizing iris scissors.
Mastoid Interpolation Flap Text: A decision was made to reconstruct the defect utilizing an interpolation axial flap and a staged reconstruction.  A telfa template was made of the defect.  This telfa template was then used to outline the mastoid interpolation flap.  The donor area for the pedicle flap was then injected with anesthesia.  The flap was excised through the skin and subcutaneous tissue down to the layer of the underlying musculature.  The pedicle flap was carefully excised within this deep plane to maintain its blood supply.  The edges of the donor site were undermined.   The donor site was closed in a primary fashion.  The pedicle was then rotated into position and sutured.  Once the tube was sutured into place, adequate blood supply was confirmed with blanching and refill.  The pedicle was then wrapped with xeroform gauze and dressed appropriately with a telfa and gauze bandage to ensure continued blood supply and protect the attached pedicle.
Complex Repair And Melolabial Flap Text: The defect edges were debeveled with a #15 scalpel blade.  The primary defect was closed partially with a complex linear closure.  Given the location of the remaining defect, shape of the defect and the proximity to free margins a melolabial flap was deemed most appropriate for complete closure of the defect.  Using a sterile surgical marker, an appropriate advancement flap was drawn incorporating the defect and placing the expected incisions within the relaxed skin tension lines where possible.    The area thus outlined was incised deep to adipose tissue with a #15 scalpel blade.  The skin margins were undermined to an appropriate distance in all directions utilizing iris scissors.
Posterior Auricular Interpolation Flap Text: A decision was made to reconstruct the defect utilizing an interpolation axial flap and a staged reconstruction.  A telfa template was made of the defect.  This telfa template was then used to outline the posterior auricular interpolation flap.  The donor area for the pedicle flap was then injected with anesthesia.  The flap was excised through the skin and subcutaneous tissue down to the layer of the underlying musculature.  The pedicle flap was carefully excised within this deep plane to maintain its blood supply.  The edges of the donor site were undermined.   The donor site was closed in a primary fashion.  The pedicle was then rotated into position and sutured.  Once the tube was sutured into place, adequate blood supply was confirmed with blanching and refill.  The pedicle was then wrapped with xeroform gauze and dressed appropriately with a telfa and gauze bandage to ensure continued blood supply and protect the attached pedicle.
Fusiform Excision Additional Text (Leave Blank If You Do Not Want): The margin was drawn around the clinically apparent lesion.  A fusiform shape was then drawn on the skin incorporating the lesion and margins.  Incisions were then made along these lines to the appropriate tissue plane and the lesion was extirpated.
Lab Facility: 
Complex Repair And Rotation Flap Text: The defect edges were debeveled with a #15 scalpel blade.  The primary defect was closed partially with a complex linear closure.  Given the location of the remaining defect, shape of the defect and the proximity to free margins a rotation flap was deemed most appropriate for complete closure of the defect.  Using a sterile surgical marker, an appropriate advancement flap was drawn incorporating the defect and placing the expected incisions within the relaxed skin tension lines where possible.    The area thus outlined was incised deep to adipose tissue with a #15 scalpel blade.  The skin margins were undermined to an appropriate distance in all directions utilizing iris scissors.
Elliptical Excision Additional Text (Leave Blank If You Do Not Want): The margin was drawn around the clinically apparent lesion.  An elliptical shape was then drawn on the skin incorporating the lesion and margins.  Incisions were then made along these lines to the appropriate tissue plane and the lesion was extirpated.
Consent was obtained from the patient. The risks and benefits to therapy were discussed in detail. Specifically, the risks of infection, scarring, bleeding, prolonged wound healing, incomplete removal, allergy to anesthesia, nerve injury and recurrence were addressed. Prior to the procedure, the treatment site was clearly identified and confirmed by the patient. All components of Universal Protocol/PAUSE Rule completed.
Mucosal Advancement Flap Text: Given the location of the defect, shape of the defect and the proximity to free margins a mucosal advancement flap was deemed most appropriate. Incisions were made with a 15 blade scalpel in the appropriate fashion along the cutaneous vermilion border and the mucosal lip. The remaining actinically damaged mucosal tissue was excised.  The mucosal advancement flap was then elevated to the gingival sulcus with care taken to preserve the neurovascular structures and advanced into the primary defect. Care was taken to ensure that precise realignment of the vermilion border was achieved.
Dressing: dry sterile dressing
Island Pedicle Flap Text: The defect edges were debeveled with a #15 scalpel blade.  Given the location of the defect, shape of the defect and the proximity to free margins an island pedicle advancement flap was deemed most appropriate.  Using a sterile surgical marker, an appropriate advancement flap was drawn incorporating the defect, outlining the appropriate donor tissue and placing the expected incisions within the relaxed skin tension lines where possible.    The area thus outlined was incised deep to adipose tissue with a #15 scalpel blade.  The skin margins were undermined to an appropriate distance in all directions around the primary defect and laterally outward around the island pedicle utilizing iris scissors.  There was minimal undermining beneath the pedicle flap.
Excision Depth: adipose tissue
Paramedian Forehead Flap Text: A decision was made to reconstruct the defect utilizing an interpolation axial flap and a staged reconstruction.  A telfa template was made of the defect.  This telfa template was then used to outline the paramedian forehead pedicle flap.  The donor area for the pedicle flap was then injected with anesthesia.  The flap was excised through the skin and subcutaneous tissue down to the layer of the underlying musculature.  The pedicle flap was carefully excised within this deep plane to maintain its blood supply.  The edges of the donor site were undermined.   The donor site was closed in a primary fashion.  The pedicle was then rotated into position and sutured.  Once the tube was sutured into place, adequate blood supply was confirmed with blanching and refill.  The pedicle was then wrapped with xeroform gauze and dressed appropriately with a telfa and gauze bandage to ensure continued blood supply and protect the attached pedicle.
Hatchet Flap Text: The defect edges were debeveled with a #15 scalpel blade.  Given the location of the defect, shape of the defect and the proximity to free margins a hatchet flap was deemed most appropriate.  Using a sterile surgical marker, an appropriate hatchet flap was drawn incorporating the defect and placing the expected incisions within the relaxed skin tension lines where possible.    The area thus outlined was incised deep to adipose tissue with a #15 scalpel blade.  The skin margins were undermined to an appropriate distance in all directions utilizing iris scissors.
Graft Donor Site Bandage (Optional-Leave Blank If You Don't Want In Note): Steri-strips and a pressure bandage were applied to the donor site.
Complex Repair And Rhombic Flap Text: The defect edges were debeveled with a #15 scalpel blade.  The primary defect was closed partially with a complex linear closure.  Given the location of the remaining defect, shape of the defect and the proximity to free margins a rhombic flap was deemed most appropriate for complete closure of the defect.  Using a sterile surgical marker, an appropriate advancement flap was drawn incorporating the defect and placing the expected incisions within the relaxed skin tension lines where possible.    The area thus outlined was incised deep to adipose tissue with a #15 scalpel blade.  The skin margins were undermined to an appropriate distance in all directions utilizing iris scissors.
Island Pedicle Flap With Canthal Suspension Text: The defect edges were debeveled with a #15 scalpel blade.  Given the location of the defect, shape of the defect and the proximity to free margins an island pedicle advancement flap was deemed most appropriate.  Using a sterile surgical marker, an appropriate advancement flap was drawn incorporating the defect, outlining the appropriate donor tissue and placing the expected incisions within the relaxed skin tension lines where possible. The area thus outlined was incised deep to adipose tissue with a #15 scalpel blade.  The skin margins were undermined to an appropriate distance in all directions around the primary defect and laterally outward around the island pedicle utilizing iris scissors.  There was minimal undermining beneath the pedicle flap. A suspension suture was placed in the canthal tendon to prevent tension and prevent ectropion.
Hemostasis: Electrocautery
Saucerization Excision Additional Text (Leave Blank If You Do Not Want): The margin was drawn around the clinically apparent lesion.  Incisions were then made along these lines, in a tangential fashion, to the appropriate tissue plane and the lesion was extirpated.
Alar Island Pedicle Flap Text: The defect edges were debeveled with a #15 scalpel blade.  Given the location of the defect, shape of the defect and the proximity to the alar rim an island pedicle advancement flap was deemed most appropriate.  Using a sterile surgical marker, an appropriate advancement flap was drawn incorporating the defect, outlining the appropriate donor tissue and placing the expected incisions within the nasal ala running parallel to the alar rim. The area thus outlined was incised with a #15 scalpel blade.  The skin margins were undermined minimally to an appropriate distance in all directions around the primary defect and laterally outward around the island pedicle utilizing iris scissors.  There was minimal undermining beneath the pedicle flap.
Lip Wedge Excision Repair Text: Given the location of the defect and the proximity to free margins a full thickness wedge repair was deemed most appropriate.  Using a sterile surgical marker, the appropriate repair was drawn incorporating the defect and placing the expected incisions perpendicular to the vermilion border.  The vermilion border was also meticulously outlined to ensure appropriate reapproximation during the repair.  The area thus outlined was incised through and through with a #15 scalpel blade.  The muscularis and dermis were reaproximated with deep sutures following hemostasis. Care was taken to realign the vermilion border before proceeding with the superficial closure.  Once the vermilion was realigned the superfical and mucosal closure was finished.
Rotation Flap Text: The defect edges were debeveled with a #15 scalpel blade.  Given the location of the defect, shape of the defect and the proximity to free margins a rotation flap was deemed most appropriate.  Using a sterile surgical marker, an appropriate rotation flap was drawn incorporating the defect and placing the expected incisions within the relaxed skin tension lines where possible.    The area thus outlined was incised deep to adipose tissue with a #15 scalpel blade.  The skin margins were undermined to an appropriate distance in all directions utilizing iris scissors.
Complex Repair And Transposition Flap Text: The defect edges were debeveled with a #15 scalpel blade.  The primary defect was closed partially with a complex linear closure.  Given the location of the remaining defect, shape of the defect and the proximity to free margins a transposition flap was deemed most appropriate for complete closure of the defect.  Using a sterile surgical marker, an appropriate advancement flap was drawn incorporating the defect and placing the expected incisions within the relaxed skin tension lines where possible.    The area thus outlined was incised deep to adipose tissue with a #15 scalpel blade.  The skin margins were undermined to an appropriate distance in all directions utilizing iris scissors.
Scalpel Size: 15 blade
Epidermal Autograft Text: The defect edges were debeveled with a #15 scalpel blade.  Given the location of the defect, shape of the defect and the proximity to free margins an epidermal autograft was deemed most appropriate.  Using a sterile surgical marker, the primary defect shape was transferred to the donor site. The epidermal graft was then harvested.  The skin graft was then placed in the primary defect and oriented appropriately.
Slit Excision Additional Text (Leave Blank If You Do Not Want): A linear line was drawn on the skin overlying the lesion. An incision was made slowly until the lesion was visualized.  Once visualized, the lesion was removed with blunt dissection.
Excisional Biopsy Additional Text (Leave Blank If You Do Not Want): The margin was drawn around the clinically apparent lesion. An elliptical shape was then drawn on the skin incorporating the lesion and margins.  Incisions were then made along these lines to the appropriate tissue plane and the lesion was extirpated.
Complex Repair And V-Y Plasty Text: The defect edges were debeveled with a #15 scalpel blade.  The primary defect was closed partially with a complex linear closure.  Given the location of the remaining defect, shape of the defect and the proximity to free margins a V-Y plasty was deemed most appropriate for complete closure of the defect.  Using a sterile surgical marker, an appropriate advancement flap was drawn incorporating the defect and placing the expected incisions within the relaxed skin tension lines where possible.    The area thus outlined was incised deep to adipose tissue with a #15 scalpel blade.  The skin margins were undermined to an appropriate distance in all directions utilizing iris scissors.
Ftsg Text: The defect edges were debeveled with a #15 scalpel blade.  Given the location of the defect, shape of the defect and the proximity to free margins a full thickness skin graft was deemed most appropriate.  Using a sterile surgical marker, the primary defect shape was transferred to the donor site. The area thus outlined was incised deep to adipose tissue with a #15 scalpel blade.  The harvested graft was then trimmed of adipose tissue until only dermis and epidermis was left.  The skin margins of the secondary defect were undermined to an appropriate distance in all directions utilizing iris scissors.  The secondary defect was closed with interrupted buried subcutaneous sutures.  The skin edges were then re-apposed with running  sutures.  The skin graft was then placed in the primary defect and oriented appropriately.
Estimated Blood Loss (Cc): minimal
Post-Care Instructions: I reviewed with the patient in detail post-care instructions. Patient is not to engage in any heavy lifting, exercise, or swimming for the next 14 days. Should the patient develop any fevers, chills, bleeding, severe pain patient will contact the office immediately.
Dermal Autograft Text: The defect edges were debeveled with a #15 scalpel blade.  Given the location of the defect, shape of the defect and the proximity to free margins a dermal autograft was deemed most appropriate.  Using a sterile surgical marker, the primary defect shape was transferred to the donor site. The area thus outlined was incised deep to adipose tissue with a #15 scalpel blade.  The harvested graft was then trimmed of adipose and epidermal tissue until only dermis was left.  The skin graft was then placed in the primary defect and oriented appropriately.
Spiral Flap Text: The defect edges were debeveled with a #15 scalpel blade.  Given the location of the defect, shape of the defect and the proximity to free margins a spiral flap was deemed most appropriate.  Using a sterile surgical marker, an appropriate rotation flap was drawn incorporating the defect and placing the expected incisions within the relaxed skin tension lines where possible. The area thus outlined was incised deep to adipose tissue with a #15 scalpel blade.  The skin margins were undermined to an appropriate distance in all directions utilizing iris scissors.
Double Island Pedicle Flap Text: The defect edges were debeveled with a #15 scalpel blade.  Given the location of the defect, shape of the defect and the proximity to free margins a double island pedicle advancement flap was deemed most appropriate.  Using a sterile surgical marker, an appropriate advancement flap was drawn incorporating the defect, outlining the appropriate donor tissue and placing the expected incisions within the relaxed skin tension lines where possible.    The area thus outlined was incised deep to adipose tissue with a #15 scalpel blade.  The skin margins were undermined to an appropriate distance in all directions around the primary defect and laterally outward around the island pedicle utilizing iris scissors.  There was minimal undermining beneath the pedicle flap.
Star Wedge Flap Text: The defect edges were debeveled with a #15 scalpel blade.  Given the location of the defect, shape of the defect and the proximity to free margins a star wedge flap was deemed most appropriate.  Using a sterile surgical marker, an appropriate rotation flap was drawn incorporating the defect and placing the expected incisions within the relaxed skin tension lines where possible. The area thus outlined was incised deep to adipose tissue with a #15 scalpel blade.  The skin margins were undermined to an appropriate distance in all directions utilizing iris scissors.
Home Suture Removal Text: Patient was provided a home suture removal kit and will remove their sutures at home.  If they have any questions or difficulties they will call the office.
Complex Repair And M Plasty Text: The defect edges were debeveled with a #15 scalpel blade.  The primary defect was closed partially with a complex linear closure.  Given the location of the remaining defect, shape of the defect and the proximity to free margins an M plasty was deemed most appropriate for complete closure of the defect.  Using a sterile surgical marker, an appropriate advancement flap was drawn incorporating the defect and placing the expected incisions within the relaxed skin tension lines where possible.    The area thus outlined was incised deep to adipose tissue with a #15 scalpel blade.  The skin margins were undermined to an appropriate distance in all directions utilizing iris scissors.
Split-Thickness Skin Graft Text: The defect edges were debeveled with a #15 scalpel blade.  Given the location of the defect, shape of the defect and the proximity to free margins a split thickness skin graft was deemed most appropriate.  Using a sterile surgical marker, the primary defect shape was transferred to the donor site. The split thickness graft was then harvested.  The skin graft was then placed in the primary defect and oriented appropriately.
Primary Defect Length (In Cm): 2
Island Pedicle Flap-Requiring Vessel Identification Text: The defect edges were debeveled with a #15 scalpel blade.  Given the location of the defect, shape of the defect and the proximity to free margins an island pedicle advancement flap was deemed most appropriate.  Using a sterile surgical marker, an appropriate advancement flap was drawn, based on the axial vessel mentioned above, incorporating the defect, outlining the appropriate donor tissue and placing the expected incisions within the relaxed skin tension lines where possible.    The area thus outlined was incised deep to adipose tissue with a #15 scalpel blade.  The skin margins were undermined to an appropriate distance in all directions around the primary defect and laterally outward around the island pedicle utilizing iris scissors.  There was minimal undermining beneath the pedicle flap.
Skin Substitute Text: The defect edges were debeveled with a #15 scalpel blade.  Given the location of the defect, shape of the defect and the proximity to free margins a skin substitute graft was deemed most appropriate.  The graft material was trimmed to fit the size of the defect. The graft was then placed in the primary defect and oriented appropriately.
Perilesional Excision Additional Text (Leave Blank If You Do Not Want): The margin was drawn around the clinically apparent lesion. Incisions were then made along these lines to the appropriate tissue plane and the lesion was extirpated.
Keystone Flap Text: The defect edges were debeveled with a #15 scalpel blade.  Given the location of the defect, shape of the defect a keystone flap was deemed most appropriate.  Using a sterile surgical marker, an appropriate keystone flap was drawn incorporating the defect, outlining the appropriate donor tissue and placing the expected incisions within the relaxed skin tension lines where possible. The area thus outlined was incised deep to adipose tissue with a #15 scalpel blade.  The skin margins were undermined to an appropriate distance in all directions around the primary defect and laterally outward around the flap utilizing iris scissors.
Positioning (Leave Blank If You Do Not Want): The patient was placed in a comfortable position exposing the surgical site.
Cartilage Graft Text: The defect edges were debeveled with a #15 scalpel blade.  Given the location of the defect, shape of the defect, the fact the defect involved a full thickness cartilage defect a cartilage graft was deemed most appropriate.  An appropriate donor site was identified, cleansed, and anesthetized. The cartilage graft was then harvested and transferred to the recipient site, oriented appropriately and then sutured into place.  The secondary defect was then repaired using a primary closure.
Transposition Flap Text: The defect edges were debeveled with a #15 scalpel blade.  Given the location of the defect and the proximity to free margins a transposition flap was deemed most appropriate.  Using a sterile surgical marker, an appropriate transposition flap was drawn incorporating the defect.    The area thus outlined was incised deep to adipose tissue with a #15 scalpel blade.  The skin margins were undermined to an appropriate distance in all directions utilizing iris scissors.
Complex Repair And Double M Plasty Text: The defect edges were debeveled with a #15 scalpel blade.  The primary defect was closed partially with a complex linear closure.  Given the location of the remaining defect, shape of the defect and the proximity to free margins a double M plasty was deemed most appropriate for complete closure of the defect.  Using a sterile surgical marker, an appropriate advancement flap was drawn incorporating the defect and placing the expected incisions within the relaxed skin tension lines where possible.    The area thus outlined was incised deep to adipose tissue with a #15 scalpel blade.  The skin margins were undermined to an appropriate distance in all directions utilizing iris scissors.
Tissue Cultured Epidermal Autograft Text: The defect edges were debeveled with a #15 scalpel blade.  Given the location of the defect, shape of the defect and the proximity to free margins a tissue cultured epidermal autograft was deemed most appropriate.  The graft was then trimmed to fit the size of the defect.  The graft was then placed in the primary defect and oriented appropriately.
Primary Defect Width (In Cm): 1.8
Repair Performed By Another Provider Text (Leave Blank If You Do Not Want): After the tissue was excised the defect was repaired by another provider.
Complex Repair And W Plasty Text: The defect edges were debeveled with a #15 scalpel blade.  The primary defect was closed partially with a complex linear closure.  Given the location of the remaining defect, shape of the defect and the proximity to free margins a W plasty was deemed most appropriate for complete closure of the defect.  Using a sterile surgical marker, an appropriate advancement flap was drawn incorporating the defect and placing the expected incisions within the relaxed skin tension lines where possible.    The area thus outlined was incised deep to adipose tissue with a #15 scalpel blade.  The skin margins were undermined to an appropriate distance in all directions utilizing iris scissors.
Composite Graft Text: The defect edges were debeveled with a #15 scalpel blade.  Given the location of the defect, shape of the defect, the proximity to free margins and the fact the defect was full thickness a composite graft was deemed most appropriate.  The defect was outline and then transferred to the donor site.  A full thickness graft was then excised from the donor site. The graft was then placed in the primary defect, oriented appropriately and then sutured into place.  The secondary defect was then repaired using a primary closure.
Xenograft Text: The defect edges were debeveled with a #15 scalpel blade.  Given the location of the defect, shape of the defect and the proximity to free margins a xenograft was deemed most appropriate.  The graft was then trimmed to fit the size of the defect.  The graft was then placed in the primary defect and oriented appropriately.
Muscle Hinge Flap Text: The defect edges were debeveled with a #15 scalpel blade.  Given the size, depth and location of the defect and the proximity to free margins a muscle hinge flap was deemed most appropriate.  Using a sterile surgical marker, an appropriate hinge flap was drawn incorporating the defect. The area thus outlined was incised with a #15 scalpel blade.  The skin margins were undermined to an appropriate distance in all directions utilizing iris scissors.
Pre-Excision Curettage Text (Leave Blank If You Do Not Want): Prior to drawing the surgical margin the visible lesion was removed with electrodesiccation and curettage to clearly define the lesion size.
O-T Plasty Text: The defect edges were debeveled with a #15 scalpel blade.  Given the location of the defect, shape of the defect and the proximity to free margins an O-T plasty was deemed most appropriate.  Using a sterile surgical marker, an appropriate O-T plasty was drawn incorporating the defect and placing the expected incisions within the relaxed skin tension lines where possible.    The area thus outlined was incised deep to adipose tissue with a #15 scalpel blade.  The skin margins were undermined to an appropriate distance in all directions utilizing iris scissors.
Melolabial Transposition Flap Text: The defect edges were debeveled with a #15 scalpel blade.  Given the location of the defect and the proximity to free margins a melolabial flap was deemed most appropriate.  Using a sterile surgical marker, an appropriate melolabial transposition flap was drawn incorporating the defect.    The area thus outlined was incised deep to adipose tissue with a #15 scalpel blade.  The skin margins were undermined to an appropriate distance in all directions utilizing iris scissors.
Complex Repair And Z Plasty Text: The defect edges were debeveled with a #15 scalpel blade.  The primary defect was closed partially with a complex linear closure.  Given the location of the remaining defect, shape of the defect and the proximity to free margins a Z plasty was deemed most appropriate for complete closure of the defect.  Using a sterile surgical marker, an appropriate advancement flap was drawn incorporating the defect and placing the expected incisions within the relaxed skin tension lines where possible.    The area thus outlined was incised deep to adipose tissue with a #15 scalpel blade.  The skin margins were undermined to an appropriate distance in all directions utilizing iris scissors.
Complex Repair Preamble Text (Leave Blank If You Do Not Want): Extensive wide undermining was performed.
O-Z Plasty Text: The defect edges were debeveled with a #15 scalpel blade.  Given the location of the defect, shape of the defect and the proximity to free margins an O-Z plasty (double transposition flap) was deemed most appropriate.  Using a sterile surgical marker, the appropriate transposition flaps were drawn incorporating the defect and placing the expected incisions within the relaxed skin tension lines where possible.    The area thus outlined was incised deep to adipose tissue with a #15 scalpel blade.  The skin margins were undermined to an appropriate distance in all directions utilizing iris scissors.  Hemostasis was achieved with electrocautery.  The flaps were then transposed into place, one clockwise and the other counterclockwise, and anchored with interrupted buried subcutaneous sutures.
Detail Level: Detailed
No Repair - Repaired With Adjacent Surgical Defect Text (Leave Blank If You Do Not Want): After the excision the defect was repaired concurrently with another surgical defect which was in close approximation.
Purse String (Intermediate) Text: Given the location of the defect and the characteristics of the surrounding skin a purse string intermediate closure was deemed most appropriate.  Undermining was performed circumfirentially around the surgical defect.  A purse string suture was then placed and tightened.
Double O-Z Plasty Text: The defect edges were debeveled with a #15 scalpel blade.  Given the location of the defect, shape of the defect and the proximity to free margins a Double O-Z plasty (double transposition flap) was deemed most appropriate.  Using a sterile surgical marker, the appropriate transposition flaps were drawn incorporating the defect and placing the expected incisions within the relaxed skin tension lines where possible. The area thus outlined was incised deep to adipose tissue with a #15 scalpel blade.  The skin margins were undermined to an appropriate distance in all directions utilizing iris scissors.  Hemostasis was achieved with electrocautery.  The flaps were then transposed into place, one clockwise and the other counterclockwise, and anchored with interrupted buried subcutaneous sutures.
Advancement Flap (Single) Text: The defect edges were debeveled with a #15 scalpel blade.  Given the location of the defect and the proximity to free margins a single advancement flap was deemed most appropriate.  Using a sterile surgical marker, an appropriate advancement flap was drawn incorporating the defect and placing the expected incisions within the relaxed skin tension lines where possible.    The area thus outlined was incised deep to adipose tissue with a #15 scalpel blade.  The skin margins were undermined to an appropriate distance in all directions utilizing iris scissors.
Information: Selecting Yes will display possible errors in your note based on the variables you have selected. This validation is only offered as a suggestion for you. PLEASE NOTE THAT THE VALIDATION TEXT WILL BE REMOVED WHEN YOU FINALIZE YOUR NOTE. IF YOU WANT TO FAX A PRELIMINARY NOTE YOU WILL NEED TO TOGGLE THIS TO 'NO' IF YOU DO NOT WANT IT IN YOUR FAXED NOTE.
Rhombic Flap Text: The defect edges were debeveled with a #15 scalpel blade.  Given the location of the defect and the proximity to free margins a rhombic flap was deemed most appropriate.  Using a sterile surgical marker, an appropriate rhombic flap was drawn incorporating the defect.    The area thus outlined was incised deep to adipose tissue with a #15 scalpel blade.  The skin margins were undermined to an appropriate distance in all directions utilizing iris scissors.
Complex Repair And Dorsal Nasal Flap Text: The defect edges were debeveled with a #15 scalpel blade.  The primary defect was closed partially with a complex linear closure.  Given the location of the remaining defect, shape of the defect and the proximity to free margins a dorsal nasal flap was deemed most appropriate for complete closure of the defect.  Using a sterile surgical marker, an appropriate flap was drawn incorporating the defect and placing the expected incisions within the relaxed skin tension lines where possible.    The area thus outlined was incised deep to adipose tissue with a #15 scalpel blade.  The skin margins were undermined to an appropriate distance in all directions utilizing iris scissors.
Size Of Lesion In Cm: 1.4
Purse String (Simple) Text: Given the location of the defect and the characteristics of the surrounding skin a purse string simple closure was deemed most appropriate.  Undermining was performed circumferentially around the surgical defect.  A purse string suture was then placed and tightened.
Advancement Flap (Double) Text: The defect edges were debeveled with a #15 scalpel blade.  Given the location of the defect and the proximity to free margins a double advancement flap was deemed most appropriate.  Using a sterile surgical marker, the appropriate advancement flaps were drawn incorporating the defect and placing the expected incisions within the relaxed skin tension lines where possible.    The area thus outlined was incised deep to adipose tissue with a #15 scalpel blade.  The skin margins were undermined to an appropriate distance in all directions utilizing iris scissors.
X Size Of Lesion In Cm (Optional): 1.2
Rhomboid Transposition Flap Text: The defect edges were debeveled with a #15 scalpel blade.  Given the location of the defect and the proximity to free margins a rhomboid transposition flap was deemed most appropriate.  Using a sterile surgical marker, an appropriate rhomboid flap was drawn incorporating the defect.    The area thus outlined was incised deep to adipose tissue with a #15 scalpel blade.  The skin margins were undermined to an appropriate distance in all directions utilizing iris scissors.
Partial Purse String (Intermediate) Text: Given the location of the defect and the characteristics of the surrounding skin an intermediate purse string closure was deemed most appropriate.  Undermining was performed circumferentially around the surgical defect.  A purse string suture was then placed and tightened. Wound tension of the circular defect prevented complete closure of the wound.
S Plasty Text: Given the location and shape of the defect, and the orientation of relaxed skin tension lines, an S-plasty was deemed most appropriate for repair.  Using a sterile surgical marker, the appropriate outline of the S-plasty was drawn, incorporating the defect and placing the expected incisions within the relaxed skin tension lines where possible.  The area thus outlined was incised deep to adipose tissue with a #15 scalpel blade.  The skin margins were undermined to an appropriate distance in all directions utilizing iris scissors. The skin flaps were advanced over the defect.  The opposing margins were then approximated with interrupted buried subcutaneous sutures.
Bi-Rhombic Flap Text: The defect edges were debeveled with a #15 scalpel blade.  Given the location of the defect and the proximity to free margins a bi-rhombic flap was deemed most appropriate.  Using a sterile surgical marker, an appropriate rhombic flap was drawn incorporating the defect. The area thus outlined was incised deep to adipose tissue with a #15 scalpel blade.  The skin margins were undermined to an appropriate distance in all directions utilizing iris scissors.
V-Y Plasty Text: The defect edges were debeveled with a #15 scalpel blade.  Given the location of the defect, shape of the defect and the proximity to free margins an V-Y advancement flap was deemed most appropriate.  Using a sterile surgical marker, an appropriate advancement flap was drawn incorporating the defect and placing the expected incisions within the relaxed skin tension lines where possible.    The area thus outlined was incised deep to adipose tissue with a #15 scalpel blade.  The skin margins were undermined to an appropriate distance in all directions utilizing iris scissors.
Complex Repair And Ftsg Text: The defect edges were debeveled with a #15 scalpel blade.  The primary defect was closed partially with a complex linear closure.  Given the location of the defect, shape of the defect and the proximity to free margins a full thickness skin graft was deemed most appropriate to repair the remaining defect.  The graft was trimmed to fit the size of the remaining defect.  The graft was then placed in the primary defect, oriented appropriately, and sutured into place.
Suture Removal: 14 days
Complex Repair And Split-Thickness Skin Graft Text: The defect edges were debeveled with a #15 scalpel blade.  The primary defect was closed partially with a complex linear closure.  Given the location of the defect, shape of the defect and the proximity to free margins a split thickness skin graft was deemed most appropriate to repair the remaining defect.  The graft was trimmed to fit the size of the remaining defect.  The graft was then placed in the primary defect, oriented appropriately, and sutured into place.
Partial Purse String (Simple) Text: Given the location of the defect and the characteristics of the surrounding skin a simple purse string closure was deemed most appropriate.  Undermining was performed circumferentially around the surgical defect.  A purse string suture was then placed and tightened. Wound tension of the circular defect prevented complete closure of the wound.
Size Of Margin In Cm: 0.3
Burow's Advancement Flap Text: The defect edges were debeveled with a #15 scalpel blade.  Given the location of the defect and the proximity to free margins a Burow's advancement flap was deemed most appropriate.  Using a sterile surgical marker, the appropriate advancement flap was drawn incorporating the defect and placing the expected incisions within the relaxed skin tension lines where possible.    The area thus outlined was incised deep to adipose tissue with a #15 scalpel blade.  The skin margins were undermined to an appropriate distance in all directions utilizing iris scissors.
H Plasty Text: Given the location of the defect, shape of the defect and the proximity to free margins a H-plasty was deemed most appropriate for repair.  Using a sterile surgical marker, the appropriate advancement arms of the H-plasty were drawn incorporating the defect and placing the expected incisions within the relaxed skin tension lines where possible. The area thus outlined was incised deep to adipose tissue with a #15 scalpel blade. The skin margins were undermined to an appropriate distance in all directions utilizing iris scissors.  The opposing advancement arms were then advanced into place in opposite direction and anchored with interrupted buried subcutaneous sutures.
Crescentic Advancement Flap Text: The defect edges were debeveled with a #15 scalpel blade.  Given the location of the defect and the proximity to free margins a crescentic advancement flap was deemed most appropriate.  Using a sterile surgical marker, the appropriate advancement flap was drawn incorporating the defect and placing the expected incisions within the relaxed skin tension lines where possible.    The area thus outlined was incised deep to adipose tissue with a #15 scalpel blade.  The skin margins were undermined to an appropriate distance in all directions utilizing iris scissors.
Complex Repair And Epidermal Autograft Text: The defect edges were debeveled with a #15 scalpel blade.  The primary defect was closed partially with a complex linear closure.  Given the location of the defect, shape of the defect and the proximity to free margins an epidermal autograft was deemed most appropriate to repair the remaining defect.  The graft was trimmed to fit the size of the remaining defect.  The graft was then placed in the primary defect, oriented appropriately, and sutured into place.
Excision Method: Round
Complex Repair And Single Advancement Flap Text: The defect edges were debeveled with a #15 scalpel blade.  The primary defect was closed partially with a complex linear closure.  Given the location of the remaining defect, shape of the defect and the proximity to free margins a single advancement flap was deemed most appropriate for complete closure of the defect.  Using a sterile surgical marker, an appropriate advancement flap was drawn incorporating the defect and placing the expected incisions within the relaxed skin tension lines where possible.    The area thus outlined was incised deep to adipose tissue with a #15 scalpel blade.  The skin margins were undermined to an appropriate distance in all directions utilizing iris scissors.
Helical Rim Advancement Flap Text: The defect edges were debeveled with a #15 blade scalpel.  Given the location of the defect and the proximity to free margins (helical rim) a double helical rim advancement flap was deemed most appropriate.  Using a sterile surgical marker, the appropriate advancement flaps were drawn incorporating the defect and placing the expected incisions between the helical rim and antihelix where possible.  The area thus outlined was incised through and through with a #15 scalpel blade.  With a skin hook and iris scissors, the flaps were gently and sharply undermined and freed up.
A-T Advancement Flap Text: The defect edges were debeveled with a #15 scalpel blade.  Given the location of the defect, shape of the defect and the proximity to free margins an A-T advancement flap was deemed most appropriate.  Using a sterile surgical marker, an appropriate advancement flap was drawn incorporating the defect and placing the expected incisions within the relaxed skin tension lines where possible.    The area thus outlined was incised deep to adipose tissue with a #15 scalpel blade.  The skin margins were undermined to an appropriate distance in all directions utilizing iris scissors.
Bilateral Helical Rim Advancement Flap Text: The defect edges were debeveled with a #15 blade scalpel.  Given the location of the defect and the proximity to free margins (helical rim) a bilateral helical rim advancement flap was deemed most appropriate.  Using a sterile surgical marker, the appropriate advancement flaps were drawn incorporating the defect and placing the expected incisions between the helical rim and antihelix where possible.  The area thus outlined was incised through and through with a #15 scalpel blade.  With a skin hook and iris scissors, the flaps were gently and sharply undermined and freed up.
Complex Repair And Double Advancement Flap Text: The defect edges were debeveled with a #15 scalpel blade.  The primary defect was closed partially with a complex linear closure.  Given the location of the remaining defect, shape of the defect and the proximity to free margins a double advancement flap was deemed most appropriate for complete closure of the defect.  Using a sterile surgical marker, an appropriate advancement flap was drawn incorporating the defect and placing the expected incisions within the relaxed skin tension lines where possible.    The area thus outlined was incised deep to adipose tissue with a #15 scalpel blade.  The skin margins were undermined to an appropriate distance in all directions utilizing iris scissors.
W Plasty Text: The lesion was extirpated to the level of the fat with a #15 scalpel blade.  Given the location of the defect, shape of the defect and the proximity to free margins a W-plasty was deemed most appropriate for repair.  Using a sterile surgical marker, the appropriate transposition arms of the W-plasty were drawn incorporating the defect and placing the expected incisions within the relaxed skin tension lines where possible.    The area thus outlined was incised deep to adipose tissue with a #15 scalpel blade.  The skin margins were undermined to an appropriate distance in all directions utilizing iris scissors.  The opposing transposition arms were then transposed into place in opposite direction and anchored with interrupted buried subcutaneous sutures.

## 2019-02-27 NOTE — TELEPHONE ENCOUNTER
Was the patient seen in the last year in this department? No     Does patient have an active prescription for medications requested? Yes    Received Request Via: Patient    
Unknown if ever smoked

## 2019-02-28 ENCOUNTER — TELEPHONE (OUTPATIENT)
Dept: SLEEP MEDICINE | Facility: MEDICAL CENTER | Age: 79
End: 2019-02-28

## 2019-02-28 ENCOUNTER — HOSPITAL ENCOUNTER (OUTPATIENT)
Facility: MEDICAL CENTER | Age: 79
DRG: 190 | End: 2019-02-28
Attending: NURSE PRACTITIONER
Payer: MEDICARE

## 2019-02-28 DIAGNOSIS — Z22.39 PSEUDOMONAS AERUGINOSA COLONIZATION: ICD-10-CM

## 2019-02-28 DIAGNOSIS — J47.9 BRONCHIECTASIS WITHOUT COMPLICATION (HCC): ICD-10-CM

## 2019-02-28 LAB
BACTERIA BLD CULT: NORMAL
BACTERIA SPEC RESP CULT: ABNORMAL
GRAM STN SPEC: ABNORMAL
SIGNIFICANT IND 70042: ABNORMAL
SIGNIFICANT IND 70042: NORMAL
SITE SITE: ABNORMAL
SITE SITE: NORMAL
SOURCE SOURCE: ABNORMAL
SOURCE SOURCE: NORMAL

## 2019-02-28 PROCEDURE — 87205 SMEAR GRAM STAIN: CPT

## 2019-02-28 PROCEDURE — 87181 SC STD AGAR DILUTION PER AGT: CPT

## 2019-02-28 PROCEDURE — 87070 CULTURE OTHR SPECIMN AEROBIC: CPT

## 2019-02-28 PROCEDURE — 87015 SPECIMEN INFECT AGNT CONCNTJ: CPT

## 2019-02-28 PROCEDURE — 87106 FUNGI IDENTIFICATION YEAST: CPT

## 2019-02-28 PROCEDURE — 87116 MYCOBACTERIA CULTURE: CPT

## 2019-02-28 PROCEDURE — 87102 FUNGUS ISOLATION CULTURE: CPT

## 2019-02-28 PROCEDURE — 87077 CULTURE AEROBIC IDENTIFY: CPT

## 2019-02-28 PROCEDURE — 87206 SMEAR FLUORESCENT/ACID STAI: CPT

## 2019-03-01 ENCOUNTER — HOSPITAL ENCOUNTER (INPATIENT)
Facility: MEDICAL CENTER | Age: 79
LOS: 2 days | DRG: 190 | End: 2019-03-03
Attending: EMERGENCY MEDICINE | Admitting: HOSPITALIST
Payer: MEDICARE

## 2019-03-01 ENCOUNTER — APPOINTMENT (OUTPATIENT)
Dept: RADIOLOGY | Facility: MEDICAL CENTER | Age: 79
DRG: 190 | End: 2019-03-01
Attending: EMERGENCY MEDICINE
Payer: MEDICARE

## 2019-03-01 DIAGNOSIS — J18.9 PNEUMONIA OF LOWER LOBE DUE TO INFECTIOUS ORGANISM, UNSPECIFIED LATERALITY: ICD-10-CM

## 2019-03-01 PROBLEM — J44.1 COPD EXACERBATION (HCC): Status: ACTIVE | Noted: 2019-03-01

## 2019-03-01 PROBLEM — J10.1 INFLUENZA A: Status: ACTIVE | Noted: 2019-03-01

## 2019-03-01 LAB
ALBUMIN SERPL BCP-MCNC: 3.7 G/DL (ref 3.2–4.9)
ALBUMIN/GLOB SERPL: 1.4 G/DL
ALP SERPL-CCNC: 45 U/L (ref 30–99)
ALT SERPL-CCNC: 17 U/L (ref 2–50)
ANION GAP SERPL CALC-SCNC: 10 MMOL/L (ref 0–11.9)
AST SERPL-CCNC: 16 U/L (ref 12–45)
BASOPHILS # BLD AUTO: 0.6 % (ref 0–1.8)
BASOPHILS # BLD: 0.04 K/UL (ref 0–0.12)
BILIRUB SERPL-MCNC: 0.5 MG/DL (ref 0.1–1.5)
BUN SERPL-MCNC: 36 MG/DL (ref 8–22)
CALCIUM SERPL-MCNC: 9 MG/DL (ref 8.5–10.5)
CHLORIDE SERPL-SCNC: 102 MMOL/L (ref 96–112)
CO2 SERPL-SCNC: 28 MMOL/L (ref 20–33)
CREAT SERPL-MCNC: 4.11 MG/DL (ref 0.5–1.4)
EOSINOPHIL # BLD AUTO: 0.02 K/UL (ref 0–0.51)
EOSINOPHIL NFR BLD: 0.3 % (ref 0–6.9)
ERYTHROCYTE [DISTWIDTH] IN BLOOD BY AUTOMATED COUNT: 62 FL (ref 35.9–50)
FLUAV RNA SPEC QL NAA+PROBE: POSITIVE
FLUBV RNA SPEC QL NAA+PROBE: NEGATIVE
GLOBULIN SER CALC-MCNC: 2.6 G/DL (ref 1.9–3.5)
GLUCOSE SERPL-MCNC: 108 MG/DL (ref 65–99)
GRAM STN SPEC: NORMAL
HCT VFR BLD AUTO: 33.5 % (ref 42–52)
HGB BLD-MCNC: 10.2 G/DL (ref 14–18)
IMM GRANULOCYTES # BLD AUTO: 0.03 K/UL (ref 0–0.11)
IMM GRANULOCYTES NFR BLD AUTO: 0.4 % (ref 0–0.9)
LACTATE BLD-SCNC: 0.9 MMOL/L (ref 0.5–2)
LACTATE BLD-SCNC: 1.3 MMOL/L (ref 0.5–2)
LYMPHOCYTES # BLD AUTO: 0.42 K/UL (ref 1–4.8)
LYMPHOCYTES NFR BLD: 6 % (ref 22–41)
MCH RBC QN AUTO: 31.2 PG (ref 27–33)
MCHC RBC AUTO-ENTMCNC: 30.4 G/DL (ref 33.7–35.3)
MCV RBC AUTO: 102.4 FL (ref 81.4–97.8)
MONOCYTES # BLD AUTO: 0.59 K/UL (ref 0–0.85)
MONOCYTES NFR BLD AUTO: 8.5 % (ref 0–13.4)
NEUTROPHILS # BLD AUTO: 5.86 K/UL (ref 1.82–7.42)
NEUTROPHILS NFR BLD: 84.2 % (ref 44–72)
NRBC # BLD AUTO: 0 K/UL
NRBC BLD-RTO: 0 /100 WBC
PLATELET # BLD AUTO: 210 K/UL (ref 164–446)
PMV BLD AUTO: 8.5 FL (ref 9–12.9)
POTASSIUM SERPL-SCNC: 3.9 MMOL/L (ref 3.6–5.5)
PROT SERPL-MCNC: 6.3 G/DL (ref 6–8.2)
RBC # BLD AUTO: 3.27 M/UL (ref 4.7–6.1)
RHODAMINE-AURAMINE STN SPEC: NORMAL
SIGNIFICANT IND 70042: NORMAL
SIGNIFICANT IND 70042: NORMAL
SITE SITE: NORMAL
SITE SITE: NORMAL
SODIUM SERPL-SCNC: 140 MMOL/L (ref 135–145)
SOURCE SOURCE: NORMAL
SOURCE SOURCE: NORMAL
WBC # BLD AUTO: 7 K/UL (ref 4.8–10.8)

## 2019-03-01 PROCEDURE — 96375 TX/PRO/DX INJ NEW DRUG ADDON: CPT

## 2019-03-01 PROCEDURE — 304561 HCHG STAT O2

## 2019-03-01 PROCEDURE — 700105 HCHG RX REV CODE 258: Performed by: EMERGENCY MEDICINE

## 2019-03-01 PROCEDURE — 700102 HCHG RX REV CODE 250 W/ 637 OVERRIDE(OP): Performed by: EMERGENCY MEDICINE

## 2019-03-01 PROCEDURE — 99285 EMERGENCY DEPT VISIT HI MDM: CPT

## 2019-03-01 PROCEDURE — 700102 HCHG RX REV CODE 250 W/ 637 OVERRIDE(OP): Performed by: INTERNAL MEDICINE

## 2019-03-01 PROCEDURE — 87040 BLOOD CULTURE FOR BACTERIA: CPT

## 2019-03-01 PROCEDURE — 700101 HCHG RX REV CODE 250: Performed by: EMERGENCY MEDICINE

## 2019-03-01 PROCEDURE — A9270 NON-COVERED ITEM OR SERVICE: HCPCS | Performed by: EMERGENCY MEDICINE

## 2019-03-01 PROCEDURE — 99223 1ST HOSP IP/OBS HIGH 75: CPT | Mod: AI | Performed by: HOSPITALIST

## 2019-03-01 PROCEDURE — 700102 HCHG RX REV CODE 250 W/ 637 OVERRIDE(OP): Performed by: HOSPITALIST

## 2019-03-01 PROCEDURE — 700111 HCHG RX REV CODE 636 W/ 250 OVERRIDE (IP): Performed by: HOSPITALIST

## 2019-03-01 PROCEDURE — 83605 ASSAY OF LACTIC ACID: CPT

## 2019-03-01 PROCEDURE — 94760 N-INVAS EAR/PLS OXIMETRY 1: CPT

## 2019-03-01 PROCEDURE — A9270 NON-COVERED ITEM OR SERVICE: HCPCS | Performed by: HOSPITALIST

## 2019-03-01 PROCEDURE — 94667 MNPJ CHEST WALL 1ST: CPT

## 2019-03-01 PROCEDURE — 94669 MECHANICAL CHEST WALL OSCILL: CPT

## 2019-03-01 PROCEDURE — 85025 COMPLETE CBC W/AUTO DIFF WBC: CPT

## 2019-03-01 PROCEDURE — 96365 THER/PROPH/DIAG IV INF INIT: CPT

## 2019-03-01 PROCEDURE — 700101 HCHG RX REV CODE 250: Performed by: HOSPITALIST

## 2019-03-01 PROCEDURE — 87502 INFLUENZA DNA AMP PROBE: CPT

## 2019-03-01 PROCEDURE — 99222 1ST HOSP IP/OBS MODERATE 55: CPT | Performed by: INTERNAL MEDICINE

## 2019-03-01 PROCEDURE — 71045 X-RAY EXAM CHEST 1 VIEW: CPT

## 2019-03-01 PROCEDURE — 700111 HCHG RX REV CODE 636 W/ 250 OVERRIDE (IP): Performed by: EMERGENCY MEDICINE

## 2019-03-01 PROCEDURE — 94640 AIRWAY INHALATION TREATMENT: CPT

## 2019-03-01 PROCEDURE — A9270 NON-COVERED ITEM OR SERVICE: HCPCS | Performed by: INTERNAL MEDICINE

## 2019-03-01 PROCEDURE — 80053 COMPREHEN METABOLIC PANEL: CPT

## 2019-03-01 PROCEDURE — 770020 HCHG ROOM/CARE - TELE (206)

## 2019-03-01 RX ORDER — ACETAMINOPHEN 325 MG/1
650 TABLET ORAL EVERY 6 HOURS PRN
Status: DISCONTINUED | OUTPATIENT
Start: 2019-03-01 | End: 2019-03-03 | Stop reason: HOSPADM

## 2019-03-01 RX ORDER — IPRATROPIUM BROMIDE AND ALBUTEROL SULFATE 2.5; .5 MG/3ML; MG/3ML
3 SOLUTION RESPIRATORY (INHALATION) EVERY 6 HOURS PRN
COMMUNITY
End: 2020-04-07 | Stop reason: SDUPTHER

## 2019-03-01 RX ORDER — RANITIDINE 150 MG/1
150 TABLET ORAL 2 TIMES DAILY PRN
Status: DISCONTINUED | OUTPATIENT
Start: 2019-03-02 | End: 2019-03-01

## 2019-03-01 RX ORDER — IPRATROPIUM BROMIDE AND ALBUTEROL SULFATE 2.5; .5 MG/3ML; MG/3ML
3 SOLUTION RESPIRATORY (INHALATION)
Status: COMPLETED | OUTPATIENT
Start: 2019-03-01 | End: 2019-03-01

## 2019-03-01 RX ORDER — FAMOTIDINE 20 MG/1
20 TABLET, FILM COATED ORAL
Status: DISCONTINUED | OUTPATIENT
Start: 2019-03-01 | End: 2019-03-03 | Stop reason: HOSPADM

## 2019-03-01 RX ORDER — AMOXICILLIN 250 MG
2 CAPSULE ORAL 2 TIMES DAILY
Status: DISCONTINUED | OUTPATIENT
Start: 2019-03-01 | End: 2019-03-03 | Stop reason: HOSPADM

## 2019-03-01 RX ORDER — METHYLPREDNISOLONE SODIUM SUCCINATE 125 MG/2ML
125 INJECTION, POWDER, LYOPHILIZED, FOR SOLUTION INTRAMUSCULAR; INTRAVENOUS ONCE
Status: COMPLETED | OUTPATIENT
Start: 2019-03-01 | End: 2019-03-01

## 2019-03-01 RX ORDER — BUDESONIDE AND FORMOTEROL FUMARATE DIHYDRATE 160; 4.5 UG/1; UG/1
2 AEROSOL RESPIRATORY (INHALATION)
Status: DISCONTINUED | OUTPATIENT
Start: 2019-03-01 | End: 2019-03-03 | Stop reason: HOSPADM

## 2019-03-01 RX ORDER — TAMSULOSIN HYDROCHLORIDE 0.4 MG/1
0.4 CAPSULE ORAL DAILY
Status: DISCONTINUED | OUTPATIENT
Start: 2019-03-02 | End: 2019-03-03 | Stop reason: HOSPADM

## 2019-03-01 RX ORDER — SEVELAMER CARBONATE 800 MG/1
800 TABLET, FILM COATED ORAL
Status: DISCONTINUED | OUTPATIENT
Start: 2019-03-01 | End: 2019-03-03 | Stop reason: HOSPADM

## 2019-03-01 RX ORDER — DOXYCYCLINE 100 MG/1
100 TABLET ORAL EVERY 12 HOURS
Status: DISCONTINUED | OUTPATIENT
Start: 2019-03-01 | End: 2019-03-01

## 2019-03-01 RX ORDER — AZITHROMYCIN 250 MG/1
500 TABLET, FILM COATED ORAL ONCE
Status: COMPLETED | OUTPATIENT
Start: 2019-03-01 | End: 2019-03-01

## 2019-03-01 RX ORDER — AZITHROMYCIN 250 MG/1
250 TABLET, FILM COATED ORAL DAILY
Status: DISCONTINUED | OUTPATIENT
Start: 2019-03-02 | End: 2019-03-03 | Stop reason: HOSPADM

## 2019-03-01 RX ORDER — IPRATROPIUM BROMIDE AND ALBUTEROL SULFATE 2.5; .5 MG/3ML; MG/3ML
3 SOLUTION RESPIRATORY (INHALATION)
Status: DISCONTINUED | OUTPATIENT
Start: 2019-03-01 | End: 2019-03-02

## 2019-03-01 RX ORDER — SEVELAMER CARBONATE 800 MG/1
800 TABLET, FILM COATED ORAL
Status: DISCONTINUED | OUTPATIENT
Start: 2019-03-02 | End: 2019-03-01

## 2019-03-01 RX ORDER — GUAIFENESIN/DEXTROMETHORPHAN 100-10MG/5
10 SYRUP ORAL EVERY 6 HOURS PRN
Status: DISCONTINUED | OUTPATIENT
Start: 2019-03-01 | End: 2019-03-03 | Stop reason: HOSPADM

## 2019-03-01 RX ORDER — POLYETHYLENE GLYCOL 3350 17 G/17G
1 POWDER, FOR SOLUTION ORAL
Status: DISCONTINUED | OUTPATIENT
Start: 2019-03-01 | End: 2019-03-03 | Stop reason: HOSPADM

## 2019-03-01 RX ORDER — METHYLPREDNISOLONE SODIUM SUCCINATE 40 MG/ML
40 INJECTION, POWDER, LYOPHILIZED, FOR SOLUTION INTRAMUSCULAR; INTRAVENOUS EVERY 6 HOURS
Status: DISCONTINUED | OUTPATIENT
Start: 2019-03-01 | End: 2019-03-03 | Stop reason: HOSPADM

## 2019-03-01 RX ORDER — PREDNISONE 1 MG/1
9 TABLET ORAL DAILY
Status: ON HOLD | COMMUNITY
End: 2019-03-03

## 2019-03-01 RX ORDER — BISACODYL 10 MG
10 SUPPOSITORY, RECTAL RECTAL
Status: DISCONTINUED | OUTPATIENT
Start: 2019-03-01 | End: 2019-03-03 | Stop reason: HOSPADM

## 2019-03-01 RX ORDER — OSELTAMIVIR PHOSPHATE 30 MG/1
30 CAPSULE ORAL ONCE
Status: COMPLETED | OUTPATIENT
Start: 2019-03-01 | End: 2019-03-01

## 2019-03-01 RX ORDER — OSELTAMIVIR PHOSPHATE 30 MG/1
30 CAPSULE ORAL
Status: DISCONTINUED | OUTPATIENT
Start: 2019-03-01 | End: 2019-03-02

## 2019-03-01 RX ORDER — ASCORBIC ACID, THIAMINE, RIBOFLAVIN, NIACINAMIDE, PYRIDOXINE, FOLIC ACID, COBALAMIN, BIOTIN, PANTOTHENIC ACID 100; 1.5; 1.7; 20; 10; 1; 6; 300; 1 MG/1; MG/1; MG/1; MG/1; MG/1; MG/1; UG/1; UG/1; MG/1
1 TABLET, COATED ORAL EVERY MORNING
COMMUNITY

## 2019-03-01 RX ORDER — FINASTERIDE 5 MG/1
5 TABLET, FILM COATED ORAL DAILY
Status: DISCONTINUED | OUTPATIENT
Start: 2019-03-02 | End: 2019-03-03 | Stop reason: HOSPADM

## 2019-03-01 RX ORDER — ROSUVASTATIN CALCIUM 20 MG/1
10 TABLET, COATED ORAL EVERY EVENING
Status: DISCONTINUED | OUTPATIENT
Start: 2019-03-02 | End: 2019-03-03 | Stop reason: HOSPADM

## 2019-03-01 RX ORDER — ACETAMINOPHEN 325 MG/1
650 TABLET ORAL ONCE
Status: COMPLETED | OUTPATIENT
Start: 2019-03-01 | End: 2019-03-01

## 2019-03-01 RX ORDER — SODIUM CHLORIDE FOR INHALATION 7 %
4 VIAL, NEBULIZER (ML) INHALATION
Status: DISCONTINUED | OUTPATIENT
Start: 2019-03-01 | End: 2019-03-03 | Stop reason: HOSPADM

## 2019-03-01 RX ADMIN — PIPERACILLIN AND TAZOBACTAM 4.5 G: 4; .5 INJECTION, POWDER, LYOPHILIZED, FOR SOLUTION INTRAVENOUS; PARENTERAL at 07:46

## 2019-03-01 RX ADMIN — AZITHROMYCIN 500 MG: 250 TABLET, FILM COATED ORAL at 13:15

## 2019-03-01 RX ADMIN — ACETAMINOPHEN 650 MG: 325 TABLET, FILM COATED ORAL at 07:12

## 2019-03-01 RX ADMIN — APIXABAN 2.5 MG: 2.5 TABLET, FILM COATED ORAL at 17:44

## 2019-03-01 RX ADMIN — METHYLPREDNISOLONE SODIUM SUCCINATE 125 MG: 125 INJECTION, POWDER, FOR SOLUTION INTRAMUSCULAR; INTRAVENOUS at 07:46

## 2019-03-01 RX ADMIN — METHYLPREDNISOLONE SODIUM SUCCINATE 40 MG: 40 INJECTION, POWDER, FOR SOLUTION INTRAMUSCULAR; INTRAVENOUS at 13:16

## 2019-03-01 RX ADMIN — IPRATROPIUM BROMIDE AND ALBUTEROL SULFATE 3 ML: .5; 3 SOLUTION RESPIRATORY (INHALATION) at 15:32

## 2019-03-01 RX ADMIN — SODIUM CHLORIDE 4 ML: 7 NEBU SOLN,3 % NEBU at 15:41

## 2019-03-01 RX ADMIN — BUDESONIDE AND FORMOTEROL FUMARATE DIHYDRATE 2 PUFF: 160; 4.5 AEROSOL RESPIRATORY (INHALATION) at 15:41

## 2019-03-01 RX ADMIN — METHYLPREDNISOLONE SODIUM SUCCINATE 40 MG: 40 INJECTION, POWDER, FOR SOLUTION INTRAMUSCULAR; INTRAVENOUS at 17:44

## 2019-03-01 RX ADMIN — IPRATROPIUM BROMIDE AND ALBUTEROL SULFATE 3 ML: .5; 3 SOLUTION RESPIRATORY (INHALATION) at 18:47

## 2019-03-01 RX ADMIN — METOPROLOL TARTRATE 25 MG: 25 TABLET, FILM COATED ORAL at 17:44

## 2019-03-01 RX ADMIN — ASPIRIN 81 MG: 81 TABLET, COATED ORAL at 13:15

## 2019-03-01 RX ADMIN — IPRATROPIUM BROMIDE AND ALBUTEROL SULFATE 3 ML: .5; 3 SOLUTION RESPIRATORY (INHALATION) at 07:32

## 2019-03-01 RX ADMIN — IPRATROPIUM BROMIDE AND ALBUTEROL SULFATE 3 ML: .5; 3 SOLUTION RESPIRATORY (INHALATION) at 23:03

## 2019-03-01 RX ADMIN — SEVELAMER CARBONATE 800 MG: 800 TABLET, FILM COATED ORAL at 17:44

## 2019-03-01 RX ADMIN — OSELTAMIVIR PHOSPHATE 30 MG: 30 CAPSULE ORAL at 08:50

## 2019-03-01 RX ADMIN — OSELTAMIVIR PHOSPHATE 30 MG: 30 CAPSULE ORAL at 21:49

## 2019-03-01 ASSESSMENT — ENCOUNTER SYMPTOMS
PHOTOPHOBIA: 0
SEIZURES: 0
SPUTUM PRODUCTION: 1
EYE PAIN: 0
NERVOUS/ANXIOUS: 0
VOMITING: 0
SORE THROAT: 0
STRIDOR: 0
CHILLS: 1
BLOOD IN STOOL: 0
SHORTNESS OF BREATH: 1
PALPITATIONS: 0
WHEEZING: 1
FLANK PAIN: 0
HALLUCINATIONS: 0
SPEECH CHANGE: 0
DIAPHORESIS: 1
BACK PAIN: 1
EYE DISCHARGE: 0
SENSORY CHANGE: 1
LOSS OF CONSCIOUSNESS: 0
FEVER: 1
BRUISES/BLEEDS EASILY: 0
HEMOPTYSIS: 0
POLYDIPSIA: 0
ABDOMINAL PAIN: 0
MYALGIAS: 1
FOCAL WEAKNESS: 0
COUGH: 1
DIARRHEA: 0
FALLS: 0

## 2019-03-01 ASSESSMENT — COPD QUESTIONNAIRES
HAVE YOU SMOKED AT LEAST 100 CIGARETTES IN YOUR ENTIRE LIFE: NO/DON'T KNOW
DO YOU EVER COUGH UP ANY MUCUS OR PHLEGM?: YES, EVERY DAY
COPD SCREENING SCORE: 8
DURING THE PAST 4 WEEKS HOW MUCH DID YOU FEEL SHORT OF BREATH: MOST  OR ALL OF THE TIME

## 2019-03-01 ASSESSMENT — COGNITIVE AND FUNCTIONAL STATUS - GENERAL
SUGGESTED CMS G CODE MODIFIER DAILY ACTIVITY: CJ
DRESSING REGULAR LOWER BODY CLOTHING: A LOT
CLIMB 3 TO 5 STEPS WITH RAILING: A LITTLE
STANDING UP FROM CHAIR USING ARMS: A LITTLE
MOVING TO AND FROM BED TO CHAIR: A LITTLE
DAILY ACTIVITIY SCORE: 20
WALKING IN HOSPITAL ROOM: A LITTLE
SUGGESTED CMS G CODE MODIFIER MOBILITY: CJ
HELP NEEDED FOR BATHING: A LITTLE
MOBILITY SCORE: 20
DRESSING REGULAR UPPER BODY CLOTHING: A LITTLE

## 2019-03-01 ASSESSMENT — PATIENT HEALTH QUESTIONNAIRE - PHQ9
2. FEELING DOWN, DEPRESSED, IRRITABLE, OR HOPELESS: NOT AT ALL
1. LITTLE INTEREST OR PLEASURE IN DOING THINGS: NOT AT ALL
SUM OF ALL RESPONSES TO PHQ9 QUESTIONS 1 AND 2: 0

## 2019-03-01 ASSESSMENT — LIFESTYLE VARIABLES
DO YOU DRINK ALCOHOL: NO
EVER_SMOKED: NEVER

## 2019-03-01 NOTE — ASSESSMENT & PLAN NOTE
Uses steroids chronically  I have started intravenous steroids for COPD exacerbation  Follow-up with rheumatology upon discharge

## 2019-03-01 NOTE — ASSESSMENT & PLAN NOTE
On apixaban once daily despite having end-stage renal disease  Metoprolol for rate control   He is followed by Dr. Chavarria with cardiology at [facility]

## 2019-03-01 NOTE — CARE PLAN
Problem: Safety  Goal: Will remain free from injury    Intervention: Educate patient and significant other/support system about adaptive mobility strategies and safe transfers    Safety precautions reviewed with pt, pt verbalized understanding and denies questions.  Pt demonstrated ability to use call light for assistance.      Problem: Knowledge Deficit  Goal: Knowledge of disease process/condition, treatment plan, diagnostic tests, and medications will improve    Intervention: Explain information regarding disease process/condition, treatment plan, diagnostic tests, and medications and document in education  Medications and indications reviewed with pt, pt verbalized understanding and all questions answered.

## 2019-03-01 NOTE — ASSESSMENT & PLAN NOTE
Influenza A  Tamiflu, discussed the dose with pharmacy  Patient to get Tamiflu after dialysis on dialysis days for a duration of 5 days

## 2019-03-01 NOTE — ED TRIAGE NOTES
Gilberto Brown  78 y.o.  male  Chief Complaint   Patient presents with   • Cough     non stop cough   • Fever     Present to triage c/o non stop cough since last night with low grade fever. Patient was DC'd at   5 days ago. Sepsis score of 3. Oxygen saturation 86 % RA. Placed on 3 liters. Dialysis patient.

## 2019-03-01 NOTE — FLOWSHEET NOTE
RES     03/01/19 1343   Type of Assessment   Assessment Yes   Patient History   Pulmonary Diagnosis COPD exacerbation   Home O2 Yes   Oxygen liter flow 3  (PRN)   Oxygen at night only No   Nocturnal CPAP No   Home Treatments/Frequency Yes  (Vest therapy BID)   SVN 1/Frequency Duoneb BID   SVN 2/Frequency 7% saline BID   MDI 1/Frequency Pro air PRN   DPI 1/Frequency Breo Qday   Oxygen   Home O2 Use Prior To Admission? Yes   Home O2 LPM Flow 3 LPM   Home O2 Delivery Method Nasal Cannula   Home O2 Frequency of Use As Needed for SOB   O2 (LPM) 3   O2 Daily Delivery Respiratory  Silicone Nasal Cannula   Protocol [facility]   Protocol [facility] Yes   Bronchodilator Protocol   Med Order With RCP Yes - Initial Order by MD for Therapy - Follow Order for 24 Hours, Reassess Patient   Is this an exacerbation of COPD/Asthma? Yes - Bronchodilators Q4 hours for 24 hours   Bronchodilator Indications History / Diagnosis   O2 Protocol   O2 Protocol Indications Room Air SpO2 Less Than 90%   PRN oxygen initiated for: PRN Oxygen at Home

## 2019-03-01 NOTE — RESPIRATORY CARE
COPD EDUCATION by COPD CLINICAL EDUCATOR  3/1/2019  at  1:05 PM by Therese Bowen     Patient interviewed by COPD education team.  Patient unable to participate in full program.  Short intervention has been conducted.

## 2019-03-01 NOTE — DISCHARGE PLANNING
Outpatient Dialysis Note    Confirmed patient is active at:    [facility]  [address] [place] [address]      Schedule: Monday, Wednesday, Friday  Time: 10:45 am    If patient does not need to be admitted, he can go to outpatient HD for an afternoon chair.    Spoke with Nadja at facility who confirmed.      Dialysis Coordinator, [facility]  Devika Bridges [phone]

## 2019-03-01 NOTE — ASSESSMENT & PLAN NOTE
Oxygen as needed, Respiratory protocol, Bronchodilators, Incentive spirometry  Intravenous steroids   Azithromycin

## 2019-03-01 NOTE — DISCHARGE PLANNING
Pt lives with spouse (Crys) who assists him in what you cannot do for himself. He has both a cane and a FWW he uses on dialysis days (M, W, F with [facility]). He has home oxygen that he believes is through [facility] (old records support this). Do not anticipate any needs at this time.    Care Transition Team Assessment    Information Source  Orientation : Oriented x 4  Information Given By: Spouse  Informant's Name: Crys  Who is responsible for making decisions for patient? : Patient    Readmission Evaluation  Is this a readmission?: Yes - unplanned readmission  Why do you think you were readmitted?: feels worse     Interdisciplinary Discharge Planning  Does Admitting Nurse Feel This Could be a Complex Discharge?: No  Primary Care Physician: Christine Zamudio  Lives with - Patient's Self Care Capacity: Spouse  Support Systems: Spouse / Significant Other  Do You Take your Prescribed Medications Regularly: Yes  Able to Return to Previous ADL's: Future Time w/Therapy  Mobility Issues: Yes  Prior Services: None  Patient Expects to be Discharged to:: home  Assistance Needed: Unknown at this Time  Durable Medical Equipment: Home Oxygen  DME Provider / Phone: ? [facility]    Discharge Preparedness  What is your plan after discharge?: Home with help  What are your discharge supports?: Spouse  Prior Functional Level: Needs Assist with Activities of Daily Living, Uses Walker, Uses Cane  Difficulity with ADLs: Walking  Difficulty with ADLs Comment: cane  Difficulity with IADLs: Driving  Difficulity with IADL Comments: wife assists    Functional Assesment  Prior Functional Level: Needs Assist with Activities of Daily Living, Uses Walker, Uses Cane    Finances  Financial Barriers to Discharge: No  Prescription Coverage: Yes    Domestic Abuse  Have you ever been the victim of abuse or violence?: No    Anticipated Discharge Information  Anticipated discharge disposition: Home  Discharge Address: facesheet verified

## 2019-03-01 NOTE — H&P
Hospital Medicine History & Physical Note    Date of Service  3/1/2019    Primary Care Physician  Christine Zamudio M.D.    Consultants  N/A    Code Status  FULL    Chief Complaint  Shortness of breath, fevers, chills    History of Presenting Illness  78 y.o. Male with past medical history of Wegener's granulomatosis on chronic steroid therapy follows with rheumatology, end-stage renal disease on hemodialysis MWF, chronic obstructive pulmonary disease on 3 L oxygen at night and during dialysis only, atrial fibrillation on apixaban despite having end-stage renal disease, dyslipidemia, benign prostatic hyperplasia and gastroesophageal reflux disease presented 3/1/2019 with progressive shortness of breath and cough over the past 3 days.  Patient reports fevers and chills in addition to easy fatigability over the past 3 days.  He also reports having back pain and muscle pain in addition to diaphoresis.  He reports increase in production of his sputum 1-2 tablespoons a day of clear-white sputum.  Denies having blood in sputum denies having chest pain or palpitations.      Review of Systems  Review of Systems   Constitutional: Positive for chills, diaphoresis, fever and malaise/fatigue.   HENT: Negative for congestion, ear discharge, ear pain and sore throat.    Eyes: Negative for photophobia, pain and discharge.   Respiratory: Positive for cough, sputum production, shortness of breath and wheezing. Negative for hemoptysis and stridor.    Cardiovascular: Negative for chest pain, palpitations and leg swelling.   Gastrointestinal: Negative for abdominal pain, blood in stool, diarrhea and vomiting.   Genitourinary: Negative for flank pain, hematuria and urgency.   Musculoskeletal: Positive for back pain and myalgias. Negative for falls.   Skin: Negative.    Neurological: Positive for sensory change (right hand, chronic ). Negative for speech change, focal weakness, seizures and loss of consciousness.    Endo/Heme/Allergies: Negative for polydipsia. Does not bruise/bleed easily.   Psychiatric/Behavioral: Negative for hallucinations and suicidal ideas. The patient is not nervous/anxious.        Past Medical History  Atrial fibrillation  Chronic obstructive pulmonary disease  End-stage renal disease on hemodialysis  Dyslipidemia  Gastroesophageal reflux disease  History of Wegener's granulomatosis    Surgical History   has a past surgical history that includes pacemaker insertion (4/2009); hernia repair (1990); gastroscopy with balloon dilatation (9/28/2011); cath placement (10/8/2011); gastroscopy with biopsy (1/17/2012); hip replacement, total; av fistula creation (3/8/2012); recovery (4/19/2012); av fistula revision (6/9/2012); rotator cuff repair (2003); recovery (7/17/2013); bronchoscopy-endo (7/18/2013); recovery (12/17/2013); recovery (8/7/2014); recovery (10/3/2014); recovery (2/26/2015); recovery (9/3/2015); av fistulogram (Right, 4/12/2016); and tonsillectomy.     Family History  family history includes Cancer in his unknown relative; Heart Disease in his father; Stroke in his father and mother.     Social History   reports that he quit smoking about 29 years ago. His smoking use included Pipe. He quit after 30.00 years of use. He has quit using smokeless tobacco. His smokeless tobacco use included Chew. He reports that he drinks about 4.8 oz of alcohol per week . He reports that he does not use drugs.    Allergies  Allergies   Allergen Reactions   • Doxycycline Unspecified     Reports terrible diarrhea   • Erythromycin Unspecified     Abdominal pain.  RXN=before 2011   • Rituximab      Flu like syndrome       Medications  Prior to Admission Medications   Prescriptions Last Dose Informant Patient Reported? Taking?   B Complex-C-Folic Acid (DIALYVITE TABLET) Tab 2/28/2019 at AM Patient Yes Yes   Sig: Take 1 Tab by mouth every day.   Cholecalciferol (VITAMIN D3) 5000 units Cap 2/28/2019 at AM Patient Yes Yes    Sig: Take 1 Cap by mouth every day.   Fluticasone Furoate-Vilanterol (BREO ELLIPTA) 200-25 MCG/INH AEROSOL POWDER, BREATH ACTIVATED 2/28/2019 at AM Patient No No   Sig: Inhale 1 Puff by mouth every day. Rinse mouth after use.   albuterol (PROAIR HFA) 108 (90 Base) MCG/ACT Aero Soln inhalation aerosol PRN at PRN Patient Yes No   Sig: Inhale 2 Puffs by mouth every 6 hours as needed for Shortness of Breath.   apixaban (ELIQUIS) 2.5mg Tab 2/27/2019 at PM Patient Yes No   Sig: Take 2.5 mg by mouth every day.   aspirin EC (ECOTRIN) 81 MG TBEC 2/28/2019 at AM Patient Yes No   Sig: Take 81 mg by mouth every day.   finasteride (PROSCAR) 5 MG Tab 2/28/2019 at AM Patient Yes No   Sig: Take 5 mg by mouth every day.   ipratropium-albuterol (DUONEB) 0.5-2.5 (3) MG/3ML nebulizer solution PRN at PRN Patient Yes Yes   Sig: 3 mL by Nebulization route every 6 hours as needed for Shortness of Breath.   metoprolol (LOPRESSOR) 25 MG Tab 2/28/2019 at AM Patient Yes No   Sig: Take 25 mg by mouth 2 times a day.   predniSONE (DELTASONE) 1 MG Tab 2/28/2019 at AM Patient Yes Yes   Sig: Take 9 mg by mouth every day.   raNITidine (ZANTAC) 150 MG Tab PRN at PRN Patient Yes No   Sig: Take 150 mg by mouth 2 times a day as needed.   rosuvastatin (CRESTOR) 20 MG Tab 2/27/2019 at PM Patient Yes No   Sig: Take 20 mg by mouth every evening.   sevelamer carbonate (RENVELA) 800 MG Tab tablet 2/28/2019 at AM Patient No No   Sig: TAKE 1 TABLET BY MOUTH THREE TIMES DAILY WITH MEALS   sodium chloride (HYPER-SAL) 7 % Nebu Soln 2/28/2019 at AM Patient No No   Sig: INHALE 1 VIAL VIA NEBULIZER TWICE DAILY   tamsulosin (FLOMAX) 0.4 MG capsule 2/28/2019 at AM Patient No No   Sig: Take 1 Cap by mouth every day.   tobramycin 60 mg/mL in sterile water 2/28/2019 at AM Patient No No   Sig: Inhale 5 mL by mouth every day for 26 days.      Facility-Administered Medications: None       Physical Exam  Temp:  [36.7 °C (98 °F)-37.9 °C (100.2 °F)] 36.7 °C (98 °F)  Pulse:   [] 94  Resp:  [17-24] 17  BP: (122-154)/(46-72) 122/46  SpO2:  [97 %-100 %] 97 %    Physical Exam   Constitutional: He is oriented to person, place, and time. He appears well-developed and well-nourished.   HENT:   Head: Normocephalic and atraumatic.   Right Ear: External ear normal.   Left Ear: External ear normal.   Eyes: Pupils are equal, round, and reactive to light. Right eye exhibits no discharge. Left eye exhibits no discharge. No scleral icterus.   Neck: Normal range of motion. Neck supple. No JVD present. No tracheal deviation present. No thyromegaly present.   Cardiovascular: Exam reveals no gallop and no friction rub.    Pulmonary/Chest: No stridor. No respiratory distress. He has wheezes. He has rales.   Reduced air entry B/L   Abdominal: Soft. He exhibits no distension. There is no tenderness. There is no rebound.   Musculoskeletal: He exhibits no edema, tenderness or deformity.   Right arm fistula    Neurological: He is alert and oriented to person, place, and time. No cranial nerve deficit. Coordination normal.   Skin: Skin is warm and dry.   Psychiatric: He has a normal mood and affect. Judgment normal.   Nursing note and vitals reviewed.      Laboratory:  Recent Labs      03/01/19   0656   WBC  7.0   RBC  3.27*   HEMOGLOBIN  10.2*   HEMATOCRIT  33.5*   MCV  102.4*   MCH  31.2   MCHC  30.4*   RDW  62.0*   PLATELETCT  210   MPV  8.5*     Recent Labs      03/01/19   0656   SODIUM  140   POTASSIUM  3.9   CHLORIDE  102   CO2  28   GLUCOSE  108*   BUN  36*   CREATININE  4.11*   CALCIUM  9.0     Recent Labs      03/01/19   0656   ALTSGPT  17   ASTSGOT  16   ALKPHOSPHAT  45   TBILIRUBIN  0.5   GLUCOSE  108*                 No results for input(s): TROPONINI in the last 72 hours.    Urinalysis:    No results found     Imaging:  DX-CHEST-PORTABLE (1 VIEW)   Final Result         1. No significant interval change.      2. Trace pleural effusions with bibasilar opacities.            Assessment/Plan:  I  anticipate this patient will require at least two midnights for appropriate medical management, necessitating inpatient admission.    * COPD exacerbation (HCC)- (present on admission)   Assessment & Plan    Oxygen as needed, Respiratory protocol, Bronchodilators, Incentive spirometry  Intravenous steroids   Azithromycin      Influenza A- (present on admission)   Assessment & Plan    Influenza A  Tamiflu, discussed the dose with pharmacy  Patient to get Tamiflu after dialysis on dialysis days for a duration of 5 days, not 5 doses       Wegener's granulomatosis (HCC)- (present on admission)   Assessment & Plan    Uses steroids chronically  I have started intravenous steroids for COPD exacerbation  Follow-up with rheumatology upon discharge     Atrial fibrillation (HCC)- (present on admission)   Assessment & Plan    On apixaban once daily despite having end-stage renal disease  The patient does not want to be on Coumadin which is the gold standard in this case he understands and accepts the risks  The patient is taking his apixaban once daily because of history of bleeding while he was on twice daily  Metoprolol for rate control      ESRD on dialysis ([facility])- (present on admission)   Assessment & Plan    Monday Wednesday Friday   I consulted nephrology to resume dialysis     Chronic anticoagulation- (present on admission)   Assessment & Plan    Apixaban, as above and atrial fibrillation     COPD (chronic obstructive pulmonary disease) (HCC)- (present on admission)   Assessment & Plan    On 3 L at baseline that he uses at night, and during dialysis only     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

## 2019-03-01 NOTE — PROGRESS NOTES
Report received in ER from Selam STANLEY.  Pt transported to unit.  Pt ambulated to bed with cane and standby assist.  POC reviewed with pt, pt verbalized understanding and denies questions.  Bed in low position call light in reach, bed alarm on, will continue to monitor.

## 2019-03-01 NOTE — ED NOTES
Med rec completed per pt  Allergies reviewed    Pt started a 26 day course of Tobramycin on 2/24/19    Pt takes Eliquis 2.5 mg once a day at night, and Aspirin 81 mg once a day in the morning     Pt takes Prednisone daily as a maintenance medication

## 2019-03-01 NOTE — ED NOTES
Rounded on patient, report received.  No s.s of major distress noted at this time, vitals stable, updated patient on status, wife at bedside, they verbalized understanding.  Patient medicated as ordered, respiratory called for treatment.  Will monitor.

## 2019-03-01 NOTE — ASSESSMENT & PLAN NOTE
On 3 L at baseline that he uses at night, and during dialysis only  Wheezing on exam, continue steroids azithromycin

## 2019-03-01 NOTE — ED PROVIDER NOTES
ED Provider Note    CHIEF COMPLAINT  Chief Complaint   Patient presents with   • Cough     non stop cough   • Fever       HPI  Gilberto Brown is a 78 y.o. male who presents with cough and shortness of breath.  Patient has been sick for the last week or so.  Recently hospitalized at [facility] with pneumonia.  Has a known history of Pseudomonas colonization.  There is also questionable fistula infection in his right arm.  He states that he started getting worse yesterday.  Has had fevers chills.  Cough productive of a large volume of brownish mucus.  Feels short of breath.  No leg swelling orthopnea or PND.    REVIEW OF SYSTEMS  As per HPI, otherwise a 10 point review of systems is negative    PAST MEDICAL HISTORY  Past Medical History:   Diagnosis Date   • A-fib ([facility])        • Anemia    • Arthritis     arms, legs, shoulders   • ASTHMA    • BPH (benign prostatic hyperplasia)    • Breath shortness    • Bronchitis     chronic   • Cataract     removed bilat   • COPD    • Dialysis     [facility],    • Dyslipidemia    • EMPHYSEMA    • GERD (gastroesophageal reflux disease)    • Hypertension    • Oxygen decrease     O2 3liters @  and dialysis   • Pacemaker 1988   • Pain 05/09/2018    shoulders/hips, 5/10   • Pneumonia 2017   • Pseudomonas pneumonia ([facility])    • Pulmonary histoplasmosis ([facility])    • Renal disorder     CKD,    • Sick sinus syndrome ([facility])    • Sleep apnea     uses cpap at noc   • Snoring    • Stroke ([facility]) 02/2018   • Unspecified hemorrhagic conditions     bruises easily, fragile skin   • Wegener's disease, pulmonary ([facility])        SOCIAL HISTORY  Social History   Substance Use Topics   • Smoking status: Former Smoker     Years: 30.00     Types: Pipe     Quit date: 1/1/1990   • Smokeless tobacco: Former User     Types: Chew   • Alcohol use 4.8 oz/week     7 Glasses of wine, 1 Standard drinks or equivalent per week      Comment: 1/2 glass Red wine nightly       SURGICAL HISTORY  Past  Surgical History:   Procedure Laterality Date   • AV FISTULOGRAM Right 4/12/2016    Procedure: AV FISTULOGRAM , VENOPLASTY X 2;  Surgeon: Nessa Johansen M.D.;  Location: [facility];  Service:    • RECOVERY  9/3/2015    Procedure: IR1 VASCULAR CASE-ELENO RIGHT DIALYSIS FISTULOGRAM, POSSIBLE INTERVENTION;  Surgeon: Recoveryonly Surgery;  Location: SURGERY PRE-POST PROC UNIT [facility];  Service:    • RECOVERY  2/26/2015    Performed by Ir-Recovery Surgery at SURGERY SAME DAY [facility] ORS   • RECOVERY  10/3/2014    Performed by Ir-Recovery Surgery at SURGERY SAME DAY [facility] ORS   • RECOVERY  8/7/2014    Performed by Ir-Recovery Surgery at [facility]   • RECOVERY  12/17/2013    Performed by Ir-Recovery Surgery at SURGERY SAME DAY [facility]   • BRONCHOSCOPY-ENDO  7/18/2013    Performed by Juan F Rubio M.D. at [facility]   • RECOVERY  7/17/2013    Performed by Ir-Recovery Surgery at SURGERY SAME DAY [facility] ORS   • AV FISTULA REVISION  6/9/2012    Performed by NESSA JOHANSEN at SURGERY [facility]   • RECOVERY  4/19/2012    Performed by SURGERY, IR-RECOVERY at [facility] SAME DAY [facility] ORS   • AV FISTULA CREATION  3/8/2012    Performed by NESSA JOHANSEN at [facility]   • GASTROSCOPY WITH BIOPSY  1/17/2012    Performed by ZURDO HARRISON at [facility]   • CATH PLACEMENT  10/8/2011    Performed by NESSA JOHANSEN at [facility]   • GASTROSCOPY WITH BALLOON DILATATION  9/28/2011    Performed by KT FERNANDEZ at [facility]   • PACEMAKER INSERTION  4/2009   • ROTATOR CUFF REPAIR  2003    right   • HERNIA REPAIR  1990    right inguinal hernia   • HIP REPLACEMENT, TOTAL      right   • TONSILLECTOMY         CURRENT MEDICATIONS  Home Medications    **Home medications have not yet been reviewed for this encounter**         ALLERGIES  Allergies   Allergen Reactions   • Erythromycin Unspecified     Abdominal  pain.  RXN=before 2011   • Rituximab      Flu like syndrome       PHYSICAL EXAM  VITAL SIGNS: /72   Pulse (!) 107   Temp 37.9 °C (100.2 °F) (Temporal)   Resp 19   Wt 66.8 kg (147 lb 4.3 oz)   SpO2 100%   BMI 22.39 kg/m²    Constitutional: Awake and alert.  Ill-appearing.  Breathless.  Reiger's noted.  HENT:  Atraumatic, Normocephalic.Oropharynx dry mucus membranes, Nose normal inspection.   Eyes: Normal inspection  Neck: Supple  Cardiovascular: Tachycardic heart rate, Normal rhythm.  Symmetric peripheral pulses.   Thorax & Lungs: Diffuse wheezing and scattered crackles.  No focal pulmonary findings  Abdomen: Bowel sounds normal, soft, non-distended, nontender, no mass  Skin: Warm, Dry, No rash.   Back: No tenderness, No CVA tenderness.   Extremities: Good thrill fistula right arm.  Biopsy site left arm.  Neurologic: Grossly normal       RADIOLOGY/PROCEDURES  DX-CHEST-PORTABLE (1 VIEW)   Final Result         1. No significant interval change.      2. Trace pleural effusions with bibasilar opacities.           Imaging is interpreted by radiologist    Labs:  Results for orders placed or performed during the hospital encounter of 03/01/19   Lactic acid (lactate)   Result Value Ref Range    Lactic Acid 1.3 0.5 - 2.0 mmol/L   CBC WITH DIFFERENTIAL   Result Value Ref Range    WBC 7.0 4.8 - 10.8 K/uL    RBC 3.27 (L) 4.70 - 6.10 M/uL    Hemoglobin 10.2 (L) 14.0 - 18.0 g/dL    Hematocrit 33.5 (L) 42.0 - 52.0 %    .4 (H) 81.4 - 97.8 fL    MCH 31.2 27.0 - 33.0 pg    MCHC 30.4 (L) 33.7 - 35.3 g/dL    RDW 62.0 (H) 35.9 - 50.0 fL    Platelet Count 210 164 - 446 K/uL    MPV 8.5 (L) 9.0 - 12.9 fL    Neutrophils-Polys 84.20 (H) 44.00 - 72.00 %    Lymphocytes 6.00 (L) 22.00 - 41.00 %    Monocytes 8.50 0.00 - 13.40 %    Eosinophils 0.30 0.00 - 6.90 %    Basophils 0.60 0.00 - 1.80 %    Immature Granulocytes 0.40 0.00 - 0.90 %    Nucleated RBC 0.00 /100 WBC    Neutrophils (Absolute) 5.86 1.82 - 7.42 K/uL    Lymphs  (Absolute) 0.42 (L) 1.00 - 4.80 K/uL    Monos (Absolute) 0.59 0.00 - 0.85 K/uL    Eos (Absolute) 0.02 0.00 - 0.51 K/uL    Baso (Absolute) 0.04 0.00 - 0.12 K/uL    Immature Granulocytes (abs) 0.03 0.00 - 0.11 K/uL    NRBC (Absolute) 0.00 K/uL   COMP METABOLIC PANEL   Result Value Ref Range    Sodium 140 135 - 145 mmol/L    Potassium 3.9 3.6 - 5.5 mmol/L    Chloride 102 96 - 112 mmol/L    Co2 28 20 - 33 mmol/L    Anion Gap 10.0 0.0 - 11.9    Glucose 108 (H) 65 - 99 mg/dL    Bun 36 (H) 8 - 22 mg/dL    Creatinine 4.11 (H) 0.50 - 1.40 mg/dL    Calcium 9.0 8.5 - 10.5 mg/dL    AST(SGOT) 16 12 - 45 U/L    ALT(SGPT) 17 2 - 50 U/L    Alkaline Phosphatase 45 30 - 99 U/L    Total Bilirubin 0.5 0.1 - 1.5 mg/dL    Albumin 3.7 3.2 - 4.9 g/dL    Total Protein 6.3 6.0 - 8.2 g/dL    Globulin 2.6 1.9 - 3.5 g/dL    A-G Ratio 1.4 g/dL   Influenza A/B By PCR (Adult - Flu Only)   Result Value Ref Range    Influenza virus A RNA POSITIVE (A) Negative    Influenza virus B, PCR Negative Negative   ESTIMATED GFR   Result Value Ref Range    GFR If  17 (A) >60 mL/min/1.73 m 2    GFR If Non  14 (A) >60 mL/min/1.73 m 2       Medications   methylPREDNISolone sod succ (SOLU-MEDROL) 125 MG injection 125 mg (not administered)   ipratropium-albuterol (DUONEB) nebulizer solution (not administered)   piperacillin-tazobactam (ZOSYN) 4.5 g in  mL IVPB (not administered)   acetaminophen (TYLENOL) tablet 650 mg (650 mg Oral Given 3/1/19 0712)         COURSE & MEDICAL DECISION MAKING  Patient presents to the ER with shortness of breath, fevers chills.  He appears unwell and breathless on examination.  He is identified to have crackles and wheezing on his examination.  Recent history of pneumonia and Pseudomonas colonization as dictated.  He was given albuterol and Atrovent nebulizer treatment.  Given Solu-Medrol and treatment for COPD and bronchitis.  He was placed on supplemental oxygen.  Given Tylenol for his fever.   After review of the chart he was given Pseudomonas as treatment for possible pneumonia.  He was identified to have influenza and given Tamiflu.  He will need to be admitted to the hospital for further treatment.  I consulted the hospitalist for admission.      FINAL IMPRESSION  1.  Influenza A  2.  COPD with exacerbation  3.  Hypoxia    CRITICAL CARE TIME 30 minutes  There was a very real possibility of deterioration of the patient's condition.  This patient required the highest level of care.  I provided critical care services which included: review of the medical record, treatment orders, ordering and reviewing test results, frequent reevaluation of the patient's condition and response to treatment, as well as discussing the case with appropriate personnel and various consultants. The critical care time associated with the care of this patient is exclusive of any procedures or specific interventions.        This dictation was created using voice recognition software. The accuracy of the dictation is limited to the abilities of the software.  The nursing notes were reviewed and certain aspects of this information were incorporated into this note.      Electronically signed by: Giuseppe Gama, 3/1/2019 7:27 AM

## 2019-03-02 LAB
ANION GAP SERPL CALC-SCNC: 15 MMOL/L (ref 0–11.9)
BASOPHILS # BLD AUTO: 0.4 % (ref 0–1.8)
BASOPHILS # BLD: 0.03 K/UL (ref 0–0.12)
BUN SERPL-MCNC: 58 MG/DL (ref 8–22)
CALCIUM SERPL-MCNC: 8.9 MG/DL (ref 8.5–10.5)
CHLORIDE SERPL-SCNC: 100 MMOL/L (ref 96–112)
CO2 SERPL-SCNC: 22 MMOL/L (ref 20–33)
CREAT SERPL-MCNC: 4.46 MG/DL (ref 0.5–1.4)
EOSINOPHIL # BLD AUTO: 0 K/UL (ref 0–0.51)
EOSINOPHIL NFR BLD: 0 % (ref 0–6.9)
ERYTHROCYTE [DISTWIDTH] IN BLOOD BY AUTOMATED COUNT: 60.1 FL (ref 35.9–50)
GLUCOSE SERPL-MCNC: 194 MG/DL (ref 65–99)
HAV IGM SERPL QL IA: NEGATIVE
HBV CORE IGM SER QL: NEGATIVE
HBV SURFACE AB SERPL IA-ACNC: 3.33 MIU/ML (ref 0–10)
HBV SURFACE AG SER QL: NEGATIVE
HCT VFR BLD AUTO: 30.9 % (ref 42–52)
HCV AB SER QL: NEGATIVE
HGB BLD-MCNC: 9.3 G/DL (ref 14–18)
IMM GRANULOCYTES # BLD AUTO: 0.05 K/UL (ref 0–0.11)
IMM GRANULOCYTES NFR BLD AUTO: 0.7 % (ref 0–0.9)
LYMPHOCYTES # BLD AUTO: 0.14 K/UL (ref 1–4.8)
LYMPHOCYTES NFR BLD: 1.9 % (ref 22–41)
MAGNESIUM SERPL-MCNC: 2.5 MG/DL (ref 1.5–2.5)
MCH RBC QN AUTO: 30.4 PG (ref 27–33)
MCHC RBC AUTO-ENTMCNC: 30.1 G/DL (ref 33.7–35.3)
MCV RBC AUTO: 101 FL (ref 81.4–97.8)
MONOCYTES # BLD AUTO: 0.11 K/UL (ref 0–0.85)
MONOCYTES NFR BLD AUTO: 1.5 % (ref 0–13.4)
NEUTROPHILS # BLD AUTO: 7.18 K/UL (ref 1.82–7.42)
NEUTROPHILS NFR BLD: 95.5 % (ref 44–72)
NRBC # BLD AUTO: 0 K/UL
NRBC BLD-RTO: 0 /100 WBC
PLATELET # BLD AUTO: 203 K/UL (ref 164–446)
PMV BLD AUTO: 8.8 FL (ref 9–12.9)
POTASSIUM SERPL-SCNC: 3.9 MMOL/L (ref 3.6–5.5)
RBC # BLD AUTO: 3.06 M/UL (ref 4.7–6.1)
SODIUM SERPL-SCNC: 137 MMOL/L (ref 135–145)
WBC # BLD AUTO: 7.5 K/UL (ref 4.8–10.8)

## 2019-03-02 PROCEDURE — A9270 NON-COVERED ITEM OR SERVICE: HCPCS | Performed by: INTERNAL MEDICINE

## 2019-03-02 PROCEDURE — 90935 HEMODIALYSIS ONE EVALUATION: CPT

## 2019-03-02 PROCEDURE — 700101 HCHG RX REV CODE 250: Performed by: HOSPITALIST

## 2019-03-02 PROCEDURE — A9270 NON-COVERED ITEM OR SERVICE: HCPCS | Performed by: HOSPITALIST

## 2019-03-02 PROCEDURE — 94668 MNPJ CHEST WALL SBSQ: CPT

## 2019-03-02 PROCEDURE — 700101 HCHG RX REV CODE 250: Performed by: INTERNAL MEDICINE

## 2019-03-02 PROCEDURE — 700111 HCHG RX REV CODE 636 W/ 250 OVERRIDE (IP): Performed by: INTERNAL MEDICINE

## 2019-03-02 PROCEDURE — 90935 HEMODIALYSIS ONE EVALUATION: CPT | Performed by: INTERNAL MEDICINE

## 2019-03-02 PROCEDURE — 36415 COLL VENOUS BLD VENIPUNCTURE: CPT

## 2019-03-02 PROCEDURE — 99233 SBSQ HOSP IP/OBS HIGH 50: CPT | Performed by: INTERNAL MEDICINE

## 2019-03-02 PROCEDURE — 94760 N-INVAS EAR/PLS OXIMETRY 1: CPT

## 2019-03-02 PROCEDURE — 85025 COMPLETE CBC W/AUTO DIFF WBC: CPT

## 2019-03-02 PROCEDURE — 80074 ACUTE HEPATITIS PANEL: CPT

## 2019-03-02 PROCEDURE — 86706 HEP B SURFACE ANTIBODY: CPT

## 2019-03-02 PROCEDURE — 700102 HCHG RX REV CODE 250 W/ 637 OVERRIDE(OP): Performed by: INTERNAL MEDICINE

## 2019-03-02 PROCEDURE — 700102 HCHG RX REV CODE 250 W/ 637 OVERRIDE(OP): Performed by: HOSPITALIST

## 2019-03-02 PROCEDURE — 700111 HCHG RX REV CODE 636 W/ 250 OVERRIDE (IP): Performed by: HOSPITALIST

## 2019-03-02 PROCEDURE — 94640 AIRWAY INHALATION TREATMENT: CPT

## 2019-03-02 PROCEDURE — 94669 MECHANICAL CHEST WALL OSCILL: CPT

## 2019-03-02 PROCEDURE — 83735 ASSAY OF MAGNESIUM: CPT

## 2019-03-02 PROCEDURE — 770020 HCHG ROOM/CARE - TELE (206)

## 2019-03-02 PROCEDURE — 80048 BASIC METABOLIC PNL TOTAL CA: CPT

## 2019-03-02 PROCEDURE — 5A1D70Z PERFORMANCE OF URINARY FILTRATION, INTERMITTENT, LESS THAN 6 HOURS PER DAY: ICD-10-PCS | Performed by: INTERNAL MEDICINE

## 2019-03-02 RX ORDER — IPRATROPIUM BROMIDE AND ALBUTEROL SULFATE 2.5; .5 MG/3ML; MG/3ML
3 SOLUTION RESPIRATORY (INHALATION)
Status: DISCONTINUED | OUTPATIENT
Start: 2019-03-03 | End: 2019-03-03 | Stop reason: HOSPADM

## 2019-03-02 RX ORDER — HEPARIN SODIUM 1000 [USP'U]/ML
3000 INJECTION, SOLUTION INTRAVENOUS; SUBCUTANEOUS
Status: DISCONTINUED | OUTPATIENT
Start: 2019-03-02 | End: 2019-03-03 | Stop reason: HOSPADM

## 2019-03-02 RX ORDER — OSELTAMIVIR PHOSPHATE 30 MG/1
30 CAPSULE ORAL
Status: DISCONTINUED | OUTPATIENT
Start: 2019-03-02 | End: 2019-03-03 | Stop reason: HOSPADM

## 2019-03-02 RX ADMIN — APIXABAN 2.5 MG: 2.5 TABLET, FILM COATED ORAL at 17:48

## 2019-03-02 RX ADMIN — TOBRAMYCIN SULFATE 0.3 G: 1200 INJECTION, POWDER, FOR SOLUTION INTRAVENOUS at 08:29

## 2019-03-02 RX ADMIN — BUDESONIDE AND FORMOTEROL FUMARATE DIHYDRATE 2 PUFF: 160; 4.5 AEROSOL RESPIRATORY (INHALATION) at 18:15

## 2019-03-02 RX ADMIN — ROSUVASTATIN CALCIUM 10 MG: 20 TABLET, FILM COATED ORAL at 17:48

## 2019-03-02 RX ADMIN — METHYLPREDNISOLONE SODIUM SUCCINATE 40 MG: 40 INJECTION, POWDER, FOR SOLUTION INTRAMUSCULAR; INTRAVENOUS at 17:48

## 2019-03-02 RX ADMIN — SODIUM CHLORIDE 4 ML: 7 NEBU SOLN,3 % NEBU at 08:28

## 2019-03-02 RX ADMIN — METHYLPREDNISOLONE SODIUM SUCCINATE 40 MG: 40 INJECTION, POWDER, FOR SOLUTION INTRAMUSCULAR; INTRAVENOUS at 13:34

## 2019-03-02 RX ADMIN — OSELTAMIVIR PHOSPHATE 30 MG: 30 CAPSULE ORAL at 21:55

## 2019-03-02 RX ADMIN — HEPARIN SODIUM 3000 UNITS: 1000 INJECTION, SOLUTION INTRAVENOUS; SUBCUTANEOUS at 12:00

## 2019-03-02 RX ADMIN — SEVELAMER CARBONATE 800 MG: 800 TABLET, FILM COATED ORAL at 08:00

## 2019-03-02 RX ADMIN — SENNOSIDES AND DOCUSATE SODIUM 2 TABLET: 8.6; 5 TABLET ORAL at 05:49

## 2019-03-02 RX ADMIN — GUAIFENESIN AND DEXTROMETHORPHAN 10 ML: 100; 10 SYRUP ORAL at 19:08

## 2019-03-02 RX ADMIN — SEVELAMER CARBONATE 800 MG: 800 TABLET, FILM COATED ORAL at 13:33

## 2019-03-02 RX ADMIN — SODIUM CHLORIDE 4 ML: 7 NEBU SOLN,3 % NEBU at 18:15

## 2019-03-02 RX ADMIN — FINASTERIDE 5 MG: 5 TABLET, FILM COATED ORAL at 05:49

## 2019-03-02 RX ADMIN — METHYLPREDNISOLONE SODIUM SUCCINATE 40 MG: 40 INJECTION, POWDER, FOR SOLUTION INTRAMUSCULAR; INTRAVENOUS at 23:51

## 2019-03-02 RX ADMIN — METHYLPREDNISOLONE SODIUM SUCCINATE 40 MG: 40 INJECTION, POWDER, FOR SOLUTION INTRAMUSCULAR; INTRAVENOUS at 05:50

## 2019-03-02 RX ADMIN — METHYLPREDNISOLONE SODIUM SUCCINATE 40 MG: 40 INJECTION, POWDER, FOR SOLUTION INTRAMUSCULAR; INTRAVENOUS at 00:11

## 2019-03-02 RX ADMIN — SEVELAMER CARBONATE 800 MG: 800 TABLET, FILM COATED ORAL at 17:48

## 2019-03-02 RX ADMIN — IPRATROPIUM BROMIDE AND ALBUTEROL SULFATE 3 ML: .5; 3 SOLUTION RESPIRATORY (INHALATION) at 18:15

## 2019-03-02 RX ADMIN — IPRATROPIUM BROMIDE AND ALBUTEROL SULFATE 3 ML: .5; 3 SOLUTION RESPIRATORY (INHALATION) at 08:28

## 2019-03-02 RX ADMIN — IPRATROPIUM BROMIDE AND ALBUTEROL SULFATE 3 ML: .5; 3 SOLUTION RESPIRATORY (INHALATION) at 16:09

## 2019-03-02 RX ADMIN — TAMSULOSIN HYDROCHLORIDE 0.4 MG: 0.4 CAPSULE ORAL at 05:48

## 2019-03-02 RX ADMIN — ASPIRIN 81 MG: 81 TABLET, COATED ORAL at 05:49

## 2019-03-02 RX ADMIN — AZITHROMYCIN 250 MG: 250 TABLET, FILM COATED ORAL at 05:49

## 2019-03-02 RX ADMIN — LIDOCAINE HYDROCHLORIDE 1 ML: 10 INJECTION, SOLUTION INFILTRATION; PERINEURAL at 09:45

## 2019-03-02 RX ADMIN — BUDESONIDE AND FORMOTEROL FUMARATE DIHYDRATE 2 PUFF: 160; 4.5 AEROSOL RESPIRATORY (INHALATION) at 08:29

## 2019-03-02 RX ADMIN — TOBRAMYCIN SULFATE 0.3 G: 1200 INJECTION, POWDER, FOR SOLUTION INTRAVENOUS at 18:15

## 2019-03-02 RX ADMIN — IPRATROPIUM BROMIDE AND ALBUTEROL SULFATE 3 ML: .5; 3 SOLUTION RESPIRATORY (INHALATION) at 12:54

## 2019-03-02 ASSESSMENT — ENCOUNTER SYMPTOMS
SPUTUM PRODUCTION: 0
SHORTNESS OF BREATH: 1
WEAKNESS: 0
HEADACHES: 0
DIARRHEA: 0
ABDOMINAL PAIN: 0
COUGH: 1
WHEEZING: 1
MYALGIAS: 0
VOMITING: 0

## 2019-03-02 NOTE — FLOWSHEET NOTE
03/01/19 1850   Events/Summary/Plan   Events/Summary/Plan SVN given   Interdisciplinary Plan of Care-Goals (Indications)   Bronchodilator Indications History / Diagnosis;Physical Exam / Hyperinflation / Wheezing (bronchospasm);Strong Subjective / Objective Improvement   Interdisciplinary Plan of Care-Outcomes    Bronchodilator Outcome Diminished Wheezing and Volume of Air Movement Increased;Patient at Stable Baseline   Education   Education Yes - Pt. / Family has been Instructed in use of Respiratory Equipment;Yes - Pt. / Family has been Instructed in use of Respiratory Medications and Adverse Reactions   RT Assessment of Delivered Medications   Evaluation of Medication Delivery Daily Yes-- Pt /Family has been Instructed in use of Respiratory Medications and Adverse Reactions   SVN Group   #SVN Performed Yes   Given By: Mouthpiece   PEP/CPT Group   PEP/CPT/Airway Clearance Therapy Yes   #PEP/CPT (Manual) Initial Initial   #CPT (Mechanical) Yes   PEP/CPT Method Percussion/Vibration   CPT Settings Yes   [place] Protocol Yes   Date Disposable Equipment Last Changed 03/01/19   Date Disposable Equipment Next Change Due (Q 30 Days) 03/31/19   Respiratory WDL   Respiratory (WDL) X   Chest Exam   Work Of Breathing / Effort Mild   Respiration 18   Pulse (!) 105   Breath Sounds   Pre/Post Intervention Pre Intervention Assessment   RUL Breath Sounds Coarse Crackles   RML Breath Sounds Coarse Crackles;Diminished   RLL Breath Sounds Diminished   DEBORA Breath Sounds Rhonchi   LLL Breath Sounds Diminished   Secretions   Cough Congested;Productive;Strong   How Sputum Obtained Cough on Request   Sputum Amount Unable to Evaluate   Sputum Color Unable to Evaluate   Sputum Consistency Unable to Evaluate   Oximetry   #Pulse Oximetry (Single Determination) Yes   Oxygen   Pulse Oximetry 99 %   O2 (LPM) 3   O2 Daily Delivery Respiratory  Silicone Nasal Cannula

## 2019-03-02 NOTE — PROGRESS NOTES
· 2 RN skin check complete with Tatum RAMIREZ RN.  · Devices in place oxygen.  · Skin assessed under devices WDL.  · Confirmed pressure ulcers found on N/A.  · New potential pressure ulcers noted on N/A. Wound consult placed and wound reported.  · The following interventions in place mepilex to red and blanching coccyx.

## 2019-03-02 NOTE — PROGRESS NOTES
[facility] Medicine Daily Progress Note    Date of Service  3/2/2019    Chief Complaint  78 y.o. male admitted 3/1/2019 with SOB.    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 CXR showed trace pleural effusions and bibasilar opacities. Influenza A was positive. He was found with wheezing on exam and admitted for influenza and COPD flare treatment.        Interval Problem Update  His SOB and cough is improving  Dialysis today  Continue solumedrol, azithro, tamiflu    Consultants/Specialty  Nephrology    Code Status  Full    Disposition  Home tomorrow if improved oxygenation    Review of Systems  Review of Systems   Constitutional: Negative for malaise/fatigue.   HENT: Negative for congestion.    Respiratory: Positive for cough, shortness of breath and wheezing. Negative for sputum production.    Cardiovascular: Negative for chest pain.   Gastrointestinal: Negative for abdominal pain, diarrhea and vomiting.   Musculoskeletal: Negative for myalgias.   Skin: Negative for itching.   Neurological: Negative for weakness and headaches.        Physical Exam  Temp:  [36.2 °C (97.2 °F)-37.4 °C (99.3 °F)] 36.8 °C (98.3 °F)  Pulse:  [] 84  Resp:  [16-20] 18  BP: (102-122)/(46-60) 122/60  SpO2:  [97 %-100 %] 97 %    Physical Exam   Constitutional: He is oriented to person, place, and time. He appears well-developed.   frail   HENT:   Head: Normocephalic.   Mouth/Throat: Oropharynx is clear and moist.   Eyes: Conjunctivae are normal. Right eye exhibits no discharge. Left eye exhibits no discharge.   Cardiovascular: Normal rate and regular rhythm.    Pulmonary/Chest: He has wheezes. He has no rales.   tachypneic   Abdominal: Soft. There is no tenderness. There is no rebound and no guarding.   Musculoskeletal: He exhibits no edema.   Right arm fistula with palpable  thrill   Neurological: He is alert and oriented to person, place, and time.   Skin: Skin is warm and dry.   Nursing note and vitals reviewed.      Fluids    Intake/Output Summary (Last 24 hours) at 03/02/19 1402  Last data filed at 03/02/19 1300   Gross per 24 hour   Intake              980 ml   Output             2000 ml   Net            -1020 ml       Laboratory  Recent Labs      03/01/19   0656  03/02/19   0056   WBC  7.0  7.5   RBC  3.27*  3.06*   HEMOGLOBIN  10.2*  9.3*   HEMATOCRIT  33.5*  30.9*   MCV  102.4*  101.0*   MCH  31.2  30.4   MCHC  30.4*  30.1*   RDW  62.0*  60.1*   PLATELETCT  210  203   MPV  8.5*  8.8*     Recent Labs      03/01/19   0656  03/02/19   0056   SODIUM  140  137   POTASSIUM  3.9  3.9   CHLORIDE  102  100   CO2  28  22   GLUCOSE  108*  194*   BUN  36*  58*   CREATININE  4.11*  4.46*   CALCIUM  9.0  8.9                   Imaging  DX-CHEST-PORTABLE (1 VIEW)   Final Result         1. No significant interval change.      2. Trace pleural effusions with bibasilar opacities.           Assessment/Plan  * COPD exacerbation (HCC)- (present on admission)   Assessment & Plan    Oxygen as needed, Respiratory protocol, Bronchodilators, Incentive spirometry  Intravenous steroids   Azithromycin      Influenza A- (present on admission)   Assessment & Plan    Influenza A  Tamiflu, discussed the dose with pharmacy  Patient to get Tamiflu after dialysis on dialysis days for a duration of 5 days     Wegener's granulomatosis (HCC)- (present on admission)   Assessment & Plan    Uses steroids chronically  I have started intravenous steroids for COPD exacerbation  Follow-up with rheumatology upon discharge     Acute on chronic respiratory failure with hypoxia due to influenza B, not due to sepsis(CMS-HCC)- (present on admission)   Assessment & Plan    H/o of Wegener's, has been stable and followed by rheum on chronic steroids  Pseudomonas colonization, followed by pulm. On tobramycin, will continue  Wheezing  on exam, continue steroids and azithromycin for COPD exacerbation  Influenza A infection, continue tamiflu  CXR with mild pulm edema, continue HD  Wean O2 as tolerated     Atrial fibrillation ([facility])- (present on admission)   Assessment & Plan    On apixaban once daily despite having end-stage renal disease  Metoprolol for rate control   He is followed by Dr. 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

## 2019-03-02 NOTE — PROCEDURES
Pt with ESRD, presented with flu infection.  Pt is doing better.  Seen and examined while getting HD.

## 2019-03-02 NOTE — PROGRESS NOTES
hemodialysis done today, started @ 1005 and ended @ 1306 with net Uf= 1300 ml. Report given to LIZETH Guadarrama. See flow sheet for details.

## 2019-03-02 NOTE — FLOWSHEET NOTE
Respiratory Therapy Update    Interdisciplinary Plan of Care-Goals (Indications)  Bronchodilator Indications: History / Diagnosis;Physical Exam / Hyperinflation / Wheezing (bronchospasm);Strong Subjective / Objective Improvement (03/02/19 0820)  Interdisciplinary Plan of Care-Outcomes   Bronchodilator Outcome: Diminished Wheezing and Volume of Air Movement Increased;Patient at Stable Baseline (03/02/19 0820)    #PEP/CPT (Manual) Initial: Initial (03/01/19 1850)     #SVN Performed: Yes (03/02/19 0820)    Cough: Congested;Non Productive (03/02/19 0820)  Sputum Amount: Unable to Evaluate (03/02/19 0820)  Sputum Color: Unable to Evaluate (03/02/19 0820)  Sputum Consistency: Unable to Evaluate (03/02/19 0820)                  O2 (LPM): 3 (03/02/19 0820)  O2 Daily Delivery Respiratory : Silicone Nasal Cannula (03/02/19 0820)    Breath Sounds  Pre/Post Intervention: Pre Intervention Assessment (03/02/19 0820)  RUL Breath Sounds: Fine Crackles (03/02/19 0820)  RML Breath Sounds: Clear (03/02/19 0820)  RLL Breath Sounds: Diminished (03/02/19 0820)  DEBORA Breath Sounds: Fine Crackles (03/02/19 0820)  LLL Breath Sounds: Diminished (03/02/19 0820)        Events/Summary/Plan: SVN done f/b vest cpt and mdi. All tolerated well.  No distress at this time. (03/02/19 0820)

## 2019-03-02 NOTE — CONSULTS
DATE OF SERVICE:  03/01/2019    REQUESTING PHYSICIAN:  Dr. Bee Quezada.    REASON FOR CONSULTATION:  Management of chronic kidney disease and assessing   the need for dialysis.    Patient seen and examined, medical records were reviewed.    HISTORY OF PRESENT ILLNESS:  The patient is an unfortunate 78-year-old   gentleman with a past medical history significant for end-stage renal disease   secondary to Wegener granulomatosis, undergoes dialysis on Monday, Wednesday   and Friday, presented to the hospital with a fever, chills, shortness of   breath.  Patient was diagnosed with influenza A infection and COPD   exacerbation.  We were called to manage his kidney disease.    Patient's last dialysis was on Wednesday.    Patient has no hematuria, no dysuria.    PAST MEDICAL HISTORY:  Significant for   1.  Atrial fibrillation.  2.  End-stage renal disease.  3.  Wegener granulomatosis.    ALLERGIES:  The list was reviewed.    SOCIAL HISTORY:  Patient is .  He lives with his wife.    FAMILY HISTORY:  Positive for stroke in his father.    MEDICATIONS:  Reviewed.    REVIEW OF SYSTEMS:  The patient feels fatigue, decreased p.o. intake.  He has   occasional cough.  All other review of systems were reviewed and were negative   except as in history of present illness.    PHYSICAL EXAMINATION:  GENERAL:  Patient appears ill, but in no apparent distress.  VITAL SIGNS:  Showed blood pressure of 122/46, heart rate was 94, respiratory   rate was 17.  HEENT:  Normocephalic, atraumatic.  Sclerae anicteric.  Pupils are reactive.    Nose is normal.  NECK:  No lymphadenopathy.  CHEST:  Normal.  LUNGS:  Scattered rhonchi.  HEART:  S1, S2.  ABDOMEN:  Soft.  EXTREMITIES:  There is no lower extremity edema.  SKIN:  No skin rashes.  NEUROLOGIC:  Patient is alert and oriented x3.    LABORATORY DATA:  His recent labs were reviewed.    ASSESSMENT:  1.  End-stage renal disease.  2.  Influenza A infection.  3.  Anemia.  4.   Hypertension.    PLAN:  1.  We will hold dialysis today to let the patient rest and to a plan dialysis   tomorrow.  2.  Renal diet.  3.  Daily labs.  4.  Renal dose all medications.  5.  Avoid nephrotoxin.    Plan discussed with Dr. Quezada.       ____________________________________     FADI NAJJAR, MD FN / SHARRON    DD:  03/01/2019 15:55:22  DT:  03/01/2019 19:14:45    D#:  [id number]  Job#:  [id number]

## 2019-03-02 NOTE — PROGRESS NOTES
Assumed care of pt, beside report received from LIZETH Uriarte. Updated on POC, call light within reach all fall precautions within place. Bed locked and lowered. Pt instructed to call for assistance before getting up. All questions answered, no other needs at this time.

## 2019-03-02 NOTE — FLOWSHEET NOTE
Respiratory Therapy Update    Interdisciplinary Plan of Care-Goals (Indications)  Bronchodilator Indications: History / Diagnosis;Physical Exam / Hyperinflation / Wheezing (bronchospasm);Strong Subjective / Objective Improvement (03/01/19 1536)  Interdisciplinary Plan of Care-Outcomes   Bronchodilator Outcome: Diminished Wheezing and Volume of Air Movement Increased;Patient at Stable Baseline (03/01/19 1536)          #SVN Performed: Yes (03/01/19 1536)    Cough: Congested;Productive;Strong (03/01/19 1536)  Sputum Amount: Moderate (03/01/19 1536)  Sputum Color: Tan;White;Yellow (03/01/19 1536)  Sputum Consistency: Thick (03/01/19 1536)                  O2 (LPM): 3 (03/01/19 1536)  O2 Daily Delivery Respiratory : Silicone Nasal Cannula (03/01/19 1536)    Breath Sounds  Pre/Post Intervention: Pre Intervention Assessment (03/01/19 1536)  RUL Breath Sounds: Rhonchi (03/01/19 1536)  RML Breath Sounds: Crackles;Diminished (03/01/19 1536)  RLL Breath Sounds: Diminished (03/01/19 1536)  DEBORA Breath Sounds: Rhonchi (03/01/19 1536)  LLL Breath Sounds: Diminished (03/01/19 1536)      Events/Summary/Plan: Pt. is up from ER.  Neb done f/b 7% hypertonic  and MDI.  Some  moderate distress. (03/01/19 1536)

## 2019-03-02 NOTE — CARE PLAN
Problem: Knowledge Deficit  Goal: Knowledge of disease process/condition, treatment plan, diagnostic tests, and medications will improve    Intervention: Assess knowledge level of disease process/condition, treatment plan, diagnostic tests, and medications  Pt educated regarding activity, diet, meds and plan of care. Verbalized understanding.       Problem: Discharge Barriers/Planning  Goal: Patient's continuum of care needs will be met  Outcome: PROGRESSING AS EXPECTED

## 2019-03-02 NOTE — FLOWSHEET NOTE
Respiratory Therapy Update    Interdisciplinary Plan of Care-Goals (Indications)  Bronchodilator Indications: History / Diagnosis;Physical Exam / Hyperinflation / Wheezing (bronchospasm);Strong Subjective / Objective Improvement (03/02/19 1250)  Interdisciplinary Plan of Care-Outcomes   Bronchodilator Outcome: Diminished Wheezing and Volume of Air Movement Increased;Patient at Stable Baseline (03/02/19 1250)    #PEP/CPT (Manual) Initial: Initial (03/01/19 1850)     #SVN Performed: Yes (03/02/19 1250)    Cough: Congested;Non Productive;Strong (03/02/19 1250)  Sputum Amount: Unable to Evaluate (03/02/19 1250)  Sputum Color: Unable to Evaluate (03/02/19 1250)  Sputum Consistency: Unable to Evaluate (03/02/19 1250)                  O2 (LPM): 3 (03/02/19 1250)  O2 Daily Delivery Respiratory : Silicone Nasal Cannula (03/02/19 1250)    Breath Sounds  Pre/Post Intervention: Post Intervention Assessment (03/02/19 1250)  RUL Breath Sounds: Fine Crackles (03/02/19 1250)  RML Breath Sounds: Fine Crackles (03/02/19 1250)  RLL Breath Sounds: Diminished (03/02/19 1250)  DEBORA Breath Sounds: Fine Crackles (03/02/19 1250)  LLL Breath Sounds: Diminished (03/02/19 1250)        Events/Summary/Plan: SVN done via mask in high fowlers.  Dialysis almost complete. (03/02/19 1250)

## 2019-03-02 NOTE — ASSESSMENT & PLAN NOTE
H/o of Wegener's, has been stable and followed by rheum on chronic steroids  Pseudomonas colonization, followed by pulm. On tobramycin, will continue  Wheezing on exam, continue steroids and azithromycin for COPD exacerbation  Influenza A infection, continue tamiflu  CXR with mild pulm edema, continue HD  Wean O2 as tolerated

## 2019-03-03 VITALS
HEIGHT: 68 IN | WEIGHT: 146.83 LBS | DIASTOLIC BLOOD PRESSURE: 49 MMHG | TEMPERATURE: 97.8 F | BODY MASS INDEX: 22.25 KG/M2 | OXYGEN SATURATION: 93 % | SYSTOLIC BLOOD PRESSURE: 116 MMHG | RESPIRATION RATE: 18 BRPM | HEART RATE: 88 BPM

## 2019-03-03 PROBLEM — J96.11 CHRONIC RESPIRATORY FAILURE WITH HYPOXIA (HCC): Status: ACTIVE | Noted: 2017-05-14

## 2019-03-03 LAB
ANION GAP SERPL CALC-SCNC: 12 MMOL/L (ref 0–11.9)
BUN SERPL-MCNC: 43 MG/DL (ref 8–22)
CALCIUM SERPL-MCNC: 8.7 MG/DL (ref 8.5–10.5)
CHLORIDE SERPL-SCNC: 101 MMOL/L (ref 96–112)
CO2 SERPL-SCNC: 26 MMOL/L (ref 20–33)
CREAT SERPL-MCNC: 3.13 MG/DL (ref 0.5–1.4)
GLUCOSE SERPL-MCNC: 182 MG/DL (ref 65–99)
POTASSIUM SERPL-SCNC: 4.1 MMOL/L (ref 3.6–5.5)
PROCALCITONIN SERPL-MCNC: 0.8 NG/ML
SODIUM SERPL-SCNC: 139 MMOL/L (ref 135–145)

## 2019-03-03 PROCEDURE — 80048 BASIC METABOLIC PNL TOTAL CA: CPT

## 2019-03-03 PROCEDURE — 94669 MECHANICAL CHEST WALL OSCILL: CPT

## 2019-03-03 PROCEDURE — A9270 NON-COVERED ITEM OR SERVICE: HCPCS | Performed by: HOSPITALIST

## 2019-03-03 PROCEDURE — 700101 HCHG RX REV CODE 250: Performed by: HOSPITALIST

## 2019-03-03 PROCEDURE — 36415 COLL VENOUS BLD VENIPUNCTURE: CPT

## 2019-03-03 PROCEDURE — 99231 SBSQ HOSP IP/OBS SF/LOW 25: CPT | Performed by: INTERNAL MEDICINE

## 2019-03-03 PROCEDURE — 700111 HCHG RX REV CODE 636 W/ 250 OVERRIDE (IP): Performed by: INTERNAL MEDICINE

## 2019-03-03 PROCEDURE — 700111 HCHG RX REV CODE 636 W/ 250 OVERRIDE (IP): Performed by: HOSPITALIST

## 2019-03-03 PROCEDURE — 94640 AIRWAY INHALATION TREATMENT: CPT

## 2019-03-03 PROCEDURE — 99239 HOSP IP/OBS DSCHRG MGMT >30: CPT | Performed by: INTERNAL MEDICINE

## 2019-03-03 PROCEDURE — 94760 N-INVAS EAR/PLS OXIMETRY 1: CPT

## 2019-03-03 PROCEDURE — 94668 MNPJ CHEST WALL SBSQ: CPT

## 2019-03-03 PROCEDURE — A9270 NON-COVERED ITEM OR SERVICE: HCPCS | Performed by: INTERNAL MEDICINE

## 2019-03-03 PROCEDURE — 84145 PROCALCITONIN (PCT): CPT

## 2019-03-03 PROCEDURE — 700102 HCHG RX REV CODE 250 W/ 637 OVERRIDE(OP): Performed by: HOSPITALIST

## 2019-03-03 PROCEDURE — 700101 HCHG RX REV CODE 250: Performed by: INTERNAL MEDICINE

## 2019-03-03 PROCEDURE — 700102 HCHG RX REV CODE 250 W/ 637 OVERRIDE(OP): Performed by: INTERNAL MEDICINE

## 2019-03-03 RX ORDER — OSELTAMIVIR PHOSPHATE 30 MG/1
CAPSULE ORAL
Qty: 1 CAP | Refills: 0 | Status: ON HOLD | OUTPATIENT
Start: 2019-03-03 | End: 2019-05-16

## 2019-03-03 RX ORDER — PREDNISONE 20 MG/1
TABLET ORAL
Qty: 9 TAB | Refills: 0 | Status: SHIPPED | OUTPATIENT
Start: 2019-03-03 | End: 2019-04-30

## 2019-03-03 RX ORDER — ALBUTEROL SULFATE 90 UG/1
2 AEROSOL, METERED RESPIRATORY (INHALATION) EVERY 6 HOURS PRN
Qty: 8.5 G | Refills: 0 | Status: SHIPPED | OUTPATIENT
Start: 2019-03-03

## 2019-03-03 RX ADMIN — IPRATROPIUM BROMIDE AND ALBUTEROL SULFATE 3 ML: .5; 3 SOLUTION RESPIRATORY (INHALATION) at 06:31

## 2019-03-03 RX ADMIN — FINASTERIDE 5 MG: 5 TABLET, FILM COATED ORAL at 06:07

## 2019-03-03 RX ADMIN — METHYLPREDNISOLONE SODIUM SUCCINATE 40 MG: 40 INJECTION, POWDER, FOR SOLUTION INTRAMUSCULAR; INTRAVENOUS at 06:04

## 2019-03-03 RX ADMIN — METOPROLOL TARTRATE 25 MG: 25 TABLET, FILM COATED ORAL at 06:07

## 2019-03-03 RX ADMIN — TOBRAMYCIN SULFATE 0.3 G: 1200 INJECTION, POWDER, FOR SOLUTION INTRAVENOUS at 06:32

## 2019-03-03 RX ADMIN — SEVELAMER CARBONATE 800 MG: 800 TABLET, FILM COATED ORAL at 08:44

## 2019-03-03 RX ADMIN — ASPIRIN 81 MG: 81 TABLET, COATED ORAL at 06:07

## 2019-03-03 RX ADMIN — AZITHROMYCIN 250 MG: 250 TABLET, FILM COATED ORAL at 06:06

## 2019-03-03 RX ADMIN — TAMSULOSIN HYDROCHLORIDE 0.4 MG: 0.4 CAPSULE ORAL at 06:07

## 2019-03-03 RX ADMIN — SODIUM CHLORIDE 4 ML: 7 NEBU SOLN,3 % NEBU at 06:32

## 2019-03-03 RX ADMIN — BUDESONIDE AND FORMOTEROL FUMARATE DIHYDRATE 2 PUFF: 160; 4.5 AEROSOL RESPIRATORY (INHALATION) at 06:32

## 2019-03-03 RX ADMIN — IPRATROPIUM BROMIDE AND ALBUTEROL SULFATE 3 ML: .5; 3 SOLUTION RESPIRATORY (INHALATION) at 10:44

## 2019-03-03 ASSESSMENT — PATIENT HEALTH QUESTIONNAIRE - PHQ9
SUM OF ALL RESPONSES TO PHQ9 QUESTIONS 1 AND 2: 0
2. FEELING DOWN, DEPRESSED, IRRITABLE, OR HOPELESS: NOT AT ALL
1. LITTLE INTEREST OR PLEASURE IN DOING THINGS: NOT AT ALL

## 2019-03-03 ASSESSMENT — ENCOUNTER SYMPTOMS
COUGH: 1
FEVER: 0
VOMITING: 0
CHILLS: 0
SHORTNESS OF BREATH: 0
NAUSEA: 0

## 2019-03-03 NOTE — PROGRESS NOTES
Discharge note:  Patient and family aware of and comfortable with discharge. IV site removed, catheter in tact, gauze dressing c/d/i. Discharge instructions given, signed and placed in chart. Telemetry monitoring removed. Patient resting in room awaiting ride at this time. Call bell in reach, safety measures in place. Will continue to monitor.

## 2019-03-03 NOTE — PROGRESS NOTES
Oxygen turned down to 1LPM in anticipation of discharge today. Patient tolerating well, with O2 saturation stable between 93-96%. Call bell in reach, will continue to monitor.

## 2019-03-03 NOTE — DISCHARGE SUMMARY
Discharge Summary    CHIEF COMPLAINT ON ADMISSION  Chief Complaint   Patient presents with   • Cough     non stop cough   • Fever       Reason for Admission  Cough      Admission Date  3/1/2019    CODE STATUS  Full Code    HPI & HOSPITAL COURSE  This is a 78 y.o. male here with influenza A infection.     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 CXR showed trace pleural effusions and bibasilar opacities. Influenza A was positive. He was found with wheezing on exam and admitted with tamiflu and steroids. Nephrology was consulted for HD. His cough improved and he was weaned off O2. Resp sputum showed pseudomonas, he was continued on tobramycin and counseled to f/u with his pulmonologist. He was discharged to complete tamiflu and prednisone taper.    Therefore, he is discharged in good and stable condition to home with close outpatient follow-up.    The patient met 2-midnight criteria for an inpatient stay at the time of discharge.    Discharge Date  3/3/2019    FOLLOW UP ITEMS POST DISCHARGE  F/u pulm    DISCHARGE DIAGNOSES  Principal Problem:    COPD exacerbation (HCC) POA: Yes  Active Problems:    Influenza A POA: Yes    ESRD on dialysis (HCC) (Chronic) POA: Yes    Atrial fibrillation (HCC) POA: Yes    Chronic respiratory failure with hypoxia (HCC) POA: Yes    Wegener's granulomatosis (HCC) (Chronic) POA: Yes    HTN (hypertension) POA: Yes    Hyperlipidemia POA: Yes    GERD (gastroesophageal reflux disease) POA: Yes    Pseudomonas aeruginosa colonization (Chronic) POA: Yes    COPD (chronic obstructive pulmonary disease) (HCC) (Chronic) POA: Yes    Chronic anticoagulation POA: Yes  Resolved Problems:    * No resolved hospital problems. *      FOLLOW UP  Future Appointments  Date Time Provider Department Center   3/26/2019 1:00 PM Cintia Ariza M.D. [facility] None   4/18/2019  2:30 PM Abhijit Rowe M.D. [facility] None     Abhijit Rowe M.D.  [address]  [facility] [address]  [place] [id number] [phone]    Go to  follow up appointment has been made for 4/18/19      MEDICATIONS ON DISCHARGE     Medication List      START taking these medications      Instructions   oseltamivir 30 MG Caps  Commonly known as:  TAMIFLU   Tae one dose tomorrow after dialysis        CHANGE how you take these medications      Instructions   predniSONE 20 MG Tabs  What changed:  · medication strength  · how much to take  · how to take this  · when to take this  · additional instructions  Commonly known as:  DELTASONE   Take 40mg daily for 2 days, then take 30mg for 2 days, then take 20mg daily for 2 days, then take 9mg daily as before        CONTINUE taking these medications      Instructions   albuterol 108 (90 Base) MCG/ACT Aers inhalation aerosol  Commonly known as:  PROAIR HFA   Inhale 2 Puffs by mouth every 6 hours as needed for Shortness of Breath.  Dose:  2 Puff     aspirin EC 81 MG Tbec  Commonly known as:  ECOTRIN   Take 81 mg by mouth every day.  Dose:  81 mg     CRESTOR 20 MG Tabs  Generic drug:  rosuvastatin   Take 20 mg by mouth every evening.  Dose:  20 mg     DIALYVITE TABLET Tabs   Take 1 Tab by mouth every day.  Dose:  1 Tab     ELIQUIS 2.5mg Tabs  Generic drug:  apixaban   Take 2.5 mg by mouth every day.  Dose:  2.5 mg     finasteride 5 MG Tabs  Commonly known as:  PROSCAR   Take 5 mg by mouth every day.  Dose:  5 mg     Fluticasone Furoate-Vilanterol 200-25 MCG/INH Aepb  Commonly known as:  BREO ELLIPTA   Inhale 1 Puff by mouth every day. Rinse mouth after use.  Dose:  1 Puff     ipratropium-albuterol 0.5-2.5 (3) MG/3ML nebulizer solution  Commonly known as:  DUONEB   3 mL by Nebulization route every 6 hours as needed for Shortness of Breath.  Dose:  3 mL     metoprolol 25 MG Tabs  Commonly known as:  LOPRESSOR   Take 25 mg by mouth 2 times a day.  Dose:  25 mg     raNITidine 150 MG  Tabs  Commonly known as:  ZANTAC   Take 150 mg by mouth 2 times a day as needed.  Dose:  150 mg     sevelamer carbonate 800 MG Tabs tablet  Commonly known as:  RENVELA   Doctor's comments:  **Patient requests 90 days supply**  TAKE 1 TABLET BY MOUTH THREE TIMES DAILY WITH MEALS     sodium chloride 7 % Nebu  Commonly known as:  HYPER-SAL   INHALE 1 VIAL VIA NEBULIZER TWICE DAILY     tamsulosin 0.4 MG capsule  Commonly known as:  FLOMAX   Take 1 Cap by mouth every day.  Dose:  0.4 mg     tobramycin 60 mg/mL in sterile water   Inhale 5 mL by mouth every day for 26 days.  Dose:  300 mg     vitamin D3 5000 units Caps   Take 1 Cap by mouth every day.  Dose:  1 Cap            Allergies  Allergies   Allergen Reactions   • Doxycycline Unspecified     Reports terrible diarrhea   • Erythromycin Unspecified     Abdominal pain.  RXN=before 2011   • Rituximab      Flu like syndrome       DIET  Orders Placed This Encounter   Procedures   • Diet Order Renal     Standing Status:   Standing     Number of Occurrences:   1     Order Specific Question:   Diet:     Answer:   Renal [8]       ACTIVITY  As tolerated.  Weight bearing as tolerated    CONSULTATIONS  Nephrology    PROCEDURES  DX-CHEST-PORTABLE (1 VIEW)   Final Result         1. No significant interval change.      2. Trace pleural effusions with bibasilar opacities.            LABORATORY  Lab Results   Component Value Date    SODIUM 139 03/03/2019    POTASSIUM 4.1 03/03/2019    CHLORIDE 101 03/03/2019    CO2 26 03/03/2019    GLUCOSE 182 (H) 03/03/2019    BUN 43 (H) 03/03/2019    CREATININE 3.13 (H) 03/03/2019    CREATININE 1.0 09/23/2008        Lab Results   Component Value Date    WBC 7.5 03/02/2019    WBC 7.6 03/19/2012    HEMOGLOBIN 9.3 (L) 03/02/2019    HEMATOCRIT 30.9 (L) 03/02/2019    PLATELETCT 203 03/02/2019        Total time of the discharge process exceeds 32 minutes.

## 2019-03-03 NOTE — PROGRESS NOTES
Nephrology Daily Progress Note    Date of Service  3/3/2019    Chief Complaint  78 y.o. male admitted 3/1/2019 with ESRD, flu infection    Interval Problem Update  Pt feels better    Review of Systems  Review of Systems   Constitutional: Negative for chills, fever and malaise/fatigue.   Respiratory: Positive for cough. Negative for shortness of breath.    Cardiovascular: Negative for chest pain and leg swelling.   Gastrointestinal: Negative for nausea and vomiting.   Genitourinary: Negative for dysuria, frequency and urgency.        Physical Exam  Temp:  [36.6 °C (97.8 °F)-37.2 °C (99 °F)] 36.6 °C (97.8 °F)  Pulse:  [] 88  Resp:  [16-19] 18  BP: (101-120)/(48-57) 116/49  SpO2:  [92 %-100 %] 93 %    Physical Exam   Constitutional: He is oriented to person, place, and time. No distress.   HENT:   Nose: Nose normal.   Eyes: No scleral icterus.   Cardiovascular: Normal rate and regular rhythm.    No murmur heard.  Pulmonary/Chest: Effort normal. No respiratory distress. He has no wheezes. He has rhonchi.   Musculoskeletal: He exhibits no edema.   Neurological: He is alert and oriented to person, place, and time.   Skin: He is not diaphoretic.   Nursing note and vitals reviewed.      Fluids    Intake/Output Summary (Last 24 hours) at 03/03/19 1301  Last data filed at 03/03/19 0400   Gross per 24 hour   Intake             2400 ml   Output              125 ml   Net             2275 ml       Laboratory  Recent Labs      03/01/19   0656  03/02/19   0056   WBC  7.0  7.5   RBC  3.27*  3.06*   HEMOGLOBIN  10.2*  9.3*   HEMATOCRIT  33.5*  30.9*   MCV  102.4*  101.0*   MCH  31.2  30.4   MCHC  30.4*  30.1*   RDW  62.0*  60.1*   PLATELETCT  210  203   MPV  8.5*  8.8*     Recent Labs      03/01/19   0656  03/02/19   0056  03/03/19   0218   SODIUM  140  137  139   POTASSIUM  3.9  3.9  4.1   CHLORIDE  102  100  101   CO2  28  22  26   GLUCOSE  108*  194*  182*   BUN  36*  58*  43*   CREATININE  4.11*  4.46*  3.13*   CALCIUM  9.0   8.9  8.7                   Imaging    Assessment/Plan  1 ESRD: HD MWF  2 flu   3 Anemia  no need for HD  Renal dose all meds  Avoid nephrotoxins

## 2019-03-03 NOTE — FLOWSHEET NOTE
Respiratory Therapy Update    Interdisciplinary Plan of Care-Goals (Indications)  Bronchodilator Indications: History / Diagnosis;Physical Exam / Hyperinflation / Wheezing (bronchospasm);Strong Subjective / Objective Improvement (03/02/19 1608)  Interdisciplinary Plan of Care-Outcomes   Bronchodilator Outcome: Diminished Wheezing and Volume of Air Movement Increased (03/02/19 1608)    #PEP/CPT (Manual) Initial: Initial (03/01/19 1850)     #SVN Performed: Yes (03/02/19 1608)    Cough: Congested;Non Productive (03/02/19 1608)  Sputum Amount: Unable to Evaluate (03/02/19 1608)  Sputum Color: Unable to Evaluate (03/02/19 1608)  Sputum Consistency: Unable to Evaluate (03/02/19 1608)                  O2 (LPM): 3 (03/02/19 1608)  O2 Daily Delivery Respiratory : Silicone Nasal Cannula (03/02/19 1608)    Breath Sounds  Pre/Post Intervention: Pre Intervention Assessment (03/02/19 1608)  RUL Breath Sounds: Expiratory Wheezes;Fine Crackles (03/02/19 1608)  RML Breath Sounds: Diminished;Fine Crackles (03/02/19 1608)  RLL Breath Sounds: Diminished (03/02/19 1608)  DEBORA Breath Sounds: Expiratory Wheezes;Fine Crackles (03/02/19 1608)  LLL Breath Sounds: Diminished (03/02/19 1608)        Events/Summary/Plan: SVN done via mask in fowlers.  No resp. distress at this time. (03/02/19 1608)

## 2019-03-03 NOTE — PROGRESS NOTES
Oxygen removed at 1000, patient tolerating well with O2 saturation remaining stable between 91-94% at rest and with activity, no c/o SOB. Call bell in reach, safety measures in place. Will continue to monitor.

## 2019-03-03 NOTE — DISCHARGE INSTRUCTIONS
Oxygen Use at Home  Oxygen can be prescribed for home use. The prescription will show the flow rate. This is how much oxygen is to be used per minute. This will be listed in liters per minute (LPM or L/M). A liter is a metric measurement of volume.  You will use oxygen therapy as directed. It can be used while exercising, sleeping, or at rest. You may need oxygen continuously. Your health care provider may order a blood oxygen test (arterial blood gas or pulse oximetry test) that will show what your oxygen level is. Your health care provider will use these measurements to learn about your needs and follow your progress.  Home oxygen therapy is commonly used on patients with various lung (pulmonary) related conditions. Some of these conditions include:  · Asthma.  · Lung cancer.  · Pneumonia.  · Emphysema.  · Chronic bronchitis.  · Cystic fibrosis.  · Other lung diseases.  · Pulmonary fibrosis.  · Occupational lung disease.  · Heart failure.  · Chronic obstructive pulmonary disease (COPD).  3 COMMON WAYS OF PROVIDING OXYGEN THERAPY  · Gas: The gas form of oxygen is put into variously sized cylinders or tanks. The cylinders or oxygen tanks contain compressed oxygen. The cylinder is equipped with a regulator that controls the flow rate. Because the flow of oxygen out of the cylinder is constant, an oxygen conserving device may be attached to the system to avoid waste. This device releases the gas only when you inhale and cuts it off when you exhale. Oxygen can be provided in a small cylinder that can be carried with you. Large tanks are heavy and are only for stationary use. After use, empty tanks must be exchanged for full tanks.  · Liquid: The liquid form of oxygen is put into a container similar to a thermos. When released, the liquid converts to a gas and you breathe it in just like the compressed gas. This storage method takes up less space than the compressed gas cylinder, and you can transfer the liquid to a  "small, portable vessel at home. Liquid oxygen is more expensive than the compressed gas, and the vessel vents when not in use. An oxygen conserving device may be built into the vessel to conserve the oxygen. Liquid oxygen is very cold, around 297° below zero.  · Oxygen concentrator: This medical device filters oxygen from room air and gives almost 100% oxygen to the patient. Oxygen concentrators are powered by electricity. Benefits of this system are:  ¨ It does not need to be resupplied.  ¨ It is not as costly as liquid oxygen.  ¨ Extra tubing permits the user to move around easier.  There are several types of small, portable oxygen systems available which can help you remain active and mobile. You must have a cylinder of oxygen as a backup in the event of a power failure. Advise your electric power company that you are on oxygen therapy in order to get priority service when there is a power failure.  OXYGEN DELIVERY DEVICES  There are 3 common ways to deliver oxygen to your body.  · Nasal cannula. This is a 2-pronged device inserted in the nostrils that is connected to tubing carrying the oxygen. The tubing can rest on the ears or be attached to the frame of eyeglasses.  · Mask. People who need a high flow of oxygen generally use a mask.  · Transtracheal catheter. Transtracheal oxygen therapy requires the insertion of a small, flexible tube (catheter) in the windpipe (trachea). This catheter is held in place by a necklace. Since transtracheal oxygen bypasses the mouth, nose, and throat, a humidifier is absolutely required at flow rates of 1 LPM or greater.  OXYGEN USE SAFETY TIPS  · Never smoke while using oxygen. Oxygen does not burn or explode, but flammable materials will burn faster in the presence of oxygen.  · Keep a fire extinguisher close by. Let your fire department know that you have oxygen in your home.  · Warn visitors not to smoke near you when you are using oxygen. Put up \"no smoking\" signs in your " home where you most often use the oxygen.  · When you go to a restaurant with your portable oxygen source, ask to be seated in the nonsmoking section.  · Stay at least 5 feet away from gas stoves, candles, lighted fireplaces, or other heat sources.  · Do not use materials that burn easily (flammable) while using your oxygen.  · If you use an oxygen cylinder, make sure it is secured to some fixed object or in a stand. If you use liquid oxygen, make sure the vessel is kept upright to keep the oxygen from pouring out. Liquid oxygen is so cold it can hurt your skin.  · If you use an oxygen concentrator, call your electric company so you will be given priority service if your power goes out. Avoid using extension cords, if possible.  · Regularly test your smoke detectors at home to make sure they work. If you receive care in your home from a nurse or other health care provider, he or she may also check to make sure your smoke detectors work.  GUIDELINES FOR CLEANING YOUR EQUIPMENT  · Wash the nasal prongs with a liquid soap. Thoroughly rinse them once or twice a week.  · Replace the prongs every 2 to 4 weeks. If you have an infection (cold, pneumonia) change them when you are well.  · Your health care provider will give you instructions on how to clean your transtracheal catheter.  · The humidifier bottle should be washed with soap and warm water and rinsed thoroughly between each refill. Air-dry the bottle before filling it with sterile or distilled water. The bottle and its top should be disinfected after they are cleaned.  · If you use an oxygen concentrator, unplug the unit. Then wipe down the cabinet with a damp cloth and dry it daily. The air filter should be cleaned at least twice a week.  · Follow your home [employer] equipment and service company's directions for cleaning the compressor filter.  HOME CARE INSTRUCTIONS   · Do not change the flow of oxygen unless directed by your health care provider.  · Do not use  alcohol or other sedating drugs unless instructed. They slow your breathing rate.  · Do not use materials that burn easily (flammable) while using your oxygen.  · Always keep a spare tank of oxygen. Plan ahead for holidays when you may not be able to get a prescription filled.  · Use water-based lubricants on your lips or nostrils. Do not use an oil-based product like petroleum jelly.  · To prevent your cheeks or the skin behind your ears from becoming irritated, tuck some gauze under the tubing.  · If you have persistent redness under your nose, call your health care provider.  · When you no longer need oxygen, your doctor will have the oxygen discontinued. Oxygen is not addicting or habit forming.  · Use the oxygen as instructed. Too much oxygen can be harmful and too little will not give you the benefit you need.  · Shortness of breath is not always from a lack of oxygen. If your oxygen level is not the cause of your shortness of breath, taking oxygen will not help.  SEEK MEDICAL CARE IF:   · You have frequent headaches.  · You have shortness of breath or a lasting cough.  · You have anxiety.  · You are confused.  · You are drowsy or sleepy all the time.  · You develop an illness which aggravates your breathing.  · You cannot exercise.  · You are restless.  · You have blue lips or fingernails.  · You have difficult or irregular breathing and it is getting worse.  · You have a fever.     This information is not intended to replace advice given to you by your health care provider. Make sure you discuss any questions you have with your health care provider.     Document Released: 03/09/2005 Document Revised: 01/08/2016 Document Reviewed: 07/30/2014  [employer] Patient Education ©2016 [employer]  Apixaban oral tablets  What is this medicine?  APIXABAN (a PIX a ban) is an anticoagulant (blood thinner). It is used to lower the chance of stroke in people with a medical condition called atrial fibrillation.  It is also used to treat or prevent blood clots in the lungs or in the veins.  This medicine may be used for other purposes; ask your health care provider or pharmacist if you have questions.  COMMON BRAND NAME(S): Eliquis  What should I tell my health care provider before I take this medicine?  They need to know if you have any of these conditions:  -bleeding disorders  -bleeding in the brain  -blood in your stools (black or tarry stools) or if you have blood in your vomit  -history of stomach bleeding  -kidney disease  -liver disease  -mechanical heart valve  -an unusual or allergic reaction to apixaban, other medicines, foods, dyes, or preservatives  -pregnant or trying to get pregnant  -breast-feeding  How should I use this medicine?  Take this medicine by mouth with a glass of water. Follow the directions on the prescription label. You can take it with or without food. If it upsets your stomach, take it with food. Take your medicine at regular intervals. Do not take it more often than directed. Do not stop taking except on your doctor's advice. Stopping this medicine may increase your risk of a blot clot. Be sure to refill your prescription before you run out of medicine.  Talk to your pediatrician regarding the use of this medicine in children. Special care may be needed.  Overdosage: If you think you have taken too much of this medicine contact a poison control center or emergency room at once.  NOTE: This medicine is only for you. Do not share this medicine with others.  What if I miss a dose?  If you miss a dose, take it as soon as you can. If it is almost time for your next dose, take only that dose. Do not take double or extra doses.  What may interact with this medicine?  This medicine may interact with the following:  -aspirin and aspirin-like medicines  -certain medicines for fungal infections like ketoconazole and itraconazole  -certain medicines for seizures like carbamazepine and phenytoin  -certain  medicines that treat or prevent blood clots like warfarin, enoxaparin, and dalteparin  -clarithromycin  -NSAIDs, medicines for pain and inflammation, like ibuprofen or naproxen  -rifampin  -ritonavir  -Jameson's wort  This list may not describe all possible interactions. Give your health care provider a list of all the medicines, herbs, non-prescription drugs, or dietary supplements you use. Also tell them if you smoke, drink alcohol, or use illegal drugs. Some items may interact with your medicine.  What should I watch for while using this medicine?  Visit your doctor or health care professional for regular checks on your progress.  Notify your doctor or health care professional and seek emergency treatment if you develop breathing problems; changes in vision; chest pain; severe, sudden headache; pain, swelling, warmth in the leg; trouble speaking; sudden numbness or weakness of the face, arm or leg. These can be signs that your condition has gotten worse.  If you are going to have surgery or other procedure, tell your doctor that you are taking this medicine.  What side effects may I notice from receiving this medicine?  Side effects that you should report to your doctor or health care professional as soon as possible:  -allergic reactions like skin rash, itching or hives, swelling of the face, lips, or tongue  -signs and symptoms of bleeding such as bloody or black, tarry stools; red or dark-brown urine; spitting up blood or brown material that looks like coffee grounds; red spots on the skin; unusual bruising or bleeding from the eye, gums, or nose  This list may not describe all possible side effects. Call your doctor for medical advice about side effects. You may report side effects to FDA at 7-846-FDA-1783.  Where should I keep my medicine?  Keep out of the reach of children.  Store at room temperature between 20 and 25 degrees C (68 and 77 degrees F). Throw away any unused medicine after the expiration  "date.  NOTE: This sheet is a summary. It may not cover all possible information. If you have questions about this medicine, talk to your doctor, pharmacist, or health care provider.  © 2018 Elsevier/Gold Standard (2017-07-10 11:54:23)  Influenza, Adult  Influenza (“the flu\") is an infection in the lungs, nose, and throat (respiratory tract). It is caused by a virus. The flu causes many common cold symptoms, as well as a high fever and body aches. It can make you feel very sick.  The flu spreads easily from person to person (is contagious). Getting a flu shot (influenza vaccination) every year is the best way to prevent the flu.  Follow these instructions at home:  · Take over-the-counter and prescription medicines only as told by your doctor.  · Use a cool mist humidifier to add moisture (humidity) to the air in your home. This can make it easier to breathe.  · Rest as needed.  · Drink enough fluid to keep your pee (urine) clear or pale yellow.  · Cover your mouth and nose when you cough or sneeze.  · Wash your hands with soap and water often, especially after you cough or sneeze. If you cannot use soap and water, use hand .  · Stay home from work or school as told by your doctor. Unless you are visiting your doctor, try to avoid leaving home until your fever has been gone for 24 hours without the use of medicine.  · Keep all follow-up visits as told by your doctor. This is important.  How is this prevented?  · Getting a yearly (annual) flu shot is the best way to avoid getting the flu. You may get the flu shot in late summer, fall, or winter. Ask your doctor when you should get your flu shot.  · Wash your hands often or use hand  often.  · Avoid contact with people who are sick during cold and flu season.  · Eat healthy foods.  · Drink plenty of fluids.  · Get enough sleep.  · Exercise regularly.  Contact a doctor if:  · You get new symptoms.  · You have:  ¨ Chest pain.  ¨ Watery poop " (diarrhea).  ¨ A fever.  · Your cough gets worse.  · You start to have more mucus.  · You feel sick to your stomach (nauseous).  · You throw up (vomit).  Get help right away if:  · You start to be short of breath or have trouble breathing.  · Your skin or nails turn a bluish color.  · You have very bad pain or stiffness in your neck.  · You get a sudden headache.  · You get sudden pain in your face or ear.  · You cannot stop throwing up.  This information is not intended to replace advice given to you by your health care provider. Make sure you discuss any questions you have with your health care provider.  Document Released: 09/26/2009 Document Revised: 05/25/2017 Document Reviewed: 10/11/2016  [employer] Patient Education © 2017 [employer]  Discharge Instructions    Discharged to home by car with relative. Discharged via wheelchair, hospital escort: Yes.  Special equipment needed: Not Applicable    Be sure to schedule a follow-up appointment with your primary care doctor or any specialists as instructed.     Discharge Plan:   Influenza Vaccine Indication: Not indicated: Previously immunized this influenza season and > 8 years of age    I understand that a diet low in cholesterol, fat, and sodium is recommended for good health. Unless I have been given specific instructions below for another diet, I accept this instruction as my diet prescription.   Other diet: Renal    Special Instructions: None    · Is patient discharged on Warfarin / Coumadin?   No     Depression / Suicide Risk    As you are discharged from this [facility] facility, it is important to learn how to keep safe from harming yourself.    Recognize the warning signs:  · Abrupt changes in personality, positive or negative- including increase in energy   · Giving away possessions  · Change in eating patterns- significant weight changes-  positive or negative  · Change in sleeping patterns- unable to sleep or sleeping all the  time   · Unwillingness or inability to communicate  · Depression  · Unusual sadness, discouragement and loneliness  · Talk of wanting to die  · Neglect of personal appearance   · Rebelliousness- reckless behavior  · Withdrawal from people/activities they love  · Confusion- inability to concentrate     If you or a loved one observes any of these behaviors or has concerns about self-harm, here's what you can do:  · Talk about it- your feelings and reasons for harming yourself  · Remove any means that you might use to hurt yourself (examples: pills, rope, extension cords, firearm)  · Get professional help from the community (Mental Health, Substance Abuse, psychological counseling)  · Do not be alone:Call your Safe Contact- someone whom you trust who will be there for you.  · Call your local CRISIS HOTLINE 942-9467 or 055-983-4618  · Call your local Children's Mobile Crisis Response Team [place] (546) 300-9261 or www.[employer]  · Call the toll free National Suicide Prevention Hotlines   · National Suicide Prevention Lifeline 732-861-YIPY (8637)  · National Hope Line Network 800-SUICIDE (994-8400)

## 2019-03-03 NOTE — FLOWSHEET NOTE
Respiratory Therapy Update    Interdisciplinary Plan of Care-Goals (Indications)  Bronchodilator Indications: History / Diagnosis;Physical Exam / Hyperinflation / Wheezing (bronchospasm);Strong Subjective / Objective Improvement (03/03/19 1040)  Interdisciplinary Plan of Care-Outcomes   Bronchodilator Outcome: Diminished Wheezing and Volume of Air Movement Increased;Patient at Stable Baseline (03/03/19 1040)    #PEP/CPT (Manual) Initial: Initial (03/01/19 1850)     #SVN Performed: Yes (03/03/19 1040)    Cough: Moist;Non Productive (03/03/19 1040)  Sputum Amount: Unable to Evaluate (03/03/19 1040)  Sputum Color: Unable to Evaluate (03/03/19 1040)  Sputum Consistency: Unable to Evaluate (03/03/19 0630)                  O2 (LPM): 0 (03/03/19 1040)  O2 Daily Delivery Respiratory : Room Air with O2 Available (03/03/19 1040)    Breath Sounds  Pre/Post Intervention: Pre Intervention Assessment (03/03/19 1040)  RUL Breath Sounds: Expiratory Wheezes;Fine Crackles (03/03/19 1040)  RML Breath Sounds: Expiratory Wheezes;Fine Crackles (03/03/19 1040)  RLL Breath Sounds: Diminished;Expiratory Wheezes (03/03/19 1040)  DEBORA Breath Sounds: Expiratory Wheezes;Fine Crackles (03/03/19 1040)  LLL Breath Sounds: Diminished;Expiratory Wheezes (03/03/19 1040)      Events/Summary/Plan: SVN via mask in fowlers w/o distress. (03/03/19 1040)

## 2019-03-03 NOTE — FLOWSHEET NOTE
Respiratory Therapy Update    Interdisciplinary Plan of Care-Goals (Indications)  Bronchodilator Indications: History / Diagnosis;Physical Exam / Hyperinflation / Wheezing (bronchospasm);Strong Subjective / Objective Improvement (03/03/19 0630)  Interdisciplinary Plan of Care-Outcomes   Bronchodilator Outcome: Diminished Wheezing and Volume of Air Movement Increased;Patient at Stable Baseline (03/03/19 0630)    #PEP/CPT (Manual) Initial: Initial (03/01/19 1850)     #SVN Performed: Yes (03/03/19 0630)    Cough: Congested;Strong (03/03/19 0630)  Sputum Amount: Unable to Evaluate (03/03/19 0630)  Sputum Color: Unable to Evaluate (03/03/19 0630)  Sputum Consistency: Unable to Evaluate (03/03/19 0630)                  O2 (LPM): 3 (03/03/19 0630)  O2 Daily Delivery Respiratory : Silicone Nasal Cannula (03/03/19 0630)    Breath Sounds  Pre/Post Intervention: Pre Intervention Assessment (03/03/19 0630)  RUL Breath Sounds: Coarse Crackles (03/03/19 0630)  RML Breath Sounds: Coarse Crackles (03/03/19 0630)  RLL Breath Sounds: Diminished (03/03/19 0630)  DEBORA Breath Sounds: Coarse Crackles (03/03/19 0630)  LLL Breath Sounds: Diminished (03/03/19 0630)      Events/Summary/Plan: SVN done with cpt and mdi sitting at bedside w/o distress. (03/03/19 0630)

## 2019-03-04 DIAGNOSIS — M31.30 WEGENER'S GRANULOMATOSIS (GRANULOMATOSIS WITH POLYANGIITIS): ICD-10-CM

## 2019-03-04 LAB
BACTERIA SPEC RESP CULT: ABNORMAL
ETEST SENSITIVITY ETEST: NORMAL
SIGNIFICANT IND 70042: ABNORMAL
SITE SITE: ABNORMAL
SOURCE SOURCE: ABNORMAL

## 2019-03-04 NOTE — TELEPHONE ENCOUNTER
Have we ever prescribed this med? Yes.  If yes, what date? 3/14/18    Last OV: 1/24/19    Next OV: 4/18/19    DX: COPD    Medications: Fluticasone Furoate-Vilanterol (BREO ELLIPTA) 200-25 MCG/INH AEROSOL POWDER, BREATH ACTIVATED

## 2019-03-06 DIAGNOSIS — B37.0 THRUSH: ICD-10-CM

## 2019-03-06 RX ORDER — FLUCONAZOLE 100 MG/1
100 TABLET ORAL DAILY
Qty: 10 TAB | Refills: 0 | Status: SHIPPED | OUTPATIENT
Start: 2019-03-06 | End: 2019-03-11

## 2019-03-07 ENCOUNTER — TELEPHONE (OUTPATIENT)
Dept: NEPHROLOGY | Facility: MEDICAL CENTER | Age: 79
End: 2019-03-07

## 2019-03-08 RX ORDER — CIPROFLOXACIN 500 MG/1
500 TABLET, FILM COATED ORAL 2 TIMES DAILY
Qty: 28 TAB | Refills: 0 | Status: ON HOLD | OUTPATIENT
Start: 2019-03-08 | End: 2019-05-16

## 2019-03-12 ENCOUNTER — HOSPITAL ENCOUNTER (OUTPATIENT)
Dept: LAB | Facility: MEDICAL CENTER | Age: 79
End: 2019-03-12
Attending: INTERNAL MEDICINE
Payer: MEDICARE

## 2019-03-12 ENCOUNTER — APPOINTMENT (RX ONLY)
Dept: URBAN - METROPOLITAN AREA CLINIC 36 | Facility: CLINIC | Age: 79
Setting detail: DERMATOLOGY
End: 2019-03-12

## 2019-03-12 DIAGNOSIS — Z48.02 ENCOUNTER FOR REMOVAL OF SUTURES: ICD-10-CM

## 2019-03-12 DIAGNOSIS — M31.30 WEGENER'S GRANULOMATOSIS (GRANULOMATOSIS WITH POLYANGIITIS): ICD-10-CM

## 2019-03-12 PROCEDURE — 36415 COLL VENOUS BLD VENIPUNCTURE: CPT

## 2019-03-12 PROCEDURE — 86255 FLUORESCENT ANTIBODY SCREEN: CPT

## 2019-03-12 PROCEDURE — ? SUTURE REMOVAL (GLOBAL PERIOD)

## 2019-03-12 PROCEDURE — 99024 POSTOP FOLLOW-UP VISIT: CPT

## 2019-03-12 ASSESSMENT — LOCATION ZONE DERM: LOCATION ZONE: ARM

## 2019-03-12 ASSESSMENT — LOCATION DETAILED DESCRIPTION DERM: LOCATION DETAILED: LEFT DISTAL DORSAL FOREARM

## 2019-03-12 ASSESSMENT — LOCATION SIMPLE DESCRIPTION DERM: LOCATION SIMPLE: LEFT FOREARM

## 2019-03-12 NOTE — PROCEDURE: SUTURE REMOVAL (GLOBAL PERIOD)
Add 74191 Cpt? (Important Note: In 2017 The Use Of 28839 Is Being Tracked By Cms To Determine Future Global Period Reimbursement For Global Periods): yes
Detail Level: Detailed

## 2019-03-15 LAB — ANCA IGG TITR SER IF: NORMAL {TITER}

## 2019-03-26 ENCOUNTER — OFFICE VISIT (OUTPATIENT)
Dept: RHEUMATOLOGY | Facility: MEDICAL CENTER | Age: 79
End: 2019-03-26
Payer: MEDICARE

## 2019-03-26 VITALS
DIASTOLIC BLOOD PRESSURE: 50 MMHG | HEART RATE: 98 BPM | BODY MASS INDEX: 21.12 KG/M2 | OXYGEN SATURATION: 94 % | TEMPERATURE: 98.5 F | RESPIRATION RATE: 14 BRPM | WEIGHT: 138.89 LBS | SYSTOLIC BLOOD PRESSURE: 92 MMHG

## 2019-03-26 DIAGNOSIS — N18.6 ESRD ON DIALYSIS (HCC): Chronic | ICD-10-CM

## 2019-03-26 DIAGNOSIS — R41.3 MEMORY CHANGE: ICD-10-CM

## 2019-03-26 DIAGNOSIS — Z79.52 LONG TERM CURRENT USE OF SYSTEMIC STEROIDS: ICD-10-CM

## 2019-03-26 DIAGNOSIS — J44.1 COPD EXACERBATION (HCC): ICD-10-CM

## 2019-03-26 DIAGNOSIS — I10 ESSENTIAL HYPERTENSION: ICD-10-CM

## 2019-03-26 DIAGNOSIS — M31.30 WEGENER'S GRANULOMATOSIS: Chronic | ICD-10-CM

## 2019-03-26 DIAGNOSIS — Z99.2 ESRD ON DIALYSIS (HCC): Chronic | ICD-10-CM

## 2019-03-26 DIAGNOSIS — Z95.0 PACEMAKER: ICD-10-CM

## 2019-03-26 DIAGNOSIS — Z79.01 CHRONIC ANTICOAGULATION: ICD-10-CM

## 2019-03-26 DIAGNOSIS — I48.0 PAROXYSMAL ATRIAL FIBRILLATION (HCC): ICD-10-CM

## 2019-03-26 DIAGNOSIS — Z22.39 PSEUDOMONAS AERUGINOSA COLONIZATION: Chronic | ICD-10-CM

## 2019-03-26 PROCEDURE — 99214 OFFICE O/P EST MOD 30 MIN: CPT | Performed by: INTERNAL MEDICINE

## 2019-03-26 NOTE — PROGRESS NOTES
Chief Complaint- joint pain    Subjective:   Gilberto Brown is a 78 y.o. male here today for follow up of rheumatological issues    This is a follow-up visit for this complicated patient who we see in this clinic for Wegener's granulomatosis initially diagnosed in September 2011 with progressive renal insufficiency and now on permanent renal dialysis.  Patient underwent a renal biopsy that confirmed Wegener's granulomatosis.  Patient is currently on prednisone at 7 mg p.o. daily with a negative ANCA.   Of note we have tried other medications such as Cytoxan, CellCept rituximab all of which resulted in severe infections.  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  Patient does have a long history of lung problems since childhood status post whooping cough per patient report.        Patient today is worried about cognitive dysfunction, states his memory is not as good as it used to be and does not seem to be able to process information as well as he thinks he should be.       Additional comorbidities include atrial fibrillation with a pacemaker in place also recent diagnosis CVA and is on permanent anticoagulation. Patient also on oxygen at home 3 L per minute.  Patient also with history of rotator cuff tear and right shoulder in the past.     S/p Cytoxan-n/v  Off of Cellcept 5/2016  Rituximab infusions 1/14/2016, 1/28/2016-patient developed severe infection and was hospitalized     GBM neg 1/2012  ANCA neg 1/2015; ANCA 1:20  11/2015; ANCA neg 2/2016; ANCA neg 5/2016; ANCA neg 12/2016; ANCA neg 3/2017; ANCA neg 7/2017; ANCA neg 2/2018; ANCA neg 5/2018; ANCA neg 12/2018; ANCA neg 3/2019  C3 90 normal 10/2011  C4 21 10/2011  OFELIA neg 10/2011  Uric acid 4.4 1/2016  HBsAg/HBcAb neg 3/2019  HCV neg 3/2019       Of note  Dr Coral Corona nephrology  Dr Gutierrez pulmonology   Dr Chavarria Cardiologist [phone]  Dr Nunez Opthalmologist [phone]         Current  medicines (including changes today)  Current Outpatient Prescriptions   Medication Sig Dispense Refill   • BREO ELLIPTA 200-25 MCG/INH AEROSOL POWDER, BREATH ACTIVATED INHALE 1 PUFF BY MOUTH EVERY DAY. RINSE MOUTH AFTER USE 1 Each 5   • predniSONE (DELTASONE) 20 MG Tab Take 40mg daily for 2 days, then take 30mg for 2 days, then take 20mg daily for 2 days, then take 9mg daily as before 9 Tab 0   • albuterol (PROAIR HFA) 108 (90 Base) MCG/ACT Aero Soln inhalation aerosol Inhale 2 Puffs by mouth every 6 hours as needed for Shortness of Breath. 8.5 g 0   • B Complex-C-Folic Acid (DIALYVITE TABLET) Tab Take 1 Tab by mouth every day.     • ipratropium-albuterol (DUONEB) 0.5-2.5 (3) MG/3ML nebulizer solution 3 mL by Nebulization route every 6 hours as needed for Shortness of Breath.     • Cholecalciferol (VITAMIN D3) 5000 units Cap Take 1 Cap by mouth every day.     • rosuvastatin (CRESTOR) 20 MG Tab Take 20 mg by mouth every evening.     • sodium chloride (HYPER-SAL) 7 % Nebu Soln INHALE 1 VIAL VIA NEBULIZER TWICE DAILY 240 mL 11   • tamsulosin (FLOMAX) 0.4 MG capsule Take 1 Cap by mouth every day. 90 Cap 3   • sevelamer carbonate (RENVELA) 800 MG Tab tablet TAKE 1 TABLET BY MOUTH THREE TIMES DAILY WITH MEALS 270 Tab 7   • apixaban (ELIQUIS) 2.5mg Tab Take 2.5 mg by mouth every day.     • metoprolol (LOPRESSOR) 25 MG Tab Take 25 mg by mouth 2 times a day.     • finasteride (PROSCAR) 5 MG Tab Take 5 mg by mouth every day.     • aspirin EC (ECOTRIN) 81 MG TBEC Take 81 mg by mouth every day.     • ciprofloxacin (CIPRO) 500 MG Tab Take 1 Tab by mouth 2 times a day. (Patient not taking: Reported on 3/26/2019) 28 Tab 0   • oseltamivir (TAMIFLU) 30 MG Cap Tae one dose tomorrow after dialysis (Patient not taking: Reported on 3/26/2019) 1 Cap 0   • raNITidine (ZANTAC) 150 MG Tab Take 150 mg by mouth 2 times a day as needed.       No current facility-administered medications for this visit.      He  has a past medical history of  A-fib ([facility]); Anemia; Arthritis; ASTHMA; BPH (benign prostatic hyperplasia); Breath shortness; Bronchitis; Cataract; COPD; Dialysis; Dyslipidemia; EMPHYSEMA; GERD (gastroesophageal reflux disease); Hypertension; Oxygen decrease; Pacemaker (1988); Pain (05/09/2018); Pneumonia (2017); Pseudomonas pneumonia ([facility]); Pulmonary histoplasmosis ([facility]); Renal disorder; Sick sinus syndrome ([facility]); Sleep apnea; Snoring; Stroke ([facility]) (02/2018); Unspecified hemorrhagic conditions; and Wegener's disease, pulmonary ([facility]).    ROS   Other than what is mentioned in HPI or physical exam, there is no history of headaches, double vision or blurred vision. No temporal tenderness or jaw claudication. No history of cataracts or glaucoma. No trouble swallowing difficulties or sore throats.  No chest complaints including chest pain, cough or sputum production. No GI complaints including nausea, vomiting, change in bowel habits, or past peptic ulcer disease. No history of blood in the stools. No urinary complaints including dysuria or frequency. No history of alopecia, photosensitivity, oral ulcerations, Raynaud's phenomena.       Objective:     Blood pressure (!) 92/50, pulse 98, temperature 36.9 °C (98.5 °F), temperature source Temporal, resp. rate 14, weight 63 kg (138 lb 14.2 oz), SpO2 94 %. Body mass index is 21.12 kg/m².   Physical Exam:    Constitutional: Alert and oriented X3, patient is talkative with good eye contact, patient looks thin and tired.Skin: Warm, dry, good turgor, no rashes in visible areas.Eye: Equal, round and reactive, conjunctiva clear, lids normal EOM intactENMT: Lips without lesions, good dentition, no oropharyngeal ulcers, moist buccal mucosa, pinna without deformity, mild superficial sore on the edge of the right nostril, thrush in his mouth neck: Trachea midline, no masses, no thyromegaly.Lymph:  No cervical lymphadenopathy, no axillary lymphadenopathy, no supraclavicular lymphadenopathyRespiratory: Unlabored  respiratory effort, lungs clear to auscultation, no wheezes, no ronchi.Cardiovascular: Normal S1, S2, no murmur, no edema.Abdomen: Soft, non-tender, no masses, no hepatosplenomegaly.Psych: Alert and oriented x3, normal affect and mood.Neuro: Cranial nerves 2-12 are grossly intact, no loss of sensation LEExt:no joint laxity noted in bilateral arms, no joint laxity noted in bilateral legs, 1+ pulses lower extremities 2+ pulses in bilateral upper extremities, patient has a vascular shunt for dialysis in the right upper arm, no evidence of digital tip ulcers no periungual erythema no petechial hemorrhages no carotid bruits no subclavian bruits    Lab Results   Component Value Date/Time    HEPBCORIGM Negative 03/02/2019 09:10 AM    HEPBSAG Negative 03/02/2019 09:10 AM     Lab Results   Component Value Date/Time    HEPCAB Negative 03/02/2019 09:10 AM     Lab Results   Component Value Date/Time    SODIUM 139 03/03/2019 02:18 AM    POTASSIUM 4.1 03/03/2019 02:18 AM    CHLORIDE 101 03/03/2019 02:18 AM    CO2 26 03/03/2019 02:18 AM    GLUCOSE 182 (H) 03/03/2019 02:18 AM    BUN 43 (H) 03/03/2019 02:18 AM    CREATININE 3.13 (H) 03/03/2019 02:18 AM    CREATININE 1.0 09/23/2008 09:36 AM      Lab Results   Component Value Date/Time    WBC 7.5 03/02/2019 12:56 AM    WBC 7.6 03/19/2012 12:00 AM    RBC 3.06 (L) 03/02/2019 12:56 AM    RBC 2.25 (LL) 03/19/2012 12:00 AM    HEMOGLOBIN 9.3 (L) 03/02/2019 12:56 AM    HEMATOCRIT 30.9 (L) 03/02/2019 12:56 AM    .0 (H) 03/02/2019 12:56 AM     (H) 03/19/2012 12:00 AM    MCH 30.4 03/02/2019 12:56 AM    MCH 33.8 03/19/2012 12:00 AM    MCHC 30.1 (L) 03/02/2019 12:56 AM    MPV 8.8 (L) 03/02/2019 12:56 AM    NEUTSPOLYS 95.50 (H) 03/02/2019 12:56 AM    LYMPHOCYTES 1.90 (L) 03/02/2019 12:56 AM    MONOCYTES 1.50 03/02/2019 12:56 AM    EOSINOPHILS 0.00 03/02/2019 12:56 AM    BASOPHILS 0.40 03/02/2019 12:56 AM    HYPOCHROMIA 1+ 10/28/2011 05:35 AM    ANISOCYTOSIS 1+ 07/03/2017 05:15 PM       Lab Results   Component Value Date/Time    CALCIUM 8.7 03/03/2019 02:18 AM    ASTSGOT 16 03/01/2019 06:56 AM    ALTSGPT 17 03/01/2019 06:56 AM    ALKPHOSPHAT 45 03/01/2019 06:56 AM    TBILIRUBIN 0.5 03/01/2019 06:56 AM    ALBUMIN 3.7 03/01/2019 06:56 AM    ALBUMIN 1.28 (L) 10/02/2011 04:30 AM    TOTPROTEIN 6.3 03/01/2019 06:56 AM    TOTPROTEIN 4.50 (L) 10/02/2011 04:30 AM     Lab Results   Component Value Date/Time    URICACID 4.4 01/21/2016 08:36 AM    ANTINUCAB None Detected 10/02/2011 04:30 AM     Lab Results   Component Value Date/Time    [id number] 90 10/02/2011 04:30 AM    J4KVTMCBVZN 21 10/02/2011 04:30 AM     Lab Results   Component Value Date/Time    AGBMAB Negative 01/18/2012 03:50 AM    GBMABA Negative 01/18/2012 03:50 AM    ANCAIGG <1:20 03/12/2019 10:50 AM    [id number] 90 10/02/2011 04:30 AM     Lab Results   Component Value Date/Time    CPKTOTAL 18 09/27/2011 05:05 AM     Lab Results   Component Value Date/Time    FLTYPE Stool 09/22/2006 01:04 PM     Results for orders placed during the hospital encounter of 02/05/19   DX-CERVICAL SPINE-2 OR 3 VIEWS    Impression Mild anterolisthesis of C4 on C5, likely due to facet arthropathy.    Moderate degenerative change of the cervical spine.    Diffuse osseous demineralization.     Results for orders placed in visit on 05/02/13   CT-CHEST, HIGH RESOLUTION LUNG    Impression 1. Bilateral lower lobe bronchiectasis, grossly unchanged compared with the prior conventional chest CT 1/14/12.  2. Minimal bilateral lower lobe interstitial opacities as well as confluent opacities in those areas, greater on the left, suggesting active airspace disease and/or atelectasis.  3. Probable uncalcified left lower lobe nodule, an equivocal finding with high-resolution technique.  Conventional chest CT would be needed for confirmation.  Note that no similar lesion was identified on the prior chest scan 1/14/12.            INTERPRETING LOCATION: [address] [place] [address], [id number]      Results for orders placed during the hospital encounter of 07/27/17   CT-CHEST (THORAX) W/O    Impression 1.  There is no significant interval change in the diffuse bilateral subpleural interstitial lung disease with a basilar predominance.  2.  Bibasilar and medial segment right middle lobe bronchiectasis are again seen with minimal fluid now seen on the left within the dilated bronchi. This is most consistent with postinflammatory/infectious change.  3.  There is underlying emphysema/COPD with upper lobe paraseptal blebs.  4.  There is evidence of previous granulomatous disease with calcified granulomata, calcified nodes, and calcifications in the liver and spleen. This is consistent with the history of pulmonary histoplasmosis.  5.  There are no new suspicious findings.  6.  Enthesopathy again noted in the thoracic spine.         Assessment and Plan:     1. Wegener's granulomatosis (HCC)  Clinically and serologically in remission, currently on prednisone at 7 mg p.o. daily, we will continue to try and taper down prednisone recheck ANCA in about 6 weeks if negative then decrease prednisone down to 6 mg p.o. daily.  - REFERRAL TO NEUROLOGY  - Anti-Neutrophil Cytoplasmic Antibodies Screen; Future    2. Long term current use of systemic steroids  Currently tapering down on prednisone we tried other medications for which patient had complications and serious adverse events stable on the prednisone and in the process of tapering  - Anti-Neutrophil Cytoplasmic Antibodies Screen; Future    3. Pseudomonas aeruginosa colonization  Followed by pulmonology and complicating patient's recovery  - Anti-Neutrophil Cytoplasmic Antibodies Screen; Future    4. ESRD on dialysis (HCC)  - REFERRAL TO NEUROLOGY  - Anti-Neutrophil Cytoplasmic Antibodies Screen; Future    5. Memory change  Cognitive dysfunction, will refer to neurology memory clinic for cognitive evaluation  - REFERRAL TO NEUROLOGY    6. COPD exacerbation  ([facility])  Followed by pulmonology    7. Paroxysmal atrial fibrillation (HCC)  On chronic anticoagulation  This will impact with kind of medications we can use for this patient's arthritis, NSAIDs are contraindicated because of increased risk of bleeding while on chronic anticoagulation    8. Chronic anticoagulation  This will impact with kind of medications we can use for this patient's arthritis, NSAIDs are contraindicated because of increased risk of bleeding while on chronic anticoagulation    9. Pacemaker    10. Essential hypertension  May impact the type of medications we can use for this patient's arthritis. We will have to keep this under advisement.    Followup: Return in about 3 months (around 6/26/2019). or sooner power Brown  was seen 30 minutes face-to-face of which more than 50% of the time was spent counseling the patient (excluding time for procedures)  regarding  rheumatological condition and care. Therapy was discussed in detail.      Please note that this dictation was created using voice recognition software. I have made every reasonable attempt to correct obvious errors, but I expect that there are errors of grammar and possibly content that I did not discover before finalizing the note.

## 2019-03-26 NOTE — LETTER
[facility]   [address] [place] [address], [address]  [place] [address]  Phone: [phone]  Fax: [phone]              Encounter Date: 3/26/2019    Dear Dr. Forrest ref. provider found,    It was a pleasure seeing your patient, Gilberto Brown, on 3/26/2019. Diagnoses of Wegener's granulomatosis (HCC), Long term current use of systemic steroids, Pseudomonas aeruginosa colonization, ESRD on dialysis (HCC), Memory change, COPD exacerbation (HCC), Paroxysmal atrial fibrillation (HCC), Chronic anticoagulation, Pacemaker, and Essential hypertension were pertinent to this visit.     Please find attached progress note which includes the history I obtained from Mr. Brown, my physical examination findings, my impression and recommendations.      Once again, it was a pleasure participating in your patient's care.  Please feel free to contact me if you have any questions or if I can be of any further assistance to your patients.      Sincerely,    Cintia Ariza M.D.  Electronically Signed          PROGRESS NOTE:  No notes on file

## 2019-03-27 LAB
FUNGUS SPEC CULT: ABNORMAL
FUNGUS SPEC CULT: ABNORMAL
SIGNIFICANT IND 70042: ABNORMAL
SITE SITE: ABNORMAL
SOURCE SOURCE: ABNORMAL

## 2019-04-16 ENCOUNTER — OFFICE VISIT (OUTPATIENT)
Dept: NEUROLOGY | Facility: MEDICAL CENTER | Age: 79
End: 2019-04-16
Payer: MEDICARE

## 2019-04-16 VITALS
WEIGHT: 137.2 LBS | BODY MASS INDEX: 20.79 KG/M2 | OXYGEN SATURATION: 95 % | HEART RATE: 92 BPM | HEIGHT: 68 IN | DIASTOLIC BLOOD PRESSURE: 60 MMHG | SYSTOLIC BLOOD PRESSURE: 100 MMHG

## 2019-04-16 DIAGNOSIS — R41.3 MEMORY LOSS: ICD-10-CM

## 2019-04-16 DIAGNOSIS — G25.3 MYOCLONUS: ICD-10-CM

## 2019-04-16 PROCEDURE — 99205 OFFICE O/P NEW HI 60 MIN: CPT

## 2019-04-16 NOTE — PROGRESS NOTES
"Referred by Cintia Ariza M.D for cognitive dysfunction     CC: memory loss, jerks of arms and legs      HPI:    77 yo man with Wegener's granulomatosis,sick sinus syndrome  with memory loss. States symptoms started in 1985 with short term memory loss.Initial symptoms were difficulty thinking and needed more time to think things through. He had a neurologist and psychologist back in 80s they never told him the diagnosis.  Since mid 80's he started HD and states he needs long time to recover from the procedure.  He has difficulty sleeping and states \" I have no REM sleep\". He has sleep apnea and has CPAP and states 02 helps.  Leaves burners on, stove on, would watch TV and forget what he is watching.  He has a lot of time before dialysis and loses time not remembering what he does in between.Forgets names appointments and conversations, forgets whom he talks about with his wife.  Wife states his organization at home is \" a mess\". Almost forgot today's appointment.  Has jerking of muscles usually R arm starts and needs in left leg. Fully aware of this.  He used to have   Tired all the time as he does not sleep well.  Walks with a cane and can not walk too far.  Energy is low.  Fell down 1 year ago after his left leg buckled.  No falls since then.  Pacemaker for SSS and got it 1985.    Mother had AD and she was 79 when she developed alzheimer's disease.  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  8 years on HD     ROS:   Constitutional: No fevers or chills.malaise, generalized weakness   Eyes: No blurry vision or eye pain.  ENT: No dysphagia or hearing loss.  Respiratory: sob+  Cardiovascular: No chest pain or palpitations.  GI: No nausea, vomiting, or diarrhea.  : No urinary incontinence or dysuria.  Musculoskeletal: No joint swelling or arthralgias.  Skin: No skin rashes.  Neuro: as per HPI   Endocrine: No heat or cold intolerance. No polydipsia or polyuria.  Psych: some depression, feeling down, " tiredness   Heme/Lymph: No easy bruising or swollen lymph nodes.      Past Medical History:   Past Medical History:   Diagnosis Date   • A-fib (HCC)        • Anemia    • Arthritis     arms, legs, shoulders   • ASTHMA    • BPH (benign prostatic hyperplasia)    • Breath shortness    • Bronchitis     chronic   • Cataract     removed bilat   • COPD    • Dialysis     [facility] M-[facility],    • Dyslipidemia    • EMPHYSEMA    • GERD (gastroesophageal reflux disease)    • Hypertension    • Oxygen decrease     O2 3liters @ HS and dialysis   • Pacemaker 1988   • Pain 05/09/2018    shoulders/hips, 5/10   • Pneumonia 2017   • Pseudomonas pneumonia (HCC)    • Pulmonary histoplasmosis (HCC)    • Renal disorder     CKD,    • Sick sinus syndrome (HCC)    • Sleep apnea     uses cpap at noc   • Snoring    • Stroke (HCC) 02/2018   • Unspecified hemorrhagic conditions     bruises easily, fragile skin   • Wegener's disease, pulmonary (HCC)        Past Surgical History:   Past Surgical History:   Procedure Laterality Date   • AV FISTULOGRAM Right 4/12/2016    Procedure: AV FISTULOGRAM , VENOPLASTY X 2;  Surgeon: Nate Syed M.D.;  Location: SURGERY [facility];  Service:    • RECOVERY  9/3/2015    Procedure: IR1 VASCULAR CASE-ELENO RIGHT DIALYSIS FISTULOGRAM, POSSIBLE INTERVENTION;  Surgeon: Recoveryonly Surgery;  Location: SURGERY PRE-POST PROC UNIT [facility];  Service:    • RECOVERY  2/26/2015    Performed by Ir-Recovery Surgery at [facility]ERY [facility]   • RECOVERY  10/3/2014    Performed by Ir-Recovery Surgery at [facility]   • RECOVERY  8/7/2014    Performed by Ir-Recovery Surgery at [facility]   • RECOVERY  12/17/2013    Performed by Ir-Recovery Surgery at [facility]GERY [facility] DAY [facility]   • BRONCHOSCOPY-ENDO  7/18/2013    Performed by Juan F Rubio M.D. at [facility]   • RECOVERY  7/17/2013    Performed by Ir-Recovery Surgery at SURGERY SAME DAY [facility]    • AV FISTULA REVISION  6/9/2012    Performed by NESSA JOHANSEN at SURGERY [facility]   • RECOVERY  4/19/2012    Performed by SURGERY, IR-RECOVERY at SURGERY SAME DAY [facility]   • AV FISTULA CREATION  3/8/2012    Performed by NESSA JOHANSEN at SURGERY [facility]   • GASTROSCOPY WITH BIOPSY  1/17/2012    Performed by ZURDO HARRISON at ENDOSCOPY [facility]   • CATH PLACEMENT  10/8/2011    Performed by NESSA JOHANSEN at SURGERY [facility]   • GASTROSCOPY WITH BALLOON DILATATION  9/28/2011    Performed by KT FERNANDEZ at SURGERY [facility]   • PACEMAKER INSERTION  4/2009   • ROTATOR CUFF REPAIR  2003    right   • HERNIA REPAIR  1990    right inguinal hernia   • HIP REPLACEMENT, TOTAL      right   • TONSILLECTOMY         Social History:   Social History     Social History   • Marital status:      Spouse name: N/A   • Number of children: N/A   • Years of education: N/A     Occupational History   • Not on file.     Social History Main Topics   • Smoking status: Former Smoker     Years: 30.00     Types: Pipe     Quit date: 1/1/1990   • Smokeless tobacco: Former User     Types: Chew   • Alcohol use 4.8 oz/week     7 Glasses of wine, 1 Standard drinks or equivalent per week      Comment: 1/2 glass Red wine nightly   • Drug use: No   • Sexual activity: Yes     Partners: Female     Other Topics Concern   • Not on file     Social History Narrative   • No narrative on file       Family History:   Family History   Problem Relation Age of Onset   • Stroke Father    • Heart Disease Father    • Stroke Mother    • Cancer Unknown        Allergies:   Allergies   Allergen Reactions   • Doxycycline Unspecified     Reports terrible diarrhea   • Erythromycin Unspecified     Abdominal pain.  RXN=before 2011   • Rituximab      Flu like syndrome     Allergies   Allergen Reactions   • Doxycycline Unspecified     Reports terrible diarrhea   • Erythromycin Unspecified     Abdominal  pain.  RXN=before 2011   • Rituximab      Flu like syndrome     Current Outpatient Prescriptions on File Prior to Visit   Medication Sig Dispense Refill   • BREO ELLIPTA 200-25 MCG/INH AEROSOL POWDER, BREATH ACTIVATED INHALE 1 PUFF BY MOUTH EVERY DAY. RINSE MOUTH AFTER USE 1 Each 5   • predniSONE (DELTASONE) 20 MG Tab Take 40mg daily for 2 days, then take 30mg for 2 days, then take 20mg daily for 2 days, then take 9mg daily as before 9 Tab 0   • albuterol (PROAIR HFA) 108 (90 Base) MCG/ACT Aero Soln inhalation aerosol Inhale 2 Puffs by mouth every 6 hours as needed for Shortness of Breath. 8.5 g 0   • B Complex-C-Folic Acid (DIALYVITE TABLET) Tab Take 1 Tab by mouth every day.     • ipratropium-albuterol (DUONEB) 0.5-2.5 (3) MG/3ML nebulizer solution 3 mL by Nebulization route every 6 hours as needed for Shortness of Breath.     • Cholecalciferol (VITAMIN D3) 5000 units Cap Take 1 Cap by mouth every day.     • rosuvastatin (CRESTOR) 20 MG Tab Take 20 mg by mouth every evening.     • sodium chloride (HYPER-SAL) 7 % Nebu Soln INHALE 1 VIAL VIA NEBULIZER TWICE DAILY 240 mL 11   • tamsulosin (FLOMAX) 0.4 MG capsule Take 1 Cap by mouth every day. 90 Cap 3   • sevelamer carbonate (RENVELA) 800 MG Tab tablet TAKE 1 TABLET BY MOUTH THREE TIMES DAILY WITH MEALS 270 Tab 7   • apixaban (ELIQUIS) 2.5mg Tab Take 2.5 mg by mouth every day.     • metoprolol (LOPRESSOR) 25 MG Tab Take 25 mg by mouth 2 times a day.     • finasteride (PROSCAR) 5 MG Tab Take 5 mg by mouth every day.     • aspirin EC (ECOTRIN) 81 MG TBEC Take 81 mg by mouth every day.     • ciprofloxacin (CIPRO) 500 MG Tab Take 1 Tab by mouth 2 times a day. (Patient not taking: Reported on 3/26/2019) 28 Tab 0   • oseltamivir (TAMIFLU) 30 MG Cap Tae one dose tomorrow after dialysis (Patient not taking: Reported on 3/26/2019) 1 Cap 0   • raNITidine (ZANTAC) 150 MG Tab Take 150 mg by mouth 2 times a day as needed.       No current facility-administered medications on file  prior to visit.      Physical Exam:     MOCA 27/30     Vitals:    04/16/19 1008   BP: 100/60   Pulse: 92   SpO2: 95%     Constitutional: Alert and oriented X3, patient is talkative with good eye contact, patient looks thin and tired.Skin: Warm, dry, good turgor, no rashes in visible areas.Eye: Equal, round and reactive, conjunctiva clear, lids normal EOM intactENMT: Lips without lesions, good dentition, no oropharyngeal ulcers, moist buccal mucosa, pinna without deformity, mild superficial sore on the edge of the right nostril, thrush in his mouth neck: Trachea midline, no masses, no thyromegaly.Lymph:  No cervical lymphadenopathy, no axillary lymphadenopathy, no supraclavicular lymphadenopathyRespiratory: Unlabored respiratory effort, lungs clear to auscultation, no wheezes, no ronchi.Cardiovascular: Normal S1, S2, no murmur, no edema.Abdomen: Soft, non-tender, no masses, no hepatosplenomegaly.Psych: Alert and oriented x3, normal affect and mood  Eyes: Sclera anicteric   Funduscopic: Optic discs flat with no evidence of papilledema or pallor.   Neurologic:   Mental Status: Awake, alert, oriented x 3.  MOCA 27/30   Focusing difficulties   Difficulties with registration   Overall solid fund of knowledge   Some executive dysfunction  Attention decreased    Speech: Fluent with normal prosody.   Memory: delayed recall 4/5   Concentration: inattention. Appears tired.             Fund of Knowledge: Appropriate   Cranial Nerves:    CN II: PERRL. No afferent pupillary defect.    CN III, IV, VI: Good eye closure, EOMI.     CN V: Facial sensation intact and symmetric.     CN VII: No facial asymmetry.     CN VIII: Hearing intact.     CN IX and X: Palate elevates symmetrically. Normal gag reflex.    CN XI: Symmetric shoulder shrug.     CN XII: Tongue midline.    Sensory: Intact light touch, vibration and temperature.    Coordination: No evidence of past-pointing on finger to nose testing, no dysdiadochokinesia. Heel to shin  intact.             DTR's: 2+ throughout without clonus.    Babinski: Toes downgoing bilaterally.   Romberg: Negative.   Movements: myoclonic jerks brief lasting 1 second observed in L shoulder/arm   Musculoskeletal:    Strength: 5/5  in upper and lower extremities bilaterally.   Gait: slow, good arm swing, turns well   Tone: decreased bulk with normal tone    Joints: No swelling.     Labs:  Component Results     Component Value Ref Range & Units Status   WBC 7.5  4.8 - 10.8 K/uL Final   RBC 3.06   4.70 - 6.10 M/uL Final   Hemoglobin 9.3   14.0 - 18.0 g/dL Final   Hematocrit 30.9   42.0 - 52.0 % Final   .0   81.4 - 97.8 fL Final   MCH 30.4  27.0 - 33.0 pg Final   MCHC 30.1   33.7 - 35.3 g/dL Final   RDW 60.1   35.9 - 50.0 fL Final   Platelet Count 203  164 - 446 K/uL Final   MPV 8.8   9.0 - 12.9 fL Final   Neutrophils-Polys 95.50   44.00 - 72.00 % Final   Lymphocytes 1.90   22.00 - 41.00 % Final   Monocytes 1.50  0.00 - 13.40 % Final   Eosinophils 0.00  0.00 - 6.90 % Final   Basophils 0.40  0.00 - 1.80 % Final   Immature Granulocytes 0.70  0.00 - 0.90 % Final   Nucleated RBC 0.00  /100 WBC Final   Neutrophils (Absolute) 7.18  1.82 - 7.42 K/uL Final   Comment:   Includes immature neutrophils, if present.   Lymphs (Absolute) 0.14   1.00 - 4.80 K/uL Final   Monos (Absolute) 0.11  0.00 - 0.85 K/uL Final   Eos (Absolute) 0.00  0.00 - 0.51 K/uL Final   Baso (Absolute) 0.03  0.00 - 0.12 K/uL Final   Immature Granulocytes (abs) 0.05  0.00 - 0.11 K/uL Final   NRBC (Absolute) 0.00  K/uL Final       Imaging:      head 2017   Impression       1.  White matter lucencies most consistent with small vessel ischemic change versus demyelination or gliosis.    2.  Otherwise, Head CT without contrast with no acute findings. No evidence of acute cerebral hemorrhage or mass lesion.  3. Sinusitis.       Assessment/Plan:  77 yo man with multiple medical comorbidities including Wegener's granulomatosis, ESRD on HD for 7-8 years,SSS  with pacemaker presents with cognitive dysfunction which was initially noticed in 1985 with short term memory loss. Symptoms now involve difficulty focusing, multitasking, attention to detail. He feels exhausted given three times per week dialysis taking up too many hours out of his day.   His MOCA today was 27/30 with pretty good recall and difficulties with registration,executive tasks and repetition.  I believe that multiple medical conditions including poor sleep and generalized weakness play significant role in his memory complaints. This does not appear to be AD pattern and there is no parkinsonian symptoms, no hallucinations, delusions or changes in personality.  We discussed completing the workup for memory loss and at this time we will also recommend referral to psychologist to help the patient cope with his illnesses.  Plan  Referral to psychologist   Consider Provigil - will have to discuss with his nephrologist ( it may help with energy and perhaps with this medication he could participate in more exercise)   Referral to sleep specialist - to ensure optimal CPAP settings   Discussed PT he does not want to do this at this time - asked him to reconsider in the future   EEG given myoclonic jerks I suspect connection with oxygen levels   We will meet after EEG and neuropsychological testing   CT head w/o contrast to assess for degree of cerebral atrophy as he can not have MRI brain (pacemaker)  RTC after above studies result     Ramon Beltran MD PhD  Behavioral Neurologist   Board certified neurologist  [facility]     Greater than 50% of this 60 minute face to face encounter was devoted to disease state counseling and coordination of care.  Please see above assessment and plan for discussion.

## 2019-04-17 NOTE — PATIENT INSTRUCTIONS
Dementia  Dementia is the loss of two or more brain functions, such as:  · Memory.  · Decision making.  · Behavior.  · Speaking.  · Thinking.  · Problem solving.  There are many types of dementia. The most common type is called progressive dementia. Progressive dementia gets worse with time and it is irreversible. An example of this type of dementia is Alzheimer disease.  What are the causes?  This condition may be caused by:  · Nerve cell damage in the brain.  · Genetic mutations.  · Certain medicines.  · Multiple small strokes.  · An infection, such as chronic meningitis.  · A metabolic problem, such as vitamin B12 deficiency or thyroid disease.  · Pressure on the brain, such as from a tumor or blood clot.  What are the signs or symptoms?  Symptoms of this condition include:  · Sudden changes in mood.  · Depression.  · Problems with balance.  · Changes in personality.  · Poor short-term memory.  · Agitation.  · Delusions.  · Hallucinations.  · Having a hard time:  ¨ Speaking thoughts.  ¨ Finding words.  ¨ Solving problems.  ¨ Doing familiar tasks.  ¨ Understanding familiar ideas.  How is this diagnosed?  This condition is diagnosed with an assessment by your health care provider. During this assessment, your health care provider will talk with you and your family, friends, or caregivers about your symptoms.  A thorough medical history will be taken, and you will have a physical exam and tests. Tests may include:  · Lab tests, such as blood or urine tests.  · Imaging tests, such as a CT scan, PET scan, or MRI.  · A lumbar puncture. This test involves removing and testing a small amount of the fluid that surrounds the brain and spinal cord.  · An electroencephalogram (EEG). In this test, small metal discs are used to measure electrical activity in the brain.  · Memory tests, cognitive tests, and neuropsychological tests. These tests evaluate brain function.  How is this treated?  Treatment depends on the cause of  the dementia. It may involve taking medicines that may help:  · To control the dementia.  · To slow down the disease.  · To manage symptoms.  In some cases, treating the cause of the dementia can improve symptoms, reverse symptoms, or slow down how quickly the dementia gets worse.  Your health care provider can help direct you to support groups, organizations, and other health care providers who can help with decisions about your care.  Follow these instructions at home:  Medicine  · Take over-the-counter and prescription medicines only as told by your health care provider.  · Avoid taking medicines that can affect thinking, such as pain or sleeping medicines.  Lifestyle  · Make healthy lifestyle choices:  ¨ Be physically active as told by your health care provider.  ¨ Do not use any tobacco products, such as cigarettes, chewing tobacco, and e-cigarettes. If you need help quitting, ask your health care provider.  ¨ Eat a healthy diet.  ¨ Practice stress-management techniques when you get stressed.  ¨ Stay social.  · Drink enough fluid to keep your urine clear or pale yellow.  · Make sure to get quality sleep. These tips can help you to get a good night's rest:  ¨ Avoid napping during the day.  ¨ Keep your sleeping area dark and cool.  ¨ Avoid exercising during the few hours before you go to bed.  ¨ Avoid caffeine products in the evening.  General instructions  · Work with your health care provider to determine what you need help with and what your safety needs are.  · If you were given a bracelet that tracks your location, make sure to wear it.  · Keep all follow-up visits as told by your health care provider. This is important.  Contact a health care provider if:  · You have any new symptoms.  · You have problems with choking or swallowing.  · You have any symptoms of a different illness.  Get help right away if:  · You develop a fever.  · You have new or worsening confusion.  · You have new or worsening  sleepiness.  · You have a hard time staying awake.  · You or your family members become concerned for your safety.  This information is not intended to replace advice given to you by your health care provider. Make sure you discuss any questions you have with your health care provider.  Document Released: 06/13/2002 Document Revised: 04/27/2017 Document Reviewed: 09/14/2016  [employer] Interactive Patient Education © 2017 [employer]

## 2019-04-18 ENCOUNTER — OFFICE VISIT (OUTPATIENT)
Dept: PULMONOLOGY | Facility: HOSPICE | Age: 79
End: 2019-04-18
Payer: MEDICARE

## 2019-04-18 ENCOUNTER — PATIENT MESSAGE (OUTPATIENT)
Dept: NEUROLOGY | Facility: MEDICAL CENTER | Age: 79
End: 2019-04-18

## 2019-04-18 VITALS
SYSTOLIC BLOOD PRESSURE: 106 MMHG | BODY MASS INDEX: 20.31 KG/M2 | HEIGHT: 68 IN | TEMPERATURE: 98.1 F | HEART RATE: 92 BPM | RESPIRATION RATE: 16 BRPM | DIASTOLIC BLOOD PRESSURE: 60 MMHG | OXYGEN SATURATION: 95 % | WEIGHT: 134 LBS

## 2019-04-18 DIAGNOSIS — Z22.39 PSEUDOMONAS AERUGINOSA COLONIZATION: Chronic | ICD-10-CM

## 2019-04-18 DIAGNOSIS — R41.3 MEMORY LOSS OF UNKNOWN CAUSE: ICD-10-CM

## 2019-04-18 DIAGNOSIS — J41.1 MUCOPURULENT CHRONIC BRONCHITIS (HCC): Chronic | ICD-10-CM

## 2019-04-18 DIAGNOSIS — F32.A DEPRESSION, UNSPECIFIED DEPRESSION TYPE: ICD-10-CM

## 2019-04-18 DIAGNOSIS — M31.30 WEGENER'S GRANULOMATOSIS: Chronic | ICD-10-CM

## 2019-04-18 PROCEDURE — 99213 OFFICE O/P EST LOW 20 MIN: CPT | Performed by: INTERNAL MEDICINE

## 2019-04-18 RX ORDER — PREDNISONE 1 MG/1
TABLET ORAL
Refills: 1 | COMMUNITY
Start: 2019-03-18 | End: 2019-04-29 | Stop reason: SDUPTHER

## 2019-04-18 RX ORDER — MODAFINIL 100 MG/1
100 TABLET ORAL DAILY
Qty: 90 TAB | Refills: 0 | Status: SHIPPED | OUTPATIENT
Start: 2019-04-18 | End: 2019-07-17

## 2019-04-18 ASSESSMENT — ENCOUNTER SYMPTOMS
GASTROINTESTINAL NEGATIVE: 1
SHORTNESS OF BREATH: 1
PSYCHIATRIC NEGATIVE: 1
CARDIOVASCULAR NEGATIVE: 1
BACK PAIN: 1
EYES NEGATIVE: 1
NEUROLOGICAL NEGATIVE: 1

## 2019-04-18 ASSESSMENT — PAIN SCALES - GENERAL: PAINLEVEL: NO PAIN

## 2019-04-18 NOTE — TELEPHONE ENCOUNTER
Can we please get someone to sign the order for Provigil 100 mg daily?  My SIGIFREDO is still not working

## 2019-04-18 NOTE — ASSESSMENT & PLAN NOTE
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

## 2019-04-18 NOTE — PROGRESS NOTES
Pulmonary Clinic follow up    Date of Service: 4/18/2019    Reason for follow up:  Follow-Up (COPD)      Problem List Items Addressed This Visit     Pseudomonas aeruginosa colonization (Chronic)     Pseudomonas colonization he is on tobramycin every 28 days on and off and hypertonic saline  No signs of an infection at this time and feels at his baseline other than fatigue         COPD (chronic obstructive pulmonary disease) (HCC) (Chronic)      COPD is due to underlying bronchiectasis which is from his histoplasmosis of DANIEL infection  Breo, vest twice daily, duo nebs  Doing well         Relevant Medications    predniSONE (DELTASONE) 1 MG Tab    Wegener's granulomatosis (HCC) (Chronic)     In remission please see Dr. Roa note  On prednisone 7 mg daily         Depression     Not suicidal but very low in mood  Wife says they are going to see the children in the [place] area and this may help his wood  He is starting provigil for his fatigue which may also help him fill better with more energy.                 History of Present Illness: Gilberto Brown is a 78 y.o. male with a past medical history of  Wegener'granulomatosis initially diagnosed in September 2011 with progressive renal insufficiency and now on permanent renal dialysis on chronic steroids, chronic pseudomonas colonization on inhaled tobramycin, pacemaker, ESRD on HD, COPD on nocturnal O2, afib, pacemaker , GERD who last admitted on 3/3/19 with Influenza A  Patient also with sleep apnea on CPAP    Patient is currently on prednisone at 7 mg p.o. daily with a negative ANCA.  Tried other immunosuppresants resulting in serious infections so stopped  Last seen rheumatology 3/26/2019 if felt clinically and serologically and [place]agnon is quite edematous this was in remission and to remain on prednisone 7 mg daily.  Plan was to continue to taper after recheck of Ankcely.  Current prednisone is at  She has been complaining of some memory changes and referred to  neurology  For his COPD he is on oxygen Breo, vest twice daily, duo nebs.  COPD is due to underlying bronchiectasis which is from his histoplasmosis of DANIEL infection RX 18 months at [facility].  Pseudomonas colonization he is on tobramycin every 28 days on and off and hypertonic saline    His COPD is optimized  On 2l of oxygen  Denies any problems    Sleeps in a chair due to mucous production.      Review of Systems   Constitutional: Positive for malaise/fatigue.   HENT: Negative.    Eyes: Negative.    Respiratory: Positive for shortness of breath.    Cardiovascular: Negative.    Gastrointestinal: Negative.    Genitourinary: Negative.    Musculoskeletal: Positive for back pain.   Skin: Negative.    Neurological: Negative.         Memory change   Endo/Heme/Allergies: Negative.    Psychiatric/Behavioral: Negative.        Current Outpatient Prescriptions on File Prior to Visit   Medication Sig Dispense Refill   • ciprofloxacin (CIPRO) 500 MG Tab Take 1 Tab by mouth 2 times a day. 28 Tab 0   • BREO ELLIPTA 200-25 MCG/INH AEROSOL POWDER, BREATH ACTIVATED INHALE 1 PUFF BY MOUTH EVERY DAY. RINSE MOUTH AFTER USE 1 Each 5   • oseltamivir (TAMIFLU) 30 MG Cap Tae one dose tomorrow after dialysis 1 Cap 0   • predniSONE (DELTASONE) 20 MG Tab Take 40mg daily for 2 days, then take 30mg for 2 days, then take 20mg daily for 2 days, then take 9mg daily as before 9 Tab 0   • albuterol (PROAIR HFA) 108 (90 Base) MCG/ACT Aero Soln inhalation aerosol Inhale 2 Puffs by mouth every 6 hours as needed for Shortness of Breath. 8.5 g 0   • B Complex-C-Folic Acid (DIALYVITE TABLET) Tab Take 1 Tab by mouth every day.     • ipratropium-albuterol (DUONEB) 0.5-2.5 (3) MG/3ML nebulizer solution 3 mL by Nebulization route every 6 hours as needed for Shortness of Breath.     • Cholecalciferol (VITAMIN D3) 5000 units Cap Take 1 Cap by mouth every day.     • rosuvastatin (CRESTOR) 20 MG Tab Take 20 mg by mouth every evening.     • raNITidine  (ZANTAC) 150 MG Tab Take 150 mg by mouth 2 times a day as needed.     • sodium chloride (HYPER-SAL) 7 % Nebu Soln INHALE 1 VIAL VIA NEBULIZER TWICE DAILY 240 mL 11   • tamsulosin (FLOMAX) 0.4 MG capsule Take 1 Cap by mouth every day. 90 Cap 3   • sevelamer carbonate (RENVELA) 800 MG Tab tablet TAKE 1 TABLET BY MOUTH THREE TIMES DAILY WITH MEALS 270 Tab 7   • apixaban (ELIQUIS) 2.5mg Tab Take 2.5 mg by mouth every day.     • metoprolol (LOPRESSOR) 25 MG Tab Take 25 mg by mouth 2 times a day.     • finasteride (PROSCAR) 5 MG Tab Take 5 mg by mouth every day.     • aspirin EC (ECOTRIN) 81 MG TBEC Take 81 mg by mouth every day.       No current facility-administered medications on file prior to visit.        Social History   Substance Use Topics   • Smoking status: Former Smoker     Years: 30.00     Types: Pipe     Quit date: 1/1/1990   • Smokeless tobacco: Former User     Types: Chew   • Alcohol use 4.8 oz/week     7 Glasses of wine, 1 Standard drinks or equivalent per week      Comment: 1/2 glass Red wine nightly        Past Medical History:   Diagnosis Date   • A-fib ([facility])        • Anemia    • Arthritis     arms, legs, shoulders   • ASTHMA    • BPH (benign prostatic hyperplasia)    • Breath shortness    • Bronchitis     chronic   • Cataract     removed bilat   • COPD    • Dialysis     [facility]   • Dyslipidemia    • EMPHYSEMA    • GERD (gastroesophageal reflux disease)    • Hypertension    • Oxygen decrease     O2 3liters @  and dialysis   • Pacemaker 1988   • Pain 05/09/2018    shoulders/hips, 5/10   • Pneumonia 2017   • Pseudomonas pneumonia ([facility])    • Pulmonary histoplasmosis ([facility])    • Renal disorder     CKD,    • Sick sinus syndrome ([facility])    • Sleep apnea     uses cpap at [facility]   • Snoring    • Stroke ([facility]) 02/2018   • Unspecified hemorrhagic conditions     bruises easily, fragile skin   • Wegener's disease, pulmonary ([facility])        Past Surgical History:   Procedure Laterality Date   •  "AV FISTULOGRAM Right 4/12/2016    Procedure: AV FISTULOGRAM , VENOPLASTY X 2;  Surgeon: Nessa Johansen M.D.;  Location: [facility];  Service:    • RECOVERY  9/3/2015    Procedure: IR1 VASCULAR CASE-ELENO RIGHT DIALYSIS FISTULOGRAM, POSSIBLE INTERVENTION;  Surgeon: Recoveryonly Surgery;  Location: SURGERY PRE-POST PROC UNIT [facility];  Service:    • RECOVERY  2/26/2015    Performed by Ir-Recovery Surgery at SUR[facility]ERY SAME DAY [facility]   • RECOVERY  10/3/2014    Performed by Ir-Recovery Surgery at [facility]   • RECOVERY  8/7/2014    Performed by Ir-Recovery Surgery at [facility]   • RECOVERY  12/17/2013    Performed by Ir-Recovery Surgery at SURGERY SAME DAY [facility]   • BRONCHOSCOPY-ENDO  7/18/2013    Performed by Juan F Rubio M.D. at [facility]   • RECOVERY  7/17/2013    Performed by Ir-Recovery Surgery at SURGERY SAME DAY [facility]   • AV FISTULA REVISION  6/9/2012    Performed by NESSA JOHANSEN at SURGERY [facility]   • RECOVERY  4/19/2012    Performed by SURGERY, IR-RECOVERY at [facility] SAME DAY [facility]   • AV FISTULA CREATION  3/8/2012    Performed by NESSA JOHANSEN at [facility]   • GASTROSCOPY WITH BIOPSY  1/17/2012    Performed by ZURDO HARRISON at [facility]   • CATH PLACEMENT  10/8/2011    Performed by NESSA JOHANSEN at [facility]   • GASTROSCOPY WITH BALLOON DILATATION  9/28/2011    Performed by KT FERNANDEZ at [facility]   • PACEMAKER INSERTION  4/2009   • ROTATOR CUFF REPAIR  2003    right   • HERNIA REPAIR  1990    right inguinal hernia   • HIP REPLACEMENT, TOTAL      right   • TONSILLECTOMY         Allergies: Doxycycline; Erythromycin; and Rituximab    Family History   Problem Relation Age of Onset   • Stroke Father    • Heart Disease Father    • Stroke Mother    • Cancer Unknown        Vitals:    04/18/19 1440   Height: 1.727 m (5' 8\")   Weight: 60.8 " kg (134 lb)   Weight % change since last entry.: 0 %   BP: 106/60   Pulse: 92   BMI (Calculated): 20.37   Resp: 16   Temp: 36.7 °C (98.1 °F)   TempSrc: Oral       Physical Examination  Physical Exam   Constitutional: He is oriented to person, place, and time and well-developed, well-nourished, and in no distress. No distress.   HENT:   Head: Normocephalic and atraumatic.   Right Ear: External ear normal.   Left Ear: External ear normal.   Nose: Nose normal.   Mouth/Throat: Oropharynx is clear and moist. No oropharyngeal exudate.   Eyes: Pupils are equal, round, and reactive to light. Conjunctivae and EOM are normal.   Neck: Normal range of motion. Neck supple. No JVD present. No tracheal deviation present. No thyromegaly present.   Cardiovascular: Normal rate, regular rhythm and intact distal pulses.  Exam reveals no gallop and no friction rub.    Murmur heard.  Pulmonary/Chest: Effort normal and breath sounds normal. No stridor. No respiratory distress. He has no wheezes. He has no rales. He exhibits no tenderness.   Intermittent squeaks   Abdominal: Soft. Bowel sounds are normal. He exhibits no distension and no mass.   Musculoskeletal: Normal range of motion. He exhibits no edema or deformity.   Lymphadenopathy:     He has no cervical adenopathy.   Neurological: He is alert and oriented to person, place, and time. No cranial nerve deficit. He exhibits normal muscle tone. Gait normal. Coordination normal. GCS score is 15.   Skin: No rash noted. He is not diaphoretic.   Multiple bruises on his hands   Psychiatric: Mood, memory, affect and judgment normal.           Abhijit Rowe MD MPH [facility]  [facility]

## 2019-04-18 NOTE — ASSESSMENT & PLAN NOTE
Not suicidal but very low in mood  Wife says they are going to see the children in the [place] area and this may help his wood  He is starting provigil for his fatigue which may also help him fill better with more energy.

## 2019-04-18 NOTE — ASSESSMENT & PLAN NOTE
Pseudomonas colonization he is on tobramycin every 28 days on and off and hypertonic saline  No signs of an infection at this time and feels at his baseline other than fatigue

## 2019-04-19 NOTE — TELEPHONE ENCOUNTER
----- Message from Romi Peres Med Ass't sent at 4/18/2019  3:12 PM PDT -----  Regarding: FW: Prescription Question  Contact: [phone]  Hi Dr. Mathias,  Can you order medication for Dr. Beltran her SIGIFREDO is not working yet?  Thanks,  Romi    ----- Message -----  From: Ramon Beltran  Sent: 4/18/2019   3:05 PM  To: Romi Peres, Med Ass't  Subject: FW: Prescription Question                        Can we please get someone to sign the order for Provigil 100 mg daily?  My SIGIFREDO is still not working     ----- Message -----  From: Romi Peres Med Ass't  Sent: 4/18/2019   1:35 PM  To: Ramon Beltran  Subject: FW: Prescription Question                            ----- Message -----  From: Gilberto Brown  Sent: 4/18/2019   1:34 PM  To: Neurology [facility]  Subject: Prescription Question                              you said check with doctors re DAIANA.     Dr. Ariza, Wegeners disease. says can try it.  Dr. Corona, Nephrology, says can try it.    please send prescription to [facility] in [place].    Gilberto Brown

## 2019-04-29 NOTE — TELEPHONE ENCOUNTER
Please notify patient that we received his ANCA test March 2019 and it was negative, we would like to decrease his prednisone from 7 mg p.o. daily to 6 mg p.o. daily, this patient have 5 mg and 1 mg tablets?  Or should be do six 1 mg tablets?    Also, and looks like on patient's medication list that he has 20 mg tablets of prednisone this patient taking 20 mg tabs of prednisone?

## 2019-04-29 NOTE — TELEPHONE ENCOUNTER
Was the patient seen in the last year in this department? Yes  March labs     Does patient have an active prescription for medications requested? No     Received Request Via: Pharmacy

## 2019-04-30 RX ORDER — PREDNISONE 1 MG/1
TABLET ORAL
Qty: 540 TAB | Refills: 0 | Status: SHIPPED | OUTPATIENT
Start: 2019-04-30 | End: 2019-06-18 | Stop reason: SDUPTHER

## 2019-04-30 NOTE — TELEPHONE ENCOUNTER
Patient returned call  He prefers to take 6- 1mg tablets please    He is not on the 20mg prednisone that was just once when he was at hospital    Thank you

## 2019-05-08 ENCOUNTER — HOSPITAL ENCOUNTER (OUTPATIENT)
Dept: RADIOLOGY | Facility: MEDICAL CENTER | Age: 79
End: 2019-05-08
Payer: MEDICARE

## 2019-05-08 DIAGNOSIS — R41.3 MEMORY LOSS: ICD-10-CM

## 2019-05-08 PROCEDURE — 70450 CT HEAD/BRAIN W/O DYE: CPT

## 2019-05-15 ENCOUNTER — TELEPHONE (OUTPATIENT)
Dept: NEUROLOGY | Facility: MEDICAL CENTER | Age: 79
End: 2019-05-15

## 2019-05-15 NOTE — TELEPHONE ENCOUNTER
I have done 2 PA's for patient's Provigil and it has been denied by patient's insurance. Please advise.

## 2019-05-15 NOTE — TELEPHONE ENCOUNTER
That is OK we will hold off.  We need to check with Dr Fontaine's office [facility] Neuropsychologist if they can see him as he has appointment with Dr Umana on November 19th.  Thanks

## 2019-05-16 ENCOUNTER — APPOINTMENT (OUTPATIENT)
Dept: RADIOLOGY | Facility: MEDICAL CENTER | Age: 79
End: 2019-05-16
Attending: SURGERY
Payer: MEDICARE

## 2019-05-16 ENCOUNTER — HOSPITAL ENCOUNTER (OUTPATIENT)
Facility: MEDICAL CENTER | Age: 79
End: 2019-05-16
Attending: SURGERY | Admitting: SURGERY
Payer: MEDICARE

## 2019-05-16 VITALS
RESPIRATION RATE: 18 BRPM | SYSTOLIC BLOOD PRESSURE: 116 MMHG | DIASTOLIC BLOOD PRESSURE: 56 MMHG | BODY MASS INDEX: 20.18 KG/M2 | WEIGHT: 133.16 LBS | HEART RATE: 86 BPM | OXYGEN SATURATION: 98 % | TEMPERATURE: 97.6 F | HEIGHT: 68 IN

## 2019-05-16 DIAGNOSIS — T82.858A BYPASS GRAFT STENOSIS, INITIAL ENCOUNTER (HCC): ICD-10-CM

## 2019-05-16 LAB
ANION GAP SERPL CALC-SCNC: 10 MMOL/L (ref 0–11.9)
BASOPHILS # BLD AUTO: 0.3 % (ref 0–1.8)
BASOPHILS # BLD: 0.02 K/UL (ref 0–0.12)
BUN SERPL-MCNC: 30 MG/DL (ref 8–22)
CALCIUM SERPL-MCNC: 8.8 MG/DL (ref 8.5–10.5)
CHLORIDE SERPL-SCNC: 99 MMOL/L (ref 96–112)
CO2 SERPL-SCNC: 31 MMOL/L (ref 20–33)
CREAT SERPL-MCNC: 2.97 MG/DL (ref 0.5–1.4)
EOSINOPHIL # BLD AUTO: 0.03 K/UL (ref 0–0.51)
EOSINOPHIL NFR BLD: 0.4 % (ref 0–6.9)
ERYTHROCYTE [DISTWIDTH] IN BLOOD BY AUTOMATED COUNT: 61.6 FL (ref 35.9–50)
GLUCOSE SERPL-MCNC: 82 MG/DL (ref 65–99)
HCT VFR BLD AUTO: 34.2 % (ref 42–52)
HGB BLD-MCNC: 10.3 G/DL (ref 14–18)
IMM GRANULOCYTES # BLD AUTO: 0.06 K/UL (ref 0–0.11)
IMM GRANULOCYTES NFR BLD AUTO: 0.8 % (ref 0–0.9)
LYMPHOCYTES # BLD AUTO: 0.67 K/UL (ref 1–4.8)
LYMPHOCYTES NFR BLD: 9.4 % (ref 22–41)
MCH RBC QN AUTO: 29.5 PG (ref 27–33)
MCHC RBC AUTO-ENTMCNC: 30.1 G/DL (ref 33.7–35.3)
MCV RBC AUTO: 98 FL (ref 81.4–97.8)
MONOCYTES # BLD AUTO: 0.48 K/UL (ref 0–0.85)
MONOCYTES NFR BLD AUTO: 6.7 % (ref 0–13.4)
NEUTROPHILS # BLD AUTO: 5.88 K/UL (ref 1.82–7.42)
NEUTROPHILS NFR BLD: 82.4 % (ref 44–72)
NRBC # BLD AUTO: 0 K/UL
NRBC BLD-RTO: 0 /100 WBC
PLATELET # BLD AUTO: 221 K/UL (ref 164–446)
PMV BLD AUTO: 9.1 FL (ref 9–12.9)
POTASSIUM SERPL-SCNC: 3.8 MMOL/L (ref 3.6–5.5)
RBC # BLD AUTO: 3.49 M/UL (ref 4.7–6.1)
SODIUM SERPL-SCNC: 140 MMOL/L (ref 135–145)
WBC # BLD AUTO: 7.1 K/UL (ref 4.8–10.8)

## 2019-05-16 PROCEDURE — 700111 HCHG RX REV CODE 636 W/ 250 OVERRIDE (IP)

## 2019-05-16 PROCEDURE — 85025 COMPLETE CBC W/AUTO DIFF WBC: CPT

## 2019-05-16 PROCEDURE — 700117 HCHG RX CONTRAST REV CODE 255: Performed by: SURGERY

## 2019-05-16 PROCEDURE — 99153 MOD SED SAME PHYS/QHP EA: CPT

## 2019-05-16 PROCEDURE — 700111 HCHG RX REV CODE 636 W/ 250 OVERRIDE (IP): Performed by: SURGERY

## 2019-05-16 PROCEDURE — 80048 BASIC METABOLIC PNL TOTAL CA: CPT

## 2019-05-16 PROCEDURE — 160002 HCHG RECOVERY MINUTES (STAT)

## 2019-05-16 RX ORDER — SODIUM CHLORIDE 9 MG/ML
500 INJECTION, SOLUTION INTRAVENOUS ONCE
Status: DISCONTINUED | OUTPATIENT
Start: 2019-05-16 | End: 2019-05-16 | Stop reason: HOSPADM

## 2019-05-16 RX ORDER — SODIUM CHLORIDE 9 MG/ML
INJECTION, SOLUTION INTRAVENOUS
Status: DISCONTINUED | OUTPATIENT
Start: 2019-05-16 | End: 2019-05-16 | Stop reason: HOSPADM

## 2019-05-16 RX ORDER — IODIXANOL 270 MG/ML
15 INJECTION, SOLUTION INTRAVASCULAR ONCE
Status: COMPLETED | OUTPATIENT
Start: 2019-05-16 | End: 2019-05-16

## 2019-05-16 RX ORDER — CEFAZOLIN SODIUM 1 G/50ML
1 INJECTION, SOLUTION INTRAVENOUS ONCE
Status: COMPLETED | OUTPATIENT
Start: 2019-05-16 | End: 2019-05-16

## 2019-05-16 RX ORDER — HEPARIN SODIUM,PORCINE 1000/ML
VIAL (ML) INJECTION
Status: COMPLETED
Start: 2019-05-16 | End: 2019-05-16

## 2019-05-16 RX ORDER — MIDAZOLAM HYDROCHLORIDE 1 MG/ML
INJECTION INTRAMUSCULAR; INTRAVENOUS
Status: COMPLETED
Start: 2019-05-16 | End: 2019-05-16

## 2019-05-16 RX ORDER — PROTAMINE SULFATE 10 MG/ML
20 INJECTION, SOLUTION INTRAVENOUS ONCE
Status: COMPLETED | OUTPATIENT
Start: 2019-05-16 | End: 2019-05-16

## 2019-05-16 RX ORDER — CEFAZOLIN SODIUM 1 G/50ML
1 INJECTION, SOLUTION INTRAVENOUS ONCE
Status: DISCONTINUED | OUTPATIENT
Start: 2019-05-16 | End: 2019-05-16 | Stop reason: HOSPADM

## 2019-05-16 RX ORDER — PROTAMINE SULFATE 10 MG/ML
INJECTION, SOLUTION INTRAVENOUS
Status: COMPLETED
Start: 2019-05-16 | End: 2019-05-16

## 2019-05-16 RX ORDER — HEPARIN SODIUM 1000 [USP'U]/ML
3000 INJECTION, SOLUTION INTRAVENOUS; SUBCUTANEOUS ONCE
Status: COMPLETED | OUTPATIENT
Start: 2019-05-16 | End: 2019-05-16

## 2019-05-16 RX ORDER — ONDANSETRON 2 MG/ML
4 INJECTION INTRAMUSCULAR; INTRAVENOUS PRN
Status: DISCONTINUED | OUTPATIENT
Start: 2019-05-16 | End: 2019-05-16 | Stop reason: HOSPADM

## 2019-05-16 RX ORDER — MIDAZOLAM HYDROCHLORIDE 1 MG/ML
.5-2 INJECTION INTRAMUSCULAR; INTRAVENOUS PRN
Status: DISCONTINUED | OUTPATIENT
Start: 2019-05-16 | End: 2019-05-16 | Stop reason: HOSPADM

## 2019-05-16 RX ORDER — SODIUM CHLORIDE 9 MG/ML
500 INJECTION, SOLUTION INTRAVENOUS
Status: DISCONTINUED | OUTPATIENT
Start: 2019-05-16 | End: 2019-05-16 | Stop reason: HOSPADM

## 2019-05-16 RX ADMIN — PROTAMINE SULFATE 20 MG: 10 INJECTION, SOLUTION INTRAVENOUS at 17:54

## 2019-05-16 RX ADMIN — CEFAZOLIN SODIUM 1 G: 1 INJECTION, SOLUTION INTRAVENOUS at 16:30

## 2019-05-16 RX ADMIN — HEPARIN SODIUM 3000 UNITS: 1000 INJECTION, SOLUTION INTRAVENOUS; SUBCUTANEOUS at 17:41

## 2019-05-16 RX ADMIN — IODIXANOL 15 ML: 270 INJECTION, SOLUTION INTRAVASCULAR at 18:30

## 2019-05-16 RX ADMIN — MIDAZOLAM HYDROCHLORIDE 1 MG: 1 INJECTION, SOLUTION INTRAMUSCULAR; INTRAVENOUS at 17:34

## 2019-05-16 RX ADMIN — FENTANYL CITRATE 25 MCG: 50 INJECTION, SOLUTION INTRAMUSCULAR; INTRAVENOUS at 17:34

## 2019-05-16 RX ADMIN — MIDAZOLAM HYDROCHLORIDE 1 MG: 1 INJECTION INTRAMUSCULAR; INTRAVENOUS at 17:34

## 2019-05-17 ENCOUNTER — PATIENT MESSAGE (OUTPATIENT)
Dept: PULMONOLOGY | Facility: HOSPICE | Age: 79
End: 2019-05-17

## 2019-05-17 NOTE — OR NURSING
1811 Patient arrived from IR s/p RUE fistulogram with angioplasty. Patient wide awake denies pain. JAMES dressing clean dry intact. VSS.  1820 Dr sanchez at the bed side talking to patient  1830 patient given water tolerating well.   1835 Criteria met to discharge patient home.  1840 discharge instructions given to patient and family. Both patient and family verbalize understanding. Reviewed activity, dressing care, follow up, worsening symptoms, medications. Copy given to family.   1845 patient escorted via w/c with all his personal belongings.

## 2019-05-17 NOTE — OR SURGEON
Immediate Post OP Note    PreOp Diagnosis: Dialysis access dysfunction.    PostOp Diagnosis: Same.    Procedure(s):  VASCULAR CASE-ELENO-DIALYSIS FISTULOGRAM, VENOPLASTY.    Surgeon:  Nate Syed MD  [facility]    Type of Anesthesia:  IV sedation for 15 minutes and local with 2ml 1% Lidocaine.    IR Staff:    Specimens removed if any:  None.    Estimated Blood Loss: 5ml.    Contrast: 15ml Visipaque.    Findings: Severe stenosis above anastomosis, treated with venoplasty using 7 x 40mm balloon.    Complications: None.    Dictated, #[id number].        5/16/2019 5:59 PM Nate Syed M.D.

## 2019-05-17 NOTE — PROGRESS NOTES
IR Nursing Note:    Patient consented by Dr. Syed, all questions answered. Dr. Syed performed RUE Fistulagram with angioplasty.  The pt tolerated the procedure well; ETCo2 baseline 35, with consistent waveform during the procedure.  Sutures, gauze and tegaderm applied to right arm, CDI and soft; pressure held x 5 minutes.  Pt alert  and verbally appropriate post procedure, vital signs stable during procedure  and transport, see flow sheet for vital signs.  Report given to LIZETH Chin.  RN transported pt to PPU with Sao2 monitor.

## 2019-05-17 NOTE — DISCHARGE INSTRUCTIONS
ACTIVITY: Rest and take it easy for the first 24 hours.  A responsible adult is recommended to remain with you during that time.  It is normal to feel sleepy.  We encourage you to not do anything that requires balance, judgment or coordination.    MILD FLU-LIKE SYMPTOMS ARE NORMAL. YOU MAY EXPERIENCE GENERALIZED MUSCLE ACHES, THROAT IRRITATION, HEADACHE AND/OR SOME NAUSEA.    FOR 24 HOURS DO NOT:  Drive, operate machinery or run household appliances.  Drink beer or alcoholic beverages.   Make important decisions or sign legal documents.    SPECIAL INSTRUCTIONS: follow up with primary care physician as needed follow up to find out when.  Resume your home medication.  If you experience chest pain, shortness of breath call 911 return to ER  Follow up with Dr. Syed as needed or next week to have suture removed if suture cannot be removed by dialysis nurse    Minimal activity at home for 2 days.   ·   No Pushing, pulling, or heavy lifting (10 LBS) using right arm for 2 days.   ·   Keep dressings on for 2 days, then remove.   ·   Keep incisions dry and clean.   ·   Okay to shower after 2 days.   .   Okay to use fistula for dialysis.  Remove dressing as needed.   ·   No bath for 10 days    DIET: To avoid nausea, slowly advance diet as tolerated, avoiding spicy or greasy foods for the first day.  Add more substantial food to your diet according to your physician's instructions.  Babies can be fed formula or breast milk as soon as they are hungry.  INCREASE FLUIDS AND FIBER TO AVOID CONSTIPATION.    SURGICAL DRESSING/BATHING: keep dressing clean dry intact for 24 hours. Remove dressing after 24 hours.     FOLLOW-UP APPOINTMENT:  A follow-up appointment should be arranged with your doctor in [phone]; call to schedule.    You should CALL YOUR PHYSICIAN if you develop:  Fever greater than 101 degrees F.  Pain not relieved by medication, or persistent nausea or vomiting.  Excessive bleeding (blood soaking through dressing)  or unexpected drainage from the wound.  Extreme redness or swelling around the incision site, drainage of pus or foul smelling drainage.  Inability to urinate or empty your bladder within 8 hours.  Problems with breathing or chest pain.    You should call 911 if you develop problems with breathing or chest pain.  If you are unable to contact your doctor or surgical center, you should go to the nearest emergency room or urgent care center.  Physician's telephone #: [phone]    If any questions arise, call your doctor.  If your doctor is not available, please feel free to call the Surgical Center at [phone].  The Center is open Monday through Friday from 7AM to 7PM.  You can also call the [facility] HOTLINE open 24 hours/day, 7 days/week and speak to a nurse at [phone], or toll free at [phone].    A registered nurse may call you a few days after your surgery to see how you are doing after your procedure.    MEDICATIONS: Resume taking daily medication.  Take prescribed pain medication with food.  If no medication is prescribed, you may take non-aspirin pain medication if needed.  PAIN MEDICATION CAN BE VERY CONSTIPATING.  Take a stool softener or laxative such as senokot, pericolace, or milk of magnesia if needed.  Fistulogram  Introduction  A fistulogram is an X-ray test to look inside the site where your blood is removed and returned to your body during dialysis (dialysis fistula). Your fistula allows easy and effective access, but sometimes problems can occur, such as narrowing or blockages. A narrowing or blockage can reduce the effectiveness of dialysis. A fistulogram helps to detect these problems. It also lets your health care provider know of any additional treatment you may need.  Tell a health care provider about:  · Any allergies you have.  · All medicines you are taking, including vitamins, herbs, eye drops, creams, and over-the-counter medicines.  · Any problems you or family members have  had with anesthetic medicines.  · Any reactions you have had to contrast dye.  · Any blood disorders you have.  · Any surgeries you have had.  · Any medical conditions you have.  · Pain, redness, or swelling in your limb with the dialysis fistula.  · Any bleeding from your dialysis fistula.  · Any of the following in the part of your body where the dialysis fistula leads:  ¨ Numbness.  ¨ Tingling.  ¨ Cold feeling.  ¨ Bluish or pale white in color.  What are the risks?  Generally, a fistulogram is a safe procedure. However, problems can occur and include:  · Bleeding.  · Infection.  · A blood clot in the dialysis fistula.  · A blood clot that travels to your lungs (pulmonary embolism).  What happens before the procedure?  · Your health care provider may do some blood or urine tests or both. These will help your health care provider learn how well your kidneys and liver are working and how well your blood clots.  · Ask your health care provider about:  ¨ Changing or stopping your regular medicines. This is especially important if you are taking diabetes medicines or blood thinners.  ¨ Taking medicines such as aspirin and ibuprofen. These medicines can thin your blood. Do not take these medicines before your procedure if your health care provider asks you not to.  · Do not eat or drink anything after midnight on the night before the procedure or as directed by your health care provider.  · Ask your health care provider if you will be able to go home after the procedure. Some people stay overnight in the hospital.  · Plan to have someone take you home after the procedure.  What happens during the procedure?  · A needle will be inserted into your arm. It will be used to give you medicine. Medicines given may include:  ¨ Numbing medicine (local anesthetic) to numb an area on the limb with your fistula.  ¨ A medicine to help you relax (sedative).  ¨ A medicine that makes you fall asleep (general anesthetic).  · When your  skin is numb, a needle will be inserted into your vein.  · A thin, flexible tube (catheter) will be inserted into the needle and guided to the narrowed area.  · Dye (contrast material) will be injected into the catheter. As the contrast material passes through your body, you may feel warm.  · X-rays will be taken. The contrast material will show where any narrowing or blockages are.  · A guide wire and balloon-tipped catheter will be advanced to the blockage site.  · The balloon will be inflated for a short period of time. The balloon may be inflated several times at this site, or the balloon may be moved to other sites.  · If your health care provider determines the clot will be best treated by a clot-dissolving medicine (thrombolysis), this medicine can be delivered through the catheter instead of using the balloon.  · A mechanical device at the end of the catheter can also be used to break up the clot (thrombectomy).  · At the end of the procedure, the catheter will be removed.  · In some cases, the catheter site will be closed with a stitch or two.  · Pressure will be applied to stop any bleeding, and your skin will be covered with a bandage (dressing).  What happens after the procedure?  · You will stay in a recovery area until the medicines have worn off.  · You will stay in bed for several hours.  · As you begin to feel better, you will be offered ice and beverages. You may be given food.  · Your health care provider will check your blood pressure and pulse and watch your access site.  · When you can walk, drink, eat, and use the bathroom, you may be discharged.  This information is not intended to replace advice given to you by your health care provider. Make sure you discuss any questions you have with your health care provider.  Document Released: 05/04/2015 Document Revised: 05/25/2017 Document Reviewed: 02/06/2015  © 2017 [employer]      If your physician has prescribed pain medication that includes  Acetaminophen (Tylenol), do not take additional Acetaminophen (Tylenol) while taking the prescribed medication.    Depression / Suicide Risk    As you are discharged from this [facility] facility, it is important to learn how to keep safe from harming yourself.    Recognize the warning signs:  · Abrupt changes in personality, positive or negative- including increase in energy   · Giving away possessions  · Change in eating patterns- significant weight changes-  positive or negative  · Change in sleeping patterns- unable to sleep or sleeping all the time   · Unwillingness or inability to communicate  · Depression  · Unusual sadness, discouragement and loneliness  · Talk of wanting to die  · Neglect of personal appearance   · Rebelliousness- reckless behavior  · Withdrawal from people/activities they love  · Confusion- inability to concentrate     If you or a loved one observes any of these behaviors or has concerns about self-harm, here's what you can do:  · Talk about it- your feelings and reasons for harming yourself  · Remove any means that you might use to hurt yourself (examples: pills, rope, extension cords, firearm)  · Get professional help from the community (Mental Health, Substance Abuse, psychological counseling)  · Do not be alone:Call your Safe Contact- someone whom you trust who will be there for you.  · Call your local CRISIS HOTLINE 234-1243 or 851-517-8474  · Call your local Children's Mobile Crisis Response Team [place] (476) 759-8859 or www.[employer]  · Call the toll free National Suicide Prevention Hotlines   · National Suicide Prevention Lifeline 360-199-FSKQ (1459)  · National Hope Line Network 800-SUICIDE (530-4479)                      AV Fistula Placement  Introduction  Arteriovenous (AV) fistula placement is a surgical procedure to create a connection between a blood vessel that carries blood away from your heart (artery) and a blood vessel that returns blood to your heart  (vein). The connection is called a fistula. It is often made in the forearm or upper arm.  You may need this procedure if you are getting hemodialysis treatments for kidney disease. An AV fistula makes your vein larger and stronger over several months. This makes the vein a safe and easy spot to insert the needles that are used for hemodialysis.  Tell a health care provider about:  · Any allergies you have.  · All medicines you are taking, including vitamins, herbs, eye drops, creams, and over-the-counter medicines.  · Any problems you or family members have had with anesthetic medicines.  · Any blood disorders you have.  · Any surgeries you have had.  · Any medical conditions you have.  What are the risks?  Generally, this is a safe procedure. However, problems may occur, including:  · Infection.  · Blood clot (thrombosis).  · Reduced blood flow (stenosis).  · Weakening or ballooning out of the fistula (aneurysm).  · Bleeding.  · Allergic reactions to medicines.  · Nerve damage.  · Swelling near the fistula (lymphedema).  · Weakening of your heart (congestive heart failure).  · Failure of the procedure.  What happens before the procedure?  · Imaging tests of your arm may be done to find the best place for the fistula.  · Ask your health care provider about:  ¨ Changing or stopping your regular medicines. This is especially important if you are taking diabetes medicines or blood thinners.  ¨ Taking medicines such as aspirin and ibuprofen. These medicines can thin your blood. Do not take these medicines before your procedure if your health care provider instructs you not to.  · Follow instructions from your health care provider about eating or drinking restrictions.  · You may be given antibiotic medicine to help prevent infection.  · Ask your health care provider how your surgical site will be marked or identified.  · Plan to have someone take you home after the procedure.  What happens during the procedure?  · To  reduce your risk of infection:  ¨ Your health care team will wash or sanitize their hands.  ¨ Your skin will be washed with soap.  ¨ Hair may be removed from the surgical area.  · An IV tube will be started in one of your veins.  · You will be given one or more of the following:  ¨ A medicine to help you relax (sedative).  ¨ A medicine to numb the area (local anesthetic).  ¨ A medicine to make you fall asleep (general anesthetic).  ¨ A medicine that is injected into an area of your body to numb everything below the injection site (regional anesthetic).  · The fistula site will be cleaned with a germ-killing solution (antiseptic).  · A cut (incision) will be made on the inner side of your arm.  · A vein and an artery will be opened and connected with stitches (sutures).  · The incision will be closed with sutures or clips.  · A bandage (dressing) will be placed over the area.  The procedure may vary among health care providers and hospitals.  What happens after the procedure?  · Your blood pressure, heart rate, breathing rate, and blood oxygen level will be monitored often until the medicines you were given have worn off.  · Your fistula site will be checked for bleeding or swelling.  · You will be given pain medication as needed.  · Do not drive for 24 hours if you received a sedative.  This information is not intended to replace advice given to you by your health care provider. Make sure you discuss any questions you have with your health care provider.  Document Released: 11/28/2016 Document Revised: 05/25/2017 Document Reviewed: 03/09/2016  © 2017 Elsevier

## 2019-05-17 NOTE — TELEPHONE ENCOUNTER
From: Gilberto Brown  To: Abhijit Rowe M.D.  Sent: 5/17/2019 3:31 AM PDT  Subject: Non-Urgent Medical Question      had roto-router done on dialysis yesterday.    letter received yesterday NOTICE OF DENIAL OF MEDICARE PRESCRIPTION DRUG COVERAGE for the drug MODAFINIL 100 G.

## 2019-05-17 NOTE — PATIENT COMMUNICATION
Camelia, see [facility]am[facility] message. I am unsure if we have been prescribing this or PCP. Can you research, and if we are the appropriate provider, start prior auth? Otherwise let patient know who he should contact about this.  Thank you!

## 2019-05-17 NOTE — OP REPORT
DATE OF SERVICE:  05/16/2019    SURGEON:  Nate Syed MD    ANESTHESIA:  Intravenous sedation with fentanyl and Versed for 70 minutes and   local anesthesia with 2 mL, 1% lidocaine solution.    PREOPERATIVE DIAGNOSIS:  Right upper extremity dialysis access dysfunction.    POSTOPERATIVE DIAGNOSIS:  Right upper extremity dialysis access dysfunction.    PROCEDURES PERFORMED:  1.  Percutaneous cannulation of right upper extremity arteriovenous fistula   under ultrasound guidance.  2.  Dialysis fistulogram.  3.  Percutaneous, transluminal venoplasty of fistula stenosis above the   anastomosis using 7x40 mm angioplasty balloon.    INDICATION FOR PROCEDURE:  The patient is a 78-year-old gentleman with   multiple medical problems including renal failure who has been dialyzed via a   fistula in the right upper extremity.  The venoplasty of the fistula was last   done about a year ago.  It was working well until recently when there was a   problem with flow.  Discussion was made with patient.  He would like to   undergo above procedures, fully understanding all risks.    PROCEDURE:  Informed consent was obtained.  Patient was taken to the   interventional radiology suite and was placed in the supine position.  He was   given Ancef intravenously.  A time-out procedure was done.  Intravenous   sedation was performed with fentanyl and Versed.  The patient was closely   monitored.    Next, his right upper extremity was sterilely prepped and draped in the normal   fashion.  Doppler evaluation of the fistula was performed.  There were areas   of severe stenosis seen above the anastomosis.  The remaining fistula appeared   to be patent with mild stenosis seen in the mid upper arm.    Next, under ultrasound guidance, the fistula was percutaneously cannulated in   the distal upper arm, after the skin and subcutaneous tissue was anesthetized   with 2 mL of 1% lidocaine solution.  This was done using a micropuncture kit.    The  microcatheter was removed and replaced with a 6-Moldovan sheath.  A   dialysis fistulogram was obtained.  The stenosed area was successfully   traversed using a Glidewire.  Over the guidewire, percutaneous, transluminal   venoplasty of the fistula stenosis was performed using a 7x40 mm angioplasty   balloon, after the patient was given heparin 3000 unit intravenously and the   heparin was allowed to circulate systemically for 3 minutes.  A followup   fistulogram was obtained and showed restoration of luminal patency with prompt   flow through the fistula.    Patient was given protamine 20 mg intravenously.  The 6-Moldovan sheaths were   removed and hemostasis was achieved by placing a pursestring at the puncture   site with 3-0 nylon suture.  Sterile dressing was applied.    IMPRESSION:  Patent right upper extremity arteriovenous fistula with a severe stenosis   seen above the anastomosis.  This was successfully treated with percutaneous,   transluminal venoplasty using 7x40 mm angioplasty balloon.  There is 1 areas   of mild stenosis seen in the mid upper arm.    ESTIMATED BLOOD LOSS:  5 mL    CONTRAST USED:  15 mL of Visipaque.    Sponge and instrument count was correct x2.    The patient was then awakened and taken to recovery area in stable condition   with excellent thrills over the fistula and intact neurovascular examination   of the right hand.       ____________________________________     MD ESMER JOHNSON / SHARRON    DD:  05/16/2019 18:06:38  DT:  05/16/2019 19:30:42    D#:  [id number]  Job#:  [id number]

## 2019-06-18 ENCOUNTER — OFFICE VISIT (OUTPATIENT)
Dept: RHEUMATOLOGY | Facility: MEDICAL CENTER | Age: 79
End: 2019-06-18
Payer: MEDICARE

## 2019-06-18 ENCOUNTER — HOSPITAL ENCOUNTER (OUTPATIENT)
Dept: LAB | Facility: MEDICAL CENTER | Age: 79
End: 2019-06-18
Attending: INTERNAL MEDICINE
Payer: MEDICARE

## 2019-06-18 VITALS
HEART RATE: 102 BPM | WEIGHT: 134 LBS | RESPIRATION RATE: 14 BRPM | TEMPERATURE: 98.9 F | SYSTOLIC BLOOD PRESSURE: 120 MMHG | BODY MASS INDEX: 20.37 KG/M2 | OXYGEN SATURATION: 93 % | DIASTOLIC BLOOD PRESSURE: 62 MMHG

## 2019-06-18 DIAGNOSIS — Z79.52 LONG TERM CURRENT USE OF SYSTEMIC STEROIDS: ICD-10-CM

## 2019-06-18 DIAGNOSIS — I10 ESSENTIAL HYPERTENSION: ICD-10-CM

## 2019-06-18 DIAGNOSIS — N18.6 ESRD ON DIALYSIS (HCC): Chronic | ICD-10-CM

## 2019-06-18 DIAGNOSIS — J44.1 COPD EXACERBATION (HCC): ICD-10-CM

## 2019-06-18 DIAGNOSIS — Z79.01 CHRONIC ANTICOAGULATION: ICD-10-CM

## 2019-06-18 DIAGNOSIS — I48.0 PAROXYSMAL ATRIAL FIBRILLATION (HCC): ICD-10-CM

## 2019-06-18 DIAGNOSIS — Z99.2 ESRD ON DIALYSIS (HCC): Chronic | ICD-10-CM

## 2019-06-18 DIAGNOSIS — Z95.0 PACEMAKER: ICD-10-CM

## 2019-06-18 DIAGNOSIS — Z22.39 PSEUDOMONAS AERUGINOSA COLONIZATION: Chronic | ICD-10-CM

## 2019-06-18 DIAGNOSIS — M31.30 WEGENER'S GRANULOMATOSIS: Chronic | ICD-10-CM

## 2019-06-18 PROCEDURE — 99214 OFFICE O/P EST MOD 30 MIN: CPT | Performed by: INTERNAL MEDICINE

## 2019-06-18 PROCEDURE — 86255 FLUORESCENT ANTIBODY SCREEN: CPT

## 2019-06-18 PROCEDURE — 36415 COLL VENOUS BLD VENIPUNCTURE: CPT

## 2019-06-18 RX ORDER — PREDNISONE 1 MG/1
TABLET ORAL
Qty: 540 TAB | Refills: 0 | Status: SHIPPED | OUTPATIENT
Start: 2019-06-18 | End: 2019-07-24

## 2019-06-18 NOTE — LETTER
[facility]   [address] [place] [address], [address]  [place] [address]  Phone: [phone]  Fax: [phone]              Encounter Date: 6/18/2019    Dear Dr. Forrest ref. provider found,    It was a pleasure seeing your patient, Gilberto Brown, on 6/18/2019. Diagnoses of Wegener's granulomatosis (HCC), Long term current use of systemic steroids, COPD exacerbation (HCC), ESRD on dialysis (HCC), Paroxysmal atrial fibrillation (HCC), Chronic anticoagulation, Essential hypertension, and Pacemaker were pertinent to this visit.     Please find attached progress note which includes the history I obtained from Mr. Brown, my physical examination findings, my impression and recommendations.      Once again, it was a pleasure participating in your patient's care.  Please feel free to contact me if you have any questions or if I can be of any further assistance to your patients.      Sincerely,    Cintia Ariza M.D.  Electronically Signed          PROGRESS NOTE:  No notes on file

## 2019-06-19 NOTE — PROGRESS NOTES
Chief Complaint- joint pain     Subjective:   Gilberto Brown is a 78 y.o. male here today for follow up of rheumatological issues    This is a follow-up visit for this complicated patient who is seen in this clinic for Wegener's granulomatosis that was initially diagnosed in September 2011 manifesting as progressive renal insufficiency status post biopsy indicating Wegener's granulomatosis and is now on permanent renal dialysis.  Patient is status post a number of DMARDS including Cytoxan, CellCept, rituximab all of which resulted in severe infections.  Patient is currently on prednisone 6 mg p.o. daily.  Of note patient's ANCA's have been negative since about July 2017.  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  Patient does have a long history of lung problems since childhood status post whooping cough per patient report.           Additional comorbidities include atrial fibrillation with a pacemaker in place also recent diagnosis CVA and is on permanent anticoagulation. Patient also on oxygen at home 3 L per minute.  Patient also with history of rotator cuff tear and right shoulder in the past.     S/p Cytoxan-n/v  Off of Cellcept 5/2016  Rituximab infusions 1/14/2016, 1/28/2016-patient developed severe infection and was hospitalized     GBM neg 1/2012  ANCA neg 1/2015; ANCA 1:20  11/2015; ANCA neg 2/2016; ANCA neg 5/2016; ANCA neg 12/2016; ANCA neg 3/2017; ANCA neg 7/2017; ANCA neg 2/2018; ANCA neg 5/2018; ANCA neg 12/2018; ANCA neg 3/2019  C3 90 normal 10/2011  C4 21 10/2011  OFELIA neg 10/2011  Uric acid 4.4 1/2016  HBsAg/HBcAb neg 3/2019  HCV neg 3/2019        Of note  Dr Coral Corona nephrology  Dr Gutierrez pulmonology   Dr Chavarria Cardiologist [phone]  Dr Nunez Opthalmologist [phone]          Current medicines (including changes today)  Current Outpatient Prescriptions   Medication Sig Dispense Refill   • predniSONE (DELTASONE) 1  MG Tab 6 tabs p.o. every morning for a total of 6 mg p.o. daily 540 Tab 0   • lidocaine (XYLOCAINE) 2 % Gel APPLY TO AFFECTED AREA DAILY AS NEEDED. 60 mL 9   • modafinil (PROVIGIL) 100 MG Tab Take 1 Tab by mouth every day for 90 days. 90 Tab 0   • BREO ELLIPTA 200-25 MCG/INH AEROSOL POWDER, BREATH ACTIVATED INHALE 1 PUFF BY MOUTH EVERY DAY. RINSE MOUTH AFTER USE 1 Each 5   • albuterol (PROAIR HFA) 108 (90 Base) MCG/ACT Aero Soln inhalation aerosol Inhale 2 Puffs by mouth every 6 hours as needed for Shortness of Breath. 8.5 g 0   • B Complex-C-Folic Acid (DIALYVITE TABLET) Tab Take 1 Tab by mouth every day.     • ipratropium-albuterol (DUONEB) 0.5-2.5 (3) MG/3ML nebulizer solution 3 mL by Nebulization route every 6 hours as needed for Shortness of Breath.     • Cholecalciferol (VITAMIN D3) 5000 units Cap Take 1 Cap by mouth every day.     • rosuvastatin (CRESTOR) 20 MG Tab Take 20 mg by mouth every evening.     • sodium chloride (HYPER-SAL) 7 % Nebu Soln INHALE 1 VIAL VIA NEBULIZER TWICE DAILY 240 mL 11   • tamsulosin (FLOMAX) 0.4 MG capsule Take 1 Cap by mouth every day. 90 Cap 3   • sevelamer carbonate (RENVELA) 800 MG Tab tablet TAKE 1 TABLET BY MOUTH THREE TIMES DAILY WITH MEALS 270 Tab 7   • apixaban (ELIQUIS) 2.5mg Tab Take 2.5 mg by mouth every day.     • metoprolol (LOPRESSOR) 25 MG Tab Take 25 mg by mouth 2 times a day.     • finasteride (PROSCAR) 5 MG Tab Take 5 mg by mouth every day.     • aspirin EC (ECOTRIN) 81 MG TBEC Take 81 mg by mouth every day.     • raNITidine (ZANTAC) 150 MG Tab Take 150 mg by mouth 2 times a day as needed.       No current facility-administered medications for this visit.      He  has a past medical history of A-fib (HCC); Anemia; Arthritis; ASTHMA; BPH (benign prostatic hyperplasia); Breath shortness; Bronchitis; Cataract; COPD; Dialysis; Dyslipidemia; EMPHYSEMA; GERD (gastroesophageal reflux disease); Hypertension; Oxygen decrease; Pacemaker (1988); Pain (05/09/2018); Pneumonia  (2017); Pseudomonas pneumonia ([facility]); Pulmonary histoplasmosis (HCC); Renal disorder; Sick sinus syndrome ([facility]); Sleep apnea; Snoring; Stroke ([facility]) (02/2018); Unspecified hemorrhagic conditions; and Wegener's disease, pulmonary ([facility]).    ROS   Other than what is mentioned in HPI or physical exam, there is no history of headaches, double vision or blurred vision. No temporal tenderness or jaw claudication. No history of cataracts or glaucoma. No trouble swallowing difficulties or sore throats.  No chest complaints including chest pain, cough or sputum production. No GI complaints including nausea, vomiting, change in bowel habits, or past peptic ulcer disease. No history of blood in the stools. No urinary complaints including dysuria or frequency. No history of alopecia, photosensitivity, oral ulcerations, Raynaud's phenomena.       Objective:     /62   Pulse (!) 102   Temp 37.2 °C (98.9 °F) (Temporal)   Resp 14   Wt 60.8 kg (134 lb)   SpO2 93%  Body mass index is 20.37 kg/m².   Physical Exam:    Constitutional: Alert and oriented X3, patient is talkative with good eye contact, able to talk full sentences without getting short of breath.Skin: Warm, dry, good turgor, no rashes in visible areas, patient has a number of skin cancers that he sees dermatology for.Eye: Equal, round and reactive, conjunctiva clear, lids normal EOM intactENMT: Lips without lesions, good dentition, no oropharyngeal ulcers, moist buccal mucosa, pinna without deformityNeck: Trachea midline, no masses, no thyromegaly.Lymph:  No cervical lymphadenopathy, no axillary lymphadenopathy, no supraclavicular lymphadenopathyRespiratory: Unlabored respiratory effort, lungs clear to auscultation, no wheezes, no ronchi.Cardiovascular: Normal S1, S2, patient does have a murmur at both the right and left sternal borders about 2-3 out of 6 no edema, no evidence abdominal bruits, good radial pulses bilaterally 2+.Abdomen: Soft, non-tender, no masses,  no hepatosplenomegaly, no abdominal bruits.Psych: Alert and oriented x3, normal affect and mood.Neuro: Cranial nerves 2-12 are grossly intact, no loss of sensation LEExt:no joint laxity noted in bilateral arms, no joint laxity noted in bilateral legs      Lab Results   Component Value Date/Time    HEPBCORIGM Negative 03/02/2019 09:10 AM    HEPBSAG Negative 03/02/2019 09:10 AM     Lab Results   Component Value Date/Time    HEPCAB Negative 03/02/2019 09:10 AM     Lab Results   Component Value Date/Time    SODIUM 140 05/16/2019 03:55 PM    POTASSIUM 3.8 05/16/2019 03:55 PM    CHLORIDE 99 05/16/2019 03:55 PM    CO2 31 05/16/2019 03:55 PM    GLUCOSE 82 05/16/2019 03:55 PM    BUN 30 (H) 05/16/2019 03:55 PM    CREATININE 2.97 (H) 05/16/2019 03:55 PM    CREATININE 1.0 09/23/2008 09:36 AM      Lab Results   Component Value Date/Time    WBC 7.1 05/16/2019 03:55 PM    WBC 7.6 03/19/2012 12:00 AM    RBC 3.49 (L) 05/16/2019 03:55 PM    RBC 2.25 (LL) 03/19/2012 12:00 AM    HEMOGLOBIN 10.3 (L) 05/16/2019 03:55 PM    HEMATOCRIT 34.2 (L) 05/16/2019 03:55 PM    MCV 98.0 (H) 05/16/2019 03:55 PM     (H) 03/19/2012 12:00 AM    MCH 29.5 05/16/2019 03:55 PM    MCH 33.8 03/19/2012 12:00 AM    MCHC 30.1 (L) 05/16/2019 03:55 PM    MPV 9.1 05/16/2019 03:55 PM    NEUTSPOLYS 82.40 (H) 05/16/2019 03:55 PM    LYMPHOCYTES 9.40 (L) 05/16/2019 03:55 PM    MONOCYTES 6.70 05/16/2019 03:55 PM    EOSINOPHILS 0.40 05/16/2019 03:55 PM    BASOPHILS 0.30 05/16/2019 03:55 PM    HYPOCHROMIA 1+ 10/28/2011 05:35 AM    ANISOCYTOSIS 1+ 07/03/2017 05:15 PM      Lab Results   Component Value Date/Time    CALCIUM 8.8 05/16/2019 03:55 PM    ASTSGOT 16 03/01/2019 06:56 AM    ALTSGPT 17 03/01/2019 06:56 AM    ALKPHOSPHAT 45 03/01/2019 06:56 AM    TBILIRUBIN 0.5 03/01/2019 06:56 AM    ALBUMIN 3.7 03/01/2019 06:56 AM    ALBUMIN 1.28 (L) 10/02/2011 04:30 AM    TOTPROTEIN 6.3 03/01/2019 06:56 AM    TOTPROTEIN 4.50 (L) 10/02/2011 04:30 AM     Lab Results   Component  Value Date/Time    URICACID 4.4 01/21/2016 08:36 AM    ANTINUCAB None Detected 10/02/2011 04:30 AM     Lab Results   Component Value Date/Time    L0DMBJOXCGN 90 10/02/2011 04:30 AM    J7KRSXUCBZP 21 10/02/2011 04:30 AM     Lab Results   Component Value Date/Time    AGBMAB Negative 01/18/2012 03:50 AM    GBMABA Negative 01/18/2012 03:50 AM    ANCAIGG <1:20 03/12/2019 10:50 AM    O1HGPKJSIIS 90 10/02/2011 04:30 AM     Lab Results   Component Value Date/Time    SEDRATEWES 48 (H) 04/13/2017 03:25 AM     Lab Results   Component Value Date/Time    CPKTOTAL 18 09/27/2011 05:05 AM     Lab Results   Component Value Date/Time    FLTYPE Stool 09/22/2006 01:04 PM     Lab Results   Component Value Date/Time    PTHINTACT 204.5 (H) 10/18/2011 06:21 AM      Results for orders placed during the hospital encounter of 02/05/19   DX-CERVICAL SPINE-2 OR 3 VIEWS    Impression Mild anterolisthesis of C4 on C5, likely due to facet arthropathy.    Moderate degenerative change of the cervical spine.    Diffuse osseous demineralization.     Results for orders placed in visit on 05/02/13   CT-CHEST, HIGH RESOLUTION LUNG    Impression 1. Bilateral lower lobe bronchiectasis, grossly unchanged compared with the prior conventional chest CT 1/14/12.  2. Minimal bilateral lower lobe interstitial opacities as well as confluent opacities in those areas, greater on the left, suggesting active airspace disease and/or atelectasis.  3. Probable uncalcified left lower lobe nodule, an equivocal finding with high-resolution technique.  Conventional chest CT would be needed for confirmation.  Note that no similar lesion was identified on the prior chest scan 1/14/12.            INTERPRETING LOCATION: [address] [place] [address], [id number]     Results for orders placed during the hospital encounter of 07/27/17   CT-CHEST (THORAX) W/O    Impression 1.  There is no significant interval change in the diffuse bilateral subpleural interstitial lung disease with a basilar  predominance.  2.  Bibasilar and medial segment right middle lobe bronchiectasis are again seen with minimal fluid now seen on the left within the dilated bronchi. This is most consistent with postinflammatory/infectious change.  3.  There is underlying emphysema/COPD with upper lobe paraseptal blebs.  4.  There is evidence of previous granulomatous disease with calcified granulomata, calcified nodes, and calcifications in the liver and spleen. This is consistent with the history of pulmonary histoplasmosis.  5.  There are no new suspicious findings.  6.  Enthesopathy again noted in the thoracic spine.         Assessment and Plan:     1. Wegener's granulomatosis (HCC)  Clinically seems stable, patient's ANCA levels are negative, we will recheck ANCA while patients on prednisone at 6 mg p.o. daily.  Suspect patient will be on chronic prednisone for the rest of his life.  - predniSONE (DELTASONE) 1 MG Tab; 6 tabs p.o. every morning for a total of 6 mg p.o. daily  Dispense: 540 Tab; Refill: 0    2. Long term current use of systemic steroids  Currently on prednisone at 6 mg p.o. daily, suspect this may be patient's maintenance dose  Discussion with patient regarding side effects of prednisone, patient states understanding  - predniSONE (DELTASONE) 1 MG Tab; 6 tabs p.o. every morning for a total of 6 mg p.o. daily  Dispense: 540 Tab; Refill: 0    3. COPD exacerbation ([facility])  Followed by pulmonology uses oxygen nightly    4. ESRD on dialysis ([facility])  On dialysis 3 times a week followed by nephrology    5. Paroxysmal atrial fibrillation (HCC)  Patient on chronic anticoagulation Eliquis    6. Chronic anticoagulation  This will impact with kind of medications we can use for this patient's arthritis, NSAIDs are contraindicated because of increased risk of bleeding while on chronic anticoagulation    7. Essential hypertension  May impact the type of medications we can use for this patient's arthritis. We will have to keep this  under advisement.    8. Pacemaker  Followed by cardiology    Followup: Return in about 3 months (around 9/18/2019). or sooner power Brown  was seen 30 minutes face-to-face of which more than 50% of the time was spent counseling the patient (excluding time for procedures)  regarding  rheumatological condition and care. Therapy was discussed in detail.      Please note that this dictation was created using voice recognition software. I have made every reasonable attempt to correct obvious errors, but I expect that there are errors of grammar and possibly content that I did not discover before finalizing the note.

## 2019-06-20 ENCOUNTER — TELEPHONE (OUTPATIENT)
Dept: RHEUMATOLOGY | Facility: MEDICAL CENTER | Age: 79
End: 2019-06-20

## 2019-06-20 DIAGNOSIS — M31.30 WEGENER'S GRANULOMATOSIS: Chronic | ICD-10-CM

## 2019-06-20 LAB — ANCA IGG TITR SER IF: NORMAL {TITER}

## 2019-06-20 NOTE — TELEPHONE ENCOUNTER
Please notify patient that his ANCA blood test is negative,  Recommend to decrease his prednisone from 6 mg p.o. daily to 5 mg p.o. Daily  We will recheck his ANCA blood test in about 2 months, lab ordered in the computer he does not need to be fasting

## 2019-07-09 ENCOUNTER — APPOINTMENT (RX ONLY)
Dept: URBAN - METROPOLITAN AREA CLINIC 20 | Facility: CLINIC | Age: 79
Setting detail: DERMATOLOGY
End: 2019-07-09

## 2019-07-09 DIAGNOSIS — D18.0 HEMANGIOMA: ICD-10-CM

## 2019-07-09 DIAGNOSIS — L82.1 OTHER SEBORRHEIC KERATOSIS: ICD-10-CM

## 2019-07-09 DIAGNOSIS — L30.0 NUMMULAR DERMATITIS: ICD-10-CM

## 2019-07-09 DIAGNOSIS — D22 MELANOCYTIC NEVI: ICD-10-CM

## 2019-07-09 DIAGNOSIS — L81.4 OTHER MELANIN HYPERPIGMENTATION: ICD-10-CM

## 2019-07-09 DIAGNOSIS — Z85.828 PERSONAL HISTORY OF OTHER MALIGNANT NEOPLASM OF SKIN: ICD-10-CM

## 2019-07-09 DIAGNOSIS — Z71.89 OTHER SPECIFIED COUNSELING: ICD-10-CM

## 2019-07-09 PROBLEM — D18.01 HEMANGIOMA OF SKIN AND SUBCUTANEOUS TISSUE: Status: ACTIVE | Noted: 2019-07-09

## 2019-07-09 PROBLEM — D22.61 MELANOCYTIC NEVI OF RIGHT UPPER LIMB, INCLUDING SHOULDER: Status: ACTIVE | Noted: 2019-07-09

## 2019-07-09 PROBLEM — D22.72 MELANOCYTIC NEVI OF LEFT LOWER LIMB, INCLUDING HIP: Status: ACTIVE | Noted: 2019-07-09

## 2019-07-09 PROBLEM — D22.39 MELANOCYTIC NEVI OF OTHER PARTS OF FACE: Status: ACTIVE | Noted: 2019-07-09

## 2019-07-09 PROBLEM — D22.62 MELANOCYTIC NEVI OF LEFT UPPER LIMB, INCLUDING SHOULDER: Status: ACTIVE | Noted: 2019-07-09

## 2019-07-09 PROBLEM — D22.5 MELANOCYTIC NEVI OF TRUNK: Status: ACTIVE | Noted: 2019-07-09

## 2019-07-09 PROBLEM — D22.71 MELANOCYTIC NEVI OF RIGHT LOWER LIMB, INCLUDING HIP: Status: ACTIVE | Noted: 2019-07-09

## 2019-07-09 PROBLEM — E78.5 HYPERLIPIDEMIA, UNSPECIFIED: Status: ACTIVE | Noted: 2019-07-09

## 2019-07-09 PROBLEM — I10 ESSENTIAL (PRIMARY) HYPERTENSION: Status: ACTIVE | Noted: 2019-07-09

## 2019-07-09 PROBLEM — D48.5 NEOPLASM OF UNCERTAIN BEHAVIOR OF SKIN: Status: ACTIVE | Noted: 2019-07-09

## 2019-07-09 PROCEDURE — 11103 TANGNTL BX SKIN EA SEP/ADDL: CPT

## 2019-07-09 PROCEDURE — 99214 OFFICE O/P EST MOD 30 MIN: CPT | Mod: 25

## 2019-07-09 PROCEDURE — ? SUNSCREEN RECOMMENDATIONS

## 2019-07-09 PROCEDURE — ? BIOPSY BY SHAVE METHOD

## 2019-07-09 PROCEDURE — 11102 TANGNTL BX SKIN SINGLE LES: CPT

## 2019-07-09 PROCEDURE — ? COUNSELING

## 2019-07-09 ASSESSMENT — LOCATION SIMPLE DESCRIPTION DERM
LOCATION SIMPLE: LEFT PRETIBIAL REGION
LOCATION SIMPLE: LEFT POSTERIOR UPPER ARM
LOCATION SIMPLE: RIGHT FOREHEAD
LOCATION SIMPLE: RIGHT POSTERIOR THIGH
LOCATION SIMPLE: RIGHT LOWER BACK
LOCATION SIMPLE: RIGHT UPPER BACK
LOCATION SIMPLE: RIGHT CALF
LOCATION SIMPLE: RIGHT POSTERIOR UPPER ARM
LOCATION SIMPLE: RIGHT FOREARM
LOCATION SIMPLE: UPPER BACK
LOCATION SIMPLE: LEFT POSTERIOR THIGH
LOCATION SIMPLE: LEFT FOREARM
LOCATION SIMPLE: RIGHT CHEEK
LOCATION SIMPLE: RIGHT PRETIBIAL REGION

## 2019-07-09 ASSESSMENT — LOCATION DETAILED DESCRIPTION DERM
LOCATION DETAILED: RIGHT DISTAL POSTERIOR UPPER ARM
LOCATION DETAILED: LEFT LATERAL PROXIMAL PRETIBIAL REGION
LOCATION DETAILED: LEFT PROXIMAL POSTERIOR UPPER ARM
LOCATION DETAILED: INFERIOR THORACIC SPINE
LOCATION DETAILED: RIGHT PROXIMAL PRETIBIAL REGION
LOCATION DETAILED: RIGHT INFERIOR MEDIAL UPPER BACK
LOCATION DETAILED: LEFT DISTAL POSTERIOR THIGH
LOCATION DETAILED: RIGHT INFERIOR MEDIAL MIDBACK
LOCATION DETAILED: LEFT VENTRAL PROXIMAL FOREARM
LOCATION DETAILED: RIGHT DISTAL CALF
LOCATION DETAILED: RIGHT INFERIOR FOREHEAD
LOCATION DETAILED: RIGHT VENTRAL PROXIMAL FOREARM
LOCATION DETAILED: RIGHT SUPERIOR UPPER BACK
LOCATION DETAILED: RIGHT DISTAL POSTERIOR THIGH
LOCATION DETAILED: LEFT PROXIMAL DORSAL FOREARM
LOCATION DETAILED: RIGHT INFERIOR CENTRAL MALAR CHEEK

## 2019-07-09 ASSESSMENT — LOCATION ZONE DERM
LOCATION ZONE: TRUNK
LOCATION ZONE: ARM
LOCATION ZONE: FACE
LOCATION ZONE: LEG

## 2019-07-09 NOTE — PROCEDURE: BIOPSY BY SHAVE METHOD
Anesthesia Volume In Cc: 1
Type Of Destruction Used: Curettage
Consent: Written consent was obtained and risks were reviewed including but not limited to scarring, infection, bleeding, scabbing, incomplete removal, nerve damage and allergy to anesthesia.
Dressing: Band-Aid
Render Post-Care Instructions In Note?: yes
Bill 61277 For Specimen Handling/Conveyance To Laboratory?: no
Hemostasis: Drysol and Electrocautery
Lab: [id number]
Detail Level: Detailed
Billing Type: Third-Party Bill
Biopsy Method: Personna blade
Size Of Lesion In Cm: 0
Lab Facility: 
Anesthesia Type: 1% lidocaine with 1:100,000 epinephrine and a 1:12 solution of 8.4% sodium bicarbonate
Post-Care Instructions: I reviewed with the patient in detail post-care instructions. Patient is to keep the biopsy site clean once daily and then apply petroleum and bandaid  until healed.
Notification Instructions: Patient will be notified of biopsy results. However, patient instructed to call the office if not contacted within 2 weeks.
Wound Care: Bacitracin
Biopsy Type: H and E
Depth Of Biopsy: dermis

## 2019-07-18 ENCOUNTER — OFFICE VISIT (OUTPATIENT)
Dept: PULMONOLOGY | Facility: HOSPICE | Age: 79
End: 2019-07-18
Payer: MEDICARE

## 2019-07-18 VITALS
HEIGHT: 68 IN | WEIGHT: 136.13 LBS | BODY MASS INDEX: 20.63 KG/M2 | DIASTOLIC BLOOD PRESSURE: 60 MMHG | SYSTOLIC BLOOD PRESSURE: 104 MMHG | HEART RATE: 86 BPM | OXYGEN SATURATION: 93 %

## 2019-07-18 DIAGNOSIS — F32.A DEPRESSION, UNSPECIFIED DEPRESSION TYPE: ICD-10-CM

## 2019-07-18 DIAGNOSIS — Z22.39 PSEUDOMONAS AERUGINOSA COLONIZATION: Chronic | ICD-10-CM

## 2019-07-18 DIAGNOSIS — M31.30 WEGENER'S GRANULOMATOSIS: Chronic | ICD-10-CM

## 2019-07-18 DIAGNOSIS — J44.9 CHRONIC OBSTRUCTIVE PULMONARY DISEASE, UNSPECIFIED COPD TYPE (HCC): Chronic | ICD-10-CM

## 2019-07-18 PROCEDURE — 99214 OFFICE O/P EST MOD 30 MIN: CPT | Mod: 25 | Performed by: INTERNAL MEDICINE

## 2019-07-18 PROCEDURE — 94761 N-INVAS EAR/PLS OXIMETRY MLT: CPT | Performed by: INTERNAL MEDICINE

## 2019-07-18 ASSESSMENT — ENCOUNTER SYMPTOMS
EYES NEGATIVE: 1
CARDIOVASCULAR NEGATIVE: 1
NEUROLOGICAL NEGATIVE: 1
SPUTUM PRODUCTION: 1
COUGH: 1
GASTROINTESTINAL NEGATIVE: 1
PSYCHIATRIC NEGATIVE: 1
BACK PAIN: 1

## 2019-07-18 NOTE — PROGRESS NOTES
Pulmonary Clinic follow up    Date of Service: 7/18/2019    Reason for follow up:  Follow-Up (Mucopurulent Chronic Bronchitis ) and Cough      Problem List Items Addressed This Visit     Pseudomonas aeruginosa colonization (Chronic)     Pseudomonas colonization he is on tobramycin every 28 days on and off and hypertonic saline  No signs of an infection at this time and feels at his baseline other than fatigue         COPD (chronic obstructive pulmonary disease) (HCC) (Chronic)     COPD is due to underlying bronchiectasis which is from his histoplasmosis of DANIEL infection  Breo, vest twice daily, duo nebs  Doing well         Relevant Orders    Multiple Oximetry    Wegener's granulomatosis (HCC) (Chronic)     In remission ANCA negative   On prednisone 5 mg daily  See Dr. Ariza's note         Depression     Doing ok  Still quite depressed   Encouraged to be more active                 History of Present Illness: Gilberto Brown is a 79 y.o. male with a past medical history ofpast medical history of  Wegener'granulomatosis initially diagnosed in September 2011 with progressive renal insufficiency and now on permanent renal dialysis on chronic steroids, chronic pseudomonas colonization on inhaled tobramycin, pacemaker, ESRD on HD, COPD on nocturnal O2, afib, pacemaker , GERD who last admitted on 3/3/19 with Influenza A  Patient also with sleep apnea on CPAP     Patient is currently on prednisone at 5 mg p.o. daily with a negative ANCA.  Tried other immunosuppresants resulting in serious infections so stopped   I last saw patient 4/18/19     For his COPD he is on oxygen Breo, vest twice daily, duo nebs.  COPD is due to underlying bronchiectasis which is from his histoplasmosis of DANIEL infection RX 18 months at [facility].  Pseudomonas colonization he is on tobramycin every 28 days on and off and hypertonic saline     His COPD is optimized  On 2l of oxygen  Denies any problems     Sleeps in a chair due to mucous  production  Am is when he has a lot of production of mucous  Uses a vest bid  Stopped using oxygen during the day as too heavy  Recovery post dialysis says is exhausting  Losing his memory more and weight loss another 10 pounds    Review of Systems   Constitutional: Positive for malaise/fatigue.   HENT: Negative.    Eyes: Negative.    Respiratory: Positive for cough and sputum production.    Cardiovascular: Negative.    Gastrointestinal: Negative.    Genitourinary: Negative.    Musculoskeletal: Positive for back pain.   Skin: Negative.    Neurological: Negative.    Endo/Heme/Allergies: Negative.    Psychiatric/Behavioral: Negative.        Current Outpatient Prescriptions on File Prior to Visit   Medication Sig Dispense Refill   • predniSONE (DELTASONE) 1 MG Tab 6 tabs p.o. every morning for a total of 6 mg p.o. daily (Patient taking differently: 5 tabs p.o. every morning for a total of 5 mg p.o. daily) 540 Tab 0   • lidocaine (XYLOCAINE) 2 % Gel APPLY TO AFFECTED AREA DAILY AS NEEDED. 60 mL 9   • BREO ELLIPTA 200-25 MCG/INH AEROSOL POWDER, BREATH ACTIVATED INHALE 1 PUFF BY MOUTH EVERY DAY. RINSE MOUTH AFTER USE 1 Each 5   • B Complex-C-Folic Acid (DIALYVITE TABLET) Tab Take 1 Tab by mouth every day.     • ipratropium-albuterol (DUONEB) 0.5-2.5 (3) MG/3ML nebulizer solution 3 mL by Nebulization route every 6 hours as needed for Shortness of Breath.     • Cholecalciferol (VITAMIN D3) 5000 units Cap Take 1 Cap by mouth every day.     • rosuvastatin (CRESTOR) 20 MG Tab Take 20 mg by mouth every evening.     • sodium chloride (HYPER-SAL) 7 % Nebu Soln INHALE 1 VIAL VIA NEBULIZER TWICE DAILY 240 mL 11   • tamsulosin (FLOMAX) 0.4 MG capsule Take 1 Cap by mouth every day. 90 Cap 3   • sevelamer carbonate (RENVELA) 800 MG Tab tablet TAKE 1 TABLET BY MOUTH THREE TIMES DAILY WITH MEALS 270 Tab 7   • apixaban (ELIQUIS) 2.5mg Tab Take 2.5 mg by mouth every day.     • metoprolol (LOPRESSOR) 25 MG Tab Take 25 mg by mouth 2 times a  day.     • finasteride (PROSCAR) 5 MG Tab Take 5 mg by mouth every day.     • aspirin EC (ECOTRIN) 81 MG TBEC Take 81 mg by mouth every day.     • albuterol (PROAIR HFA) 108 (90 Base) MCG/ACT Aero Soln inhalation aerosol Inhale 2 Puffs by mouth every 6 hours as needed for Shortness of Breath. 8.5 g 0   • raNITidine (ZANTAC) 150 MG Tab Take 150 mg by mouth 2 times a day as needed.       No current facility-administered medications on file prior to visit.        Social History   Substance Use Topics   • Smoking status: Former Smoker     Years: 30.00     Types: Pipe     Quit date: 1/1/1990   • Smokeless tobacco: Former User     Types: Chew   • Alcohol use 4.8 oz/week     7 Glasses of wine, 1 Standard drinks or equivalent per week      Comment: 1/2 glass Red wine nightly        Past Medical History:   Diagnosis Date   • A-fib ([facility])        • Anemia    • Arthritis     arms, legs, shoulders   • ASTHMA    • BPH (benign prostatic hyperplasia)    • Breath shortness    • Bronchitis     chronic   • Cataract     removed bilat   • COPD    • Dialysis     [facility] M[facility],    • Dyslipidemia    • EMPHYSEMA    • GERD (gastroesophageal reflux disease)    • Hypertension    • Oxygen decrease     O2 3liters @ HS and dialysis   • Pacemaker 1988   • Pain 05/09/2018    shoulders/hips, 5/10   • Pneumonia 2017   • Pseudomonas pneumonia ([facility])    • Pulmonary histoplasmosis ([facility])    • Renal disorder     CKD,    • Sick sinus syndrome ([facility])    • Sleep apnea     uses cpap at noc   • Snoring    • Stroke ([facility]) 02/2018   • Unspecified hemorrhagic conditions     bruises easily, fragile skin   • Wegener's disease, pulmonary ([facility])        Past Surgical History:   Procedure Laterality Date   • AV FISTULOGRAM Right 4/12/2016    Procedure: AV FISTULOGRAM , VENOPLASTY X 2;  Surgeon: Nate Syed M.D.;  Location: SURGERY [facility];  Service:    • RECOVERY  9/3/2015    Procedure: IR1 VASCULAR CASE-ELENO RIGHT DIALYSIS FISTULOGRAM,  "POSSIBLE INTERVENTION;  Surgeon: Recoveryonly Surgery;  Location: SURGERY PRE-POST PROC UNIT [facility];  Service:    • RECOVERY  2/26/2015    Performed by Ir-Recovery Surgery at SURGERY SAME DAY [facility]   • RECOVERY  10/3/2014    Performed by Ir-Recovery Surgery at SURGERY SAME DAY [facility]   • RECOVERY  8/7/2014    Performed by Ir-Recovery Surgery at [facility]   • RECOVERY  12/17/2013    Performed by Ir-Recovery Surgery at [facility] SAME DAY [facility]   • BRONCHOSCOPY-ENDO  7/18/2013    Performed by Juan F Rubio M.D. at SURGERY [facility]   • RECOVERY  7/17/2013    Performed by Ir-Recovery Surgery at [facility] SAME DAY [facility]S   • AV FISTULA REVISION  6/9/2012    Performed by NESSA JOHANSEN at SURGERY [facility]   • RECOVERY  4/19/2012    Performed by SURGERY, IR-RECOVERY at SURGERY SAME DAY [facility]   • AV FISTULA CREATION  3/8/2012    Performed by NESSA JOHANSEN at SURGERY [facility]   • GASTROSCOPY WITH BIOPSY  1/17/2012    Performed by ZURDO HARRISON at ENDOSCOPY [facility]   • CATH PLACEMENT  10/8/2011    Performed by NESSA JOHANSEN at SURGERY [facility]   • GASTROSCOPY WITH BALLOON DILATATION  9/28/2011    Performed by KT FERNANDEZ at [facility]   • PACEMAKER INSERTION  4/2009   • ROTATOR CUFF REPAIR  2003    right   • HERNIA REPAIR  1990    right inguinal hernia   • HIP REPLACEMENT, TOTAL      right   • TONSILLECTOMY         Allergies: Doxycycline; Erythromycin; and Rituximab    Family History   Problem Relation Age of Onset   • Stroke Father    • Heart Disease Father    • Stroke Mother    • Cancer Unknown        Vitals:    07/18/19 0952 07/18/19 0959   Height:  1.727 m (5' 8\")   Weight:  61.7 kg (136 lb 2 oz)   Weight % change since last entry.:  0 %   BP:  104/60   Pulse:  86   BMI (Calculated):  20.7   O2 sat % room air: 93 %        Physical Examination  Physical Exam   Constitutional: He is oriented to person, place, " and time and well-developed, well-nourished, and in no distress. No distress.   HENT:   Head: Normocephalic and atraumatic.   Right Ear: External ear normal.   Left Ear: External ear normal.   Nose: Nose normal.   Mouth/Throat: Oropharynx is clear and moist. No oropharyngeal exudate.   Eyes: Pupils are equal, round, and reactive to light. Conjunctivae and EOM are normal.   Neck: Normal range of motion. Neck supple. No JVD present. No tracheal deviation present. No thyromegaly present.   Cardiovascular: Normal rate, regular rhythm and intact distal pulses.  Exam reveals no gallop and no friction rub.    Murmur heard.  Pulmonary/Chest: Effort normal and breath sounds normal. No stridor. No respiratory distress. He has no wheezes. He has no rales. He exhibits no tenderness.   insp intermittent squeaks   Abdominal: Soft. Bowel sounds are normal. He exhibits no distension and no mass.   Musculoskeletal: Normal range of motion. He exhibits no edema or deformity.   Lymphadenopathy:     He has no cervical adenopathy.   Neurological: He is alert and oriented to person, place, and time. No cranial nerve deficit. He exhibits normal muscle tone. Gait normal. Coordination normal. GCS score is 15.   Skin: No rash noted. He is not diaphoretic.   Multiple areas of echymosis   Psychiatric: Mood, memory, affect and judgment normal.     Multi-Ox Readings  Dropped to 91% on RA      Abhijit Rowe MD MPH [facility]  [facility]

## 2019-07-18 NOTE — PROCEDURES
Multi-Ox Readings  Multi Ox #1 Room air   O2 sat % at rest 94   O2 sat % on exertion 91   O2 sat average on exertion     Multi Ox #2     O2 sat % at rest     O2 sat % on exertion     O2 sat average on exertion       Oxygen Use 3   Oxygen Frequency 24/7 (AM-when at dialysis and at home only)   Duration of need     Is the patient mobile within the home?     CPAP Use? ?cmH20   BIPAP Use?     Servo Titration

## 2019-07-24 ENCOUNTER — APPOINTMENT (OUTPATIENT)
Dept: RADIOLOGY | Facility: MEDICAL CENTER | Age: 79
DRG: 177 | End: 2019-07-24
Attending: EMERGENCY MEDICINE
Payer: MEDICARE

## 2019-07-24 ENCOUNTER — APPOINTMENT (OUTPATIENT)
Dept: RADIOLOGY | Facility: MEDICAL CENTER | Age: 79
DRG: 177 | End: 2019-07-24
Payer: MEDICARE

## 2019-07-24 ENCOUNTER — HOSPITAL ENCOUNTER (INPATIENT)
Facility: MEDICAL CENTER | Age: 79
LOS: 8 days | DRG: 177 | End: 2019-08-01
Attending: EMERGENCY MEDICINE | Admitting: HOSPITALIST
Payer: MEDICARE

## 2019-07-24 DIAGNOSIS — Z99.2 ESRD ON DIALYSIS (HCC): Chronic | ICD-10-CM

## 2019-07-24 DIAGNOSIS — E43 PROTEIN-CALORIE MALNUTRITION, SEVERE (HCC): ICD-10-CM

## 2019-07-24 DIAGNOSIS — D84.9 IMMUNOSUPPRESSED STATUS (HCC): Chronic | ICD-10-CM

## 2019-07-24 DIAGNOSIS — R53.81 DEBILITY: ICD-10-CM

## 2019-07-24 DIAGNOSIS — M31.30 WEGENER'S GRANULOMATOSIS: Chronic | ICD-10-CM

## 2019-07-24 DIAGNOSIS — S51.812A LACERATION OF LEFT FOREARM, INITIAL ENCOUNTER: ICD-10-CM

## 2019-07-24 DIAGNOSIS — J18.9 PNEUMONIA OF RIGHT LOWER LOBE DUE TO INFECTIOUS ORGANISM: ICD-10-CM

## 2019-07-24 DIAGNOSIS — N18.6 ESRD ON DIALYSIS (HCC): Chronic | ICD-10-CM

## 2019-07-24 DIAGNOSIS — I44.2 AV BLOCK, 3RD DEGREE (HCC): Chronic | ICD-10-CM

## 2019-07-24 DIAGNOSIS — J44.9 CHRONIC OBSTRUCTIVE PULMONARY DISEASE, UNSPECIFIED COPD TYPE (HCC): Chronic | ICD-10-CM

## 2019-07-24 PROBLEM — J96.01 ACUTE RESPIRATORY FAILURE WITH HYPOXIA (HCC): Status: ACTIVE | Noted: 2019-07-24

## 2019-07-24 LAB
ALBUMIN SERPL BCP-MCNC: 3.7 G/DL (ref 3.2–4.9)
ALBUMIN/GLOB SERPL: 1.2 G/DL
ALP SERPL-CCNC: 55 U/L (ref 30–99)
ALT SERPL-CCNC: 14 U/L (ref 2–50)
ANION GAP SERPL CALC-SCNC: 12 MMOL/L (ref 0–11.9)
ANISOCYTOSIS BLD QL SMEAR: ABNORMAL
APPEARANCE UR: CLEAR
AST SERPL-CCNC: 17 U/L (ref 12–45)
BACTERIA #/AREA URNS HPF: NEGATIVE /HPF
BASOPHILS # BLD AUTO: 0.9 % (ref 0–1.8)
BASOPHILS # BLD: 0.18 K/UL (ref 0–0.12)
BILIRUB SERPL-MCNC: 0.6 MG/DL (ref 0.1–1.5)
BILIRUB UR QL STRIP.AUTO: NEGATIVE
BUN SERPL-MCNC: 35 MG/DL (ref 8–22)
CALCIUM SERPL-MCNC: 9.1 MG/DL (ref 8.5–10.5)
CHLORIDE SERPL-SCNC: 101 MMOL/L (ref 96–112)
CO2 SERPL-SCNC: 25 MMOL/L (ref 20–33)
COLOR UR: YELLOW
CREAT SERPL-MCNC: 4.21 MG/DL (ref 0.5–1.4)
EKG IMPRESSION: NORMAL
EOSINOPHIL # BLD AUTO: 0 K/UL (ref 0–0.51)
EOSINOPHIL NFR BLD: 0 % (ref 0–6.9)
EPI CELLS #/AREA URNS HPF: NEGATIVE /HPF
ERYTHROCYTE [DISTWIDTH] IN BLOOD BY AUTOMATED COUNT: 62.7 FL (ref 35.9–50)
GLOBULIN SER CALC-MCNC: 3.1 G/DL (ref 1.9–3.5)
GLUCOSE SERPL-MCNC: 114 MG/DL (ref 65–99)
GLUCOSE UR STRIP.AUTO-MCNC: NEGATIVE MG/DL
HCT VFR BLD AUTO: 38.1 % (ref 42–52)
HGB BLD-MCNC: 11.2 G/DL (ref 14–18)
HYALINE CASTS #/AREA URNS LPF: ABNORMAL /LPF
KETONES UR STRIP.AUTO-MCNC: NEGATIVE MG/DL
LACTATE BLD-SCNC: 1.4 MMOL/L (ref 0.5–2)
LACTATE BLD-SCNC: 1.6 MMOL/L (ref 0.5–2)
LEUKOCYTE ESTERASE UR QL STRIP.AUTO: NEGATIVE
LYMPHOCYTES # BLD AUTO: 0.33 K/UL (ref 1–4.8)
LYMPHOCYTES NFR BLD: 1.7 % (ref 22–41)
MACROCYTES BLD QL SMEAR: ABNORMAL
MAGNESIUM SERPL-MCNC: 2.3 MG/DL (ref 1.5–2.5)
MANUAL DIFF BLD: NORMAL
MCH RBC QN AUTO: 29.3 PG (ref 27–33)
MCHC RBC AUTO-ENTMCNC: 29.4 G/DL (ref 33.7–35.3)
MCV RBC AUTO: 99.7 FL (ref 81.4–97.8)
MICRO URNS: ABNORMAL
MONOCYTES # BLD AUTO: 0.66 K/UL (ref 0–0.85)
MONOCYTES NFR BLD AUTO: 3.4 % (ref 0–13.4)
MORPHOLOGY BLD-IMP: NORMAL
NEUTROPHILS # BLD AUTO: 18.33 K/UL (ref 1.82–7.42)
NEUTROPHILS NFR BLD: 88 % (ref 44–72)
NEUTS BAND NFR BLD MANUAL: 6 % (ref 0–10)
NITRITE UR QL STRIP.AUTO: NEGATIVE
NRBC # BLD AUTO: 0 K/UL
NRBC BLD-RTO: 0 /100 WBC
OVALOCYTES BLD QL SMEAR: NORMAL
PH UR STRIP.AUTO: 8.5 [PH]
PLATELET # BLD AUTO: 209 K/UL (ref 164–446)
PLATELET BLD QL SMEAR: NORMAL
PMV BLD AUTO: 8.7 FL (ref 9–12.9)
POIKILOCYTOSIS BLD QL SMEAR: NORMAL
POTASSIUM SERPL-SCNC: 4.7 MMOL/L (ref 3.6–5.5)
PROCALCITONIN SERPL-MCNC: 1.59 NG/ML
PROT SERPL-MCNC: 6.8 G/DL (ref 6–8.2)
PROT UR QL STRIP: 100 MG/DL
RBC # BLD AUTO: 3.82 M/UL (ref 4.7–6.1)
RBC # URNS HPF: ABNORMAL /HPF
RBC BLD AUTO: PRESENT
RBC UR QL AUTO: ABNORMAL
SODIUM SERPL-SCNC: 138 MMOL/L (ref 135–145)
SP GR UR STRIP.AUTO: 1.01
UROBILINOGEN UR STRIP.AUTO-MCNC: 0.2 MG/DL
WBC # BLD AUTO: 19.5 K/UL (ref 4.8–10.8)
WBC #/AREA URNS HPF: ABNORMAL /HPF

## 2019-07-24 PROCEDURE — 99285 EMERGENCY DEPT VISIT HI MDM: CPT

## 2019-07-24 PROCEDURE — 700101 HCHG RX REV CODE 250: Performed by: EMERGENCY MEDICINE

## 2019-07-24 PROCEDURE — 80053 COMPREHEN METABOLIC PANEL: CPT

## 2019-07-24 PROCEDURE — 83735 ASSAY OF MAGNESIUM: CPT

## 2019-07-24 PROCEDURE — 36415 COLL VENOUS BLD VENIPUNCTURE: CPT

## 2019-07-24 PROCEDURE — 85007 BL SMEAR W/DIFF WBC COUNT: CPT

## 2019-07-24 PROCEDURE — 83605 ASSAY OF LACTIC ACID: CPT

## 2019-07-24 PROCEDURE — 84145 PROCALCITONIN (PCT): CPT

## 2019-07-24 PROCEDURE — 700105 HCHG RX REV CODE 258: Performed by: EMERGENCY MEDICINE

## 2019-07-24 PROCEDURE — 71045 X-RAY EXAM CHEST 1 VIEW: CPT

## 2019-07-24 PROCEDURE — 94640 AIRWAY INHALATION TREATMENT: CPT

## 2019-07-24 PROCEDURE — 304217 HCHG IRRIGATION SYSTEM

## 2019-07-24 PROCEDURE — 770006 HCHG ROOM/CARE - MED/SURG/GYN SEMI*

## 2019-07-24 PROCEDURE — 700111 HCHG RX REV CODE 636 W/ 250 OVERRIDE (IP): Performed by: EMERGENCY MEDICINE

## 2019-07-24 PROCEDURE — 0HQEXZZ REPAIR LEFT LOWER ARM SKIN, EXTERNAL APPROACH: ICD-10-PCS | Performed by: EMERGENCY MEDICINE

## 2019-07-24 PROCEDURE — 93005 ELECTROCARDIOGRAM TRACING: CPT | Performed by: HOSPITALIST

## 2019-07-24 PROCEDURE — 81001 URINALYSIS AUTO W/SCOPE: CPT

## 2019-07-24 PROCEDURE — 85027 COMPLETE CBC AUTOMATED: CPT

## 2019-07-24 PROCEDURE — 93010 ELECTROCARDIOGRAM REPORT: CPT | Performed by: INTERNAL MEDICINE

## 2019-07-24 PROCEDURE — 96365 THER/PROPH/DIAG IV INF INIT: CPT

## 2019-07-24 PROCEDURE — 87040 BLOOD CULTURE FOR BACTERIA: CPT

## 2019-07-24 PROCEDURE — 96375 TX/PRO/DX INJ NEW DRUG ADDON: CPT

## 2019-07-24 PROCEDURE — 99223 1ST HOSP IP/OBS HIGH 75: CPT | Mod: AI | Performed by: HOSPITALIST

## 2019-07-24 PROCEDURE — 94760 N-INVAS EAR/PLS OXIMETRY 1: CPT

## 2019-07-24 PROCEDURE — 87086 URINE CULTURE/COLONY COUNT: CPT

## 2019-07-24 RX ORDER — IPRATROPIUM BROMIDE AND ALBUTEROL SULFATE 2.5; .5 MG/3ML; MG/3ML
3 SOLUTION RESPIRATORY (INHALATION) EVERY 6 HOURS PRN
Status: DISCONTINUED | OUTPATIENT
Start: 2019-07-24 | End: 2019-08-01 | Stop reason: HOSPADM

## 2019-07-24 RX ORDER — AMOXICILLIN 500 MG/1
500 CAPSULE ORAL 4 TIMES DAILY
Refills: 1 | Status: ON HOLD | COMMUNITY
Start: 2019-07-16 | End: 2019-08-01

## 2019-07-24 RX ORDER — METOPROLOL SUCCINATE 25 MG/1
25 TABLET, EXTENDED RELEASE ORAL 2 TIMES DAILY
Status: DISCONTINUED | OUTPATIENT
Start: 2019-07-24 | End: 2019-08-01 | Stop reason: HOSPADM

## 2019-07-24 RX ORDER — ONDANSETRON 2 MG/ML
4 INJECTION INTRAMUSCULAR; INTRAVENOUS EVERY 4 HOURS PRN
Status: DISCONTINUED | OUTPATIENT
Start: 2019-07-24 | End: 2019-08-01 | Stop reason: HOSPADM

## 2019-07-24 RX ORDER — FINASTERIDE 5 MG/1
5 TABLET, FILM COATED ORAL EVERY MORNING
Status: DISCONTINUED | OUTPATIENT
Start: 2019-07-25 | End: 2019-08-01 | Stop reason: HOSPADM

## 2019-07-24 RX ORDER — MORPHINE SULFATE 4 MG/ML
4 INJECTION, SOLUTION INTRAMUSCULAR; INTRAVENOUS ONCE
Status: COMPLETED | OUTPATIENT
Start: 2019-07-24 | End: 2019-07-24

## 2019-07-24 RX ORDER — LEVOFLOXACIN 5 MG/ML
750 INJECTION, SOLUTION INTRAVENOUS EVERY 24 HOURS
Status: DISCONTINUED | OUTPATIENT
Start: 2019-07-24 | End: 2019-07-24

## 2019-07-24 RX ORDER — POLYETHYLENE GLYCOL 3350 17 G/17G
1 POWDER, FOR SOLUTION ORAL
Status: DISCONTINUED | OUTPATIENT
Start: 2019-07-24 | End: 2019-08-01 | Stop reason: HOSPADM

## 2019-07-24 RX ORDER — PREDNISONE 5 MG/1
5 TABLET ORAL EVERY MORNING
COMMUNITY
End: 2019-09-19

## 2019-07-24 RX ORDER — SODIUM CHLORIDE FOR INHALATION 7 %
4 VIAL, NEBULIZER (ML) INHALATION
Status: DISCONTINUED | OUTPATIENT
Start: 2019-07-24 | End: 2019-08-01 | Stop reason: HOSPADM

## 2019-07-24 RX ORDER — TAMSULOSIN HYDROCHLORIDE 0.4 MG/1
0.4 CAPSULE ORAL DAILY
Status: DISCONTINUED | OUTPATIENT
Start: 2019-07-25 | End: 2019-08-01 | Stop reason: HOSPADM

## 2019-07-24 RX ORDER — AMOXICILLIN 250 MG
2 CAPSULE ORAL 2 TIMES DAILY
Status: DISCONTINUED | OUTPATIENT
Start: 2019-07-25 | End: 2019-08-01 | Stop reason: HOSPADM

## 2019-07-24 RX ORDER — PREDNISONE 5 MG/1
5 TABLET ORAL EVERY MORNING
Status: DISCONTINUED | OUTPATIENT
Start: 2019-07-25 | End: 2019-08-01 | Stop reason: HOSPADM

## 2019-07-24 RX ORDER — IPRATROPIUM BROMIDE AND ALBUTEROL SULFATE 2.5; .5 MG/3ML; MG/3ML
3 SOLUTION RESPIRATORY (INHALATION)
Status: DISCONTINUED | OUTPATIENT
Start: 2019-07-24 | End: 2019-08-01 | Stop reason: HOSPADM

## 2019-07-24 RX ORDER — ROSUVASTATIN CALCIUM 20 MG/1
20 TABLET, COATED ORAL EVERY EVENING
Status: DISCONTINUED | OUTPATIENT
Start: 2019-07-24 | End: 2019-08-01 | Stop reason: HOSPADM

## 2019-07-24 RX ORDER — SODIUM CHLORIDE 9 MG/ML
250 INJECTION, SOLUTION INTRAVENOUS ONCE
Status: COMPLETED | OUTPATIENT
Start: 2019-07-24 | End: 2019-07-24

## 2019-07-24 RX ORDER — BISACODYL 10 MG
10 SUPPOSITORY, RECTAL RECTAL
Status: DISCONTINUED | OUTPATIENT
Start: 2019-07-24 | End: 2019-08-01 | Stop reason: HOSPADM

## 2019-07-24 RX ORDER — LEVOFLOXACIN 5 MG/ML
750 INJECTION, SOLUTION INTRAVENOUS EVERY 24 HOURS
Status: DISCONTINUED | OUTPATIENT
Start: 2019-07-25 | End: 2019-07-24

## 2019-07-24 RX ORDER — LEVOFLOXACIN 500 MG/1
500 TABLET, FILM COATED ORAL
Status: DISCONTINUED | OUTPATIENT
Start: 2019-07-26 | End: 2019-07-25

## 2019-07-24 RX ORDER — ALBUTEROL SULFATE 90 UG/1
2 AEROSOL, METERED RESPIRATORY (INHALATION) EVERY 6 HOURS PRN
Status: DISCONTINUED | OUTPATIENT
Start: 2019-07-24 | End: 2019-08-01 | Stop reason: HOSPADM

## 2019-07-24 RX ORDER — ONDANSETRON 4 MG/1
4 TABLET, ORALLY DISINTEGRATING ORAL EVERY 4 HOURS PRN
Status: DISCONTINUED | OUTPATIENT
Start: 2019-07-24 | End: 2019-08-01 | Stop reason: HOSPADM

## 2019-07-24 RX ORDER — SEVELAMER CARBONATE 800 MG/1
800 TABLET, FILM COATED ORAL
Status: DISCONTINUED | OUTPATIENT
Start: 2019-07-24 | End: 2019-08-01 | Stop reason: HOSPADM

## 2019-07-24 RX ORDER — METOPROLOL SUCCINATE 25 MG/1
25 TABLET, EXTENDED RELEASE ORAL 2 TIMES DAILY
Refills: 3 | COMMUNITY
Start: 2019-07-15

## 2019-07-24 RX ORDER — BUDESONIDE AND FORMOTEROL FUMARATE DIHYDRATE 160; 4.5 UG/1; UG/1
2 AEROSOL RESPIRATORY (INHALATION) 2 TIMES DAILY
Status: DISCONTINUED | OUTPATIENT
Start: 2019-07-25 | End: 2019-07-25

## 2019-07-24 RX ORDER — LEVOFLOXACIN 5 MG/ML
750 INJECTION, SOLUTION INTRAVENOUS ONCE
Status: COMPLETED | OUTPATIENT
Start: 2019-07-24 | End: 2019-07-25

## 2019-07-24 RX ADMIN — ALBUTEROL SULFATE 2.5 MG: 5 SOLUTION RESPIRATORY (INHALATION) at 15:11

## 2019-07-24 RX ADMIN — CEFTRIAXONE SODIUM 1 G: 1 INJECTION, POWDER, FOR SOLUTION INTRAMUSCULAR; INTRAVENOUS at 16:34

## 2019-07-24 RX ADMIN — MORPHINE SULFATE 4 MG: 4 INJECTION INTRAVENOUS at 16:33

## 2019-07-24 RX ADMIN — SODIUM CHLORIDE 250 ML: 9 INJECTION, SOLUTION INTRAVENOUS at 22:44

## 2019-07-24 RX ADMIN — LEVOFLOXACIN 750 MG: 750 INJECTION, SOLUTION INTRAVENOUS at 23:50

## 2019-07-24 ASSESSMENT — ENCOUNTER SYMPTOMS
STRIDOR: 0
HEARTBURN: 0
DEPRESSION: 0
BACK PAIN: 0
WEAKNESS: 1
POLYDIPSIA: 0
NAUSEA: 1
DOUBLE VISION: 0
CHILLS: 1
FLANK PAIN: 0
SINUS PAIN: 0
BLURRED VISION: 0
COUGH: 1
FALLS: 0
SHORTNESS OF BREATH: 1
PALPITATIONS: 0
ABDOMINAL PAIN: 0
BRUISES/BLEEDS EASILY: 0
HEMOPTYSIS: 0
WHEEZING: 0
SPUTUM PRODUCTION: 1
NECK PAIN: 0
HALLUCINATIONS: 0
VOMITING: 0
EYE PAIN: 0
CONSTIPATION: 0
HEADACHES: 0
DIAPHORESIS: 0
PHOTOPHOBIA: 0
SORE THROAT: 0
ORTHOPNEA: 0
DIARRHEA: 0
DIZZINESS: 0
BLOOD IN STOOL: 0
CLAUDICATION: 0
TREMORS: 0
MYALGIAS: 0
PND: 0
TINGLING: 0
FEVER: 1

## 2019-07-24 ASSESSMENT — COPD QUESTIONNAIRES
DURING THE PAST 4 WEEKS HOW MUCH DID YOU FEEL SHORT OF BREATH: NONE/LITTLE OF THE TIME
DO YOU EVER COUGH UP ANY MUCUS OR PHLEGM?: NO/ONLY WITH OCCASIONAL COLDS OR INFECTIONS
HAVE YOU SMOKED AT LEAST 100 CIGARETTES IN YOUR ENTIRE LIFE: YES
COPD SCREENING SCORE: 4

## 2019-07-24 ASSESSMENT — LIFESTYLE VARIABLES
SUBSTANCE_ABUSE: 0
EVER_SMOKED: YES

## 2019-07-24 NOTE — ED PROVIDER NOTES
ED Provider  Scribed for Manfred Novak D.O. by Mary Hunter. 7/24/2019  2:10 PM    Means of arrival:EMS  History obtained from:Patient  History limited by: None    CHIEF COMPLAINT  Chief Complaint   Patient presents with   • GLF   • Open Wound   • Fever   • Other       HPI  Gilberto Brown is a 79 y.o. male, with a history of a renal disorder, who presents for a chief complaint of acute, constant, non-radiating pain to the right arm secondary to a ground level fall. The patient notes that he slipped at home and landed on his right hand leaving an open laceration to his left forearm. Denies loss of consciousness or trauma to other areas of the body. He notes that receives dialysis every Monday, Wednesday and Friday. His last dialysis appointment was on 7/22/19. The patient missed his dialysis appointment today due to his injury and ED visit.Endorses symptoms of fevers, coughing, chills, difficulty breathing, nausea and intermittent pain to the anus. No exacerbating or alleviating factors were reported. Denies congestion, abdominal pain, chest pain, vomiting, diarrhea or decreased urine infection. Past medical history includes arthritis, asthma, bronchitis, COPD, pneumonia, GERD, pulmonary histoplasmosis, stroke and hypertension    REVIEW OF SYSTEMS  See HPI for further details. All other systems are negative.     PAST MEDICAL HISTORY  The patient has a past medical history of A-fib ([facility]); Anemia; Arthritis; ASTHMA; BPH (benign prostatic hyperplasia); Breath shortness; Bronchitis; Cataract; COPD; Dialysis; Dyslipidemia; EMPHYSEMA; GERD (gastroesophageal reflux disease); Hypertension; Oxygen decrease; Pacemaker (1988); Pain (05/09/2018); Pneumonia (2017); Pseudomonas pneumonia (HCC); Pulmonary histoplasmosis ([facility]); Renal disorder; Sick sinus syndrome ([facility]); Sleep apnea; Snoring; Stroke ([facility]) (02/2018); Unspecified hemorrhagic conditions; and Wegener's disease, pulmonary ([facility]).    SOCIAL HISTORY  Social History      Social History Main Topics   • Smoking status: Former Smoker     Years: 30.00     Types: Pipe     Quit date: 1/1/1990   • Smokeless tobacco: Former User     Types: Chew   • Alcohol use 4.8 oz/week     7 Glasses of wine, 1 Standard drinks or equivalent per week      Comment: 1/2 glass Red wine nightly   • Drug use: No   • Sexual activity: Yes     Partners: Female       SURGICAL HISTORY  The patient has a past surgical history that includes pacemaker insertion (4/2009); hernia repair (1990); gastroscopy with balloon dilatation (9/28/2011); cath placement (10/8/2011); gastroscopy with biopsy (1/17/2012); hip replacement, total; av fistula creation (3/8/2012); recovery (4/19/2012); av fistula revision (6/9/2012); rotator cuff repair (2003); recovery (7/17/2013); bronchoscopy-endo (7/18/2013); recovery (12/17/2013); recovery (8/7/2014); recovery (10/3/2014); recovery (2/26/2015); recovery (9/3/2015); av fistulogram (Right, 4/12/2016); and tonsillectomy.    CURRENT MEDICATIONS  Home Medications     Reviewed by Maru Cates R.N. (Registered Nurse) on 07/24/19 at 1331  Med List Status: Partial   Medication Last Dose Status   albuterol (PROAIR HFA) 108 (90 Base) MCG/ACT Aero Soln inhalation aerosol  Active   apixaban (ELIQUIS) 2.5mg Tab 7/24/2019 Active   aspirin EC (ECOTRIN) 81 MG TBEC  Active   B Complex-C-Folic Acid (DIALYVITE TABLET) Tab  Active   BREO ELLIPTA 200-25 MCG/INH AEROSOL POWDER, BREATH ACTIVATED  Active   Cholecalciferol (VITAMIN D3) 5000 units Cap  Active   finasteride (PROSCAR) 5 MG Tab  Active   ipratropium-albuterol (DUONEB) 0.5-2.5 (3) MG/3ML nebulizer solution  Active   lidocaine (XYLOCAINE) 2 % Gel  Active   metoprolol (LOPRESSOR) 25 MG Tab  Active   predniSONE (DELTASONE) 1 MG Tab  Active   raNITidine (ZANTAC) 150 MG Tab  Active   rosuvastatin (CRESTOR) 20 MG Tab  Active   sevelamer carbonate (RENVELA) 800 MG Tab tablet  Active   sodium chloride (HYPER-SAL) 7 % Nebu Soln  Active  "  tamsulosin (FLOMAX) 0.4 MG capsule  Active                ALLERGIES  Allergies   Allergen Reactions   • Doxycycline Unspecified     Reports terrible diarrhea   • Erythromycin Unspecified     Abdominal pain.  RXN=before 2011   • Rituximab      Flu like syndrome       PHYSICAL EXAM  VITAL SIGNS: /58   Pulse (!) 112   Temp 37.8 °C (100 °F) (Temporal)   Resp 14   Ht 1.727 m (5' 8\")   Wt 59.9 kg (132 lb 0.2 oz)   SpO2 94%   BMI 20.07 kg/m²   Constitutional: Alert in no apparent distress.  HENT: No signs of trauma, mucous membranes are moist  Eyes: Conjunctiva normal, Non-icteric.   Neck: Normal range of motion, No tenderness, Supple.  Lymphatic: No lymphadenopathy noted.   Cardiovascular: Regular rate and rhythm, no murmurs.   Thorax & Lungs: Mild wheezes. No respiratory distress. No chest tenderness.   Abdomen: Bowel sounds normal, Soft, No tenderness, No masses, No pulsatile masses. No peritoneal signs.  Skin: See extremities. Warm, Dry, normal color.   Back: No bony tenderness, No CVA tenderness.   Extremities: left forearm has laceration, lateral aspect extends from elbow to distal forearm. Normal range of motion of the joints and arm. Regular pulse is strong. No cyanosis  Musculoskeletal: Good range of motion in all major joints. No major deformities noted.   Neurologic: Alert and oriented x4, Normal motor function, Normal sensory function, No focal deficits noted.   Psychiatric: Affect normal, Judgment normal, Mood normal.     MEDICAL DECISION MAKING  This is a 79 y.o. male who presents this with complaints of cough, chills, he is tachycardic and mildly elevated temperature today.  His evaluation shows questionable early pneumonia, his white blood cell count is elevated.  He does not have an anion gap acidosis I do not suspect sepsis however there is pneumonia with need for admission.  Patient is a dialysis patient he missed dialysis today, he does not appear to be fluid overloaded and emergent " dialysis is not necessary now.  He did have a very large skin tear to the left arm.  This is very friable tissue and sutures would not hold through the skin.  So the wound was irrigated and Steri-Strips were applied by nurse.    IV antibiotics have been initiated, patient was given a small amount of IV fluids to improve his heart rate, he does have renal failure and excessive fluids can cause more complications.  This time aggressive fluid treatment is not indicated.    DIAGNOSTIC STUDIES / PROCEDURES    EKG  12 Lead EKG interpreted by me shown below.   Sinus rhythm at a rate of 93  Axis is normal  QR is widened consistent with a right bundle branch block  No ST depression  No change from previous EKG      LABS  Results for orders placed or performed during the hospital encounter of 07/24/19   Lactic acid (lactate)   Result Value Ref Range    Lactic Acid 1.6 0.5 - 2.0 mmol/L   Lactic acid (lactate)   Result Value Ref Range    Lactic Acid 1.4 0.5 - 2.0 mmol/L   CBC WITH DIFFERENTIAL   Result Value Ref Range    WBC 19.5 (H) 4.8 - 10.8 K/uL    RBC 3.82 (L) 4.70 - 6.10 M/uL    Hemoglobin 11.2 (L) 14.0 - 18.0 g/dL    Hematocrit 38.1 (L) 42.0 - 52.0 %    MCV 99.7 (H) 81.4 - 97.8 fL    MCH 29.3 27.0 - 33.0 pg    MCHC 29.4 (L) 33.7 - 35.3 g/dL    RDW 62.7 (H) 35.9 - 50.0 fL    Platelet Count 209 164 - 446 K/uL    MPV 8.7 (L) 9.0 - 12.9 fL    Neutrophils-Polys 88.00 (H) 44.00 - 72.00 %    Lymphocytes 1.70 (L) 22.00 - 41.00 %    Monocytes 3.40 0.00 - 13.40 %    Eosinophils 0.00 0.00 - 6.90 %    Basophils 0.90 0.00 - 1.80 %    Nucleated RBC 0.00 /100 WBC    Neutrophils (Absolute) 18.33 (H) 1.82 - 7.42 K/uL    Lymphs (Absolute) 0.33 (L) 1.00 - 4.80 K/uL    Monos (Absolute) 0.66 0.00 - 0.85 K/uL    Eos (Absolute) 0.00 0.00 - 0.51 K/uL    Baso (Absolute) 0.18 (H) 0.00 - 0.12 K/uL    NRBC (Absolute) 0.00 K/uL    Anisocytosis 1+     Macrocytosis 1+    COMP METABOLIC PANEL   Result Value Ref Range    Sodium 138 135 - 145 mmol/L     Potassium 4.7 3.6 - 5.5 mmol/L    Chloride 101 96 - 112 mmol/L    Co2 25 20 - 33 mmol/L    Anion Gap 12.0 (H) 0.0 - 11.9    Glucose 114 (H) 65 - 99 mg/dL    Bun 35 (H) 8 - 22 mg/dL    Creatinine 4.21 (H) 0.50 - 1.40 mg/dL    Calcium 9.1 8.5 - 10.5 mg/dL    AST(SGOT) 17 12 - 45 U/L    ALT(SGPT) 14 2 - 50 U/L    Alkaline Phosphatase 55 30 - 99 U/L    Total Bilirubin 0.6 0.1 - 1.5 mg/dL    Albumin 3.7 3.2 - 4.9 g/dL    Total Protein 6.8 6.0 - 8.2 g/dL    Globulin 3.1 1.9 - 3.5 g/dL    A-G Ratio 1.2 g/dL   URINALYSIS   Result Value Ref Range    Color Yellow     Character Clear     Specific Gravity 1.010 <1.035    Ph 8.5 (A) 5.0 - 8.0    Glucose Negative Negative mg/dL    Ketones Negative Negative mg/dL    Protein 100 (A) Negative mg/dL    Bilirubin Negative Negative    Urobilinogen, Urine 0.2 Negative    Nitrite Negative Negative    Leukocyte Esterase Negative Negative    Occult Blood Trace (A) Negative    Micro Urine Req Microscopic    DIFFERENTIAL MANUAL   Result Value Ref Range    Bands-Stabs 6.00 0.00 - 10.00 %    Manual Diff Status PERFORMED    PERIPHERAL SMEAR REVIEW   Result Value Ref Range    Peripheral Smear Review see below    PLATELET ESTIMATE   Result Value Ref Range    Plt Estimation Normal    MORPHOLOGY   Result Value Ref Range    RBC Morphology Present     Poikilocytosis 1+     Ovalocytes 1+    ESTIMATED GFR   Result Value Ref Range    GFR If  17 (A) >60 mL/min/1.73 m 2    GFR If Non  14 (A) >60 mL/min/1.73 m 2   URINE MICROSCOPIC (W/UA)   Result Value Ref Range    WBC 0-2 (A) /hpf    RBC 2-5 (A) /hpf    Bacteria Negative None /hpf    Epithelial Cells Negative /hpf    Hyaline Cast 0-2 /lpf       All labs reviewed by me.    RADIOLOGY  DX-CHEST-PORTABLE (1 VIEW)   Final Result      1.  New RIGHT infrahilar opacity suspicious for new pneumonia, recommend follow-up radiograph to confirm clearing   2.  Bibasilar underinflation atelectasis which could obscure an additional  process.   3.  Small RIGHT pleural effusion        The radiologist's interpretations of all radiological studies have been reviewed by me.        COURSE  Pertinent Labs & Imaging studies reviewed. (See chart for details)    2:10 PM - Patient seen and examined at bedside. Discussed plan of care. The patient will be medicated with Morphine 4 mg and Proventil 2.5 mg. Ordered for Dx-chest, lactic acid, differential manual, peripheral smear review, platelet estimate, morphology, estimated GFR, lactic acid, CBC with differential, CMP, UA, Urine culture, blood culture to evaluate his symptoms.     3:15 PM- Patient will be treated with Rocephin 1 gram.     5:11 PM- Ordered EKG was ordered.     5:12 PM - Dr. Guzmán (Hospitalist) was paged at this time.    5:15 PM Recheck. Patient re-evaluated at beside. The patient's heart rate is improving and his shortness of breath has resolved. . Patient's lab and radiology results discussed. Discussed patient's condition and treatment plan which include being admitted to the hospital overnight. The patient understood and is in agreement.     5:19 PM I discussed the patient's case and the above findings with Dr. Guzmán (Hospitalist) who agrees to admit the patient.     Critical care time 30 minutes    DISPOSITION:  Patient will be admitted to Dr. Guzmán (Hospitalist) in guarded condition.    FINAL IMPRESSION  1. Pneumonia of right lower lobe due to infectious organism (HCC)    2. Laceration of left forearm, initial encounter    3.  Tachycardia    4.  Acute dyspnea     Mary QUEZADA (Celestinaibjaney), am scribing for, and in the presence of, Manfred Novak D.O..    Electronically signed by: Mary Hunter (Scribe), 7/24/2019    Manfred QUEZADA D.O. personally performed the services described in this documentation, as scribed by Mary Hunter in my presence, and it is both accurate and complete.    C    The note accurately reflects work and decisions made by me.  Manfred Novak  7/24/2019  6:31  PM

## 2019-07-24 NOTE — ED NOTES
Med Rec complete per Pt at bedside  Allergies Reviewed    Pt completed a 3 day course of AMOXIL on 7/19    Pt takes METOPROLOL XL twice daily  And ELIQUIS once every morning

## 2019-07-24 NOTE — FLOWSHEET NOTE
07/24/19 1514   Interdisciplinary Plan of Care-Goals (Indications)   Bronchodilator Indications Strong Subjective / Objective Improvement   Interdisciplinary Plan of Care-Outcomes    Bronchodilator Outcome Patient at Stable Baseline   Education   Education Yes - Pt. / Family has been Instructed in use of Respiratory Medications and Adverse Reactions   RT Assessment of Delivered Medications   Evaluation of Medication Delivery Daily Yes-- Pt /Family has been Instructed in use of Respiratory Medications and Adverse Reactions   SVN Group   #SVN Performed Yes   Given By: Mask   Date SVN Last Changed 07/24/19   Date SVN Next Change Due (Q 7 Days) 07/31/19   Respiratory WDL   Respiratory (WDL) X   Chest Exam   Respiration 16   Pulse (!) 104   Heart Rate (Monitored) (!) 104   Breath Sounds   Pre/Post Intervention Pre Intervention Assessment   RUL Breath Sounds Clear   RML Breath Sounds Clear   RLL Breath Sounds Diminished   DEBORA Breath Sounds Clear   LLL Breath Sounds Diminished   Oximetry   Continuous Oximetry Yes   O2 Alarms Set & Reviewed Yes   Oxygen   Pulse Oximetry 99 %   O2 (LPM) 3   O2 Daily Delivery Respiratory  Nasal Cannula

## 2019-07-24 NOTE — ED TRIAGE NOTES
BIB [facility] to triage with   Chief Complaint   Patient presents with   • GLF   • Open Wound   • Fever   • Other   Per report pt tripped and fell. On Eliquis. Large skin tear to left arm. CMS intact. Dressing in paced. Temporal temp is 100.0. C/o chills. Congested cough x 3 days. Missed dialysis today. Sepsis score is a 3. Sepsis order set ordered and initiated. Charge RN notified.

## 2019-07-24 NOTE — ED NOTES
Pt to room, arm unwrapped.  Large skin flap/tear to left elbow noticed.  Bleeding controlled.  PIV inserted to left arm due to fistula on right.  Blood drawn and sent.  Xray and ERP at bedside

## 2019-07-25 ENCOUNTER — APPOINTMENT (RX ONLY)
Dept: URBAN - METROPOLITAN AREA CLINIC 20 | Facility: CLINIC | Age: 79
Setting detail: DERMATOLOGY
End: 2019-07-25

## 2019-07-25 PROBLEM — S51.819A SKIN TEAR OF FOREARM WITHOUT COMPLICATION, INITIAL ENCOUNTER: Status: ACTIVE | Noted: 2019-07-25

## 2019-07-25 PROBLEM — C44.91 BASAL CELL CARCINOMA OF SKIN, UNSPECIFIED: Status: ACTIVE | Noted: 2019-07-25

## 2019-07-25 LAB
ALBUMIN SERPL BCP-MCNC: 2.9 G/DL (ref 3.2–4.9)
ALBUMIN/GLOB SERPL: 1.1 G/DL
ALP SERPL-CCNC: 42 U/L (ref 30–99)
ALT SERPL-CCNC: 9 U/L (ref 2–50)
ANION GAP SERPL CALC-SCNC: 8 MMOL/L (ref 0–11.9)
AST SERPL-CCNC: 11 U/L (ref 12–45)
BILIRUB SERPL-MCNC: 0.4 MG/DL (ref 0.1–1.5)
BUN SERPL-MCNC: 41 MG/DL (ref 8–22)
CALCIUM SERPL-MCNC: 8.1 MG/DL (ref 8.5–10.5)
CHLORIDE SERPL-SCNC: 103 MMOL/L (ref 96–112)
CO2 SERPL-SCNC: 26 MMOL/L (ref 20–33)
CORTIS SERPL-MCNC: 7.5 UG/DL (ref 0–23)
CREAT SERPL-MCNC: 4.51 MG/DL (ref 0.5–1.4)
ERYTHROCYTE [DISTWIDTH] IN BLOOD BY AUTOMATED COUNT: 59.9 FL (ref 35.9–50)
FLUAV RNA SPEC QL NAA+PROBE: NEGATIVE
FLUBV RNA SPEC QL NAA+PROBE: NEGATIVE
GLOBULIN SER CALC-MCNC: 2.6 G/DL (ref 1.9–3.5)
GLUCOSE SERPL-MCNC: 109 MG/DL (ref 65–99)
HAV IGM SERPL QL IA: NEGATIVE
HBV CORE IGM SER QL: NEGATIVE
HBV SURFACE AB SERPL IA-ACNC: <3.1 MIU/ML (ref 0–10)
HBV SURFACE AG SER QL: NEGATIVE
HCT VFR BLD AUTO: 28.5 % (ref 42–52)
HCV AB SER QL: NEGATIVE
HGB BLD-MCNC: 8.8 G/DL (ref 14–18)
MCH RBC QN AUTO: 30 PG (ref 27–33)
MCHC RBC AUTO-ENTMCNC: 30.6 G/DL (ref 33.7–35.3)
MCV RBC AUTO: 97.9 FL (ref 81.4–97.8)
PLATELET # BLD AUTO: 153 K/UL (ref 164–446)
PMV BLD AUTO: 8.9 FL (ref 9–12.9)
POTASSIUM SERPL-SCNC: 5.1 MMOL/L (ref 3.6–5.5)
PROT SERPL-MCNC: 5.5 G/DL (ref 6–8.2)
RBC # BLD AUTO: 2.9 M/UL (ref 4.7–6.1)
SODIUM SERPL-SCNC: 137 MMOL/L (ref 135–145)
WBC # BLD AUTO: 16.4 K/UL (ref 4.8–10.8)

## 2019-07-25 PROCEDURE — A9270 NON-COVERED ITEM OR SERVICE: HCPCS | Performed by: HOSPITALIST

## 2019-07-25 PROCEDURE — ? PRESCRIPTION

## 2019-07-25 PROCEDURE — 80053 COMPREHEN METABOLIC PANEL: CPT

## 2019-07-25 PROCEDURE — 86706 HEP B SURFACE ANTIBODY: CPT

## 2019-07-25 PROCEDURE — 90935 HEMODIALYSIS ONE EVALUATION: CPT

## 2019-07-25 PROCEDURE — A9270 NON-COVERED ITEM OR SERVICE: HCPCS | Performed by: FAMILY MEDICINE

## 2019-07-25 PROCEDURE — 85027 COMPLETE CBC AUTOMATED: CPT

## 2019-07-25 PROCEDURE — 99221 1ST HOSP IP/OBS SF/LOW 40: CPT | Performed by: INTERNAL MEDICINE

## 2019-07-25 PROCEDURE — 5A1D70Z PERFORMANCE OF URINARY FILTRATION, INTERMITTENT, LESS THAN 6 HOURS PER DAY: ICD-10-PCS | Performed by: INTERNAL MEDICINE

## 2019-07-25 PROCEDURE — 86713 LEGIONELLA ANTIBODY: CPT

## 2019-07-25 PROCEDURE — 700105 HCHG RX REV CODE 258: Performed by: FAMILY MEDICINE

## 2019-07-25 PROCEDURE — 700102 HCHG RX REV CODE 250 W/ 637 OVERRIDE(OP): Performed by: FAMILY MEDICINE

## 2019-07-25 PROCEDURE — 700111 HCHG RX REV CODE 636 W/ 250 OVERRIDE (IP): Performed by: FAMILY MEDICINE

## 2019-07-25 PROCEDURE — ? MOHS SURGERY PHONE CONSULTATION

## 2019-07-25 PROCEDURE — 94664 DEMO&/EVAL PT USE INHALER: CPT

## 2019-07-25 PROCEDURE — 87502 INFLUENZA DNA AMP PROBE: CPT

## 2019-07-25 PROCEDURE — 99233 SBSQ HOSP IP/OBS HIGH 50: CPT | Performed by: FAMILY MEDICINE

## 2019-07-25 PROCEDURE — 36415 COLL VENOUS BLD VENIPUNCTURE: CPT

## 2019-07-25 PROCEDURE — 700102 HCHG RX REV CODE 250 W/ 637 OVERRIDE(OP): Performed by: HOSPITALIST

## 2019-07-25 PROCEDURE — 82533 TOTAL CORTISOL: CPT

## 2019-07-25 PROCEDURE — 92610 EVALUATE SWALLOWING FUNCTION: CPT

## 2019-07-25 PROCEDURE — 80074 ACUTE HEPATITIS PANEL: CPT

## 2019-07-25 PROCEDURE — 700101 HCHG RX REV CODE 250: Performed by: HOSPITALIST

## 2019-07-25 PROCEDURE — 700111 HCHG RX REV CODE 636 W/ 250 OVERRIDE (IP): Performed by: HOSPITALIST

## 2019-07-25 PROCEDURE — 94668 MNPJ CHEST WALL SBSQ: CPT

## 2019-07-25 PROCEDURE — 94760 N-INVAS EAR/PLS OXIMETRY 1: CPT

## 2019-07-25 PROCEDURE — 94669 MECHANICAL CHEST WALL OSCILL: CPT

## 2019-07-25 PROCEDURE — 770006 HCHG ROOM/CARE - MED/SURG/GYN SEMI*

## 2019-07-25 PROCEDURE — 94640 AIRWAY INHALATION TREATMENT: CPT

## 2019-07-25 RX ORDER — BUDESONIDE AND FORMOTEROL FUMARATE DIHYDRATE 160; 4.5 UG/1; UG/1
2 AEROSOL RESPIRATORY (INHALATION)
Status: DISCONTINUED | OUTPATIENT
Start: 2019-07-25 | End: 2019-08-01 | Stop reason: HOSPADM

## 2019-07-25 RX ORDER — AMOXICILLIN 500 MG/1
TABLET, FILM COATED ORAL
Qty: 4 | Refills: 0 | Status: ERX

## 2019-07-25 RX ORDER — ACETAMINOPHEN 325 MG/1
650 TABLET ORAL EVERY 6 HOURS PRN
Status: DISCONTINUED | OUTPATIENT
Start: 2019-07-25 | End: 2019-07-26

## 2019-07-25 RX ADMIN — PIPERACILLIN AND TAZOBACTAM 4.5 G: 4; .5 INJECTION, POWDER, LYOPHILIZED, FOR SOLUTION INTRAVENOUS; PARENTERAL at 13:43

## 2019-07-25 RX ADMIN — ACETAMINOPHEN 650 MG: 325 TABLET, FILM COATED ORAL at 13:14

## 2019-07-25 RX ADMIN — SODIUM CHLORIDE 4 ML: 7 NEBU SOLN,3 % NEBU at 12:16

## 2019-07-25 RX ADMIN — SEVELAMER CARBONATE 800 MG: 800 TABLET, FILM COATED ORAL at 18:12

## 2019-07-25 RX ADMIN — BUDESONIDE AND FORMOTEROL FUMARATE DIHYDRATE 2 PUFF: 160; 4.5 AEROSOL RESPIRATORY (INHALATION) at 12:51

## 2019-07-25 RX ADMIN — FINASTERIDE 5 MG: 5 TABLET, FILM COATED ORAL at 13:08

## 2019-07-25 RX ADMIN — VITAMIN D, TAB 1000IU (100/BT) 5000 UNITS: 25 TAB at 13:07

## 2019-07-25 RX ADMIN — THERA TABS 1 TABLET: TAB at 13:10

## 2019-07-25 RX ADMIN — ROSUVASTATIN CALCIUM 20 MG: 20 TABLET, FILM COATED ORAL at 18:12

## 2019-07-25 RX ADMIN — IPRATROPIUM BROMIDE AND ALBUTEROL SULFATE 3 ML: .5; 3 SOLUTION RESPIRATORY (INHALATION) at 12:15

## 2019-07-25 RX ADMIN — PIPERACILLIN AND TAZOBACTAM 4.5 G: 4; .5 INJECTION, POWDER, LYOPHILIZED, FOR SOLUTION INTRAVENOUS; PARENTERAL at 10:58

## 2019-07-25 RX ADMIN — BUDESONIDE AND FORMOTEROL FUMARATE DIHYDRATE 2 PUFF: 160; 4.5 AEROSOL RESPIRATORY (INHALATION) at 17:56

## 2019-07-25 RX ADMIN — PREDNISONE 5 MG: 5 TABLET ORAL at 13:09

## 2019-07-25 RX ADMIN — SEVELAMER CARBONATE 800 MG: 800 TABLET, FILM COATED ORAL at 13:18

## 2019-07-25 RX ADMIN — IPRATROPIUM BROMIDE AND ALBUTEROL SULFATE 3 ML: .5; 3 SOLUTION RESPIRATORY (INHALATION) at 17:56

## 2019-07-25 RX ADMIN — TAMSULOSIN HYDROCHLORIDE 0.4 MG: 0.4 CAPSULE ORAL at 13:07

## 2019-07-25 RX ADMIN — SODIUM CHLORIDE 4 ML: 7 NEBU SOLN,3 % NEBU at 17:56

## 2019-07-25 ASSESSMENT — ENCOUNTER SYMPTOMS
DEPRESSION: 0
TINGLING: 0
MYALGIAS: 0
HEADACHES: 0
VOMITING: 0
COUGH: 1
HEARTBURN: 0
PALPITATIONS: 0
FALLS: 1
DOUBLE VISION: 0
SHORTNESS OF BREATH: 1
PHOTOPHOBIA: 0
FEVER: 0
NAUSEA: 0
BACK PAIN: 0
HEMOPTYSIS: 0
BRUISES/BLEEDS EASILY: 0
CHILLS: 0
ORTHOPNEA: 0
DIZZINESS: 0
BLURRED VISION: 0
NECK PAIN: 0

## 2019-07-25 ASSESSMENT — PATIENT HEALTH QUESTIONNAIRE - PHQ9
8. MOVING OR SPEAKING SO SLOWLY THAT OTHER PEOPLE COULD HAVE NOTICED. OR THE OPPOSITE, BEING SO FIGETY OR RESTLESS THAT YOU HAVE BEEN MOVING AROUND A LOT MORE THAN USUAL: NOT AT ALL
6. FEELING BAD ABOUT YOURSELF - OR THAT YOU ARE A FAILURE OR HAVE LET YOURSELF OR YOUR FAMILY DOWN: NOT AL ALL
4. FEELING TIRED OR HAVING LITTLE ENERGY: MORE THAN HALF THE DAYS
SUM OF ALL RESPONSES TO PHQ QUESTIONS 1-9: 9
5. POOR APPETITE OR OVEREATING: NEARLY EVERY DAY
7. TROUBLE CONCENTRATING ON THINGS, SUCH AS READING THE NEWSPAPER OR WATCHING TELEVISION: NOT AT ALL
9. THOUGHTS THAT YOU WOULD BE BETTER OFF DEAD, OR OF HURTING YOURSELF: NOT AT ALL
1. LITTLE INTEREST OR PLEASURE IN DOING THINGS: MORE THAN HALF THE DAYS
2. FEELING DOWN, DEPRESSED, IRRITABLE, OR HOPELESS: MORE THAN HALF THE DAYS
SUM OF ALL RESPONSES TO PHQ9 QUESTIONS 1 AND 2: 4
3. TROUBLE FALLING OR STAYING ASLEEP OR SLEEPING TOO MUCH: NOT AT ALL

## 2019-07-25 ASSESSMENT — COGNITIVE AND FUNCTIONAL STATUS - GENERAL
MOVING TO AND FROM BED TO CHAIR: A LITTLE
TURNING FROM BACK TO SIDE WHILE IN FLAT BAD: A LITTLE
DAILY ACTIVITIY SCORE: 18
WALKING IN HOSPITAL ROOM: A LITTLE
DRESSING REGULAR LOWER BODY CLOTHING: A LITTLE
TOILETING: A LITTLE
SUGGESTED CMS G CODE MODIFIER MOBILITY: CK
STANDING UP FROM CHAIR USING ARMS: A LITTLE
MOBILITY SCORE: 17
SUGGESTED CMS G CODE MODIFIER DAILY ACTIVITY: CK
PERSONAL GROOMING: A LITTLE
MOVING FROM LYING ON BACK TO SITTING ON SIDE OF FLAT BED: A LITTLE
HELP NEEDED FOR BATHING: A LITTLE
EATING MEALS: A LITTLE
CLIMB 3 TO 5 STEPS WITH RAILING: A LOT
DRESSING REGULAR UPPER BODY CLOTHING: A LITTLE

## 2019-07-25 ASSESSMENT — LIFESTYLE VARIABLES
SUBSTANCE_ABUSE: 0
EVER_SMOKED: YES

## 2019-07-25 NOTE — PROGRESS NOTES
Hospital Medicine Daily Progress Note    Date of Service  7/25/2019    Chief Complaint  79 y.o. male admitted 7/24/2019 with left arm pain after a fall and worsening shortness of breath.    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  Patient fell at home resulted and skin tear of the left arm.  Was also noted to have worsening of shortness of breath.  Chest x-ray showed right infrahilar opacity consistent with pneumonia.  Started on broad-spectrum antibiotics.  Pancultures obtained.  X-ray of the left forearm ordered due to significant pain on exam.  Interval Problem Update  Resting comfortably in bed.  Evaluated by speech therapy who felt that patient is high risk for aspiration and recommended NG tube feeding.  However, patient refused and requested diet.  Was placed on dysphagia 3 per speech recommendations.  Respiratory status stable with no distress at this point.  Complaint of pain of the left forearm.  Since large skin tear with bleeding noted on the left arm.    Consultants/Specialty  Nephrology    Code Status  DNR/DNI    Disposition  Likely home once medically cleared    Review of Systems  Review of Systems   Constitutional: Positive for malaise/fatigue. Negative for chills and fever.   HENT: Negative for hearing loss and tinnitus.    Eyes: Negative for blurred vision, double vision and photophobia.   Respiratory: Positive for cough and shortness of breath. Negative for hemoptysis.    Cardiovascular: Negative for chest pain, palpitations and orthopnea.   Gastrointestinal: Negative for heartburn, nausea and vomiting.   Genitourinary: Negative for dysuria, frequency and urgency.   Musculoskeletal: Positive for falls. Negative for back  pain, myalgias and neck pain.   Skin: Negative for rash.   Neurological: Negative for dizziness, tingling and headaches.   Endo/Heme/Allergies: Does not bruise/bleed easily.   Psychiatric/Behavioral: Negative for depression, substance abuse and suicidal ideas.        Physical Exam  Temp:  [36.9 °C (98.4 °F)-37.5 °C (99.5 °F)] 36.9 °C (98.5 °F)  Pulse:  [] 102  Resp:  [16-18] 18  BP: ()/(49-57) 99/56  SpO2:  [90 %-99 %] 97 %    Physical Exam   Constitutional: He is oriented to person, place, and time. No distress.   Frail   HENT:   Head: Normocephalic and atraumatic.   Mouth/Throat: No oropharyngeal exudate.   Eyes: Pupils are equal, round, and reactive to light. No scleral icterus.   Neck: Normal range of motion. No tracheal deviation present. No thyromegaly present.   Cardiovascular: Normal rate and regular rhythm.  Exam reveals no gallop and no friction rub.    Pulmonary/Chest: No respiratory distress. He has decreased breath sounds. He has rhonchi. He has rales.   Abdominal: Soft. He exhibits no distension. There is no tenderness.   Musculoskeletal:   Left forearm wrapped with gauze  Large ecchymosis noted on the left hand tender to touch.  Left forearm tender to light palpation.   Neurological: He is alert and oriented to person, place, and time.   Skin: He is not diaphoretic.   Psychiatric: He has a normal mood and affect. His behavior is normal.       Fluids    Intake/Output Summary (Last 24 hours) at 07/25/19 1407  Last data filed at 07/25/19 0900   Gross per 24 hour   Intake              220 ml   Output             1150 ml   Net             -930 ml       Laboratory  Recent Labs      07/24/19   1406  07/25/19   0237   WBC  19.5*  16.4*   RBC  3.82*  2.90*   HEMOGLOBIN  11.2*  8.8*   HEMATOCRIT  38.1*  28.5*   MCV  99.7*  97.9*   MCH  29.3  30.0   MCHC  29.4*  30.6*   RDW  62.7*  59.9*   PLATELETCT  209  153*   MPV  8.7*  8.9*     Recent Labs      07/24/19   1406  07/25/19   0237   SODIUM  138  137    POTASSIUM  4.7  5.1   CHLORIDE  101  103   CO2  25  26   GLUCOSE  114*  109*   BUN  35*  41*   CREATININE  4.21*  4.51*   CALCIUM  9.1  8.1*                   Imaging  DX-CHEST-PORTABLE (1 VIEW)   Final Result      1.  New RIGHT infrahilar opacity suspicious for new pneumonia, recommend follow-up radiograph to confirm clearing   2.  Bibasilar underinflation atelectasis which could obscure an additional process.   3.  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  History of aspiration and recurrent pneumonia.  Chest x-ray showed right infrahilar opacity.  Discontinue Levaquin and switch to Zosyn Pseudomonas renally dosed.  Follow-up with sputum culture.     Wegener's granulomatosis (HCC)- (present on admission)   Assessment & Plan    Resulting in end-stage renal disease  On hemodialysis  Chronically on prednisone.       Atrial fibrillation (HCC)- (present on admission)   Assessment & Plan    Goal rate 60-90.   Pulse: 86 presently  [id number] score at least 6; estimated annual risk of stroke at least 9.7%  Anticoagulation with Factor Xa or direct thrombin inhibitor with Eliquis 2.5 mg  7/25: We will hold Eliquis for now due to large skin tear on left arm with bleeding.         Immunosuppressed status (HCC)- (present on admission)   Assessment & Plan    Secondary to bronchiectasis and prednisone     Pacemaker- (present on admission)   Assessment & Plan    History of sick sinus syndrome       ESRD on dialysis (HCC)- (present on admission)   Assessment & Plan    Nephrology following.     Skin tear of forearm without complication, initial encounter- (present on admission)   Assessment & Plan    Of the left forearm after a fall.  X-ray to rule out any acute fracture  Wound care consulted.     Acute respiratory failure with hypoxia (HCC)    Assessment & Plan    On 3 L of O2 above baseline  Wean off O2 as necessary  At baseline for now.     COPD (chronic obstructive pulmonary disease) (HCC)- (present on admission)   Assessment & Plan    Not an acute exacerbation  Home inhalers including duo nebs, albuterol, Symbicort  Follows Dr. ballard outpatient          VTE prophylaxis: Eliquis on hold for now due to left arm skin tear and bleeding. SCD's.

## 2019-07-25 NOTE — THERAPY
"Speech Language Therapy Clinical Swallow Evaluation completed.    Functional Status: Patient was seen on this date for a clinical swallow evaluation. Patient carries a history of dysphagia and recurring pna. Patient was on a modified diet in during hospital admit in 2011. He endorsed difficulty swallowing and with his \"lungs\" in the past 3 weeks. Oral motor examination was unremarkable. Vocal quality was clear and cough was congested. PO trials were administered and consisted of single ice chips, NTL, puree, and thin liquids. Swallow trigger minimally delayed and laryngeal elevation reduced to palpation. Patient presented with overt s/sx of aspiration 100% of the time as seen by immediate and delayed congested coughing despite attempts for swallow strategies.     Extensive education provided to patient regarding hx of dysphagia, current concerns for aspiration, risk of aspiration including pneumonia and even death, and SLP recs for alternative source of nutrition given serious concern for aspiration. Patient with good insight into deficits; however, declining feeding tube. He requested SLP contact SO and deliver results of today's evaluation over the phone. SO agreeable with declining feeding tube and initiation safest diet despite known risk of aspiration, per patient wishes. MD was updated regarding results of the evaluation.     Recommendations - Diet: Patient is presenting with overt s/sx concerning for aspiration and would recommend NPO with strong consideration for cortrak placement. However, patient wishes are to decline a feeding tube and wishing to be placed on \"safest\" diet. Therefore, recommendations for the safest diet to minimize, completely eliminate risk of aspiration would be a Nectar Thick Full Liquid diet. Obtained verbal order from MD to place diet order.                             Strategies: Direct supervision during meals, No Straws and Head of Bed at 90 Degrees                            " "Medication Administration: CRUSH meds in puree    Plan of Care: Will benefit from Speech Therapy 3 times per week    Post-Acute Therapy: Recommend inpatient transitional care services for continued speech therapy services.      See \"Rehab Therapy-Acute\" Patient Summary Report for complete documentation. Thank you for the consult.           "

## 2019-07-25 NOTE — PROGRESS NOTES
2 RN skin check complete with Charles Soto RN.   Devices in place nasal cannula, PIV LFA.  Skin assessed under devices yes.  Confirmed pressure ulcers found on none.  New potential pressure ulcers noted on n/a. Wound consult placed yes (for ST to left arm and left hand).  The following interventions in place dresing to skin tear to left hand and left forearm, pressure redistribution mattress, non skid socks, pillow for elevating left arm.    Skin assessment: Skin tear left arm and left hand, bilateral heels pink and blanching, bilateral ankles with trace edema, scabbing to lower extremities, sacrum pink and blanching, discoloration to right upper back. *

## 2019-07-25 NOTE — ASSESSMENT & PLAN NOTE
Not an acute exacerbation  Home inhalers including duo nebs, albuterol, Symbicort  Follows Dr. elio azevedo

## 2019-07-25 NOTE — RESPIRATORY CARE
COPD EDUCATION by COPD CLINICAL EDUCATOR  7/25/2019 at 7:59 AM by Therese Bowen     Patient reviewed by COPD education team. Patient does not have a history or diagnosis of COPD and is a non-smoker, therefore does not qualify for the COPD program.

## 2019-07-25 NOTE — ASSESSMENT & PLAN NOTE
Goal rate 60-90.   Pulse: 86 presently  [id number] score at least 6; estimated annual risk of stroke at least 9.7%  Anticoagulation with Factor Xa or direct thrombin inhibitor with Eliquis 2.5 mg  -tolerating eliquis with no issue at this time

## 2019-07-25 NOTE — PROGRESS NOTES
Attempted to provide pt with morning medications. Per pt he would prefer to have all medications after dialysis and with breakfast.

## 2019-07-25 NOTE — H&P
Hospital Medicine History & Physical Note    Date of Service  7/24/2019    Primary Care Physician  Christine Zamudio M.D.    Consultants  Nephrology    Code Status  DNR    Chief Complaint  Weakness    History of Presenting Illness  79 y.o. male who presented 7/24/2019 with past medical history of COPD, A. fib, [place] disease, ESRD dialysis Monday Wednesday Friday, status post pacemaker, history of Pseudomonas pneumonia comes to the emergency room with complaints of weakness.  Patient states that he fell at home while putting on his close.  He states that he remembers the whole event and never lost consciousness nor hit his head.  States that he been feeling unwell for the past week with symptoms of fever, cough, shortness of breath, fatigue, headache.  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  He believes that this occurred before his fall.  He did state yesterday he was at a friend's gathering where he drank alcohol.  Upon arrival to the emergency room he was hypoxic, tachycardic, blood pressure 124/58, respiratory rate of 16.  Chest x-ray interpreted by me found right-sided infiltrate indicating pneumonia   EKG interpreted by me found normal sinus rhythm with right bundle branch block      Review of Systems  Review of Systems   Constitutional: Positive for chills, fever and malaise/fatigue. Negative for diaphoresis.   HENT: Negative for congestion, ear discharge, ear pain, hearing loss, nosebleeds, sinus pain, sore throat and tinnitus.    Eyes: Negative for blurred vision, double vision, photophobia and pain.   Respiratory: Positive for cough, sputum production and shortness of breath. Negative for hemoptysis, wheezing and stridor.    Cardiovascular: Negative for chest pain, palpitations, orthopnea, claudication, leg swelling and PND.   Gastrointestinal: Positive for nausea. Negative for abdominal pain, blood in stool,  constipation, diarrhea, heartburn, melena and vomiting.   Genitourinary: Negative for dysuria, flank pain, frequency, hematuria and urgency.   Musculoskeletal: Negative for back pain, falls, joint pain, myalgias and neck pain.   Skin: Negative for itching and rash.   Neurological: Positive for weakness. Negative for dizziness, tingling, tremors and headaches.   Endo/Heme/Allergies: Negative for environmental allergies and polydipsia. Does not bruise/bleed easily.   Psychiatric/Behavioral: Negative for depression, hallucinations, substance abuse and suicidal ideas.       Past Medical History   has a past medical history of A-fib ([facility]); Anemia; Arthritis; ASTHMA; BPH (benign prostatic hyperplasia); Breath shortness; Bronchitis; Cataract; COPD; Dialysis; Dyslipidemia; EMPHYSEMA; GERD (gastroesophageal reflux disease); Hypertension; Oxygen decrease; Pacemaker (1988); Pain (05/09/2018); Pneumonia (2017); Pseudomonas pneumonia ([facility]); Pulmonary histoplasmosis ([facility]); Renal disorder; Sick sinus syndrome ([facility]); Sleep apnea; Snoring; Stroke ([facility]) (02/2018); Unspecified hemorrhagic conditions; and Wegener's disease, pulmonary ([facility]).    Surgical History   has a past surgical history that includes pacemaker insertion (4/2009); hernia repair (1990); gastroscopy with balloon dilatation (9/28/2011); cath placement (10/8/2011); gastroscopy with biopsy (1/17/2012); hip replacement, total; av fistula creation (3/8/2012); recovery (4/19/2012); av fistula revision (6/9/2012); rotator cuff repair (2003); recovery (7/17/2013); bronchoscopy-endo (7/18/2013); recovery (12/17/2013); recovery (8/7/2014); recovery (10/3/2014); recovery (2/26/2015); recovery (9/3/2015); av fistulogram (Right, 4/12/2016); and tonsillectomy.     Family History  family history includes Cancer in his unknown relative; Heart Disease in his father; Stroke in his father and mother.     Social History   reports that he quit smoking about 29 years ago. His smoking use  included Pipe. He quit after 30.00 years of use. He has quit using smokeless tobacco. His smokeless tobacco use included Chew. He reports that he drinks about 4.8 oz of alcohol per week . He reports that he does not use drugs.    Allergies  Allergies   Allergen Reactions   • Doxycycline Unspecified     Reports terrible diarrhea   • Erythromycin Unspecified     Abdominal pain.  RXN=before 2011   • Rituximab      Flu like syndrome       Medications  Prior to Admission Medications   Prescriptions Last Dose Informant Patient Reported? Taking?   B Complex-C-Folic Acid (DIALYVITE TABLET) Tab 7/24/2019 at 0800 Patient Yes No   Sig: Take 1 Tab by mouth every morning.   BREO ELLIPTA 200-25 MCG/INH AEROSOL POWDER, BREATH ACTIVATED 7/24/2019 at 0800 Patient No No   Sig: INHALE 1 PUFF BY MOUTH EVERY DAY. RINSE MOUTH AFTER USE   Cholecalciferol (VITAMIN D3) 5000 units Cap 7/24/2019 at 0800 Patient Yes No   Sig: Take 5,000 Units by mouth every morning.   albuterol (PROAIR HFA) 108 (90 Base) MCG/ACT Aero Soln inhalation aerosol 7/24/2019 at 0800 Patient No No   Sig: Inhale 2 Puffs by mouth every 6 hours as needed for Shortness of Breath.   amoxicillin (AMOXIL) 500 MG Cap 7/19/2019 at Complete Patient Yes No   Sig: Take 500 mg by mouth 4 times a day. Pt completed a 3 day course of AMOXIL on 7/19   apixaban (ELIQUIS) 2.5mg Tab 7/24/2019 at 0800 Patient Yes No   Sig: Take 2.5 mg by mouth every morning. Every morning   aspirin EC (ECOTRIN) 81 MG TBEC 7/24/2019 at 0800 Patient Yes No   Sig: Take 81 mg by mouth every morning.   finasteride (PROSCAR) 5 MG Tab 7/24/2019 at 0800 Patient Yes No   Sig: Take 5 mg by mouth every morning.   ipratropium-albuterol (DUONEB) 0.5-2.5 (3) MG/3ML nebulizer solution 7/24/2019 at 1000 Patient Yes No   Sig: 3 mL by Nebulization route every 6 hours as needed for Shortness of Breath.   metoprolol SR (TOPROL XL) 25 MG TABLET SR 24 HR 7/24/2019 at 0800 Patient Yes No   Sig: Take 25 mg by mouth 2 Times a Day.    predniSONE (DELTASONE) 5 MG Tab 7/24/2019 at 0800 Patient Yes Yes   Sig: Take 5 mg by mouth every morning.   rosuvastatin (CRESTOR) 20 MG Tab 7/23/2019 at PM Patient Yes No   Sig: Take 20 mg by mouth every evening.   sevelamer carbonate (RENVELA) 800 MG Tab tablet 7/24/2019 at AM Patient No No   Sig: TAKE 1 TABLET BY MOUTH THREE TIMES DAILY WITH MEALS   sodium chloride (HYPER-SAL) 7 % Nebu Soln 7/24/2019 at AM Patient No No   Sig: INHALE 1 VIAL VIA NEBULIZER TWICE DAILY   tamsulosin (FLOMAX) 0.4 MG capsule 7/24/2019 at AM Patient No No   Sig: Take 1 Cap by mouth every day.      Facility-Administered Medications: None       Physical Exam  Temp:  [37.5 °C (99.5 °F)-37.8 °C (100 °F)] 37.5 °C (99.5 °F)  Pulse:  [] 86  Resp:  [14-16] 16  BP: ()/(49-58) 87/49  SpO2:  [90 %-99 %] 99 %    Physical Exam   Constitutional: He is oriented to person, place, and time. He appears well-developed and well-nourished.   HENT:   Head: Normocephalic and atraumatic.   Mouth/Throat: No oropharyngeal exudate.   Eyes: Conjunctivae are normal. No scleral icterus.   Neck: Normal range of motion. Neck supple. JVD present. No tracheal deviation present. No thyromegaly present.   Cardiovascular: Normal rate, regular rhythm and intact distal pulses.  Exam reveals no gallop and no friction rub.    No murmur heard.  Pulmonary/Chest: No stridor. He is in respiratory distress. He has wheezes. He has rales. He exhibits no tenderness.   Right-sided bronchial breath sounds and egophony   Abdominal: Soft. Bowel sounds are normal. He exhibits no distension and no mass. There is no tenderness. There is no rebound and no guarding.   Musculoskeletal: Normal range of motion. He exhibits edema.   Lymphadenopathy:     He has no cervical adenopathy.   Neurological: He is alert and oriented to person, place, and time. He has normal reflexes.   Nursing note and vitals reviewed.      Laboratory:  Recent Labs      07/24/19   1406   WBC  19.5*   RBC   3.82*   HEMOGLOBIN  11.2*   HEMATOCRIT  38.1*   MCV  99.7*   MCH  29.3   MCHC  29.4*   RDW  62.7*   PLATELETCT  209   MPV  8.7*     Recent Labs      07/24/19   1406   SODIUM  138   POTASSIUM  4.7   CHLORIDE  101   CO2  25   GLUCOSE  114*   BUN  35*   CREATININE  4.21*   CALCIUM  9.1     Recent Labs      07/24/19   1406   ALTSGPT  14   ASTSGOT  17   ALKPHOSPHAT  55   TBILIRUBIN  0.6   GLUCOSE  114*         No results for input(s): NTPROBNP in the last 72 hours.      No results for input(s): TROPONINT in the last 72 hours.    Urinalysis:    No results found     Imaging:  DX-CHEST-PORTABLE (1 VIEW)   Final Result      1.  New RIGHT infrahilar opacity suspicious for new pneumonia, recommend follow-up radiograph to confirm clearing   2.  Bibasilar underinflation atelectasis which could obscure an additional process.   3.  Small RIGHT pleural effusion            Assessment/Plan:  I anticipate this patient will require at least two midnights for appropriate medical management, necessitating inpatient admission.    * Pneumonia due to infectious organism   Assessment & Plan    Fever, right-sided infiltrate  Start patient on levofloxacin since he has a history of Pseudomonas  Follow sputum and blood cultures     Wegener's granulomatosis (HCC)- (present on admission)   Assessment & Plan    Chronic prednisone  Frequent infections and is immunocompromised     Atrial fibrillation (HCC)- (present on admission)   Assessment & Plan    Goal rate 60-90.   Pulse: 86 presently  [id number] score at least 6; estimated annual risk of stroke at least 9.7%  Anticoagulation with Factor Xa or direct thrombin inhibitor with Eliquis 2.5 mg  On metoprolol and currently is rate controlled         Immunosuppressed status (HCC)- (present on admission)   Assessment & Plan    Secondary to bronchiectasis and prednisone     Pacemaker- (present on admission)   Assessment & Plan    History of sick sinus syndrome  Monitor on telemetry     Acute respiratory  failure with hypoxia (HCC)   Assessment & Plan    On 3 L of O2 above baseline  Wean off O2 as necessary  RT protocol     COPD (chronic obstructive pulmonary disease) (HCC)- (present on admission)   Assessment & Plan    Not an acute exacerbation  Home inhalers including duo nebs, albuterol, Symbicort  Follows Dr. ballard outpatient         VTE prophylaxis: eliquis

## 2019-07-25 NOTE — ED NOTES
Unit clerk on med neph aware that pt is on his way up.  Wound was bleeding significantly so bandage changed and more pressure applied

## 2019-07-25 NOTE — PROGRESS NOTES
Pt A&Ox4. 3L oxygen on and chronic for patient. Congesting productive cough. Pt to collect sputum sample with next episode. Dialysis this morning. Fistula to right arm. Medications given after. Blood pressures on the low side and metoprolol held. IV antibiotics given. Pt able to stand at bedside with steady gait. Diaphoretic this afternoon however afebrile. Tylenol given for left arm pain. Skin tears to left arm with large amounts of blood seeping through bandage and reinforced with ABDs. Influenza swab completed and negative. Pt produces small amounts of urine. Speech therapy worked with pt and downgraded diet to nectar thick full liquid per patient request. Wife visited.

## 2019-07-25 NOTE — PROGRESS NOTES
Patient A&O x 4, pleasant and cooperative with cares. Skin assessment completed. Left forearm and left hand skin tear with dressing. LFA wound continues to bleed, changed dressing and reinforced.  Needs attended to.

## 2019-07-25 NOTE — ASSESSMENT & PLAN NOTE
Of the left forearm after a fall.  X-ray with no fracture.  Wound care following   -no e/o active infection

## 2019-07-25 NOTE — DISCHARGE PLANNING
"Care Transition Team Assessment    LSW met with Pt at bedside to complete assessment.  Pt reported his lives in a single story home with his wife.  Pt reported he receives dialysis 3x a week through [facility].  Pt reported he \"wants to go home\" and \"has had a wonderful life but is now tired.\"  LSW asked Pt if his Advanced Directive needs are currently updated with [facility].  Pt reported they were, Pt went on to report his wife and children are also aware of his AD desires.  Pts pharmacy is [facility].  LSW will continue to follow and assist as needed.           Information Source  Information Given By: Patient  Who is responsible for making decisions for patient? : Patient         Elopement Risk  Legal Hold: No  Ambulatory or Self Mobile in Wheelchair: No-Not an Elopement Risk    Interdisciplinary Discharge Planning  Patient or legal guardian wants to designate a caregiver (see row info): No    Discharge Preparedness  What is your plan after discharge?: Home with help  What are your discharge supports?: Spouse  Prior Functional Level: Ambulatory, Independent with Activities of Daily Living, Needs Assist with Activities of Daily Living  Difficulity with ADLs: Walking, Other (Dialysis 3x weekly)  Difficulity with IADLs: Shopping    Functional Assesment  Prior Functional Level: Ambulatory, Independent with Activities of Daily Living, Needs Assist with Activities of Daily Living    Finances  Financial Barriers to Discharge: No  Prescription Coverage: Yes (Pharmacy [facility])    Vision / Hearing Impairment  Vision Impairment : Yes  Right Eye Vision: Impaired, Wears Glasses  Left Eye Vision: Impaired, Wears Glasses  Hearing Impairment : No              Domestic Abuse  Have you ever been the victim of abuse or violence?: No  Physical Abuse or Sexual Abuse: No  Verbal Abuse or Emotional Abuse: No         Discharge Risks or Barriers  Discharge risks or barriers?: No  Patient risk factors: Vulnerable " adult    Anticipated Discharge Information  Anticipated discharge disposition: Discharge needs currently unknown

## 2019-07-25 NOTE — ASSESSMENT & PLAN NOTE
Resulting in end-stage renal disease  On hemodialysis  Chronically on prednisone.  -hospice saw patient today and refused hospice services at this time  -home health orders placed

## 2019-07-25 NOTE — CONSULTS
Consults   Nephrology Inpatient Consultation    Date of Service  7/25/2019    Reason for Consultation  ESRD    History of Presenting Illness  79 y.o. male admitted 7/24/2019 with ESRD, normally a MWF schedule.  His original cause of kidney disease is GPA.  The patient came in with weakness and stating that he fell at home.  He was found to have pneumonia with a right-sided infiltrate.  Furthermore he felt he hurt his arm and has a skin tear.    He missed his dialysis Wednesday, and is scheduled for dialysis today.    Referring Physician  Carroll Guaman M.D.    Consulting Physician  Wilberto Salomon M.D.    Review of Systems  Review of Systems   Constitutional: Positive for malaise/fatigue. Negative for chills and fever.   Cardiovascular: Negative for chest pain and palpitations.   Musculoskeletal:        Arm pain   All other systems reviewed and are negative.     Past Medical History  Past Medical History:   Diagnosis Date   • Pain 05/09/2018    shoulders/hips, 5/10   • Stroke ([facility]) 02/2018   • Pneumonia 2017   • Pacemaker 1988   • A-fib ([facility])        • Anemia    • Arthritis     arms, legs, shoulders   • ASTHMA    • BPH (benign prostatic hyperplasia)    • Breath shortness    • Bronchitis     chronic   • Cataract     removed bilat   • COPD    • Dialysis     [facility] M-W-F,    • Dyslipidemia    • EMPHYSEMA    • GERD (gastroesophageal reflux disease)    • Hypertension    • Oxygen decrease     O2 3liters @ HS and dialysis   • Pseudomonas pneumonia ([facility])    • Pulmonary histoplasmosis ([facility])    • Renal disorder     CKD,    • Sick sinus syndrome ([facility])    • Sleep apnea     uses cpap at [facility]   • Snoring    • Unspecified hemorrhagic conditions     bruises easily, fragile skin   • Wegener's disease, pulmonary ([facility])        Surgical History  Past Surgical History:   Procedure Laterality Date   • AV FISTULOGRAM Right 4/12/2016    Procedure: AV FISTULOGRAM , VENOPLASTY X 2;  Surgeon: Nate Syed M.D.;   Location: SURGERY [facility];  Service:    • RECOVERY  9/3/2015    Procedure: IR1 VASCULAR CASE-ELENO RIGHT DIALYSIS FISTULOGRAM, POSSIBLE INTERVENTION;  Surgeon: Recoveryonly Surgery;  [place] [facility] [facility]  Service:    • RECOVERY  2/26/2015    Performed by Ir[facility]Recovery Surgery at SURGERY SAME DAY [facility]   • RECOVERY  10/3/2014    Performed by [facility]Rec[facility] Surgery at SURGERY SAME DAY [facility]   • RECOVERY  8/7/2014    Performed by [facility]Recovery Surgery at SURGERY [facility]   • RECOVERY  12/17/2013    Performed by [facility]Recovery Surgery at SURGERY SAME DAY [facility]   • BRONCHOSCOPY-ENDO  7/18/2013    Performed by Juan F Rubio M.D. at [facility]   • RECOVERY  7/17/2013    Performed by [facility]Recovery Surgery at SURGERY SAME DAY [facility]   • AV FISTULA REVISION  6/9/2012    Performed by NESSA JOHANSEN at SURGERY [facility]   • RECOVERY  4/19/2012    Performed by SURGERY, IR-RECOVERY at SURGERY SAME DAY [facility]   • AV FISTULA CREATION  3/8/2012    Performed by NESSA JOHANSEN at SURGERY [facility]   • GASTROSCOPY WITH BIOPSY  1/17/2012    Performed by ZURDO HARRISON at ENDOSCOPY [facility]   • CATH PLACEMENT  10/8/2011    Performed by NESSA JOHANSEN at [facility]   • GASTROSCOPY WITH BALLOON DILATATION  9/28/2011    Performed by KT FERNANDEZ at [facility]   • PACEMAKER INSERTION  4/2009   • ROTATOR CUFF REPAIR  2003    right   • HERNIA REPAIR  1990    right inguinal hernia   • HIP REPLACEMENT, TOTAL      right   • TONSILLECTOMY         Medications  No current facility-administered medications on file prior to encounter.      Current Outpatient Prescriptions on File Prior to Encounter   Medication Sig Dispense Refill   • BREO ELLIPTA 200-25 MCG/INH AEROSOL POWDER, BREATH ACTIVATED INHALE 1 PUFF BY MOUTH EVERY DAY. RINSE MOUTH AFTER USE 1 Each 5   • albuterol (PROAIR HFA) 108 (90 Base) MCG/ACT Aero  Soln inhalation aerosol Inhale 2 Puffs by mouth every 6 hours as needed for Shortness of Breath. 8.5 g 0   • B Complex-C-Folic Acid (DIALYVITE TABLET) Tab Take 1 Tab by mouth every morning.     • ipratropium-albuterol (DUONEB) 0.5-2.5 (3) MG/3ML nebulizer solution 3 mL by Nebulization route every 6 hours as needed for Shortness of Breath.     • Cholecalciferol (VITAMIN D3) 5000 units Cap Take 5,000 Units by mouth every morning.     • rosuvastatin (CRESTOR) 20 MG Tab Take 20 mg by mouth every evening.     • sodium chloride (HYPER-SAL) 7 % Nebu Soln INHALE 1 VIAL VIA NEBULIZER TWICE DAILY 240 mL 11   • tamsulosin (FLOMAX) 0.4 MG capsule Take 1 Cap by mouth every day. 90 Cap 3   • sevelamer carbonate (RENVELA) 800 MG Tab tablet TAKE 1 TABLET BY MOUTH THREE TIMES DAILY WITH MEALS 270 Tab 7   • apixaban (ELIQUIS) 2.5mg Tab Take 2.5 mg by mouth every morning. Every morning     • finasteride (PROSCAR) 5 MG Tab Take 5 mg by mouth every morning.     • aspirin EC (ECOTRIN) 81 MG TBEC Take 81 mg by mouth every morning.         Family History  family history includes Cancer in his unknown relative; Heart Disease in his father; Stroke in his father and mother.    Social History  Social History   Substance Use Topics   • Smoking status: Former Smoker     Years: 30.00     Types: Pipe     Quit date: 1990   • Smokeless tobacco: Former User     Types: Chew   • Alcohol use 4.8 oz/week     7 Glasses of wine, 1 Standard drinks or equivalent per week      Comment: 1/2 glass Red wine nightly       Allergies  Allergies   Allergen Reactions   • Doxycycline Unspecified     Reports terrible diarrhea   • Erythromycin Unspecified     Abdominal pain.  RXN=before    • Rituximab      Flu like syndrome        Physical Exam  Laboratory/Imaging   Hemodynamics  Temp (24hrs), Av.1 °C (98.7 °F), Min:36.9 °C (98.4 °F), Max:37.5 °C (99.5 °F)   Temperature: 36.9 °C (98.5 °F)  Pulse  Av.6  Min: 85  Max: 114 Heart Rate (Monitored): 98  Blood  Pressure : (!) 99/56, NIBP: (!) 96/44      Respiratory      Respiration: 18, Pulse Oximetry: 97 %, O2 Daily Delivery Respiratory : Silicone Nasal Cannula     Given By:: Mouthpiece, #MDI/DPI Given: MDI/DPI x 1, Work Of Breathing / Effort: Mild  RUL Breath Sounds: Clear, RML Breath Sounds: Diminished, RLL Breath Sounds: Diminished, DEBORA Breath Sounds: Clear, LLL Breath Sounds: Diminished    Fluids  Date 07/25/19 0700 - 07/26/19 0659   Shift 2975-4215 1975-9395 0497-2405 24 Hour Total   I  N  T  A  K  E   Shift Total       O  U  T  P  U  T   Dialysis 1000   1000    Shift Total 1000   1000   Weight (kg) 61.6 61.6 61.6 61.6         Nutrition  Orders Placed This Encounter   Procedures   • Diet Order Regular, Full Liquid     Standing Status:   Standing     Number of Occurrences:   1     Order Specific Question:   Diet:     Answer:   Regular [1]     Order Specific Question:   Diet:     Answer:   Full Liquid [11]     Order Specific Question:   Consistency/Fluid modifications:     Answer:   Nectar Thick [2]       Physical Exam   Constitutional: He is oriented to person, place, and time. He appears well-developed and well-nourished.   HENT:   Head: Normocephalic and atraumatic.   Cardiovascular: Normal rate and regular rhythm.    Pulmonary/Chest: Effort normal and breath sounds normal.   Abdominal: Soft. Bowel sounds are normal.   Musculoskeletal: He exhibits no edema or tenderness.   Neurological: He is alert and oriented to person, place, and time.   Skin: Skin is warm and dry.       Recent Labs      07/24/19   1406  07/25/19   0237   WBC  19.5*  16.4*   RBC  3.82*  2.90*   HEMOGLOBIN  11.2*  8.8*   HEMATOCRIT  38.1*  28.5*   MCV  99.7*  97.9*   MCH  29.3  30.0   MCHC  29.4*  30.6*   RDW  62.7*  59.9*   PLATELETCT  209  153*   MPV  8.7*  8.9*     Recent Labs      07/24/19   1406  07/25/19   0237   SODIUM  138  137   POTASSIUM  4.7  5.1   CHLORIDE  101  103   CO2  25  26   GLUCOSE  114*  109*   BUN  35*  41*   CREATININE   4.21*  4.51*   CALCIUM  9.1  8.1*                      Assessment/Plan     1. ESRD - HD today and then back to MWF schedule  2. Anemia - Epogen MWF  3. Pneumonia - on zosyn

## 2019-07-25 NOTE — DISCHARGE PLANNING
Outpatient Dialysis Note    Confirmed patient is active at:    [facility]  [address] [place] [address]    Schedule: Monday, Wednesday, Friday  Time: 1:30 pm    Spoke with Nadja at facility who confirmed.    Forwarded records for review.    Dialysis Coordinator, [facility]  Devika Bridges [phone]

## 2019-07-25 NOTE — PROGRESS NOTES
HD treatment today using RUAAVF.Treatmetn tolerated well.Heparin held with treatment,some bleeding noted on injured  L arm.Net UF removed 1 L.Report given to primary Rn.

## 2019-07-25 NOTE — PROCEDURE: MOHS SURGERY PHONE CONSULTATION
Does The Patient Have A Living Will (Optional)?: No
Patient's Insurance: medicare
Does The Patient Take Blood Thinners?: Yes
Time Of Mohs Surgery: 08:30 am
If Yes- What Is The Patient's Pharmacy Number?: [phone]
Referring Provider: Trang ALVARES
Detail Level: Simple
If Yes- Additional Details: right hip
Which Antibiotic Do They Take For Surgical Prophylaxis?: Amoxicillin (2 grams)
Office Location Of Mohs Surgery: [place] [address]
Pathology Accession #: [id number] A & B
Date Of Mohs Surgery: 08/13/2019
Patient Reported Location: Right Superior Central Forehead, Right superior medial forehead
If Yes- What Blood Thinners Do They Take?: ulisses

## 2019-07-25 NOTE — ASSESSMENT & PLAN NOTE
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  History of aspiration and recurrent pneumonia.  Chest x-ray showed right infrahilar opacity.  Completed a course of abx's, no further changes, cultures c/w above mentioned colonization

## 2019-07-25 NOTE — ED NOTES
Wound irrigated by tech.  Skin flap folded back over wound as best as tolerated  3 steri strips placed where possible.  Adaptic placed over wound and Kerlix applied.  Small oozing still through kerlix noted

## 2019-07-25 NOTE — PROGRESS NOTES
2 RN skin check complete with LIZETH Burk.   Devices in place PIV, nasal cannula  Skin assessed under devices yes  Confirmed pressure ulcers found on N/A.  New potential pressure ulcers noted on N/A. Wound consult placed yes.  The following interventions in place dressing to left arm from skin tears, pillow for repositioning, 2 RN skin checks    Skin tear to left fore arm and hand, bruising and bleeding, dressing in place  Bilateral heels pink and blanching  Bruising to upper extremities  Sacrum pink and blanching  Scabbing to lower extremities

## 2019-07-26 ENCOUNTER — APPOINTMENT (OUTPATIENT)
Dept: RADIOLOGY | Facility: MEDICAL CENTER | Age: 79
DRG: 177 | End: 2019-07-26
Attending: FAMILY MEDICINE
Payer: MEDICARE

## 2019-07-26 LAB
ALBUMIN SERPL BCP-MCNC: 3.1 G/DL (ref 3.2–4.9)
ALBUMIN/GLOB SERPL: 1.2 G/DL
ALP SERPL-CCNC: 45 U/L (ref 30–99)
ALT SERPL-CCNC: 9 U/L (ref 2–50)
ANION GAP SERPL CALC-SCNC: 11 MMOL/L (ref 0–11.9)
AST SERPL-CCNC: 11 U/L (ref 12–45)
BACTERIA UR CULT: NORMAL
BASOPHILS # BLD AUTO: 0.2 % (ref 0–1.8)
BASOPHILS # BLD: 0.02 K/UL (ref 0–0.12)
BILIRUB SERPL-MCNC: 0.6 MG/DL (ref 0.1–1.5)
BUN SERPL-MCNC: 27 MG/DL (ref 8–22)
CALCIUM SERPL-MCNC: 8.2 MG/DL (ref 8.5–10.5)
CHLORIDE SERPL-SCNC: 100 MMOL/L (ref 96–112)
CO2 SERPL-SCNC: 26 MMOL/L (ref 20–33)
CREAT SERPL-MCNC: 3.26 MG/DL (ref 0.5–1.4)
EOSINOPHIL # BLD AUTO: 0.07 K/UL (ref 0–0.51)
EOSINOPHIL NFR BLD: 0.5 % (ref 0–6.9)
ERYTHROCYTE [DISTWIDTH] IN BLOOD BY AUTOMATED COUNT: 60.1 FL (ref 35.9–50)
GLOBULIN SER CALC-MCNC: 2.6 G/DL (ref 1.9–3.5)
GLUCOSE SERPL-MCNC: 144 MG/DL (ref 65–99)
HCT VFR BLD AUTO: 28.5 % (ref 42–52)
HGB BLD-MCNC: 8.7 G/DL (ref 14–18)
IMM GRANULOCYTES # BLD AUTO: 0.06 K/UL (ref 0–0.11)
IMM GRANULOCYTES NFR BLD AUTO: 0.5 % (ref 0–0.9)
LYMPHOCYTES # BLD AUTO: 0.13 K/UL (ref 1–4.8)
LYMPHOCYTES NFR BLD: 1 % (ref 22–41)
MCH RBC QN AUTO: 29.9 PG (ref 27–33)
MCHC RBC AUTO-ENTMCNC: 30.5 G/DL (ref 33.7–35.3)
MCV RBC AUTO: 97.9 FL (ref 81.4–97.8)
MONOCYTES # BLD AUTO: 0.63 K/UL (ref 0–0.85)
MONOCYTES NFR BLD AUTO: 4.9 % (ref 0–13.4)
NEUTROPHILS # BLD AUTO: 12.05 K/UL (ref 1.82–7.42)
NEUTROPHILS NFR BLD: 92.9 % (ref 44–72)
NRBC # BLD AUTO: 0 K/UL
NRBC BLD-RTO: 0 /100 WBC
PLATELET # BLD AUTO: 145 K/UL (ref 164–446)
PMV BLD AUTO: 9 FL (ref 9–12.9)
POTASSIUM SERPL-SCNC: 4.1 MMOL/L (ref 3.6–5.5)
PROCALCITONIN SERPL-MCNC: 4.06 NG/ML
PROT SERPL-MCNC: 5.7 G/DL (ref 6–8.2)
RBC # BLD AUTO: 2.91 M/UL (ref 4.7–6.1)
SIGNIFICANT IND 70042: NORMAL
SITE SITE: NORMAL
SODIUM SERPL-SCNC: 137 MMOL/L (ref 135–145)
SOURCE SOURCE: NORMAL
WBC # BLD AUTO: 13 K/UL (ref 4.8–10.8)

## 2019-07-26 PROCEDURE — 90935 HEMODIALYSIS ONE EVALUATION: CPT

## 2019-07-26 PROCEDURE — 700105 HCHG RX REV CODE 258: Performed by: FAMILY MEDICINE

## 2019-07-26 PROCEDURE — 85025 COMPLETE CBC W/AUTO DIFF WBC: CPT

## 2019-07-26 PROCEDURE — 700102 HCHG RX REV CODE 250 W/ 637 OVERRIDE(OP): Performed by: HOSPITALIST

## 2019-07-26 PROCEDURE — 73120 X-RAY EXAM OF HAND: CPT | Mod: LT

## 2019-07-26 PROCEDURE — 99232 SBSQ HOSP IP/OBS MODERATE 35: CPT | Performed by: FAMILY MEDICINE

## 2019-07-26 PROCEDURE — 36415 COLL VENOUS BLD VENIPUNCTURE: CPT

## 2019-07-26 PROCEDURE — A9270 NON-COVERED ITEM OR SERVICE: HCPCS | Performed by: FAMILY MEDICINE

## 2019-07-26 PROCEDURE — 84145 PROCALCITONIN (PCT): CPT

## 2019-07-26 PROCEDURE — 770006 HCHG ROOM/CARE - MED/SURG/GYN SEMI*

## 2019-07-26 PROCEDURE — 700101 HCHG RX REV CODE 250: Performed by: HOSPITALIST

## 2019-07-26 PROCEDURE — 94640 AIRWAY INHALATION TREATMENT: CPT

## 2019-07-26 PROCEDURE — 700111 HCHG RX REV CODE 636 W/ 250 OVERRIDE (IP): Performed by: FAMILY MEDICINE

## 2019-07-26 PROCEDURE — 700111 HCHG RX REV CODE 636 W/ 250 OVERRIDE (IP): Performed by: HOSPITALIST

## 2019-07-26 PROCEDURE — 90935 HEMODIALYSIS ONE EVALUATION: CPT | Performed by: INTERNAL MEDICINE

## 2019-07-26 PROCEDURE — 5A1D70Z PERFORMANCE OF URINARY FILTRATION, INTERMITTENT, LESS THAN 6 HOURS PER DAY: ICD-10-PCS | Performed by: INTERNAL MEDICINE

## 2019-07-26 PROCEDURE — 94668 MNPJ CHEST WALL SBSQ: CPT

## 2019-07-26 PROCEDURE — 80053 COMPREHEN METABOLIC PANEL: CPT

## 2019-07-26 PROCEDURE — 700102 HCHG RX REV CODE 250 W/ 637 OVERRIDE(OP): Performed by: FAMILY MEDICINE

## 2019-07-26 PROCEDURE — 73090 X-RAY EXAM OF FOREARM: CPT | Mod: LT

## 2019-07-26 PROCEDURE — A9270 NON-COVERED ITEM OR SERVICE: HCPCS | Performed by: HOSPITALIST

## 2019-07-26 PROCEDURE — 94760 N-INVAS EAR/PLS OXIMETRY 1: CPT

## 2019-07-26 RX ORDER — HYDROCODONE BITARTRATE AND ACETAMINOPHEN 5; 325 MG/1; MG/1
1-2 TABLET ORAL EVERY 6 HOURS PRN
Status: DISCONTINUED | OUTPATIENT
Start: 2019-07-26 | End: 2019-08-01 | Stop reason: HOSPADM

## 2019-07-26 RX ADMIN — HYDROCODONE BITARTRATE AND ACETAMINOPHEN 1 TABLET: 5; 325 TABLET ORAL at 11:14

## 2019-07-26 RX ADMIN — SEVELAMER CARBONATE 800 MG: 800 TABLET, FILM COATED ORAL at 12:35

## 2019-07-26 RX ADMIN — PREDNISONE 5 MG: 5 TABLET ORAL at 09:25

## 2019-07-26 RX ADMIN — ACETAMINOPHEN 650 MG: 325 TABLET, FILM COATED ORAL at 01:09

## 2019-07-26 RX ADMIN — PIPERACILLIN AND TAZOBACTAM 4.5 G: 4; .5 INJECTION, POWDER, LYOPHILIZED, FOR SOLUTION INTRAVENOUS; PARENTERAL at 02:30

## 2019-07-26 RX ADMIN — VITAMIN D, TAB 1000IU (100/BT) 5000 UNITS: 25 TAB at 09:24

## 2019-07-26 RX ADMIN — PIPERACILLIN AND TAZOBACTAM 4.5 G: 4; .5 INJECTION, POWDER, LYOPHILIZED, FOR SOLUTION INTRAVENOUS; PARENTERAL at 14:40

## 2019-07-26 RX ADMIN — ASPIRIN 81 MG: 81 TABLET, COATED ORAL at 09:24

## 2019-07-26 RX ADMIN — SEVELAMER CARBONATE 800 MG: 800 TABLET, FILM COATED ORAL at 17:26

## 2019-07-26 RX ADMIN — TAMSULOSIN HYDROCHLORIDE 0.4 MG: 0.4 CAPSULE ORAL at 09:25

## 2019-07-26 RX ADMIN — ACETAMINOPHEN 650 MG: 325 TABLET, FILM COATED ORAL at 10:57

## 2019-07-26 RX ADMIN — THERA TABS 1 TABLET: TAB at 09:25

## 2019-07-26 RX ADMIN — BUDESONIDE AND FORMOTEROL FUMARATE DIHYDRATE 2 PUFF: 160; 4.5 AEROSOL RESPIRATORY (INHALATION) at 19:45

## 2019-07-26 RX ADMIN — ROSUVASTATIN CALCIUM 20 MG: 20 TABLET, FILM COATED ORAL at 17:26

## 2019-07-26 RX ADMIN — FINASTERIDE 5 MG: 5 TABLET, FILM COATED ORAL at 09:25

## 2019-07-26 RX ADMIN — APIXABAN 2.5 MG: 2.5 TABLET, FILM COATED ORAL at 09:25

## 2019-07-26 RX ADMIN — IPRATROPIUM BROMIDE AND ALBUTEROL SULFATE 3 ML: .5; 3 SOLUTION RESPIRATORY (INHALATION) at 20:27

## 2019-07-26 RX ADMIN — SENNOSIDES, DOCUSATE SODIUM 2 TABLET: 50; 8.6 TABLET, FILM COATED ORAL at 09:25

## 2019-07-26 RX ADMIN — SENNOSIDES, DOCUSATE SODIUM 2 TABLET: 50; 8.6 TABLET, FILM COATED ORAL at 17:26

## 2019-07-26 RX ADMIN — SEVELAMER CARBONATE 800 MG: 800 TABLET, FILM COATED ORAL at 09:25

## 2019-07-26 RX ADMIN — SODIUM CHLORIDE 4 ML: 7 NEBU SOLN,3 % NEBU at 20:27

## 2019-07-26 ASSESSMENT — LIFESTYLE VARIABLES: SUBSTANCE_ABUSE: 0

## 2019-07-26 ASSESSMENT — ENCOUNTER SYMPTOMS
HEARTBURN: 0
NAUSEA: 0
ORTHOPNEA: 0
FALLS: 1
HEADACHES: 0
NECK PAIN: 0
DOUBLE VISION: 0
HEMOPTYSIS: 0
DEPRESSION: 0
TREMORS: 0
BLURRED VISION: 0
WEIGHT LOSS: 0
CHILLS: 0
TINGLING: 0
EYE PAIN: 0
MYALGIAS: 0
SHORTNESS OF BREATH: 1
PALPITATIONS: 0
VOMITING: 0
COUGH: 1
PHOTOPHOBIA: 0
DIZZINESS: 0
FEVER: 0
BRUISES/BLEEDS EASILY: 0
BACK PAIN: 0

## 2019-07-26 NOTE — PROGRESS NOTES
2 RN skin check complete with Tatum STANLEY.   Devices in place nasal cannula, PIV.  Skin assessed under devices blanchable redness noted on bilat ears and underneath nose.  Confirmed pressure ulcers found on NA.  New potential pressure ulcers noted on coccyx red and blanching, wound to left forearm - seen by wound care and covered by dressing that was changed today - RN did not remove again. Bandaid on forehead with scab underneath, scabs noted on BLE, bilat heels red and blanching. Wound following.  The following interventions in place encouraging Q2 turns, encouraging pt to get up and walk, waffle cushion placed, mepilex placed on coccyx.

## 2019-07-26 NOTE — PROGRESS NOTES
Nephrology Daily Progress Note    Date of Service  7/26/2019    Chief Complaint  79 y.o. male admitted 7/24/2019 with ESRD, SOB    Interval Problem Update  Feeling better; seen on HD    Review of Systems  Review of Systems   Constitutional: Positive for malaise/fatigue.   All other systems reviewed and are negative.       Physical Exam  Temp:  [36.9 °C (98.5 °F)-37.7 °C (99.8 °F)] 37.5 °C (99.5 °F)  Pulse:  [] 97  Resp:  [16-18] 16  BP: ()/(40-56) 99/48  SpO2:  [94 %-99 %] 94 %    Physical Exam   Constitutional: He is oriented to person, place, and time. He appears cachectic. He appears ill.   Cardiovascular: Normal rate and regular rhythm.    Neurological: He is alert and oriented to person, place, and time.   Skin: Skin is warm and dry.       Fluids    Intake/Output Summary (Last 24 hours) at 07/26/19 1052  Last data filed at 07/25/19 2330   Gross per 24 hour   Intake              300 ml   Output              100 ml   Net              200 ml       Laboratory  Recent Labs      07/24/19   1406  07/25/19   0237  07/26/19   0225   WBC  19.5*  16.4*  13.0*   RBC  3.82*  2.90*  2.91*   HEMOGLOBIN  11.2*  8.8*  8.7*   HEMATOCRIT  38.1*  28.5*  28.5*   MCV  99.7*  97.9*  97.9*   MCH  29.3  30.0  29.9   MCHC  29.4*  30.6*  30.5*   RDW  62.7*  59.9*  60.1*   PLATELETCT  209  153*  145*   MPV  8.7*  8.9*  9.0     Recent Labs      07/24/19   1406  07/25/19   0237  07/26/19 0225   SODIUM  138  137  137   POTASSIUM  4.7  5.1  4.1   CHLORIDE  101  103  100   CO2  25 26 26   GLUCOSE  114*  109*  144*   BUN  35*  41*  27*   CREATININE  4.21*  4.51*  3.26*   CALCIUM  9.1  8.1*  8.2*         No results for input(s): NTPROBNP in the last 72 hours.        Imaging  DX-CHEST-PORTABLE (1 VIEW)   Final Result      1.  New RIGHT infrahilar opacity suspicious for new pneumonia, recommend follow-up radiograph to confirm clearing   2.  Bibasilar underinflation atelectasis which could obscure an additional process.   3.  Small  RIGHT pleural effusion      DX-FOREARM LEFT    (Results Pending)        Assessment/Plan  1. ESRD - HD on a MWF schedule. Seen on HD, and tolerating tx.

## 2019-07-26 NOTE — PROGRESS NOTES
HD treatment today per routine order using RUAAVF.Treatment tolerated well.Heparin held with treatment.Net UF removed 500 ml.Report given to primary Rn.

## 2019-07-26 NOTE — PROGRESS NOTES
2 RN skin check complete with Patel STANLEY.   Devices in place nasal cannula..  Skin assessed under devices yes.  Confirmed pressure ulcers found on none.  New potential pressure ulcers noted on n/a. The following interventions in place 2 RN skin check, pressure redistribution mattress, pillows for repositioning and support.  Skin assessment: left  Hand skin tear with pressure dressing, left hand swelling and bruising, left forearm  Skin tear with pressure dressing; bilateral heels pink and blanching, bilateral ankles with trace edema, scabbing to lower extremities, discoloration to right upper back, ears pink and blanching, sacrum pink and blanching.*

## 2019-07-26 NOTE — PROGRESS NOTES
Patient A&O x 4, cooperative with cares. Continue to complain of pain to left arm. Dressing intact. Continue to have non productive cough. PRN Tylenol given for pain with good result. Continue on IV antibiotics, no adverse reaction noted. Needs attended to.

## 2019-07-26 NOTE — PROGRESS NOTES
Hospital Medicine Daily Progress Note    Date of Service  7/26/2019    Chief Complaint  79 y.o. male admitted 7/24/2019 with left arm pain after a fall and worsening shortness of breath.    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  Patient fell at home resulted and skin tear of the left arm.  Was also noted to have worsening of shortness of breath.  Chest x-ray showed right infrahilar opacity consistent with pneumonia.  Started on broad-spectrum antibiotics.  Pancultures obtained.  X-ray of the left forearm ordered due to significant pain on exam.  Interval Problem Update  Resting comfortably in bed.  Evaluated by speech therapy who felt that patient is high risk for aspiration and recommended NG tube feeding.  However, patient refused and requested diet.  Was placed on dysphagia 3 per speech recommendations.  Respiratory status stable with no distress at this point.  Complaint of pain of the left forearm.  Since large skin tear with bleeding noted on the left arm.  7/26: Resting comfortably. Respiratory status at baseline. Pending left arm and hand xray to rule out any acute bony abnormalities or fracture.  Leukocytosis trending down.  Afebrile overnight.  Tolerating p.o. fairly but with overt aspiration.  Consultants/Specialty  Nephrology    Code Status  DNR/DNI    Disposition  Pending PT/OT eval    Review of Systems  Review of Systems   Constitutional: Positive for malaise/fatigue. Negative for chills, fever and weight loss.   HENT: Negative for ear pain, hearing loss and tinnitus.    Eyes: Negative for blurred vision, double vision, photophobia and pain.   Respiratory: Positive for cough and shortness of breath. Negative for hemoptysis.     Cardiovascular: Negative for chest pain, palpitations and orthopnea.   Gastrointestinal: Negative for heartburn, nausea and vomiting.   Genitourinary: Negative for dysuria, frequency and urgency.   Musculoskeletal: Positive for falls and joint pain (left arm and elbow pain). Negative for back pain, myalgias and neck pain.   Skin: Negative for rash.   Neurological: Negative for dizziness, tingling, tremors and headaches.   Endo/Heme/Allergies: Does not bruise/bleed easily.   Psychiatric/Behavioral: Negative for depression, substance abuse and suicidal ideas.        Physical Exam  Temp:  [37.1 °C (98.7 °F)-37.7 °C (99.8 °F)] 37.5 °C (99.5 °F)  Pulse:  [83-97] 97  Resp:  [16-17] 16  BP: ()/(40-51) 99/48  SpO2:  [94 %-99 %] 94 %    Physical Exam   Constitutional: He is oriented to person, place, and time. No distress.   Frail   HENT:   Head: Normocephalic and atraumatic.   Mouth/Throat: No oropharyngeal exudate.   Eyes: Pupils are equal, round, and reactive to light. No scleral icterus.   Neck: Normal range of motion. No tracheal deviation present. No thyromegaly present.   Cardiovascular: Normal rate and regular rhythm.  Exam reveals no gallop and no friction rub.    Pulmonary/Chest: No respiratory distress. He has decreased breath sounds. He has no wheezes. He has rhonchi. He has rales.   Abdominal: Soft. He exhibits no distension. There is no tenderness. There is no rebound.   Musculoskeletal:   Left forearm wrapped with gauze  Large ecchymosis noted on the left hand tender to touch.  Left forearm and hand tender to light palpation.   Neurological: He is alert and oriented to person, place, and time.   Skin: He is not diaphoretic.   Psychiatric: He has a normal mood and affect. His behavior is normal.       Fluids    Intake/Output Summary (Last 24 hours) at 07/26/19 1516  Last data filed at 07/26/19 1000   Gross per 24 hour   Intake              600 ml   Output              100 ml   Net              500 ml        Laboratory  Recent Labs      07/24/19   1406  07/25/19 0237  07/26/19 0225   WBC  19.5*  16.4*  13.0*   RBC  3.82*  2.90*  2.91*   HEMOGLOBIN  11.2*  8.8*  8.7*   HEMATOCRIT  38.1*  28.5*  28.5*   MCV  99.7*  97.9*  97.9*   MCH  29.3  30.0  29.9   MCHC  29.4*  30.6*  30.5*   RDW  62.7*  59.9*  60.1*   PLATELETCT  209  153*  145*   MPV  8.7*  8.9*  9.0     Recent Labs      07/24/19   1406  07/25/19 0237 07/26/19 0225   SODIUM  138  137  137   POTASSIUM  4.7  5.1  4.1   CHLORIDE  101  103  100   CO2  25 26 26   GLUCOSE  114*  109*  144*   BUN  35*  41*  27*   CREATININE  4.21*  4.51*  3.26*   CALCIUM  9.1  8.1*  8.2*                   Imaging  DX-CHEST-PORTABLE (1 VIEW)   Final Result      1.  New RIGHT infrahilar opacity suspicious for new pneumonia, recommend follow-up radiograph to confirm clearing   2.  Bibasilar underinflation atelectasis which could obscure an additional process.   3.  Small RIGHT pleural effusion      DX-FOREARM LEFT    (Results Pending)   DX-HAND 2- LEFT    (Results Pending)        Assessment/Plan  * Pneumonia due to infectious organism   Assessment & Plan    History of pseudomonas aeruginosa colonization in the respiratory tract.  History of aspiration and recurrent pneumonia.  Chest x-ray showed right infrahilar opacity.  Discontinue Levaquin and switch to Zosyn Pseudomonas renally dosed.  Follow-up with sputum culture.     Wegener's granulomatosis (HCC)- (present on admission)   Assessment & Plan    Resulting in end-stage renal disease  On hemodialysis  Chronically on prednisone.       Atrial fibrillation (HCC)- (present on admission)   Assessment & Plan    Goal rate 60-90.   Pulse: 86 presently  [id number] score at least 6; estimated annual risk of stroke at least 9.7%  Anticoagulation with Factor Xa or direct thrombin inhibitor with Eliquis 2.5 mg  7/25: We will hold Eliquis for now due to large skin tear on left arm with bleeding.  7/26: Continue Eliquis.           Immunosuppressed status ([facility])- (present on admission)   Assessment & Plan    Secondary to bronchiectasis and prednisone     Pacemaker- (present on admission)   Assessment & Plan    History of sick sinus syndrome       ESRD on dialysis ([facility])- (present on admission)   Assessment & Plan    Nephrology following.     Skin tear of forearm without complication, initial encounter- (present on admission)   Assessment & Plan    Of the left forearm after a fall.  X-ray to rule out any acute fracture (pending).   Wound care consulted.     Acute respiratory failure with hypoxia ([facility])   Assessment & Plan    On 3 L of O2 above baseline  Wean off O2 as necessary  At baseline for now.     COPD (chronic obstructive pulmonary disease) ([facility])- (present on admission)   Assessment & Plan    Not an acute exacerbation  Home inhalers including duo nebs, albuterol, Symbicort  Follows Dr. ballard outpatient          VTE prophylaxis: Eliquis on hold for now due to left arm skin tear and bleeding. SCD's.

## 2019-07-26 NOTE — PROGRESS NOTES
Patient refused SCD, educated regarding importance.  Education reinforced by LIZETH Strong.  Patient continues to refuse.

## 2019-07-27 LAB
ALBUMIN SERPL BCP-MCNC: 3.1 G/DL (ref 3.2–4.9)
ALBUMIN/GLOB SERPL: 1.2 G/DL
ALP SERPL-CCNC: 42 U/L (ref 30–99)
ALT SERPL-CCNC: 8 U/L (ref 2–50)
ANION GAP SERPL CALC-SCNC: 9 MMOL/L (ref 0–11.9)
AST SERPL-CCNC: 9 U/L (ref 12–45)
BASOPHILS # BLD AUTO: 0.4 % (ref 0–1.8)
BASOPHILS # BLD: 0.04 K/UL (ref 0–0.12)
BILIRUB SERPL-MCNC: 0.4 MG/DL (ref 0.1–1.5)
BUN SERPL-MCNC: 21 MG/DL (ref 8–22)
CALCIUM SERPL-MCNC: 8.1 MG/DL (ref 8.5–10.5)
CHLORIDE SERPL-SCNC: 102 MMOL/L (ref 96–112)
CO2 SERPL-SCNC: 28 MMOL/L (ref 20–33)
CREAT SERPL-MCNC: 2.69 MG/DL (ref 0.5–1.4)
EOSINOPHIL # BLD AUTO: 0.21 K/UL (ref 0–0.51)
EOSINOPHIL NFR BLD: 2.2 % (ref 0–6.9)
ERYTHROCYTE [DISTWIDTH] IN BLOOD BY AUTOMATED COUNT: 61 FL (ref 35.9–50)
FERRITIN SERPL-MCNC: 881.6 NG/ML (ref 22–322)
FOLATE SERPL-MCNC: >23.8 NG/ML
GLOBULIN SER CALC-MCNC: 2.5 G/DL (ref 1.9–3.5)
GLUCOSE SERPL-MCNC: 132 MG/DL (ref 65–99)
HCT VFR BLD AUTO: 28 % (ref 42–52)
HGB BLD-MCNC: 8.2 G/DL (ref 14–18)
HGB RETIC QN AUTO: 28.8 PG/CELL (ref 29–35)
IMM GRANULOCYTES # BLD AUTO: 0.04 K/UL (ref 0–0.11)
IMM GRANULOCYTES NFR BLD AUTO: 0.4 % (ref 0–0.9)
IMM RETICS NFR: 25.9 % (ref 9.3–17.4)
IRON SATN MFR SERPL: 11 % (ref 15–55)
IRON SERPL-MCNC: 18 UG/DL (ref 50–180)
LACTATE BLD-SCNC: 0.8 MMOL/L (ref 0.5–2)
LYMPHOCYTES # BLD AUTO: 0.24 K/UL (ref 1–4.8)
LYMPHOCYTES NFR BLD: 2.5 % (ref 22–41)
MCH RBC QN AUTO: 29.4 PG (ref 27–33)
MCHC RBC AUTO-ENTMCNC: 29.3 G/DL (ref 33.7–35.3)
MCV RBC AUTO: 100.4 FL (ref 81.4–97.8)
MONOCYTES # BLD AUTO: 0.56 K/UL (ref 0–0.85)
MONOCYTES NFR BLD AUTO: 5.9 % (ref 0–13.4)
NEUTROPHILS # BLD AUTO: 8.38 K/UL (ref 1.82–7.42)
NEUTROPHILS NFR BLD: 88.6 % (ref 44–72)
NRBC # BLD AUTO: 0 K/UL
NRBC BLD-RTO: 0 /100 WBC
PLATELET # BLD AUTO: 157 K/UL (ref 164–446)
PMV BLD AUTO: 9.2 FL (ref 9–12.9)
POTASSIUM SERPL-SCNC: 4 MMOL/L (ref 3.6–5.5)
PROCALCITONIN SERPL-MCNC: 4 NG/ML
PROT SERPL-MCNC: 5.6 G/DL (ref 6–8.2)
RBC # BLD AUTO: 2.79 M/UL (ref 4.7–6.1)
RETICS # AUTO: 0.04 M/UL (ref 0.04–0.06)
RETICS/RBC NFR: 1.6 % (ref 0.8–2.1)
SODIUM SERPL-SCNC: 139 MMOL/L (ref 135–145)
TIBC SERPL-MCNC: 167 UG/DL (ref 250–450)
VIT B12 SERPL-MCNC: 650 PG/ML (ref 211–911)
WBC # BLD AUTO: 9.5 K/UL (ref 4.8–10.8)

## 2019-07-27 PROCEDURE — 83550 IRON BINDING TEST: CPT

## 2019-07-27 PROCEDURE — 700111 HCHG RX REV CODE 636 W/ 250 OVERRIDE (IP): Performed by: HOSPITALIST

## 2019-07-27 PROCEDURE — 82607 VITAMIN B-12: CPT

## 2019-07-27 PROCEDURE — 85025 COMPLETE CBC W/AUTO DIFF WBC: CPT

## 2019-07-27 PROCEDURE — 82746 ASSAY OF FOLIC ACID SERUM: CPT

## 2019-07-27 PROCEDURE — 87181 SC STD AGAR DILUTION PER AGT: CPT

## 2019-07-27 PROCEDURE — 87070 CULTURE OTHR SPECIMN AEROBIC: CPT

## 2019-07-27 PROCEDURE — 94760 N-INVAS EAR/PLS OXIMETRY 1: CPT

## 2019-07-27 PROCEDURE — 84145 PROCALCITONIN (PCT): CPT

## 2019-07-27 PROCEDURE — 770006 HCHG ROOM/CARE - MED/SURG/GYN SEMI*

## 2019-07-27 PROCEDURE — 83540 ASSAY OF IRON: CPT

## 2019-07-27 PROCEDURE — 85046 RETICYTE/HGB CONCENTRATE: CPT

## 2019-07-27 PROCEDURE — 700111 HCHG RX REV CODE 636 W/ 250 OVERRIDE (IP): Performed by: FAMILY MEDICINE

## 2019-07-27 PROCEDURE — A9270 NON-COVERED ITEM OR SERVICE: HCPCS | Performed by: FAMILY MEDICINE

## 2019-07-27 PROCEDURE — 99232 SBSQ HOSP IP/OBS MODERATE 35: CPT | Performed by: FAMILY MEDICINE

## 2019-07-27 PROCEDURE — 94668 MNPJ CHEST WALL SBSQ: CPT

## 2019-07-27 PROCEDURE — 700102 HCHG RX REV CODE 250 W/ 637 OVERRIDE(OP): Performed by: HOSPITALIST

## 2019-07-27 PROCEDURE — A9270 NON-COVERED ITEM OR SERVICE: HCPCS | Performed by: HOSPITALIST

## 2019-07-27 PROCEDURE — 83605 ASSAY OF LACTIC ACID: CPT

## 2019-07-27 PROCEDURE — 36415 COLL VENOUS BLD VENIPUNCTURE: CPT

## 2019-07-27 PROCEDURE — 82728 ASSAY OF FERRITIN: CPT

## 2019-07-27 PROCEDURE — 80053 COMPREHEN METABOLIC PANEL: CPT

## 2019-07-27 PROCEDURE — 94640 AIRWAY INHALATION TREATMENT: CPT

## 2019-07-27 PROCEDURE — 700105 HCHG RX REV CODE 258: Performed by: FAMILY MEDICINE

## 2019-07-27 PROCEDURE — 700102 HCHG RX REV CODE 250 W/ 637 OVERRIDE(OP): Performed by: FAMILY MEDICINE

## 2019-07-27 PROCEDURE — 87205 SMEAR GRAM STAIN: CPT

## 2019-07-27 PROCEDURE — 700101 HCHG RX REV CODE 250: Performed by: HOSPITALIST

## 2019-07-27 RX ORDER — SODIUM CHLORIDE 9 MG/ML
500 INJECTION, SOLUTION INTRAVENOUS ONCE
Status: COMPLETED | OUTPATIENT
Start: 2019-07-27 | End: 2019-07-27

## 2019-07-27 RX ORDER — GUAIFENESIN 600 MG/1
600 TABLET, EXTENDED RELEASE ORAL EVERY 12 HOURS
Status: DISCONTINUED | OUTPATIENT
Start: 2019-07-27 | End: 2019-08-01 | Stop reason: HOSPADM

## 2019-07-27 RX ADMIN — METOPROLOL SUCCINATE 25 MG: 25 TABLET, EXTENDED RELEASE ORAL at 17:41

## 2019-07-27 RX ADMIN — TAMSULOSIN HYDROCHLORIDE 0.4 MG: 0.4 CAPSULE ORAL at 04:24

## 2019-07-27 RX ADMIN — ROSUVASTATIN CALCIUM 20 MG: 20 TABLET, FILM COATED ORAL at 17:34

## 2019-07-27 RX ADMIN — SEVELAMER CARBONATE 800 MG: 800 TABLET, FILM COATED ORAL at 17:34

## 2019-07-27 RX ADMIN — SODIUM CHLORIDE 500 ML: 9 INJECTION, SOLUTION INTRAVENOUS at 08:24

## 2019-07-27 RX ADMIN — PIPERACILLIN AND TAZOBACTAM 4.5 G: 4; .5 INJECTION, POWDER, LYOPHILIZED, FOR SOLUTION INTRAVENOUS; PARENTERAL at 04:25

## 2019-07-27 RX ADMIN — SODIUM CHLORIDE 4 ML: 7 NEBU SOLN,3 % NEBU at 06:52

## 2019-07-27 RX ADMIN — GUAIFENESIN 600 MG: 600 TABLET, EXTENDED RELEASE ORAL at 17:35

## 2019-07-27 RX ADMIN — PREDNISONE 5 MG: 5 TABLET ORAL at 04:24

## 2019-07-27 RX ADMIN — APIXABAN 2.5 MG: 2.5 TABLET, FILM COATED ORAL at 04:24

## 2019-07-27 RX ADMIN — THERA TABS 1 TABLET: TAB at 04:24

## 2019-07-27 RX ADMIN — ASPIRIN 81 MG: 81 TABLET, COATED ORAL at 04:23

## 2019-07-27 RX ADMIN — FINASTERIDE 5 MG: 5 TABLET, FILM COATED ORAL at 04:24

## 2019-07-27 RX ADMIN — VITAMIN D, TAB 1000IU (100/BT) 5000 UNITS: 25 TAB at 04:24

## 2019-07-27 RX ADMIN — IPRATROPIUM BROMIDE AND ALBUTEROL SULFATE 3 ML: .5; 3 SOLUTION RESPIRATORY (INHALATION) at 06:51

## 2019-07-27 RX ADMIN — SENNOSIDES, DOCUSATE SODIUM 2 TABLET: 50; 8.6 TABLET, FILM COATED ORAL at 17:34

## 2019-07-27 RX ADMIN — SEVELAMER CARBONATE 800 MG: 800 TABLET, FILM COATED ORAL at 09:38

## 2019-07-27 RX ADMIN — BUDESONIDE AND FORMOTEROL FUMARATE DIHYDRATE 2 PUFF: 160; 4.5 AEROSOL RESPIRATORY (INHALATION) at 20:00

## 2019-07-27 RX ADMIN — IPRATROPIUM BROMIDE AND ALBUTEROL SULFATE 3 ML: .5; 3 SOLUTION RESPIRATORY (INHALATION) at 20:00

## 2019-07-27 RX ADMIN — SODIUM CHLORIDE 4 ML: 7 NEBU SOLN,3 % NEBU at 20:00

## 2019-07-27 RX ADMIN — PIPERACILLIN AND TAZOBACTAM 4.5 G: 4; .5 INJECTION, POWDER, LYOPHILIZED, FOR SOLUTION INTRAVENOUS; PARENTERAL at 13:57

## 2019-07-27 RX ADMIN — BUDESONIDE AND FORMOTEROL FUMARATE DIHYDRATE 2 PUFF: 160; 4.5 AEROSOL RESPIRATORY (INHALATION) at 08:00

## 2019-07-27 RX ADMIN — SEVELAMER CARBONATE 800 MG: 800 TABLET, FILM COATED ORAL at 13:55

## 2019-07-27 ASSESSMENT — ENCOUNTER SYMPTOMS
BACK PAIN: 0
PHOTOPHOBIA: 0
CHILLS: 0
BLURRED VISION: 0
DOUBLE VISION: 0
HEARTBURN: 0
DEPRESSION: 0
NECK PAIN: 0
TINGLING: 0
EYE PAIN: 0
HEMOPTYSIS: 0
COUGH: 1
NAUSEA: 0
ORTHOPNEA: 0
WEIGHT LOSS: 0
TREMORS: 0
DIZZINESS: 0
SHORTNESS OF BREATH: 1
MYALGIAS: 0
FEVER: 0
PALPITATIONS: 0
FALLS: 1
HEADACHES: 0
BRUISES/BLEEDS EASILY: 0
VOMITING: 0
ABDOMINAL PAIN: 0

## 2019-07-27 ASSESSMENT — LIFESTYLE VARIABLES: SUBSTANCE_ABUSE: 0

## 2019-07-27 NOTE — WOUND TEAM
Wound team in to see pt for left hand and forearm wound.  Large skin tear on forearm noted.  Depth of wound is partial thickness.  There is a small amount of sanguinous drainage.  Dressing is painful to remove.  Cleansed wound with wound cleanser, covered wounds with adaptic and ABD on forarm, wrapped in rolled gauze leaving IV access available.  Nursing orders written for dressing changes.

## 2019-07-27 NOTE — PROGRESS NOTES
AAOx4. Hypoitensive/tachycardic this AM - MD aware. C/o pain present in LUE, declining intervention at this time. -N/V. -N/T. Denies new onset of chest pain/SOB. +BS in all 4 quadrants, last BM yesterday per pt. Dressing in place to LUE, changed yesterday by wound nurse. POC dicussed, denies further needs at this time. Bed alarm on, call light within reach & hourly rounding in place.

## 2019-07-27 NOTE — CARE PLAN
Problem: Communication  Goal: The ability to communicate needs accurately and effectively will improve  Outcome: PROGRESSING AS EXPECTED  Pt able to verbalize needs.     Problem: Pain Management  Goal: Pain level will decrease to patient's comfort goal  Outcome: PROGRESSING AS EXPECTED  Pt c/o pain in left arm. Medication offered. Pt declined at this time. Will continue to monitor.

## 2019-07-27 NOTE — PROGRESS NOTES
Pt resting in bed throughout shift. Pt c/o pain on left arm. Pain medication offered. Pt declined. Updated on plan of care and medications. All questions answered at bedside. Pt verbalized understanding. Fall precautions in place. Call bell and bedside table within reach. Will continue to monitor.

## 2019-07-27 NOTE — PROGRESS NOTES
Pt having lunch, c/o pain but refused taking pain med. Medication give per MAR, fall precautions in place, bed in low position, call light within reach, ctm

## 2019-07-27 NOTE — PROGRESS NOTES
2 RN skin check complete w/ LIZETH Reaves.   Devices in place: Oxygen tubing.  Skin assessed under devices: Yes.  Confirmed pressure ulcers found: N/A.  New potential pressure ulcers noted: L ear. Wound consult placed: Yes.  The following interventions in place: Waffle mattress, heels floated on pillows, mepilex to sacrum, Q2 turns.    Skin tear to L arm, covered by dressing - to be changed tomorrow. Redness to sacrum, blanching. Redness to upper back, blanching. Scab to L knee. Redness to inner R ankle, non-blanching. Heels pink and blanching bilaterally. R ear pink & blanching. L ear pink, unblanchable. Generalized bruising.

## 2019-07-27 NOTE — PROGRESS NOTES
Informed by FARRAH Hunt of patient's BP 83/47 and HR of 103. This RN redid patient's vital signs, BP 79/44 and HR of 105. Informed Dr. Guaman, obtained order for 500 ml bolus NS and lactic acid.

## 2019-07-27 NOTE — PROGRESS NOTES
· Assumed care at 0645  · Pt came back from dialysis, tolerated well. Pt's BP remained low today but not symptomatic, MD made aware, will cont to monitor.   · Pt up with 1p/FWW. Encouraging pt to turn self, coccyx was red but blanching.  · Pt has productive cough - encouraging use of IS.   · Wound care came to see pt and redress left FA.   · Pt resting well, wife at bedside today, will cont to monitor.

## 2019-07-27 NOTE — PROGRESS NOTES
[facility] Medicine Daily Progress Note    Date of Service  7/27/2019    Chief Complaint  79 y.o. male admitted 7/24/2019 with left arm pain after a fall and worsening shortness of breath.    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  Patient fell at home resulted and skin tear of the left arm.  Was also noted to have worsening of shortness of breath.  Chest x-ray showed right infrahilar opacity consistent with pneumonia.  Started on broad-spectrum antibiotics.  Pancultures obtained.  X-ray of the left forearm ordered due to significant pain on exam.  Interval Problem Update  Resting comfortably in bed.  Evaluated by speech therapy who felt that patient is high risk for aspiration and recommended NG tube feeding.  However, patient refused and requested diet.  Was placed on dysphagia 3 per speech recommendations.  Respiratory status stable with no distress at this point.  Complaint of pain of the left forearm.  Since large skin tear with bleeding noted on the left arm.  7/26: Resting comfortably. Respiratory status at baseline. Pending left arm and hand xray to rule out any acute bony abnormalities or fracture.  Leukocytosis trending down.  Afebrile overnight.  Tolerating p.o. fairly but with overt aspiration.  7/27: Resting comfortably in bed.  X-ray of the left arm and hand with no fractures.  Pain has improved on the left arm.  Range of motion has improved as well.  Blood pressure borderline but patient is asymptomatic.  Repeat echocardiogram ordered.  Febrile overnight.  Story status stable.  No issues overnight per staff.  Consultants/Specialty  Nephrology    Code Status  DNR/DNI    Disposition  Pending PT/OT eval    Review of  Systems  Review of Systems   Constitutional: Positive for malaise/fatigue. Negative for chills, fever and weight loss.   HENT: Negative for ear pain, hearing loss and tinnitus.    Eyes: Negative for blurred vision, double vision, photophobia and pain.   Respiratory: Positive for cough and shortness of breath. Negative for hemoptysis.    Cardiovascular: Negative for chest pain, palpitations and orthopnea.   Gastrointestinal: Negative for abdominal pain, heartburn, nausea and vomiting.   Genitourinary: Negative for dysuria, frequency, hematuria and urgency.   Musculoskeletal: Positive for falls and joint pain (left arm and elbow pain). Negative for back pain, myalgias and neck pain.   Skin: Negative for rash.   Neurological: Negative for dizziness, tingling, tremors and headaches.   Endo/Heme/Allergies: Does not bruise/bleed easily.   Psychiatric/Behavioral: Negative for depression, substance abuse and suicidal ideas.        Physical Exam  Temp:  [36.4 °C (97.6 °F)-37.6 °C (99.7 °F)] 36.9 °C (98.5 °F)  Pulse:  [] 95  Resp:  [14-18] 18  BP: ()/(41-53) 83/46  SpO2:  [95 %-100 %] 96 %    Physical Exam   Constitutional: He is oriented to person, place, and time. No distress.   Frail   HENT:   Head: Normocephalic and atraumatic.   Mouth/Throat: No oropharyngeal exudate.   Eyes: Pupils are equal, round, and reactive to light. No scleral icterus.   Neck: Normal range of motion. No tracheal deviation present. No thyromegaly present.   Cardiovascular: Normal rate and regular rhythm.  Exam reveals no gallop and no friction rub.    Pulmonary/Chest: No respiratory distress. He has decreased breath sounds. He has no wheezes. He has rhonchi. He has rales.   Abdominal: Soft. He exhibits no distension. There is no tenderness. There is no rebound.   Musculoskeletal:   Left forearm wrapped with gauze  Large ecchymosis noted on the left hand tender to touch.  Left forearm and hand tender to light palpation.   Neurological:  He is alert and oriented to person, place, and time.   Skin: He is not diaphoretic.   Psychiatric: He has a normal mood and affect. His behavior is normal.       Fluids    Intake/Output Summary (Last 24 hours) at 07/27/19 1245  Last data filed at 07/27/19 0940   Gross per 24 hour   Intake              860 ml   Output              200 ml   Net              660 ml       Laboratory  Recent Labs      07/25/19 0237  07/26/19 0225 07/27/19 0217   WBC  16.4*  13.0*  9.5   RBC  2.90*  2.91*  2.79*   HEMOGLOBIN  8.8*  8.7*  8.2*   HEMATOCRIT  28.5*  28.5*  28.0*   MCV  97.9*  97.9*  100.4*   MCH  30.0  29.9  29.4   MCHC  30.6*  30.5*  29.3*   RDW  59.9*  60.1*  61.0*   PLATELETCT  153*  145*  157*   MPV  8.9*  9.0  9.2     Recent Labs      07/25/19 0237 07/26/19 0225 07/27/19 0217   SODIUM  137  137  139   POTASSIUM  5.1  4.1  4.0   CHLORIDE  103  100  102   CO2  26  26  28   GLUCOSE  109*  144*  132*   BUN  41*  27*  21   CREATININE  4.51*  3.26*  2.69*   CALCIUM  8.1*  8.2*  8.1*                   Imaging  DX-HAND 2- LEFT   Final Result         Questionable subluxation of the second MCP joint. Correlate clinically.      No definite fracture is seen but evaluation limited due to osseous demineralization.      Widening of the scapholunate interval, likely degenerative.      DX-FOREARM LEFT   Final Result         No acute osseous abnormality.      DX-CHEST-PORTABLE (1 VIEW)   Final Result      1.  New RIGHT infrahilar opacity suspicious for new pneumonia, recommend follow-up radiograph to confirm clearing   2.  Bibasilar underinflation atelectasis which could obscure an additional process.   3.  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  History of aspiration and recurrent pneumonia.  Chest x-ray showed  right infrahilar opacity.  Discontinue Levaquin and switch to Zosyn Pseudomonas renally dosed.  Follow-up with sputum culture.     Wegener's granulomatosis ([facility])- (present on admission)   Assessment & Plan    Resulting in end-stage renal disease  On hemodialysis  Chronically on prednisone.       Atrial fibrillation (HCC)- (present on admission)   Assessment & Plan    Goal rate 60-90.   Pulse: 86 presently  [id number] score at least 6; estimated annual risk of stroke at least 9.7%  Anticoagulation with Factor Xa or direct thrombin inhibitor with Eliquis 2.5 mg  7/25: We will hold Eliquis for now due to large skin tear on left arm with bleeding.  7/26: Continue Eliquis.          Immunosuppressed status ([facility])- (present on admission)   Assessment & Plan    Secondary to bronchiectasis and prednisone     Pacemaker- (present on admission)   Assessment & Plan    History of sick sinus syndrome       ESRD on dialysis ([facility])- (present on admission)   Assessment & Plan    Nephrology following.     Skin tear of forearm without complication, initial encounter- (present on admission)   Assessment & Plan    Of the left forearm after a fall.  X-ray with no fracture.  Wound care following      Acute respiratory failure with hypoxia ([facility])   Assessment & Plan    Acute on chronic respiratory failure   At baseline for now.     COPD (chronic obstructive pulmonary disease) ([facility])- (present on admission)   Assessment & Plan    Not an acute exacerbation  Home inhalers including duo nebs, albuterol, Symbicort  Follows Dr. ballard outpatient          VTE prophylaxis: Eliquis on hold for now due to left arm skin tear and bleeding. SCD's.

## 2019-07-27 NOTE — CARE PLAN
Problem: Pain Management  Goal: Pain level will decrease to patient's comfort goal  Outcome: PROGRESSING SLOWER THAN EXPECTED  Patient c/o pain to LUE, declining intervention at this time. Asked that patient inform this RN if he would like to be medicated for pain and patient verbalized understanding.     Problem: Skin Integrity  Goal: Risk for impaired skin integrity will decrease  Outcome: PROGRESSING SLOWER THAN EXPECTED  Large skin tear to LUE. Wound care RN assessed yesterday & placed dressing/new dressing order. To be changed Q48 hours. Will change tomorrow.

## 2019-07-28 LAB
ALBUMIN SERPL BCP-MCNC: 2.8 G/DL (ref 3.2–4.9)
ALBUMIN/GLOB SERPL: 1.2 G/DL
ALP SERPL-CCNC: 45 U/L (ref 30–99)
ALT SERPL-CCNC: 7 U/L (ref 2–50)
ANION GAP SERPL CALC-SCNC: 10 MMOL/L (ref 0–11.9)
AST SERPL-CCNC: 10 U/L (ref 12–45)
BASOPHILS # BLD AUTO: 0.5 % (ref 0–1.8)
BASOPHILS # BLD: 0.04 K/UL (ref 0–0.12)
BILIRUB SERPL-MCNC: 0.3 MG/DL (ref 0.1–1.5)
BUN SERPL-MCNC: 35 MG/DL (ref 8–22)
CALCIUM SERPL-MCNC: 7.8 MG/DL (ref 8.5–10.5)
CHLORIDE SERPL-SCNC: 101 MMOL/L (ref 96–112)
CO2 SERPL-SCNC: 25 MMOL/L (ref 20–33)
CREAT SERPL-MCNC: 3.26 MG/DL (ref 0.5–1.4)
EOSINOPHIL # BLD AUTO: 0.25 K/UL (ref 0–0.51)
EOSINOPHIL NFR BLD: 3.3 % (ref 0–6.9)
ERYTHROCYTE [DISTWIDTH] IN BLOOD BY AUTOMATED COUNT: 60.3 FL (ref 35.9–50)
GLOBULIN SER CALC-MCNC: 2.4 G/DL (ref 1.9–3.5)
GLUCOSE SERPL-MCNC: 193 MG/DL (ref 65–99)
GRAM STN SPEC: NORMAL
HCT VFR BLD AUTO: 25.7 % (ref 42–52)
HGB BLD-MCNC: 7.6 G/DL (ref 14–18)
IMM GRANULOCYTES # BLD AUTO: 0.04 K/UL (ref 0–0.11)
IMM GRANULOCYTES NFR BLD AUTO: 0.5 % (ref 0–0.9)
L PNEUMO IGG TITR SER IF: NORMAL {TITER}
LYMPHOCYTES # BLD AUTO: 0.3 K/UL (ref 1–4.8)
LYMPHOCYTES NFR BLD: 3.9 % (ref 22–41)
MCH RBC QN AUTO: 29.8 PG (ref 27–33)
MCHC RBC AUTO-ENTMCNC: 29.6 G/DL (ref 33.7–35.3)
MCV RBC AUTO: 100.8 FL (ref 81.4–97.8)
MONOCYTES # BLD AUTO: 0.61 K/UL (ref 0–0.85)
MONOCYTES NFR BLD AUTO: 8 % (ref 0–13.4)
NEUTROPHILS # BLD AUTO: 6.42 K/UL (ref 1.82–7.42)
NEUTROPHILS NFR BLD: 83.8 % (ref 44–72)
NRBC # BLD AUTO: 0 K/UL
NRBC BLD-RTO: 0 /100 WBC
PLATELET # BLD AUTO: 174 K/UL (ref 164–446)
PMV BLD AUTO: 9.3 FL (ref 9–12.9)
POTASSIUM SERPL-SCNC: 4 MMOL/L (ref 3.6–5.5)
PROCALCITONIN SERPL-MCNC: 2.68 NG/ML
PROT SERPL-MCNC: 5.2 G/DL (ref 6–8.2)
RBC # BLD AUTO: 2.55 M/UL (ref 4.7–6.1)
SIGNIFICANT IND 70042: NORMAL
SITE SITE: NORMAL
SODIUM SERPL-SCNC: 136 MMOL/L (ref 135–145)
SOURCE SOURCE: NORMAL
WBC # BLD AUTO: 7.7 K/UL (ref 4.8–10.8)

## 2019-07-28 PROCEDURE — 700101 HCHG RX REV CODE 250: Performed by: HOSPITALIST

## 2019-07-28 PROCEDURE — 85025 COMPLETE CBC W/AUTO DIFF WBC: CPT

## 2019-07-28 PROCEDURE — 80053 COMPREHEN METABOLIC PANEL: CPT

## 2019-07-28 PROCEDURE — 94760 N-INVAS EAR/PLS OXIMETRY 1: CPT

## 2019-07-28 PROCEDURE — 700105 HCHG RX REV CODE 258: Performed by: FAMILY MEDICINE

## 2019-07-28 PROCEDURE — 700102 HCHG RX REV CODE 250 W/ 637 OVERRIDE(OP): Performed by: FAMILY MEDICINE

## 2019-07-28 PROCEDURE — 94640 AIRWAY INHALATION TREATMENT: CPT

## 2019-07-28 PROCEDURE — A9270 NON-COVERED ITEM OR SERVICE: HCPCS | Performed by: FAMILY MEDICINE

## 2019-07-28 PROCEDURE — 700111 HCHG RX REV CODE 636 W/ 250 OVERRIDE (IP): Performed by: FAMILY MEDICINE

## 2019-07-28 PROCEDURE — 36415 COLL VENOUS BLD VENIPUNCTURE: CPT

## 2019-07-28 PROCEDURE — 770006 HCHG ROOM/CARE - MED/SURG/GYN SEMI*

## 2019-07-28 PROCEDURE — 700102 HCHG RX REV CODE 250 W/ 637 OVERRIDE(OP): Performed by: HOSPITALIST

## 2019-07-28 PROCEDURE — 99232 SBSQ HOSP IP/OBS MODERATE 35: CPT | Performed by: INTERNAL MEDICINE

## 2019-07-28 PROCEDURE — A9270 NON-COVERED ITEM OR SERVICE: HCPCS | Performed by: HOSPITALIST

## 2019-07-28 PROCEDURE — 700111 HCHG RX REV CODE 636 W/ 250 OVERRIDE (IP): Performed by: HOSPITALIST

## 2019-07-28 PROCEDURE — 99232 SBSQ HOSP IP/OBS MODERATE 35: CPT | Performed by: FAMILY MEDICINE

## 2019-07-28 PROCEDURE — 84145 PROCALCITONIN (PCT): CPT

## 2019-07-28 RX ORDER — OMEPRAZOLE 20 MG/1
20 CAPSULE, DELAYED RELEASE ORAL DAILY
Status: DISCONTINUED | OUTPATIENT
Start: 2019-07-28 | End: 2019-08-01 | Stop reason: HOSPADM

## 2019-07-28 RX ADMIN — HYDROCODONE BITARTRATE AND ACETAMINOPHEN 1 TABLET: 5; 325 TABLET ORAL at 23:18

## 2019-07-28 RX ADMIN — GUAIFENESIN 600 MG: 600 TABLET, EXTENDED RELEASE ORAL at 04:46

## 2019-07-28 RX ADMIN — BUDESONIDE AND FORMOTEROL FUMARATE DIHYDRATE 2 PUFF: 160; 4.5 AEROSOL RESPIRATORY (INHALATION) at 07:53

## 2019-07-28 RX ADMIN — ROSUVASTATIN CALCIUM 20 MG: 20 TABLET, FILM COATED ORAL at 17:12

## 2019-07-28 RX ADMIN — TAMSULOSIN HYDROCHLORIDE 0.4 MG: 0.4 CAPSULE ORAL at 04:46

## 2019-07-28 RX ADMIN — THERA TABS 1 TABLET: TAB at 04:46

## 2019-07-28 RX ADMIN — HYDROCODONE BITARTRATE AND ACETAMINOPHEN 1 TABLET: 5; 325 TABLET ORAL at 15:58

## 2019-07-28 RX ADMIN — APIXABAN 2.5 MG: 2.5 TABLET, FILM COATED ORAL at 04:48

## 2019-07-28 RX ADMIN — OMEPRAZOLE 20 MG: 20 CAPSULE, DELAYED RELEASE ORAL at 15:39

## 2019-07-28 RX ADMIN — FINASTERIDE 5 MG: 5 TABLET, FILM COATED ORAL at 04:46

## 2019-07-28 RX ADMIN — METOPROLOL SUCCINATE 25 MG: 25 TABLET, EXTENDED RELEASE ORAL at 04:46

## 2019-07-28 RX ADMIN — IPRATROPIUM BROMIDE AND ALBUTEROL SULFATE 3 ML: .5; 3 SOLUTION RESPIRATORY (INHALATION) at 20:40

## 2019-07-28 RX ADMIN — ASPIRIN 81 MG: 81 TABLET, COATED ORAL at 04:46

## 2019-07-28 RX ADMIN — PIPERACILLIN AND TAZOBACTAM 4.5 G: 4; .5 INJECTION, POWDER, LYOPHILIZED, FOR SOLUTION INTRAVENOUS; PARENTERAL at 04:45

## 2019-07-28 RX ADMIN — SEVELAMER CARBONATE 800 MG: 800 TABLET, FILM COATED ORAL at 14:04

## 2019-07-28 RX ADMIN — SEVELAMER CARBONATE 800 MG: 800 TABLET, FILM COATED ORAL at 09:56

## 2019-07-28 RX ADMIN — PREDNISONE 5 MG: 5 TABLET ORAL at 04:46

## 2019-07-28 RX ADMIN — GUAIFENESIN 600 MG: 600 TABLET, EXTENDED RELEASE ORAL at 17:12

## 2019-07-28 RX ADMIN — VITAMIN D, TAB 1000IU (100/BT) 5000 UNITS: 25 TAB at 04:45

## 2019-07-28 RX ADMIN — IPRATROPIUM BROMIDE AND ALBUTEROL SULFATE 3 ML: .5; 3 SOLUTION RESPIRATORY (INHALATION) at 07:48

## 2019-07-28 RX ADMIN — SODIUM CHLORIDE 4 ML: 7 NEBU SOLN,3 % NEBU at 07:48

## 2019-07-28 RX ADMIN — BUDESONIDE AND FORMOTEROL FUMARATE DIHYDRATE 2 PUFF: 160; 4.5 AEROSOL RESPIRATORY (INHALATION) at 20:40

## 2019-07-28 RX ADMIN — SODIUM CHLORIDE 4 ML: 7 NEBU SOLN,3 % NEBU at 20:40

## 2019-07-28 ASSESSMENT — ENCOUNTER SYMPTOMS
NAUSEA: 0
DIZZINESS: 0
HEMOPTYSIS: 0
COUGH: 1
CHILLS: 0
MYALGIAS: 0
WEIGHT LOSS: 0
HEARTBURN: 0
FEVER: 0
EYE PAIN: 0
DEPRESSION: 0
BACK PAIN: 0
NECK PAIN: 0
FALLS: 1
DOUBLE VISION: 0
TINGLING: 0
SHORTNESS OF BREATH: 1
BLURRED VISION: 0
VOMITING: 0
ABDOMINAL PAIN: 0
HEADACHES: 0
PHOTOPHOBIA: 0
ORTHOPNEA: 0
BRUISES/BLEEDS EASILY: 0
TREMORS: 0
PALPITATIONS: 0

## 2019-07-28 ASSESSMENT — LIFESTYLE VARIABLES: SUBSTANCE_ABUSE: 0

## 2019-07-28 NOTE — CARE PLAN
Problem: Communication  Goal: The ability to communicate needs accurately and effectively will improve  Outcome: PROGRESSING AS EXPECTED  A&OX4. Pt able to verbalize needs.     Problem: Pain Management  Goal: Pain level will decrease to patient's comfort goal  Outcome: PROGRESSING AS EXPECTED  Pt c/o pain in left arm. PRN tylenol offered. Pt declined at this time. Will continue to monitor throughout shift.

## 2019-07-28 NOTE — PROGRESS NOTES
Pt resting in bed throughout shift. C/o pain in left arm. Declined pain medication. 2 RN skin check completed. Updated on plan of care and medications. Pt verbalized understanding. Fall precautions in place. Call bell and bedside table within reach. Will continue to monitor.

## 2019-07-28 NOTE — PROGRESS NOTES
AAOx4. C/o pain present in LUE, declining intervention at this time. -N/V. -N/T. Denies new onset of chest pain/SOB. +BS in all 4 quadrants, last BM this AM per pt. Dressing in place to LUE, to be changed today. POC dicussed, denies further needs at this time. Bed alarm on, call light within reach & hourly rounding in place.

## 2019-07-28 NOTE — PROGRESS NOTES
Nephrology Daily Progress Note    Date of Service  7/28/2019    Chief Complaint  79 y.o. male admitted 7/24/2019 with ESRD, SOB    Interval Problem Update  Seems to be improving  HD on a MWF schedule    Review of Systems  Review of Systems   Constitutional: Positive for malaise/fatigue.   All other systems reviewed and are negative.       Physical Exam  Temp:  [36.7 °C (98 °F)-37 °C (98.6 °F)] 36.9 °C (98.4 °F)  Pulse:  [78-99] 78  Resp:  [16-18] 18  BP: (106-119)/(50-61) 108/60  SpO2:  [93 %-99 %] 98 %    Physical Exam   Constitutional: He is oriented to person, place, and time. He appears well-developed and well-nourished. He appears cachectic. He appears ill.   HENT:   Head: Normocephalic and atraumatic.   Cardiovascular: Normal rate and regular rhythm.    Pulmonary/Chest: Effort normal and breath sounds normal.   Abdominal: Soft. Bowel sounds are normal.   Musculoskeletal: He exhibits no edema or tenderness.   Neurological: He is alert and oriented to person, place, and time.   Skin: Skin is warm and dry.       Fluids    Intake/Output Summary (Last 24 hours) at 07/28/19 1317  Last data filed at 07/28/19 1300   Gross per 24 hour   Intake             1040 ml   Output              885 ml   Net              155 ml       Laboratory  Recent Labs      07/26/19 0225 07/27/19 0217 07/28/19   0043   WBC  13.0*  9.5  7.7   RBC  2.91*  2.79*  2.55*   HEMOGLOBIN  8.7*  8.2*  7.6*   HEMATOCRIT  28.5*  28.0*  25.7*   MCV  97.9*  100.4*  100.8*   MCH  29.9  29.4  29.8   MCHC  30.5*  29.3*  29.6*   RDW  60.1*  61.0*  60.3*   PLATELETCT  145*  157*  174   MPV  9.0  9.2  9.3     Recent Labs      07/26/19 0225 07/27/19 0217 07/28/19   0043   SODIUM  137  139  136   POTASSIUM  4.1  4.0  4.0   CHLORIDE  100  102  101   CO2  26  28  25   GLUCOSE  144*  132*  193*   BUN  27*  21  35*   CREATININE  3.26*  2.69*  3.26*   CALCIUM  8.2*  8.1*  7.8*         No results for input(s): NTPROBNP in the last 72 hours.         Imaging  DX-HAND 2- LEFT   Final Result         Questionable subluxation of the second MCP joint. Correlate clinically.      No definite fracture is seen but evaluation limited due to osseous demineralization.      Widening of the scapholunate interval, likely degenerative.      DX-FOREARM LEFT   Final Result         No acute osseous abnormality.      DX-CHEST-PORTABLE (1 VIEW)   Final Result      1.  New RIGHT infrahilar opacity suspicious for new pneumonia, recommend follow-up radiograph to confirm clearing   2.  Bibasilar underinflation atelectasis which could obscure an additional process.   3.  Small RIGHT pleural effusion      EC-ECHOCARDIOGRAM COMPLETE W/O CONT    (Results Pending)        Assessment/Plan  1. ESRD -continue dialysis on Monday Wednesday Friday schedule  2.  Anemia of chronic kidney disease, Epogen every Monday Wednesday Friday  3.  Pneumonia, on antibiotics

## 2019-07-28 NOTE — PROGRESS NOTES
[facility] Medicine Daily Progress Note    Date of Service  7/28/2019    Chief Complaint  79 y.o. male admitted 7/24/2019 with left arm pain after a fall and worsening shortness of breath.    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  Patient fell at home resulted and skin tear of the left arm.  Was also noted to have worsening of shortness of breath.  Chest x-ray showed right infrahilar opacity consistent with pneumonia.  Started on broad-spectrum antibiotics.  Pancultures obtained.  X-ray of the left forearm ordered due to significant pain on exam.  Interval Problem Update  Resting comfortably in bed.  Evaluated by speech therapy who felt that patient is high risk for aspiration and recommended NG tube feeding.  However, patient refused and requested diet.  Was placed on dysphagia 3 per speech recommendations.  Respiratory status stable with no distress at this point.  Complaint of pain of the left forearm.  Since large skin tear with bleeding noted on the left arm.  7/26: Resting comfortably. Respiratory status at baseline. Pending left arm and hand xray to rule out any acute bony abnormalities or fracture.  Leukocytosis trending down.  Afebrile overnight.  Tolerating p.o. fairly but with overt aspiration.  7/27: Resting comfortably in bed.  X-ray of the left arm and hand with no fractures.  Pain has improved on the left arm.  Range of motion has improved as well.  Blood pressure borderline but patient is asymptomatic.  Repeat echocardiogram ordered.  Febrile overnight.  Story status stable.  No issues overnight per staff.  Consultants/Specialty  Nephrology    Code Status  DNR/DNI    Disposition  Pending PT/OT eval    Review of  Systems  Review of Systems   Constitutional: Positive for malaise/fatigue. Negative for chills, fever and weight loss.   HENT: Negative for ear pain, hearing loss and tinnitus.    Eyes: Negative for blurred vision, double vision, photophobia and pain.   Respiratory: Positive for cough and shortness of breath. Negative for hemoptysis.    Cardiovascular: Negative for chest pain, palpitations and orthopnea.   Gastrointestinal: Negative for abdominal pain, heartburn, nausea and vomiting.   Genitourinary: Negative for dysuria, frequency, hematuria and urgency.   Musculoskeletal: Positive for falls and joint pain (left arm and elbow pain). Negative for back pain, myalgias and neck pain.   Skin: Negative for rash.   Neurological: Negative for dizziness, tingling, tremors and headaches.   Endo/Heme/Allergies: Does not bruise/bleed easily.   Psychiatric/Behavioral: Negative for depression, substance abuse and suicidal ideas.        Physical Exam  Temp:  [36.7 °C (98 °F)-37 °C (98.6 °F)] 36.9 °C (98.4 °F)  Pulse:  [78-99] 78  Resp:  [16-18] 18  BP: (106-119)/(50-61) 108/60  SpO2:  [93 %-99 %] 98 %    Physical Exam   Constitutional: He is oriented to person, place, and time. No distress.   Frail   HENT:   Head: Normocephalic and atraumatic.   Mouth/Throat: No oropharyngeal exudate.   Eyes: Pupils are equal, round, and reactive to light. No scleral icterus.   Neck: Normal range of motion. No tracheal deviation present. No thyromegaly present.   Cardiovascular: Normal rate and regular rhythm.  Exam reveals no gallop and no friction rub.    Pulmonary/Chest: No respiratory distress. He has decreased breath sounds. He has no wheezes. He has rhonchi. He has rales.   Abdominal: Soft. He exhibits no distension. There is no tenderness. There is no rebound.   Musculoskeletal:   Left forearm wrapped with gauze  Large ecchymosis noted on the left hand tender to touch.  Left forearm and hand tender to light palpation.   Neurological: He is  alert and oriented to person, place, and time.   Skin: He is not diaphoretic.   Psychiatric: He has a normal mood and affect. His behavior is normal.       Fluids    Intake/Output Summary (Last 24 hours) at 07/28/19 1404  Last data filed at 07/28/19 1300   Gross per 24 hour   Intake              690 ml   Output              885 ml   Net             -195 ml       Laboratory  Recent Labs      07/26/19 0225 07/27/19 0217 07/28/19   0043   WBC  13.0*  9.5  7.7   RBC  2.91*  2.79*  2.55*   HEMOGLOBIN  8.7*  8.2*  7.6*   HEMATOCRIT  28.5*  28.0*  25.7*   MCV  97.9*  100.4*  100.8*   MCH  29.9  29.4  29.8   MCHC  30.5*  29.3*  29.6*   RDW  60.1*  61.0*  60.3*   PLATELETCT  145*  157*  174   MPV  9.0  9.2  9.3     Recent Labs      07/26/19 0225 07/27/19 0217 07/28/19   0043   SODIUM  137  139  136   POTASSIUM  4.1  4.0  4.0   CHLORIDE  100  102  101   CO2  26  28  25   GLUCOSE  144*  132*  193*   BUN  27*  21  35*   CREATININE  3.26*  2.69*  3.26*   CALCIUM  8.2*  8.1*  7.8*                   Imaging  DX-HAND 2- LEFT   Final Result         Questionable subluxation of the second MCP joint. Correlate clinically.      No definite fracture is seen but evaluation limited due to osseous demineralization.      Widening of the scapholunate interval, likely degenerative.      DX-FOREARM LEFT   Final Result         No acute osseous abnormality.      DX-CHEST-PORTABLE (1 VIEW)   Final Result      1.  New RIGHT infrahilar opacity suspicious for new pneumonia, recommend follow-up radiograph to confirm clearing   2.  Bibasilar underinflation atelectasis which could obscure an additional process.   3.  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  History of aspiration and recurrent pneumonia.  Chest x-ray showed right  infrahilar opacity.  Discontinue Levaquin and switch to Zosyn Pseudomonas renally dosed.  Follow-up with sputum culture (negative so far).      Debility- (present on admission)   Assessment & Plan    PT/OT pending   Hospice consulted per patient request      Wegener's granulomatosis ([facility])- (present on admission)   Assessment & Plan    Resulting in end-stage renal disease  On hemodialysis  Chronically on prednisone.       Atrial fibrillation ([facility])- (present on admission)   Assessment & Plan    Goal rate 60-90.   Pulse: 86 presently  [id number] score at least 6; estimated annual risk of stroke at least 9.7%  Anticoagulation with Factor Xa or direct thrombin inhibitor with Eliquis 2.5 mg  7/25: We will hold Eliquis for now due to large skin tear on left arm with bleeding.  7/26: Continue Eliquis.          Immunosuppressed status ([facility])- (present on admission)   Assessment & Plan    Secondary to bronchiectasis and prednisone     Pacemaker- (present on admission)   Assessment & Plan    History of sick sinus syndrome       Chronic disease anemia- (present on admission)   Assessment & Plan    Epogen per nephrology   Monitor HH     ESRD on dialysis ([facility])- (present on admission)   Assessment & Plan    Nephrology following.     Skin tear of forearm without complication, initial encounter- (present on admission)   Assessment & Plan    Of the left forearm after a fall.  X-ray with no fracture.  Wound care following      Acute respiratory failure with hypoxia ([facility])   Assessment & Plan    Acute on chronic respiratory failure   At baseline for now.     COPD (chronic obstructive pulmonary disease) ([facility])- (present on admission)   Assessment & Plan    Not an acute exacerbation  Home inhalers including duo nebs, albuterol, Symbicort  Follows Dr. ballard outpatient          VTE prophylaxis: Eliquis

## 2019-07-28 NOTE — ASSESSMENT & PLAN NOTE
Epogen per nephrology   Monitor HH  Low iron levels and saturation   Replacement per pharmacy recommendations

## 2019-07-28 NOTE — PROGRESS NOTES
2 RN skin check completed with Tamiko STANLEY.  Devices in place nasal cannula.  Skin assessed under devices yes.  The following interventions in place: pillows in place for repositioning. Mepilex on sacrum. Q2H turns. Waffle overlay.    Skin tear to left arm. Dressing in place. Sacrum red and blanching. Scab to left knee. Right ear pink and blanching. Left ear nonblanching. Scattered bruising. Redness to inner right ankle. Bruise to right hip.

## 2019-07-28 NOTE — PROGRESS NOTES
Problem: Pain Management  Goal: Pain level will decrease to patient's comfort goal  Outcome: PROGRESSING SLOWER THAN EXPECTED  Patient c/o pain to LUE, declining intervention at this time. Asked that patient inform this RN if he would like to be medicated for pain and patient verbalized understanding.      Problem: Skin Integrity  Goal: Risk for impaired skin integrity will decrease  Outcome: PROGRESSING SLOWER THAN EXPECTED  Large skin tear to LUE. Dressing to be changed today. Plan to change shortly.

## 2019-07-29 ENCOUNTER — HOME CARE VISIT (OUTPATIENT)
Dept: HOSPICE | Facility: HOSPICE | Age: 79
End: 2019-07-29
Payer: MEDICARE

## 2019-07-29 ENCOUNTER — APPOINTMENT (OUTPATIENT)
Dept: CARDIOLOGY | Facility: MEDICAL CENTER | Age: 79
DRG: 177 | End: 2019-07-29
Attending: FAMILY MEDICINE
Payer: MEDICARE

## 2019-07-29 LAB
ALBUMIN SERPL BCP-MCNC: 2.8 G/DL (ref 3.2–4.9)
ALBUMIN/GLOB SERPL: 1 G/DL
ALP SERPL-CCNC: 45 U/L (ref 30–99)
ALT SERPL-CCNC: 9 U/L (ref 2–50)
ANION GAP SERPL CALC-SCNC: 10 MMOL/L (ref 0–11.9)
AST SERPL-CCNC: 11 U/L (ref 12–45)
BACTERIA BLD CULT: NORMAL
BACTERIA BLD CULT: NORMAL
BASOPHILS # BLD AUTO: 0.4 % (ref 0–1.8)
BASOPHILS # BLD: 0.03 K/UL (ref 0–0.12)
BILIRUB SERPL-MCNC: 0.3 MG/DL (ref 0.1–1.5)
BUN SERPL-MCNC: 43 MG/DL (ref 8–22)
CALCIUM SERPL-MCNC: 8.3 MG/DL (ref 8.5–10.5)
CHLORIDE SERPL-SCNC: 102 MMOL/L (ref 96–112)
CO2 SERPL-SCNC: 26 MMOL/L (ref 20–33)
CREAT SERPL-MCNC: 3.74 MG/DL (ref 0.5–1.4)
EOSINOPHIL # BLD AUTO: 0.27 K/UL (ref 0–0.51)
EOSINOPHIL NFR BLD: 3.7 % (ref 0–6.9)
ERYTHROCYTE [DISTWIDTH] IN BLOOD BY AUTOMATED COUNT: 59.3 FL (ref 35.9–50)
GLOBULIN SER CALC-MCNC: 2.8 G/DL (ref 1.9–3.5)
GLUCOSE SERPL-MCNC: 133 MG/DL (ref 65–99)
HCT VFR BLD AUTO: 25.3 % (ref 42–52)
HGB BLD-MCNC: 7.8 G/DL (ref 14–18)
IMM GRANULOCYTES # BLD AUTO: 0.04 K/UL (ref 0–0.11)
IMM GRANULOCYTES NFR BLD AUTO: 0.6 % (ref 0–0.9)
LV EJECT FRACT  99904: 70
LV EJECT FRACT MOD 2C 99903: 61.02
LV EJECT FRACT MOD 4C 99902: 73.13
LV EJECT FRACT MOD BP 99901: 68.59
LYMPHOCYTES # BLD AUTO: 0.44 K/UL (ref 1–4.8)
LYMPHOCYTES NFR BLD: 6.1 % (ref 22–41)
MCH RBC QN AUTO: 30.4 PG (ref 27–33)
MCHC RBC AUTO-ENTMCNC: 30.8 G/DL (ref 33.7–35.3)
MCV RBC AUTO: 98.4 FL (ref 81.4–97.8)
MONOCYTES # BLD AUTO: 0.55 K/UL (ref 0–0.85)
MONOCYTES NFR BLD AUTO: 7.6 % (ref 0–13.4)
NEUTROPHILS # BLD AUTO: 5.94 K/UL (ref 1.82–7.42)
NEUTROPHILS NFR BLD: 81.6 % (ref 44–72)
NRBC # BLD AUTO: 0 K/UL
NRBC BLD-RTO: 0 /100 WBC
PLATELET # BLD AUTO: 187 K/UL (ref 164–446)
PMV BLD AUTO: 9.6 FL (ref 9–12.9)
POTASSIUM SERPL-SCNC: 4.2 MMOL/L (ref 3.6–5.5)
PROT SERPL-MCNC: 5.6 G/DL (ref 6–8.2)
RBC # BLD AUTO: 2.57 M/UL (ref 4.7–6.1)
SIGNIFICANT IND 70042: NORMAL
SIGNIFICANT IND 70042: NORMAL
SITE SITE: NORMAL
SITE SITE: NORMAL
SODIUM SERPL-SCNC: 138 MMOL/L (ref 135–145)
SOURCE SOURCE: NORMAL
SOURCE SOURCE: NORMAL
WBC # BLD AUTO: 7.3 K/UL (ref 4.8–10.8)

## 2019-07-29 PROCEDURE — A9270 NON-COVERED ITEM OR SERVICE: HCPCS | Performed by: HOSPITALIST

## 2019-07-29 PROCEDURE — 85025 COMPLETE CBC W/AUTO DIFF WBC: CPT

## 2019-07-29 PROCEDURE — 92526 ORAL FUNCTION THERAPY: CPT

## 2019-07-29 PROCEDURE — 700101 HCHG RX REV CODE 250: Performed by: HOSPITALIST

## 2019-07-29 PROCEDURE — 97162 PT EVAL MOD COMPLEX 30 MIN: CPT

## 2019-07-29 PROCEDURE — A9270 NON-COVERED ITEM OR SERVICE: HCPCS | Performed by: FAMILY MEDICINE

## 2019-07-29 PROCEDURE — 700111 HCHG RX REV CODE 636 W/ 250 OVERRIDE (IP): Performed by: HOSPITALIST

## 2019-07-29 PROCEDURE — 90935 HEMODIALYSIS ONE EVALUATION: CPT | Performed by: INTERNAL MEDICINE

## 2019-07-29 PROCEDURE — 94760 N-INVAS EAR/PLS OXIMETRY 1: CPT

## 2019-07-29 PROCEDURE — 93306 TTE W/DOPPLER COMPLETE: CPT

## 2019-07-29 PROCEDURE — 5A1D70Z PERFORMANCE OF URINARY FILTRATION, INTERMITTENT, LESS THAN 6 HOURS PER DAY: ICD-10-PCS | Performed by: INTERNAL MEDICINE

## 2019-07-29 PROCEDURE — 700102 HCHG RX REV CODE 250 W/ 637 OVERRIDE(OP): Performed by: HOSPITALIST

## 2019-07-29 PROCEDURE — 770006 HCHG ROOM/CARE - MED/SURG/GYN SEMI*

## 2019-07-29 PROCEDURE — 700102 HCHG RX REV CODE 250 W/ 637 OVERRIDE(OP): Performed by: FAMILY MEDICINE

## 2019-07-29 PROCEDURE — 99232 SBSQ HOSP IP/OBS MODERATE 35: CPT | Performed by: FAMILY MEDICINE

## 2019-07-29 PROCEDURE — 36415 COLL VENOUS BLD VENIPUNCTURE: CPT

## 2019-07-29 PROCEDURE — 94640 AIRWAY INHALATION TREATMENT: CPT

## 2019-07-29 PROCEDURE — 80053 COMPREHEN METABOLIC PANEL: CPT

## 2019-07-29 PROCEDURE — 93306 TTE W/DOPPLER COMPLETE: CPT | Mod: 26 | Performed by: INTERNAL MEDICINE

## 2019-07-29 PROCEDURE — 90935 HEMODIALYSIS ONE EVALUATION: CPT

## 2019-07-29 RX ADMIN — BUDESONIDE AND FORMOTEROL FUMARATE DIHYDRATE 2 PUFF: 160; 4.5 AEROSOL RESPIRATORY (INHALATION) at 06:40

## 2019-07-29 RX ADMIN — ROSUVASTATIN CALCIUM 20 MG: 20 TABLET, FILM COATED ORAL at 19:31

## 2019-07-29 RX ADMIN — GUAIFENESIN 600 MG: 600 TABLET, EXTENDED RELEASE ORAL at 06:00

## 2019-07-29 RX ADMIN — SODIUM CHLORIDE 4 ML: 7 NEBU SOLN,3 % NEBU at 19:37

## 2019-07-29 RX ADMIN — PREDNISONE 5 MG: 5 TABLET ORAL at 04:52

## 2019-07-29 RX ADMIN — HYDROCODONE BITARTRATE AND ACETAMINOPHEN 2 TABLET: 5; 325 TABLET ORAL at 22:17

## 2019-07-29 RX ADMIN — OMEPRAZOLE 20 MG: 20 CAPSULE, DELAYED RELEASE ORAL at 04:51

## 2019-07-29 RX ADMIN — IPRATROPIUM BROMIDE AND ALBUTEROL SULFATE 3 ML: .5; 3 SOLUTION RESPIRATORY (INHALATION) at 06:39

## 2019-07-29 RX ADMIN — TAMSULOSIN HYDROCHLORIDE 0.4 MG: 0.4 CAPSULE ORAL at 04:52

## 2019-07-29 RX ADMIN — APIXABAN 2.5 MG: 2.5 TABLET, FILM COATED ORAL at 04:52

## 2019-07-29 RX ADMIN — IPRATROPIUM BROMIDE AND ALBUTEROL SULFATE 3 ML: .5; 3 SOLUTION RESPIRATORY (INHALATION) at 19:36

## 2019-07-29 RX ADMIN — THERA TABS 1 TABLET: TAB at 04:52

## 2019-07-29 RX ADMIN — GUAIFENESIN 600 MG: 600 TABLET, EXTENDED RELEASE ORAL at 19:31

## 2019-07-29 RX ADMIN — FINASTERIDE 5 MG: 5 TABLET, FILM COATED ORAL at 04:52

## 2019-07-29 RX ADMIN — SODIUM CHLORIDE 4 ML: 7 NEBU SOLN,3 % NEBU at 06:40

## 2019-07-29 RX ADMIN — BUDESONIDE AND FORMOTEROL FUMARATE DIHYDRATE 2 PUFF: 160; 4.5 AEROSOL RESPIRATORY (INHALATION) at 19:53

## 2019-07-29 RX ADMIN — ASPIRIN 81 MG: 81 TABLET, COATED ORAL at 04:51

## 2019-07-29 RX ADMIN — VITAMIN D, TAB 1000IU (100/BT) 5000 UNITS: 25 TAB at 04:51

## 2019-07-29 RX ADMIN — SEVELAMER CARBONATE 800 MG: 800 TABLET, FILM COATED ORAL at 13:48

## 2019-07-29 RX ADMIN — METOPROLOL SUCCINATE 25 MG: 25 TABLET, EXTENDED RELEASE ORAL at 19:31

## 2019-07-29 ASSESSMENT — CHA2DS2 SCORE
HYPERTENSION: NO
PRIOR STROKE OR TIA OR THROMBOEMBOLISM: YES
DIABETES: NO
CHF OR LEFT VENTRICULAR DYSFUNCTION: NO
SEX: MALE
AGE 75 OR GREATER: YES
VASCULAR DISEASE: NO
AGE 65 TO 74: NO
CHA2DS2 VASC SCORE: 4

## 2019-07-29 ASSESSMENT — ENCOUNTER SYMPTOMS
SENSORY CHANGE: 0
NAUSEA: 0
SPEECH CHANGE: 0
DEPRESSION: 0
WEAKNESS: 1
DIARRHEA: 0
DIZZINESS: 0
DOUBLE VISION: 0
FALLS: 1
PHOTOPHOBIA: 0
COUGH: 1
FEVER: 0
CHILLS: 0
VOMITING: 0
TREMORS: 0
CONSTIPATION: 0
MYALGIAS: 0
PALPITATIONS: 0
NECK PAIN: 0
HALLUCINATIONS: 0
HEARTBURN: 0
BRUISES/BLEEDS EASILY: 0
HEMOPTYSIS: 0
ORTHOPNEA: 0
BACK PAIN: 0
TINGLING: 0
BLURRED VISION: 0
HEADACHES: 0
ABDOMINAL PAIN: 0
WEIGHT LOSS: 0
SHORTNESS OF BREATH: 1
EYE PAIN: 0

## 2019-07-29 ASSESSMENT — COGNITIVE AND FUNCTIONAL STATUS - GENERAL
TURNING FROM BACK TO SIDE WHILE IN FLAT BAD: A LITTLE
STANDING UP FROM CHAIR USING ARMS: A LITTLE
MOVING TO AND FROM BED TO CHAIR: UNABLE
MOVING FROM LYING ON BACK TO SITTING ON SIDE OF FLAT BED: A LITTLE
SUGGESTED CMS G CODE MODIFIER MOBILITY: CK
MOBILITY SCORE: 16
WALKING IN HOSPITAL ROOM: A LITTLE
CLIMB 3 TO 5 STEPS WITH RAILING: A LITTLE

## 2019-07-29 ASSESSMENT — GAIT ASSESSMENTS
DISTANCE (FEET): 50
ASSISTIVE DEVICE: FRONT WHEEL WALKER
GAIT LEVEL OF ASSIST: SUPERVISED
DEVIATION: BRADYKINETIC;SHUFFLED GAIT

## 2019-07-29 ASSESSMENT — LIFESTYLE VARIABLES: SUBSTANCE_ABUSE: 0

## 2019-07-29 NOTE — DISCHARGE PLANNING
Met at bedside to discuss hospice choice and pt requests referral be sent to [facility]. Choice from faxed to [facility] to send referral. Call placed to hospice at [phone] to let them know pt wants to meet with hospice when he wife is around and she may be back this afternoon.

## 2019-07-29 NOTE — DISCHARGE PLANNING
Received Choice form at [phone]  Agency/Facility Name: [facility]   Referral sent per Choice form @ [phone]

## 2019-07-29 NOTE — PROGRESS NOTES
2 RN skin check completed with Patel STANLEY.  Devices in place nasal cannula.  Skin assessed under devices yes.  The following interventions in place: pillows in place for repositioning. Mepilex on sacrum. Q2H turns. Waffle overlay.     Skin tear to left arm. Dressing in place. Sacrum red and blanching. Scab to left knee. Right ear pink and blanching. Left ear nonblanching. Grey foam for oxygen tubing. Scattered bruising. Redness to inner right ankle. Bruise to right hip.

## 2019-07-29 NOTE — PROGRESS NOTES
Pt resting in bed throughout shift. C/o pain in left arm. PRN pain medication given. 2 RN skin check completed. Updated on plan of care and medications. Pt verbalized understanding. Unable to remove dressing on left arm. Will update wound care. Fall precautions in place. Call bell and bedside table within reach. Will continue to monitor.

## 2019-07-29 NOTE — CARE PLAN
Problem: Bronchoconstriction:  Goal: Improve in air movement and diminished wheezing  Outcome: PROGRESSING AS EXPECTED    Intervention: Implement inhaled treatments  Pt doing home regimen of DUO-BID, 7%-BID and Symbicort

## 2019-07-29 NOTE — WOUND TEAM
In to see patient for wound consult.  Left ear assessed, offloaded with foam.  Noted to be pink and blanching.  Left leg with dry intact scab.  1 deo to right posterior achilles of scarring with another area of scar to right below knee.  Patient concerned about left arm wound.  Removed previous dressing, per RN, having trouble removing dressing with changes.  Cleansed with NS and gauze.  Covered with 2 sheets of xeroform folded in half, covered with ABD pad and secured with roll gauze.  Patient has hospice consult pending.

## 2019-07-29 NOTE — CARE PLAN
Problem: Communication  Goal: The ability to communicate needs accurately and effectively will improve  Outcome: PROGRESSING AS EXPECTED  A&OX4. Pt able to verbalize needs.     Problem: Safety  Goal: Will remain free from falls  Outcome: PROGRESSING AS EXPECTED  Bed in locked and lowest position. Bed alarm on. Ambulates with 1 assist. Nonskid socks in place. Uses call bell appropriately.

## 2019-07-29 NOTE — THERAPY
"Speech Language Therapy dysphagia treatment completed.     Functional Status:  Pt was seen for dysphagia tx at lunch, with a NTFL meal tray.  Pt was AAOx4, with good awareness of his current situation.  Pt is currently eating despite risk for aspiration, and verbalized good understanding of the risks associated with aspiration.  Pt endorses vocationally \"coughing and choking\" on some food items, including puree and thin liquids (he is drinking thins despite being on a NTFL diet).  Pt had coughing with >50% of trials of puree and thins (pt declining trials of nectars), which is concerning for possible aspiration.  However, he was also noted to be coughing independent of PO intake.  As it is difficult to determine if pt is coughing due to aspiration or respiratory issues, a diagnostic swallowing evaluation would be beneficial in ruling out aspiration and to further evaluate swallow function.  However, pt continues to refuse any feeding tubes, and will continue to eat despite risk for aspiration despite results.  Pt and spouse were educated again regarding s/sx of aspiration, risks associated with aspiration, swallow precautions to help reduce aspiration and SLP recommendations.  Pt verbalized good understanding of all eduction. since pt's preference is to eat despite risk for aspiration, a nectar thick full liquid diet would be the safest to REDUCE, NOT PREVENT aspiration.  SLP will no longer actively follow pt, however will be available upon request if needed.     Recommendations: In order to prevent aspiration, recommend NPO and diagnostic evaluation to rule out aspiration.  However, since pt's preference is to eat despite risk for aspiration, a nectar thick full liquid diet would be the safest to REDUCE, NOT PREVENT aspiration.     Plan of Care: SLP will no longer actively follow pt, however will be available upon request if needed.     Post-Acute Therapy: Anticipate that the patient will have no further speech " "therapy needs after discharge from the hospital.    See \"Rehab Therapy-Acute\" Patient Summary Report for complete documentation.     "

## 2019-07-29 NOTE — PROGRESS NOTES
[facility] Medicine Daily Progress Note    Date of Service  7/29/2019    Chief Complaint  79 y.o. male admitted 7/24/2019 with left arm pain after a fall and worsening shortness of breath.    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  Patient fell at home resulted and skin tear of the left arm.  Was also noted to have worsening of shortness of breath.  Chest x-ray showed right infrahilar opacity consistent with pneumonia.  Started on broad-spectrum antibiotics.  Pancultures obtained.  X-ray of the left forearm ordered due to significant pain on exam.  Interval Problem Update  Resting comfortably in bed.  Evaluated by speech therapy who felt that patient is high risk for aspiration and recommended NG tube feeding.  However, patient refused and requested diet.  Was placed on dysphagia 3 per speech recommendations.  Respiratory status stable with no distress at this point.  Complaint of pain of the left forearm.  Since large skin tear with bleeding noted on the left arm.  7/26: Resting comfortably. Respiratory status at baseline. Pending left arm and hand xray to rule out any acute bony abnormalities or fracture.  Leukocytosis trending down.  Afebrile overnight.  Tolerating p.o. fairly but with overt aspiration.  7/27: Resting comfortably in bed.  X-ray of the left arm and hand with no fractures.  Pain has improved on the left arm.  Range of motion has improved as well.  Blood pressure borderline but patient is asymptomatic.  Repeat echocardiogram ordered.  Febrile overnight.  Story status stable.  No issues overnight per staff.  7/29: Resting comfortably. Going for dialysis today. Awaiting PT/OT eval. Pain is fairly controlled.    Consultants/Specialty  Nephrology    Code Status  DNR/DNI    Disposition  Pending PT/OT eval  Pending hospice eval   Review of Systems  Review of Systems   Constitutional: Positive for malaise/fatigue. Negative for chills, fever and weight loss.   HENT: Negative for ear discharge, ear pain, hearing loss, nosebleeds and tinnitus.    Eyes: Negative for blurred vision, double vision, photophobia and pain.   Respiratory: Positive for cough and shortness of breath. Negative for hemoptysis.    Cardiovascular: Negative for chest pain, palpitations and orthopnea.   Gastrointestinal: Negative for abdominal pain, constipation, diarrhea, heartburn, nausea and vomiting.   Genitourinary: Negative for dysuria, frequency and urgency.   Musculoskeletal: Positive for falls and joint pain (left arm and elbow pain). Negative for back pain, myalgias and neck pain.   Skin: Negative for rash.   Neurological: Positive for weakness. Negative for dizziness, tingling, tremors, sensory change, speech change and headaches.   Endo/Heme/Allergies: Does not bruise/bleed easily.   Psychiatric/Behavioral: Negative for depression, hallucinations, substance abuse and suicidal ideas.        Physical Exam  Temp:  [36.4 °C (97.6 °F)-36.8 °C (98.2 °F)] 36.8 °C (98.2 °F)  Pulse:  [] 100  Resp:  [16-18] 18  BP: (103-119)/(52-61) 115/55  SpO2:  [90 %-99 %] 90 %    Physical Exam   Constitutional: He is oriented to person, place, and time. No distress.   Frail   HENT:   Head: Normocephalic and atraumatic.   Mouth/Throat: No oropharyngeal exudate.   Eyes: Pupils are equal, round, and reactive to light. No scleral icterus.   Neck: Normal range of motion. No tracheal deviation present. No thyromegaly present.   Cardiovascular: Normal rate and regular rhythm.  Exam reveals no gallop and no friction rub.    Pulmonary/Chest: No respiratory distress. He has decreased breath sounds. He has no wheezes. He has rhonchi. He has rales.   Abdominal: Soft. He  exhibits no distension. There is no tenderness. There is no rebound and no guarding.   Musculoskeletal:   Left forearm wrapped with gauze  Large ecchymosis noted on the left hand tender to touch.  Left forearm and hand tender to light palpation.   Neurological: He is alert and oriented to person, place, and time.   Skin: He is not diaphoretic. No erythema.   Psychiatric: He has a normal mood and affect. His behavior is normal.       Fluids    Intake/Output Summary (Last 24 hours) at 07/29/19 1419  Last data filed at 07/29/19 0451   Gross per 24 hour   Intake              300 ml   Output              425 ml   Net             -125 ml       Laboratory  Recent Labs      07/27/19 0217  07/28/19   0043   WBC  9.5  7.7   RBC  2.79*  2.55*   HEMOGLOBIN  8.2*  7.6*   HEMATOCRIT  28.0*  25.7*   MCV  100.4*  100.8*   MCH  29.4  29.8   MCHC  29.3*  29.6*   RDW  61.0*  60.3*   PLATELETCT  157*  174   MPV  9.2  9.3     Recent Labs      07/27/19 0217  07/28/19   0043  07/29/19   0148   SODIUM  139  136  138   POTASSIUM  4.0  4.0  4.2   CHLORIDE  102  101  102   CO2  28  25  26   GLUCOSE  132*  193*  133*   BUN  21  35*  43*   CREATININE  2.69*  3.26*  3.74*   CALCIUM  8.1*  7.8*  8.3*                   Imaging  DX-HAND 2- LEFT   Final Result         Questionable subluxation of the second MCP joint. Correlate clinically.      No definite fracture is seen but evaluation limited due to osseous demineralization.      Widening of the scapholunate interval, likely degenerative.      DX-FOREARM LEFT   Final Result         No acute osseous abnormality.      DX-CHEST-PORTABLE (1 VIEW)   Final Result      1.  New RIGHT infrahilar opacity suspicious for new pneumonia, recommend follow-up radiograph to confirm clearing   2.  Bibasilar underinflation atelectasis which could obscure an additional process.   3.  Small RIGHT pleural effusion      EC-ECHOCARDIOGRAM COMPLETE W/O CONT    (Results Pending)        Assessment/Plan  * Pneumonia due  to infectious organism   Assessment & Plan    History of pseudomonas aeruginosa colonization in the respiratory tract.  History of aspiration and recurrent pneumonia.  Chest x-ray showed right infrahilar opacity.  Discontinue Levaquin and switch to Zosyn Pseudomonas renally dosed.  Follow-up with sputum culture (negative so far).   Completed a course of antibiotics      Debility- (present on admission)   Assessment & Plan    PT/OT pending   Hospice consulted per patient request   Awaiting hospice eval.      Wegener's granulomatosis ([facility])- (present on admission)   Assessment & Plan    Resulting in end-stage renal disease  On hemodialysis  Chronically on prednisone.       Atrial fibrillation ([facility])- (present on admission)   Assessment & Plan    Goal rate 60-90.   Pulse: 86 presently  [id number] score at least 6; estimated annual risk of stroke at least 9.7%  Anticoagulation with Factor Xa or direct thrombin inhibitor with Eliquis 2.5 mg  7/25: We will hold Eliquis for now due to large skin tear on left arm with bleeding.  7/26: Continue Eliquis.          Immunosuppressed status ([facility])- (present on admission)   Assessment & Plan    Secondary to bronchiectasis and prednisone     Pacemaker- (present on admission)   Assessment & Plan    History of sick sinus syndrome       Chronic disease anemia- (present on admission)   Assessment & Plan    Epogen per nephrology   Monitor HH  Low iron levels and saturation   Replacement per pharmacy recommendations      ESRD on dialysis ([facility])- (present on admission)   Assessment & Plan    Nephrology following.     Skin tear of forearm without complication, initial encounter- (present on admission)   Assessment & Plan    Of the left forearm after a fall.  X-ray with no fracture.  Wound care following      Acute respiratory failure with hypoxia ([facility])   Assessment & Plan    Acute on chronic respiratory failure   At baseline for now.     COPD (chronic obstructive pulmonary disease) ([facility])-  (present on admission)   Assessment & Plan    Not an acute exacerbation  Home inhalers including duo nebs, albuterol, Symbicort  Follows Dr. ballard outpatient     Plan of care discussed with multidisciplinary team during rounds.     VTE prophylaxis: Eliquis

## 2019-07-30 ENCOUNTER — HOSPICE ADMISSION (OUTPATIENT)
Dept: HOSPICE | Facility: HOSPICE | Age: 79
End: 2019-07-30
Payer: MEDICARE

## 2019-07-30 ENCOUNTER — HOME CARE VISIT (OUTPATIENT)
Dept: HOSPICE | Facility: HOSPICE | Age: 79
End: 2019-07-30
Payer: MEDICARE

## 2019-07-30 LAB
ALBUMIN SERPL BCP-MCNC: 3.1 G/DL (ref 3.2–4.9)
ALBUMIN/GLOB SERPL: 1.1 G/DL
ALP SERPL-CCNC: 46 U/L (ref 30–99)
ALT SERPL-CCNC: 12 U/L (ref 2–50)
ANION GAP SERPL CALC-SCNC: 10 MMOL/L (ref 0–11.9)
ANISOCYTOSIS BLD QL SMEAR: ABNORMAL
AST SERPL-CCNC: 17 U/L (ref 12–45)
BASOPHILS # BLD AUTO: 0.6 % (ref 0–1.8)
BASOPHILS # BLD: 0.04 K/UL (ref 0–0.12)
BILIRUB SERPL-MCNC: 0.4 MG/DL (ref 0.1–1.5)
BUN SERPL-MCNC: 22 MG/DL (ref 8–22)
CALCIUM SERPL-MCNC: 8.4 MG/DL (ref 8.5–10.5)
CHLORIDE SERPL-SCNC: 102 MMOL/L (ref 96–112)
CO2 SERPL-SCNC: 27 MMOL/L (ref 20–33)
COMMENT 1642: NORMAL
CREAT SERPL-MCNC: 2.52 MG/DL (ref 0.5–1.4)
EOSINOPHIL # BLD AUTO: 0.19 K/UL (ref 0–0.51)
EOSINOPHIL NFR BLD: 2.7 % (ref 0–6.9)
ERYTHROCYTE [DISTWIDTH] IN BLOOD BY AUTOMATED COUNT: 59.2 FL (ref 35.9–50)
GLOBULIN SER CALC-MCNC: 2.9 G/DL (ref 1.9–3.5)
GLUCOSE SERPL-MCNC: 99 MG/DL (ref 65–99)
HCT VFR BLD AUTO: 28.6 % (ref 42–52)
HGB BLD-MCNC: 8.5 G/DL (ref 14–18)
IMM GRANULOCYTES # BLD AUTO: 0.05 K/UL (ref 0–0.11)
IMM GRANULOCYTES NFR BLD AUTO: 0.7 % (ref 0–0.9)
LYMPHOCYTES # BLD AUTO: 0.55 K/UL (ref 1–4.8)
LYMPHOCYTES NFR BLD: 7.9 % (ref 22–41)
MACROCYTES BLD QL SMEAR: ABNORMAL
MCH RBC QN AUTO: 29.2 PG (ref 27–33)
MCHC RBC AUTO-ENTMCNC: 29.7 G/DL (ref 33.7–35.3)
MCV RBC AUTO: 98.3 FL (ref 81.4–97.8)
MICROCYTES BLD QL SMEAR: ABNORMAL
MONOCYTES # BLD AUTO: 0.58 K/UL (ref 0–0.85)
MONOCYTES NFR BLD AUTO: 8.3 % (ref 0–13.4)
MORPHOLOGY BLD-IMP: NORMAL
NEUTROPHILS # BLD AUTO: 5.58 K/UL (ref 1.82–7.42)
NEUTROPHILS NFR BLD: 79.8 % (ref 44–72)
NRBC # BLD AUTO: 0 K/UL
NRBC BLD-RTO: 0 /100 WBC
OVALOCYTES BLD QL SMEAR: NORMAL
PLATELET # BLD AUTO: 207 K/UL (ref 164–446)
PLATELET BLD QL SMEAR: NORMAL
PMV BLD AUTO: 8.9 FL (ref 9–12.9)
POIKILOCYTOSIS BLD QL SMEAR: NORMAL
POTASSIUM SERPL-SCNC: 4.4 MMOL/L (ref 3.6–5.5)
PROT SERPL-MCNC: 6 G/DL (ref 6–8.2)
RBC # BLD AUTO: 2.91 M/UL (ref 4.7–6.1)
RBC BLD AUTO: PRESENT
SODIUM SERPL-SCNC: 139 MMOL/L (ref 135–145)
WBC # BLD AUTO: 7 K/UL (ref 4.8–10.8)

## 2019-07-30 PROCEDURE — 700111 HCHG RX REV CODE 636 W/ 250 OVERRIDE (IP): Mod: JG | Performed by: FAMILY MEDICINE

## 2019-07-30 PROCEDURE — A9270 NON-COVERED ITEM OR SERVICE: HCPCS | Performed by: FAMILY MEDICINE

## 2019-07-30 PROCEDURE — 99232 SBSQ HOSP IP/OBS MODERATE 35: CPT | Performed by: INTERNAL MEDICINE

## 2019-07-30 PROCEDURE — 80053 COMPREHEN METABOLIC PANEL: CPT

## 2019-07-30 PROCEDURE — 700105 HCHG RX REV CODE 258: Performed by: FAMILY MEDICINE

## 2019-07-30 PROCEDURE — 700111 HCHG RX REV CODE 636 W/ 250 OVERRIDE (IP): Performed by: HOSPITALIST

## 2019-07-30 PROCEDURE — 36415 COLL VENOUS BLD VENIPUNCTURE: CPT

## 2019-07-30 PROCEDURE — A9270 NON-COVERED ITEM OR SERVICE: HCPCS | Performed by: HOSPITALIST

## 2019-07-30 PROCEDURE — 700102 HCHG RX REV CODE 250 W/ 637 OVERRIDE(OP): Performed by: HOSPITALIST

## 2019-07-30 PROCEDURE — 700102 HCHG RX REV CODE 250 W/ 637 OVERRIDE(OP): Performed by: FAMILY MEDICINE

## 2019-07-30 PROCEDURE — 97166 OT EVAL MOD COMPLEX 45 MIN: CPT

## 2019-07-30 PROCEDURE — 700101 HCHG RX REV CODE 250: Performed by: HOSPITALIST

## 2019-07-30 PROCEDURE — 770006 HCHG ROOM/CARE - MED/SURG/GYN SEMI*

## 2019-07-30 PROCEDURE — 94760 N-INVAS EAR/PLS OXIMETRY 1: CPT

## 2019-07-30 PROCEDURE — 85025 COMPLETE CBC W/AUTO DIFF WBC: CPT

## 2019-07-30 PROCEDURE — 94640 AIRWAY INHALATION TREATMENT: CPT

## 2019-07-30 RX ORDER — ACETAMINOPHEN 325 MG/1
650 TABLET ORAL ONCE
Status: COMPLETED | OUTPATIENT
Start: 2019-07-30 | End: 2019-07-30

## 2019-07-30 RX ORDER — DIPHENHYDRAMINE HCL 25 MG
25 TABLET ORAL ONCE
Status: COMPLETED | OUTPATIENT
Start: 2019-07-30 | End: 2019-07-30

## 2019-07-30 RX ORDER — DIPHENHYDRAMINE HYDROCHLORIDE 50 MG/ML
25 INJECTION INTRAMUSCULAR; INTRAVENOUS ONCE
Status: COMPLETED | OUTPATIENT
Start: 2019-07-30 | End: 2019-07-30

## 2019-07-30 RX ADMIN — SODIUM CHLORIDE 4 ML: 7 NEBU SOLN,3 % NEBU at 07:44

## 2019-07-30 RX ADMIN — BUDESONIDE AND FORMOTEROL FUMARATE DIHYDRATE 2 PUFF: 160; 4.5 AEROSOL RESPIRATORY (INHALATION) at 19:08

## 2019-07-30 RX ADMIN — APIXABAN 2.5 MG: 2.5 TABLET, FILM COATED ORAL at 06:03

## 2019-07-30 RX ADMIN — VITAMIN D, TAB 1000IU (100/BT) 5000 UNITS: 25 TAB at 06:02

## 2019-07-30 RX ADMIN — SODIUM CHLORIDE 2100 MG: 9 INJECTION, SOLUTION INTRAVENOUS at 12:09

## 2019-07-30 RX ADMIN — ROSUVASTATIN CALCIUM 20 MG: 20 TABLET, FILM COATED ORAL at 17:17

## 2019-07-30 RX ADMIN — IPRATROPIUM BROMIDE AND ALBUTEROL SULFATE 3 ML: .5; 3 SOLUTION RESPIRATORY (INHALATION) at 07:44

## 2019-07-30 RX ADMIN — SEVELAMER CARBONATE 800 MG: 800 TABLET, FILM COATED ORAL at 14:57

## 2019-07-30 RX ADMIN — SODIUM CHLORIDE 25 MG: 9 INJECTION, SOLUTION INTRAVENOUS at 10:08

## 2019-07-30 RX ADMIN — DIPHENHYDRAMINE HCL 25 MG: 25 TABLET ORAL at 08:49

## 2019-07-30 RX ADMIN — PREDNISONE 5 MG: 5 TABLET ORAL at 06:03

## 2019-07-30 RX ADMIN — METOPROLOL SUCCINATE 25 MG: 25 TABLET, EXTENDED RELEASE ORAL at 06:03

## 2019-07-30 RX ADMIN — FINASTERIDE 5 MG: 5 TABLET, FILM COATED ORAL at 06:03

## 2019-07-30 RX ADMIN — GUAIFENESIN 600 MG: 600 TABLET, EXTENDED RELEASE ORAL at 06:03

## 2019-07-30 RX ADMIN — OMEPRAZOLE 20 MG: 20 CAPSULE, DELAYED RELEASE ORAL at 06:03

## 2019-07-30 RX ADMIN — SEVELAMER CARBONATE 800 MG: 800 TABLET, FILM COATED ORAL at 20:39

## 2019-07-30 RX ADMIN — BUDESONIDE AND FORMOTEROL FUMARATE DIHYDRATE 2 PUFF: 160; 4.5 AEROSOL RESPIRATORY (INHALATION) at 07:44

## 2019-07-30 RX ADMIN — ASPIRIN 81 MG: 81 TABLET, COATED ORAL at 06:02

## 2019-07-30 RX ADMIN — SODIUM CHLORIDE 4 ML: 7 NEBU SOLN,3 % NEBU at 19:08

## 2019-07-30 RX ADMIN — HYDROCODONE BITARTRATE AND ACETAMINOPHEN 2 TABLET: 5; 325 TABLET ORAL at 12:09

## 2019-07-30 RX ADMIN — SEVELAMER CARBONATE 800 MG: 800 TABLET, FILM COATED ORAL at 10:04

## 2019-07-30 RX ADMIN — TAMSULOSIN HYDROCHLORIDE 0.4 MG: 0.4 CAPSULE ORAL at 06:03

## 2019-07-30 RX ADMIN — THERA TABS 1 TABLET: TAB at 06:03

## 2019-07-30 RX ADMIN — HYDROCODONE BITARTRATE AND ACETAMINOPHEN 1 TABLET: 5; 325 TABLET ORAL at 06:02

## 2019-07-30 RX ADMIN — ACETAMINOPHEN 650 MG: 325 TABLET, FILM COATED ORAL at 08:49

## 2019-07-30 RX ADMIN — IPRATROPIUM BROMIDE AND ALBUTEROL SULFATE 3 ML: .5; 3 SOLUTION RESPIRATORY (INHALATION) at 19:07

## 2019-07-30 RX ADMIN — METOPROLOL SUCCINATE 25 MG: 25 TABLET, EXTENDED RELEASE ORAL at 17:16

## 2019-07-30 RX ADMIN — GUAIFENESIN 600 MG: 600 TABLET, EXTENDED RELEASE ORAL at 17:16

## 2019-07-30 ASSESSMENT — ENCOUNTER SYMPTOMS
PALPITATIONS: 0
HEADACHES: 0
VOMITING: 0
EYES NEGATIVE: 1
CHILLS: 0
WEIGHT LOSS: 0
DIARRHEA: 0
BLURRED VISION: 0
WHEEZING: 0
SHORTNESS OF BREATH: 1
ABDOMINAL PAIN: 0
SHORTNESS OF BREATH: 0
SINUS PAIN: 0
ORTHOPNEA: 0
HEMOPTYSIS: 0
DEPRESSION: 0
WEAKNESS: 1
DIZZINESS: 0
COUGH: 1
BRUISES/BLEEDS EASILY: 0
FEVER: 0
NAUSEA: 0
MYALGIAS: 0
FALLS: 1

## 2019-07-30 ASSESSMENT — COGNITIVE AND FUNCTIONAL STATUS - GENERAL
DRESSING REGULAR UPPER BODY CLOTHING: A LOT
TOILETING: A LOT
DRESSING REGULAR LOWER BODY CLOTHING: A LOT
EATING MEALS: A LITTLE
DAILY ACTIVITIY SCORE: 13
HELP NEEDED FOR BATHING: A LOT
SUGGESTED CMS G CODE MODIFIER DAILY ACTIVITY: CL
PERSONAL GROOMING: A LOT

## 2019-07-30 NOTE — PROGRESS NOTES
Hemodialysis ordered by Dr Corona for 3hrs on 3K acid bath.  Treatment initiated at 0752 and ended at 1053.   Pt tolerated tx well. Net UF 1000ml. Please see flowsheet for details.  Report given to staff RN, SARAH Snell.    Pre and post tx, RUAAVF positive for bruit and thrill. Access needles removed; manual pressure held for 10mins over venous segment and 20mins over arterial segment.; then sites covered with clean and dry dressing, CDI.

## 2019-07-30 NOTE — PROGRESS NOTES
IV Iron Per Pharmacy Note    Patient Lean Body Weight:  61.6 kg  Reason for Iron Replacement: anemia with iron deficiency       Lab Results   Component Value Date/Time    WBC 7.0 07/30/2019 02:40 AM    WBC 7.6 03/19/2012 12:00 AM    RBC 2.91 (L) 07/30/2019 02:40 AM    RBC 2.25 (LL) 03/19/2012 12:00 AM    HEMOGLOBIN 8.5 (L) 07/30/2019 02:40 AM    HEMATOCRIT 28.6 (L) 07/30/2019 02:40 AM    MCV 98.3 (H) 07/30/2019 02:40 AM     (H) 03/19/2012 12:00 AM    MCH 29.2 07/30/2019 02:40 AM    MCH 33.8 03/19/2012 12:00 AM    MCHC 29.7 (L) 07/30/2019 02:40 AM    MPV 8.9 (L) 07/30/2019 02:40 AM       Recent Labs      07/27/19   1512   FERRITIN  881.6*   FOLATE  >23.8       7/27/2019 02:17   Iron 18 (L)   Total Iron Binding 167 (L)   % Saturation 11 (L)        Recent Labs      07/30/19   0240   CREATININE  2.52*          Assessment/Plan:  1. IV Iron Indicated.   2. Give Iron Dextran 25 mg IV test dose following diphenhydramine/acetaminophen premeds over 30 minutes per protocol.  3. If no reaction (Anaphylaxis, Hypotension/Hypertension, N/V/D, Chest pain/Back Pain, Urticaria/Pruritis) in the next hour, proceed to full dose. Nursing to call the pharmacy IV room at ext. [phone] for full dose.  4. Full dose: Iron Dextran 2100 mg IV over 4 hours. Continue to monitor for delayed ADR including: Arthralgia/myalgia, Headache/backache, chills/dizziness/malaise, moderate to high fever and n/v.        Jazmin Rao, PharmD., BCPS

## 2019-07-30 NOTE — DISCHARGE PLANNING
Anticipated Discharge Disposition: skilled vs home with home health    Action: Received update from hospice RN that she met with pt to talk about hospice but pt is not interested in hospice at this time. Reviewed in IDT rounds, pt may need snf.    Reviewed PT notes and pt is supervised for transfers and ambulation and they are recommending home with home health. Reviewed with MD via TT.     Barriers to Discharge: not yet med cleared.     Plan: home with home health when med cleared.      No

## 2019-07-30 NOTE — THERAPY
"Occupational Therapy Evaluation completed.   Functional Status: Supine > < EOB min A using bed rail, stand-pivot transfer min A, LB dressing max A, drinking from cup supv  Plan of Care: Will benefit from Occupational Therapy 3 times per week  Discharge Recommendations:  Equipment: Will Continue to Assess for Equipment Needs. Post-acute therapy: Recommend post-acute placement for additional occupational therapy services prior to discharge home. Patient can tolerate post-acute therapies at a 5x/week frequency.    See \"Rehab Therapy-Acute\" Patient Summary Report for complete documentation.    79 y.o. male with h/o [place]'s dz, a-fib, COPD (on home O2), ESRD (on HD), pacemaker, admitted following GLF. Pt dx with PNA. Seen now for OT ermias. Pt has multiple joint limitations BUE at baseline, now with increased limitation to LUE due to discomfort/scabbing from skin tears. Pt is at very high risk for falls due to balance impairment, generalized weakness, oxygen use. Reports he mobilizes up full flight of stairs to access walk-in shower at home (pt told PT he does NOT do stairs). Also drives himself to dialysis. His wife then joins him after dialysis for the sole purpose of loading 4WW into vehicle as he is too weak after dialysis to do so. Wife describes extensive clutter around pt's recliner at home which he absolutely refuses to clear. She expressed that she has \"given up\" on trying to create safer space. Pt was barely managing basic ADL at home PTA and is now further debilitated. OT had long discussion regarding recommendation for post-acute transitional care, however pt states he does not want to go to a facility due to long-standing immune compromise. He is open to HH. Strongly recommended hired help at home as spouse is only able to provide limited assist. Both pt and wife appear unrealistic regarding function and safety at home as both are eager to DC ASAP. Acute OT to continue following while in-house.     "

## 2019-07-30 NOTE — PROGRESS NOTES
2 RN skin check completed with LIZETH Llanos.   Devices in place.  Skin assessed under devices: NC, PIV, dressings on LUE.  Confirmed pressure ulcers found on: yes.  New potential pressure ulcers noted on: none. Wound consult placed: already following.  The following interventions in place: Waffle cushion overlay, mepilex on sacrum and between shoulder blades, incontinence monitoring, pillows for support, frequent repositioning, 2 RN skin checks, gray foam over O2 tubing.    Skin tear to left arm. Sacrum red and blanching.   Scab to left knee.   Right ear pink and blanching. Left ear nonblanching.   Scattered bruising.   Redness to inner right ankle.   Bruise to right hip.

## 2019-07-30 NOTE — PROGRESS NOTES
Patient A/Ox4, pleasant, non-ambulatory during shift but does sit on side of bed to use urinal. Dressings to LUE done by wound during day shift are c/d/i. Dressing changes are to be done daily. Medicated per MAR for complaints of pain. Medication floated in applesauce. It was brought to this RNs attention that for the past two days pt's wife has given him his evening dose of sevelamer from home meds she brings. Pt and wife were previously educated after first interference but then did it again on 7-29-19.     No other complaints. Mepilex applied to sacrum and to upper back for added skin protection. Bed in low and locked position with call light within reach. No signs of distress. Hospice consult scheduled for 1000 Tuesday morning, wife is planning to attend.

## 2019-07-30 NOTE — THERAPY
"Physical Therapy Evaluation completed.   Bed Mobility:  Supine to Sit: Moderate Assist  Transfers: Sit to Stand: Supervised  Gait: Level Of Assist: Supervised with Front-Wheel Walker       Plan of Care: Will benefit from Physical Therapy 3 times per week  Discharge Recommendations: Equipment: No Equipment Needed. Post-acute therapy Recommend home health transitional care for continued physical therapy services.     Mr. Brown is an 80 y/o male who presents to acute secondary to pneumonia. PMH of a fib and Wegener's granulomatosis. He presents with lower extremity weakness and balance deficits that result in need for FWW and decreased activity tolerance most impacting his gait. Initially gait good quality with little postural sway. By end of walk very fatigue, SOB, and swaying significantly. Spouse present and very supportive. Difficulty coming out of bed but they report he has a rope at home he pulls up on. Pt did have dialysis this am, anticipate increased fatigue due to this as well. Pt is ambulating household distances. Hospice consult pending- recommend discharge home with home health therapies or hospice. Acute PT to follow while in house to insure endurance progresses as expected.     See \"Rehab Therapy-Acute\" Patient Summary Report for complete documentation.     "

## 2019-07-30 NOTE — PROGRESS NOTES
Hospital Medicine Daily Progress Note    Date of Service  7/30/2019    Chief Complaint  79 y.o. male admitted 7/24/2019 with left arm pain after a fall and worsening shortness of breath.    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  Patient fell at home resulted and skin tear of the left arm.  Was also noted to have worsening of shortness of breath.  Chest x-ray showed right infrahilar opacity consistent with pneumonia.  Started on broad-spectrum antibiotics.  Pancultures obtained.  X-ray of the left forearm ordered due to significant pain on exam.  Interval Problem Update  Shortness of breath-somewhat improving. NO new pulmonary symptoms. Remains on 2 L NC    L arm laceration-doing ok. Minimal pain.    Refuses hospice.     Consultants/Specialty  Nephrology    Code Status  DNR/DNI    Disposition  Pending PT/OT eval  Pending hospice eval   Home health orders placed    Review of Systems  Review of Systems   Constitutional: Positive for malaise/fatigue. Negative for fever.        Feels very tired still   HENT: Negative for hearing loss and tinnitus.    Eyes: Negative for blurred vision.   Respiratory: Positive for cough and shortness of breath. Negative for hemoptysis.         Mild clinical improvement today   Cardiovascular: Negative for chest pain.   Gastrointestinal: Negative for abdominal pain, nausea and vomiting.   Genitourinary: Negative for dysuria, frequency and urgency.   Musculoskeletal: Positive for falls and joint pain (left arm and elbow pain). Negative for myalgias.   Skin: Negative for rash.   Neurological: Positive for weakness. Negative for dizziness and headaches.   Endo/Heme/Allergies: Does not bruise/bleed easily.    Psychiatric/Behavioral: Negative for depression.   All other systems reviewed and are negative.       Physical Exam  Temp:  [36.3 °C (97.4 °F)-37.5 °C (99.5 °F)] 37.5 °C (99.5 °F)  Pulse:  [84-98] 84  Resp:  [16-20] 20  BP: ()/(32-57) 123/52  SpO2:  [95 %-98 %] 97 %    Physical Exam   Constitutional: He is oriented to person, place, and time. No distress.   Frail   HENT:   Head: Normocephalic and atraumatic.   Mouth/Throat: No oropharyngeal exudate.   Eyes: Pupils are equal, round, and reactive to light.   Neck: Normal range of motion.   Cardiovascular: Normal rate and regular rhythm.    No murmur heard.  Pulmonary/Chest: No stridor. He has decreased breath sounds. He has no wheezes. He has rhonchi. He has rales.   Relatively unchanged   Abdominal: Soft. There is no tenderness.   Musculoskeletal:   Left forearm wrapped with gauze-appears dry, good radial pulses   Neurological: He is alert and oriented to person, place, and time.   Skin: He is not diaphoretic. No erythema.   Psychiatric: He has a normal mood and affect. His behavior is normal.       Fluids    Intake/Output Summary (Last 24 hours) at 07/30/19 1619  Last data filed at 07/30/19 0800   Gross per 24 hour   Intake              480 ml   Output              350 ml   Net              130 ml       Laboratory  Recent Labs      07/28/19   0043  07/29/19   0148  07/30/19   0240   WBC  7.7  7.3  7.0   RBC  2.55*  2.57*  2.91*   HEMOGLOBIN  7.6*  7.8*  8.5*   HEMATOCRIT  25.7*  25.3*  28.6*   MCV  100.8*  98.4*  98.3*   MCH  29.8  30.4  29.2   MCHC  29.6*  30.8*  29.7*   RDW  60.3*  59.3*  59.2*   PLATELETCT  174  187  207   MPV  9.3  9.6  8.9*     Recent Labs      07/28/19   0043  07/29/19   0148  07/30/19   0240   SODIUM  136  138  139   POTASSIUM  4.0  4.2  4.4   CHLORIDE  101  102  102   CO2  25  26  27   GLUCOSE  193*  133*  99   BUN  35*  43*  22   CREATININE  3.26*  3.74*  2.52*   CALCIUM  7.8*  8.3*  8.4*                   Imaging  EC-ECHOCARDIOGRAM  COMPLETE W/O CONT   Final Result      DX-HAND 2- LEFT   Final Result         Questionable subluxation of the second MCP joint. Correlate clinically.      No definite fracture is seen but evaluation limited due to osseous demineralization.      Widening of the scapholunate interval, likely degenerative.      DX-FOREARM LEFT   Final Result         No acute osseous abnormality.      DX-CHEST-PORTABLE (1 VIEW)   Final Result      1.  New RIGHT infrahilar opacity suspicious for new pneumonia, recommend follow-up radiograph to confirm clearing   2.  Bibasilar underinflation atelectasis which could obscure an additional process.   3.  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  History of aspiration and recurrent pneumonia.  Chest x-ray showed right infrahilar opacity.  Completed a course of abx's, no further changes, cultures c/w above mentioned colonization     Debility- (present on admission)   Assessment & Plan    PT/OT pending   Hospice consulted per patient request   Awaiting hospice eval.      Wegener's granulomatosis ([facility])- (present on admission)   Assessment & Plan    Resulting in end-stage renal disease  On hemodialysis  Chronically on prednisone.  -hospice saw patient today and refused hospice services at this time  -home health orders placed       Atrial fibrillation ([facility])- (present on admission)   Assessment & Plan    Goal rate 60-90.   Pulse: 86 presently  [id number] score at least 6; estimated annual risk of stroke at least 9.7%  Anticoagulation with Factor Xa or direct thrombin inhibitor with Eliquis 2.5 mg  -tolerating eliquis with no issue at this time         Immunosuppressed status ([facility])- (present on admission)   Assessment & Plan    Secondary to bronchiectasis and prednisone     Pacemaker- (present on admission)   Assessment & Plan     History of sick sinus syndrome       Chronic disease anemia- (present on admission)   Assessment & Plan    Epogen per nephrology   Monitor HH  Low iron levels and saturation   Replacement per pharmacy recommendations      ESRD on dialysis ([facility])- (present on admission)   Assessment & Plan    Nephrology following.     Skin tear of forearm without complication, initial encounter- (present on admission)   Assessment & Plan    Of the left forearm after a fall.  X-ray with no fracture.  Wound care following   -no e/o active infection     Acute respiratory failure with hypoxia ([facility])- (present on admission)   Assessment & Plan    Acute on chronic respiratory failure   Remains at his baseline     COPD (chronic obstructive pulmonary disease) ([facility])- (present on admission)   Assessment & Plan    Not an acute exacerbation  Home inhalers including duo nebs, albuterol, Symbicort  Follows Dr. ballard outpatient     Plan of care discussed with multidisciplinary team during rounds.     VTE prophylaxis: Eliquis

## 2019-07-30 NOTE — PROCEDURES
Patient with ESRD/HD admitted with left arm injury, PNA    Seen and examined during dialysis -tolerates well  VS stable  Lab tests reviewed  Please see dialysis flow sheet for details

## 2019-07-30 NOTE — CARE PLAN
Problem: Communication  Goal: The ability to communicate needs accurately and effectively will improve  Outcome: PROGRESSING AS EXPECTED    Intervention: Educate patient and significant other/support system about the plan of care, procedures, treatments, medications and allow for questions  Discussed plan of care for duration of shift, pt acknowledges understanding. All questions answered.      Problem: Safety  Goal: Will remain free from injury  Outcome: PROGRESSING AS EXPECTED  Patient will remain free from falls/injury during shift. He is a high fall risk and has all appropriate precautions in place. Bed in low and locked position.

## 2019-07-30 NOTE — PROGRESS NOTES
Dressing to L arm changed today, medicated with Percocet prior to procedure. Patient tolerated the dressing change well  Hospice nurse in to see patient and wife this morning  Awaiting orders to discharge home on hospice  All needs met at this time, continue to monitor    Update: patient has changed mind and is choosing not to utilize hospice at this time

## 2019-07-30 NOTE — PROGRESS NOTES
"When giving evening medications, pt stated that this RN could just give the sevelamer to his wife because she had already given him one of his own. Pt and pt's educated on necessity to only take medications supplied from the hospital. Pt states \"I know we're not supposed to, but I wanted to eat dinner.\" Will continue to reeducate.   "

## 2019-07-30 NOTE — CARE PLAN
Problem: Bronchoconstriction:  Goal: Improve in air movement and diminished wheezing  Outcome: PROGRESSING AS EXPECTED    Intervention: Implement inhaled treatments  Duoneb BID, 7% BID, Symbicort BID per home reg      Problem: Oxygenation:  Goal: Maintain adequate oxygenation dependent on patient condition  Outcome: PROGRESSING AS EXPECTED    Intervention: Manage oxygen therapy by monitoring pulse oximetry and/or ABG values  Pt on 2L NC, will continue to titrate as tolerated

## 2019-07-31 ENCOUNTER — HOME HEALTH ADMISSION (OUTPATIENT)
Dept: HOME HEALTH SERVICES | Facility: HOME HEALTHCARE | Age: 79
End: 2019-07-31
Payer: MEDICARE

## 2019-07-31 LAB
ALBUMIN SERPL BCP-MCNC: 2.9 G/DL (ref 3.2–4.9)
ALBUMIN/GLOB SERPL: 1.1 G/DL
ALP SERPL-CCNC: 48 U/L (ref 30–99)
ALT SERPL-CCNC: 13 U/L (ref 2–50)
ANION GAP SERPL CALC-SCNC: 9 MMOL/L (ref 0–11.9)
AST SERPL-CCNC: 13 U/L (ref 12–45)
BACTERIA SPEC RESP CULT: ABNORMAL
BACTERIA SPEC RESP CULT: ABNORMAL
BASOPHILS # BLD AUTO: 0.4 % (ref 0–1.8)
BASOPHILS # BLD: 0.03 K/UL (ref 0–0.12)
BILIRUB SERPL-MCNC: 0.3 MG/DL (ref 0.1–1.5)
BUN SERPL-MCNC: 30 MG/DL (ref 8–22)
CALCIUM SERPL-MCNC: 9.2 MG/DL (ref 8.5–10.5)
CHLORIDE SERPL-SCNC: 101 MMOL/L (ref 96–112)
CO2 SERPL-SCNC: 27 MMOL/L (ref 20–33)
CREAT SERPL-MCNC: 3.46 MG/DL (ref 0.5–1.4)
EOSINOPHIL # BLD AUTO: 0.18 K/UL (ref 0–0.51)
EOSINOPHIL NFR BLD: 2.6 % (ref 0–6.9)
ERYTHROCYTE [DISTWIDTH] IN BLOOD BY AUTOMATED COUNT: 58.3 FL (ref 35.9–50)
ETEST SENSITIVITY ETEST: NORMAL
GLOBULIN SER CALC-MCNC: 2.7 G/DL (ref 1.9–3.5)
GLUCOSE SERPL-MCNC: 89 MG/DL (ref 65–99)
GRAM STN SPEC: ABNORMAL
HCT VFR BLD AUTO: 27 % (ref 42–52)
HGB BLD-MCNC: 8 G/DL (ref 14–18)
IMM GRANULOCYTES # BLD AUTO: 0.07 K/UL (ref 0–0.11)
IMM GRANULOCYTES NFR BLD AUTO: 1 % (ref 0–0.9)
LYMPHOCYTES # BLD AUTO: 0.62 K/UL (ref 1–4.8)
LYMPHOCYTES NFR BLD: 8.8 % (ref 22–41)
MCH RBC QN AUTO: 29.2 PG (ref 27–33)
MCHC RBC AUTO-ENTMCNC: 29.6 G/DL (ref 33.7–35.3)
MCV RBC AUTO: 98.5 FL (ref 81.4–97.8)
MONOCYTES # BLD AUTO: 0.5 K/UL (ref 0–0.85)
MONOCYTES NFR BLD AUTO: 7.1 % (ref 0–13.4)
NEUTROPHILS # BLD AUTO: 5.65 K/UL (ref 1.82–7.42)
NEUTROPHILS NFR BLD: 80.1 % (ref 44–72)
NRBC # BLD AUTO: 0 K/UL
NRBC BLD-RTO: 0 /100 WBC
PLATELET # BLD AUTO: 229 K/UL (ref 164–446)
PMV BLD AUTO: 9.4 FL (ref 9–12.9)
POTASSIUM SERPL-SCNC: 4.6 MMOL/L (ref 3.6–5.5)
PROT SERPL-MCNC: 5.6 G/DL (ref 6–8.2)
RBC # BLD AUTO: 2.74 M/UL (ref 4.7–6.1)
SIGNIFICANT IND 70042: ABNORMAL
SITE SITE: ABNORMAL
SODIUM SERPL-SCNC: 137 MMOL/L (ref 135–145)
SOURCE SOURCE: ABNORMAL
WBC # BLD AUTO: 7.1 K/UL (ref 4.8–10.8)

## 2019-07-31 PROCEDURE — 90935 HEMODIALYSIS ONE EVALUATION: CPT

## 2019-07-31 PROCEDURE — 770006 HCHG ROOM/CARE - MED/SURG/GYN SEMI*

## 2019-07-31 PROCEDURE — 700101 HCHG RX REV CODE 250: Performed by: HOSPITALIST

## 2019-07-31 PROCEDURE — 80053 COMPREHEN METABOLIC PANEL: CPT

## 2019-07-31 PROCEDURE — 5A1D70Z PERFORMANCE OF URINARY FILTRATION, INTERMITTENT, LESS THAN 6 HOURS PER DAY: ICD-10-PCS | Performed by: INTERNAL MEDICINE

## 2019-07-31 PROCEDURE — 700102 HCHG RX REV CODE 250 W/ 637 OVERRIDE(OP): Performed by: FAMILY MEDICINE

## 2019-07-31 PROCEDURE — 700111 HCHG RX REV CODE 636 W/ 250 OVERRIDE (IP): Mod: JG | Performed by: INTERNAL MEDICINE

## 2019-07-31 PROCEDURE — 36415 COLL VENOUS BLD VENIPUNCTURE: CPT

## 2019-07-31 PROCEDURE — 99232 SBSQ HOSP IP/OBS MODERATE 35: CPT | Performed by: INTERNAL MEDICINE

## 2019-07-31 PROCEDURE — A9270 NON-COVERED ITEM OR SERVICE: HCPCS | Performed by: FAMILY MEDICINE

## 2019-07-31 PROCEDURE — 85025 COMPLETE CBC W/AUTO DIFF WBC: CPT

## 2019-07-31 PROCEDURE — 700102 HCHG RX REV CODE 250 W/ 637 OVERRIDE(OP): Performed by: HOSPITALIST

## 2019-07-31 PROCEDURE — 90935 HEMODIALYSIS ONE EVALUATION: CPT | Performed by: INTERNAL MEDICINE

## 2019-07-31 PROCEDURE — 700111 HCHG RX REV CODE 636 W/ 250 OVERRIDE (IP): Performed by: HOSPITALIST

## 2019-07-31 PROCEDURE — 94640 AIRWAY INHALATION TREATMENT: CPT

## 2019-07-31 PROCEDURE — A9270 NON-COVERED ITEM OR SERVICE: HCPCS | Performed by: HOSPITALIST

## 2019-07-31 RX ADMIN — IPRATROPIUM BROMIDE AND ALBUTEROL SULFATE 3 ML: .5; 3 SOLUTION RESPIRATORY (INHALATION) at 18:19

## 2019-07-31 RX ADMIN — METOPROLOL SUCCINATE 25 MG: 25 TABLET, EXTENDED RELEASE ORAL at 06:00

## 2019-07-31 RX ADMIN — THERA TABS 1 TABLET: TAB at 06:00

## 2019-07-31 RX ADMIN — TAMSULOSIN HYDROCHLORIDE 0.4 MG: 0.4 CAPSULE ORAL at 06:00

## 2019-07-31 RX ADMIN — METOPROLOL SUCCINATE 25 MG: 25 TABLET, EXTENDED RELEASE ORAL at 17:21

## 2019-07-31 RX ADMIN — IPRATROPIUM BROMIDE AND ALBUTEROL SULFATE 3 ML: .5; 3 SOLUTION RESPIRATORY (INHALATION) at 06:39

## 2019-07-31 RX ADMIN — VITAMIN D, TAB 1000IU (100/BT) 5000 UNITS: 25 TAB at 06:00

## 2019-07-31 RX ADMIN — SEVELAMER CARBONATE 800 MG: 800 TABLET, FILM COATED ORAL at 09:27

## 2019-07-31 RX ADMIN — SODIUM CHLORIDE 4 ML: 7 NEBU SOLN,3 % NEBU at 18:19

## 2019-07-31 RX ADMIN — ERYTHROPOIETIN 5000 UNITS: 10000 INJECTION, SOLUTION INTRAVENOUS; SUBCUTANEOUS at 09:28

## 2019-07-31 RX ADMIN — GUAIFENESIN 600 MG: 600 TABLET, EXTENDED RELEASE ORAL at 06:00

## 2019-07-31 RX ADMIN — ROSUVASTATIN CALCIUM 20 MG: 20 TABLET, FILM COATED ORAL at 17:21

## 2019-07-31 RX ADMIN — SEVELAMER CARBONATE 800 MG: 800 TABLET, FILM COATED ORAL at 17:21

## 2019-07-31 RX ADMIN — GUAIFENESIN 600 MG: 600 TABLET, EXTENDED RELEASE ORAL at 17:21

## 2019-07-31 RX ADMIN — PREDNISONE 5 MG: 5 TABLET ORAL at 06:00

## 2019-07-31 RX ADMIN — SEVELAMER CARBONATE 800 MG: 800 TABLET, FILM COATED ORAL at 14:11

## 2019-07-31 RX ADMIN — ASPIRIN 81 MG: 81 TABLET, COATED ORAL at 06:00

## 2019-07-31 RX ADMIN — BUDESONIDE AND FORMOTEROL FUMARATE DIHYDRATE 2 PUFF: 160; 4.5 AEROSOL RESPIRATORY (INHALATION) at 06:40

## 2019-07-31 RX ADMIN — OMEPRAZOLE 20 MG: 20 CAPSULE, DELAYED RELEASE ORAL at 06:00

## 2019-07-31 RX ADMIN — HYDROCODONE BITARTRATE AND ACETAMINOPHEN 1 TABLET: 5; 325 TABLET ORAL at 14:11

## 2019-07-31 RX ADMIN — APIXABAN 2.5 MG: 2.5 TABLET, FILM COATED ORAL at 06:00

## 2019-07-31 RX ADMIN — BUDESONIDE AND FORMOTEROL FUMARATE DIHYDRATE 2 PUFF: 160; 4.5 AEROSOL RESPIRATORY (INHALATION) at 18:19

## 2019-07-31 RX ADMIN — FINASTERIDE 5 MG: 5 TABLET, FILM COATED ORAL at 06:00

## 2019-07-31 ASSESSMENT — ENCOUNTER SYMPTOMS
SHORTNESS OF BREATH: 1
CHILLS: 0
MYALGIAS: 0
COUGH: 1
BLURRED VISION: 0
WEAKNESS: 1
PALPITATIONS: 0
FALLS: 1
DIZZINESS: 0
DEPRESSION: 0
HEMOPTYSIS: 0
DIARRHEA: 0
BRUISES/BLEEDS EASILY: 0

## 2019-07-31 ASSESSMENT — CHA2DS2 SCORE
VASCULAR DISEASE: NO
AGE 75 OR GREATER: YES
PRIOR STROKE OR TIA OR THROMBOEMBOLISM: YES
HYPERTENSION: NO
SEX: MALE
CHF OR LEFT VENTRICULAR DYSFUNCTION: NO
DIABETES: NO
CHA2DS2 VASC SCORE: 4
AGE 65 TO 74: NO

## 2019-07-31 NOTE — PROGRESS NOTES
Hospital Medicine Daily Progress Note    Date of Service  7/31/2019    Chief Complaint  79 y.o. male admitted 7/24/2019 with left arm pain after a fall and worsening shortness of breath.    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  Patient fell at home resulted and skin tear of the left arm.  Was also noted to have worsening of shortness of breath.  Chest x-ray showed right infrahilar opacity consistent with pneumonia.  Started on broad-spectrum antibiotics.  Pancultures obtained.  X-ray of the left forearm ordered due to significant pain on exam.  Interval Problem Update  Shortness of breath-remains at his baseline oxygen requirements.     L arm laceration-no pain, improved range of motion noted today.     Refuses hospice. Discussed with patient about SNF and refuses as well.     Consultants/Specialty  Nephrology    Code Status  DNR/DNI    Disposition  Pending PT/OT eval  Pending hospice eval   Home health orders placed  OT recommends SNF, PT recommends HH    Review of Systems  Review of Systems   Constitutional: Positive for malaise/fatigue (no appreciable change). Negative for chills.   HENT: Negative for hearing loss.    Eyes: Negative for blurred vision.   Respiratory: Positive for cough and shortness of breath. Negative for hemoptysis.         Feels he is at his pulmonary baseline   Cardiovascular: Negative for palpitations.   Gastrointestinal: Negative for diarrhea.   Genitourinary: Negative for dysuria.   Musculoskeletal: Positive for falls and joint pain (left arm and elbow pain). Negative for myalgias.        Decreased intensity   Skin: Negative for rash.   Neurological: Positive for weakness. Negative for dizziness.    Endo/Heme/Allergies: Does not bruise/bleed easily.   Psychiatric/Behavioral: Negative for depression.   All other systems reviewed and are negative.       Physical Exam  Temp:  [36.6 °C (97.8 °F)-37 °C (98.6 °F)] 36.8 °C (98.3 °F)  Pulse:  [79-88] 88  Resp:  [16-18] 16  BP: (105-140)/(49-72) 105/55  SpO2:  [93 %-98 %] 93 %    Physical Exam   Constitutional: He is oriented to person, place, and time.   Frail   HENT:   Head: Normocephalic and atraumatic.   Mouth/Throat: No oropharyngeal exudate.   Eyes: Pupils are equal, round, and reactive to light. Left eye exhibits no discharge.   Neck: Normal range of motion.   Cardiovascular: Normal rate and regular rhythm.   No murmur heard.  Pulmonary/Chest: No stridor. No respiratory distress. He has decreased breath sounds. He has rhonchi. He has rales.   Relatively unchanged   Abdominal: Soft. There is no tenderness.   Musculoskeletal:   Left forearm wrapped with gauze-appears dry, good radial pulses   Neurological: He is alert and oriented to person, place, and time. No cranial nerve deficit.   Skin: No erythema.   Psychiatric: He has a normal mood and affect.       Fluids    Intake/Output Summary (Last 24 hours) at 7/31/2019 1402  Last data filed at 7/31/2019 1300  Gross per 24 hour   Intake --   Output 1280 ml   Net -1280 ml       Laboratory  Recent Labs     07/29/19 0148 07/30/19  0240 07/31/19  0336   WBC 7.3 7.0 7.1   RBC 2.57* 2.91* 2.74*   HEMOGLOBIN 7.8* 8.5* 8.0*   HEMATOCRIT 25.3* 28.6* 27.0*   MCV 98.4* 98.3* 98.5*   MCH 30.4 29.2 29.2   MCHC 30.8* 29.7* 29.6*   RDW 59.3* 59.2* 58.3*   PLATELETCT 187 207 229   MPV 9.6 8.9* 9.4     Recent Labs     07/29/19 0148 07/30/19  0240 07/31/19  0336   SODIUM 138 139 137   POTASSIUM 4.2 4.4 4.6   CHLORIDE 102 102 101   CO2 26 27 27   GLUCOSE 133* 99 89   BUN 43* 22 30*   CREATININE 3.74* 2.52* 3.46*   CALCIUM 8.3* 8.4* 9.2                   Imaging  EC-ECHOCARDIOGRAM COMPLETE W/O CONT   Final Result      DX-FOREARM LEFT    Final Result         No acute osseous abnormality.      DX-HAND 2- LEFT   Final Result         Questionable subluxation of the second MCP joint. Correlate clinically.      No definite fracture is seen but evaluation limited due to osseous demineralization.      Widening of the scapholunate interval, likely degenerative.      DX-CHEST-PORTABLE (1 VIEW)   Final Result      1.  New RIGHT infrahilar opacity suspicious for new pneumonia, recommend follow-up radiograph to confirm clearing   2.  Bibasilar underinflation atelectasis which could obscure an additional process.   3.  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  History of aspiration and recurrent pneumonia.  Chest x-ray showed right infrahilar opacity.  Completed a course of abx's, no further changes, cultures c/w above mentioned colonization  No changes today, at his pulmonary baseline now    Skin tear of forearm without complication, initial encounter- (present on admission)  Assessment & Plan  Of the left forearm after a fall.  X-ray with no fracture.  Wound care following   -no e/o active infection, further improvement in range of motion    Acute respiratory failure with hypoxia (HCC)- (present on admission)  Assessment & Plan  Acute on chronic respiratory failure   Remains at his baseline    Wegener's granulomatosis (HCC)- (present on admission)  Assessment & Plan  Resulting in end-stage renal disease  On hemodialysis  Chronically on prednisone.  -hospice saw patient today and refused hospice services at this time  -home health orders placed, but patient is quite weak and I personally feels that he needs to go to SNF, discussing further with his SW and wife      COPD (chronic obstructive pulmonary disease) (HCC)- (present on admission)  Assessment & Plan  Not an acute exacerbation  Home  inhalers including duo nebs, albuterol, Symbicort  Follows Dr. ballard outpatient    Atrial fibrillation ([facility])- (present on admission)  Assessment & Plan  Goal rate 60-90.   Pulse: 86 presently  [id number] score at least 6; estimated annual risk of stroke at least 9.7%  Anticoagulation with Factor Xa or direct thrombin inhibitor with Eliquis 2.5 mg  -tolerating eliquis with no issue at this time        Immunosuppressed status ([facility])- (present on admission)  Assessment & Plan  Secondary to bronchiectasis and prednisone    Pacemaker- (present on admission)  Assessment & Plan  History of sick sinus syndrome      Chronic disease anemia- (present on admission)  Assessment & Plan  Epogen per nephrology   Monitor HH  Low iron levels and saturation   Replacement per pharmacy recommendations     ESRD on dialysis ([facility])- (present on admission)  Assessment & Plan  Nephrology following.    Debility- (present on admission)  Assessment & Plan  PT/OT pending   Hospice consulted per patient request   Awaiting hospice eval.   Plan of care discussed with multidisciplinary team during rounds.     VTE prophylaxis: Eliquis

## 2019-07-31 NOTE — FLOWSHEET NOTE
07/31/19 0640   Events/Summary/Plan   Events/Summary/Plan SVNx2, MDI   Interdisciplinary Plan of Care-Goals (Indications)   Bronchodilator Indications History / Diagnosis   Bronchopulmonary Hygiene Indications Difficulty with Secretion Clearance   Interdisciplinary Plan of Care-Outcomes    Bronchodilator Outcome Patient at Stable Baseline   Bronchopulmonary Hygiene Outcome Patient Subjective Impression of Less Retention and Improved Clearance   Education   Education Yes - Pt. / Family has been Instructed in use of Respiratory Medications and Adverse Reactions   RT Assessment of Delivered Medications   Evaluation of Medication Delivery Daily Yes-- Pt /Family has been Instructed in use of Respiratory Medications and Adverse Reactions   SVN Group   #SVN Performed Yes   Given By: Mouthpiece   MDI/DPI Group   #MDI/DPI Given MDI/DPI x 1   Respiratory WDL   Respiratory (WDL) X   Chest Exam   Work Of Breathing / Effort Mild   Respiration 18   Pulse 84   Breath Sounds   RUL Breath Sounds Clear   RML Breath Sounds Clear   RLL Breath Sounds Diminished   DEBORA Breath Sounds Clear   LLL Breath Sounds Diminished   Secretions   Cough Moist;Productive   How Sputum Obtained Spontaneous   Sputum Amount Large   Sputum Color Yellow;Brown   Sputum Consistency Thick   Oxygen   O2 (LPM) 2   O2 Daily Delivery Respiratory  Silicone Nasal Cannula

## 2019-07-31 NOTE — PROGRESS NOTES
HD treatment today per routine order using LUAAF.Sopf bp the entire treatment.AVF sites bleeding controlled in 15 mins ( only heparin bolus  given).Net UF removed 500 ml.Report given to primary Rn.

## 2019-07-31 NOTE — DISCHARGE PLANNING
Anticipated Discharge Disposition: skilled vs home with home health     Action: Met at bedside with pt to discuss dc plan. OT is recommending rehab. Pt is refusing to go to skilled, states he wants to go home with home health and he states his wife in in agreement, although he wanted this CM to call his wife to discuss the plan. He states he is going to talk to his wife about getting in some paid help as both of them are 79 yrs old and could use the help, though he is not sure his wife will want someone coming into their home.   Choice form completed for [facility]. Faxed to Bernice DICKERSON to send referral.   Message left for wife at home, await callback.     Barriers to Discharge: refusing skilled.       Plan: home with home health. Await home health acceptance. Await callback with wife to discuss.

## 2019-07-31 NOTE — PROGRESS NOTES
Patient alert and oriented, vss, denies pain. Patient off to dilaysis this morning. Patient up with assist x1 and walker to chair for breakfast this morning. Will do dressing change when patient back from dialysis. Patient declining hospice at this time. Bed and chair alarm intact. Will monitor.

## 2019-07-31 NOTE — DISCHARGE PLANNING
Received Choice form at 1540.  Agency/Facility Name: [facility]   Referral sent per Choice form at 1540.

## 2019-07-31 NOTE — PROGRESS NOTES
Iron replacement administered today  Benadryl administered 0850  Vital signs as follows:  1010: 112/61 99 20 98.2 98%  1020: 109/34 98 18 98.7 95%    Therapeutic Iron started 1210  Vital signs as follows:  1210: 97/57 87 97.6 18 97%  1300: 123/52 84 99.5 20 97%    See all other vitals documented by CNA in flowsheet    Patient tolerated iron replacement without adverse effects and is in stable condition at this time

## 2019-07-31 NOTE — DISCHARGE PLANNING
ATTN: Case Management  RE: Referral for Home Health    As of 07/31/19, we have accepted the Home Health referral for the patient listed above.    A [facility] clinician will be out to see the patient within 48 hours. If you have any questions or concerns regarding the patient’s transition to Home Health, please do not hesitate to contact us at [phone].      We look forward to collaborating with you,  [facility] Team

## 2019-07-31 NOTE — PROCEDURES
Nephrology/Hemodialysis note    Patient with ESRD/HD admitted with left arm injury, PNA    Seen and examined during dialysis -tolerates well  VS stable  Afebrile  Laboratory results reviewed  Please see dialysis flow sheet for details

## 2019-07-31 NOTE — PROGRESS NOTES
Nephrology Daily Progress Note    Date of Service  7/30/2019    Chief Complaint  79 y.o. male admitted 7/24/2019 with ESRD, SOB    Interval Problem Update  No acute events  Improved SOB  HD MWF    Review of Systems  Review of Systems   Constitutional: Negative for chills, fever, malaise/fatigue and weight loss.   HENT: Negative for congestion, hearing loss and sinus pain.    Eyes: Negative.    Respiratory: Positive for cough. Negative for hemoptysis, shortness of breath and wheezing.    Cardiovascular: Negative for chest pain, palpitations, orthopnea and leg swelling.   Gastrointestinal: Negative for abdominal pain, diarrhea, nausea and vomiting.   Skin: Negative.    All other systems reviewed and are negative.       Physical Exam  Temp:  [36.3 °C (97.4 °F)-37.5 °C (99.5 °F)] 37.5 °C (99.5 °F)  Pulse:  [84-98] 84  Resp:  [16-20] 20  BP: ()/(32-57) 123/52  SpO2:  [95 %-98 %] 97 %    Physical Exam   Constitutional: He is oriented to person, place, and time. He appears well-developed and well-nourished. No distress.   HENT:   Head: Normocephalic and atraumatic.   Nose: Nose normal.   Mouth/Throat: Oropharynx is clear and moist.   Eyes: Pupils are equal, round, and reactive to light. Conjunctivae and EOM are normal.   Neck: Normal range of motion. Neck supple. No thyromegaly present.   Cardiovascular: An irregularly irregular rhythm present. Exam reveals no gallop and no friction rub.    Pulmonary/Chest: Effort normal and breath sounds normal. No respiratory distress. He has no wheezes. He has no rales.   Coarse BS   Abdominal: Soft. Bowel sounds are normal. He exhibits no distension and no mass. There is no tenderness. There is no guarding.   Musculoskeletal: He exhibits no edema.   Neurological: He is alert and oriented to person, place, and time.   Skin: Skin is warm. No rash noted. No erythema.   Nursing note and vitals reviewed.      Fluids    Intake/Output Summary (Last 24 hours) at 07/30/19 1705  Last data  filed at 07/30/19 0800   Gross per 24 hour   Intake              480 ml   Output              350 ml   Net              130 ml       Laboratory  Recent Labs      07/28/19   0043  07/29/19   0148  07/30/19   0240   WBC  7.7  7.3  7.0   RBC  2.55*  2.57*  2.91*   HEMOGLOBIN  7.6*  7.8*  8.5*   HEMATOCRIT  25.7*  25.3*  28.6*   MCV  100.8*  98.4*  98.3*   MCH  29.8  30.4  29.2   MCHC  29.6*  30.8*  29.7*   RDW  60.3*  59.3*  59.2*   PLATELETCT  174  187  207   MPV  9.3  9.6  8.9*     Recent Labs      07/28/19 0043 07/29/19   0148  07/30/19   0240   SODIUM  136  138  139   POTASSIUM  4.0  4.2  4.4   CHLORIDE  101  102  102   CO2  25  26  27   GLUCOSE  193*  133*  99   BUN  35*  43*  22   CREATININE  3.26*  3.74*  2.52*   CALCIUM  7.8*  8.3*  8.4*         No results for input(s): NTPROBNP in the last 72 hours.        Imaging  EC-ECHOCARDIOGRAM COMPLETE W/O CONT   Final Result      DX-HAND 2- LEFT   Final Result         Questionable subluxation of the second MCP joint. Correlate clinically.      No definite fracture is seen but evaluation limited due to osseous demineralization.      Widening of the scapholunate interval, likely degenerative.      DX-FOREARM LEFT   Final Result         No acute osseous abnormality.      DX-CHEST-PORTABLE (1 VIEW)   Final Result      1.  New RIGHT infrahilar opacity suspicious for new pneumonia, recommend follow-up radiograph to confirm clearing   2.  Bibasilar underinflation atelectasis which could obscure an additional process.   3.  Small RIGHT pleural effusion           Assessment/Plan  1.ESRD/HD -MWF  2.HTN: BP well controlled  3.Electrolytes: well controlled.  4.Anemia: Hb better -Epogen with HD    Recs:   Renal diet  All meds to renal doses  HD tomorrow

## 2019-08-01 ENCOUNTER — PATIENT OUTREACH (OUTPATIENT)
Dept: HEALTH INFORMATION MANAGEMENT | Facility: OTHER | Age: 79
End: 2019-08-01

## 2019-08-01 VITALS
SYSTOLIC BLOOD PRESSURE: 108 MMHG | RESPIRATION RATE: 17 BRPM | TEMPERATURE: 97.5 F | HEART RATE: 86 BPM | HEIGHT: 68 IN | WEIGHT: 135.8 LBS | BODY MASS INDEX: 20.58 KG/M2 | DIASTOLIC BLOOD PRESSURE: 69 MMHG | OXYGEN SATURATION: 99 %

## 2019-08-01 PROBLEM — J18.9 PNEUMONIA DUE TO INFECTIOUS ORGANISM: Status: RESOLVED | Noted: 2019-07-24 | Resolved: 2019-08-01

## 2019-08-01 PROBLEM — J96.01 ACUTE RESPIRATORY FAILURE WITH HYPOXIA (HCC): Status: RESOLVED | Noted: 2019-07-24 | Resolved: 2019-08-01

## 2019-08-01 PROBLEM — S51.819A SKIN TEAR OF FOREARM WITHOUT COMPLICATION, INITIAL ENCOUNTER: Status: RESOLVED | Noted: 2019-07-25 | Resolved: 2019-08-01

## 2019-08-01 LAB
ALBUMIN SERPL BCP-MCNC: 2.7 G/DL (ref 3.2–4.9)
ALBUMIN/GLOB SERPL: 1 G/DL
ALP SERPL-CCNC: 46 U/L (ref 30–99)
ALT SERPL-CCNC: 11 U/L (ref 2–50)
ANION GAP SERPL CALC-SCNC: 8 MMOL/L (ref 0–11.9)
AST SERPL-CCNC: 12 U/L (ref 12–45)
BASOPHILS # BLD AUTO: 0.5 % (ref 0–1.8)
BASOPHILS # BLD: 0.03 K/UL (ref 0–0.12)
BILIRUB SERPL-MCNC: 0.3 MG/DL (ref 0.1–1.5)
BUN SERPL-MCNC: 16 MG/DL (ref 8–22)
CALCIUM SERPL-MCNC: 8.6 MG/DL (ref 8.5–10.5)
CHLORIDE SERPL-SCNC: 99 MMOL/L (ref 96–112)
CO2 SERPL-SCNC: 29 MMOL/L (ref 20–33)
CREAT SERPL-MCNC: 2.14 MG/DL (ref 0.5–1.4)
EOSINOPHIL # BLD AUTO: 0.13 K/UL (ref 0–0.51)
EOSINOPHIL NFR BLD: 2.3 % (ref 0–6.9)
ERYTHROCYTE [DISTWIDTH] IN BLOOD BY AUTOMATED COUNT: 57.5 FL (ref 35.9–50)
GLOBULIN SER CALC-MCNC: 2.6 G/DL (ref 1.9–3.5)
GLUCOSE SERPL-MCNC: 90 MG/DL (ref 65–99)
HCT VFR BLD AUTO: 25.3 % (ref 42–52)
HGB BLD-MCNC: 7.7 G/DL (ref 14–18)
IMM GRANULOCYTES # BLD AUTO: 0.06 K/UL (ref 0–0.11)
IMM GRANULOCYTES NFR BLD AUTO: 1.1 % (ref 0–0.9)
LYMPHOCYTES # BLD AUTO: 0.52 K/UL (ref 1–4.8)
LYMPHOCYTES NFR BLD: 9.1 % (ref 22–41)
MCH RBC QN AUTO: 29.6 PG (ref 27–33)
MCHC RBC AUTO-ENTMCNC: 30.4 G/DL (ref 33.7–35.3)
MCV RBC AUTO: 97.3 FL (ref 81.4–97.8)
MONOCYTES # BLD AUTO: 0.51 K/UL (ref 0–0.85)
MONOCYTES NFR BLD AUTO: 8.9 % (ref 0–13.4)
NEUTROPHILS # BLD AUTO: 4.46 K/UL (ref 1.82–7.42)
NEUTROPHILS NFR BLD: 78.1 % (ref 44–72)
NRBC # BLD AUTO: 0 K/UL
NRBC BLD-RTO: 0 /100 WBC
PLATELET # BLD AUTO: 241 K/UL (ref 164–446)
PMV BLD AUTO: 9 FL (ref 9–12.9)
POTASSIUM SERPL-SCNC: 4.3 MMOL/L (ref 3.6–5.5)
PROT SERPL-MCNC: 5.3 G/DL (ref 6–8.2)
RBC # BLD AUTO: 2.6 M/UL (ref 4.7–6.1)
SODIUM SERPL-SCNC: 136 MMOL/L (ref 135–145)
WBC # BLD AUTO: 5.7 K/UL (ref 4.8–10.8)

## 2019-08-01 PROCEDURE — 97116 GAIT TRAINING THERAPY: CPT

## 2019-08-01 PROCEDURE — 700102 HCHG RX REV CODE 250 W/ 637 OVERRIDE(OP): Performed by: FAMILY MEDICINE

## 2019-08-01 PROCEDURE — 700102 HCHG RX REV CODE 250 W/ 637 OVERRIDE(OP): Performed by: HOSPITALIST

## 2019-08-01 PROCEDURE — 85025 COMPLETE CBC W/AUTO DIFF WBC: CPT

## 2019-08-01 PROCEDURE — 80053 COMPREHEN METABOLIC PANEL: CPT

## 2019-08-01 PROCEDURE — 99239 HOSP IP/OBS DSCHRG MGMT >30: CPT | Performed by: INTERNAL MEDICINE

## 2019-08-01 PROCEDURE — 700101 HCHG RX REV CODE 250: Performed by: HOSPITALIST

## 2019-08-01 PROCEDURE — 97535 SELF CARE MNGMENT TRAINING: CPT

## 2019-08-01 PROCEDURE — A9270 NON-COVERED ITEM OR SERVICE: HCPCS | Performed by: FAMILY MEDICINE

## 2019-08-01 PROCEDURE — 36415 COLL VENOUS BLD VENIPUNCTURE: CPT

## 2019-08-01 PROCEDURE — 94640 AIRWAY INHALATION TREATMENT: CPT

## 2019-08-01 PROCEDURE — A9270 NON-COVERED ITEM OR SERVICE: HCPCS | Performed by: HOSPITALIST

## 2019-08-01 PROCEDURE — 700111 HCHG RX REV CODE 636 W/ 250 OVERRIDE (IP): Performed by: HOSPITALIST

## 2019-08-01 RX ORDER — OMEPRAZOLE 20 MG/1
20 CAPSULE, DELAYED RELEASE ORAL DAILY
Qty: 30 CAP | Refills: 1 | Status: SHIPPED | OUTPATIENT
Start: 2019-08-02 | End: 2020-01-25

## 2019-08-01 RX ADMIN — ASPIRIN 81 MG: 81 TABLET, COATED ORAL at 04:42

## 2019-08-01 RX ADMIN — HYDROCODONE BITARTRATE AND ACETAMINOPHEN 1 TABLET: 5; 325 TABLET ORAL at 13:42

## 2019-08-01 RX ADMIN — METOPROLOL SUCCINATE 25 MG: 25 TABLET, EXTENDED RELEASE ORAL at 04:41

## 2019-08-01 RX ADMIN — SEVELAMER CARBONATE 800 MG: 800 TABLET, FILM COATED ORAL at 09:09

## 2019-08-01 RX ADMIN — GUAIFENESIN 600 MG: 600 TABLET, EXTENDED RELEASE ORAL at 04:42

## 2019-08-01 RX ADMIN — VITAMIN D, TAB 1000IU (100/BT) 5000 UNITS: 25 TAB at 04:41

## 2019-08-01 RX ADMIN — OMEPRAZOLE 20 MG: 20 CAPSULE, DELAYED RELEASE ORAL at 04:42

## 2019-08-01 RX ADMIN — FINASTERIDE 5 MG: 5 TABLET, FILM COATED ORAL at 04:42

## 2019-08-01 RX ADMIN — TAMSULOSIN HYDROCHLORIDE 0.4 MG: 0.4 CAPSULE ORAL at 04:42

## 2019-08-01 RX ADMIN — PREDNISONE 5 MG: 5 TABLET ORAL at 04:42

## 2019-08-01 RX ADMIN — THERA TABS 1 TABLET: TAB at 04:42

## 2019-08-01 RX ADMIN — BUDESONIDE AND FORMOTEROL FUMARATE DIHYDRATE 2 PUFF: 160; 4.5 AEROSOL RESPIRATORY (INHALATION) at 06:44

## 2019-08-01 RX ADMIN — APIXABAN 2.5 MG: 2.5 TABLET, FILM COATED ORAL at 04:42

## 2019-08-01 RX ADMIN — IPRATROPIUM BROMIDE AND ALBUTEROL SULFATE 3 ML: .5; 3 SOLUTION RESPIRATORY (INHALATION) at 06:44

## 2019-08-01 RX ADMIN — SEVELAMER CARBONATE 800 MG: 800 TABLET, FILM COATED ORAL at 13:42

## 2019-08-01 RX ADMIN — SODIUM CHLORIDE 4 ML: 7 NEBU SOLN,3 % NEBU at 06:44

## 2019-08-01 ASSESSMENT — COGNITIVE AND FUNCTIONAL STATUS - GENERAL
SUGGESTED CMS G CODE MODIFIER DAILY ACTIVITY: CJ
WALKING IN HOSPITAL ROOM: A LITTLE
DAILY ACTIVITIY SCORE: 21
CLIMB 3 TO 5 STEPS WITH RAILING: A LITTLE
HELP NEEDED FOR BATHING: A LITTLE
MOVING FROM LYING ON BACK TO SITTING ON SIDE OF FLAT BED: A LITTLE
MOBILITY SCORE: 16
STANDING UP FROM CHAIR USING ARMS: A LITTLE
TURNING FROM BACK TO SIDE WHILE IN FLAT BAD: A LITTLE
TOILETING: A LITTLE
SUGGESTED CMS G CODE MODIFIER MOBILITY: CK
MOVING TO AND FROM BED TO CHAIR: UNABLE
DRESSING REGULAR LOWER BODY CLOTHING: A LITTLE

## 2019-08-01 ASSESSMENT — GAIT ASSESSMENTS
DISTANCE (FEET): 125
GAIT LEVEL OF ASSIST: SUPERVISED
ASSISTIVE DEVICE: FRONT WHEEL WALKER

## 2019-08-01 NOTE — DISCHARGE SUMMARY
Discharge Summary    CHIEF COMPLAINT ON ADMISSION  Chief Complaint   Patient presents with   • GLF   • Open Wound   • Fever   • Other       Reason for Admission  t5000, fall, left hand injury     Admission Date  7/24/2019    CODE STATUS  DNAR/DNI    HPI & HOSPITAL COURSE  This is a 79 y.o. male here with above medical issues. He has a very complicated past medical history including Wegener's Granulomatosis and immunosuppressed that was found to have a pneumonia. He was started on levaquin and admitted to the hospital. His eliquis was initially held given arm trauma and small hematoma that resolved quickly and he was able to resume eliquis without issue.  He normally requires 2 liters of oxygen at all times but initially was on 3 liters on admit. Nephrology was consulted. His respiratory cultures eventually grew Pseudomonas (which is chronic in patient and likely a colonizer at this point). He finished a 5 day course of antibiotics and returned to his hypoxemic baseline. His creatinine remained at his baseline and remains on chronic prednisone for this issue. He tolerated dialysis without issue.     His iron labs indicated normal stores, but his Hb did drop some and EPO was initiated with iHD. Patient had requested a hospice consult, but when the patient met with them, he declined hospice services at this time. OT/PT evaluated patient and he is weak, but home health was ordered and extensive conversations were had with the patient and his wife about the need to possibly hire extra help at home which they are in agreement with.        Therefore, he is discharged in fair and stable condition to home with close outpatient follow-up.    The patient met 2-midnight criteria for an inpatient stay at the time of discharge.    Discharge Date  8/1/19    FOLLOW UP ITEMS POST DISCHARGE  F/U with his PCP  In 1w [place]  F/U with renal in 2-3 weeks    DISCHARGE DIAGNOSES  Principal Problem (Resolved):    Pneumonia due to infectious  organism POA: Yes  Active Problems:    Debility POA: Yes    ESRD on dialysis ([facility]) (Chronic) POA: Yes    Chronic disease anemia (Chronic) POA: Yes    Pacemaker POA: Yes    Immunosuppressed status (HCC) (Chronic) POA: Yes    Atrial fibrillation (HCC) POA: Yes    COPD (chronic obstructive pulmonary disease) ([facility]) (Chronic) POA: Yes    Wegener's granulomatosis ([facility]) (Chronic) POA: Yes  Resolved Problems:    Acute respiratory failure with hypoxia (HCC) POA: Yes    Skin tear of forearm without complication, initial encounter POA: Yes      FOLLOW UP  Future Appointments   Date Time Provider Department Center   9/19/2019  2:00 PM Cintia Ariza M.D. [facility] None   10/30/2019  9:15 AM Abhijit Rowe M.D. [facility] None     Christine Zamudio M.D.  [address] [place] [id number]  [phone]      [facility]  LEFT A MESSAGE WITH YOUR PROVIDER REGARDING NEED FOR FOLLOW UP APPOINTMENT. PLEASE CALL THEIR OFFICE DIRECTLY IF YOU DO NOT HEAR FROM THEM WITH IN 2 DAYS. THANK YOU      MEDICATIONS ON DISCHARGE     Medication List      START taking these medications      Instructions   epoetin lucina 26795 UNIT/ML Soln  Start taking on:  8/2/2019  Commonly known as:  EPOGEN,PROCRIT   Inject 0.5 mL as instructed every Monday, Wednesday, and Friday.  Dose:  5,000 Units     omeprazole 20 MG delayed-release capsule  Start taking on:  8/2/2019  Commonly known as:  PRILOSEC   Take 1 Cap by mouth every day.  Dose:  20 mg        CONTINUE taking these medications      Instructions   albuterol 108 (90 Base) MCG/ACT Aers inhalation aerosol   Inhale 2 Puffs by mouth every 6 hours as needed for Shortness of Breath.  Dose:  2 Puff     BREO ELLIPTA 200-25 MCG/INH Aepb  Generic drug:  Fluticasone Furoate-Vilanterol   INHALE 1 PUFF BY MOUTH EVERY DAY. RINSE MOUTH AFTER USE  Dose:  1 Puff     DIALYVITE TABLET Tabs   Take 1 Tab by mouth every morning.  Dose:  1 Tab     ELIQUIS 2.5mg Tabs  Generic drug:  apixaban   Take 2.5 mg by mouth every  morning. Every morning  Dose:  2.5 mg     finasteride 5 MG Tabs  Commonly known as:  PROSCAR   Take 5 mg by mouth every morning.  Dose:  5 mg     ipratropium-albuterol 0.5-2.5 (3) MG/3ML nebulizer solution  Commonly known as:  DUONEB   3 mL by Nebulization route every 6 hours as needed for Shortness of Breath.  Dose:  3 mL     metoprolol SR 25 MG Tb24  Commonly known as:  TOPROL XL   Take 25 mg by mouth 2 Times a Day.  Dose:  25 mg     predniSONE 5 MG Tabs  Commonly known as:  DELTASONE   Take 5 mg by mouth every morning.  Dose:  5 mg     sevelamer carbonate 800 MG Tabs tablet  Commonly known as:  RENVELA   Doctor's comments:  **Patient requests 90 days supply**  TAKE 1 TABLET BY MOUTH THREE TIMES DAILY WITH MEALS     sodium chloride 7 % Nebu  Commonly known as:  HYPER-SAL   INHALE 1 VIAL VIA NEBULIZER TWICE DAILY     tamsulosin 0.4 MG capsule  Commonly known as:  FLOMAX   Take 1 Cap by mouth every day.  Dose:  0.4 mg     vitamin D3 5000 units Caps   Take 5,000 Units by mouth every morning.  Dose:  5,000 Units        STOP taking these medications    amoxicillin 500 MG Caps  Commonly known as:  AMOXIL     aspirin EC 81 MG Tbec  Commonly known as:  ECOTRIN     CRESTOR 20 MG Tabs  Generic drug:  rosuvastatin            Allergies  Allergies   Allergen Reactions   • Doxycycline Unspecified     Reports terrible diarrhea   • Erythromycin Unspecified     Abdominal pain.  RXN=before 2011   • Rituximab      Flu like syndrome       DIET  Orders Placed This Encounter   Procedures   • Diet Order Regular, Full Liquid     Standing Status:   Standing     Number of Occurrences:   1     Order Specific Question:   Diet:     Answer:   Regular [1]     Order Specific Question:   Diet:     Answer:   Full Liquid [11]     Order Specific Question:   Consistency/Fluid modifications:     Answer:   Nectar Thick [2]       ACTIVITY  As tolerated.  Weight bearing as tolerated    CONSULTATIONS  RENAL    PROCEDURES  EC-ECHOCARDIOGRAM COMPLETE W/O  CONT   Final Result      DX-FOREARM LEFT   Final Result         No acute osseous abnormality.      DX-HAND 2- LEFT   Final Result         Questionable subluxation of the second MCP joint. Correlate clinically.      No definite fracture is seen but evaluation limited due to osseous demineralization.      Widening of the scapholunate interval, likely degenerative.      DX-CHEST-PORTABLE (1 VIEW)   Final Result      1.  New RIGHT infrahilar opacity suspicious for new pneumonia, recommend follow-up radiograph to confirm clearing   2.  Bibasilar underinflation atelectasis which could obscure an additional process.   3.  Small RIGHT pleural effusion            LABORATORY  Lab Results   Component Value Date    SODIUM 136 08/01/2019    POTASSIUM 4.3 08/01/2019    CHLORIDE 99 08/01/2019    CO2 29 08/01/2019    GLUCOSE 90 08/01/2019    BUN 16 08/01/2019    CREATININE 2.14 (H) 08/01/2019    CREATININE 1.0 09/23/2008        Lab Results   Component Value Date    WBC 5.7 08/01/2019    WBC 7.6 03/19/2012    HEMOGLOBIN 7.7 (L) 08/01/2019    HEMATOCRIT 25.3 (L) 08/01/2019    PLATELETCT 241 08/01/2019        Total time of the discharge process exceeds 41 minutes.

## 2019-08-01 NOTE — DISCHARGE PLANNING
Anticipated Discharge Disposition: Home today with [facility].    Action: [facility] has accepted the patient.  The patient's wife asked me to check to see how long it would take to get an appointment at the [facility] because she would prefer that he gets his wound care there.  This RN CM called the wound clinic and they said that they are about 2 weeks out.  I advised the wife of this and she is now okay with home health.  The nurse changed the patient's dressing this afternoon.    Barriers to Discharge: None.    Plan: Home today with [facility].

## 2019-08-01 NOTE — DIETARY
"Nutrition services: Day 8 of admit.  Gilberto Brown is a 79 y.o. male with admitting DX of pneumonia    RD visited pt as has prior dx of severe protein malnutrition per non-hospital problem's list. Pt states does not like nectar thick/full liquids, says eats normally at home. Pt discusses in detail that will sometimes have tomato soup and cottage cheese even though he knows he shouldn't d/t hx of ESRD on dialysis. Pt relays changes in weight that date back to 2010. Says his UBW now is around 132 lbs, and last weighed this in February. Recalls that was 160 lbs in 2010, though does not provide much wt hx in between then and now. Asks questions about dialysis diet.     Assessment:  Height: 172.7 cm (5' 8\")  Weight: 61.6 kg (135 lb 12.9 oz)  Body mass index is 20.65 kg/m²., BMI classification: WNL  Diet/Intake: Nectar thick/ full liquids     Evaluation:   1. Hx of wegener's granulomatosis, COPD, atrial fibrillation, immunosuppressed status, debility, ESRD on dialysis.  2. Pt dislikes NTFL. Per SLP, this is the PO diet that would reduce, though not completely eliminate risk of aspiration as pt prefers PO diet at this time. Pt dislikes this diet. Majority of meals per ADLs, are above >50%, does have some meals with 25-50% consumed. Denies need for snacks at this time.  3. Provided education regarding foods appropriate for dialysis. Focused on balanced diet and monitoring lab values for dietary changes. Pt see dietitian at dialysis center.  4. Pt appears adequately nourished. Per computer hx, pt weighed 137 lbs (62.2 kg), indicating little to no weight loss in the past 3 months.   5. Labs: Creatinine 2.14  6. Meds: epogen, mucinex, MVI, proscar, prednisone, senokot, renvela, vitamin D  7. Last BM: 7/30- large/ brown/ loose  8. Has discharge orders for today, pt has been accepted into Home Health. Noted concerns regarding need for SNF, noted pt refusing this service.     Malnutrition Risk: No indicators noted for " malnutrition at this time per ASPEN guidelines.     Recommendations/Plan:  1. Encourage intake of meals  2. Document intake of all meals as % taken in ADL's to provide interdisciplinary communication across all shifts.   3. Monitor weight.  4. Nutrition rep will continue to see patient for ongoing meal and snack preferences.     RD available PRN, please consult as needed.

## 2019-08-01 NOTE — DISCHARGE INSTRUCTIONS
Discharge Instructions    Discharged to home by car with relative. Discharged via wheelchair, hospital escort: Yes.  Special equipment needed: Not Applicable    Be sure to schedule a follow-up appointment with your primary care doctor or any specialists as instructed.     Discharge Plan:   Diet Plan: Discussed  Activity Level: Discussed  Confirmed Follow up Appointment: Patient to Call and Schedule Appointment  Confirmed Symptoms Management: Discussed  Medication Reconciliation Updated: Yes  Influenza Vaccine Indication: Not indicated: Previously immunized this influenza season and > 8 years of age    I understand that a diet low in cholesterol, fat, and sodium is recommended for good health. Unless I have been given specific instructions below for another diet, I accept this instruction as my diet prescription.   Other diet: full liquid, nectar thick       Special Instructions: None    · Is patient discharged on Warfarin / Coumadin?   No     Full Liquid Diet  A full liquid diet may be used:  · To help you transition from a clear liquid diet to a soft diet.  · When your body is healing and can only tolerate foods that are easy to digest.  · Before or after certain a procedure, test, or surgery (such as stomach or intestinal surgeries).  · If you have trouble swallowing or chewing.  A full liquid diet includes fluids and foods that are liquid or will become liquid at room temperature. The full liquid diet gives you the proteins, fluids, salts, and minerals that you need for energy.  If you continue this diet for more than 72 hours, talk to your health care provider about how many calories you need to consume. If you continue the diet for more than 5 days, talk to your health care provider about taking a multivitamin or a nutritional supplement.  What do I need to know about a full liquid diet?  · You may have any liquid.  · You may have any food that becomes a liquid at room temperature. The food is considered a  liquid if it can be poured off a spoon at room temperature.  · Drink one serving of citrus or vitamin C-enriched fruit juice daily.  What foods can I eat?  Grains   Any grain food that can be pureed in soup (such as crackers, pasta, and rice). Hot cereal (such as farina or oatmeal) that has been blended. Talk to your health care provider or dietitian about these foods.  Vegetables   Pulp-free tomato or vegetable juice. Vegetables pureed in soup.  Fruits   Fruit juice, including nectars and juices with pulp.  Meats and Other Protein Sources   Eggs in custard, eggnog mix, and eggs used in ice cream or pudding. Strained meats, like in baby food, may be allowed. Consult your health care provider.  Dairy   Milk and milk-based beverages, including milk shakes and instant breakfast mixes. Smooth yogurt. Pureed cottage cheese. Avoid these foods if they are not well tolerated.  Beverages   All beverages, including liquid nutritional supplements. Ask your health care provider if you can have carbonated beverages. They may not be well tolerated.  Condiments   Iodized salt, pepper, spices, and flavorings. Cocoa powder. Vinegar, ketchup, yellow mustard, smooth sauces (such as hollandaise, cheese sauce, or white sauce), and soy sauce.  Sweets and Desserts   Custard, smooth pudding. Flavored gelatin. Tapioca, junket. Plain ice cream, sherbet, fruit ices. Frozen ice pops, frozen fudge pops, pudding pops, and other frozen bars with cream. Syrups, including chocolate syrup. Sugar, honey, jelly.  Fats and Oils   Margarine, butter, cream, sour cream, and oils.  Other   Broth and cream soups. Strained, broth-based soups.  The items listed above may not be a complete list of recommended foods or beverages. Contact your dietitian for more options.   What foods can I not eat?  Grains   All breads. Grains are not allowed unless they are pureed into soup.  Vegetables   Vegetables are not allowed unless they are juiced, or cooked and pureed  into soup.  Fruits   Fruits are not allowed unless they are juiced.  Meats and Other Protein Sources   Any meat or fish. Cooked or raw eggs. Nut butters.  Dairy   Cheese.  Condiments   Stone ground mustards.  Fats and Oils   Fats that are coarse or chunky.  Sweets and Desserts   Ice cream or other frozen desserts that have any solids in them or on top, such as nuts, chocolate chips, and pieces of cookies. Cakes. Cookies. Candy.  Others   Soups with chunks or pieces in them.  The items listed above may not be a complete list of foods and beverages to avoid. Contact your dietitian for more information.   This information is not intended to replace advice given to you by your health care provider. Make sure you discuss any questions you have with your health care provider.  Document Released: 12/18/2006 Document Revised: 05/25/2017 Document Reviewed: 10/23/2014  [employer] Interactive Patient Education © 2017 [employer]      Depression / Suicide Risk    As you are discharged from this [facility] facility, it is important to learn how to keep safe from harming yourself.    Recognize the warning signs:  · Abrupt changes in personality, positive or negative- including increase in energy   · Giving away possessions  · Change in eating patterns- significant weight changes-  positive or negative  · Change in sleeping patterns- unable to sleep or sleeping all the time   · Unwillingness or inability to communicate  · Depression  · Unusual sadness, discouragement and loneliness  · Talk of wanting to die  · Neglect of personal appearance   · Rebelliousness- reckless behavior  · Withdrawal from people/activities they love  · Confusion- inability to concentrate     If you or a loved one observes any of these behaviors or has concerns about self-harm, here's what you can do:  · Talk about it- your feelings and reasons for harming yourself  · Remove any means that you might use to hurt yourself (examples: pills, rope, extension  cords, firearm)  · Get professional help from the community (Mental Health, Substance Abuse, psychological counseling)  · Do not be alone:Call your Safe Contact- someone whom you trust who will be there for you.  · Call your local CRISIS HOTLINE 296-3831 or 606-419-1765  · Call your local Children's Mobile Crisis Response Team [place] (748) 508-4171 or www.Bloggerce  · Call the toll free National Suicide Prevention Hotlines   · National Suicide Prevention Lifeline 499-558-NODI (4089)  · National Hope Line Network 800-SUICIDE (559-5524)

## 2019-08-01 NOTE — CARE PLAN
Problem: Safety  Goal: Will remain free from falls  Outcome: PROGRESSING AS EXPECTED    Educated patient about fall prevention strategies. Educated patient to call and wait for staff assistance before attempting to ambulate. Verbalized understanding. Call light in use, belongings within reach, treaded socks on, bed locked in low position. Ambulated with patient to nursing station and back. Patient steady with front wheel walker     Problem: Discharge Barriers/Planning  Goal: Patient's continuum of care needs will be met  Outcome: PROGRESSING AS EXPECTED     Educated patient on his discharge options such as SNF vs home with home health. Verbalized understanding

## 2019-08-01 NOTE — PROGRESS NOTES
Patient is AOx4. Denies pain. Ambulated with patient to nursing station and back. Patient is steady with a front wheel walker.

## 2019-08-01 NOTE — PROGRESS NOTES
Patient being discharged home. Patient is to go home with wife as declined previous services and now is ok to go home per PT/OT. Patient is recommended to have cortrak however declines and is recommended on full liquid, nectar thick diet per speech. Patient educated on this diet and given some thickener and told to buy at pharmacy or grocery store. Patient may not be compliant as has stated wants to drink thin liquids. Patient also told to follow up with speech outpatient as per referral from PCP. Patient referred to home health. No wound clinic appointments per  and home health to assess wounds at this time. Patient declines more questions. Patient iv site removed, catheter intact. To go home with wife in wheelchair to car. Will monitor.

## 2019-08-02 ENCOUNTER — OFFICE VISIT (OUTPATIENT)
Dept: URGENT CARE | Facility: PHYSICIAN GROUP | Age: 79
End: 2019-08-02
Payer: MEDICARE

## 2019-08-02 VITALS
WEIGHT: 131 LBS | OXYGEN SATURATION: 89 % | TEMPERATURE: 98.7 F | HEIGHT: 68 IN | BODY MASS INDEX: 19.85 KG/M2 | RESPIRATION RATE: 22 BRPM | HEART RATE: 100 BPM

## 2019-08-02 DIAGNOSIS — S51.012A SKIN TEAR OF LEFT ELBOW WITHOUT COMPLICATION, INITIAL ENCOUNTER: ICD-10-CM
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
## 2019-08-02 PROCEDURE — 99214 OFFICE O/P EST MOD 30 MIN: CPT | Performed by: PHYSICIAN ASSISTANT

## 2019-08-02 SDOH — HEALTH STABILITY: MENTAL HEALTH: HOW OFTEN DO YOU HAVE A DRINK CONTAINING ALCOHOL?: 2-3 TIMES A WEEK

## 2019-08-02 ASSESSMENT — ENCOUNTER SYMPTOMS
FEVER: 0
NAUSEA: 0
CHILLS: 0

## 2019-08-02 NOTE — PROGRESS NOTES
Subjective:   Gilberto Brown is a 79 y.o. male who presents for Wound Check (left arm )        This is a new problem. Pt has not been evaluated for this in urgent care. Patient presents to clinic for wound evaluation and dressing change.  He fell approximately 10 days ago and sustained significant tearing of his skin.  He was seen in the emergency room.  He was released yesterday after 9 days of inpatient treatment.  He does not yet have a follow-up appointment scheduled with his PCP.  He denies redness, swelling, discharge, nausea, vomiting, fevers, chills.    Wound Check   He was originally treated 2 to 3 days ago. Prior ED Treatment: Xeroform gauze and Coban, skin glue. His temperature was unmeasured prior to arrival. There has been no drainage from the wound. There is no redness present. There is no swelling present. The pain has improved. He has no difficulty moving the affected extremity or digit.     Review of Systems   Constitutional: Negative for chills and fever.   Gastrointestinal: Negative for nausea.   Skin:        Tear left arm       PMH:  has a past medical history of A-fib ([facility]), Anemia, Arthritis, ASTHMA, BPH (benign prostatic hyperplasia), Breath shortness, Bronchitis, Cataract, COPD, Dialysis, Dyslipidemia, EMPHYSEMA, GERD (gastroesophageal reflux disease), Hypertension, Oxygen decrease, Pacemaker (1988), Pain (05/09/2018), Pneumonia (2017), Pseudomonas pneumonia ([facility]), Pulmonary histoplasmosis ([facility]), Renal disorder, Sick sinus syndrome ([facility]), Sleep apnea, Snoring, Stroke ([facility]) (02/2018), Unspecified hemorrhagic conditions, and Wegener's disease, 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  MEDS:   Current Outpatient Medications:   •  epoetin lucina (EPOGEN,PROCRIT) 45974 UNIT/ML Solution, Inject 0.5 mL as instructed every Monday, Wednesday, and Friday., Disp: 30 mL, Rfl: 0  •  omeprazole (PRILOSEC) 20 MG delayed-release capsule, Take 1 Cap by mouth every day., Disp: 30 Cap, Rfl: 1  •  metoprolol SR (TOPROL XL) 25 MG  TABLET SR 24 HR, Take 25 mg by mouth 2 Times a Day., Disp: , Rfl: 3  •  predniSONE (DELTASONE) 5 MG Tab, Take 5 mg by mouth every morning., Disp: , Rfl:   •  BREO ELLIPTA 200-25 MCG/INH AEROSOL POWDER, BREATH ACTIVATED, INHALE 1 PUFF BY MOUTH EVERY DAY. RINSE MOUTH AFTER USE, Disp: 1 Each, Rfl: 5  •  B Complex-C-Folic Acid (DIALYVITE TABLET) Tab, Take 1 Tab by mouth every morning., Disp: , Rfl:   •  ipratropium-albuterol (DUONEB) 0.5-2.5 (3) MG/3ML nebulizer solution, 3 mL by Nebulization route every 6 hours as needed for Shortness of Breath., Disp: , Rfl:   •  Cholecalciferol (VITAMIN D3) 5000 units Cap, Take 5,000 Units by mouth every morning., Disp: , Rfl:   •  sodium chloride (HYPER-SAL) 7 % Nebu Soln, INHALE 1 VIAL VIA NEBULIZER TWICE DAILY, Disp: 240 mL, Rfl: 11  •  tamsulosin (FLOMAX) 0.4 MG capsule, Take 1 Cap by mouth every day., Disp: 90 Cap, Rfl: 3  •  sevelamer carbonate (RENVELA) 800 MG Tab tablet, TAKE 1 TABLET BY MOUTH THREE TIMES DAILY WITH MEALS, Disp: 270 Tab, Rfl: 7  •  apixaban (ELIQUIS) 2.5mg Tab, Take 2.5 mg by mouth every morning. Every morning, Disp: , Rfl:   •  finasteride (PROSCAR) 5 MG Tab, Take 5 mg by mouth every morning., Disp: , Rfl:   •  albuterol (PROAIR HFA) 108 (90 Base) MCG/ACT Aero Soln inhalation aerosol, Inhale 2 Puffs by mouth every 6 hours as needed for Shortness of Breath., Disp: 8.5 g, Rfl: 0  ALLERGIES:   Allergies   Allergen Reactions   • Doxycycline Unspecified     Reports terrible diarrhea   • Erythromycin Unspecified     Abdominal pain.  RXN=before 2011   • Rituximab      Flu like syndrome     SURGHX:   Past Surgical History:   Procedure Laterality Date   • AV FISTULOGRAM Right 4/12/2016    Procedure: AV FISTULOGRAM , VENOPLASTY X 2;  Surgeon: Nate Syed M.D.;  Location: SURGERY [place];  Service:    • RECOVERY  9/3/2015    Procedure: IR1 VASCULAR CASE-SYED RIGHT DIALYSIS FISTULOGRAM, POSSIBLE INTERVENTION;  Surgeon: Mayi Surgery;  Location:  "[place]UR[place] PRE[place];  Service:    • RECOVERY  2/26/2015    Performed by Ir[facility]Recovery Surgery at SURGERY SAME DAY [facility]   • RECOVERY  10/3/2014    Performed by [facility] at SURGERY SAME DAY [facility]   • RECOVERY  8/7/2014    Performed by [facility] at [facility]   • RECOVERY  12/17/2013    Performed by [facility] at [facility] SAME DAY [facility]   • BRONCHOSCOPY-ENDO  7/18/2013    Performed by Juan F Rubio M.D. at [facility]   • RECOVERY  7/17/2013    Performed by [facility] at SURGERY SAME DAY [facility]   • AV FISTULA REVISION  6/9/2012    Performed by NESSA JOHANSEN at SURGERY [facility]   • RECOVERY  4/19/2012    Performed by SURGERY, IR-RECOVERY at SURGERY SAME DAY [facility]   • AV FISTULA CREATION  3/8/2012    Performed by NESSA JOHANSEN at SURGERY [facility]   • GASTROSCOPY WITH BIOPSY  1/17/2012    Performed by ZURDO HARRISON at ENDOSCOPY [facility] [place]   • CATH PLACEMENT  10/8/2011    Performed by NESSA JOHANSEN at SURGERY [facility]   • GASTROSCOPY WITH BALLOON DILATATION  9/28/2011    Performed by KT FERNANDEZ at [facility]   • PACEMAKER INSERTION  4/2009   • ROTATOR CUFF REPAIR  2003    right   • HERNIA REPAIR  1990    right inguinal hernia   • HIP REPLACEMENT, TOTAL      right   • TONSILLECTOMY       SOCHX:  reports that he quit smoking about 29 years ago. His smoking use included pipe. He quit after 30.00 years of use. He has quit using smokeless tobacco.  His smokeless tobacco use included chew. He reports that he drinks about 4.2 oz of alcohol per week. He reports that he does not use drugs.  FH: Family history was reviewed, no pertinent findings to report   Objective:   Pulse 100   Temp 37.1 °C (98.7 °F) (Temporal)   Resp (!) 22   Ht 1.727 m (5' 8\")   Wt 59.4 kg (131 lb)   SpO2 89%   BMI 19.92 kg/m²   Physical Exam   Constitutional: He is oriented to " person, place, and time. He appears well-developed and well-nourished.  Non-toxic appearance. No distress.   HENT:   Head: Normocephalic and atraumatic.   Right Ear: External ear normal.   Left Ear: External ear normal.   Nose: Nose normal.   Eyes: Conjunctivae and lids are normal.   Neck: Neck supple.   Cardiovascular: Normal rate and regular rhythm.   Pulmonary/Chest: Effort normal and breath sounds normal. No respiratory distress.   Abdominal: Normal appearance.   Musculoskeletal:   Normal range of motion. Exhibits no edema and no tenderness.    Neurological: He is alert and oriented to person, place, and time. No cranial nerve deficit or sensory deficit.   Skin: Skin is warm, dry and intact. Capillary refill takes less than 2 seconds.   Numerous tears on left forearm ranging in size from 1 cm to 5 cm.  Several of these have been reapproximated and closed with Dermabond.  There is approximately a 7 x 3 partial-thickness tear that appears to have been resected and is open.  Xeroform gauze is in place.  No surrounding edema or erythema.  No discharge.  No warmth.   Psychiatric: He has a normal mood and affect. His speech is normal and behavior is normal. Judgment and thought content normal. Cognition and memory are normal.   Vitals reviewed.        Assessment/Plan:   1. Skin tear of left elbow without complication, initial encounter  - REFERRAL TO WOUND CLINIC    2. Fall, initial encounter    Dressings removed and wound was thoroughly evaluated and cleaned.  No evidence of infection.  Wound was we redressed with Xeroform gauze, nonstick gauze and an Ace bandage to prevent further irritation to the skin.  I recommend that patient follow-up with his PCP to receive additional changes.  I recommend that these be performed every 2 days.  If he is unable to get into see his PCP I would like him to return to clinic on Sunday to have the wound reevaluated and dressing changed.    Signs of infection, to include: redness,  swelling, increased pain, purulent discharge, red streaking from wound site, N/V, and F/C discussed with patient.  Pt instructed to RTC or go to the ER if he or she should experience these.    Differential diagnosis, natural history, supportive care, and indications for immediate follow-up discussed.

## 2019-08-02 NOTE — THERAPY
"Occupational Therapy Treatment completed with focus on ADLs, ADL transfers and patient education.  Functional Status:  SPV level BADLs in this setting  Plan of Care: Patient with no further skilled OT needs in the acute care setting at this time  Discharge Recommendations:  Equipment No Equipment Needed. Post-acute therapy Discharge to home with outpatient or home health for additional skilled therapy services.    See \"Rehab Therapy-Acute\" Patient Summary Report for complete documentation.     Pt met acute OT goals. Ed/trained pt and spouse on home safety and energy conservation. Pt performed LB dressing, toileting, and standing grooming at SPV level and appears close to functional baseline. Recommend DC home w/ HH.   "

## 2019-08-02 NOTE — THERAPY
"Physical Therapy Treatment completed.   Bed Mobility:  Supine to Sit: Minimal Assist  Transfers: Sit to Stand: Supervised  Gait: Level Of Assist: Supervised with Front-Wheel Walker       Plan of Care: Patient with no further skilled PT needs in the acute care setting at this time  Discharge Recommendations: Equipment: No Equipment Needed. Post-acute therapy Recommend home health transitional care for continued physical therapy services.     Pt with much more energy and improved mobility. Only required min assist with bed mobility as therapist simulated his rope to grab to sit up. Able to perform gait and transfers without physical assist. Reports he feels back to his normal and is very pleased with his progress. No LOB when ambulating. As pt is back to his baseline level of function, no additional acute PT needs.     See \"Rehab Therapy-Acute\" Patient Summary Report for complete documentation.       "

## 2019-08-03 ENCOUNTER — HOME CARE VISIT (OUTPATIENT)
Dept: HOME HEALTH SERVICES | Facility: HOME HEALTHCARE | Age: 79
End: 2019-08-03
Payer: MEDICARE

## 2019-08-03 VITALS
WEIGHT: 131 LBS | HEIGHT: 68 IN | BODY MASS INDEX: 19.85 KG/M2 | HEART RATE: 80 BPM | SYSTOLIC BLOOD PRESSURE: 110 MMHG | TEMPERATURE: 99.2 F | DIASTOLIC BLOOD PRESSURE: 55 MMHG | OXYGEN SATURATION: 98 % | RESPIRATION RATE: 18 BRPM

## 2019-08-03 PROCEDURE — G0493 RN CARE EA 15 MIN HH/HOSPICE: HCPCS

## 2019-08-03 PROCEDURE — 665999 HH PPS REVENUE DEBIT

## 2019-08-03 PROCEDURE — 665998 HH PPS REVENUE CREDIT

## 2019-08-03 PROCEDURE — 665001 SOC-HOME HEALTH

## 2019-08-04 PROCEDURE — 665999 HH PPS REVENUE DEBIT

## 2019-08-04 PROCEDURE — 665998 HH PPS REVENUE CREDIT

## 2019-08-04 SDOH — ECONOMIC STABILITY: HOUSING INSECURITY
HOME SAFETY: FIRE ESCAPE PLAN DEVELOPED. PATIENT DOES NOT HAVE FLAMMABLE MATERIALS PRESENT IN THE HOME PRESENTING A FIRE HAZARD. NO EVIDENCE FOUND OF SMOKING MATERIALS PRESENT IN THE HOME.

## 2019-08-04 SDOH — ECONOMIC STABILITY: HOUSING INSECURITY: EVIDENCE OF SMOKING MATERIAL: 0

## 2019-08-04 SDOH — ECONOMIC STABILITY: HOUSING INSECURITY
HOME SAFETY: OXYGEN SAFETY RISK ASSESSMENT PERFORMED. PATIENT DOES HAVE A NO SMOKING SIGN POSTED IN THE HOME. PATIENT DOES HAVE A WORKING FIRE EXTINGUISHER PRESENT IN THE HOME. SMOKE ALARMS ARE PRESENT AND FUNCTIONAL ON EACH LEVEL OF THE HOME. PATIENT DOES HAVE A

## 2019-08-04 ASSESSMENT — ENCOUNTER SYMPTOMS
VOMITING: DENIES
NAUSEA: DENIES
DEBILITATING PAIN: 1

## 2019-08-05 ENCOUNTER — TELEPHONE (OUTPATIENT)
Dept: VASCULAR LAB | Facility: MEDICAL CENTER | Age: 79
End: 2019-08-05

## 2019-08-05 ENCOUNTER — HOME CARE VISIT (OUTPATIENT)
Dept: HOME HEALTH SERVICES | Facility: HOME HEALTHCARE | Age: 79
End: 2019-08-05
Payer: MEDICARE

## 2019-08-05 VITALS
DIASTOLIC BLOOD PRESSURE: 60 MMHG | OXYGEN SATURATION: 98 % | RESPIRATION RATE: 18 BRPM | TEMPERATURE: 98.4 F | HEART RATE: 90 BPM | SYSTOLIC BLOOD PRESSURE: 105 MMHG

## 2019-08-05 PROCEDURE — 665999 HH PPS REVENUE DEBIT

## 2019-08-05 PROCEDURE — G0299 HHS/HOSPICE OF RN EA 15 MIN: HCPCS

## 2019-08-05 PROCEDURE — 665998 HH PPS REVENUE CREDIT

## 2019-08-05 ASSESSMENT — PATIENT HEALTH QUESTIONNAIRE - PHQ9
1. LITTLE INTEREST OR PLEASURE IN DOING THINGS: 00
CLINICAL INTERPRETATION OF PHQ2 SCORE: 0
2. FEELING DOWN, DEPRESSED, IRRITABLE, OR HOPELESS: 00

## 2019-08-05 ASSESSMENT — ACTIVITIES OF DAILY LIVING (ADL): OASIS_M1830: 05

## 2019-08-05 NOTE — TELEPHONE ENCOUNTER
Medications reviewed  DDI apixiban & prednisone    Emilie Martinez, Clinical Pharmacist, CDE, CACP

## 2019-08-06 ENCOUNTER — HOME CARE VISIT (OUTPATIENT)
Dept: HOME HEALTH SERVICES | Facility: HOME HEALTHCARE | Age: 79
End: 2019-08-06
Payer: MEDICARE

## 2019-08-06 PROCEDURE — 665998 HH PPS REVENUE CREDIT

## 2019-08-06 PROCEDURE — G0153 HHCP-SVS OF S/L PATH,EA 15MN: HCPCS

## 2019-08-06 PROCEDURE — 665999 HH PPS REVENUE DEBIT

## 2019-08-06 ASSESSMENT — ENCOUNTER SYMPTOMS
VOMITING: DENIES
NAUSEA: DENIES

## 2019-08-07 ENCOUNTER — HOME CARE VISIT (OUTPATIENT)
Dept: HOME HEALTH SERVICES | Facility: HOME HEALTHCARE | Age: 79
End: 2019-08-07
Payer: MEDICARE

## 2019-08-07 VITALS
HEART RATE: 78 BPM | TEMPERATURE: 98.2 F | RESPIRATION RATE: 18 BRPM | SYSTOLIC BLOOD PRESSURE: 110 MMHG | DIASTOLIC BLOOD PRESSURE: 60 MMHG | OXYGEN SATURATION: 98 %

## 2019-08-07 PROCEDURE — 665999 HH PPS REVENUE DEBIT

## 2019-08-07 PROCEDURE — 665998 HH PPS REVENUE CREDIT

## 2019-08-07 PROCEDURE — G0299 HHS/HOSPICE OF RN EA 15 MIN: HCPCS

## 2019-08-08 ENCOUNTER — HOME CARE VISIT (OUTPATIENT)
Dept: HOME HEALTH SERVICES | Facility: HOME HEALTHCARE | Age: 79
End: 2019-08-08
Payer: MEDICARE

## 2019-08-08 VITALS
RESPIRATION RATE: 18 BRPM | OXYGEN SATURATION: 98 % | HEART RATE: 95 BPM | DIASTOLIC BLOOD PRESSURE: 60 MMHG | SYSTOLIC BLOOD PRESSURE: 110 MMHG | TEMPERATURE: 98.2 F

## 2019-08-08 PROCEDURE — 665998 HH PPS REVENUE CREDIT

## 2019-08-08 PROCEDURE — 665999 HH PPS REVENUE DEBIT

## 2019-08-08 SDOH — ECONOMIC STABILITY: HOUSING INSECURITY: EVIDENCE OF SMOKING MATERIAL: 0

## 2019-08-08 ASSESSMENT — ENCOUNTER SYMPTOMS
SEVERE DYSPNEA: 1
SHORTNESS OF BREATH: T

## 2019-08-09 ENCOUNTER — HOME CARE VISIT (OUTPATIENT)
Dept: HOME HEALTH SERVICES | Facility: HOME HEALTHCARE | Age: 79
End: 2019-08-09
Payer: MEDICARE

## 2019-08-09 VITALS
DIASTOLIC BLOOD PRESSURE: 60 MMHG | HEART RATE: 93 BPM | TEMPERATURE: 99.1 F | OXYGEN SATURATION: 94 % | SYSTOLIC BLOOD PRESSURE: 110 MMHG | RESPIRATION RATE: 20 BRPM

## 2019-08-09 PROCEDURE — 665998 HH PPS REVENUE CREDIT

## 2019-08-09 PROCEDURE — G0152 HHCP-SERV OF OT,EA 15 MIN: HCPCS

## 2019-08-09 PROCEDURE — G0299 HHS/HOSPICE OF RN EA 15 MIN: HCPCS

## 2019-08-09 PROCEDURE — 665999 HH PPS REVENUE DEBIT

## 2019-08-09 ASSESSMENT — ACTIVITIES OF DAILY LIVING (ADL)
TOILETING_EQUIPMENT_USED: RAISED TOILET
MEAL_PREP_ASSISTANCE: 6
BATHING_ASSISTANCE: 1
TELEPHONE_ASSISTANCE: 0
ADLS_COMMENTS: >
BATHING_ASSISTIVE_EQUIPMENT_NEEDED: TUB TRANSFER BENCH
EATING_ASSISTANCE: 0
TOILETING_ASSISTANCE: 0
HOUSEKEEPING_ASSISTANCE: 6
GROOMING_ASSISTANCE: 0
SHOPPING_ASSISTANCE: 6
DRESSING_LB_ASSISTANCE: 0
BATHING_ASSISTIVE_EQUIPMENT_USED: SHOWER CHAIR, GRAB BAR, HANDHELD SHOWER
TRANSPORTATION_ASSISTANCE: 6
ORAL_CARE_ASSISTANCE: 0
LAUNDRY_ASSISTANCE: 6
DRESSING_UB_ASSISTANCE: 0

## 2019-08-10 PROCEDURE — 665999 HH PPS REVENUE DEBIT

## 2019-08-10 PROCEDURE — 665998 HH PPS REVENUE CREDIT

## 2019-08-11 ENCOUNTER — HOME CARE VISIT (OUTPATIENT)
Dept: HOME HEALTH SERVICES | Facility: HOME HEALTHCARE | Age: 79
End: 2019-08-11
Payer: MEDICARE

## 2019-08-11 PROCEDURE — 665998 HH PPS REVENUE CREDIT

## 2019-08-11 PROCEDURE — 665999 HH PPS REVENUE DEBIT

## 2019-08-12 VITALS
RESPIRATION RATE: 20 BRPM | OXYGEN SATURATION: 94 % | SYSTOLIC BLOOD PRESSURE: 110 MMHG | TEMPERATURE: 99.1 F | HEART RATE: 93 BPM | DIASTOLIC BLOOD PRESSURE: 60 MMHG

## 2019-08-12 PROCEDURE — 665999 HH PPS REVENUE DEBIT

## 2019-08-12 PROCEDURE — 665998 HH PPS REVENUE CREDIT

## 2019-08-12 SDOH — ECONOMIC STABILITY: HOUSING INSECURITY: EVIDENCE OF SMOKING MATERIAL: 0

## 2019-08-12 ASSESSMENT — ENCOUNTER SYMPTOMS
SEVERE DYSPNEA: 1
DEBILITATING PAIN: 1

## 2019-08-13 ENCOUNTER — APPOINTMENT (RX ONLY)
Dept: URBAN - METROPOLITAN AREA CLINIC 36 | Facility: CLINIC | Age: 79
Setting detail: DERMATOLOGY
End: 2019-08-13

## 2019-08-13 ENCOUNTER — HOME CARE VISIT (OUTPATIENT)
Dept: HOME HEALTH SERVICES | Facility: HOME HEALTHCARE | Age: 79
End: 2019-08-13
Payer: MEDICARE

## 2019-08-13 PROBLEM — C44.319 BASAL CELL CARCINOMA OF SKIN OF OTHER PARTS OF FACE: Status: ACTIVE | Noted: 2019-08-13

## 2019-08-13 PROCEDURE — 665998 HH PPS REVENUE CREDIT

## 2019-08-13 PROCEDURE — 665999 HH PPS REVENUE DEBIT

## 2019-08-13 PROCEDURE — 17312 MOHS ADDL STAGE: CPT

## 2019-08-13 PROCEDURE — 17311 MOHS 1 STAGE H/N/HF/G: CPT

## 2019-08-13 PROCEDURE — ? MOHS SURGERY

## 2019-08-13 PROCEDURE — 13132 CMPLX RPR F/C/C/M/N/AX/G/H/F: CPT

## 2019-08-13 NOTE — DOCUMENTATION QUERY
"                                                                         [facility]                                                                       Query Response Note      PATIENT:               ALETA BUCK  ACCT #:                  [id number]  MRN:                     [id number]  :                      1940  ADMIT DATE:       2019 1:39 PM  DISCH DATE:        2019 4:29 PM  RESPONDING  PROVIDER #:        [id number]           QUERY TEXT:    Please clarify in documentation the relationship, if any, between pseudomonas and pneumonia.    NOTE:  If an appropriate response is not listed below, please respond with a new note.    The patient's Clinical Indicators include:  HP-personal history of pseudomonas pneumonia   pseudomonas colonizer    PN-sputum cultures growing pseudomonas    DS-pneumonia due to infectious organism    Treatment:  Levofloxacin \"since he has a history of pseudomonas\"  Options provided:   -- Pneumonia is due to pseudomonas   -- Pneumonia is due to other unspecified organism   -- Unrelated to each other   -- Unable to determine      Query created by: Rama Adrian on 2019 4:18 PM    RESPONSE TEXT:    Pneumonia is due to pseudomonas          Electronically signed by:  EVELYNE TALAVERA 2019 6:47 AM              "

## 2019-08-13 NOTE — PROCEDURE: MOHS SURGERY
O-L Flap Text: The defect edges were debeveled with a #15 scalpel blade.  Given the location of the defect, shape of the defect and the proximity to free margins an O-L flap was deemed most appropriate.  Using a sterile surgical marker, an appropriate advancement flap was drawn incorporating the defect and placing the expected incisions within the relaxed skin tension lines where possible.    The area thus outlined was incised deep to adipose tissue with a #15 scalpel blade.  The skin margins were undermined to an appropriate distance in all directions utilizing iris scissors.
Partial Purse String (Simple) Text: Given the location of the defect and the characteristics of the surrounding skin a simple purse string closure was deemed most appropriate.  Undermining was performed circumfirentially around the surgical defect.  A purse string suture was then placed and tightened. Wound tension only allowed a partial closure of the circular defect.
W Plasty Text: The lesion was extirpated to the level of the fat with a #15 scalpel blade.  Given the location of the defect, shape of the defect and the proximity to free margins a W-plasty was deemed most appropriate for repair.  Using a sterile surgical marker, the appropriate transposition arms of the W-plasty were drawn incorporating the defect and placing the expected incisions within the relaxed skin tension lines where possible.    The area thus outlined was incised deep to adipose tissue with a #15 scalpel blade.  The skin margins were undermined to an appropriate distance in all directions utilizing iris scissors.  The opposing transposition arms were then transposed into place in opposite direction and anchored with interrupted buried subcutaneous sutures.
Asc Procedure Text (C): After obtaining clear surgical margins the patient was sent to an ASC for surgical repair.  The patient understands they will receive post-surgical care and follow-up from the ASC physician.
Anesthesia Volume In Cc: 6
Rapid Mohs Report (Note: If The Tumor Is Complex, Or If Any Stage Is Divided Into More Than 5 Pieces Or If You Want A More Detailed Report, Select No And Proceed To The Individual Stages Below): no
Show Biopsy Photo Reviewed Variable: Yes
Stage 9: Additional Anesthesia Type: 1% lidocaine with epinephrine
Banner Transposition Flap Text: The defect edges were debeveled with a #15 scalpel blade.  Given the location of the defect and the proximity to free margins a Banner transposition flap was deemed most appropriate.  Using a sterile surgical marker, an appropriate flap drawn around the defect. The area thus outlined was incised deep to adipose tissue with a #15 scalpel blade.  The skin margins were undermined to an appropriate distance in all directions utilizing iris scissors.
Non-Graft Cartilage Fenestration Text: The cartilage was fenestrated with a 2mm punch biopsy to help facilitate healing.
Otolaryngologist Procedure Text (D): After obtaining clear surgical margins the patient was sent to otolaryngology for surgical repair.  The patient understands they will receive post-surgical care and follow-up from the referring physician's office.
Detail Level: Detailed
Xenograft Text: The defect edges were debeveled with a #15 scalpel blade.  Given the location of the defect, shape of the defect and the proximity to free margins a xenograft was deemed most appropriate.  The graft was then trimmed to fit the size of the defect.  The graft was then placed in the primary defect and oriented appropriately.
Mohs Method Verbiage: An incision at a 45 degree angle following the standard Mohs approach was done and the specimen was harvested as a microscopic controlled layer.
Interpolation Flap Text: A decision was made to reconstruct the defect utilizing an interpolation axial flap and a staged reconstruction.  A telfa template was made of the defect.  This telfa template was then used to outline the interpolation flap.  The donor area for the pedicle flap was then injected with anesthesia.  The flap was excised through the skin and subcutaneous tissue down to the layer of the underlying musculature.  The interpolation flap was carefully excised within this deep plane to maintain its blood supply.  The edges of the donor site were undermined.   The donor site was closed in a primary fashion.  The pedicle was then rotated into position and sutured.  Once the tube was sutured into place, adequate blood supply was confirmed with blanching and refill.  The pedicle was then wrapped with xeroform gauze and dressed appropriately with a telfa and gauze bandage to ensure continued blood supply and protect the attached pedicle.
Complex Repair And Flap Additional Text (Will Appearing After The Standard Complex Repair Text): The complex repair was not sufficient to completely close the primary defect. The remaining additional defect was repaired with the flap mentioned below.
Surgical Defect Length In Cm (Optional): 3.7
Advancement-Rotation Flap Text: The defect edges were debeveled with a #15 scalpel blade.  Given the location of the defect, shape of the defect and the proximity to free margins an advancement-rotation flap was deemed most appropriate.  Using a sterile surgical marker, an appropriate flap was drawn incorporating the defect and placing the expected incisions within the relaxed skin tension lines where possible. The area thus outlined was incised deep to adipose tissue with a #15 scalpel blade.  The skin margins were undermined to an appropriate distance in all directions utilizing iris scissors.
Oculoplastic Surgeon Procedure Text (A): After obtaining clear surgical margins the patient was sent to oculoplastics for surgical repair.  The patient understands they will receive post-surgical care and follow-up from the referring physician's office.
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
Quadrants Reporting?: 0
Dorsal Nasal Flap Text: The defect edges were debeveled with a #15 scalpel blade.  Given the location of the defect and the proximity to free margins a dorsal nasal flap,based upon the glabellar folds, was deemed most appropriate.  Using a sterile surgical marker, an appropriate dorsal nasal flap was drawn around the defect.    The area thus outlined was incised deep to adipose tissue with a #15 scalpel blade.  The skin margins were undermined to an appropriate distance in all directions utilizing iris scissors.
Provider Procedure Text (D): After obtaining clear surgical margins the defect was repaired by another provider.
Stage 2: Additional Anesthesia Type: 1% lidocaine with 1:100,000 epinephrine and 408mcg clindamycin/ml and a 1:10 solution of 8.4% sodium bicarbonate
Suturegard Intro: Intraoperative tissue expansion was performed, utilizing the SUTUREGARD device, in order to reduce wound tension.
Paramedian Forehead Flap Text: A decision was made to reconstruct the defect utilizing an interpolation axial flap and a staged reconstruction.  A telfa template was made of the defect.  This telfa template was then used to outline the paramedian forehead pedicle flap.  The donor area for the pedicle flap was then injected with anesthesia.  The flap was excised through the skin and subcutaneous tissue down to the layer of the underlying musculature.  The pedicle flap was carefully excised within this deep plane to maintain its blood supply.  The edges of the donor site were undermined.   The donor site was closed in a primary fashion.  The pedicle was then rotated into position and sutured.  Once the tube was sutured into place, adequate blood supply was confirmed with blanching and refill.  The pedicle was then wrapped with xeroform gauze and dressed appropriately with a telfa and gauze bandage to ensure continued blood supply and protect the attached pedicle.
Hatchet Flap Text: The defect edges were debeveled with a #15 scalpel blade.  Given the location of the defect, shape of the defect and the proximity to free margins a hatchet flap based from the glabella was deemed most appropriate.  Using a sterile surgical marker, an appropriate glabellar hatchet flap was drawn incorporating the defect and placing the expected incisions within the relaxed skin tension lines where possible.    The area thus outlined was incised deep to adipose tissue with a #15 scalpel blade.  The skin margins were undermined to an appropriate distance in all directions utilizing iris scissors.
Mid-Level Procedure Text (A): After obtaining clear surgical margins the patient was sent to a mid-level provider for surgical repair.  The patient understands they will receive post-surgical care and follow-up from the mid-level provider.
Retention Suture Bite Size: 3 mm
Unique Flap 3 Name: Mercedes Flap
Medical Necessity Statement: Based on my medical judgement, Mohs surgery is the most appropriate treatment for this cancer compared to other treatments.
Stage 1: Number Of Blocks?: 1
Rotation Flap Text: The defect edges were debeveled with a #15 scalpel blade.  Given the location of the defect, shape of the defect and the proximity to free margins a rotation flap was deemed most appropriate.  Using a sterile surgical marker, an appropriate rotation flap was drawn incorporating the defect and placing the expected incisions within the relaxed skin tension lines where possible.    The area thus outlined was incised deep to adipose tissue with a #15 scalpel blade.  The skin margins were undermined to an appropriate distance in all directions utilizing iris scissors.
Subsequent Stages Histo Method Verbiage: Using a similar technique to that described above, a thin layer of tissue was removed from all areas where tumor was visible on the previous stage.  The tissue was again oriented, mapped, dyed, and processed as above.
Unique Flap 4 Name: Banner Flap
Cheiloplasty (Less Than 50%) Text: A decision was made to reconstruct the defect with a  cheiloplasty.  The defect was undermined extensively.  Additional obicularis oris muscle was excised with a 15 blade scalpel.  The defect was converted into a full thickness wedge, of less than 50% of the vertical height of the lip, to facilite a better cosmetic result.  Small vessels were then tied off with 5-0 monocyrl. The obicularis oris, superficial fascia, adipose and dermis were then reapproximated.  After the deeper layers were approximated the epidermis was reapproximated with particular care given to realign the vermilion border.
Alternatives Discussed Intro (Do Not Add Period): I discussed alternative treatments to Mohs surgery and specifically discussed the risks and benefits of
Estimated Blood Loss (Cc): less than 5 cc
Island Pedicle Flap-Requiring Vessel Identification Text: The defect edges were debeveled with a #15 scalpel blade.  Given the location of the defect, shape of the defect and the proximity to free margins an island pedicle advancement flap was deemed most appropriate.  Using a sterile surgical marker, an appropriate advancement flap was drawn, based on the axial vessel mentioned above, incorporating the defect, outlining the appropriate donor tissue and placing the expected incisions within the relaxed skin tension lines where possible.    The area thus outlined was incised deep to adipose tissue with a #15 scalpel blade.  The skin margins were undermined to an appropriate distance in all directions around the primary defect and laterally outward around the island pedicle utilizing iris scissors.  There was minimal undermining beneath the pedicle flap.
Graft Basting Suture (Optional): 5-0 Fast Absorbing Gut
Crescentic Complex Repair Preamble Text (Leave Blank If You Do Not Want): Extensive wide undermining was performed at least 2 cm in all directions.
Ftsg Text: The defect edges were debeveled with a #15 scalpel blade.  Given the location of the defect, shape of the defect and the proximity to free margins a full thickness skin graft was deemed most appropriate.  Using a sterile surgical marker, the primary defect shape was transferred to the donor site. The area thus outlined was incised deep to adipose tissue with a #15 scalpel blade.  The harvested graft was then trimmed of adipose tissue until only dermis and epidermis was left.  The skin margins of the secondary defect were undermined to an appropriate distance in all directions utilizing iris scissors.  The secondary defect was closed with interrupted buried subcutaneous sutures.  The skin edges were then re-apposed with running  sutures.  The skin graft was then placed in the primary defect and oriented appropriately.
Consent (Temporal Branch)/Introductory Paragraph: The rationale for Mohs was explained to the patient and consent was obtained. The risks, benefits and alternatives to therapy were discussed in detail. Specifically, the risks of damage to the temporal branch of the facial nerve, infection, scarring, bleeding, prolonged wound healing, incomplete removal, allergy to anesthesia, and recurrence were addressed. Prior to the procedure, the treatment site was clearly identified and confirmed by the patient. All components of Universal Protocol/PAUSE Rule completed.
Muscle Hinge Flap Text: The defect edges were debeveled with a #15 scalpel blade.  Given the size, depth and location of the defect and the proximity to free margins a muscle hinge flap was deemed most appropriate.  Using a sterile surgical marker, an appropriate hinge flap was drawn incorporating the defect. The area thus outlined was incised with a #15 scalpel blade.  The skin margins were undermined to an appropriate distance in all directions utilizing iris scissors.
Unique Flap 4 Text: The defect edges were debeveled with a #15 scalpel blade.  Given the location of the defect and the proximity to free margins a Banner transposition flap was deemed most appropriate.  Using a sterile surgical marker, an appropriate Banner transposition flap was drawn incorporating the defect.    The area thus outlined was incised deep to adipose tissue with a #15 scalpel blade.  The skin margins were undermined to an appropriate distance in all directions utilizing iris scissors.
Closure 4 Information: This tab is for additional flaps and grafts above and beyond our usual structured repairs.  Please note if you enter information here it will not currently bill and you will need to add the billing information manually.
Epidermal Sutures: 5-0 Ethilon
Double O-Z Plasty Text: The defect edges were debeveled with a #15 scalpel blade.  Given the location of the defect, shape of the defect and the proximity to free margins a Double O-Z plasty (double transposition flap) was deemed most appropriate.  Using a sterile surgical marker, the appropriate transposition flaps were drawn incorporating the defect and placing the expected incisions within the relaxed skin tension lines where possible. The area thus outlined was incised deep to adipose tissue with a #15 scalpel blade.  The skin margins were undermined to an appropriate distance in all directions utilizing iris scissors.  Hemostasis was achieved with electrocautery.  The flaps were then transposed into place, one clockwise and the other counterclockwise, and anchored with interrupted buried subcutaneous sutures.
Plastic Surgeon Procedure Text (C): After obtaining clear surgical margins the patient was sent to plastics for surgical repair.  The patient understands they will receive post-surgical care and follow-up from the referring physician's office.
Chonodrocutaneous Helical Advancement Flap Text: The defect edges were debeveled with a #15 scalpel blade.  Given the location of the defect and the proximity to free margins a chondrocutaneous helical advancement flap was deemed most appropriate.  Using a sterile surgical marker, the appropriate advancement flap was drawn incorporating the defect and placing the expected incisions within the relaxed skin tension lines where possible.    The area thus outlined was incised deep to adipose tissue with a #15 scalpel blade.  The skin margins were undermined to an appropriate distance in all directions utilizing iris scissors.
Repair Anesthesia Method: local infiltration
Bi-Rhombic Flap Text: The defect edges were debeveled with a #15 scalpel blade.  Given the location of the defect and the proximity to free margins a bi-rhombic flap was deemed most appropriate.  Using a sterile surgical marker, an appropriate rhombic flap was drawn incorporating the defect. The area thus outlined was incised deep to adipose tissue with a #15 scalpel blade.  The skin margins were undermined to an appropriate distance in all directions utilizing iris scissors.
Consent (Ear)/Introductory Paragraph: The rationale for Mohs was explained to the patient and consent was obtained. The risks, benefits and alternatives to therapy were discussed in detail. Specifically, the risks of ear deformity, infection, scarring, bleeding, prolonged wound healing, incomplete removal, allergy to anesthesia, nerve injury and recurrence were addressed. Prior to the procedure, the treatment site was clearly identified and confirmed by the patient. All components of Universal Protocol/PAUSE Rule completed.
Crescentic Intermediate Repair Preamble Text (Leave Blank If You Do Not Want): Undermining was performed with blunt dissection.
Epidermal Autograft Text: The defect edges were debeveled with a #15 scalpel blade.  Given the location of the defect, shape of the defect and the proximity to free margins an epidermal autograft was deemed most appropriate.  Using a sterile surgical marker, the primary defect shape was transferred to the donor site. The epidermal graft was then harvested.  The skin graft was then placed in the primary defect and oriented appropriately.
Wound Care: Aquaphor
Consent (Nose)/Introductory Paragraph: The rationale for Mohs was explained to the patient and consent was obtained. The risks, benefits and alternatives to therapy were discussed in detail. Specifically, the risks of nasal deformity, changes in the flow of air through the nose, infection, scarring, bleeding, prolonged wound healing, incomplete removal, allergy to anesthesia, nerve injury and recurrence were addressed. Prior to the procedure, the treatment site was clearly identified and confirmed by the patient. All components of Universal Protocol/PAUSE Rule completed.
Dermal Autograft Text: The defect edges were debeveled with a #15 scalpel blade.  Given the location of the defect, shape of the defect and the proximity to free margins a dermal autograft was deemed most appropriate.  Using a sterile surgical marker, the primary defect shape was transferred to the donor site. The area thus outlined was incised deep to adipose tissue with a #15 scalpel blade.  The harvested graft was then trimmed of adipose and epidermal tissue until only dermis was left.  The skin graft was then placed in the primary defect and oriented appropriately.
Same Histology In Subsequent Stages Text: The pattern and morphology of the tumor is as described in the first stage.
Complex/Intermediate Repair Variations: Lazy S
Z Plasty Text: The lesion was extirpated to the level of the fat with a #15 scalpel blade.  Given the location of the defect, shape of the defect and the proximity to free margins a Z-plasty was deemed most appropriate for repair.  Using a sterile surgical marker, the appropriate transposition arms of the Z-plasty were drawn incorporating the defect and placing the expected incisions within the relaxed skin tension lines where possible.    The area thus outlined was incised deep to adipose tissue with a #15 scalpel blade.  The skin margins were undermined to an appropriate distance in all directions utilizing iris scissors.  The opposing transposition arms were then transposed into place in opposite direction and anchored with interrupted buried subcutaneous sutures.
O-Z Flap Text: The defect edges were debeveled with a #15 scalpel blade.  Given the location of the defect, shape of the defect and the proximity to free margins an O-Z flap was deemed most appropriate.  Using a sterile surgical marker, an appropriate transposition flap was drawn incorporating the defect and placing the expected incisions within the relaxed skin tension lines where possible. The area thus outlined was incised deep to adipose tissue with a #15 scalpel blade.  The skin margins were undermined to an appropriate distance in all directions utilizing iris scissors.
Anesthesia Type: 1% lidocaine with 1:100,000 epinephrine and a 1:10 solution of 8.4% sodium bicarbonate
Partial Purse String (Intermediate) Text: Given the location of the defect and the characteristics of the surrounding skin an intermediate purse string closure was deemed most appropriate.  Undermining was performed circumfirentially around the surgical defect.  A purse string suture was then placed and tightened. Wound tension only allowed a partial closure of the circular defect.
Purse String (Simple) Text: Given the location of the defect and the characteristics of the surrounding skin a purse string closure was deemed most appropriate.  Undermining was performed circumfirentially around the surgical defect.  A purse string suture was then placed and tightened.
Bilobed Flap Text: The defect edges were debeveled with a #15 scalpel blade.  Given the location of the defect and the proximity to free margins a bilobe flap was deemed most appropriate.  Using a sterile surgical marker, an appropriate bilobe flap drawn around the defect.    The area thus outlined was incised deep to adipose tissue with a #15 scalpel blade.  The skin margins were undermined to an appropriate distance in all directions utilizing iris scissors.
Graft Cartilage Fenestration Text: The cartilage was fenestrated with a 2mm punch biopsy to help facilitate graft survival and healing.
Wound Care (No Sutures): Petrolatum
Information: Selecting Yes will display possible errors in your note based on the variables you have selected. This validation is only offered as a suggestion for you. PLEASE NOTE THAT THE VALIDATION TEXT WILL BE REMOVED WHEN YOU FINALIZE YOUR NOTE. IF YOU WANT TO FAX A PRELIMINARY NOTE YOU WILL NEED TO TOGGLE THIS TO 'NO' IF YOU DO NOT WANT IT IN YOUR FAXED NOTE.
Melolabial Interpolation Flap Text: A decision was made to reconstruct the defect utilizing an interpolation axial flap and a staged reconstruction.  A telfa template was made of the defect.  This telfa template was then used to outline the melolabial interpolation flap.  The donor area for the pedicle flap was then injected with anesthesia.  The flap was excised through the skin and subcutaneous tissue down to the layer of the underlying musculature.  The pedicle flap was carefully excised within this deep plane to maintain its blood supply.  The edges of the donor site were undermined.   The donor site was closed in a primary fashion.  The pedicle was then rotated into position and sutured.  Once the tube was sutured into place, adequate blood supply was confirmed with blanching and refill.  The pedicle was then wrapped with xeroform gauze and dressed appropriately with a telfa and gauze bandage to ensure continued blood supply and protect the attached pedicle.
Complex Repair And Graft Additional Text (Will Appearing After The Standard Complex Repair Text): The complex repair was not sufficient to completely close the primary defect. The remaining additional defect was repaired with the graft mentioned below.
Surgical Defect Width In Cm (Optional): 1.7
Pain Refusal Text: I offered to prescribe pain medication but the patient refused to take this medication.
Mercedes Flap Text: The defect edges were debeveled with a #15 scalpel blade.  Given the location of the defect, shape of the defect and the proximity to free margins a Mercedes flap was deemed most appropriate.  Using a sterile surgical marker, an appropriate advancement flap was drawn incorporating the defect and placing the expected incisions within the relaxed skin tension lines where possible. The area thus outlined was incised deep to adipose tissue with a #15 scalpel blade.  The skin margins were undermined to an appropriate distance in all directions utilizing iris scissors.
Surgeon/Pathologist Verbiage (Will Incorporate Name Of Surgeon From Intro If Not Blank): operated in two distinct and integrated capacities as the surgeon and pathologist.
Island Pedicle Flap Text: The defect edges were debeveled with a #15 scalpel blade.  Given the location of the defect, shape of the defect and the proximity to free margins an island pedicle advancement flap was deemed most appropriate.  Using a sterile surgical marker, an appropriate advancement flap was drawn incorporating the defect, outlining the appropriate donor tissue and placing the expected incisions within the relaxed skin tension lines where possible.    The area thus outlined was incised deep to adipose tissue with a #15 scalpel blade.  The skin margins were undermined to an appropriate distance in all directions around the primary defect and laterally outward around the island pedicle utilizing iris scissors.  There was minimal undermining beneath the pedicle flap.
Suture Removal: 7 days
Bcc Histology Text: There were numerous aggregates of basaloid cells.
Surgeon Performing Repair (Optional): Janak
Suturegard Body: The suture ends were repeatedly re-tightened and re-clamped to achieve the desired tissue expansion.
Initial Size Of Lesion: 1.2
Donor Site Anesthesia Type: same as repair anesthesia
Mohs Rapid Report Verbiage: The area of clinically evident tumor was marked with skin marking ink and appropriately hatched.  The initial incision was made following the Mohs approach through the skin.  The specimen was taken to the lab, divided into the necessary number of pieces, chromacoded and processed according to the Mohs protocol.  This was repeated in successive stages until a tumor free defect was achieved.
Consent 1/Introductory Paragraph: The rationale for Mohs was explained to the patient and consent was obtained. The risks, benefits and alternatives to therapy were discussed in detail. Specifically, the risks of infection, scarring, bleeding, prolonged wound healing, incomplete removal, allergy to anesthesia, nerve injury and recurrence were addressed. Prior to the procedure, the treatment site was clearly identified and confirmed by the patient. All components of Universal Protocol/PAUSE Rule completed.
Cheiloplasty (Complex) Text: A decision was made to reconstruct the defect with a  cheiloplasty.  The defect was undermined extensively.  Additional obicularis oris muscle was excised with a 15 blade scalpel.  The defect was converted into a full thickness wedge to facilite a better cosmetic result.  Small vessels were then tied off with 5-0 monocyrl. The obicularis oris, superficial fascia, adipose and dermis were then reapproximated.  After the deeper layers were approximated the epidermis was reapproximated with particular care given to realign the vermilion border.
Unique Flap 1 Text: A decision was made to reconstruct the defect utilizing a myocutaneous Island pedicle Flap based on the levator labii superioris muscle.  A telfa template was made of the defect.  This telfa template was then used to outline the myocutaneous flap, based along the meilolabial fold.  The donor area for the pedicle flap was then injected with anesthesia.  The flap was excised through the skin and subcutaneous tissue down to the layer of the underlying musculature.  The myocutaneous flap was carefully excised within this deep plane to maintain its blood supply. Based on the muscle. The edges of the donor site were undermined.   The donor site was closed in a primary fashion to the point of transposition.  The pedicle was then transposed into position and sutured.  Once the flap was sutured into place, adequate blood supply was confirmed with blanching and refill.
Spiral Flap Text: The defect edges were debeveled with a #15 scalpel blade.  Given the location of the defect, shape of the defect and the proximity to free margins a spiral flap was deemed most appropriate.  Using a sterile surgical marker, an appropriate rotation flap was drawn incorporating the defect and placing the expected incisions within the relaxed skin tension lines where possible. The area thus outlined was incised deep to adipose tissue with a #15 scalpel blade.  The skin margins were undermined to an appropriate distance in all directions utilizing iris scissors.
Keystone Flap Text: The defect edges were debeveled with a #15 scalpel blade.  Given the location of the defect, shape of the defect a keystone flap was deemed most appropriate.  Using a sterile surgical marker, an appropriate keystone flap was drawn incorporating the defect, outlining the appropriate donor tissue and placing the expected incisions within the relaxed skin tension lines where possible. The area thus outlined was incised deep to adipose tissue with a #15 scalpel blade.  The skin margins were undermined to an appropriate distance in all directions around the primary defect and laterally outward around the flap utilizing iris scissors.
Advancement Flap (Single) Text: The defect edges were debeveled with a #15 scalpel blade.  Given the location of the defect and the proximity to free margins a single advancement flap was deemed most appropriate.  Using a sterile surgical marker, an appropriate advancement flap was drawn incorporating the defect and placing the expected incisions within the relaxed skin tension lines where possible.    The area thus outlined was incised deep to adipose tissue with a #15 scalpel blade.  The skin margins were undermined to an appropriate distance in all directions utilizing iris scissors.
Area H Indication Text: Tumors in this location are included in Area H (eyelids, eyebrows, nose, lips, chin, ear, pre-auricular, post-auricular, temple, genitalia, hands, feet, ankles and areola).  Tissue conservation is critical in these anatomic locations.
Melolabial Transposition Flap Text: The defect edges were debeveled with a #15 scalpel blade.  Given the location of the defect and the proximity to free margins a melolabial flap was deemed most appropriate.  Using a sterile surgical marker, an appropriate melolabial transposition flap was drawn incorporating the defect.    The area thus outlined was incised deep to adipose tissue with a #15 scalpel blade.  The skin margins were undermined to an appropriate distance in all directions utilizing iris scissors.
Consent (Marginal Mandibular)/Introductory Paragraph: The rationale for Mohs was explained to the patient and consent was obtained. The risks, benefits and alternatives to therapy were discussed in detail. Specifically, the risks of damage to the marginal mandibular branch of the facial nerve, infection, scarring, bleeding, prolonged wound healing, incomplete removal, allergy to anesthesia, and recurrence were addressed. Prior to the procedure, the treatment site was clearly identified and confirmed by the patient. All components of Universal Protocol/PAUSE Rule completed.
Mohs Case Number: [id number]
Manual Repair Warning Statement: We plan on removing the manually selected variable below in favor of our much easier automatic structured text blocks found in the previous tab. We decided to do this to help make the flow better and give you the full power of structured data. Manual selection is never going to be ideal in our platform and I would encourage you to avoid using manual selection from this point on, especially since I will be sunsetting this feature. It is important that you do one of two things with the customized text below. First, you can save all of the text in a word file so you can have it for future reference. Second, transfer the text to the appropriate area in the Library tab. Lastly, if there is a flap or graft type which we do not have you need to let us know right away so I can add it in before the variable is hidden. No need to panic, we plan to give you roughly 6 months to make the change.
Split-Thickness Skin Graft Text: The defect edges were debeveled with a #15 scalpel blade.  Given the location of the defect, shape of the defect and the proximity to free margins a split thickness skin graft was deemed most appropriate.  Using a sterile surgical marker, the primary defect shape was transferred to the donor site. The split thickness graft was then harvested.  The skin graft was then placed in the primary defect and oriented appropriately.
Epidermal Closure Graft Donor Site (Optional): simple interrupted
Crescentic Advancement Flap Text: The defect edges were debeveled with a #15 scalpel blade.  Given the location of the defect and the proximity to free margins a crescentic advancement flap was deemed most appropriate.  Using a sterile surgical marker, the appropriate advancement flap was drawn incorporating the defect and placing the expected incisions within the relaxed skin tension lines where possible.    The area thus outlined was incised deep to adipose tissue with a #15 scalpel blade.  The skin margins were undermined to an appropriate distance in all directions utilizing iris scissors.
S Plasty Text: Given the location and shape of the defect, and the orientation of relaxed skin tension lines, an S-plasty was deemed most appropriate for repair.  Using a sterile surgical marker, the appropriate outline of the S-plasty was drawn, incorporating the defect and placing the expected incisions within the relaxed skin tension lines where possible.  The area thus outlined was incised deep to adipose tissue with a #15 scalpel blade.  The skin margins were undermined to an appropriate distance in all directions utilizing iris scissors. The skin flaps were advanced over the defect.  The opposing margins were then approximated with interrupted buried subcutaneous sutures.
Helical Rim Advancement Flap Text: The defect edges were debeveled with a #15 blade scalpel.  Given the location of the defect and the proximity to free margins (helical rim) a double helical rim advancement flap was deemed most appropriate.  Using a sterile surgical marker, the appropriate advancement flaps were drawn incorporating the defect and placing the expected incisions between the helical rim and antihelix where possible.  The area thus outlined was incised through and through with a #15 scalpel blade.  With a skin hook and iris scissors, the flaps were gently and sharply undermined and freed up.
Graft Donor Site Epidermal Sutures (Optional): 5-0 Ethibond
Skin Substitute Text: The defect edges were debeveled with a #15 scalpel blade.  Given the location of the defect, shape of the defect and the proximity to free margins a skin substitute graft was deemed most appropriate.  The graft material was trimmed to fit the size of the defect. The graft was then placed in the primary defect and oriented appropriately.
Location Indication Override (Is Already Calculated Based On Selected Body Location): Area M
Bilateral Helical Rim Advancement Flap Text: The defect edges were debeveled with a #15 blade scalpel.  Given the location of the defect and the proximity to free margins (helical rim) a bilateral helical rim advancement flap was deemed most appropriate.  Using a sterile surgical marker, the appropriate advancement flaps were drawn incorporating the defect and placing the expected incisions between the helical rim and antihelix where possible.  The area thus outlined was incised through and through with a #15 scalpel blade.  With a skin hook and iris scissors, the flaps were gently and sharply undermined and freed up.
Dressing: dry sterile dressing
No Residual Tumor Seen Histology Text: There were no malignant cells seen in the sections examined.
Referring Physician (Optional): BRENNON Willard
Consent (Lip)/Introductory Paragraph: The rationale for Mohs was explained to the patient and consent was obtained. The risks, benefits and alternatives to therapy were discussed in detail. Specifically, the risks of lip deformity, changes in the oral aperture, infection, scarring, bleeding, prolonged wound healing, incomplete removal, allergy to anesthesia, nerve injury and recurrence were addressed. Prior to the procedure, the treatment site was clearly identified and confirmed by the patient. All components of Universal Protocol/PAUSE Rule completed.
Epidermal Closure: running cuticular
Closure 2 Information: This tab is for additional flaps and grafts, including complex repair and grafts and complex repair and flaps. You can also specify a different location for the additional defect, if the location is the same you do not need to select a new one. We will insert the automated text for the repair you select below just as we do for solitary flaps and grafts. Please note that at this time if you select a location with a different insurance zone you will need to override the ICD10 and CPT if appropriate.
Cheek Interpolation Flap Text: A decision was made to reconstruct the defect utilizing an interpolation axial flap and a staged reconstruction.  A telfa template was made of the defect.  This telfa template was then used to outline the Cheek Interpolation flap.  The donor area for the pedicle flap was then injected with anesthesia.  The flap was excised through the skin and subcutaneous tissue down to the layer of the underlying musculature.  The interpolation flap was carefully excised within this deep plane to maintain its blood supply.  The edges of the donor site were undermined.   The donor site was closed in a primary fashion.  The pedicle was then rotated into position and sutured.  Once the tube was sutured into place, adequate blood supply was confirmed with blanching and refill.  The pedicle was then wrapped with xeroform gauze and dressed appropriately with a telfa and gauze bandage to ensure continued blood supply and protect the attached pedicle.
Localized Dermabrasion With Wire Brush Text: The patient was draped in routine manner.  Localized dermabrasion using 3 x 17 mm wire brush was performed in routine manner to papillary dermis. This spot dermabrasion is being performed to complete skin cancer reconstruction. It also will eliminate the other sun damaged precancerous cells that are known to be part of the regional effect of a lifetime's worth of sun exposure. This localized dermabrasion is therapeutic and should not be considered cosmetic in any regard.
Double O-Z Flap Text: The defect edges were debeveled with a #15 scalpel blade.  Given the location of the defect, shape of the defect and the proximity to free margins a Double O-Z flap was deemed most appropriate.  Using a sterile surgical marker, an appropriate transposition flap was drawn incorporating the defect and placing the expected incisions within the relaxed skin tension lines where possible. The area thus outlined was incised deep to adipose tissue with a #15 scalpel blade.  The skin margins were undermined to an appropriate distance in all directions utilizing iris scissors.
Bilobed Transposition Flap Text: The defect edges were debeveled with a #15 scalpel blade.  Given the location of the defect and the proximity to free margins a bilobed transposition flap was deemed most appropriate.  Using a sterile surgical marker, an appropriate bilobe flap drawn around the defect.    The area thus outlined was incised deep to adipose tissue with a #15 scalpel blade.  The skin margins were undermined to an appropriate distance in all directions utilizing iris scissors.
Secondary Intention Text (Leave Blank If You Do Not Want): The defect will heal with secondary intention.
Purse String (Intermediate) Text: Given the location of the defect and the characteristics of the surrounding skin a purse string intermediate closure was deemed most appropriate.  Undermining was performed circumfirentially around the surgical defect.  A purse string suture was then placed and tightened.
Modified Advancement Flap Text: The defect edges were debeveled with a #15 scalpel blade.  Given the location of the defect, shape of the defect and the proximity to free margins a modified advancement flap was deemed most appropriate.  Using a sterile surgical marker, an appropriate advancement flap was drawn incorporating the defect and placing the expected incisions within the relaxed skin tension lines where possible.    The area thus outlined was incised deep to adipose tissue with a #15 scalpel blade.  The skin margins were undermined to an appropriate distance in all directions utilizing iris scissors.
Mohs Histo Method Verbiage: Each section was then chromacoded and processed in the Mohs lab using the Mohs protocol and submitted for frozen section.
Unique Flap 1 Name: Myocutaneous Island pedicle Flap
Mastoid Interpolation Flap Text: A decision was made to reconstruct the defect utilizing an interpolation axial flap and a staged reconstruction.  A telfa template was made of the defect.  This telfa template was then used to outline the mastoid interpolation flap.  The donor area for the pedicle flap was then injected with anesthesia.  The flap was excised through the skin and subcutaneous tissue down to the layer of the underlying musculature.  The pedicle flap was carefully excised within this deep plane to maintain its blood supply.  The edges of the donor site were undermined.   The donor site was closed in a primary fashion.  The pedicle was then rotated into position and sutured.  Once the tube was sutured into place, adequate blood supply was confirmed with blanching and refill.  The pedicle was then wrapped with xeroform gauze and dressed appropriately with a telfa and gauze bandage to ensure continued blood supply and protect the attached pedicle.
Suturegard Retention Suture: 2-0 Nylon
Mauc Instructions: By selecting yes to the question below the MAUC number will be added into the note.  This will be calculated automatically based on the diagnosis chosen, the size entered, the body zone selected (H,M,L) and the specific indications you chose. You will also have the option to override the Mohs AUC if you disagree with the automatically calculated number and this option is found in the Case Summary tab.
Bcc Infiltrative Histology Text: There were numerous aggregates of basaloid cells demonstrating an infiltrative pattern.
Surgical Defect Length In Cm (Optional): 2.1
Island Pedicle Flap With Canthal Suspension Text: The defect edges were debeveled with a #15 scalpel blade.  Given the location of the defect, shape of the defect and the proximity to free margins an island pedicle advancement flap was deemed most appropriate.  Using a sterile surgical marker, an appropriate advancement flap was drawn incorporating the defect, outlining the appropriate donor tissue and placing the expected incisions within the relaxed skin tension lines where possible. The area thus outlined was incised deep to adipose tissue with a #15 scalpel blade.  The skin margins were undermined to an appropriate distance in all directions around the primary defect and laterally outward around the island pedicle utilizing iris scissors.  There was minimal undermining beneath the pedicle flap. A suspension suture was placed in the canthal tendon to prevent tension and prevent ectropion.
Alar Island Pedicle Flap Text: The defect edges were debeveled with a #15 scalpel blade.  Given the location of the defect, shape of the defect and the proximity to the alar rim an island pedicle advancement flap was deemed most appropriate.  Using a sterile surgical marker, an appropriate advancement flap was drawn incorporating the defect, outlining the appropriate donor tissue and placing the expected incisions within the nasal ala running parallel to the alar rim. The area thus outlined was incised with a #15 scalpel blade.  The skin margins were undermined minimally to an appropriate distance in all directions around the primary defect and laterally outward around the island pedicle utilizing iris scissors.  There was minimal undermining beneath the pedicle flap.
Postop Diagnosis: same
Surgical Defect Width In Cm (Optional): 1.3
X Size Of Lesion In Cm (Optional): 0.7
Ear Wedge Repair Text: A wedge excision was completed by carrying down an excision through the full thickness of the ear and cartilage with an inward facing Burow's triangle. The wound was then closed in a layered fashion.
Star Wedge Flap Text: The defect edges were debeveled with a #15 scalpel blade.  Given the location of the defect, shape of the defect and the proximity to free margins a star wedge flap was deemed most appropriate.  Using a sterile surgical marker, an appropriate rotation flap was drawn incorporating the defect and placing the expected incisions within the relaxed skin tension lines where possible. The area thus outlined was incised deep to adipose tissue with a #15 scalpel blade.  The skin margins were undermined to an appropriate distance in all directions utilizing iris scissors.
Unique Flap 2 Text: A decision was made to reconstruct the defect utilizing a Peng Flap (Bilateral Advancement Rotation Flap). Given the location of the defect and the proximity to free margins, this flap was deemed most appropriate.  Using a sterile surgical marker, the appropriate rotation flaps were drawn incorporating the defect and placing the expected incisions within the relaxed skin tension lines where possible.    The area thus outlined was incised deep to adipose tissue with a #15 scalpel blade.  The skin margins were undermined to an appropriate distance in all directions utilizing iris scissors.
Consent 2/Introductory Paragraph: Mohs surgery was explained to the patient and consent was obtained. The risks, benefits and alternatives to therapy were discussed in detail. Specifically, the risks of infection, scarring, bleeding, prolonged wound healing, incomplete removal, allergy to anesthesia, nerve injury and recurrence were addressed. Prior to the procedure, the treatment site was clearly identified and confirmed by the patient. All components of Universal Protocol/PAUSE Rule completed.
Surgical Defect Width In Cm (Optional): 0.9
O-T Plasty Text: The defect edges were debeveled with a #15 scalpel blade.  Given the location of the defect, shape of the defect and the proximity to free margins an O-T plasty was deemed most appropriate.  Using a sterile surgical marker, an appropriate O-T plasty was drawn incorporating the defect and placing the expected incisions within the relaxed skin tension lines where possible.    The area thus outlined was incised deep to adipose tissue with a #15 scalpel blade.  The skin margins were undermined to an appropriate distance in all directions utilizing iris scissors.
Advancement Flap (Double) Text: The defect edges were debeveled with a #15 scalpel blade.  Given the location of the defect and the proximity to free margins a double advancement flap was deemed most appropriate.  Using a sterile surgical marker, the appropriate advancement flaps were drawn incorporating the defect and placing the expected incisions within the relaxed skin tension lines where possible.    The area thus outlined was incised deep to adipose tissue with a #15 scalpel blade.  The skin margins were undermined to an appropriate distance in all directions utilizing iris scissors.
Unna Boot Text: An Unna boot was placed to help immobilize the limb and facilitate more rapid healing.
Repair Type: Complex Repair
Area M Indication Text: Tumors in this location are included in Area M (cheek, forehead, scalp, neck, jawline and pretibial skin).  Mohs surgery is indicated for tumors in these anatomic locations.
Rhombic Flap Text: The defect edges were debeveled with a #15 scalpel blade.  Given the location of the defect and the proximity to free margins a rhombic flap was deemed most appropriate.  Using a sterile surgical marker, an appropriate rhombic flap was drawn incorporating the defect.    The area thus outlined was incised deep to adipose tissue with a #15 scalpel blade.  The skin margins were undermined to an appropriate distance in all directions utilizing iris scissors.
M-Plasty Complex Repair Preamble Text (Leave Blank If You Do Not Want): Extensive wide undermining was performed.
Consent (Spinal Accessory)/Introductory Paragraph: The rationale for Mohs was explained to the patient and consent was obtained. The risks, benefits and alternatives to therapy were discussed in detail. Specifically, the risks of damage to the spinal accessory nerve, infection, scarring, bleeding, prolonged wound healing, incomplete removal, allergy to anesthesia, and recurrence were addressed. Prior to the procedure, the treatment site was clearly identified and confirmed by the patient. All components of Universal Protocol/PAUSE Rule completed.
Cartilage Graft Text: The defect edges were debeveled with a #15 scalpel blade.  Given the location of the defect, shape of the defect, the fact the defect involved a full thickness cartilage defect a cartilage graft was deemed most appropriate.  An appropriate donor site was identified, cleansed, and anesthetized. The cartilage graft was then harvested and transferred to the recipient site, oriented appropriately and then sutured into place.  The secondary defect was then repaired using a primary closure.
Length To Time In Minutes Device Was In Place: 10
V-Y Plasty Text: The defect edges were debeveled with a #15 scalpel blade.  Given the location of the defect, shape of the defect and the proximity to free margins an V-Y advancement flap was deemed most appropriate.  Using a sterile surgical marker, an appropriate advancement flap was drawn incorporating the defect and placing the expected incisions within the relaxed skin tension lines where possible.    The area thus outlined was incised deep to adipose tissue with a #15 scalpel blade.  The skin margins were undermined to an appropriate distance in all directions utilizing iris scissors.
A-T Advancement Flap Text: The defect edges were debeveled with a #15 scalpel blade.  Given the location of the defect, shape of the defect and the proximity to free margins an A-T advancement flap was deemed most appropriate.  Using a sterile surgical marker, an appropriate advancement flap was drawn incorporating the defect and placing the expected incisions within the relaxed skin tension lines where possible.    The area thus outlined was incised deep to adipose tissue with a #15 scalpel blade.  The skin margins were undermined to an appropriate distance in all directions utilizing iris scissors.
Post-Care Instructions: I reviewed with the patient in detail post-care instructions. Patient is not to engage in any heavy lifting, exercise, or swimming for the next 14 days. Should the patient develop any fevers, chills, bleeding, severe pain patient will contact the office immediately.
Oculoplastic Surgeon (A): Juan
O-T Advancement Flap Text: The defect edges were debeveled with a #15 scalpel blade.  Given the location of the defect, shape of the defect and the proximity to free margins an O-T advancement flap was deemed most appropriate.  Using a sterile surgical marker, an appropriate advancement flap was drawn incorporating the defect and placing the expected incisions within the relaxed skin tension lines where possible.    The area thus outlined was incised deep to adipose tissue with a #15 scalpel blade.  The skin margins were undermined to an appropriate distance in all directions utilizing iris scissors.
Ear Star Wedge Flap Text: The defect edges were debeveled with a #15 blade scalpel.  Given the location of the defect and the proximity to free margins (helical rim) an ear star wedge flap was deemed most appropriate.  Using a sterile surgical marker, the appropriate flap was drawn incorporating the defect and placing the expected incisions between the helical rim and antihelix where possible.  The area thus outlined was incised through and through with a #15 scalpel blade.
Inflammation Suggestive Of Cancer Camouflage Histology Text: There was a dense lymphocytic infiltrate which prevented adequate histologic evaluation of adjacent structures.
Consent Type: Consent 1 (Standard)
Tissue Cultured Epidermal Autograft Text: The defect edges were debeveled with a #15 scalpel blade.  Given the location of the defect, shape of the defect and the proximity to free margins a tissue cultured epidermal autograft was deemed most appropriate.  The graft was then trimmed to fit the size of the defect.  The graft was then placed in the primary defect and oriented appropriately.
Consent (Scalp)/Introductory Paragraph: The rationale for Mohs was explained to the patient and consent was obtained. The risks, benefits and alternatives to therapy were discussed in detail. Specifically, the risks of changes in hair growth pattern secondary to repair, infection, scarring, bleeding, prolonged wound healing, incomplete removal, allergy to anesthesia, nerve injury and recurrence were addressed. Prior to the procedure, the treatment site was clearly identified and confirmed by the patient. All components of Universal Protocol/PAUSE Rule completed.
V-Y Flap Text: The defect edges were debeveled with a #15 scalpel blade.  Given the location of the defect, shape of the defect and the proximity to free margins a V-Y flap was deemed most appropriate.  Using a sterile surgical marker, an appropriate advancement flap was drawn incorporating the defect and placing the expected incisions within the relaxed skin tension lines where possible.    The area thus outlined was incised deep to adipose tissue with a #15 scalpel blade.  The skin margins were undermined to an appropriate distance in all directions utilizing iris scissors.
Eye Protection Verbiage: Before proceeding with the stage, a plastic scleral shield was inserted. The globe was anesthetized with a few drops of 1% lidocaine with 1:100,000 epinephrine. Then, an appropriate sized scleral shield was chosen and coated with lacrilube ointment. The shield was gently inserted and left in place for the duration of each stage. After the stage was completed, the shield was gently removed.
Tarsorrhaphy Text: A tarsorrhaphy was performed using Frost sutures.
Cheek-To-Nose Interpolation Flap Text: A decision was made to reconstruct the defect utilizing an interpolation axial flap and a staged reconstruction.  A telfa template was made of the defect.  This telfa template was then used to outline the Cheek-To-Nose Interpolation flap.  The donor area for the pedicle flap was then injected with anesthesia.  The flap was excised through the skin and subcutaneous tissue down to the layer of the underlying musculature.  The interpolation flap was carefully excised within this deep plane to maintain its blood supply.  The edges of the donor site were undermined.   The donor site was closed in a primary fashion.  The pedicle was then rotated into position and sutured.  Once the tube was sutured into place, adequate blood supply was confirmed with blanching and refill.  The pedicle was then wrapped with xeroform gauze and dressed appropriately with a telfa and gauze bandage to ensure continued blood supply and protect the attached pedicle.
No Repair - Repaired With Adjacent Surgical Defect Text (Leave Blank If You Do Not Want): After obtaining clear surgical margins the defect was repaired concurrently with another surgical defect which was in close approximation.
Trilobed Flap Text: The defect edges were debeveled with a #15 scalpel blade.  Given the location of the defect and the proximity to free margins a trilobed flap was deemed most appropriate.  Using a sterile surgical marker, an appropriate trilobed flap drawn around the defect.    The area thus outlined was incised deep to adipose tissue with a #15 scalpel blade.  The skin margins were undermined to an appropriate distance in all directions utilizing iris scissors.
Posterior Auricular Interpolation Flap Text: A decision was made to reconstruct the defect utilizing an interpolation axial flap and a staged reconstruction.  A telfa template was made of the defect.  This telfa template was then used to outline the posterior auricular interpolation flap.  The donor area for the pedicle flap was then injected with anesthesia.  The flap was excised through the skin and subcutaneous tissue down to the layer of the underlying musculature.  The pedicle flap was carefully excised within this deep plane to maintain its blood supply.  The edges of the donor site were undermined.   The donor site was closed in a primary fashion.  The pedicle was then rotated into position and sutured.  Once the tube was sutured into place, adequate blood supply was confirmed with blanching and refill.  The pedicle was then wrapped with xeroform gauze and dressed appropriately with a telfa and gauze bandage to ensure continued blood supply and protect the attached pedicle.
Unique Flap 2 Name: Peng Flap
Mucosal Advancement Flap Text: Given the location of the defect, shape of the defect and the proximity to free margins a mucosal advancement flap was deemed most appropriate. Incisions were made with a 15 blade scalpel in the appropriate fashion along the cutaneous vermilion border and the mucosal lip. The remaining actinically damaged mucosal tissue was excised.  The mucosal advancement flap was then elevated to the gingival sulcus with care taken to preserve the neurovascular structures and advanced into the primary defect. Care was taken to ensure that precise realignment of the vermilion border was achieved.
Hemostasis: Electrocautery
Deep Sutures: 5-0 Vicryl
Double Island Pedicle Flap Text: The defect edges were debeveled with a #15 scalpel blade.  Given the location of the defect, shape of the defect and the proximity to free margins a double island pedicle advancement flap was deemed most appropriate.  Using a sterile surgical marker, an appropriate advancement flap was drawn incorporating the defect, outlining the appropriate donor tissue and placing the expected incisions within the relaxed skin tension lines where possible.    The area thus outlined was incised deep to adipose tissue with a #15 scalpel blade.  The skin margins were undermined to an appropriate distance in all directions around the primary defect and laterally outward around the island pedicle utilizing iris scissors.  There was minimal undermining beneath the pedicle flap.
Number Of Stages: 3
Transposition Flap Text: The defect edges were debeveled with a #15 scalpel blade.  Given the location of the defect and the proximity to free margins a transposition flap was deemed most appropriate.  Using a sterile surgical marker, an appropriate transposition flap was drawn incorporating the defect.    The area thus outlined was incised deep to adipose tissue with a #15 scalpel blade.  The skin margins were undermined to an appropriate distance in all directions utilizing iris scissors.
Full Thickness Lip Wedge Repair (Flap) Text: Given the location of the defect and the proximity to free margins a full thickness wedge repair was deemed most appropriate.  Using a sterile surgical marker, the appropriate repair was drawn incorporating the defect and placing the expected incisions perpendicular to the vermilion border.  The vermilion border was also meticulously outlined to ensure appropriate reapproximation during the repair.  The area thus outlined was incised through and through with a #15 scalpel blade.  The muscularis and dermis were reaproximated with deep sutures following hemostasis. Care was taken to realign the vermilion border before proceeding with the superficial closure.  Once the vermilion was realigned the superfical and mucosal closure was finished.
Consent 3/Introductory Paragraph: I gave the patient a chance to ask questions they had about the procedure.  Following this I explained the Mohs procedure and consent was obtained. The risks, benefits and alternatives to therapy were discussed in detail. Specifically, the risks of infection, scarring, bleeding, prolonged wound healing, incomplete removal, allergy to anesthesia, nerve injury and recurrence were addressed. Prior to the procedure, the treatment site was clearly identified and confirmed by the patient. All components of Universal Protocol/PAUSE Rule completed.
Body Location Override (Optional - Billing Will Still Be Based On Selected Body Map Location If Applicable): right superior medial forehead
Unique Flap 3 Text: The defect edges were debeveled with a #15 scalpel blade.  Given the location of the defect, shape of the defect and the proximity to free margins a Mercedes (double advancement flap) was deemed most appropriate.  Using a sterile surgical marker, the appropriate transposition flaps were drawn incorporating the defect and placing the expected incisions within the relaxed skin tension lines where possible.    The area thus outlined was incised deep to adipose tissue with a #15 scalpel blade.  The skin margins were undermined to an appropriate distance in all directions utilizing iris scissors.  Hemostasis was achieved with electrocautery.  The flaps were then advanced into the defect and anchored with interrupted buried subcutaneous sutures.
Area L Indication Text: Tumors in this location are included in Area L (trunk and extremities).  Mohs surgery is indicated for larger tumors, or tumors with aggressive histologic features, in these anatomic locations.
O-Z Plasty Text: The defect edges were debeveled with a #15 scalpel blade.  Given the location of the defect, shape of the defect and the proximity to free margins an O-Z plasty (double transposition flap) was deemed most appropriate.  Using a sterile surgical marker, the appropriate transposition flaps were drawn incorporating the defect and placing the expected incisions within the relaxed skin tension lines where possible.    The area thus outlined was incised deep to adipose tissue with a #15 scalpel blade.  The skin margins were undermined to an appropriate distance in all directions utilizing iris scissors.  Hemostasis was achieved with electrocautery.  The flaps were then transposed into place, one clockwise and the other counterclockwise, and anchored with interrupted buried subcutaneous sutures.
Burow's Advancement Flap Text: The defect edges were debeveled with a #15 scalpel blade.  Given the location of the defect and the proximity to free margins a Burow's advancement flap was deemed most appropriate.  Using a sterile surgical marker, the appropriate advancement flap was drawn incorporating the defect and placing the expected incisions within the relaxed skin tension lines where possible.    The area thus outlined was incised deep to adipose tissue with a #15 scalpel blade.  The skin margins were undermined to an appropriate distance in all directions utilizing iris scissors.
Home Suture Removal Text: Patient was provided instructions on removing sutures and will remove their sutures at home.  If they have any questions or difficulties they will call the office.
Composite Graft Text: The defect edges were debeveled with a #15 scalpel blade.  Given the location of the defect, shape of the defect, the proximity to free margins and the fact the defect was full thickness a composite graft was deemed most appropriate.  The defect was outline and then transferred to the donor site.  A full thickness graft was then excised from the donor site. The graft was then placed in the primary defect, oriented appropriately and then sutured into place.  The secondary defect was then repaired using a primary closure.
Consent (Near Eyelid Margin)/Introductory Paragraph: The rationale for Mohs was explained to the patient and consent was obtained. The risks, benefits and alternatives to therapy were discussed in detail. Specifically, the risks of ectropion or eyelid deformity, infection, scarring, bleeding, prolonged wound healing, incomplete removal, allergy to anesthesia, nerve injury and recurrence were addressed. Prior to the procedure, the treatment site was clearly identified and confirmed by the patient. All components of Universal Protocol/PAUSE Rule completed.
Rhomboid Transposition Flap Text: The defect edges were debeveled with a #15 scalpel blade.  Given the location of the defect and the proximity to free margins a rhomboid transposition flap was deemed most appropriate.  Using a sterile surgical marker, an appropriate rhomboid flap was drawn incorporating the defect.    The area thus outlined was incised deep to adipose tissue with a #15 scalpel blade.  The skin margins were undermined to an appropriate distance in all directions utilizing iris scissors.
Graft Donor Site Bandage (Optional-Leave Blank If You Don't Want In Note): Aquaphor and telefa placed on wound. Pressure dressing applied to donor site
H Plasty Text: Given the location of the defect, shape of the defect and the proximity to free margins a H-plasty was deemed most appropriate for repair.  Using a sterile surgical marker, the appropriate advancement arms of the H-plasty were drawn incorporating the defect and placing the expected incisions within the relaxed skin tension lines where possible. The area thus outlined was incised deep to adipose tissue with a #15 scalpel blade. The skin margins were undermined to an appropriate distance in all directions utilizing iris scissors.  The opposing advancement arms were then advanced into place in opposite direction and anchored with interrupted buried subcutaneous sutures.

## 2019-08-14 ENCOUNTER — HOME CARE VISIT (OUTPATIENT)
Dept: HOME HEALTH SERVICES | Facility: HOME HEALTHCARE | Age: 79
End: 2019-08-14
Payer: MEDICARE

## 2019-08-14 PROCEDURE — 665999 HH PPS REVENUE DEBIT

## 2019-08-14 PROCEDURE — 665998 HH PPS REVENUE CREDIT

## 2019-08-15 ENCOUNTER — HOME CARE VISIT (OUTPATIENT)
Dept: HOME HEALTH SERVICES | Facility: HOME HEALTHCARE | Age: 79
End: 2019-08-15
Payer: MEDICARE

## 2019-08-15 ENCOUNTER — HOSPITAL ENCOUNTER (OUTPATIENT)
Dept: RADIOLOGY | Facility: MEDICAL CENTER | Age: 79
End: 2019-08-15
Attending: INTERNAL MEDICINE
Payer: MEDICARE

## 2019-08-15 VITALS
RESPIRATION RATE: 18 BRPM | DIASTOLIC BLOOD PRESSURE: 52 MMHG | HEART RATE: 84 BPM | TEMPERATURE: 98.8 F | SYSTOLIC BLOOD PRESSURE: 110 MMHG | OXYGEN SATURATION: 97 %

## 2019-08-15 DIAGNOSIS — R13.12 DYSPHAGIA, OROPHARYNGEAL: ICD-10-CM

## 2019-08-15 DIAGNOSIS — R13.12 OROPHARYNGEAL DYSPHAGIA: ICD-10-CM

## 2019-08-15 PROCEDURE — 92611 MOTION FLUOROSCOPY/SWALLOW: CPT

## 2019-08-15 PROCEDURE — 74230 X-RAY XM SWLNG FUNCJ C+: CPT

## 2019-08-15 PROCEDURE — G0153 HHCP-SVS OF S/L PATH,EA 15MN: HCPCS

## 2019-08-15 PROCEDURE — 665998 HH PPS REVENUE CREDIT

## 2019-08-15 PROCEDURE — 665999 HH PPS REVENUE DEBIT

## 2019-08-15 NOTE — OP THERAPY EVALUATION
[facility]  Speech-Language Pathology Department  Modified Barium Swallow Study Summary    Patient: Gilberto Brown     Date of Evaluation: 8/15/19    Referring Provider:  Dr. Christine Zamudio; Fax: [phone]    Radiologist:  Dr. Whitney    Patient History/Reason for Referral: Pt was referred for MBS 'to determine current swallow function and safest diet.' Pt with PMHx notable for asthma, emphysema, GERD, COPD, HTN, CVA (2018), recurrent PNA, bronchitis, SOB, pulmonary histoplasmosis and pseudomonas pneumonia.     Pt last seen by Inpatient SLP on 7/29/19 for clinical swallow evaluation and was recommended for NPO/Cortrak; however, was placed on Nectar-thick Full Liquid diet (despite aspiration risks) due to Pt declining tube feedings. Pt currently works with HH SLP, Jessica Meyer to address dysphagia tx/management and eats a Regular/thin diet.    Pt arrived accompanied by his spouse/partner to today's appointment. Pt endorsed 'coughing for 80 years.' Pt stated that he coughs no matter what, and it does not appear to be related/unrelated to swallowing liquids, solids and/or pills. Pt endorsed x9 bouts of PNA over the last two years, however. Pt reported that he receives dialysis and is often in/out of the hospital. Pt endorsed mucus/secretions that cause him to cough/spit and he stated that he has to expectorate PO about 1x/month, but this is not a frequent occurrence. Pt denied difficulties swallowing multiple pills at once washed down by coffee.      Oral Mechanism Exam:   -Dentition: Intact; natural  -Labial: WFL  -Lingual: Reduced strength  -Palatal Elevation: WFL  -Laryngeal Elevation: Complete, weak  -Cough: Adequate  -Vocal Quality: Normal  - Circumlaryngeal Palpation: No c/o pain    Procedure Results:  The examination was conducted with videofluoroscopy from a   Lateral and Anterior view.  The following textures were presented:   Pudding, Cookie and Thin Liquid     Structural  Abnormalities:  Muscle spasm vs. CP bar noted during exam (does not appear to impact bolus flow)    The following observations were made:   (WFL unless otherwise noted)    Oral Phase Thin Liquids  Puree Solid   Lip Closure      Tongue Control During Bolus Hold      Premature spillage into the valleculae X   tsps x1     Premature spillage into the pyriform sinus      Bolus Preparation/ Mastication      Bolus Transport/ Lingual Motion      Oral Residue after swallow X  trace X  trace    Initiation of Pharyngeal Swallow X  Delay to valleculae X  Delay to valleculae       Other Observations:     - With verbal cue, Pt was able to take secondary swallow to successfully clear residue       Pharyngeal Phase Thin Liquids  Puree Solid   Soft Palate Elevation      Laryngeal Elevation      Anterior Hyoid Excursion      Epiglottic Inversion       Laryngeal Vestibular Closure X  reduced     Pharyngeal Stripping Wave      Pharyngoesophageal Segment Opening (PES)      Tongue Base Retraction (BOT) and/or BOT Residue      Residue in valleculae X  trace     Residue in piriform sinus(es) X  Trace x1     Residue on posterior pharyngeal wall (PPW)      Penetration X  During swallow     Aspiration        Other Observations:      Penetration Aspiration Scale:   Score of 1: Neither penetration nor aspiration  Score of 2-5: Penetration  Score of 6-8: Aspiration    1: Material does not enter airway   2: Material enters airway, but remains above vocal folds; Ejected from airway; no stasis  3: Material remains above vocal folds; visible stasis remains  4: Material contacts vocal folds, but is ejected; no stasis  5: Material contacts vocal folds, and is not ejected; visible stasis remains  6: Material passes glottis, but is ejected from airway; No visible subglottic stasis  7: Material passes glottis, but is not ejected from airway; visible subglottic stasis despite patient’s response  8: Material passes glottis, and is not ejected; visible  subglottic stasis; Absent patient response                          Esophageal Phase:  Mid-esophageal retention with minimal retrograde flow below PES                                Impressions:  Pt with trace, intermittent penetration during the swallow for thin liquids; however, no aspiration appreciated across today's exam. Oral dysphagia characterized by premature spillage to valleculae x1 (for second tsps only), trace oral/lingual residue after the swallow (for thins and puree), and delayed swallow initiation to the valleculae (for thins and puree). Pharyngeal dysphagia characterized by reduced laryngeal vestibular closure, trace vallecular residue (for thins), trace residue in piriform sinuses (for thins x1) and trace intermittent penetration during the swallow for thin liquids. Esophageal swallow phase characterized by mid-esophageal retention with minimal retrograde flow below PES.    Pt was able to successfully clear residue with secondary swallow. Pt was noted to cough intermittently during exam; however, it did not appear to be due to nor as a result of PO intake. (Note: Pt with delayed cough x1 after initial penetration event; however, Pt typically took a secondary swallow rather than coughed as a result of further penetration events.)      Recommendations: Diet:     Regular       Liquid:     Thin                                        Medications:   Whole with liquid wash            Compensatory Strategies:   Upright 90 degrees, Small bites/sips, Slow feeding rate, Remain upright for >30-min after eating/drinking        Your patient may benefit from a referral for:     1.) Ongoing skilled  SLP services for dysphagia education/management (including training for safe swallow precautions) as indicated            Thank you for the referral.    For further questions, please call [phone].       Mey Mojica M.S. CCC-SLP

## 2019-08-16 ENCOUNTER — HOME CARE VISIT (OUTPATIENT)
Dept: HOME HEALTH SERVICES | Facility: HOME HEALTHCARE | Age: 79
End: 2019-08-16
Payer: MEDICARE

## 2019-08-16 VITALS
DIASTOLIC BLOOD PRESSURE: 60 MMHG | TEMPERATURE: 98.4 F | SYSTOLIC BLOOD PRESSURE: 105 MMHG | HEART RATE: 78 BPM | RESPIRATION RATE: 18 BRPM | OXYGEN SATURATION: 97 %

## 2019-08-16 PROCEDURE — G0299 HHS/HOSPICE OF RN EA 15 MIN: HCPCS

## 2019-08-16 PROCEDURE — 665998 HH PPS REVENUE CREDIT

## 2019-08-16 PROCEDURE — 665999 HH PPS REVENUE DEBIT

## 2019-08-17 PROCEDURE — 665999 HH PPS REVENUE DEBIT

## 2019-08-17 PROCEDURE — 665998 HH PPS REVENUE CREDIT

## 2019-08-18 ENCOUNTER — HOME CARE VISIT (OUTPATIENT)
Dept: HOME HEALTH SERVICES | Facility: HOME HEALTHCARE | Age: 79
End: 2019-08-18
Payer: MEDICARE

## 2019-08-18 PROCEDURE — 665998 HH PPS REVENUE CREDIT

## 2019-08-18 PROCEDURE — 665999 HH PPS REVENUE DEBIT

## 2019-08-18 ASSESSMENT — ENCOUNTER SYMPTOMS
NAUSEA: DENIES
VOMITING: DENIES
DEBILITATING PAIN: 1

## 2019-08-19 ENCOUNTER — HOME CARE VISIT (OUTPATIENT)
Dept: HOME HEALTH SERVICES | Facility: HOME HEALTHCARE | Age: 79
End: 2019-08-19
Payer: MEDICARE

## 2019-08-19 PROCEDURE — 665999 HH PPS REVENUE DEBIT

## 2019-08-19 PROCEDURE — 665998 HH PPS REVENUE CREDIT

## 2019-08-20 ENCOUNTER — HOME CARE VISIT (OUTPATIENT)
Dept: HOME HEALTH SERVICES | Facility: HOME HEALTHCARE | Age: 79
End: 2019-08-20
Payer: MEDICARE

## 2019-08-20 ENCOUNTER — APPOINTMENT (RX ONLY)
Dept: URBAN - METROPOLITAN AREA CLINIC 36 | Facility: CLINIC | Age: 79
Setting detail: DERMATOLOGY
End: 2019-08-20

## 2019-08-20 VITALS
DIASTOLIC BLOOD PRESSURE: 68 MMHG | TEMPERATURE: 98.8 F | SYSTOLIC BLOOD PRESSURE: 115 MMHG | RESPIRATION RATE: 18 BRPM | OXYGEN SATURATION: 95 % | HEART RATE: 83 BPM

## 2019-08-20 DIAGNOSIS — Z48.02 ENCOUNTER FOR REMOVAL OF SUTURES: ICD-10-CM

## 2019-08-20 PROCEDURE — 665998 HH PPS REVENUE CREDIT

## 2019-08-20 PROCEDURE — 665999 HH PPS REVENUE DEBIT

## 2019-08-20 PROCEDURE — G0493 RN CARE EA 15 MIN HH/HOSPICE: HCPCS

## 2019-08-20 PROCEDURE — ? SUTURE REMOVAL (GLOBAL PERIOD)

## 2019-08-20 PROCEDURE — 665997 HH PPS REVENUE ADJ

## 2019-08-20 PROCEDURE — 99024 POSTOP FOLLOW-UP VISIT: CPT

## 2019-08-20 ASSESSMENT — PATIENT HEALTH QUESTIONNAIRE - PHQ9: CLINICAL INTERPRETATION OF PHQ2 SCORE: 0

## 2019-08-20 ASSESSMENT — ACTIVITIES OF DAILY LIVING (ADL)
HOME_HEALTH_OASIS: 01
OASIS_M1830: 03

## 2019-08-20 ASSESSMENT — LOCATION ZONE DERM: LOCATION ZONE: FACE

## 2019-08-20 ASSESSMENT — LOCATION DETAILED DESCRIPTION DERM: LOCATION DETAILED: RIGHT SUPERIOR MEDIAL FOREHEAD

## 2019-08-20 ASSESSMENT — LOCATION SIMPLE DESCRIPTION DERM: LOCATION SIMPLE: RIGHT FOREHEAD

## 2019-08-20 NOTE — PROCEDURE: SUTURE REMOVAL (GLOBAL PERIOD)
Add 33839 Cpt? (Important Note: In 2017 The Use Of 67888 Is Being Tracked By Cms To Determine Future Global Period Reimbursement For Global Periods): yes
Detail Level: Detailed

## 2019-08-21 PROCEDURE — 665999 HH PPS REVENUE DEBIT

## 2019-08-21 PROCEDURE — 665998 HH PPS REVENUE CREDIT

## 2019-08-22 PROCEDURE — 665999 HH PPS REVENUE DEBIT

## 2019-08-22 PROCEDURE — 665998 HH PPS REVENUE CREDIT

## 2019-08-23 PROCEDURE — 665999 HH PPS REVENUE DEBIT

## 2019-08-23 PROCEDURE — 665998 HH PPS REVENUE CREDIT

## 2019-08-24 PROCEDURE — 665998 HH PPS REVENUE CREDIT

## 2019-08-24 PROCEDURE — 665999 HH PPS REVENUE DEBIT

## 2019-08-25 PROCEDURE — 665998 HH PPS REVENUE CREDIT

## 2019-08-25 PROCEDURE — 665999 HH PPS REVENUE DEBIT

## 2019-08-26 PROCEDURE — 665999 HH PPS REVENUE DEBIT

## 2019-08-26 PROCEDURE — 665998 HH PPS REVENUE CREDIT

## 2019-08-27 PROCEDURE — 665999 HH PPS REVENUE DEBIT

## 2019-08-27 PROCEDURE — 665998 HH PPS REVENUE CREDIT

## 2019-08-28 PROCEDURE — 665998 HH PPS REVENUE CREDIT

## 2019-08-28 PROCEDURE — 665999 HH PPS REVENUE DEBIT

## 2019-08-29 PROCEDURE — 665999 HH PPS REVENUE DEBIT

## 2019-08-29 PROCEDURE — 665998 HH PPS REVENUE CREDIT

## 2019-08-30 PROCEDURE — 665999 HH PPS REVENUE DEBIT

## 2019-08-30 PROCEDURE — 665998 HH PPS REVENUE CREDIT

## 2019-08-31 PROCEDURE — 665999 HH PPS REVENUE DEBIT

## 2019-08-31 PROCEDURE — 665998 HH PPS REVENUE CREDIT

## 2019-09-01 PROCEDURE — 665999 HH PPS REVENUE DEBIT

## 2019-09-01 PROCEDURE — 665998 HH PPS REVENUE CREDIT

## 2019-09-02 PROCEDURE — 665998 HH PPS REVENUE CREDIT

## 2019-09-02 PROCEDURE — 665999 HH PPS REVENUE DEBIT

## 2019-09-03 ENCOUNTER — APPOINTMENT (RX ONLY)
Dept: URBAN - METROPOLITAN AREA CLINIC 36 | Facility: CLINIC | Age: 79
Setting detail: DERMATOLOGY
End: 2019-09-03

## 2019-09-03 PROBLEM — C44.622 SQUAMOUS CELL CARCINOMA OF SKIN OF RIGHT UPPER LIMB, INCLUDING SHOULDER: Status: ACTIVE | Noted: 2019-09-03

## 2019-09-03 PROCEDURE — ? CURETTAGE AND DESTRUCTION WITH PATHOLOGY

## 2019-09-03 PROCEDURE — 17262 DSTRJ MAL LES T/A/L 1.1-2.0: CPT

## 2019-09-03 PROCEDURE — 17272 DSTR MAL LES S/N/H/F/G 1.1-2: CPT

## 2019-09-03 PROCEDURE — 665999 HH PPS REVENUE DEBIT

## 2019-09-03 PROCEDURE — 665998 HH PPS REVENUE CREDIT

## 2019-09-03 PROCEDURE — 17271 DSTR MAL LES S/N/H/F/G 0.6-1: CPT

## 2019-09-03 NOTE — PROCEDURE: CURETTAGE AND DESTRUCTION WITH PATHOLOGY
Render Post-Care Instructions In Note?: no
Cautery Type: electrodesiccation
Size Of Lesion In Cm: 0.6
Billing Type: Third-Party Bill
Additional Information: (Optional): The wound was cleaned, and a pressure dressing was applied.  The patient received detailed post-op instructions.
Lab Facility: 
Body Location Override (Optional - Billing Will Still Be Based On Selected Body Map Location If Applicable): right dorsal hand( near thumb)
Histology Text: Following the procedure a portion of the curetted material was sent for histologic evaluation.
What Was Performed First?: Curettage
Size Of Lesion After Curettage: 0.8
Anesthesia Volume In Cc: 4
Bill As A Line Item Or As Units: Line Item
Lab: [id number]
Anesthesia Type: 1% lidocaine with epinephrine and a 1:10 solution of 8.4% sodium bicarbonate
Post-Care Instructions: I reviewed with the patient in detail post-care instructions. Patient is to keep the area dry for 48 hours, and not to engage in any swimming until the area is healed. Should the patient develop any fevers, chills, bleeding, severe pain patient will contact the office immediately.
Number Of Curettages: 3
Detail Level: Detailed
Consent was obtained from the patient. The risks, benefits and alternatives to therapy were discussed in detail. Specifically, the risks of infection, scarring, bleeding, prolonged wound healing, nerve injury, incomplete removal, allergy to anesthesia and recurrence were addressed. Alternatives to ED&C, such as: surgical removal and XRT were also discussed.  Prior to the procedure, the treatment site was clearly identified and confirmed by the patient. All components of Universal Protocol/PAUSE Rule completed.
Biopsy Type: H and E
Size Of Lesion After Curettage: 1.1
Size Of Lesion In Cm: 0.9
Size Of Lesion After Curettage: 1.2

## 2019-09-17 ENCOUNTER — HOSPITAL ENCOUNTER (OUTPATIENT)
Dept: LAB | Facility: MEDICAL CENTER | Age: 79
End: 2019-09-17
Attending: INTERNAL MEDICINE
Payer: MEDICARE

## 2019-09-17 DIAGNOSIS — M31.30 WEGENER'S GRANULOMATOSIS: Chronic | ICD-10-CM

## 2019-09-17 PROCEDURE — 36415 COLL VENOUS BLD VENIPUNCTURE: CPT

## 2019-09-17 PROCEDURE — 86255 FLUORESCENT ANTIBODY SCREEN: CPT

## 2019-09-19 ENCOUNTER — OFFICE VISIT (OUTPATIENT)
Dept: RHEUMATOLOGY | Facility: MEDICAL CENTER | Age: 79
End: 2019-09-19
Payer: MEDICARE

## 2019-09-19 ENCOUNTER — TELEPHONE (OUTPATIENT)
Dept: RHEUMATOLOGY | Facility: MEDICAL CENTER | Age: 79
End: 2019-09-19

## 2019-09-19 VITALS
RESPIRATION RATE: 16 BRPM | HEART RATE: 98 BPM | TEMPERATURE: 98.5 F | SYSTOLIC BLOOD PRESSURE: 100 MMHG | DIASTOLIC BLOOD PRESSURE: 40 MMHG | OXYGEN SATURATION: 93 % | WEIGHT: 132.72 LBS | BODY MASS INDEX: 20.18 KG/M2

## 2019-09-19 DIAGNOSIS — Z79.01 CHRONIC ANTICOAGULATION: ICD-10-CM

## 2019-09-19 DIAGNOSIS — I48.0 PAROXYSMAL ATRIAL FIBRILLATION (HCC): ICD-10-CM

## 2019-09-19 DIAGNOSIS — Z79.52 LONG TERM CURRENT USE OF SYSTEMIC STEROIDS: ICD-10-CM

## 2019-09-19 DIAGNOSIS — J44.1 COPD EXACERBATION (HCC): ICD-10-CM

## 2019-09-19 DIAGNOSIS — M31.30 WEGENER'S GRANULOMATOSIS: ICD-10-CM

## 2019-09-19 DIAGNOSIS — I35.0 AORTIC VALVE STENOSIS, ETIOLOGY OF CARDIAC VALVE DISEASE UNSPECIFIED: ICD-10-CM

## 2019-09-19 DIAGNOSIS — N18.6 ESRD ON DIALYSIS (HCC): ICD-10-CM

## 2019-09-19 DIAGNOSIS — I10 ESSENTIAL HYPERTENSION: ICD-10-CM

## 2019-09-19 DIAGNOSIS — Z99.2 ESRD ON DIALYSIS (HCC): ICD-10-CM

## 2019-09-19 LAB — ANCA IGG TITR SER IF: NORMAL {TITER}

## 2019-09-19 PROCEDURE — 99214 OFFICE O/P EST MOD 30 MIN: CPT | Performed by: INTERNAL MEDICINE

## 2019-09-19 RX ORDER — PREDNISONE 1 MG/1
TABLET ORAL
Qty: 450 TAB | Refills: 0 | Status: SHIPPED | OUTPATIENT
Start: 2019-09-19 | End: 2019-12-17 | Stop reason: SDUPTHER

## 2019-09-19 NOTE — PROGRESS NOTES
Chief Complaint- joint pain     Subjective:   Gilberto Brown is a 79 y.o. male here today for follow up of rheumatological issues    This is a follow-up visit for this complicated patient who is seen in this clinic for Wegener's granulomatosis.  Patient's Wegener's granulomatosis was initially diagnosed in September 2011 manifesting as progressive renal insufficiency.  Patient at that time status post renal biopsy that indicates Wegener's granulomatosis, patient is now on permanent renal dialysis.  Patient is status post number of DMARDs including Cytoxan, CellCept, rituximab all of which resulted in severe infections.  Patient is currently on prednisone at 5 mg p.o. daily with management of patient's Wegener's and resulting negative ANCA levels.  Patient's ANCA levels been negative since about July 2017.       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  Patient does have a long history of lung problems since childhood status post whooping cough per patient report.           Additional comorbidities include atrial fibrillation with a pacemaker in place also recent diagnosis CVA and is on permanent anticoagulation. Patient also on oxygen at home 3 L per minute.  Patient also with history of rotator cuff tear and right shoulder in the past.  Patient also recently diagnosed now with severe aortic stenosis and deemed not a surgical candidate.     S/p Cytoxan-n/v  Off of Cellcept 5/2016  Rituximab infusions 1/14/2016, 1/28/2016-patient developed severe infection and was hospitalized     GBM neg 1/2012  ANCA neg 1/2015; ANCA 1:20  11/2015; ANCA neg 2/2016; ANCA neg 5/2016; ANCA neg 12/2016; ANCA neg 3/2017; ANCA neg 7/2017; ANCA neg 2/2018; ANCA neg 5/2018; ANCA neg 12/2018; ANCA neg 3/2019; ANCA neg 9/2019  C3 90 normal 10/2011  C4 21 10/2011  OFELIA neg 10/2011  Uric acid 4.4 1/2016  HBsAg/HBcAb neg 3/2019  HCV neg 3/2019        Of note  Dr Moncada  [facility] nephrology  Dr Gutierrez pulmonology   Dr Chavarria Cardiologist [phone]  Dr Nunez Opthalmologist [phone]           Current medicines (including changes today)  Current Outpatient Medications   Medication Sig Dispense Refill   • predniSONE (DELTASONE) 1 MG Tab 5 tabs po qam 450 Tab 0   • acetaminophen (TYLENOL) 650 MG CR tablet Take 650 mg by mouth every 6 hours as needed. Indications: Pain     • Home Care Oxygen Inhale 3 L/min by mouth as needed.     • metoprolol SR (TOPROL XL) 25 MG TABLET SR 24 HR Take 25 mg by mouth 2 Times a Day.  3   • BREO ELLIPTA 200-25 MCG/INH AEROSOL POWDER, BREATH ACTIVATED INHALE 1 PUFF BY MOUTH EVERY DAY. RINSE MOUTH AFTER USE 1 Each 5   • albuterol (PROAIR HFA) 108 (90 Base) MCG/ACT Aero Soln inhalation aerosol Inhale 2 Puffs by mouth every 6 hours as needed for Shortness of Breath. 8.5 g 0   • B Complex-C-Folic Acid (DIALYVITE TABLET) Tab Take 1 Tab by mouth every morning.     • ipratropium-albuterol (DUONEB) 0.5-2.5 (3) MG/3ML nebulizer solution 3 mL by Nebulization route every 6 hours as needed for Shortness of Breath.     • Cholecalciferol (VITAMIN D3) 5000 units Cap Take 5,000 Units by mouth every morning.     • sodium chloride (HYPER-SAL) 7 % Nebu Soln INHALE 1 VIAL VIA NEBULIZER TWICE DAILY 240 mL 11   • tamsulosin (FLOMAX) 0.4 MG capsule Take 1 Cap by mouth every day. 90 Cap 3   • sevelamer carbonate (RENVELA) 800 MG Tab tablet TAKE 1 TABLET BY MOUTH THREE TIMES DAILY WITH MEALS 270 Tab 7   • finasteride (PROSCAR) 5 MG Tab Take 5 mg by mouth every morning.     • epoetin lucina (EPOGEN,PROCRIT) 05965 UNIT/ML Solution Inject 0.5 mL as instructed every Monday, Wednesday, and Friday. (Patient not taking: Reported on 9/19/2019) 30 mL 0   • omeprazole (PRILOSEC) 20 MG delayed-release capsule Take 1 Cap by mouth every day. (Patient not taking: Reported on 9/19/2019) 30 Cap 1   • apixaban (ELIQUIS) 2.5mg Tab Take 2.5 mg by mouth every morning. Every morning       No current  facility-administered medications for this visit.      He  has a past medical history of A-fib ([facility]), Anemia, Arthritis, ASTHMA, BPH (benign prostatic hyperplasia), Breath shortness, Bronchitis, Cataract, COPD, Dialysis, Dyslipidemia, EMPHYSEMA, GERD (gastroesophageal reflux disease), Hypertension, Oxygen decrease, Pacemaker (1988), Pain (05/09/2018), Pneumonia (2017), Pseudomonas pneumonia ([facility]), Pulmonary histoplasmosis ([facility]), Renal disorder, Sick sinus syndrome ([facility]), Sleep apnea, Snoring, Stroke ([facility]) (02/2018), Unspecified hemorrhagic conditions, and Wegener's disease, pulmonary ([facility]).    ROS   Other than what is mentioned in HPI or physical exam, there is no history of headaches, double vision or blurred vision. No temporal tenderness or jaw claudication. No trouble swallowing difficulties or sore throats.  No chest complaints including chest pain, cough or sputum production. No GI complaints including nausea, vomiting, change in bowel habits, or past peptic ulcer disease. No history of blood in the stools. No urinary complaints including dysuria or frequency. No history of alopecia, photosensitivity, oral ulcerations, Raynaud's phenomena.       Objective:     /40   Pulse 98   Temp 36.9 °C (98.5 °F) (Temporal)   Resp 16   Wt 60.2 kg (132 lb 11.5 oz)   SpO2 93%  Body mass index is 20.18 kg/m².   Physical Exam:    Constitutional: Alert and oriented X3, patient is talkative with good eye contact, wife with patient.Skin: Warm, dry, good turgor, no rashes in visible areas.Eye: Equal, round and reactive, conjunctiva clear, lids normal EOM intactENMT: Lips without lesions, good dentition, no oropharyngeal ulcers, moist buccal mucosa, pinna without deformityNeck: Trachea midline, no masses, no thyromegaly.Lymph:  No cervical lymphadenopathy, no axillary lymphadenopathy, no supraclavicular lymphadenopathyRespiratory: Unlabored respiratory effort, lungs clear to auscultation, no wheezes, no  eddie.Cardiovascular: Normal S1, S2, no murmur, no edema, patient does have a murmur of both right and left sternal borders about 2-3 out of 6, no lower extremity edema.Abdomen: Soft, non-tender, no masses, no hepatosplenomegaly, no abdominal bruits.Psych: Alert and oriented x3, normal affect and mood.Neuro: Cranial nerves 2-12 are grossly intact, no loss of sensation LEExt:no joint laxity noted in bilateral arms, no joint laxity noted in bilateral legs, there is a dialysis stent right arm,    Lab Results   Component Value Date/Time    HEPBCORIGM Negative 07/25/2019 05:50 AM    HEPBSAG Negative 07/25/2019 05:50 AM     Lab Results   Component Value Date/Time    HEPCAB Negative 07/25/2019 05:50 AM     Lab Results   Component Value Date/Time    SODIUM 136 08/01/2019 01:01 AM    POTASSIUM 4.3 08/01/2019 01:01 AM    CHLORIDE 99 08/01/2019 01:01 AM    CO2 29 08/01/2019 01:01 AM    GLUCOSE 90 08/01/2019 01:01 AM    BUN 16 08/01/2019 01:01 AM    CREATININE 2.14 (H) 08/01/2019 01:01 AM    CREATININE 1.0 09/23/2008 09:36 AM      Lab Results   Component Value Date/Time    WBC 5.7 08/01/2019 01:01 AM    WBC 7.6 03/19/2012 12:00 AM    RBC 2.60 (L) 08/01/2019 01:01 AM    RBC 2.25 (LL) 03/19/2012 12:00 AM    HEMOGLOBIN 7.7 (L) 08/01/2019 01:01 AM    HEMATOCRIT 25.3 (L) 08/01/2019 01:01 AM    MCV 97.3 08/01/2019 01:01 AM     (H) 03/19/2012 12:00 AM    MCH 29.6 08/01/2019 01:01 AM    MCH 33.8 03/19/2012 12:00 AM    MCHC 30.4 (L) 08/01/2019 01:01 AM    MPV 9.0 08/01/2019 01:01 AM    NEUTSPOLYS 78.10 (H) 08/01/2019 01:01 AM    LYMPHOCYTES 9.10 (L) 08/01/2019 01:01 AM    MONOCYTES 8.90 08/01/2019 01:01 AM    EOSINOPHILS 2.30 08/01/2019 01:01 AM    BASOPHILS 0.50 08/01/2019 01:01 AM    HYPOCHROMIA 1+ 10/28/2011 05:35 AM    ANISOCYTOSIS 1+ 07/30/2019 02:40 AM      Lab Results   Component Value Date/Time    CALCIUM 8.6 08/01/2019 01:01 AM    ASTSGOT 12 08/01/2019 01:01 AM    ALTSGPT 11 08/01/2019 01:01 AM    ALKPHOSPHAT 46  08/01/2019 01:01 AM    TBILIRUBIN 0.3 08/01/2019 01:01 AM    ALBUMIN 2.7 (L) 08/01/2019 01:01 AM    ALBUMIN 1.28 (L) 10/02/2011 04:30 AM    TOTPROTEIN 5.3 (L) 08/01/2019 01:01 AM    TOTPROTEIN 4.50 (L) 10/02/2011 04:30 AM     Lab Results   Component Value Date/Time    URICACID 4.4 01/21/2016 08:36 AM    ANTINUCAB None Detected 10/02/2011 04:30 AM     Lab Results   Component Value Date/Time    K4ALBGECWJX 90 10/02/2011 04:30 AM    H9AKLVXXHUG 21 10/02/2011 04:30 AM     Lab Results   Component Value Date/Time    AGBMAB Negative 01/18/2012 03:50 AM    GBMABA Negative 01/18/2012 03:50 AM    ANCAIGG <1:20 06/18/2019 03:39 PM    B9EPFHVETMK 90 10/02/2011 04:30 AM     Lab Results   Component Value Date/Time    SEDRATEWES 48 (H) 04/13/2017 03:25 AM     Lab Results   Component Value Date/Time    CPKTOTAL 18 09/27/2011 05:05 AM       Lab Results   Component Value Date/Time    FLTYPE Stool 09/22/2006 01:04 PM     Lab Results   Component Value Date/Time    PTHINTACT 204.5 (H) 10/18/2011 06:21 AM      Results for orders placed during the hospital encounter of 07/24/19   DX-HAND 2- LEFT    Impression Questionable subluxation of the second MCP joint. Correlate clinically.    No definite fracture is seen but evaluation limited due to osseous demineralization.    Widening of the scapholunate interval, likely degenerative.     Results for orders placed during the hospital encounter of 02/05/19   DX-CERVICAL SPINE-2 OR 3 VIEWS    Impression Mild anterolisthesis of C4 on C5, likely due to facet arthropathy.    Moderate degenerative change of the cervical spine.    Diffuse osseous demineralization.     Results for orders placed in visit on 05/02/13   CT-CHEST, HIGH RESOLUTION LUNG    Impression 1. Bilateral lower lobe bronchiectasis, grossly unchanged compared with the prior conventional chest CT 1/14/12.  2. Minimal bilateral lower lobe interstitial opacities as well as confluent opacities in those areas, greater on the left,  suggesting active airspace disease and/or atelectasis.  3. Probable uncalcified left lower lobe nodule, an equivocal finding with high-resolution technique.  Conventional chest CT would be needed for confirmation.  Note that no similar lesion was identified on the prior chest scan 1/14/12.            INTERPRETING LOCATION: [address] [place] [address] [place], [id number]     Results for orders placed during the hospital encounter of 07/27/17   CT-CHEST (THORAX) W/O    Impression 1.  There is no significant interval change in the diffuse bilateral subpleural interstitial lung disease with a basilar predominance.  2.  Bibasilar and medial segment right middle lobe bronchiectasis are again seen with minimal fluid now seen on the left within the dilated bronchi. This is most consistent with postinflammatory/infectious change.  3.  There is underlying emphysema/COPD with upper lobe paraseptal blebs.  4.  There is evidence of previous granulomatous disease with calcified granulomata, calcified nodes, and calcifications in the liver and spleen. This is consistent with the history of pulmonary histoplasmosis.  5.  There are no new suspicious findings.  6.  Enthesopathy again noted in the thoracic spine.         Assessment and Plan:     1. Wegener's granulomatosis (HCC)  Clinically in remission, ANCA's have been negative currently on prednisone at 5 mg p.o. daily, awaiting most recent ANC level  - predniSONE (DELTASONE) 1 MG Tab; 5 tabs po qam  Dispense: 450 Tab; Refill: 0    2. Long term current use of systemic steroids  Currently on prednisone at 5 mg a day  Reviewed risks with patient regarding prednisone patient states understanding  - predniSONE (DELTASONE) 1 MG Tab; 5 tabs po qam  Dispense: 450 Tab; Refill: 0    3. COPD exacerbation ([facility])  Followed by pulmonology, patient states repeat lung function tests are pending  - predniSONE (DELTASONE) 1 MG Tab; 5 tabs po qam  Dispense: 450 Tab; Refill: 0    4. ESRD on dialysis ([facility])  Followed by  nephrology, currently on dialysis  - predniSONE (DELTASONE) 1 MG Tab; 5 tabs po qam  Dispense: 450 Tab; Refill: 0    5. Paroxysmal atrial fibrillation (HCC)  Patient on chronic anticoagulation Eliquis    6. Chronic anticoagulation  This will impact with kind of medications we can use for this patient's arthritis, NSAIDs are contraindicated because of increased risk of bleeding while on chronic anticoagulation    7. Essential hypertension  May impact the type of medications we can use for this patient's arthritis. We will have to keep this under advisement.    8. Aortic valve stenosis, etiology of cardiac valve disease unspecified  Deemed not a surgical candidate by cardiology    Followup: Return in about 6 months (around 3/19/2020). or sooner power Brown  was seen 30 minutes face-to-face of which more than 50% of the time was spent counseling the patient (excluding time for procedures)  regarding  rheumatological condition and care. Therapy was discussed in detail.      Please note that this dictation was created using voice recognition software. I have made every reasonable attempt to correct obvious errors, but I expect that there are errors of grammar and possibly content that I did not discover before finalizing the note.

## 2019-09-19 NOTE — TELEPHONE ENCOUNTER
Please notify patient that we just received the results of his ANCA and it still negative which is good news  Recommend to continue prednisone at 5 mg p.o. daily.

## 2019-09-20 DIAGNOSIS — J44.9 CHRONIC OBSTRUCTIVE PULMONARY DISEASE, UNSPECIFIED COPD TYPE (HCC): ICD-10-CM

## 2019-09-20 NOTE — TELEPHONE ENCOUNTER
Have we ever prescribed this med? Yes.  If yes, what date? 3/4/19    Last OV: 7/18/19 HARDIK Orourke MD     Next OV: 10/30/19 HARDIK Orourke MD     DX: COPD     Medications: BREO ELLIPTA 200-25 MCG/INH AEROSOL POWDER, BREATH ACTIVATED

## 2019-09-28 NOTE — PROGRESS NOTES
Patient:   ZITA MORROW            MRN: [id number]            FIN: [id number]               Age:   38 years     Sex:  FEMALE     :  [birth year]   Associated Diagnoses:   None   Author:   ALEXANDRO LINDSEY        Hospitalist Discharge Summary       Primary Care Physician      Physician Name:  MU, PHYSICIAN [facility]  Specialty :  NONE    Consulting Physicians     Physician Name:  EMELYN GARCIA Speciality:  PULMONOLOGY Consult Reason:  Chest Pain, shortness of breath, elevated troponins.   Physician Name:  LANI MORA Speciality:  CARDIOLOGY Consult Reason:  Chest Pain, shortness of breath, elevated troponins.   Physician Name:  LANI MORA Speciality:  CARDIOLOGY Consult Reason:  Chest Pain   Physician Name:  EMELYN GARCIA Speciality:  PULMONOLOGY Consult Reason:  Chest Pain         Procedure History  No qualifying data available.    Hospital course:     38 years old lady with no past medical history who presented to the hospital with chest pain, substernal, radiating to the backs, 6-7 out of 10 in intensity, started 2 weeks ago, occurred again on the day of admission, associated with shortness of breath, with no clear aggravating or alleviating factors, no other associated symptoms.  Patient presented to the emergency room and was admitted for further evaluation and management    nstemi: on hep gtt, asa, statin, bb  s/p cath : clean coronaries.  etiology: vasospasm  mri cardiac: small area of subendocardial ischemic changes  started on amlodipine  asa, statin, plavix imdur  2d echo reviewed  check ldl/hba1c     Suspected sleep apnea   Consider outpatient sleep study     DVT : hep subq    dispo: d/c home. f/u with pcp in 1 week. f/u with cardiology in 2 weeks.       Discharge diagnoses:     Vitals:   Vitals between:   2017 16:33:21   TO   2017 16:33:21                   LAST RESULT MINIMUM MAXIMUM  Temperature 36.8 36.6 36.8  Heart  Chief Complaint   Patient presents with   • Follow-Up     Bronchiectasis without complication        HPI:  Gilberto Brown is a 78 y.o. year old male here today for follow-up on ILD,  pseudomonas colonization, COPD and TIMOTHY. BMI 22.    He has a history of Wegener's granulomatosis and chronic renal failure. ESRD with Dialysis 3x's weekly. Followed by rheumatology and on prednisone 11mg daily. Patient taken off of biologics due to increased infection rate and hospitalizations of pneumonia. Recent ANCA negative. He is on prednisone 10mg daily now.    PFT 1/10/2017 indicated FEV1 1.55 L or 61%, FEV1 history of 0 274%, TLC 76% and a DLCO of 60% predicted.  No bronchodilator response noted.  Please indicated mild restrictive ventilatory defect and mild diffusion impairment consistent with loss of alveolar capillary units.  He remains on Breo 200 mcg 1 puff daily and Pro Air HFA inhaler. He is using saline 7% nebulized BID and Duoneb as needed.   He uses VEST 1-2x's daily.  He has flutter valve on hand.  He has not been hospitalized since last OV 5/29/18.  He has been off abx's for 1 year. Last sputum culture 6/2017 noted pseudomonas with sensitivities to samreen/cipro/gent.  CT chest 7/27/17:  1.  There is no significant interval change in the diffuse bilateral subpleural interstitial lung disease with a basilar predominance.  2.  Bibasilar and medial segment right middle lobe bronchiectasis are again seen with minimal fluid now seen on the left within the dilated bronchi. This is most consistent with postinflammatory/infectious change.  3.  There is underlying emphysema/COPD with upper lobe paraseptal blebs.  4.  There is evidence of previous granulomatous disease with calcified granulomata, calcified nodes, and calcifications in the liver and spleen. This is consistent with the history of pulmonary histoplasmosis.  5.  There are no new suspicious findings.  6.  Enthesopathy again noted in the thoracic spine.  CXR 6/6/18:  No  Rate 96 82 98  Respiratory Rate 18 18 20  NISBP           129 111 132  NIDBP           88 73 88  NIMBP           102 86 102  SpO2                    96 96 98    General:  Alert and oriented  Eye:  Pupils are equal, round and reactive to light, Normal conjunctiva.    HENT:  Normocephalic.    Neck:  Supple, No carotid bruit, No lymphadenopathy.    Respiratory:  ctab,  respirations are non-labored, symmetrical expansion  Cardiovascular:  Normal rate, Regular rhythm, No murmur, Good pulses equal in all extremities.    Gastrointestinal:  Soft, Non-tender, Non-distended, Normal bowel sounds.    Lymphatics:  No lymphadenopathy neck, axilla, groin.    Integumentary:  Warm, Intact.    Extremities no edema  no calf tenderness   Neurologic:  Alert, Oriented, Normal motor function, No focal deficits.        Psychiatric:  Cooperative, Appropriate mood & affect.         MSK: strength intact, symmetrical b/l upper and lower extremities. ROM preserved      Labs:   Labs between:  01-NOV-2017 16:33 to 02-NOV-2017 16:33    CBC:                 WBC  HgB  Hct  Plt  MCV  RDW   02-NOV-2017 10.4  13.4  42.0  217  95.0  (H) 15.3                  Radiology results:      Echocardiogram Results    STUDY CONCLUSIONSSUMMARY:1. Left ventricle: Systolic function is normal. The estimated ejection   fraction is 50-55%, by visual assessment.2. Right ventricle: The cavity size is normal.    Pending results:      DISCHARGE MEDICATION LIST   Allergies: No known allergies     MEDICATION  DOSE  ROUTE  FREQUENCY  SPECIAL INSTRUCTIONS   amLODIPine (amLODIPine oral 5 mg tablet)  5 mg=1 tab  Oral  Daily     aspirin (aspirin oral 81 mg DR tablet (Ecotrin Low Strength))  81 mg=1 tab  Oral  Daily     atorvastatin (atorvastatin oral 40 mg tablet)  40 mg=1 tab  Oral  Daily     clopidogrel (clopidogrel oral 75 mg tablet)  75 mg=1 tab  Oral  Daily     isosorbide dinitrate (isosorbide dinitrate oral 5 mg tablet)  5 mg=1 tab  Oral  Three times daily     nitroGLYCERIN  acute process.    He also suffers from obstructive sleep apnea syndrome. Previous PSG indicated AHI 68.1. Last titration study indicated need for CPAP 15cm H20. He currently uses CPAP 4-12cm H20.  He did not bring a chip today. He continues to use it daily especially on dialysis days when he sleeps 20hrs. He feels rested on therapy. He has not had new supplies in >1year. He is cleaning them routinely.     Today he notes sputum to have changed to yellow and thick in the last month. He feels the pseudomonas is activating again. He notes dyspnea with stairs and having to pace himself. He denies chest tightness or chest pain. No significant wheezing.       ROS: As per HPI and otherwise negative if not stated.    Past Medical History:   Diagnosis Date   • A-fib ([facility])        • Anemia    • Arthritis     arms, legs, shoulders   • ASTHMA    • BPH (benign prostatic hyperplasia)    • Breath shortness    • Bronchitis     chronic   • Cataract     removed bilat   • COPD    • Dialysis     [facility] M-[facility]-F,    • Dyslipidemia    • EMPHYSEMA    • GERD (gastroesophageal reflux disease)    • Hypertension    • Oxygen decrease     O2 3liters @  and dialysis   • Pacemaker 1988   • Pain 05/09/2018    shoulders/hips, 5/10   • Pneumonia 2017   • Pseudomonas pneumonia ([facility])    • Pulmonary histoplasmosis ([facility])    • Renal disorder     CKD,    • Sick sinus syndrome ([facility])    • Sleep apnea     uses cpap at [facility]   • Snoring    • Stroke ([facility]) 02/2018   • Unspecified hemorrhagic conditions     bruises easily, fragile skin   • Wegener's disease, pulmonary ([facility])        Past Surgical History:   Procedure Laterality Date   • AV FISTULOGRAM Right 4/12/2016    Procedure: AV FISTULOGRAM , VENOPLASTY X 2;  Surgeon: Nate Syed M.D.;  Location: SURGERY [facility];  Service:    • RECOVERY  9/3/2015    Procedure: IR1 VASCULAR CASE-ELENO RIGHT DIALYSIS FISTULOGRAM, POSSIBLE INTERVENTION;  Surgeon: [facility] Surgery;  Location:  (nitroGLYCERIN sublingual 0.4 mg tablet)  0.4 mg=1 tab  SL  Every 5 minutes As Needed: chest pain  - If chest pain not relieved in 5 minutes after first dose, seek immediate medical attention     CI  Advanced Care Planning:_   * Document discussions:_    Immunization Status:    * Pneumovax:_     * Flu document:_   Smoker: Yes__  No__  Smoking Cessation Counseling Given: Yes___   No___      AMI (CAD, CABG): _____ Non Applicable ___ On Asa, BB,  CHF: ____ Non Applicable ___ EF:___ Asa, ACE, BB. Contraindicated due to:  DM: ____Non Applicable _____Insuline __oral diabetic ___HgA1c, BMP q 4m  Depression:___ Non applicable __ Symptoms: Cries, feel depress  ANTICOAGULATION; ___non Applicable. ____List indication, target range, ____2-3 or ___2.5 -3.6   ______ _ current dose ________current P T and INR date  _______monitoring order and name of the provider you contacted to f/u on this in outpatient setting  Date of next PT/INR______  Verbal/written report given to ____ for f/u on outpt setting.    Patient and family education document given.         Follow-up instructions:  d/c home. f/u with pcp in 1 week. f/u with cardiology in 2 weeks.         I spent _35  minutes completing this patient's discharge.             Electronically Signed On 11/02/2017 16:36  __________________________________________________   ALEXANDRO LINDSEY     SURGERY PRE-POST PROC UNIT [facility] [place];  Service:    • RECOVERY  2/26/2015    Performed by [facility] at SURGERY SAME DAY [facility]   • RECOVERY  10/3/2014    Performed by [facility] at SURGERY SAME DAY [facility]   • RECOVERY  8/7/2014    Performed by [facility] at [facility]   • RECOVERY  12/17/2013    Performed by [facility] at SURGERY SAME DAY [facility]   • BRONCHOSCOPY-ENDO  7/18/2013    Performed by Juan F Rubio M.D. at [facility]   • RECOVERY  7/17/2013    Performed by [facility] at SURGERY SAME DAY [facility]   • AV FISTULA REVISION  6/9/2012    Performed by NESSA JOHANSEN at SURGERY [facility]   • RECOVERY  4/19/2012    Performed by SURGERY, IR-RECOVERY at SURGERY SAME DAY [facility]   • AV FISTULA CREATION  3/8/2012    Performed by NESSA JOHANSEN at SURGERY [facility]   • GASTROSCOPY WITH BIOPSY  1/17/2012    Performed by ZURDO HARRISON at [facility]   • CATH PLACEMENT  10/8/2011    Performed by NESSA JOHANSEN at [facility]   • GASTROSCOPY WITH BALLOON DILATATION  9/28/2011    Performed by KT FERNANDEZ at [facility]   • PACEMAKER INSERTION  4/2009   • ROTATOR CUFF REPAIR  2003    right   • HERNIA REPAIR  1990    right inguinal hernia   • HIP REPLACEMENT, TOTAL      right   • TONSILLECTOMY         Family History   Problem Relation Age of Onset   • Stroke Father    • Heart Disease Father    • Stroke Mother    • Cancer Unknown        Social History     Social History   • Marital status:      Spouse name: N/A   • Number of children: N/A   • Years of education: N/A     Occupational History   • Not on file.     Social History Main Topics   • Smoking status: Former Smoker     Years: 30.00     Types: Pipe     Quit date: 1/1/1990   • Smokeless tobacco: Former User     Types: Chew   • Alcohol use 4.8 oz/week     7 Glasses of wine, 1 Standard drinks or  "equivalent per week      Comment: 1/2 glass Red wine nightly   • Drug use: No   • Sexual activity: Yes     Partners: Female     Other Topics Concern   • Not on file     Social History Narrative   • No narrative on file       Allergies as of 11/27/2018 - Reviewed 11/27/2018   Allergen Reaction Noted   • Erythromycin Unspecified 07/07/2014   • Rituximab  08/15/2016        @Vital signs for this encounter:  Vitals:    11/27/18 1459   Height: 1.727 m (5' 8\")   Weight: 65.8 kg (145 lb)   Weight % change since last entry.: 0 %   BP: 124/60   Pulse: 91   BMI (Calculated): 22.05   Resp: 16   Temp: 36.9 °C (98.4 °F)   TempSrc: Temporal   O2 sat % room air: 95 %       Current medications as of today   Current Outpatient Prescriptions   Medication Sig Dispense Refill   • raNITidine (ZANTAC) 150 MG Tab Take 150 mg by mouth 2 times a day.     • albuterol (PROAIR HFA) 108 (90 Base) MCG/ACT Aero Soln inhalation aerosol Inhale 2 Puffs by mouth every 6 hours as needed for Shortness of Breath.     • sodium chloride (HYPER-SAL) 7 % Nebu Soln INHALE 1 VIAL VIA NEBULIZER TWICE DAILY 240 mL 11   • tamsulosin (FLOMAX) 0.4 MG capsule Take 1 Cap by mouth every day. 90 Cap 3   • sevelamer carbonate (RENVELA) 800 MG Tab tablet TAKE 1 TABLET BY MOUTH THREE TIMES DAILY WITH MEALS 270 Tab 7   • apixaban (ELIQUIS) 2.5mg Tab Take 2.5 mg by mouth 2 Times a Day.     • Fluticasone Furoate-Vilanterol (BREO ELLIPTA) 200-25 MCG/INH AEROSOL POWDER, BREATH ACTIVATED Inhale 1 Puff by mouth every day. Rinse mouth after use. 1 Each 11   • ipratropium-albuterol (DUONEB) 0.5-2.5 (3) MG/3ML nebulizer solution USE 3 ML VIA NEBULIZER FOUR TIMES DAILY 1080 mL 3   • metoprolol (LOPRESSOR) 25 MG Tab Take 25 mg by mouth 2 times a day.     • B Complex-C-Folic Acid (DIALYVITE TABLET) Tab Dialyvite -nehro vit B complex-C-folic acid 1 tablet po QD Dialysis Pt 90 Tab 3   • finasteride (PROSCAR) 5 MG Tab Take 5 mg by mouth every day.     • simvastatin (ZOCOR) 40 MG TABS " Take 40 mg by mouth every evening.     • epoetin lucina (EPOGEN,PROCRIT) 3000 UNIT/ML SOLN Inject 2,200 Units as instructed every Monday, Wednesday, and Friday. With dialysis     • aspirin EC (ECOTRIN) 81 MG TBEC Take 81 mg by mouth every day.     • predniSONE (DELTASONE) 10 MG Tab Take 10 mg by mouth every day. Take with 1mg tab every day for total daily dose of 11mg.       No current facility-administered medications for this visit.          Physical Exam:   Gen:           Alert and oriented, No apparent distress. Mood and affect appropriate, normal interaction with examiner.  Eyes:          PERRL, EOM intact, sclere white, conjunctive moist.  Ears:          Not examined.   Hearing:     Grossly intact.  Nose:          Normal, no lesions or deformities.  Dentition:    Good dentition.  Oropharynx:   Tongue normal, posterior pharynx without erythema or exudate.  Mallampati Classification: 3  Neck:        Supple, trachea midline, no masses.  Respiratory Effort: No intercostal retractions or use of accessory muscles.   Lung Auscultation:      Mild bibasilar crackles; no wheezing, CTA.  CV:            Regular rate and rhythm. No murmurs, rubs or gallops.  Abd:           Not examined.   Lymphadenopathy: Not examined.  Gait and Station: Normal.  Digits and Nails: No clubbing, cyanosis, petechiae, or nodes.   Cranial Nerves: II-XII grossly intact.  Skin:        No rashes, lesions or ulcers noted.               Ext:           No cyanosis or edema.      Assessment:  1. Chronic obstructive pulmonary disease, unspecified COPD type ([facility])     2. Pseudomonas aeruginosa colonization     3. Abnormal CT scan of lung     4. TIMOTHY (obstructive sleep apnea)     5. Wegener's granulomatosis ([facility])     6. ESRD on dialysis ([facility])     7. Immunosuppressed status ([facility])     8. Pacemaker     9. Atrial fibrillation, unspecified type ([facility])     10. Chronic anticoagulation     11. Former smoker     12. BMI 22.0-22.9, adult          Immunizations:    Flu:given in the last 2 months per patient  Pneumovax 23:2011  Prevnar 13:2017    Plan: Patient was seen for 35 minutes, more than 50% of time spent in face to face review, counseling, and arranging future evaluation and follow up of medical conditions and care related to ILD, medications, TIMOTHY. Patient is clinically stable and will proceed with following plan. Answered all patient questions to their satisfaction.      1. RX Cipro now. If symptoms do not improve after regimen, please call office and will initiate sputum cultures and possible update on CT.  2. Continue CPAP w/O2 bleed in.  DME mask/supplies.  3. Discussed respiratory and sleep hygiene.  4. F/u with rheumatology as scheduled.   5. F/u with neph as scheduled with dialysis.  6. Follow up at pulmonary clinic in 2 months with MD for continued care, sooner if needed. May also review compliance card next time. May need future f/u at sleep center for TIMOTHY.    Please note that this dictation was created using voice recognition software. I have made every reasonable attempt to correct obvious errors, but it is possible there are errors of grammar and possibly content that I did not discover before finalizing the note.

## 2019-10-08 ENCOUNTER — APPOINTMENT (RX ONLY)
Dept: URBAN - METROPOLITAN AREA CLINIC 20 | Facility: CLINIC | Age: 79
Setting detail: DERMATOLOGY
End: 2019-10-08

## 2019-10-08 DIAGNOSIS — L57.0 ACTINIC KERATOSIS: ICD-10-CM

## 2019-10-08 DIAGNOSIS — D485 NEOPLASM OF UNCERTAIN BEHAVIOR OF SKIN: ICD-10-CM

## 2019-10-08 DIAGNOSIS — L81.4 OTHER MELANIN HYPERPIGMENTATION: ICD-10-CM

## 2019-10-08 DIAGNOSIS — Z85.828 PERSONAL HISTORY OF OTHER MALIGNANT NEOPLASM OF SKIN: ICD-10-CM

## 2019-10-08 DIAGNOSIS — Z71.89 OTHER SPECIFIED COUNSELING: ICD-10-CM

## 2019-10-08 DIAGNOSIS — D22 MELANOCYTIC NEVI: ICD-10-CM

## 2019-10-08 DIAGNOSIS — L30.0 NUMMULAR DERMATITIS: ICD-10-CM

## 2019-10-08 DIAGNOSIS — D18.0 HEMANGIOMA: ICD-10-CM

## 2019-10-08 DIAGNOSIS — L82.1 OTHER SEBORRHEIC KERATOSIS: ICD-10-CM

## 2019-10-08 PROBLEM — D22.39 MELANOCYTIC NEVI OF OTHER PARTS OF FACE: Status: ACTIVE | Noted: 2019-10-08

## 2019-10-08 PROBLEM — D22.72 MELANOCYTIC NEVI OF LEFT LOWER LIMB, INCLUDING HIP: Status: ACTIVE | Noted: 2019-10-08

## 2019-10-08 PROBLEM — D22.71 MELANOCYTIC NEVI OF RIGHT LOWER LIMB, INCLUDING HIP: Status: ACTIVE | Noted: 2019-10-08

## 2019-10-08 PROBLEM — D18.01 HEMANGIOMA OF SKIN AND SUBCUTANEOUS TISSUE: Status: ACTIVE | Noted: 2019-10-08

## 2019-10-08 PROBLEM — D22.61 MELANOCYTIC NEVI OF RIGHT UPPER LIMB, INCLUDING SHOULDER: Status: ACTIVE | Noted: 2019-10-08

## 2019-10-08 PROBLEM — D22.5 MELANOCYTIC NEVI OF TRUNK: Status: ACTIVE | Noted: 2019-10-08

## 2019-10-08 PROBLEM — D48.5 NEOPLASM OF UNCERTAIN BEHAVIOR OF SKIN: Status: ACTIVE | Noted: 2019-10-08

## 2019-10-08 PROBLEM — D22.62 MELANOCYTIC NEVI OF LEFT UPPER LIMB, INCLUDING SHOULDER: Status: ACTIVE | Noted: 2019-10-08

## 2019-10-08 PROCEDURE — ? SUNSCREEN RECOMMENDATIONS

## 2019-10-08 PROCEDURE — 99214 OFFICE O/P EST MOD 30 MIN: CPT | Mod: 25

## 2019-10-08 PROCEDURE — 17004 DESTROY PREMAL LESIONS 15/>: CPT

## 2019-10-08 PROCEDURE — 11102 TANGNTL BX SKIN SINGLE LES: CPT | Mod: 59

## 2019-10-08 PROCEDURE — 11103 TANGNTL BX SKIN EA SEP/ADDL: CPT

## 2019-10-08 PROCEDURE — ? BIOPSY BY SHAVE METHOD

## 2019-10-08 PROCEDURE — ? PRESCRIPTION

## 2019-10-08 PROCEDURE — ? LIQUID NITROGEN

## 2019-10-08 PROCEDURE — ? COUNSELING

## 2019-10-08 RX ORDER — TRIAMCINOLONE ACETONIDE 1 MG/G
CREAM TOPICAL
Qty: 1 | Refills: 0 | Status: ERX

## 2019-10-08 ASSESSMENT — LOCATION DETAILED DESCRIPTION DERM
LOCATION DETAILED: LEFT DISTAL DORSAL FOREARM
LOCATION DETAILED: RIGHT LATERAL TRAPEZIAL NECK
LOCATION DETAILED: RIGHT PROXIMAL PRETIBIAL REGION
LOCATION DETAILED: RIGHT VENTRAL PROXIMAL FOREARM
LOCATION DETAILED: RIGHT INFERIOR MEDIAL UPPER BACK
LOCATION DETAILED: RIGHT PROXIMAL RADIAL DORSAL FOREARM
LOCATION DETAILED: LEFT SUPERIOR LATERAL MALAR CHEEK
LOCATION DETAILED: LEFT VENTRAL PROXIMAL FOREARM
LOCATION DETAILED: LEFT DORSAL THUMB METACARPOPHALANGEAL JOINT
LOCATION DETAILED: LEFT LATERAL PROXIMAL PRETIBIAL REGION
LOCATION DETAILED: LEFT CENTRAL PARIETAL SCALP
LOCATION DETAILED: RIGHT DISTAL POSTERIOR THIGH
LOCATION DETAILED: RIGHT INFERIOR MEDIAL MIDBACK
LOCATION DETAILED: RIGHT ULNAR DORSAL HAND
LOCATION DETAILED: 1ST WEB SPACE LEFT HAND
LOCATION DETAILED: LEFT PROXIMAL PRETIBIAL REGION
LOCATION DETAILED: RIGHT FOREHEAD
LOCATION DETAILED: RIGHT SUPERIOR PARIETAL SCALP
LOCATION DETAILED: RIGHT DISTAL DORSAL FOREARM
LOCATION DETAILED: LEFT PROXIMAL POSTERIOR UPPER ARM
LOCATION DETAILED: LEFT INFERIOR TEMPLE
LOCATION DETAILED: LEFT FOREHEAD
LOCATION DETAILED: LEFT RADIAL DORSAL HAND
LOCATION DETAILED: RIGHT SUPERIOR UPPER BACK
LOCATION DETAILED: RIGHT DISTAL POSTERIOR UPPER ARM
LOCATION DETAILED: LEFT DISTAL POSTERIOR THIGH
LOCATION DETAILED: RIGHT SUPERIOR OCCIPITAL SCALP
LOCATION DETAILED: LEFT SUPERIOR FOREHEAD
LOCATION DETAILED: RIGHT PROXIMAL DORSAL FOREARM
LOCATION DETAILED: RIGHT SUPERIOR FOREHEAD
LOCATION DETAILED: RIGHT RADIAL DORSAL HAND
LOCATION DETAILED: 1ST WEB SPACE RIGHT HAND
LOCATION DETAILED: LEFT PROXIMAL DORSAL FOREARM
LOCATION DETAILED: INFERIOR THORACIC SPINE
LOCATION DETAILED: LEFT SUPERIOR OCCIPITAL SCALP
LOCATION DETAILED: RIGHT INFERIOR FOREHEAD
LOCATION DETAILED: RIGHT VENTRAL DISTAL FOREARM
LOCATION DETAILED: RIGHT INFERIOR CENTRAL MALAR CHEEK
LOCATION DETAILED: RIGHT SUPERIOR LATERAL BUCCAL CHEEK

## 2019-10-08 ASSESSMENT — LOCATION SIMPLE DESCRIPTION DERM
LOCATION SIMPLE: RIGHT PRETIBIAL REGION
LOCATION SIMPLE: LEFT OCCIPITAL SCALP
LOCATION SIMPLE: LEFT FOREARM
LOCATION SIMPLE: RIGHT HAND
LOCATION SIMPLE: RIGHT THUMB
LOCATION SIMPLE: RIGHT UPPER BACK
LOCATION SIMPLE: RIGHT CHEEK
LOCATION SIMPLE: LEFT TEMPLE
LOCATION SIMPLE: UPPER BACK
LOCATION SIMPLE: RIGHT LOWER BACK
LOCATION SIMPLE: RIGHT POSTERIOR UPPER ARM
LOCATION SIMPLE: RIGHT FOREARM
LOCATION SIMPLE: LEFT HAND
LOCATION SIMPLE: POSTERIOR NECK
LOCATION SIMPLE: RIGHT OCCIPITAL SCALP
LOCATION SIMPLE: RIGHT FOREHEAD
LOCATION SIMPLE: LEFT POSTERIOR THIGH
LOCATION SIMPLE: LEFT PRETIBIAL REGION
LOCATION SIMPLE: LEFT FOREHEAD
LOCATION SIMPLE: SCALP
LOCATION SIMPLE: LEFT POSTERIOR UPPER ARM
LOCATION SIMPLE: LEFT CHEEK
LOCATION SIMPLE: RIGHT POSTERIOR THIGH

## 2019-10-08 ASSESSMENT — LOCATION ZONE DERM
LOCATION ZONE: NECK
LOCATION ZONE: TRUNK
LOCATION ZONE: HAND
LOCATION ZONE: FACE
LOCATION ZONE: ARM
LOCATION ZONE: SCALP
LOCATION ZONE: FINGER
LOCATION ZONE: LEG

## 2019-10-08 NOTE — PROCEDURE: LIQUID NITROGEN
Post-Care Instructions: I reviewed with the patient in detail post-care instructions. Patient is to wear sunprotection, and avoid picking at any of the treated lesions. Pt may apply Vaseline to crusted or scabbing areas.
Duration Of Freeze Thaw-Cycle (Seconds): 10
Render Note In Bullet Format When Appropriate: No
Consent: The patient's consent was obtained including but not limited to risks of crusting, scabbing, blistering, scarring, darker or lighter pigmentary change, recurrence, incomplete removal and infection. RTC in 2 months if lesion(s) persistent.
Number Of Freeze-Thaw Cycles: 2 freeze-thaw cycles
Detail Level: Detailed
Render Post-Care Instructions In Note?: yes

## 2019-10-08 NOTE — PROCEDURE: BIOPSY BY SHAVE METHOD
Detail Level: Detailed
Lab: [id number]
Anesthesia Type: 1% lidocaine with 1:100,000 epinephrine and a 1:12 solution of 8.4% sodium bicarbonate
Destruction After The Procedure: No
Post-Care Instructions: I reviewed with the patient in detail post-care instructions. Patient is to keep the biopsy site clean once daily and then apply petroleum and bandaid  until healed.
Lab Facility: 
Size Of Lesion In Cm: 0
Notification Instructions: Patient will be notified of biopsy results. However, patient instructed to call the office if not contacted within 2 weeks.
Depth Of Biopsy: dermis
Wound Care: Bacitracin
Consent: Written consent was obtained and risks were reviewed including but not limited to scarring, infection, bleeding, scabbing, incomplete removal, nerve damage and allergy to anesthesia.
Biopsy Type: H and E
Render Post-Care Instructions In Note?: yes
Anesthesia Volume In Cc: 1
Type Of Destruction Used: Curettage
Dressing: Band-Aid
Hemostasis: Drysol and Electrocautery
Biopsy Method: Personna blade
Billing Type: Third-Party Bill

## 2019-10-17 ENCOUNTER — HOSPITAL ENCOUNTER (OUTPATIENT)
Dept: LAB | Facility: MEDICAL CENTER | Age: 79
End: 2019-10-17
Attending: NURSE PRACTITIONER
Payer: MEDICARE

## 2019-10-17 LAB — PSA SERPL-MCNC: 0.25 NG/ML (ref 0–4)

## 2019-10-17 PROCEDURE — 36415 COLL VENOUS BLD VENIPUNCTURE: CPT | Mod: GA

## 2019-10-17 PROCEDURE — 84153 ASSAY OF PSA TOTAL: CPT | Mod: GA

## 2019-10-29 ENCOUNTER — APPOINTMENT (RX ONLY)
Dept: URBAN - METROPOLITAN AREA CLINIC 20 | Facility: CLINIC | Age: 79
Setting detail: DERMATOLOGY
End: 2019-10-29

## 2019-10-29 PROBLEM — C44.91 BASAL CELL CARCINOMA OF SKIN, UNSPECIFIED: Status: ACTIVE | Noted: 2019-10-29

## 2019-10-29 PROCEDURE — ? MOHS SURGERY PHONE CONSULTATION

## 2019-10-29 PROCEDURE — ? PRESCRIPTION

## 2019-10-29 RX ORDER — AMOXICILLIN 500 MG/1
TABLET, FILM COATED ORAL
Qty: 30 | Refills: 0 | Status: ERX

## 2019-10-29 NOTE — PROCEDURE: MOHS SURGERY PHONE CONSULTATION
Date Of Mohs Surgery: 11/26/2019
Does The Patient Have A Pacemaker Or Defibrillator?: Yes
Has The Patient Ever Had A Heart Attack Or Stroke?: No
Which Antibiotic Do They Take For Surgical Prophylaxis?: Amoxicillin (2 grams)
Patient's Insurance: medicare
If Yes- Additional Details: right hip
Pathology Accession #: [id number]
Office Location Of Mohs Surgery: [place] [id number]
Detail Level: Simple
Time Of Mohs Surgery: 11:40am
Patient Reported Location: right superior lateral buccal cheek
Referring Provider: Trang ALVARES

## 2019-10-30 ENCOUNTER — OFFICE VISIT (OUTPATIENT)
Dept: PULMONOLOGY | Facility: HOSPICE | Age: 79
End: 2019-10-30
Payer: MEDICARE

## 2019-10-30 VITALS
BODY MASS INDEX: 20.02 KG/M2 | DIASTOLIC BLOOD PRESSURE: 76 MMHG | OXYGEN SATURATION: 98 % | HEIGHT: 68 IN | SYSTOLIC BLOOD PRESSURE: 102 MMHG | HEART RATE: 74 BPM | WEIGHT: 132.13 LBS

## 2019-10-30 DIAGNOSIS — I35.0 AORTIC VALVE STENOSIS, ETIOLOGY OF CARDIAC VALVE DISEASE UNSPECIFIED: ICD-10-CM

## 2019-10-30 PROCEDURE — 99214 OFFICE O/P EST MOD 30 MIN: CPT | Performed by: INTERNAL MEDICINE

## 2019-10-30 RX ORDER — ROSUVASTATIN CALCIUM 20 MG/1
20 TABLET, COATED ORAL DAILY
Refills: 3 | COMMUNITY
Start: 2019-08-13

## 2019-10-30 NOTE — PROGRESS NOTES
CC:  Chief Complaint   Patient presents with   • Follow-Up     Pseudomonas aeruginosa colonization. Last see 07/18/19   • Patient Question     Pt brought list of questions he would like to go over.      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    HPI:   The patient is a 79 y.o. male with complicated past medical history including treated pulmonary MAC, treated pulmonary histoplasmosis and now with bronchiectasis/COPD secondary to that came today for follow-up.  The patient also has Wegener's syndrome and end-stage renal disease on hemodialysis.  He was recently found to have severe aortic stenosis on echocardiogram done in [facility].    The patient has been using Breo percussion vest for bronchiectasis.  Symptoms are at baseline.  His wife is asking to [facility] the [facility] for [facility] regarding his aortic stenosis.      ROS:   Constitutional: Denies fevers, chills, night sweats  Eyes: Denies vision loss, pain, drainage, double vision  Ears, Nose, Throat: Denies earache, difficulty hearing, tinnitus, nasal congestion, hoarseness  Cardiovascular: Denies chest pain, tightness, palpitations, orthopnea or edema  Respiratory: Please see HPI  GI: Denies heartburn, dysphagia, nausea, abdominal pain, diarrhea or constipation  : Denies frequent urination, hematuria, discharge or painful urination  Musculoskeletal: Denies back pain, painful joints, sore muscles  Neurological: Denies weakness or headaches  Skin: No rashes  All other ROS were negative except what mentioned in the HPI     Past Medical History:  Past Medical History:   Diagnosis Date   • A-fib ([facility])        • Anemia    • Arthritis     arms, legs, shoulders   • ASTHMA    • BPH (benign prostatic hyperplasia)    • Breath shortness    • Bronchitis     chronic   • Cataract     removed bilat   • COPD    •  Dialysis     Dr.Nylk Leon   • Dyslipidemia    • EMPHYSEMA    • GERD (gastroesophageal reflux disease)    • Hypertension    • Oxygen decrease     O2 3liters @ HS and dialysis   • Pacemaker    • Pain 2018    shoulders/hips, 5/10   • Pneumonia 2017   • Pseudomonas pneumonia (HCC)    • Pulmonary histoplasmosis (HCC)    • Renal disorder     CKD,    • Sick sinus syndrome (HCC)    • Sleep apnea     [facility] cpap at [facility]   • Snoring    • Stroke (HCC) 2018   • Unspecified hemorrhagic conditions     bruises easily, fragile skin   • Wegener's disease, pulmonary (HCC)                Social History:  Social History     Socioeconomic History   • Marital status:      Spouse name: Not on file   • Number of children: Not on file   • Years of education: Not on file   • Highest education level: Not on file   Occupational History   • Not on file   Social Needs   • Financial resource strain: Not on file   • Food insecurity:     Worry: Not on file     Inability: Not on file   • Transportation needs:     Medical: Not on file     Non-medical: Not on file   Tobacco Use   • Smoking status: Former Smoker     Years: 30.00     Types: Pipe     Last attempt to quit: 1990     Years since quittin.8   • Smokeless tobacco: Former User     Types: Chew   Substance and Sexual Activity   • Alcohol use: Yes     Alcohol/week: 4.2 oz     Types: 7 Glasses of wine per week     Frequency: 2-3 times a week     Comment: 1/2 glass Red wine nightly   • Drug use: No   • Sexual activity: Yes     Partners: Female   Lifestyle   • Physical activity:     Days per week: Not on file     Minutes per session: Not on file   • Stress: Not on file   Relationships   • Social connections:     Talks on phone: Not on file     Gets together: Not on file     Attends [employer] service: Not on file     Active member of club or organization: Not on file     Attends meetings of clubs or organizations: Not on file     Relationship status: Not on  file   • Intimate partner violence:     Fear of current or ex partner: Not on file     Emotionally abused: Not on file     Physically abused: Not on file     Forced sexual activity: Not on file   Other Topics Concern   • Not on file   Social History Narrative   • Not on file             Family History:  Family History   Problem Relation Age of Onset   • Stroke Father    • Heart Disease Father    • Stroke Mother    • Cancer Other        Current Outpatient Medications on File Prior to Visit   Medication Sig Dispense Refill   • rosuvastatin (CRESTOR) 20 MG Tab Take 20 mg by mouth every day.  3   • Multiple Vitamins-Minerals (CENTRUM SILVER PO) Take  by mouth every day.     • BREO ELLIPTA 200-25 MCG/INH AEROSOL POWDER, BREATH ACTIVATED INHALE 1 PUFF BY MOUTH EVERY DAY. RINSE MOUTH AFTER USE 1 Each 5   • predniSONE (DELTASONE) 1 MG Tab 5 tabs po qam 450 Tab 0   • acetaminophen (TYLENOL) 650 MG CR tablet Take 650 mg by mouth every 6 hours as needed. Indications: Pain     • Home Care Oxygen Inhale 3 L/min by mouth as needed.     • epoetin lucina (EPOGEN,PROCRIT) 40888 UNIT/ML Solution Inject 0.5 mL as instructed every Monday, Wednesday, and Friday. 30 mL 0   • metoprolol SR (TOPROL XL) 25 MG TABLET SR 24 HR Take 25 mg by mouth 2 Times a Day.  3   • albuterol (PROAIR HFA) 108 (90 Base) MCG/ACT Aero Soln inhalation aerosol Inhale 2 Puffs by mouth every 6 hours as needed for Shortness of Breath. 8.5 g 0   • ipratropium-albuterol (DUONEB) 0.5-2.5 (3) MG/3ML nebulizer solution 3 mL by Nebulization route every 6 hours as needed for Shortness of Breath.     • Cholecalciferol (VITAMIN D3) 5000 units Cap Take 5,000 Units by mouth every morning.     • sodium chloride (HYPER-SAL) 7 % Nebu Soln INHALE 1 VIAL VIA NEBULIZER TWICE DAILY 240 mL 11   • tamsulosin (FLOMAX) 0.4 MG capsule Take 1 Cap by mouth every day. 90 Cap 3   • sevelamer carbonate (RENVELA) 800 MG Tab tablet TAKE 1 TABLET BY MOUTH THREE TIMES DAILY WITH MEALS 270 Tab 7   •  "finasteride (PROSCAR) 5 MG Tab Take 5 mg by mouth every morning.     • omeprazole (PRILOSEC) 20 MG delayed-release capsule Take 1 Cap by mouth every day. (Patient not taking: Reported on 9/19/2019) 30 Cap 1   • B Complex-C-Folic Acid (DIALYVITE TABLET) Tab Take 1 Tab by mouth every morning.     • apixaban (ELIQUIS) 2.5mg Tab Take 2.5 mg by mouth every morning. Every morning       No current facility-administered medications on file prior to visit.        Allergies:   Doxycycline; Erythromycin; and Rituximab        Vitals:    10/30/19 0903 10/30/19 0913   Height:  1.727 m (5' 8\")   Weight:  59.9 kg (132 lb 2 oz)   Weight % change since last entry.:  0 %   BP:  102/76   Pulse:  74   BMI (Calculated):  20.09   O2 sat % room air: 98 %            Physical Exam:  Appearance:  in no acute distress  HEENT: Normocephalic, atraumatic, white sclera, PERRLA, oropharynx clear  Neck: No adenopathy or masses  Respiratory: no intercostal retractions or accessory muscle use  Lungs auscultation: Clear to auscultation bilaterally  Cardiovascular: Regular rate rhythm. No murmurs, rubs or gallops.  No LE edema  Abdomen: soft, nondistended  Gait: Normal  Digits: No clubbing, cyanosis  Motor: No focal deficits  Orientation: Oriented to time, person and place      DATA:    Labs:  Lab Results   Component Value Date/Time    WBC 5.7 08/01/2019 01:01 AM    WBC 7.6 03/19/2012 12:00 AM    RBC 2.60 (L) 08/01/2019 01:01 AM    RBC 2.25 (LL) 03/19/2012 12:00 AM    HEMOGLOBIN 7.7 (L) 08/01/2019 01:01 AM    HEMATOCRIT 25.3 (L) 08/01/2019 01:01 AM    MCV 97.3 08/01/2019 01:01 AM     (H) 03/19/2012 12:00 AM    MCH 29.6 08/01/2019 01:01 AM    MCH 33.8 03/19/2012 12:00 AM    MCHC 30.4 (L) 08/01/2019 01:01 AM    MPV 9.0 08/01/2019 01:01 AM    NEUTSPOLYS 78.10 (H) 08/01/2019 01:01 AM    LYMPHOCYTES 9.10 (L) 08/01/2019 01:01 AM    MONOCYTES 8.90 08/01/2019 01:01 AM    EOSINOPHILS 2.30 08/01/2019 01:01 AM    BASOPHILS 0.50 08/01/2019 01:01 AM    " HYPOCHROMIA 1+ 10/28/2011 05:35 AM    ANISOCYTOSIS 1+ 07/30/2019 02:40 AM      Lab Results   Component Value Date/Time    SODIUM 136 08/01/2019 01:01 AM    POTASSIUM 4.3 08/01/2019 01:01 AM    CHLORIDE 99 08/01/2019 01:01 AM    CO2 29 08/01/2019 01:01 AM    GLUCOSE 90 08/01/2019 01:01 AM    BUN 16 08/01/2019 01:01 AM    CREATININE 2.14 (H) 08/01/2019 01:01 AM    CREATININE 1.0 09/23/2008 09:36 AM                Diagnosis:  1. Aortic valve stenosis, etiology of cardiac valve disease unspecified  REFERRAL TO CARDIOLOGY        Assessment and Plan   Regarding the patient bronchiectasis/COPD I asked him to continue the percussion vest and Brio inhaler.  He also should continue DuoNeb's on as-needed basis.  His symptoms has been on baseline.    [facility] severe [facility].    Patient will come back to follow-up with Dr. ballard in 3 months  Status post flu vaccine this season                      Return in about 3 months (around 1/30/2020).        This note was created using voice recognition software. I apologize for any overlooked obvious grammar or  vocabulary mistake

## 2019-11-04 RX ORDER — TAMSULOSIN HYDROCHLORIDE 0.4 MG/1
CAPSULE ORAL
Qty: 90 CAP | Refills: 2 | Status: SHIPPED | OUTPATIENT
Start: 2019-11-04 | End: 2020-07-15

## 2019-11-05 ENCOUNTER — RX ONLY (OUTPATIENT)
Age: 79
Setting detail: RX ONLY
End: 2019-11-05

## 2019-11-05 RX ORDER — AMOXICILLIN 500 MG/1
CAPSULE ORAL
Qty: 4 | Refills: 0 | Status: ERX

## 2019-11-05 RX ORDER — AMOXICILLIN 500 MG/1
CAPSULE ORAL
Qty: 6 | Refills: 0 | Status: ERX

## 2019-11-07 ENCOUNTER — HOSPITAL ENCOUNTER (OUTPATIENT)
Facility: MEDICAL CENTER | Age: 79
End: 2019-11-07
Attending: INTERNAL MEDICINE | Admitting: INTERNAL MEDICINE
Payer: MEDICARE

## 2019-11-07 ENCOUNTER — APPOINTMENT (OUTPATIENT)
Dept: RADIOLOGY | Facility: MEDICAL CENTER | Age: 79
End: 2019-11-07
Attending: SURGERY
Payer: MEDICARE

## 2019-11-07 VITALS
TEMPERATURE: 97.5 F | OXYGEN SATURATION: 94 % | DIASTOLIC BLOOD PRESSURE: 59 MMHG | SYSTOLIC BLOOD PRESSURE: 121 MMHG | BODY MASS INDEX: 19.21 KG/M2 | HEIGHT: 68 IN | RESPIRATION RATE: 16 BRPM | WEIGHT: 126.76 LBS | HEART RATE: 85 BPM

## 2019-11-07 DIAGNOSIS — T82.858A BYPASS GRAFT STENOSIS, INITIAL ENCOUNTER (HCC): ICD-10-CM

## 2019-11-07 LAB
ANION GAP SERPL CALC-SCNC: 8 MMOL/L (ref 0–11.9)
BUN SERPL-MCNC: 35 MG/DL (ref 8–22)
CALCIUM SERPL-MCNC: 9.1 MG/DL (ref 8.5–10.5)
CHLORIDE SERPL-SCNC: 98 MMOL/L (ref 96–112)
CO2 SERPL-SCNC: 33 MMOL/L (ref 20–33)
CREAT SERPL-MCNC: 3 MG/DL (ref 0.5–1.4)
ERYTHROCYTE [DISTWIDTH] IN BLOOD BY AUTOMATED COUNT: 56.1 FL (ref 35.9–50)
GLUCOSE SERPL-MCNC: 89 MG/DL (ref 65–99)
HCT VFR BLD AUTO: 32.2 % (ref 42–52)
HGB BLD-MCNC: 10.1 G/DL (ref 14–18)
MCH RBC QN AUTO: 30.3 PG (ref 27–33)
MCHC RBC AUTO-ENTMCNC: 31.4 G/DL (ref 33.7–35.3)
MCV RBC AUTO: 96.7 FL (ref 81.4–97.8)
PLATELET # BLD AUTO: 191 K/UL (ref 164–446)
PMV BLD AUTO: 9 FL (ref 9–12.9)
POTASSIUM SERPL-SCNC: 4 MMOL/L (ref 3.6–5.5)
RBC # BLD AUTO: 3.33 M/UL (ref 4.7–6.1)
SODIUM SERPL-SCNC: 139 MMOL/L (ref 135–145)
WBC # BLD AUTO: 6.7 K/UL (ref 4.8–10.8)

## 2019-11-07 PROCEDURE — 700111 HCHG RX REV CODE 636 W/ 250 OVERRIDE (IP)

## 2019-11-07 PROCEDURE — 85027 COMPLETE CBC AUTOMATED: CPT

## 2019-11-07 PROCEDURE — 99153 MOD SED SAME PHYS/QHP EA: CPT

## 2019-11-07 PROCEDURE — 700117 HCHG RX CONTRAST REV CODE 255: Performed by: SURGERY

## 2019-11-07 PROCEDURE — 700111 HCHG RX REV CODE 636 W/ 250 OVERRIDE (IP): Performed by: SURGERY

## 2019-11-07 PROCEDURE — 80048 BASIC METABOLIC PNL TOTAL CA: CPT

## 2019-11-07 PROCEDURE — 36902 INTRO CATH DIALYSIS CIRCUIT: CPT

## 2019-11-07 PROCEDURE — 700105 HCHG RX REV CODE 258: Performed by: SURGERY

## 2019-11-07 PROCEDURE — 160002 HCHG RECOVERY MINUTES (STAT)

## 2019-11-07 RX ORDER — MIDAZOLAM HYDROCHLORIDE 1 MG/ML
INJECTION INTRAMUSCULAR; INTRAVENOUS
Status: COMPLETED
Start: 2019-11-07 | End: 2019-11-07

## 2019-11-07 RX ORDER — IODIXANOL 270 MG/ML
20 INJECTION, SOLUTION INTRAVASCULAR ONCE
Status: COMPLETED | OUTPATIENT
Start: 2019-11-07 | End: 2019-11-07

## 2019-11-07 RX ORDER — ONDANSETRON 2 MG/ML
4 INJECTION INTRAMUSCULAR; INTRAVENOUS PRN
Status: DISCONTINUED | OUTPATIENT
Start: 2019-11-07 | End: 2019-11-07 | Stop reason: HOSPADM

## 2019-11-07 RX ORDER — HEPARIN SODIUM,PORCINE 1000/ML
VIAL (ML) INJECTION
Status: COMPLETED
Start: 2019-11-07 | End: 2019-11-07

## 2019-11-07 RX ORDER — MIDAZOLAM HYDROCHLORIDE 1 MG/ML
.5-2 INJECTION INTRAMUSCULAR; INTRAVENOUS PRN
Status: DISCONTINUED | OUTPATIENT
Start: 2019-11-07 | End: 2019-11-07 | Stop reason: HOSPADM

## 2019-11-07 RX ORDER — HEPARIN SODIUM 1000 [USP'U]/ML
2000 INJECTION, SOLUTION INTRAVENOUS; SUBCUTANEOUS PRN
Status: DISCONTINUED | OUTPATIENT
Start: 2019-11-07 | End: 2019-11-07 | Stop reason: HOSPADM

## 2019-11-07 RX ORDER — SODIUM CHLORIDE 9 MG/ML
INJECTION, SOLUTION INTRAVENOUS CONTINUOUS
Status: DISCONTINUED | OUTPATIENT
Start: 2019-11-07 | End: 2019-11-07 | Stop reason: HOSPADM

## 2019-11-07 RX ORDER — SODIUM CHLORIDE 9 MG/ML
500 INJECTION, SOLUTION INTRAVENOUS ONCE
Status: COMPLETED | OUTPATIENT
Start: 2019-11-07 | End: 2019-11-07

## 2019-11-07 RX ADMIN — SODIUM CHLORIDE 500 ML: 9 INJECTION, SOLUTION INTRAVENOUS at 15:50

## 2019-11-07 RX ADMIN — VANCOMYCIN HYDROCHLORIDE 1 G: 500 INJECTION, POWDER, LYOPHILIZED, FOR SOLUTION INTRAVENOUS at 16:50

## 2019-11-07 RX ADMIN — HEPARIN SODIUM 2000 UNITS: 1000 INJECTION, SOLUTION INTRAVENOUS; SUBCUTANEOUS at 17:25

## 2019-11-07 RX ADMIN — MIDAZOLAM HYDROCHLORIDE 0.5 MG: 1 INJECTION INTRAMUSCULAR; INTRAVENOUS at 17:13

## 2019-11-07 RX ADMIN — SODIUM CHLORIDE: 9 INJECTION, SOLUTION INTRAVENOUS at 15:51

## 2019-11-07 RX ADMIN — FENTANYL CITRATE 25 MCG: 50 INJECTION INTRAMUSCULAR; INTRAVENOUS at 17:13

## 2019-11-07 RX ADMIN — IODIXANOL 20 ML: 270 INJECTION, SOLUTION INTRAVASCULAR at 17:35

## 2019-11-07 RX ADMIN — MIDAZOLAM HYDROCHLORIDE 0.5 MG: 1 INJECTION, SOLUTION INTRAMUSCULAR; INTRAVENOUS at 17:13

## 2019-11-08 NOTE — DISCHARGE INSTRUCTIONS
ACTIVITY: Rest and take it easy for the first 24 hours.  A responsible adult is recommended to remain with you during that time.  It is normal to feel sleepy.  We encourage you to not do anything that requires balance, judgment or coordination.    MILD FLU-LIKE SYMPTOMS ARE NORMAL. YOU MAY EXPERIENCE GENERALIZED MUSCLE ACHES, THROAT IRRITATION, HEADACHE AND/OR SOME NAUSEA.    FOR 24 HOURS DO NOT:  Drive, operate machinery or run household appliances.  Drink beer or alcoholic beverages.   Make important decisions or sign legal documents.      DIET: To avoid nausea, slowly advance diet as tolerated, avoiding spicy or greasy foods for the first day.  Add more substantial food to your diet according to your physician's instructions.  Babies can be fed formula or breast milk as soon as they are hungry.  INCREASE FLUIDS AND FIBER TO AVOID CONSTIPATION.    SURGICAL DRESSING/BATHING:     Minimal activity at home for 2 days.   ·   No Pushing, pulling, or heavy lifting (10 LBS) for 2 days.   ·   Keep dressings on for 2 days, then remove.   ·   Keep incisions dry and clean.   ·   Okay to shower after 2 days.   ·   No bath for 10 days.   .   Suture may be removed at dialysis center.    FOLLOW-UP APPOINTMENT:  A follow-up appointment should be arranged with your doctor; call to schedule.    You should CALL YOUR PHYSICIAN if you develop:  Fever greater than 101 degrees F.  Pain not relieved by medication, or persistent nausea or vomiting.  Excessive bleeding (blood soaking through dressing) or unexpected drainage from the wound.  Extreme redness or swelling around the incision site, drainage of pus or foul smelling drainage.  Inability to urinate or empty your bladder within 8 hours.  Problems with breathing or chest pain.    You should call 911 if you develop problems with breathing or chest pain.  If you are unable to contact your doctor or surgical center, you should go to the nearest emergency room or urgent care center.       Physician's telephone #: [phone]    If any questions arise, call your doctor.  If your doctor is not available, please feel free to call the Surgical Center at [phone].  The Center is open Monday through Friday from 7AM to 7PM.  You can also call the HEALTH HOTLINE open 24 hours/day, 7 days/week and speak to a nurse at [phone], or toll free at [phone].    A registered nurse may call you a few days after your surgery to see how you are doing after your procedure.    MEDICATIONS: Resume taking daily medication.  Take prescribed pain medication with food.  If no medication is prescribed, you may take non-aspirin pain medication if needed.  PAIN MEDICATION CAN BE VERY CONSTIPATING.  Take a stool softener or laxative such as senokot, pericolace, or milk of magnesia if needed.      If your physician has prescribed pain medication that includes Acetaminophen (Tylenol), do not take additional Acetaminophen (Tylenol) while taking the prescribed medication.    Depression / Suicide Risk    As you are discharged from this [facility] facility, it is important to learn how to keep safe from harming yourself.    Recognize the warning signs:  · Abrupt changes in personality, positive or negative- including increase in energy   · Giving away possessions  · Change in eating patterns- significant weight changes-  positive or negative  · Change in sleeping patterns- unable to sleep or sleeping all the time   · Unwillingness or inability to communicate  · Depression  · Unusual sadness, discouragement and loneliness  · Talk of wanting to die  · Neglect of personal appearance   · Rebelliousness- reckless behavior  · Withdrawal from people/activities they love  · Confusion- inability to concentrate     If you or a loved one observes any of these behaviors or has concerns about self-harm, here's what you can do:  · Talk about it- your feelings and reasons for harming yourself  · Remove any means that you might  use to hurt yourself (examples: pills, rope, extension cords, firearm)  · Get professional help from the community (Mental Health, Substance Abuse, psychological counseling)  · Do not be alone:Call your Safe Contact- someone whom you trust who will be there for you.  · Call your local CRISIS HOTLINE 883-4029 or 274-105-8691  · Call your local Children's Mobile Crisis Response Team [place] (549) 430-3470 or www.iRule  · Call the toll free National Suicide Prevention Hotlines   · National Suicide Prevention Lifeline 909-031-SWJY (6105)  · [employer] Line Network 800-SUICIDE (764-1145)                            AV Fistula Placement  Introduction  Arteriovenous (AV) fistula placement is a surgical procedure to create a connection between a blood vessel that carries blood away from your heart (artery) and a blood vessel that returns blood to your heart (vein). The connection is called a fistula. It is often made in the forearm or upper arm.  You may need this procedure if you are getting hemodialysis treatments for kidney disease. An AV fistula makes your vein larger and stronger over several months. This makes the vein a safe and easy spot to insert the needles that are used for hemodialysis.  Tell a health care provider about:  · Any allergies you have.  · All medicines you are taking, including vitamins, herbs, eye drops, creams, and over-the-counter medicines.  · Any problems you or family members have had with anesthetic medicines.  · Any blood disorders you have.  · Any surgeries you have had.  · Any medical conditions you have.  What are the risks?  Generally, this is a safe procedure. However, problems may occur, including:  · Infection.  · Blood clot (thrombosis).  · Reduced blood flow (stenosis).  · Weakening or ballooning out of the fistula (aneurysm).  · Bleeding.  · Allergic reactions to medicines.  · Nerve damage.  · Swelling near the fistula (lymphedema).  · Weakening of your heart  (congestive heart failure).  · Failure of the procedure.  What happens before the procedure?  · Imaging tests of your arm may be done to find the best place for the fistula.  · Ask your health care provider about:  ¨ Changing or stopping your regular medicines. This is especially important if you are taking diabetes medicines or blood thinners.  ¨ Taking medicines such as aspirin and ibuprofen. These medicines can thin your blood. Do not take these medicines before your procedure if your health care provider instructs you not to.  · Follow instructions from your health care provider about eating or drinking restrictions.  · You may be given antibiotic medicine to help prevent infection.  · Ask your health care provider how your surgical site will be marked or identified.  · Plan to have someone take you home after the procedure.  What happens during the procedure?  · To reduce your risk of infection:  ¨ Your health care team will wash or sanitize their hands.  ¨ Your skin will be washed with soap.  ¨ Hair may be removed from the surgical area.  · An IV tube will be started in one of your veins.  · You will be given one or more of the following:  ¨ A medicine to help you relax (sedative).  ¨ A medicine to numb the area (local anesthetic).  ¨ A medicine to make you fall asleep (general anesthetic).  ¨ A medicine that is injected into an area of your body to numb everything below the injection site (regional anesthetic).  · The fistula site will be cleaned with a germ-killing solution (antiseptic).  · A cut (incision) will be made on the inner side of your arm.  · A vein and an artery will be opened and connected with stitches (sutures).  · The incision will be closed with sutures or clips.  · A bandage (dressing) will be placed over the area.  The procedure may vary among health care providers and hospitals.  What happens after the procedure?  · Your blood pressure, heart rate, breathing rate, and blood oxygen level  will be monitored often until the medicines you were given have worn off.  · Your fistula site will be checked for bleeding or swelling.  · You will be given pain medication as needed.  · Do not drive for 24 hours if you received a sedative.  This information is not intended to replace advice given to you by your health care provider. Make sure you discuss any questions you have with your health care provider.  Document Released: 11/28/2016 Document Revised: 05/25/2017 Document Reviewed: 03/09/2016  © 2017 [employer]vier

## 2019-11-08 NOTE — OP REPORT
DATE OF SERVICE:  11/07/2019    SURGEON:  Nate Syed MD.    ANESTHESIA:  Intravenous sedation with fentanyl and Versed for 30 minutes and   local anesthesia with 4 mL of 1% lidocaine solution.    PREOPERATIVE DIAGNOSIS:  Right upper extremity dialysis fistula dysfunction   secondary to stenosis.    POSTOPERATIVE DIAGNOSIS:  Right upper extremity dialysis fistula dysfunction   secondary to stenosis.    PROCEDURES:  1.  Percutaneous cannulation of right upper extremity arteriovenous fistula   under ultrasound guidance.  2.  Dialysis fistulogram.  3.  Percutaneous, transluminal venoplasty of fistula stenosis above the   anastomosis using 7x40 mm and 9x40 mm angioplasty balloons.    INDICATIONS FOR PROCEDURE:  This is a pleasant gentleman with multiple medical   problems including renal failure on hemodialysis via a right upper arm   arteriovenous fistula which has been present for at least 9-10 years.  The   fistula was working well until recently when there was problem.  Discussion   was made with patient.  He would like to undergo dialysis fistulogram with   possible intervention.  He understood that the fistula stenosis would recur at   some point, as it had been.    DESCRIPTION OF PROCEDURE:  Informed consent was obtained.  Patient was taken   to the interventional radiology suite and was placed in the supine position.    He was given vancomycin intravenously.  A time-out procedure was done.    Intravenous sedation was performed with fentanyl and Versed.  The patient was   closely monitored.    Next, his right upper extremity was sterilely prepped and draped in the normal   fashion.  Doppler evaluation of the fistula was performed.  There was severe   stenosis seen above the anastomosis.  Under ultrasound guidance, the fistula   was percutaneously cannulated using a micropuncture kit, directed towards the   anastomosis.  The microcatheter was removed and replaced with a 6-Maltese   sheath.  A dialysis  fistulogram was obtained.  There was severe stenosis seen   above the anastomosis.    Patient was given heparin 2000 units intravenously.  After the heparin was   allowed to circulate systemically for 3 minutes, over the guidewire,   percutaneous, transluminal venoplasty of the fistula stenosis was performed   using a 7x40 mm angioplasty balloon.  A followup angiogram was obtained and   showed significant improvement in luminal patency, although this area was   still smaller than the remaining of the fistula.  Therefore, this area and the   stenotic area were treated with a 9x40 mm angioplasty balloon.  A followup   fistulogram was obtained and showed improvement of luminal patency with not a   significant recoil stenosis seen, although the area was still smaller compared   to the remaining of the fistula.  At this point, it was obvious that no   further venoplasty could be done without risking rupture.  The procedure was   therefore stopped.  The 6-Telugu sheath was removed and hemostasis was   achieved by placing a pursestring at the puncture site using 2-0 nylon suture.    Sterile dressing was applied.    IMPRESSION:  Patent right upper extremity arteriovenous fistula with areas of   severe stenosis above the anastomosis.  Following percutaneous, transluminal   venoplasty using 7x40 and 9x40 mm angioplasty balloon, there was significant   improvement of luminal patency.    ESTIMATED BLOOD LOSS:  10 mL.    CONTRAST USED:  20 mL of Visipaque.    COUNTS:  Sponge and instrument count was correct x2.    Patient was then awakened and taken to recovery area in stable condition with   excellent thrills over the fistula and intact neurovascular examination of the   right hand.       ____________________________________     MD ESMER JOHNSON / SHARRON    DD:  11/07/2019 17:55:03  DT:  11/07/2019 20:13:00    D#:  [id number]  Job#:  [id number]

## 2019-11-08 NOTE — PROGRESS NOTES
Site visualized and Procedure Confirmed with MD, patient and RN pre procedure. Consent obtained and placed in Patient's chart. Patient assisted to the table in IR 1 in the supine position with all bony prominences padded and draped in sterile fashion.    Right Dialysis Fistulogram with Angioplasty by MD Syed assisted by RT Aries, Right arm fistula access site.     End Tidal CO2 range 21-30 during procedure.    Right arm fistula access site CDI, sutured in place with gauze and tegaderm dressing in place.     Patient tolerated procedure, hemodynamically stable; pt awake and alert post procedure; report given to LIZETH Craven; patient transported to PPU via IR RN monitored then transferred care to report RN. Family notified and at bedside.

## 2019-11-08 NOTE — OR NURSING
1751: Patient from radiology to PPU s/p fistulogram. L arm fistula site soft, CDI. Thrill and bruit present. RUE CMS intact. Patient is wide awake.   1800: Wife at bedside. Vancomycin infusing from IR. Patient tolerating PO fluids well. Patient denies pain or nausea at this time. R arm elevated and wrap in warm blankets.  1830: DC instructions given. Questions answered. Wife at bedside. R arm fistula site soft,CDI. No c/o pain or nausea. Patient wide awake. VSS. Patient met criteria for discharge.

## 2019-11-12 ENCOUNTER — APPOINTMENT (RX ONLY)
Dept: URBAN - METROPOLITAN AREA CLINIC 20 | Facility: CLINIC | Age: 79
Setting detail: DERMATOLOGY
End: 2019-11-12

## 2019-11-12 DIAGNOSIS — L56.5 DISSEMINATED SUPERFICIAL ACTINIC POROKERATOSIS (DSAP): ICD-10-CM

## 2019-11-12 PROBLEM — C44.619 BASAL CELL CARCINOMA OF SKIN OF LEFT UPPER LIMB, INCLUDING SHOULDER: Status: ACTIVE | Noted: 2019-11-12

## 2019-11-12 PROBLEM — C44.41 BASAL CELL CARCINOMA OF SKIN OF SCALP AND NECK: Status: ACTIVE | Noted: 2019-11-12

## 2019-11-12 PROCEDURE — ? COUNSELING

## 2019-11-12 PROCEDURE — ? ADDITIONAL NOTES

## 2019-11-12 PROCEDURE — ? BIOPSY BY SHAVE METHOD

## 2019-11-12 PROCEDURE — ? EXCISION

## 2019-11-12 PROCEDURE — 11602 EXC TR-EXT MAL+MARG 1.1-2 CM: CPT

## 2019-11-12 PROCEDURE — 99212 OFFICE O/P EST SF 10 MIN: CPT | Mod: 25

## 2019-11-12 PROCEDURE — 11102 TANGNTL BX SKIN SINGLE LES: CPT | Mod: 59

## 2019-11-12 PROCEDURE — 12032 INTMD RPR S/A/T/EXT 2.6-7.5: CPT | Mod: 59

## 2019-11-12 ASSESSMENT — LOCATION SIMPLE DESCRIPTION DERM: LOCATION SIMPLE: LEFT FOREARM

## 2019-11-12 ASSESSMENT — LOCATION DETAILED DESCRIPTION DERM: LOCATION DETAILED: LEFT PROXIMAL DORSAL FOREARM

## 2019-11-12 ASSESSMENT — LOCATION ZONE DERM: LOCATION ZONE: ARM

## 2019-11-12 NOTE — PROCEDURE: EXCISION
V-Y Flap Text: The defect edges were debeveled with a #15 scalpel blade.  Given the location of the defect, shape of the defect and the proximity to free margins a V-Y flap was deemed most appropriate.  Using a sterile surgical marker, an appropriate advancement flap was drawn incorporating the defect and placing the expected incisions within the relaxed skin tension lines where possible.    The area thus outlined was incised deep to adipose tissue with a #15 scalpel blade.  The skin margins were undermined to an appropriate distance in all directions utilizing iris scissors.
Complex Repair And Split-Thickness Skin Graft Text: The defect edges were debeveled with a #15 scalpel blade.  The primary defect was closed partially with a complex linear closure.  Given the location of the defect, shape of the defect and the proximity to free margins a split thickness skin graft was deemed most appropriate to repair the remaining defect.  The graft was trimmed to fit the size of the remaining defect.  The graft was then placed in the primary defect, oriented appropriately, and sutured into place.
Bilobed Flap Text: The defect edges were debeveled with a #15 scalpel blade.  Given the location of the defect and the proximity to free margins a bilobe flap was deemed most appropriate.  Using a sterile surgical marker, an appropriate bilobe flap drawn around the defect.    The area thus outlined was incised deep to adipose tissue with a #15 scalpel blade.  The skin margins were undermined to an appropriate distance in all directions utilizing iris scissors.
Cheek-To-Nose Interpolation Flap Text: A decision was made to reconstruct the defect utilizing an interpolation axial flap and a staged reconstruction.  A telfa template was made of the defect.  This telfa template was then used to outline the Cheek-To-Nose Interpolation flap.  The donor area for the pedicle flap was then injected with anesthesia.  The flap was excised through the skin and subcutaneous tissue down to the layer of the underlying musculature.  The interpolation flap was carefully excised within this deep plane to maintain its blood supply.  The edges of the donor site were undermined.   The donor site was closed in a primary fashion.  The pedicle was then rotated into position and sutured.  Once the tube was sutured into place, adequate blood supply was confirmed with blanching and refill.  The pedicle was then wrapped with xeroform gauze and dressed appropriately with a telfa and gauze bandage to ensure continued blood supply and protect the attached pedicle.
Partial Purse String (Simple) Text: Given the location of the defect and the characteristics of the surrounding skin a simple purse string closure was deemed most appropriate.  Undermining was performed circumferentially around the surgical defect.  A purse string suture was then placed and tightened. Wound tension of the circular defect prevented complete closure of the wound.
Show Anatomic Location From Referring Provider Variable: Yes
Estimated Blood Loss (Cc): minimal
Banner Transposition Flap Text: The defect edges were debeveled with a #15 scalpel blade.  Given the location of the defect and the proximity to free margins a Banner transposition flap was deemed most appropriate.  Using a sterile surgical marker, an appropriate flap drawn around the defect. The area thus outlined was incised deep to adipose tissue with a #15 scalpel blade.  The skin margins were undermined to an appropriate distance in all directions utilizing iris scissors.
Complex Repair And Ftsg Text: The defect edges were debeveled with a #15 scalpel blade.  The primary defect was closed partially with a complex linear closure.  Given the location of the defect, shape of the defect and the proximity to free margins a full thickness skin graft was deemed most appropriate to repair the remaining defect.  The graft was trimmed to fit the size of the remaining defect.  The graft was then placed in the primary defect, oriented appropriately, and sutured into place.
Lab: [id number]
Interpolation Flap Text: A decision was made to reconstruct the defect utilizing an interpolation axial flap and a staged reconstruction.  A telfa template was made of the defect.  This telfa template was then used to outline the interpolation flap.  The donor area for the pedicle flap was then injected with anesthesia.  The flap was excised through the skin and subcutaneous tissue down to the layer of the underlying musculature.  The interpolation flap was carefully excised within this deep plane to maintain its blood supply.  The edges of the donor site were undermined.   The donor site was closed in a primary fashion.  The pedicle was then rotated into position and sutured.  Once the tube was sutured into place, adequate blood supply was confirmed with blanching and refill.  The pedicle was then wrapped with xeroform gauze and dressed appropriately with a telfa and gauze bandage to ensure continued blood supply and protect the attached pedicle.
Dermal Closure: simple interrupted
Complex Repair And Single Advancement Flap Text: The defect edges were debeveled with a #15 scalpel blade.  The primary defect was closed partially with a complex linear closure.  Given the location of the remaining defect, shape of the defect and the proximity to free margins a single advancement flap was deemed most appropriate for complete closure of the defect.  Using a sterile surgical marker, an appropriate advancement flap was drawn incorporating the defect and placing the expected incisions within the relaxed skin tension lines where possible.    The area thus outlined was incised deep to adipose tissue with a #15 scalpel blade.  The skin margins were undermined to an appropriate distance in all directions utilizing iris scissors.
Advancement-Rotation Flap Text: The defect edges were debeveled with a #15 scalpel blade.  Given the location of the defect, shape of the defect and the proximity to free margins an advancement-rotation flap was deemed most appropriate.  Using a sterile surgical marker, an appropriate flap was drawn incorporating the defect and placing the expected incisions within the relaxed skin tension lines where possible. The area thus outlined was incised deep to adipose tissue with a #15 scalpel blade.  The skin margins were undermined to an appropriate distance in all directions utilizing iris scissors.
Complex Repair And Epidermal Autograft Text: The defect edges were debeveled with a #15 scalpel blade.  The primary defect was closed partially with a complex linear closure.  Given the location of the defect, shape of the defect and the proximity to free margins an epidermal autograft was deemed most appropriate to repair the remaining defect.  The graft was trimmed to fit the size of the remaining defect.  The graft was then placed in the primary defect, oriented appropriately, and sutured into place.
Positioning (Leave Blank If You Do Not Want): The patient was placed in a comfortable position exposing the surgical site.
Bilobed Transposition Flap Text: The defect edges were debeveled with a #15 scalpel blade.  Given the location of the defect and the proximity to free margins a bilobed transposition flap was deemed most appropriate.  Using a sterile surgical marker, an appropriate bilobe flap drawn around the defect.    The area thus outlined was incised deep to adipose tissue with a #15 scalpel blade.  The skin margins were undermined to an appropriate distance in all directions utilizing iris scissors.
Lab Facility: 
Repair Type: Intermediate
Intermediate / Complex Repair - Final Wound Length In Cm: 4.1
Trilobed Flap Text: The defect edges were debeveled with a #15 scalpel blade.  Given the location of the defect and the proximity to free margins a trilobed flap was deemed most appropriate.  Using a sterile surgical marker, an appropriate trilobed flap drawn around the defect.    The area thus outlined was incised deep to adipose tissue with a #15 scalpel blade.  The skin margins were undermined to an appropriate distance in all directions utilizing iris scissors.
Pre-Excision Curettage Text (Leave Blank If You Do Not Want): Prior to drawing the surgical margin the visible lesion was removed with electrodesiccation and curettage to clearly define the lesion size.
Complex Repair And Dermal Autograft Text: The defect edges were debeveled with a #15 scalpel blade.  The primary defect was closed partially with a complex linear closure.  Given the location of the defect, shape of the defect and the proximity to free margins an dermal autograft was deemed most appropriate to repair the remaining defect.  The graft was trimmed to fit the size of the remaining defect.  The graft was then placed in the primary defect, oriented appropriately, and sutured into place.
Saucerization Excision Additional Text (Leave Blank If You Do Not Want): The margin was drawn around the clinically apparent lesion.  Incisions were then made along these lines, in a tangential fashion, to the appropriate tissue plane and the lesion was extirpated.
Melolabial Interpolation Flap Text: A decision was made to reconstruct the defect utilizing an interpolation axial flap and a staged reconstruction.  A telfa template was made of the defect.  This telfa template was then used to outline the melolabial interpolation flap.  The donor area for the pedicle flap was then injected with anesthesia.  The flap was excised through the skin and subcutaneous tissue down to the layer of the underlying musculature.  The pedicle flap was carefully excised within this deep plane to maintain its blood supply.  The edges of the donor site were undermined.   The donor site was closed in a primary fashion.  The pedicle was then rotated into position and sutured.  Once the tube was sutured into place, adequate blood supply was confirmed with blanching and refill.  The pedicle was then wrapped with xeroform gauze and dressed appropriately with a telfa and gauze bandage to ensure continued blood supply and protect the attached pedicle.
Excision Depth: adipose tissue
Mercedes Flap Text: The defect edges were debeveled with a #15 scalpel blade.  Given the location of the defect, shape of the defect and the proximity to free margins a Mercedes flap was deemed most appropriate.  Using a sterile surgical marker, an appropriate advancement flap was drawn incorporating the defect and placing the expected incisions within the relaxed skin tension lines where possible. The area thus outlined was incised deep to adipose tissue with a #15 scalpel blade.  The skin margins were undermined to an appropriate distance in all directions utilizing iris scissors.
Graft Donor Site Bandage (Optional-Leave Blank If You Don't Want In Note): Steri-strips and a pressure bandage were applied to the donor site.
Complex Repair And Double Advancement Flap Text: The defect edges were debeveled with a #15 scalpel blade.  The primary defect was closed partially with a complex linear closure.  Given the location of the remaining defect, shape of the defect and the proximity to free margins a double advancement flap was deemed most appropriate for complete closure of the defect.  Using a sterile surgical marker, an appropriate advancement flap was drawn incorporating the defect and placing the expected incisions within the relaxed skin tension lines where possible.    The area thus outlined was incised deep to adipose tissue with a #15 scalpel blade.  The skin margins were undermined to an appropriate distance in all directions utilizing iris scissors.
Epidermal Sutures: 5-0 Vicryl Rapide
Render Path Notes In Note?: no
Detail Level: Detailed
Slit Excision Additional Text (Leave Blank If You Do Not Want): A linear line was drawn on the skin overlying the lesion. An incision was made slowly until the lesion was visualized.  Once visualized, the lesion was removed with blunt dissection.
Surgeon Performing The Repair (Optional): Trang ALVARES
Scalpel Size: 15 blade
Complex Repair And Tissue Cultured Epidermal Autograft Text: The defect edges were debeveled with a #15 scalpel blade.  The primary defect was closed partially with a complex linear closure.  Given the location of the defect, shape of the defect and the proximity to free margins an tissue cultured epidermal autograft was deemed most appropriate to repair the remaining defect.  The graft was trimmed to fit the size of the remaining defect.  The graft was then placed in the primary defect, oriented appropriately, and sutured into place.
Dorsal Nasal Flap Text: The defect edges were debeveled with a #15 scalpel blade.  Given the location of the defect and the proximity to free margins a dorsal nasal flap was deemed most appropriate.  Using a sterile surgical marker, an appropriate dorsal nasal flap was drawn around the defect.    The area thus outlined was incised deep to adipose tissue with a #15 scalpel blade.  The skin margins were undermined to an appropriate distance in all directions utilizing iris scissors.
Complex Repair Preamble Text (Leave Blank If You Do Not Want): Extensive wide undermining was performed.
Mastoid Interpolation Flap Text: A decision was made to reconstruct the defect utilizing an interpolation axial flap and a staged reconstruction.  A telfa template was made of the defect.  This telfa template was then used to outline the mastoid interpolation flap.  The donor area for the pedicle flap was then injected with anesthesia.  The flap was excised through the skin and subcutaneous tissue down to the layer of the underlying musculature.  The pedicle flap was carefully excised within this deep plane to maintain its blood supply.  The edges of the donor site were undermined.   The donor site was closed in a primary fashion.  The pedicle was then rotated into position and sutured.  Once the tube was sutured into place, adequate blood supply was confirmed with blanching and refill.  The pedicle was then wrapped with xeroform gauze and dressed appropriately with a telfa and gauze bandage to ensure continued blood supply and protect the attached pedicle.
Complex Repair And Modified Advancement Flap Text: The defect edges were debeveled with a #15 scalpel blade.  The primary defect was closed partially with a complex linear closure.  Given the location of the remaining defect, shape of the defect and the proximity to free margins a modified advancement flap was deemed most appropriate for complete closure of the defect.  Using a sterile surgical marker, an appropriate advancement flap was drawn incorporating the defect and placing the expected incisions within the relaxed skin tension lines where possible.    The area thus outlined was incised deep to adipose tissue with a #15 scalpel blade.  The skin margins were undermined to an appropriate distance in all directions utilizing iris scissors.
Modified Advancement Flap Text: The defect edges were debeveled with a #15 scalpel blade.  Given the location of the defect, shape of the defect and the proximity to free margins a modified advancement flap was deemed most appropriate.  Using a sterile surgical marker, an appropriate advancement flap was drawn incorporating the defect and placing the expected incisions within the relaxed skin tension lines where possible.    The area thus outlined was incised deep to adipose tissue with a #15 scalpel blade.  The skin margins were undermined to an appropriate distance in all directions utilizing iris scissors.
Primary Defect Length (In Cm): 0
Where Do You Want The Question To Include Opioid Counseling Located?: Case Summary Tab
Hatchet Flap Text: The defect edges were debeveled with a #15 scalpel blade.  Given the location of the defect, shape of the defect and the proximity to free margins a hatchet flap was deemed most appropriate.  Using a sterile surgical marker, an appropriate hatchet flap was drawn incorporating the defect and placing the expected incisions within the relaxed skin tension lines where possible.    The area thus outlined was incised deep to adipose tissue with a #15 scalpel blade.  The skin margins were undermined to an appropriate distance in all directions utilizing iris scissors.
Complex Repair And O-T Advancement Flap Text: The defect edges were debeveled with a #15 scalpel blade.  The primary defect was closed partially with a complex linear closure.  Given the location of the remaining defect, shape of the defect and the proximity to free margins an O-T advancement flap was deemed most appropriate for complete closure of the defect.  Using a sterile surgical marker, an appropriate advancement flap was drawn incorporating the defect and placing the expected incisions within the relaxed skin tension lines where possible.    The area thus outlined was incised deep to adipose tissue with a #15 scalpel blade.  The skin margins were undermined to an appropriate distance in all directions utilizing iris scissors.
Posterior Auricular Interpolation Flap Text: A decision was made to reconstruct the defect utilizing an interpolation axial flap and a staged reconstruction.  A telfa template was made of the defect.  This telfa template was then used to outline the posterior auricular interpolation flap.  The donor area for the pedicle flap was then injected with anesthesia.  The flap was excised through the skin and subcutaneous tissue down to the layer of the underlying musculature.  The pedicle flap was carefully excised within this deep plane to maintain its blood supply.  The edges of the donor site were undermined.   The donor site was closed in a primary fashion.  The pedicle was then rotated into position and sutured.  Once the tube was sutured into place, adequate blood supply was confirmed with blanching and refill.  The pedicle was then wrapped with xeroform gauze and dressed appropriately with a telfa and gauze bandage to ensure continued blood supply and protect the attached pedicle.
Excisional Biopsy Additional Text (Leave Blank If You Do Not Want): The margin was drawn around the clinically apparent lesion. An elliptical shape was then drawn on the skin incorporating the lesion and margins.  Incisions were then made along these lines to the appropriate tissue plane and the lesion was extirpated.
Mucosal Advancement Flap Text: Given the location of the defect, shape of the defect and the proximity to free margins a mucosal advancement flap was deemed most appropriate. Incisions were made with a 15 blade scalpel in the appropriate fashion along the cutaneous vermilion border and the mucosal lip. The remaining actinically damaged mucosal tissue was excised.  The mucosal advancement flap was then elevated to the gingival sulcus with care taken to preserve the neurovascular structures and advanced into the primary defect. Care was taken to ensure that precise realignment of the vermilion border was achieved.
Complex Repair And A-T Advancement Flap Text: The defect edges were debeveled with a #15 scalpel blade.  The primary defect was closed partially with a complex linear closure.  Given the location of the remaining defect, shape of the defect and the proximity to free margins an A-T advancement flap was deemed most appropriate for complete closure of the defect.  Using a sterile surgical marker, an appropriate advancement flap was drawn incorporating the defect and placing the expected incisions within the relaxed skin tension lines where possible.    The area thus outlined was incised deep to adipose tissue with a #15 scalpel blade.  The skin margins were undermined to an appropriate distance in all directions utilizing iris scissors.
Intermediate Repair Preamble Text (Leave Blank If You Do Not Want): Undermining was performed with blunt dissection.
Complex Repair And Xenograft Text: The defect edges were debeveled with a #15 scalpel blade.  The primary defect was closed partially with a complex linear closure.  Given the location of the defect, shape of the defect and the proximity to free margins a xenograft was deemed most appropriate to repair the remaining defect.  The graft was trimmed to fit the size of the remaining defect.  The graft was then placed in the primary defect, oriented appropriately, and sutured into place.
Island Pedicle Flap Text: The defect edges were debeveled with a #15 scalpel blade.  Given the location of the defect, shape of the defect and the proximity to free margins an island pedicle advancement flap was deemed most appropriate.  Using a sterile surgical marker, an appropriate advancement flap was drawn incorporating the defect, outlining the appropriate donor tissue and placing the expected incisions within the relaxed skin tension lines where possible.    The area thus outlined was incised deep to adipose tissue with a #15 scalpel blade.  The skin margins were undermined to an appropriate distance in all directions around the primary defect and laterally outward around the island pedicle utilizing iris scissors.  There was minimal undermining beneath the pedicle flap.
Alar Island Pedicle Flap Text: The defect edges were debeveled with a #15 scalpel blade.  Given the location of the defect, shape of the defect and the proximity to the alar rim an island pedicle advancement flap was deemed most appropriate.  Using a sterile surgical marker, an appropriate advancement flap was drawn incorporating the defect, outlining the appropriate donor tissue and placing the expected incisions within the nasal ala running parallel to the alar rim. The area thus outlined was incised with a #15 scalpel blade.  The skin margins were undermined minimally to an appropriate distance in all directions around the primary defect and laterally outward around the island pedicle utilizing iris scissors.  There was minimal undermining beneath the pedicle flap.
Lip Wedge Excision Repair Text: Given the location of the defect and the proximity to free margins a full thickness wedge repair was deemed most appropriate.  Using a sterile surgical marker, the appropriate repair was drawn incorporating the defect and placing the expected incisions perpendicular to the vermilion border.  The vermilion border was also meticulously outlined to ensure appropriate reapproximation during the repair.  The area thus outlined was incised through and through with a #15 scalpel blade.  The muscularis and dermis were reaproximated with deep sutures following hemostasis. Care was taken to realign the vermilion border before proceeding with the superficial closure.  Once the vermilion was realigned the superfical and mucosal closure was finished.
Path Notes (To The Dermatopathologist): Please check margins.
Complex Repair And O-L Flap Text: The defect edges were debeveled with a #15 scalpel blade.  The primary defect was closed partially with a complex linear closure.  Given the location of the remaining defect, shape of the defect and the proximity to free margins an O-L flap was deemed most appropriate for complete closure of the defect.  Using a sterile surgical marker, an appropriate flap was drawn incorporating the defect and placing the expected incisions within the relaxed skin tension lines where possible.    The area thus outlined was incised deep to adipose tissue with a #15 scalpel blade.  The skin margins were undermined to an appropriate distance in all directions utilizing iris scissors.
Rotation Flap Text: The defect edges were debeveled with a #15 scalpel blade.  Given the location of the defect, shape of the defect and the proximity to free margins a rotation flap was deemed most appropriate.  Using a sterile surgical marker, an appropriate rotation flap was drawn incorporating the defect and placing the expected incisions within the relaxed skin tension lines where possible.    The area thus outlined was incised deep to adipose tissue with a #15 scalpel blade.  The skin margins were undermined to an appropriate distance in all directions utilizing iris scissors.
Island Pedicle Flap With Canthal Suspension Text: The defect edges were debeveled with a #15 scalpel blade.  Given the location of the defect, shape of the defect and the proximity to free margins an island pedicle advancement flap was deemed most appropriate.  Using a sterile surgical marker, an appropriate advancement flap was drawn incorporating the defect, outlining the appropriate donor tissue and placing the expected incisions within the relaxed skin tension lines where possible. The area thus outlined was incised deep to adipose tissue with a #15 scalpel blade.  The skin margins were undermined to an appropriate distance in all directions around the primary defect and laterally outward around the island pedicle utilizing iris scissors.  There was minimal undermining beneath the pedicle flap. A suspension suture was placed in the canthal tendon to prevent tension and prevent ectropion.
Complex Repair And Skin Substitute Graft Text: The defect edges were debeveled with a #15 scalpel blade.  The primary defect was closed partially with a complex linear closure.  Given the location of the remaining defect, shape of the defect and the proximity to free margins a skin substitute graft was deemed most appropriate to repair the remaining defect.  The graft was trimmed to fit the size of the remaining defect.  The graft was then placed in the primary defect, oriented appropriately, and sutured into place.
Perilesional Excision Additional Text (Leave Blank If You Do Not Want): The margin was drawn around the clinically apparent lesion. Incisions were then made along these lines to the appropriate tissue plane and the lesion was extirpated.
Paramedian Forehead Flap Text: A decision was made to reconstruct the defect utilizing an interpolation axial flap and a staged reconstruction.  A telfa template was made of the defect.  This telfa template was then used to outline the paramedian forehead pedicle flap.  The donor area for the pedicle flap was then injected with anesthesia.  The flap was excised through the skin and subcutaneous tissue down to the layer of the underlying musculature.  The pedicle flap was carefully excised within this deep plane to maintain its blood supply.  The edges of the donor site were undermined.   The donor site was closed in a primary fashion.  The pedicle was then rotated into position and sutured.  Once the tube was sutured into place, adequate blood supply was confirmed with blanching and refill.  The pedicle was then wrapped with xeroform gauze and dressed appropriately with a telfa and gauze bandage to ensure continued blood supply and protect the attached pedicle.
Repair Performed By Another Provider Text (Leave Blank If You Do Not Want): After the tissue was excised the defect was repaired by another provider.
Ftsg Text: The defect edges were debeveled with a #15 scalpel blade.  Given the location of the defect, shape of the defect and the proximity to free margins a full thickness skin graft was deemed most appropriate.  Using a sterile surgical marker, the primary defect shape was transferred to the donor site. The area thus outlined was incised deep to adipose tissue with a #15 scalpel blade.  The harvested graft was then trimmed of adipose tissue until only dermis and epidermis was left.  The skin margins of the secondary defect were undermined to an appropriate distance in all directions utilizing iris scissors.  The secondary defect was closed with interrupted buried subcutaneous sutures.  The skin edges were then re-apposed with running  sutures.  The skin graft was then placed in the primary defect and oriented appropriately.
Spiral Flap Text: The defect edges were debeveled with a #15 scalpel blade.  Given the location of the defect, shape of the defect and the proximity to free margins a spiral flap was deemed most appropriate.  Using a sterile surgical marker, an appropriate rotation flap was drawn incorporating the defect and placing the expected incisions within the relaxed skin tension lines where possible. The area thus outlined was incised deep to adipose tissue with a #15 scalpel blade.  The skin margins were undermined to an appropriate distance in all directions utilizing iris scissors.
Complex Repair And Bilobe Flap Text: The defect edges were debeveled with a #15 scalpel blade.  The primary defect was closed partially with a complex linear closure.  Given the location of the remaining defect, shape of the defect and the proximity to free margins a bilobe flap was deemed most appropriate for complete closure of the defect.  Using a sterile surgical marker, an appropriate advancement flap was drawn incorporating the defect and placing the expected incisions within the relaxed skin tension lines where possible.    The area thus outlined was incised deep to adipose tissue with a #15 scalpel blade.  The skin margins were undermined to an appropriate distance in all directions utilizing iris scissors.
Double Island Pedicle Flap Text: The defect edges were debeveled with a #15 scalpel blade.  Given the location of the defect, shape of the defect and the proximity to free margins a double island pedicle advancement flap was deemed most appropriate.  Using a sterile surgical marker, an appropriate advancement flap was drawn incorporating the defect, outlining the appropriate donor tissue and placing the expected incisions within the relaxed skin tension lines where possible.    The area thus outlined was incised deep to adipose tissue with a #15 scalpel blade.  The skin margins were undermined to an appropriate distance in all directions around the primary defect and laterally outward around the island pedicle utilizing iris scissors.  There was minimal undermining beneath the pedicle flap.
Star Wedge Flap Text: The defect edges were debeveled with a #15 scalpel blade.  Given the location of the defect, shape of the defect and the proximity to free margins a star wedge flap was deemed most appropriate.  Using a sterile surgical marker, an appropriate rotation flap was drawn incorporating the defect and placing the expected incisions within the relaxed skin tension lines where possible. The area thus outlined was incised deep to adipose tissue with a #15 scalpel blade.  The skin margins were undermined to an appropriate distance in all directions utilizing iris scissors.
Island Pedicle Flap-Requiring Vessel Identification Text: The defect edges were debeveled with a #15 scalpel blade.  Given the location of the defect, shape of the defect and the proximity to free margins an island pedicle advancement flap was deemed most appropriate.  Using a sterile surgical marker, an appropriate advancement flap was drawn, based on the axial vessel mentioned above, incorporating the defect, outlining the appropriate donor tissue and placing the expected incisions within the relaxed skin tension lines where possible.    The area thus outlined was incised deep to adipose tissue with a #15 scalpel blade.  The skin margins were undermined to an appropriate distance in all directions around the primary defect and laterally outward around the island pedicle utilizing iris scissors.  There was minimal undermining beneath the pedicle flap.
No Repair - Repaired With Adjacent Surgical Defect Text (Leave Blank If You Do Not Want): After the excision the defect was repaired concurrently with another surgical defect which was in close approximation.
Split-Thickness Skin Graft Text: The defect edges were debeveled with a #15 scalpel blade.  Given the location of the defect, shape of the defect and the proximity to free margins a split thickness skin graft was deemed most appropriate.  Using a sterile surgical marker, the primary defect shape was transferred to the donor site. The split thickness graft was then harvested.  The skin graft was then placed in the primary defect and oriented appropriately.
Complex Repair And Melolabial Flap Text: The defect edges were debeveled with a #15 scalpel blade.  The primary defect was closed partially with a complex linear closure.  Given the location of the remaining defect, shape of the defect and the proximity to free margins a melolabial flap was deemed most appropriate for complete closure of the defect.  Using a sterile surgical marker, an appropriate advancement flap was drawn incorporating the defect and placing the expected incisions within the relaxed skin tension lines where possible.    The area thus outlined was incised deep to adipose tissue with a #15 scalpel blade.  The skin margins were undermined to an appropriate distance in all directions utilizing iris scissors.
Previous Accession (Optional): [id number]
Epidermal Closure: running subcuticular
Date Of Previous Biopsy (Optional): 10/8/18
Transposition Flap Text: The defect edges were debeveled with a #15 scalpel blade.  Given the location of the defect and the proximity to free margins a transposition flap was deemed most appropriate.  Using a sterile surgical marker, an appropriate transposition flap was drawn incorporating the defect.    The area thus outlined was incised deep to adipose tissue with a #15 scalpel blade.  The skin margins were undermined to an appropriate distance in all directions utilizing iris scissors.
Suture Removal: 14 days
Anesthesia Type: 1% lidocaine with epinephrine and a 1:10 solution of 8.4% sodium bicarbonate
Anesthesia Type: 1% lidocaine with epinephrine
Keystone Flap Text: The defect edges were debeveled with a #15 scalpel blade.  Given the location of the defect, shape of the defect a keystone flap was deemed most appropriate.  Using a sterile surgical marker, an appropriate keystone flap was drawn incorporating the defect, outlining the appropriate donor tissue and placing the expected incisions within the relaxed skin tension lines where possible. The area thus outlined was incised deep to adipose tissue with a #15 scalpel blade.  The skin margins were undermined to an appropriate distance in all directions around the primary defect and laterally outward around the flap utilizing iris scissors.
Cartilage Graft Text: The defect edges were debeveled with a #15 scalpel blade.  Given the location of the defect, shape of the defect, the fact the defect involved a full thickness cartilage defect a cartilage graft was deemed most appropriate.  An appropriate donor site was identified, cleansed, and anesthetized. The cartilage graft was then harvested and transferred to the recipient site, oriented appropriately and then sutured into place.  The secondary defect was then repaired using a primary closure.
Advancement Flap (Single) Text: The defect edges were debeveled with a #15 scalpel blade.  Given the location of the defect and the proximity to free margins a single advancement flap was deemed most appropriate.  Using a sterile surgical marker, an appropriate advancement flap was drawn incorporating the defect and placing the expected incisions within the relaxed skin tension lines where possible.    The area thus outlined was incised deep to adipose tissue with a #15 scalpel blade.  The skin margins were undermined to an appropriate distance in all directions utilizing iris scissors.
Consent was obtained from the patient. The risks and benefits to therapy were discussed in detail. Specifically, the risks of infection, scarring, bleeding, prolonged wound healing, incomplete removal, allergy to anesthesia, nerve injury and recurrence were addressed. Prior to the procedure, the treatment site was clearly identified and confirmed by the patient. All components of Universal Protocol/PAUSE Rule completed.
Complex Repair And Rotation Flap Text: The defect edges were debeveled with a #15 scalpel blade.  The primary defect was closed partially with a complex linear closure.  Given the location of the remaining defect, shape of the defect and the proximity to free margins a rotation flap was deemed most appropriate for complete closure of the defect.  Using a sterile surgical marker, an appropriate advancement flap was drawn incorporating the defect and placing the expected incisions within the relaxed skin tension lines where possible.    The area thus outlined was incised deep to adipose tissue with a #15 scalpel blade.  The skin margins were undermined to an appropriate distance in all directions utilizing iris scissors.
Complex Repair And Transposition Flap Text: The defect edges were debeveled with a #15 scalpel blade.  The primary defect was closed partially with a complex linear closure.  Given the location of the remaining defect, shape of the defect and the proximity to free margins a transposition flap was deemed most appropriate for complete closure of the defect.  Using a sterile surgical marker, an appropriate advancement flap was drawn incorporating the defect and placing the expected incisions within the relaxed skin tension lines where possible.    The area thus outlined was incised deep to adipose tissue with a #15 scalpel blade.  The skin margins were undermined to an appropriate distance in all directions utilizing iris scissors.
O-Z Plasty Text: The defect edges were debeveled with a #15 scalpel blade.  Given the location of the defect, shape of the defect and the proximity to free margins an O-Z plasty (double transposition flap) was deemed most appropriate.  Using a sterile surgical marker, the appropriate transposition flaps were drawn incorporating the defect and placing the expected incisions within the relaxed skin tension lines where possible.    The area thus outlined was incised deep to adipose tissue with a #15 scalpel blade.  The skin margins were undermined to an appropriate distance in all directions utilizing iris scissors.  Hemostasis was achieved with electrocautery.  The flaps were then transposed into place, one clockwise and the other counterclockwise, and anchored with interrupted buried subcutaneous sutures.
Advancement Flap (Double) Text: The defect edges were debeveled with a #15 scalpel blade.  Given the location of the defect and the proximity to free margins a double advancement flap was deemed most appropriate.  Using a sterile surgical marker, the appropriate advancement flaps were drawn incorporating the defect and placing the expected incisions within the relaxed skin tension lines where possible.    The area thus outlined was incised deep to adipose tissue with a #15 scalpel blade.  The skin margins were undermined to an appropriate distance in all directions utilizing iris scissors.
Composite Graft Text: The defect edges were debeveled with a #15 scalpel blade.  Given the location of the defect, shape of the defect, the proximity to free margins and the fact the defect was full thickness a composite graft was deemed most appropriate.  The defect was outline and then transferred to the donor site.  A full thickness graft was then excised from the donor site. The graft was then placed in the primary defect, oriented appropriately and then sutured into place.  The secondary defect was then repaired using a primary closure.
Muscle Hinge Flap Text: The defect edges were debeveled with a #15 scalpel blade.  Given the size, depth and location of the defect and the proximity to free margins a muscle hinge flap was deemed most appropriate.  Using a sterile surgical marker, an appropriate hinge flap was drawn incorporating the defect. The area thus outlined was incised with a #15 scalpel blade.  The skin margins were undermined to an appropriate distance in all directions utilizing iris scissors.
Complex Repair And Rhombic Flap Text: The defect edges were debeveled with a #15 scalpel blade.  The primary defect was closed partially with a complex linear closure.  Given the location of the remaining defect, shape of the defect and the proximity to free margins a rhombic flap was deemed most appropriate for complete closure of the defect.  Using a sterile surgical marker, an appropriate advancement flap was drawn incorporating the defect and placing the expected incisions within the relaxed skin tension lines where possible.    The area thus outlined was incised deep to adipose tissue with a #15 scalpel blade.  The skin margins were undermined to an appropriate distance in all directions utilizing iris scissors.
O-T Plasty Text: The defect edges were debeveled with a #15 scalpel blade.  Given the location of the defect, shape of the defect and the proximity to free margins an O-T plasty was deemed most appropriate.  Using a sterile surgical marker, an appropriate O-T plasty was drawn incorporating the defect and placing the expected incisions within the relaxed skin tension lines where possible.    The area thus outlined was incised deep to adipose tissue with a #15 scalpel blade.  The skin margins were undermined to an appropriate distance in all directions utilizing iris scissors.
Double O-Z Plasty Text: The defect edges were debeveled with a #15 scalpel blade.  Given the location of the defect, shape of the defect and the proximity to free margins a Double O-Z plasty (double transposition flap) was deemed most appropriate.  Using a sterile surgical marker, the appropriate transposition flaps were drawn incorporating the defect and placing the expected incisions within the relaxed skin tension lines where possible. The area thus outlined was incised deep to adipose tissue with a #15 scalpel blade.  The skin margins were undermined to an appropriate distance in all directions utilizing iris scissors.  Hemostasis was achieved with electrocautery.  The flaps were then transposed into place, one clockwise and the other counterclockwise, and anchored with interrupted buried subcutaneous sutures.
Dermal Autograft Text: The defect edges were debeveled with a #15 scalpel blade.  Given the location of the defect, shape of the defect and the proximity to free margins a dermal autograft was deemed most appropriate.  Using a sterile surgical marker, the primary defect shape was transferred to the donor site. The area thus outlined was incised deep to adipose tissue with a #15 scalpel blade.  The harvested graft was then trimmed of adipose and epidermal tissue until only dermis was left.  The skin graft was then placed in the primary defect and oriented appropriately.
Chonodrocutaneous Helical Advancement Flap Text: The defect edges were debeveled with a #15 scalpel blade.  Given the location of the defect and the proximity to free margins a chondrocutaneous helical advancement flap was deemed most appropriate.  Using a sterile surgical marker, the appropriate advancement flap was drawn incorporating the defect and placing the expected incisions within the relaxed skin tension lines where possible.    The area thus outlined was incised deep to adipose tissue with a #15 scalpel blade.  The skin margins were undermined to an appropriate distance in all directions utilizing iris scissors.
Anesthesia Volume In Cc: 6
Size Of Lesion In Cm: 0.8
Post-Care Instructions: I reviewed with the patient in detail post-care instructions. Patient is not to engage in any heavy lifting, exercise, or swimming for the next 14 days. Should the patient develop any fevers, chills, bleeding, severe pain patient will contact the office immediately.
Complex Repair And V-Y Plasty Text: The defect edges were debeveled with a #15 scalpel blade.  The primary defect was closed partially with a complex linear closure.  Given the location of the remaining defect, shape of the defect and the proximity to free margins a V-Y plasty was deemed most appropriate for complete closure of the defect.  Using a sterile surgical marker, an appropriate advancement flap was drawn incorporating the defect and placing the expected incisions within the relaxed skin tension lines where possible.    The area thus outlined was incised deep to adipose tissue with a #15 scalpel blade.  The skin margins were undermined to an appropriate distance in all directions utilizing iris scissors.
Melolabial Transposition Flap Text: The defect edges were debeveled with a #15 scalpel blade.  Given the location of the defect and the proximity to free margins a melolabial flap was deemed most appropriate.  Using a sterile surgical marker, an appropriate melolabial transposition flap was drawn incorporating the defect.    The area thus outlined was incised deep to adipose tissue with a #15 scalpel blade.  The skin margins were undermined to an appropriate distance in all directions utilizing iris scissors.
Epidermal Autograft Text: The defect edges were debeveled with a #15 scalpel blade.  Given the location of the defect, shape of the defect and the proximity to free margins an epidermal autograft was deemed most appropriate.  Using a sterile surgical marker, the primary defect shape was transferred to the donor site. The epidermal graft was then harvested.  The skin graft was then placed in the primary defect and oriented appropriately.
Burow's Advancement Flap Text: The defect edges were debeveled with a #15 scalpel blade.  Given the location of the defect and the proximity to free margins a Burow's advancement flap was deemed most appropriate.  Using a sterile surgical marker, the appropriate advancement flap was drawn incorporating the defect and placing the expected incisions within the relaxed skin tension lines where possible.    The area thus outlined was incised deep to adipose tissue with a #15 scalpel blade.  The skin margins were undermined to an appropriate distance in all directions utilizing iris scissors.
Crescentic Advancement Flap Text: The defect edges were debeveled with a #15 scalpel blade.  Given the location of the defect and the proximity to free margins a crescentic advancement flap was deemed most appropriate.  Using a sterile surgical marker, the appropriate advancement flap was drawn incorporating the defect and placing the expected incisions within the relaxed skin tension lines where possible.    The area thus outlined was incised deep to adipose tissue with a #15 scalpel blade.  The skin margins were undermined to an appropriate distance in all directions utilizing iris scissors.
Skin Substitute Text: The defect edges were debeveled with a #15 scalpel blade.  Given the location of the defect, shape of the defect and the proximity to free margins a skin substitute graft was deemed most appropriate.  The graft material was trimmed to fit the size of the defect. The graft was then placed in the primary defect and oriented appropriately.
Home Suture Removal Text: Patient was provided a home suture removal kit and will remove their sutures at home.  If they have any questions or difficulties they will call the office.
Rhomboid Transposition Flap Text: The defect edges were debeveled with a #15 scalpel blade.  Given the location of the defect and the proximity to free margins a rhomboid transposition flap was deemed most appropriate.  Using a sterile surgical marker, an appropriate rhomboid flap was drawn incorporating the defect.    The area thus outlined was incised deep to adipose tissue with a #15 scalpel blade.  The skin margins were undermined to an appropriate distance in all directions utilizing iris scissors.
Wound Care: Petrolatum
Complex Repair And M Plasty Text: The defect edges were debeveled with a #15 scalpel blade.  The primary defect was closed partially with a complex linear closure.  Given the location of the remaining defect, shape of the defect and the proximity to free margins an M plasty was deemed most appropriate for complete closure of the defect.  Using a sterile surgical marker, an appropriate advancement flap was drawn incorporating the defect and placing the expected incisions within the relaxed skin tension lines where possible.    The area thus outlined was incised deep to adipose tissue with a #15 scalpel blade.  The skin margins were undermined to an appropriate distance in all directions utilizing iris scissors.
S Plasty Text: Given the location and shape of the defect, and the orientation of relaxed skin tension lines, an S-plasty was deemed most appropriate for repair.  Using a sterile surgical marker, the appropriate outline of the S-plasty was drawn, incorporating the defect and placing the expected incisions within the relaxed skin tension lines where possible.  The area thus outlined was incised deep to adipose tissue with a #15 scalpel blade.  The skin margins were undermined to an appropriate distance in all directions utilizing iris scissors. The skin flaps were advanced over the defect.  The opposing margins were then approximated with interrupted buried subcutaneous sutures.
Rhombic Flap Text: The defect edges were debeveled with a #15 scalpel blade.  Given the location of the defect and the proximity to free margins a rhombic flap was deemed most appropriate.  Using a sterile surgical marker, an appropriate rhombic flap was drawn incorporating the defect.    The area thus outlined was incised deep to adipose tissue with a #15 scalpel blade.  The skin margins were undermined to an appropriate distance in all directions utilizing iris scissors.
Undermining Location (Optional): in the superficial subcutaneous fat
Fusiform Excision Additional Text (Leave Blank If You Do Not Want): The margin was drawn around the clinically apparent lesion.  A fusiform shape was then drawn on the skin incorporating the lesion and margins.  Incisions were then made along these lines to the appropriate tissue plane and the lesion was extirpated.
Tissue Cultured Epidermal Autograft Text: The defect edges were debeveled with a #15 scalpel blade.  Given the location of the defect, shape of the defect and the proximity to free margins a tissue cultured epidermal autograft was deemed most appropriate.  The graft was then trimmed to fit the size of the defect.  The graft was then placed in the primary defect and oriented appropriately.
A-T Advancement Flap Text: The defect edges were debeveled with a #15 scalpel blade.  Given the location of the defect, shape of the defect and the proximity to free margins an A-T advancement flap was deemed most appropriate.  Using a sterile surgical marker, an appropriate advancement flap was drawn incorporating the defect and placing the expected incisions within the relaxed skin tension lines where possible.    The area thus outlined was incised deep to adipose tissue with a #15 scalpel blade.  The skin margins were undermined to an appropriate distance in all directions utilizing iris scissors.
Bi-Rhombic Flap Text: The defect edges were debeveled with a #15 scalpel blade.  Given the location of the defect and the proximity to free margins a bi-rhombic flap was deemed most appropriate.  Using a sterile surgical marker, an appropriate rhombic flap was drawn incorporating the defect. The area thus outlined was incised deep to adipose tissue with a #15 scalpel blade.  The skin margins were undermined to an appropriate distance in all directions utilizing iris scissors.
Complex Repair And Double M Plasty Text: The defect edges were debeveled with a #15 scalpel blade.  The primary defect was closed partially with a complex linear closure.  Given the location of the remaining defect, shape of the defect and the proximity to free margins a double M plasty was deemed most appropriate for complete closure of the defect.  Using a sterile surgical marker, an appropriate advancement flap was drawn incorporating the defect and placing the expected incisions within the relaxed skin tension lines where possible.    The area thus outlined was incised deep to adipose tissue with a #15 scalpel blade.  The skin margins were undermined to an appropriate distance in all directions utilizing iris scissors.
V-Y Plasty Text: The defect edges were debeveled with a #15 scalpel blade.  Given the location of the defect, shape of the defect and the proximity to free margins an V-Y advancement flap was deemed most appropriate.  Using a sterile surgical marker, an appropriate advancement flap was drawn incorporating the defect and placing the expected incisions within the relaxed skin tension lines where possible.    The area thus outlined was incised deep to adipose tissue with a #15 scalpel blade.  The skin margins were undermined to an appropriate distance in all directions utilizing iris scissors.
Curvilinear Excision Additional Text (Leave Blank If You Do Not Want): The margin was drawn around the clinically apparent lesion.  A curvilinear shape was then drawn on the skin incorporating the lesion and margins.  Incisions were then made along these lines to the appropriate tissue plane and the lesion was extirpated.
Size Of Margin In Cm: 0.2
Dressing: dry sterile dressing
Helical Rim Advancement Flap Text: The defect edges were debeveled with a #15 blade scalpel.  Given the location of the defect and the proximity to free margins (helical rim) a double helical rim advancement flap was deemed most appropriate.  Using a sterile surgical marker, the appropriate advancement flaps were drawn incorporating the defect and placing the expected incisions between the helical rim and antihelix where possible.  The area thus outlined was incised through and through with a #15 scalpel blade.  With a skin hook and iris scissors, the flaps were gently and sharply undermined and freed up.
Complex Repair And W Plasty Text: The defect edges were debeveled with a #15 scalpel blade.  The primary defect was closed partially with a complex linear closure.  Given the location of the remaining defect, shape of the defect and the proximity to free margins a W plasty was deemed most appropriate for complete closure of the defect.  Using a sterile surgical marker, an appropriate advancement flap was drawn incorporating the defect and placing the expected incisions within the relaxed skin tension lines where possible.    The area thus outlined was incised deep to adipose tissue with a #15 scalpel blade.  The skin margins were undermined to an appropriate distance in all directions utilizing iris scissors.
Elliptical Excision Additional Text (Leave Blank If You Do Not Want): The margin was drawn around the clinically apparent lesion.  An elliptical shape was then drawn on the skin incorporating the lesion and margins.  Incisions were then made along these lines to the appropriate tissue plane and the lesion was extirpated.
H Plasty Text: Given the location of the defect, shape of the defect and the proximity to free margins a H-plasty was deemed most appropriate for repair.  Using a sterile surgical marker, the appropriate advancement arms of the H-plasty were drawn incorporating the defect and placing the expected incisions within the relaxed skin tension lines where possible. The area thus outlined was incised deep to adipose tissue with a #15 scalpel blade. The skin margins were undermined to an appropriate distance in all directions utilizing iris scissors.  The opposing advancement arms were then advanced into place in opposite direction and anchored with interrupted buried subcutaneous sutures.
Billing Type: Third-Party Bill
Deep Sutures: 3-0 Vicryl
O-T Advancement Flap Text: The defect edges were debeveled with a #15 scalpel blade.  Given the location of the defect, shape of the defect and the proximity to free margins an O-T advancement flap was deemed most appropriate.  Using a sterile surgical marker, an appropriate advancement flap was drawn incorporating the defect and placing the expected incisions within the relaxed skin tension lines where possible.    The area thus outlined was incised deep to adipose tissue with a #15 scalpel blade.  The skin margins were undermined to an appropriate distance in all directions utilizing iris scissors.
Xenograft Text: The defect edges were debeveled with a #15 scalpel blade.  Given the location of the defect, shape of the defect and the proximity to free margins a xenograft was deemed most appropriate.  The graft was then trimmed to fit the size of the defect.  The graft was then placed in the primary defect and oriented appropriately.
Excision Method: Elliptical
Hemostasis: Electrocautery
Z Plasty Text: The lesion was extirpated to the level of the fat with a #15 scalpel blade.  Given the location of the defect, shape of the defect and the proximity to free margins a Z-plasty was deemed most appropriate for repair.  Using a sterile surgical marker, the appropriate transposition arms of the Z-plasty were drawn incorporating the defect and placing the expected incisions within the relaxed skin tension lines where possible.    The area thus outlined was incised deep to adipose tissue with a #15 scalpel blade.  The skin margins were undermined to an appropriate distance in all directions utilizing iris scissors.  The opposing transposition arms were then transposed into place in opposite direction and anchored with interrupted buried subcutaneous sutures.
O-L Flap Text: The defect edges were debeveled with a #15 scalpel blade.  Given the location of the defect, shape of the defect and the proximity to free margins an O-L flap was deemed most appropriate.  Using a sterile surgical marker, an appropriate advancement flap was drawn incorporating the defect and placing the expected incisions within the relaxed skin tension lines where possible.    The area thus outlined was incised deep to adipose tissue with a #15 scalpel blade.  The skin margins were undermined to an appropriate distance in all directions utilizing iris scissors.
Purse String (Intermediate) Text: Given the location of the defect and the characteristics of the surrounding skin a purse string intermediate closure was deemed most appropriate.  Undermining was performed circumfirentially around the surgical defect.  A purse string suture was then placed and tightened.
Bilateral Helical Rim Advancement Flap Text: The defect edges were debeveled with a #15 blade scalpel.  Given the location of the defect and the proximity to free margins (helical rim) a bilateral helical rim advancement flap was deemed most appropriate.  Using a sterile surgical marker, the appropriate advancement flaps were drawn incorporating the defect and placing the expected incisions between the helical rim and antihelix where possible.  The area thus outlined was incised through and through with a #15 scalpel blade.  With a skin hook and iris scissors, the flaps were gently and sharply undermined and freed up.
Complex Repair And Z Plasty Text: The defect edges were debeveled with a #15 scalpel blade.  The primary defect was closed partially with a complex linear closure.  Given the location of the remaining defect, shape of the defect and the proximity to free margins a Z plasty was deemed most appropriate for complete closure of the defect.  Using a sterile surgical marker, an appropriate advancement flap was drawn incorporating the defect and placing the expected incisions within the relaxed skin tension lines where possible.    The area thus outlined was incised deep to adipose tissue with a #15 scalpel blade.  The skin margins were undermined to an appropriate distance in all directions utilizing iris scissors.
W Plasty Text: The lesion was extirpated to the level of the fat with a #15 scalpel blade.  Given the location of the defect, shape of the defect and the proximity to free margins a W-plasty was deemed most appropriate for repair.  Using a sterile surgical marker, the appropriate transposition arms of the W-plasty were drawn incorporating the defect and placing the expected incisions within the relaxed skin tension lines where possible.    The area thus outlined was incised deep to adipose tissue with a #15 scalpel blade.  The skin margins were undermined to an appropriate distance in all directions utilizing iris scissors.  The opposing transposition arms were then transposed into place in opposite direction and anchored with interrupted buried subcutaneous sutures.
Cheek Interpolation Flap Text: A decision was made to reconstruct the defect utilizing an interpolation axial flap and a staged reconstruction.  A telfa template was made of the defect.  This telfa template was then used to outline the Cheek Interpolation flap.  The donor area for the pedicle flap was then injected with anesthesia.  The flap was excised through the skin and subcutaneous tissue down to the layer of the underlying musculature.  The interpolation flap was carefully excised within this deep plane to maintain its blood supply.  The edges of the donor site were undermined.   The donor site was closed in a primary fashion.  The pedicle was then rotated into position and sutured.  Once the tube was sutured into place, adequate blood supply was confirmed with blanching and refill.  The pedicle was then wrapped with xeroform gauze and dressed appropriately with a telfa and gauze bandage to ensure continued blood supply and protect the attached pedicle.
Double O-Z Flap Text: The defect edges were debeveled with a #15 scalpel blade.  Given the location of the defect, shape of the defect and the proximity to free margins a Double O-Z flap was deemed most appropriate.  Using a sterile surgical marker, an appropriate transposition flap was drawn incorporating the defect and placing the expected incisions within the relaxed skin tension lines where possible. The area thus outlined was incised deep to adipose tissue with a #15 scalpel blade.  The skin margins were undermined to an appropriate distance in all directions utilizing iris scissors.
Partial Purse String (Intermediate) Text: Given the location of the defect and the characteristics of the surrounding skin an intermediate purse string closure was deemed most appropriate.  Undermining was performed circumferentially around the surgical defect.  A purse string suture was then placed and tightened. Wound tension of the circular defect prevented complete closure of the wound.
Information: Selecting Yes will display possible errors in your note based on the variables you have selected. This validation is only offered as a suggestion for you. PLEASE NOTE THAT THE VALIDATION TEXT WILL BE REMOVED WHEN YOU FINALIZE YOUR NOTE. IF YOU WANT TO FAX A PRELIMINARY NOTE YOU WILL NEED TO TOGGLE THIS TO 'NO' IF YOU DO NOT WANT IT IN YOUR FAXED NOTE.
Purse String (Simple) Text: Given the location of the defect and the characteristics of the surrounding skin a purse string simple closure was deemed most appropriate.  Undermining was performed circumferentially around the surgical defect.  A purse string suture was then placed and tightened.
O-Z Flap Text: The defect edges were debeveled with a #15 scalpel blade.  Given the location of the defect, shape of the defect and the proximity to free margins an O-Z flap was deemed most appropriate.  Using a sterile surgical marker, an appropriate transposition flap was drawn incorporating the defect and placing the expected incisions within the relaxed skin tension lines where possible. The area thus outlined was incised deep to adipose tissue with a #15 scalpel blade.  The skin margins were undermined to an appropriate distance in all directions utilizing iris scissors.
Complex Repair And Dorsal Nasal Flap Text: The defect edges were debeveled with a #15 scalpel blade.  The primary defect was closed partially with a complex linear closure.  Given the location of the remaining defect, shape of the defect and the proximity to free margins a dorsal nasal flap was deemed most appropriate for complete closure of the defect.  Using a sterile surgical marker, an appropriate flap was drawn incorporating the defect and placing the expected incisions within the relaxed skin tension lines where possible.    The area thus outlined was incised deep to adipose tissue with a #15 scalpel blade.  The skin margins were undermined to an appropriate distance in all directions utilizing iris scissors.
Ear Star Wedge Flap Text: The defect edges were debeveled with a #15 blade scalpel.  Given the location of the defect and the proximity to free margins (helical rim) an ear star wedge flap was deemed most appropriate.  Using a sterile surgical marker, the appropriate flap was drawn incorporating the defect and placing the expected incisions between the helical rim and antihelix where possible.  The area thus outlined was incised through and through with a #15 scalpel blade.
Wound Check: 10 days

## 2019-11-12 NOTE — HPI: BIOPSY PROVEN BCC
How Severe Is Your Bcc?: moderate
When Was The Bcc Biopsied? (Optional): 10/08/19
Accession # (Optional): [id number] A and D
Accession # (Optional): [id number]

## 2019-11-12 NOTE — PROCEDURE: BIOPSY BY SHAVE METHOD
Bill For Surgical Tray: no
Consent: Written consent was obtained and risks were reviewed including but not limited to scarring, infection, bleeding, scabbing, incomplete removal, nerve damage and allergy to anesthesia.
X Size Of Lesion In Cm: 0
Depth Of Biopsy: dermis
Notification Instructions: Patient will be notified of biopsy results. However, patient instructed to call the office if not contacted within 2 weeks.
Lab Facility: 
Wound Care: Bacitracin
Render Post-Care Instructions In Note?: yes
Anesthesia Volume In Cc: 1
Biopsy Type: H and E
Type Of Destruction Used: Curettage
Biopsy Method: Personna blade
Hemostasis: Drysol and Electrocautery
Dressing: Band-Aid
Anesthesia Type: 1% lidocaine with 1:100,000 epinephrine and a 1:12 solution of 8.4% sodium bicarbonate
Detail Level: Detailed
Post-Care Instructions: I reviewed with the patient in detail post-care instructions. Patient is to keep the biopsy site clean once daily and then apply petroleum and bandaid  until healed.
Lab: [id number]
Billing Type: Third-Party Bill

## 2019-11-25 DIAGNOSIS — J47.9 BRONCHIECTASIS WITHOUT COMPLICATION (HCC): ICD-10-CM

## 2019-11-25 NOTE — TELEPHONE ENCOUNTER
Have we ever prescribed this med? Yes.  If yes, what date? 11/21/18    Last OV: 10/30/19 Brenda    Next OV: No pending apt scheduled     DX: Bronchiectasis without complication (HCC) (J47.9)    Medications: sodium chloride (HYPER-SAL) 7 % Nebu Soln

## 2019-11-26 ENCOUNTER — APPOINTMENT (RX ONLY)
Dept: URBAN - METROPOLITAN AREA CLINIC 36 | Facility: CLINIC | Age: 79
Setting detail: DERMATOLOGY
End: 2019-11-26

## 2019-11-26 PROBLEM — C44.91 BASAL CELL CARCINOMA OF SKIN, UNSPECIFIED: Status: ACTIVE | Noted: 2019-11-26

## 2019-11-26 PROCEDURE — 11900 INJECT SKIN LESIONS </W 7: CPT

## 2019-11-26 PROCEDURE — ? INJECTION

## 2019-11-26 NOTE — PROCEDURE: INJECTION
Dose Administered (Numbers Only - Mg, G, Mcg, Units, Cc): 0
Procedure Information: Please note that the numeric value listed in the Medication (1) and associated J-code units and Medication (2) and associated J-code units variables are j-code amounts and do not represent either the concentration or the total amount of the medications injected.  I strongly recommend selecting no to the Render J-code information in note question. This will allow your note to be more clear. If you are billing j-codes with your injection codes you need to document the total amount of the medication injected. This amount should match the j-code units. For example, if you are injecting Triamcinolone 40mg as an intramuscular injection you would select 40 for the dose field and mg for the units. This would allow you to document  with 4 units (40mg = 10mg x 4). The total volume is not used to calculate j-codes only the amount of the medication administered.
units
Treatment Number: 1
Render J-Code Information In Note?: yes
Consent: The risks of the medication was reviewed with the patient.
Route: IL
Detail Level: None
Post-Care Instructions: I reviewed with the patient in detail post-care instructions. Patient understands to keep the injection sites clean and call the clinic if there is any redness, swelling or pain.
Dose Administered (Numbers Only - Mg, G, Mcg, Units, Cc): 0.3
Medication (1) And Associated J-Code Units: Bleomycin, 15 units

## 2019-12-02 RX ORDER — SEVELAMER CARBONATE 800 MG/1
TABLET, FILM COATED ORAL
Qty: 270 TAB | Refills: 3 | Status: SHIPPED | OUTPATIENT
Start: 2019-12-02 | End: 2020-01-25

## 2019-12-16 RX ORDER — SODIUM CHLORIDE FOR INHALATION 7 %
VIAL, NEBULIZER (ML) INHALATION
Qty: 240 ML | Refills: 11 | Status: SHIPPED | OUTPATIENT
Start: 2019-12-16 | End: 2020-01-25

## 2019-12-17 ENCOUNTER — APPOINTMENT (RX ONLY)
Dept: URBAN - METROPOLITAN AREA CLINIC 36 | Facility: CLINIC | Age: 79
Setting detail: DERMATOLOGY
End: 2019-12-17

## 2019-12-17 ENCOUNTER — OFFICE VISIT (OUTPATIENT)
Dept: RHEUMATOLOGY | Facility: MEDICAL CENTER | Age: 79
End: 2019-12-17
Payer: MEDICARE

## 2019-12-17 VITALS
WEIGHT: 131 LBS | SYSTOLIC BLOOD PRESSURE: 128 MMHG | BODY MASS INDEX: 19.92 KG/M2 | TEMPERATURE: 99 F | DIASTOLIC BLOOD PRESSURE: 70 MMHG | RESPIRATION RATE: 14 BRPM | HEART RATE: 104 BPM | OXYGEN SATURATION: 92 %

## 2019-12-17 DIAGNOSIS — I48.0 PAROXYSMAL ATRIAL FIBRILLATION (HCC): ICD-10-CM

## 2019-12-17 DIAGNOSIS — Z95.0 PACEMAKER: ICD-10-CM

## 2019-12-17 DIAGNOSIS — I10 ESSENTIAL HYPERTENSION: ICD-10-CM

## 2019-12-17 DIAGNOSIS — Z99.2 ESRD ON DIALYSIS (HCC): ICD-10-CM

## 2019-12-17 DIAGNOSIS — I35.0 AORTIC VALVE STENOSIS, ETIOLOGY OF CARDIAC VALVE DISEASE UNSPECIFIED: ICD-10-CM

## 2019-12-17 DIAGNOSIS — M31.31 GRANULOMATOSIS WITH POLYANGIITIS WITH RENAL INVOLVEMENT (HCC): ICD-10-CM

## 2019-12-17 DIAGNOSIS — G89.29 CHRONIC RIGHT SHOULDER PAIN: ICD-10-CM

## 2019-12-17 DIAGNOSIS — N18.6 ESRD ON DIALYSIS (HCC): ICD-10-CM

## 2019-12-17 DIAGNOSIS — Z79.52 LONG TERM CURRENT USE OF SYSTEMIC STEROIDS: ICD-10-CM

## 2019-12-17 DIAGNOSIS — M25.511 CHRONIC RIGHT SHOULDER PAIN: ICD-10-CM

## 2019-12-17 DIAGNOSIS — Z22.39 PSEUDOMONAS AERUGINOSA COLONIZATION: ICD-10-CM

## 2019-12-17 DIAGNOSIS — Z79.01 CHRONIC ANTICOAGULATION: ICD-10-CM

## 2019-12-17 DIAGNOSIS — J44.1 COPD EXACERBATION (HCC): ICD-10-CM

## 2019-12-17 PROBLEM — C44.91 BASAL CELL CARCINOMA OF SKIN, UNSPECIFIED: Status: ACTIVE | Noted: 2019-12-17

## 2019-12-17 PROCEDURE — 11900 INJECT SKIN LESIONS </W 7: CPT

## 2019-12-17 PROCEDURE — 20605 DRAIN/INJ JOINT/BURSA W/O US: CPT | Mod: RT | Performed by: INTERNAL MEDICINE

## 2019-12-17 PROCEDURE — ? INJECTION

## 2019-12-17 PROCEDURE — 99214 OFFICE O/P EST MOD 30 MIN: CPT | Mod: 25 | Performed by: INTERNAL MEDICINE

## 2019-12-17 RX ORDER — PREDNISONE 1 MG/1
TABLET ORAL
Qty: 450 TAB | Refills: 1 | Status: SHIPPED | OUTPATIENT
Start: 2019-12-17 | End: 2020-01-25

## 2019-12-17 RX ORDER — METHYLPREDNISOLONE ACETATE 40 MG/ML
40 INJECTION, SUSPENSION INTRA-ARTICULAR; INTRALESIONAL; INTRAMUSCULAR; SOFT TISSUE ONCE
Status: COMPLETED | OUTPATIENT
Start: 2019-12-17 | End: 2019-12-17

## 2019-12-17 RX ADMIN — METHYLPREDNISOLONE ACETATE 20 MG: 40 INJECTION, SUSPENSION INTRA-ARTICULAR; INTRALESIONAL; INTRAMUSCULAR; SOFT TISSUE at 15:12

## 2019-12-17 NOTE — PROGRESS NOTES
Chief Complaint- joint pain     Subjective:   Gilberto Brown is a 79 y.o. male here today for follow up of rheumatological issues    This is a follow-up visit for this very complicated patient who is seen in this clinic for Wegener's granulomatosis that is now in remission.  Patient's Wegener's granulomatosis was initially diagnosed September 2011 manifesting as progressive renal insufficiency.  Patient status post renal biopsy that indicated Wegener's granulomatosis.  However patient is now on permanent renal dialysis 3 times a week.  Patient status post a number of DMARDs and Biologics including Cytoxan, CellCept, rituximab, all of which have resulted in severe infections requiring hospitalizations.  Patient currently is on low-dose prednisone at 5 mg p.o. daily with negative ANCA levels which have been negative since July 2017.    Patient here today complaining of right shoulder pain denies any trauma, wonders about getting a corticosteroid injection today in the right shoulder.      Additional comorbidities include chronic lung problems including COPD and subsequent colonization of Pseudomonas in the patient's lungs.  Patient does have a long history of lung problems since childhood status post whooping cough per patient report.           Additional comorbidities include atrial fibrillation with a pacemaker in place also recent diagnosis CVA and is on permanent anticoagulation. Patient also on oxygen at home 3 L per minute.  Patient also with history of rotator cuff tear and right shoulder in the past.  Patient also recently diagnosed now with severe aortic stenosis and deemed not a surgical candidate.  Patient is being referred to [facility] for reevaluation by cardiology/cardiovascular surgery of patient's severe aortic stenosis.     S/p Cytoxan-n/v  Off of Cellcept 5/2016  Rituximab infusions 1/14/2016, 1/28/2016-patient developed severe infection and was hospitalized     GBM neg 1/2012  ANCA neg 1/2015;  ANCA 1:20  11/2015; ANCA neg 2/2016; ANCA neg 5/2016; ANCA neg 12/2016; ANCA neg 3/2017; ANCA neg 7/2017; ANCA neg 2/2018; ANCA neg 5/2018; ANCA neg 12/2018; ANCA neg 3/2019; ANCA neg 9/2019  C3 90 normal 10/2011  C4 21 10/2011  OFELIA neg 10/2011  Uric acid 4.4 1/2016  HBsAg/HBcAb neg 3/2019  HCV neg 3/2019  Echocardiogram 7/2019  CONCLUSIONS  Normal left ventricular systolic function.  Left ventricular ejection fraction is visually estimated to be 70-75%.  Mild concentric left ventricular hypertrophy.  Severe aortic stenosis.  Moderate aortic insufficiency.  Mild mitral stenosis.  Compared to the images of the study done on 04/13/2017 there is now   severe aortic stenosis.      Addendum 3/16/2020  ANCA neg 3/2020     Of note  Dr Coral Corona nephrology  Dr Gutierrez pulmonology   Dr Chavarria Cardiologist   Dr Nunez Opthalmologist       Current medicines (including changes today)  Current Outpatient Medications   Medication Sig Dispense Refill   • predniSONE (DELTASONE) 1 MG Tab 5 tabs po qam 450 Tab 1   • sodium chloride (HYPER-SAL) 7 % Nebu Soln INHALE 1 VIAL VIA NEBULIZER TWICE DAILY 240 mL 11   • sevelamer carbonate (RENVELA) 800 MG Tab tablet TAKE 1 TABLET BY MOUTH THREE TIMES DAILY WITH MEALS 270 Tab 3   • tamsulosin (FLOMAX) 0.4 MG capsule TAKE 1 CAPSULE BY MOUTH EVERY DAY 90 Cap 2   • rosuvastatin (CRESTOR) 20 MG Tab Take 20 mg by mouth every day.  3   • Multiple Vitamins-Minerals (CENTRUM SILVER PO) Take  by mouth every day.     • BREO ELLIPTA 200-25 MCG/INH AEROSOL POWDER, BREATH ACTIVATED INHALE 1 PUFF BY MOUTH EVERY DAY. RINSE MOUTH AFTER USE 1 Each 5   • acetaminophen (TYLENOL) 650 MG CR tablet Take 650 mg by mouth every 6 hours as needed. Indications: Pain     • Home Care Oxygen Inhale 3 L/min by mouth as needed.     • epoetin lucina (EPOGEN,PROCRIT) 24964 UNIT/ML Solution Inject 0.5 mL as instructed every Monday, Wednesday, and Friday. 30 mL 0   • metoprolol SR (TOPROL XL) 25 MG TABLET SR 24 HR Take 25  mg by mouth 2 Times a Day.  3   • albuterol (PROAIR HFA) 108 (90 Base) MCG/ACT Aero Soln inhalation aerosol Inhale 2 Puffs by mouth every 6 hours as needed for Shortness of Breath. 8.5 g 0   • B Complex-C-Folic Acid (DIALYVITE TABLET) Tab Take 1 Tab by mouth every morning.     • ipratropium-albuterol (DUONEB) 0.5-2.5 (3) MG/3ML nebulizer solution 3 mL by Nebulization route every 6 hours as needed for Shortness of Breath.     • Cholecalciferol (VITAMIN D3) 5000 units Cap Take 5,000 Units by mouth every morning.     • finasteride (PROSCAR) 5 MG Tab Take 5 mg by mouth every morning.     • omeprazole (PRILOSEC) 20 MG delayed-release capsule Take 1 Cap by mouth every day. (Patient not taking: Reported on 12/17/2019) 30 Cap 1   • apixaban (ELIQUIS) 2.5mg Tab Take 2.5 mg by mouth every morning. Every morning       No current facility-administered medications for this visit.      He  has a past medical history of A-fib ([facility]), Anemia, Arthritis, ASTHMA, BPH (benign prostatic hyperplasia), Breath shortness, Bronchitis, Cataract, COPD, Dialysis, Dyslipidemia, EMPHYSEMA, GERD (gastroesophageal reflux disease), Hypertension, Oxygen decrease, Pacemaker (1988), Pain (05/09/2018), Pneumonia (2017), Pseudomonas pneumonia ([facility]), Pulmonary histoplasmosis ([facility]), Renal disorder, Sick sinus syndrome ([facility]), Sleep apnea, Snoring, Stroke ([facility]) (02/2018), Unspecified hemorrhagic conditions, and Wegener's disease, pulmonary ([facility]).    ROS   Other than what is mentioned in HPI or physical exam, there is no history of headaches, double vision or blurred vision. No temporal tenderness or jaw claudication. No trouble swallowing difficulties or sore throats.  No chest complaints including chest pain, cough or sputum production. No GI complaints including nausea, vomiting, change in bowel habits, or past peptic ulcer disease. No history of blood in the stools. No urinary complaints including dysuria or frequency. No history of alopecia,  photosensitivity, oral ulcerations, Raynaud's phenomena.       Objective:     /70   Pulse (!) 104   Temp 37.2 °C (99 °F) (Temporal)   Resp 14   Wt 59.4 kg (131 lb)   SpO2 92%  Body mass index is 19.92 kg/m².   Physical Exam:    Constitutional: Alert and oriented X3, patient is quite talkative with good eye contact, but short of breath with exertion, wife in room with patient today.Skin: Warm, dry, good turgor, no rashes in visible areas, multiple actinic keratoses.Eye: Equal, round and reactive, conjunctiva clear, lids normal EOM intactENMT: Lips without lesions, good dentition, no oropharyngeal ulcers, moist buccal mucosa, pinna without deformityNeck: Trachea midline, no masses, no thyromegaly.Lymph:  No cervical lymphadenopathy, no axillary lymphadenopathy, no supraclavicular lymphadenopathyRespiratory: Unlabored respiratory effort, lungs clear to auscultation, no wheezes, no ronchi.Cardiovascular: Normal S1, S2, positive murmur both right and left sternal borders about 2-3 out of 6, no lower extremity edema.Abdomen: Soft, non-tender, no masses, no hepatosplenomegaly.Psych: Alert and oriented x3, normal affect and mood.Neuro: Cranial nerves 2-12 are grossly intact, no loss of sensation LEExt:no joint laxity noted in bilateral arms, no joint laxity noted in bilateral legs      Lab Results   Component Value Date/Time    HEPBCORIGM Negative 07/25/2019 05:50 AM    HEPBSAG Negative 07/25/2019 05:50 AM     Lab Results   Component Value Date/Time    HEPCAB Negative 07/25/2019 05:50 AM     Lab Results   Component Value Date/Time    SODIUM 139 11/07/2019 03:10 PM    POTASSIUM 4.0 11/07/2019 03:10 PM    CHLORIDE 98 11/07/2019 03:10 PM    CO2 33 11/07/2019 03:10 PM    GLUCOSE 89 11/07/2019 03:10 PM    BUN 35 (H) 11/07/2019 03:10 PM    CREATININE 3.00 (H) 11/07/2019 03:10 PM    CREATININE 1.0 09/23/2008 09:36 AM      Lab Results   Component Value Date/Time    WBC 6.7 11/07/2019 03:10 PM    WBC 7.6 03/19/2012 12:00  AM    RBC 3.33 (L) 11/07/2019 03:10 PM    RBC 2.25 (LL) 03/19/2012 12:00 AM    HEMOGLOBIN 10.1 (L) 11/07/2019 03:10 PM    HEMATOCRIT 32.2 (L) 11/07/2019 03:10 PM    MCV 96.7 11/07/2019 03:10 PM     (H) 03/19/2012 12:00 AM    MCH 30.3 11/07/2019 03:10 PM    MCH 33.8 03/19/2012 12:00 AM    MCHC 31.4 (L) 11/07/2019 03:10 PM    MPV 9.0 11/07/2019 03:10 PM    NEUTSPOLYS 78.10 (H) 08/01/2019 01:01 AM    LYMPHOCYTES 9.10 (L) 08/01/2019 01:01 AM    MONOCYTES 8.90 08/01/2019 01:01 AM    EOSINOPHILS 2.30 08/01/2019 01:01 AM    BASOPHILS 0.50 08/01/2019 01:01 AM    HYPOCHROMIA 1+ 10/28/2011 05:35 AM    ANISOCYTOSIS 1+ 07/30/2019 02:40 AM      Lab Results   Component Value Date/Time    CALCIUM 9.1 11/07/2019 03:10 PM    ASTSGOT 12 08/01/2019 01:01 AM    ALTSGPT 11 08/01/2019 01:01 AM    ALKPHOSPHAT 46 08/01/2019 01:01 AM    TBILIRUBIN 0.3 08/01/2019 01:01 AM    ALBUMIN 2.7 (L) 08/01/2019 01:01 AM    ALBUMIN 1.28 (L) 10/02/2011 04:30 AM    TOTPROTEIN 5.3 (L) 08/01/2019 01:01 AM    TOTPROTEIN 4.50 (L) 10/02/2011 04:30 AM     Lab Results   Component Value Date/Time    URICACID 4.4 01/21/2016 08:36 AM    ANTINUCAB None Detected 10/02/2011 04:30 AM     Lab Results   Component Value Date/Time    G5HPLOQSBQY 90 10/02/2011 04:30 AM    L4XLZXTHFOS 21 10/02/2011 04:30 AM     Lab Results   Component Value Date/Time    AGBMAB Negative 01/18/2012 03:50 AM    GBMABA Negative 01/18/2012 03:50 AM    ANCAIGG <1:20 09/17/2019 02:09 PM    T1KXEODUIIV 90 10/02/2011 04:30 AM     Lab Results   Component Value Date/Time    SEDRATEWES 48 (H) 04/13/2017 03:25 AM     Lab Results   Component Value Date/Time    CPKTOTAL 18 09/27/2011 05:05 AM     Lab Results   Component Value Date/Time    FLTYPE Stool 09/22/2006 01:04 PM     Lab Results   Component Value Date/Time    PTHINTACT 204.5 (H) 10/18/2011 06:21 AM     Results for orders placed during the hospital encounter of 07/24/19   DX-HAND 2- LEFT    Impression Questionable subluxation of the second  MCP joint. Correlate clinically.    No definite fracture is seen but evaluation limited due to osseous demineralization.    Widening of the scapholunate interval, likely degenerative.     Results for orders placed during the hospital encounter of 02/05/19   DX-CERVICAL SPINE-2 OR 3 VIEWS    Impression Mild anterolisthesis of C4 on C5, likely due to facet arthropathy.    Moderate degenerative change of the cervical spine.    Diffuse osseous demineralization.     CT-CHEST, HIGH RESOLUTION LUNG    Impression 1. Bilateral lower lobe bronchiectasis, grossly unchanged compared with the prior conventional chest CT 1/14/12.  2. Minimal bilateral lower lobe interstitial opacities as well as confluent opacities in those areas, greater on the left, suggesting active airspace disease and/or atelectasis.  3. Probable uncalcified left lower lobe nodule, an equivocal finding with high-resolution technique.  Conventional chest CT would be needed for confirmation.  Note that no similar lesion was identified on the prior chest scan 1/14/12.            INTERPRETING LOCATION: [address] [place] [address], [facility]     Results for orders placed during the hospital encounter of 07/27/17   CT-CHEST (THORAX) W/O    Impression 1.  There is no significant interval change in the diffuse bilateral subpleural interstitial lung disease with a basilar predominance.  2.  Bibasilar and medial segment right middle lobe bronchiectasis are again seen with minimal fluid now seen on the left within the dilated bronchi. This is most consistent with postinflammatory/infectious change.  3.  There is underlying emphysema/COPD with upper lobe paraseptal blebs.  4.  There is evidence of previous granulomatous disease with calcified granulomata, calcified nodes, and calcifications in the liver and spleen. This is consistent with the history of pulmonary histoplasmosis.  5.  There are no new suspicious findings.  6.  Enthesopathy again noted in the thoracic spine.          Assessment and Plan:     1. 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 daily, continue as such, recheck ANCA about every 6 months, ANCA ordered for patient  - Anti-Neutrophil Cytoplasmic Antibodies Screen; Future  - predniSONE (DELTASONE) 1 MG Tab; 5 tabs po qam  Dispense: 450 Tab; Refill: 1    2. Long term current use of systemic steroids  On prednisone 5 mg p.o. daily, risks reviewed with patient and patient's wife who is in the room today, patient states understanding  - Anti-Neutrophil Cytoplasmic Antibodies Screen; Future  - predniSONE (DELTASONE) 1 MG Tab; 5 tabs po qam  Dispense: 450 Tab; Refill: 1    3. Chronic right shoulder pain  With poor muscular architecture, compatible with rotator cuff pathology, today we will do a right shoulder corticosteroid injection  - methylPREDNISolone acetate (DEPO-MEDROL) injection 40 mg    4. Aortic valve stenosis, etiology of cardiac valve disease unspecified  Echocardiogram indicating severe aortic stenosis being referred to [facility] for evaluation of possible surgical candidate  - Anti-Neutrophil Cytoplasmic Antibodies Screen; Future    5. Paroxysmal atrial fibrillation (HCC)  On chronic anticoagulation  This will impact with kind of medications we can use for this patient's arthritis, NSAIDs are contraindicated because of increased risk of bleeding while on chronic anticoagulation  - Anti-Neutrophil Cytoplasmic Antibodies Screen; Future    6. Chronic anticoagulation  This will impact with kind of medications we can use for this patient's arthritis, NSAIDs are contraindicated because of increased risk of bleeding while on chronic anticoagulation    7. Pacemaker  Followed by cardiology    8. ESRD on dialysis (HCC)  This may impact what medications we can use to treat this patient's rheumatological disease, we will have to continue to monitor closely.  - predniSONE (DELTASONE) 1 MG Tab; 5 tabs po qam   Dispense: 450 Tab; Refill: 1    9. 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 Essential hypertension  May impact the type of medications we can use for this patient's arthritis. We will have to keep this under advisement.     Procedure: Right shoulder corticosteroid injection    The procedure was explained to the patient in detail including potential damage to area being injected including risk of avascular necrosis, all questions were answered, verbal consent to do procedure was obtained. Risks and benefits were reviewed with patient and patient states understanding including risks of steroid injections including bleeding, infections, subcutaneous lipolysis and/or hypopigmentation at site of injection, and risk of avascular necrosis. Verbal consent was again obtained. The patient was positioned appropriately. Anatomical landmarks were identified.    Lateral aspect of the right shoulder was prepped with betadine x 3, local anesthetic with ethyl chloride and 1% Xylocaine lot #[id number], Exp December 2022 and using sterile technique,  injected 20 mg of Depomedrol Lot #[id number], Exp December 2020 using lateral approach subclavicular    EBL 0  The patient tolerated the procedure well, was observed in the office and there were no complications     Patient was asked to rest right shoulder for 3 days, patient states understanding and states will comply.        Followup: Return in about 6 months (around 6/17/2020). or sooner power Brown  was seen 30 minutes face-to-face of which more than 50% of the time was spent counseling the patient (excluding time for procedures)  regarding  rheumatological condition and care. Therapy was discussed in detail.      Please note that this dictation was created using voice recognition software. I  have made every reasonable attempt to correct obvious errors, but I expect that there are errors of grammar and possibly content that I did not discover before finalizing the note.

## 2020-01-07 ENCOUNTER — HOSPITAL ENCOUNTER (OUTPATIENT)
Dept: LAB | Facility: MEDICAL CENTER | Age: 80
End: 2020-01-07
Attending: INTERNAL MEDICINE
Payer: MEDICARE

## 2020-01-07 LAB
ALBUMIN SERPL BCP-MCNC: 3.8 G/DL (ref 3.2–4.9)
ALBUMIN/GLOB SERPL: 1.3 G/DL
ALP SERPL-CCNC: 68 U/L (ref 30–99)
ALT SERPL-CCNC: 17 U/L (ref 2–50)
ANION GAP SERPL CALC-SCNC: 12 MMOL/L (ref 0–11.9)
ANISOCYTOSIS BLD QL SMEAR: ABNORMAL
AST SERPL-CCNC: 13 U/L (ref 12–45)
BASOPHILS # BLD AUTO: 0.6 % (ref 0–1.8)
BASOPHILS # BLD: 0.04 K/UL (ref 0–0.12)
BILIRUB SERPL-MCNC: 0.6 MG/DL (ref 0.1–1.5)
BUN SERPL-MCNC: 31 MG/DL (ref 8–22)
CALCIUM SERPL-MCNC: 8.9 MG/DL (ref 8.5–10.5)
CHLORIDE SERPL-SCNC: 97 MMOL/L (ref 96–112)
CO2 SERPL-SCNC: 30 MMOL/L (ref 20–33)
COMMENT 1642: NORMAL
CREAT SERPL-MCNC: 2.45 MG/DL (ref 0.5–1.4)
EOSINOPHIL # BLD AUTO: 0.01 K/UL (ref 0–0.51)
EOSINOPHIL NFR BLD: 0.1 % (ref 0–6.9)
ERYTHROCYTE [DISTWIDTH] IN BLOOD BY AUTOMATED COUNT: 62.6 FL (ref 35.9–50)
GLOBULIN SER CALC-MCNC: 3 G/DL (ref 1.9–3.5)
GLUCOSE SERPL-MCNC: 114 MG/DL (ref 65–99)
HCT VFR BLD AUTO: 33.8 % (ref 42–52)
HGB BLD-MCNC: 10.1 G/DL (ref 14–18)
HYPOCHROMIA BLD QL SMEAR: ABNORMAL
IMM GRANULOCYTES # BLD AUTO: 0.04 K/UL (ref 0–0.11)
IMM GRANULOCYTES NFR BLD AUTO: 0.6 % (ref 0–0.9)
LYMPHOCYTES # BLD AUTO: 0.17 K/UL (ref 1–4.8)
LYMPHOCYTES NFR BLD: 2.5 % (ref 22–41)
MACROCYTES BLD QL SMEAR: ABNORMAL
MCH RBC QN AUTO: 29.3 PG (ref 27–33)
MCHC RBC AUTO-ENTMCNC: 29.9 G/DL (ref 33.7–35.3)
MCV RBC AUTO: 98 FL (ref 81.4–97.8)
MICROCYTES BLD QL SMEAR: ABNORMAL
MONOCYTES # BLD AUTO: 0.26 K/UL (ref 0–0.85)
MONOCYTES NFR BLD AUTO: 3.8 % (ref 0–13.4)
MORPHOLOGY BLD-IMP: NORMAL
NEUTROPHILS # BLD AUTO: 6.3 K/UL (ref 1.82–7.42)
NEUTROPHILS NFR BLD: 92.4 % (ref 44–72)
NRBC # BLD AUTO: 0 K/UL
NRBC BLD-RTO: 0 /100 WBC
OVALOCYTES BLD QL SMEAR: NORMAL
PLATELET # BLD AUTO: 187 K/UL (ref 164–446)
PLATELET BLD QL SMEAR: NORMAL
PMV BLD AUTO: 9.6 FL (ref 9–12.9)
POIKILOCYTOSIS BLD QL SMEAR: NORMAL
POLYCHROMASIA BLD QL SMEAR: NORMAL
POTASSIUM SERPL-SCNC: 4.3 MMOL/L (ref 3.6–5.5)
PROT SERPL-MCNC: 6.8 G/DL (ref 6–8.2)
RBC # BLD AUTO: 3.45 M/UL (ref 4.7–6.1)
RBC BLD AUTO: PRESENT
SCHISTOCYTES BLD QL SMEAR: NORMAL
SODIUM SERPL-SCNC: 139 MMOL/L (ref 135–145)
WBC # BLD AUTO: 6.8 K/UL (ref 4.8–10.8)

## 2020-01-07 PROCEDURE — 36415 COLL VENOUS BLD VENIPUNCTURE: CPT

## 2020-01-07 PROCEDURE — 85025 COMPLETE CBC W/AUTO DIFF WBC: CPT

## 2020-01-07 PROCEDURE — 80053 COMPREHEN METABOLIC PANEL: CPT

## 2020-01-14 ENCOUNTER — APPOINTMENT (RX ONLY)
Dept: URBAN - METROPOLITAN AREA CLINIC 20 | Facility: CLINIC | Age: 80
Setting detail: DERMATOLOGY
End: 2020-01-14

## 2020-01-14 DIAGNOSIS — L30.0 NUMMULAR DERMATITIS: ICD-10-CM

## 2020-01-14 DIAGNOSIS — Z71.89 OTHER SPECIFIED COUNSELING: ICD-10-CM

## 2020-01-14 DIAGNOSIS — D18.0 HEMANGIOMA: ICD-10-CM

## 2020-01-14 DIAGNOSIS — L81.4 OTHER MELANIN HYPERPIGMENTATION: ICD-10-CM

## 2020-01-14 DIAGNOSIS — Z85.828 PERSONAL HISTORY OF OTHER MALIGNANT NEOPLASM OF SKIN: ICD-10-CM

## 2020-01-14 DIAGNOSIS — L82.1 OTHER SEBORRHEIC KERATOSIS: ICD-10-CM

## 2020-01-14 DIAGNOSIS — D22 MELANOCYTIC NEVI: ICD-10-CM

## 2020-01-14 DIAGNOSIS — L57.0 ACTINIC KERATOSIS: ICD-10-CM

## 2020-01-14 PROBLEM — D18.01 HEMANGIOMA OF SKIN AND SUBCUTANEOUS TISSUE: Status: ACTIVE | Noted: 2020-01-14

## 2020-01-14 PROBLEM — D22.5 MELANOCYTIC NEVI OF TRUNK: Status: ACTIVE | Noted: 2020-01-14

## 2020-01-14 PROBLEM — D48.5 NEOPLASM OF UNCERTAIN BEHAVIOR OF SKIN: Status: ACTIVE | Noted: 2020-01-14

## 2020-01-14 PROBLEM — D22.61 MELANOCYTIC NEVI OF RIGHT UPPER LIMB, INCLUDING SHOULDER: Status: ACTIVE | Noted: 2020-01-14

## 2020-01-14 PROBLEM — D22.62 MELANOCYTIC NEVI OF LEFT UPPER LIMB, INCLUDING SHOULDER: Status: ACTIVE | Noted: 2020-01-14

## 2020-01-14 PROCEDURE — 99214 OFFICE O/P EST MOD 30 MIN: CPT | Mod: 25

## 2020-01-14 PROCEDURE — ? PRESCRIPTION

## 2020-01-14 PROCEDURE — ? COUNSELING

## 2020-01-14 PROCEDURE — 11103 TANGNTL BX SKIN EA SEP/ADDL: CPT

## 2020-01-14 PROCEDURE — 17000 DESTRUCT PREMALG LESION: CPT | Mod: 59

## 2020-01-14 PROCEDURE — ? ADDITIONAL NOTES

## 2020-01-14 PROCEDURE — ? BIOPSY BY SHAVE METHOD

## 2020-01-14 PROCEDURE — 17003 DESTRUCT PREMALG LES 2-14: CPT

## 2020-01-14 PROCEDURE — 11102 TANGNTL BX SKIN SINGLE LES: CPT

## 2020-01-14 PROCEDURE — ? SUNSCREEN RECOMMENDATIONS

## 2020-01-14 PROCEDURE — ? LIQUID NITROGEN

## 2020-01-14 RX ORDER — FLUOROURACIL 5 MG/G
CREAM TOPICAL
Qty: 1 | Refills: 0 | Status: ERX

## 2020-01-14 ASSESSMENT — LOCATION DETAILED DESCRIPTION DERM
LOCATION DETAILED: LEFT PROXIMAL PRETIBIAL REGION
LOCATION DETAILED: LEFT DISTAL DORSAL FOREARM
LOCATION DETAILED: RIGHT VENTRAL DISTAL FOREARM
LOCATION DETAILED: RIGHT INFERIOR UPPER BACK
LOCATION DETAILED: RIGHT MEDIAL UPPER BACK
LOCATION DETAILED: RIGHT INFERIOR MEDIAL FOREHEAD
LOCATION DETAILED: LEFT VENTRAL PROXIMAL FOREARM
LOCATION DETAILED: RIGHT RADIAL DORSAL HAND
LOCATION DETAILED: RIGHT LATERAL TRAPEZIAL NECK
LOCATION DETAILED: RIGHT CYMBA CONCHA
LOCATION DETAILED: INFERIOR THORACIC SPINE
LOCATION DETAILED: LEFT RADIAL DORSAL HAND
LOCATION DETAILED: LEFT VENTRAL DISTAL FOREARM
LOCATION DETAILED: INFERIOR MID FOREHEAD
LOCATION DETAILED: LEFT PROXIMAL DORSAL FOREARM
LOCATION DETAILED: RIGHT PROXIMAL DORSAL FOREARM
LOCATION DETAILED: SUPERIOR THORACIC SPINE

## 2020-01-14 ASSESSMENT — LOCATION ZONE DERM
LOCATION ZONE: LEG
LOCATION ZONE: NECK
LOCATION ZONE: HAND
LOCATION ZONE: TRUNK
LOCATION ZONE: FACE
LOCATION ZONE: EAR
LOCATION ZONE: ARM

## 2020-01-14 ASSESSMENT — LOCATION SIMPLE DESCRIPTION DERM
LOCATION SIMPLE: RIGHT FOREARM
LOCATION SIMPLE: RIGHT UPPER BACK
LOCATION SIMPLE: LEFT HAND
LOCATION SIMPLE: POSTERIOR NECK
LOCATION SIMPLE: RIGHT EAR
LOCATION SIMPLE: RIGHT HAND
LOCATION SIMPLE: INFERIOR FOREHEAD
LOCATION SIMPLE: UPPER BACK
LOCATION SIMPLE: RIGHT FOREHEAD
LOCATION SIMPLE: LEFT PRETIBIAL REGION
LOCATION SIMPLE: LEFT FOREARM

## 2020-01-14 NOTE — PROCEDURE: LIQUID NITROGEN
Detail Level: Detailed
Duration Of Freeze Thaw-Cycle (Seconds): 10
Post-Care Instructions: I reviewed with the patient in detail post-care instructions. Patient is to wear sunprotection, and avoid picking at any of the treated lesions. Pt may apply Vaseline to crusted or scabbing areas.
Render Note In Bullet Format When Appropriate: No
Consent: The patient's consent was obtained including but not limited to risks of crusting, scabbing, blistering, scarring, darker or lighter pigmentary change, recurrence, incomplete removal and infection. RTC in 2 months if lesion(s) persistent.
Number Of Freeze-Thaw Cycles: 2 freeze-thaw cycles
Render Post-Care Instructions In Note?: yes

## 2020-01-14 NOTE — PROCEDURE: BIOPSY BY SHAVE METHOD
Lab: [id number]
Billing Type: Third-Party Bill
Hide Anticipated Plan (Based On Presumed Biopsy Results)?: No
Size Of Lesion In Cm: 0
Depth Of Biopsy: dermis
Anesthesia Type: 1% lidocaine with 1:100,000 epinephrine and a 1:12 solution of 8.4% sodium bicarbonate
Biopsy Type: H and E
Post-Care Instructions: I reviewed with the patient in detail post-care instructions. Patient is to keep the biopsy site clean once daily and then apply petroleum and bandaid  until healed.
Wound Care: Bacitracin
Lab Facility: 
Notification Instructions: Patient will be notified of biopsy results. However, patient instructed to call the office if not contacted within 2 weeks.
Was A Bandage Applied: Yes
Consent: Written consent was obtained and risks were reviewed including but not limited to scarring, infection, bleeding, scabbing, incomplete removal, nerve damage and allergy to anesthesia.
Type Of Destruction Used: Curettage
Anesthesia Volume In Cc: 1
Dressing: Band-Aid
Detail Level: Detailed
Biopsy Method: Personna blade
Hemostasis: Drysol and Electrocautery

## 2020-01-25 ENCOUNTER — APPOINTMENT (OUTPATIENT)
Dept: RADIOLOGY | Facility: MEDICAL CENTER | Age: 80
DRG: 871 | End: 2020-01-25
Attending: EMERGENCY MEDICINE
Payer: MEDICARE

## 2020-01-25 ENCOUNTER — HOSPITAL ENCOUNTER (INPATIENT)
Facility: MEDICAL CENTER | Age: 80
LOS: 5 days | DRG: 871 | End: 2020-01-30
Attending: EMERGENCY MEDICINE | Admitting: INTERNAL MEDICINE
Payer: MEDICARE

## 2020-01-25 DIAGNOSIS — I35.0 SEVERE AORTIC STENOSIS: ICD-10-CM

## 2020-01-25 DIAGNOSIS — M31.31 GRANULOMATOSIS WITH POLYANGIITIS WITH RENAL INVOLVEMENT (HCC): Chronic | ICD-10-CM

## 2020-01-25 DIAGNOSIS — J44.1 COPD EXACERBATION (HCC): ICD-10-CM

## 2020-01-25 DIAGNOSIS — J15.1 PNEUMONIA OF BOTH LOWER LOBES DUE TO PSEUDOMONAS SPECIES (HCC): Primary | ICD-10-CM

## 2020-01-25 PROBLEM — R79.89 ELEVATED TROPONIN: Status: ACTIVE | Noted: 2020-01-25

## 2020-01-25 LAB
ALBUMIN SERPL BCP-MCNC: 3.7 G/DL (ref 3.2–4.9)
ALBUMIN/GLOB SERPL: 1.2 G/DL
ALP SERPL-CCNC: 60 U/L (ref 30–99)
ALT SERPL-CCNC: 11 U/L (ref 2–50)
ANION GAP SERPL CALC-SCNC: 11 MMOL/L (ref 0–11.9)
APPEARANCE UR: CLEAR
AST SERPL-CCNC: 13 U/L (ref 12–45)
BACTERIA #/AREA URNS HPF: NEGATIVE /HPF
BASOPHILS # BLD AUTO: 0.2 % (ref 0–1.8)
BASOPHILS # BLD: 0.03 K/UL (ref 0–0.12)
BILIRUB SERPL-MCNC: 0.5 MG/DL (ref 0.1–1.5)
BILIRUB UR QL STRIP.AUTO: NEGATIVE
BUN SERPL-MCNC: 21 MG/DL (ref 8–22)
CALCIUM SERPL-MCNC: 9.3 MG/DL (ref 8.5–10.5)
CHLORIDE SERPL-SCNC: 95 MMOL/L (ref 96–112)
CO2 SERPL-SCNC: 31 MMOL/L (ref 20–33)
COLOR UR: YELLOW
CREAT SERPL-MCNC: 3 MG/DL (ref 0.5–1.4)
EKG IMPRESSION: NORMAL
EOSINOPHIL # BLD AUTO: 0.02 K/UL (ref 0–0.51)
EOSINOPHIL NFR BLD: 0.1 % (ref 0–6.9)
EPI CELLS #/AREA URNS HPF: ABNORMAL /HPF
ERYTHROCYTE [DISTWIDTH] IN BLOOD BY AUTOMATED COUNT: 56.5 FL (ref 35.9–50)
FLUAV RNA SPEC QL NAA+PROBE: NEGATIVE
FLUBV RNA SPEC QL NAA+PROBE: NEGATIVE
GLOBULIN SER CALC-MCNC: 3.2 G/DL (ref 1.9–3.5)
GLUCOSE SERPL-MCNC: 117 MG/DL (ref 65–99)
GLUCOSE UR STRIP.AUTO-MCNC: 100 MG/DL
HCT VFR BLD AUTO: 33 % (ref 42–52)
HGB BLD-MCNC: 10.2 G/DL (ref 14–18)
HYALINE CASTS #/AREA URNS LPF: ABNORMAL /LPF
IMM GRANULOCYTES # BLD AUTO: 0.07 K/UL (ref 0–0.11)
IMM GRANULOCYTES NFR BLD AUTO: 0.5 % (ref 0–0.9)
KETONES UR STRIP.AUTO-MCNC: NEGATIVE MG/DL
LACTATE BLD-SCNC: 1.1 MMOL/L (ref 0.5–2)
LACTATE BLD-SCNC: 1.3 MMOL/L (ref 0.5–2)
LACTATE BLD-SCNC: 1.3 MMOL/L (ref 0.5–2)
LDH SERPL L TO P-CCNC: 247 U/L (ref 107–266)
LEUKOCYTE ESTERASE UR QL STRIP.AUTO: NEGATIVE
LYMPHOCYTES # BLD AUTO: 0.26 K/UL (ref 1–4.8)
LYMPHOCYTES NFR BLD: 1.7 % (ref 22–41)
MCH RBC QN AUTO: 29.1 PG (ref 27–33)
MCHC RBC AUTO-ENTMCNC: 30.9 G/DL (ref 33.7–35.3)
MCV RBC AUTO: 94.3 FL (ref 81.4–97.8)
MICRO URNS: ABNORMAL
MONOCYTES # BLD AUTO: 0.81 K/UL (ref 0–0.85)
MONOCYTES NFR BLD AUTO: 5.4 % (ref 0–13.4)
NEUTROPHILS # BLD AUTO: 13.8 K/UL (ref 1.82–7.42)
NEUTROPHILS NFR BLD: 92.1 % (ref 44–72)
NITRITE UR QL STRIP.AUTO: NEGATIVE
NRBC # BLD AUTO: 0 K/UL
NRBC BLD-RTO: 0 /100 WBC
PH UR STRIP.AUTO: >=9 [PH] (ref 5–8)
PLATELET # BLD AUTO: 238 K/UL (ref 164–446)
PMV BLD AUTO: 9 FL (ref 9–12.9)
POTASSIUM SERPL-SCNC: 3.8 MMOL/L (ref 3.6–5.5)
PROCALCITONIN SERPL-MCNC: 1.02 NG/ML
PROT SERPL-MCNC: 6.9 G/DL (ref 6–8.2)
PROT UR QL SSA: NEGATIVE MG/DL
PROT UR QL STRIP: 100 MG/DL
RBC # BLD AUTO: 3.5 M/UL (ref 4.7–6.1)
RBC # URNS HPF: ABNORMAL /HPF
RBC UR QL AUTO: ABNORMAL
RENAL EPI CELLS #/AREA URNS HPF: ABNORMAL /HPF
SODIUM SERPL-SCNC: 137 MMOL/L (ref 135–145)
SP GR UR STRIP.AUTO: 1.01
TROPONIN T SERPL-MCNC: 239 NG/L (ref 6–19)
TROPONIN T SERPL-MCNC: 246 NG/L (ref 6–19)
UROBILINOGEN UR STRIP.AUTO-MCNC: 0.2 MG/DL
WBC # BLD AUTO: 15 K/UL (ref 4.8–10.8)
WBC #/AREA URNS HPF: ABNORMAL /HPF

## 2020-01-25 PROCEDURE — 84484 ASSAY OF TROPONIN QUANT: CPT | Mod: 91

## 2020-01-25 PROCEDURE — 84145 PROCALCITONIN (PCT): CPT

## 2020-01-25 PROCEDURE — A9270 NON-COVERED ITEM OR SERVICE: HCPCS | Performed by: STUDENT IN AN ORGANIZED HEALTH CARE EDUCATION/TRAINING PROGRAM

## 2020-01-25 PROCEDURE — 83605 ASSAY OF LACTIC ACID: CPT | Mod: 91

## 2020-01-25 PROCEDURE — 83615 LACTATE (LD) (LDH) ENZYME: CPT

## 2020-01-25 PROCEDURE — 700111 HCHG RX REV CODE 636 W/ 250 OVERRIDE (IP): Performed by: EMERGENCY MEDICINE

## 2020-01-25 PROCEDURE — 99291 CRITICAL CARE FIRST HOUR: CPT | Performed by: INTERNAL MEDICINE

## 2020-01-25 PROCEDURE — 81001 URINALYSIS AUTO W/SCOPE: CPT

## 2020-01-25 PROCEDURE — 93005 ELECTROCARDIOGRAM TRACING: CPT | Performed by: EMERGENCY MEDICINE

## 2020-01-25 PROCEDURE — 99292 CRITICAL CARE ADDL 30 MIN: CPT | Performed by: INTERNAL MEDICINE

## 2020-01-25 PROCEDURE — 87502 INFLUENZA DNA AMP PROBE: CPT

## 2020-01-25 PROCEDURE — 700101 HCHG RX REV CODE 250: Performed by: STUDENT IN AN ORGANIZED HEALTH CARE EDUCATION/TRAINING PROGRAM

## 2020-01-25 PROCEDURE — 85025 COMPLETE CBC W/AUTO DIFF WBC: CPT

## 2020-01-25 PROCEDURE — 700102 HCHG RX REV CODE 250 W/ 637 OVERRIDE(OP): Performed by: INTERNAL MEDICINE

## 2020-01-25 PROCEDURE — 770022 HCHG ROOM/CARE - ICU (200)

## 2020-01-25 PROCEDURE — A9270 NON-COVERED ITEM OR SERVICE: HCPCS | Performed by: INTERNAL MEDICINE

## 2020-01-25 PROCEDURE — 71045 X-RAY EXAM CHEST 1 VIEW: CPT

## 2020-01-25 PROCEDURE — 94669 MECHANICAL CHEST WALL OSCILL: CPT

## 2020-01-25 PROCEDURE — 87086 URINE CULTURE/COLONY COUNT: CPT

## 2020-01-25 PROCEDURE — 94640 AIRWAY INHALATION TREATMENT: CPT

## 2020-01-25 PROCEDURE — 700105 HCHG RX REV CODE 258

## 2020-01-25 PROCEDURE — 96365 THER/PROPH/DIAG IV INF INIT: CPT

## 2020-01-25 PROCEDURE — 700105 HCHG RX REV CODE 258: Performed by: STUDENT IN AN ORGANIZED HEALTH CARE EDUCATION/TRAINING PROGRAM

## 2020-01-25 PROCEDURE — 99291 CRITICAL CARE FIRST HOUR: CPT

## 2020-01-25 PROCEDURE — 700102 HCHG RX REV CODE 250 W/ 637 OVERRIDE(OP): Performed by: EMERGENCY MEDICINE

## 2020-01-25 PROCEDURE — 87040 BLOOD CULTURE FOR BACTERIA: CPT

## 2020-01-25 PROCEDURE — 86698 HISTOPLASMA ANTIBODY: CPT

## 2020-01-25 PROCEDURE — 86635 COCCIDIOIDES ANTIBODY: CPT | Mod: 91

## 2020-01-25 PROCEDURE — A9270 NON-COVERED ITEM OR SERVICE: HCPCS | Performed by: EMERGENCY MEDICINE

## 2020-01-25 PROCEDURE — 87449 NOS EACH ORGANISM AG IA: CPT | Mod: 91

## 2020-01-25 PROCEDURE — 700105 HCHG RX REV CODE 258: Performed by: EMERGENCY MEDICINE

## 2020-01-25 PROCEDURE — 87899 AGENT NOS ASSAY W/OPTIC: CPT

## 2020-01-25 PROCEDURE — 80053 COMPREHEN METABOLIC PANEL: CPT

## 2020-01-25 PROCEDURE — 700111 HCHG RX REV CODE 636 W/ 250 OVERRIDE (IP): Performed by: STUDENT IN AN ORGANIZED HEALTH CARE EDUCATION/TRAINING PROGRAM

## 2020-01-25 PROCEDURE — 700102 HCHG RX REV CODE 250 W/ 637 OVERRIDE(OP): Performed by: STUDENT IN AN ORGANIZED HEALTH CARE EDUCATION/TRAINING PROGRAM

## 2020-01-25 PROCEDURE — 304561 HCHG STAT O2

## 2020-01-25 PROCEDURE — 36415 COLL VENOUS BLD VENIPUNCTURE: CPT

## 2020-01-25 PROCEDURE — 96375 TX/PRO/DX INJ NEW DRUG ADDON: CPT

## 2020-01-25 RX ORDER — METHYLPREDNISOLONE SODIUM SUCCINATE 125 MG/2ML
125 INJECTION, POWDER, LYOPHILIZED, FOR SOLUTION INTRAMUSCULAR; INTRAVENOUS ONCE
Status: COMPLETED | OUTPATIENT
Start: 2020-01-25 | End: 2020-01-25

## 2020-01-25 RX ORDER — PREDNISONE 1 MG/1
5 TABLET ORAL DAILY
COMMUNITY
End: 2020-06-16 | Stop reason: SDUPTHER

## 2020-01-25 RX ORDER — GUAIFENESIN 600 MG/1
600 TABLET, EXTENDED RELEASE ORAL EVERY 12 HOURS
Status: DISCONTINUED | OUTPATIENT
Start: 2020-01-25 | End: 2020-01-30 | Stop reason: HOSPADM

## 2020-01-25 RX ORDER — BISACODYL 10 MG
10 SUPPOSITORY, RECTAL RECTAL
Status: DISCONTINUED | OUTPATIENT
Start: 2020-01-25 | End: 2020-01-30 | Stop reason: HOSPADM

## 2020-01-25 RX ORDER — PREDNISONE 20 MG/1
40 TABLET ORAL DAILY
Status: COMPLETED | OUTPATIENT
Start: 2020-01-26 | End: 2020-01-29

## 2020-01-25 RX ORDER — SODIUM CHLORIDE FOR INHALATION 3 %
3 VIAL, NEBULIZER (ML) INHALATION
Status: DISCONTINUED | OUTPATIENT
Start: 2020-01-25 | End: 2020-01-27

## 2020-01-25 RX ORDER — AMOXICILLIN 250 MG
2 CAPSULE ORAL 2 TIMES DAILY
Status: DISCONTINUED | OUTPATIENT
Start: 2020-01-25 | End: 2020-01-30 | Stop reason: HOSPADM

## 2020-01-25 RX ORDER — HEPARIN SODIUM 5000 [USP'U]/ML
5000 INJECTION, SOLUTION INTRAVENOUS; SUBCUTANEOUS EVERY 12 HOURS
Status: DISCONTINUED | OUTPATIENT
Start: 2020-01-25 | End: 2020-01-30 | Stop reason: HOSPADM

## 2020-01-25 RX ORDER — ACETAMINOPHEN 325 MG/1
650 TABLET ORAL EVERY 6 HOURS PRN
Status: DISCONTINUED | OUTPATIENT
Start: 2020-01-25 | End: 2020-01-30 | Stop reason: HOSPADM

## 2020-01-25 RX ORDER — ROSUVASTATIN CALCIUM 20 MG/1
20 TABLET, COATED ORAL DAILY
Status: DISCONTINUED | OUTPATIENT
Start: 2020-01-26 | End: 2020-01-30 | Stop reason: HOSPADM

## 2020-01-25 RX ORDER — POLYETHYLENE GLYCOL 3350 17 G/17G
1 POWDER, FOR SOLUTION ORAL
Status: DISCONTINUED | OUTPATIENT
Start: 2020-01-25 | End: 2020-01-30 | Stop reason: HOSPADM

## 2020-01-25 RX ORDER — MYCOPHENOLATE MOFETIL 250 MG/1
250 CAPSULE ORAL
COMMUNITY
End: 2020-01-25

## 2020-01-25 RX ORDER — SODIUM CHLORIDE 9 MG/ML
500 INJECTION, SOLUTION INTRAVENOUS ONCE
Status: COMPLETED | OUTPATIENT
Start: 2020-01-25 | End: 2020-01-25

## 2020-01-25 RX ORDER — IPRATROPIUM BROMIDE AND ALBUTEROL SULFATE 2.5; .5 MG/3ML; MG/3ML
3 SOLUTION RESPIRATORY (INHALATION)
Status: DISCONTINUED | OUTPATIENT
Start: 2020-01-25 | End: 2020-01-27

## 2020-01-25 RX ORDER — FINASTERIDE 5 MG/1
5 TABLET, FILM COATED ORAL EVERY MORNING
Status: DISCONTINUED | OUTPATIENT
Start: 2020-01-26 | End: 2020-01-30 | Stop reason: HOSPADM

## 2020-01-25 RX ORDER — BUDESONIDE AND FORMOTEROL FUMARATE DIHYDRATE 160; 4.5 UG/1; UG/1
2 AEROSOL RESPIRATORY (INHALATION)
Status: DISCONTINUED | OUTPATIENT
Start: 2020-01-25 | End: 2020-01-30 | Stop reason: HOSPADM

## 2020-01-25 RX ORDER — ALBUTEROL SULFATE 90 UG/1
2 AEROSOL, METERED RESPIRATORY (INHALATION) EVERY 6 HOURS PRN
Status: DISCONTINUED | OUTPATIENT
Start: 2020-01-25 | End: 2020-01-30 | Stop reason: HOSPADM

## 2020-01-25 RX ORDER — SEVELAMER CARBONATE 800 MG/1
800 TABLET, FILM COATED ORAL 3 TIMES DAILY
Status: DISCONTINUED | OUTPATIENT
Start: 2020-01-25 | End: 2020-01-26

## 2020-01-25 RX ORDER — METOPROLOL SUCCINATE 25 MG/1
25 TABLET, EXTENDED RELEASE ORAL 2 TIMES DAILY
Status: DISCONTINUED | OUTPATIENT
Start: 2020-01-25 | End: 2020-01-26

## 2020-01-25 RX ORDER — MIDODRINE HYDROCHLORIDE 5 MG/1
10 TABLET ORAL EVERY 8 HOURS
Status: DISCONTINUED | OUTPATIENT
Start: 2020-01-25 | End: 2020-01-29

## 2020-01-25 RX ORDER — SEVELAMER HYDROCHLORIDE 800 MG/1
800 TABLET, FILM COATED ORAL 3 TIMES DAILY
COMMUNITY

## 2020-01-25 RX ORDER — PREDNISONE 20 MG/1
40 TABLET ORAL DAILY
Status: DISCONTINUED | OUTPATIENT
Start: 2020-01-26 | End: 2020-01-25

## 2020-01-25 RX ORDER — SODIUM CHLORIDE, SODIUM LACTATE, POTASSIUM CHLORIDE, AND CALCIUM CHLORIDE .6; .31; .03; .02 G/100ML; G/100ML; G/100ML; G/100ML
500 INJECTION, SOLUTION INTRAVENOUS
Status: DISCONTINUED | OUTPATIENT
Start: 2020-01-25 | End: 2020-01-30 | Stop reason: HOSPADM

## 2020-01-25 RX ORDER — ACETAMINOPHEN 325 MG/1
650 TABLET ORAL ONCE
Status: COMPLETED | OUTPATIENT
Start: 2020-01-25 | End: 2020-01-25

## 2020-01-25 RX ORDER — SODIUM CHLORIDE, SODIUM LACTATE, POTASSIUM CHLORIDE, AND CALCIUM CHLORIDE .6; .31; .03; .02 G/100ML; G/100ML; G/100ML; G/100ML
500 INJECTION, SOLUTION INTRAVENOUS ONCE
Status: DISCONTINUED | OUTPATIENT
Start: 2020-01-25 | End: 2020-01-25

## 2020-01-25 RX ADMIN — AZITHROMYCIN MONOHYDRATE 500 MG: 500 INJECTION, POWDER, LYOPHILIZED, FOR SOLUTION INTRAVENOUS at 17:06

## 2020-01-25 RX ADMIN — METHYLPREDNISOLONE SODIUM SUCCINATE 125 MG: 125 INJECTION, POWDER, FOR SOLUTION INTRAMUSCULAR; INTRAVENOUS at 14:37

## 2020-01-25 RX ADMIN — IPRATROPIUM BROMIDE AND ALBUTEROL SULFATE 3 ML: .5; 3 SOLUTION RESPIRATORY (INHALATION) at 19:35

## 2020-01-25 RX ADMIN — SODIUM CHLORIDE SOLN NEBU 3% 3 ML: 3 NEBU SOLN at 19:36

## 2020-01-25 RX ADMIN — CEFTRIAXONE SODIUM 1 G: 1 INJECTION, POWDER, FOR SOLUTION INTRAMUSCULAR; INTRAVENOUS at 13:09

## 2020-01-25 RX ADMIN — MIDODRINE HYDROCHLORIDE 10 MG: 5 TABLET ORAL at 22:35

## 2020-01-25 RX ADMIN — IPRATROPIUM BROMIDE AND ALBUTEROL SULFATE 3 ML: .5; 3 SOLUTION RESPIRATORY (INHALATION) at 22:38

## 2020-01-25 RX ADMIN — PIPERACILLIN AND TAZOBACTAM 4.5 G: 4; .5 INJECTION, POWDER, LYOPHILIZED, FOR SOLUTION INTRAVENOUS; PARENTERAL at 17:22

## 2020-01-25 RX ADMIN — GUAIFENESIN 600 MG: 600 TABLET, EXTENDED RELEASE ORAL at 18:30

## 2020-01-25 RX ADMIN — SODIUM CHLORIDE 500 ML: 9 INJECTION, SOLUTION INTRAVENOUS at 14:37

## 2020-01-25 RX ADMIN — SEVELAMER CARBONATE 800 MG: 800 TABLET, FILM COATED ORAL at 17:15

## 2020-01-25 RX ADMIN — ACETAMINOPHEN 650 MG: 325 TABLET, FILM COATED ORAL at 13:08

## 2020-01-25 RX ADMIN — HEPARIN SODIUM 5000 UNITS: 5000 INJECTION, SOLUTION INTRAVENOUS; SUBCUTANEOUS at 17:02

## 2020-01-25 RX ADMIN — BUDESONIDE AND FORMOTEROL FUMARATE DIHYDRATE 2 PUFF: 160; 4.5 AEROSOL RESPIRATORY (INHALATION) at 19:35

## 2020-01-25 RX ADMIN — PIPERACILLIN AND TAZOBACTAM 4.5 G: 4; .5 INJECTION, POWDER, LYOPHILIZED, FOR SOLUTION INTRAVENOUS; PARENTERAL at 16:52

## 2020-01-25 SDOH — HEALTH STABILITY: MENTAL HEALTH: HOW MANY STANDARD DRINKS CONTAINING ALCOHOL DO YOU HAVE ON A TYPICAL DAY?: 1 OR 2

## 2020-01-25 SDOH — HEALTH STABILITY: MENTAL HEALTH: HOW OFTEN DO YOU HAVE 6 OR MORE DRINKS ON ONE OCCASION?: NEVER

## 2020-01-25 ASSESSMENT — ENCOUNTER SYMPTOMS
WHEEZING: 1
SINUS PAIN: 0
SHORTNESS OF BREATH: 1
FEVER: 1
COUGH: 0
CHILLS: 0
SPUTUM PRODUCTION: 1
WEAKNESS: 0
BLURRED VISION: 0
DEPRESSION: 0
FEVER: 0
SEIZURES: 0
NERVOUS/ANXIOUS: 0
PALPITATIONS: 0
NAUSEA: 0
HEADACHES: 0
ORTHOPNEA: 0
VOMITING: 0
COUGH: 1
BACK PAIN: 0
DOUBLE VISION: 0
DIZZINESS: 0
DIARRHEA: 0
VOMITING: 1
SPUTUM PRODUCTION: 0
HEMOPTYSIS: 0
SHORTNESS OF BREATH: 0
ABDOMINAL PAIN: 0

## 2020-01-25 ASSESSMENT — LIFESTYLE VARIABLES
HOW MANY TIMES IN THE PAST YEAR HAVE YOU HAD 5 OR MORE DRINKS IN A DAY: 0
HAVE YOU EVER FELT YOU SHOULD CUT DOWN ON YOUR DRINKING: NO
HAVE PEOPLE ANNOYED YOU BY CRITICIZING YOUR DRINKING: NO
TOTAL SCORE: 0
EVER FELT BAD OR GUILTY ABOUT YOUR DRINKING: NO
EVER HAD A DRINK FIRST THING IN THE MORNING TO STEADY YOUR NERVES TO GET RID OF A HANGOVER: NO
HAVE PEOPLE ANNOYED YOU BY CRITICIZING YOUR DRINKING: NO
EVER FELT BAD OR GUILTY ABOUT YOUR DRINKING: NO
TOTAL SCORE: 0
HAVE YOU EVER FELT YOU SHOULD CUT DOWN ON YOUR DRINKING: NO
SUBSTANCE_ABUSE: 0
DO YOU DRINK ALCOHOL: YES
TOTAL SCORE: 0
TOTAL SCORE: 0
AVERAGE NUMBER OF DAYS PER WEEK YOU HAVE A DRINK CONTAINING ALCOHOL: 2
ON A TYPICAL DAY WHEN YOU DRINK ALCOHOL HOW MANY DRINKS DO YOU HAVE: .5
DO YOU DRINK ALCOHOL: YES
TOTAL SCORE: 0
HOW MANY TIMES IN THE PAST YEAR HAVE YOU HAD 5 OR MORE DRINKS IN A DAY: 0
CONSUMPTION TOTAL: NEGATIVE
ON A TYPICAL DAY WHEN YOU DRINK ALCOHOL HOW MANY DRINKS DO YOU HAVE: 1
EVER_SMOKED: YES
TOTAL SCORE: 0
AVERAGE NUMBER OF DAYS PER WEEK YOU HAVE A DRINK CONTAINING ALCOHOL: 2
EVER_SMOKED: YES
DOES PATIENT WANT TO STOP DRINKING: NO
CONSUMPTION TOTAL: NEGATIVE
EVER HAD A DRINK FIRST THING IN THE MORNING TO STEADY YOUR NERVES TO GET RID OF A HANGOVER: NO

## 2020-01-25 ASSESSMENT — COGNITIVE AND FUNCTIONAL STATUS - GENERAL
STANDING UP FROM CHAIR USING ARMS: A LITTLE
HELP NEEDED FOR BATHING: A LITTLE
DRESSING REGULAR LOWER BODY CLOTHING: A LITTLE
SUGGESTED CMS G CODE MODIFIER DAILY ACTIVITY: CJ
TURNING FROM BACK TO SIDE WHILE IN FLAT BAD: A LITTLE
CLIMB 3 TO 5 STEPS WITH RAILING: A LITTLE
MOVING FROM LYING ON BACK TO SITTING ON SIDE OF FLAT BED: A LITTLE
PERSONAL GROOMING: A LITTLE
MOVING TO AND FROM BED TO CHAIR: A LITTLE
MOBILITY SCORE: 18
WALKING IN HOSPITAL ROOM: A LITTLE
DAILY ACTIVITIY SCORE: 21
SUGGESTED CMS G CODE MODIFIER MOBILITY: CK

## 2020-01-25 ASSESSMENT — PATIENT HEALTH QUESTIONNAIRE - PHQ9
SUM OF ALL RESPONSES TO PHQ9 QUESTIONS 1 AND 2: 0
1. LITTLE INTEREST OR PLEASURE IN DOING THINGS: NOT AT ALL
2. FEELING DOWN, DEPRESSED, IRRITABLE, OR HOPELESS: NOT AT ALL

## 2020-01-25 ASSESSMENT — COPD QUESTIONNAIRES
COPD SCREENING SCORE: 6
DO YOU EVER COUGH UP ANY MUCUS OR PHLEGM?: YES, EVERY DAY
HAVE YOU SMOKED AT LEAST 100 CIGARETTES IN YOUR ENTIRE LIFE: NO/DON'T KNOW
DURING THE PAST 4 WEEKS HOW MUCH DID YOU FEEL SHORT OF BREATH: SOME OF THE TIME

## 2020-01-25 NOTE — ED NOTES
Doug from Lab called with critical result of troponin at 1305. Critical lab result read back to Doug.   Dr. Burt notified of critical lab result at 1310.  Critical lab result read back by Dr. Burt.

## 2020-01-25 NOTE — ED TRIAGE NOTES
Pt brought in by EMS. Per wife pt has been having a productive cough for since this morning. She took his temp at home and it read 103F and was confused called EMS. In route EMS gave one of albuterol w/ saline. Patient is A&Ox3, is on 3L oxygen, no complaints of pain, but still has a cough. Patient and wife Crys educated on ER process.

## 2020-01-25 NOTE — ED NOTES
Pharmacy Medication Reconciliation      Medication reconciliation updated and complete per pt at bedside  Allergies have been verified and updated   No oral ABX within the last 14 days  Pt home pharmacy:[facility]

## 2020-01-25 NOTE — ED NOTES
Pt BP discussed with admitting MD team.  ERP discussed central line placement with intensivist.  Fluids started per order.

## 2020-01-25 NOTE — CONSULTS
Critical Care Consultation    Date of consult: 1/25/2020    Referring Physician  CHANCE Fraire*    Reason for Consultation  S/p cardio resp arrest.     History of Presenting Illness  79 y.o. male with hx of Wegeners (on prednisone), ESRD on IHD, COPD, severe AS, bronchiectasis who presented to ED for 2-day hx of fever, cough, SOB. Pt recently went to [facility] for preop eval for TAVR. In the past 2 days, pt c/o fever, SOB, cough, generalized weaknesss. He also c/o 2-3 weeks of coughing fits. At ED, pt was on 3L NC. Febrile with Tm 104.9C, HR 110s. Pt alert oriented.     Code Status  DNAR/DNI    Review of Systems  Review of Systems   Constitutional: Negative for chills, fever and malaise/fatigue.   HENT: Negative for congestion and sinus pain.    Respiratory: Negative for cough, sputum production and shortness of breath.    Cardiovascular: Negative for chest pain, palpitations, orthopnea and leg swelling.   Gastrointestinal: Negative for abdominal pain, diarrhea, nausea and vomiting.   Genitourinary: Negative for dysuria.   Musculoskeletal: Negative for back pain and joint pain.   Skin: Negative for rash.   Neurological: Negative for seizures, weakness and headaches.   Psychiatric/Behavioral: The patient is not nervous/anxious.        Past Medical History   has a past medical history of A-fib (Formerly [facility]), Anemia, Arthritis, ASTHMA, BPH (benign prostatic hyperplasia), Breath shortness, Bronchitis, Cataract, COPD, Dialysis, Dyslipidemia, EMPHYSEMA, GERD (gastroesophageal reflux disease), Hypertension, Oxygen decrease, Pacemaker (1988), Pain (05/09/2018), Pneumonia (2017), Pseudomonas pneumonia ([facility]), Pulmonary histoplasmosis (Formerly [facility]), Renal disorder, Sick sinus syndrome (Formerly [facility]), Sleep apnea, Snoring, Stroke ([facility]ly [facility]) (02/2018), Unspecified hemorrhagic conditions, and Wegener's disease, pulmonary (Formerly [facility]).    Surgical History   has a past surgical history that includes pacemaker insertion (4/2009); hernia repair (1990);  gastroscopy with balloon dilatation (9/28/2011); cath placement (10/8/2011); gastroscopy with biopsy (1/17/2012); hip replacement, total; av fistula creation (3/8/2012); recovery (4/19/2012); av fistula revision (6/9/2012); rotator cuff repair (2003); recovery (7/17/2013); bronchoscopy-endo (7/18/2013); recovery (12/17/2013); recovery (8/7/2014); recovery (10/3/2014); recovery (2/26/2015); recovery (9/3/2015); av fistulogram (Right, 4/12/2016); and tonsillectomy.    Family History  family history includes Cancer in an other family member; Heart Disease in his father; Stroke in his father and mother.    Social History   reports that he quit smoking about 30 years ago. His smoking use included pipe. He smoked 0.00 packs per day for 30.00 years. He has quit using smokeless tobacco.  His smokeless tobacco use included chew. He reports current alcohol use of about 1.2 oz of alcohol per week. He reports that he does not use drugs.    Medications  Home Medications     Reviewed by Darline Escobar (Pharmacy Tech) on 01/25/20 at 1310  Med List Status: Complete   Medication Last Dose Status   albuterol (PROAIR HFA) 108 (90 Base) MCG/ACT Aero Soln inhalation aerosol 1/25/2020 Active   B Complex-C-Folic Acid (DIALYVITE TABLET) Tab 1/25/2020 Active   BREO ELLIPTA 200-25 MCG/INH AEROSOL POWDER, BREATH ACTIVATED 1/25/2020 Active   epoetin lucina (EPOGEN,PROCRIT) 83121 UNIT/ML Solution 1/24/2020 Active   finasteride (PROSCAR) 5 MG Tab 1/25/2020 Active   Home Care Oxygen continuous Active   ipratropium-albuterol (DUONEB) 0.5-2.5 (3) MG/3ML nebulizer solution 1/25/2020 Active   metoprolol SR (TOPROL XL) 25 MG TABLET SR 24 HR 1/25/2020 Active   Multiple Vitamins-Minerals (CENTRUM SILVER PO) 1/25/2020 Active   predniSONE (DELTASONE) 1 MG Tab 1/25/2020 Active   rosuvastatin (CRESTOR) 20 MG Tab 1/25/2020 Active   sevelamer (RENAGEL) 800 MG Tab 1/25/2020 Active   tamsulosin (FLOMAX) 0.4 MG capsule 1/25/2020 Active              Current  Facility-Administered Medications   Medication Dose Route Frequency Provider Last Rate Last Dose   • albuterol inhaler 2 Puff  2 Puff Inhalation Q6HRS PRN Dayron Durán M.D.       • [START ON 1/26/2020] finasteride (PROSCAR) tablet 5 mg  5 mg Oral QAM Dayron Durán M.D.       • piperacillin-tazobactam (ZOSYN) 4.5 g in  mL IVPB  4.5 g Intravenous Once Dayron Durán M.D.        And   • piperacillin-tazobactam (ZOSYN) 4.5 g in  mL IVPB  4.5 g Intravenous Q12HRS Dayron Durán M.D.       • azithromycin (ZITHROMAX) 500 mg in D5W 250 mL IVPB  500 mg Intravenous Q24HRS Dayron Durán M.D.       • metoprolol SR (TOPROL XL) tablet 25 mg  25 mg Oral BID Dayron Durán M.D.       • [START ON 1/26/2020] rosuvastatin (CRESTOR) tablet 20 mg  20 mg Oral DAILY Dayron Durán M.D.       • sevelamer carbonate (RENVELA) tablet 800 mg  800 mg Oral TID Dayron Durán M.D.       • senna-docusate (PERICOLACE or SENOKOT S) 8.6-50 MG per tablet 2 Tab  2 Tab Oral BID Dayron Durán M.D.        And   • polyethylene glycol/lytes (MIRALAX) PACKET 1 Packet  1 Packet Oral QDAY PRN Dayron Durán M.D.        And   • magnesium hydroxide (MILK OF MAGNESIA) suspension 30 mL  30 mL Oral QDAY PRN Dayron Durán M.D.        And   • bisacodyl (DULCOLAX) suppository 10 mg  10 mg Rectal QDAY PRN Dayron Durán M.D.       • Respiratory Care per Protocol   Nebulization Continuous RT Dayron Durán M.D.       • [START ON 1/26/2020] predniSONE (DELTASONE) tablet 40 mg  40 mg Oral DAILY Dayron Durán M.D.       • lactated ringers infusion (BOLUS)  500 mL Intravenous Once PRN Dayron Durán M.D.       • heparin injection 5,000 Units  5,000 Units Subcutaneous Q12HRS Dayron Durán M.D.       • acetaminophen (TYLENOL) tablet 650 mg  650 mg Oral Q6HRS PRN Dayron Durán M.D.       • guaiFENesin ER (MUCINEX) tablet 600 mg  600 mg Oral Q12HRS Dayron Durán M.D.       • ipratropium-albuterol (DUONEB) nebulizer solution  3 mL  Nebulization Q4HRS (RT) Dayron Durán M.D.         Current Outpatient Medications   Medication Sig Dispense Refill   • predniSONE (DELTASONE) 1 MG Tab Take 5 mg by mouth every day.     • sevelamer (RENAGEL) 800 MG Tab Take 800 mg by mouth 3 times a day.     • tamsulosin (FLOMAX) 0.4 MG capsule TAKE 1 CAPSULE BY MOUTH EVERY DAY 90 Cap 2   • rosuvastatin (CRESTOR) 20 MG Tab Take 20 mg by mouth every day.  3   • Multiple Vitamins-Minerals (CENTRUM SILVER PO) Take 1 Tab by mouth every day.     • BREO ELLIPTA 200-25 MCG/INH AEROSOL POWDER, BREATH ACTIVATED INHALE 1 PUFF BY MOUTH EVERY DAY. RINSE MOUTH AFTER USE 1 Each 5   • Home Care Oxygen Inhale 3 L/min by mouth Continuous.     • epoetin lucina (EPOGEN,PROCRIT) 60094 UNIT/ML Solution Inject 0.5 mL as instructed every Monday, Wednesday, and Friday. 30 mL 0   • metoprolol SR (TOPROL XL) 25 MG TABLET SR 24 HR Take 25 mg by mouth 2 Times a Day.  3   • albuterol (PROAIR HFA) 108 (90 Base) MCG/ACT Aero Soln inhalation aerosol Inhale 2 Puffs by mouth every 6 hours as needed for Shortness of Breath. 8.5 g 0   • B Complex-C-Folic Acid (DIALYVITE TABLET) Tab Take 1 Tab by mouth every morning.     • ipratropium-albuterol (DUONEB) 0.5-2.5 (3) MG/3ML nebulizer solution 3 mL by Nebulization route every 6 hours as needed for Shortness of Breath.     • finasteride (PROSCAR) 5 MG Tab Take 5 mg by mouth every morning.         Allergies  Allergies   Allergen Reactions   • Doxycycline Diarrhea and Nausea     Reports terrible diarrhea   • Erythromycin Diarrhea and Nausea     Abdominal pain.  RXN=before 2011   • Rituximab Unspecified     Flu like syndrome       Vital Signs last 24 hours  Temp:  [40.5 °C (104.9 °F)] 40.5 °C (104.9 °F)  Pulse:  [] 101  Resp:  [14-46] 46  BP: ()/(34-60) 101/51  SpO2:  [84 %-100 %] 95 %    Physical Exam  Physical Exam  Vitals signs and nursing note reviewed.   Constitutional:       General: He is not in acute distress.     Appearance: He is not  ill-appearing, toxic-appearing or diaphoretic.   HENT:      Head: Normocephalic and atraumatic.      Mouth/Throat:      Mouth: Mucous membranes are moist.   Neck:      Musculoskeletal: Neck supple.   Cardiovascular:      Rate and Rhythm: Normal rate and regular rhythm.      Pulses: Normal pulses.      Heart sounds: Normal heart sounds. No murmur.   Pulmonary:      Effort: Pulmonary effort is normal. No respiratory distress.      Breath sounds: Normal breath sounds. No wheezing, rhonchi or rales.   Abdominal:      General: Bowel sounds are normal. There is no distension.      Palpations: Abdomen is soft. There is no mass.      Tenderness: There is no tenderness.      Hernia: No hernia is present.   Musculoskeletal:         General: No swelling or tenderness.   Skin:     General: Skin is warm and dry.      Capillary Refill: Capillary refill takes 2 to 3 seconds.      Coloration: Skin is not jaundiced.   Neurological:      General: No focal deficit present.      Mental Status: He is alert and oriented to person, place, and time.      Cranial Nerves: No cranial nerve deficit.         Fluids    Intake/Output Summary (Last 24 hours) at 1/25/2020 1550  Last data filed at 1/25/2020 1530  Gross per 24 hour   Intake 600 ml   Output --   Net 600 ml       Laboratory  Recent Results (from the past 48 hour(s))   Lactic acid (lactate)    Collection Time: 01/25/20 12:26 PM   Result Value Ref Range    Lactic Acid 1.3 0.5 - 2.0 mmol/L   CBC WITH DIFFERENTIAL    Collection Time: 01/25/20 12:26 PM   Result Value Ref Range    WBC 15.0 (H) 4.8 - 10.8 K/uL    RBC 3.50 (L) 4.70 - 6.10 M/uL    Hemoglobin 10.2 (L) 14.0 - 18.0 g/dL    Hematocrit 33.0 (L) 42.0 - 52.0 %    MCV 94.3 81.4 - 97.8 fL    MCH 29.1 27.0 - 33.0 pg    MCHC 30.9 (L) 33.7 - 35.3 g/dL    RDW 56.5 (H) 35.9 - 50.0 fL    Platelet Count 238 164 - 446 K/uL    MPV 9.0 9.0 - 12.9 fL    Neutrophils-Polys 92.10 (H) 44.00 - 72.00 %    Lymphocytes 1.70 (L) 22.00 - 41.00 %     Monocytes 5.40 0.00 - 13.40 %    Eosinophils 0.10 0.00 - 6.90 %    Basophils 0.20 0.00 - 1.80 %    Immature Granulocytes 0.50 0.00 - 0.90 %    Nucleated RBC 0.00 /100 WBC    Neutrophils (Absolute) 13.80 (H) 1.82 - 7.42 K/uL    Lymphs (Absolute) 0.26 (L) 1.00 - 4.80 K/uL    Monos (Absolute) 0.81 0.00 - 0.85 K/uL    Eos (Absolute) 0.02 0.00 - 0.51 K/uL    Baso (Absolute) 0.03 0.00 - 0.12 K/uL    Immature Granulocytes (abs) 0.07 0.00 - 0.11 K/uL    NRBC (Absolute) 0.00 K/uL   Comp Metabolic Panel    Collection Time: 01/25/20 12:26 PM   Result Value Ref Range    Sodium 137 135 - 145 mmol/L    Potassium 3.8 3.6 - 5.5 mmol/L    Chloride 95 (L) 96 - 112 mmol/L    Co2 31 20 - 33 mmol/L    Anion Gap 11.0 0.0 - 11.9    Glucose 117 (H) 65 - 99 mg/dL    Bun 21 8 - 22 mg/dL    Creatinine 3.00 (H) 0.50 - 1.40 mg/dL    Calcium 9.3 8.5 - 10.5 mg/dL    AST(SGOT) 13 12 - 45 U/L    ALT(SGPT) 11 2 - 50 U/L    Alkaline Phosphatase 60 30 - 99 U/L    Total Bilirubin 0.5 0.1 - 1.5 mg/dL    Albumin 3.7 3.2 - 4.9 g/dL    Total Protein 6.9 6.0 - 8.2 g/dL    Globulin 3.2 1.9 - 3.5 g/dL    A-G Ratio 1.2 g/dL   TROPONIN    Collection Time: 01/25/20 12:26 PM   Result Value Ref Range    Troponin T 246 (H) 6 - 19 ng/L   ESTIMATED GFR    Collection Time: 01/25/20 12:26 PM   Result Value Ref Range    GFR If  24 (A) >60 mL/min/1.73 m 2    GFR If Non African American 20 (A) >60 mL/min/1.73 m 2   LDH    Collection Time: 01/25/20 12:26 PM   Result Value Ref Range    LDH Total 247 107 - 266 U/L   PROCALCITONIN    Collection Time: 01/25/20 12:26 PM   Result Value Ref Range    Procalcitonin 1.02 (H) <0.25 ng/mL   URINALYSIS    Collection Time: 01/25/20 12:37 PM   Result Value Ref Range    Color Yellow     Character Clear     Specific Gravity 1.011 <1.035    Ph >=9.0 (A) 5.0 - 8.0    Glucose 100 (A) Negative mg/dL    Ketones Negative Negative mg/dL    Protein 100 (A) Negative mg/dL    Bilirubin Negative Negative    Urobilinogen, Urine 0.2  Negative    Nitrite Negative Negative    Leukocyte Esterase Negative Negative    Occult Blood Small (A) Negative    Micro Urine Req Microscopic    URINE MICROSCOPIC (W/UA)    Collection Time: 20 12:37 PM   Result Value Ref Range    WBC 10-20 (A) /hpf    RBC 5-10 (A) /hpf    Bacteria Negative None /hpf    Epithelial Cells Few /hpf    Epithelial Cells Renal Few /hpf    Hyaline Cast 0-2 /lpf   UR PROTEIN SSA    Collection Time: 20 12:37 PM   Result Value Ref Range    Protein Negative Negative mg/dL   EKG    Collection Time: 20 12:43 PM   Result Value Ref Range    Report       [facility]    Test Date:  2020  Pt Name:    ALETA BUCK                  Department: ER  MRN:        [id number]                      Room:       [facility]  Gender:     Male                         Technician: [id number]  :        [id number]                   Requested By:ILEANA DE LA GARZA  Order #:    [id number]                    Reading MD: ILEANA DE LA GARZA,     Measurements  Intervals                                Axis  Rate:       115                          P:          45  WI:         136                          QRS:        -18  QRSD:       126                          T:          17  QT:         352  QTc:        487    Interpretive Statements  SINUS TACHYCARDIA  VENTRICULAR BIGEMINY  RIGHT BUNDLE BRANCH BLOCK  BORDERLINE ST DEPRESSION, LATERAL LEADS  Compared to ECG 2019 19:09:58  Ventricular premature complex(es) now present  ST (T wave) deviation now present  Sinus rhythm no longer present  Electronically Signed On 2020 15: 18:23 PST by ILEANA DE LA GARZA DO     Influenza A/B By PCR (Adult - Flu Only)    Collection Time: 20 12:45 PM   Result Value Ref Range    Influenza virus A RNA Negative Negative    Influenza virus B, PCR Negative Negative   Lactic acid (lactate): Repeat if initial lactic acid result is greater than 2    Collection Time: 20  3:13 PM    Result Value Ref Range    Lactic Acid 1.1 0.5 - 2.0 mmol/L       Imaging  DX-CHEST-PORTABLE (1 VIEW)   Final Result      1.  Patchy bibasilar infiltrate or atelectasis and probable small pleural effusions.   2.  Stable cardiomegaly.   3.  Evidence of old granulomatous disease.      EC-ECHOCARDIOGRAM COMPLETE W/O CONT    (Results Pending)       Assessment/Plan  * Pneumonia- (present on admission)  Assessment & Plan  Concerning of pneumonia - community vs hospital. Procal 1.02  Influenza A/B PCR is negative.     Plan:   Start piptazo and azithromycin. Piptazo to cover pseudomonas and other GNB  Azithromycin to cover CAP   Will hold off treating fungal pneumonia or MAC  Urine strep pnuemo Ag, legionella Ag  Serum cocci antibodis, fungitell, aspergillus galactomannan, cryptococcus antigen  Respiratory panel by PCR (nasopharyngeal swab)  Bordetella pertussis (nasopharyhngeal swab), need isolation  Given hx of pulmonary MAC in the past, will get AFB sputum  Urine histo Ag (documented hx of histo)    Sepsis secondary to pneumonia (HCC)- (present on admission)  Assessment & Plan  This is Severe Sepsis Present on admission  SIRS criteria identified on my evaluation include: Tachycardia, with heart rate greater than 90 BPM and Leukocyosis, with WBC greater than 12,000  Source of infection is lungs  Clinical indicators of end organ dysfunction include Lactate >2 mmol/L (18.0 mg/dL)  Sepsis protocol initiated  Fluid resuscitation ordered per protocol  IV antibiotics as appropriate for source of sepsis  Reassessment: I have reassessed the patient's hemodynamic status  End organ dysfunction include(s):  Acute respiratory failure    Keep MAP >65  Sat >90%    Wegener's granulomatosis (HCC)- (present on admission)  Assessment & Plan  On prednisone 5mg daily     ESRD on dialysis (HCC)- (present on admission)  Assessment & Plan  MWF via AV fistula in arm. Dry weight 61kg.   Last dialysis was on Friday  Nephrology consult    Severe  aortic stenosis  Assessment & Plan  Careful of volume status.   Avoid tachycardia to optimize diastolic filling.   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    The patient remains critically ill.  Critical care time = 50 minutes in directly providing and coordinating critical care and extensive data review.  No time overlap and excludes procedures.

## 2020-01-25 NOTE — ED PROVIDER NOTES
"ED Provider Note    Scribed for Ernst Burt D.O. by Bhumika Forrester. 1/25/2020  12:24 PM    Primary care provider: Christine Zamudio M.D.  Means of arrival: Ambulance  History obtained from: Patient, Wife  History limited by: None    CHIEF COMPLAINT  Chief Complaint   Patient presents with   • Cough     productive cough this am   • Fever     per wife at home temp was \"103F\"       HPI  Gilberto Brown is a 79 y.o. male who presents to the Emergency Department by ambulance with his wife complaining of coughing and dyspnea onset a couple hours prior to my exam. Patient states he have been having a productive cough with copious amount of mucous, which has been causing dyspnea. He reports he feels really fatigue from his symptoms, which is why he presents to the ED. He denies any modifying factors. Per wife, patient also had a fever of 103°F at home. He denies any associated dysuria, abdominal pain, or chest pain. The patient has been on dialysis for 8 years. He has a history of severe aortic stenosis and has a pacemaker placed. The patient is on 3 L of oxygen at home.     REVIEW OF SYSTEMS  Pertinent positives include productive cough, dyspnea, fever, and fatigue. Pertinent negatives include no dysuria, abdominal pain, or kimani.  All other systems reviewed and negative.    PAST MEDICAL HISTORY  Past Medical History:   Diagnosis Date   • A-fib ([facility])        • Anemia    • Arthritis     arms, legs, shoulders   • ASTHMA    • BPH (benign prostatic hyperplasia)    • Breath shortness    • Bronchitis     chronic   • Cataract     removed bilat   • COPD    • Dialysis     [facility]-W-F,    • Dyslipidemia    • EMPHYSEMA    • GERD (gastroesophageal reflux disease)    • Hypertension    • Oxygen decrease     O2 3liters @ HS and dialysis   • Pacemaker 1988   • Pain 05/09/2018    shoulders/hips, 5/10   • Pneumonia 2017   • Pseudomonas pneumonia ([facility])    • Pulmonary histoplasmosis ([facility])    • Renal disorder     " Dr.Nylk BLAKE   • Sick sinus syndrome ([facility])    • Sleep apnea     uses cpap at noc   • Snoring    • Stroke (HCC) 02/2018   • Unspecified hemorrhagic conditions     bruises easily, fragile skin   • Wegener's disease, pulmonary (HCC)        SURGICAL HISTORY  Past Surgical History:   Procedure Laterality Date   • AV FISTULOGRAM Right 4/12/2016    Procedure: AV FISTULOGRAM , VENOPLASTY X 2;  Surgeon: Nessa Johansen M.D.;  Location: SURGERY [facility];  Service:    • RECOVERY  9/3/2015    Procedure: IR1 VASCULAR CASE-ELENO RIGHT DIALYSIS FISTULOGRAM, POSSIBLE INTERVENTION;  Surgeon: Recoveryonly Surgery;  Location: SURGERY PRE-POST PROC UNIT [facility];  Service:    • RECOVERY  2/26/2015    Performed by Ir-Recovery Surgery at SURGERY SAME DAY [facility]   • RECOVERY  10/3/2014    Performed by Ir-Recovery Surgery at SURGERY SAME DAY [facility]   • RECOVERY  8/7/2014    Performed by Ir-Recovery Surgery at [facility]   • RECOVERY  12/17/2013    Performed by Ir-Recovery Surgery at SURGERY SAME DAY [facility]   • BRONCHOSCOPY-ENDO  7/18/2013    Performed by Juan F Rubio M.D. at SURGERY [facility]   • RECOVERY  7/17/2013    Performed by Ir-Recovery Surgery at SURGERY SAME DAY [facility]   • AV FISTULA REVISION  6/9/2012    Performed by NESSA JOHANSEN at SURGERY [facility]   • RECOVERY  4/19/2012    Performed by SURGERY, IR-RECOVERY at [facility] SAME DAY [facility]   • AV FISTULA CREATION  3/8/2012    Performed by NESSA JOHANSEN at SURGERY [facility]   • GASTROSCOPY WITH BIOPSY  1/17/2012    Performed by ZURDO HARRISON at ENDOSCOPY [facility]   • CATH PLACEMENT  10/8/2011    Performed by NESSA JOHANSEN at [facility]   • GASTROSCOPY WITH BALLOON DILATATION  9/28/2011    Performed by KT FERNANDEZ at [facility]   • PACEMAKER INSERTION  4/2009   • ROTATOR CUFF REPAIR  2003    right   • HERNIA REPAIR  1990    right inguinal hernia   • HIP  REPLACEMENT, TOTAL      right   • TONSILLECTOMY          SOCIAL HISTORY  Social History     Tobacco Use   • Smoking status: Former Smoker     Packs/day: 0.00     Years: 30.00     Pack years: 0.00     Types: Pipe     Last attempt to quit: 1990     Years since quittin.0   • Smokeless tobacco: Former User     Types: Chew   Substance Use Topics   • Alcohol use: Yes     Alcohol/week: 1.2 oz     Types: 1 Glasses of wine, 1 Cans of beer per week     Frequency: 2-3 times a week     Drinks per session: 1 or 2     Binge frequency: Never     Comment: 1/2 glass Red wine nightly   • Drug use: No      Social History     Substance and Sexual Activity   Drug Use No       FAMILY HISTORY  Family History   Problem Relation Age of Onset   • Stroke Father    • Heart Disease Father    • Stroke Mother    • Cancer Other        CURRENT MEDICATIONS  No current facility-administered medications on file prior to encounter.      Current Outpatient Medications on File Prior to Encounter   Medication Sig Dispense Refill   • predniSONE (DELTASONE) 1 MG Tab Take 5 mg by mouth every day.     • sevelamer (RENAGEL) 800 MG Tab Take 800 mg by mouth 3 times a day.     • tamsulosin (FLOMAX) 0.4 MG capsule TAKE 1 CAPSULE BY MOUTH EVERY DAY 90 Cap 2   • rosuvastatin (CRESTOR) 20 MG Tab Take 20 mg by mouth every day.  3   • Multiple Vitamins-Minerals (CENTRUM SILVER PO) Take 1 Tab by mouth every day.     • BREO ELLIPTA 200-25 MCG/INH AEROSOL POWDER, BREATH ACTIVATED INHALE 1 PUFF BY MOUTH EVERY DAY. RINSE MOUTH AFTER USE 1 Each 5   • Home Care Oxygen Inhale 3 L/min by mouth Continuous.     • epoetin lucina (EPOGEN,PROCRIT) 66968 UNIT/ML Solution Inject 0.5 mL as instructed every Monday, Wednesday, and Friday. 30 mL 0   • metoprolol SR (TOPROL XL) 25 MG TABLET SR 24 HR Take 25 mg by mouth 2 Times a Day.  3   • albuterol (PROAIR HFA) 108 (90 Base) MCG/ACT Aero Soln inhalation aerosol Inhale 2 Puffs by mouth every 6 hours as needed for Shortness of  "Breath. 8.5 g 0   • B Complex-C-Folic Acid (DIALYVITE TABLET) Tab Take 1 Tab by mouth every morning.     • ipratropium-albuterol (DUONEB) 0.5-2.5 (3) MG/3ML nebulizer solution 3 mL by Nebulization route every 6 hours as needed for Shortness of Breath.     • finasteride (PROSCAR) 5 MG Tab Take 5 mg by mouth every morning.       ALLERGIES  Allergies   Allergen Reactions   • Doxycycline Diarrhea and Nausea     Reports terrible diarrhea   • Erythromycin Diarrhea and Nausea     Abdominal pain.  RXN=before 2011   • Rituximab Unspecified     Flu like syndrome       PHYSICAL EXAM  VITAL SIGNS: Temp (!) 40.5 °C (104.9 °F) (Temporal)   Ht 1.727 m (5' 8\")   Wt 59.4 kg (131 lb)   BMI 19.92 kg/m²     Nursing notes and vitals reviewed.  Constitutional: Well developed, Well nourished, No acute distress, Non-toxic appearance.   Eyes: PERRLA, EOMI, Conjunctiva normal, No discharge.   Cardiovascular: Tachycardic, grade 3/6 holosystolic murmur   thorax & Lungs: Rales and rhonchi bilaterally in upper and lower lung fields, pacemaker in left anterior chest wall.  Abdomen: Bowel sounds normal, Soft, No tenderness, No guarding, No rebound, No masses, No pulsatile masses.   Skin: Warm, Dry, No erythema, No rash.   Musculoskeletal: Intact distal pulses, slight bilateral nonpitting pedal edema, No cyanosis, No clubbing. Good range of motion in all major joints. No tenderness to palpation or major deformities noted,   Neurologic: Alert & oriented x 3, Normal motor function, Normal sensory function, No focal deficits noted.  Psychiatric: Affect normal for clinical presentation.      DIAGNOSTIC STUDIES/PROCEDURES    LABS  Results for orders placed or performed during the hospital encounter of 01/25/20   Lactic acid (lactate)   Result Value Ref Range    Lactic Acid 1.3 0.5 - 2.0 mmol/L   URINALYSIS   Result Value Ref Range    Color Yellow     Character Clear     Specific Gravity 1.011 <1.035    Ph >=9.0 (A) 5.0 - 8.0    Glucose 100 (A) " Negative mg/dL    Ketones Negative Negative mg/dL    Protein 100 (A) Negative mg/dL    Bilirubin Negative Negative    Urobilinogen, Urine 0.2 Negative    Nitrite Negative Negative    Leukocyte Esterase Negative Negative    Occult Blood Small (A) Negative    Micro Urine Req Microscopic    CBC WITH DIFFERENTIAL   Result Value Ref Range    WBC 15.0 (H) 4.8 - 10.8 K/uL    RBC 3.50 (L) 4.70 - 6.10 M/uL    Hemoglobin 10.2 (L) 14.0 - 18.0 g/dL    Hematocrit 33.0 (L) 42.0 - 52.0 %    MCV 94.3 81.4 - 97.8 fL    MCH 29.1 27.0 - 33.0 pg    MCHC 30.9 (L) 33.7 - 35.3 g/dL    RDW 56.5 (H) 35.9 - 50.0 fL    Platelet Count 238 164 - 446 K/uL    MPV 9.0 9.0 - 12.9 fL    Neutrophils-Polys 92.10 (H) 44.00 - 72.00 %    Lymphocytes 1.70 (L) 22.00 - 41.00 %    Monocytes 5.40 0.00 - 13.40 %    Eosinophils 0.10 0.00 - 6.90 %    Basophils 0.20 0.00 - 1.80 %    Immature Granulocytes 0.50 0.00 - 0.90 %    Nucleated RBC 0.00 /100 WBC    Neutrophils (Absolute) 13.80 (H) 1.82 - 7.42 K/uL    Lymphs (Absolute) 0.26 (L) 1.00 - 4.80 K/uL    Monos (Absolute) 0.81 0.00 - 0.85 K/uL    Eos (Absolute) 0.02 0.00 - 0.51 K/uL    Baso (Absolute) 0.03 0.00 - 0.12 K/uL    Immature Granulocytes (abs) 0.07 0.00 - 0.11 K/uL    NRBC (Absolute) 0.00 K/uL   Comp Metabolic Panel   Result Value Ref Range    Sodium 137 135 - 145 mmol/L    Potassium 3.8 3.6 - 5.5 mmol/L    Chloride 95 (L) 96 - 112 mmol/L    Co2 31 20 - 33 mmol/L    Anion Gap 11.0 0.0 - 11.9    Glucose 117 (H) 65 - 99 mg/dL    Bun 21 8 - 22 mg/dL    Creatinine 3.00 (H) 0.50 - 1.40 mg/dL    Calcium 9.3 8.5 - 10.5 mg/dL    AST(SGOT) 13 12 - 45 U/L    ALT(SGPT) 11 2 - 50 U/L    Alkaline Phosphatase 60 30 - 99 U/L    Total Bilirubin 0.5 0.1 - 1.5 mg/dL    Albumin 3.7 3.2 - 4.9 g/dL    Total Protein 6.9 6.0 - 8.2 g/dL    Globulin 3.2 1.9 - 3.5 g/dL    A-G Ratio 1.2 g/dL   TROPONIN   Result Value Ref Range    Troponin T 246 (H) 6 - 19 ng/L   Influenza A/B By PCR (Adult - Flu Only)   Result Value Ref Range     Influenza virus A RNA Negative Negative    Influenza virus B, PCR Negative Negative   URINE MICROSCOPIC (W/UA)   Result Value Ref Range    WBC 10-20 (A) /hpf    RBC 5-10 (A) /hpf    Bacteria Negative None /hpf    Epithelial Cells Few /hpf    Epithelial Cells Renal Few /hpf    Hyaline Cast 0-2 /lpf   UR PROTEIN SSA   Result Value Ref Range    Protein Negative Negative mg/dL   ESTIMATED GFR   Result Value Ref Range    GFR If  24 (A) >60 mL/min/1.73 m 2    GFR If Non African American 20 (A) >60 mL/min/1.73 m 2   LDH   Result Value Ref Range    LDH Total 247 107 - 266 U/L   EKG   Result Value Ref Range    Report       [facility] Emergency Dept.    Test Date:  2020  Pt Name:    ALETA BUCK                  Department: ER  MRN:        [id number]                      Room:       [facility]  Gender:     Male                         Technician: [id number]  :        [id number]                   Requested By:ILEANA DE LA GARZA  Order #:    [id number]                    Reading MD: ILEANA DE LA GARZA DO    Measurements  Intervals                                Axis  Rate:       115                          P:          45  AR:         136                          QRS:        -18  QRSD:       126                          T:          17  QT:         352  QTc:        487    Interpretive Statements  SINUS TACHYCARDIA  VENTRICULAR BIGEMINY  RIGHT BUNDLE BRANCH BLOCK  BORDERLINE ST DEPRESSION, LATERAL LEADS  Compared to ECG 2019 19:09:58  Ventricular premature complex(es) now present  ST (T wave) deviation now present  Sinus rhythm no longer present  Electronically Signed On 2020 15: 18:23 PST by ILEANA DE LA GARZA DO           RADIOLOGY  DX-CHEST-PORTABLE (1 VIEW)   Final Result      1.  Patchy bibasilar infiltrate or atelectasis and probable small pleural effusions.   2.  Stable cardiomegaly.   3.  Evidence of old granulomatous disease.      EC-ECHOCARDIOGRAM COMPLETE W/O CONT     (Results Pending)     The radiologist's interpretation of all radiological studies have been reviewed by me.    COURSE & MEDICAL DECISION MAKING  Pertinent Labs & Imaging studies reviewed. (See chart for details)    12:24 PM - Patient seen and examined at bedside. Discussed with patient I will let him and his wife decide if he wants to start intubation to help him breathe emergently. I will give him a breathing treatment and order labs and imaging to further evaluate. Patient consents to plan of care.  Patient will be treated with Rocephin  mL. Ordered DX-Chest, Influenza A.B by PCR, Lactic acid x2, CBC with differential, CMP, UA, Urine culture, EKG, and Blood culture to evaluate his symptoms.     CRITICAL CARE  The very real possibilty of a deterioration of this patient's condition required the highest level of my preparedness for sudden, emergent intervention.  I provided critical care services, which included medication orders, frequent reevaluations of the patient's condition and response to treatment, ordering and reviewing test results, and discussing the case with various consultants.  The critical care time associated with the care of the patient was 35 minutes. Review chart for interventions. This time is exclusive of any other billable procedures.       This is a charming 79 y.o. male that presents with pneumonia and early sepsis.  Here in the emergency department, the patient had a normal blood pressure, slight elevated heart rate, normal lactic acid therefore fluid was then administered secondary to his history of aortic stenosis and is concern for possible fluid overload.  IV antibiotics were established after x-ray did reveal pneumonia.  The patient is slight hypoxic, he has slightly increased work of breathing and continues to state he is a full code.  I have hospitalized the patient with [facility] internal medicine Gold team and Dr. Ruvalcaba for further evaluation and management.  In the interim time  after I discussed the patient with the hospitalizing team, the patient had bouts of hypotension.  Dr. Ruvalcaba is aware of this and would like to administer IV fluids first prior to central venous access and vasopressors.  For this reason the fluids were administered.  Once again on my evaluation, the patient only had tachycardia, he had no evidence of lactic acidosis or hypotension requiring aggressive fluid administration is a very tenuous patient with his aortic stenosis therefore I did not administer large fluid boluses until the patient declared himself needed for fluid.      FINAL IMPRESSION  Pneumonia  Sepsis  Total critical care time of 35 minutes performed, separate of other billable procedures.      IBhumika (Scribe), am scribing for, and in the presence of, Ernst Burt D.O    Electronically signed by: Bhumika Forrester (Scribjaney), 1/25/2020    IErnst D.O. personally performed the services described in this documentation, as scribed by Bhumika Forrester in my presence, and it is both accurate and complete.    C    The note accurately reflects work and decisions made by me.  Ernst Burt D.O.  1/25/2020  3:20 PM

## 2020-01-25 NOTE — ED NOTES
Pt BP discussed with admitting MD team.  ERP discussed central line placement with intensivist.  Fluids started per order

## 2020-01-26 ENCOUNTER — APPOINTMENT (OUTPATIENT)
Dept: CARDIOLOGY | Facility: MEDICAL CENTER | Age: 80
DRG: 871 | End: 2020-01-26
Attending: STUDENT IN AN ORGANIZED HEALTH CARE EDUCATION/TRAINING PROGRAM
Payer: MEDICARE

## 2020-01-26 LAB
ALBUMIN SERPL BCP-MCNC: 3.4 G/DL (ref 3.2–4.9)
ALBUMIN/GLOB SERPL: 1.2 G/DL
ALP SERPL-CCNC: 49 U/L (ref 30–99)
ALT SERPL-CCNC: 16 U/L (ref 2–50)
ANION GAP SERPL CALC-SCNC: 14 MMOL/L (ref 0–11.9)
AST SERPL-CCNC: 15 U/L (ref 12–45)
BASOPHILS # BLD AUTO: 0.2 % (ref 0–1.8)
BASOPHILS # BLD: 0.03 K/UL (ref 0–0.12)
BILIRUB SERPL-MCNC: 0.5 MG/DL (ref 0.1–1.5)
BUN SERPL-MCNC: 32 MG/DL (ref 8–22)
CALCIUM SERPL-MCNC: 8.6 MG/DL (ref 8.5–10.5)
CHLORIDE SERPL-SCNC: 96 MMOL/L (ref 96–112)
CO2 SERPL-SCNC: 26 MMOL/L (ref 20–33)
CREAT SERPL-MCNC: 4.05 MG/DL (ref 0.5–1.4)
EOSINOPHIL # BLD AUTO: 0 K/UL (ref 0–0.51)
EOSINOPHIL NFR BLD: 0 % (ref 0–6.9)
ERYTHROCYTE [DISTWIDTH] IN BLOOD BY AUTOMATED COUNT: 56.7 FL (ref 35.9–50)
GLOBULIN SER CALC-MCNC: 2.9 G/DL (ref 1.9–3.5)
GLUCOSE BLD-MCNC: 150 MG/DL (ref 65–99)
GLUCOSE BLD-MCNC: 182 MG/DL (ref 65–99)
GLUCOSE BLD-MCNC: 261 MG/DL (ref 65–99)
GLUCOSE BLD-MCNC: 265 MG/DL (ref 65–99)
GLUCOSE SERPL-MCNC: 252 MG/DL (ref 65–99)
GRAM STN SPEC: NORMAL
HCT VFR BLD AUTO: 30.7 % (ref 42–52)
HGB BLD-MCNC: 9.5 G/DL (ref 14–18)
IMM GRANULOCYTES # BLD AUTO: 0.12 K/UL (ref 0–0.11)
IMM GRANULOCYTES NFR BLD AUTO: 0.7 % (ref 0–0.9)
LV EJECT FRACT  99904: 55
LV EJECT FRACT MOD 2C 99903: 62.14
LV EJECT FRACT MOD 4C 99902: 56.75
LV EJECT FRACT MOD BP 99901: 57.51
LYMPHOCYTES # BLD AUTO: 0.14 K/UL (ref 1–4.8)
LYMPHOCYTES NFR BLD: 0.8 % (ref 22–41)
MCH RBC QN AUTO: 29.3 PG (ref 27–33)
MCHC RBC AUTO-ENTMCNC: 30.9 G/DL (ref 33.7–35.3)
MCV RBC AUTO: 94.8 FL (ref 81.4–97.8)
MONOCYTES # BLD AUTO: 0.26 K/UL (ref 0–0.85)
MONOCYTES NFR BLD AUTO: 1.5 % (ref 0–13.4)
NEUTROPHILS # BLD AUTO: 17.17 K/UL (ref 1.82–7.42)
NEUTROPHILS NFR BLD: 96.8 % (ref 44–72)
NRBC # BLD AUTO: 0 K/UL
NRBC BLD-RTO: 0 /100 WBC
PLATELET # BLD AUTO: 192 K/UL (ref 164–446)
PMV BLD AUTO: 9.3 FL (ref 9–12.9)
POTASSIUM SERPL-SCNC: 3.7 MMOL/L (ref 3.6–5.5)
PROT SERPL-MCNC: 6.3 G/DL (ref 6–8.2)
RBC # BLD AUTO: 3.24 M/UL (ref 4.7–6.1)
RHODAMINE-AURAMINE STN SPEC: NORMAL
SIGNIFICANT IND 70042: NORMAL
SIGNIFICANT IND 70042: NORMAL
SITE SITE: NORMAL
SITE SITE: NORMAL
SODIUM SERPL-SCNC: 136 MMOL/L (ref 135–145)
SOURCE SOURCE: NORMAL
SOURCE SOURCE: NORMAL
WBC # BLD AUTO: 17.7 K/UL (ref 4.8–10.8)

## 2020-01-26 PROCEDURE — 700102 HCHG RX REV CODE 250 W/ 637 OVERRIDE(OP): Performed by: INTERNAL MEDICINE

## 2020-01-26 PROCEDURE — 87070 CULTURE OTHR SPECIMN AEROBIC: CPT

## 2020-01-26 PROCEDURE — 87015 SPECIMEN INFECT AGNT CONCNTJ: CPT

## 2020-01-26 PROCEDURE — 700101 HCHG RX REV CODE 250: Performed by: STUDENT IN AN ORGANIZED HEALTH CARE EDUCATION/TRAINING PROGRAM

## 2020-01-26 PROCEDURE — 94640 AIRWAY INHALATION TREATMENT: CPT

## 2020-01-26 PROCEDURE — 87181 SC STD AGAR DILUTION PER AGT: CPT

## 2020-01-26 PROCEDURE — 94669 MECHANICAL CHEST WALL OSCILL: CPT

## 2020-01-26 PROCEDURE — 87205 SMEAR GRAM STAIN: CPT

## 2020-01-26 PROCEDURE — 99223 1ST HOSP IP/OBS HIGH 75: CPT | Performed by: INTERNAL MEDICINE

## 2020-01-26 PROCEDURE — 87116 MYCOBACTERIA CULTURE: CPT

## 2020-01-26 PROCEDURE — 87206 SMEAR FLUORESCENT/ACID STAI: CPT

## 2020-01-26 PROCEDURE — 94667 MNPJ CHEST WALL 1ST: CPT

## 2020-01-26 PROCEDURE — 93306 TTE W/DOPPLER COMPLETE: CPT

## 2020-01-26 PROCEDURE — 99222 1ST HOSP IP/OBS MODERATE 55: CPT | Performed by: INTERNAL MEDICINE

## 2020-01-26 PROCEDURE — 80053 COMPREHEN METABOLIC PANEL: CPT

## 2020-01-26 PROCEDURE — 87102 FUNGUS ISOLATION CULTURE: CPT

## 2020-01-26 PROCEDURE — 85025 COMPLETE CBC W/AUTO DIFF WBC: CPT

## 2020-01-26 PROCEDURE — A9270 NON-COVERED ITEM OR SERVICE: HCPCS | Performed by: INTERNAL MEDICINE

## 2020-01-26 PROCEDURE — 87015 SPECIMEN INFECT AGNT CONCNTJ: CPT | Mod: 91

## 2020-01-26 PROCEDURE — 87281 PNEUMOCYSTIS CARINII AG IF: CPT

## 2020-01-26 PROCEDURE — A9270 NON-COVERED ITEM OR SERVICE: HCPCS | Performed by: STUDENT IN AN ORGANIZED HEALTH CARE EDUCATION/TRAINING PROGRAM

## 2020-01-26 PROCEDURE — 99233 SBSQ HOSP IP/OBS HIGH 50: CPT | Performed by: INTERNAL MEDICINE

## 2020-01-26 PROCEDURE — 700102 HCHG RX REV CODE 250 W/ 637 OVERRIDE(OP): Performed by: STUDENT IN AN ORGANIZED HEALTH CARE EDUCATION/TRAINING PROGRAM

## 2020-01-26 PROCEDURE — 93306 TTE W/DOPPLER COMPLETE: CPT | Mod: 26 | Performed by: INTERNAL MEDICINE

## 2020-01-26 PROCEDURE — 700111 HCHG RX REV CODE 636 W/ 250 OVERRIDE (IP): Performed by: STUDENT IN AN ORGANIZED HEALTH CARE EDUCATION/TRAINING PROGRAM

## 2020-01-26 PROCEDURE — 700105 HCHG RX REV CODE 258: Performed by: STUDENT IN AN ORGANIZED HEALTH CARE EDUCATION/TRAINING PROGRAM

## 2020-01-26 PROCEDURE — 87186 SC STD MICRODIL/AGAR DIL: CPT

## 2020-01-26 PROCEDURE — 87077 CULTURE AEROBIC IDENTIFY: CPT

## 2020-01-26 PROCEDURE — 770022 HCHG ROOM/CARE - ICU (200)

## 2020-01-26 PROCEDURE — 82962 GLUCOSE BLOOD TEST: CPT | Mod: 91

## 2020-01-26 PROCEDURE — 87798 DETECT AGENT NOS DNA AMP: CPT

## 2020-01-26 RX ORDER — ETHYL CHLORIDE 100 %
1 AEROSOL, SPRAY (ML) TOPICAL PRN
Status: DISCONTINUED | OUTPATIENT
Start: 2020-01-26 | End: 2020-01-30 | Stop reason: HOSPADM

## 2020-01-26 RX ORDER — SEVELAMER CARBONATE 800 MG/1
800 TABLET, FILM COATED ORAL
Status: DISCONTINUED | OUTPATIENT
Start: 2020-01-27 | End: 2020-01-26

## 2020-01-26 RX ORDER — METOPROLOL SUCCINATE 25 MG/1
25 TABLET, EXTENDED RELEASE ORAL
Status: DISCONTINUED | OUTPATIENT
Start: 2020-01-27 | End: 2020-01-30 | Stop reason: HOSPADM

## 2020-01-26 RX ORDER — SEVELAMER CARBONATE 800 MG/1
800 TABLET, FILM COATED ORAL
Status: DISCONTINUED | OUTPATIENT
Start: 2020-01-26 | End: 2020-01-30 | Stop reason: HOSPADM

## 2020-01-26 RX ORDER — LIDOCAINE 5% 5 G/100G
1 CREAM TOPICAL PRN
Status: DISCONTINUED | OUTPATIENT
Start: 2020-01-26 | End: 2020-01-30 | Stop reason: HOSPADM

## 2020-01-26 RX ORDER — AZITHROMYCIN 250 MG/1
500 TABLET, FILM COATED ORAL DAILY
Status: COMPLETED | OUTPATIENT
Start: 2020-01-27 | End: 2020-01-27

## 2020-01-26 RX ADMIN — SENNOSIDES AND DOCUSATE SODIUM 2 TABLET: 8.6; 5 TABLET ORAL at 18:11

## 2020-01-26 RX ADMIN — ALBUTEROL SULFATE 2 PUFF: 90 AEROSOL, METERED RESPIRATORY (INHALATION) at 04:04

## 2020-01-26 RX ADMIN — MIDODRINE HYDROCHLORIDE 10 MG: 5 TABLET ORAL at 13:54

## 2020-01-26 RX ADMIN — SODIUM CHLORIDE SOLN NEBU 3% 3 ML: 3 NEBU SOLN at 06:28

## 2020-01-26 RX ADMIN — ROSUVASTATIN CALCIUM 20 MG: 20 TABLET, FILM COATED ORAL at 05:46

## 2020-01-26 RX ADMIN — BUDESONIDE AND FORMOTEROL FUMARATE DIHYDRATE 2 PUFF: 160; 4.5 AEROSOL RESPIRATORY (INHALATION) at 20:09

## 2020-01-26 RX ADMIN — PREDNISONE 40 MG: 20 TABLET ORAL at 06:51

## 2020-01-26 RX ADMIN — SEVELAMER CARBONATE 800 MG: 800 TABLET, FILM COATED ORAL at 08:55

## 2020-01-26 RX ADMIN — PIPERACILLIN AND TAZOBACTAM 4.5 G: 4; .5 INJECTION, POWDER, LYOPHILIZED, FOR SOLUTION INTRAVENOUS; PARENTERAL at 05:47

## 2020-01-26 RX ADMIN — PIPERACILLIN AND TAZOBACTAM 4.5 G: 4; .5 INJECTION, POWDER, LYOPHILIZED, FOR SOLUTION INTRAVENOUS; PARENTERAL at 18:23

## 2020-01-26 RX ADMIN — HEPARIN SODIUM 5000 UNITS: 5000 INJECTION, SOLUTION INTRAVENOUS; SUBCUTANEOUS at 05:46

## 2020-01-26 RX ADMIN — IPRATROPIUM BROMIDE AND ALBUTEROL SULFATE 3 ML: .5; 3 SOLUTION RESPIRATORY (INHALATION) at 06:28

## 2020-01-26 RX ADMIN — SODIUM CHLORIDE SOLN NEBU 3% 3 ML: 3 NEBU SOLN at 20:07

## 2020-01-26 RX ADMIN — IPRATROPIUM BROMIDE AND ALBUTEROL SULFATE 3 ML: .5; 3 SOLUTION RESPIRATORY (INHALATION) at 20:07

## 2020-01-26 RX ADMIN — SEVELAMER CARBONATE 800 MG: 800 TABLET, FILM COATED ORAL at 13:46

## 2020-01-26 RX ADMIN — IPRATROPIUM BROMIDE AND ALBUTEROL SULFATE 3 ML: .5; 3 SOLUTION RESPIRATORY (INHALATION) at 02:02

## 2020-01-26 RX ADMIN — HEPARIN SODIUM 5000 UNITS: 5000 INJECTION, SOLUTION INTRAVENOUS; SUBCUTANEOUS at 18:26

## 2020-01-26 RX ADMIN — GUAIFENESIN 600 MG: 600 TABLET, EXTENDED RELEASE ORAL at 05:46

## 2020-01-26 RX ADMIN — MIDODRINE HYDROCHLORIDE 10 MG: 5 TABLET ORAL at 05:54

## 2020-01-26 RX ADMIN — ALBUTEROL SULFATE 2 PUFF: 90 AEROSOL, METERED RESPIRATORY (INHALATION) at 04:02

## 2020-01-26 RX ADMIN — IPRATROPIUM BROMIDE AND ALBUTEROL SULFATE 3 ML: .5; 3 SOLUTION RESPIRATORY (INHALATION) at 10:11

## 2020-01-26 RX ADMIN — AZITHROMYCIN MONOHYDRATE 500 MG: 500 INJECTION, POWDER, LYOPHILIZED, FOR SOLUTION INTRAVENOUS at 05:46

## 2020-01-26 RX ADMIN — SODIUM CHLORIDE SOLN NEBU 3% 3 ML: 3 NEBU SOLN at 10:11

## 2020-01-26 RX ADMIN — IPRATROPIUM BROMIDE AND ALBUTEROL SULFATE 3 ML: .5; 3 SOLUTION RESPIRATORY (INHALATION) at 23:40

## 2020-01-26 RX ADMIN — MIDODRINE HYDROCHLORIDE 10 MG: 5 TABLET ORAL at 22:54

## 2020-01-26 RX ADMIN — BUDESONIDE AND FORMOTEROL FUMARATE DIHYDRATE 2 PUFF: 160; 4.5 AEROSOL RESPIRATORY (INHALATION) at 06:29

## 2020-01-26 RX ADMIN — FINASTERIDE 5 MG: 5 TABLET, FILM COATED ORAL at 05:46

## 2020-01-26 RX ADMIN — INSULIN LISPRO 3 UNITS: 100 INJECTION, SOLUTION INTRAVENOUS; SUBCUTANEOUS at 08:53

## 2020-01-26 RX ADMIN — GUAIFENESIN 600 MG: 600 TABLET, EXTENDED RELEASE ORAL at 18:11

## 2020-01-26 ASSESSMENT — ENCOUNTER SYMPTOMS
CHILLS: 1
SHORTNESS OF BREATH: 0
BACK PAIN: 0
CONSTIPATION: 0
DOUBLE VISION: 0
WHEEZING: 1
SHORTNESS OF BREATH: 1
FEVER: 0
WEAKNESS: 0
FEVER: 1
CHILLS: 0
DIARRHEA: 0
NAUSEA: 0
HEADACHES: 0
MYALGIAS: 0
ABDOMINAL PAIN: 0
COUGH: 1
VOMITING: 0
NERVOUS/ANXIOUS: 0
BLURRED VISION: 0
SPUTUM PRODUCTION: 1
WHEEZING: 0
COUGH: 0

## 2020-01-26 ASSESSMENT — PATIENT HEALTH QUESTIONNAIRE - PHQ9
1. LITTLE INTEREST OR PLEASURE IN DOING THINGS: NOT AT ALL
2. FEELING DOWN, DEPRESSED, IRRITABLE, OR HOPELESS: NOT AT ALL
SUM OF ALL RESPONSES TO PHQ9 QUESTIONS 1 AND 2: 0

## 2020-01-26 NOTE — H&P
"      Internal Medicine Admitting History and Physical    Note Author: Dayron Durán M.D.       Name Gilberto Brown     1940   Age/Sex 79 y.o. male   MRN [id number]   Code Status DNAR/DNI     After 5PM or if no immediate response to page, please call for cross-coverage  Attending/Team: Dr. Danita Durán(resident) See Patient List for primary contact information  Call [phone] to page        Chief Complaint:   Fever, cough, shortness of breath    HPI:  Mr. 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  He states that he returned from a trip to [facility] for a preoperative evaluation for aortic valve replacement this past Wednesday and apart from intermittent coughing and fatigue from the journey felt relatively well.  Over the past 1 to 2 days he has had relatively rapid onset of fever, shortness of breath, and cough productive of yellow-green sputum.  He also complains of generalized weakness.  He denies hemoptysis, myalgias, sick contacts, or recent travel outside the country.  He does complain of recent posttussive emesis, and states that he has had \"coughing fits\" for the past few weeks although they were unaccompanied by any other symptoms.      More recently he has been wheezing and when he presented to the emergency department the physician was concerned he might need intubation due to apparent respiratory distress.  By the time of my examination he has received bronchodilator therapy and has improved dramatically, and is now comfortable on 3 L of oxygen via nasal cannula.  Notably he did have a fever of 104.9 °F and has been tachycardic to the 110s. He received Ceftriaxone per the ERP and 500mL bolus ordered by myself in the emergency department. With " "his wife at bedside code status was discussed and the patient elected for DNAR/DNI, stating \"I'm 79 years old, if I go let me go.\"    Review of Systems   Constitutional: Positive for fever and malaise/fatigue. Negative for chills.   Eyes: Negative for blurred vision and double vision.   Respiratory: Positive for cough, sputum production, shortness of breath and wheezing. Negative for hemoptysis.    Cardiovascular: Positive for leg swelling (mild). Negative for chest pain and palpitations.   Gastrointestinal: Positive for vomiting (post-tussive). Negative for nausea.   Genitourinary: Negative for dysuria, frequency and urgency.   Skin: Negative for itching and rash.   Neurological: Negative for dizziness and headaches.   Psychiatric/Behavioral: Negative for depression and substance abuse.             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 On chronic steroids. Denies ever having pulmonary manifestations including hemoptysis.  History of DANIEL/fungal pneumonia/reported pulmonary histoplasmosis/recurrent pseudomonal pneumonia/bronchiectasis  Severe Aortic Stenosis - pending TAVR at [facility], follows with Dr. Chavarria  COPD - apparently had PFTs recently    Past Surgical History:  Past Surgical History:   Procedure Laterality Date   • AV FISTULOGRAM Right 4/12/2016    Procedure: AV FISTULOGRAM , VENOPLASTY X 2;  Surgeon: Nate Syed M.D.;  Location: SURGERY [facility];  Service:    • RECOVERY  9/3/2015    Procedure: IR1 VASCULAR CASE-ELENO RIGHT DIALYSIS FISTULOGRAM, POSSIBLE INTERVENTION;  Surgeon: Recoveryonly Surgery;  Location: SURGERY PRE-POST PROC UNIT [facility];  Service:    • RECOVERY  2/26/2015    Performed by Ir-Recovery Surgery at SURGERY SAME DAY [facility]   • RECOVERY  10/3/2014    Performed by Ir-Recovery Surgery at SURGERY SAME DAY [facility]   • " RECOVERY  8/7/2014    Performed by Ir-Recovery Surgery at SURGERY [facility]   • RECOVERY  12/17/2013    Performed by Ir-Recovery Surgery at SURGERY SAME DAY [facility]   • BRONCHOSCOPY-ENDO  7/18/2013    Performed by Juan F Rubio M.D. at SURGERY [facility]   • RECOVERY  7/17/2013    Performed by Ir-Recovery Surgery at SURGERY SAME DAY [facility]   • AV FISTULA REVISION  6/9/2012    Performed by NESSA JOHANSEN at SURGERY [facility] ORS   • RECOVERY  4/19/2012    Performed by SURGERY, IR-RECOVERY at SURGERY SAME DAY [facility] ORS   • AV FISTULA CREATION  3/8/2012    Performed by NESSA JOHANSEN at SURGERY [facility] ORS   • GASTROSCOPY WITH BIOPSY  1/17/2012    Performed by ZURDO HARRISON at ENDOSCOPY [facility]S   • CATH PLACEMENT  10/8/2011    Performed by NESSA JOHANSEN at [facility]   • GASTROSCOPY WITH BALLOON DILATATION  9/28/2011    Performed by KT FERNANDEZ at [facility]   • PACEMAKER INSERTION  4/2009   • ROTATOR CUFF REPAIR  2003    right   • HERNIA REPAIR  1990    right inguinal hernia   • HIP REPLACEMENT, TOTAL      right   • TONSILLECTOMY         Current Outpatient Medications:  Home Medications     Reviewed by Darline Escobar (Pharmacy Tech) on 01/25/20 at 1310  Med List Status: Complete   Medication Last Dose Status   albuterol (PROAIR HFA) 108 (90 Base) MCG/ACT Aero Soln inhalation aerosol 1/25/2020 Active   B Complex-C-Folic Acid (DIALYVITE TABLET) Tab 1/25/2020 Active   BREO ELLIPTA 200-25 MCG/INH AEROSOL POWDER, BREATH ACTIVATED 1/25/2020 Active   epoetin lucina (EPOGEN,PROCRIT) 05640 UNIT/ML Solution 1/24/2020 Active   finasteride (PROSCAR) 5 MG Tab 1/25/2020 Active   Home Care Oxygen continuous Active   ipratropium-albuterol (DUONEB) 0.5-2.5 (3) MG/3ML nebulizer solution 1/25/2020 Active   metoprolol SR (TOPROL XL) 25 MG TABLET SR 24 HR 1/25/2020 Active   Multiple Vitamins-Minerals (CENTRUM SILVER PO) 1/25/2020 Active    predniSONE (DELTASONE) 1 MG Tab 2020 Active   rosuvastatin (CRESTOR) 20 MG Tab 2020 Active   sevelamer (RENAGEL) 800 MG Tab 2020 Active   tamsulosin (FLOMAX) 0.4 MG capsule 2020 Active                Medication Allergy/Sensitivities:  Allergies   Allergen Reactions   • Doxycycline Diarrhea and Nausea     Reports terrible diarrhea   • Erythromycin Diarrhea and Nausea     Abdominal pain.  RXN=before    • Rituximab Unspecified     Flu like syndrome         Family History (mandatory)   Family History   Problem Relation Age of Onset   • Stroke Father    • Heart Disease Father    • Stroke Mother    • Cancer Other        Social History (mandatory)   Social History     Socioeconomic History   • Marital status:      Spouse name: Not on file   • Number of children: Not on file   • Years of education: Not on file   • Highest education level: Not on file   Occupational History   • Not on file   Social Needs   • Financial resource strain: Not on file   • Food insecurity:     Worry: Not on file     Inability: Not on file   • Transportation needs:     Medical: Not on file     Non-medical: Not on file   Tobacco Use   • Smoking status: Former Smoker     Packs/day: 0.00     Years: 30.00     Pack years: 0.00     Types: Pipe     Last attempt to quit: 1990     Years since quittin.0   • Smokeless tobacco: Former User     Types: Chew   Substance and Sexual Activity   • Alcohol use: Yes     Alcohol/week: 1.2 oz     Types: 1 Glasses of wine, 1 Cans of beer per week     Frequency: 2-3 times a week     Drinks per session: 1 or 2     Binge frequency: Never     Comment: 1/2 glass Red wine nightly   • Drug use: No   • Sexual activity: Yes     Partners: Female   Lifestyle   • Physical activity:     Days per week: Not on file     Minutes per session: Not on file   • Stress: Not on file   Relationships   • Social connections:     Talks on phone: Not on file     Gets together: Not on file     Attends  Taoism service: Not on file     Active member of club or organization: Not on file     Attends meetings of clubs or organizations: Not on file     Relationship status: Not on file   • Intimate partner violence:     Fear of current or ex partner: Not on file     Emotionally abused: Not on file     Physically abused: Not on file     Forced sexual activity: Not on file   Other Topics Concern   • Not on file   Social History Narrative   • Not on file     Living situation: Lives in [place] with wife  PCP : Christine Zamudio M.D.    Physical Exam     Vitals:    01/25/20 1556 01/25/20 1601 01/25/20 1605 01/25/20 1611   BP: (!) 93/50 (!) 99/52 (!) 99/54 102/52   Pulse: (!) 104 (!) 102 (!) 101 (!) 102   Resp:       Temp:       TempSrc:       SpO2: (!) 86% 89% 95% 93%   Weight:       Height:         Body mass index is 19.92 kg/m².  O2 therapy: Pulse Oximetry: 93 %, O2 (LPM): 3, O2 Delivery: Nasal Cannula    Physical Exam   Constitutional: He is oriented to person, place, and time.   Appears chronically ill, cachectic   HENT:   Head: Normocephalic and atraumatic.   Mouth/Throat: Oropharynx is clear and moist.   Cardiovascular:   Tachycardic, regular, 4/6 late-peaking systolic murmur   Pulmonary/Chest:   No respiratory distress, bilateral expiratory wheezing with bilateral coarse crackles, worse in the right lower lung fields   Abdominal: Soft. He exhibits no distension. There is no tenderness.   Musculoskeletal:         General: No edema.      Comments: RUE AVF   Neurological: He is alert and oriented to person, place, and time.         Data Review       Old Records Request:   Completed  Current Records review/summary: Completed    Lab Data Review:  Recent Results (from the past 24 hour(s))   Lactic acid (lactate)    Collection Time: 01/25/20 12:26 PM   Result Value Ref Range    Lactic Acid 1.3 0.5 - 2.0 mmol/L   CBC WITH DIFFERENTIAL    Collection Time: 01/25/20 12:26 PM   Result Value Ref Range    WBC 15.0 (H) 4.8 -  10.8 K/uL    RBC 3.50 (L) 4.70 - 6.10 M/uL    Hemoglobin 10.2 (L) 14.0 - 18.0 g/dL    Hematocrit 33.0 (L) 42.0 - 52.0 %    MCV 94.3 81.4 - 97.8 fL    MCH 29.1 27.0 - 33.0 pg    MCHC 30.9 (L) 33.7 - 35.3 g/dL    RDW 56.5 (H) 35.9 - 50.0 fL    Platelet Count 238 164 - 446 K/uL    MPV 9.0 9.0 - 12.9 fL    Neutrophils-Polys 92.10 (H) 44.00 - 72.00 %    Lymphocytes 1.70 (L) 22.00 - 41.00 %    Monocytes 5.40 0.00 - 13.40 %    Eosinophils 0.10 0.00 - 6.90 %    Basophils 0.20 0.00 - 1.80 %    Immature Granulocytes 0.50 0.00 - 0.90 %    Nucleated RBC 0.00 /100 WBC    Neutrophils (Absolute) 13.80 (H) 1.82 - 7.42 K/uL    Lymphs (Absolute) 0.26 (L) 1.00 - 4.80 K/uL    Monos (Absolute) 0.81 0.00 - 0.85 K/uL    Eos (Absolute) 0.02 0.00 - 0.51 K/uL    Baso (Absolute) 0.03 0.00 - 0.12 K/uL    Immature Granulocytes (abs) 0.07 0.00 - 0.11 K/uL    NRBC (Absolute) 0.00 K/uL   Comp Metabolic Panel    Collection Time: 01/25/20 12:26 PM   Result Value Ref Range    Sodium 137 135 - 145 mmol/L    Potassium 3.8 3.6 - 5.5 mmol/L    Chloride 95 (L) 96 - 112 mmol/L    Co2 31 20 - 33 mmol/L    Anion Gap 11.0 0.0 - 11.9    Glucose 117 (H) 65 - 99 mg/dL    Bun 21 8 - 22 mg/dL    Creatinine 3.00 (H) 0.50 - 1.40 mg/dL    Calcium 9.3 8.5 - 10.5 mg/dL    AST(SGOT) 13 12 - 45 U/L    ALT(SGPT) 11 2 - 50 U/L    Alkaline Phosphatase 60 30 - 99 U/L    Total Bilirubin 0.5 0.1 - 1.5 mg/dL    Albumin 3.7 3.2 - 4.9 g/dL    Total Protein 6.9 6.0 - 8.2 g/dL    Globulin 3.2 1.9 - 3.5 g/dL    A-G Ratio 1.2 g/dL   TROPONIN    Collection Time: 01/25/20 12:26 PM   Result Value Ref Range    Troponin T 246 (H) 6 - 19 ng/L   ESTIMATED GFR    Collection Time: 01/25/20 12:26 PM   Result Value Ref Range    GFR If  24 (A) >60 mL/min/1.73 m 2    GFR If Non African American 20 (A) >60 mL/min/1.73 m 2   LDH    Collection Time: 01/25/20 12:26 PM   Result Value Ref Range    LDH Total 247 107 - 266 U/L   PROCALCITONIN    Collection Time: 01/25/20 12:26 PM   Result  Value Ref Range    Procalcitonin 1.02 (H) <0.25 ng/mL   URINALYSIS    Collection Time: 20 12:37 PM   Result Value Ref Range    Color Yellow     Character Clear     Specific Gravity 1.011 <1.035    Ph >=9.0 (A) 5.0 - 8.0    Glucose 100 (A) Negative mg/dL    Ketones Negative Negative mg/dL    Protein 100 (A) Negative mg/dL    Bilirubin Negative Negative    Urobilinogen, Urine 0.2 Negative    Nitrite Negative Negative    Leukocyte Esterase Negative Negative    Occult Blood Small (A) Negative    Micro Urine Req Microscopic    URINE MICROSCOPIC (W/UA)    Collection Time: 20 12:37 PM   Result Value Ref Range    WBC 10-20 (A) /hpf    RBC 5-10 (A) /hpf    Bacteria Negative None /hpf    Epithelial Cells Few /hpf    Epithelial Cells Renal Few /hpf    Hyaline Cast 0-2 /lpf   UR PROTEIN SSA    Collection Time: 20 12:37 PM   Result Value Ref Range    Protein Negative Negative mg/dL   EKG    Collection Time: 20 12:43 PM   Result Value Ref Range    Report       [facility] Emergency Dept.    Test Date:  2020  Pt Name:    ALETA BUCK                  Department: ER  MRN:        [id number]                      Room:       [facility]  Gender:     Male                         Technician: [id number]  :        1940                   Requested By:ILEANA DE LA GARZA  Order #:    [id number]                    Reading MD: ILEANA DE LA GARZA, DO    Measurements  Intervals                                Axis  Rate:       115                          P:          45  VT:         136                          QRS:        -18  QRSD:       126                          T:          17  QT:         352  QTc:        487    Interpretive Statements  SINUS TACHYCARDIA  VENTRICULAR BIGEMINY  RIGHT BUNDLE BRANCH BLOCK  BORDERLINE ST DEPRESSION, LATERAL LEADS  Compared to ECG 2019 19:09:58  Ventricular premature complex(es) now present  ST (T wave) deviation now present  Sinus rhythm no longer  present  Electronically Signed On 1- 15: 18:23 PST by ILEANA DE LA GARZA DO     Influenza A/B By PCR (Adult - Flu Only)    Collection Time: 01/25/20 12:45 PM   Result Value Ref Range    Influenza virus A RNA Negative Negative    Influenza virus B, PCR Negative Negative   Lactic acid (lactate): Repeat if initial lactic acid result is greater than 2    Collection Time: 01/25/20  3:13 PM   Result Value Ref Range    Lactic Acid 1.1 0.5 - 2.0 mmol/L       Imaging/Procedures Review:    Independant Imaging Review: Completed  DX-CHEST-PORTABLE (1 VIEW)   Final Result      1.  Patchy bibasilar infiltrate or atelectasis and probable small pleural effusions.   2.  Stable cardiomegaly.   3.  Evidence of old granulomatous disease.      EC-ECHOCARDIOGRAM COMPLETE W/O CONT    (Results Pending)            EKG:   EKG Independent Review: Completed  ST, RBBB pattern    Records reviewed and summarized in current documentation :  Yes  [employer] teaching service handout given to patient:  No         Assessment/Plan     * Sepsis secondary to pneumonia (HCC)- (present on admission)  Assessment & Plan  Assessment: Patient presents with high fever, leukocytosis with left shift, and right lower lobe pulmonary infiltrates consistent with pneumonia.  In the emergency department he has been somewhat hypotensive, although per the wife his blood pressure typically runs about 90/50 especially after dialysis.  Given his end-stage renal disease, we will be somewhat cautious with fluid repletion although with his severe aortic stenosis he will need a high preload to maintain cardiac output and to allow filling.    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  He has had multiple previous admissions for pneumonia, and sputum cultures are persistently positive for Pseudomonas.  It is unknown whether this is merely colonization or truly pathogenic, although in the setting of his bronchiectasis it may be the latter.  Notably a recent CT scan at [facility] shows honeycombing, as well as redemonstrating bronchiectasis and mucous plugging.    We will therefore undertake a broad therapeutic and diagnostic strategy with broad-spectrum coverage, we will obtain AFBs to evaluate if his MAC has returned although it is typically more indolent.  Given his posttussive emesis and weeks of coughing spells, will obtain a pertussis PCR and cover with azithromycin.  His previous pseudomonal infections will be covered with Zosyn.  Aspiration was considered but apparently he recently underwent outpatient swallow evaluation by speech therapy, which was normal.  Additionally we will obtain LDH, 1 3 beta D glucan due to his longstanding immunosuppression and very low absolute lymphocyte count.    Plan:  Admit to ICU for close monitoring, possible pressors  Blood cultures  Trend lactate  Gentle fluid repletion, start with 500mL bolus. May be restricted by ESRD, might need central line placement.  Start pseudomonal dosed Zosyn, and azithromycin.  Guaifenesin, chest physiotherapy to aid in sputum clearance  Induce sputum culture, sputum AFB x3  Urine strep, Legionella antigen  1 3 beta D glucan, LDH, cocci, pertussis PCR  Consider high-resolution CT as recommended at [facility] for honeycombing  Infectious disease consult held for now, primary intensivist is ID    COPD exacerbation (HCC)- (present on admission)  Assessment & Plan  Assessment: Diffuse wheezing, increase in sputum production with color change. Likely COPD exacerbation with pulmonary infection.    Plan:  Duonebs Q4H  Azithromycin, treatment per sepsis problem  RT protocol  Solumedrol 125mg, then  Prednisone 40mg  Chest physiotherapy    Elevated troponin  Assessment & Plan  Assessment: Elevated troponin in the setting of ESRD, sepsis, and tachycardia secondary to pneumonia.  No chest pain, EKG shows right bundle branch block.  Very low suspicion for primary acute coronary syndrome, likely demand ischemia.    Plan:  Trend troponin  Treat primary etiology with fluids, antibiotics, continue metoprolol      Wegener's granulomatosis (HCC)- (present on admission)  Assessment & Plan  Diagnosed via renal biopsy, on prednisone alone.    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 Last HD Friday 1/24, 1000mL removed. Typically MWF. Appears to follow with Dr. Corona of [facility].    Plan:  Consult [facility] in the AM for further dialysis MWF    Severe aortic stenosis  Assessment & Plan  Assessment: Patient has severe aortic stenosis with unknown EF pending TAVR in [place].    Plan:  Maintain preload, continue metoprolol to allow diastolic filling time as it has minimal effect on BP and will allow improved coronary perfusion in the setting of demand ichemia  Echocardiogram to quantify ejection fraction  He is weighing whether to proceed with TAVR, if he decides against it a hospice consultation would be appropriate.        Anticipated Hospital stay:  >2 midnights        Quality Measures  Quality-Core Measures   Reviewed items::  Labs reviewed and Medications reviewed  Emmanuel catheter::  No Emmanuel  DVT prophylaxis pharmacological::  Heparin    PCP: Christine Zamudio M.D.

## 2020-01-26 NOTE — ASSESSMENT & PLAN NOTE
Assessment: Diffuse wheezing, increase in sputum production with color change. Likely COPD exacerbation with pulmonary infection. He has had significant improvement with standard COPD exacerbation therapy.    Plan:  Duonebs Q4H  RT protocol  Prednisone 40mg x4 days  then treatment per wegeners  Chest physiotherapy

## 2020-01-26 NOTE — PROGRESS NOTES
[facility] Progress Note      Admit Date: 1/25/2020    Resident(s): Dayron Durán M.D.   Attending:  XAVI THOMAS/ Dr. Samayoa    Patient ID:    Name:  Gilberto Brown   YOB: 1940  Age:  79 y.o.  male   MRN:  [id number]    Hospital Course (carried forward and updated):  Mr. 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 Infectious disease consultation was obtained given his pulmonary history including DANIEL, aspergillosis, bronchiectasis, and possible pulmonary histoplasmosis. Nephrology was consulted to continue his regular MWF dialysis.    Consultants:  Critical Care  Infectious disease  Nephrology     Interval Events:  No acute events overnight although was persistently borderline hypotensive, requiring Midodrine initiation. This morning BPs soft but acceptable. I called Dr. Limon of infectious disease given that he previously followed with [facility] and given his complex pulmonary infectious disease history. States his shortness of breath is improved, afebrile overnight, still having productive cough.      Review of Systems   Constitutional: Negative for chills and fever.   Eyes: Negative for blurred vision and double vision.   Respiratory: Positive for cough, sputum production and shortness of breath. Negative for wheezing.    Cardiovascular: Negative for chest pain and leg swelling.   Gastrointestinal: Negative for nausea and vomiting.       PHYSICAL EXAM:  Vitals:    01/26/20 0800 01/26/20 0900 01/26/20 1000 01/26/20 1014   BP: (!) 78/46 (!) 96/54 (!) 81/47    Pulse: 83 96 94 93   Resp: 19 (!) 36 (!) 28 (!) 25   Temp: 36.7 °C (98.1 °F)      TempSrc: Temporal      SpO2:    100%  "  Weight:       Height:        Body mass index is 19.92 kg/m².  Latest Vitals:  BP (!) 81/47   Pulse 93   Temp 36.7 °C (98.1 °F) (Temporal)   Resp (!) 25   Ht 1.727 m (5' 8\")   Wt 59.4 kg (131 lb)   SpO2 100%   BMI 19.92 kg/m²   O2 therapy: Pulse Oximetry: 100 %, O2 (LPM): 3, O2 Delivery: Nasal Cannula  Vitals Range last 24h:  Temp:  [36.1 °C (97 °F)-40.5 °C (104.9 °F)] 36.7 °C (98.1 °F)  Pulse:  [] 93  Resp:  [14-80] 25  BP: ()/(34-60) 81/47  SpO2:  [84 %-100 %] 100 %       Intake/Output Summary (Last 24 hours) at 1/26/2020 1047  Last data filed at 1/26/2020 0646  Gross per 24 hour   Intake 1800 ml   Output 225 ml   Net 1575 ml        Physical Exam   Constitutional: He is oriented to person, place, and time.   Appears chronically ill, cachectic   HENT:   Head: Normocephalic and atraumatic.   Mouth/Throat: Oropharynx is clear and moist.   Cardiovascular:   Tachycardic, regular, 4/6 late-peaking systolic murmur   Pulmonary/Chest:   No respiratory distress, interval improvement in bilateral wheezing, persistent bilateral right>left crackles   Abdominal: Soft. He exhibits no distension. There is no tenderness.   Musculoskeletal:         General: No edema.      Comments: RUE AVF   Neurological: He is alert and oriented to person, place, and time.           Recent Labs     01/25/20  1226 01/26/20  0418   SODIUM 137 136   POTASSIUM 3.8 3.7   CHLORIDE 95* 96   CO2 31 26   BUN 21 32*   CREATININE 3.00* 4.05*   CALCIUM 9.3 8.6     Recent Labs     01/25/20  1226 01/26/20  0418   ALTSGPT 11 16   ASTSGOT 13 15   ALKPHOSPHAT 60 49   TBILIRUBIN 0.5 0.5   GLUCOSE 117* 252*     Recent Labs     01/25/20  1226 01/26/20  0418   RBC 3.50* 3.24*   HEMOGLOBIN 10.2* 9.5*   HEMATOCRIT 33.0* 30.7*   PLATELETCT 238 192     Recent Labs     01/25/20  1226 01/26/20  0418   WBC 15.0* 17.7*   NEUTSPOLYS 92.10* 96.80*   LYMPHOCYTES 1.70* 0.80*   MONOCYTES 5.40 1.50   EOSINOPHILS 0.10 0.00   BASOPHILS 0.20 0.20   ASTSGOT 13 15 "   ALTSGPT 11 16   ALKPHOSPHAT 60 49   TBILIRUBIN 0.5 0.5       Meds:  • insulin lispro  1-6 Units      And   • glucose  16 g      And   • dextrose 10% bolus  250 mL     • [START ON 1/27/2020] metoprolol SR  25 mg     • albuterol  2 Puff     • finasteride  5 mg     • piperacillin-tazobactam  4.5 g 4.5 g (01/26/20 0543)   • azithromycin (ZITHROMAX) IV  500 mg Stopped (01/26/20 0646)   • rosuvastatin  20 mg     • sevelamer carbonate  800 mg     • senna-docusate  2 Tab      And   • polyethylene glycol/lytes  1 Packet      And   • magnesium hydroxide  30 mL      And   • bisacodyl  10 mg     • Respiratory Care per Protocol       • LR  500 mL     • heparin  5,000 Units     • acetaminophen  650 mg     • guaiFENesin ER  600 mg     • ipratropium-albuterol  3 mL     • predniSONE  40 mg     • budesonide-formoterol  2 Puff     • sodium chloride 3%  3 mL     • midodrine  10 mg          Imaging:  DX-CHEST-PORTABLE (1 VIEW)   Final Result      1.  Patchy bibasilar infiltrate or atelectasis and probable small pleural effusions.   2.  Stable cardiomegaly.   3.  Evidence of old granulomatous disease.      EC-ECHOCARDIOGRAM COMPLETE W/O CONT    (Results Pending)       Problem and Plan:    COPD exacerbation (HCC)- (present on admission)  Assessment & Plan  Assessment: Diffuse wheezing, increase in sputum production with color change. Likely COPD exacerbation with pulmonary infection. He has had significant improvement with standard COPD exacerbation therapy.    Plan:  Duonebs Q4H  Azithromycin, treatment per sepsis problem  RT protocol  Prednisone 40mg x4 days, then treatment per wegeners  Chest physiotherapy    Sepsis secondary to pneumonia (HCC)- (present on admission)  Assessment & Plan  Assessment: Patient presents with high fever, leukocytosis with left shift, and right lower lobe pulmonary infiltrates consistent with pneumonia.  In the emergency department he has been somewhat hypotensive, although per the wife his blood pressure  typically runs about 90/50 especially after dialysis.  Given his end-stage renal disease, we will be somewhat cautious with fluid repletion although with his severe aortic stenosis he will need a high preload to maintain cardiac output and to allow filling.    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  He has had multiple previous admissions for pneumonia, and sputum cultures are persistently positive for Pseudomonas.  It is unknown whether this is merely colonization or truly pathogenic, although in the setting of his bronchiectasis it may be the latter.  Notably a recent CT scan at [facility] shows honeycombing, as well as redemonstrating bronchiectasis and mucous plugging.    We will therefore undertake a broad therapeutic and diagnostic strategy with broad-spectrum coverage, we will obtain AFBs to evaluate if his MAC has returned although it is typically more indolent.  Given his posttussive emesis and weeks of coughing spells, will obtain a pertussis PCR and cover with azithromycin.  His previous pseudomonal infections will be covered with Zosyn.  Aspiration was considered but apparently he recently underwent outpatient swallow evaluation by speech therapy, which was normal.  Additionally we will obtain LDH, 1 3 beta D glucan due to his longstanding immunosuppression and very low absolute lymphocyte count.    Plan:  Blood cultures NGTD, sputum cultures pending  Fluid restriction limited by ESRD, start Midodrine 10mg TID  Continue pseudomonal dosed Zosyn, and azithromycin.  Guaifenesin, chest physiotherapy to aid in sputum clearance  Induce sputum culture, sputum AFB x3 - 1/3  collected  Urine strep, Legionella antigen pending  1 3 beta D glucan, cocci, pertussis pending  LDH normal  Consider high-resolution CT as recommended at [facility] for honeycombing  Consult ID    Elevated troponin  Assessment & Plan  Assessment: Elevated troponin in the setting of ESRD, sepsis, and tachycardia secondary to pneumonia.  No chest pain, EKG shows right bundle branch block.  Very low suspicion for primary acute coronary syndrome, likely demand ischemia. Troponins trended and decreased.    Plan:  Treat primary etiology with fluids, antibiotics, continue metoprolol at reduced dosing interval      Wegener's granulomatosis (HCC)- (present on admission)  Assessment & Plan  Diagnosed via renal biopsy, on prednisone alone.    COPD exacerbation dosing(40mg daily) x4 days total, followed by 5mg daily  He would like someone to contact Dr. 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 Last HD Friday 1/24, 1000mL removed. Typically MWF. Appears to follow with Dr. Corona of [facility].    Plan:  Consult [facility] in the AM for further dialysis MWF    Severe aortic stenosis  Assessment & Plan  Assessment: Patient has severe aortic stenosis with unknown EF pending TAVR in [place].    Plan:  Maintain preload, continue metoprolol to allow diastolic filling time as it has minimal effect on BP and will allow improved coronary perfusion in the setting of demand ichemia  Echocardiogram to quantify ejection fraction  He is weighing whether to proceed with TAVR, if he decides against it a hospice consultation would be appropriate.        DISPO: ICU, possibly floor later today/tomorrow    CODE STATUS: DNAR/DNI    Quality Measures:  Feeding: Renal  Analgesia: N/A  Sedation: N/A  Thromboprophylaxis:  Heparin  Head of bed: >30 degrees  Ulcer prophylaxis: PRN  Glycemic control: PRN  Bowel care: bowel regimen  Indwelling lines: PIV  Deescalation of antibiotics: antipseudomonal/atypical      Dayron Durán M.D.

## 2020-01-26 NOTE — ASSESSMENT & PLAN NOTE
Assessment: Patient has severe aortic stenosis with unknown EF pending TAVR in [place].    Plan:  Maintain preload, continue metoprolol to allow diastolic filling time as it has minimal effect on BP and will allow improved coronary perfusion in the setting of demand ichemia  Echocardiogram to quantify ejection fraction  He is weighing whether to proceed with TAVR, if he decides against it a hospice consultation would be appropriate.

## 2020-01-26 NOTE — ASSESSMENT & PLAN NOTE
Careful of volume status.   Avoid tachycardia to optimize diastolic filling.   Can resume metoprolol. Gentle metoprolol XL 25mg daily

## 2020-01-26 NOTE — ASSESSMENT & PLAN NOTE
Diagnosed via renal biopsy, on prednisone alone.  COPD exacerbation got prednisone 40 mg for 4 days   Follow-up with Dr. Cintia Ariza for Wegener's   Called Dr. Cintia Ariza office she is not in office today  Will call again tomorrow morning

## 2020-01-26 NOTE — FLOWSHEET NOTE
01/26/20 1014   Events/Summary/Plan   Events/Summary/Plan did not produse sputum after tx, refues NT suction   Interdisciplinary Plan of Care-Goals (Indications)   Bronchodilator Indications History / Diagnosis   Bronchopulmonary Hygiene Indications Difficulty with Secretion Clearance   Interdisciplinary Plan of Care-Outcomes    Bronchodilator Outcome Patient at Stable Baseline   Bronchopulmonary Hygiene Outcome Patient at Stable Baseline   SVN Group   #SVN Performed Yes   Given By: Mask   Respiratory WDL   Respiratory (WDL) X   Chest Exam   Work Of Breathing / Effort Mild   Respiration (!) 25   Pulse 93   Breath Sounds   RUL Breath Sounds Diminished   RML Breath Sounds Diminished   RLL Breath Sounds Diminished   DEBORA Breath Sounds Diminished   LLL Breath Sounds Diminished   Secretions   Cough Strong   Oximetry   Continuous Oximetry Yes   Oxygen   Pulse Oximetry 100 %   O2 (LPM) 3   O2 Daily Delivery Respiratory  Silicone Nasal Cannula

## 2020-01-26 NOTE — CONSULTS
"Consults   INFECTIOUS DISEASES INPATIENT CONSULT NOTE     Date of Service: 1/26/2020    Consult Requested By: Sai Samayoa     Reason for Consultation: Pneumonia    History of Present Illness:   Gilberto Brown is a 79 y.o.  admitted 1/25/2020. Pt has a past medical history of severe aortic stenosis recently evaluated for TAVR, hx of Wegener's granulomatosis chronic lung disease and has been on higher dose steroids but improvement over last 6 months and now only on 5 mg daily prednisone . ESRD on HD.  Known chronic lung disease with bronchiectasis and prior cultures positive for multidrug-resistant Pseudomonas, \"mold\", DANIEL. Also a history of pulmonary histoplasmosis per report. He has been seen by ID in the past.     He presented complaining of increased coughing productive of yellow-green sputum, fatigue , increasing shortness of breath and fever of 103 at home.  Symptoms were acute in onset and progressed over the last 2 days.      Hospital Course:   In the ER he had a fever of 104.9, now improved.  He has been hypotensive since arrival and is midodrine.  He also is in end-stage renal disease patient so unclear if the hypotension is related to septic shock.  He has been stable on 3 L nasal cannula since arrival.  He had a leukocytosis of 15 on arrival.  Chest x-ray with patchy bibasilar infiltrate atelectasis, evidence of old granulomatous disease.    He states that he is feeling better with less cough less shortness of breath and less fatigue since admit and starting antibiotics.      Review Of Systems:  Review of Systems   Constitutional: Positive for chills, fever and malaise/fatigue.   HENT: Negative for hearing loss.    Eyes: Negative for blurred vision and double vision.   Respiratory: Positive for cough, sputum production, shortness of breath and wheezing.    Cardiovascular: Negative for chest pain and leg swelling.   Gastrointestinal: Negative for abdominal pain, constipation, diarrhea, nausea and " vomiting.   Genitourinary: Negative for dysuria.   Musculoskeletal: Negative for back pain, joint pain and myalgias.   Skin: Negative for rash.   Neurological: Negative for headaches.   Psychiatric/Behavioral: The patient is not nervous/anxious.          PMH:   Past Medical History:   Diagnosis Date   • A-fib (Formerly [facility])        • Anemia    • Arthritis     arms, legs, shoulders   • ASTHMA    • BPH (benign prostatic hyperplasia)    • Breath shortness    • Bronchitis     chronic   • Cataract     removed bilat   • COPD    • [facility]ialysis     [facility],    • Dyslipidemia    • EMPHYSEMA    • GERD (gastroesophageal reflux disease)    • Hypertension    • Oxygen decrease     O2 3liters @  and dialysis   • Pacemaker 1988   • Pain 05/09/2018    shoulders/hips, 5/10   • Pneumonia 2017   • Pseudomonas pneumonia (Formerly [facility])    • Pulmonary histoplasmosis (Formerly [facility])    • Renal disorder     CKD,    • Sick sinus syndrome (Formerly [facility])    • Sleep apnea     uses cpap at noc   • Snoring    • Stroke (Formerly [facility]) 02/2018   • Unspecified hemorrhagic conditions     bruises easily, fragile skin   • Wegener's disease, pulmonary (Formerly [facility])        PSH:  Past Surgical History:   Procedure Laterality Date   • AV FISTULOGRAM Right 4/12/2016    Procedure: AV FISTULOGRAM , VENOPLASTY X 2;  Surgeon: Nate Syed M.D.;  Location: SURGERY [facility];  Service:    • RECOVERY  9/3/2015    Procedure: IR1 VASCULAR CASE-ELENO RIGHT DIALYSIS FISTULOGRAM, POSSIBLE INTERVENTION;  Surgeon: Recoveryonly Surgery;  Location: SURGERY PRE-POST PROC UNIT [facility];  Service:    • RECOVERY  2/26/2015    Performed by [facility]Rec[facility] Surgery at SURGERY SAME [facility]   • RECOVERY  10/3/2014    Performed by [facility] at [facility]ERY [facility]   • RECOVERY  8/7/2014    Performed by [facility] at SURGERY [facility]   • RECOVERY  12/17/2013    Performed by [facility]Rec[facility] Surgery at SURGERY SAME DAY [facility]   • BRONCHOSCOPY-ENDO  7/18/2013     Performed by Juan F Rubio M.D. at SURGERY [facility]S   • RECOVERY  2013    Performed by Ir[facility]Recovery Surgery at SURGERY SAME DAY [facility]   • AV FISTULA REVISION  2012    Performed by NESSA JOHANSEN at SURGERY [facility] ORS   • RECOVERY  2012    Performed by SURGERY, IR-RECOVERY at SURGERY SAME DAY [facility]   • AV FISTULA CREATION  3/8/2012    Performed by NESSA JOHANSEN at SURGERY [facility]S   • GASTROSCOPY WITH BIOPSY  2012    Performed by ZURDO HARRISON at ENDOSCOPY [facility]S   • CATH PLACEMENT  10/8/2011    Performed by NESSA JOHANSEN at SURGERY [facility]   • GASTROSCOPY WITH BALLOON DILATATION  2011    Performed by KT FERNANDEZ at SURGERY [facility]   • PACEMAKER INSERTION  2009   • ROTATOR CUFF REPAIR      right   • HERNIA REPAIR      right inguinal hernia   • HIP REPLACEMENT, TOTAL      right   • TONSILLECTOMY         FAMILY HX:  Family History   Problem Relation Age of Onset   • Stroke Father    • Heart Disease Father    • Stroke Mother    • Cancer Other        SOCIAL HX:  Social History     Socioeconomic History   • Marital status:      Spouse name: Not on file   • Number of children: Not on file   • Years of education: Not on file   • Highest education level: Not on file   Occupational History   • Not on file   Social Needs   • Financial resource strain: Not on file   • Food insecurity:     Worry: Not on file     Inability: Not on file   • Transportation needs:     Medical: Not on file     Non-medical: Not on file   Tobacco Use   • Smoking status: Former Smoker     Packs/day: 0.00     Years: 30.00     Pack years: 0.00     Types: Pipe     Last attempt to quit: 1990     Years since quittin.0   • Smokeless tobacco: Former User     Types: Chew   Substance and Sexual Activity   • Alcohol use: Yes     Alcohol/week: 1.2 oz     Types: 1 Glasses of wine, 1 Cans of beer per week     Frequency: 2-3 times a  week     Drinks per session: 1 or 2     Binge frequency: Never     Comment: 1/2 glass Red wine nightly   • Drug use: No   • Sexual activity: Yes     Partners: Female   Lifestyle   • Physical activity:     Days per week: Not on file     Minutes per session: Not on file   • Stress: Not on file   Relationships   • Social connections:     Talks on phone: Not on file     Gets together: Not on file     Attends [employer] service: Not on file     Active member of club or organization: Not on file     Attends meetings of clubs or organizations: Not on file     Relationship status: Not on file   • Intimate partner violence:     Fear of current or ex partner: Not on file     Emotionally abused: Not on file     Physically abused: Not on file     Forced sexual activity: Not on file   Other Topics Concern   • Not on file   Social History Narrative   • Not on file     Social History     Tobacco Use   Smoking Status Former Smoker   • Packs/day: 0.00   • Years: 30.00   • Pack years: 0.00   • Types: Pipe   • Last attempt to quit: 1990   • Years since quittin.0   Smokeless Tobacco Former User   • Types: Chew     Social History     Substance and Sexual Activity   Alcohol Use Yes   • Alcohol/week: 1.2 oz   • Types: 1 Glasses of wine, 1 Cans of beer per week   • Frequency: 2-3 times a week   • Drinks per session: 1 or 2   • Binge frequency: Never    Comment: 1/2 glass Red wine nightly       Allergies/Intolerances:  Allergies   Allergen Reactions   • Doxycycline Diarrhea and Nausea     Reports terrible diarrhea   • Erythromycin Diarrhea and Nausea     Abdominal pain.  RXN=before    • Rituximab Unspecified     Flu like syndrome       History reviewed with the patient     Other Current Medications:    Current Facility-Administered Medications:   •  insulin lispro (HUMALOG) injection 1-6 Units, 1-6 Units, Subcutaneous, Q6HRS, 3 Units at 20 0853 **AND** Accu-Chek Q6 if NPO, , , Q6H **AND** NOTIFY MD and PharmD, , , Once  **AND** glucose 4 g chewable tablet 16 g, 16 g, Oral, Q15 MIN PRN **AND** DEXTROSE 10% BOLUS 250 mL, 250 mL, Intravenous, Q15 MIN PRN, Dayron Durán M.D.  •  [START ON 1/27/2020] metoprolol SR (TOPROL XL) tablet 25 mg, 25 mg, Oral, Q DAY, Dayron Durán M.D.  •  albuterol inhaler 2 Puff, 2 Puff, Inhalation, Q6HRS PRN, Dayron Durán M.D., 2 Puff at 01/26/20 0404  •  finasteride (PROSCAR) tablet 5 mg, 5 mg, Oral, QAM, Dayron Durán M.D., 5 mg at 01/26/20 0546  •  [COMPLETED] piperacillin-tazobactam (ZOSYN) 4.5 g in  mL IVPB, 4.5 g, Intravenous, Once, Stopped at 01/25/20 1722 **AND** piperacillin-tazobactam (ZOSYN) 4.5 g in  mL IVPB, 4.5 g, Intravenous, Q12HRS, Dayron Durán M.D., Last Rate: 25 mL/hr at 01/26/20 0547, 4.5 g at 01/26/20 0547  •  azithromycin (ZITHROMAX) 500 mg in D5W 250 mL IVPB, 500 mg, Intravenous, Q24HRS, Dayron Durán M.D., Stopped at 01/26/20 0646  •  rosuvastatin (CRESTOR) tablet 20 mg, 20 mg, Oral, DAILY, Dayron Durán M.D., 20 mg at 01/26/20 0546  •  sevelamer carbonate (RENVELA) tablet 800 mg, 800 mg, Oral, TID, Dayron Durán M.D., 800 mg at 01/26/20 0855  •  senna-docusate (PERICOLACE or SENOKOT S) 8.6-50 MG per tablet 2 Tab, 2 Tab, Oral, BID **AND** polyethylene glycol/lytes (MIRALAX) PACKET 1 Packet, 1 Packet, Oral, QDAY PRN **AND** magnesium hydroxide (MILK OF MAGNESIA) suspension 30 mL, 30 mL, Oral, QDAY PRN **AND** bisacodyl (DULCOLAX) suppository 10 mg, 10 mg, Rectal, QDAY PRN, Dayron Durán M.D.  •  Respiratory Care per Protocol, , Nebulization, Continuous RT, Dayron Durán M.D.  •  lactated ringers infusion (BOLUS), 500 mL, Intravenous, Once PRN, Dayron Durán M.D.  •  heparin injection 5,000 Units, 5,000 Units, Subcutaneous, Q12HRS, Dayron Durán M.D., 5,000 Units at 01/26/20 0546  •  acetaminophen (TYLENOL) tablet 650 mg, 650 mg, Oral, Q6HRS PRN, Dayron Durán M.D.  •  guaiFENesin ER (MUCINEX) tablet 600 mg, 600 mg, Oral, Q12HRS, Dayron Durán  "M.D., 600 mg at 20 0546  •  ipratropium-albuterol (DUONEB) nebulizer solution, 3 mL, Nebulization, Q4HRS (RT), Dayron Durán M.D., 3 mL at 20 0628  •  predniSONE (DELTASONE) tablet 40 mg, 40 mg, Oral, DAILY, Dayron Durán M.D., 40 mg at 20 0651  •  budesonide-formoterol (SYMBICORT) 160-4.5 MCG/ACT inhaler 2 Puff, 2 Puff, Inhalation, BID (RT), Dayron Durán M.D., 2 Puff at 20 0629  •  sodium chloride 3% nebulizer solution 3 mL, 3 mL, Nebulization, 4X/DAY (RT), Dayron Durán M.D., 3 mL at 20 0628  •  midodrine (PROAMATINE) tablet 10 mg, 10 mg, Oral, Q8HR, Tres Thacker M.D., 10 mg at 20 0554  [unfilled]    Most Recent Vital Signs:  BP (!) 91/54   Pulse 88   Temp 36.7 °C (98.1 °F) (Temporal)   Resp (!) 28   Ht 1.727 m (5' 8\")   Wt 59.4 kg (131 lb)   SpO2 100%   BMI 19.92 kg/m²   Temp  Av.2 °C (99 °F)  Min: 36.1 °C (97 °F)  Max: 40.5 °C (104.9 °F)    Physical Exam:  Physical Exam   Constitutional: He is oriented to person, place, and time and well-developed, well-nourished, and in no distress.   HENT:   Head: Normocephalic and atraumatic.   Eyes: Pupils are equal, round, and reactive to light. Conjunctivae and EOM are normal. Right eye exhibits no discharge. Left eye exhibits no discharge. No scleral icterus.   Cardiovascular: Normal rate and regular rhythm.   Murmur heard.  Pulmonary/Chest: Effort normal.   Bronchial breath sounds, rhonchi and diffuse crackles bilaterally worse in the bases   Abdominal: Soft. Bowel sounds are normal. He exhibits no distension. There is no tenderness. There is no rebound and no guarding.   Musculoskeletal: Normal range of motion.         General: No edema.   Neurological: He is alert and oriented to person, place, and time.   Some mild forgetfulness   Skin: Skin is warm and dry.   Psychiatric: Affect and judgment normal.           Pertinent Lab Results:  Recent Labs     20  1226 20  0418   WBC 15.0* 17.7* " "     Recent Labs     01/25/20  1226 01/26/20 0418   HEMOGLOBIN 10.2* 9.5*   HEMATOCRIT 33.0* 30.7*   MCV 94.3 94.8   MCH 29.1 29.3   PLATELETCT 238 192         Recent Labs     01/25/20  1226 01/26/20 0418   SODIUM 137 136   POTASSIUM 3.8 3.7   CHLORIDE 95* 96   CO2 31 26   CREATININE 3.00* 4.05*        Recent Labs     01/25/20  1226 01/26/20 0418   ALBUMIN 3.7 3.4        Pertinent Micro:  Results     Procedure Component Value Units Date/Time    CULTURE RESPIRATORY W/ GRM STN [[id number]] Collected:  01/26/20 0904    Order Status:  Sent Specimen:  Respirate from Sputum Induced     Fungal Culture [[id number]] Collected:  01/26/20 0904    Order Status:  Sent Specimen:  Respirate from Sputum Induced     BLOOD CULTURE [[id number]] Collected:  01/25/20 1226    Order Status:  Completed Specimen:  Blood from Peripheral Updated:  01/26/20 0734     Significant Indicator NEG     Source BLD     Site PERIPHERAL     Culture Result No Growth  Note: Blood cultures are incubated for 5 days and  are monitored continuously.Positive blood cultures  are called to the RN and reported as soon as  they are identified.      Narrative:       Per Hospital Policy: Only change Specimen Src: to \"Line\" if  specified by physician order.  No site indicated    BLOOD CULTURE [[id number]] Collected:  01/25/20 1226    Order Status:  Completed Specimen:  Blood from Peripheral Updated:  01/26/20 0734     Significant Indicator NEG     Source BLD     Site PERIPHERAL     Culture Result No Growth  Note: Blood cultures are incubated for 5 days and  are monitored continuously.Positive blood cultures  are called to the RN and reported as soon as  they are identified.      Narrative:       Per Hospital Policy: Only change Specimen Src: to \"Line\" if  specified by physician order.  No site indicated    AFB Culture [[id number]]     Order Status:  No result Specimen:  Respirate from Sputum Induced     AFB Culture [[id number]] Collected:  01/26/20 0420    Order Status:  " Completed Specimen:  Respirate from Sputum Induced Updated:  01/26/20 0440    Narrative:       Collected By:[id number] ZACH SCHROEDER  Culture + smear    Fungal Culture [[id number]]     Order Status:  Canceled Specimen:  Respirate from Sputum Induced     Cryptococcal Antigen [[id number]]     Order Status:  Sent Specimen:  Blood     AFB Culture [[id number]]     Order Status:  Sent Specimen:  Respirate from Sputum Induced     CULTURE RESPIRATORY W/ GRM STN [[id number]]     Order Status:  Completed Specimen:  Respirate from Sputum Induced     Influenza A/B By PCR (Adult - Flu Only) [[id number]] Collected:  01/25/20 1245    Order Status:  Completed Specimen:  Respirate from Nasopharyngeal Updated:  01/25/20 1326     Influenza virus A RNA Negative     Influenza virus B, PCR Negative    URINALYSIS [[id number]]  (Abnormal) Collected:  01/25/20 1237    Order Status:  Completed Specimen:  Urine Updated:  01/25/20 1314     Color Yellow     Character Clear     Specific Gravity 1.011     Ph >=9.0     Glucose 100 mg/dL      Ketones Negative mg/dL      Protein 100 mg/dL      Bilirubin Negative     Urobilinogen, Urine 0.2     Nitrite Negative     Leukocyte Esterase Negative     Occult Blood Small     Micro Urine Req Microscopic    Narrative:       Indication for culture:->Septic Shock: Persistent  hypotension,  Lactic acid > 4, vasopressors/inotropes started    URINE CULTURE(NEW) [[id number]] Collected:  01/25/20 1237    Order Status:  Completed Specimen:  Urine Updated:  01/25/20 1242    Narrative:       Indication for culture:->Septic Shock: Persistent  hypotension,  Lactic acid > 4, vasopressors/inotropes started        Blood Culture   Date Value Ref Range Status   03/01/2019 No growth after 5 days of incubation.  Final   12/30/2013 Final report  Final     Blood Culture Hold   Date Value Ref Range Status   06/06/2018 Collected  Final        Studies:  Dx-chest-portable (1 View)    Result Date: 1/25/2020 1/25/2020 12:45 PM  "HISTORY/REASON FOR EXAM:  possible sepsis.. Productive cough.  Shortness of breath, chest pain. TECHNIQUE/EXAM DESCRIPTION AND NUMBER OF VIEWS: Single portable view of the chest. COMPARISON: 7/24/2019 FINDINGS: Cardiac mediastinal contour is unchanged. LEFT chest pacemaker in place. Ill-defined opacities in both lung bases with mild blunting of the costophrenic angles. Linear opacity in the RIGHT midlung region. No pneumothorax. Calcified RIGHT hilar lymph nodes and upper lobe nodule.     1.  Patchy bibasilar infiltrate or atelectasis and probable small pleural effusions. 2.  Stable cardiomegaly. 3.  Evidence of old granulomatous disease.      ASSESSMENT/PLAN:     Gilberto Brown is a 79 y.o.  admitted 1/25/2020. Pt has a past medical history of severe aortic stenosis recently evaluated for TAVR, hx of Wegener's granulomatosis on 5 mg daily prednisone, ESRD on HD.  Known chronic lung disease with bronchiectasis on home oxygen of 3 L and prior respiratory cultures positive for multidrug-resistant Pseudomonas, \"mold\", DANIEL. Also a history of pulmonary histoplasmosis per report. He has been seen by ID in the past.  He presented complaining of 2 days of increased productive cough, shortness of breath and fevers.     Hospital Course:   In the ER he had a fever of 104.9, now improved.  He has been hypotensive since arrival and is midodrine.  He has been stable on 3 L nasal cannula since arrival.  He had a leukocytosis of 15 on arrival.  Chest x-ray with patchy bibasilar infiltrate atelectasis, evidence of old granulomatous disease.      Assessment    Sepsis, secondary to pneumonia  Fevers-improved  Hypotensive, however with end-stage renal disease and on midorodine     Community-acquired pneumonia in setting of chronic lung disease and bronchiectasis, known colonization with Pseudomonas typically sensitive to Zosyn when this is tested.  Sputum positive for pseudomonas aeruginosa in 7/2019 intermediate to ciprofloxacin " and sensitive to ticarcillin/CA    -Work-up so far negative for influenza, , procalcitonin 1.02  In process:   -Strep pneumo urine antigen-pending  -Legionella urinary antigen-pending  -Beta D glucan-pending  -Pertussis PCR-not yet collected  -Cocci antibody panel-pending  -Cryptococcal antigen-pending  -Histoplasmosis antibody-pending  -P TANIYA with reflex to PCR- collected  -Respiratory cultures-bacterial, AFB, fungal - pending     Bronchiectasis  History of interstitial lung disease/emphysema and COPD on prior imaging  Granulomatous disease on prior imaging  -History of Pseudomonas, DANIEL and aspergillosis & histoplasmosis  Leukocytosis, increasing, also on high-dose steroid    Wegener's granulomatosis  -On chronic low-dose steroid, 5 mg prednisone    End-stage renal disease on hemodialysis    Severe aortic stenosis    Plan   --- Continue Zosyn as he appears to be responding, once pertussis PCR is returned can likely stop azithromycin  --- CT chest noncontrast to compare to prior  --- Follow-up work-up as outlined above  --- Patient has significant lung disease per prior CT and could easily be colonized with numerous organisms.  If the AFB work-up returns is positive for NTM, PSAR or aspergillosis would carefully consider overall symptoms over the past 6 months, compare repeat CT chest to prior and and thoughtfully consider whether treatment is needed prior to placing this 79-year-old on potentially toxic medication.  A watch and wait approach with repeat imaging would also be a consideration based on clinical status.   --- He appears to be responding but if further work-up is needed would send histoplasmosis urinary antigen and galactomannan serum.  In this scenario would also recommend bronchoscopy with cultures.       Plan of care discussed with Dr. Samayoa.  ID will continue to follow.     Jessica Limon M.D.

## 2020-01-26 NOTE — PROGRESS NOTES
Critical Care Progress Note    Date of admission  1/25/2020    Chief Complaint  pneumonia    Hospital Course    79 y.o. male with hx of Wegeners (on prednisone), ESRD on IHD, COPD, severe AS, bronchiectasis who presented to ED for 2-day hx of fever, cough, SOB. Pt recently went to [facility] for preop eval for TAVR. In the past 2 days, pt c/o fever, SOB, cough, generalized weaknesss. He also c/o 2-3 weeks of coughing fits. At ED, pt was on 3L NC. Febrile with Tm 104.9C, HR 110s. Pt alert oriented.       Interval Problem Update  Reviewed last 24 hour events:  No acute events overnight   Afebrile  HR in 90s  SBP in 90-100s, with MAP 60-80s  Midodrine was started last night   WBC 17.7K with platelet 192  Creatinine 4.05 today (3.00) yesterday   UOP about +835cc in the past 24 hours, positive 2.4L since admission     Review of Systems  Review of Systems   Constitutional: Negative for chills, fever and malaise/fatigue.   Respiratory: Negative for cough and shortness of breath.    Cardiovascular: Negative for chest pain and leg swelling.   Gastrointestinal: Negative for abdominal pain, diarrhea, nausea and vomiting.   Musculoskeletal: Negative for back pain and joint pain.   Skin: Negative for rash.   Neurological: Negative for weakness and headaches.   Psychiatric/Behavioral: The patient is not nervous/anxious.    All other systems reviewed and are negative.       Vital Signs for last 24 hours   Temp:  [36.1 °C (97 °F)-40.5 °C (104.9 °F)] 36.7 °C (98 °F)  Pulse:  [] 105  Resp:  [14-80] 39  BP: ()/(34-60) 102/56  SpO2:  [84 %-100 %] 97 %    Hemodynamic parameters for last 24 hours       Respiratory Information for the last 24 hours       Physical Exam   Physical Exam  Vitals signs and nursing note reviewed.   Constitutional:       General: He is not in acute distress.     Appearance: Normal appearance. He is not ill-appearing, toxic-appearing or diaphoretic.      Comments: Alert, oriented   HENT:      Head:  Normocephalic and atraumatic.      Mouth/Throat:      Mouth: Mucous membranes are moist.   Neck:      Musculoskeletal: Neck supple.   Cardiovascular:      Rate and Rhythm: Normal rate and regular rhythm.      Pulses: Normal pulses.      Heart sounds: Normal heart sounds. No murmur.   Pulmonary:      Effort: Pulmonary effort is normal. No respiratory distress.      Breath sounds: Normal breath sounds. No wheezing, rhonchi or rales.      Comments: Diminished at bases  Abdominal:      General: Bowel sounds are normal. There is no distension.      Palpations: Abdomen is soft.      Tenderness: There is no tenderness. There is no guarding.   Musculoskeletal:         General: No swelling or tenderness.   Skin:     General: Skin is warm and dry.      Coloration: Skin is not jaundiced.   Neurological:      General: No focal deficit present.      Mental Status: He is alert and oriented to person, place, and time.      Cranial Nerves: No cranial nerve deficit.         Medications  Current Facility-Administered Medications   Medication Dose Route Frequency Provider Last Rate Last Dose   • albuterol inhaler 2 Puff  2 Puff Inhalation Q6HRS PRN Dayron Durán M.D.   2 Puff at 01/26/20 0404   • finasteride (PROSCAR) tablet 5 mg  5 mg Oral QAM Dayron Durán M.D.   5 mg at 01/26/20 0546   • piperacillin-tazobactam (ZOSYN) 4.5 g in  mL IVPB  4.5 g Intravenous Q12HRS Dayron Durán M.D. 25 mL/hr at 01/26/20 0547 4.5 g at 01/26/20 0547   • azithromycin (ZITHROMAX) 500 mg in D5W 250 mL IVPB  500 mg Intravenous Q24HRS Dayron Durán M.D. 250 mL/hr at 01/26/20 0546 500 mg at 01/26/20 0546   • metoprolol SR (TOPROL XL) tablet 25 mg  25 mg Oral BID Dayron Durán M.D.   Stopped at 01/25/20 1800   • rosuvastatin (CRESTOR) tablet 20 mg  20 mg Oral DAILY Dayron Durán M.D.   20 mg at 01/26/20 0546   • sevelamer carbonate (RENVELA) tablet 800 mg  800 mg Oral TID Dayron Durán M.D.   800 mg at 01/26/20 0546   • senna-docusate  (PERICOLACE or SENOKOT S) 8.6-50 MG per tablet 2 Tab  2 Tab Oral BID Dayron Durán M.D.        And   • polyethylene glycol/lytes (MIRALAX) PACKET 1 Packet  1 Packet Oral QDAY PRN Dayron Durán M.D.        And   • magnesium hydroxide (MILK OF MAGNESIA) suspension 30 mL  30 mL Oral QDAY PRN Dayron Durán M.D.        And   • bisacodyl (DULCOLAX) suppository 10 mg  10 mg Rectal QDAY PRN Dayron Durán M.D.       • Respiratory Care per Protocol   Nebulization Continuous RT Dayron Durán M.D.       • lactated ringers infusion (BOLUS)  500 mL Intravenous Once PRN Dayron Durán M.D.       • heparin injection 5,000 Units  5,000 Units Subcutaneous Q12HRS Dayron Durán M.D.   5,000 Units at 01/26/20 0546   • acetaminophen (TYLENOL) tablet 650 mg  650 mg Oral Q6HRS PRN Dayron Durán M.D.       • guaiFENesin ER (MUCINEX) tablet 600 mg  600 mg Oral Q12HRS Dayron Durán M.D.   600 mg at 01/26/20 0546   • ipratropium-albuterol (DUONEB) nebulizer solution  3 mL Nebulization Q4HRS (RT) Dayron Durán M.D.   3 mL at 01/26/20 0202   • predniSONE (DELTASONE) tablet 40 mg  40 mg Oral DAILY Dayron Durán M.D.       • budesonide-formoterol (SYMBICORT) 160-4.5 MCG/ACT inhaler 2 Puff  2 Puff Inhalation BID (RT) Dayron Durán M.D.   2 Puff at 01/25/20 1935   • sodium chloride 3% nebulizer solution 3 mL  3 mL Nebulization 4X/DAY (RT) Dayron Durán M.D.   3 mL at 01/25/20 1936   • midodrine (PROAMATINE) tablet 10 mg  10 mg Oral Q8HR Tres Thacker M.D.   10 mg at 01/26/20 0554       Fluids    Intake/Output Summary (Last 24 hours) at 1/26/2020 0612  Last data filed at 1/25/2020 2300  Gross per 24 hour   Intake 1060 ml   Output 225 ml   Net 835 ml       Laboratory          Recent Labs     01/25/20  1226 01/26/20  0418   SODIUM 137 136   POTASSIUM 3.8 3.7   CHLORIDE 95* 96   CO2 31 26   BUN 21 32*   CREATININE 3.00* 4.05*   CALCIUM 9.3 8.6     Recent Labs     01/25/20  1226 01/26/20  0418   ALTSGPT 11 16   ASTSGOT 13 15    ALKPHOSPHAT 60 49   TBILIRUBIN 0.5 0.5   GLUCOSE 117* 252*     Recent Labs     01/25/20  1226 01/26/20  0418   WBC 15.0* 17.7*   NEUTSPOLYS 92.10* 96.80*   LYMPHOCYTES 1.70* 0.80*   MONOCYTES 5.40 1.50   EOSINOPHILS 0.10 0.00   BASOPHILS 0.20 0.20   ASTSGOT 13 15   ALTSGPT 11 16   ALKPHOSPHAT 60 49   TBILIRUBIN 0.5 0.5     Recent Labs     01/25/20  1226 01/26/20  0418   RBC 3.50* 3.24*   HEMOGLOBIN 10.2* 9.5*   HEMATOCRIT 33.0* 30.7*   PLATELETCT 238 192       Imaging  X-Ray:  I have personally reviewed the images and compared with prior images.  Echo:   Reviewed    Assessment/Plan  * Pneumonia- (present on admission)  Assessment & Plan  Concerning of pneumonia - community vs hospital. Procal 1.02  Influenza A/B PCR is negative.     Plan:   Continue piptazo and azithromycin. Piptazo to cover pseudomonas and other GNB  Azithromycin to cover CAP   Will hold off treating fungal pneumonia or MAC  ID diagnostics  Urine strep pnuemo Ag, legionella Ag  Serum cocci antibodis, fungitell, aspergillus galactomannan, cryptococcus antigen  Respiratory panel by PCR (nasopharyngeal swab)  Bordetella pertussis PCR (nasopharyhngeal swab), need isolation  Given hx of pulmonary MAC in the past, will get AFB sputum x3. No need for airborne isolation as we are ruling out MAC, not MTB  Urine histo Ag (documented hx of histo)  PJP PCR sputum  Consulted ID (Dr. Limon). Appreciate input    Sepsis secondary to pneumonia (HCC)- (present on admission)  Assessment & Plan  This is Severe Sepsis Present on admission  SIRS criteria identified on my evaluation include: Tachycardia, with heart rate greater than 90 BPM and Leukocyosis, with WBC greater than 12,000  Source of infection is lungs  Clinical indicators of end organ dysfunction include Lactate >2 mmol/L (18.0 mg/dL)  Sepsis protocol initiated  Fluid resuscitation ordered per protocol  IV antibiotics as appropriate for source of sepsis  Reassessment: I have reassessed the patient's  hemodynamic status  End organ dysfunction include(s):  Acute respiratory failure    Sat >90%  Borderline SBP in 90s, midodrine was started.   Keep MAP >65.     Wegener's granulomatosis (HCC)- (present on admission)  Assessment & Plan  On prednisone 5mg daily     ESRD on dialysis (HCC)- (present on admission)  Assessment & Plan  MWF via AV fistula in arm. Dry weight 61kg.   Last dialysis was on Friday  Nephrology consult    Severe aortic stenosis  Assessment & Plan  Careful of volume status.   Avoid tachycardia to optimize diastolic filling.   Can resume metoprolol. 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 In review of yesterday's note (1/25/2020), there are no changes except as documented above.     Discussed patient condition and risk of morbidity and/or mortality with RN, RT, [facility] resident and Patient

## 2020-01-26 NOTE — ASSESSMENT & PLAN NOTE
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   Appears to follow with Dr. Corona of [facility].    Plan:  Continue dialysis MWF  For palliative care consult  discussed with patient he agreed to talk to them

## 2020-01-26 NOTE — PROGRESS NOTES
Called about soft blood pressure in 80's while sleeping  Patient heart rate is fine 80's lactate normal ESRD admit for pna.   Awakes with stable baseline mental status  John's, severe AS, chronic prednisone 5mg, MAC, Histo    EF good will start oral midodrine 10mg Q8  Will hold fluids at this time since Xray with lots of fluids.  Flu negative   Hg flat, left shift 92% wbc 15      Patient remains in critical condition from hypotension and close monitoring need for pressor. Critical care time provided was 26 minutes. This excludes all separate billable procedures.

## 2020-01-26 NOTE — CARE PLAN
Respiratory Therapy Update    Interdisciplinary Plan of Care-Goals (Indications)  Bronchodilator Indications: History / Diagnosis (01/25/20 1645)             #SVN Performed: Yes (01/26/20 0202)    Cough: Productive;Strong (01/26/20 0000)  Sputum Amount: Moderate (01/25/20 1700)  Sputum Color: Tan (01/25/20 1700)  Sputum Consistency: Thick (01/25/20 1700)     O2 (LPM): 3 (01/26/20 0202)  O2 Daily Delivery Respiratory : Silicone Nasal Cannula (01/26/20 0202)    Breath Sounds  Pre/Post Intervention: Pre Intervention Assessment (01/26/20 0202)  RUL Breath Sounds: Clear (01/26/20 0202)  RML Breath Sounds: Diminished (01/26/20 0202)  RLL Breath Sounds: Diminished (01/26/20 0202)  DEBORA Breath Sounds: Clear (01/26/20 0202)  LLL Breath Sounds: Diminished (01/26/20 0202)

## 2020-01-26 NOTE — ASSESSMENT & PLAN NOTE
Assessment: Patient presents with high fever, leukocytosis with left shift, and right lower lobe pulmonary infiltrates consistent with pneumonia.  In the emergency department he has been somewhat hypotensive, although per the wife his blood pressure typically runs about 90/50 especially after dialysis.  Given his end-stage renal disease, we will be somewhat cautious with fluid repletion although with his severe aortic stenosis he will need a high preload to maintain cardiac output and to allow filling.    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  He has had multiple previous admissions for pneumonia, and sputum cultures are persistently positive for Pseudomonas.  It is unknown whether this is merely colonization or truly pathogenic, although in the setting of his bronchiectasis it may be the latter.  Notably a recent CT scan at [facility] shows honeycombing, as well as redemonstrating bronchiectasis and mucous plugging.    We will therefore undertake a broad therapeutic and diagnostic strategy with broad-spectrum coverage, we will obtain AFBs to evaluate if his MAC has returned although it is typically more indolent.  Given his posttussive emesis and weeks of coughing spells, will obtain a pertussis PCR and cover with azithromycin.  His previous pseudomonal infections will be covered with Zosyn(last dose on 1/30/20).  Aspiration was considered but apparently he recently underwent outpatient swallow evaluation by speech therapy, which was normal.  Additionally we will obtain LDH, 1 3 beta D glucan due to his longstanding immunosuppression and very low absolute lymphocyte count.  Follow-up on  strep pneumo urine antigen, will pertussis PCR, histoplasmosis antibody, pneumocystis testing with reflex to PCR, and all respiratory cultures bacterial, fungal and AFB culture    Plan:  Blood cultures NGTD, sputum cultures pending.  Continue Zosyn and stop azithromycin once pertussis PCR is negative per ID recommendations  Fluid restriction limited by ESRD, start Midodrine 10mg TID  Continue pseudomonal  Renally dosed Zosyn, and azithromycin.  Guaifenesin, chest physiotherapy to aid in sputum clearance  Induce sputum culture, sputum AFB x3 - 1/3 collected   Urine strep Pneumo pending, Legionella antigen pending  Cocci Ab pending.  Histoplasma Ab pending  1 3 beta D glucan pending ,  Pertussis PCR-not collected .  LDH normal  Consider high-resolution CT as recommended at [facility] for honeycombing   ID Recs noted-Patient likely colonized with numerous organisms, if any other organisms grow on cultures, would thoughtfully consider changes in chronic symptoms, comparison with prior CTs, bronchoscopy, and further micro lab testing before starting potentially prolonged toxic medications

## 2020-01-26 NOTE — PROGRESS NOTES
Report received from Paty STANLEY in ED. Patient transferred to [facility] on monitor by RN and CNA

## 2020-01-26 NOTE — ASSESSMENT & PLAN NOTE
Assessment: Elevated troponin in the setting of ESRD, sepsis, and tachycardia secondary to pneumonia.  No chest pain, EKG shows right bundle branch block.  Very low suspicion for primary acute coronary syndrome, likely demand ischemia. Troponins trended and decreased.    Plan:  Treat primary etiology with fluids, antibiotics, continue metoprolol at reduced dosing interval

## 2020-01-26 NOTE — ASSESSMENT & PLAN NOTE
This is Severe Sepsis Present on admission  SIRS criteria identified on my evaluation include: Tachycardia, with heart rate greater than 90 BPM and Leukocyosis, with WBC greater than 12,000  Source of infection is lungs  Clinical indicators of end organ dysfunction include Lactate >2 mmol/L (18.0 mg/dL)  Sepsis protocol initiated  Fluid resuscitation ordered per protocol  IV antibiotics as appropriate for source of sepsis  Reassessment: I have reassessed the patient's hemodynamic status  End organ dysfunction include(s):  Acute respiratory failure    Sat >90%  Borderline SBP in 90s, midodrine was started.   Keep MAP >65.

## 2020-01-26 NOTE — ASSESSMENT & PLAN NOTE
Concerning of pneumonia - community vs hospital. Procal 1.02  Influenza A/B PCR is negative.     Plan:   Continue piptazo and azithromycin. Piptazo to cover pseudomonas and other GNB  Azithromycin to cover CAP   Will hold off treating fungal pneumonia or MAC  ID diagnostics  Urine strep pnuemo Ag, legionella Ag  Serum cocci antibodis, fungitell, aspergillus galactomannan, cryptococcus antigen  Respiratory panel by PCR (nasopharyngeal swab)  Bordetella pertussis PCR (nasopharyhngeal swab), need isolation  Given hx of pulmonary MAC in the past, will get AFB sputum x3. No need for airborne isolation as we are ruling out MAC, not MTB  Urine histo Ag (documented hx of histo)  PJP PCR sputum  Consulted ID (Dr. Limon). Appreciate input

## 2020-01-27 LAB
ALBUMIN SERPL BCP-MCNC: 3.3 G/DL (ref 3.2–4.9)
ALBUMIN/GLOB SERPL: 1.1 G/DL
ALP SERPL-CCNC: 49 U/L (ref 30–99)
ALT SERPL-CCNC: 13 U/L (ref 2–50)
ANION GAP SERPL CALC-SCNC: 16 MMOL/L (ref 0–11.9)
AST SERPL-CCNC: 9 U/L (ref 12–45)
BACTERIA UR CULT: NORMAL
BASOPHILS # BLD AUTO: 0.1 % (ref 0–1.8)
BASOPHILS # BLD: 0.02 K/UL (ref 0–0.12)
BILIRUB SERPL-MCNC: 0.3 MG/DL (ref 0.1–1.5)
BUN SERPL-MCNC: 55 MG/DL (ref 8–22)
CALCIUM SERPL-MCNC: 8.7 MG/DL (ref 8.5–10.5)
CHLORIDE SERPL-SCNC: 93 MMOL/L (ref 96–112)
CO2 SERPL-SCNC: 24 MMOL/L (ref 20–33)
CREAT SERPL-MCNC: 4.84 MG/DL (ref 0.5–1.4)
EOSINOPHIL # BLD AUTO: 0 K/UL (ref 0–0.51)
EOSINOPHIL NFR BLD: 0 % (ref 0–6.9)
ERYTHROCYTE [DISTWIDTH] IN BLOOD BY AUTOMATED COUNT: 56.2 FL (ref 35.9–50)
GLOBULIN SER CALC-MCNC: 3 G/DL (ref 1.9–3.5)
GLUCOSE BLD-MCNC: 116 MG/DL (ref 65–99)
GLUCOSE BLD-MCNC: 127 MG/DL (ref 65–99)
GLUCOSE BLD-MCNC: 127 MG/DL (ref 65–99)
GLUCOSE SERPL-MCNC: 160 MG/DL (ref 65–99)
HCT VFR BLD AUTO: 27.6 % (ref 42–52)
HGB BLD-MCNC: 8.6 G/DL (ref 14–18)
IMM GRANULOCYTES # BLD AUTO: 0.06 K/UL (ref 0–0.11)
IMM GRANULOCYTES NFR BLD AUTO: 0.4 % (ref 0–0.9)
LYMPHOCYTES # BLD AUTO: 0.16 K/UL (ref 1–4.8)
LYMPHOCYTES NFR BLD: 1 % (ref 22–41)
MCH RBC QN AUTO: 29.1 PG (ref 27–33)
MCHC RBC AUTO-ENTMCNC: 31.2 G/DL (ref 33.7–35.3)
MCV RBC AUTO: 93.2 FL (ref 81.4–97.8)
MONOCYTES # BLD AUTO: 0.54 K/UL (ref 0–0.85)
MONOCYTES NFR BLD AUTO: 3.3 % (ref 0–13.4)
NEUTROPHILS # BLD AUTO: 15.52 K/UL (ref 1.82–7.42)
NEUTROPHILS NFR BLD: 95.2 % (ref 44–72)
NRBC # BLD AUTO: 0 K/UL
NRBC BLD-RTO: 0 /100 WBC
PLATELET # BLD AUTO: 212 K/UL (ref 164–446)
PMV BLD AUTO: 9.4 FL (ref 9–12.9)
POTASSIUM SERPL-SCNC: 3.8 MMOL/L (ref 3.6–5.5)
PROT SERPL-MCNC: 6.3 G/DL (ref 6–8.2)
RBC # BLD AUTO: 2.96 M/UL (ref 4.7–6.1)
RHODAMINE-AURAMINE STN SPEC: NORMAL
SIGNIFICANT IND 70042: NORMAL
SIGNIFICANT IND 70042: NORMAL
SITE SITE: NORMAL
SITE SITE: NORMAL
SODIUM SERPL-SCNC: 133 MMOL/L (ref 135–145)
SOURCE SOURCE: NORMAL
SOURCE SOURCE: NORMAL
WBC # BLD AUTO: 16.3 K/UL (ref 4.8–10.8)

## 2020-01-27 PROCEDURE — A9270 NON-COVERED ITEM OR SERVICE: HCPCS | Performed by: STUDENT IN AN ORGANIZED HEALTH CARE EDUCATION/TRAINING PROGRAM

## 2020-01-27 PROCEDURE — 700111 HCHG RX REV CODE 636 W/ 250 OVERRIDE (IP)

## 2020-01-27 PROCEDURE — 80074 ACUTE HEPATITIS PANEL: CPT

## 2020-01-27 PROCEDURE — 90935 HEMODIALYSIS ONE EVALUATION: CPT | Performed by: INTERNAL MEDICINE

## 2020-01-27 PROCEDURE — 700111 HCHG RX REV CODE 636 W/ 250 OVERRIDE (IP): Performed by: STUDENT IN AN ORGANIZED HEALTH CARE EDUCATION/TRAINING PROGRAM

## 2020-01-27 PROCEDURE — 99233 SBSQ HOSP IP/OBS HIGH 50: CPT | Performed by: INTERNAL MEDICINE

## 2020-01-27 PROCEDURE — 770020 HCHG ROOM/CARE - TELE (206)

## 2020-01-27 PROCEDURE — 700101 HCHG RX REV CODE 250: Performed by: STUDENT IN AN ORGANIZED HEALTH CARE EDUCATION/TRAINING PROGRAM

## 2020-01-27 PROCEDURE — 99233 SBSQ HOSP IP/OBS HIGH 50: CPT | Mod: GC | Performed by: INTERNAL MEDICINE

## 2020-01-27 PROCEDURE — 700105 HCHG RX REV CODE 258: Performed by: STUDENT IN AN ORGANIZED HEALTH CARE EDUCATION/TRAINING PROGRAM

## 2020-01-27 PROCEDURE — 86706 HEP B SURFACE ANTIBODY: CPT

## 2020-01-27 PROCEDURE — 94640 AIRWAY INHALATION TREATMENT: CPT

## 2020-01-27 PROCEDURE — A9270 NON-COVERED ITEM OR SERVICE: HCPCS | Performed by: INTERNAL MEDICINE

## 2020-01-27 PROCEDURE — 700102 HCHG RX REV CODE 250 W/ 637 OVERRIDE(OP): Performed by: INTERNAL MEDICINE

## 2020-01-27 PROCEDURE — 80053 COMPREHEN METABOLIC PANEL: CPT

## 2020-01-27 PROCEDURE — 5A1D70Z PERFORMANCE OF URINARY FILTRATION, INTERMITTENT, LESS THAN 6 HOURS PER DAY: ICD-10-PCS | Performed by: INTERNAL MEDICINE

## 2020-01-27 PROCEDURE — 82962 GLUCOSE BLOOD TEST: CPT | Mod: 91

## 2020-01-27 PROCEDURE — 700102 HCHG RX REV CODE 250 W/ 637 OVERRIDE(OP): Performed by: STUDENT IN AN ORGANIZED HEALTH CARE EDUCATION/TRAINING PROGRAM

## 2020-01-27 PROCEDURE — 85025 COMPLETE CBC W/AUTO DIFF WBC: CPT

## 2020-01-27 PROCEDURE — 90935 HEMODIALYSIS ONE EVALUATION: CPT

## 2020-01-27 RX ORDER — AZITHROMYCIN 250 MG/1
250 TABLET, FILM COATED ORAL DAILY
Status: COMPLETED | OUTPATIENT
Start: 2020-01-28 | End: 2020-01-29

## 2020-01-27 RX ORDER — HEPARIN SODIUM 1000 [USP'U]/ML
INJECTION, SOLUTION INTRAVENOUS; SUBCUTANEOUS
Status: COMPLETED
Start: 2020-01-27 | End: 2020-01-27

## 2020-01-27 RX ORDER — LIDOCAINE HYDROCHLORIDE 10 MG/ML
INJECTION, SOLUTION INFILTRATION; PERINEURAL
Status: COMPLETED
Start: 2020-01-27 | End: 2020-01-27

## 2020-01-27 RX ORDER — HEPARIN SODIUM 1000 [USP'U]/ML
1500 INJECTION, SOLUTION INTRAVENOUS; SUBCUTANEOUS
Status: DISCONTINUED | OUTPATIENT
Start: 2020-01-27 | End: 2020-01-30 | Stop reason: HOSPADM

## 2020-01-27 RX ORDER — IPRATROPIUM BROMIDE AND ALBUTEROL SULFATE 2.5; .5 MG/3ML; MG/3ML
3 SOLUTION RESPIRATORY (INHALATION)
Status: DISCONTINUED | OUTPATIENT
Start: 2020-01-27 | End: 2020-01-30 | Stop reason: HOSPADM

## 2020-01-27 RX ORDER — SODIUM CHLORIDE FOR INHALATION 3 %
3 VIAL, NEBULIZER (ML) INHALATION
Status: DISCONTINUED | OUTPATIENT
Start: 2020-01-27 | End: 2020-01-30 | Stop reason: HOSPADM

## 2020-01-27 RX ADMIN — IPRATROPIUM BROMIDE AND ALBUTEROL SULFATE 3 ML: .5; 3 SOLUTION RESPIRATORY (INHALATION) at 10:07

## 2020-01-27 RX ADMIN — HEPARIN SODIUM 5000 UNITS: 5000 INJECTION, SOLUTION INTRAVENOUS; SUBCUTANEOUS at 05:38

## 2020-01-27 RX ADMIN — LIDOCAINE 5% 1 APPLICATION: 5 CREAM TOPICAL at 16:50

## 2020-01-27 RX ADMIN — MIDODRINE HYDROCHLORIDE 10 MG: 5 TABLET ORAL at 09:13

## 2020-01-27 RX ADMIN — SEVELAMER CARBONATE 800 MG: 800 TABLET, FILM COATED ORAL at 08:44

## 2020-01-27 RX ADMIN — SEVELAMER CARBONATE 800 MG: 800 TABLET, FILM COATED ORAL at 13:20

## 2020-01-27 RX ADMIN — SEVELAMER CARBONATE 800 MG: 800 TABLET, FILM COATED ORAL at 22:31

## 2020-01-27 RX ADMIN — HEPARIN SODIUM 1500 UNITS: 1000 INJECTION, SOLUTION INTRAVENOUS; SUBCUTANEOUS at 18:27

## 2020-01-27 RX ADMIN — HEPARIN SODIUM 5000 UNITS: 5000 INJECTION, SOLUTION INTRAVENOUS; SUBCUTANEOUS at 22:33

## 2020-01-27 RX ADMIN — BUDESONIDE AND FORMOTEROL FUMARATE DIHYDRATE 2 PUFF: 160; 4.5 AEROSOL RESPIRATORY (INHALATION) at 10:08

## 2020-01-27 RX ADMIN — ROSUVASTATIN CALCIUM 20 MG: 20 TABLET, FILM COATED ORAL at 22:34

## 2020-01-27 RX ADMIN — AZITHROMYCIN 500 MG: 250 TABLET, FILM COATED ORAL at 05:39

## 2020-01-27 RX ADMIN — PREDNISONE 40 MG: 20 TABLET ORAL at 22:34

## 2020-01-27 RX ADMIN — METOPROLOL SUCCINATE 25 MG: 25 TABLET, EXTENDED RELEASE ORAL at 05:39

## 2020-01-27 RX ADMIN — MIDODRINE HYDROCHLORIDE 10 MG: 5 TABLET ORAL at 13:20

## 2020-01-27 RX ADMIN — GUAIFENESIN 600 MG: 600 TABLET, EXTENDED RELEASE ORAL at 05:39

## 2020-01-27 RX ADMIN — PIPERACILLIN AND TAZOBACTAM 4.5 G: 4; .5 INJECTION, POWDER, LYOPHILIZED, FOR SOLUTION INTRAVENOUS; PARENTERAL at 05:38

## 2020-01-27 RX ADMIN — SODIUM CHLORIDE SOLN NEBU 3% 3 ML: 3 NEBU SOLN at 10:07

## 2020-01-27 RX ADMIN — PIPERACILLIN AND TAZOBACTAM 4.5 G: 4; .5 INJECTION, POWDER, LYOPHILIZED, FOR SOLUTION INTRAVENOUS; PARENTERAL at 22:31

## 2020-01-27 RX ADMIN — MIDODRINE HYDROCHLORIDE 10 MG: 5 TABLET ORAL at 22:31

## 2020-01-27 ASSESSMENT — ENCOUNTER SYMPTOMS
WEAKNESS: 1
BLOOD IN STOOL: 0
CHILLS: 0
SPUTUM PRODUCTION: 1
ORTHOPNEA: 0
VOMITING: 0
SORE THROAT: 0
MYALGIAS: 1
SHORTNESS OF BREATH: 1
HEMOPTYSIS: 0
ABDOMINAL PAIN: 0
FEVER: 0
BACK PAIN: 0
DIARRHEA: 0
STRIDOR: 0
PALPITATIONS: 0
NAUSEA: 0
COUGH: 1

## 2020-01-27 NOTE — CONSULTS
DATE OF SERVICE:  01/26/2020    REQUESTING PHYSICIAN:  Sai Samayoa DO    REASON FOR CONSULTATION:  Management of chronic kidney disease, assessing the   need for urgent dialysis.    The patient seen and examined, medical record reviewed.    HISTORY OF PRESENT ILLNESS:  The patient is an unfortunate 79-year-old   gentleman who is very well known to our service.  He has a history of   end-stage renal disease secondary to Wegener's disease, he undergoes   hemodialysis on a Monday, Wednesday, and Friday, presented to the hospital   yesterday evening with fever, cough, and shortness of breath.  Patient was   diagnosed with possible pneumonia versus John exacerbation.  We were called   to manage his kidney disease, assessing the need for urgent dialysis.    The patient's last dialysis was on Friday, 01/24.    Patient feels better this morning, shortness of breath has improved.  He has   less cough, no hemoptysis.    Patient has no recent use of NSAIDs or IV contrast.    PAST MEDICAL HISTORY:  Significant for:  1.  End-stage renal disease.  2.  John disease.  3.  Anemia.  4.  Hypertension.    ALLERGIES:  The list was reviewed.    SOCIAL HISTORY:  The patient is .  He had a remote history of smoking.    FAMILY HISTORY:  Positive for heart disease in his father.    MEDICATIONS:  Reviewed.    REVIEW OF SYSTEMS:  The patient seems very anxious, slightly depressed.  He is   weak, but all other review of system is negative except outlined in the   history of present illness.    PHYSICAL EXAMINATION:  GENERAL:  Patient appears cachectic, but no apparent distress.  VITAL SIGNS:  Showed blood pressure of 90/53, heart rate was 94, respiratory   rate was 22.  HEENT:  Normocephalic, atraumatic.  Sclerae are anicteric.  Pupils are   reactive.  Nose is normal.  Mucous membranes are moist.  NECK:  No lymphadenopathy, no JVD, no thyroid mass.  CHEST:  Normal.  LUNGS:  Scattered rhonchi.  HEART:  S1, S2.  ABDOMEN:  Soft,  nontender, no hepatosplenomegaly.  There is no inguinal   lymphadenopathy.  EXTREMITIES:  There is trace lower extremity edema.  SKIN:  No skin rashes.  NEUROLOGIC:  Patient is alert and oriented x3.  PSYCHIATRIC:  Mood, patient is anxious.    LABORATORY DATA:  His recent labs from today were reviewed.  He also had a   chest x-ray done yesterday, I reviewed the image myself, showed pleural   effusion and cardiomegaly.    ASSESSMENT:  1.  End-stage renal disease.  2.  Respiratory failure.  3.  Pneumonia.  4.  Anemia.  5.  Leukocytosis.    PLAN:  1.  There is no acute need for renal replacement therapy today.  2.  Plan on tomorrow.  3.  Renal dose all medications.  4.  Renal diet.  5.  Antibiotic coverage.  6.  Rule out John exacerbation.  7.  Prognosis is guarded.    Plan discussed in detail with the primary team.       ____________________________________     FADI NAJJAR, MD FN / SHARRON    DD:  01/26/2020 14:23:59  DT:  01/26/2020 19:09:52    D#:  [id number]  Job#:  [id number]

## 2020-01-27 NOTE — PROGRESS NOTES
Infectious Disease Progress Note    Author: Mateusz Ann M.D. Date & Time of service: 2020  9:05 AM    Chief Complaint:  Follow-up of pneumonia    Interval History:   Now afebrile, leukocytosis down to 16.3, tolerating Zosyn and azithromycin, states breathing is much better as well as his cough, and he is back to his baseline 3 L oxygen    Labs Reviewed and Medications Reviewed.    Review of Systems:  Review of Systems   Constitutional: Positive for malaise/fatigue. Negative for chills and fever.   Respiratory: Positive for cough and shortness of breath.    Gastrointestinal: Negative for abdominal pain, diarrhea, nausea and vomiting.   Skin: Negative for itching and rash.   All other systems reviewed and are negative.      Hemodynamics:  Temp (24hrs), Av.8 °C (98.3 °F), Min:36.7 °C (98 °F), Max:37.3 °C (99.1 °F)  Temperature: 36.7 °C (98 °F)  Pulse  Av.4  Min: 70  Max: 125   Blood Pressure : 114/65       Physical Exam:  Physical Exam  Constitutional:       Comments: Elderly and frail   HENT:      Head: Normocephalic and atraumatic.      Nose:      Comments: Nasal cannula oxygen in place     Mouth/Throat:      Mouth: Mucous membranes are moist.   Neck:      Musculoskeletal: Normal range of motion. No neck rigidity.   Cardiovascular:      Rate and Rhythm: Normal rate and regular rhythm.      Heart sounds: Murmur present.   Pulmonary:      Effort: Pulmonary effort is normal. No respiratory distress.      Breath sounds: No wheezing.      Comments: Scattered crackles bilaterally, right greater than left  Abdominal:      General: There is no distension.      Tenderness: There is no tenderness.   Musculoskeletal:         General: No tenderness.   Skin:     General: Skin is warm and dry.      Findings: No erythema.   Neurological:      General: No focal deficit present.      Mental Status: He is alert and oriented to person, place, and time.   Psychiatric:         Mood and Affect: Mood normal.          Behavior: Behavior normal.      Comments: Very pleasant         Meds:    Current Facility-Administered Medications:   •  metoprolol SR  •  sevelamer carbonate  •  insulin lispro **AND** Accu-Chek Q6 if NPO **AND** NOTIFY MD and PharmD **AND** glucose **AND** dextrose 10% bolus  •  ethyl chloride  •  Lidocaine  •  albuterol  •  finasteride  •  [COMPLETED] piperacillin-tazobactam **AND** piperacillin-tazobactam  •  rosuvastatin  •  senna-docusate **AND** polyethylene glycol/lytes **AND** magnesium hydroxide **AND** bisacodyl  •  Respiratory Care per Protocol  •  LR  •  heparin  •  acetaminophen  •  guaiFENesin ER  •  ipratropium-albuterol  •  predniSONE  •  budesonide-formoterol  •  sodium chloride 3%  •  midodrine    Labs:  Recent Labs     01/25/20  1226 01/26/20  0418 01/27/20  0445   WBC 15.0* 17.7* 16.3*   RBC 3.50* 3.24* 2.96*   HEMOGLOBIN 10.2* 9.5* 8.6*   HEMATOCRIT 33.0* 30.7* 27.6*   MCV 94.3 94.8 93.2   MCH 29.1 29.3 29.1   RDW 56.5* 56.7* 56.2*   PLATELETCT 238 192 212   MPV 9.0 9.3 9.4   NEUTSPOLYS 92.10* 96.80* 95.20*   LYMPHOCYTES 1.70* 0.80* 1.00*   MONOCYTES 5.40 1.50 3.30   EOSINOPHILS 0.10 0.00 0.00   BASOPHILS 0.20 0.20 0.10     Recent Labs     01/25/20  1226 01/26/20  0418 01/27/20  0445   SODIUM 137 136 133*   POTASSIUM 3.8 3.7 3.8   CHLORIDE 95* 96 93*   CO2 31 26 24   GLUCOSE 117* 252* 160*   BUN 21 32* 55*     Recent Labs     01/25/20  1226 01/26/20  0418 01/27/20  0445   ALBUMIN 3.7 3.4 3.3   TBILIRUBIN 0.5 0.5 0.3   ALKPHOSPHAT 60 49 49   TOTPROTEIN 6.9 6.3 6.3   ALTSGPT 11 16 13   ASTSGOT 13 15 9*   CREATININE 3.00* 4.05* 4.84*       Imaging:  Dx-chest-portable (1 View)    Result Date: 1/25/2020 1/25/2020 12:45 PM HISTORY/REASON FOR EXAM:  possible sepsis.. Productive cough.  Shortness of breath, chest pain. TECHNIQUE/EXAM DESCRIPTION AND NUMBER OF VIEWS: Single portable view of the chest. COMPARISON: 7/24/2019 FINDINGS: Cardiac mediastinal contour is unchanged. LEFT chest pacemaker in  place. Ill-defined opacities in both lung bases with mild blunting of the costophrenic angles. Linear opacity in the RIGHT midlung region. No pneumothorax. Calcified RIGHT hilar lymph nodes and upper lobe nodule.     1.  Patchy bibasilar infiltrate or atelectasis and probable small pleural effusions. 2.  Stable cardiomegaly. 3.  Evidence of old granulomatous disease.    Ec-echocardiogram Complete W/o Cont    Result Date: 2020  Transthoracic Echo Report Echocardiography Laboratory CONCLUSIONS Severe aortic stenosis. Likely tricuspid aortic valve with severe leaflet calcification. Aortic valve area calculated from the continuity equation is.6 cm2  Vmax is 4.8   m/s. Transvalvular gradients are - Peak:  93 mmHg, Mean: 56  mmHg. Normal left ventricular systolic function. Left ventricle is small in size. Left ventricular ejection fraction is visually estimated to be 55%. Moderate concentric left ventricular hypertrophy. Grade II diastolic dysfunction. Normal right ventricular size and systolic function. Pacer/ICD wire seen in right ventricle. Normal estimated right atrial pressure. Mild pulmonary hypertension. Compared to the images of the prior study done on 19: The aortic stenosis gradients are similar. the LVEF has decreased from 70% to 55%. ALETA BUCK Exam Date:         2020                    14:36 Exam Location:     Inpatient Priority:          Routine Ordering Physician:        STEVEN CARO Referring Physician:       [id number] Ahmadi Sonographer:               Aubrie Brennan RDCS Age:    79     Gender:    M MRN:    [id number] :    [id number] BSA:    1.71   Ht (in):    68     Wt (lb):    131 Exam Type:     Complete Indications:     Aortic stenosis ICD Codes:       424.1 CPT Codes:       67969 BP:   91     /   49     HR:   100 Technical Quality:       Fair MEASUREMENTS  (Male / Female) Normal Values 2D ECHO LV Diastolic Diameter PLAX        3.7 cm                4.2 - 5.9 / 3.9 - 5.3 cm LV  Systolic Diameter PLAX         3.3 cm                2.1 - 4.0 cm IVS Diastolic Thickness           1.4 cm                LVPW Diastolic Thickness          1.3 cm                RV Diameter 4C                    2.9 cm                2.5 - 2.9 cm LVOT Diameter                     1.9 cm                RA Diameter                       2.7 cm                Estimated LV Ejection Fraction    55 %                  LV Ejection Fraction MOD BP       57.5 %                >= 55  % LV Ejection Fraction MOD 4C       56.8 %                LV Ejection Fraction MOD 2C       62.1 %                LA Volume Index                   12.6 cm3/m2           16 - 28 cm3/m2 IVC Diameter                      1.6 cm                DOPPLER AV Peak Velocity                  4.2 m/s               AV Peak Gradient                  70.5 mmHg             AV Mean Gradient                  43 mmHg               AI Pressure Half Time             235 ms                LVOT Peak Velocity                0.88 m/s              AV Area Cont Eq vti               0.65 cm2              Mitral E Point Velocity           1.2 m/s               Mitral E to A Ratio               1.2                   MV Pressure Half Time             53.1 ms               MV Area PHT                       4.1 cm2               MV Deceleration Time              183 ms                TR Peak Velocity                  298 cm/s              PV Peak Velocity                  0.85 m/s              PV Peak Gradient                  2.9 mmHg              RVOT Peak Velocity                0.71 m/s              * Indicates values subject to auto-interpretation LV EF:  55    % FINDINGS Left Ventricle Normal left ventricular systolic function.  Left ventricle is small in size. Moderate concentric left ventricular hypertrophy. Normal regional wall motion. Left ventricular ejection fraction is visually estimated to be 55%. Grade II diastolic dysfunction. Right Ventricle Normal right  ventricular size and systolic function. Pacer/ICD wire seen in right ventricle. Right Atrium Normal right atrial size. Normal inferior vena cava size and inspiratory collapse. Left Atrium Normal left atrial size. Mitral Valve Thickened mitral valve leaflets. Mitral annular calcification.  Mild mitral regurgitation. No mitral stenosis. Aortic Valve Likely tricuspid aortic valve with severe leaflet calcification. Severe aortic stenosis. Aortic valve area calculated from the continuity equation is.6 cm2  Vmax is 4.8   m/s. Transvalvular gradients are - Peak:  93 mmHg, Mean: 56  mmHg. Dimensionless index is. 19  Mild to moderate aortic insufficiency. Tricuspid Valve Structurally normal tricuspid valve. Mild tricuspid regurgitation. No tricuspid stenosis.  Right atrial pressure is estimated to be 3 mmHg. Estimated right ventricular systolic pressure  is 45 mmHg. Pulmonic Valve The pulmonic valve is not well visualized. No stenosis or regurgitation seen. Pericardium Trivial pericardial effusion. Aorta The aortic root is normal.  Ascending aorta diameter is  3.1 cm. Miguel A Anderson MD MD (Electronically Signed) Final Date:     26 January 2020                 21:08      Micro:  Results     Procedure Component Value Units Date/Time    AFB Culture [[id number]] Collected:  01/26/20 1049    Order Status:  Completed Specimen:  Respirate from Sputum Induced Updated:  01/27/20 0825    Narrative:       [facility]  Culture + smear    URINE CULTURE(NEW) [[id number]] Collected:  01/25/20 1237    Order Status:  Completed Specimen:  Urine Updated:  01/27/20 0758     Significant Indicator NEG     Source UR     Site -     Culture Result Mixed skin matt <10,000 cfu/mL    Narrative:       Indication for culture:->Septic Shock: Persistent  hypotension,  Lactic acid > 4, vasopressors/inotropes started  Indication for culture:->Septic Shock: Persistent    Acid Fast Stain [[id number]] Collected:  01/26/20 0420    Order  Status:  Completed Specimen:  Respirate Updated:  01/26/20 2231     Significant Indicator NEG     Source RESP     Site SPUTUM INDUCED     AFB Smear Results No acid fast bacilli seen.    Narrative:       Collected By:[id number] ZACH GUPTA E.  Culture + smear  Collected By:[id number] ZACH ARGUELLO.    AFB Culture [[id number]] Collected:  01/26/20 0420    Order Status:  Completed Specimen:  Respirate from Sputum Induced Updated:  01/26/20 2231     Significant Indicator NEG     Source RESP     Site SPUTUM INDUCED     Culture Result Culture in progress.     AFB Smear Results No acid fast bacilli seen.    Narrative:       Collected By:[id number] ZACH GUPTA E.  Culture + smear  Collected By:[id number] ZACH ARGUELLO.    Fungal Culture [[id number]] Collected:  01/26/20 0418    Order Status:  Completed Specimen:  Respirate from Sputum Induced Updated:  01/26/20 2104     Significant Indicator NEG     Source RESP     Site SPUTUM INDUCED     Culture Result Culture in progress.    Narrative:       Collected By:[id number] MEGAN CHAU  Collected By:[id number] MEGAN CHAU    GRAM STAIN [[id number]] Collected:  01/26/20 0418    Order Status:  Completed Specimen:  Respirate Updated:  01/26/20 1358     Significant Indicator .     Source RESP     Site SPUTUM INDUCED     Gram Stain Result Many WBCs.  Rare epithelial cells.  Moderate Gram negative rods.  Rare Gram positive cocci.  Specimen Quality Score: 3+      Narrative:       Collected By:[id number] MEGAN CHAU  Collected By:[id number] MEGAN CHAU    CULTURE RESPIRATORY W/ GRM STN [[id number]] Collected:  01/26/20 0418    Order Status:  Completed Specimen:  Respirate from Sputum Induced Updated:  01/26/20 1246    Narrative:       Collected By:[id number] MEGAN CHAU    BLOOD CULTURE [[id number]] Collected:  01/25/20 1226    Order Status:  Completed Specimen:  Blood from Peripheral Updated:  01/26/20 0734     Significant Indicator NEG     Source BLD     Site PERIPHERAL     Culture Result No  "Growth  Note: Blood cultures are incubated for 5 days and  are monitored continuously.Positive blood cultures  are called to the RN and reported as soon as  they are identified.      Narrative:       Per Hospital Policy: Only change Specimen Src: to \"Line\" if  specified by physician order.  No site indicated    BLOOD CULTURE [[id number]] Collected:  01/25/20 1226    Order Status:  Completed Specimen:  Blood from Peripheral Updated:  01/26/20 0734     Significant Indicator NEG     Source BLD     Site PERIPHERAL     Culture Result No Growth  Note: Blood cultures are incubated for 5 days and  are monitored continuously.Positive blood cultures  are called to the RN and reported as soon as  they are identified.      Narrative:       Per Hospital Policy: Only change Specimen Src: to \"Line\" if  specified by physician order.  No site indicated    Fungal Culture [[id number]]     Order Status:  Canceled Specimen:  Respirate from Sputum Induced     Cryptococcal Antigen [[id number]]     Order Status:  Sent Specimen:  Blood     AFB Culture [[id number]]     Order Status:  Sent Specimen:  Respirate from Sputum Induced     CULTURE RESPIRATORY W/ GRM STN [[id number]]     Order Status:  Completed Specimen:  Respirate from Sputum Induced     Influenza A/B By PCR (Adult - Flu Only) [[id number]] Collected:  01/25/20 1245    Order Status:  Completed Specimen:  Respirate from Nasopharyngeal Updated:  01/25/20 1326     Influenza virus A RNA Negative     Influenza virus B, PCR Negative    URINALYSIS [[id number]]  (Abnormal) Collected:  01/25/20 1237    Order Status:  Completed Specimen:  Urine Updated:  01/25/20 1314     Color Yellow     Character Clear     Specific Gravity 1.011     Ph >=9.0     Glucose 100 mg/dL      Ketones Negative mg/dL      Protein 100 mg/dL      Bilirubin Negative     Urobilinogen, Urine 0.2     Nitrite Negative     Leukocyte Esterase Negative     Occult Blood Small     Micro Urine Req Microscopic    Narrative:       " "Indication for culture:->Septic Shock: Persistent  hypotension,  Lactic acid > 4, vasopressors/inotropes started          Assessment:  Gilberto Brown is a 79 y.o.  admitted 1/25/2020. Pt has a past medical history of severe aortic stenosis recently evaluated for TAVR, hx of GPA on 5 mg daily prednisone, ESRD on HD MWF.  Known chronic lung disease with bronchiectasis on home oxygen of 3 L and prior respiratory cultures positive for multidrug-resistant Pseudomonas, light growth of \"mold\" in 2017, DANIEL. Also a history of pulmonary Histoplasmosis per report. He has been seen by ID in the past.  He presented complaining of 2 days of increased productive cough, shortness of breath and fevers.     Pertinent Diagnoses:  Sepsis  Acute on chronic hypoxemic respiratory failure  Community-acquired pneumonia  Bronchiectasis  Granulomatosis with polyangiitis  ESRD on HD  Immunosuppressed status  Severe aortic stenosis  Systolic and diastolic heart failure    Plan:  Sepsis  Acute on chronic hypoxemic respiratory failure  Community-acquired pneumonia  Bronchiectasis  Chest x-ray from 1/25 with patchy bibasilar infiltrate traits, bilateral interstitial thickening, chronic calcified probably granulomatous lesions  Procalcitonin mildly elevated at 1.02, but patient also has ESRD  Continue renally dosed IV Zosyn 4.5 g every 12 hours, patient has clinically improved and is back to his baseline oxygen  Recommend 5 to 7 days of IV Zosyn therapy, followed by follow-up in pulmonary clinic for consideration of nebulizers to prevent exacerbations and hospital admissions  Patient is also on prednisone 40 mg daily  Community-acquired pneumonia in setting of chronic lung disease and bronchiectasis, known colonization with Pseudomonas typically sensitive to Zosyn when this is tested.  Sputum positive for pseudomonas aeruginosa in 7/2019 intermediate to ciprofloxacin and sensitive to ticarcillin/CA   -Work-up so far negative for influenza, LDH " 247, procalcitonin 1.02  In process:   -Strep pneumo urine antigen-pending  -Legionella urinary antigen-pending  -Beta D glucan-pending  -Pertussis PCR-not yet collected  -Cocci antibody panel-pending  -Cryptococcal antigen-pending  -Histoplasmosis antibody-pending  -PJP with reflex to PCR- collected  -Respiratory cultures-bacterial, AFB, fungal - pending   -Patient likely colonized with numerous organisms, if any other organisms grow on cultures, would thoughtfully consider changes in chronic symptoms, comparison with prior CTs, bronchoscopy, and further micro lab testing before starting potentially prolonged toxic medications     Granulomatosis with polyangiitis  Immunosuppressed status  -Secondary above, on low-dose 5 mg daily prednisone at home    ESRD on HD  -Renally dose medications as above.  Nephrology is following    Discussed with internal medicine, Dr. Samayoa    ID will sign off.  Please call questions.

## 2020-01-27 NOTE — PROGRESS NOTES
ICU transfer note  Attending: Dr.Michael Fontaine  Resident: Dr. Mo  Date of admission: 1/25/20  Date of transferring out from ICU :1/28/20     Primary diagnosis:    Sepsis secondary to pneumonia.    HPI/ICU hospital course:  Mr. 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  Presentation in the emergency department patient had a T-max of 104.9 °C with a heart rate in the 110s, with hypotension.  Patient was admitted to the ICU, and midodrine support was initiated, with infectious disease Dr. Limon consulted given his complex pulmonary infectious disease history and previous follow-up at [facility].  Nephrology was consulted to continue his regular MWF dialysis when inpatient  Patient had a history of multiple previous admissions for pneumonia with sputum cultures consistently positive for Pseudomonas, with infectious disease recommending broad therapeutic and diagnostic strategies with broad-spectrum coverage which included Zosyn for pseudomonal infections and azithromycin for possible pertussis, recommendations to obtain 1, 3 beta D glucan due to his longstanding immunosuppression and low absolute lymphocyte count.    Important events / procedures in ICU:   -iHD.    Things to follow:   -Sputum AFB X  ----- Message from Maria Esther Webb sent at 2017  8:24 AM CST -----  Contact: Claudette, [facility], [phone]  CONSULT    Patient: LEBRON CASTRO  : 1962  Clinic#:  [[id number]]  Room /bed number: 479  Referring MD: Dr. Valera  Diagnosis: biliary stenosis status post biliary stent, pt known to        3.  -Follow-up on Legionella urinary antigen, strep pneumonia urinary antigen, beta D glucan, cryptococcal antigen with histoplasmosis antibody tests  -HRCT as recommended by  Ranjan for honeycombing  -Patient advised to continue follow-up at discharge with [facility] for TAVR procedure to treat severe aortic stenosis, as patient and family intent on getting it done.  -Advised follow-up with Dr. Corona of [facility] at discharge.    Labs and imaging need to continue :  - CBC: yes,   - BMP: yes,     Patient will be signing out to the [facility] team

## 2020-01-27 NOTE — PROGRESS NOTES
[facility] Progress Note      Admit Date: 1/25/2020    Resident(s): Kristina Mo M.D.  Attending: XAVI THOMAS/ Dr. Luis Fontaine    Date & Time:   1/27/2020   1:44 PM       Patient ID: [id number]  Name:             Gilberto Brown   YOB: 1940  Age:                 79 y.o.  male   MRN:               [id number]    HPI:  Mr. 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  Presentation in the emergency department patient had a T-max of 104.9 °C with a heart rate in the 110s, with hypotension.  Patient was admitted to the ICU, and midodrine support was initiated, with infectious disease Dr. Limon consulted given his complex pulmonary infectious disease history and previous follow-up at [facility].  Nephrology was consulted to continue his regular MWF dialysis when inpatient  Patient had a history of multiple previous admissions for pneumonia with sputum cultures consistently positive for Pseudomonas, with infectious disease recommending broad therapeutic and diagnostic strategies with broad-spectrum coverage which included Zosyn for pseudomonal infections and azithromycin for possible pertussis, recommendations to obtain 1, 3 beta D glucan due to his longstanding immunosuppression and low absolute lymphocyte count.    Consultants:  [facility]  Infectious disease  Nephrology     Imaging:  EC-ECHOCARDIOGRAM COMPLETE W/O CONT   Final Result      DX-CHEST-PORTABLE (1 VIEW)   Final Result      1.  Patchy bibasilar infiltrate or atelectasis and probable small pleural effusions.   2.  Stable cardiomegaly.   3.  Evidence of old granulomatous disease.          Interval Events:    1/27/20: Patient comfortable, afebrile today and saturating at 100% on 3 L N/C since arrival.  Patient also on midodrine for hypotension since arrival possibly due to end-stage renal disease/sepsis.  Patient is improving and complains of less cough and shortness of breath and fatigue after he started antibiotics.Patient is for transfer to the floor as stable when beds are available      Review of Systems   Constitutional: Positive for malaise/fatigue. Negative for chills and fever.   HENT: Negative for sore throat.    Respiratory: Positive for cough, sputum production and shortness of breath. Negative for hemoptysis and stridor.    Cardiovascular: Negative for chest pain, palpitations, orthopnea and leg swelling.   Gastrointestinal: Negative for abdominal pain, blood in stool, nausea and vomiting.   Genitourinary: Negative for dysuria, frequency, hematuria and urgency.   Musculoskeletal: Positive for myalgias. Negative for back pain.   Neurological: Positive for weakness.       SpO2  Min: 84 %  Max: 100 %  O2 (LPM)  Min: 3  Max: 3  Temp  Min: 36.1 °C (97 °F)  Max: 40.5 °C (104.9 °F)     PHYSICAL EXAM  Gen: NAD  HEENT: NC/AT, no scleral icterus, no conjunctival injection, mucous membranes pink and moist.  Neck: Supple, no lymphadenopathy.  Cardiac: S1S2, RRR, no m/r/g, no JVD.  Respiratory: Normal effort, symmetrical, CTA b/l.  Abdomen: BS+, soft, NT/ND, no rebound/guarding, no palpable organomegaly.  Ext: No edema, 2+ DP pulses b/l.  Skin: Warm, dry. No rashes or erythema.   Neuro: AAOx4, CN II-XII grossly normal, no focal sensory or motor deficits.   Psych: Affect, mood, judgement  normal.    Respiratory:     Respiration: (!) 24, Pulse Oximetry: 99 %, O2 Daily Delivery Respiratory : Silicone Nasal Cannula    HemoDynamics:  Pulse: 88 Blood Pressure : 106/58      Neuro:      Fluids:  Date 20 0700 - 20 0659   Shift 6959-8595 5108-6607 7013-6989 24 Hour Total   INTAKE   P.O. 480   480     P.O. 480   480   IV Piggyback 100   100     Volume (mL) (piperacillin-tazobactam (ZOSYN) 4.5 g in  mL IVPB) 100   100   Shift Total 580   580   OUTPUT   Urine 150   150     Number of Times Voided 1 x   1 x     Urine Void (mL) 150   150   Stool         Number of Times Stooled 1 x   1 x   Shift Total 150   150      430        Intake/Output Summary (Last 24 hours) at 2020 1323  Last data filed at 2020 1000  Gross per 24 hour   Intake 1080.42 ml   Output 700 ml   Net 380.42 ml          Body mass index is 19.92 kg/m².    Recent Labs     206 20   SODIUM 137 136 133*   POTASSIUM 3.8 3.7 3.8   CHLORIDE 95* 96 93*   CO2 31 26 24   BUN 21 32* 55*   CREATININE 3.00* 4.05* 4.84*   CALCIUM 9.3 8.6 8.7       GI/Nutrition:  Recent Labs     20  122208 20  044   ALTSGPT 11 16 13   ASTSGOT 13 15 9*   ALKPHOSPHAT 60 49 49   TBILIRUBIN 0.5 0.5 0.3   GLUCOSE 117* 252* 160*       Heme:  Recent Labs     20  122208 20  044   RBC 3.50* 3.24* 2.96*   HEMOGLOBIN 10.2* 9.5* 8.6*   HEMATOCRIT 33.0* 30.7* 27.6*   PLATELETCT 238 192 212       Infectious Disease:  Temp  Av.8 °C (98.3 °F)  Min: 36.7 °C (98 °F)  Max: 37.3 °C (99.1 °F)  Recent Labs     20  1226 20  044   WBC 15.0* 17.7* 16.3*   NEUTSPOLYS 92.10* 96.80* 95.20*   LYMPHOCYTES 1.70* 0.80* 1.00*   MONOCYTES 5.40 1.50 3.30   EOSINOPHILS 0.10 0.00 0.00   BASOPHILS 0.20 0.20 0.10   ASTSGOT 13 15 9*   ALTSGPT 11 16 13   ALKPHOSPHAT 60 49 49   TBILIRUBIN 0.5 0.5 0.3       Meds:  • [START ON 2020] azithromycin  250 mg     •  metoprolol SR  25 mg     • sevelamer carbonate  800 mg     • insulin lispro  1-6 Units      And   • glucose  16 g      And   • dextrose 10% bolus  250 mL     • ethyl chloride  1 Application     • Lidocaine  1 Application     • albuterol  2 Puff     • finasteride  5 mg     • piperacillin-tazobactam  4.5 g Stopped (01/27/20 0938)   • rosuvastatin  20 mg     • senna-docusate  2 Tab      And   • polyethylene glycol/lytes  1 Packet      And   • magnesium hydroxide  30 mL      And   • bisacodyl  10 mg     • Respiratory Care per Protocol       • LR  500 mL     • heparin  5,000 Units     • acetaminophen  650 mg     • guaiFENesin ER  600 mg     • ipratropium-albuterol  3 mL     • predniSONE  40 mg     • budesonide-formoterol  2 Puff     • sodium chloride 3%  3 mL     • midodrine  10 mg            Assessment and Plan:  COPD exacerbation (HCC)- (present on admission)  Assessment & Plan  Assessment: Diffuse wheezing, increase in sputum production with color change. Likely COPD exacerbation with pulmonary infection. He has had significant improvement with standard COPD exacerbation therapy.    Plan:  Duonebs Q4H  Azithromycin, treatment per sepsis problem  RT protocol  Prednisone 40mg x4 days, then treatment per wegeners  Chest physiotherapy    Sepsis secondary to pneumonia (HCC)- (present on admission)  Assessment & Plan  Assessment: Patient presents with high fever, leukocytosis with left shift, and right lower lobe pulmonary infiltrates consistent with pneumonia.  In the emergency department he has been somewhat hypotensive, although per the wife his blood pressure typically runs about 90/50 especially after dialysis.  Given his end-stage renal disease, we will be somewhat cautious with fluid repletion although with his severe aortic stenosis he will need a high preload to maintain cardiac output and to allow filling.    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  He has had multiple previous admissions for pneumonia, and sputum cultures are persistently positive for Pseudomonas.  It is unknown whether this is merely colonization or truly pathogenic, although in the setting of his bronchiectasis it may be the latter.  Notably a recent CT scan at [facility] shows honeycombing, as well as redemonstrating bronchiectasis and mucous plugging.    We will therefore undertake a broad therapeutic and diagnostic strategy with broad-spectrum coverage, we will obtain AFBs to evaluate if his MAC has returned although it is typically more indolent.  Given his posttussive emesis and weeks of coughing spells, will obtain a pertussis PCR and cover with azithromycin.  His previous pseudomonal infections will be covered with Zosyn.  Aspiration was considered but apparently he recently underwent outpatient swallow evaluation by speech therapy, which was normal.  Additionally we will obtain LDH, 1 3 beta D glucan due to his longstanding immunosuppression and very low absolute lymphocyte count.  Follow-up on strep pneumo urine antigen, will pertussis PCR, histoplasmosis antibody, pneumocystis testing with reflex to PCR, and all respiratory cultures bacterial, fungal and AFB culture    Plan:  Blood cultures NGTD, sputum cultures pending.  Continue Zosyn and stop azithromycin once pertussis PCR is negative per ID recommendations  Fluid restriction limited by ESRD, start Midodrine 10mg TID  Continue pseudomonal dosed Zosyn, and azithromycin.  Guaifenesin, chest physiotherapy to aid in sputum clearance  Induce sputum culture, sputum AFB x3 - 1/3 collected  Urine strep, Legionella antigen pending  1 3 beta D glucan,  pertussis  pending  LDH normal  Consider high-resolution CT as recommended at [facility] for honeycombing  Consult ID    Elevated troponin  Assessment & Plan  Assessment: Elevated troponin in the setting of ESRD, sepsis, and tachycardia secondary to pneumonia.  No chest pain, EKG shows right bundle branch block.  Very low suspicion for primary acute coronary syndrome, likely demand ischemia. Troponins trended and decreased.    Plan:  Treat primary etiology with fluids, antibiotics, continue metoprolol at reduced dosing interval      Wegener's granulomatosis (HCC)- (present on admission)  Assessment & Plan  Diagnosed via renal biopsy, on prednisone alone.  COPD exacerbation dosing(40mg daily) x4 days total, followed by 5mg daily  Follow-up with Dr. 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 Last HD Friday 1/24, 1000mL removed. Typically MWF. Appears to follow with Dr. Corona of [facility].    Plan:  Consult [facility] in the AM for further dialysis MWF    Severe aortic stenosis  Assessment & Plan  Assessment: Patient has severe aortic stenosis with unknown EF pending TAVR in [place].    Plan:  Maintain preload, continue metoprolol to allow diastolic filling time as it has minimal effect on BP and will allow improved coronary perfusion in the setting of demand ichemia  Echocardiogram to quantify ejection fraction  He is weighing whether to proceed with TAVR, if he decides against it a hospice consultation would be appropriate.

## 2020-01-28 ENCOUNTER — TELEPHONE (OUTPATIENT)
Dept: RHEUMATOLOGY | Facility: MEDICAL CENTER | Age: 80
End: 2020-01-28

## 2020-01-28 DIAGNOSIS — M31.31 GRANULOMATOSIS WITH POLYANGIITIS WITH RENAL INVOLVEMENT (HCC): ICD-10-CM

## 2020-01-28 LAB
1 3 BETA D GLUCAN INTERP Q4483: ABNORMAL
1,3 BETA GLUCAN SER-MCNC: 63 PG/ML
ALBUMIN SERPL BCP-MCNC: 3.2 G/DL (ref 3.2–4.9)
ALBUMIN/GLOB SERPL: 1.1 G/DL
ALP SERPL-CCNC: 54 U/L (ref 30–99)
ALT SERPL-CCNC: 18 U/L (ref 2–50)
ANION GAP SERPL CALC-SCNC: 12 MMOL/L (ref 0–11.9)
AST SERPL-CCNC: 15 U/L (ref 12–45)
BASOPHILS # BLD AUTO: 0.1 % (ref 0–1.8)
BASOPHILS # BLD: 0.01 K/UL (ref 0–0.12)
BILIRUB SERPL-MCNC: 0.4 MG/DL (ref 0.1–1.5)
BUN SERPL-MCNC: 27 MG/DL (ref 8–22)
CALCIUM SERPL-MCNC: 8.4 MG/DL (ref 8.5–10.5)
CHLORIDE SERPL-SCNC: 95 MMOL/L (ref 96–112)
CO2 SERPL-SCNC: 29 MMOL/L (ref 20–33)
CREAT SERPL-MCNC: 2.63 MG/DL (ref 0.5–1.4)
EOSINOPHIL # BLD AUTO: 0.01 K/UL (ref 0–0.51)
EOSINOPHIL NFR BLD: 0.1 % (ref 0–6.9)
ERYTHROCYTE [DISTWIDTH] IN BLOOD BY AUTOMATED COUNT: 56 FL (ref 35.9–50)
GLOBULIN SER CALC-MCNC: 2.9 G/DL (ref 1.9–3.5)
GLUCOSE BLD-MCNC: 161 MG/DL (ref 65–99)
GLUCOSE BLD-MCNC: 175 MG/DL (ref 65–99)
GLUCOSE BLD-MCNC: 176 MG/DL (ref 65–99)
GLUCOSE BLD-MCNC: 177 MG/DL (ref 65–99)
GLUCOSE BLD-MCNC: 76 MG/DL (ref 65–99)
GLUCOSE SERPL-MCNC: 142 MG/DL (ref 65–99)
H CAPSUL MYC AB TITR SER CF: NORMAL {TITER}
HAV IGM SERPL QL IA: NEGATIVE
HBV CORE IGM SER QL: NEGATIVE
HBV SURFACE AB SERPL IA-ACNC: <3.1 MIU/ML (ref 0–10)
HBV SURFACE AG SER QL: NEGATIVE
HCT VFR BLD AUTO: 27.2 % (ref 42–52)
HCV AB SER QL: NEGATIVE
HGB BLD-MCNC: 8.5 G/DL (ref 14–18)
HISTO AB YEAST 9338: NORMAL
IMM GRANULOCYTES # BLD AUTO: 0.06 K/UL (ref 0–0.11)
IMM GRANULOCYTES NFR BLD AUTO: 0.5 % (ref 0–0.9)
L PNEUMO AG UR QL IA: NEGATIVE
LYMPHOCYTES # BLD AUTO: 0.1 K/UL (ref 1–4.8)
LYMPHOCYTES NFR BLD: 0.9 % (ref 22–41)
MCH RBC QN AUTO: 28.9 PG (ref 27–33)
MCHC RBC AUTO-ENTMCNC: 31.3 G/DL (ref 33.7–35.3)
MCV RBC AUTO: 92.5 FL (ref 81.4–97.8)
MONOCYTES # BLD AUTO: 0.18 K/UL (ref 0–0.85)
MONOCYTES NFR BLD AUTO: 1.6 % (ref 0–13.4)
NEUTROPHILS # BLD AUTO: 10.63 K/UL (ref 1.82–7.42)
NEUTROPHILS NFR BLD: 96.8 % (ref 44–72)
NRBC # BLD AUTO: 0 K/UL
NRBC BLD-RTO: 0 /100 WBC
P JIROVECII AG SPEC QL IF: NEGATIVE
PLATELET # BLD AUTO: 224 K/UL (ref 164–446)
PMV BLD AUTO: 9.3 FL (ref 9–12.9)
POTASSIUM SERPL-SCNC: 4.6 MMOL/L (ref 3.6–5.5)
PROT SERPL-MCNC: 6.1 G/DL (ref 6–8.2)
RBC # BLD AUTO: 2.94 M/UL (ref 4.7–6.1)
S PNEUM AG UR QL: NEGATIVE
SODIUM SERPL-SCNC: 136 MMOL/L (ref 135–145)
SPECIMEN SOURCE: NORMAL
WBC # BLD AUTO: 11 K/UL (ref 4.8–10.8)

## 2020-01-28 PROCEDURE — 99232 SBSQ HOSP IP/OBS MODERATE 35: CPT | Performed by: INTERNAL MEDICINE

## 2020-01-28 PROCEDURE — 700102 HCHG RX REV CODE 250 W/ 637 OVERRIDE(OP): Performed by: INTERNAL MEDICINE

## 2020-01-28 PROCEDURE — 700111 HCHG RX REV CODE 636 W/ 250 OVERRIDE (IP): Performed by: STUDENT IN AN ORGANIZED HEALTH CARE EDUCATION/TRAINING PROGRAM

## 2020-01-28 PROCEDURE — 94640 AIRWAY INHALATION TREATMENT: CPT

## 2020-01-28 PROCEDURE — 82962 GLUCOSE BLOOD TEST: CPT | Mod: 91

## 2020-01-28 PROCEDURE — 80053 COMPREHEN METABOLIC PANEL: CPT

## 2020-01-28 PROCEDURE — 700101 HCHG RX REV CODE 250: Performed by: INTERNAL MEDICINE

## 2020-01-28 PROCEDURE — 99233 SBSQ HOSP IP/OBS HIGH 50: CPT | Mod: GC | Performed by: INTERNAL MEDICINE

## 2020-01-28 PROCEDURE — 700102 HCHG RX REV CODE 250 W/ 637 OVERRIDE(OP): Performed by: STUDENT IN AN ORGANIZED HEALTH CARE EDUCATION/TRAINING PROGRAM

## 2020-01-28 PROCEDURE — A9270 NON-COVERED ITEM OR SERVICE: HCPCS | Performed by: INTERNAL MEDICINE

## 2020-01-28 PROCEDURE — 770006 HCHG ROOM/CARE - MED/SURG/GYN SEMI*

## 2020-01-28 PROCEDURE — 700105 HCHG RX REV CODE 258: Performed by: STUDENT IN AN ORGANIZED HEALTH CARE EDUCATION/TRAINING PROGRAM

## 2020-01-28 PROCEDURE — A9270 NON-COVERED ITEM OR SERVICE: HCPCS | Performed by: STUDENT IN AN ORGANIZED HEALTH CARE EDUCATION/TRAINING PROGRAM

## 2020-01-28 PROCEDURE — 85025 COMPLETE CBC W/AUTO DIFF WBC: CPT

## 2020-01-28 RX ADMIN — GUAIFENESIN 600 MG: 600 TABLET, EXTENDED RELEASE ORAL at 17:34

## 2020-01-28 RX ADMIN — SEVELAMER CARBONATE 800 MG: 800 TABLET, FILM COATED ORAL at 07:50

## 2020-01-28 RX ADMIN — PREDNISONE 40 MG: 20 TABLET ORAL at 10:05

## 2020-01-28 RX ADMIN — GUAIFENESIN 600 MG: 600 TABLET, EXTENDED RELEASE ORAL at 05:45

## 2020-01-28 RX ADMIN — FINASTERIDE 5 MG: 5 TABLET, FILM COATED ORAL at 05:46

## 2020-01-28 RX ADMIN — BUDESONIDE AND FORMOTEROL FUMARATE DIHYDRATE 2 PUFF: 160; 4.5 AEROSOL RESPIRATORY (INHALATION) at 07:05

## 2020-01-28 RX ADMIN — IPRATROPIUM BROMIDE AND ALBUTEROL SULFATE 3 ML: .5; 3 SOLUTION RESPIRATORY (INHALATION) at 06:52

## 2020-01-28 RX ADMIN — PIPERACILLIN AND TAZOBACTAM 4.5 G: 4; .5 INJECTION, POWDER, LYOPHILIZED, FOR SOLUTION INTRAVENOUS; PARENTERAL at 05:44

## 2020-01-28 RX ADMIN — METOPROLOL SUCCINATE 25 MG: 25 TABLET, EXTENDED RELEASE ORAL at 05:45

## 2020-01-28 RX ADMIN — MIDODRINE HYDROCHLORIDE 10 MG: 5 TABLET ORAL at 05:48

## 2020-01-28 RX ADMIN — PIPERACILLIN AND TAZOBACTAM 4.5 G: 4; .5 INJECTION, POWDER, LYOPHILIZED, FOR SOLUTION INTRAVENOUS; PARENTERAL at 17:36

## 2020-01-28 RX ADMIN — HEPARIN SODIUM 5000 UNITS: 5000 INJECTION, SOLUTION INTRAVENOUS; SUBCUTANEOUS at 17:35

## 2020-01-28 RX ADMIN — ROSUVASTATIN CALCIUM 20 MG: 20 TABLET, FILM COATED ORAL at 06:00

## 2020-01-28 RX ADMIN — ALBUTEROL SULFATE 2 PUFF: 90 AEROSOL, METERED RESPIRATORY (INHALATION) at 17:45

## 2020-01-28 RX ADMIN — MIDODRINE HYDROCHLORIDE 10 MG: 5 TABLET ORAL at 15:22

## 2020-01-28 RX ADMIN — SODIUM CHLORIDE SOLN NEBU 3% 3 ML: 3 NEBU SOLN at 06:57

## 2020-01-28 RX ADMIN — MIDODRINE HYDROCHLORIDE 10 MG: 5 TABLET ORAL at 21:09

## 2020-01-28 RX ADMIN — IPRATROPIUM BROMIDE AND ALBUTEROL SULFATE 3 ML: .5; 3 SOLUTION RESPIRATORY (INHALATION) at 19:42

## 2020-01-28 RX ADMIN — HEPARIN SODIUM 5000 UNITS: 5000 INJECTION, SOLUTION INTRAVENOUS; SUBCUTANEOUS at 05:44

## 2020-01-28 RX ADMIN — AZITHROMYCIN 250 MG: 250 TABLET, FILM COATED ORAL at 07:51

## 2020-01-28 RX ADMIN — SEVELAMER CARBONATE 800 MG: 800 TABLET, FILM COATED ORAL at 17:34

## 2020-01-28 ASSESSMENT — ENCOUNTER SYMPTOMS
SHORTNESS OF BREATH: 0
EYES NEGATIVE: 1
MYALGIAS: 1
FEVER: 0
SORE THROAT: 0
WEAKNESS: 1
BACK PAIN: 0
SHORTNESS OF BREATH: 1
COUGH: 1
ABDOMINAL PAIN: 0
COUGH: 0
STRIDOR: 0
BLOOD IN STOOL: 0
WEIGHT LOSS: 0
HEMOPTYSIS: 0
SPUTUM PRODUCTION: 0
ORTHOPNEA: 0
CHILLS: 0
NAUSEA: 0
WHEEZING: 0
PALPITATIONS: 0
SINUS PAIN: 0
VOMITING: 0

## 2020-01-28 NOTE — PROGRESS NOTES
Nephrology/Hemodialysis note    Patient with ESRD/HD admitted with PNA  Seen and examined during dialysis  Tolerates well  VS stable  Lab results reviewed  Please see dialysis flow sheet for details

## 2020-01-28 NOTE — PROGRESS NOTES
3hr HD started @ 1830 and completed @ 2131,tolerated 1500ml net UF,VSS.RUAVF + B/T,cannulation sites covered with DD,CDI,report given to primary RN.

## 2020-01-28 NOTE — TELEPHONE ENCOUNTER
Crys Gilberto's wife called stating she would like to talk with you. Gilberto is in the hosp with pneumonia and she just has a question or two for you regarding some labs.  She wants to speak with you.  Thank you

## 2020-01-28 NOTE — PROGRESS NOTES
[facility] Progress Note      Admit Date: 1/25/2020    Resident(s): Kristina Mo M.D.  Attending: XAVI THOMAS/ Dr. Luis Fontaine    Date & Time:   1/28/2020   8:31 AM       Patient ID: [id number]  Name:             Gilberto Brown   YOB: 1940  Age:                 79 y.o.  male   MRN:               [id number]    HPI:  Mr. Brown is a 79-year-old gentleman admitted on 1/25/2020 with a past medical history significant for severe aortic stenosis, evaluated for TAVR at 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  Presentation in the emergency department patient had a T-max of 104.9 °C with a heart rate in the 110s, with hypotension.  Patient was admitted to the ICU, and midodrine support was initiated, with infectious disease Dr. Limon consulted given his complex pulmonary infectious disease history and previous follow-up at [facility].  Nephrology was consulted to continue his regular MWF dialysis when inpatient  Patient had a history of multiple previous admissions for pneumonia with sputum cultures consistently positive for Pseudomonas, with infectious disease recommending broad therapeutic and diagnostic strategies with broad-spectrum coverage which included Zosyn for pseudomonal infections and azithromycin for possible pertussis, recommendations to obtain 1, 3 beta D glucan due to his longstanding immunosuppression and low absolute lymphocyte count.    Consultants:  [facility]  Infectious disease  Nephrology     Imaging:  EC-ECHOCARDIOGRAM COMPLETE W/O CONT   Final Result      DX-CHEST-PORTABLE (1 VIEW)   Final Result      1.  Patchy bibasilar infiltrate or atelectasis and probable small pleural effusions.   2.  Stable cardiomegaly.   3.  Evidence of old granulomatous disease.          Interval Events:    1/28/20: Patient comfortable, afebrile today and saturating at 100% on 3 L N/C since arrival.  Patient also on midodrine for hypotension since arrival possibly due to end-stage renal disease/sepsis.Patient however has a low SBP in the 80s-90s which is his baseline.SOB  improving .Patient is awaiting transfer to the floor .       Review of Systems   Constitutional: Positive for malaise/fatigue. Negative for chills and fever.   HENT: Negative for sore throat.    Respiratory: Positive for cough and shortness of breath. Negative for hemoptysis, sputum production and stridor.    Cardiovascular: Negative for chest pain, palpitations, orthopnea and leg swelling.   Gastrointestinal: Negative for abdominal pain, blood in stool, nausea and vomiting.   Genitourinary: Negative for dysuria, frequency, hematuria and urgency.   Musculoskeletal: Positive for myalgias. Negative for back pain.   Neurological: Positive for weakness.       SpO2  Min: 84 %  Max: 100 %  O2 (LPM)  Min: 3  Max: 3  Temp  Min: 36.1 °C (97 °F)  Max: 40.5 °C (104.9 °F)     PHYSICAL EXAM  Gen: frail elderly ,nasal cannula in place  HEENT: NC/AT, no scleral icterus, no conjunctival injection, mucous membranes pink and moist.  Neck: Supple, no lymphadenopathy.  Cardiac: S1S2, RRR,  Murmur(+)no /r/g, no JVD.  Respiratory: Air entry decreased in the lung bases ,occasional  crepitations right greater than left.No audible wheeze.  Abdomen: BS+, soft, NT/ND, no rebound/guarding, no palpable organomegaly.  Ext: No edema, 2+ DP pulses b/l.  Skin: Warm, dry. No rashes or erythema.   Neuro: AAOx4, CN II-XII grossly normal, no focal  sensory or motor deficits.   Psych: Affect, mood, judgement normal.    Respiratory:     Respiration: (!) 25, Pulse Oximetry: 100 %, O2 Daily Delivery Respiratory : Silicone Nasal Cannula    HemoDynamics:  Pulse: 88 Blood Pressure : 101/65      Neuro:  Alert and oriented    Fluids:  Date 20 0700 - 20 0659   Shift 5535-8200 4567-5996 0763-9162 24 Hour Total   INTAKE   Shift Total       OUTPUT   Urine 200   200     Urine Void (mL) 200   200   Shift Total 200   200   NET -200   -200      Intake/Output Summary (Last 24 hours) at 2020 0824  Last data filed at 2020 0600  Gross per 24 hour   Intake 1286.67 ml   Output 2350 ml   Net -1063.33 ml          Body mass index is 19.92 kg/m².    Recent Labs     20   SODIUM 136 133* 136   POTASSIUM 3.7 3.8 4.6   CHLORIDE 96 93* 95*   CO2 26 24 29   BUN 32* 55* 27*   CREATININE 4.05* 4.84* 2.63*   CALCIUM 8.6 8.7 8.4*       GI/Nutrition:  Recent Labs     20  0418 205 20  041   ALTSGPT 16 13 18   ASTSGOT 15 9* 15   ALKPHOSPHAT 49 49 54   TBILIRUBIN 0.5 0.3 0.4   GLUCOSE 252* 160* 142*       Heme:  Recent Labs     208 20  041   RBC 3.24* 2.96* 2.94*   HEMOGLOBIN 9.5* 8.6* 8.5*   HEMATOCRIT 30.7* 27.6* 27.2*   PLATELETCT 192 212 224       Infectious Disease:  Temp  Av.9 °C (98.4 °F)  Min: 36.8 °C (98.3 °F)  Max: 36.9 °C (98.5 °F)  Recent Labs     20   WBC 17.7* 16.3* 11.0*   NEUTSPOLYS 96.80* 95.20* 96.80*   LYMPHOCYTES 0.80* 1.00* 0.90*   MONOCYTES 1.50 3.30 1.60   EOSINOPHILS 0.00 0.00 0.10   BASOPHILS 0.20 0.10 0.10   ASTSGOT 15 9* 15   ALTSGPT 16 13 18   ALKPHOSPHAT 49 49 54   TBILIRUBIN 0.5 0.3 0.4       Meds:  • azithromycin  250 mg     • ipratropium-albuterol  3 mL     • sodium chloride 3%  3 mL     • heparin  1,500 Units     • metoprolol SR  25 mg     • sevelamer carbonate  800 mg     • insulin lispro  1-6 Units       And   • glucose  16 g      And   • dextrose 10% bolus  250 mL     • ethyl chloride  1 Application     • Lidocaine  1 Application     • albuterol  2 Puff     • finasteride  5 mg     • piperacillin-tazobactam  4.5 g 4.5 g (01/28/20 0544)   • rosuvastatin  20 mg     • senna-docusate  2 Tab      And   • polyethylene glycol/lytes  1 Packet      And   • magnesium hydroxide  30 mL      And   • bisacodyl  10 mg     • Respiratory Care per Protocol       • LR  500 mL     • heparin  5,000 Units     • acetaminophen  650 mg     • guaiFENesin ER  600 mg     • predniSONE  40 mg     • budesonide-formoterol  2 Puff     • midodrine  10 mg            Assessment and Plan:  COPD exacerbation (HCC)- (present on admission)  Assessment & Plan  Assessment: Diffuse wheezing, increase in sputum production with color change. Likely COPD exacerbation with pulmonary infection. He has had significant improvement with standard COPD exacerbation therapy.    Plan:  Duonebs Q4H  Azithromycin, treatment per sepsis problem  RT protocol  Prednisone 40mg x4 days, then treatment per wegeners  Chest physiotherapy    Sepsis secondary to pneumonia (HCC)- (present on admission)  Assessment & Plan  Assessment: Patient presents with high fever, leukocytosis with left shift, and right lower lobe pulmonary infiltrates consistent with pneumonia.  In the emergency department he has been somewhat hypotensive, although per the wife his blood pressure typically runs about 90/50 especially after dialysis.  Given his end-stage renal disease, we will be somewhat cautious with fluid repletion although with his severe aortic stenosis he will need a high preload to maintain cardiac output and to allow filling.    He has a highly complex and extensive history of various pulmonary infectious diseases, [facility] where it was noted that he has a history of Mycobacterium avium, recurrent pseudomonal pneumonia, invasive pulmonary  aspergillosis versus CPA, and it is noted elsewhere in his chart that he has a history of pulmonary histoplasmosis.  He has had multiple previous admissions for pneumonia, and sputum cultures are persistently positive for Pseudomonas.  It is unknown whether this is merely colonization or truly pathogenic, although in the setting of his bronchiectasis it may be the latter.  Notably a recent CT scan at [facility] shows honeycombing, as well as redemonstrating bronchiectasis and mucous plugging.    We will therefore undertake a broad therapeutic and diagnostic strategy with broad-spectrum coverage, we will obtain AFBs to evaluate if his MAC has returned although it is typically more indolent.  Given his posttussive emesis and weeks of coughing spells, will obtain a pertussis PCR and cover with azithromycin.  His previous pseudomonal infections will be covered with Zosyn(last dose on 1/30/20).  Aspiration was considered but apparently he recently underwent outpatient swallow evaluation by speech therapy, which was normal.  Additionally we will obtain LDH, 1 3 beta D glucan due to his longstanding immunosuppression and very low absolute lymphocyte count.  Follow-up on strep pneumo urine antigen, will pertussis PCR, histoplasmosis antibody, pneumocystis testing with reflex to PCR, and all respiratory cultures bacterial, fungal and AFB culture    Plan:  Blood cultures NGTD, sputum cultures pending.  Continue Zosyn and stop azithromycin once pertussis PCR is negative per ID recommendations  Fluid restriction limited by ESRD, start Midodrine 10mg TID  Continue pseudomonal  Renally dosed Zosyn, and azithromycin.  Guaifenesin, chest physiotherapy to aid in sputum clearance  Induce sputum culture, sputum AFB x3 - 1/3 collected   Urine strep Pneumo pending, Legionella antigen pending  Cocci Ab pending.  Histoplasma Ab pending  1 3 beta D glucan pending ,  Pertussis PCR-not collected .  LDH normal  Consider high-resolution CT as  recommended at [facility] for honeycombing   ID Recs noted-Patient likely colonized with numerous organisms, if any other organisms grow on cultures, would thoughtfully consider changes in chronic symptoms, comparison with prior CTs, bronchoscopy, and further micro lab testing before starting potentially prolonged toxic medications      Elevated troponin  Assessment & Plan  Assessment: Elevated troponin in the setting of ESRD, sepsis, and tachycardia secondary to pneumonia.  No chest pain, EKG shows right bundle branch block.  Very low suspicion for primary acute coronary syndrome, likely demand ischemia. Troponins trended and decreased.    Plan:  Treat primary etiology with fluids, antibiotics, continue metoprolol at reduced dosing interval      Wegener's granulomatosis (HCC)- (present on admission)  Assessment & Plan  Diagnosed via renal biopsy, on prednisone alone.  COPD exacerbation dosing(40mg daily) x4 days total, followed by 5mg daily  Follow-up with Dr. 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 Last HD Friday 1/24, 1000mL removed. Typically MWF. Appears to follow with Dr. Corona of [facility].    Plan:  Consult [facility] in the AM for further dialysis MWF    Severe aortic stenosis  Assessment & Plan  Assessment: Patient has severe aortic stenosis with unknown EF pending TAVR in [place].    Plan:  Maintain preload, continue metoprolol to allow diastolic filling time as it has minimal effect on BP and will allow improved coronary perfusion in the setting of demand ichemia  Echocardiogram to quantify ejection fraction  He is weighing whether to proceed with TAVR, if he decides against it a hospice consultation would be appropriate.

## 2020-01-28 NOTE — PROGRESS NOTES
Assumed pt care at 0715.  Received report from verona RN.  Assessment completed.  Pt AAOx4.  Pt has no comlaints of pain at this time.  No other s/s of discomfort or distress. Pt ambulates with 1 assist.  Bed in lowest position and locked.  Pt calls appropriately.  Treaded socks in place, call light within reach and staff numbers provided.  Pt needs met at this time.

## 2020-01-28 NOTE — TELEPHONE ENCOUNTER
Called Crys, Crys would like to have another ANCA done, will order that in the computer for patient

## 2020-01-29 LAB
ALBUMIN SERPL BCP-MCNC: 3.6 G/DL (ref 3.2–4.9)
ALBUMIN/GLOB SERPL: 1.2 G/DL
ALP SERPL-CCNC: 65 U/L (ref 30–99)
ALT SERPL-CCNC: 32 U/L (ref 2–50)
ANION GAP SERPL CALC-SCNC: 15 MMOL/L (ref 0–11.9)
AST SERPL-CCNC: 21 U/L (ref 12–45)
BASOPHILS # BLD AUTO: 0.2 % (ref 0–1.8)
BASOPHILS # BLD: 0.02 K/UL (ref 0–0.12)
BILIRUB SERPL-MCNC: 0.4 MG/DL (ref 0.1–1.5)
BUN SERPL-MCNC: 46 MG/DL (ref 8–22)
CALCIUM SERPL-MCNC: 8.7 MG/DL (ref 8.5–10.5)
CHLORIDE SERPL-SCNC: 96 MMOL/L (ref 96–112)
CO2 SERPL-SCNC: 24 MMOL/L (ref 20–33)
CREAT SERPL-MCNC: 3.88 MG/DL (ref 0.5–1.4)
EOSINOPHIL # BLD AUTO: 0 K/UL (ref 0–0.51)
EOSINOPHIL NFR BLD: 0 % (ref 0–6.9)
ERYTHROCYTE [DISTWIDTH] IN BLOOD BY AUTOMATED COUNT: 56.2 FL (ref 35.9–50)
GLOBULIN SER CALC-MCNC: 2.9 G/DL (ref 1.9–3.5)
GLUCOSE BLD-MCNC: 118 MG/DL (ref 65–99)
GLUCOSE BLD-MCNC: 122 MG/DL (ref 65–99)
GLUCOSE BLD-MCNC: 139 MG/DL (ref 65–99)
GLUCOSE BLD-MCNC: 184 MG/DL (ref 65–99)
GLUCOSE SERPL-MCNC: 151 MG/DL (ref 65–99)
HCT VFR BLD AUTO: 29.7 % (ref 42–52)
HGB BLD-MCNC: 9.1 G/DL (ref 14–18)
IMM GRANULOCYTES # BLD AUTO: 0.07 K/UL (ref 0–0.11)
IMM GRANULOCYTES NFR BLD AUTO: 0.7 % (ref 0–0.9)
LYMPHOCYTES # BLD AUTO: 0.23 K/UL (ref 1–4.8)
LYMPHOCYTES NFR BLD: 2.2 % (ref 22–41)
MCH RBC QN AUTO: 28.5 PG (ref 27–33)
MCHC RBC AUTO-ENTMCNC: 30.6 G/DL (ref 33.7–35.3)
MCV RBC AUTO: 93.1 FL (ref 81.4–97.8)
MONOCYTES # BLD AUTO: 0.37 K/UL (ref 0–0.85)
MONOCYTES NFR BLD AUTO: 3.5 % (ref 0–13.4)
NEUTROPHILS # BLD AUTO: 9.99 K/UL (ref 1.82–7.42)
NEUTROPHILS NFR BLD: 93.4 % (ref 44–72)
NRBC # BLD AUTO: 0 K/UL
NRBC BLD-RTO: 0 /100 WBC
P JIROVECII DNA SPEC QL NAA+PROBE: NOT DETECTED
PLATELET # BLD AUTO: 257 K/UL (ref 164–446)
PMV BLD AUTO: 9.3 FL (ref 9–12.9)
POTASSIUM SERPL-SCNC: 4.7 MMOL/L (ref 3.6–5.5)
PROT SERPL-MCNC: 6.5 G/DL (ref 6–8.2)
RBC # BLD AUTO: 3.19 M/UL (ref 4.7–6.1)
SODIUM SERPL-SCNC: 135 MMOL/L (ref 135–145)
SPECIMEN SOURCE: NORMAL
WBC # BLD AUTO: 10.7 K/UL (ref 4.8–10.8)

## 2020-01-29 PROCEDURE — 700102 HCHG RX REV CODE 250 W/ 637 OVERRIDE(OP): Performed by: INTERNAL MEDICINE

## 2020-01-29 PROCEDURE — 97161 PT EVAL LOW COMPLEX 20 MIN: CPT

## 2020-01-29 PROCEDURE — A9270 NON-COVERED ITEM OR SERVICE: HCPCS | Performed by: INTERNAL MEDICINE

## 2020-01-29 PROCEDURE — 85025 COMPLETE CBC W/AUTO DIFF WBC: CPT

## 2020-01-29 PROCEDURE — 90935 HEMODIALYSIS ONE EVALUATION: CPT

## 2020-01-29 PROCEDURE — 80053 COMPREHEN METABOLIC PANEL: CPT

## 2020-01-29 PROCEDURE — 97165 OT EVAL LOW COMPLEX 30 MIN: CPT

## 2020-01-29 PROCEDURE — 5A1D70Z PERFORMANCE OF URINARY FILTRATION, INTERMITTENT, LESS THAN 6 HOURS PER DAY: ICD-10-PCS | Performed by: INTERNAL MEDICINE

## 2020-01-29 PROCEDURE — A9270 NON-COVERED ITEM OR SERVICE: HCPCS | Performed by: STUDENT IN AN ORGANIZED HEALTH CARE EDUCATION/TRAINING PROGRAM

## 2020-01-29 PROCEDURE — 700111 HCHG RX REV CODE 636 W/ 250 OVERRIDE (IP): Performed by: STUDENT IN AN ORGANIZED HEALTH CARE EDUCATION/TRAINING PROGRAM

## 2020-01-29 PROCEDURE — 99232 SBSQ HOSP IP/OBS MODERATE 35: CPT | Mod: GC | Performed by: INTERNAL MEDICINE

## 2020-01-29 PROCEDURE — 700101 HCHG RX REV CODE 250: Performed by: INTERNAL MEDICINE

## 2020-01-29 PROCEDURE — 82962 GLUCOSE BLOOD TEST: CPT

## 2020-01-29 PROCEDURE — 700102 HCHG RX REV CODE 250 W/ 637 OVERRIDE(OP): Performed by: STUDENT IN AN ORGANIZED HEALTH CARE EDUCATION/TRAINING PROGRAM

## 2020-01-29 PROCEDURE — 90935 HEMODIALYSIS ONE EVALUATION: CPT | Performed by: INTERNAL MEDICINE

## 2020-01-29 PROCEDURE — 36415 COLL VENOUS BLD VENIPUNCTURE: CPT

## 2020-01-29 PROCEDURE — 700105 HCHG RX REV CODE 258: Performed by: STUDENT IN AN ORGANIZED HEALTH CARE EDUCATION/TRAINING PROGRAM

## 2020-01-29 PROCEDURE — 94640 AIRWAY INHALATION TREATMENT: CPT

## 2020-01-29 PROCEDURE — 770006 HCHG ROOM/CARE - MED/SURG/GYN SEMI*

## 2020-01-29 RX ORDER — MIDODRINE HYDROCHLORIDE 5 MG/1
10 TABLET ORAL EVERY 8 HOURS
Status: DISCONTINUED | OUTPATIENT
Start: 2020-01-29 | End: 2020-01-30 | Stop reason: HOSPADM

## 2020-01-29 RX ADMIN — PREDNISONE 40 MG: 20 TABLET ORAL at 05:16

## 2020-01-29 RX ADMIN — IPRATROPIUM BROMIDE AND ALBUTEROL SULFATE 3 ML: .5; 3 SOLUTION RESPIRATORY (INHALATION) at 21:42

## 2020-01-29 RX ADMIN — ALBUTEROL SULFATE 2 PUFF: 90 AEROSOL, METERED RESPIRATORY (INHALATION) at 00:56

## 2020-01-29 RX ADMIN — PIPERACILLIN AND TAZOBACTAM 4.5 G: 4; .5 INJECTION, POWDER, LYOPHILIZED, FOR SOLUTION INTRAVENOUS; PARENTERAL at 17:48

## 2020-01-29 RX ADMIN — FINASTERIDE 5 MG: 5 TABLET, FILM COATED ORAL at 05:17

## 2020-01-29 RX ADMIN — HEPARIN SODIUM 5000 UNITS: 5000 INJECTION, SOLUTION INTRAVENOUS; SUBCUTANEOUS at 05:17

## 2020-01-29 RX ADMIN — GUAIFENESIN 600 MG: 600 TABLET, EXTENDED RELEASE ORAL at 17:47

## 2020-01-29 RX ADMIN — SEVELAMER CARBONATE 800 MG: 800 TABLET, FILM COATED ORAL at 11:57

## 2020-01-29 RX ADMIN — GUAIFENESIN 600 MG: 600 TABLET, EXTENDED RELEASE ORAL at 05:16

## 2020-01-29 RX ADMIN — ALBUTEROL SULFATE 2 PUFF: 90 AEROSOL, METERED RESPIRATORY (INHALATION) at 17:46

## 2020-01-29 RX ADMIN — ROSUVASTATIN CALCIUM 20 MG: 20 TABLET, FILM COATED ORAL at 05:16

## 2020-01-29 RX ADMIN — SODIUM CHLORIDE SOLN NEBU 3% 3 ML: 3 NEBU SOLN at 07:07

## 2020-01-29 RX ADMIN — MIDODRINE HYDROCHLORIDE 10 MG: 5 TABLET ORAL at 05:16

## 2020-01-29 RX ADMIN — METOPROLOL SUCCINATE 25 MG: 25 TABLET, EXTENDED RELEASE ORAL at 05:16

## 2020-01-29 RX ADMIN — MIDODRINE HYDROCHLORIDE 10 MG: 5 TABLET ORAL at 22:36

## 2020-01-29 RX ADMIN — AZITHROMYCIN 250 MG: 250 TABLET, FILM COATED ORAL at 05:17

## 2020-01-29 RX ADMIN — IPRATROPIUM BROMIDE AND ALBUTEROL SULFATE 3 ML: .5; 3 SOLUTION RESPIRATORY (INHALATION) at 07:07

## 2020-01-29 RX ADMIN — SODIUM CHLORIDE SOLN NEBU 3% 3 ML: 3 NEBU SOLN at 21:42

## 2020-01-29 RX ADMIN — BUDESONIDE AND FORMOTEROL FUMARATE DIHYDRATE 2 PUFF: 160; 4.5 AEROSOL RESPIRATORY (INHALATION) at 07:07

## 2020-01-29 RX ADMIN — MIDODRINE HYDROCHLORIDE 10 MG: 5 TABLET ORAL at 11:56

## 2020-01-29 RX ADMIN — PIPERACILLIN AND TAZOBACTAM 4.5 G: 4; .5 INJECTION, POWDER, LYOPHILIZED, FOR SOLUTION INTRAVENOUS; PARENTERAL at 05:25

## 2020-01-29 RX ADMIN — HEPARIN SODIUM 5000 UNITS: 5000 INJECTION, SOLUTION INTRAVENOUS; SUBCUTANEOUS at 17:46

## 2020-01-29 RX ADMIN — SEVELAMER CARBONATE 800 MG: 800 TABLET, FILM COATED ORAL at 17:47

## 2020-01-29 RX ADMIN — BUDESONIDE AND FORMOTEROL FUMARATE DIHYDRATE 2 PUFF: 160; 4.5 AEROSOL RESPIRATORY (INHALATION) at 21:42

## 2020-01-29 ASSESSMENT — COGNITIVE AND FUNCTIONAL STATUS - GENERAL
DAILY ACTIVITIY SCORE: 21
SUGGESTED CMS G CODE MODIFIER MOBILITY: CL
STANDING UP FROM CHAIR USING ARMS: A LITTLE
MOVING FROM LYING ON BACK TO SITTING ON SIDE OF FLAT BED: A LOT
CLIMB 3 TO 5 STEPS WITH RAILING: A LITTLE
TOILETING: A LITTLE
MOBILITY SCORE: 14
HELP NEEDED FOR BATHING: A LITTLE
MOVING TO AND FROM BED TO CHAIR: UNABLE
SUGGESTED CMS G CODE MODIFIER DAILY ACTIVITY: CJ
DRESSING REGULAR LOWER BODY CLOTHING: A LITTLE
WALKING IN HOSPITAL ROOM: A LITTLE
TURNING FROM BACK TO SIDE WHILE IN FLAT BAD: A LOT

## 2020-01-29 ASSESSMENT — GAIT ASSESSMENTS
GAIT LEVEL OF ASSIST: SUPERVISED
DEVIATION: BRADYKINETIC
DISTANCE (FEET): 150
ASSISTIVE DEVICE: FRONT WHEEL WALKER

## 2020-01-29 ASSESSMENT — ACTIVITIES OF DAILY LIVING (ADL): TOILETING: INDEPENDENT

## 2020-01-29 NOTE — CARE PLAN
Problem: Communication  Goal: The ability to communicate needs accurately and effectively will improve  Outcome: PROGRESSING AS EXPECTED  Note:   Discussed POC with pt including PRN inhalers for dyspnea, dialysis in the am, and IV abx. Answered all of pt's questions.     Problem: Safety  Goal: Will remain free from falls  Outcome: PROGRESSING AS EXPECTED  Note:   Fall precautions in place: pt room close to nurse's station, treaded socks on pt, bed in lowest position, fall ID band on.      Problem: Safety  Goal: Will remain free from falls  Outcome: PROGRESSING AS EXPECTED  Note:   Fall precautions in place: pt room close to nurse's station, treaded socks on pt, bed in lowest position, fall ID band on.

## 2020-01-29 NOTE — PROGRESS NOTES
2 RN skin check complete with LIZETH Pablo.   Devices in place: PIV.  Skin assessed under devices: yes.  Confirmed pressure ulcers found on: N/A.  New potential pressure ulcers noted on: N/A  The following interventions in place: pillows used for positioning, frequent turning.     Skin is dusky and fragile throughout.   A few dry/itchy spots throughout.  Sacrum is red and blanching.   Feet are dusky and dry.   BLE edema present.   Right arm fistula present.

## 2020-01-29 NOTE — THERAPY
"Physical Therapy Evaluation completed.   Bed Mobility:  Supine to Sit: NT, up EOB pre/post  Transfers: Supervised  Gait: Level Of Assist: Supervised with Front-Wheel Walker       Plan of Care: Will benefit from Physical Therapy 3 times per week  Discharge Recommendations: Equipment: Tub Transfer BenchRecommend home health transitional care for continued physical therapy services.     See \"Rehab Therapy-Acute\" Patient Summary Report for complete documentation.     Pt is an 80yo male admitted with PNA. Pt seen for PT evaluation, spouse present throughout. Pt reports slightly decreased activity tolerance at baseline though worsened over the last month. Pt demonstrated STS and ambulation with FWW with SPV. Ambulated 150ft, no LOB. Spouse reports able to assist with xfrs and bed mobility at home if needed and reports no concerns with DC. Recommend home health PT to facilitate return home and progress strength and endurance. Will continue to work with pt in acute setting. Is functionally capable of home at this time.   "

## 2020-01-29 NOTE — PROGRESS NOTES
3hr HD started @ 0748 and completed @ 1049,tx well tolerated,VSS,net UF = 2500ml.RUAAVF + B/T,cannulation sites covered with DD,CDI,report given to Romi Lockett RN.

## 2020-01-29 NOTE — PROGRESS NOTES
Received report and assumed patient care. Patient is AOx4. No complaints of pain at this time. Assessment complete on 3L O2 NC. All needs met at this time. Safety precautions and hourly rounding in place.    Patient is off the floor in dialysis.

## 2020-01-29 NOTE — PROGRESS NOTES
Nephrology Daily Progress Note    Date of Service  1/28/2020    Chief Complaint  79 y.o. male with ESRD/HD MWF schedule, admitted 1/25/2020 with PNA    Interval Problem Update  Doing much better  No complaints  HD MWF    Review of Systems  Review of Systems   Constitutional: Negative for chills, fever, malaise/fatigue and weight loss.   HENT: Negative for congestion, hearing loss and sinus pain.    Eyes: Negative.    Respiratory: Negative for cough, hemoptysis, shortness of breath and wheezing.    Cardiovascular: Negative for chest pain, palpitations, orthopnea and leg swelling.   Gastrointestinal: Negative for abdominal pain, nausea and vomiting.   Skin: Negative.    All other systems reviewed and are negative.       Physical Exam  Temp:  [36.8 °C (98.3 °F)] 36.8 °C (98.3 °F)  Pulse:  [83-90] 86  Resp:  [16-46] 16  BP: ()/(49-66) 126/56  SpO2:  [97 %-100 %] 100 %    Physical Exam  Vitals signs and nursing note reviewed.   Constitutional:       General: He is not in acute distress.     Appearance: Normal appearance. He is well-developed. He is not diaphoretic.   HENT:      Head: Normocephalic and atraumatic.      Nose: Nose normal.      Mouth/Throat:      Mouth: Mucous membranes are moist.      Pharynx: Oropharynx is clear.   Eyes:      Conjunctiva/sclera: Conjunctivae normal.      Pupils: Pupils are equal, round, and reactive to light.   Neck:      Musculoskeletal: Normal range of motion and neck supple.   Cardiovascular:      Rate and Rhythm: Normal rate and regular rhythm.      Pulses: Normal pulses.      Heart sounds: Murmur present. No friction rub. No gallop.    Pulmonary:      Effort: Pulmonary effort is normal. No respiratory distress.      Breath sounds: Rhonchi present. No wheezing or rales.   Abdominal:      General: Bowel sounds are normal. There is no distension.      Tenderness: There is no tenderness.   Musculoskeletal:         General: No swelling.   Skin:     General: Skin is warm.       Coloration: Skin is not jaundiced.      Findings: No erythema or rash.   Neurological:      General: No focal deficit present.      Mental Status: He is alert and oriented to person, place, and time.      Cranial Nerves: No cranial nerve deficit.      Coordination: Coordination normal.         Fluids    Intake/Output Summary (Last 24 hours) at 1/28/2020 1612  Last data filed at 1/28/2020 0750  Gross per 24 hour   Intake 706.67 ml   Output 2450 ml   Net -1743.33 ml       Laboratory  Recent Labs     01/26/20 0418 01/27/20  0445 01/28/20  0410   WBC 17.7* 16.3* 11.0*   RBC 3.24* 2.96* 2.94*   HEMOGLOBIN 9.5* 8.6* 8.5*   HEMATOCRIT 30.7* 27.6* 27.2*   MCV 94.8 93.2 92.5   MCH 29.3 29.1 28.9   MCHC 30.9* 31.2* 31.3*   RDW 56.7* 56.2* 56.0*   PLATELETCT 192 212 224   MPV 9.3 9.4 9.3     Recent Labs     01/26/20 0418 01/27/20 0445 01/28/20  0410   SODIUM 136 133* 136   POTASSIUM 3.7 3.8 4.6   CHLORIDE 96 93* 95*   CO2 26 24 29   GLUCOSE 252* 160* 142*   BUN 32* 55* 27*   CREATININE 4.05* 4.84* 2.63*   CALCIUM 8.6 8.7 8.4*         No results for input(s): NTPROBNP in the last 72 hours.        Imaging  reviewed    Assessment/Plan  1.ESRD/HD -MWF -will dialyze tomorrow  2.HTN: BP well controlled  3.Electrolytes: well controlled.  4.Anemia: Hb stable -Epogen with HD  5.PNA -clinically better  6.Volume:well controlled    Recs; dialysis tomorrow             Renal diet             All meds to renal doses             Will follow

## 2020-01-29 NOTE — PROGRESS NOTES
Assumed care of pt from day RN. Pt is sitting up in bed A&Ox4. Pt denies any pain at this time. Pt on 3L O2 via nasal canula, IV abx. Pt's emla cream applied 30 minutes before dialysis this am.

## 2020-01-29 NOTE — CARE PLAN
Problem: Communication  Goal: The ability to communicate needs accurately and effectively will improve  Outcome: PROGRESSING AS EXPECTED  Intervention: Educate patient and significant other/support system about the plan of care, procedures, treatments, medications and allow for questions  Note:   Educated patient to call RN for any needs. Patient verbalized understanding. Hourly rounding in place.      Problem: Skin Integrity  Goal: Risk for impaired skin integrity will decrease  Outcome: PROGRESSING AS EXPECTED  Intervention: Assess risk factors for impaired skin integrity and/or pressure ulcers  Note:   Patient has very fragile skin, educated on purpose of 2 RN skin check.

## 2020-01-29 NOTE — PROGRESS NOTES
Nephrology/Hemodialysis note    Patient with ESRD/HD admitted with PNA  Seen and examined during dialysis-tolerates well  VS stable  Lab results reviewed  Please see dialysis flow sheet for details

## 2020-01-30 VITALS
OXYGEN SATURATION: 100 % | HEART RATE: 78 BPM | DIASTOLIC BLOOD PRESSURE: 51 MMHG | HEIGHT: 68 IN | BODY MASS INDEX: 19.85 KG/M2 | SYSTOLIC BLOOD PRESSURE: 115 MMHG | RESPIRATION RATE: 16 BRPM | WEIGHT: 131 LBS | TEMPERATURE: 97.1 F

## 2020-01-30 LAB
ALBUMIN SERPL BCP-MCNC: 3.4 G/DL (ref 3.2–4.9)
ALBUMIN/GLOB SERPL: 1.3 G/DL
ALP SERPL-CCNC: 62 U/L (ref 30–99)
ALT SERPL-CCNC: 33 U/L (ref 2–50)
ANION GAP SERPL CALC-SCNC: 12 MMOL/L (ref 0–11.9)
AST SERPL-CCNC: 16 U/L (ref 12–45)
BACTERIA BLD CULT: NORMAL
BACTERIA BLD CULT: NORMAL
BASOPHILS # BLD AUTO: 0.2 % (ref 0–1.8)
BASOPHILS # BLD: 0.02 K/UL (ref 0–0.12)
BILIRUB SERPL-MCNC: 0.4 MG/DL (ref 0.1–1.5)
BUN SERPL-MCNC: 36 MG/DL (ref 8–22)
C IMMITIS IGM SPEC QL IA: 0 IV
CALCIUM SERPL-MCNC: 8.4 MG/DL (ref 8.5–10.5)
CHLORIDE SERPL-SCNC: 95 MMOL/L (ref 96–112)
CO2 SERPL-SCNC: 28 MMOL/L (ref 20–33)
COCCIDIOIDES AB SPEC QL ID: NORMAL
COCCIDIOIDES AB TITR SER CF: NORMAL {TITER}
COCCIDIOIDES IGG SPEC QL IA: 0.6 IV
CREAT SERPL-MCNC: 3.02 MG/DL (ref 0.5–1.4)
EOSINOPHIL # BLD AUTO: 0.04 K/UL (ref 0–0.51)
EOSINOPHIL NFR BLD: 0.5 % (ref 0–6.9)
ERYTHROCYTE [DISTWIDTH] IN BLOOD BY AUTOMATED COUNT: 56.4 FL (ref 35.9–50)
GLOBULIN SER CALC-MCNC: 2.7 G/DL (ref 1.9–3.5)
GLUCOSE BLD-MCNC: 98 MG/DL (ref 65–99)
GLUCOSE SERPL-MCNC: 147 MG/DL (ref 65–99)
HCT VFR BLD AUTO: 29.4 % (ref 42–52)
HGB BLD-MCNC: 8.9 G/DL (ref 14–18)
IMM GRANULOCYTES # BLD AUTO: 0.12 K/UL (ref 0–0.11)
IMM GRANULOCYTES NFR BLD AUTO: 1.5 % (ref 0–0.9)
LYMPHOCYTES # BLD AUTO: 0.4 K/UL (ref 1–4.8)
LYMPHOCYTES NFR BLD: 4.9 % (ref 22–41)
MCH RBC QN AUTO: 28 PG (ref 27–33)
MCHC RBC AUTO-ENTMCNC: 30.3 G/DL (ref 33.7–35.3)
MCV RBC AUTO: 92.5 FL (ref 81.4–97.8)
MONOCYTES # BLD AUTO: 0.55 K/UL (ref 0–0.85)
MONOCYTES NFR BLD AUTO: 6.7 % (ref 0–13.4)
NEUTROPHILS # BLD AUTO: 7.07 K/UL (ref 1.82–7.42)
NEUTROPHILS NFR BLD: 86.2 % (ref 44–72)
NRBC # BLD AUTO: 0 K/UL
NRBC BLD-RTO: 0 /100 WBC
PLATELET # BLD AUTO: 237 K/UL (ref 164–446)
PMV BLD AUTO: 9.3 FL (ref 9–12.9)
POTASSIUM SERPL-SCNC: 3.8 MMOL/L (ref 3.6–5.5)
PROT SERPL-MCNC: 6.1 G/DL (ref 6–8.2)
RBC # BLD AUTO: 3.18 M/UL (ref 4.7–6.1)
SIGNIFICANT IND 70042: NORMAL
SIGNIFICANT IND 70042: NORMAL
SITE SITE: NORMAL
SITE SITE: NORMAL
SODIUM SERPL-SCNC: 135 MMOL/L (ref 135–145)
SOURCE SOURCE: NORMAL
SOURCE SOURCE: NORMAL
WBC # BLD AUTO: 8.2 K/UL (ref 4.8–10.8)

## 2020-01-30 PROCEDURE — 80053 COMPREHEN METABOLIC PANEL: CPT

## 2020-01-30 PROCEDURE — 700111 HCHG RX REV CODE 636 W/ 250 OVERRIDE (IP): Performed by: STUDENT IN AN ORGANIZED HEALTH CARE EDUCATION/TRAINING PROGRAM

## 2020-01-30 PROCEDURE — 36415 COLL VENOUS BLD VENIPUNCTURE: CPT

## 2020-01-30 PROCEDURE — A9270 NON-COVERED ITEM OR SERVICE: HCPCS | Performed by: STUDENT IN AN ORGANIZED HEALTH CARE EDUCATION/TRAINING PROGRAM

## 2020-01-30 PROCEDURE — 700102 HCHG RX REV CODE 250 W/ 637 OVERRIDE(OP): Performed by: STUDENT IN AN ORGANIZED HEALTH CARE EDUCATION/TRAINING PROGRAM

## 2020-01-30 PROCEDURE — 700105 HCHG RX REV CODE 258: Performed by: STUDENT IN AN ORGANIZED HEALTH CARE EDUCATION/TRAINING PROGRAM

## 2020-01-30 PROCEDURE — 85025 COMPLETE CBC W/AUTO DIFF WBC: CPT

## 2020-01-30 PROCEDURE — 99239 HOSP IP/OBS DSCHRG MGMT >30: CPT | Mod: GC | Performed by: INTERNAL MEDICINE

## 2020-01-30 PROCEDURE — 700102 HCHG RX REV CODE 250 W/ 637 OVERRIDE(OP): Performed by: INTERNAL MEDICINE

## 2020-01-30 PROCEDURE — 82962 GLUCOSE BLOOD TEST: CPT | Mod: 91

## 2020-01-30 PROCEDURE — A9270 NON-COVERED ITEM OR SERVICE: HCPCS | Performed by: INTERNAL MEDICINE

## 2020-01-30 PROCEDURE — 99232 SBSQ HOSP IP/OBS MODERATE 35: CPT | Performed by: INTERNAL MEDICINE

## 2020-01-30 RX ORDER — PREDNISONE 10 MG/1
TABLET ORAL
Qty: 19 TAB | Refills: 0 | Status: SHIPPED | OUTPATIENT
Start: 2020-01-30 | End: 2020-02-11

## 2020-01-30 RX ORDER — MIDODRINE HYDROCHLORIDE 10 MG/1
10 TABLET ORAL EVERY 8 HOURS
Qty: 60 TAB | Refills: 1 | Status: SHIPPED | OUTPATIENT
Start: 2020-01-30 | End: 2020-06-06

## 2020-01-30 RX ORDER — FAMOTIDINE 20 MG/1
20 TABLET, FILM COATED ORAL DAILY
Status: DISCONTINUED | OUTPATIENT
Start: 2020-01-30 | End: 2020-01-30 | Stop reason: HOSPADM

## 2020-01-30 RX ORDER — FAMOTIDINE 20 MG/1
10 TABLET, FILM COATED ORAL DAILY
Qty: 60 TAB | Refills: 1 | Status: SHIPPED | OUTPATIENT
Start: 2020-01-31 | End: 2020-06-06

## 2020-01-30 RX ADMIN — ROSUVASTATIN CALCIUM 20 MG: 20 TABLET, FILM COATED ORAL at 06:31

## 2020-01-30 RX ADMIN — MIDODRINE HYDROCHLORIDE 10 MG: 5 TABLET ORAL at 14:49

## 2020-01-30 RX ADMIN — GUAIFENESIN 600 MG: 600 TABLET, EXTENDED RELEASE ORAL at 06:30

## 2020-01-30 RX ADMIN — METOPROLOL SUCCINATE 25 MG: 25 TABLET, EXTENDED RELEASE ORAL at 06:31

## 2020-01-30 RX ADMIN — PREDNISONE 30 MG: 20 TABLET ORAL at 06:30

## 2020-01-30 RX ADMIN — SEVELAMER CARBONATE 800 MG: 800 TABLET, FILM COATED ORAL at 12:47

## 2020-01-30 RX ADMIN — PIPERACILLIN AND TAZOBACTAM 4.5 G: 4; .5 INJECTION, POWDER, LYOPHILIZED, FOR SOLUTION INTRAVENOUS; PARENTERAL at 06:24

## 2020-01-30 RX ADMIN — MIDODRINE HYDROCHLORIDE 10 MG: 5 TABLET ORAL at 06:00

## 2020-01-30 RX ADMIN — FINASTERIDE 5 MG: 5 TABLET, FILM COATED ORAL at 06:31

## 2020-01-30 RX ADMIN — SEVELAMER CARBONATE 800 MG: 800 TABLET, FILM COATED ORAL at 08:38

## 2020-01-30 RX ADMIN — HEPARIN SODIUM 5000 UNITS: 5000 INJECTION, SOLUTION INTRAVENOUS; SUBCUTANEOUS at 06:32

## 2020-01-30 ASSESSMENT — ENCOUNTER SYMPTOMS
WHEEZING: 0
SHORTNESS OF BREATH: 0
ABDOMINAL PAIN: 0
FEVER: 0
WEIGHT LOSS: 0
HEMOPTYSIS: 0
NAUSEA: 0
VOMITING: 0
SPUTUM PRODUCTION: 0
SINUS PAIN: 0
PALPITATIONS: 0
DEPRESSION: 0
EYES NEGATIVE: 1
ORTHOPNEA: 0
CHILLS: 0
DIARRHEA: 0
COUGH: 0

## 2020-01-30 NOTE — CARE PLAN
Problem: Communication  Goal: The ability to communicate needs accurately and effectively will improve  Outcome: PROGRESSING AS EXPECTED  Note:   Discussed POC with pt including dialysis today, respiratory therapy treatments this evening, medications, as well as hopes to discharge soon.     Problem: Infection  Goal: Will remain free from infection  Outcome: PROGRESSING AS EXPECTED  Note:   Assessed pt for s/s of infection, administered IV abx as ordered per MAR.

## 2020-01-30 NOTE — PROGRESS NOTES
Assumed care of pt from day RN. Pt is sitting up in bed A&Ox4. Pt denies any pain at this time. Pt on 3L O2 via nasal canula, IV abx.     Talked with pt regarding his decision to have TAVR procedure or not, offered emotional support, pt hoping for discharge tomorrow, PT and OT have both recommended HH 3x a week.

## 2020-01-30 NOTE — FACE TO FACE
Face to Face Supporting Documentation - Home Health    The encounter with this patient was in whole or in part the primary reason for home health admission.    Date of encounter:   Patient:                    MRN:                       YOB: 2021  Gilberto Brown  [id number]  1940     Home health to see patient for:  Skilled Nursing care for assessment, interventions & education    Skilled need for:  Exacerbation of Chronic Disease State ESRD     Skilled nursing interventions to include:  Comment: skilled physical needs    Homebound status evidenced by:  Need the aid of supportive devices such as crutches, canes, wheelchairs or walkers. Leaving home requires a considerable and taxing effort. There is a normal inability to leave the home.    Community Physician to provide follow up care: Christine Zamudio M.D.     Optional Interventions? No      I certify the face to face encounter for this home health care referral meets the CMS requirements and the encounter/clinical assessment with the patient was, in whole, or in part, for the medical condition(s) listed above, which is the primary reason for home health care. Based on my clinical findings: the service(s) are medically necessary, support the need for home health care, and the homebound criteria are met.  I certify that this patient has had a face to face encounter by myself.  Han Bernal M.D. - NPI: [id number]

## 2020-01-30 NOTE — DISCHARGE PLANNING
Agency/Facility Name: [facility]  Spoke To: Juliet  Outcome: Patient's referral accepted.

## 2020-01-30 NOTE — DISCHARGE PLANNING
Agency/Facility Name: [facility]  Spoke To: Juliet  Outcome: Juliet wanted to know if patient had to have 2 more AFB cultures done before discharging.  [facility] can't see the patient until Sunday, 2/02/2020 and wants to make sure physician is in agreement.  This CCA spoke with Dr. Gutierrez regarding question from Juliet and Dr. Gutierrez stated that the patient will follow up with his PCP for additional culture.  Explained to Dr. Gutierrez that [facility] can't see patient until Sunday and he said that would be fine.    CHU Alex notified.

## 2020-01-30 NOTE — CARE PLAN
Problem: Safety  Goal: Will remain free from injury  Outcome: PROGRESSING AS EXPECTED  Note:   Ensure patient has staff with him when he is up out of bed.      Problem: Bowel/Gastric:  Goal: Normal bowel function is maintained or improved  Outcome: PROGRESSING AS EXPECTED  Note:   Chart daily on patients bowel movements.

## 2020-01-30 NOTE — DISCHARGE INSTRUCTIONS
Please take medications as prescribed  Please follow up with Primary Care Physician  Please come back to Emergency room if symptoms return or oxygen requirement go up.     Discharge Instructions    Discharged to home by car with relative. Discharged via wheelchair, hospital escort: Yes.  Special equipment needed: Not Applicable    Be sure to schedule a follow-up appointment with your primary care doctor or any specialists as instructed.     Discharge Plan:   Diet Plan: Discussed  Activity Level: Discussed  Confirmed Symptoms Management: Discussed  Medication Reconciliation Updated: Yes  Influenza Vaccine Indication: Not indicated: Previously immunized this influenza season and > 8 years of age    I understand that a diet low in cholesterol, fat, and sodium is recommended for good health. Unless I have been given specific instructions below for another diet, I accept this instruction as my diet prescription.       Special Instructions: None    · Is patient discharged on Warfarin / Coumadin?   No     Depression / Suicide Risk    As you are discharged from this [facility] facility, it is important to learn how to keep safe from harming yourself.    Recognize the warning signs:  · Abrupt changes in personality, positive or negative- including increase in energy   · Giving away possessions  · Change in eating patterns- significant weight changes-  positive or negative  · Change in sleeping patterns- unable to sleep or sleeping all the time   · Unwillingness or inability to communicate  · Depression  · Unusual sadness, discouragement and loneliness  · Talk of wanting to die  · Neglect of personal appearance   · Rebelliousness- reckless behavior  · Withdrawal from people/activities they love  · Confusion- inability to concentrate     If you or a loved one observes any of these behaviors or has concerns about self-harm, here's what you can do:  · Talk about it- your feelings and reasons for harming yourself  · Remove any  means that you might use to hurt yourself (examples: pills, rope, extension cords, firearm)  · Get professional help from the community (Mental Health, Substance Abuse, psychological counseling)  · Do not be alone:Call your Safe Contact- someone whom you trust who will be there for you.  · Call your local CRISIS HOTLINE 593-5191 or 901-702-6053  · Call your local Children's Mobile Crisis Response Team [place] (503) 431-9999 or www.[place]  · Call the toll free National Suicide Prevention Hotlines   · National Suicide Prevention Lifeline 876-048-AVRV (2661)  · National Hope Line Network 800-SUICIDE (938-4247)

## 2020-01-30 NOTE — RESPIRATORY CARE
COPD EDUCATION by COPD CLINICAL EDUCATOR  1/29/2020  at  3:00 PM by Polly Cornelius, RRT     Patient interviewed by COPD education team.  Patient unable to participate in full program.  Short intervention has been conducted.  A comprehensive packet including information about COPD and home treatments with nebulizer cleaning. Patient quit smoking 1990.

## 2020-01-30 NOTE — PROGRESS NOTES
Discharge instruction given. Both PIV removed and all questions answered with the wife at bedside. Patient stable and has no concerns or questions at this time. Brady, CNA escorting patient to discharge via wheelchair where the wife will provide transportation home.

## 2020-01-30 NOTE — DISCHARGE PLANNING
Received Choice form at 1010  Agency/Facility Name: [facility]  Referral sent per Choice form @ 5990

## 2020-01-30 NOTE — PROGRESS NOTES
Daily Progress Note:     Date of Service: 1/29/2020  Primary Team: [facility] IM [facility] Team   Attending: Cody Trammell M.D.   Senior Resident: Dr. Gutierrez  Intern: Dr. Bernal  Contact:  [phone]    Chief Complaint:   Fever, cough, shortness of breath    Subjective:  An 79-year-old male with past medical history of Wegener's granulomatosis with ESRD-hemodialysis since 9 years on long-term prednisone, COPD, severe aortic stenosis and bronchiectasis admitted on 1/25 with sepsis to ICU.    Overnight patient has no new complaints still have cough denies any fever, nausea, vomiting and abdominal pain.    Consultants/Specialty:  Palliative care    Review of Systems:    Constitutional positive for malaise/fatigue.  Negative for chills and fever  HENT negative for sore throat and ear pain  Eyes blurred vision or eye pain  Respiratory positive for cough and shortness of breath negative for hemoptysis  Cardiovascular negative for chest pain, palpitations, orthopnea and leg swelling  Gastrointestinal negative for abdominal pain, nausea, vomiting  Genitourinary patient on hemodialysis  Musculoskeletal negative for back pain, joint pains  Neurological negative for headache, weakness    Objective Data: Patient sitting in the bed in no acute distress was getting respiratory therapy with inhalers when I went and saw the patient.    OT recommended HH 3 times per week  PT recommended HH 3 times per week.    Physical Exam:   Vitals:   Temp:  [36.3 °C (97.3 °F)-36.6 °C (97.8 °F)] 36.6 °C (97.8 °F)  Pulse:  [83-88] 84  Resp:  [16-18] 18  BP: (120-132)/(51-61) 132/61  SpO2:  [99 %-100 %] 100 %    Physical exam  General elderly male sitting in bed in no acute distress with 3 L of oxygen by nasal cannula  HEENT no scleral icterus, mucous membrane pink and moist  Neck supple  Cardiac S1-S2 normal  murmur positive no elevated JVD  Respiratory air entry decreased bilaterally with mild expiratory wheezing  Abdominal soft nontender bowel  sounds normal  Extremities no edema  Skin warm  Neuro AO x4 no focal neurological deficit  Psychiatric affect, mood, judgment normal    Labs:   Recent Labs     01/27/20 0445 01/28/20  0410 01/29/20  0250   WBC 16.3* 11.0* 10.7   RBC 2.96* 2.94* 3.19*   HEMOGLOBIN 8.6* 8.5* 9.1*   HEMATOCRIT 27.6* 27.2* 29.7*   MCV 93.2 92.5 93.1   MCH 29.1 28.9 28.5   RDW 56.2* 56.0* 56.2*   PLATELETCT 212 224 257   MPV 9.4 9.3 9.3   NEUTSPOLYS 95.20* 96.80* 93.40*   LYMPHOCYTES 1.00* 0.90* 2.20*   MONOCYTES 3.30 1.60 3.50   EOSINOPHILS 0.00 0.10 0.00   BASOPHILS 0.10 0.10 0.20     Recent Labs     01/27/20 0445 01/28/20  0410 01/29/20  0250   SODIUM 133* 136 135   POTASSIUM 3.8 4.6 4.7   CHLORIDE 93* 95* 96   CO2 24 29 24   GLUCOSE 160* 142* 151*   BUN 55* 27* 46*     Recent Labs     01/27/20 0445 01/28/20  0410 01/29/20  0250   ALBUMIN 3.3 3.2 3.6   TBILIRUBIN 0.3 0.4 0.4   ALKPHOSPHAT 49 54 65   TOTPROTEIN 6.3 6.1 6.5   ALTSGPT 13 18 32   ASTSGOT 9* 15 21   CREATININE 4.84* 2.63* 3.88*     Imaging:   EC-ECHOCARDIOGRAM COMPLETE W/O CONT   Final Result      DX-CHEST-PORTABLE (1 VIEW)   Final Result      1.  Patchy bibasilar infiltrate or atelectasis and probable small pleural effusions.   2.  Stable cardiomegaly.   3.  Evidence of old granulomatous disease.          COPD exacerbation (HCC)- (present on admission)  Assessment & Plan  Assessment: Diffuse wheezing, increase in sputum production with color change. Likely COPD exacerbation with pulmonary infection. He has had significant improvement with standard COPD exacerbation therapy.    Plan:  Duonebs Q4H  RT protocol  Prednisone 40mg x4 days  then treatment per wegeners  Chest physiotherapy    Elevated troponin  Assessment & Plan  Assessment: Elevated troponin in the setting of ESRD, sepsis, and tachycardia secondary to pneumonia.  No chest pain, EKG shows right bundle branch block.  Very low suspicion for primary acute coronary syndrome, likely demand ischemia. Troponins  trended and decreased.    Plan:  Treat primary etiology with fluids, antibiotics, continue metoprolol at reduced dosing interval      Wegener's granulomatosis (HCC)- (present on admission)  Assessment & Plan  Diagnosed via renal biopsy, on prednisone alone.  COPD exacerbation got prednisone 40 mg for 4 days   Follow-up with Dr. Cintia Ariza for Wegener's   Called Dr. 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   Appears to follow with Dr. Corona of [facility].    Plan:  Continue dialysis MWF  For palliative care consult  discussed with patient he agreed to talk to them    Severe aortic stenosis  Assessment & Plan  Assessment: Patient has severe aortic stenosis with unknown EF pending TAVR in [place].    Plan:  Maintain preload, continue metoprolol to allow diastolic filling time as it has minimal effect on BP and will allow improved coronary perfusion in the setting of demand ichemia  Echocardiogram to quantify ejection fraction  He is weighing whether to proceed with TAVR, if he decides against it a hospice consultation would be appropriate.

## 2020-01-30 NOTE — CONSULTS
Reason for PC Consult: Advance Care Planning    Consulted by:   Dr. Bernal    Assessment:  General:   79 year old male admitted for community acquired pneumonia on 1/25/20. Pt has a history of A-fib, hemodialysis dependent ESRD, Wegener's Disease, anemia, arthritis, asthma, BPH, bronchitis, cataract, COPD, DLD, emphysema, bronchiectasis, GERD, HTN,  Sick sinus syndrome, sleep apnea, stroke, and severe aortic stenosis.     Dyspnea: No, 100% on RA  Last BM: 01/30/20    Pain: No   Depression: No    Dementia: No      Spiritual:  Is Yarsani or spirituality important for coping with this illness? Yes, Pt declined visit from , anticipates he will be discharged soon  Has a  or spiritual provider visit been requested? No    Palliative Performance Scale: 60%    Advance Directive: Advance Directive    DPOA: Yes, Crys Stephanie; 1st Alt Eyal Brown; 2nd Alt Raimundo Brown  POLST: None on File      Code Status: DNR/DNI      Outcome:  PC RN visited pt and wife, Crys, at bedside. Introduced self and role of PC. Discussed their understanding of clinical picture, Pt verbalized great insight into aortic stenosis, wegener's disease, and lung condition. Long discussion about pt's thoughts and feelings about diagnosis and multiple hospital admissions the last few years.    Discussed quality vs quantity of life, benefits vs burdens of treatment and explored pt's beliefs and values about end of life. Pt reflected on his friends from dialysis and the supportive network he had created through the 9 years of dialysis. Pt expressed feeling tired from dialysis and the long recovery period had has now. Pt has also had several close friends he made while at dialysis who have stopped and started hospice. Pt reflected on how he had been approach privately so they could say good-bye and how he felt the next dialysis day when they were not there. Pt has been thinking about when he would feel that way, if it's tough making  "that decision, and how he would tell his friends at the dialysis center. Pt expressed feeling like he is \"getting close\" to that point. Crys verbalized agreement and how she has witnessed how his exhaustion from dialysis has slowly changed pt's life.    Discussed pt's thoughts and feelings about TAVR, Discussed concern for pt's feelings about dialysis and what that means for the procedure and recovery post TAVR. Discussed enduring the risks of TAVR just to quit dialysis weeks or months later. Discussed the goal of TAVR is the help pt breath better, explored pt's thoughts about the risk of pt's co-mobidities, chronic issues and disease trajectory continuing regardless of TAVR. Pt stated that he understands that if he gets the TAVR, his kidneys are still gone and he will still have the underlying lung disease. Pt and Crys both verbalized understanding and agreement. PC RN encouraged them to discuss more in depth about long term goals of TAVR, dialysis and pt's quality of life.     Discussed hospice in detail, philosophy, goals and support provided. Educated pt and Crys on how to obtain hospice referral IP vs OP. Crys verbalized understanding, her sister had hospice a year ago while she was dying of lung cancer. Pt verbalized he was placed on hospice when his kidney's failed but came off once Dr. Najjar diagnosed him with Wegener's Disease. Pt stated when he wants to stop dialysis and is ready then he will obtain hospice services. Pt shared his near death experience when he was younger, he is not afraid of dying and knows how peaceful it is. He just does not want to leave his family behind.      Discussed resuscitation, AD and POLST form. Pt confirmed he wishes to remain DNAR/DNI at this time. Reviewed AD, pt confirmed it still reflects his wishes. Briefly reviewed POLST, pt declined at this time. He would like to see what [facility] has to say about the TAVR.     Active listening, education, validation, " normalization, therapeutic touch, and emotional support provided throughout encounter.    Updated:   Dr. Bernal    Plan:   Discharge to home with home health services, pt to follow up with [facility] regarding TAVR, pt and wife to discuss GOC with family once at home.       Thank you for allowing Palliative Care to participate in this patient's care. Please feel free to call [phone] with any questions or concerns.

## 2020-01-30 NOTE — THERAPY
"Occupational Therapy Evaluation completed.   Functional Status:  Pt presents to skilled OT services following PNA. Pt performed LB dressing with min a, ambulated within room and short distance in hallway with sba using FWW,  c/o increased fatigue with mobility, cues required for activity pacing. Pt reports over the last month or so decreased activity tolerance to complete showers and mobility, discussed LB modifications and use of tub t/f bench and provided home equipment resource guide. Spouse present throughout the session and reports able to assist with light ADLs and IADLs upon discharge. Pt and spouse would like pt to return home if possible once medically cleared for d/c. Will follow while in house and recommend HH OT services to address higher level ADLs and home modifications as needed.   Plan of Care: Will benefit from Occupational Therapy 3 times per week  Discharge Recommendations:  Equipment: Tub Transfer Bench. Post-acute therapy Recommend home health for continued occupational therapy services.         See \"Rehab Therapy-Acute\" Patient Summary Report for complete documentation.    "

## 2020-01-30 NOTE — PROGRESS NOTES
Nephrology Daily Progress Note    Date of Service  1/30/2020    Chief Complaint  79 y.o. male with ESRD/HD MWF schedule, admitted 1/25/2020 with PNA    Interval Problem Update  Doing much better  No complaints  HD MWF    Review of Systems  Review of Systems   Constitutional: Negative for chills, fever, malaise/fatigue and weight loss.   HENT: Negative for congestion, hearing loss and sinus pain.    Eyes: Negative.    Respiratory: Negative for cough, hemoptysis, shortness of breath and wheezing.    Cardiovascular: Negative for chest pain, palpitations, orthopnea and leg swelling.   Gastrointestinal: Negative for abdominal pain, diarrhea, nausea and vomiting.   Skin: Negative.    All other systems reviewed and are negative.       Physical Exam  Temp:  [36.2 °C (97.1 °F)-37.1 °C (98.7 °F)] 36.2 °C (97.1 °F)  Pulse:  [70-83] 78  Resp:  [14-16] 16  BP: (106-119)/(50-58) 115/51  SpO2:  [98 %-100 %] 100 %    Physical Exam  Vitals signs and nursing note reviewed.   Constitutional:       General: He is not in acute distress.     Appearance: Normal appearance. He is well-developed. He is not diaphoretic.   HENT:      Head: Normocephalic and atraumatic.      Nose: Nose normal.      Mouth/Throat:      Mouth: Mucous membranes are moist.      Pharynx: Oropharynx is clear.   Eyes:      Extraocular Movements: Extraocular movements intact.      Conjunctiva/sclera: Conjunctivae normal.      Pupils: Pupils are equal, round, and reactive to light.   Neck:      Musculoskeletal: Normal range of motion and neck supple.   Cardiovascular:      Rate and Rhythm: Normal rate and regular rhythm.      Heart sounds: No friction rub. No gallop.    Pulmonary:      Effort: Pulmonary effort is normal. No respiratory distress.      Breath sounds: Normal breath sounds. No wheezing or rales.   Abdominal:      General: Abdomen is flat. Bowel sounds are normal. There is no distension.      Palpations: Abdomen is soft. There is no mass.      Tenderness:  There is no tenderness. There is no guarding.   Musculoskeletal:         General: No swelling.      Right lower leg: No edema.      Left lower leg: No edema.   Skin:     General: Skin is warm and dry.      Coloration: Skin is not jaundiced.      Findings: Bruising present. No erythema or rash.   Neurological:      General: No focal deficit present.      Mental Status: He is alert and oriented to person, place, and time.      Cranial Nerves: No cranial nerve deficit.      Coordination: Coordination normal.         Fluids    Intake/Output Summary (Last 24 hours) at 1/30/2020 1038  Last data filed at 1/30/2020 0323  Gross per 24 hour   Intake 740 ml   Output 3200 ml   Net -2460 ml       Laboratory  Recent Labs     01/28/20  0410 01/29/20  0250 01/30/20  0411   WBC 11.0* 10.7 8.2   RBC 2.94* 3.19* 3.18*   HEMOGLOBIN 8.5* 9.1* 8.9*   HEMATOCRIT 27.2* 29.7* 29.4*   MCV 92.5 93.1 92.5   MCH 28.9 28.5 28.0   MCHC 31.3* 30.6* 30.3*   RDW 56.0* 56.2* 56.4*   PLATELETCT 224 257 237   MPV 9.3 9.3 9.3     Recent Labs     01/28/20  0410 01/29/20  0250 01/30/20  0411   SODIUM 136 135 135   POTASSIUM 4.6 4.7 3.8   CHLORIDE 95* 96 95*   CO2 29 24 28   GLUCOSE 142* 151* 147*   BUN 27* 46* 36*   CREATININE 2.63* 3.88* 3.02*   CALCIUM 8.4* 8.7 8.4*         No results for input(s): NTPROBNP in the last 72 hours.        Imaging  reviewed    Assessment/Plan  1.ESRD/HD -MWF -will dialyze tomorrow  2.HTN: BP well controlled  3.Electrolytes: well controlled.  4.Anemia: Hb stable -Epogen with HD  5.PNA -clinically better  6.Volume:well controlled    Recs; dialysis tomorrow             Renal diet             All meds to renal doses             Will follow

## 2020-01-30 NOTE — DISCHARGE PLANNING
Agency/Facility Name: [facility]   Outcome: Left a voice message for Juliet regarding the patient's referral.  Requested a call back.

## 2020-01-30 NOTE — DISCHARGE PLANNING
ATTN: Case Management  RE: Referral for Home Health    Reason for referral denial: ACUITY               Unfortunately, we are not able to accept this referral for the reason listed above. If further clarity is needed, our Transitional Care Specialists are available to discuss any barriers to service at [phone].      We look forward to collaborating with you in the future,  [facility]

## 2020-01-30 NOTE — DISCHARGE PLANNING
Agency/Facility Name: [facility]  Spoke To: Via note  Outcome: Patient's referral declined, acuity.

## 2020-01-31 LAB — GLUCOSE BLD-MCNC: 126 MG/DL (ref 65–99)

## 2020-02-03 ENCOUNTER — TELEPHONE (OUTPATIENT)
Dept: CARDIOLOGY | Facility: MEDICAL CENTER | Age: 80
End: 2020-02-03

## 2020-02-03 NOTE — TELEPHONE ENCOUNTER
"Patient's wife calling with questions concerning patients review for TAVR.     Patient's wife states that the patient was told by the palliative care nurse while hospitalized, that the TAVR team reviewed his medical records and determined that the patient is not a good candidate for TAVR.    Patient's wife states that the patient has completed a second opinion consultation at [facility] and was \"accepted\" by the team at this facility.     Patient's wife would like to know why patient would be accepted by [facility] and not [facility].     Reviewed patient's medical records with wife explaining that per Palliative Care Nurse notes, the heart team recommended against TAVR due to multiple co-morbidities, including currently being established on hemodialysis.     Patient's wife states that she believes that the palliative care nurse \"made it up, and his records were never reviewed by the TAVR team\". Wife believes that the TAVR team never provided recommendation that patient was too high risk for TAVR here at [facility].     Attempted to assure wife, that if/when patient completes consultation with CTS or IC, a consultation note would be entered into patient's medical record, and charges for such would be applied accordingly.     Attempted to explain that though the patient did not undergo formal consultation with CTS and/or IC,resulting in consultation notes entered into medical record, does not discount that they may have reviewed patient's medical record and determine that the risks outweigh the benefits of proceeding with TAVR.     Reviewed with wife that our heart team practices evidence based practice in determining candidacy for TAVR, and much research has shown that patients with ESRD on dialysis, often times do not have favorable outcomes post-TAVR.     Offered wife to schedule for outpatient consultation with IC. Wife declines reiterating that patient has been accepted by [facility] TAVR team. "     Patient's wife states understanding of such information, states no further questions at this time, and is thankful for the assistance today.

## 2020-02-26 LAB
FUNGUS SPEC CULT: NORMAL
SIGNIFICANT IND 70042: NORMAL
SITE SITE: NORMAL
SOURCE SOURCE: NORMAL

## 2020-03-05 DIAGNOSIS — M31.31 GRANULOMATOSIS WITH POLYANGIITIS WITH RENAL INVOLVEMENT (HCC): ICD-10-CM

## 2020-03-12 ENCOUNTER — HOSPITAL ENCOUNTER (OUTPATIENT)
Dept: LAB | Facility: MEDICAL CENTER | Age: 80
End: 2020-03-12
Attending: INTERNAL MEDICINE
Payer: MEDICARE

## 2020-03-12 DIAGNOSIS — M31.31 GRANULOMATOSIS WITH POLYANGIITIS WITH RENAL INVOLVEMENT (HCC): ICD-10-CM

## 2020-03-12 PROCEDURE — 86255 FLUORESCENT ANTIBODY SCREEN: CPT

## 2020-03-12 PROCEDURE — 36415 COLL VENOUS BLD VENIPUNCTURE: CPT

## 2020-03-14 LAB — ANCA IGG TITR SER IF: NORMAL {TITER}

## 2020-03-16 ENCOUNTER — HOSPITAL ENCOUNTER (OUTPATIENT)
Dept: LAB | Facility: MEDICAL CENTER | Age: 80
End: 2020-03-16
Attending: INTERNAL MEDICINE
Payer: MEDICARE

## 2020-03-16 LAB
ALBUMIN SERPL BCP-MCNC: 3.7 G/DL (ref 3.2–4.9)
ALBUMIN/GLOB SERPL: 1.2 G/DL
ALP SERPL-CCNC: 68 U/L (ref 30–99)
ALT SERPL-CCNC: 8 U/L (ref 2–50)
ANION GAP SERPL CALC-SCNC: 13 MMOL/L (ref 7–16)
ANISOCYTOSIS BLD QL SMEAR: ABNORMAL
AST SERPL-CCNC: 13 U/L (ref 12–45)
BASOPHILS # BLD AUTO: 0.3 % (ref 0–1.8)
BASOPHILS # BLD: 0.02 K/UL (ref 0–0.12)
BILIRUB SERPL-MCNC: 0.5 MG/DL (ref 0.1–1.5)
BUN SERPL-MCNC: 49 MG/DL (ref 8–22)
BURR CELLS BLD QL SMEAR: NORMAL
CALCIUM SERPL-MCNC: 9.7 MG/DL (ref 8.5–10.5)
CHLORIDE SERPL-SCNC: 98 MMOL/L (ref 96–112)
CO2 SERPL-SCNC: 30 MMOL/L (ref 20–33)
COMMENT 1642: NORMAL
CREAT SERPL-MCNC: 3.69 MG/DL (ref 0.5–1.4)
EOSINOPHIL # BLD AUTO: 0.07 K/UL (ref 0–0.51)
EOSINOPHIL NFR BLD: 0.9 % (ref 0–6.9)
ERYTHROCYTE [DISTWIDTH] IN BLOOD BY AUTOMATED COUNT: 66 FL (ref 35.9–50)
GLOBULIN SER CALC-MCNC: 3.1 G/DL (ref 1.9–3.5)
GLUCOSE SERPL-MCNC: 74 MG/DL (ref 65–99)
HCT VFR BLD AUTO: 37.3 % (ref 42–52)
HGB BLD-MCNC: 11.5 G/DL (ref 14–18)
IMM GRANULOCYTES # BLD AUTO: 0.03 K/UL (ref 0–0.11)
IMM GRANULOCYTES NFR BLD AUTO: 0.4 % (ref 0–0.9)
LYMPHOCYTES # BLD AUTO: 0.46 K/UL (ref 1–4.8)
LYMPHOCYTES NFR BLD: 6.1 % (ref 22–41)
MACROCYTES BLD QL SMEAR: ABNORMAL
MCH RBC QN AUTO: 29.4 PG (ref 27–33)
MCHC RBC AUTO-ENTMCNC: 30.8 G/DL (ref 33.7–35.3)
MCV RBC AUTO: 95.4 FL (ref 81.4–97.8)
MONOCYTES # BLD AUTO: 0.82 K/UL (ref 0–0.85)
MONOCYTES NFR BLD AUTO: 10.9 % (ref 0–13.4)
MORPHOLOGY BLD-IMP: NORMAL
NEUTROPHILS # BLD AUTO: 6.15 K/UL (ref 1.82–7.42)
NEUTROPHILS NFR BLD: 81.4 % (ref 44–72)
NRBC # BLD AUTO: 0 K/UL
NRBC BLD-RTO: 0 /100 WBC
OVALOCYTES BLD QL SMEAR: NORMAL
PLATELET # BLD AUTO: 214 K/UL (ref 164–446)
PLATELET BLD QL SMEAR: NORMAL
PMV BLD AUTO: 9.2 FL (ref 9–12.9)
POIKILOCYTOSIS BLD QL SMEAR: NORMAL
POTASSIUM SERPL-SCNC: 4.7 MMOL/L (ref 3.6–5.5)
PROT SERPL-MCNC: 6.8 G/DL (ref 6–8.2)
RBC # BLD AUTO: 3.91 M/UL (ref 4.7–6.1)
RBC BLD AUTO: PRESENT
SODIUM SERPL-SCNC: 141 MMOL/L (ref 135–145)
WBC # BLD AUTO: 7.6 K/UL (ref 4.8–10.8)

## 2020-03-16 PROCEDURE — 85025 COMPLETE CBC W/AUTO DIFF WBC: CPT

## 2020-03-16 PROCEDURE — 36415 COLL VENOUS BLD VENIPUNCTURE: CPT

## 2020-03-16 PROCEDURE — 80053 COMPREHEN METABOLIC PANEL: CPT

## 2020-04-04 ENCOUNTER — PATIENT MESSAGE (OUTPATIENT)
Dept: PULMONOLOGY | Facility: HOSPICE | Age: 80
End: 2020-04-04

## 2020-04-04 DIAGNOSIS — J47.9 BRONCHIECTASIS WITHOUT COMPLICATION (HCC): ICD-10-CM

## 2020-04-06 RX ORDER — SODIUM CHLORIDE FOR INHALATION 7 %
VIAL, NEBULIZER (ML) INHALATION
Qty: 240 ML | Refills: 0 | Status: SHIPPED | OUTPATIENT
Start: 2020-04-06 | End: 2020-08-31

## 2020-04-06 NOTE — TELEPHONE ENCOUNTER
Have we ever prescribed this med? Yes.  If yes, what date? 12/16/19    Last OV: 10/30/19 DOLLY Gutierrez MD     Next OV: No Follow up on File; Pt was due back 1/30/2020    DX: Bronchiectasis without complication (HCC) (J47.9)    Medications: sodium chloride (HYPER-SAL) 7 % Nebu Soln    Pt will need ov for further fills.

## 2020-04-07 ENCOUNTER — TELEPHONE (OUTPATIENT)
Dept: PULMONOLOGY | Facility: HOSPICE | Age: 80
End: 2020-04-07

## 2020-04-07 NOTE — TELEPHONE ENCOUNTER
MEDICATION PRIOR AUTHORIZATION NEEDED:    1. Name of Medication: Sodium Chloride 7% neb soln    2. Requested By (Name of Pharmacy): [facility]     3. Is insurance on file current? yes    4. What is the name & phone number of the 3rd party payor? [employer] [phone]

## 2020-04-07 NOTE — PATIENT COMMUNICATION
INEZ Aldrich.  You Yesterday (7:36 AM)      Can you pend RX and send back to me?      Rx's already filled

## 2020-04-08 NOTE — TELEPHONE ENCOUNTER
DOCUMENTATION OF PRIOR AUTH STATUS    1. Medication name and dose: Sodium Chloride 7% solution    2. Name and Phone # of Prescription coverage company: [employer] [phone]    3. Date Prior Auth was submitted: 4/7/2020    4. What information was given to obtain insurance decision: Clinical notes    5. Prior Auth letter Approved or Denied: Denied    6. Pharmacy notified: No    7. Patient notified: No    Sodium Chloride 7% solution was denied.  It is an exclusion on the patient's plan.  Please advise, thank you.    Bill to MDCR Part B.

## 2020-04-29 ENCOUNTER — RX ONLY (OUTPATIENT)
Age: 80
Setting detail: RX ONLY
End: 2020-04-29

## 2020-04-29 ENCOUNTER — APPOINTMENT (RX ONLY)
Dept: URBAN - METROPOLITAN AREA CLINIC 36 | Facility: CLINIC | Age: 80
Setting detail: DERMATOLOGY
End: 2020-04-29

## 2020-04-29 PROBLEM — C44.91 BASAL CELL CARCINOMA OF SKIN, UNSPECIFIED: Status: ACTIVE | Noted: 2020-04-29

## 2020-04-29 PROCEDURE — 11900 INJECT SKIN LESIONS </W 7: CPT

## 2020-04-29 PROCEDURE — ? INJECTION

## 2020-04-29 RX ORDER — FLUOCINONIDE 0.5 MG/G
CREAM TOPICAL
Qty: 1 | Refills: 12

## 2020-04-29 NOTE — PROCEDURE: INJECTION
Medication (1) And Associated J-Code Units: Bleomycin, 15 units
Route: IL
Post-Care Instructions: I reviewed with the patient in detail post-care instructions. Patient understands to keep the injection sites clean and call the clinic if there is any redness, swelling or pain.
Hide Second Medication?: No
units
Consent: The risks of the medication was reviewed with the patient.
Dose Administered (Numbers Only - Mg, G, Mcg, Units, Cc): 0.3
Render J-Code Information In Note?: yes
Procedure Information: Please note that the numeric value listed in the Medication (1) and associated J-code units and Medication (2) and associated J-code units variables are j-code amounts and do not represent either the concentration or the total amount of the medications injected.  I strongly recommend selecting no to the Render J-code information in note question. This will allow your note to be more clear. If you are billing j-codes with your injection codes you need to document the total amount of the medication injected. This amount should match the j-code units. For example, if you are injecting Triamcinolone 40mg as an intramuscular injection you would select 40 for the dose field and mg for the units. This would allow you to document  with 4 units (40mg = 10mg x 4). The total volume is not used to calculate j-codes only the amount of the medication administered.
Detail Level: None
Dose Administered (Numbers Only - Mg, G, Mcg, Units, Cc): 0

## 2020-05-26 ENCOUNTER — OFFICE VISIT (OUTPATIENT)
Dept: PULMONOLOGY | Facility: HOSPICE | Age: 80
End: 2020-05-26
Payer: MEDICARE

## 2020-05-26 VITALS
HEIGHT: 68 IN | RESPIRATION RATE: 16 BRPM | OXYGEN SATURATION: 93 % | WEIGHT: 123 LBS | HEART RATE: 68 BPM | SYSTOLIC BLOOD PRESSURE: 106 MMHG | TEMPERATURE: 98.2 F | DIASTOLIC BLOOD PRESSURE: 60 MMHG | BODY MASS INDEX: 18.64 KG/M2

## 2020-05-26 DIAGNOSIS — J98.8 CHRONIC RESPIRATORY INFECTION: ICD-10-CM

## 2020-05-26 DIAGNOSIS — M31.30 GRANULOMATOSIS WITH POLYANGIITIS, UNSPECIFIED WHETHER RENAL INVOLVEMENT (HCC): Chronic | ICD-10-CM

## 2020-05-26 DIAGNOSIS — J96.11 CHRONIC RESPIRATORY FAILURE WITH HYPOXIA (HCC): ICD-10-CM

## 2020-05-26 DIAGNOSIS — G47.33 OSA (OBSTRUCTIVE SLEEP APNEA): Chronic | ICD-10-CM

## 2020-05-26 DIAGNOSIS — J44.9 CHRONIC OBSTRUCTIVE PULMONARY DISEASE, UNSPECIFIED COPD TYPE (HCC): Chronic | ICD-10-CM

## 2020-05-26 DIAGNOSIS — Z22.39 PSEUDOMONAS AERUGINOSA COLONIZATION: Chronic | ICD-10-CM

## 2020-05-26 DIAGNOSIS — E43 PROTEIN-CALORIE MALNUTRITION, SEVERE (HCC): ICD-10-CM

## 2020-05-26 DIAGNOSIS — R91.8 ABNORMAL CT SCAN OF LUNG: Chronic | ICD-10-CM

## 2020-05-26 PROCEDURE — 99214 OFFICE O/P EST MOD 30 MIN: CPT | Performed by: INTERNAL MEDICINE

## 2020-05-26 RX ORDER — CIPROFLOXACIN 500 MG/1
500 TABLET, FILM COATED ORAL 2 TIMES DAILY
Qty: 20 TAB | Refills: 0 | Status: SHIPPED | OUTPATIENT
Start: 2020-05-26 | End: 2020-06-02

## 2020-05-26 ASSESSMENT — FIBROSIS 4 INDEX: FIB4 SCORE: 1.72

## 2020-05-26 ASSESSMENT — PAIN SCALES - GENERAL: PAINLEVEL: 2=MINIMAL-SLIGHT

## 2020-05-26 NOTE — PATIENT INSTRUCTIONS
Gilberto comes in today with his wife Crys, they were  in 1961.  They have been to the pulmonary clinic for many years.    He has a very complicated history of bronchiectasis chronic Pseudomonas prior MAC and does have underlying dialysis.    He has bilateral rales on exam, although no fever he does have green sputum, with his chronic Pseudomonas colonization it appears to be infected and I am initiating Cipro 500 mg twice a day for 10 days.  If he develops fever shortness of breath he will have to go to the emergency room and he reluctantly understands this.    His COPD and asthma are controlled but Breo is too expensive.  I changed him back to the Advair 250 which she is used in the past, cost is more in his range and  And this is prescribed.    He also has a history of Wegener's is on prednisone 5 mg a day followed by rheumatology.    I reviewed his last x-ray in January it showed chronic changes, granulomatous changes no acute process.  We have a low threshold for reimaging if he does not respond to the Cipro.  Recheck in 3 months sooner if problems

## 2020-05-26 NOTE — PROGRESS NOTES
Gilberto Brown is a 79 y.o. male here for COPD and bronchiectasis with chronic Pseudomonas. Patient was referred by his primary care.    History of Present Illness:      Gilberto comes in today with his wife Crys, they were  in 1961.  They have been to the pulmonary clinic for many years.    He has a very complicated history of bronchiectasis chronic Pseudomonas prior MAC and does have underlying dialysis.    He has bilateral rales on exam, although no fever he does have green sputum, with his chronic Pseudomonas colonization it appears to be infected and I am initiating Cipro 500 mg twice a day for 10 days.  If he develops fever shortness of breath he will have to go to the emergency room and he reluctantly understands this.    His COPD and asthma are controlled but Breo is too expensive.  I changed him back to the Advair 250 which she is used in the past, cost is more in his range and  And this is prescribed.    He also has a history of Wegener's is on prednisone 5 mg a day followed by rheumatology.    I reviewed his last x-ray in January it showed chronic changes, granulomatous changes no acute process.  We have a low threshold for reimaging if he does not respond to the Cipro.  Recheck in 3 months sooner if problems    Constitutional ROS: No unexpected change in weight, No unexplained fevers; generally weak  Eyes: No change in vision or blurring or double vision  Mouth/Throat ROS: No sore throat, No recent change in voice or hoarseness  Pulmonary ROS: See present history for pertinent positives  Cardiovascular ROS: No chest pain to suggest acute coronary syndrome cardiac procedure did not restore much in the way of exercise  Gastrointestinal ROS: No abdominal pain to suggest peptic disease  Musculoskeletal/Extremities ROS: no acute artritis or unusual swelling  Hematologic/Lymphatic ROS: No easy bleeding or unusual lymph node swelling  Neurologic ROS: No new or unusual weakness  Psychiatric ROS: No  hallucinations  Allergic/Immunologic: No  urticaria or allergic rash      Current Outpatient Medications   Medication Sig Dispense Refill   • fluticasone-salmeterol (ADVAIR DISKUS) 250-50 MCG/DOSE AEROSOL POWDER, BREATH ACTIVATED Inhale 1 Puff by mouth 2 times a day. Rinse mouth after each use. 1 Inhaler 0   • ciprofloxacin (CIPRO) 500 MG Tab Take 1 Tab by mouth 2 times a day for 7 days. 20 Tab 0   • ipratropium-albuterol (DUONEB) 0.5-2.5 (3) MG/3ML nebulizer solution 3 mL by Nebulization route every 6 hours as needed for Shortness of Breath. 360 mL 11   • sodium chloride (HYPER-SAL) 7 % Nebu Soln INHALE 1 VIAL VIA NEBULIZER TWICE DAILY 240 mL 0   • famotidine (PEPCID) 20 MG Tab Take 0.5 Tabs by mouth every day. 60 Tab 1   • predniSONE (DELTASONE) 1 MG Tab Take 5 mg by mouth every day.     • rosuvastatin (CRESTOR) 20 MG Tab Take 20 mg by mouth every day.  3   • Multiple Vitamins-Minerals (CENTRUM SILVER PO) Take 1 Tab by mouth every day.     • metoprolol SR (TOPROL XL) 25 MG TABLET SR 24 HR Take 25 mg by mouth 2 Times a Day.  3   • albuterol (PROAIR HFA) 108 (90 Base) MCG/ACT Aero Soln inhalation aerosol Inhale 2 Puffs by mouth every 6 hours as needed for Shortness of Breath. 8.5 g 0   • B Complex-C-Folic Acid (DIALYVITE TABLET) Tab Take 1 Tab by mouth every morning.     • finasteride (PROSCAR) 5 MG Tab Take 5 mg by mouth every morning.     • midodrine (PROAMATINE) 10 MG tablet Take 1 Tab by mouth every 8 hours. 60 Tab 1   • sevelamer (RENAGEL) 800 MG Tab Take 800 mg by mouth 3 times a day.     • tamsulosin (FLOMAX) 0.4 MG capsule TAKE 1 CAPSULE BY MOUTH EVERY DAY 90 Cap 2   • Home Care Oxygen Inhale 3 L/min by mouth Continuous.     • epoetin lucina (EPOGEN,PROCRIT) 96266 UNIT/ML Solution Inject 0.5 mL as instructed every Monday, Wednesday, and Friday. 30 mL 0     No current facility-administered medications for this visit.        Social History     Tobacco Use   • Smoking status: Former Smoker     Packs/day: 0.00      Years: 30.00     Pack years: 0.00     Types: Pipe     Last attempt to quit: 1990     Years since quittin.4   • Smokeless tobacco: Former User     Types: Chew   Substance Use Topics   • Alcohol use: Yes     Alcohol/week: 1.2 oz     Types: 1 Glasses of wine, 1 Cans of beer per week     Frequency: 2-3 times a week     Drinks per session: 1 or 2     Binge frequency: Never     Comment: 1/2 glass Red wine nightly   • Drug use: No        Past Medical History:   Diagnosis Date   • A-fib ([facility])        • Anemia    • Arthritis     arms, legs, shoulders   • ASTHMA    • BPH (benign prostatic hyperplasia)    • Breath shortness    • Bronchitis     chronic   • Cataract     removed bilat   • COPD    • Dialysis     [facility] ,    • Dyslipidemia    • EMPHYSEMA    • GERD (gastroesophageal reflux disease)    • Hypertension    • Oxygen decrease     O2 3liters @  and dialysis   • Pacemaker    • Pain 2018    shoulders/hips, 5/10   • Pneumonia    • Pseudomonas pneumonia ([facility])    • Pulmonary histoplasmosis ([facility])    • Renal disorder     CKD,    • Sick sinus syndrome ([facility])    • Sleep apnea     uses cpap at [facility]   • Snoring    • Stroke ([facility]) 2018   • Unspecified hemorrhagic conditions     bruises easily, fragile skin   • Wegener's disease, pulmonary ([facility])        Past Surgical History:   Procedure Laterality Date   • AV FISTULOGRAM Right 2016    Procedure: AV FISTULOGRAM , VENOPLASTY X 2;  Surgeon: Nate Syed M.D.;  Location: SURGERY [facility];  Service:    • RECOVERY  9/3/2015    Procedure: IR1 VASCULAR CASE-ELENO RIGHT DIALYSIS FISTULOGRAM, POSSIBLE INTERVENTION;  Surgeon: Recoveryonly Surgery;  Location: SURGERY PRE-POST PROC UNIT [facility];  Service:    • RECOVERY  2015    Performed by Ir-Recovery Surgery at SURGERY SAME DAY [facility]   • RECOVERY  10/3/2014    Performed by Ir-Recovery Surgery at [facility]   • RECOVERY  2014    Performed by  "Ir-Recovery Surgery at [facility] ORS   • RECOVERY  12/17/2013    Performed by Ir-Recovery Surgery at SURGERY SAME DAY [facility]   • BRONCHOSCOPY-ENDO  7/18/2013    Performed by Juan F Rubio M.D. at [facility]   • RECOVERY  7/17/2013    Performed by Ir-Recovery Surgery at [facility] DAY [facility] OR[facility]   • AV FISTULA REVISION  6/9/2012    Performed by NESSA JOHANSEN at SURGERY [facility] ORS   • RECOVERY  4/19/2012    Performed by SURGERY, IR-RECOVERY at SURGERY SAME DAY [facility] ORS   • AV FISTULA CREATION  3/8/2012    Performed by NESSA JOHANSEN at [facility]   • GASTROSCOPY WITH BIOPSY  1/17/2012    Performed by ZURDO HARRISON at [facility]   • CATH PLACEMENT  10/8/2011    Performed by NESSA JOHANSEN at [facility]   • GASTROSCOPY WITH BALLOON DILATATION  9/28/2011    Performed by KT FERNANDEZ at [facility]   • PACEMAKER INSERTION  4/2009   • ROTATOR CUFF REPAIR  2003    right   • HERNIA REPAIR  1990    right inguinal hernia   • HIP REPLACEMENT, TOTAL      right   • TONSILLECTOMY         Allergies: Doxycycline; Erythromycin; and Rituximab    Family History   Problem Relation Age of Onset   • Stroke Father    • Heart Disease Father    • Stroke Mother    • Cancer Other        Physical Examination    Vitals:    05/26/20 1012   Height: 1.727 m (5' 8\")   Weight: 55.8 kg (123 lb)   Weight % change since last entry.: 0 %   BP: 106/60   Pulse: 68   BMI (Calculated): 18.7   Resp: 16   Temp: 36.8 °C (98.2 °F)   TempSrc: Tympanic       General Appearance: alert, no distress  Skin: Skin color, texture, turgor normal. No rashes or lesions.  Eyes: negative  Oropharynx: Lips, mucosa, and tongue normal. Teeth and gums normal. Oropharynx moist and without lesion  Lungs: positive findings: Scattered rhonchi minimal rales without prolonged phases  Heart: negative. RRR without murmur, gallop, or rubs.  No ectopy.  Abdomen: Abdomen " soft, non-tender. . No masses,  No organomegaly  Extremities:  No deformities, edema, or skin discoloration  Joints: No acute arthritis  Peripheral Pulses:perfused  Neurologic: intact grossly  No thrush    II (soft palate, uvula, fauces visible)    Imaging: None presently prior reviewed    PFTS: None today      Assessment and Plan  1. Chronic obstructive pulmonary disease, unspecified COPD type ([facility])  Resume Advair, use nebulizer and rescue inhaler    2. Chronic respiratory failure with hypoxia ([facility])  Oxygen nighttime and daytime activity    3. TIMOTHY (obstructive sleep apnea)  Oxygen nighttime    4. Pseudomonas aeruginosa colonization  Needs to have current infection, Cipro initiated    5. Granulomatosis with polyangiitis, unspecified whether renal involvement ([facility])  Followed by rheumatology    6. Abnormal CT scan of lung  Noted previously    7. Chronic respiratory infection  acute flare initiate Cipro    8. Protein-calorie malnutrition, severe ([facility])  Noted      Followup Return in about 4 months (around 9/26/2020) for follow up visit with Dr. Madhuri Tillman.

## 2020-06-06 ENCOUNTER — APPOINTMENT (OUTPATIENT)
Dept: RADIOLOGY | Facility: MEDICAL CENTER | Age: 80
DRG: 871 | End: 2020-06-06
Attending: EMERGENCY MEDICINE
Payer: MEDICARE

## 2020-06-06 ENCOUNTER — HOSPITAL ENCOUNTER (INPATIENT)
Facility: MEDICAL CENTER | Age: 80
LOS: 8 days | DRG: 871 | End: 2020-06-15
Attending: EMERGENCY MEDICINE | Admitting: HOSPITALIST
Payer: MEDICARE

## 2020-06-06 DIAGNOSIS — N18.6 ESRD ON DIALYSIS (HCC): Chronic | ICD-10-CM

## 2020-06-06 DIAGNOSIS — J18.9 PNEUMONIA DUE TO INFECTIOUS ORGANISM, UNSPECIFIED LATERALITY, UNSPECIFIED PART OF LUNG: ICD-10-CM

## 2020-06-06 DIAGNOSIS — M31.31 GRANULOMATOSIS WITH POLYANGIITIS WITH RENAL INVOLVEMENT (HCC): Chronic | ICD-10-CM

## 2020-06-06 DIAGNOSIS — Z99.2 ESRD ON DIALYSIS (HCC): Chronic | ICD-10-CM

## 2020-06-06 LAB
ALBUMIN SERPL BCP-MCNC: 3.7 G/DL (ref 3.2–4.9)
ALBUMIN/GLOB SERPL: 1.3 G/DL
ALP SERPL-CCNC: 68 U/L (ref 30–99)
ALT SERPL-CCNC: 13 U/L (ref 2–50)
ANION GAP SERPL CALC-SCNC: 14 MMOL/L (ref 7–16)
AST SERPL-CCNC: 14 U/L (ref 12–45)
BASOPHILS # BLD AUTO: 0.3 % (ref 0–1.8)
BASOPHILS # BLD: 0.05 K/UL (ref 0–0.12)
BILIRUB SERPL-MCNC: 0.8 MG/DL (ref 0.1–1.5)
BUN SERPL-MCNC: 36 MG/DL (ref 8–22)
CALCIUM SERPL-MCNC: 8.9 MG/DL (ref 8.5–10.5)
CHLORIDE SERPL-SCNC: 92 MMOL/L (ref 96–112)
CO2 SERPL-SCNC: 28 MMOL/L (ref 20–33)
CREAT SERPL-MCNC: 3.24 MG/DL (ref 0.5–1.4)
EKG IMPRESSION: NORMAL
EOSINOPHIL # BLD AUTO: 0.06 K/UL (ref 0–0.51)
EOSINOPHIL NFR BLD: 0.4 % (ref 0–6.9)
ERYTHROCYTE [DISTWIDTH] IN BLOOD BY AUTOMATED COUNT: 60.6 FL (ref 35.9–50)
GLOBULIN SER CALC-MCNC: 2.8 G/DL (ref 1.9–3.5)
GLUCOSE SERPL-MCNC: 97 MG/DL (ref 65–99)
HCT VFR BLD AUTO: 37.4 % (ref 42–52)
HGB BLD-MCNC: 11.4 G/DL (ref 14–18)
IMM GRANULOCYTES # BLD AUTO: 0.07 K/UL (ref 0–0.11)
IMM GRANULOCYTES NFR BLD AUTO: 0.4 % (ref 0–0.9)
LACTATE BLD-SCNC: 1 MMOL/L (ref 0.5–2)
LYMPHOCYTES # BLD AUTO: 0.46 K/UL (ref 1–4.8)
LYMPHOCYTES NFR BLD: 2.9 % (ref 22–41)
MCH RBC QN AUTO: 28.8 PG (ref 27–33)
MCHC RBC AUTO-ENTMCNC: 30.5 G/DL (ref 33.7–35.3)
MCV RBC AUTO: 94.4 FL (ref 81.4–97.8)
MONOCYTES # BLD AUTO: 1.27 K/UL (ref 0–0.85)
MONOCYTES NFR BLD AUTO: 7.9 % (ref 0–13.4)
NEUTROPHILS # BLD AUTO: 14.07 K/UL (ref 1.82–7.42)
NEUTROPHILS NFR BLD: 88.1 % (ref 44–72)
NRBC # BLD AUTO: 0 K/UL
NRBC BLD-RTO: 0 /100 WBC
PLATELET # BLD AUTO: 163 K/UL (ref 164–446)
PMV BLD AUTO: 8.8 FL (ref 9–12.9)
POTASSIUM SERPL-SCNC: 3.9 MMOL/L (ref 3.6–5.5)
PROT SERPL-MCNC: 6.5 G/DL (ref 6–8.2)
RBC # BLD AUTO: 3.96 M/UL (ref 4.7–6.1)
SODIUM SERPL-SCNC: 134 MMOL/L (ref 135–145)
TROPONIN T SERPL-MCNC: 238 NG/L (ref 6–19)
WBC # BLD AUTO: 16 K/UL (ref 4.8–10.8)

## 2020-06-06 PROCEDURE — 87040 BLOOD CULTURE FOR BACTERIA: CPT

## 2020-06-06 PROCEDURE — 96365 THER/PROPH/DIAG IV INF INIT: CPT

## 2020-06-06 PROCEDURE — A9270 NON-COVERED ITEM OR SERVICE: HCPCS | Performed by: EMERGENCY MEDICINE

## 2020-06-06 PROCEDURE — 93005 ELECTROCARDIOGRAM TRACING: CPT | Performed by: EMERGENCY MEDICINE

## 2020-06-06 PROCEDURE — 99214 OFFICE O/P EST MOD 30 MIN: CPT | Mod: CS | Performed by: HOSPITALIST

## 2020-06-06 PROCEDURE — 84484 ASSAY OF TROPONIN QUANT: CPT

## 2020-06-06 PROCEDURE — 700111 HCHG RX REV CODE 636 W/ 250 OVERRIDE (IP): Performed by: EMERGENCY MEDICINE

## 2020-06-06 PROCEDURE — 700105 HCHG RX REV CODE 258: Performed by: EMERGENCY MEDICINE

## 2020-06-06 PROCEDURE — 83605 ASSAY OF LACTIC ACID: CPT

## 2020-06-06 PROCEDURE — 80053 COMPREHEN METABOLIC PANEL: CPT

## 2020-06-06 PROCEDURE — 700102 HCHG RX REV CODE 250 W/ 637 OVERRIDE(OP): Performed by: EMERGENCY MEDICINE

## 2020-06-06 PROCEDURE — 85025 COMPLETE CBC W/AUTO DIFF WBC: CPT

## 2020-06-06 PROCEDURE — 99285 EMERGENCY DEPT VISIT HI MDM: CPT

## 2020-06-06 PROCEDURE — 71045 X-RAY EXAM CHEST 1 VIEW: CPT

## 2020-06-06 RX ORDER — CLOPIDOGREL BISULFATE 75 MG/1
75 TABLET ORAL EVERY EVENING
COMMUNITY

## 2020-06-06 RX ORDER — ACETAMINOPHEN 325 MG/1
650 TABLET ORAL ONCE
Status: COMPLETED | OUTPATIENT
Start: 2020-06-06 | End: 2020-06-06

## 2020-06-06 RX ADMIN — ACETAMINOPHEN 650 MG: 325 TABLET, FILM COATED ORAL at 23:18

## 2020-06-06 RX ADMIN — SODIUM CHLORIDE 3 G: 900 INJECTION INTRAVENOUS at 23:30

## 2020-06-06 ASSESSMENT — FIBROSIS 4 INDEX: FIB4 SCORE: 1.72

## 2020-06-07 ENCOUNTER — APPOINTMENT (OUTPATIENT)
Dept: RADIOLOGY | Facility: MEDICAL CENTER | Age: 80
DRG: 871 | End: 2020-06-07
Attending: INTERNAL MEDICINE
Payer: MEDICARE

## 2020-06-07 PROBLEM — A41.9 SEPSIS (HCC): Status: ACTIVE | Noted: 2020-06-07

## 2020-06-07 PROBLEM — J18.9 RECURRENT PNEUMONIA: Status: ACTIVE | Noted: 2020-06-07

## 2020-06-07 LAB
APPEARANCE UR: CLEAR
BILIRUB UR QL STRIP.AUTO: NEGATIVE
C PNEUM DNA SPEC QL NAA+PROBE: NOT DETECTED
COLOR UR: YELLOW
COVID ORDER STATUS COVID19: NORMAL
FLUAV H1 2009 PAND RNA SPEC QL NAA+PROBE: NOT DETECTED
FLUAV H1 RNA SPEC QL NAA+PROBE: NOT DETECTED
FLUAV H3 RNA SPEC QL NAA+PROBE: NOT DETECTED
FLUAV RNA SPEC QL NAA+PROBE: NOT DETECTED
FLUBV RNA SPEC QL NAA+PROBE: NOT DETECTED
GLUCOSE UR STRIP.AUTO-MCNC: NEGATIVE MG/DL
HADV DNA SPEC QL NAA+PROBE: NOT DETECTED
HCOV RNA SPEC QL NAA+PROBE: NOT DETECTED
HMPV RNA SPEC QL NAA+PROBE: NOT DETECTED
HPIV1 RNA SPEC QL NAA+PROBE: NOT DETECTED
HPIV2 RNA SPEC QL NAA+PROBE: NOT DETECTED
HPIV3 RNA SPEC QL NAA+PROBE: NOT DETECTED
HPIV4 RNA SPEC QL NAA+PROBE: NOT DETECTED
KETONES UR STRIP.AUTO-MCNC: NEGATIVE MG/DL
LEUKOCYTE ESTERASE UR QL STRIP.AUTO: NEGATIVE
M PNEUMO DNA SPEC QL NAA+PROBE: NOT DETECTED
MICRO URNS: ABNORMAL
NITRITE UR QL STRIP.AUTO: NEGATIVE
PH UR STRIP.AUTO: 8.5 [PH] (ref 5–8)
PROCALCITONIN SERPL-MCNC: 0.6 NG/ML
PROT UR QL STRIP: NEGATIVE MG/DL
RBC UR QL AUTO: NEGATIVE
RSV A RNA SPEC QL NAA+PROBE: NOT DETECTED
RSV B RNA SPEC QL NAA+PROBE: NOT DETECTED
RV+EV RNA SPEC QL NAA+PROBE: NOT DETECTED
SARS-COV-2 RNA RESP QL NAA+PROBE: NOTDETECTED
SP GR UR STRIP.AUTO: 1.01
SPECIMEN SOURCE: NORMAL
UROBILINOGEN UR STRIP.AUTO-MCNC: 0.2 MG/DL

## 2020-06-07 PROCEDURE — 94669 MECHANICAL CHEST WALL OSCILL: CPT

## 2020-06-07 PROCEDURE — 87070 CULTURE OTHR SPECIMN AEROBIC: CPT

## 2020-06-07 PROCEDURE — 700102 HCHG RX REV CODE 250 W/ 637 OVERRIDE(OP): Performed by: INTERNAL MEDICINE

## 2020-06-07 PROCEDURE — 94760 N-INVAS EAR/PLS OXIMETRY 1: CPT

## 2020-06-07 PROCEDURE — 87206 SMEAR FLUORESCENT/ACID STAI: CPT

## 2020-06-07 PROCEDURE — 700102 HCHG RX REV CODE 250 W/ 637 OVERRIDE(OP): Performed by: HOSPITALIST

## 2020-06-07 PROCEDURE — 87633 RESP VIRUS 12-25 TARGETS: CPT

## 2020-06-07 PROCEDURE — A9270 NON-COVERED ITEM OR SERVICE: HCPCS | Performed by: HOSPITALIST

## 2020-06-07 PROCEDURE — 87581 M.PNEUMON DNA AMP PROBE: CPT

## 2020-06-07 PROCEDURE — 99222 1ST HOSP IP/OBS MODERATE 55: CPT | Performed by: INTERNAL MEDICINE

## 2020-06-07 PROCEDURE — U0004 COV-19 TEST NON-CDC HGH THRU: HCPCS

## 2020-06-07 PROCEDURE — 700111 HCHG RX REV CODE 636 W/ 250 OVERRIDE (IP): Performed by: HOSPITALIST

## 2020-06-07 PROCEDURE — 700105 HCHG RX REV CODE 258: Performed by: HOSPITALIST

## 2020-06-07 PROCEDURE — 87486 CHLMYD PNEUM DNA AMP PROBE: CPT

## 2020-06-07 PROCEDURE — 87205 SMEAR GRAM STAIN: CPT

## 2020-06-07 PROCEDURE — C9803 HOPD COVID-19 SPEC COLLECT: HCPCS | Performed by: EMERGENCY MEDICINE

## 2020-06-07 PROCEDURE — 770006 HCHG ROOM/CARE - MED/SURG/GYN SEMI*

## 2020-06-07 PROCEDURE — 99233 SBSQ HOSP IP/OBS HIGH 50: CPT | Performed by: INTERNAL MEDICINE

## 2020-06-07 PROCEDURE — 36415 COLL VENOUS BLD VENIPUNCTURE: CPT

## 2020-06-07 PROCEDURE — 84145 PROCALCITONIN (PCT): CPT

## 2020-06-07 PROCEDURE — 96372 THER/PROPH/DIAG INJ SC/IM: CPT

## 2020-06-07 PROCEDURE — 92610 EVALUATE SWALLOWING FUNCTION: CPT

## 2020-06-07 PROCEDURE — 700111 HCHG RX REV CODE 636 W/ 250 OVERRIDE (IP): Performed by: INTERNAL MEDICINE

## 2020-06-07 PROCEDURE — 87181 SC STD AGAR DILUTION PER AGT: CPT

## 2020-06-07 PROCEDURE — 81003 URINALYSIS AUTO W/O SCOPE: CPT

## 2020-06-07 PROCEDURE — 87102 FUNGUS ISOLATION CULTURE: CPT

## 2020-06-07 PROCEDURE — 87116 MYCOBACTERIA CULTURE: CPT

## 2020-06-07 PROCEDURE — 96367 TX/PROPH/DG ADDL SEQ IV INF: CPT

## 2020-06-07 PROCEDURE — 87015 SPECIMEN INFECT AGNT CONCNTJ: CPT

## 2020-06-07 PROCEDURE — 87077 CULTURE AEROBIC IDENTIFY: CPT

## 2020-06-07 PROCEDURE — A9270 NON-COVERED ITEM OR SERVICE: HCPCS | Performed by: INTERNAL MEDICINE

## 2020-06-07 PROCEDURE — 99223 1ST HOSP IP/OBS HIGH 75: CPT | Performed by: INTERNAL MEDICINE

## 2020-06-07 RX ORDER — ROSUVASTATIN CALCIUM 20 MG/1
20 TABLET, COATED ORAL DAILY
Status: DISCONTINUED | OUTPATIENT
Start: 2020-06-07 | End: 2020-06-15 | Stop reason: HOSPADM

## 2020-06-07 RX ORDER — SODIUM CHLORIDE FOR INHALATION 7 %
4 VIAL, NEBULIZER (ML) INHALATION
Status: DISCONTINUED | OUTPATIENT
Start: 2020-06-08 | End: 2020-06-15 | Stop reason: HOSPADM

## 2020-06-07 RX ORDER — AZITHROMYCIN 250 MG/1
500 TABLET, FILM COATED ORAL DAILY
Status: COMPLETED | OUTPATIENT
Start: 2020-06-07 | End: 2020-06-08

## 2020-06-07 RX ORDER — ACETAMINOPHEN 325 MG/1
650 TABLET ORAL EVERY 6 HOURS PRN
Status: DISCONTINUED | OUTPATIENT
Start: 2020-06-07 | End: 2020-06-15 | Stop reason: HOSPADM

## 2020-06-07 RX ORDER — CLOPIDOGREL BISULFATE 75 MG/1
75 TABLET ORAL EVERY EVENING
Status: DISCONTINUED | OUTPATIENT
Start: 2020-06-07 | End: 2020-06-15 | Stop reason: HOSPADM

## 2020-06-07 RX ORDER — SODIUM CHLORIDE, SODIUM LACTATE, POTASSIUM CHLORIDE, CALCIUM CHLORIDE 600; 310; 30; 20 MG/100ML; MG/100ML; MG/100ML; MG/100ML
2000 INJECTION, SOLUTION INTRAVENOUS ONCE
Status: COMPLETED | OUTPATIENT
Start: 2020-06-07 | End: 2020-06-07

## 2020-06-07 RX ORDER — PREDNISONE 5 MG/1
5 TABLET ORAL DAILY
Status: DISCONTINUED | OUTPATIENT
Start: 2020-06-07 | End: 2020-06-15 | Stop reason: HOSPADM

## 2020-06-07 RX ORDER — AMOXICILLIN 250 MG
2 CAPSULE ORAL 2 TIMES DAILY
Status: DISCONTINUED | OUTPATIENT
Start: 2020-06-07 | End: 2020-06-15 | Stop reason: HOSPADM

## 2020-06-07 RX ORDER — ALBUTEROL SULFATE 90 UG/1
2 AEROSOL, METERED RESPIRATORY (INHALATION)
Status: DISCONTINUED | OUTPATIENT
Start: 2020-06-07 | End: 2020-06-08

## 2020-06-07 RX ORDER — BENZONATATE 100 MG/1
100 CAPSULE ORAL 3 TIMES DAILY PRN
Status: DISCONTINUED | OUTPATIENT
Start: 2020-06-07 | End: 2020-06-15 | Stop reason: HOSPADM

## 2020-06-07 RX ORDER — HEPARIN SODIUM 5000 [USP'U]/ML
5000 INJECTION, SOLUTION INTRAVENOUS; SUBCUTANEOUS EVERY 8 HOURS
Status: DISCONTINUED | OUTPATIENT
Start: 2020-06-07 | End: 2020-06-15 | Stop reason: HOSPADM

## 2020-06-07 RX ORDER — TAMSULOSIN HYDROCHLORIDE 0.4 MG/1
0.4 CAPSULE ORAL DAILY
Status: DISCONTINUED | OUTPATIENT
Start: 2020-06-07 | End: 2020-06-15 | Stop reason: HOSPADM

## 2020-06-07 RX ORDER — BUDESONIDE AND FORMOTEROL FUMARATE DIHYDRATE 160; 4.5 UG/1; UG/1
2 AEROSOL RESPIRATORY (INHALATION)
Status: DISCONTINUED | OUTPATIENT
Start: 2020-06-07 | End: 2020-06-15 | Stop reason: HOSPADM

## 2020-06-07 RX ORDER — BISACODYL 10 MG
10 SUPPOSITORY, RECTAL RECTAL
Status: DISCONTINUED | OUTPATIENT
Start: 2020-06-07 | End: 2020-06-15 | Stop reason: HOSPADM

## 2020-06-07 RX ORDER — FINASTERIDE 5 MG/1
5 TABLET, FILM COATED ORAL EVERY MORNING
Status: DISCONTINUED | OUTPATIENT
Start: 2020-06-07 | End: 2020-06-15 | Stop reason: HOSPADM

## 2020-06-07 RX ORDER — SEVELAMER CARBONATE 800 MG/1
800 TABLET, FILM COATED ORAL
Status: DISCONTINUED | OUTPATIENT
Start: 2020-06-07 | End: 2020-06-15 | Stop reason: HOSPADM

## 2020-06-07 RX ORDER — POLYETHYLENE GLYCOL 3350 17 G/17G
1 POWDER, FOR SOLUTION ORAL
Status: DISCONTINUED | OUTPATIENT
Start: 2020-06-07 | End: 2020-06-15 | Stop reason: HOSPADM

## 2020-06-07 RX ORDER — GUAIFENESIN 600 MG/1
600 TABLET, EXTENDED RELEASE ORAL EVERY 12 HOURS
Status: DISCONTINUED | OUTPATIENT
Start: 2020-06-07 | End: 2020-06-15 | Stop reason: HOSPADM

## 2020-06-07 RX ORDER — GUAIFENESIN/DEXTROMETHORPHAN 100-10MG/5
10 SYRUP ORAL EVERY 6 HOURS PRN
Status: DISCONTINUED | OUTPATIENT
Start: 2020-06-07 | End: 2020-06-15 | Stop reason: HOSPADM

## 2020-06-07 RX ORDER — METOPROLOL SUCCINATE 25 MG/1
25 TABLET, EXTENDED RELEASE ORAL 2 TIMES DAILY
Status: DISCONTINUED | OUTPATIENT
Start: 2020-06-07 | End: 2020-06-15 | Stop reason: HOSPADM

## 2020-06-07 RX ORDER — ONDANSETRON 2 MG/ML
4 INJECTION INTRAMUSCULAR; INTRAVENOUS EVERY 4 HOURS PRN
Status: DISCONTINUED | OUTPATIENT
Start: 2020-06-07 | End: 2020-06-15 | Stop reason: HOSPADM

## 2020-06-07 RX ADMIN — HEPARIN SODIUM 5000 UNITS: 5000 INJECTION, SOLUTION INTRAVENOUS; SUBCUTANEOUS at 02:04

## 2020-06-07 RX ADMIN — BUDESONIDE AND FORMOTEROL FUMARATE DIHYDRATE 2 PUFF: 160; 4.5 AEROSOL RESPIRATORY (INHALATION) at 09:44

## 2020-06-07 RX ADMIN — PIPERACILLIN AND TAZOBACTAM 4.5 G: 4; .5 INJECTION, POWDER, LYOPHILIZED, FOR SOLUTION INTRAVENOUS; PARENTERAL at 21:19

## 2020-06-07 RX ADMIN — SODIUM CHLORIDE, POTASSIUM CHLORIDE, SODIUM LACTATE AND CALCIUM CHLORIDE 2000 ML: 600; 310; 30; 20 INJECTION, SOLUTION INTRAVENOUS at 02:40

## 2020-06-07 RX ADMIN — METOPROLOL SUCCINATE 25 MG: 25 TABLET, EXTENDED RELEASE ORAL at 16:41

## 2020-06-07 RX ADMIN — SEVELAMER CARBONATE 800 MG: 800 TABLET, FILM COATED ORAL at 09:43

## 2020-06-07 RX ADMIN — ONDANSETRON HYDROCHLORIDE 4 MG: 2 SOLUTION INTRAMUSCULAR; INTRAVENOUS at 09:45

## 2020-06-07 RX ADMIN — AZITHROMYCIN MONOHYDRATE 500 MG: 250 TABLET ORAL at 21:14

## 2020-06-07 RX ADMIN — CLOPIDOGREL BISULFATE 75 MG: 75 TABLET ORAL at 16:42

## 2020-06-07 RX ADMIN — GUAIFENESIN 600 MG: 600 TABLET, EXTENDED RELEASE ORAL at 16:42

## 2020-06-07 RX ADMIN — ROSUVASTATIN CALCIUM 20 MG: 20 TABLET, FILM COATED ORAL at 04:42

## 2020-06-07 RX ADMIN — DOCUSATE SODIUM 50 MG AND SENNOSIDES 8.6 MG 2 TABLET: 8.6; 5 TABLET, FILM COATED ORAL at 16:41

## 2020-06-07 RX ADMIN — PIPERACILLIN AND TAZOBACTAM 4.5 G: 4; .5 INJECTION, POWDER, LYOPHILIZED, FOR SOLUTION INTRAVENOUS; PARENTERAL at 02:04

## 2020-06-07 RX ADMIN — PIPERACILLIN AND TAZOBACTAM 4.5 G: 4; .5 INJECTION, POWDER, LYOPHILIZED, FOR SOLUTION INTRAVENOUS; PARENTERAL at 12:58

## 2020-06-07 RX ADMIN — ACETAMINOPHEN 650 MG: 325 TABLET, FILM COATED ORAL at 21:14

## 2020-06-07 RX ADMIN — HEPARIN SODIUM 5000 UNITS: 5000 INJECTION, SOLUTION INTRAVENOUS; SUBCUTANEOUS at 21:14

## 2020-06-07 RX ADMIN — METOPROLOL SUCCINATE 25 MG: 25 TABLET, EXTENDED RELEASE ORAL at 04:41

## 2020-06-07 RX ADMIN — AZITHROMYCIN MONOHYDRATE 500 MG: 250 TABLET ORAL at 02:04

## 2020-06-07 RX ADMIN — PIPERACILLIN AND TAZOBACTAM 4.5 G: 4; .5 INJECTION, POWDER, LYOPHILIZED, FOR SOLUTION INTRAVENOUS; PARENTERAL at 05:00

## 2020-06-07 RX ADMIN — BUDESONIDE AND FORMOTEROL FUMARATE DIHYDRATE 2 PUFF: 160; 4.5 AEROSOL RESPIRATORY (INHALATION) at 18:03

## 2020-06-07 RX ADMIN — SEVELAMER CARBONATE 800 MG: 800 TABLET, FILM COATED ORAL at 16:41

## 2020-06-07 RX ADMIN — HEPARIN SODIUM 5000 UNITS: 5000 INJECTION, SOLUTION INTRAVENOUS; SUBCUTANEOUS at 12:58

## 2020-06-07 RX ADMIN — PREDNISONE 5 MG: 5 TABLET ORAL at 04:42

## 2020-06-07 RX ADMIN — GUAIFENESIN 600 MG: 600 TABLET, EXTENDED RELEASE ORAL at 11:29

## 2020-06-07 ASSESSMENT — ENCOUNTER SYMPTOMS
PALPITATIONS: 0
DOUBLE VISION: 0
DEPRESSION: 0
SHORTNESS OF BREATH: 1
NERVOUS/ANXIOUS: 0
FEVER: 1
CHILLS: 1
TINGLING: 0
SPUTUM PRODUCTION: 1
BACK PAIN: 0
MYALGIAS: 0
CONSTIPATION: 0
WHEEZING: 0
HEMOPTYSIS: 0
HEADACHES: 0
SORE THROAT: 0
FOCAL WEAKNESS: 0
WEIGHT LOSS: 1
COUGH: 1
DIARRHEA: 0
NAUSEA: 0
DIZZINESS: 0
VOMITING: 0
PHOTOPHOBIA: 0
BLURRED VISION: 0
ABDOMINAL PAIN: 0

## 2020-06-07 ASSESSMENT — LIFESTYLE VARIABLES
ON A TYPICAL DAY WHEN YOU DRINK ALCOHOL HOW MANY DRINKS DO YOU HAVE: 0.5
DOES PATIENT WANT TO STOP DRINKING: NO
TOTAL SCORE: 0
TOTAL SCORE: 0
HAVE PEOPLE ANNOYED YOU BY CRITICIZING YOUR DRINKING: NO
EVER FELT BAD OR GUILTY ABOUT YOUR DRINKING: NO
HAVE YOU EVER FELT YOU SHOULD CUT DOWN ON YOUR DRINKING: NO
EVER HAD A DRINK FIRST THING IN THE MORNING TO STEADY YOUR NERVES TO GET RID OF A HANGOVER: NO
AVERAGE NUMBER OF DAYS PER WEEK YOU HAVE A DRINK CONTAINING ALCOHOL: 2
HOW MANY TIMES IN THE PAST YEAR HAVE YOU HAD 5 OR MORE DRINKS IN A DAY: 0
ALCOHOL_USE: YES
TOTAL SCORE: 0
CONSUMPTION TOTAL: NEGATIVE
EVER_SMOKED: YES

## 2020-06-07 ASSESSMENT — COGNITIVE AND FUNCTIONAL STATUS - GENERAL
EATING MEALS: A LITTLE
SUGGESTED CMS G CODE MODIFIER DAILY ACTIVITY: CK
STANDING UP FROM CHAIR USING ARMS: A LITTLE
DAILY ACTIVITIY SCORE: 18
MOBILITY SCORE: 19
DRESSING REGULAR LOWER BODY CLOTHING: A LITTLE
PERSONAL GROOMING: A LITTLE
DRESSING REGULAR UPPER BODY CLOTHING: A LITTLE
HELP NEEDED FOR BATHING: A LITTLE
SUGGESTED CMS G CODE MODIFIER MOBILITY: CK
TOILETING: A LITTLE
WALKING IN HOSPITAL ROOM: A LITTLE
CLIMB 3 TO 5 STEPS WITH RAILING: A LOT
MOVING FROM LYING ON BACK TO SITTING ON SIDE OF FLAT BED: A LITTLE

## 2020-06-07 ASSESSMENT — COPD QUESTIONNAIRES
DURING THE PAST 4 WEEKS HOW MUCH DID YOU FEEL SHORT OF BREATH: NONE/LITTLE OF THE TIME
COPD SCREENING SCORE: 6
HAVE YOU SMOKED AT LEAST 100 CIGARETTES IN YOUR ENTIRE LIFE: YES
DO YOU EVER COUGH UP ANY MUCUS OR PHLEGM?: YES, A FEW DAYS A WEEK OR MONTH

## 2020-06-07 ASSESSMENT — FIBROSIS 4 INDEX: FIB4 SCORE: 1.91

## 2020-06-07 NOTE — ED NOTES
Lab called with critical result of troponin at 238. Critical lab result read back.   Dr. Caruso notified of critical lab result at 3841.

## 2020-06-07 NOTE — H&P
"Hospital Medicine History & Physical Note    Date of Service  6/6/2020    Primary Care Physician  Christine Zamudio M.D.    Consultants  None    Code Status  Full, patient was previously DNR.  Lengthy discussion held at bedside regarding his wishes.  Patient states that \"when it is my time to go it is time to go\", however he also notes that his sons do not agree with his decision.  I educated the patient that while he is alert and oriented, his decision is his own to make.  However as the patient cannot decide on whether or not he would like to be full code, partial, or DNR, he will default to full code for this evening and he would like to discuss further with his family in the morning.     Chief Complaint  Chief Complaint   Patient presents with   • Shortness of Breath       History of Presenting Illness  79 y.o. male who presented on 6/6/2020 with shortness of breath.  This is an elderly gentleman who is medically complex.  He carries a history of severe aortic stenosis, Wegener's granulomatosis on chronic steroid therapy, end-stage renal disease on hemodialysis, chronic respiratory failure as well as prior history of recurrent pneumonia, pseudomonal colonization, and MAC.  He comes in with complaints of shortness of breath that has been worsening over the last several days associated with subjective fever, chills, and a productive cough.  Patient was last admitted to our hospital in January by the [facility] internal medicine residency team for community-acquired pneumonia.  He did require ICU admission at that point for severe sepsis in his sputum sample was positive for Pseudomonas.  He was treated with azithromycin and Zosyn per ID recommendations.  Patient denies any known sick contacts, he has been primarily quarantined since the outbreak of COVID-19.  He denies any headache, chest pain, abdominal pain, diarrhea or dysuria.    Review of Systems  Review of Systems   Constitutional: Positive for chills and " fever.   HENT: Negative for congestion and sore throat.    Eyes: Negative for photophobia.   Respiratory: Positive for cough and shortness of breath. Negative for wheezing.    Cardiovascular: Negative for chest pain and palpitations.   Gastrointestinal: Negative for abdominal pain, diarrhea, nausea and vomiting.   Genitourinary: Negative for dysuria.   Musculoskeletal: Negative for myalgias.   Skin: Negative.    Neurological: Negative for dizziness, tingling, focal weakness and headaches.   Psychiatric/Behavioral: Negative for depression and suicidal ideas.       Past Medical History  Past Medical History:   Diagnosis Date   • Pain 05/09/2018    shoulders/hips, 5/10   • Stroke ([facility]) 02/2018   • Pneumonia 2017   • Pacemaker 1988   • A-fib ([facility])        • Anemia    • Arthritis     arms, legs, shoulders   • ASTHMA    • BPH (benign prostatic hyperplasia)    • Breath shortness    • Bronchitis     chronic   • Cataract     removed bilat   • COPD    • Dialysis     [facility] M[facility],    • Dyslipidemia    • EMPHYSEMA    • GERD (gastroesophageal reflux disease)    • Hypertension    • Oxygen decrease     O2 3liters @  and dialysis   • Pseudomonas pneumonia ([facility])    • Pulmonary histoplasmosis ([facility])    • Renal disorder     CKD,    • Sick sinus syndrome ([facility])    • Sleep apnea     uses cpap at [facility]   • Snoring    • Unspecified hemorrhagic conditions     bruises easily, fragile skin   • Wegener's disease, pulmonary ([facility])        Surgical History  Past Surgical History:   Procedure Laterality Date   • AV FISTULOGRAM Right 4/12/2016    Procedure: AV FISTULOGRAM , VENOPLASTY X 2;  Surgeon: Nate Syed M.D.;  Location: SURGERY [facility];  Service:    • RECOVERY  9/3/2015    Procedure: IR1 VASCULAR CASE-ELENO RIGHT DIALYSIS FISTULOGRAM, POSSIBLE INTERVENTION;  Surgeon: Recoveryonly Surgery;  Location: SURGERY PRE-POST PROC UNIT [facility];  Service:    • RECOVERY  2/26/2015    Performed by Ir-Recovery Surgery at  SURGERY SAME DAY [facility] ORS   • RECOVERY  10/3/2014    Performed by Ir[facility]Recovery Surgery at SURGERY SAME DAY [facility] ORS   • RECOVERY  2014    Performed by Ir[facility]Recovery Surgery at SURGERY [facility] ORS   • RECOVERY  2013    Performed by Ir-Recovery Surgery at SURGERY SAME DAY [facility] ORS   • BRONCHOSCOPY-ENDO  2013    Performed by Juan F Rubio M.D. at SURGERY [facility]   • RECOVERY  2013    Performed by Ir-Recovery Surgery at SURGERY SAME DAY [facility] ORS   • AV FISTULA REVISION  2012    Performed by NESSA JOAHNSEN at SURGERY [facility] ORS   • RECOVERY  2012    Performed by SURGERY, IR-RECOVERY at SURGERY SAME DAY [facility] ORS   • AV FISTULA CREATION  3/8/2012    Performed by NESSA JOHANSEN at SURGERY [facility] ORS   • GASTROSCOPY WITH BIOPSY  2012    Performed by ZURDO HARRISON at [facility]   • CATH PLACEMENT  10/8/2011    Performed by NESSA JOHANSEN at SURGERY [facility]   • GASTROSCOPY WITH BALLOON DILATATION  2011    Performed by KT FERNANDEZ at [facility]   • PACEMAKER INSERTION  2009   • ROTATOR CUFF REPAIR      right   • HERNIA REPAIR      right inguinal hernia   • HIP REPLACEMENT, TOTAL      right   • TONSILLECTOMY         Family History  Family History   Problem Relation Age of Onset   • Stroke Father    • Heart Disease Father    • Stroke Mother    • Cancer Other        Social History  Social History     Tobacco Use   • Smoking status: Former Smoker     Packs/day: 0.00     Years: 30.00     Pack years: 0.00     Types: Pipe     Last attempt to quit: 1990     Years since quittin.4   • Smokeless tobacco: Former User     Types: Chew   Substance Use Topics   • Alcohol use: Yes     Alcohol/week: 1.2 oz     Types: 1 Glasses of wine, 1 Cans of beer per week     Frequency: 2-3 times a week     Drinks per session: 1 or 2     Binge frequency: Never     Comment: 1/2 glass Red wine nightly    • Drug use: No       Allergies  Allergies   Allergen Reactions   • Doxycycline Diarrhea and Nausea     Reports terrible diarrhea   • Erythromycin Diarrhea and Nausea     Abdominal pain.  RXN=before    • Rituximab Unspecified     Flu like syndrome       Medications  No current facility-administered medications on file prior to encounter.      Current Outpatient Medications on File Prior to Encounter   Medication Sig Dispense Refill   • clopidogrel (PLAVIX) 75 MG Tab Take 75 mg by mouth every evening.     • fluticasone-salmeterol (ADVAIR DISKUS) 250-50 MCG/DOSE AEROSOL POWDER, BREATH ACTIVATED Inhale 1 Puff by mouth 2 times a day. Rinse mouth after each use. 1 Inhaler 0   • sodium chloride (HYPER-SAL) 7 % Nebu Soln INHALE 1 VIAL VIA NEBULIZER TWICE DAILY 240 mL 0   • sevelamer (RENAGEL) 800 MG Tab Take 800 mg by mouth 3 times a day.     • predniSONE (DELTASONE) 1 MG Tab Take 5 mg by mouth every day.     • tamsulosin (FLOMAX) 0.4 MG capsule TAKE 1 CAPSULE BY MOUTH EVERY DAY 90 Cap 2   • rosuvastatin (CRESTOR) 20 MG Tab Take 20 mg by mouth every day.  3   • epoetin lucina (EPOGEN,PROCRIT) 91717 UNIT/ML Solution Inject 0.5 mL as instructed every Monday, Wednesday, and Friday. 30 mL 0   • metoprolol SR (TOPROL XL) 25 MG TABLET SR 24 HR Take 25 mg by mouth 2 Times a Day.  3   • albuterol (PROAIR HFA) 108 (90 Base) MCG/ACT Aero Soln inhalation aerosol Inhale 2 Puffs by mouth every 6 hours as needed for Shortness of Breath. 8.5 g 0   • B Complex-C-Folic Acid (DIALYVITE TABLET) Tab Take 1 Tab by mouth every morning.     • finasteride (PROSCAR) 5 MG Tab Take 5 mg by mouth every morning.         Physical Exam  Hemodynamics  Temp (24hrs), Av.2 °C (100.7 °F), Min:38 °C (100.4 °F), Max:38.3 °C (100.9 °F)   Temperature: (!) 38.3 °C (100.9 °F)  Pulse  Av  Min: 95  Max: 115    Blood Pressure : 116/56      Respiratory      Respiration: (!) 24, Pulse Oximetry: 100 %             Physical Exam   Constitutional: He is  oriented to person, place, and time. No distress.   Frail, elderly, chronically ill-appearing, thin   HENT:   Head: Normocephalic and atraumatic.   Right Ear: External ear normal.   Left Ear: External ear normal.   Eyes: EOM are normal. Right eye exhibits no discharge. Left eye exhibits no discharge.   Neck: Neck supple. No JVD present.   Cardiovascular: Normal rate, regular rhythm and normal heart sounds.   Pulmonary/Chest: No respiratory distress. He has rales. He exhibits no tenderness.   Increased work of breathing   Abdominal: Soft. Bowel sounds are normal. He exhibits no distension. There is no abdominal tenderness.   Musculoskeletal:         General: No edema.   Neurological: He is alert and oriented to person, place, and time. No cranial nerve deficit.   Skin: Skin is dry. He is not diaphoretic. No erythema.   Psychiatric: He has a normal mood and affect. His behavior is normal.   Nursing note and vitals reviewed.    Capillary refill less than 3 seconds, distal pulses intact    Laboratory:  Recent Labs     06/06/20  2250   WBC 16.0*   RBC 3.96*   HEMOGLOBIN 11.4*   HEMATOCRIT 37.4*   MCV 94.4   MCH 28.8   MCHC 30.5*   RDW 60.6*   PLATELETCT 163*   MPV 8.8*     Recent Labs     06/06/20  2250   SODIUM 134*   POTASSIUM 3.9   CHLORIDE 92*   CO2 28   GLUCOSE 97   BUN 36*   CREATININE 3.24*   CALCIUM 8.9     Recent Labs     06/06/20  2250   ALTSGPT 13   ASTSGOT 14   ALKPHOSPHAT 68   TBILIRUBIN 0.8   GLUCOSE 97                 Lab Results   Component Value Date    TROPONINI 0.07 (H) 06/06/2018       Imaging  Dx-chest-portable (1 View)    Result Date: 6/6/2020 6/6/2020 10:58 PM HISTORY/REASON FOR EXAM:  Sepsis Short of breath TECHNIQUE/EXAM DESCRIPTION AND NUMBER OF VIEWS: Single portable view of the chest. COMPARISON: 1/25/2020 FINDINGS: Left-sided pacemaker. Hazy opacities in the bilateral lung bases, left more than right There may be small bilateral pleural effusions. No pneumothorax. Stable cardiopericardial  silhouette.     Hazy opacities in the bilateral lung bases, left more than right, are similar to prior and could be atelectasis or consolidation. There may be small bilateral pleural effusions.        Assessment/Plan:  Anticipate that patient will need greater than 2 midnights for management of the discussed medical issues.    * Sepsis (HCC)  Assessment & Plan  This is Sepsis Present on admission  SIRS criteria identified on my evaluation include: Fever, with temperature greater than 101 deg F, Tachypnea, with respirations greater than 20 per minute and Leukocytosis, with WBC greater than 12,000  Source is suspect lung  Sepsis protocol initiated  Fluid resuscitation ordered per protocol  IV antibiotics as appropriate for source of sepsis  While organ dysfunction may be noted elsewhere in this problem list or in the chart, degree of organ dysfunction does not meet CMS criteria for severe sepsis    Patient has leukocytosis, he is tachypneic and has a fever.  Otherwise he has no lactic acidosis and his blood pressure stable.  He has a known history of COPD and is chronically immunosuppressed on steroid therapy for Wegener's.  He also has a history of MAC and pseudomonal colonization.  We'll start him on IV Zosyn and azithromycin and plan to consult infectious disease in the morning who followed him during his last hospitalization.  We will continue to monitor sputum and blood cultures for speciation and sensitivities.  Patient will remain on respiratory therapy protocol with supplemental O2.  COVID-19 swab is pending.    Elevated troponin- (present on admission)  Assessment & Plan  Review the medical records indicates that this is chronically elevated, current troponin level is close to his prior baseline.  We will continue to monitor.    Wegener's granulomatosis (HCC)- (present on admission)  Assessment & Plan  Continue home low-dose steroid.    Chronic respiratory failure with hypoxia (HCC)- (present on  admission)  Assessment & Plan  Treatment as noted above for sepsis with recurrent pneumonia.    Atrial fibrillation (HCC)- (present on admission)  Assessment & Plan  Heart rate well controlled at the time of my visit, patient is not on anticoagulation.  Resume home metoprolol.    ESRD on dialysis (HCC)- (present on admission)  Assessment & Plan  Patient is on dialysis Monday, Wednesday, and Friday.  We will plan to notify nephrology in the morning regarding this patient's admission to arrange for inpatient dialysis.  Patient was last seen by Dr. Corona during his last hospitalization.    Hyperlipidemia- (present on admission)  Assessment & Plan  Okay to resume home Crestor and Plavix.      Prophylaxis: Heparin for DVT prophylaxis, no PPI indicated, bowel protocol as needed

## 2020-06-07 NOTE — PROGRESS NOTES
2 RN Skin Check    2 RN skin check complete with Tran STANLEY.   Devices in place: PIV, Nasal cannula .  Skin assessed under devices: yes, c/d/i.  Confirmed pressure ulcers found on: n/a  New potential pressure ulcers noted on: n/a .   Wound consult placed: n/a .  The following interventions in place: Pt turns and repositions independently with pillows in bed. Barrier cream at bedside for sacrum, grey foam over nasal cannula.     Assessment    Head - scratches, bruising   B/l ears - reddened, blanchable   L arm - bruising/ scars   R- arm bruising, fistula present   Sacrum - reddened, area blanchable. Encouraging pt to turn and reposition  B/l legs - bruising, skin tear reddened to left leg   B/l feet - flaky, skin c/d/i, blanchable.

## 2020-06-07 NOTE — CARE PLAN
Problem: Communication  Goal: The ability to communicate needs accurately and effectively will improve  Outcome: PROGRESSING AS EXPECTED  - Pt is able to verbalize needs. Pt uses call bell appropriately to call for assistance.     Problem: Safety  Goal: Will remain free from injury  Outcome: PROGRESSING AS EXPECTED  - Safety care plan reviewed with pt. Oriented to new room and use of call bell. Demonstrates understanding. Bed locked and in low position.

## 2020-06-07 NOTE — ED TRIAGE NOTES
Patient brought in from home by his wife after having increased SOB,  weakness, fever, chills, muscle aches and non productive cough x3 days.  Patient is A&O, and answers all questions appropriately.  Patient is short of breath during triage, wet cough noted without sputum production.

## 2020-06-07 NOTE — ED NOTES
Patient resting comfortably at this time, gave patient his phone to call and update his wife.  Antibiotics infusing.  Provide a blanket. VSS.

## 2020-06-07 NOTE — ASSESSMENT & PLAN NOTE
- Patient gets dialysis on Monday, Wednesday, and Friday.    - Nephrology on board for continued hemodialysis while he is in the hospital.    6/11 plan discharge home pending ID clearance    6/12 HD per samara

## 2020-06-07 NOTE — ASSESSMENT & PLAN NOTE
-He is at his baseline oxygen supplement (3 L).  -Continue management of pneumonia as above.  Continue RT protocol, and oxygen supplements.

## 2020-06-07 NOTE — ASSESSMENT & PLAN NOTE
-Rate and rhythm controlled.  Patient is not on chronic anticoagulation.  Continue beta-blocker.  Monitor closely.

## 2020-06-07 NOTE — PROGRESS NOTES
RT asked me to call md for home meds albuterol and advair. Paged md anuj parks f/u    md gave me orders to restart home meds as requested

## 2020-06-07 NOTE — ED PROVIDER NOTES
ED Provider Note    CHIEF COMPLAINT  Chief Complaint   Patient presents with   • Shortness of Breath       HPI  Gilberto Brown is a 79 y.o. male for evaluation of shortness of breath.  Patient came in with some hypoxia at 88%, with a productive cough over the last few days.  He also had some low-grade temperatures, at home.  He arrived here today, with a 100.9 temperature, and some continued coughing.  He has no chest pain, he has some mild shortness of breath, no vomiting, and no chills.  He has no abdominal pain or back pain.  Nothing seems alleviate or exacerbate his symptoms.  He has had multiple bouts of pneumonia in the past, and this again could be the same thing.  No one is ill around him, and has not been on any recent antibiotics.      ROS  See HPI for further details, o/w negative.     PAST MEDICAL HISTORY   has a past medical history of A-fib ([facility]), Anemia, Arthritis, ASTHMA, BPH (benign prostatic hyperplasia), Breath shortness, Bronchitis, Cataract, COPD, Dialysis, Dyslipidemia, EMPHYSEMA, GERD (gastroesophageal reflux disease), Hypertension, Oxygen decrease, Pacemaker (), Pain (2018), Pneumonia (), Pseudomonas pneumonia ([facility]), Pulmonary histoplasmosis ([facility]), Renal disorder, Sick sinus syndrome ([facility]), Sleep apnea, Snoring, Stroke ([facility]) (2018), Unspecified hemorrhagic conditions, and Wegener's disease, 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    SOCIAL HISTORY  Social History     Tobacco Use   • Smoking status: Former Smoker     Packs/day: 0.00     Years: 30.00     Pack years: 0.00     Types: Pipe     Last attempt to quit: 1990     Years since quittin.4   • Smokeless tobacco: Former User     Types: Chew   Substance and Sexual Activity   • Alcohol use: Yes     Alcohol/week: 1.2 oz     Types: 1 Glasses of wine, 1 Cans of beer per week     Frequency: 2-3 times a week     Drinks per session: 1 or 2     Binge frequency: Never     Comment: 1/2 glass Red wine nightly   • Drug use: No   • Sexual activity:  Yes     Partners: Female       Family History  No bleeding disorders    SURGICAL HISTORY   has a past surgical history that includes pacemaker insertion (4/2009); hernia repair (1990); gastroscopy with balloon dilatation (9/28/2011); cath placement (10/8/2011); gastroscopy with biopsy (1/17/2012); hip replacement, total; av fistula creation (3/8/2012); recovery (4/19/2012); av fistula revision (6/9/2012); rotator cuff repair (2003); recovery (7/17/2013); bronchoscopy-endo (7/18/2013); recovery (12/17/2013); recovery (8/7/2014); recovery (10/3/2014); recovery (2/26/2015); recovery (9/3/2015); av fistulogram (Right, 4/12/2016); and tonsillectomy.    CURRENT MEDICATIONS  Home Medications     Reviewed by Darline Edmonds (Pharmacy Tech) on 06/06/20 at 2353  Med List Status: Complete   Medication Last Dose Status   albuterol (PROAIR HFA) 108 (90 Base) MCG/ACT Aero Soln inhalation aerosol 6/6/2020 Active   B Complex-C-Folic Acid (DIALYVITE TABLET) Tab 6/6/2020 Active   clopidogrel (PLAVIX) 75 MG Tab 6/6/2020 Active   epoetin lucina (EPOGEN,PROCRIT) 03541 UNIT/ML Solution 6/5/2020 Active   finasteride (PROSCAR) 5 MG Tab 6/6/2020 Active   fluticasone-salmeterol (ADVAIR DISKUS) 250-50 MCG/DOSE AEROSOL POWDER, BREATH ACTIVATED 6/6/2020 Active   metoprolol SR (TOPROL XL) 25 MG TABLET SR 24 HR 6/6/2020 Active   predniSONE (DELTASONE) 1 MG Tab 6/6/2020 Active   rosuvastatin (CRESTOR) 20 MG Tab 6/6/2020 Active   sevelamer (RENAGEL) 800 MG Tab 6/6/2020 Active   sodium chloride (HYPER-SAL) 7 % Nebu Soln PRN Active   tamsulosin (FLOMAX) 0.4 MG capsule 6/6/2020 Active                ALLERGIES  Allergies   Allergen Reactions   • Doxycycline Diarrhea and Nausea     Reports terrible diarrhea   • Erythromycin Diarrhea and Nausea     Abdominal pain.  RXN=before 2011   • Rituximab Unspecified     Flu like syndrome       REVIEW OF SYSTEMS  See HPI for further details. Review of systems as above, otherwise all other systems are negative.      PHYSICAL EXAM  Constitutional: Well developed, well nourished. No acute distress.  HEENT: Normocephalic, atraumatic. Posterior pharynx clear and moist.  Eyes:  EOMI. Normal sclera.  Neck: Supple, Full range of motion, nontender.  Chest/Pulmonary:   Diminished breath sounds, equal expansion  Cardio: Regular rate and rhythm with no murmur.   Abdomen: Soft, nontender. No peritoneal signs. No guarding. No palpable masses.  Back: No CVA tenderness, nontender midline, no step offs.  Musculoskeletal: No deformity, no edema, neurovascular intact.   Neuro: Clear speech, appropriate, cooperative, cranial nerves II-XII grossly intact.  Psych: Normal mood and affect    Results for orders placed or performed during the hospital encounter of 06/06/20   CBC WITH DIFFERENTIAL   Result Value Ref Range    WBC 16.0 (H) 4.8 - 10.8 K/uL    RBC 3.96 (L) 4.70 - 6.10 M/uL    Hemoglobin 11.4 (L) 14.0 - 18.0 g/dL    Hematocrit 37.4 (L) 42.0 - 52.0 %    MCV 94.4 81.4 - 97.8 fL    MCH 28.8 27.0 - 33.0 pg    MCHC 30.5 (L) 33.7 - 35.3 g/dL    RDW 60.6 (H) 35.9 - 50.0 fL    Platelet Count 163 (L) 164 - 446 K/uL    MPV 8.8 (L) 9.0 - 12.9 fL    Neutrophils-Polys 88.10 (H) 44.00 - 72.00 %    Lymphocytes 2.90 (L) 22.00 - 41.00 %    Monocytes 7.90 0.00 - 13.40 %    Eosinophils 0.40 0.00 - 6.90 %    Basophils 0.30 0.00 - 1.80 %    Immature Granulocytes 0.40 0.00 - 0.90 %    Nucleated RBC 0.00 /100 WBC    Neutrophils (Absolute) 14.07 (H) 1.82 - 7.42 K/uL    Lymphs (Absolute) 0.46 (L) 1.00 - 4.80 K/uL    Monos (Absolute) 1.27 (H) 0.00 - 0.85 K/uL    Eos (Absolute) 0.06 0.00 - 0.51 K/uL    Baso (Absolute) 0.05 0.00 - 0.12 K/uL    Immature Granulocytes (abs) 0.07 0.00 - 0.11 K/uL    NRBC (Absolute) 0.00 K/uL   COMP METABOLIC PANEL   Result Value Ref Range    Sodium 134 (L) 135 - 145 mmol/L    Potassium 3.9 3.6 - 5.5 mmol/L    Chloride 92 (L) 96 - 112 mmol/L    Co2 28 20 - 33 mmol/L    Anion Gap 14.0 7.0 - 16.0    Glucose 97 65 - 99 mg/dL    Bun 36 (H)  8 - 22 mg/dL    Creatinine 3.24 (H) 0.50 - 1.40 mg/dL    Calcium 8.9 8.5 - 10.5 mg/dL    AST(SGOT) 14 12 - 45 U/L    ALT(SGPT) 13 2 - 50 U/L    Alkaline Phosphatase 68 30 - 99 U/L    Total Bilirubin 0.8 0.1 - 1.5 mg/dL    Albumin 3.7 3.2 - 4.9 g/dL    Total Protein 6.5 6.0 - 8.2 g/dL    Globulin 2.8 1.9 - 3.5 g/dL    A-G Ratio 1.3 g/dL   TROPONIN   Result Value Ref Range    Troponin T 238 (H) 6 - 19 ng/L   LACTIC ACID   Result Value Ref Range    Lactic Acid 1.0 0.5 - 2.0 mmol/L   ESTIMATED GFR   Result Value Ref Range    GFR If  22 (A) >60 mL/min/1.73 m 2    GFR If Non  18 (A) >60 mL/min/1.73 m 2   EKG   Result Value Ref Range    Report       [facility]    Test Date:  2020  Pt Name:    ALETA BUCK                  Department: ER  MRN:        [id number]                      Room:       [facility]  Gender:     Male                         Technician: [id number]  :        [id number]                   Requested By:RUPALI CENTENO  Order #:    [id number]                    Reading MD:    Measurements  Intervals                                Axis  Rate:       101                          P:          9  CA:         152                          QRS:        -9  QRSD:       118                          T:          -8  QT:         360  QTc:        467    Interpretive Statements  SINUS TACHYCARDIA  INCOMPLETE RIGHT BUNDLE BRANCH BLOCK  Compared to ECG 2020 12:43:29  Incomplete right bundle-branch block now present  Ventricular premature complex(es) no longer present  Right bundle-branch block no longer present  ST (T wave) deviation no longer present       *Note: Due to a large number of results and/or encounters for the requested time period, some results have not been displayed. A complete set of results can be found in Results Review.     DX-CHEST-PORTABLE (1 VIEW)   Final Result         Hazy opacities in the bilateral lung bases, left more than right,  are similar to prior and could be atelectasis or consolidation.      There may be small bilateral pleural effusions.        Ekg; sinus tachycardia at 101.  No ST elevation, no ST depression.  RBBB noted, QTC is 467.    PROCEDURES     MEDICAL RECORD  I have reviewed patient's medical record and pertinent results are listed.    COURSE & MEDICAL DECISION MAKING  I have reviewed any medical record information, laboratory studies and radiographic results as noted above.      CRITICAL CARE  The very real possibility of a deterioration of this patient's condition required the highest level of my preparedness for sudden, emergent intervention.  I provided critical care services, which included medication orders, frequent reevaluations of the patient's condition and response to treatment, ordering and reviewing test results, and discussing the case with various consultants.  The critical care time associated with the care of the patient was 38 minutes. Review chart for interventions. This time is exclusive of any other billable procedures.       The patient will be given antibiotics here, and will be admitted to Dr. Dietz.  He agrees to stay, and will be admitted in guarded condition.  Is noted that he has a significant elevated troponin, however he has had this in the past.      FINAL IMPRESSION  1. Pneumonia due to infectious organism, unspecified laterality, unspecified part of lung     2.      Critical care 38 minutes.       Electronically signed by: Micheal Caruso D.O., 6/7/2020 1:23 AM

## 2020-06-07 NOTE — PROGRESS NOTES
Received report from previous day shift nurse and assumed care. Assessment completed, POC discussed. Pt is A&OX4, denies pain or discomfort. States he is hungry, paged speech as patient failed his admission swallow eval. Pt is NPO at this time until he is seen. Medications given per MAR. All other needs met. Safety precautions and hourly rounding in place.

## 2020-06-07 NOTE — ASSESSMENT & PLAN NOTE
-Has had recurrent pneumonias in the past.  X-ray showed hazy opacities in the bilateral lung base, left more than the right.  Procalcitonin elevated.  Has leukocytosis, and with subjective fevers, chills, and cough. COVID-19 negative. With history of MAC and pseudomonal colonization.  -Continue Zosyn and azithromycin.  Follow cultures.    6/9 cx neg, increasing proCal, per ID, change to merrem, ID to adjust    - CT of the chest - increasing opacity, emphysema, reactive  ?aspiration  F/u SLP    Thrush- add nystatin  -Continue to trend WBC count, and procalcitonin.  Follow sputum cultures.  -ID following, appreciate inputs.   -Continue respiratory support, oxygen supplement.  Currently stable at baseline home oxygen.  Continue symptom control with antitussives, with Robitussin, and Mucinex, along with Tessalon PRN. Continue aggressive chest physiotherapy.    6/10 sputum cx, polymicrobial  F/u am labs  Cont abx  Sob unchanged    6/11 Pseudomonas on respiratory cultures, sensitivities available, to ciprofloxacin  Per ID, continue antibiotics  Improving procalcitonin down to 2.56, status post dialysis  Antibiotics per ID recommendations    6/12 abx through 6/14 per ID  Encourage activity  Now refusing h/h    6/13 Zosyn to complete through June 14  Patient failed outpatient Cipro previously    6/14 to complete antibiotics late tonight  Plan for discharge home in a.m. after dialysis  Nephrology updated

## 2020-06-07 NOTE — CONSULTS
Consults   INFECTIOUS DISEASES INPATIENT CONSULT NOTE     Date of Service: 6/7/2020    Consult Requested By: Celso Parson M.D.    Reason for Consultation: Pneumonia     History of Present Illness:   Gilberto Brown is a 79 y.o.  admitted 6/6/2020. Pt has a past medical history of severe aortic stenosis and reports he had valve placement in March 2020. History of Wegener's granulomatosis and chronic lung disease with bronchiectasis,  COPD (baseline oxygen 3 L) and on chronic steroids 5 mg daily. 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  He also has ESRD on hemodialysis.      He was seen by pulmonary and ID at the end of January, 2020 with extensive work-up at that point.  He improved on short course of Zosyn and was discharged with plan to follow-up with pulmonary. Prior work-up on 1/26/2020 with sputum positive for pseudomonas aeruginosa with the testing performed (sensitive to ciprofloxacin, meropenem, tobramycin, amikacin and and Zosyn)  negative sputum AFB, P TANIYA PCR negative, histoplasmosis antibody negative, cocci antibody negative, beta D glucan intermediate.     Patient is now admitted complaining of several weeks of increasing shortness of breath, subjective fevers and productive cough.  He states that he took a course of ciprofloxacin approximately 2 weeks ago with no improvement in his symptoms.  He has been off antibiotics for 1 week and had fevers which prompted ED visit.    Hospital Course:   In the ED was fevers to 100.9 and now improved.  He is stable on 2 L nasal cannula which is at or below his baseline.  WBC elevated to 16. COVID 19 tested and negative.  Chest x-ray with hazy opacities in bilateral lung bases, similar to prior and thought to be either atelectasis or consolidation possible small bilateral pleural effusions.    Review Of Systems:  Review of Systems   Constitutional: Positive for chills, fever,  malaise/fatigue and weight loss.   HENT: Negative for hearing loss.    Eyes: Negative for blurred vision and double vision.   Respiratory: Positive for cough, sputum production and shortness of breath. Negative for hemoptysis and wheezing.    Cardiovascular: Negative for chest pain and leg swelling.   Gastrointestinal: Negative for abdominal pain, constipation, diarrhea, nausea and vomiting.   Genitourinary: Negative for dysuria.   Musculoskeletal: Negative for back pain, joint pain and myalgias.   Skin: Negative for rash.   Neurological: Negative for headaches.   Psychiatric/Behavioral: The patient is not nervous/anxious.          PMH:   Past Medical History:   Diagnosis Date   • A-fib (Formerly [facility])        • Anemia    • Arthritis     arms, legs, shoulders   • ASTHMA    • BPH (benign prostatic hyperplasia)    • Breath shortness    • Bronchitis     chronic   • Cataract     removed bilat   • COPD    • Dialysis     [facility],    • Dyslipidemia    • EMPHYSEMA    • GERD (gastroesophageal reflux disease)    • Hypertension    • Oxygen decrease     O2 3liters @  and dialysis   • Pacemaker 1988   • Pain 05/09/2018    shoulders/hips, 5/10   • Pneumonia 2017   • Pseudomonas pneumonia (Formerly [facility])    • Pulmonary histoplasmosis (Formerly [facility])    • Renal disorder     CKD,    • Sick sinus syndrome (Formerly [facility])    • Sleep apnea     uses cpap at [facility]   • Snoring    • Stroke (Formerly [facility]) 02/2018   • Unspecified hemorrhagic conditions     bruises easily, fragile skin   • Wegener's disease, pulmonary (Formerly [facility])        PSH:  Past Surgical History:   Procedure Laterality Date   • AV FISTULOGRAM Right 4/12/2016    Procedure: AV FISTULOGRAM , VENOPLASTY X 2;  Surgeon: Nate Syed M.D.;  Location: SURGERY [facility];  Service:    • RECOVERY  9/3/2015    Procedure: IR1 VASCULAR CASE-ELENO RIGHT DIALYSIS FISTULOGRAM, POSSIBLE INTERVENTION;  Surgeon: Recovery[facility] Surgery;  Location: SURGERY PRE-POST [facility] UNIT [facility];  Service:    • RECOVERY  2/26/2015     Performed by [facility] at SURGERY SAME DAY [facility] ORS   • RECOVERY  10/3/2014    Performed by [facility] at SURGERY SAME DAY [facility] ORS   • RECOVERY  8/7/2014    Performed by [facility] at SURGERY [facility] ORS   • RECOVERY  12/17/2013    Performed by [facility] at SURGERY SAME DAY [facility] ORS   • BRONCHOSCOPY-ENDO  7/18/2013    Performed by Juan F Rubio M.D. at [facility]   • RECOVERY  7/17/2013    Performed by [facility] at SURGERY SAME DAY [facility] ORS   • AV FISTULA REVISION  6/9/2012    Performed by NESSA JOHANSEN at SURGERY [facility] ORS   • RECOVERY  4/19/2012    Performed by SURGERY, IR-RECOVERY at SURGERY SAME DAY [facility] ORS   • AV FISTULA CREATION  3/8/2012    Performed by NESSA JOHANSEN at SURGERY [facility] ORS   • GASTROSCOPY WITH BIOPSY  1/17/2012    Performed by ZURDO HARRISON at [facility] ORS   • CATH PLACEMENT  10/8/2011    Performed by NESSA JOHANSEN at [facility]   • GASTROSCOPY WITH BALLOON DILATATION  9/28/2011    Performed by KT FERNANDEZ at [facility]   • PACEMAKER INSERTION  4/2009   • ROTATOR CUFF REPAIR  2003    right   • HERNIA REPAIR  1990    right inguinal hernia   • HIP REPLACEMENT, TOTAL      right   • TONSILLECTOMY         FAMILY HX:  Family History   Problem Relation Age of Onset   • Stroke Father    • Heart Disease Father    • Stroke Mother    • Cancer Other        SOCIAL HX:  Social History     Socioeconomic History   • Marital status:      Spouse name: Not on file   • Number of children: Not on file   • Years of education: Not on file   • Highest education level: Not on file   Occupational History   • Not on file   Social Needs   • Financial resource strain: Not on file   • Food insecurity     Worry: Not on file     Inability: Not on file   • Transportation needs     Medical: Not on file     Non-medical: Not on file   Tobacco Use   •  Smoking status: Former Smoker     Packs/day: 0.00     Years: 30.00     Pack years: 0.00     Types: Pipe     Last attempt to quit: 1990     Years since quittin.4   • Smokeless tobacco: Former User     Types: Chew   Substance and Sexual Activity   • Alcohol use: Yes     Alcohol/week: 1.2 oz     Types: 1 Glasses of wine, 1 Cans of beer per week     Frequency: 2-3 times a week     Drinks per session: 1 or 2     Binge frequency: Never     Comment: 1/2 glass Red wine nightly   • Drug use: No   • Sexual activity: Yes     Partners: Female   Lifestyle   • Physical activity     Days per week: Not on file     Minutes per session: Not on file   • Stress: Not on file   Relationships   • Social connections     Talks on phone: Not on file     Gets together: Not on file     Attends Mu-ism service: Not on file     Active member of club or organization: Not on file     Attends meetings of clubs or organizations: Not on file     Relationship status: Not on file   • Intimate partner violence     Fear of current or ex partner: Not on file     Emotionally abused: Not on file     Physically abused: Not on file     Forced sexual activity: Not on file   Other Topics Concern   • Not on file   Social History Narrative   • Not on file     Social History     Tobacco Use   Smoking Status Former Smoker   • Packs/day: 0.00   • Years: 30.00   • Pack years: 0.00   • Types: Pipe   • Last attempt to quit: 1990   • Years since quittin.4   Smokeless Tobacco Former User   • Types: Chew     Social History     Substance and Sexual Activity   Alcohol Use Yes   • Alcohol/week: 1.2 oz   • Types: 1 Glasses of wine, 1 Cans of beer per week   • Frequency: 2-3 times a week   • Drinks per session: 1 or 2   • Binge frequency: Never    Comment: 1/2 glass Red wine nightly       Allergies/Intolerances:  Allergies   Allergen Reactions   • Doxycycline Diarrhea and Nausea     Reports terrible diarrhea   • Erythromycin Diarrhea and Nausea      Abdominal pain.  RXN=before 2011   • Rituximab Unspecified     Flu like syndrome       History reviewed with the patient     Other Current Medications:    Current Facility-Administered Medications:   •  sevelamer carbonate (RENVELA) tablet 800 mg, 800 mg, Oral, TID WITH MEALS, Deandra Dietz M.D., Stopped at 06/07/20 1130  •  rosuvastatin (CRESTOR) tablet 20 mg, 20 mg, Oral, DAILY, Deandra Dietz M.D., 20 mg at 06/07/20 0442  •  predniSONE (DELTASONE) tablet 5 mg, 5 mg, Oral, DAILY, Deandra Dietz M.D., 5 mg at 06/07/20 0442  •  metoprolol SR (TOPROL XL) tablet 25 mg, 25 mg, Oral, BID, Deandra Dietz M.D., 25 mg at 06/07/20 0441  •  budesonide-formoterol (SYMBICORT) 160-4.5 MCG/ACT inhaler 2 Puff, 2 Puff, Inhalation, BID (RT), Deandra Dietz M.D., 2 Puff at 06/07/20 0944  •  clopidogrel (PLAVIX) tablet 75 mg, 75 mg, Oral, Q EVENING, Deandra Dietz M.D.  •  finasteride (PROSCAR) tablet 5 mg, 5 mg, Oral, QAM, Deandra Dietz M.D., Stopped at 06/07/20 0600  •  tamsulosin (FLOMAX) capsule 0.4 mg, 0.4 mg, Oral, DAILY, Deandra Dietz M.D., Stopped at 06/07/20 0600  •  senna-docusate (PERICOLACE or SENOKOT S) 8.6-50 MG per tablet 2 Tab, 2 Tab, Oral, BID **AND** polyethylene glycol/lytes (MIRALAX) PACKET 1 Packet, 1 Packet, Oral, QDAY PRN **AND** magnesium hydroxide (MILK OF MAGNESIA) suspension 30 mL, 30 mL, Oral, QDAY PRN **AND** bisacodyl (DULCOLAX) suppository 10 mg, 10 mg, Rectal, QDAY PRN, Deandra Dietz M.D.  •  Respiratory Therapy Consult, , Nebulization, Continuous RT, Deandra Dietz M.D.  •  heparin injection 5,000 Units, 5,000 Units, Subcutaneous, Q8HRS, Deandra Dietz M.D., Stopped at 06/07/20 0600  •  acetaminophen (TYLENOL) tablet 650 mg, 650 mg, Oral, Q6HRS PRN, Deandra Dietz M.D.  •  azithromycin (ZITHROMAX) tablet 500 mg, 500 mg, Oral, DAILY, Deandra Dietz M.D., 500 mg at 06/07/20 0204  •  guaiFENesin dextromethorphan (ROBITUSSIN DM) 100-10 MG/5ML syrup 10 mL, 10 mL, Oral, Q6HRS PRN, Deandra BURCH  "DAVID Dietz  •  [COMPLETED] piperacillin-tazobactam (ZOSYN) 4.5 g in  mL IVPB, 4.5 g, Intravenous, Once, Stopped at 20 0241 **AND** piperacillin-tazobactam (ZOSYN) 4.5 g in  mL IVPB, 4.5 g, Intravenous, Q8HRS, Deandra Dietz M.D., Stopped at 20 0900  •  guaiFENesin ER (MUCINEX) tablet 600 mg, 600 mg, Oral, Q12HRS, Celso Parson M.D., 600 mg at 20 1129  •  benzonatate (TESSALON) capsule 100 mg, 100 mg, Oral, TID PRN, Celso Parson M.D.  •  ondansetron (ZOFRAN) syringe/vial injection 4 mg, 4 mg, Intravenous, Q4HRS PRN, Celso Parson M.D., 4 mg at 20 0945  [unfilled]    Most Recent Vital Signs:  /49   Pulse 89   Temp 37.1 °C (98.8 °F) (Temporal)   Resp 16   Ht 1.73 m (5' 8.11\")   Wt 55.1 kg (121 lb 7.6 oz)   SpO2 100%   BMI 18.41 kg/m²   Temp  Av.6 °C (99.6 °F)  Min: 36.7 °C (98.1 °F)  Max: 38.3 °C (100.9 °F)    Physical Exam:  Physical Exam   Constitutional: He is oriented to person, place, and time and well-developed, well-nourished, and in no distress. No distress.   HENT:   Head: Normocephalic and atraumatic.   Right Ear: External ear normal.   Left Ear: External ear normal.   Nose: Nose normal.   Eyes: Pupils are equal, round, and reactive to light. Conjunctivae and EOM are normal.   Neck: Normal range of motion. Neck supple.   Cardiovascular: Normal rate, regular rhythm and normal heart sounds.   Pulmonary/Chest: Effort normal.   Crackles bilaterally in the bases   Abdominal: Soft. Bowel sounds are normal.   Musculoskeletal: Normal range of motion.   Neurological: He is alert and oriented to person, place, and time.   Skin: Skin is warm and dry.   Elbert complexion   Psychiatric: Affect and judgment normal.           Pertinent Lab Results:  Recent Labs     20  2250   WBC 16.0*      Recent Labs     20  2250   HEMOGLOBIN 11.4*   HEMATOCRIT 37.4*   MCV 94.4   MCH 28.8   PLATELETCT 163*         Recent Labs     20  2250   SODIUM " 134*   POTASSIUM 3.9   CHLORIDE 92*   CO2 28   CREATININE 3.24*        Recent Labs     06/06/20  2250   ALBUMIN 3.7        Pertinent Micro:  Results     Procedure Component Value Units Date/Time    URINALYSIS,CULTURE IF INDICATED [[id number]]  (Abnormal) Collected:  06/07/20 0631    Order Status:  Completed Specimen:  Urine Updated:  06/07/20 0712     Color Yellow     Character Clear     Specific Gravity 1.007     Ph 8.5     Glucose Negative mg/dL      Ketones Negative mg/dL      Protein Negative mg/dL      Bilirubin Negative     Urobilinogen, Urine 0.2     Nitrite Negative     Leukocyte Esterase Negative     Occult Blood Negative     Micro Urine Req see below     Comment: Microscopic examination not performed when specimen is clear  and chemically negative for protein, blood, leukocyte esterase  and nitrite.         SARS-CoV-2, PCR (In-House) [[id number]] Collected:  06/07/20 0140    Order Status:  Completed Updated:  06/07/20 0304     SARS-CoV-2 Source NP Swab     SARS-CoV-2 by PCR NotDetected     Comment: [facility] providers: PLEASE REFER TO DE-ESCALATION AND RETESTING PROTOCOL  on [email or web address]  **The [employer] GeneXpert Xpress SARS-CoV-2 Test has been made available for  use under the Emergency Use Authorization (EUA) only.         Narrative:       Is this test for diagnosis or screening?->Diagnosis of ill  patient    COVID/SARS CoV-2 [[id number]] Collected:  06/07/20 0140    Order Status:  Completed Specimen:  Respirate from Nasopharyngeal Updated:  06/07/20 0151     COVID Order Status Received    Narrative:       Is this test for diagnosis or screening?->Diagnosis of ill  patient    Culture Respiratory W/ Grm Stn [[id number]]     Order Status:  Completed Specimen:  Respirate from Sputum     BLOOD CULTURE [[id number]] Collected:  06/06/20 2240    Order Status:  Completed Specimen:  Blood from Peripheral Updated:  06/07/20 0008    Narrative:       2 of 2 blood culture x2  Sites order. Per Hospital Policy:  Only change  "Specimen Src: to \"Line\" if specified by physician  order.    BLOOD CULTURE [[id number]] Collected:  06/06/20 2240    Order Status:  Completed Specimen:  Blood from Peripheral Updated:  06/06/20 2302    Narrative:       1 of 2 for Blood Culture x 2 sites order. Per Hospital  Policy: Only change Specimen Src: to \"Line\" if specified by  physician order.        Blood Culture   Date Value Ref Range Status   03/01/2019 No growth after 5 days of incubation.  Final   12/30/2013 Final report  Final     Blood Culture Hold   Date Value Ref Range Status   06/06/2018 Collected  Final        Studies:  Dx-chest-portable (1 View)    Result Date: 6/6/2020 6/6/2020 10:58 PM HISTORY/REASON FOR EXAM:  Sepsis Short of breath TECHNIQUE/EXAM DESCRIPTION AND NUMBER OF VIEWS: Single portable view of the chest. COMPARISON: 1/25/2020 FINDINGS: Left-sided pacemaker. Hazy opacities in the bilateral lung bases, left more than right There may be small bilateral pleural effusions. No pneumothorax. Stable cardiopericardial silhouette.     Hazy opacities in the bilateral lung bases, left more than right, are similar to prior and could be atelectasis or consolidation. There may be small bilateral pleural effusions.      ASSESSMENT/PLAN:      79 y.o.  admitted 6/6/2020. Pt has a past medical history of severe aortic stenosis and reports he had valve placement in March 2020. History of Wegener's granulomatosis and chronic lung disease with bronchiectasis,  COPD (baseline oxygen 3 L) and on chronic steroids 5 mg daily. 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  He also has ESRD on hemodialysis.      He was seen by pulmonary and ID at the end of January, 2020 with extensive work-up at that point.  He improved on short course of Zosyn and was discharged with plan to follow-up with pulmonary. Prior work-up on 1/26/2020 with sputum positive for pseudomonas aeruginosa with " the testing performed (sensitive to ciprofloxacin, meropenem, tobramycin, amikacin and and Zosyn)  negative sputum AFB, P TANIYA PCR negative, histoplasmosis antibody negative, cocci antibody negative, beta D glucan intermediate.     Patient is now admitted complaining of several weeks of increasing shortness of breath, subjective fevers and productive cough.  He states that he took a course of ciprofloxacin approximately 2 weeks ago with no improvement in his symptoms.  He has been off antibiotics for 1 week and had fevers which prompted ED visit.    Hospital Course:   In the ED was fevers to 100.9 and now improved.  He is stable on 2 L nasal cannula which is at or below his baseline.  WBC elevated to 16. COVID 19 tested and negative.  Chest x-ray with hazy opacities in bilateral lung bases, similar to prior and thought to be either atelectasis or consolidation possible small bilateral pleural effusions.    Problem List     Community-acquired pneumonia with cough, shortness of breath  -Procalcitonin on 6/7 0.60  Fevers  Leukocytosis  Chronic lung disease with COPD and bronchiectasis, baseline oxygen 3 L  Lung colonization with Pseudomonas and prior colonization with Mycobacterium avium  Immunosuppressed due to suppressed on chronic steroids  E SDR on HD  History of Wegener's granulomatosis  Remote history of pulmonary histoplasmosis  Severe aortic stenosis  Antibiotic allergies: Doxycycline    Plan     --- Agree with Zosyn and azithromycin for now-we will need to be extremely judicious in his antibiotics so as not to promote any resistance  --- PCT for am to trend   --- Will add upper respiratory viral panel to COVID swab   --- Obtain CT chest noncontrast to better characterize pneumonia versus atelectasis pleural effusions  --- Agree with respiratory culture with Gram stain-yet collected, will add AFB  --- Aggressive pulmonary toilet  --- Monitor response to antibiotics  --- Follow labs      Plan of care discussed  with IM Celso Parson M.D.. Will continue to follow    Jessica Limon M.D.

## 2020-06-07 NOTE — PROGRESS NOTES
Hospital Medicine Daily Progress Note    Date of Service  6/7/2020    Chief Complaint  Shortness of breath    Hospital Course    79 y.o. male with severe aortic stenosis, Wegener's granulomatosis on chronic steroids, end-stage renal disease on hemodialysis, chronic respiratory failure and COPD (chronic baseline 3 L oxygen), with history of MAC and pseudomonal colonization, admitted 6/6/2020 with shortness of breath, along with subjective fevers, chills, and productive cough.  He was noted to be febrile.  Initial work-up showed WBC of 16,000, and creatinine 3.24, with potassium of 3.9.  Troponin was 238, which he had chronically elevated.  Chest x-ray showed hazy opacities in the bilateral lung bases, left more than the right, with small bilateral pleural effusions.  Patient was felt to have sepsis, with lung source and recurrent pneumonia, and he is started on IV Zosyn and azithromycin.  Cultures were sent.  COVID-19 swab was sent, and it was negative.        Interval Problem Update  6/7/2020 - I reviewed the patient's chart today. Uneventful night. VSS. Afebrile today, no fever since last night. Saturating well on 2L O2 NC.  Procalcitonin was 0.60.  Urinalysis was negative.  I have consulted both nephrology (Dr. Corona), and ID (Dr. Limon) who both confirmed that they will see him today.    > I have personally seen and examined the patient today.  States he is still short of breath, easily gets winded with walking short distances.  Has nonproductive cough.  Denies any chest pain.  No nausea or vomiting.  No abdominal pain.  No diarrhea.  He does not feel feverish or chilly.      Consultants/Specialty  Nephrology  ID  Palliative care    Code Status  Full    Disposition  Monitor on the floors.    Review of Systems  ROS     Pertinent positives/negatives as mentioned above.     A complete review of systems was personally done by me. All other systems were negative.       Physical Exam  Temp:  [36.7 °C (98.1 °F)-38.3  °C (100.9 °F)] 37.1 °C (98.8 °F)  Pulse:  [] 89  Resp:  [16-26] 16  BP: (101-138)/(49-64) 101/49  SpO2:  [88 %-100 %] 100 %    Physical Exam  Vitals signs reviewed.   Constitutional:       General: He is not in acute distress.     Appearance: Normal appearance. He is not toxic-appearing or diaphoretic.   HENT:      Head: Normocephalic and atraumatic.      Right Ear: External ear normal.      Left Ear: External ear normal.      Mouth/Throat:      Mouth: Mucous membranes are moist.      Pharynx: No oropharyngeal exudate.   Eyes:      General: No scleral icterus.     Extraocular Movements: Extraocular movements intact.      Conjunctiva/sclera: Conjunctivae normal.      Pupils: Pupils are equal, round, and reactive to light.   Neck:      Musculoskeletal: Normal range of motion and neck supple.   Cardiovascular:      Heart sounds: Normal heart sounds. No murmur. No gallop.    Pulmonary:      Effort: Pulmonary effort is normal. No respiratory distress.      Breath sounds: No stridor. No wheezing, rhonchi or rales.      Comments: Diminished air entry B/L bases  Chest:      Chest wall: No tenderness.   Abdominal:      General: Bowel sounds are normal. There is no distension.      Palpations: Abdomen is soft. There is no mass.      Tenderness: There is no abdominal tenderness. There is no guarding or rebound.   Musculoskeletal: Normal range of motion.         General: No tenderness.   Lymphadenopathy:      Cervical: No cervical adenopathy.   Skin:     General: Skin is warm and dry.      Coloration: Skin is not jaundiced.      Findings: No rash.   Neurological:      General: No focal deficit present.      Mental Status: He is alert and oriented to person, place, and time.      Cranial Nerves: No cranial nerve deficit.   Psychiatric:         Mood and Affect: Mood normal.         Behavior: Behavior normal.         Thought Content: Thought content normal.         Judgment: Judgment normal.             Fluids    Intake/Output Summary (Last 24 hours) at 6/7/2020 1237  Last data filed at 6/7/2020 0630  Gross per 24 hour   Intake 100 ml   Output 175 ml   Net -75 ml       Laboratory  Recent Labs     06/06/20  2250   WBC 16.0*   RBC 3.96*   HEMOGLOBIN 11.4*   HEMATOCRIT 37.4*   MCV 94.4   MCH 28.8   MCHC 30.5*   RDW 60.6*   PLATELETCT 163*   MPV 8.8*     Recent Labs     06/06/20  2250   SODIUM 134*   POTASSIUM 3.9   CHLORIDE 92*   CO2 28   GLUCOSE 97   BUN 36*   CREATININE 3.24*   CALCIUM 8.9                   Imaging  DX-CHEST-PORTABLE (1 VIEW)   Final Result         Hazy opacities in the bilateral lung bases, left more than right, are similar to prior and could be atelectasis or consolidation.      There may be small bilateral pleural effusions.      CT-CHEST (THORAX) W/O    (Results Pending)        Assessment/Plan  * Recurrent pneumonia- (present on admission)  Assessment & Plan  -Has had recurrent pneumonias in the past.  X-ray showed hazy opacities in the bilateral lung base, left more than the right.  Procalcitonin elevated.  Has leukocytosis, and with subjective fevers, chills, and cough. COVID-19 negative. With history of MAC and pseudomonal colonization.  -Continue Zosyn and azithromycin.  Follow cultures.  -will get a CT of the chest to further evaluate for pneumonia vs atelectasis.   -Trend WBC count, and procalcitonin.  -I have consulted ID (Dr. Limon) for further evaluation, and inputs regarding antibiotics.   -Continue respiratory support, oxygen supplement.  Currently stable at baseline home oxygen.  Continue symptom control with antitussives, with Robitussin, and Mucinex, along with Tessalon PRN. Start aggressive chest physiotherapy.    Sepsis (HCC)- (present on admission)  Assessment & Plan  -This is Sepsis Present on admission.  -SIRS criteria identified on my evaluation include: Fever, with temperature greater than 101 deg F, Tachypnea, with respirations greater than 20 per minute and  Leukocytosis, with WBC greater than 12,000  -Source is likely lung  -Sepsis protocol initiated.   -Fluid resuscitation ordered per protocol.  Hold off on maintenance IV fluids as he is not hypotensive, and he is prone to fluid overload given his ESRD.  -Continue IV antibiotics as appropriate for source of sepsis.  Trend WBC count.  Follow cultures.  -While organ dysfunction may be noted elsewhere in this problem list or in the chart, degree of organ dysfunction does not meet CMS criteria for severe sepsis    Elevated troponin- (present on admission)  Assessment & Plan  -Review the medical records indicates that this is chronically elevated, current troponin level is close to his prior baseline.  No ACS.  Continue to monitor.    ESRD on dialysis (HCC)- (present on admission)  Assessment & Plan  - Patient gets dialysis on Monday, Wednesday, and Friday.    -I have contacted Dr. Corona of nephrology to resume hemodialysis while he is in the hospital.    Wegener's granulomatosis (HCC)- (present on admission)  Assessment & Plan  -Continue chronic home low-dose steroid.  -No dialysis dependent.    Chronic respiratory failure with hypoxia (HCC)- (present on admission)  Assessment & Plan  -He is at or even better than his baseline oxygen supplement (3 L).  -Continue management of pneumonia as above.  Continue RT protocol, and oxygen supplements.    Atrial fibrillation (HCC)- (present on admission)  Assessment & Plan  -Rate and rhythm controlled.  Patient is not on chronic anticoagulation.  Continue beta-blocker.  Monitor closely.    Hyperlipidemia- (present on admission)  Assessment & Plan  -Continue Crestor.       VTE prophylaxis: heparin SQ

## 2020-06-07 NOTE — THERAPY
Speech Language Pathology   Clinical Swallow Evaluation     Patient Name: Gilberto Brown  AGE:  79 y.o., SEX:  male  Medical Record #: [id number]  Today's Date: 6/7/2020          Assessment    Pt seen on this date for a clinical swallow evaluation. Per RN, failed bedside screen 2/2 coughing, however, per RN report pt has been coughing through out day. Pt last seen by SLP on 8/15/2019 for an outpatient modified barium swallow evaluation which found penetration, but no aspiration, during the swallow with thin liquids and she recommended a regular/thin liquid diet. Pt reported that he has a baseline cough, however, has been coughing more than usual and c/o minimal throat pain. Coughing appreciated prior to PO and appeared to increase when talking. No gross asymmetry appreciated during the oral motor evaluation. Intermittent coughing appreciated with all trials of MTL, purees, soft solids, and thin liquids, however, coughing did not appear to increase with PO trials. Upon palpation, laryngeal elevation was complete and swallow trigger timely. Pt c/o lethargy and requested feeding from this clinician. Mastication timely for soft solids. Pt requesting to take a nap so further evaluation discontinued.    Plan    Recommend that pt begin an SB6/TNO diet with direct supervision during meals and assist with feeding if needed. Recommend small bites/sips, up at 90* during meals, slow rate, no straws)    Recommend Speech Therapy 3 times per week until therapy goals are met for the following treatments:  Dysphagia Training and Patient / Family / Caregiver Education.    Discharge recommendations:  Recommend outpatient or home health transitional care services for continued speech therapy services       Objective     06/07/20 1439   Pain 0 - 10 Group   Therapist Pain Assessment Post Activity Pain Same as Prior to Activity;Nurse Notified;0   Prior Living Situation   Lives with - Patient's Self Care Capacity Spouse   Prior Level Of  "Function   Communication Within Functional Limits   Swallow Impaired   Dentition Intact   Dentures None   Hearing Within Functional Limits for Evaluation   Vision Within Functional Limits for Evaluation   Patient's Primary Language English   Occupation (Pre-Hospital Vocational) Not Employed   Oral Motor Eval    Is Patient Able to Complete Oral Motor Eval Yes, Within Normal Limits   Laryngeal Function   Voice Quality Within Functional Limits   Volutional Cough Within Functional Limits   Excursion Upon Swallow Complete   Oral Food Presentation   Single Swallow Mildly Thick (2) - (Nectar Thick)  Minimal   Single Swallow Thin (0) Minimal   Pureed (4) Minimal   Soft & Bite-Sized (6) - (Dysphagia III) Minimal   Self Feeding Needs Assistance   Tracheostomy   Tracheostomy  No   Dysphagia Strategies / Recommendations   Strategies / Interventions Recommended (Yes / No) Yes   Compensatory Strategies Direct Supervision During Meals;Head of Bed 90 Degrees During Eating / Drinking;No Straws;Single Sips / Bites   Diet / Liquid Recommendation Thin (0);Soft & Bite-Sized (6) - (Dysphagia III)   Medication Administration  Whole with Liquid Wash   Therapy Interventions Dysphagia Therapy By Speech Language Pathologist;Oral Motor Exercises;Pharyngeal / Laryngeal Exercises   Dysphagia Rating   Nutritional Liquid Intake Rating Scale Non thickened beverages   Nutritional Food Intake Rating Scale Total oral diet with multiple consistencies but requiring special preparation or compensations   Patient / Family Goals   Patient / Family Goal #1 \"nap\"   Short Term Goals   Short Term Goal # 1 Pt will consume a SB6/TNO diet with no overt s/sx of aspiration   Education Group   Education Provided Dysphagia   Dysphagia Patient Response Patient;Acceptance;Explanation;Verbal Demonstration;Reinforcement Needed   Problem List   Problem List Dysphagia   Anticipated Discharge Needs   Therapy Recommendations Upon DC Dysphagia Training;Community " Re-Integration;Patient / Family / Caregiver Education   Interdisciplinary Plan of Care Collaboration   IDT Collaboration with  Nursing   Patient Position at End of Therapy Seated;In Bed;Call Light within Reach;Tray Table within Reach;Phone within Reach   Collaboration Comments results and recs with RN   Session Information   Date / Session Number 1: 6/7/20 (1/3 - 6/13)   Priority 3  (3x/wk: SB6/TNO, PNA, MBS 8/19, HIGH FOLLOW)

## 2020-06-07 NOTE — ASSESSMENT & PLAN NOTE
-Review of the medical records indicates that this is chronically elevated, current troponin level is close to his prior baseline.  No ACS.  Continue to monitor.

## 2020-06-07 NOTE — ASSESSMENT & PLAN NOTE
-This is Sepsis Present on admission.  -SIRS criteria identified on my evaluation include: Fever, with temperature greater than 101 deg F, Tachypnea, with respirations greater than 20 per minute and Leukocytosis, with WBC greater than 12,000  -Source is likely lung  -Sepsis protocol initiated.   -Fluid resuscitation ordered per protocol.  Continue to hold off on maintenance IV fluids as he is not hypotensive, and he is prone to fluid overload given his ESRD.  -Continue IV antibiotics as appropriate for source of sepsis.  Trend WBC count.  Follow cultures.  -While organ dysfunction may be noted elsewhere in this problem list or in the chart, degree of organ dysfunction does not meet CMS criteria for severe sepsis

## 2020-06-07 NOTE — PROGRESS NOTES
Patient reports emesis episode got verbal order for zofran. Admin meds and handed patietn off to rimma willson. Safety precautions in place. Belongings and call light in reach.

## 2020-06-08 ENCOUNTER — APPOINTMENT (OUTPATIENT)
Dept: RADIOLOGY | Facility: MEDICAL CENTER | Age: 80
DRG: 871 | End: 2020-06-08
Attending: INTERNAL MEDICINE
Payer: MEDICARE

## 2020-06-08 LAB
ANION GAP SERPL CALC-SCNC: 17 MMOL/L (ref 7–16)
BUN SERPL-MCNC: 49 MG/DL (ref 8–22)
CALCIUM SERPL-MCNC: 8.3 MG/DL (ref 8.5–10.5)
CHLORIDE SERPL-SCNC: 95 MMOL/L (ref 96–112)
CO2 SERPL-SCNC: 23 MMOL/L (ref 20–33)
CREAT SERPL-MCNC: 4.04 MG/DL (ref 0.5–1.4)
ERYTHROCYTE [DISTWIDTH] IN BLOOD BY AUTOMATED COUNT: 60 FL (ref 35.9–50)
GLUCOSE SERPL-MCNC: 114 MG/DL (ref 65–99)
GRAM STN SPEC: NORMAL
HBV SURFACE AB SERPL IA-ACNC: <3.5 MIU/ML (ref 0–10)
HBV SURFACE AG SER QL: NORMAL
HCT VFR BLD AUTO: 38.5 % (ref 42–52)
HGB BLD-MCNC: 11.6 G/DL (ref 14–18)
MCH RBC QN AUTO: 28.2 PG (ref 27–33)
MCHC RBC AUTO-ENTMCNC: 30.1 G/DL (ref 33.7–35.3)
MCV RBC AUTO: 93.7 FL (ref 81.4–97.8)
PLATELET # BLD AUTO: 140 K/UL (ref 164–446)
PMV BLD AUTO: 9.1 FL (ref 9–12.9)
POTASSIUM SERPL-SCNC: 4.5 MMOL/L (ref 3.6–5.5)
PROCALCITONIN SERPL-MCNC: 0.93 NG/ML
RBC # BLD AUTO: 4.11 M/UL (ref 4.7–6.1)
RHODAMINE-AURAMINE STN SPEC: NORMAL
SIGNIFICANT IND 70042: NORMAL
SIGNIFICANT IND 70042: NORMAL
SITE SITE: NORMAL
SITE SITE: NORMAL
SODIUM SERPL-SCNC: 135 MMOL/L (ref 135–145)
SOURCE SOURCE: NORMAL
SOURCE SOURCE: NORMAL
WBC # BLD AUTO: 12.2 K/UL (ref 4.8–10.8)

## 2020-06-08 PROCEDURE — 94640 AIRWAY INHALATION TREATMENT: CPT

## 2020-06-08 PROCEDURE — A9270 NON-COVERED ITEM OR SERVICE: HCPCS | Performed by: HOSPITALIST

## 2020-06-08 PROCEDURE — 99233 SBSQ HOSP IP/OBS HIGH 50: CPT | Performed by: INTERNAL MEDICINE

## 2020-06-08 PROCEDURE — 86706 HEP B SURFACE ANTIBODY: CPT

## 2020-06-08 PROCEDURE — A9270 NON-COVERED ITEM OR SERVICE: HCPCS | Performed by: INTERNAL MEDICINE

## 2020-06-08 PROCEDURE — 84145 PROCALCITONIN (PCT): CPT

## 2020-06-08 PROCEDURE — 87340 HEPATITIS B SURFACE AG IA: CPT

## 2020-06-08 PROCEDURE — 700111 HCHG RX REV CODE 636 W/ 250 OVERRIDE (IP): Performed by: HOSPITALIST

## 2020-06-08 PROCEDURE — 700111 HCHG RX REV CODE 636 W/ 250 OVERRIDE (IP): Performed by: INTERNAL MEDICINE

## 2020-06-08 PROCEDURE — 36415 COLL VENOUS BLD VENIPUNCTURE: CPT

## 2020-06-08 PROCEDURE — 5A1D70Z PERFORMANCE OF URINARY FILTRATION, INTERMITTENT, LESS THAN 6 HOURS PER DAY: ICD-10-PCS | Performed by: INTERNAL MEDICINE

## 2020-06-08 PROCEDURE — 94760 N-INVAS EAR/PLS OXIMETRY 1: CPT

## 2020-06-08 PROCEDURE — 700111 HCHG RX REV CODE 636 W/ 250 OVERRIDE (IP)

## 2020-06-08 PROCEDURE — 80048 BASIC METABOLIC PNL TOTAL CA: CPT

## 2020-06-08 PROCEDURE — 700101 HCHG RX REV CODE 250: Performed by: INTERNAL MEDICINE

## 2020-06-08 PROCEDURE — 700102 HCHG RX REV CODE 250 W/ 637 OVERRIDE(OP): Performed by: INTERNAL MEDICINE

## 2020-06-08 PROCEDURE — 94669 MECHANICAL CHEST WALL OSCILL: CPT

## 2020-06-08 PROCEDURE — 90935 HEMODIALYSIS ONE EVALUATION: CPT

## 2020-06-08 PROCEDURE — 700105 HCHG RX REV CODE 258: Performed by: HOSPITALIST

## 2020-06-08 PROCEDURE — 85027 COMPLETE CBC AUTOMATED: CPT

## 2020-06-08 PROCEDURE — 92526 ORAL FUNCTION THERAPY: CPT

## 2020-06-08 PROCEDURE — 700105 HCHG RX REV CODE 258: Performed by: INTERNAL MEDICINE

## 2020-06-08 PROCEDURE — 770006 HCHG ROOM/CARE - MED/SURG/GYN SEMI*

## 2020-06-08 PROCEDURE — 71250 CT THORAX DX C-: CPT

## 2020-06-08 PROCEDURE — 90935 HEMODIALYSIS ONE EVALUATION: CPT | Performed by: INTERNAL MEDICINE

## 2020-06-08 PROCEDURE — 700102 HCHG RX REV CODE 250 W/ 637 OVERRIDE(OP): Performed by: HOSPITALIST

## 2020-06-08 RX ORDER — HEPARIN SODIUM 1000 [USP'U]/ML
INJECTION, SOLUTION INTRAVENOUS; SUBCUTANEOUS
Status: COMPLETED
Start: 2020-06-08 | End: 2020-06-08

## 2020-06-08 RX ORDER — HEPARIN SODIUM 1000 [USP'U]/ML
1750 INJECTION, SOLUTION INTRAVENOUS; SUBCUTANEOUS
Status: DISCONTINUED | OUTPATIENT
Start: 2020-06-08 | End: 2020-06-15 | Stop reason: HOSPADM

## 2020-06-08 RX ORDER — LIDOCAINE HYDROCHLORIDE 20 MG/ML
JELLY TOPICAL
Status: COMPLETED | OUTPATIENT
Start: 2020-06-08 | End: 2020-06-08

## 2020-06-08 RX ADMIN — PREDNISONE 5 MG: 5 TABLET ORAL at 05:14

## 2020-06-08 RX ADMIN — TAMSULOSIN HYDROCHLORIDE 0.4 MG: 0.4 CAPSULE ORAL at 05:14

## 2020-06-08 RX ADMIN — BUDESONIDE AND FORMOTEROL FUMARATE DIHYDRATE 2 PUFF: 160; 4.5 AEROSOL RESPIRATORY (INHALATION) at 18:30

## 2020-06-08 RX ADMIN — ALBUTEROL SULFATE 2.5 MG: 2.5 SOLUTION RESPIRATORY (INHALATION) at 18:30

## 2020-06-08 RX ADMIN — ALBUTEROL SULFATE 2.5 MG: 2.5 SOLUTION RESPIRATORY (INHALATION) at 09:10

## 2020-06-08 RX ADMIN — SODIUM CHLORIDE 4 ML: 7 NEBU SOLN,3 % NEBU at 09:10

## 2020-06-08 RX ADMIN — HEPARIN SODIUM 5000 UNITS: 5000 INJECTION, SOLUTION INTRAVENOUS; SUBCUTANEOUS at 21:55

## 2020-06-08 RX ADMIN — FINASTERIDE 5 MG: 5 TABLET, FILM COATED ORAL at 05:14

## 2020-06-08 RX ADMIN — SEVELAMER CARBONATE 800 MG: 800 TABLET, FILM COATED ORAL at 18:09

## 2020-06-08 RX ADMIN — PIPERACILLIN AND TAZOBACTAM 4.5 G: 4; .5 INJECTION, POWDER, LYOPHILIZED, FOR SOLUTION INTRAVENOUS; PARENTERAL at 18:08

## 2020-06-08 RX ADMIN — HEPARIN SODIUM 1750 UNITS: 1000 INJECTION, SOLUTION INTRAVENOUS; SUBCUTANEOUS at 11:43

## 2020-06-08 RX ADMIN — CLOPIDOGREL BISULFATE 75 MG: 75 TABLET ORAL at 18:09

## 2020-06-08 RX ADMIN — SEVELAMER CARBONATE 800 MG: 800 TABLET, FILM COATED ORAL at 08:36

## 2020-06-08 RX ADMIN — LIDOCAINE HYDROCHLORIDE 1 APPLICATION: 20 JELLY TOPICAL at 11:23

## 2020-06-08 RX ADMIN — BENZOCAINE AND MENTHOL, UNSPECIFIED FORM 1 LOZENGE: 15; 3.6 LOZENGE ORAL at 09:46

## 2020-06-08 RX ADMIN — BENZOCAINE AND MENTHOL, UNSPECIFIED FORM 1 LOZENGE: 15; 3.6 LOZENGE ORAL at 05:51

## 2020-06-08 RX ADMIN — BENZOCAINE AND MENTHOL, UNSPECIFIED FORM 1 LOZENGE: 15; 3.6 LOZENGE ORAL at 21:54

## 2020-06-08 RX ADMIN — ROSUVASTATIN CALCIUM 20 MG: 20 TABLET, FILM COATED ORAL at 05:14

## 2020-06-08 RX ADMIN — GUAIFENESIN 600 MG: 600 TABLET, EXTENDED RELEASE ORAL at 18:09

## 2020-06-08 RX ADMIN — HEPARIN SODIUM 5000 UNITS: 5000 INJECTION, SOLUTION INTRAVENOUS; SUBCUTANEOUS at 05:12

## 2020-06-08 RX ADMIN — PIPERACILLIN AND TAZOBACTAM 4.5 G: 4; .5 INJECTION, POWDER, LYOPHILIZED, FOR SOLUTION INTRAVENOUS; PARENTERAL at 05:14

## 2020-06-08 RX ADMIN — ALBUTEROL SULFATE 2.5 MG: 2.5 SOLUTION RESPIRATORY (INHALATION) at 16:05

## 2020-06-08 RX ADMIN — BUDESONIDE AND FORMOTEROL FUMARATE DIHYDRATE 2 PUFF: 160; 4.5 AEROSOL RESPIRATORY (INHALATION) at 09:11

## 2020-06-08 RX ADMIN — AZITHROMYCIN MONOHYDRATE 500 MG: 250 TABLET ORAL at 21:50

## 2020-06-08 RX ADMIN — GUAIFENESIN 600 MG: 600 TABLET, EXTENDED RELEASE ORAL at 05:14

## 2020-06-08 ASSESSMENT — ENCOUNTER SYMPTOMS
ABDOMINAL PAIN: 0
WEIGHT LOSS: 1
FEVER: 1
VOMITING: 0
HEMOPTYSIS: 0
SPUTUM PRODUCTION: 1
COUGH: 1
NAUSEA: 0
SHORTNESS OF BREATH: 1
CONSTIPATION: 0
DIARRHEA: 0

## 2020-06-08 NOTE — DISCHARGE PLANNING
Outpatient Dialysis Note    Confirmed patient is active at:    [address] [place] [address]    Schedule: Monday, Wednesday, Friday  Time: 1:45pm    Spoke with Nadja at facility who confirmed.    Forwarded records for review.    Maty Garcia- Dialysis Coordinator  [facility] # [phone]

## 2020-06-08 NOTE — PROGRESS NOTES
Diagnosis: End-Stage Renal Disease. Patient seen and examined on hemodialysis during treatment. Patient is stable, tolerating hemodialysis. Denies chest pain and shortness of breath. Orders updated as needed. Please refer to flowsheet for full details.    Access: right BC AVF  UF goal: 1.8L    Plan: continue HD Monday Wednesday Friday.     Elio Eaton MD  Nephrology

## 2020-06-08 NOTE — THERAPY
Speech Language Pathology  Daily Treatment     Patient Name: Gilberto Brown  Age:  79 y.o., Sex:  male  Medical Record #: [id number]  Today's Date: 6/8/2020          Assessment    Patient was seen on this date for dysphagia treatment with a SB6/TN0 meal tray. Pt repositioned to upright position and had weak cough x2 prior to PO intake. Patient consumed items from meal tray which resulted in delayed coughing response intermittently. Cannot rule out aspiration given history of dysphagia and recurring pneumonia. Patient reported globus sensation with soft solids and odynophagia to left side of throat. Education provided to pt regarding current status and SLP recs. Obtained permission to call SO from patient.     Plan    Recommend downgrade to a soft & bite size (level 6), mildly thick liquid (MT2) diet with a diagnostic swallow study to further assess oropharyngeal function and rule out aspiration. Please ensure patient is sitting up 90* for all PO. Patient in dialysis this afternoon; therefore, will complete MBSS tomorrow as appropriate. Discussed results of this session and POC with SO who is agreeable to downgrading diet and performing diagnostic swallow study tomorrow.     Continue current treatment plan. (3x/wk)    Discharge recommendations: Recommend inpatient transitional care services for continued speech therapy services.         Objective       06/08/20 0902   Vitals   O2 (LPM) 3   O2 Delivery Device Silicone Nasal Cannula   Dysphagia    Positioning / Behavior Modification Modulate Rate or Bite Size;Self Monitoring;Cough / Clear after Swallow   Other Treatments SB6/TN0 meal tray   Diet / Liquid Recommendation Mildly Thick (2) - (Nectar Thick);Soft & Bite-Sized (6) - (Dysphagia III)   Nutritional Liquid Intake Rating Scale Thickened beverages (mildly thick unless otherwise specified)   Skilled Intervention Compensatory Strategies;Verbal Cueing   Short Term Goals   Short Term Goal # 1 Pt will consume a SB6/TNO  diet with no overt s/sx of aspiration   Goal Outcome # 1 Progressing slower than expected

## 2020-06-08 NOTE — PROGRESS NOTES
Patient A/Ox3, up with 2 person max assist, and on RA. No complaints of pain. He receives HD on M,W,F, BP medications will be held in AM. AVF in RUE, thrill and bruit present. Pt was febrile at beginning of shift w/ a temp of 101.3, PRN Tylenol given with positive results. Bed in low and locked position. Pt is safe and all needs met. Hourly rounding done. No signs of acute distress.    Pt calcium 8.3 (previous 8.9)

## 2020-06-08 NOTE — PROGRESS NOTES
Hospital Medicine Daily Progress Note    Date of Service  6/8/2020    Chief Complaint  Shortness of breath    Hospital Course    79 y.o. male with severe aortic stenosis, Wegener's granulomatosis on chronic steroids, end-stage renal disease on hemodialysis, chronic respiratory failure and COPD (chronic baseline 3 L oxygen), with history of MAC and pseudomonal colonization, admitted 6/6/2020 with shortness of breath, along with subjective fevers, chills, and productive cough.  He was noted to be febrile.  Initial work-up showed WBC of 16,000, and creatinine 3.24, with potassium of 3.9.  Troponin was 238, which he had chronically elevated.  Chest x-ray showed hazy opacities in the bilateral lung bases, left more than the right, with small bilateral pleural effusions.  Patient was felt to have sepsis, with lung source and recurrent pneumonia, and he is started on IV Zosyn and azithromycin.  Cultures were sent.  COVID-19 swab was sent, and it was negative.  Nephrology was consulted for resumption of his hemodialysis while in the hospital.  ID was consulted for further antibiotic guidance.  He was also started on aggressive physiotherapy.        Interval Problem Update  6/8/2020 - I reviewed the patient's chart. There were no significant overnight events. Remains hemodynamically stable.  He had an episode of fever last night, maximum temperature of 38.5 °C. stable on baseline 3 L of oxygen by nasal cannula.  ID agreed with continuing Zosyn and azithromycin.  Chest CT was obtained.  Respiratory viral PCR were negative.  Procalcitonin 0.93 today.  Patient refused chest CT yesterday, but agreeable for that today.  Palliative care consult is pending.    > I have personally seen and examined the patient today.  He states he is breathing slightly better today.  Still with cough reductive with junky phlegm.  No nausea or vomiting.  No chills.  No other complaint such as diarrhea or abdominal  pain.          Consultants/Specialty  Nephrology  ID  Palliative care    Code Status  Full    Disposition  Monitor on the floors.    Review of Systems  ROS     Pertinent positives/negatives as mentioned above.     A complete review of systems was personally done by me. All other systems were negative.       Physical Exam  Temp:  [36.5 °C (97.7 °F)-38.5 °C (101.3 °F)] 36.5 °C (97.7 °F)  Pulse:  [77-96] 96  Resp:  [16-25] 18  BP: (100-107)/(44-52) 101/50  SpO2:  [91 %-100 %] 99 %    Physical Exam  Vitals signs reviewed.   Constitutional:       General: He is not in acute distress.     Appearance: Normal appearance. He is not toxic-appearing or diaphoretic.   HENT:      Head: Normocephalic and atraumatic.      Right Ear: External ear normal.      Left Ear: External ear normal.      Mouth/Throat:      Mouth: Mucous membranes are moist.      Pharynx: No oropharyngeal exudate.   Eyes:      General: No scleral icterus.     Extraocular Movements: Extraocular movements intact.      Conjunctiva/sclera: Conjunctivae normal.      Pupils: Pupils are equal, round, and reactive to light.   Neck:      Musculoskeletal: Normal range of motion and neck supple.   Cardiovascular:      Heart sounds: Normal heart sounds. No murmur. No gallop.    Pulmonary:      Effort: Pulmonary effort is normal. No respiratory distress.      Breath sounds: No stridor. No wheezing or rhonchi.      Comments: Diminished air entry B/L bases.  (+) Bibasilar crackles.  Chest:      Chest wall: No tenderness.   Abdominal:      General: Bowel sounds are normal. There is no distension.      Palpations: Abdomen is soft. There is no mass.      Tenderness: There is no abdominal tenderness. There is no guarding or rebound.   Musculoskeletal: Normal range of motion.         General: No tenderness.   Lymphadenopathy:      Cervical: No cervical adenopathy.   Skin:     General: Skin is warm and dry.      Coloration: Skin is not jaundiced.      Findings: No rash.    Neurological:      General: No focal deficit present.      Mental Status: He is alert and oriented to person, place, and time.      Cranial Nerves: No cranial nerve deficit.   Psychiatric:         Mood and Affect: Mood normal.         Behavior: Behavior normal.         Thought Content: Thought content normal.         Judgment: Judgment normal.              Fluids    Intake/Output Summary (Last 24 hours) at 6/8/2020 0931  Last data filed at 6/8/2020 0600  Gross per 24 hour   Intake 280 ml   Output 200 ml   Net 80 ml       Laboratory  Recent Labs     06/06/20 2250 06/08/20  0225   WBC 16.0* 12.2*   RBC 3.96* 4.11*   HEMOGLOBIN 11.4* 11.6*   HEMATOCRIT 37.4* 38.5*   MCV 94.4 93.7   MCH 28.8 28.2   MCHC 30.5* 30.1*   RDW 60.6* 60.0*   PLATELETCT 163* 140*   MPV 8.8* 9.1     Recent Labs     06/06/20 2250 06/08/20 0225   SODIUM 134* 135   POTASSIUM 3.9 4.5   CHLORIDE 92* 95*   CO2 28 23   GLUCOSE 97 114*   BUN 36* 49*   CREATININE 3.24* 4.04*   CALCIUM 8.9 8.3*                   Imaging  DX-CHEST-PORTABLE (1 VIEW)   Final Result         Hazy opacities in the bilateral lung bases, left more than right, are similar to prior and could be atelectasis or consolidation.      There may be small bilateral pleural effusions.      CT-CHEST (THORAX) W/O    (Results Pending)        Assessment/Plan  * Recurrent pneumonia- (present on admission)  Assessment & Plan  -Has had recurrent pneumonias in the past.  X-ray showed hazy opacities in the bilateral lung base, left more than the right.  Procalcitonin elevated.  Has leukocytosis, and with subjective fevers, chills, and cough. COVID-19 negative. With history of MAC and pseudomonal colonization.  -Continue Zosyn and azithromycin.  Follow cultures.  -pending CT of the chest to further evaluate for pneumonia vs atelectasis.  He is agreeable to have that done today.  -Continue to trend WBC count, and procalcitonin.  Follow sputum cultures.  -ID following, appreciate inputs.    -Continue respiratory support, oxygen supplement.  Currently stable at baseline home oxygen.  Continue symptom control with antitussives, with Robitussin, and Mucinex, along with Tessalon PRN. Continue aggressive chest physiotherapy.    Sepsis (HCC)- (present on admission)  Assessment & Plan  -This is Sepsis Present on admission.  -SIRS criteria identified on my evaluation include: Fever, with temperature greater than 101 deg F, Tachypnea, with respirations greater than 20 per minute and Leukocytosis, with WBC greater than 12,000  -Source is likely lung  -Sepsis protocol initiated.   -Fluid resuscitation ordered per protocol.  Continue to hold off on maintenance IV fluids as he is not hypotensive, and he is prone to fluid overload given his ESRD.  -Continue IV antibiotics as appropriate for source of sepsis.  Trend WBC count.  Follow cultures.  -While organ dysfunction may be noted elsewhere in this problem list or in the chart, degree of organ dysfunction does not meet CMS criteria for severe sepsis    Elevated troponin- (present on admission)  Assessment & Plan  -Review of the medical records indicates that this is chronically elevated, current troponin level is close to his prior baseline.  No ACS.  Continue to monitor.    ESRD on dialysis (HCC)- (present on admission)  Assessment & Plan  - Patient gets dialysis on Monday, Wednesday, and Friday.    - Nephrology on board for continued hemodialysis while he is in the hospital.    Wegener's granulomatosis (HCC)- (present on admission)  Assessment & Plan  -Continue chronic home low-dose steroid.  -Now dialysis dependent.    Chronic respiratory failure with hypoxia (HCC)- (present on admission)  Assessment & Plan  -He is at his baseline oxygen supplement (3 L).  -Continue management of pneumonia as above.  Continue RT protocol, and oxygen supplements.    Atrial fibrillation (HCC)- (present on admission)  Assessment & Plan  -Rate and rhythm controlled.  Patient is  not on chronic anticoagulation.  Continue beta-blocker.  Monitor closely.    Hyperlipidemia- (present on admission)  Assessment & Plan  -Continue Crestor.       VTE prophylaxis: heparin SQ

## 2020-06-08 NOTE — PROGRESS NOTES
Infectious Disease Progress Note    Author: Jessica Limon M.D. Date & Time of service: 2020  10:41 AM    Chief Complaint:  Pneumonia    Interval History:    Review of Systems:  Review of Systems   Constitutional: Positive for fever, malaise/fatigue and weight loss.   Respiratory: Positive for cough, sputum production and shortness of breath. Negative for hemoptysis.    Gastrointestinal: Negative for abdominal pain, constipation, diarrhea, nausea and vomiting.       Hemodynamics:  Temp (24hrs), Av.2 °C (99 °F), Min:36.5 °C (97.7 °F), Max:38.5 °C (101.3 °F)  Temperature: 36.5 °C (97.7 °F)  Pulse  Av.8  Min: 77  Max: 115   Blood Pressure : 101/50       Physical Exam:  Physical Exam  Constitutional:       Appearance: Normal appearance.      Comments: Thin     Cardiovascular:      Rate and Rhythm: Normal rate and regular rhythm.      Heart sounds: Normal heart sounds.   Pulmonary:      Effort: Pulmonary effort is normal.      Comments: Coarse crackles bilaterally  Abdominal:      General: Abdomen is flat. Bowel sounds are normal.      Palpations: Abdomen is soft.   Musculoskeletal:      Right lower leg: No edema.      Left lower leg: No edema.   Skin:     General: Skin is warm and dry.   Neurological:      General: No focal deficit present.      Mental Status: He is alert and oriented to person, place, and time.   Psychiatric:         Mood and Affect: Mood normal.         Behavior: Behavior normal.         Meds:    Current Facility-Administered Medications:   •  benzocaine-menthol  •  lidocaine  •  albuterol  •  sevelamer carbonate  •  rosuvastatin  •  predniSONE  •  metoprolol SR  •  budesonide-formoterol  •  clopidogrel  •  finasteride  •  tamsulosin  •  senna-docusate **AND** polyethylene glycol/lytes **AND** magnesium hydroxide **AND** bisacodyl  •  Respiratory Therapy Consult  •  heparin  •  acetaminophen  •  azithromycin  •  guaiFENesin dextromethorphan  •  [COMPLETED] piperacillin-tazobactam  **AND** piperacillin-tazobactam  •  guaiFENesin ER  •  benzonatate  •  ondansetron  •  sodium chloride    Labs:  Recent Labs     06/06/20 2250 06/08/20 0225   WBC 16.0* 12.2*   RBC 3.96* 4.11*   HEMOGLOBIN 11.4* 11.6*   HEMATOCRIT 37.4* 38.5*   MCV 94.4 93.7   MCH 28.8 28.2   RDW 60.6* 60.0*   PLATELETCT 163* 140*   MPV 8.8* 9.1   NEUTSPOLYS 88.10*  --    LYMPHOCYTES 2.90*  --    MONOCYTES 7.90  --    EOSINOPHILS 0.40  --    BASOPHILS 0.30  --      Recent Labs     06/06/20 2250 06/08/20 0225   SODIUM 134* 135   POTASSIUM 3.9 4.5   CHLORIDE 92* 95*   CO2 28 23   GLUCOSE 97 114*   BUN 36* 49*     Recent Labs     06/06/20 2250 06/08/20 0225   ALBUMIN 3.7  --    TBILIRUBIN 0.8  --    ALKPHOSPHAT 68  --    TOTPROTEIN 6.5  --    ALTSGPT 13  --    ASTSGOT 14  --    CREATININE 3.24* 4.04*       Imaging:  Dx-chest-portable (1 View)    Result Date: 6/6/2020 6/6/2020 10:58 PM HISTORY/REASON FOR EXAM:  Sepsis Short of breath TECHNIQUE/EXAM DESCRIPTION AND NUMBER OF VIEWS: Single portable view of the chest. COMPARISON: 1/25/2020 FINDINGS: Left-sided pacemaker. Hazy opacities in the bilateral lung bases, left more than right There may be small bilateral pleural effusions. No pneumothorax. Stable cardiopericardial silhouette.     Hazy opacities in the bilateral lung bases, left more than right, are similar to prior and could be atelectasis or consolidation. There may be small bilateral pleural effusions.      Micro:  Results     Procedure Component Value Units Date/Time    BLOOD CULTURE [[id number]] Collected:  06/06/20 2240    Order Status:  Completed Specimen:  Blood from Peripheral Updated:  06/08/20 0721     Significant Indicator NEG     Source BLD     Site PERIPHERAL     Culture Result No Growth  Note: Blood cultures are incubated for 5 days and  are monitored continuously.Positive blood cultures  are called to the RN and reported as soon as  they are identified.      Narrative:       2 of 2 blood culture x2  Sites  "order. Per Hospital Policy:  Only change Specimen Src: to \"Line\" if specified by physician  order.  Left AC    BLOOD CULTURE [[id number]] Collected:  06/06/20 2240    Order Status:  Completed Specimen:  Blood from Peripheral Updated:  06/08/20 0721     Significant Indicator NEG     Source BLD     Site PERIPHERAL     Culture Result No Growth  Note: Blood cultures are incubated for 5 days and  are monitored continuously.Positive blood cultures  are called to the RN and reported as soon as  they are identified.      Narrative:       1 of 2 for Blood Culture x 2 sites order. Per Hospital  Policy: Only change Specimen Src: to \"Line\" if specified by  physician order.  No site indicated    AFB Culture [[id number]] Collected:  06/07/20 1816    Order Status:  Completed Specimen:  Respirate from Sputum Updated:  06/07/20 1817    Narrative:       Collected By:[id number] CHARLIE LAM    AFB STAIN ONLY [[id number]]     Order Status:  No result Specimen:  Respirate from Sputum     URINALYSIS,CULTURE IF INDICATED [[id number]]  (Abnormal) Collected:  06/07/20 0631    Order Status:  Completed Specimen:  Urine Updated:  06/07/20 0712     Color Yellow     Character Clear     Specific Gravity 1.007     Ph 8.5     Glucose Negative mg/dL      Ketones Negative mg/dL      Protein Negative mg/dL      Bilirubin Negative     Urobilinogen, Urine 0.2     Nitrite Negative     Leukocyte Esterase Negative     Occult Blood Negative     Micro Urine Req see below     Comment: Microscopic examination not performed when specimen is clear  and chemically negative for protein, blood, leukocyte esterase  and nitrite.         SARS-CoV-2, PCR (In-House) [[id number]] Collected:  06/07/20 0140    Order Status:  Completed Updated:  06/07/20 0304     SARS-CoV-2 Source NP Swab     SARS-CoV-2 by PCR NotDetected     Comment: [facility] providers: PLEASE REFER TO DE-ESCALATION AND RETESTING PROTOCOL  on [email or web address]  **The [employer] GeneXpert Xpress SARS-CoV-2 Test has been " made available for  use under the Emergency Use Authorization (EUA) only.         Narrative:       Is this test for diagnosis or screening?->Diagnosis of ill  patient    COVID/SARS CoV-2 [[id number]] Collected:  06/07/20 0140    Order Status:  Completed Specimen:  Respirate from Nasopharyngeal Updated:  06/07/20 0151     COVID Order Status Received    Narrative:       Is this test for diagnosis or screening?->Diagnosis of ill  patient    Culture Respiratory W/ Grm Stn [[id number]]     Order Status:  Completed Specimen:  Respirate from Sputum           Assessment:  Active Hospital Problems    Diagnosis   • *Recurrent pneumonia [J18.9]   • Sepsis (HCC) [A41.9]   • Elevated troponin [R79.89]   • ESRD on dialysis (HCC) [N18.6, Z99.2]   • Wegener's granulomatosis (HCC) [M31.30]   • Chronic respiratory failure with hypoxia (HCC) [J96.11]   • Atrial fibrillation (HCC) [I48.91]   • Hyperlipidemia [E78.5]     Interval 24 hours:      101.3, O2 3 L NC   Labs reviewed  Imaging personally reviewed both images and report. CT chest pending.  Micro reviewed    Pt states he feels a little bit better but still with ongoing fever and productive cough.  States he was able to provide a sputum sample is been sent down for testing.  Patient continued on Zosyn at Pseudomonas dosing.        ASSESSMENT/PLAN:       79 y.o.  admitted 6/6/2020. Pt has a past medical history of severe aortic stenosis and reports he had valve placement in March 2020. History of Wegener's granulomatosis and chronic lung disease with bronchiectasis,  COPD (baseline oxygen 3 L) and on chronic steroids 5 mg daily. 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  He also has ESRD on hemodialysis.       He was seen by pulmonary and ID at the end of January, 2020 with extensive work-up at that point.  He improved on short course of Zosyn and was discharged with plan to follow-up with  pulmonary. Prior work-up on 1/26/2020 with sputum positive for pseudomonas aeruginosa with the testing performed (sensitive to ciprofloxacin, meropenem, tobramycin, amikacin and and Zosyn)  negative sputum AFB, P TANIYA PCR negative, histoplasmosis antibody negative, cocci antibody negative, beta D glucan intermediate.      Patient is now admitted complaining of several weeks of increasing shortness of breath, subjective fevers and productive cough.  He states that he took a course of ciprofloxacin approximately 2 weeks ago with no improvement in his symptoms.  He has been off antibiotics for 1 week and had fevers which prompted ED visit.     Hospital Course:   In the ED was fevers to 100.9 and now improved.  He is stable on 2 L nasal cannula which is at or below his baseline.  WBC elevated to 16. COVID 19 tested and negative.  Chest x-ray with hazy opacities in bilateral lung bases, similar to prior and thought to be either atelectasis or consolidation possible small bilateral pleural effusions.     Problem List      Community-acquired pneumonia with cough, shortness of breath  -Procalcitonin on 6/7 0.60 -> 0.93   -Upper respiratory viral panel negative  Fevers- ongoing   Leukocytosis  Chronic lung disease with COPD and bronchiectasis, baseline oxygen 3 L  Lung colonization with Pseudomonas and prior colonization with Mycobacterium avium  Immunosuppressed on low dose chronic steroids  ESDR on HD  History of Wegener's granulomatosis  Remote history of pulmonary histoplasmosis  Severe aortic stenosis  Antibiotic allergies: Doxycycline     Plan      --- Agree with Zosyn and azithromycin for now-we will need to be extremely judicious in his antibiotics so as not to promote any resistance-if patient spikes another fever or procalcitonin goes up again tomorrow will transition to meropenem  --- Follow-up respiratory cultures.  It appears that lab may be running and AFB so I have reordered the respiratory culture with  respiratory assistance for induction  --- Follow-up blood cultures-no growth to date  --- PCT for am to trend   --- Obtain CT chest noncontrast to better characterize pneumonia versus atelectasis pleural effusions-pending  --- Aggressive pulmonary toilet  --- Monitor response to antibiotics  --- Follow labs        Plan of care discussed with Dr. Parson. Will continue to follow.

## 2020-06-08 NOTE — RESPIRATORY CARE
Oxygen Rounds      Patient found on    O2 L/m:  _________    Oxygen device:  ________   Spo2: _________%      Patient titrated to   O2 L/m: ________   Oxygen device: _________   Spo2: _________%   Respiratory device skin site inspection completed.    Oxygen Rounds      Patient found on    O2 L/m:  _3________    Oxygen device:  ___nc_____   Spo2: ______91___%      Respiratory device skin site inspection completed.

## 2020-06-08 NOTE — PROGRESS NOTES
"Spoke with md leslie about patient refusing CT that he ordered yesterday. md spoke with patient, he now agrees to have CT performed. I called CT to confirm time. Tech says \"noon\". Will f/u    Held tray for SLP to assess swallow. Will f/u   "

## 2020-06-08 NOTE — RESPIRATORY CARE
COPD EDUCATION by COPD CLINICAL EDUCATOR  6/8/2020  at  4:12 PM by Selam Ascencio, RRT     Patient interviewed by COPD education team.  Patient unable to participate in full program as he has dementia and did not want the full education.  Short intervention has been conducted.  A comprehensive packet including information about COPD, treatments, and oxygen safety discussed. The wife was present for information and palliative referrals are in. Home health discussed and the patient has a home routine for his pulmonary care with vest therapy, and scheduled meds. COPD action plan discussed. Patient is a non-smoker.

## 2020-06-08 NOTE — CARE PLAN
Problem: Safety  Goal: Will remain free from injury  Outcome: PROGRESSING AS EXPECTED  Goal: Will remain free from falls  Outcome: PROGRESSING AS EXPECTED     Problem: Infection  Goal: Will remain free from infection  Outcome: PROGRESSING AS EXPECTED     Problem: Venous Thromboembolism (VTW)/Deep Vein Thrombosis (DVT) Prevention:  Goal: Patient will participate in Venous Thrombosis (VTE)/Deep Vein Thrombosis (DVT)Prevention Measures  Outcome: PROGRESSING AS EXPECTED     Problem: Discharge Barriers/Planning  Goal: Patient's continuum of care needs will be met  Outcome: PROGRESSING AS EXPECTED     Problem: Respiratory:  Goal: Respiratory status will improve  Outcome: PROGRESSING SLOWER THAN EXPECTED     Problem: Fluid Volume:  Goal: Will maintain balanced intake and output  Outcome: MET     Problem: Communication  Goal: The ability to communicate needs accurately and effectively will improve  Outcome: MET     Problem: Bowel/Gastric:  Goal: Normal bowel function is maintained or improved  Outcome: MET  Goal: Will not experience complications related to bowel motility  Outcome: MET     Problem: Knowledge Deficit  Goal: Knowledge of disease process/condition, treatment plan, diagnostic tests, and medications will improve  Outcome: MET  Goal: Knowledge of the prescribed therapeutic regimen will improve  Outcome: MET     Problem: Pain Management  Goal: Pain level will decrease to patient's comfort goal  Outcome: MET

## 2020-06-08 NOTE — PALLIATIVE CARE
Palliative Care follow-up  PC RN attempted to see pt at bedside. Per BS RN pt is in dialysis until approximately 4pm. Will round back for full ACP discussion.       Updated: BS RN    Plan: full consult to follow.     Thank you for allowing Palliative Care to support this patient and family. Contact [phone] for additional assistance, change in patient status, or with any questions/concerns.

## 2020-06-08 NOTE — CARE PLAN
Problem: Communication  Goal: The ability to communicate needs accurately and effectively will improve  Outcome: PROGRESSING AS EXPECTED  Intervention: Educate patient and significant other/support system about the plan of care, procedures, treatments, medications and allow for questions  Note: Discussed plan of care for duration of shift including medication administration times, pt acknowledges understanding. Reinforce when necessary.     Problem: Safety  Goal: Will remain free from falls  Outcome: PROGRESSING AS EXPECTED  Note: Patient will have no falls during shift. All appropriate fall precautions are in place. Bed in low and locked position. Hourly rounding in place.

## 2020-06-08 NOTE — CARE PLAN
Problem: Nutritional:  Goal: Achieve adequate nutritional intake  Description: Patient will consume ~50% of meals.   Outcome: NOT MET   See dietary note. RD to follow.

## 2020-06-08 NOTE — PROGRESS NOTES
3 1/2hr HD started @ 1149 and completed @ 1523,tx well tolerated,VSS,net UF = 1800ml as requested.RUAAVF + B/T,cannulation sites covered with DD,CDI,report given to Selvin Woodruff RN.

## 2020-06-08 NOTE — DISCHARGE PLANNING
Care Transition Team Assessment    Per MD's Notes:   Patient is admitted complaining of several weeks of increasing shortness of breath,subjective fevers and productive cough.  He states that he took a course of ciprofloxacin approximately 2 weeks ago with no improvement in his symptoms.  He has been off antibiotics for 1 week and had fevers which prompted ED visit. Patient is ESRD, on dialysis. hx. Of severe aortic stenosis and reports he had valve placement in March 2020. Hx. Of Wegener's granulomatosis and chronic lung disease, COPD, on 3L (baseline).     Dialysis: M/W/F at [facility]  Pharmacy: [facility], [place]  PCP:   Home Health: [facility]  SNF: [facility], patient does not want to go back there.  DME: [employer], for home oxygen, on 3L Liters    Patient uses a FWW to ambulate, also a shower chair for safety  Lives in a two story home with his wife Crys, spouse stated that     CM following for all discharge needs    Information Source  Orientation : Oriented x 4  Information Given By: Spouse  Informant's Name: Crys Brown  Who is responsible for making decisions for patient? : Patient    Readmission Evaluation  Is this a readmission?: No    Elopement Risk  Legal Hold: No  Ambulatory or Self Mobile in Wheelchair: Yes  Disoriented: No  Psychiatric Symptoms: None  History of Wandering: No  Elopement this Admit: No  Vocalizing Wanting to Leave: No  Displays Behaviors, Body Language Wanting to Leave: No-Not at Risk for Elopement  Elopement Risk: Not at Risk for Elopement    Interdisciplinary Discharge Planning  Lives with - Patient's Self Care Capacity: Spouse  Patient or legal guardian wants to designate a caregiver (see row info): No    Discharge Preparedness  What is your plan after discharge?: Home with help, Home health care  What are your discharge supports?: Spouse  Prior Functional Level: Ambulatory, Independent with Activities of Daily Living, Independent with Medication Management, Uses  Walker  Difficulity with ADLs: Walking  Difficulty with ADLs Comment: (patient uses a FWW)  Difficulity with IADLs: Cooking, Driving, Laundry, Shopping    Functional Assesment  Prior Functional Level: Ambulatory, Independent with Activities of Daily Living, Independent with Medication Management, Uses Walker    Finances  Financial Barriers to Discharge: No  Prescription Coverage: Yes    Vision / Hearing Impairment  Vision Impairment : No  Right Eye Vision: Impaired, Wears Glasses  Left Eye Vision: Impaired, Wears Glasses  Hearing Impairment : No       Advance Directive  Advance Directive?: POLST    Domestic Abuse  Have you ever been the victim of abuse or violence?: No  Physical Abuse or Sexual Abuse: No  Verbal Abuse or Emotional Abuse: No  Possible Abuse Reported to:: Not Applicable    Psychological Assessment  History of Substance Abuse: None  History of Psychiatric Problems: No  Non-compliant with Treatment: No  Newly Diagnosed Illness: Yes    Discharge Risks or Barriers  Discharge risks or barriers?: Complex medical needs  Patient risk factors: Complex medical needs, Vulnerable adult    Anticipated Discharge Information  Anticipated discharge disposition: [facility] Home  Discharge Address: [address] [place] [id number]  Discharge Contact Phone Number: [phone]    Kirstie Silva RN,

## 2020-06-08 NOTE — CONSULTS
DATE OF SERVICE:  06/07/2020    NEPHROLOGY CONSULTATION    REQUESTING PHYSICIAN:  Celso Parson MD    REASON FOR CONSULTATION:  To evaluate and provide dialysis for patient with   end-stage renal disease.      HISTORY OF PRESENT ILLNESS:  Patient is an 79-year-old male patient of mine   with end-stage renal disease, on hemodialysis, who has been receiving acute   dialysis treatments in [facility] on Monday, Wednesday,   Friday.  Last dialysis completed on Friday.  He has a history of multiple   medical problems including Wegener's granulomatosis, severe aortic stenosis,   recently treated in [facility] with TAVR, also multiple recurrent pneumonias,   pseudomonas infection and ____, presented to the emergency with complaints of   worsening shortness of breath and cough, also positive for fever and chills,   diagnosed with pneumonia.  No other complaints.  No nausea or vomiting.  No   chest pain.      REVIEW OF SYSTEMS:    GENERAL:  Positive for fever, chills, fatigue, low energy level, poor   appetite.    HEENT:  No congestion, no sore throat, no nosebleeds.    EYES:  No double or blurry vision, no eye pain.    RESPIRATORY:  Positive for cough, shortness of breath, no hemoptysis, no   wheezing.    CARDIOVASCULAR:  No chest pain, no palpitations, no edema.    GASTROINTESTINAL:  No abdominal pain, no nausea, vomiting, diarrhea.    GENITOURINARY:  No dysuria, hematuria or flank pain.    All other systems reviewed, all negative.      PAST MEDICAL HISTORY:  Positive for end-stage renal disease, on hemodialysis;   Wegener's granulomatosis; stroke; pneumonia; atrial fibrillation; anemia;   benign prostate hypertrophy; bronchitis; chronic obstructive pulmonary   disease; hyperlipidemia; emphysema; pseudomonas pneumonia; pulmonary   histoplasmosis; sleep apnea, on CPAP.      PAST SURGICAL HISTORY:  Fistula creation and AV fistula revision,   tonsillectomy, hip replacement, gastroscopy, rotator cuff repair,  pacemaker   insertion, recent TAVR.      FAMILY HISTORY:  Stroke, heart disease and cancer.      SOCIAL HISTORY:  Used to smoke, quit long time ago.  Occasionally drinks   alcohol.  No drugs.      ALLERGIES:  ALLERGIC TO DOXYCYCLINE, ERYTHROMYCIN, RITUXIMAB.      OUTPATIENT MEDICATIONS:  Reviewed.      PHYSICAL EXAMINATION:    VITAL SIGNS:  Blood pressure 101/49, heart rate 89, temperature 37.1 Celsius.      GENERAL APPEARANCE:  Well-developed, thin male, in no acute distress.    HEENT:  Normocephalic, atraumatic.  Pupils equal, round, reactive to light.    Extraocular movements intact.  Nares patent.  Oropharynx clear, moist mucosa.    No erythema or exudate.    NECK:  Supple, no lymphadenopathy, no thyromegaly appreciated.    LUNGS:  Coarse breaths bilaterally, scattered rhonchi.  Few basilar crackles.      HEART:  Regular rate.  No rub or gallop.    ABDOMEN:  Soft, nontender, nondistended.  Bowel sounds present.    EXTREMITIES:  No cyanosis or clubbing, no edema.    NEUROLOGIC:  Alert and oriented x3, no focal deficit.      LABORATORY RESULTS:  Reviewed, revealed hemoglobin 11.4.  Sodium 134,   potassium 3.9, BUN 36 and creatinine 3.24.      ASSESSMENT AND PLAN:  The patient is a very pleasant 79-year-old male with   end-stage renal disease, on hemodialysis due to Wegener's granulomatosis,   admitted with pneumonia.    1.  End-stage renal disease.  We will continue dialysis per patient's schedule   Monday, Wednesday, Friday.  No need for emergent dialysis today.    2.  Electrolytes remain well controlled.  To monitor.    3.  Anemia.  Hemoglobin level is stable at goal, to monitor.    4.  Hypertension, low blood pressure, to monitor, keep mean arterial pressure   above 65.    5.  Volume, not significantly overloaded.  Continue ultrafiltration with   hemodialysis as blood pressure tolerates.      RECOMMENDATIONS:  To adjust all medications to renal doses, renal diet.    Monitor basic metabolic panel, hemoglobin  level.  Hemodialysis, Monday,   Wednesday, and Friday.  We will follow up patient closely.      Thank you for the consult.       ____________________________________     MD MIRIAM COSTA / SHARRON    DD:  06/07/2020 16:06:54  DT:  06/07/2020 18:28:23    D#:  [id number]  Job#:  [id number]

## 2020-06-08 NOTE — RESPIRATORY CARE
Oxygen Rounds      Patient found on    O2 L/m:  __3_______    Oxygen device:  ___nc_____   Spo2: _____91____%      Respiratory device skin site inspection completed.

## 2020-06-08 NOTE — DIETARY
"Nutrition services: Day 1 of admit.  Gilberto Brown is a 79 y.o. male with admitting DX of sepsis.  Consult received per nutrition admit screen and low BMI < 19. Pt with unsure weight history and recent poor PO intake (MST score = 3).     Assessment:  Height: 173 cm (5' 8.11\")  Weight: 55.1 kg (121 lb 7.6 oz)  Body mass index is 18.41 kg/m²., BMI classification: underweight   Diet/Intake: Level 6 / Level 2; consumed 50-75% of dinner tray last night    Evaluation:   1. Attempted bedside nutrition eval x 2 today, was with other provider and then was OOR (suspect getting HD)  2. Per H&P pt with hx of severe aortic stenosis, Wegener's granulomatosis on chronic steroid therapy, ESRD on HD, chronic respiratory failure.   3. Obtained weight history via chart review:   · January 2020 = 61.6 kg (136#) at office visit  · Current weight per bed scale = 55.1 kg (121#)  · 6.5 kg / 15# (11%) weight loss in 6 months is severe  4. MAR: zithromax, zosyn, prednisone, renvela (with meals)  5. Labs: glucose, BUN 49, creat 4.04    Hx of severe protein calorie-malnutrition in non-hospital problem list.   Malnutrition Risk: Pt with severe weight loss over past 6 months, however unable to further assess without formal nutrition assessment with diet hx and nutrition focused physical exam. Will follow up as able.     Recommendations/Plan:  1. RD to follow up as able for full assessment   2. Encourage intake of meals  3. Document intake of all meals as % taken in ADL's to provide interdisciplinary communication across all shifts.   4. Monitor weight.  5. Nutrition rep will continue to see patient for ongoing meal and snack preferences.   6. RD to monitor PO intake, wt trends, and nutrition labs/meds    RD to follow           "

## 2020-06-09 ENCOUNTER — APPOINTMENT (OUTPATIENT)
Dept: RADIOLOGY | Facility: MEDICAL CENTER | Age: 80
DRG: 871 | End: 2020-06-09
Attending: INTERNAL MEDICINE
Payer: MEDICARE

## 2020-06-09 LAB
ANION GAP SERPL CALC-SCNC: 12 MMOL/L (ref 7–16)
BASOPHILS # BLD AUTO: 0.1 % (ref 0–1.8)
BASOPHILS # BLD: 0.01 K/UL (ref 0–0.12)
BUN SERPL-MCNC: 24 MG/DL (ref 8–22)
CALCIUM SERPL-MCNC: 8.6 MG/DL (ref 8.5–10.5)
CHLORIDE SERPL-SCNC: 94 MMOL/L (ref 96–112)
CO2 SERPL-SCNC: 27 MMOL/L (ref 20–33)
CREAT SERPL-MCNC: 2.43 MG/DL (ref 0.5–1.4)
EOSINOPHIL # BLD AUTO: 0.45 K/UL (ref 0–0.51)
EOSINOPHIL NFR BLD: 5.3 % (ref 0–6.9)
ERYTHROCYTE [DISTWIDTH] IN BLOOD BY AUTOMATED COUNT: 57.8 FL (ref 35.9–50)
GLUCOSE SERPL-MCNC: 93 MG/DL (ref 65–99)
HCT VFR BLD AUTO: 36 % (ref 42–52)
HGB BLD-MCNC: 10.9 G/DL (ref 14–18)
IMM GRANULOCYTES # BLD AUTO: 0.03 K/UL (ref 0–0.11)
IMM GRANULOCYTES NFR BLD AUTO: 0.4 % (ref 0–0.9)
LYMPHOCYTES # BLD AUTO: 0.14 K/UL (ref 1–4.8)
LYMPHOCYTES NFR BLD: 1.6 % (ref 22–41)
MCH RBC QN AUTO: 28 PG (ref 27–33)
MCHC RBC AUTO-ENTMCNC: 30.3 G/DL (ref 33.7–35.3)
MCV RBC AUTO: 92.5 FL (ref 81.4–97.8)
MONOCYTES # BLD AUTO: 0.63 K/UL (ref 0–0.85)
MONOCYTES NFR BLD AUTO: 7.4 % (ref 0–13.4)
NEUTROPHILS # BLD AUTO: 7.28 K/UL (ref 1.82–7.42)
NEUTROPHILS NFR BLD: 85.2 % (ref 44–72)
NRBC # BLD AUTO: 0 K/UL
NRBC BLD-RTO: 0 /100 WBC
PLATELET # BLD AUTO: 153 K/UL (ref 164–446)
PMV BLD AUTO: 9.4 FL (ref 9–12.9)
POTASSIUM SERPL-SCNC: 4.4 MMOL/L (ref 3.6–5.5)
PROCALCITONIN SERPL-MCNC: 1.08 NG/ML
RBC # BLD AUTO: 3.89 M/UL (ref 4.7–6.1)
SODIUM SERPL-SCNC: 133 MMOL/L (ref 135–145)
WBC # BLD AUTO: 8.5 K/UL (ref 4.8–10.8)

## 2020-06-09 PROCEDURE — 700102 HCHG RX REV CODE 250 W/ 637 OVERRIDE(OP): Performed by: INTERNAL MEDICINE

## 2020-06-09 PROCEDURE — 94760 N-INVAS EAR/PLS OXIMETRY 1: CPT

## 2020-06-09 PROCEDURE — 74230 X-RAY XM SWLNG FUNCJ C+: CPT

## 2020-06-09 PROCEDURE — 99233 SBSQ HOSP IP/OBS HIGH 50: CPT | Performed by: INTERNAL MEDICINE

## 2020-06-09 PROCEDURE — 94640 AIRWAY INHALATION TREATMENT: CPT

## 2020-06-09 PROCEDURE — A9270 NON-COVERED ITEM OR SERVICE: HCPCS | Performed by: HOSPITALIST

## 2020-06-09 PROCEDURE — 80048 BASIC METABOLIC PNL TOTAL CA: CPT

## 2020-06-09 PROCEDURE — 700102 HCHG RX REV CODE 250 W/ 637 OVERRIDE(OP): Performed by: HOSPITALIST

## 2020-06-09 PROCEDURE — 700111 HCHG RX REV CODE 636 W/ 250 OVERRIDE (IP): Performed by: INTERNAL MEDICINE

## 2020-06-09 PROCEDURE — 85025 COMPLETE CBC W/AUTO DIFF WBC: CPT

## 2020-06-09 PROCEDURE — 84145 PROCALCITONIN (PCT): CPT

## 2020-06-09 PROCEDURE — 700111 HCHG RX REV CODE 636 W/ 250 OVERRIDE (IP): Performed by: HOSPITALIST

## 2020-06-09 PROCEDURE — 770006 HCHG ROOM/CARE - MED/SURG/GYN SEMI*

## 2020-06-09 PROCEDURE — 92611 MOTION FLUOROSCOPY/SWALLOW: CPT

## 2020-06-09 PROCEDURE — A9270 NON-COVERED ITEM OR SERVICE: HCPCS | Performed by: INTERNAL MEDICINE

## 2020-06-09 PROCEDURE — 36415 COLL VENOUS BLD VENIPUNCTURE: CPT

## 2020-06-09 PROCEDURE — 94669 MECHANICAL CHEST WALL OSCILL: CPT

## 2020-06-09 PROCEDURE — 700105 HCHG RX REV CODE 258: Performed by: INTERNAL MEDICINE

## 2020-06-09 PROCEDURE — 700101 HCHG RX REV CODE 250: Performed by: INTERNAL MEDICINE

## 2020-06-09 RX ADMIN — PIPERACILLIN AND TAZOBACTAM 4.5 G: 4; .5 INJECTION, POWDER, LYOPHILIZED, FOR SOLUTION INTRAVENOUS; PARENTERAL at 18:07

## 2020-06-09 RX ADMIN — SEVELAMER CARBONATE 800 MG: 800 TABLET, FILM COATED ORAL at 13:08

## 2020-06-09 RX ADMIN — ALBUTEROL SULFATE 2.5 MG: 2.5 SOLUTION RESPIRATORY (INHALATION) at 07:00

## 2020-06-09 RX ADMIN — HEPARIN SODIUM 5000 UNITS: 5000 INJECTION, SOLUTION INTRAVENOUS; SUBCUTANEOUS at 22:32

## 2020-06-09 RX ADMIN — SODIUM CHLORIDE 4 ML: 7 NEBU SOLN,3 % NEBU at 07:00

## 2020-06-09 RX ADMIN — ALBUTEROL SULFATE 2.5 MG: 2.5 SOLUTION RESPIRATORY (INHALATION) at 10:30

## 2020-06-09 RX ADMIN — PREDNISONE 5 MG: 5 TABLET ORAL at 05:52

## 2020-06-09 RX ADMIN — PIPERACILLIN AND TAZOBACTAM 4.5 G: 4; .5 INJECTION, POWDER, LYOPHILIZED, FOR SOLUTION INTRAVENOUS; PARENTERAL at 05:53

## 2020-06-09 RX ADMIN — CLOPIDOGREL BISULFATE 75 MG: 75 TABLET ORAL at 18:06

## 2020-06-09 RX ADMIN — HEPARIN SODIUM 5000 UNITS: 5000 INJECTION, SOLUTION INTRAVENOUS; SUBCUTANEOUS at 13:08

## 2020-06-09 RX ADMIN — ROSUVASTATIN CALCIUM 20 MG: 20 TABLET, FILM COATED ORAL at 05:52

## 2020-06-09 RX ADMIN — SEVELAMER CARBONATE 800 MG: 800 TABLET, FILM COATED ORAL at 18:05

## 2020-06-09 RX ADMIN — SEVELAMER CARBONATE 800 MG: 800 TABLET, FILM COATED ORAL at 08:56

## 2020-06-09 RX ADMIN — BENZOCAINE AND MENTHOL, UNSPECIFIED FORM 1 LOZENGE: 15; 3.6 LOZENGE ORAL at 02:53

## 2020-06-09 RX ADMIN — SODIUM CHLORIDE 4 ML: 7 NEBU SOLN,3 % NEBU at 15:17

## 2020-06-09 RX ADMIN — BUDESONIDE AND FORMOTEROL FUMARATE DIHYDRATE 2 PUFF: 160; 4.5 AEROSOL RESPIRATORY (INHALATION) at 07:00

## 2020-06-09 RX ADMIN — GUAIFENESIN 600 MG: 600 TABLET, EXTENDED RELEASE ORAL at 18:05

## 2020-06-09 RX ADMIN — HEPARIN SODIUM 5000 UNITS: 5000 INJECTION, SOLUTION INTRAVENOUS; SUBCUTANEOUS at 05:52

## 2020-06-09 RX ADMIN — GUAIFENESIN 600 MG: 600 TABLET, EXTENDED RELEASE ORAL at 05:52

## 2020-06-09 RX ADMIN — METOPROLOL SUCCINATE 25 MG: 25 TABLET, EXTENDED RELEASE ORAL at 18:06

## 2020-06-09 RX ADMIN — ALBUTEROL SULFATE 2.5 MG: 2.5 SOLUTION RESPIRATORY (INHALATION) at 15:16

## 2020-06-09 RX ADMIN — FINASTERIDE 5 MG: 5 TABLET, FILM COATED ORAL at 05:57

## 2020-06-09 RX ADMIN — TAMSULOSIN HYDROCHLORIDE 0.4 MG: 0.4 CAPSULE ORAL at 05:52

## 2020-06-09 RX ADMIN — DOCUSATE SODIUM 50 MG AND SENNOSIDES 8.6 MG 2 TABLET: 8.6; 5 TABLET, FILM COATED ORAL at 18:05

## 2020-06-09 ASSESSMENT — ENCOUNTER SYMPTOMS
VOMITING: 0
ABDOMINAL PAIN: 0
SENSORY CHANGE: 0
CONSTIPATION: 0
SHORTNESS OF BREATH: 0
NERVOUS/ANXIOUS: 0
DIZZINESS: 0
BACK PAIN: 0
WEAKNESS: 1
FEVER: 0
COUGH: 1
DIAPHORESIS: 0
COUGH: 0
SPEECH CHANGE: 0
DIARRHEA: 0
PALPITATIONS: 0
FLANK PAIN: 0
CHILLS: 0
SPUTUM PRODUCTION: 1
FOCAL WEAKNESS: 0
MYALGIAS: 0
MEMORY LOSS: 0
NAUSEA: 0
SHORTNESS OF BREATH: 1
HEADACHES: 0

## 2020-06-09 NOTE — CARE PLAN
Problem: Oxygenation:  Goal: Maintain adequate oxygenation dependent on patient condition  Outcome: PROGRESSING AS EXPECTED    3L nasal cannula      Problem: Bronchoconstriction:  Goal: Improve in air movement and diminished wheezing  Outcome: PROGRESSING AS EXPECTED    Albuterol q4H  Hypertonic and Symbicort BID       Problem: Bronchopulmonary Hygiene:  Goal: Increase mobilization of retained secretions  Outcome: PROGRESSING AS EXPECTED     CPT QID

## 2020-06-09 NOTE — PROGRESS NOTES
Patient is A/Ox3, in bed for duration of shift and on 3 LPM O2 which is pt baseline. Pt complaining of pain in throat, mouth, and tongue. Cepacol given PRN but provides no relief. Assessment of mouth shows coating on tongue and white patches forming. Pt believes it to be oral thrush. Says he's had it before and it feels the same. Receives HD on M,W,F. R AVF thrill and bruit present. Bed in low and locked position, hourly rounding in place. Pt is safe and all needs met. No signs of acute distress.

## 2020-06-09 NOTE — THERAPY
"Speech Language Pathology   Video Swallow Evaluation     Patient Name: Gilberto Brown  AGE:  79 y.o., SEX:  male  Medical Record #: [id number]  Today's Date: 6/9/2020     Precautions: Swallow Precautions (See Comments)    Assessment    Per H&P, Patient is 79 y.o. male who presented on 6/6/2020 with shortness of breath.  This is an elderly gentleman who is medically complex.  He carries a history of severe aortic stenosis, Wegener's granulomatosis on chronic steroid therapy, end-stage renal disease on hemodialysis, chronic respiratory failure as well as prior history of recurrent pneumonia, pseudomonal colonization, and MAC.  He comes in with complaints of shortness of breath that has been worsening over the last several days associated with subjective fever, chills, and a productive cough. CXR revealed: \"Hazy opacities in the bilateral lung bases, left more than right, are similar to prior and could be atelectasis or consolidation. There may be small bilateral pleural effusions.\"    Pt familiar to this service and provider. Pt last seen for diagnostic swallow study (MBS) with this provider on 8/15/19 and was found to have trace, penetration; however, no aspiration during study and was recommended for Regular/thin diet.    FINDINGS:  Pt seen today for repeat MBS study. Pt with trace penetration during the swallow for serial sips of thin liquids, and penetration with straw sips of thin liquids; however, NO aspiration appreciated across study. Oral dysphagia characterized by delayed oral transit for puree and trace lingual residue after the swallow. Pharyngeal dysphagia characterized by delayed swallow initiation to the valleculae, reduced hyolaryngeal elevation and reduced base of tongue retraction, resulting in trace-collection vallecular residue (for thins and mildly thick liquids), trace residue in piriforms (for thins), trace penetration during the swallow for serial sips of thin liquids, and penetration with " "straw sips of thin liquids. Esophageal swallow phase characterized by mid-esophageal retention. Pt was noted to cough intermittently during exam; however, it did not appear to be related to swallowing function.    Pt is recommended for strict adherence to safe swallow precautions and acid reflux precautions, and PO diet of Regular solids, Thin liquids and meds per tolerance. Recommend consideration of referral to RD, given Pt's report of length of time it takes to complete a meal and concern for malnutrition/dehydration and potential risk for unintentional weight loss.      Plan    Recommend Speech Therapy 3 times per week until therapy goals are met for the following treatments:  Dysphagia Training and Patient / Family / Caregiver Education.    Discharge recommendations:  Anticipate that the patient will have no further speech therapy needs after discharge from the hospital.         Objective       06/09/20 4398   Prior Level Of Function   Communication Within Functional Limits   Dentition Intact   History / Background Information   Prior Level of Function for Eating / Swallowing Independent; Regular/thins   Diagnosis PNA   Dysphagia Symptoms Warranting Video Swallow Coughing intermittently   General Anatomy / Physiology Reduced strength   \"Dry\" / Saliva Swallow Observations Complete, weak   Procedure   Patient Seated in  MBS chair   Seated at (Degrees) 90   Views Completed Lateral;Anterior / Posterior   Oral Phase   Mildly Thick (2) - (Nectar Thick) Oral Residue After the Swallow   Thin (0) Oral Residue After the Swallow   Pureed (4) Delayed Oral Transit;Oral Residue After the Swallow   Soft & Bite-Sized (6) - (Dysphagia III) Oral Residue After the Swallow   Regular (7) Oral Residue After the Swallow   Pharyngeal Phase   Mildly Thick (2) - (Nectar Thick) Reduced Tongue Base Retraction;Residue In Valleculae;Reduced Hyo-Laryngeal Elevation   Thin (0) Reduced Tongue Base Retraction;Residue In Valleculae;Reduced " Hyo-Laryngeal Elevation;Penetration During Swallow;Delayed Onset of Swallow   Pureed (4) Delayed Onset of Swallow;Reduced Tongue Base Retraction ;Reduced Hyo-Laryngeal Elevation   Soft & Bite-Sized (6) - (Dysphagia III) Delayed Onset of Swallow;Reduced Tongue Base Retraction;Reduced Hyo-Laryngeal Elevation   Regular (7) Delayed Onset of Swallow;Reduced Tongue Base Retraction;Reduced Hyo-Laryngeal Elevation   Esophageal Phase   Mildly Thick (2) - (Nectar Thick) Esophageal Dysmotility   Compensatory Strategies Attempted   Compensatory Strategies Attempted Yes   Multiple Swallows Successful in eliminating residue   Penetration Aspiration Scale   Penetration Aspiration Scale 2 - Material enters airway but remains above vocal folds, Ejected from airway, no stasis   Impression   Dysphagia Present Yes   Oral - Pharyngeal Slight Impairment   Prognosis   Prognosis for Improvement Fair   Barriers to Improvement Age, comorbidities   Positive Indicators for Improvement Family support, insight, cognition   Recommendations   Diet / Liquid Recommendation Regular (7);Thin (0)   Medication Administration  Whole with Liquid Wash   Strategies / Precautions Small Bites;Small Sips;Multiple Swallows;Sitting Upright at 90 Degrees while Eating;Stay Upright at Least 30 Minutes After Meals   Short Term Goals   Short Term Goal # 1 Pt will consume a SB6/TNO diet with no overt s/sx of aspiration   Goal Outcome # 1 Goal met, new goal added   Short Term Goal # 1 B  NEW 6/9: Pt will consume Regular/thin diet with no clinical s/sx of aspiration/penetration

## 2020-06-09 NOTE — PROGRESS NOTES
Infectious Disease Progress Note    Author: Jessica Limon M.D. Date & Time of service: 2020  11:11 AM    Chief Complaint:  Pneumonia     Interval History:   101.3, O2 3 L NC, CT chest pending. Pt states he feels a little bit better but still with ongoing fever and productive cough.        Review of Systems:  Review of Systems   Constitutional: Positive for malaise/fatigue. Negative for chills and fever.   Respiratory: Positive for cough, sputum production and shortness of breath.    Gastrointestinal: Negative for abdominal pain, constipation, diarrhea, nausea and vomiting.       Hemodynamics:  Temp (24hrs), Av.1 °C (98.7 °F), Min:36.6 °C (97.9 °F), Max:37.5 °C (99.5 °F)  Temperature: 36.6 °C (97.9 °F)  Pulse  Av.2  Min: 77  Max: 115   Blood Pressure : 100/53       Physical Exam:  Physical Exam  Constitutional:       Appearance: Normal appearance.      Comments: thin   Cardiovascular:      Rate and Rhythm: Normal rate and regular rhythm.      Heart sounds: Normal heart sounds.   Pulmonary:      Effort: Pulmonary effort is normal. No respiratory distress.      Breath sounds: Rhonchi and rales present.      Comments: Coarse breath sounds bilaterally  Abdominal:      General: Abdomen is flat. Bowel sounds are normal. There is no distension.      Palpations: Abdomen is soft.      Tenderness: There is no abdominal tenderness. There is no guarding.   Musculoskeletal:      Right lower leg: No edema.      Left lower leg: No edema.   Skin:     General: Skin is warm and dry.   Neurological:      General: No focal deficit present.      Mental Status: He is oriented to person, place, and time.   Psychiatric:         Mood and Affect: Mood normal.         Behavior: Behavior normal.         Meds:    Current Facility-Administered Medications:   •  benzocaine-menthol  •  [COMPLETED] piperacillin-tazobactam **AND** piperacillin-tazobactam  •  heparin  •  albuterol  •  sevelamer carbonate  •  rosuvastatin  •   predniSONE  •  metoprolol SR  •  budesonide-formoterol  •  clopidogrel  •  finasteride  •  tamsulosin  •  senna-docusate **AND** polyethylene glycol/lytes **AND** magnesium hydroxide **AND** bisacodyl  •  Respiratory Therapy Consult  •  heparin  •  acetaminophen  •  guaiFENesin dextromethorphan  •  guaiFENesin ER  •  benzonatate  •  ondansetron  •  sodium chloride    Labs:  Recent Labs     06/06/20 2250 06/08/20 0225 06/09/20  0240   WBC 16.0* 12.2* 8.5   RBC 3.96* 4.11* 3.89*   HEMOGLOBIN 11.4* 11.6* 10.9*   HEMATOCRIT 37.4* 38.5* 36.0*   MCV 94.4 93.7 92.5   MCH 28.8 28.2 28.0   RDW 60.6* 60.0* 57.8*   PLATELETCT 163* 140* 153*   MPV 8.8* 9.1 9.4   NEUTSPOLYS 88.10*  --  85.20*   LYMPHOCYTES 2.90*  --  1.60*   MONOCYTES 7.90  --  7.40   EOSINOPHILS 0.40  --  5.30   BASOPHILS 0.30  --  0.10     Recent Labs     06/06/20 2250 06/08/20 0225 06/09/20  0240   SODIUM 134* 135 133*   POTASSIUM 3.9 4.5 4.4   CHLORIDE 92* 95* 94*   CO2 28 23 27   GLUCOSE 97 114* 93   BUN 36* 49* 24*     Recent Labs     06/06/20 2250 06/08/20 0225 06/09/20  0240   ALBUMIN 3.7  --   --    TBILIRUBIN 0.8  --   --    ALKPHOSPHAT 68  --   --    TOTPROTEIN 6.5  --   --    ALTSGPT 13  --   --    ASTSGOT 14  --   --    CREATININE 3.24* 4.04* 2.43*       Imaging:  Ct-chest (thorax) W/o    Result Date: 6/8/2020 6/8/2020 5:37 PM HISTORY/REASON FOR EXAM: Wegener's granulomatosis and Mycobacterium avium intracellulare. Pulmonary histoplasmosis. Follow-up TECHNIQUE/EXAM DESCRIPTION: CT thorax without contrast. Noncontrast helical scanning of the chest was obtained from the lung apices through the adrenal glands. Low dose optimization technique was utilized for this CT exam including automated exposure control and adjustment of the mA and/or kV according to patient size. COMPARISON:  7/27/2017 CT FINDINGS: Left transsubclavian pacer is present. The generator obscures structures due to artifact. Lung windows demonstrate similar extensive  abnormality. There is worsening lower lobe bronchial wall thickening and subpleural linear opacities. There is some increasing endobronchial opacity and espinoza consolidation is seen at the posterior costophrenic sulci. There is bronchiectasis seen best in the middle lobe, unchanged, medial segment. Lungs are otherwise hyperinflated with some paraseptal emphysema seen. Calcified right upper lobe nodule again noted Soft tissue windows demonstrate similar left heart enlargement. Prosthetic aortic valve. Note that lack of contrast does diminish the sensitivity of the study in evaluation of the soft tissues. Calcified right hilar and mediastinal nodes. An aortopulmonary node is again seen, partially calcified and this has enlarged 12 mm short axis There is no pleural or pericardial effusion. No acute bony abnormality.     Worsening dependent consolidation, bronchial wall thickening and endobronchial opacity. This is worrisome for aspiration favored over infection and there is some new adenopathy that is more likely reactive than malignant Extensive chronic pulmonary abnormality with emphysema, bronchiectasis, scarring, remote granulomatous disease Coronary artery disease, prosthetic aortic valve, cardiac pacer    Dx-chest-portable (1 View)    Result Date: 6/6/2020 6/6/2020 10:58 PM HISTORY/REASON FOR EXAM:  Sepsis Short of breath TECHNIQUE/EXAM DESCRIPTION AND NUMBER OF VIEWS: Single portable view of the chest. COMPARISON: 1/25/2020 FINDINGS: Left-sided pacemaker. Hazy opacities in the bilateral lung bases, left more than right There may be small bilateral pleural effusions. No pneumothorax. Stable cardiopericardial silhouette.     Hazy opacities in the bilateral lung bases, left more than right, are similar to prior and could be atelectasis or consolidation. There may be small bilateral pleural effusions.      Micro:  Results     Procedure Component Value Units Date/Time    Acid Fast Stain [[id number]] Collected:   06/07/20 1816    Order Status:  Completed Specimen:  Respirate Updated:  06/08/20 1812     Significant Indicator NEG     Source RESP     Site SPUTUM     AFB Smear Results No acid fast bacilli seen.    Narrative:       Collected By:[id number] CHARLIE LAM  Collected By:[id number] CHARLIE LAM    AFB Culture [[id number]] Collected:  06/07/20 1816    Order Status:  Completed Specimen:  Respirate from Sputum Updated:  06/08/20 1812     Significant Indicator NEG     Source RESP     Site SPUTUM     Culture Result Culture in progress.     AFB Smear Results No acid fast bacilli seen.    Narrative:       Collected By:[id number] CHARLIE LAM  Collected By:[id number] CHARLIE LAM    Fungal Culture [[id number]] Collected:  06/07/20 1816    Order Status:  Completed Specimen:  Respirate Updated:  06/08/20 1624     Significant Indicator NEG     Source RESP     Site SPUTUM     Culture Result Culture in progress.    Narrative:       Sputum cultures, induced if needed. If not done within the  last 24 hours  Sputum cultures, induced if needed. If not done within the    GRAM STAIN [[id number]] Collected:  06/07/20 1816    Order Status:  Completed Specimen:  Respirate Updated:  06/08/20 1353     Significant Indicator .     Source RESP     Site SPUTUM     Gram Stain Result Moderate WBCs.  Moderate Gram negative rods.  Specimen Quality Score: 3+      Narrative:       Sputum cultures, induced if needed. If not done within the  last 24 hours  Sputum cultures, induced if needed. If not done within the    Culture Respiratory W/ Grm Stn [[id number]] Collected:  06/07/20 1816    Order Status:  Completed Specimen:  Respirate from Sputum Updated:  06/08/20 1127    Narrative:       Sputum cultures, induced if needed. If not done within the  last 24 hours    CULTURE RESPIRATORY W/ GRM STN [[id number]]     Order Status:  Completed Specimen:  Respirate from Sputum     BLOOD CULTURE [[id number]] Collected:  06/06/20 2240    Order Status:  Completed Specimen:  Blood  "from Peripheral Updated:  06/08/20 0721     Significant Indicator NEG     Source BLD     Site PERIPHERAL     Culture Result No Growth  Note: Blood cultures are incubated for 5 days and  are monitored continuously.Positive blood cultures  are called to the RN and reported as soon as  they are identified.      Narrative:       2 of 2 blood culture x2  Sites order. Per Hospital Policy:  Only change Specimen Src: to \"Line\" if specified by physician  order.  Left AC    BLOOD CULTURE [[id number]] Collected:  06/06/20 2240    Order Status:  Completed Specimen:  Blood from Peripheral Updated:  06/08/20 0721     Significant Indicator NEG     Source BLD     Site PERIPHERAL     Culture Result No Growth  Note: Blood cultures are incubated for 5 days and  are monitored continuously.Positive blood cultures  are called to the RN and reported as soon as  they are identified.      Narrative:       1 of 2 for Blood Culture x 2 sites order. Per Hospital  Policy: Only change Specimen Src: to \"Line\" if specified by  physician order.  No site indicated    AFB STAIN ONLY [[id number]]     Order Status:  No result Specimen:  Respirate from Sputum     URINALYSIS,CULTURE IF INDICATED [[id number]]  (Abnormal) Collected:  06/07/20 0631    Order Status:  Completed Specimen:  Urine Updated:  06/07/20 0712     Color Yellow     Character Clear     Specific Gravity 1.007     Ph 8.5     Glucose Negative mg/dL      Ketones Negative mg/dL      Protein Negative mg/dL      Bilirubin Negative     Urobilinogen, Urine 0.2     Nitrite Negative     Leukocyte Esterase Negative     Occult Blood Negative     Micro Urine Req see below     Comment: Microscopic examination not performed when specimen is clear  and chemically negative for protein, blood, leukocyte esterase  and nitrite.         SARS-CoV-2, PCR (In-House) [[id number]] Collected:  06/07/20 0140    Order Status:  Completed Updated:  06/07/20 0304     SARS-CoV-2 Source NP Swab     SARS-CoV-2 by PCR " NotDetected     Comment: [facility] providers: PLEASE REFER TO DE-ESCALATION AND RETESTING PROTOCOL  on [email or web address]  **The [employer] GeneXpert Xpress SARS-CoV-2 Test has been made available for  use under the Emergency Use Authorization (EUA) only.         Narrative:       Is this test for diagnosis or screening?->Diagnosis of ill  patient    COVID/SARS CoV-2 [[id number]] Collected:  06/07/20 0140    Order Status:  Completed Specimen:  Respirate from Nasopharyngeal Updated:  06/07/20 0151     COVID Order Status Received    Narrative:       Is this test for diagnosis or screening?->Diagnosis of ill  patient          Assessment:  Active Hospital Problems    Diagnosis   • *Recurrent pneumonia [J18.9]   • Sepsis (HCC) [A41.9]   • Elevated troponin [R79.89]   • ESRD on dialysis (HCC) [N18.6, Z99.2]   • Wegener's granulomatosis (HCC) [M31.30]   • Chronic respiratory failure with hypoxia (HCC) [J96.11]   • Atrial fibrillation (HCC) [I48.91]   • Hyperlipidemia [E78.5]     Interval 24 hours:      AF, O2 3.5 L NC   Labs reviewed  Imaging personally reviewed both images and report.  Significant upper lobe emphysema and opacities in lower lobes per my read.   Studies reviewed- swallow exam pending  Micro reviewed    Pt with improvement in fevers and states he is feeling a little better.  Ongoing productive cough.  Patient continued on renally dosed Zosyn 4.5 g every 12.       ASSESSMENT/PLAN:       79 y.o.  admitted 6/6/2020. Pt has a past medical history of severe aortic stenosis and reports he had valve placement in March 2020. History of Wegener's granulomatosis and chronic lung disease with bronchiectasis,  COPD (baseline oxygen 3 L) and on chronic steroids 5 mg daily. 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  He also has ESRD on hemodialysis.       He was seen by pulmonary and ID at the end of January, 2020 with extensive work-up at that  point.  He improved on short course of Zosyn and was discharged with plan to follow-up with pulmonary. Prior work-up on 1/26/2020 with sputum positive for pseudomonas aeruginosa with the testing performed (sensitive to ciprofloxacin, meropenem, tobramycin, amikacin and and Zosyn)  negative sputum AFB, P TANIYA PCR negative, histoplasmosis antibody negative, cocci antibody negative, beta D glucan intermediate.      Patient is now admitted complaining of several weeks of increasing shortness of breath, subjective fevers and productive cough.  He states that he took a course of ciprofloxacin approximately 2 weeks ago with no improvement in his symptoms.  He has been off antibiotics for 1 week and had fevers which prompted ED visit.     Hospital Course:   In the ED was fevers to 100.9 and now improved.  He is stable on 2 L nasal cannula which is at or below his baseline.  WBC elevated to 16. COVID 19 tested and negative.  Chest x-ray with hazy opacities in bilateral lung bases, similar to prior and thought to be either atelectasis or consolidation possible small bilateral pleural effusions.     Problem List      Community-acquired pneumonia with cough, shortness of breath-acute on chronic disease and concern for aspiration  -Procalcitonin on 6/7 0.60 -> 0.93 -> 1.08  -Upper respiratory viral panel negative  -Continue with worsening consolidation, bronchial wall thickening and endobronchial opacity concerning for aspiration rather than infection.  Extensive pulmonary emphysema bronchiectasis and remote granulomatous disease.  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   --- Follow-up blood cultures-no growth to date  --- Aggressive pulmonary toilet  --- Monitor response to antibiotics  --- Follow labs        Plan of care discussed with Dr. Syed. Will continue to follow.

## 2020-06-09 NOTE — CONSULTS
"Reason for PC Consult: Advance Care Planning    Consulted by: Dr. Parson     Assessment:  General:    79 y.o. male with severe aortic stenosis, Wegener's granulomatosis on chronic steroids, end-stage renal disease on hemodialysis, chronic respiratory failure and COPD (chronic baseline 3 L oxygen), with history of MAC and pseudomonal colonization, admitted 6/6/2020 with shortness of breath, along with subjective fevers, chills, and productive cough.  Patient was felt to have sepsis, with lung source and recurrent pneumonia. COVID-19 negative.    Dyspnea: No  Last BM: 06/08/20  Pain: No  Depression: Mood appropriate for situation  Dementia: No    Spiritual:  Is Mosque or spirituality important for coping with this illness? Unable to determine  Has a  or spiritual provider visit been requested?      Palliative Performance Scale: 50%    Advance Directive: Advance Directive-on file  DPOA: Yes- Crys Stephanie  POLST: No    Code Status: Full-discussed at this encounter. Pt will discuss with his son before making final decision.     Social: Pt lives with his wife Crys in [place]. Pt has two sons, Eyal who lives in [facility] and Raimundo in [place].     Outcome:  Introduced self and role of Palliative Care to Gilberto and his wife Crys at bedside.  Assessed pt's understanding of current medical status, overall health picture, and options for future care. Pt has a good understanding of his diagnosis and co-morbidities. Pt states that Crys brought him to the hospital due to his fever and his pulmonologist stating that pt needs to come to the hospital when he reaches 100.5F. Crys states that his fever was 102 when he came in. Pt states that after his TAVR this year it \"no longer takes me 5 minutes to get up from a chair.\" He does state that many of his other symptoms remain, including SOB with exertion. Pt uses a walker at home and can walk about 20 feet before becoming fatigued. Crys and the pt agree that the pt " "has \"slowed down\" the past several months.     Explored pt's values, beliefs, and preferences in order to identify GOC. Pt's goal at this time is returning home and resuming dialysis. Pt shares that he has seen many friends at dialysis \"not come back\" and he is not at that point. Pt feels that his quality of life is maintained because of his wife, children, and ability to \"still get around.\" PC RN explains hospice as future care option. PC RN describes the extra layer of support, symptom management, and focusing on quality of life. Pt and Crys verbalize understanding and decline hospice at this time.     Advance directive on file, reviewed and confirmed with patient that it is correct. Discussed code status in detail including mechanical ventilation and what resuscitation looks like. Pt's feeling is that he would not want CPR or intubation, however his son Otf wants pt to remain full code. Pt and Crys plan to discuss with Otf and PC RN will follow up.    Active listening, reflection, reminiscing, validation & normalization, and empathic support utilized throughout this encounter.  All questions answered.  PC contact information given.         Updated: Dr. Parson via tiger text    Plan: pt will discuss code status with his son. Declines hospice. PC RN will follow and support as clinical picture evolves.       Thank you for allowing Palliative Care to participate in this patient's care. Please feel free to call [phone] with any questions or concerns.  "

## 2020-06-09 NOTE — CARE PLAN
Problem: Respiratory:  Goal: Respiratory status will improve  Outcome: PROGRESSING AS EXPECTED     Problem: Safety  Goal: Will remain free from injury  Outcome: PROGRESSING AS EXPECTED     Problem: Safety  Goal: Will remain free from falls  Outcome: PROGRESSING AS EXPECTED

## 2020-06-09 NOTE — PROGRESS NOTES
Hospital Medicine Daily Progress Note    Date of Service  6/9/2020    Chief Complaint  Shortness of breath    Hospital Course    79 y.o. male with severe aortic stenosis, Wegener's granulomatosis on chronic steroids, end-stage renal disease on hemodialysis, chronic respiratory failure and COPD (chronic baseline 3 L oxygen), with history of MAC and pseudomonal colonization, admitted 6/6/2020 with shortness of breath, along with subjective fevers, chills, and productive cough.  He was noted to be febrile.  Initial work-up showed WBC of 16,000, and creatinine 3.24, with potassium of 3.9.  Troponin was 238, which he had chronically elevated.  Chest x-ray showed hazy opacities in the bilateral lung bases, left more than the right, with small bilateral pleural effusions.  Patient was felt to have sepsis, with lung source and recurrent pneumonia, and he is started on IV Zosyn and azithromycin.  Cultures were sent.  COVID-19 swab was sent, and it was negative.  Nephrology was consulted for resumption of his hemodialysis while in the hospital.  ID was consulted for further antibiotic guidance.  He was also started on aggressive physiotherapy.        Interval Problem Update    Patient alert and oriented x4, states that he does have a cough with dark sputum production  Shortness of breath unchanged, requiring 3 L oxygen supplementation  Ongoing generalized weakness with increasing procalcitonin level    Antibiotics per ID, consideration of transitioning to meropenem          Consultants/Specialty  Nephrology  ID  Palliative care    Code Status  Full    Disposition  Monitor on the floors.    Review of Systems  Review of Systems   Constitutional: Positive for malaise/fatigue. Negative for chills, diaphoresis and fever.   HENT: Positive for hearing loss. Negative for congestion.    Respiratory: Negative for cough and shortness of breath.    Cardiovascular: Negative for chest pain, palpitations and leg swelling.    Gastrointestinal: Negative for abdominal pain, nausea and vomiting.   Genitourinary: Negative for dysuria and flank pain.   Musculoskeletal: Negative for back pain, joint pain and myalgias.   Neurological: Positive for weakness. Negative for dizziness, sensory change, speech change, focal weakness and headaches.   Psychiatric/Behavioral: Negative for memory loss. The patient is not nervous/anxious.         Pertinent positives/negatives as mentioned above.     A complete review of systems was personally done by me. All other systems were negative.       Physical Exam  Temp:  [36.6 °C (97.9 °F)-37.5 °C (99.5 °F)] 36.6 °C (97.9 °F)  Pulse:  [] 85  Resp:  [16-20] 16  BP: (100-115)/(43-67) 100/53  SpO2:  [95 %-100 %] 96 %    Physical Exam  Vitals signs reviewed.   Constitutional:       General: He is not in acute distress.     Appearance: Normal appearance. He is ill-appearing. He is not toxic-appearing or diaphoretic.      Comments: Elderly, frail   HENT:      Head: Normocephalic and atraumatic.      Right Ear: External ear normal.      Left Ear: External ear normal.      Mouth/Throat:      Mouth: Mucous membranes are moist.      Pharynx: No oropharyngeal exudate.   Eyes:      Extraocular Movements: Extraocular movements intact.      Conjunctiva/sclera: Conjunctivae normal.      Pupils: Pupils are equal, round, and reactive to light.   Neck:      Musculoskeletal: Normal range of motion and neck supple.   Cardiovascular:      Heart sounds: Normal heart sounds. No murmur. No gallop.    Pulmonary:      Effort: Pulmonary effort is normal. No respiratory distress.      Breath sounds: No stridor. No wheezing or rhonchi.      Comments: Diminished air entry B/L bases.  (+) Bibasilar crackles.  Chest:      Chest wall: No tenderness.   Abdominal:      General: Bowel sounds are normal. There is no distension.      Palpations: Abdomen is soft.      Tenderness: There is no abdominal tenderness. There is no guarding.    Musculoskeletal: Normal range of motion.         General: No tenderness.   Lymphadenopathy:      Cervical: No cervical adenopathy.   Skin:     General: Skin is warm and dry.      Coloration: Skin is pale. Skin is not jaundiced.   Neurological:      General: No focal deficit present.      Mental Status: He is alert and oriented to person, place, and time.      Cranial Nerves: No cranial nerve deficit.      Sensory: No sensory deficit.      Motor: Weakness present.      Coordination: Coordination normal.      Gait: Gait normal.   Psychiatric:         Mood and Affect: Mood normal.         Behavior: Behavior normal.              Fluids    Intake/Output Summary (Last 24 hours) at 6/9/2020 1337  Last data filed at 6/9/2020 1002  Gross per 24 hour   Intake 800 ml   Output 2500 ml   Net -1700 ml       Laboratory  Recent Labs     06/06/20 2250 06/08/20 0225 06/09/20  0240   WBC 16.0* 12.2* 8.5   RBC 3.96* 4.11* 3.89*   HEMOGLOBIN 11.4* 11.6* 10.9*   HEMATOCRIT 37.4* 38.5* 36.0*   MCV 94.4 93.7 92.5   MCH 28.8 28.2 28.0   MCHC 30.5* 30.1* 30.3*   RDW 60.6* 60.0* 57.8*   PLATELETCT 163* 140* 153*   MPV 8.8* 9.1 9.4     Recent Labs     06/06/20 2250 06/08/20 0225 06/09/20  0240   SODIUM 134* 135 133*   POTASSIUM 3.9 4.5 4.4   CHLORIDE 92* 95* 94*   CO2 28 23 27   GLUCOSE 97 114* 93   BUN 36* 49* 24*   CREATININE 3.24* 4.04* 2.43*   CALCIUM 8.9 8.3* 8.6                   Imaging  CT-CHEST (THORAX) W/O   Final Result      Worsening dependent consolidation, bronchial wall thickening and endobronchial opacity. This is worrisome for aspiration favored over infection and there is some new adenopathy that is more likely reactive than malignant      Extensive chronic pulmonary abnormality with emphysema, bronchiectasis, scarring, remote granulomatous disease      Coronary artery disease, prosthetic aortic valve, cardiac pacer      DX-CHEST-PORTABLE (1 VIEW)   Final Result         Hazy opacities in the bilateral lung bases, left  more than right, are similar to prior and could be atelectasis or consolidation.      There may be small bilateral pleural effusions.      DX-ESOPHAGUS - [id number]    (Results Pending)        Assessment/Plan  * Recurrent pneumonia- (present on admission)  Assessment & Plan  -Has had recurrent pneumonias in the past.  X-ray showed hazy opacities in the bilateral lung base, left more than the right.  Procalcitonin elevated.  Has leukocytosis, and with subjective fevers, chills, and cough. COVID-19 negative. With history of MAC and pseudomonal colonization.  -Continue Zosyn and azithromycin.  Follow cultures.    6/9 cx neg, increasing proCal, per ID, change to merrem, ID to adjust    - CT of the chest - increasing opacity, emphysema, reactive  ?aspiration  F/u SLP    Thrush- add nystatin  -Continue to trend WBC count, and procalcitonin.  Follow sputum cultures.  -ID following, appreciate inputs.   -Continue respiratory support, oxygen supplement.  Currently stable at baseline home oxygen.  Continue symptom control with antitussives, with Robitussin, and Mucinex, along with Tessalon PRN. Continue aggressive chest physiotherapy.    Sepsis (HCC)- (present on admission)  Assessment & Plan  -This is Sepsis Present on admission.  -SIRS criteria identified on my evaluation include: Fever, with temperature greater than 101 deg F, Tachypnea, with respirations greater than 20 per minute and Leukocytosis, with WBC greater than 12,000  -Source is likely lung  -Sepsis protocol initiated.   -Fluid resuscitation ordered per protocol.  Continue to hold off on maintenance IV fluids as he is not hypotensive, and he is prone to fluid overload given his ESRD.  -Continue IV antibiotics as appropriate for source of sepsis.  Trend WBC count.  Follow cultures.  -While organ dysfunction may be noted elsewhere in this problem list or in the chart, degree of organ dysfunction does not meet CMS criteria for severe sepsis    Elevated troponin-  (present on admission)  Assessment & Plan  -Review of the medical records indicates that this is chronically elevated, current troponin level is close to his prior baseline.  No ACS.  Continue to monitor.    ESRD on dialysis (HCC)- (present on admission)  Assessment & Plan  - Patient gets dialysis on Monday, Wednesday, and Friday.    - Nephrology on board for continued hemodialysis while he is in the hospital.    Wegener's granulomatosis (HCC)- (present on admission)  Assessment & Plan  -Continue chronic home low-dose steroid.  -Now dialysis dependent.    Chronic respiratory failure with hypoxia (HCC)- (present on admission)  Assessment & Plan  -He is at his baseline oxygen supplement (3 L).  -Continue management of pneumonia as above.  Continue RT protocol, and oxygen supplements.    Atrial fibrillation (HCC)- (present on admission)  Assessment & Plan  -Rate and rhythm controlled.  Patient is not on chronic anticoagulation.  Continue beta-blocker.  Monitor closely.    Hyperlipidemia- (present on admission)  Assessment & Plan  -Continue Crestor.       VTE prophylaxis: heparin SQ

## 2020-06-09 NOTE — RESPIRATORY CARE
Oxygen Rounds      Patient found on    O2 L/m:  _3.5________    Oxygen device:  ___nc_____   Spo2: _____96____%      Respiratory device skin site inspection completed.

## 2020-06-09 NOTE — PROGRESS NOTES
Assumed care of patient. Patient resting in bed with no distress noted. Call bell in reach and safety measures in place.

## 2020-06-09 NOTE — CARE PLAN
Problem: Safety  Goal: Will remain free from falls  Outcome: PROGRESSING AS EXPECTED  Note: Patient will have no falls by end of shift. Bed in low and locked position. Adequate lighting provided. Hourly rounding in place though pt is visible from nursing station.     Problem: Infection  Goal: Will remain free from infection  Outcome: PROGRESSING SLOWER THAN EXPECTED  Note: Patient complaining of pain in mouth and on tongue. Continue to monitor WBC. Administer abx as prescribed.

## 2020-06-10 LAB
ANION GAP SERPL CALC-SCNC: 16 MMOL/L (ref 7–16)
BASOPHILS # BLD AUTO: 0.4 % (ref 0–1.8)
BASOPHILS # BLD: 0.03 K/UL (ref 0–0.12)
BUN SERPL-MCNC: 37 MG/DL (ref 8–22)
CALCIUM SERPL-MCNC: 8.7 MG/DL (ref 8.5–10.5)
CHLORIDE SERPL-SCNC: 98 MMOL/L (ref 96–112)
CO2 SERPL-SCNC: 25 MMOL/L (ref 20–33)
CREAT SERPL-MCNC: 3.82 MG/DL (ref 0.5–1.4)
EOSINOPHIL # BLD AUTO: 0.5 K/UL (ref 0–0.51)
EOSINOPHIL NFR BLD: 7.3 % (ref 0–6.9)
ERYTHROCYTE [DISTWIDTH] IN BLOOD BY AUTOMATED COUNT: 58.9 FL (ref 35.9–50)
ETEST SENSITIVITY ETEST: NORMAL
GLUCOSE SERPL-MCNC: 95 MG/DL (ref 65–99)
HCT VFR BLD AUTO: 35.2 % (ref 42–52)
HGB BLD-MCNC: 10.7 G/DL (ref 14–18)
IMM GRANULOCYTES # BLD AUTO: 0.02 K/UL (ref 0–0.11)
IMM GRANULOCYTES NFR BLD AUTO: 0.3 % (ref 0–0.9)
LYMPHOCYTES # BLD AUTO: 0.27 K/UL (ref 1–4.8)
LYMPHOCYTES NFR BLD: 3.9 % (ref 22–41)
MCH RBC QN AUTO: 28.7 PG (ref 27–33)
MCHC RBC AUTO-ENTMCNC: 30.4 G/DL (ref 33.7–35.3)
MCV RBC AUTO: 94.4 FL (ref 81.4–97.8)
MONOCYTES # BLD AUTO: 0.58 K/UL (ref 0–0.85)
MONOCYTES NFR BLD AUTO: 8.5 % (ref 0–13.4)
NEUTROPHILS # BLD AUTO: 5.45 K/UL (ref 1.82–7.42)
NEUTROPHILS NFR BLD: 79.6 % (ref 44–72)
NRBC # BLD AUTO: 0 K/UL
NRBC BLD-RTO: 0 /100 WBC
PLATELET # BLD AUTO: 156 K/UL (ref 164–446)
PMV BLD AUTO: 9.3 FL (ref 9–12.9)
POTASSIUM SERPL-SCNC: 4.5 MMOL/L (ref 3.6–5.5)
RBC # BLD AUTO: 3.73 M/UL (ref 4.7–6.1)
SODIUM SERPL-SCNC: 139 MMOL/L (ref 135–145)
WBC # BLD AUTO: 6.9 K/UL (ref 4.8–10.8)

## 2020-06-10 PROCEDURE — 90935 HEMODIALYSIS ONE EVALUATION: CPT | Performed by: INTERNAL MEDICINE

## 2020-06-10 PROCEDURE — 700111 HCHG RX REV CODE 636 W/ 250 OVERRIDE (IP): Performed by: INTERNAL MEDICINE

## 2020-06-10 PROCEDURE — 85025 COMPLETE CBC W/AUTO DIFF WBC: CPT

## 2020-06-10 PROCEDURE — 770006 HCHG ROOM/CARE - MED/SURG/GYN SEMI*

## 2020-06-10 PROCEDURE — 99233 SBSQ HOSP IP/OBS HIGH 50: CPT | Performed by: INTERNAL MEDICINE

## 2020-06-10 PROCEDURE — 700102 HCHG RX REV CODE 250 W/ 637 OVERRIDE(OP): Performed by: HOSPITALIST

## 2020-06-10 PROCEDURE — 700101 HCHG RX REV CODE 250: Performed by: INTERNAL MEDICINE

## 2020-06-10 PROCEDURE — 700111 HCHG RX REV CODE 636 W/ 250 OVERRIDE (IP): Performed by: HOSPITALIST

## 2020-06-10 PROCEDURE — 94640 AIRWAY INHALATION TREATMENT: CPT

## 2020-06-10 PROCEDURE — A9270 NON-COVERED ITEM OR SERVICE: HCPCS | Performed by: INTERNAL MEDICINE

## 2020-06-10 PROCEDURE — 5A1D70Z PERFORMANCE OF URINARY FILTRATION, INTERMITTENT, LESS THAN 6 HOURS PER DAY: ICD-10-PCS | Performed by: INTERNAL MEDICINE

## 2020-06-10 PROCEDURE — 700102 HCHG RX REV CODE 250 W/ 637 OVERRIDE(OP): Performed by: INTERNAL MEDICINE

## 2020-06-10 PROCEDURE — 80048 BASIC METABOLIC PNL TOTAL CA: CPT

## 2020-06-10 PROCEDURE — 700105 HCHG RX REV CODE 258: Performed by: INTERNAL MEDICINE

## 2020-06-10 PROCEDURE — A9270 NON-COVERED ITEM OR SERVICE: HCPCS | Performed by: HOSPITALIST

## 2020-06-10 PROCEDURE — 99232 SBSQ HOSP IP/OBS MODERATE 35: CPT | Performed by: INTERNAL MEDICINE

## 2020-06-10 PROCEDURE — 94760 N-INVAS EAR/PLS OXIMETRY 1: CPT

## 2020-06-10 PROCEDURE — 90935 HEMODIALYSIS ONE EVALUATION: CPT

## 2020-06-10 PROCEDURE — 36415 COLL VENOUS BLD VENIPUNCTURE: CPT

## 2020-06-10 RX ORDER — LIDOCAINE HYDROCHLORIDE 40 MG/ML
SOLUTION TOPICAL PRN
Status: DISCONTINUED | OUTPATIENT
Start: 2020-06-10 | End: 2020-06-15 | Stop reason: HOSPADM

## 2020-06-10 RX ADMIN — NYSTATIN 500000 UNITS: 100000 SUSPENSION ORAL at 17:31

## 2020-06-10 RX ADMIN — PIPERACILLIN AND TAZOBACTAM 4.5 G: 4; .5 INJECTION, POWDER, LYOPHILIZED, FOR SOLUTION INTRAVENOUS; PARENTERAL at 08:26

## 2020-06-10 RX ADMIN — NYSTATIN 500000 UNITS: 100000 SUSPENSION ORAL at 20:21

## 2020-06-10 RX ADMIN — ALBUTEROL SULFATE 2.5 MG: 2.5 SOLUTION RESPIRATORY (INHALATION) at 07:07

## 2020-06-10 RX ADMIN — TAMSULOSIN HYDROCHLORIDE 0.4 MG: 0.4 CAPSULE ORAL at 05:42

## 2020-06-10 RX ADMIN — SEVELAMER CARBONATE 800 MG: 800 TABLET, FILM COATED ORAL at 08:27

## 2020-06-10 RX ADMIN — CLOPIDOGREL BISULFATE 75 MG: 75 TABLET ORAL at 17:31

## 2020-06-10 RX ADMIN — METOPROLOL SUCCINATE 25 MG: 25 TABLET, EXTENDED RELEASE ORAL at 05:42

## 2020-06-10 RX ADMIN — SEVELAMER CARBONATE 800 MG: 800 TABLET, FILM COATED ORAL at 17:31

## 2020-06-10 RX ADMIN — ALBUTEROL SULFATE 2.5 MG: 2.5 SOLUTION RESPIRATORY (INHALATION) at 11:12

## 2020-06-10 RX ADMIN — FINASTERIDE 5 MG: 5 TABLET, FILM COATED ORAL at 05:42

## 2020-06-10 RX ADMIN — ROSUVASTATIN CALCIUM 20 MG: 20 TABLET, FILM COATED ORAL at 05:42

## 2020-06-10 RX ADMIN — HEPARIN SODIUM 5000 UNITS: 5000 INJECTION, SOLUTION INTRAVENOUS; SUBCUTANEOUS at 20:21

## 2020-06-10 RX ADMIN — NYSTATIN 500000 UNITS: 100000 SUSPENSION ORAL at 11:05

## 2020-06-10 RX ADMIN — BUDESONIDE AND FORMOTEROL FUMARATE DIHYDRATE 2 PUFF: 160; 4.5 AEROSOL RESPIRATORY (INHALATION) at 07:07

## 2020-06-10 RX ADMIN — GUAIFENESIN 600 MG: 600 TABLET, EXTENDED RELEASE ORAL at 17:30

## 2020-06-10 RX ADMIN — DOCUSATE SODIUM 50 MG AND SENNOSIDES 8.6 MG 2 TABLET: 8.6; 5 TABLET, FILM COATED ORAL at 05:42

## 2020-06-10 RX ADMIN — BUDESONIDE AND FORMOTEROL FUMARATE DIHYDRATE 2 PUFF: 160; 4.5 AEROSOL RESPIRATORY (INHALATION) at 19:09

## 2020-06-10 RX ADMIN — HEPARIN SODIUM 5000 UNITS: 5000 INJECTION, SOLUTION INTRAVENOUS; SUBCUTANEOUS at 05:43

## 2020-06-10 RX ADMIN — ALBUTEROL SULFATE 2.5 MG: 2.5 SOLUTION RESPIRATORY (INHALATION) at 19:09

## 2020-06-10 RX ADMIN — PIPERACILLIN AND TAZOBACTAM 4.5 G: 4; .5 INJECTION, POWDER, LYOPHILIZED, FOR SOLUTION INTRAVENOUS; PARENTERAL at 17:30

## 2020-06-10 RX ADMIN — PREDNISONE 5 MG: 5 TABLET ORAL at 05:42

## 2020-06-10 RX ADMIN — GUAIFENESIN 600 MG: 600 TABLET, EXTENDED RELEASE ORAL at 05:42

## 2020-06-10 RX ADMIN — SODIUM CHLORIDE 4 ML: 7 NEBU SOLN,3 % NEBU at 07:07

## 2020-06-10 ASSESSMENT — ENCOUNTER SYMPTOMS
CHILLS: 0
ABDOMINAL PAIN: 0
MEMORY LOSS: 0
DEPRESSION: 0
SPEECH CHANGE: 0
MYALGIAS: 0
BACK PAIN: 0
SENSORY CHANGE: 0
PALPITATIONS: 0
WEAKNESS: 1
SHORTNESS OF BREATH: 0
FEVER: 0
CONSTIPATION: 0
NAUSEA: 0
DIARRHEA: 1
HEADACHES: 0
COUGH: 0
VOMITING: 0
SPUTUM PRODUCTION: 0
FOCAL WEAKNESS: 0
NERVOUS/ANXIOUS: 0
FLANK PAIN: 0

## 2020-06-10 NOTE — PALLIATIVE CARE
Palliative Care follow-up  PC RN attempted to see patient at bedside. Pt being wheeled to dialysis shortly. PC RN following up after GOC discussion on Monday regarding code status. Pt asks that PC RN call his wife regarding code status. PC RN called pt's wife Crys who states they have not had a chance to discuss amongst their family. For now pt will remain full code, Crys states that their family will discuss after pt has discharged. Crys encouraged to call if questions arise.       Plan: full code. PC RN will follow and support as clinical picture evolves.     Thank you for allowing Palliative Care to support this patient and family. Contact [phone] for additional assistance, change in patient status, or with any questions/concerns.

## 2020-06-10 NOTE — PROGRESS NOTES
Patient alert/oriented x4,assumed care given at shift changed,on oxygen without distress, ambulatory with a walker and 1 person assist to the bathroom,denies pain,new iv access patent with antibiotic infusing,call light and personal belongings within reach and bed in low position ,bed alarm on.

## 2020-06-10 NOTE — PROGRESS NOTES
Diagnosis: End-Stage Renal Disease admitted with pneumonia. Patient seen and examined on hemodialysis during treatment. Patient is stable, tolerating hemodialysis. Denies chest pain and shortness of breath. Orders updated as needed. Please refer to flowsheet for full details.    Access: R BC AVF  UF goal: 1.8L    Plan: continue HD Monday Wednesday Friday. Patient says he thinks about end of life, but doesn't feel like he is suffering or done quite yet.    Elio Eaton MD  Nephrology

## 2020-06-10 NOTE — PROGRESS NOTES
johnavnijoi has dialysis today he requests lydocaine for his fistula I got verbal order from md qiu. Will f/u

## 2020-06-10 NOTE — DOCUMENTATION QUERY
formerly [facility]                                                                       Query Response Note      PATIENT:               ALETA BUCK  ACCT #:                  [id number]  MRN:                     [id number]  :                      1940  ADMIT DATE:       2020 10:12 PM  DISCH DATE:          RESPONDING  PROVIDER #:        [id number]           QUERY TEXT:    Atrial fibrillation is documented in the Medical Record.  Please specify the type.    NOTE:  If an appropriate response is not listed below, please respond with a new note.      The patient's Clinical Indicators include:  Clinical indicators-  Per H&P PMH - Atrial fibrillation, on Plavix   EKG: Sinus Tach    Treatments-   Home & inpatient medication: Plavix  EKG     Risks-  PMH Atrial fibrillation, HTN, Pacemaker, Sick sinus syndrome, Aortic valve replacement    Thank you,   Margoth Alvarez, [facility] RN  [phone]  Options provided:   -- Paroxysmal atrial fibrillation (self-terminating or intermittent spontaneously or with intervention within 7 days of onset)   -- Permanent atrial fibrillation (persistent or longstanding persistent AF where cardioversion cannot or will not be performed   -- Other persistent atrial fibrillation (AF that does not terminate within 7 days or that requires repeat pharmacological or electrical cardioversion.   -- Longstanding persistent atrial fibrillation (AF that is persistent and continuous, lasting longer than 1 year)   -- Chronic atrial fibrillation, unspecified (may refer to persistent, longstanding persistent or permanent AF.  A more specific descriptive term is preferred over the nonspecific AF   -- Unable to determine      Query created by: Margoth Alvarez on 2020 3:55 PM    RESPONSE TEXT:    Paroxysmal atrial fibrillation (self-terminating or intermittent spontaneously or with intervention within 7 days of onset)       QUERY  TEXT:    BMI <19 is documented in the Medical Record.  Can a diagnosis be provided to support this finding?    NOTE:  If an appropriate response is not listed below, please respond with a new note.    The patient's Clinical Indicators include:  Clinical indicators-  BMI 18.41  Per RD assessment: BMI classification: underweight  15# (11%) weight loss in 6 months, severe    Treatments-   RD assessment & monitoring of PO intake, wt trends & nutrition labs/meds  Nutrition rep to follow  Encourage intake of all meals  Document intake of all meals    Risks-  BMI < 19, Hx of protein calorie malnutrition    Thank you,  Margoth Alvarez, [facility] RN  Connect via [employer]  Options provided:   -- Underweight   -- Cachexia   -- Findings of no clinical significance   -- Unable to determine      Query created by: Margoth Alvarez on 6/9/2020 4:02 PM    RESPONSE TEXT:    Cachexia          Electronically signed by:  CLIFF JOHANSEN DO 6/10/2020 8:02 AM

## 2020-06-10 NOTE — FACE TO FACE
Face to Face Supporting Documentation - Home Health    The encounter with this patient was in whole or in part the primary reason for home health admission.    Date of encounter:   Patient:                    MRN:                       YOB: 2021  Gilberto Brown  [id number]  1940     Home health to see patient for:  Skilled Nursing care for assessment, interventions & education, Physical Therapy evaluation and treatment and Occupational therapy evaluation and treatment    Skilled need for:  Exacerbation of Chronic Disease State ESRD on HD, Wegener's    Skilled nursing interventions to include:  Comment: pt/ot    Homebound status evidenced by:  Need the aid of supportive devices such as crutches, canes, wheelchairs or walkers or Needs the assistance of another person in order to leave the home. Leaving home requires a considerable and taxing effort. There is a normal inability to leave the home.    Community Physician to provide follow up care: Christine Zamudio M.D.     Optional Interventions? No      I certify the face to face encounter for this home health care referral meets the CMS requirements and the encounter/clinical assessment with the patient was, in whole, or in part, for the medical condition(s) listed above, which is the primary reason for home health care. Based on my clinical findings: the service(s) are medically necessary, support the need for home health care, and the homebound criteria are met.  I certify that this patient has had a face to face encounter by myself.  Tamiko Syed D.O. - NPI: [id number]

## 2020-06-10 NOTE — PROGRESS NOTES
Hospital Medicine Daily Progress Note    Date of Service  6/10/2020    Chief Complaint  Shortness of breath    Hospital Course    79 y.o. male with severe aortic stenosis, Wegener's granulomatosis on chronic steroids, end-stage renal disease on hemodialysis, chronic respiratory failure and COPD (chronic baseline 3 L oxygen), with history of MAC and pseudomonal colonization, admitted 6/6/2020 with shortness of breath, along with subjective fevers, chills, and productive cough.  He was noted to be febrile.  Initial work-up showed WBC of 16,000, and creatinine 3.24, with potassium of 3.9.  Troponin was 238, which he had chronically elevated.  Chest x-ray showed hazy opacities in the bilateral lung bases, left more than the right, with small bilateral pleural effusions.  Patient was felt to have sepsis, with lung source and recurrent pneumonia, and he is started on IV Zosyn and azithromycin.  Cultures were sent.  COVID-19 swab was sent, and it was negative.  Nephrology was consulted for resumption of his hemodialysis while in the hospital.  ID was consulted for further antibiotic guidance.  He was also started on aggressive physiotherapy.        Interval Problem Update    Feels better today  Improved strength  Tolerating diet  For dialysis later this afternoon  Inquiring about duration of stay and abx, f/u with ID          Consultants/Specialty  Nephrology  ID  Palliative care    Code Status  Full    Disposition  On zosyn, course per ID    Home in 2-3 days if cleared by ID    Review of Systems  Review of Systems   Constitutional: Negative for chills, fever and malaise/fatigue.   HENT: Negative for congestion.    Respiratory: Negative for cough and shortness of breath.    Cardiovascular: Negative for chest pain, palpitations and leg swelling.   Gastrointestinal: Negative for abdominal pain and nausea.   Genitourinary: Negative for dysuria and flank pain.   Musculoskeletal: Negative for back pain, joint pain and myalgias.    Neurological: Positive for weakness. Negative for sensory change, speech change, focal weakness and headaches.   Psychiatric/Behavioral: Negative for depression and memory loss. The patient is not nervous/anxious.         Pertinent positives/negatives as mentioned above.     A complete review of systems was personally done by me. All other systems were negative.       Physical Exam  Temp:  [36.4 °C (97.5 °F)-36.9 °C (98.4 °F)] 36.4 °C (97.6 °F)  Pulse:  [85-99] 97  Resp:  [16-20] 18  BP: (107-121)/(51-69) 107/51  SpO2:  [95 %-100 %] 99 %    Physical Exam  Vitals signs reviewed.   Constitutional:       General: He is not in acute distress.     Appearance: Normal appearance. He is ill-appearing. He is not diaphoretic.      Comments: Elderly, frail   HENT:      Head: Normocephalic and atraumatic.      Right Ear: External ear normal.      Left Ear: External ear normal.      Mouth/Throat:      Mouth: Mucous membranes are moist.      Pharynx: No oropharyngeal exudate.   Eyes:      Extraocular Movements: Extraocular movements intact.      Conjunctiva/sclera: Conjunctivae normal.      Pupils: Pupils are equal, round, and reactive to light.   Neck:      Musculoskeletal: Normal range of motion and neck supple.   Cardiovascular:      Heart sounds: Normal heart sounds. No murmur. No gallop.    Pulmonary:      Effort: Pulmonary effort is normal. No respiratory distress.      Breath sounds: No stridor. No wheezing or rhonchi.      Comments: Diminished air entry B/L bases.  (+) Bibasilar crackles.  Chest:      Chest wall: No tenderness.   Abdominal:      General: Bowel sounds are normal. There is no distension.      Palpations: Abdomen is soft.      Tenderness: There is no guarding.   Musculoskeletal: Normal range of motion.         General: No tenderness.   Skin:     General: Skin is warm and dry.      Coloration: Skin is not jaundiced.   Neurological:      General: No focal deficit present.      Mental Status: He is alert and  oriented to person, place, and time.      Cranial Nerves: No cranial nerve deficit.      Sensory: No sensory deficit.      Motor: Weakness present.   Psychiatric:         Mood and Affect: Mood normal.         Behavior: Behavior normal.              Fluids    Intake/Output Summary (Last 24 hours) at 6/10/2020 1152  Last data filed at 6/9/2020 2058  Gross per 24 hour   Intake 590 ml   Output --   Net 590 ml       Laboratory  Recent Labs     06/08/20 0225 06/09/20  0240 06/10/20  0242   WBC 12.2* 8.5 6.9   RBC 4.11* 3.89* 3.73*   HEMOGLOBIN 11.6* 10.9* 10.7*   HEMATOCRIT 38.5* 36.0* 35.2*   MCV 93.7 92.5 94.4   MCH 28.2 28.0 28.7   MCHC 30.1* 30.3* 30.4*   RDW 60.0* 57.8* 58.9*   PLATELETCT 140* 153* 156*   MPV 9.1 9.4 9.3     Recent Labs     06/08/20 0225 06/09/20  0240   SODIUM 135 133*   POTASSIUM 4.5 4.4   CHLORIDE 95* 94*   CO2 23 27   GLUCOSE 114* 93   BUN 49* 24*   CREATININE 4.04* 2.43*   CALCIUM 8.3* 8.6                   Imaging  DX-ESOPHAGUS - [id number]   Final Result      CT-CHEST (THORAX) W/O   Final Result      Worsening dependent consolidation, bronchial wall thickening and endobronchial opacity. This is worrisome for aspiration favored over infection and there is some new adenopathy that is more likely reactive than malignant      Extensive chronic pulmonary abnormality with emphysema, bronchiectasis, scarring, remote granulomatous disease      Coronary artery disease, prosthetic aortic valve, cardiac pacer      DX-CHEST-PORTABLE (1 VIEW)   Final Result         Hazy opacities in the bilateral lung bases, left more than right, are similar to prior and could be atelectasis or consolidation.      There may be small bilateral pleural effusions.           Assessment/Plan  * Recurrent pneumonia- (present on admission)  Assessment & Plan  -Has had recurrent pneumonias in the past.  X-ray showed hazy opacities in the bilateral lung base, left more than the right.  Procalcitonin elevated.  Has  leukocytosis, and with subjective fevers, chills, and cough. COVID-19 negative. With history of MAC and pseudomonal colonization.  -Continue Zosyn and azithromycin.  Follow cultures.    6/9 cx neg, increasing proCal, per ID, change to merrem, ID to adjust    - CT of the chest - increasing opacity, emphysema, reactive  ?aspiration  F/u SLP    Thrush- add nystatin  -Continue to trend WBC count, and procalcitonin.  Follow sputum cultures.  -ID following, appreciate inputs.   -Continue respiratory support, oxygen supplement.  Currently stable at baseline home oxygen.  Continue symptom control with antitussives, with Robitussin, and Mucinex, along with Tessalon PRN. Continue aggressive chest physiotherapy.    6/10 sputum cx, polymicrobial  F/u am labs  Cont abx  Sob unchanged    Sepsis (HCC)- (present on admission)  Assessment & Plan  -This is Sepsis Present on admission.  -SIRS criteria identified on my evaluation include: Fever, with temperature greater than 101 deg F, Tachypnea, with respirations greater than 20 per minute and Leukocytosis, with WBC greater than 12,000  -Source is likely lung  -Sepsis protocol initiated.   -Fluid resuscitation ordered per protocol.  Continue to hold off on maintenance IV fluids as he is not hypotensive, and he is prone to fluid overload given his ESRD.  -Continue IV antibiotics as appropriate for source of sepsis.  Trend WBC count.  Follow cultures.  -While organ dysfunction may be noted elsewhere in this problem list or in the chart, degree of organ dysfunction does not meet CMS criteria for severe sepsis    Elevated troponin- (present on admission)  Assessment & Plan  -Review of the medical records indicates that this is chronically elevated, current troponin level is close to his prior baseline.  No ACS.  Continue to monitor.    ESRD on dialysis (HCC)- (present on admission)  Assessment & Plan  - Patient gets dialysis on Monday, Wednesday, and Friday.    - Nephrology on board for  continued hemodialysis while he is in the hospital.    Dysphagia  Assessment & Plan  6/10 seen by slp, ok for regular diet  Esophogram pending    Wegener's granulomatosis (HCC)- (present on admission)  Assessment & Plan  -Continue chronic home low-dose steroid.  -Now dialysis dependent.    Chronic respiratory failure with hypoxia (HCC)- (present on admission)  Assessment & Plan  -He is at his baseline oxygen supplement (3 L).  -Continue management of pneumonia as above.  Continue RT protocol, and oxygen supplements.    Atrial fibrillation (HCC)- (present on admission)  Assessment & Plan  -Rate and rhythm controlled.  Patient is not on chronic anticoagulation.  Continue beta-blocker.  Monitor closely.    Hyperlipidemia- (present on admission)  Assessment & Plan  -Continue Crestor.       VTE prophylaxis: heparin SQ

## 2020-06-10 NOTE — RESPIRATORY CARE
Oxygen Rounds      Patient found on    O2 L/m:  ____3_____    Oxygen device:  ___nc_____   Spo2: _____98____%      Respiratory device skin site inspection completed.

## 2020-06-10 NOTE — CARE PLAN
Problem: Respiratory:  Goal: Respiratory status will improve  Outcome: PROGRESSING AS EXPECTED     Problem: Safety  Goal: Will remain free from injury  Outcome: PROGRESSING AS EXPECTED  Goal: Will remain free from falls  Outcome: PROGRESSING AS EXPECTED     Problem: Safety  Goal: Will remain free from falls  Outcome: PROGRESSING AS EXPECTED     Problem: Infection  Goal: Will remain free from infection  Outcome: PROGRESSING AS EXPECTED

## 2020-06-10 NOTE — CARE PLAN
Problem: Oxygenation:  Goal: Maintain adequate oxygenation dependent on patient condition  Outcome: PROGRESSING AS EXPECTED     Problem: Bronchoconstriction:  Goal: Improve in air movement and diminished wheezing  Outcome: PROGRESSING AS EXPECTED     Problem: Bronchopulmonary Hygiene:  Goal: Increase mobilization of retained secretions  Outcome: PROGRESSING AS EXPECTED

## 2020-06-11 LAB
ANION GAP SERPL CALC-SCNC: 11 MMOL/L (ref 7–16)
ANION GAP SERPL CALC-SCNC: 9 MMOL/L (ref 7–16)
BACTERIA SPEC RESP CULT: ABNORMAL
BACTERIA SPEC RESP CULT: ABNORMAL
BASOPHILS # BLD AUTO: 0.7 % (ref 0–1.8)
BASOPHILS # BLD: 0.04 K/UL (ref 0–0.12)
BUN SERPL-MCNC: 19 MG/DL (ref 8–22)
BUN SERPL-MCNC: 25 MG/DL (ref 8–22)
CALCIUM SERPL-MCNC: 9 MG/DL (ref 8.5–10.5)
CALCIUM SERPL-MCNC: 9.1 MG/DL (ref 8.5–10.5)
CHLORIDE SERPL-SCNC: 96 MMOL/L (ref 96–112)
CHLORIDE SERPL-SCNC: 97 MMOL/L (ref 96–112)
CO2 SERPL-SCNC: 27 MMOL/L (ref 20–33)
CO2 SERPL-SCNC: 31 MMOL/L (ref 20–33)
CREAT SERPL-MCNC: 2.4 MG/DL (ref 0.5–1.4)
CREAT SERPL-MCNC: 2.74 MG/DL (ref 0.5–1.4)
EOSINOPHIL # BLD AUTO: 0.42 K/UL (ref 0–0.51)
EOSINOPHIL NFR BLD: 7.4 % (ref 0–6.9)
ERYTHROCYTE [DISTWIDTH] IN BLOOD BY AUTOMATED COUNT: 56.7 FL (ref 35.9–50)
GLUCOSE SERPL-MCNC: 107 MG/DL (ref 65–99)
GLUCOSE SERPL-MCNC: 81 MG/DL (ref 65–99)
GRAM STN SPEC: ABNORMAL
HCT VFR BLD AUTO: 37.2 % (ref 42–52)
HGB BLD-MCNC: 11.6 G/DL (ref 14–18)
IMM GRANULOCYTES # BLD AUTO: 0.03 K/UL (ref 0–0.11)
IMM GRANULOCYTES NFR BLD AUTO: 0.5 % (ref 0–0.9)
LYMPHOCYTES # BLD AUTO: 0.33 K/UL (ref 1–4.8)
LYMPHOCYTES NFR BLD: 5.9 % (ref 22–41)
MCH RBC QN AUTO: 28.7 PG (ref 27–33)
MCHC RBC AUTO-ENTMCNC: 31.2 G/DL (ref 33.7–35.3)
MCV RBC AUTO: 92.1 FL (ref 81.4–97.8)
MONOCYTES # BLD AUTO: 0.53 K/UL (ref 0–0.85)
MONOCYTES NFR BLD AUTO: 9.4 % (ref 0–13.4)
NEUTROPHILS # BLD AUTO: 4.29 K/UL (ref 1.82–7.42)
NEUTROPHILS NFR BLD: 76.1 % (ref 44–72)
NRBC # BLD AUTO: 0 K/UL
NRBC BLD-RTO: 0 /100 WBC
PLATELET # BLD AUTO: 171 K/UL (ref 164–446)
PMV BLD AUTO: 9.5 FL (ref 9–12.9)
POTASSIUM SERPL-SCNC: 4.2 MMOL/L (ref 3.6–5.5)
POTASSIUM SERPL-SCNC: 4.7 MMOL/L (ref 3.6–5.5)
PROCALCITONIN SERPL-MCNC: 0.56 NG/ML
RBC # BLD AUTO: 4.04 M/UL (ref 4.7–6.1)
SIGNIFICANT IND 70042: ABNORMAL
SITE SITE: ABNORMAL
SODIUM SERPL-SCNC: 134 MMOL/L (ref 135–145)
SODIUM SERPL-SCNC: 137 MMOL/L (ref 135–145)
SOURCE SOURCE: ABNORMAL
WBC # BLD AUTO: 5.6 K/UL (ref 4.8–10.8)

## 2020-06-11 PROCEDURE — 99233 SBSQ HOSP IP/OBS HIGH 50: CPT | Performed by: INTERNAL MEDICINE

## 2020-06-11 PROCEDURE — 94640 AIRWAY INHALATION TREATMENT: CPT

## 2020-06-11 PROCEDURE — 700111 HCHG RX REV CODE 636 W/ 250 OVERRIDE (IP): Performed by: INTERNAL MEDICINE

## 2020-06-11 PROCEDURE — A9270 NON-COVERED ITEM OR SERVICE: HCPCS | Performed by: INTERNAL MEDICINE

## 2020-06-11 PROCEDURE — 770006 HCHG ROOM/CARE - MED/SURG/GYN SEMI*

## 2020-06-11 PROCEDURE — 85025 COMPLETE CBC W/AUTO DIFF WBC: CPT

## 2020-06-11 PROCEDURE — 94760 N-INVAS EAR/PLS OXIMETRY 1: CPT

## 2020-06-11 PROCEDURE — 700102 HCHG RX REV CODE 250 W/ 637 OVERRIDE(OP): Performed by: INTERNAL MEDICINE

## 2020-06-11 PROCEDURE — 700102 HCHG RX REV CODE 250 W/ 637 OVERRIDE(OP): Performed by: HOSPITALIST

## 2020-06-11 PROCEDURE — 700101 HCHG RX REV CODE 250: Performed by: INTERNAL MEDICINE

## 2020-06-11 PROCEDURE — 700111 HCHG RX REV CODE 636 W/ 250 OVERRIDE (IP): Performed by: HOSPITALIST

## 2020-06-11 PROCEDURE — 94669 MECHANICAL CHEST WALL OSCILL: CPT

## 2020-06-11 PROCEDURE — 36415 COLL VENOUS BLD VENIPUNCTURE: CPT

## 2020-06-11 PROCEDURE — 84145 PROCALCITONIN (PCT): CPT

## 2020-06-11 PROCEDURE — A9270 NON-COVERED ITEM OR SERVICE: HCPCS | Performed by: HOSPITALIST

## 2020-06-11 PROCEDURE — 700105 HCHG RX REV CODE 258: Performed by: INTERNAL MEDICINE

## 2020-06-11 PROCEDURE — 80048 BASIC METABOLIC PNL TOTAL CA: CPT

## 2020-06-11 RX ADMIN — FINASTERIDE 5 MG: 5 TABLET, FILM COATED ORAL at 05:20

## 2020-06-11 RX ADMIN — GUAIFENESIN 600 MG: 600 TABLET, EXTENDED RELEASE ORAL at 05:19

## 2020-06-11 RX ADMIN — HEPARIN SODIUM 5000 UNITS: 5000 INJECTION, SOLUTION INTRAVENOUS; SUBCUTANEOUS at 12:48

## 2020-06-11 RX ADMIN — SEVELAMER CARBONATE 800 MG: 800 TABLET, FILM COATED ORAL at 16:23

## 2020-06-11 RX ADMIN — ALBUTEROL SULFATE 2.5 MG: 2.5 SOLUTION RESPIRATORY (INHALATION) at 07:05

## 2020-06-11 RX ADMIN — NYSTATIN 500000 UNITS: 100000 SUSPENSION ORAL at 12:49

## 2020-06-11 RX ADMIN — PREDNISONE 5 MG: 5 TABLET ORAL at 05:20

## 2020-06-11 RX ADMIN — CLOPIDOGREL BISULFATE 75 MG: 75 TABLET ORAL at 16:21

## 2020-06-11 RX ADMIN — SEVELAMER CARBONATE 800 MG: 800 TABLET, FILM COATED ORAL at 09:20

## 2020-06-11 RX ADMIN — METOPROLOL SUCCINATE 25 MG: 25 TABLET, EXTENDED RELEASE ORAL at 05:20

## 2020-06-11 RX ADMIN — PIPERACILLIN AND TAZOBACTAM 4.5 G: 4; .5 INJECTION, POWDER, LYOPHILIZED, FOR SOLUTION INTRAVENOUS; PARENTERAL at 16:23

## 2020-06-11 RX ADMIN — ALBUTEROL SULFATE 2.5 MG: 2.5 SOLUTION RESPIRATORY (INHALATION) at 19:46

## 2020-06-11 RX ADMIN — BUDESONIDE AND FORMOTEROL FUMARATE DIHYDRATE 2 PUFF: 160; 4.5 AEROSOL RESPIRATORY (INHALATION) at 07:05

## 2020-06-11 RX ADMIN — METOPROLOL SUCCINATE 25 MG: 25 TABLET, EXTENDED RELEASE ORAL at 16:22

## 2020-06-11 RX ADMIN — GUAIFENESIN 600 MG: 600 TABLET, EXTENDED RELEASE ORAL at 16:22

## 2020-06-11 RX ADMIN — TAMSULOSIN HYDROCHLORIDE 0.4 MG: 0.4 CAPSULE ORAL at 05:20

## 2020-06-11 RX ADMIN — NYSTATIN 500000 UNITS: 100000 SUSPENSION ORAL at 20:05

## 2020-06-11 RX ADMIN — NYSTATIN 500000 UNITS: 100000 SUSPENSION ORAL at 16:22

## 2020-06-11 RX ADMIN — ROSUVASTATIN CALCIUM 20 MG: 20 TABLET, FILM COATED ORAL at 05:20

## 2020-06-11 RX ADMIN — HEPARIN SODIUM 5000 UNITS: 5000 INJECTION, SOLUTION INTRAVENOUS; SUBCUTANEOUS at 22:00

## 2020-06-11 RX ADMIN — SODIUM CHLORIDE 4 ML: 7 NEBU SOLN,3 % NEBU at 19:54

## 2020-06-11 RX ADMIN — PIPERACILLIN AND TAZOBACTAM 4.5 G: 4; .5 INJECTION, POWDER, LYOPHILIZED, FOR SOLUTION INTRAVENOUS; PARENTERAL at 05:19

## 2020-06-11 RX ADMIN — ALBUTEROL SULFATE 2.5 MG: 2.5 SOLUTION RESPIRATORY (INHALATION) at 15:39

## 2020-06-11 RX ADMIN — BUDESONIDE AND FORMOTEROL FUMARATE DIHYDRATE 2 PUFF: 160; 4.5 AEROSOL RESPIRATORY (INHALATION) at 19:51

## 2020-06-11 RX ADMIN — HEPARIN SODIUM 5000 UNITS: 5000 INJECTION, SOLUTION INTRAVENOUS; SUBCUTANEOUS at 05:19

## 2020-06-11 RX ADMIN — SEVELAMER CARBONATE 800 MG: 800 TABLET, FILM COATED ORAL at 12:49

## 2020-06-11 RX ADMIN — SODIUM CHLORIDE 4 ML: 7 NEBU SOLN,3 % NEBU at 07:06

## 2020-06-11 RX ADMIN — NYSTATIN 500000 UNITS: 100000 SUSPENSION ORAL at 09:21

## 2020-06-11 RX ADMIN — ALBUTEROL SULFATE 2.5 MG: 2.5 SOLUTION RESPIRATORY (INHALATION) at 11:14

## 2020-06-11 ASSESSMENT — ENCOUNTER SYMPTOMS
ABDOMINAL PAIN: 0
GASTROINTESTINAL NEGATIVE: 1
MEMORY LOSS: 0
BACK PAIN: 0
HEADACHES: 0
CONSTIPATION: 0
CHILLS: 0
DIAPHORESIS: 0
FOCAL WEAKNESS: 0
SENSORY CHANGE: 0
SHORTNESS OF BREATH: 0
COUGH: 0
DIZZINESS: 0
FLANK PAIN: 0
NAUSEA: 0
PALPITATIONS: 0
VOMITING: 0
WEAKNESS: 1
FEVER: 0
MYALGIAS: 0
ROS GI COMMENTS: DRY MOUTH
NERVOUS/ANXIOUS: 0
SPEECH CHANGE: 0

## 2020-06-11 NOTE — PROGRESS NOTES
Hospital Medicine Daily Progress Note    Date of Service  6/11/2020    Chief Complaint  Shortness of breath    Hospital Course    79 y.o. male with severe aortic stenosis, Wegener's granulomatosis on chronic steroids, end-stage renal disease on hemodialysis, chronic respiratory failure and COPD (chronic baseline 3 L oxygen), with history of MAC and pseudomonal colonization, admitted 6/6/2020 with shortness of breath, along with subjective fevers, chills, and productive cough.  He was noted to be febrile.  Initial work-up showed WBC of 16,000, and creatinine 3.24, with potassium of 3.9.  Troponin was 238, which he had chronically elevated.  Chest x-ray showed hazy opacities in the bilateral lung bases, left more than the right, with small bilateral pleural effusions.  Patient was felt to have sepsis, with lung source and recurrent pneumonia, and he is started on IV Zosyn and azithromycin.  Cultures were sent.  COVID-19 swab was sent, and it was negative.  Nephrology was consulted for resumption of his hemodialysis while in the hospital.  ID was consulted for further antibiotic guidance.  He was also started on aggressive physiotherapy.        Interval Problem Update    Patient feels better today  No new complaints  Reports dry mouth after waking up from sleep  Ambulated with 1 person assist, supervised  As per patient near baseline          Consultants/Specialty  Nephrology  ID  Palliative care    Code Status  Full    Disposition  On zosyn, course per ID    To home in 1 to 2 days with clinical improvement if off IV antibiotics  Antibiotics per ID, home health order   to assist      Review of Systems  Review of Systems   Constitutional: Negative for chills, diaphoresis, fever and malaise/fatigue.   HENT: Negative for congestion.    Respiratory: Negative for cough and shortness of breath.    Cardiovascular: Negative for chest pain, palpitations and leg swelling.   Gastrointestinal: Negative.  Negative for  abdominal pain.        Dry mouth   Genitourinary: Negative for dysuria and flank pain.   Musculoskeletal: Negative for back pain and myalgias.   Neurological: Positive for weakness. Negative for dizziness, sensory change, speech change, focal weakness and headaches.   Psychiatric/Behavioral: Negative for memory loss. The patient is not nervous/anxious.         Pertinent positives/negatives as mentioned above.     A complete review of systems was personally done by me. All other systems were negative.       Physical Exam  Temp:  [36.6 °C (97.8 °F)-37.3 °C (99.2 °F)] 36.8 °C (98.2 °F)  Pulse:  [] 99  Resp:  [18-20] 18  BP: (101-111)/(50-65) 107/50  SpO2:  [96 %-100 %] 96 %    Physical Exam  Vitals signs reviewed.   Constitutional:       General: He is not in acute distress.     Appearance: Normal appearance. He is not ill-appearing.      Comments: Elderly, frail   HENT:      Head: Normocephalic and atraumatic.      Right Ear: External ear normal.      Left Ear: External ear normal.      Mouth/Throat:      Mouth: Mucous membranes are moist.      Pharynx: No oropharyngeal exudate.   Eyes:      Extraocular Movements: Extraocular movements intact.      Pupils: Pupils are equal, round, and reactive to light.   Neck:      Musculoskeletal: Normal range of motion and neck supple.   Cardiovascular:      Heart sounds: Normal heart sounds. No murmur. No gallop.    Pulmonary:      Effort: Pulmonary effort is normal. No respiratory distress.      Breath sounds: No stridor. No wheezing.      Comments: Diminished air entry B/L bases.  (+) Bibasilar crackles.  Abdominal:      General: Bowel sounds are normal. There is no distension.      Palpations: Abdomen is soft.      Tenderness: There is no guarding.   Musculoskeletal: Normal range of motion.         General: No tenderness.   Skin:     General: Skin is warm and dry.      Coloration: Skin is not jaundiced.   Neurological:      General: No focal deficit present.      Mental  Status: He is alert and oriented to person, place, and time.      Cranial Nerves: No cranial nerve deficit.      Motor: Weakness present.   Psychiatric:         Mood and Affect: Mood normal.         Behavior: Behavior normal.              Fluids    Intake/Output Summary (Last 24 hours) at 6/11/2020 1307  Last data filed at 6/11/2020 0420  Gross per 24 hour   Intake 840 ml   Output 2500 ml   Net -1660 ml       Laboratory  Recent Labs     06/09/20  0240 06/10/20  0242 06/11/20  0306   WBC 8.5 6.9 5.6   RBC 3.89* 3.73* 4.04*   HEMOGLOBIN 10.9* 10.7* 11.6*   HEMATOCRIT 36.0* 35.2* 37.2*   MCV 92.5 94.4 92.1   MCH 28.0 28.7 28.7   MCHC 30.3* 30.4* 31.2*   RDW 57.8* 58.9* 56.7*   PLATELETCT 153* 156* 171   MPV 9.4 9.3 9.5     Recent Labs     06/10/20  0242 06/11/20  0306 06/11/20  0915   SODIUM 139 137 134*   POTASSIUM 4.5 4.2 4.7   CHLORIDE 98 97 96   CO2 25 31 27   GLUCOSE 95 81 107*   BUN 37* 19 25*   CREATININE 3.82* 2.40* 2.74*   CALCIUM 8.7 9.0 9.1                   Imaging  DX-ESOPHAGUS - [id number]   Final Result      CT-CHEST (THORAX) W/O   Final Result      Worsening dependent consolidation, bronchial wall thickening and endobronchial opacity. This is worrisome for aspiration favored over infection and there is some new adenopathy that is more likely reactive than malignant      Extensive chronic pulmonary abnormality with emphysema, bronchiectasis, scarring, remote granulomatous disease      Coronary artery disease, prosthetic aortic valve, cardiac pacer      DX-CHEST-PORTABLE (1 VIEW)   Final Result         Hazy opacities in the bilateral lung bases, left more than right, are similar to prior and could be atelectasis or consolidation.      There may be small bilateral pleural effusions.           Assessment/Plan  * Recurrent pneumonia- (present on admission)  Assessment & Plan  -Has had recurrent pneumonias in the past.  X-ray showed hazy opacities in the bilateral lung base, left more than the right.   Procalcitonin elevated.  Has leukocytosis, and with subjective fevers, chills, and cough. COVID-19 negative. With history of MAC and pseudomonal colonization.  -Continue Zosyn and azithromycin.  Follow cultures.    6/9 cx neg, increasing proCal, per ID, change to merrem, ID to adjust    - CT of the chest - increasing opacity, emphysema, reactive  ?aspiration  F/u SLP    Thrush- add nystatin  -Continue to trend WBC count, and procalcitonin.  Follow sputum cultures.  -ID following, appreciate inputs.   -Continue respiratory support, oxygen supplement.  Currently stable at baseline home oxygen.  Continue symptom control with antitussives, with Robitussin, and Mucinex, along with Tessalon PRN. Continue aggressive chest physiotherapy.    6/10 sputum cx, polymicrobial  F/u am labs  Cont abx  Sob unchanged    6/11 Pseudomonas on respiratory cultures, sensitivities available, to ciprofloxacin  Per ID, continue antibiotics  Improving procalcitonin down to 2.56, status post dialysis  Antibiotics per ID recommendations    Sepsis (HCC)- (present on admission)  Assessment & Plan  -This is Sepsis Present on admission.  -SIRS criteria identified on my evaluation include: Fever, with temperature greater than 101 deg F, Tachypnea, with respirations greater than 20 per minute and Leukocytosis, with WBC greater than 12,000  -Source is likely lung  -Sepsis protocol initiated.   -Fluid resuscitation ordered per protocol.  Continue to hold off on maintenance IV fluids as he is not hypotensive, and he is prone to fluid overload given his ESRD.  -Continue IV antibiotics as appropriate for source of sepsis.  Trend WBC count.  Follow cultures.  -While organ dysfunction may be noted elsewhere in this problem list or in the chart, degree of organ dysfunction does not meet CMS criteria for severe sepsis    Elevated troponin- (present on admission)  Assessment & Plan  -Review of the medical records indicates that this is chronically elevated,  current troponin level is close to his prior baseline.  No ACS.  Continue to monitor.    ESRD on dialysis (HCC)- (present on admission)  Assessment & Plan  - Patient gets dialysis on Monday, Wednesday, and Friday.    - Nephrology on board for continued hemodialysis while he is in the hospital.    6/11 plan discharge home pending ID clearance      Dysphagia  Assessment & Plan  6/10 seen by slp, ok for regular diet  Esophogram pending    Wegener's granulomatosis (HCC)- (present on admission)  Assessment & Plan  -Continue chronic home low-dose steroid.  -Now dialysis dependent.    Chronic respiratory failure with hypoxia (HCC)- (present on admission)  Assessment & Plan  -He is at his baseline oxygen supplement (3 L).  -Continue management of pneumonia as above.  Continue RT protocol, and oxygen supplements.    Atrial fibrillation (HCC)- (present on admission)  Assessment & Plan  -Rate and rhythm controlled.  Patient is not on chronic anticoagulation.  Continue beta-blocker.  Monitor closely.    Hyperlipidemia- (present on admission)  Assessment & Plan  -Continue Crestor.       VTE prophylaxis: heparin SQ

## 2020-06-11 NOTE — CARE PLAN
Problem: Safety  Goal: Will remain free from injury  Outcome: PROGRESSING AS EXPECTED     Problem: Safety  Goal: Will remain free from falls  Outcome: PROGRESSING AS EXPECTED     Problem: Discharge Barriers/Planning  Goal: Patient's continuum of care needs will be met  Outcome: PROGRESSING AS EXPECTED

## 2020-06-11 NOTE — CARE PLAN
Problem: Nutritional:  Goal: Achieve adequate nutritional intake  Description: Patient will consume ~50% of meals.   Outcome: PROGRESSING AS EXPECTED   See dietary note. RD following.

## 2020-06-11 NOTE — PROGRESS NOTES
patietn had dialysis. Breathe sounds shallow and wet. Fistula dressing intact wcm for bleeding. Had palliative consult today. No c/o shob. On 3L nc o2. C/o thrush in mouth. Using nystatin swish and spit as ordered. Wife came to visit. Resting comfortably. All needs met at this time.safety precautions in place. Belongings in reach. Will pass onto night shift rn.

## 2020-06-11 NOTE — PROGRESS NOTES
3hr HD started @ 1330 and completed @ 1702,tx well tolerated,VSS,net UF = 1800ml.RUAAVF + B/T,cannulation sites covered with DD,CDI,report given to Selvin Woodruff RN.

## 2020-06-11 NOTE — PROGRESS NOTES
Pt A&Ox4. VSS. Lungs diminished and crackles. Pt has scheduled breathing treatments. Pt on 3LNC. Assist of 1 with walker OOB. Heels red and blanching, heel mepilexs covered bilat heels for protection. Old healed wound on posterior lower RLE, wound blanches red and is c/d/i. Sacrum is red and blanching. Preventive mepilex applied to sacrum. Pt has dialysis MWF last dialysis 6/10. Pt pleasant and cooperative. Wife helpful at bedside at the beginning of the shift. Safety precautions in place, bed low and locked, call light within reach, WCM.

## 2020-06-11 NOTE — DIETARY
Nutrition Services Update: following for adequate nutritional intake    Day 4 of admit. Regular diet. ADL documentation shows intake has been 25-75%.    Visited bedside, pt appears very thin and frail - however difficult to determine if appearance r/t nutritional status vs age related muscle loss as pt did not report any reduced nutritional intake to this RD. Pt states that his appetite is at baseline, states that he usually eats all of breakfast and about half of lunch and dinner. Noted some Boost at bedside, pt states that he drinks these at home throughout the day. No order in place at this time, therefore will place and send BID.     Pt also had some nutritious snacks at bedside such as mixed nuts and mini-wheat cereal. Pt says that he eats throughout the day, every couple of hours. Reports that wife brought in snacks and knows what are nutritious options.     Malnutrition Risk: Suspect pt had limited nutritional stores at baseline, therefore appears thin/frial, however reports fairly adequate nutritional intake. History of severe protein calorie malnutrition since 2011 per non-hospital problem list - appears that this is chronic, however stable.     Plan/Recommend:   1. Boost Plus BID  2. Encourage intake of meals, snacks, and supplements  3. Please continue to record intake as % meals consumed  4. Nutrition Representative will see daily for meal selections  5. RD will continue to monitor    RD following

## 2020-06-11 NOTE — PROGRESS NOTES
Aox4. No c/o pain or shob. Lungs sound wet crackles, diminished. Ambulates to bathroom with fww sb assist. 3L o2 nc applied. Generalized weakness and dyspnea on exertion. mepilex applied to heels and sacrum to prevent pressure sores. Safety precautions and call light in reach. All needs met at this time. Wcm.

## 2020-06-11 NOTE — CARE PLAN
Problem: Bronchoconstriction:  Goal: Improve in air movement and diminished wheezing  Outcome: PROGRESSING AS EXPECTED       Respiratory Update    Treatment modality: SVN/SVN/CPT/MDI  Frequency: QID/BID/BID/BID    Pt tolerating current treatments well with no adverse reactions.

## 2020-06-11 NOTE — PROGRESS NOTES
Infectious Disease Progress Note    Author: Christine Manzo M.D. Date & Time of service: 2020  4:47 PM    Chief Complaint:  Pneumonia     Interval History:   79 y.o. male  admitted 2020. +severe aortic stenosis and reports he had valve placement in 2020. History of Wegener's granulomatosis and chronic lung disease with bronchiectasis,  COPD (baseline oxygen 3 L) and on chronic steroids 5 mg daily. 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  He also has ESRD on hemodialysis.   101.3, O2 3 L NC, CT chest pending. Pt states he feels a little bit better but still with ongoing fever and productive cough.   \   AF, O2 3.5 L NC, Studies reviewed- swallow exam pending. Improvement in fevers and states he is feeling a little better.  Ongoing productive cough.     AF WBC 5.6 no new complaints     Review of Systems:  Review of Systems   Constitutional: Positive for malaise/fatigue. Negative for fever.   Respiratory: Negative for cough and shortness of breath.    Gastrointestinal: Negative for abdominal pain, constipation, nausea and vomiting.       Hemodynamics:  Temp (24hrs), Av.9 °C (98.5 °F), Min:36.6 °C (97.8 °F), Max:37.3 °C (99.2 °F)  Temperature: 36.9 °C (98.4 °F)  Pulse  Av.6  Min: 77  Max: 115   Blood Pressure : 133/71       Physical Exam:  Physical Exam  Constitutional:       General: He is not in acute distress.     Appearance: He is not toxic-appearing or diaphoretic.      Comments: thin   HENT:      Nose: No rhinorrhea.   Eyes:      Extraocular Movements: Extraocular movements intact.      Pupils: Pupils are equal, round, and reactive to light.   Cardiovascular:      Rate and Rhythm: Normal rate.      Heart sounds: Murmur present.   Pulmonary:      Effort: Pulmonary effort is normal. No respiratory distress.      Breath sounds: Rhonchi present. No wheezing.      Comments: Diffuse  crackles/rhonchi bilaterally, decreased breath sounds bilaterally  Abdominal:      General: Bowel sounds are normal. There is no distension.      Palpations: Abdomen is soft.      Tenderness: There is no abdominal tenderness.   Skin:     General: Skin is warm.   Neurological:      General: No focal deficit present.      Mental Status: He is alert and oriented to person, place, and time.   Psychiatric:         Mood and Affect: Mood normal.         Behavior: Behavior normal.         Meds:    Current Facility-Administered Medications:   •  lidocaine  •  nystatin  •  benzocaine-menthol  •  [COMPLETED] piperacillin-tazobactam **AND** piperacillin-tazobactam  •  heparin  •  albuterol  •  sevelamer carbonate  •  rosuvastatin  •  predniSONE  •  metoprolol SR  •  budesonide-formoterol  •  clopidogrel  •  finasteride  •  tamsulosin  •  senna-docusate **AND** polyethylene glycol/lytes **AND** magnesium hydroxide **AND** bisacodyl  •  Respiratory Therapy Consult  •  heparin  •  acetaminophen  •  guaiFENesin dextromethorphan  •  guaiFENesin ER  •  benzonatate  •  ondansetron  •  sodium chloride    Labs:  Recent Labs     06/09/20  0240 06/10/20  0242 06/11/20  0306   WBC 8.5 6.9 5.6   RBC 3.89* 3.73* 4.04*   HEMOGLOBIN 10.9* 10.7* 11.6*   HEMATOCRIT 36.0* 35.2* 37.2*   MCV 92.5 94.4 92.1   MCH 28.0 28.7 28.7   RDW 57.8* 58.9* 56.7*   PLATELETCT 153* 156* 171   MPV 9.4 9.3 9.5   NEUTSPOLYS 85.20* 79.60* 76.10*   LYMPHOCYTES 1.60* 3.90* 5.90*   MONOCYTES 7.40 8.50 9.40   EOSINOPHILS 5.30 7.30* 7.40*   BASOPHILS 0.10 0.40 0.70     Recent Labs     06/10/20  0242 06/11/20  0306 06/11/20  0915   SODIUM 139 137 134*   POTASSIUM 4.5 4.2 4.7   CHLORIDE 98 97 96   CO2 25 31 27   GLUCOSE 95 81 107*   BUN 37* 19 25*     Recent Labs     06/10/20  0242 06/11/20  0306 06/11/20  0915   CREATININE 3.82* 2.40* 2.74*       Imaging:  Ct-chest (thorax) W/o    Result Date: 6/8/2020 6/8/2020 5:37 PM HISTORY/REASON FOR EXAM: Wegener's granulomatosis  and Mycobacterium avium intracellulare. Pulmonary histoplasmosis. Follow-up TECHNIQUE/EXAM DESCRIPTION: CT thorax without contrast. Noncontrast helical scanning of the chest was obtained from the lung apices through the adrenal glands. Low dose optimization technique was utilized for this CT exam including automated exposure control and adjustment of the mA and/or kV according to patient size. COMPARISON:  7/27/2017 CT FINDINGS: Left transsubclavian pacer is present. The generator obscures structures due to artifact. Lung windows demonstrate similar extensive abnormality. There is worsening lower lobe bronchial wall thickening and subpleural linear opacities. There is some increasing endobronchial opacity and espinoza consolidation is seen at the posterior costophrenic sulci. There is bronchiectasis seen best in the middle lobe, unchanged, medial segment. Lungs are otherwise hyperinflated with some paraseptal emphysema seen. Calcified right upper lobe nodule again noted Soft tissue windows demonstrate similar left heart enlargement. Prosthetic aortic valve. Note that lack of contrast does diminish the sensitivity of the study in evaluation of the soft tissues. Calcified right hilar and mediastinal nodes. An aortopulmonary node is again seen, partially calcified and this has enlarged 12 mm short axis There is no pleural or pericardial effusion. No acute bony abnormality.     Worsening dependent consolidation, bronchial wall thickening and endobronchial opacity. This is worrisome for aspiration favored over infection and there is some new adenopathy that is more likely reactive than malignant Extensive chronic pulmonary abnormality with emphysema, bronchiectasis, scarring, remote granulomatous disease Coronary artery disease, prosthetic aortic valve, cardiac pacer    Dx-chest-portable (1 View)    Result Date: 6/6/2020 6/6/2020 10:58 PM HISTORY/REASON FOR EXAM:  Sepsis Short of breath TECHNIQUE/EXAM DESCRIPTION AND  NUMBER OF VIEWS: Single portable view of the chest. COMPARISON: 1/25/2020 FINDINGS: Left-sided pacemaker. Hazy opacities in the bilateral lung bases, left more than right There may be small bilateral pleural effusions. No pneumothorax. Stable cardiopericardial silhouette.     Hazy opacities in the bilateral lung bases, left more than right, are similar to prior and could be atelectasis or consolidation. There may be small bilateral pleural effusions.    Dx-esophagus - [id number]    Result Date: 6/10/2020  6/9/2020 3:38 PM HISTORY/REASON FOR EXAM:  Dysphagia, rule out aspiration TECHNIQUE/EXAM DESCRIPTION AND NUMBER OF VIEWS: Esophagus-CINE-Video-CD. The study was performed by the speech language pathologist who administered a variety of barium coated substances under direct fluoroscopic visualization. COMPARISON:  None. FINDINGS: The study was performed by the speech language pathologist who will issue a full report and used a total of 2.7 minutes of fluoroscopy time. INTERPRETING LOCATION: [address] [place] [address]      Micro:  Results     Procedure Component Value Units Date/Time    Fungal Culture [[id number]] Collected:  06/07/20 1816    Order Status:  Completed Specimen:  Respirate Updated:  06/11/20 0937     Significant Indicator NEG     Source RESP     Site SPUTUM     Culture Result Culture in progress.    Narrative:       Sputum cultures, induced if needed. If not done within the  last 24 hours  Sputum cultures, induced if needed. If not done within the    Culture Respiratory W/ Grm Stn [[id number]]  (Abnormal)  (Susceptibility) Collected:  06/07/20 1816    Order Status:  Completed Specimen:  Respirate from Sputum Updated:  06/11/20 0937     Significant Indicator POS     Source RESP     Site SPUTUM     Culture Result Light growth usual upper respiratory matt     Gram Stain Result Moderate WBCs.  Moderate Gram negative rods.  Specimen Quality Score: 3+       Culture Result Pseudomonas aeruginosa  Moderate  growth  mucoid colony morphology  P.aeruginosa may develop resistance during prolonged therapy  with all antibiotics. Isolates that are initially susceptible  may become resistant within three to four days after  initiation of therapy. Testing of repeat isolates may be  warranted.      Narrative:       Sputum cultures, induced if needed. If not done within the  last 24 hours  Sputum cultures, induced if needed. If not done within the    Susceptibility     Pseudomonas aeruginosa (1)     Antibiotic Interpretation Microscan Method Status    Ceftazidime Sensitive 1.0 mcg/mL E-TEST Final    Ciprofloxacin Intermediate 1.5 mcg/mL E-TEST Final    Amikacin Intermediate 32 mcg/mL E-TEST Final    Gentamicin Intermediate 8 mcg/mL E-TEST Final    Tobramycin Sensitive 3.0 mcg/mL E-TEST Final    Imipenem Sensitive 0.25 mcg/mL E-TEST Final    Ticarcillin/CA Sensitive 12/2 mcg/mL E-TEST Final                   GRAM STAIN [[id number]] Collected:  06/07/20 1816    Order Status:  Completed Specimen:  Respirate Updated:  06/10/20 0756     Significant Indicator .     Source RESP     Site SPUTUM     Gram Stain Result Moderate WBCs.  Moderate Gram negative rods.  Specimen Quality Score: 3+      Narrative:       Sputum cultures, induced if needed. If not done within the  last 24 hours  Sputum cultures, induced if needed. If not done within the    E-Test [[id number]] Collected:  06/07/20 1816    Order Status:  Completed Specimen:  Sputum Updated:  06/10/20 0756     ETEST Sensitivity INTERIM    Narrative:       Sputum cultures, induced if needed. If not done within the  last 24 hours    Acid Fast Stain [[id number]] Collected:  06/07/20 1816    Order Status:  Completed Specimen:  Respirate Updated:  06/08/20 1812     Significant Indicator NEG     Source RESP     Site SPUTUM     AFB Smear Results No acid fast bacilli seen.    Narrative:       Collected By:[id number] CHARLIE LAM  Collected By:[id number] CHARLIE LAM    AFB Culture [[id number]]  "Collected:  06/07/20 1816    Order Status:  Completed Specimen:  Respirate from Sputum Updated:  06/08/20 1812     Significant Indicator NEG     Source RESP     Site SPUTUM     Culture Result Culture in progress.     AFB Smear Results No acid fast bacilli seen.    Narrative:       Collected By:[id number] CHARLIE LAM  Collected By:[id number] CHARLIE CAROLE    CULTURE RESPIRATORY W/ GRM STN [[id number]]     Order Status:  Completed Specimen:  Respirate from Sputum     BLOOD CULTURE [[id number]] Collected:  06/06/20 2240    Order Status:  Completed Specimen:  Blood from Peripheral Updated:  06/08/20 0721     Significant Indicator NEG     Source BLD     Site PERIPHERAL     Culture Result No Growth  Note: Blood cultures are incubated for 5 days and  are monitored continuously.Positive blood cultures  are called to the RN and reported as soon as  they are identified.      Narrative:       2 of 2 blood culture x2  Sites order. Per Hospital Policy:  Only change Specimen Src: to \"Line\" if specified by physician  order.  Left AC    BLOOD CULTURE [[id number]] Collected:  06/06/20 2240    Order Status:  Completed Specimen:  Blood from Peripheral Updated:  06/08/20 0721     Significant Indicator NEG     Source BLD     Site PERIPHERAL     Culture Result No Growth  Note: Blood cultures are incubated for 5 days and  are monitored continuously.Positive blood cultures  are called to the RN and reported as soon as  they are identified.      Narrative:       1 of 2 for Blood Culture x 2 sites order. Per Hospital  Policy: Only change Specimen Src: to \"Line\" if specified by  physician order.  No site indicated    AFB STAIN ONLY [[id number]]     Order Status:  No result Specimen:  Respirate from Sputum     URINALYSIS,CULTURE IF INDICATED [[id number]]  (Abnormal) Collected:  06/07/20 0631    Order Status:  Completed Specimen:  Urine Updated:  06/07/20 0712     Color Yellow     Character Clear     Specific Gravity 1.007     Ph 8.5     Glucose " Negative mg/dL      Ketones Negative mg/dL      Protein Negative mg/dL      Bilirubin Negative     Urobilinogen, Urine 0.2     Nitrite Negative     Leukocyte Esterase Negative     Occult Blood Negative     Micro Urine Req see below     Comment: Microscopic examination not performed when specimen is clear  and chemically negative for protein, blood, leukocyte esterase  and nitrite.         SARS-CoV-2, PCR (In-House) [[id number]] Collected:  06/07/20 0140    Order Status:  Completed Updated:  06/07/20 0304     SARS-CoV-2 Source NP Swab     SARS-CoV-2 by PCR NotDetected     Comment: [facility] providers: PLEASE REFER TO DE-ESCALATION AND RETESTING PROTOCOL  on [email or web address]  **The [employer] GeneXpert Xpress SARS-CoV-2 Test has been made available for  use under the Emergency Use Authorization (EUA) only.         Narrative:       Is this test for diagnosis or screening?->Diagnosis of ill  patient    COVID/SARS CoV-2 [[id number]] Collected:  06/07/20 0140    Order Status:  Completed Specimen:  Respirate from Nasopharyngeal Updated:  06/07/20 0151     COVID Order Status Received    Narrative:       Is this test for diagnosis or screening?->Diagnosis of ill  patient          Assessment:  Active Hospital Problems    Diagnosis   • *Recurrent pneumonia [J18.9]   • Sepsis (HCC) [A41.9]   • Elevated troponin [R79.89]   • ESRD on dialysis ([facility]) [N18.6, Z99.2]   • Dysphagia [R13.10]   • Wegener's granulomatosis (HCC) [M31.30]   • Chronic respiratory failure with hypoxia ([facility]) [J96.11]   • Atrial fibrillation ([facility]) [I48.91]   • Hyperlipidemia [E78.5]          ASSESSMENT/PLAN:       79 y.o. male admitted 6/6/2020. Pt has a past medical history of severe aortic stenosis and reports he had valve placement in March 2020. History of Wegener's granulomatosis and chronic lung disease with bronchiectasis,  COPD (baseline oxygen 3 L) and on chronic steroids 5 mg daily. He also has a remote history of pulmonary histoplasmosis and is known to be  colonized with Pseudomonas and Mycobacterium avium.  He also has ESRD on hemodialysis.       He was seen by pulmonary and ID at the end of January, 2020 with extensive work-up at that point.  He improved on short course of Zosyn and was discharged with plan to follow-up with pulmonary. Prior work-up on 1/26/2020 with sputum positive for pseudomonas aeruginosa with the testing performed (sensitive to ciprofloxacin, meropenem, tobramycin, amikacin and and Zosyn)  negative sputum AFB, P TANIYA PCR negative, histoplasmosis antibody negative, cocci antibody negative, beta D glucan intermediate.      Admitted complaining of several weeks of increasing shortness of breath, subjective fevers and productive cough.  He states that he took a course of ciprofloxacin approximately 2 weeks ago with no improvement in his symptoms.  He has been off antibiotics for 1 week and had fevers which prompted ED visit.     Pneumonia, acute on chronic disease  Less febrile  No current leukocytosis  Respiratory viral panel negative  CT with worsening consolidation, bronchial wall thickening and endobronchial opacity concerning for aspiration rather than infection.  Extensive pulmonary emphysema bronchiectasis and remote granulomatous disease.  Sputum culture from 6/7 +PSAR, mucoid colony morphology which is indicative of resistance  Blood cxs neg  pulm toilet  Continue Zosyn-failed outpatient cipro  Stop date 6/14/2020    Chronic lung disease with COPD and bronchiectasis  Baseline oxygen 3 L    Wegeners granulomatosis  Immunosuppressed - on low dose chronic steroids    ESDR on HD   Adjust abx as needed

## 2020-06-11 NOTE — CARE PLAN
Problem: Bronchoconstriction:  Goal: Improve in air movement and diminished wheezing  Outcome: PROGRESSING AS EXPECTED  Note:     Respiratory Update    Treatment modality: SVN  Frequency:QID    Pt tolerating current treatments well with no adverse reactions.

## 2020-06-12 LAB
ANION GAP SERPL CALC-SCNC: 11 MMOL/L (ref 7–16)
BACTERIA BLD CULT: NORMAL
BACTERIA BLD CULT: NORMAL
BUN SERPL-MCNC: 37 MG/DL (ref 8–22)
CALCIUM SERPL-MCNC: 9.4 MG/DL (ref 8.5–10.5)
CHLORIDE SERPL-SCNC: 100 MMOL/L (ref 96–112)
CO2 SERPL-SCNC: 27 MMOL/L (ref 20–33)
CREAT SERPL-MCNC: 3.65 MG/DL (ref 0.5–1.4)
GLUCOSE SERPL-MCNC: 99 MG/DL (ref 65–99)
POTASSIUM SERPL-SCNC: 5 MMOL/L (ref 3.6–5.5)
SIGNIFICANT IND 70042: NORMAL
SIGNIFICANT IND 70042: NORMAL
SITE SITE: NORMAL
SITE SITE: NORMAL
SODIUM SERPL-SCNC: 138 MMOL/L (ref 135–145)
SOURCE SOURCE: NORMAL
SOURCE SOURCE: NORMAL

## 2020-06-12 PROCEDURE — 700111 HCHG RX REV CODE 636 W/ 250 OVERRIDE (IP): Performed by: INTERNAL MEDICINE

## 2020-06-12 PROCEDURE — A9270 NON-COVERED ITEM OR SERVICE: HCPCS | Performed by: INTERNAL MEDICINE

## 2020-06-12 PROCEDURE — 5A1D70Z PERFORMANCE OF URINARY FILTRATION, INTERMITTENT, LESS THAN 6 HOURS PER DAY: ICD-10-PCS | Performed by: INTERNAL MEDICINE

## 2020-06-12 PROCEDURE — 99232 SBSQ HOSP IP/OBS MODERATE 35: CPT | Performed by: INTERNAL MEDICINE

## 2020-06-12 PROCEDURE — 700111 HCHG RX REV CODE 636 W/ 250 OVERRIDE (IP): Performed by: HOSPITALIST

## 2020-06-12 PROCEDURE — 90935 HEMODIALYSIS ONE EVALUATION: CPT

## 2020-06-12 PROCEDURE — 700105 HCHG RX REV CODE 258: Performed by: INTERNAL MEDICINE

## 2020-06-12 PROCEDURE — 80048 BASIC METABOLIC PNL TOTAL CA: CPT

## 2020-06-12 PROCEDURE — A9270 NON-COVERED ITEM OR SERVICE: HCPCS | Performed by: HOSPITALIST

## 2020-06-12 PROCEDURE — 700102 HCHG RX REV CODE 250 W/ 637 OVERRIDE(OP): Performed by: HOSPITALIST

## 2020-06-12 PROCEDURE — 700102 HCHG RX REV CODE 250 W/ 637 OVERRIDE(OP): Performed by: INTERNAL MEDICINE

## 2020-06-12 PROCEDURE — 700101 HCHG RX REV CODE 250: Performed by: INTERNAL MEDICINE

## 2020-06-12 PROCEDURE — 94640 AIRWAY INHALATION TREATMENT: CPT

## 2020-06-12 PROCEDURE — 94760 N-INVAS EAR/PLS OXIMETRY 1: CPT

## 2020-06-12 PROCEDURE — 770006 HCHG ROOM/CARE - MED/SURG/GYN SEMI*

## 2020-06-12 PROCEDURE — 90935 HEMODIALYSIS ONE EVALUATION: CPT | Performed by: INTERNAL MEDICINE

## 2020-06-12 PROCEDURE — 36415 COLL VENOUS BLD VENIPUNCTURE: CPT

## 2020-06-12 RX ADMIN — SODIUM CHLORIDE 4 ML: 7 NEBU SOLN,3 % NEBU at 07:38

## 2020-06-12 RX ADMIN — GUAIFENESIN 600 MG: 600 TABLET, EXTENDED RELEASE ORAL at 05:33

## 2020-06-12 RX ADMIN — NYSTATIN 500000 UNITS: 100000 SUSPENSION ORAL at 18:16

## 2020-06-12 RX ADMIN — NYSTATIN 500000 UNITS: 100000 SUSPENSION ORAL at 08:57

## 2020-06-12 RX ADMIN — SEVELAMER CARBONATE 800 MG: 800 TABLET, FILM COATED ORAL at 08:57

## 2020-06-12 RX ADMIN — CLOPIDOGREL BISULFATE 75 MG: 75 TABLET ORAL at 18:16

## 2020-06-12 RX ADMIN — BUDESONIDE AND FORMOTEROL FUMARATE DIHYDRATE 2 PUFF: 160; 4.5 AEROSOL RESPIRATORY (INHALATION) at 21:14

## 2020-06-12 RX ADMIN — TAMSULOSIN HYDROCHLORIDE 0.4 MG: 0.4 CAPSULE ORAL at 05:33

## 2020-06-12 RX ADMIN — ROSUVASTATIN CALCIUM 20 MG: 20 TABLET, FILM COATED ORAL at 05:33

## 2020-06-12 RX ADMIN — ALBUTEROL SULFATE 2.5 MG: 2.5 SOLUTION RESPIRATORY (INHALATION) at 21:14

## 2020-06-12 RX ADMIN — HEPARIN SODIUM 5000 UNITS: 5000 INJECTION, SOLUTION INTRAVENOUS; SUBCUTANEOUS at 05:33

## 2020-06-12 RX ADMIN — METOPROLOL SUCCINATE 25 MG: 25 TABLET, EXTENDED RELEASE ORAL at 18:16

## 2020-06-12 RX ADMIN — SEVELAMER CARBONATE 800 MG: 800 TABLET, FILM COATED ORAL at 15:25

## 2020-06-12 RX ADMIN — HEPARIN SODIUM 1750 UNITS: 1000 INJECTION, SOLUTION INTRAVENOUS; SUBCUTANEOUS at 11:27

## 2020-06-12 RX ADMIN — ALBUTEROL SULFATE 2.5 MG: 2.5 SOLUTION RESPIRATORY (INHALATION) at 10:39

## 2020-06-12 RX ADMIN — PIPERACILLIN AND TAZOBACTAM 4.5 G: 4; .5 INJECTION, POWDER, LYOPHILIZED, FOR SOLUTION INTRAVENOUS; PARENTERAL at 05:33

## 2020-06-12 RX ADMIN — PIPERACILLIN AND TAZOBACTAM 4.5 G: 4; .5 INJECTION, POWDER, LYOPHILIZED, FOR SOLUTION INTRAVENOUS; PARENTERAL at 18:16

## 2020-06-12 RX ADMIN — HEPARIN SODIUM 5000 UNITS: 5000 INJECTION, SOLUTION INTRAVENOUS; SUBCUTANEOUS at 21:27

## 2020-06-12 RX ADMIN — SEVELAMER CARBONATE 800 MG: 800 TABLET, FILM COATED ORAL at 18:16

## 2020-06-12 RX ADMIN — GUAIFENESIN 600 MG: 600 TABLET, EXTENDED RELEASE ORAL at 18:16

## 2020-06-12 RX ADMIN — NYSTATIN 500000 UNITS: 100000 SUSPENSION ORAL at 21:27

## 2020-06-12 RX ADMIN — HEPARIN SODIUM 5000 UNITS: 5000 INJECTION, SOLUTION INTRAVENOUS; SUBCUTANEOUS at 15:21

## 2020-06-12 RX ADMIN — PREDNISONE 5 MG: 5 TABLET ORAL at 05:34

## 2020-06-12 RX ADMIN — FINASTERIDE 5 MG: 5 TABLET, FILM COATED ORAL at 05:33

## 2020-06-12 RX ADMIN — BUDESONIDE AND FORMOTEROL FUMARATE DIHYDRATE 2 PUFF: 160; 4.5 AEROSOL RESPIRATORY (INHALATION) at 07:40

## 2020-06-12 RX ADMIN — METOPROLOL SUCCINATE 25 MG: 25 TABLET, EXTENDED RELEASE ORAL at 05:34

## 2020-06-12 RX ADMIN — ALBUTEROL SULFATE 2.5 MG: 2.5 SOLUTION RESPIRATORY (INHALATION) at 07:38

## 2020-06-12 ASSESSMENT — ENCOUNTER SYMPTOMS
CHILLS: 0
GASTROINTESTINAL NEGATIVE: 1
NERVOUS/ANXIOUS: 0
WEAKNESS: 1
ROS GI COMMENTS: DRY MOUTH
NAUSEA: 0
VOMITING: 0
CONSTIPATION: 0
ABDOMINAL PAIN: 0
FLANK PAIN: 0
SHORTNESS OF BREATH: 0
HEARTBURN: 0
FEVER: 0
HEADACHES: 0
SENSORY CHANGE: 0
COUGH: 1
MEMORY LOSS: 0
DEPRESSION: 0
SPUTUM PRODUCTION: 1
MYALGIAS: 0
FOCAL WEAKNESS: 0
SPEECH CHANGE: 0

## 2020-06-12 NOTE — CARE PLAN
Problem: Safety  Goal: Will remain free from injury  Outcome: PROGRESSING AS EXPECTED     Problem: Safety  Goal: Will remain free from falls  Outcome: PROGRESSING AS EXPECTED     Problem: Urinary Elimination:  Goal: Ability to reestablish a normal urinary elimination pattern will improve  Outcome: PROGRESSING AS EXPECTED

## 2020-06-12 NOTE — PROGRESS NOTES
Diagnosis: End-Stage Renal Disease admitted with pneumonia. Patient seen and examined on hemodialysis during treatment. Patient is stable, tolerating hemodialysis. Denies chest pain and shortness of breath. Orders updated as needed. Please refer to flowsheet for full details.    Access: R BC AVF  UF goal: 1L    Plan: Continue HD Monday Wednesday Friday. OK to discharge from Nephrology standpoint.  There is no need for outpatient nephrology clinic follow up appointment, as the patient will be seen by a nephrologist at their outpatient dialysis center.     Elio Eaton MD  Nephrology

## 2020-06-12 NOTE — PROGRESS NOTES
[facility] Progress Note     Hemodialysis treatment ordered today per Dr. BYRON Eaton x 3.5 hours.   Treatment initiated at 1127 ended at 1457.      Patient tolerated tx; see paper flow sheet for details.      Net UF 1000 mL.      Needles removed from access site. Dressings applied and sites held x 10 minutes; verified no bleeding. Positive bruit/thrill post tx. Staff RN to monitor AVF for breakthrough bleeding. Should breakthrough bleeding occur, staff RN to apply pressure to access sites until bleeding resolved. Notify Dialysis and Nephrologist for follow-up.     Report given to Primary RN.

## 2020-06-12 NOTE — RESPIRATORY CARE
Oxygen Rounds      Patient found on    O2 L/m:  3  Oxygen device:  snc  Spo2: 99%        Respiratory device skin site inspection completed.

## 2020-06-12 NOTE — PROGRESS NOTES
Pt is A&Ox4. VSS. Pt lungs diminished on 3LNC. Breathing treatments from RT as scheduled. Pt denies any SOB/chest pain. Pt resting comfortably in bed. denies any pain. Pt assist x1 OOB with FWW. IV abx as scheduled. Bed low and locked, call light within reach, safety precautions in place, WCM.

## 2020-06-12 NOTE — PROGRESS NOTES
Hospital Medicine Daily Progress Note    Date of Service  6/12/2020    Chief Complaint  Shortness of breath    Hospital Course    79 y.o. male with severe aortic stenosis, Wegener's granulomatosis on chronic steroids, end-stage renal disease on hemodialysis, chronic respiratory failure and COPD (chronic baseline 3 L oxygen), with history of MAC and pseudomonal colonization, admitted 6/6/2020 with shortness of breath, along with subjective fevers, chills, and productive cough.  He was noted to be febrile.  Initial work-up showed WBC of 16,000, and creatinine 3.24, with potassium of 3.9.  Troponin was 238, which he had chronically elevated.  Chest x-ray showed hazy opacities in the bilateral lung bases, left more than the right, with small bilateral pleural effusions.  Patient was felt to have sepsis, with lung source and recurrent pneumonia, and he is started on IV Zosyn and azithromycin.  Cultures were sent.  COVID-19 swab was sent, and it was negative.  Nephrology was consulted for resumption of his hemodialysis while in the hospital.  ID was consulted for further antibiotic guidance.  He was also started on aggressive physiotherapy.        Interval Problem Update    Status post hemodialysis  No new events  For antibiotics through June 14 per ID        Consultants/Specialty  Nephrology  ID  Palliative care    Code Status  Full    Disposition  On zosyn, course per ID    Encourage activity  Family refusing home health to limit exposure to carbon  Patient has wife support at home      Review of Systems  Review of Systems   Constitutional: Negative for chills, fever and malaise/fatigue.   HENT: Negative for congestion.    Respiratory: Negative for shortness of breath.    Cardiovascular: Negative for leg swelling.   Gastrointestinal: Negative.  Negative for abdominal pain and heartburn.        Dry mouth   Genitourinary: Negative for dysuria and flank pain.   Musculoskeletal: Negative for myalgias.   Neurological:  Positive for weakness. Negative for sensory change, speech change, focal weakness and headaches.   Psychiatric/Behavioral: Negative for depression and memory loss. The patient is not nervous/anxious.         Pertinent positives/negatives as mentioned above.     A complete review of systems was personally done by me. All other systems were negative.       Physical Exam  Temp:  [36.6 °C (97.8 °F)-36.9 °C (98.5 °F)] 36.9 °C (98.5 °F)  Pulse:  [] 91  Resp:  [16-18] 18  BP: (114-133)/(49-71) 119/49  SpO2:  [95 %-100 %] 95 %    Physical Exam  Vitals signs reviewed.   Constitutional:       General: He is not in acute distress.     Appearance: Normal appearance. He is not toxic-appearing or diaphoretic.      Comments: Elderly, frail   HENT:      Head: Normocephalic and atraumatic.      Right Ear: External ear normal.      Left Ear: External ear normal.      Mouth/Throat:      Mouth: Mucous membranes are moist.      Pharynx: No oropharyngeal exudate.   Eyes:      General: No scleral icterus.     Extraocular Movements: Extraocular movements intact.      Pupils: Pupils are equal, round, and reactive to light.   Neck:      Musculoskeletal: Normal range of motion and neck supple.   Cardiovascular:      Heart sounds: Normal heart sounds. No murmur. No gallop.    Pulmonary:      Effort: Pulmonary effort is normal. No respiratory distress.      Breath sounds: No stridor. No wheezing.      Comments: Diminished air entry B/L bases.  (+) Bibasilar crackles.  Abdominal:      General: Bowel sounds are normal. There is no distension.      Palpations: Abdomen is soft.   Musculoskeletal: Normal range of motion.         General: No swelling.   Skin:     General: Skin is warm and dry.      Coloration: Skin is not jaundiced.   Neurological:      General: No focal deficit present.      Mental Status: He is alert and oriented to person, place, and time.      Cranial Nerves: No cranial nerve deficit.      Sensory: No sensory deficit.       Motor: Weakness present.   Psychiatric:         Mood and Affect: Mood normal.         Behavior: Behavior normal.              Fluids    Intake/Output Summary (Last 24 hours) at 6/12/2020 1411  Last data filed at 6/12/2020 0947  Gross per 24 hour   Intake 700 ml   Output 161 ml   Net 539 ml       Laboratory  Recent Labs     06/10/20  0242 06/11/20  0306   WBC 6.9 5.6   RBC 3.73* 4.04*   HEMOGLOBIN 10.7* 11.6*   HEMATOCRIT 35.2* 37.2*   MCV 94.4 92.1   MCH 28.7 28.7   MCHC 30.4* 31.2*   RDW 58.9* 56.7*   PLATELETCT 156* 171   MPV 9.3 9.5     Recent Labs     06/11/20  0306 06/11/20  0915 06/12/20  0854   SODIUM 137 134* 138   POTASSIUM 4.2 4.7 5.0   CHLORIDE 97 96 100   CO2 31 27 27   GLUCOSE 81 107* 99   BUN 19 25* 37*   CREATININE 2.40* 2.74* 3.65*   CALCIUM 9.0 9.1 9.4                   Imaging  DX-ESOPHAGUS - [id number]   Final Result      CT-CHEST (THORAX) W/O   Final Result      Worsening dependent consolidation, bronchial wall thickening and endobronchial opacity. This is worrisome for aspiration favored over infection and there is some new adenopathy that is more likely reactive than malignant      Extensive chronic pulmonary abnormality with emphysema, bronchiectasis, scarring, remote granulomatous disease      Coronary artery disease, prosthetic aortic valve, cardiac pacer      DX-CHEST-PORTABLE (1 VIEW)   Final Result         Hazy opacities in the bilateral lung bases, left more than right, are similar to prior and could be atelectasis or consolidation.      There may be small bilateral pleural effusions.           Assessment/Plan  * Recurrent pneumonia- (present on admission)  Assessment & Plan  -Has had recurrent pneumonias in the past.  X-ray showed hazy opacities in the bilateral lung base, left more than the right.  Procalcitonin elevated.  Has leukocytosis, and with subjective fevers, chills, and cough. COVID-19 negative. With history of MAC and pseudomonal colonization.  -Continue Zosyn and  azithromycin.  Follow cultures.    6/9 cx neg, increasing proCal, per ID, change to merrem, ID to adjust    - CT of the chest - increasing opacity, emphysema, reactive  ?aspiration  F/u SLP    Thrush- add nystatin  -Continue to trend WBC count, and procalcitonin.  Follow sputum cultures.  -ID following, appreciate inputs.   -Continue respiratory support, oxygen supplement.  Currently stable at baseline home oxygen.  Continue symptom control with antitussives, with Robitussin, and Mucinex, along with Tessalon PRN. Continue aggressive chest physiotherapy.    6/10 sputum cx, polymicrobial  F/u am labs  Cont abx  Sob unchanged    6/11 Pseudomonas on respiratory cultures, sensitivities available, to ciprofloxacin  Per ID, continue antibiotics  Improving procalcitonin down to 2.56, status post dialysis  Antibiotics per ID recommendations    6/12 abx through 6/14 per ID  Encourage activity  Now refusing h/h    Sepsis (HCC)- (present on admission)  Assessment & Plan  -This is Sepsis Present on admission.  -SIRS criteria identified on my evaluation include: Fever, with temperature greater than 101 deg F, Tachypnea, with respirations greater than 20 per minute and Leukocytosis, with WBC greater than 12,000  -Source is likely lung  -Sepsis protocol initiated.   -Fluid resuscitation ordered per protocol.  Continue to hold off on maintenance IV fluids as he is not hypotensive, and he is prone to fluid overload given his ESRD.  -Continue IV antibiotics as appropriate for source of sepsis.  Trend WBC count.  Follow cultures.  -While organ dysfunction may be noted elsewhere in this problem list or in the chart, degree of organ dysfunction does not meet CMS criteria for severe sepsis    Elevated troponin- (present on admission)  Assessment & Plan  -Review of the medical records indicates that this is chronically elevated, current troponin level is close to his prior baseline.  No ACS.  Continue to monitor.    ESRD on dialysis (HCC)-  (present on admission)  Assessment & Plan  - Patient gets dialysis on Monday, Wednesday, and Friday.    - Nephrology on board for continued hemodialysis while he is in the hospital.    6/11 plan discharge home pending ID clearance    6/12 HD per neprho      Dysphagia  Assessment & Plan  6/10 seen by slp, ok for regular diet  Esophogram pending    Wegener's granulomatosis (HCC)- (present on admission)  Assessment & Plan  -Continue chronic home low-dose steroid.  -Now dialysis dependent.    Chronic respiratory failure with hypoxia (HCC)- (present on admission)  Assessment & Plan  -He is at his baseline oxygen supplement (3 L).  -Continue management of pneumonia as above.  Continue RT protocol, and oxygen supplements.    Atrial fibrillation (HCC)- (present on admission)  Assessment & Plan  -Rate and rhythm controlled.  Patient is not on chronic anticoagulation.  Continue beta-blocker.  Monitor closely.    Hyperlipidemia- (present on admission)  Assessment & Plan  -Continue Crestor.       VTE prophylaxis: heparin SQ

## 2020-06-12 NOTE — DISCHARGE PLANNING
"Anticipated Disposition:  Home    Action:   This CM met with Pt and his wife in his room, offered them HH choice, HH service declined by both Pt and his wife, stated \" Pt had HH service for a few weeks, they do not think, they were very helpful, meanwhile, they want to minimize the exposure to COVID. Don't want visitors to their house \"       notified via TT.          Barriers to Discharge:   Medical clearance      Plan:  Please continue assist with Pt with discharge.    "

## 2020-06-12 NOTE — CARE PLAN
Problem: Respiratory:  Goal: Respiratory status will improve  Outcome: PROGRESSING AS EXPECTED     Problem: Safety  Goal: Will remain free from injury  Outcome: PROGRESSING AS EXPECTED  Goal: Will remain free from falls  Outcome: PROGRESSING AS EXPECTED     Problem: Infection  Goal: Will remain free from infection  Outcome: PROGRESSING AS EXPECTED     Problem: Venous Thromboembolism (VTW)/Deep Vein Thrombosis (DVT) Prevention:  Goal: Patient will participate in Venous Thrombosis (VTE)/Deep Vein Thrombosis (DVT)Prevention Measures  Outcome: PROGRESSING AS EXPECTED     Problem: Discharge Barriers/Planning  Goal: Patient's continuum of care needs will be met  Outcome: PROGRESSING AS EXPECTED     Problem: Urinary Elimination:  Goal: Ability to reestablish a normal urinary elimination pattern will improve  Outcome: PROGRESSING AS EXPECTED

## 2020-06-12 NOTE — PROGRESS NOTES
"      Spiritual Care Note    Patient Information     Patient's Name: Gilberto Brown   MRN: [id number]    YOB: 1940   Age and Gender: 79 y.o. male   Service Area: Dialysis   Room (and Bed): [place] [address]   Ethnicity or Nationality:     Primary Language: English   Congregational/Spiritual preference: Bahai   Place of Residence: [place]   Family/Friends/Others Present: No   Clinical Team Present: No   Medical Diagnosis(-es)/Procedure(s): Sepsis   Code Status: Full Code    Date of Admission: 6/6/2020   Length of Stay: 5 days        Spiritual Care Provider Information:  Name of Spiritual Care Provider: Margareth Gracia  Title of Spiritual Care Provider: Associate   Phone Number: [phone]  E-mail: [email or web address]  Total time : 15 minutes    Spiritual Screen Results:    Gen Nursing  Spiritual Screen  Is your spiritual health or inner well-being important to you as you cope with your medical condition?: No  Would you like to receive a visit from our Spiritual Care team or your own Baptism or spiritual leader?: No  Was spiritual care education provided to the patient?: Declined     Palliative Care  PC Congregational/Spiritual Screening  Is your spiritual health or inner well-being important to you as you cope with your medical condition?: Unable to determine  Was spiritual care education provided to the patient?: Declined      Encounter/Request Information  Encounter/Request Type   Visited With: Patient  Nature of the Visit: Initial, On shift  General Visit: Yes  Referral From/ Origin of Request: [place] management rounds, Verbal patient    Religous Needs/Values       Spiritual Assessment     Spiritual Care Encounters    Observations/Symptoms: Accepting, Thankfulness    Interaction/Conversation: Very pleasant pt has attended [employer] and asked what it was like to be a .  He said that his doctors say he's dying, but he feels \"it's not time yet.\"  However, he was eager to discuss EOL " situations he has been in with family.  asked if he would like visits upstairs, and he said that would be fine.    Assessment: Need    Need: (Companionship)    Interventions: Compassionate presence, active listening.    Outcomes: Ability to Communicate with Truth and Honesty    Plan: Visit Upon Request    Notes:

## 2020-06-12 NOTE — PROGRESS NOTES
Infectious Disease Progress Note    Author: Christine Manzo M.D. Date & Time of service: 2020  3:04 PM    Chief Complaint:  Pneumonia     Interval History:   79 y.o. male  admitted 2020. +severe aortic stenosis and reports he had valve placement in 2020. History of Wegener's granulomatosis and chronic lung disease with bronchiectasis,  COPD (baseline oxygen 3 L) and on chronic steroids 5 mg daily. 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  He also has ESRD on hemodialysis.   101.3, O2 3 L NC, CT chest pending. Pt states he feels a little bit better but still with ongoing fever and productive cough.   \   AF, O2 3.5 L NC, Studies reviewed- swallow exam pending. Improvement in fevers and states he is feeling a little better.  Ongoing productive cough.     AF WBC 5.6 no new complaints   AF seen in HD- states cough improved; +sputum production (decreased) No new complaints. Denies Se abx     Review of Systems:  Review of Systems   Constitutional: Positive for malaise/fatigue. Negative for fever.   Respiratory: Positive for cough and sputum production. Negative for shortness of breath.    Gastrointestinal: Negative for abdominal pain, constipation, nausea and vomiting.       Hemodynamics:  Temp (24hrs), Av.8 °C (98.3 °F), Min:36.6 °C (97.8 °F), Max:36.9 °C (98.5 °F)  Temperature: 36.9 °C (98.5 °F)  Pulse  Av.3  Min: 77  Max: 115   Blood Pressure : 119/49       Physical Exam:  Physical Exam  Vitals signs and nursing note reviewed.   Constitutional:       General: He is not in acute distress.     Appearance: He is not toxic-appearing or diaphoretic.      Comments: Chronically ill   HENT:      Nose: No rhinorrhea.   Eyes:      General: No scleral icterus.     Extraocular Movements: Extraocular movements intact.      Pupils: Pupils are equal, round, and reactive to light.   Cardiovascular:      Rate  and Rhythm: Normal rate. Rhythm irregular.      Heart sounds: Murmur present.   Pulmonary:      Effort: Pulmonary effort is normal. No respiratory distress.      Breath sounds: Rhonchi present. No wheezing.      Comments: Diffuse crackles/rhonchi bilaterally, decreased breath sounds bilaterally  Abdominal:      General: Bowel sounds are normal. There is no distension.      Palpations: Abdomen is soft.      Tenderness: There is no abdominal tenderness.   Musculoskeletal:      Comments: RUE AVF   Skin:     General: Skin is warm.      Coloration: Skin is not jaundiced.   Neurological:      General: No focal deficit present.      Mental Status: He is alert and oriented to person, place, and time.   Psychiatric:         Mood and Affect: Mood normal.         Behavior: Behavior normal.         Meds:    Current Facility-Administered Medications:   •  lidocaine  •  nystatin  •  benzocaine-menthol  •  [COMPLETED] piperacillin-tazobactam **AND** piperacillin-tazobactam  •  heparin  •  albuterol  •  sevelamer carbonate  •  rosuvastatin  •  predniSONE  •  metoprolol SR  •  budesonide-formoterol  •  clopidogrel  •  finasteride  •  tamsulosin  •  senna-docusate **AND** polyethylene glycol/lytes **AND** magnesium hydroxide **AND** bisacodyl  •  Respiratory Therapy Consult  •  heparin  •  acetaminophen  •  guaiFENesin dextromethorphan  •  guaiFENesin ER  •  benzonatate  •  ondansetron  •  sodium chloride    Labs:  Recent Labs     06/10/20  0242 06/11/20  0306   WBC 6.9 5.6   RBC 3.73* 4.04*   HEMOGLOBIN 10.7* 11.6*   HEMATOCRIT 35.2* 37.2*   MCV 94.4 92.1   MCH 28.7 28.7   RDW 58.9* 56.7*   PLATELETCT 156* 171   MPV 9.3 9.5   NEUTSPOLYS 79.60* 76.10*   LYMPHOCYTES 3.90* 5.90*   MONOCYTES 8.50 9.40   EOSINOPHILS 7.30* 7.40*   BASOPHILS 0.40 0.70     Recent Labs     06/11/20  0306 06/11/20  0915 06/12/20  0854   SODIUM 137 134* 138   POTASSIUM 4.2 4.7 5.0   CHLORIDE 97 96 100   CO2 31 27 27   GLUCOSE 81 107* 99   BUN 19 25* 37*      Recent Labs     06/11/20  0306 06/11/20  0915 06/12/20  0854   CREATININE 2.40* 2.74* 3.65*       Imaging:  Ct-chest (thorax) W/o    Result Date: 6/8/2020 6/8/2020 5:37 PM HISTORY/REASON FOR EXAM: Wegener's granulomatosis and Mycobacterium avium intracellulare. Pulmonary histoplasmosis. Follow-up TECHNIQUE/EXAM DESCRIPTION: CT thorax without contrast. Noncontrast helical scanning of the chest was obtained from the lung apices through the adrenal glands. Low dose optimization technique was utilized for this CT exam including automated exposure control and adjustment of the mA and/or kV according to patient size. COMPARISON:  7/27/2017 CT FINDINGS: Left transsubclavian pacer is present. The generator obscures structures due to artifact. Lung windows demonstrate similar extensive abnormality. There is worsening lower lobe bronchial wall thickening and subpleural linear opacities. There is some increasing endobronchial opacity and espinoza consolidation is seen at the posterior costophrenic sulci. There is bronchiectasis seen best in the middle lobe, unchanged, medial segment. Lungs are otherwise hyperinflated with some paraseptal emphysema seen. Calcified right upper lobe nodule again noted Soft tissue windows demonstrate similar left heart enlargement. Prosthetic aortic valve. Note that lack of contrast does diminish the sensitivity of the study in evaluation of the soft tissues. Calcified right hilar and mediastinal nodes. An aortopulmonary node is again seen, partially calcified and this has enlarged 12 mm short axis There is no pleural or pericardial effusion. No acute bony abnormality.     Worsening dependent consolidation, bronchial wall thickening and endobronchial opacity. This is worrisome for aspiration favored over infection and there is some new adenopathy that is more likely reactive than malignant Extensive chronic pulmonary abnormality with emphysema, bronchiectasis, scarring, remote granulomatous  "disease Coronary artery disease, prosthetic aortic valve, cardiac pacer    Dx-chest-portable (1 View)    Result Date: 6/6/2020 6/6/2020 10:58 PM HISTORY/REASON FOR EXAM:  Sepsis Short of breath TECHNIQUE/EXAM DESCRIPTION AND NUMBER OF VIEWS: Single portable view of the chest. COMPARISON: 1/25/2020 FINDINGS: Left-sided pacemaker. Hazy opacities in the bilateral lung bases, left more than right There may be small bilateral pleural effusions. No pneumothorax. Stable cardiopericardial silhouette.     Hazy opacities in the bilateral lung bases, left more than right, are similar to prior and could be atelectasis or consolidation. There may be small bilateral pleural effusions.    Dx-esophagus - [id number]    Result Date: 6/10/2020  6/9/2020 3:38 PM HISTORY/REASON FOR EXAM:  Dysphagia, rule out aspiration TECHNIQUE/EXAM DESCRIPTION AND NUMBER OF VIEWS: Esophagus-CINE-Video-CD. The study was performed by the speech language pathologist who administered a variety of barium coated substances under direct fluoroscopic visualization. COMPARISON:  None. FINDINGS: The study was performed by the speech language pathologist who will issue a full report and used a total of 2.7 minutes of fluoroscopy time. INTERPRETING LOCATION: [address] [place] [address], [facility]      Micro:  Results     Procedure Component Value Units Date/Time    BLOOD CULTURE [[id number]] Collected:  06/06/20 2240    Order Status:  Completed Specimen:  Blood from Peripheral Updated:  06/12/20 0100     Significant Indicator NEG     Source BLD     Site PERIPHERAL     Culture Result No growth after 5 days of incubation.    Narrative:       1 of 2 for Blood Culture x 2 sites order. Per Hospital  Policy: Only change Specimen Src: to \"Line\" if specified by  physician order.  No site indicated    BLOOD CULTURE [[id number]] Collected:  06/06/20 2240    Order Status:  Completed Specimen:  Blood from Peripheral Updated:  06/12/20 0100     Significant Indicator NEG     Source " "BLD     Site PERIPHERAL     Culture Result No growth after 5 days of incubation.    Narrative:       2 of 2 blood culture x2  Sites order. Per Hospital Policy:  Only change Specimen Src: to \"Line\" if specified by physician  order.  Left AC    Fungal Culture [[id number]] Collected:  06/07/20 1816    Order Status:  Completed Specimen:  Respirate Updated:  06/11/20 0937     Significant Indicator NEG     Source RESP     Site SPUTUM     Culture Result Culture in progress.    Narrative:       Sputum cultures, induced if needed. If not done within the  last 24 hours  Sputum cultures, induced if needed. If not done within the    Culture Respiratory W/ Grm Stn [[id number]]  (Abnormal)  (Susceptibility) Collected:  06/07/20 1816    Order Status:  Completed Specimen:  Respirate from Sputum Updated:  06/11/20 0937     Significant Indicator POS     Source RESP     Site SPUTUM     Culture Result Light growth usual upper respiratory matt     Gram Stain Result Moderate WBCs.  Moderate Gram negative rods.  Specimen Quality Score: 3+       Culture Result Pseudomonas aeruginosa  Moderate growth  mucoid colony morphology  P.aeruginosa may develop resistance during prolonged therapy  with all antibiotics. Isolates that are initially susceptible  may become resistant within three to four days after  initiation of therapy. Testing of repeat isolates may be  warranted.      Narrative:       Sputum cultures, induced if needed. If not done within the  last 24 hours  Sputum cultures, induced if needed. If not done within the    Susceptibility     Pseudomonas aeruginosa (1)     Antibiotic Interpretation Microscan Method Status    Ceftazidime Sensitive 1.0 mcg/mL E-TEST Final    Ciprofloxacin Intermediate 1.5 mcg/mL E-TEST Final    Amikacin Intermediate 32 mcg/mL E-TEST Final    Gentamicin Intermediate 8 mcg/mL E-TEST Final    Tobramycin Sensitive 3.0 mcg/mL E-TEST Final    Imipenem Sensitive 0.25 mcg/mL E-TEST Final    Ticarcillin/CA Sensitive " 12/2 mcg/mL E-TEST Final                   GRAM STAIN [[id number]] Collected:  06/07/20 1816    Order Status:  Completed Specimen:  Respirate Updated:  06/10/20 0756     Significant Indicator .     Source RESP     Site SPUTUM     Gram Stain Result Moderate WBCs.  Moderate Gram negative rods.  Specimen Quality Score: 3+      Narrative:       Sputum cultures, induced if needed. If not done within the  last 24 hours  Sputum cultures, induced if needed. If not done within the    E-Test [[id number]] Collected:  06/07/20 1816    Order Status:  Completed Specimen:  Sputum Updated:  06/10/20 0756     ETEST Sensitivity INTERIM    Narrative:       Sputum cultures, induced if needed. If not done within the  last 24 hours    Acid Fast Stain [[id number]] Collected:  06/07/20 1816    Order Status:  Completed Specimen:  Respirate Updated:  06/08/20 1812     Significant Indicator NEG     Source RESP     Site SPUTUM     AFB Smear Results No acid fast bacilli seen.    Narrative:       Collected By:[id number] CHARLIE LAM  Collected By:[id number] CHARLIE LAM    AFB Culture [[id number]] Collected:  06/07/20 1816    Order Status:  Completed Specimen:  Respirate from Sputum Updated:  06/08/20 1812     Significant Indicator NEG     Source RESP     Site SPUTUM     Culture Result Culture in progress.     AFB Smear Results No acid fast bacilli seen.    Narrative:       Collected By:[id number] CHARLIE LAM  Collected By:[id number] CHARLIE LAM    CULTURE RESPIRATORY W/ GRM STN [[id number]]     Order Status:  Completed Specimen:  Respirate from Sputum     AFB STAIN ONLY [[id number]]     Order Status:  No result Specimen:  Respirate from Sputum     URINALYSIS,CULTURE IF INDICATED [[id number]]  (Abnormal) Collected:  06/07/20 0631    Order Status:  Completed Specimen:  Urine Updated:  06/07/20 0712     Color Yellow     Character Clear     Specific Gravity 1.007     Ph 8.5     Glucose Negative mg/dL      Ketones Negative mg/dL      Protein Negative mg/dL       Bilirubin Negative     Urobilinogen, Urine 0.2     Nitrite Negative     Leukocyte Esterase Negative     Occult Blood Negative     Micro Urine Req see below     Comment: Microscopic examination not performed when specimen is clear  and chemically negative for protein, blood, leukocyte esterase  and nitrite.         SARS-CoV-2, PCR (In-House) [[id number]] Collected:  06/07/20 0140    Order Status:  Completed Updated:  06/07/20 0304     SARS-CoV-2 Source NP Swab     SARS-CoV-2 by PCR NotDetected     Comment: [facility] providers: PLEASE REFER TO DE-ESCALATION AND RETESTING PROTOCOL  on [email or web address]  **The [employer] Mobile GeneXpert Xpress SARS-CoV-2 Test has been made available for  use under the Emergency Use Authorization (EUA) only.         Narrative:       Is this test for diagnosis or screening?->Diagnosis of ill  patient    COVID/SARS CoV-2 [[id number]] Collected:  06/07/20 0140    Order Status:  Completed Specimen:  Respirate from Nasopharyngeal Updated:  06/07/20 0151     COVID Order Status Received    Narrative:       Is this test for diagnosis or screening?->Diagnosis of ill  patient          Assessment:  Active Hospital Problems    Diagnosis   • *Recurrent pneumonia [J18.9]   • Sepsis ([facility]) [A41.9]   • Elevated troponin [R79.89]   • ESRD on dialysis ([facility]) [N18.6, Z99.2]   • Dysphagia [R13.10]   • Wegener's granulomatosis (HCC) [M31.30]   • Chronic respiratory failure with hypoxia ([facility]) [J96.11]   • Atrial fibrillation ([facility]) [I48.91]          ASSESSMENT/PLAN:       79 y.o. male admitted 6/6/2020. Pt has a past medical history of severe aortic stenosis and reports he had valve placement in March 2020. History of Wegener's granulomatosis and chronic lung disease with bronchiectasis,  COPD (baseline oxygen 3 L) and on chronic steroids 5 mg daily. 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  He also has ESRD on hemodialysis.       He was  seen by pulmonary and ID at the end of January, 2020 with extensive work-up at that point.  He improved on short course of Zosyn and was discharged with plan to follow-up with pulmonary. Prior work-up on 1/26/2020 with sputum positive for pseudomonas aeruginosa with the testing performed (sensitive to ciprofloxacin, meropenem, tobramycin, amikacin and and Zosyn)  negative sputum AFB, P TANIYA PCR negative, histoplasmosis antibody negative, cocci antibody negative, beta D glucan intermediate.      Admitted complaining of several weeks of increasing shortness of breath, subjective fevers and productive cough.  He states that he took a course of ciprofloxacin approximately 2 weeks ago with no improvement in his symptoms.  He has been off antibiotics for 1 week and had fevers which prompted ED visit.     Pneumonia, acute on chronic disease  Afebrile  No current leukocytosis  Respiratory viral panel negative  CT 6/8 with worsening consolidation, bronchial wall thickening and endobronchial opacity concerning for aspiration rather than infection.  Extensive pulmonary emphysema bronchiectasis and remote granulomatous disease.  Sputum culture from 6/7 +PSAR, mucoid colony morphology which is indicative of resistance  Blood cxs neg  pulm toilet  Continue Zosyn-failed outpatient cipro  Stop date 6/14/2020  Pulm toilet    Chronic lung disease with COPD and bronchiectasis  Baseline oxygen 3 L  Speaking in full sentences    Wegeners granulomatosis  Immunosuppressed - on low dose chronic steroids    ESDR on HD   Adjust abx as needed

## 2020-06-13 LAB
ANION GAP SERPL CALC-SCNC: 11 MMOL/L (ref 7–16)
BUN SERPL-MCNC: 27 MG/DL (ref 8–22)
CALCIUM SERPL-MCNC: 9.5 MG/DL (ref 8.5–10.5)
CHLORIDE SERPL-SCNC: 97 MMOL/L (ref 96–112)
CO2 SERPL-SCNC: 28 MMOL/L (ref 20–33)
CREAT SERPL-MCNC: 2.76 MG/DL (ref 0.5–1.4)
GLUCOSE SERPL-MCNC: 114 MG/DL (ref 65–99)
POTASSIUM SERPL-SCNC: 4.9 MMOL/L (ref 3.6–5.5)
SODIUM SERPL-SCNC: 136 MMOL/L (ref 135–145)

## 2020-06-13 PROCEDURE — 700102 HCHG RX REV CODE 250 W/ 637 OVERRIDE(OP): Performed by: INTERNAL MEDICINE

## 2020-06-13 PROCEDURE — 36415 COLL VENOUS BLD VENIPUNCTURE: CPT

## 2020-06-13 PROCEDURE — 94669 MECHANICAL CHEST WALL OSCILL: CPT

## 2020-06-13 PROCEDURE — 80048 BASIC METABOLIC PNL TOTAL CA: CPT

## 2020-06-13 PROCEDURE — A9270 NON-COVERED ITEM OR SERVICE: HCPCS | Performed by: INTERNAL MEDICINE

## 2020-06-13 PROCEDURE — 700102 HCHG RX REV CODE 250 W/ 637 OVERRIDE(OP): Performed by: HOSPITALIST

## 2020-06-13 PROCEDURE — 99232 SBSQ HOSP IP/OBS MODERATE 35: CPT | Performed by: INTERNAL MEDICINE

## 2020-06-13 PROCEDURE — 700101 HCHG RX REV CODE 250: Performed by: INTERNAL MEDICINE

## 2020-06-13 PROCEDURE — 94760 N-INVAS EAR/PLS OXIMETRY 1: CPT

## 2020-06-13 PROCEDURE — 94640 AIRWAY INHALATION TREATMENT: CPT

## 2020-06-13 PROCEDURE — 770006 HCHG ROOM/CARE - MED/SURG/GYN SEMI*

## 2020-06-13 PROCEDURE — 700111 HCHG RX REV CODE 636 W/ 250 OVERRIDE (IP): Performed by: INTERNAL MEDICINE

## 2020-06-13 PROCEDURE — 700111 HCHG RX REV CODE 636 W/ 250 OVERRIDE (IP): Performed by: HOSPITALIST

## 2020-06-13 PROCEDURE — A9270 NON-COVERED ITEM OR SERVICE: HCPCS | Performed by: HOSPITALIST

## 2020-06-13 PROCEDURE — 700105 HCHG RX REV CODE 258: Performed by: INTERNAL MEDICINE

## 2020-06-13 RX ADMIN — NYSTATIN 500000 UNITS: 100000 SUSPENSION ORAL at 18:02

## 2020-06-13 RX ADMIN — GUAIFENESIN 600 MG: 600 TABLET, EXTENDED RELEASE ORAL at 05:15

## 2020-06-13 RX ADMIN — ALBUTEROL SULFATE 2.5 MG: 2.5 SOLUTION RESPIRATORY (INHALATION) at 14:44

## 2020-06-13 RX ADMIN — PIPERACILLIN AND TAZOBACTAM 4.5 G: 4; .5 INJECTION, POWDER, LYOPHILIZED, FOR SOLUTION INTRAVENOUS; PARENTERAL at 18:02

## 2020-06-13 RX ADMIN — SODIUM CHLORIDE 4 ML: 7 NEBU SOLN,3 % NEBU at 06:45

## 2020-06-13 RX ADMIN — GUAIFENESIN 600 MG: 600 TABLET, EXTENDED RELEASE ORAL at 18:02

## 2020-06-13 RX ADMIN — HEPARIN SODIUM 5000 UNITS: 5000 INJECTION, SOLUTION INTRAVENOUS; SUBCUTANEOUS at 12:57

## 2020-06-13 RX ADMIN — SODIUM CHLORIDE 4 ML: 7 NEBU SOLN,3 % NEBU at 19:25

## 2020-06-13 RX ADMIN — ALBUTEROL SULFATE 2.5 MG: 2.5 SOLUTION RESPIRATORY (INHALATION) at 10:47

## 2020-06-13 RX ADMIN — PREDNISONE 5 MG: 5 TABLET ORAL at 05:15

## 2020-06-13 RX ADMIN — BUDESONIDE AND FORMOTEROL FUMARATE DIHYDRATE 2 PUFF: 160; 4.5 AEROSOL RESPIRATORY (INHALATION) at 19:25

## 2020-06-13 RX ADMIN — CLOPIDOGREL BISULFATE 75 MG: 75 TABLET ORAL at 18:02

## 2020-06-13 RX ADMIN — NYSTATIN 500000 UNITS: 100000 SUSPENSION ORAL at 12:28

## 2020-06-13 RX ADMIN — SEVELAMER CARBONATE 800 MG: 800 TABLET, FILM COATED ORAL at 09:49

## 2020-06-13 RX ADMIN — NYSTATIN 500000 UNITS: 100000 SUSPENSION ORAL at 21:02

## 2020-06-13 RX ADMIN — METOPROLOL SUCCINATE 25 MG: 25 TABLET, EXTENDED RELEASE ORAL at 05:15

## 2020-06-13 RX ADMIN — SEVELAMER CARBONATE 800 MG: 800 TABLET, FILM COATED ORAL at 18:02

## 2020-06-13 RX ADMIN — SEVELAMER CARBONATE 800 MG: 800 TABLET, FILM COATED ORAL at 12:28

## 2020-06-13 RX ADMIN — HEPARIN SODIUM 5000 UNITS: 5000 INJECTION, SOLUTION INTRAVENOUS; SUBCUTANEOUS at 21:02

## 2020-06-13 RX ADMIN — ROSUVASTATIN CALCIUM 20 MG: 20 TABLET, FILM COATED ORAL at 05:16

## 2020-06-13 RX ADMIN — ALBUTEROL SULFATE 2.5 MG: 2.5 SOLUTION RESPIRATORY (INHALATION) at 19:25

## 2020-06-13 RX ADMIN — METOPROLOL SUCCINATE 25 MG: 25 TABLET, EXTENDED RELEASE ORAL at 18:02

## 2020-06-13 RX ADMIN — BUDESONIDE AND FORMOTEROL FUMARATE DIHYDRATE 2 PUFF: 160; 4.5 AEROSOL RESPIRATORY (INHALATION) at 06:45

## 2020-06-13 RX ADMIN — HEPARIN SODIUM 5000 UNITS: 5000 INJECTION, SOLUTION INTRAVENOUS; SUBCUTANEOUS at 05:17

## 2020-06-13 RX ADMIN — NYSTATIN 500000 UNITS: 100000 SUSPENSION ORAL at 09:49

## 2020-06-13 RX ADMIN — PIPERACILLIN AND TAZOBACTAM 4.5 G: 4; .5 INJECTION, POWDER, LYOPHILIZED, FOR SOLUTION INTRAVENOUS; PARENTERAL at 05:16

## 2020-06-13 RX ADMIN — ALBUTEROL SULFATE 2.5 MG: 2.5 SOLUTION RESPIRATORY (INHALATION) at 06:45

## 2020-06-13 ASSESSMENT — ENCOUNTER SYMPTOMS
SENSORY CHANGE: 0
MEMORY LOSS: 0
COUGH: 0
DIAPHORESIS: 0
CONSTIPATION: 0
SHORTNESS OF BREATH: 0
PALPITATIONS: 0
NERVOUS/ANXIOUS: 0
CHILLS: 0
FOCAL WEAKNESS: 0
MYALGIAS: 0
SPUTUM PRODUCTION: 1
WEAKNESS: 1
HEARTBURN: 0
NAUSEA: 0
FLANK PAIN: 0
VOMITING: 0
HEADACHES: 0
COUGH: 1
FEVER: 0
ROS GI COMMENTS: DRY MOUTH
DEPRESSION: 0
SPEECH CHANGE: 0
GASTROINTESTINAL NEGATIVE: 1
BACK PAIN: 0
ABDOMINAL PAIN: 0

## 2020-06-13 NOTE — CARE PLAN
Problem: Respiratory:  Goal: Respiratory status will improve  Outcome: PROGRESSING AS EXPECTED  Note: Lung sounds and respiratory effort assessed regularly. RT protocol in effect.      Problem: Safety  Goal: Will remain free from injury  Outcome: PROGRESSING AS EXPECTED  Note: Fall precautions in place. Bed in lowest position. Non-skid socks in place. Personal possessions within reach. Mobility sign on door. Call light within reach. Pt educated regarding fall prevention and states understanding.

## 2020-06-13 NOTE — CARE PLAN
Problem: Respiratory:  Goal: Respiratory status will improve  Outcome: PROGRESSING AS EXPECTED       Problem: Safety  Goal: Will remain free from injury  Outcome: PROGRESSING AS EXPECTED

## 2020-06-13 NOTE — CARE PLAN
Problem: Bronchoconstriction:  Goal: Improve in air movement and diminished wheezing  Outcome: PROGRESSING AS EXPECTED   Albuterol QID    Problem: Bronchopulmonary Hygiene:  Goal: Increase mobilization of retained secretions  Outcome: PROGRESSING AS EXPECTED   Sodium Bicarb QID  CPT QID

## 2020-06-13 NOTE — PROGRESS NOTES
Infectious Disease Progress Note    Author: Christine Manzo M.D. Date & Time of service: 2020  2:12 PM    Chief Complaint:  Pneumonia     Interval History:   79 y.o. male  admitted 2020. +severe aortic stenosis and reports he had valve placement in 2020. History of Wegener's granulomatosis and chronic lung disease with bronchiectasis,  COPD (baseline oxygen 3 L) and on chronic steroids 5 mg daily. 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  He also has ESRD on hemodialysis.   101.3, O2 3 L NC, CT chest pending. Pt states he feels a little bit better but still with ongoing fever and productive cough.   \   AF, O2 3.5 L NC, Studies reviewed- swallow exam pending. Improvement in fevers and states he is feeling a little better.  Ongoing productive cough.     AF WBC 5.6 no new complaints   AF seen in HD- states cough improved; +sputum production (decreased) No new complaints. Denies Se abx   AF anxious for discharge home. No new complaints     Review of Systems:  Review of Systems   Constitutional: Positive for malaise/fatigue. Negative for fever.   Respiratory: Positive for cough and sputum production. Negative for shortness of breath.    Gastrointestinal: Negative for abdominal pain, constipation, nausea and vomiting.   All other systems reviewed and are negative.      Hemodynamics:  Temp (24hrs), Av.7 °C (98 °F), Min:35.8 °C (96.5 °F), Max:37.1 °C (98.8 °F)  Temperature: 37.1 °C (98.8 °F)  Pulse  Av.5  Min: 77  Max: 115   Blood Pressure : 107/53       Physical Exam:  Physical Exam  Vitals signs and nursing note reviewed.   Constitutional:       General: He is not in acute distress.     Appearance: He is not toxic-appearing or diaphoretic.      Comments: Chronically ill   HENT:      Nose: No congestion or rhinorrhea.      Mouth/Throat:      Pharynx: No oropharyngeal exudate.   Eyes:       General: No scleral icterus.     Extraocular Movements: Extraocular movements intact.      Pupils: Pupils are equal, round, and reactive to light.   Cardiovascular:      Rate and Rhythm: Normal rate. Rhythm irregular.      Heart sounds: Murmur present.   Pulmonary:      Effort: Pulmonary effort is normal. No respiratory distress.      Breath sounds: Rhonchi present. No wheezing.      Comments: Diffuse crackles/rhonchi bilaterally, decreased breath sounds bilaterally  Abdominal:      General: Bowel sounds are normal. There is no distension.      Palpations: Abdomen is soft.      Tenderness: There is no abdominal tenderness. There is no guarding.   Musculoskeletal:      Comments: RUE AVF   Skin:     General: Skin is warm.      Coloration: Skin is not jaundiced.   Neurological:      General: No focal deficit present.      Mental Status: He is alert and oriented to person, place, and time.   Psychiatric:         Mood and Affect: Mood normal.         Behavior: Behavior normal.         Meds:    Current Facility-Administered Medications:   •  lidocaine  •  nystatin  •  benzocaine-menthol  •  [COMPLETED] piperacillin-tazobactam **AND** piperacillin-tazobactam  •  heparin  •  albuterol  •  sevelamer carbonate  •  rosuvastatin  •  predniSONE  •  metoprolol SR  •  budesonide-formoterol  •  clopidogrel  •  finasteride  •  tamsulosin  •  senna-docusate **AND** polyethylene glycol/lytes **AND** magnesium hydroxide **AND** bisacodyl  •  Respiratory Therapy Consult  •  heparin  •  acetaminophen  •  guaiFENesin dextromethorphan  •  guaiFENesin ER  •  benzonatate  •  ondansetron  •  sodium chloride    Labs:  Recent Labs     06/11/20  0306   WBC 5.6   RBC 4.04*   HEMOGLOBIN 11.6*   HEMATOCRIT 37.2*   MCV 92.1   MCH 28.7   RDW 56.7*   PLATELETCT 171   MPV 9.5   NEUTSPOLYS 76.10*   LYMPHOCYTES 5.90*   MONOCYTES 9.40   EOSINOPHILS 7.40*   BASOPHILS 0.70     Recent Labs     06/11/20  0915 06/12/20  0854 06/13/20  0900   SODIUM 134* 138  136   POTASSIUM 4.7 5.0 4.9   CHLORIDE 96 100 97   CO2 27 27 28   GLUCOSE 107* 99 114*   BUN 25* 37* 27*     Recent Labs     06/11/20  0915 06/12/20  0854 06/13/20  0900   CREATININE 2.74* 3.65* 2.76*       Imaging:  Ct-chest (thorax) W/o    Result Date: 6/8/2020 6/8/2020 5:37 PM HISTORY/REASON FOR EXAM: Wegener's granulomatosis and Mycobacterium avium intracellulare. Pulmonary histoplasmosis. Follow-up TECHNIQUE/EXAM DESCRIPTION: CT thorax without contrast. Noncontrast helical scanning of the chest was obtained from the lung apices through the adrenal glands. Low dose optimization technique was utilized for this CT exam including automated exposure control and adjustment of the mA and/or kV according to patient size. COMPARISON:  7/27/2017 CT FINDINGS: Left transsubclavian pacer is present. The generator obscures structures due to artifact. Lung windows demonstrate similar extensive abnormality. There is worsening lower lobe bronchial wall thickening and subpleural linear opacities. There is some increasing endobronchial opacity and espinoza consolidation is seen at the posterior costophrenic sulci. There is bronchiectasis seen best in the middle lobe, unchanged, medial segment. Lungs are otherwise hyperinflated with some paraseptal emphysema seen. Calcified right upper lobe nodule again noted Soft tissue windows demonstrate similar left heart enlargement. Prosthetic aortic valve. Note that lack of contrast does diminish the sensitivity of the study in evaluation of the soft tissues. Calcified right hilar and mediastinal nodes. An aortopulmonary node is again seen, partially calcified and this has enlarged 12 mm short axis There is no pleural or pericardial effusion. No acute bony abnormality.     Worsening dependent consolidation, bronchial wall thickening and endobronchial opacity. This is worrisome for aspiration favored over infection and there is some new adenopathy that is more likely reactive than malignant  "Extensive chronic pulmonary abnormality with emphysema, bronchiectasis, scarring, remote granulomatous disease Coronary artery disease, prosthetic aortic valve, cardiac pacer    Dx-chest-portable (1 View)    Result Date: 6/6/2020 6/6/2020 10:58 PM HISTORY/REASON FOR EXAM:  Sepsis Short of breath TECHNIQUE/EXAM DESCRIPTION AND NUMBER OF VIEWS: Single portable view of the chest. COMPARISON: 1/25/2020 FINDINGS: Left-sided pacemaker. Hazy opacities in the bilateral lung bases, left more than right There may be small bilateral pleural effusions. No pneumothorax. Stable cardiopericardial silhouette.     Hazy opacities in the bilateral lung bases, left more than right, are similar to prior and could be atelectasis or consolidation. There may be small bilateral pleural effusions.    Dx-esophagus - [id number]    Result Date: 6/10/2020  6/9/2020 3:38 PM HISTORY/REASON FOR EXAM:  Dysphagia, rule out aspiration TECHNIQUE/EXAM DESCRIPTION AND NUMBER OF VIEWS: Esophagus-CINE-Video-CD. The study was performed by the speech language pathologist who administered a variety of barium coated substances under direct fluoroscopic visualization. COMPARISON:  None. FINDINGS: The study was performed by the speech language pathologist who will issue a full report and used a total of 2.7 minutes of fluoroscopy time. INTERPRETING LOCATION: [address] [place] [address], [facility]      Micro:  Results     Procedure Component Value Units Date/Time    BLOOD CULTURE [[id number]] Collected:  06/06/20 2240    Order Status:  Completed Specimen:  Blood from Peripheral Updated:  06/12/20 0100     Significant Indicator NEG     Source BLD     Site PERIPHERAL     Culture Result No growth after 5 days of incubation.    Narrative:       1 of 2 for Blood Culture x 2 sites order. Per Hospital  Policy: Only change Specimen Src: to \"Line\" if specified by  physician order.  No site indicated    BLOOD CULTURE [[id number]] Collected:  06/06/20 2240    Order Status:  " "Completed Specimen:  Blood from Peripheral Updated:  06/12/20 0100     Significant Indicator NEG     Source BLD     Site PERIPHERAL     Culture Result No growth after 5 days of incubation.    Narrative:       2 of 2 blood culture x2  Sites order. Per Hospital Policy:  Only change Specimen Src: to \"Line\" if specified by physician  order.  Left AC    Fungal Culture [[id number]] Collected:  06/07/20 1816    Order Status:  Completed Specimen:  Respirate Updated:  06/11/20 0937     Significant Indicator NEG     Source RESP     Site SPUTUM     Culture Result Culture in progress.    Narrative:       Sputum cultures, induced if needed. If not done within the  last 24 hours  Sputum cultures, induced if needed. If not done within the    Culture Respiratory W/ Grm Stn [[id number]]  (Abnormal)  (Susceptibility) Collected:  06/07/20 1816    Order Status:  Completed Specimen:  Respirate from Sputum Updated:  06/11/20 0937     Significant Indicator POS     Source RESP     Site SPUTUM     Culture Result Light growth usual upper respiratory matt     Gram Stain Result Moderate WBCs.  Moderate Gram negative rods.  Specimen Quality Score: 3+       Culture Result Pseudomonas aeruginosa  Moderate growth  mucoid colony morphology  P.aeruginosa may develop resistance during prolonged therapy  with all antibiotics. Isolates that are initially susceptible  may become resistant within three to four days after  initiation of therapy. Testing of repeat isolates may be  warranted.      Narrative:       Sputum cultures, induced if needed. If not done within the  last 24 hours  Sputum cultures, induced if needed. If not done within the    Susceptibility     Pseudomonas aeruginosa (1)     Antibiotic Interpretation Microscan Method Status    Ceftazidime Sensitive 1.0 mcg/mL E-TEST Final    Ciprofloxacin Intermediate 1.5 mcg/mL E-TEST Final    Amikacin Intermediate 32 mcg/mL E-TEST Final    Gentamicin Intermediate 8 mcg/mL E-TEST Final    Tobramycin " Sensitive 3.0 mcg/mL E-TEST Final    Imipenem Sensitive 0.25 mcg/mL E-TEST Final    Ticarcillin/CA Sensitive 12/2 mcg/mL E-TEST Final                   GRAM STAIN [[id number]] Collected:  06/07/20 1816    Order Status:  Completed Specimen:  Respirate Updated:  06/10/20 0756     Significant Indicator .     Source RESP     Site SPUTUM     Gram Stain Result Moderate WBCs.  Moderate Gram negative rods.  Specimen Quality Score: 3+      Narrative:       Sputum cultures, induced if needed. If not done within the  last 24 hours  Sputum cultures, induced if needed. If not done within the    E-Test [[id number]] Collected:  06/07/20 1816    Order Status:  Completed Specimen:  Sputum Updated:  06/10/20 0756     ETEST Sensitivity INTERIM    Narrative:       Sputum cultures, induced if needed. If not done within the  last 24 hours    Acid Fast Stain [[id number]] Collected:  06/07/20 1816    Order Status:  Completed Specimen:  Respirate Updated:  06/08/20 1812     Significant Indicator NEG     Source RESP     Site SPUTUM     AFB Smear Results No acid fast bacilli seen.    Narrative:       Collected By:[id number] CHARLIE LAM  Collected By:[id number] CHARLIE LAM    AFB Culture [[id number]] Collected:  06/07/20 1816    Order Status:  Completed Specimen:  Respirate from Sputum Updated:  06/08/20 1812     Significant Indicator NEG     Source RESP     Site SPUTUM     Culture Result Culture in progress.     AFB Smear Results No acid fast bacilli seen.    Narrative:       Collected By:[id number] CHARLIE LAM  Collected By:[id number] CHARLIE LAM    CULTURE RESPIRATORY W/ GRM STN [[id number]]     Order Status:  Completed Specimen:  Respirate from Sputum     AFB STAIN ONLY [[id number]]     Order Status:  No result Specimen:  Respirate from Sputum     URINALYSIS,CULTURE IF INDICATED [[id number]]  (Abnormal) Collected:  06/07/20 0631    Order Status:  Completed Specimen:  Urine Updated:  06/07/20 0712     Color Yellow     Character Clear     Specific  Gravity 1.007     Ph 8.5     Glucose Negative mg/dL      Ketones Negative mg/dL      Protein Negative mg/dL      Bilirubin Negative     Urobilinogen, Urine 0.2     Nitrite Negative     Leukocyte Esterase Negative     Occult Blood Negative     Micro Urine Req see below     Comment: Microscopic examination not performed when specimen is clear  and chemically negative for protein, blood, leukocyte esterase  and nitrite.         SARS-CoV-2, PCR (In-House) [[id number]] Collected:  06/07/20 0140    Order Status:  Completed Updated:  06/07/20 0304     SARS-CoV-2 Source NP Swab     SARS-CoV-2 by PCR NotDetected     Comment: [facility] providers: PLEASE REFER TO DE-ESCALATION AND RETESTING PROTOCOL  on [email or web address]  **The [employer] GeneXpert Xpress SARS-CoV-2 Test has been made available for  use under the Emergency Use Authorization (EUA) only.         Narrative:       Is this test for diagnosis or screening?->Diagnosis of ill  patient    COVID/SARS CoV-2 [[id number]] Collected:  06/07/20 0140    Order Status:  Completed Specimen:  Respirate from Nasopharyngeal Updated:  06/07/20 0151     COVID Order Status Received    Narrative:       Is this test for diagnosis or screening?->Diagnosis of ill  patient          Assessment:  Active Hospital Problems    Diagnosis   • *Recurrent pneumonia [J18.9]   • Sepsis ([facility]) [A41.9]   • Elevated troponin [R79.89]   • ESRD on dialysis ([facility]) [N18.6, Z99.2]   • Dysphagia [R13.10]   • Wegener's granulomatosis ([facility]) [M31.30]   • Chronic respiratory failure with hypoxia ([facility]) [J96.11]   • Atrial fibrillation ([facility]) [I48.91]          ASSESSMENT/PLAN:       79 y.o. male admitted 6/6/2020. Pt has a past medical history of severe aortic stenosis and reports he had valve placement in March 2020. History of Wegener's granulomatosis and chronic lung disease with bronchiectasis,  COPD (baseline oxygen 3 L) and on chronic steroids 5 mg daily. He also has a remote history of pulmonary histoplasmosis and is  known to be colonized with Pseudomonas and Mycobacterium avium.  He also has ESRD on hemodialysis.       He was seen by pulmonary and ID at the end of January, 2020 with extensive work-up at that point.  He improved on short course of Zosyn and was discharged with plan to follow-up with pulmonary. Prior work-up on 1/26/2020 with sputum positive for pseudomonas aeruginosa with the testing performed (sensitive to ciprofloxacin, meropenem, tobramycin, amikacin and and Zosyn)  negative sputum AFB, P TANIYA PCR negative, histoplasmosis antibody negative, cocci antibody negative, beta D glucan intermediate.      Admitted complaining of several weeks of increasing shortness of breath, subjective fevers and productive cough.  He states that he took a course of ciprofloxacin approximately 2 weeks ago with no improvement in his symptoms.  He has been off antibiotics for 1 week and had fevers which prompted ED visit.     Pneumonia, acute on chronic disease  Afebrile  No current leukocytosis  Respiratory viral panel negative  CT 6/8 with worsening consolidation, bronchial wall thickening and endobronchial opacity concerning for aspiration rather than infection.  Extensive pulmonary emphysema bronchiectasis and remote granulomatous disease.  Sputum culture from 6/7 +PSAR, mucoid colony morphology which is indicative of resistance  Blood cxs neg  Pulm toilet  Continue Zosyn-failed outpatient cipro  Stop date 6/14/2020    Chronic lung disease with COPD and bronchiectasis  Baseline oxygen 3 L  Speaking in full sentences    Wegeners granulomatosis  Immunosuppressed - on low dose chronic steroids    ESDR on HD   Adjust abx as needed

## 2020-06-13 NOTE — PROGRESS NOTES
Hospital Medicine Daily Progress Note    Date of Service  6/13/2020    Chief Complaint  Shortness of breath    Hospital Course    79 y.o. male with severe aortic stenosis, Wegener's granulomatosis on chronic steroids, end-stage renal disease on hemodialysis, chronic respiratory failure and COPD (chronic baseline 3 L oxygen), with history of MAC and pseudomonal colonization, admitted 6/6/2020 with shortness of breath, along with subjective fevers, chills, and productive cough.  He was noted to be febrile.  Initial work-up showed WBC of 16,000, and creatinine 3.24, with potassium of 3.9.  Troponin was 238, which he had chronically elevated.  Chest x-ray showed hazy opacities in the bilateral lung bases, left more than the right, with small bilateral pleural effusions.  Patient was felt to have sepsis, with lung source and recurrent pneumonia, and he is started on IV Zosyn and azithromycin.  Cultures were sent.  COVID-19 swab was sent, and it was negative.  Nephrology was consulted for resumption of his hemodialysis while in the hospital.  ID was consulted for further antibiotic guidance.  He was also started on aggressive physiotherapy.        Interval Problem Update      Patient eating breakfast  No new complaints  Anxious for discharge home  Shortness of breath controlled    Patient to decide with family feels post completion of antibiotics late June 14  Likely discharge to home after dialysis on Fidelia 15    Consultants/Specialty  Nephrology  ID  Palliative care    Code Status  Full    Disposition  On zosyn, course per ID    Encourage activity  Family refusing home health to limit exposure to covid  Patient has wife support at home      Review of Systems  Review of Systems   Constitutional: Negative for chills, diaphoresis, fever and malaise/fatigue.   Respiratory: Negative for cough and shortness of breath.    Cardiovascular: Negative for palpitations and leg swelling.   Gastrointestinal: Negative.  Negative for  abdominal pain, heartburn and nausea.        Dry mouth   Genitourinary: Negative for dysuria, flank pain and urgency.   Musculoskeletal: Negative for back pain and myalgias.   Neurological: Positive for weakness. Negative for sensory change, speech change, focal weakness and headaches.   Psychiatric/Behavioral: Negative for depression and memory loss. The patient is not nervous/anxious.         Pertinent positives/negatives as mentioned above.     A complete review of systems was personally done by me. All other systems were negative.       Physical Exam  Temp:  [35.8 °C (96.5 °F)-37.1 °C (98.8 °F)] 37.1 °C (98.8 °F)  Pulse:  [80-92] 90  Resp:  [16-18] 16  BP: (107-123)/(53-67) 107/53  SpO2:  [97 %-100 %] 98 %    Physical Exam  Vitals signs reviewed.   Constitutional:       General: He is not in acute distress.     Appearance: Normal appearance. He is not toxic-appearing.      Comments: Elderly, frail   HENT:      Head: Normocephalic and atraumatic.      Right Ear: External ear normal.      Left Ear: External ear normal.      Mouth/Throat:      Mouth: Mucous membranes are moist.      Pharynx: No oropharyngeal exudate.   Eyes:      Extraocular Movements: Extraocular movements intact.      Pupils: Pupils are equal, round, and reactive to light.   Neck:      Musculoskeletal: Normal range of motion and neck supple.   Cardiovascular:      Heart sounds: Normal heart sounds. No murmur. No gallop.    Pulmonary:      Effort: Pulmonary effort is normal. No respiratory distress.      Breath sounds: No stridor. No wheezing.      Comments: Diminished air entry B/L bases.  (+) Bibasilar crackles.  Abdominal:      General: Bowel sounds are normal. There is no distension.      Palpations: Abdomen is soft. There is no mass.   Musculoskeletal: Normal range of motion.         General: No swelling.   Skin:     General: Skin is warm.      Coloration: Skin is not jaundiced or pale.   Neurological:      General: No focal deficit present.       Mental Status: He is alert and oriented to person, place, and time.      Cranial Nerves: No cranial nerve deficit.      Motor: Weakness present.      Gait: Gait normal.   Psychiatric:         Mood and Affect: Mood normal.         Behavior: Behavior normal.              Fluids    Intake/Output Summary (Last 24 hours) at 6/13/2020 1411  Last data filed at 6/13/2020 1300  Gross per 24 hour   Intake 2040 ml   Output 1505 ml   Net 535 ml       Laboratory  Recent Labs     06/11/20  0306   WBC 5.6   RBC 4.04*   HEMOGLOBIN 11.6*   HEMATOCRIT 37.2*   MCV 92.1   MCH 28.7   MCHC 31.2*   RDW 56.7*   PLATELETCT 171   MPV 9.5     Recent Labs     06/11/20  0915 06/12/20  0854 06/13/20  0900   SODIUM 134* 138 136   POTASSIUM 4.7 5.0 4.9   CHLORIDE 96 100 97   CO2 27 27 28   GLUCOSE 107* 99 114*   BUN 25* 37* 27*   CREATININE 2.74* 3.65* 2.76*   CALCIUM 9.1 9.4 9.5                   Imaging  DX-ESOPHAGUS - [id number]   Final Result      CT-CHEST (THORAX) W/O   Final Result      Worsening dependent consolidation, bronchial wall thickening and endobronchial opacity. This is worrisome for aspiration favored over infection and there is some new adenopathy that is more likely reactive than malignant      Extensive chronic pulmonary abnormality with emphysema, bronchiectasis, scarring, remote granulomatous disease      Coronary artery disease, prosthetic aortic valve, cardiac pacer      DX-CHEST-PORTABLE (1 VIEW)   Final Result         Hazy opacities in the bilateral lung bases, left more than right, are similar to prior and could be atelectasis or consolidation.      There may be small bilateral pleural effusions.           Assessment/Plan  * Recurrent pneumonia- (present on admission)  Assessment & Plan  -Has had recurrent pneumonias in the past.  X-ray showed hazy opacities in the bilateral lung base, left more than the right.  Procalcitonin elevated.  Has leukocytosis, and with subjective fevers, chills, and cough. COVID-19  negative. With history of MAC and pseudomonal colonization.  -Continue Zosyn and azithromycin.  Follow cultures.    6/9 cx neg, increasing proCal, per ID, change to merrem, ID to adjust    - CT of the chest - increasing opacity, emphysema, reactive  ?aspiration  F/u SLP    Thrush- add nystatin  -Continue to trend WBC count, and procalcitonin.  Follow sputum cultures.  -ID following, appreciate inputs.   -Continue respiratory support, oxygen supplement.  Currently stable at baseline home oxygen.  Continue symptom control with antitussives, with Robitussin, and Mucinex, along with Tessalon PRN. Continue aggressive chest physiotherapy.    6/10 sputum cx, polymicrobial  F/u am labs  Cont abx  Sob unchanged    6/11 Pseudomonas on respiratory cultures, sensitivities available, to ciprofloxacin  Per ID, continue antibiotics  Improving procalcitonin down to 2.56, status post dialysis  Antibiotics per ID recommendations    6/12 abx through 6/14 per ID  Encourage activity  Now refusing h/h    6/13 Zosyn to complete through June 14  Patient failed outpatient Cipro previously      Sepsis (HCC)- (present on admission)  Assessment & Plan  -This is Sepsis Present on admission.  -SIRS criteria identified on my evaluation include: Fever, with temperature greater than 101 deg F, Tachypnea, with respirations greater than 20 per minute and Leukocytosis, with WBC greater than 12,000  -Source is likely lung  -Sepsis protocol initiated.   -Fluid resuscitation ordered per protocol.  Continue to hold off on maintenance IV fluids as he is not hypotensive, and he is prone to fluid overload given his ESRD.  -Continue IV antibiotics as appropriate for source of sepsis.  Trend WBC count.  Follow cultures.  -While organ dysfunction may be noted elsewhere in this problem list or in the chart, degree of organ dysfunction does not meet CMS criteria for severe sepsis    Elevated troponin- (present on admission)  Assessment & Plan  -Review of the  medical records indicates that this is chronically elevated, current troponin level is close to his prior baseline.  No ACS.  Continue to monitor.    ESRD on dialysis (HCC)- (present on admission)  Assessment & Plan  - Patient gets dialysis on Monday, Wednesday, and Friday.    - Nephrology on board for continued hemodialysis while he is in the hospital.    6/11 plan discharge home pending ID clearance    6/12 [facility] [place]      Dysphagia  Assessment & Plan  6/10 seen by slp, ok for regular diet  Esophogram pending    Wegener's granulomatosis (HCC)- (present on admission)  Assessment & Plan  -Continue chronic home low-dose steroid.  -Now dialysis dependent.    Chronic respiratory failure with hypoxia (HCC)- (present on admission)  Assessment & Plan  -He is at his baseline oxygen supplement (3 L).  -Continue management of pneumonia as above.  Continue RT protocol, and oxygen supplements.    Atrial fibrillation (HCC)- (present on admission)  Assessment & Plan  -Rate and rhythm controlled.  Patient is not on chronic anticoagulation.  Continue beta-blocker.  Monitor closely.    Hyperlipidemia- (present on admission)  Assessment & Plan  -Continue Crestor.       VTE prophylaxis: heparin SQ

## 2020-06-13 NOTE — RESPIRATORY CARE
Oxygen Rounds      Patient found on    O2 L/m:  3  Oxygen device:  __snc______   Spo2: 97%        Respiratory device skin site inspection completed.

## 2020-06-13 NOTE — PROGRESS NOTES
Pt is currently in bed, resting. No c/o pain. Zosyn infusing. No bleeding at JAMES fistula site. 3 L oxygen. Seen by RT for treatments. Able to make needs known. Ambulates with walker, 1x assist. All needs currently met.  Call light and belongings within reach. Bed in lowest and locked position.

## 2020-06-14 LAB
ANION GAP SERPL CALC-SCNC: 11 MMOL/L (ref 7–16)
BUN SERPL-MCNC: 38 MG/DL (ref 8–22)
CALCIUM SERPL-MCNC: 9.3 MG/DL (ref 8.5–10.5)
CHLORIDE SERPL-SCNC: 102 MMOL/L (ref 96–112)
CO2 SERPL-SCNC: 26 MMOL/L (ref 20–33)
CREAT SERPL-MCNC: 3.84 MG/DL (ref 0.5–1.4)
GLUCOSE SERPL-MCNC: 101 MG/DL (ref 65–99)
POTASSIUM SERPL-SCNC: 5.2 MMOL/L (ref 3.6–5.5)
SODIUM SERPL-SCNC: 139 MMOL/L (ref 135–145)

## 2020-06-14 PROCEDURE — 700102 HCHG RX REV CODE 250 W/ 637 OVERRIDE(OP): Performed by: INTERNAL MEDICINE

## 2020-06-14 PROCEDURE — A9270 NON-COVERED ITEM OR SERVICE: HCPCS | Performed by: INTERNAL MEDICINE

## 2020-06-14 PROCEDURE — A9270 NON-COVERED ITEM OR SERVICE: HCPCS | Performed by: HOSPITALIST

## 2020-06-14 PROCEDURE — 36415 COLL VENOUS BLD VENIPUNCTURE: CPT

## 2020-06-14 PROCEDURE — 700101 HCHG RX REV CODE 250: Performed by: INTERNAL MEDICINE

## 2020-06-14 PROCEDURE — 94760 N-INVAS EAR/PLS OXIMETRY 1: CPT

## 2020-06-14 PROCEDURE — 99232 SBSQ HOSP IP/OBS MODERATE 35: CPT | Performed by: INTERNAL MEDICINE

## 2020-06-14 PROCEDURE — 700111 HCHG RX REV CODE 636 W/ 250 OVERRIDE (IP): Performed by: INTERNAL MEDICINE

## 2020-06-14 PROCEDURE — 94669 MECHANICAL CHEST WALL OSCILL: CPT

## 2020-06-14 PROCEDURE — 94640 AIRWAY INHALATION TREATMENT: CPT

## 2020-06-14 PROCEDURE — 80048 BASIC METABOLIC PNL TOTAL CA: CPT

## 2020-06-14 PROCEDURE — 700105 HCHG RX REV CODE 258: Performed by: INTERNAL MEDICINE

## 2020-06-14 PROCEDURE — 700102 HCHG RX REV CODE 250 W/ 637 OVERRIDE(OP): Performed by: HOSPITALIST

## 2020-06-14 PROCEDURE — 770006 HCHG ROOM/CARE - MED/SURG/GYN SEMI*

## 2020-06-14 PROCEDURE — 700111 HCHG RX REV CODE 636 W/ 250 OVERRIDE (IP): Performed by: HOSPITALIST

## 2020-06-14 RX ADMIN — FINASTERIDE 5 MG: 5 TABLET, FILM COATED ORAL at 04:37

## 2020-06-14 RX ADMIN — SODIUM CHLORIDE 4 ML: 7 NEBU SOLN,3 % NEBU at 21:27

## 2020-06-14 RX ADMIN — SEVELAMER CARBONATE 800 MG: 800 TABLET, FILM COATED ORAL at 18:27

## 2020-06-14 RX ADMIN — PREDNISONE 5 MG: 5 TABLET ORAL at 04:37

## 2020-06-14 RX ADMIN — GUAIFENESIN 600 MG: 600 TABLET, EXTENDED RELEASE ORAL at 18:27

## 2020-06-14 RX ADMIN — PIPERACILLIN AND TAZOBACTAM 4.5 G: 4; .5 INJECTION, POWDER, LYOPHILIZED, FOR SOLUTION INTRAVENOUS; PARENTERAL at 18:27

## 2020-06-14 RX ADMIN — BUDESONIDE AND FORMOTEROL FUMARATE DIHYDRATE 2 PUFF: 160; 4.5 AEROSOL RESPIRATORY (INHALATION) at 07:58

## 2020-06-14 RX ADMIN — ALBUTEROL SULFATE 2.5 MG: 2.5 SOLUTION RESPIRATORY (INHALATION) at 21:27

## 2020-06-14 RX ADMIN — ALBUTEROL SULFATE 2.5 MG: 2.5 SOLUTION RESPIRATORY (INHALATION) at 14:35

## 2020-06-14 RX ADMIN — METOPROLOL SUCCINATE 25 MG: 25 TABLET, EXTENDED RELEASE ORAL at 04:36

## 2020-06-14 RX ADMIN — SEVELAMER CARBONATE 800 MG: 800 TABLET, FILM COATED ORAL at 13:28

## 2020-06-14 RX ADMIN — ROSUVASTATIN CALCIUM 20 MG: 20 TABLET, FILM COATED ORAL at 04:37

## 2020-06-14 RX ADMIN — NYSTATIN 500000 UNITS: 100000 SUSPENSION ORAL at 18:27

## 2020-06-14 RX ADMIN — ALBUTEROL SULFATE 2.5 MG: 2.5 SOLUTION RESPIRATORY (INHALATION) at 07:58

## 2020-06-14 RX ADMIN — CLOPIDOGREL BISULFATE 75 MG: 75 TABLET ORAL at 18:27

## 2020-06-14 RX ADMIN — ALBUTEROL SULFATE 2.5 MG: 2.5 SOLUTION RESPIRATORY (INHALATION) at 11:24

## 2020-06-14 RX ADMIN — TAMSULOSIN HYDROCHLORIDE 0.4 MG: 0.4 CAPSULE ORAL at 04:37

## 2020-06-14 RX ADMIN — HEPARIN SODIUM 5000 UNITS: 5000 INJECTION, SOLUTION INTRAVENOUS; SUBCUTANEOUS at 13:28

## 2020-06-14 RX ADMIN — METOPROLOL SUCCINATE 25 MG: 25 TABLET, EXTENDED RELEASE ORAL at 18:27

## 2020-06-14 RX ADMIN — PIPERACILLIN AND TAZOBACTAM 4.5 G: 4; .5 INJECTION, POWDER, LYOPHILIZED, FOR SOLUTION INTRAVENOUS; PARENTERAL at 04:37

## 2020-06-14 RX ADMIN — NYSTATIN 500000 UNITS: 100000 SUSPENSION ORAL at 13:28

## 2020-06-14 RX ADMIN — HEPARIN SODIUM 5000 UNITS: 5000 INJECTION, SOLUTION INTRAVENOUS; SUBCUTANEOUS at 04:36

## 2020-06-14 RX ADMIN — SODIUM CHLORIDE 4 ML: 7 NEBU SOLN,3 % NEBU at 07:57

## 2020-06-14 RX ADMIN — SEVELAMER CARBONATE 800 MG: 800 TABLET, FILM COATED ORAL at 09:19

## 2020-06-14 RX ADMIN — BUDESONIDE AND FORMOTEROL FUMARATE DIHYDRATE 2 PUFF: 160; 4.5 AEROSOL RESPIRATORY (INHALATION) at 20:58

## 2020-06-14 RX ADMIN — GUAIFENESIN 600 MG: 600 TABLET, EXTENDED RELEASE ORAL at 04:37

## 2020-06-14 RX ADMIN — NYSTATIN 500000 UNITS: 100000 SUSPENSION ORAL at 09:19

## 2020-06-14 ASSESSMENT — ENCOUNTER SYMPTOMS
MYALGIAS: 0
ABDOMINAL PAIN: 0
FLANK PAIN: 0
GASTROINTESTINAL NEGATIVE: 1
DEPRESSION: 0
FOCAL WEAKNESS: 0
CONSTIPATION: 0
DIZZINESS: 0
SPUTUM PRODUCTION: 1
VOMITING: 0
HEADACHES: 0
NERVOUS/ANXIOUS: 0
ROS GI COMMENTS: DRY MOUTH
NAUSEA: 0
COUGH: 1
FEVER: 0
WEAKNESS: 1
CHILLS: 0
SHORTNESS OF BREATH: 0

## 2020-06-14 NOTE — PROGRESS NOTES
Received report from night shift nurse at shift change. Pt is AOx4. Pt is in a pleasant mood and is slightly lethargic. Pt reports no pain. Bed is in lowest locked position, call light and all belongings are within reach. Will continue to monitor throughout shift.

## 2020-06-14 NOTE — CARE PLAN
Problem: Safety  Goal: Will remain free from falls  6/13/2020 2202 by Angela Andrade, Student  Outcome: PROGRESSING AS EXPECTED  Note: Patient is aware of how to use call light and the call light along with all belongings are within reach. Pt has non-slip socks on. Bed is in the lowest locked position and the bed alarm is on. Will continue to monitor pt throughout shift.        Problem: Respiratory:  Goal: Respiratory status will improve  Outcome: PROGRESSING AS EXPECTED  Note: Respiratory assessment done to check lung function. Will continue to monitor breathing effort and pattern.

## 2020-06-14 NOTE — PROGRESS NOTES
Infectious Disease Progress Note    Author: Christine Manzo M.D. Date & Time of service: 2020  2:30 PM    Chief Complaint:  Pneumonia     Interval History:   79 y.o. male  admitted 2020. +severe aortic stenosis and reports he had valve placement in 2020. History of Wegener's granulomatosis and chronic lung disease with bronchiectasis,  COPD (baseline oxygen 3 L) and on chronic steroids 5 mg daily. 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  He also has ESRD on hemodialysis.   101.3, O2 3 L NC, CT chest pending. Pt states he feels a little bit better but still with ongoing fever and productive cough.   \   AF, O2 3.5 L NC, Studies reviewed- swallow exam pending. Improvement in fevers and states he is feeling a little better.  Ongoing productive cough.     AF WBC 5.6 no new complaints   AF seen in HD- states cough improved; +sputum production (decreased) No new complaints. Denies Se abx   AF anxious for discharge home. No new complaints   AF getting breathing treatment-decreased sputum production. Denies dyspnea. No new complaints     Review of Systems:  Review of Systems   Constitutional: Negative for chills, fever and malaise/fatigue.   Respiratory: Positive for cough and sputum production. Negative for shortness of breath.         Improved   Gastrointestinal: Negative for abdominal pain, constipation, nausea and vomiting.   All other systems reviewed and are negative.      Hemodynamics:  Temp (24hrs), Av.8 °C (98.3 °F), Min:36.7 °C (98 °F), Max:36.9 °C (98.5 °F)  Temperature: 36.8 °C (98.3 °F)  Pulse  Av.3  Min: 77  Max: 115   Blood Pressure : 103/58       Physical Exam:  Physical Exam  Vitals signs and nursing note reviewed.   Constitutional:       General: He is not in acute distress.     Appearance: He is not toxic-appearing or diaphoretic.      Comments: Chronically ill   HENT:       Nose: No congestion or rhinorrhea.      Mouth/Throat:      Pharynx: No oropharyngeal exudate.   Eyes:      General: No scleral icterus.     Extraocular Movements: Extraocular movements intact.      Pupils: Pupils are equal, round, and reactive to light.   Cardiovascular:      Rate and Rhythm: Normal rate. Rhythm irregular.      Heart sounds: Murmur present.   Pulmonary:      Effort: Pulmonary effort is normal. No respiratory distress.      Breath sounds: Rhonchi present. No wheezing.      Comments: Diffuse crackles/rhonchi bilaterally, decreased breath sounds bilaterally  Abdominal:      General: Bowel sounds are normal. There is no distension.      Palpations: Abdomen is soft.      Tenderness: There is no abdominal tenderness. There is no guarding or rebound.   Musculoskeletal:      Comments: RUE AVF   Skin:     General: Skin is warm.      Coloration: Skin is not jaundiced.   Neurological:      General: No focal deficit present.      Mental Status: He is alert and oriented to person, place, and time.      Comments: [facility]   Psychiatric:         Mood and Affect: Mood normal.         Behavior: Behavior normal.         Meds:    Current Facility-Administered Medications:   •  lidocaine  •  nystatin  •  benzocaine-menthol  •  [COMPLETED] piperacillin-tazobactam **AND** piperacillin-tazobactam  •  heparin  •  albuterol  •  sevelamer carbonate  •  rosuvastatin  •  predniSONE  •  metoprolol SR  •  budesonide-formoterol  •  clopidogrel  •  finasteride  •  tamsulosin  •  senna-docusate **AND** polyethylene glycol/lytes **AND** magnesium hydroxide **AND** bisacodyl  •  Respiratory Therapy Consult  •  heparin  •  acetaminophen  •  guaiFENesin dextromethorphan  •  guaiFENesin ER  •  benzonatate  •  ondansetron  •  sodium chloride    Labs:  No results for input(s): WBC, RBC, HEMOGLOBIN, HEMATOCRIT, MCV, MCH, RDW, PLATELETCT, MPV, NEUTSPOLYS, LYMPHOCYTES, MONOCYTES, EOSINOPHILS, BASOPHILS, RBCMORPHOLO in the last 72  hours.  Recent Labs     06/12/20  0854 06/13/20  0900 06/14/20  0803   SODIUM 138 136 139   POTASSIUM 5.0 4.9 5.2   CHLORIDE 100 97 102   CO2 27 28 26   GLUCOSE 99 114* 101*   BUN 37* 27* 38*     Recent Labs     06/12/20  0854 06/13/20  0900 06/14/20  0803   CREATININE 3.65* 2.76* 3.84*       Imaging:  Ct-chest (thorax) W/o    Result Date: 6/8/2020 6/8/2020 5:37 PM HISTORY/REASON FOR EXAM: Wegener's granulomatosis and Mycobacterium avium intracellulare. Pulmonary histoplasmosis. Follow-up TECHNIQUE/EXAM DESCRIPTION: CT thorax without contrast. Noncontrast helical scanning of the chest was obtained from the lung apices through the adrenal glands. Low dose optimization technique was utilized for this CT exam including automated exposure control and adjustment of the mA and/or kV according to patient size. COMPARISON:  7/27/2017 CT FINDINGS: Left transsubclavian pacer is present. The generator obscures structures due to artifact. Lung windows demonstrate similar extensive abnormality. There is worsening lower lobe bronchial wall thickening and subpleural linear opacities. There is some increasing endobronchial opacity and espinoza consolidation is seen at the posterior costophrenic sulci. There is bronchiectasis seen best in the middle lobe, unchanged, medial segment. Lungs are otherwise hyperinflated with some paraseptal emphysema seen. Calcified right upper lobe nodule again noted Soft tissue windows demonstrate similar left heart enlargement. Prosthetic aortic valve. Note that lack of contrast does diminish the sensitivity of the study in evaluation of the soft tissues. Calcified right hilar and mediastinal nodes. An aortopulmonary node is again seen, partially calcified and this has enlarged 12 mm short axis There is no pleural or pericardial effusion. No acute bony abnormality.     Worsening dependent consolidation, bronchial wall thickening and endobronchial opacity. This is worrisome for aspiration favored over  "infection and there is some new adenopathy that is more likely reactive than malignant Extensive chronic pulmonary abnormality with emphysema, bronchiectasis, scarring, remote granulomatous disease Coronary artery disease, prosthetic aortic valve, cardiac pacer    Dx-chest-portable (1 View)    Result Date: 6/6/2020 6/6/2020 10:58 PM HISTORY/REASON FOR EXAM:  Sepsis Short of breath TECHNIQUE/EXAM DESCRIPTION AND NUMBER OF VIEWS: Single portable view of the chest. COMPARISON: 1/25/2020 FINDINGS: Left-sided pacemaker. Hazy opacities in the bilateral lung bases, left more than right There may be small bilateral pleural effusions. No pneumothorax. Stable cardiopericardial silhouette.     Hazy opacities in the bilateral lung bases, left more than right, are similar to prior and could be atelectasis or consolidation. There may be small bilateral pleural effusions.    Dx-esophagus - [id number]-[id number]-xi    Result Date: 6/10/2020  6/9/2020 3:38 PM HISTORY/REASON FOR EXAM:  Dysphagia, rule out aspiration TECHNIQUE/EXAM DESCRIPTION AND NUMBER OF VIEWS: Esophagus-CINE-Video-CD. The study was performed by the speech language pathologist who administered a variety of barium coated substances under direct fluoroscopic visualization. COMPARISON:  None. FINDINGS: The study was performed by the speech language pathologist who will issue a full report and used a total of 2.7 minutes of fluoroscopy time. INTERPRETING LOCATION: [address] [place] [address], [facility]      Micro:  Results     Procedure Component Value Units Date/Time    BLOOD CULTURE [[id number]] Collected:  06/06/20 2240    Order Status:  Completed Specimen:  Blood from Peripheral Updated:  06/12/20 0100     Significant Indicator NEG     Source BLD     Site PERIPHERAL     Culture Result No growth after 5 days of incubation.    Narrative:       1 of 2 for Blood Culture x 2 sites order. Per Hospital  Policy: Only change Specimen Src: to \"Line\" if specified by  physician order.  No site " "indicated    BLOOD CULTURE [[id number]] Collected:  06/06/20 2240    Order Status:  Completed Specimen:  Blood from Peripheral Updated:  06/12/20 0100     Significant Indicator NEG     Source BLD     Site PERIPHERAL     Culture Result No growth after 5 days of incubation.    Narrative:       2 of 2 blood culture x2  Sites order. Per Hospital Policy:  Only change Specimen Src: to \"Line\" if specified by physician  order.  Left AC    Fungal Culture [[id number]] Collected:  06/07/20 1816    Order Status:  Completed Specimen:  Respirate Updated:  06/11/20 0937     Significant Indicator NEG     Source RESP     Site SPUTUM     Culture Result Culture in progress.    Narrative:       Sputum cultures, induced if needed. If not done within the  last 24 hours  Sputum cultures, induced if needed. If not done within the    Culture Respiratory W/ Grm Stn [[id number]]  (Abnormal)  (Susceptibility) Collected:  06/07/20 1816    Order Status:  Completed Specimen:  Respirate from Sputum Updated:  06/11/20 0937     Significant Indicator POS     Source RESP     Site SPUTUM     Culture Result Light growth usual upper respiratory matt     Gram Stain Result Moderate WBCs.  Moderate Gram negative rods.  Specimen Quality Score: 3+       Culture Result Pseudomonas aeruginosa  Moderate growth  mucoid colony morphology  P.aeruginosa may develop resistance during prolonged therapy  with all antibiotics. Isolates that are initially susceptible  may become resistant within three to four days after  initiation of therapy. Testing of repeat isolates may be  warranted.      Narrative:       Sputum cultures, induced if needed. If not done within the  last 24 hours  Sputum cultures, induced if needed. If not done within the    Susceptibility     Pseudomonas aeruginosa (1)     Antibiotic Interpretation Microscan Method Status    Ceftazidime Sensitive 1.0 mcg/mL E-TEST Final    Ciprofloxacin Intermediate 1.5 mcg/mL E-TEST Final    Amikacin Intermediate 32 " mcg/mL E-TEST Final    Gentamicin Intermediate 8 mcg/mL E-TEST Final    Tobramycin Sensitive 3.0 mcg/mL E-TEST Final    Imipenem Sensitive 0.25 mcg/mL E-TEST Final    Ticarcillin/CA Sensitive 12/2 mcg/mL E-TEST Final                   GRAM STAIN [[id number]] Collected:  06/07/20 1816    Order Status:  Completed Specimen:  Respirate Updated:  06/10/20 0756     Significant Indicator .     Source RESP     Site SPUTUM     Gram Stain Result Moderate WBCs.  Moderate Gram negative rods.  Specimen Quality Score: 3+      Narrative:       Sputum cultures, induced if needed. If not done within the  last 24 hours  Sputum cultures, induced if needed. If not done within the    E-Test [[id number]] Collected:  06/07/20 1816    Order Status:  Completed Specimen:  Sputum Updated:  06/10/20 0756     ETEST Sensitivity INTERIM    Narrative:       Sputum cultures, induced if needed. If not done within the  last 24 hours    Acid Fast Stain [[id number]] Collected:  06/07/20 1816    Order Status:  Completed Specimen:  Respirate Updated:  06/08/20 1812     Significant Indicator NEG     Source RESP     Site SPUTUM     AFB Smear Results No acid fast bacilli seen.    Narrative:       Collected By:[id number] CHARLIE LAM  Collected By:[id number] CHARLIE LAM    AFB Culture [[id number]] Collected:  06/07/20 1816    Order Status:  Completed Specimen:  Respirate from Sputum Updated:  06/08/20 1812     Significant Indicator NEG     Source RESP     Site SPUTUM     Culture Result Culture in progress.     AFB Smear Results No acid fast bacilli seen.    Narrative:       Collected By:[id number] CHARLIE LAM  Collected By:[id number] CHARLIE LAM    CULTURE RESPIRATORY W/ GRM STN [[id number]]     Order Status:  Completed Specimen:  Respirate from Sputum           Assessment:  Active Hospital Problems    Diagnosis   • *Recurrent pneumonia [J18.9]   • Sepsis (HCC) [A41.9]   • Elevated troponin [R79.89]   • ESRD on dialysis (HCC) [N18.6, Z99.2]   • Dysphagia [R13.10]   •  Wegener's granulomatosis ([facility]) [M31.30]   • Chronic respiratory failure with hypoxia ([facility]) [J96.11]   • Atrial fibrillation ([facility]) [I48.91]          ASSESSMENT/PLAN:       79 y.o. male admitted 6/6/2020. Pt has a past medical history of severe aortic stenosis and reports he had valve placement in March 2020. History of Wegener's granulomatosis and chronic lung disease with bronchiectasis,  COPD (baseline oxygen 3 L) and on chronic steroids 5 mg daily. 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  He also has ESRD on hemodialysis.       He was seen by pulmonary and ID at the end of January, 2020 with extensive work-up at that point.  He improved on short course of Zosyn and was discharged with plan to follow-up with pulmonary. Prior work-up on 1/26/2020 with sputum positive for pseudomonas aeruginosa with the testing performed (sensitive to ciprofloxacin, meropenem, tobramycin, amikacin and and Zosyn)  negative sputum AFB, P TANIYA PCR negative, histoplasmosis antibody negative, cocci antibody negative, beta D glucan intermediate.      Admitted complaining of several weeks of increasing shortness of breath, subjective fevers and productive cough.  He states that he took a course of ciprofloxacin approximately 2 weeks ago with no improvement in his symptoms.  He has been off antibiotics for 1 week and had fevers which prompted ED visit.     Pneumonia, acute on chronic disease  Afebrile  No leukocytosis at last check  Respiratory viral panel negative  CT 6/8 with worsening consolidation, bronchial wall thickening and endobronchial opacity concerning for aspiration rather than infection.  Extensive pulmonary emphysema bronchiectasis and remote granulomatous disease.  Sputum culture from 6/7 +PSAR, mucoid colony morphology which is indicative of resistance  Blood cxs neg  Pulm toilet  Completes Zosyn in am-failed outpatient cipro  Stop date  6/14/2020    Chronic lung disease with COPD and bronchiectasis  O2 and resp as needed  Pulm toilet  Speaking in full sentences    Wegeners granulomatosis  Immunosuppressed - on low dose chronic steroids    ESDR on HD   Adjust abx as needed    Will sign off-please reconsult if needed  FU ID [id number]

## 2020-06-14 NOTE — CARE PLAN
Problem: Safety  Goal: Will remain free from injury  Outcome: PROGRESSING AS EXPECTED  Note: Fall precautions in place. Bed in lowest position. Non-skid socks in place. Personal possessions within reach. Mobility sign on door. Bed-alarm on. Call light within reach. Pt educated regarding fall prevention and states understanding.       Problem: Venous Thromboembolism (VTW)/Deep Vein Thrombosis (DVT) Prevention:  Goal: Patient will participate in Venous Thrombosis (VTE)/Deep Vein Thrombosis (DVT)Prevention Measures  Outcome: PROGRESSING AS EXPECTED  Note: Pt to receive VTE prophylactic measures as ordered. Will continue to provide medication as prescribed by the MAR.

## 2020-06-14 NOTE — PROGRESS NOTES
Received bedside report and accepted care of patient. Pt is currently A&O x4, on baseline O2 3L via NC, and communicates kathrin and responds to commands appropriately. Discussed POC with the pt and possible scheduling of hemodialysis tomorrow morning in-patient prior to discharge. Patient currently resting in bed in no visible or stated signs of distress. Bed in locked and lowest position. Call light and personal possessions within reach. Patient educated about use of call light and verbalized understanding.

## 2020-06-14 NOTE — PROGRESS NOTES
Hospital Medicine Daily Progress Note    Date of Service  6/14/2020    Chief Complaint  Shortness of breath    Hospital Course    79 y.o. male with severe aortic stenosis, Wegener's granulomatosis on chronic steroids, end-stage renal disease on hemodialysis, chronic respiratory failure and COPD (chronic baseline 3 L oxygen), with history of MAC and pseudomonal colonization, admitted 6/6/2020 with shortness of breath, along with subjective fevers, chills, and productive cough.  He was noted to be febrile.  Initial work-up showed WBC of 16,000, and creatinine 3.24, with potassium of 3.9.  Troponin was 238, which he had chronically elevated.  Chest x-ray showed hazy opacities in the bilateral lung bases, left more than the right, with small bilateral pleural effusions.  Patient was felt to have sepsis, with lung source and recurrent pneumonia, and he is started on IV Zosyn and azithromycin.  Cultures were sent.  COVID-19 swab was sent, and it was negative.  Nephrology was consulted for resumption of his hemodialysis while in the hospital.  ID was consulted for further antibiotic guidance.  He was also started on aggressive physiotherapy.        Interval Problem Update    Patient appears comfortable.  Improving shortness of breath.  Decreased rhonchi  To complete antibiotics this evening    Normal outpatient dialysis scheduled for 1 PM tomorrow  To follow-up with nephrology regarding early a.m. dialysis prior to discharge order for patient to follow-up as outpatient  Patient aware      Consultants/Specialty  Nephrology  ID  Palliative care    Code Status  Full    Disposition  On zosyn, course per ID    Encourage activity  Family refusing home health to limit exposure to covid  Patient has wife support at home    To home in a.m. after dialysis versus predialysis and for outpatient 1 PM dialysis      Review of Systems  Review of Systems   Constitutional: Negative for chills, fever and malaise/fatigue.   Respiratory:  Negative for shortness of breath.    Cardiovascular: Negative for chest pain and leg swelling.   Gastrointestinal: Negative.  Negative for abdominal pain and nausea.        Dry mouth   Genitourinary: Negative for dysuria, flank pain and urgency.   Musculoskeletal: Negative for myalgias.   Neurological: Positive for weakness. Negative for dizziness, focal weakness and headaches.   Psychiatric/Behavioral: Negative for depression. The patient is not nervous/anxious.         Pertinent positives/negatives as mentioned above.     A complete review of systems was personally done by me. All other systems were negative.       Physical Exam  Temp:  [36.7 °C (98 °F)-36.9 °C (98.5 °F)] 36.8 °C (98.3 °F)  Pulse:  [81-99] 81  Resp:  [14-20] 14  BP: (103-119)/(48-65) 103/58  SpO2:  [94 %-100 %] 99 %    Physical Exam  Vitals signs reviewed.   Constitutional:       General: He is not in acute distress.     Appearance: Normal appearance. He is not ill-appearing, toxic-appearing or diaphoretic.      Comments: Elderly, frail   HENT:      Head: Normocephalic and atraumatic.      Right Ear: External ear normal.      Left Ear: External ear normal.      Mouth/Throat:      Mouth: Mucous membranes are moist.      Pharynx: No oropharyngeal exudate.   Eyes:      Extraocular Movements: Extraocular movements intact.      Pupils: Pupils are equal, round, and reactive to light.   Neck:      Musculoskeletal: Normal range of motion and neck supple.   Cardiovascular:      Heart sounds: Normal heart sounds. No murmur.   Pulmonary:      Effort: Pulmonary effort is normal. No respiratory distress.      Breath sounds: No stridor. No wheezing or rhonchi.      Comments: Diminished air entry B/L bases.  (+) Bibasilar crackles.  Abdominal:      General: Bowel sounds are normal. There is no distension.      Palpations: Abdomen is soft.   Musculoskeletal: Normal range of motion.         General: No swelling.   Skin:     General: Skin is warm.      Coloration:  Skin is not jaundiced.   Neurological:      General: No focal deficit present.      Mental Status: He is alert and oriented to person, place, and time.      Cranial Nerves: No cranial nerve deficit.      Sensory: No sensory deficit.      Motor: Weakness present.   Psychiatric:         Mood and Affect: Mood normal.         Behavior: Behavior normal.              Fluids    Intake/Output Summary (Last 24 hours) at 6/14/2020 1340  Last data filed at 6/14/2020 0918  Gross per 24 hour   Intake 688 ml   Output 100 ml   Net 588 ml       Laboratory      Recent Labs     06/12/20  0854 06/13/20  0900 06/14/20  0803   SODIUM 138 136 139   POTASSIUM 5.0 4.9 5.2   CHLORIDE 100 97 102   CO2 27 28 26   GLUCOSE 99 114* 101*   BUN 37* 27* 38*   CREATININE 3.65* 2.76* 3.84*   CALCIUM 9.4 9.5 9.3                   Imaging  DX-ESOPHAGUS - [id number]   Final Result      CT-CHEST (THORAX) W/O   Final Result      Worsening dependent consolidation, bronchial wall thickening and endobronchial opacity. This is worrisome for aspiration favored over infection and there is some new adenopathy that is more likely reactive than malignant      Extensive chronic pulmonary abnormality with emphysema, bronchiectasis, scarring, remote granulomatous disease      Coronary artery disease, prosthetic aortic valve, cardiac pacer      DX-CHEST-PORTABLE (1 VIEW)   Final Result         Hazy opacities in the bilateral lung bases, left more than right, are similar to prior and could be atelectasis or consolidation.      There may be small bilateral pleural effusions.           Assessment/Plan  * Recurrent pneumonia- (present on admission)  Assessment & Plan  -Has had recurrent pneumonias in the past.  X-ray showed hazy opacities in the bilateral lung base, left more than the right.  Procalcitonin elevated.  Has leukocytosis, and with subjective fevers, chills, and cough. COVID-19 negative. With history of MAC and pseudomonal colonization.  -Continue Zosyn and  azithromycin.  Follow cultures.    6/9 cx neg, increasing proCal, per ID, change to merrem, ID to adjust    - CT of the chest - increasing opacity, emphysema, reactive  ?aspiration  F/u SLP    Thrush- add nystatin  -Continue to trend WBC count, and procalcitonin.  Follow sputum cultures.  -ID following, appreciate inputs.   -Continue respiratory support, oxygen supplement.  Currently stable at baseline home oxygen.  Continue symptom control with antitussives, with Robitussin, and Mucinex, along with Tessalon PRN. Continue aggressive chest physiotherapy.    6/10 sputum cx, polymicrobial  F/u am labs  Cont abx  Sob unchanged    6/11 Pseudomonas on respiratory cultures, sensitivities available, to ciprofloxacin  Per ID, continue antibiotics  Improving procalcitonin down to 2.56, status post dialysis  Antibiotics per ID recommendations    6/12 abx through 6/14 per ID  Encourage activity  Now refusing h/h    6/13 Zosyn to complete through June 14  Patient failed outpatient Cipro previously    6/14 to complete antibiotics late tonight  Plan for discharge home in a.m. after dialysis  Nephrology updated      Sepsis (HCC)- (present on admission)  Assessment & Plan  -This is Sepsis Present on admission.  -SIRS criteria identified on my evaluation include: Fever, with temperature greater than 101 deg F, Tachypnea, with respirations greater than 20 per minute and Leukocytosis, with WBC greater than 12,000  -Source is likely lung  -Sepsis protocol initiated.   -Fluid resuscitation ordered per protocol.  Continue to hold off on maintenance IV fluids as he is not hypotensive, and he is prone to fluid overload given his ESRD.  -Continue IV antibiotics as appropriate for source of sepsis.  Trend WBC count.  Follow cultures.  -While organ dysfunction may be noted elsewhere in this problem list or in the chart, degree of organ dysfunction does not meet CMS criteria for severe sepsis    Elevated troponin- (present on  admission)  Assessment & Plan  -Review of the medical records indicates that this is chronically elevated, current troponin level is close to his prior baseline.  No ACS.  Continue to monitor.    ESRD on dialysis (HCC)- (present on admission)  Assessment & Plan  - Patient gets dialysis on Monday, Wednesday, and Friday.    - Nephrology on board for continued hemodialysis while he is in the hospital.    6/11 plan discharge home pending ID clearance    6/12 HD per neprho      Dysphagia  Assessment & Plan  6/10 seen by slp, ok for regular diet  Esophogram pending    Wegener's granulomatosis (HCC)- (present on admission)  Assessment & Plan  -Continue chronic home low-dose steroid.  -Now dialysis dependent.    Chronic respiratory failure with hypoxia (HCC)- (present on admission)  Assessment & Plan  -He is at his baseline oxygen supplement (3 L).  -Continue management of pneumonia as above.  Continue RT protocol, and oxygen supplements.    Atrial fibrillation (HCC)- (present on admission)  Assessment & Plan  -Rate and rhythm controlled.  Patient is not on chronic anticoagulation.  Continue beta-blocker.  Monitor closely.    Hyperlipidemia- (present on admission)  Assessment & Plan  -Continue Crestor.       VTE prophylaxis: heparin SQ

## 2020-06-14 NOTE — PROGRESS NOTES
Paged Dr.Nguyen HERNANDEZ and talked to her on the phone and in person about pts desire to reschedule hemodialysis early in the morning prior to discharge. Paged nephrology and talked to Dr.Safdi HERNANDEZ on the phone and was informed about being able to reschedule in-patient but preferred if the pt sought hemodialysis tx out-patient. Discussed the potential POC with the pt and is currently thinking about his choices. Will continue to round and get an update on pts discharge plan.

## 2020-06-14 NOTE — DISCHARGE SUMMARY
Discharge Summary    CHIEF COMPLAINT ON ADMISSION  Chief Complaint   Patient presents with   • Shortness of Breath       Reason for Admission  Fever; Cough     Admission Date  6/6/2020    CODE STATUS  Full Code    HPI & HOSPITAL COURSE    79 y.o. male with severe aortic stenosis, Wegener's granulomatosis on chronic steroids, end-stage renal disease on hemodialysis, chronic respiratory failure and COPD (chronic baseline 3 L oxygen), with history of MAC and pseudomonal colonization, admitted 6/6/2020 with shortness of breath, along with subjective fevers, chills, and productive cough.  He was noted to be febrile.  Initial work-up showed WBC of 16,000, and creatinine 3.24, with potassium of 3.9.  Troponin was 238, which he had chronically elevated.  Chest x-ray showed hazy opacities in the bilateral lung bases, left more than the right, with small bilateral pleural effusions.  Patient was felt to have sepsis, with lung source and recurrent pneumonia, and he is started on IV Zosyn and azithromycin.  Cultures were sent.  COVID-19 swab was sent, and it was negative.  Nephrology was consulted for resumption of his hemodialysis while in the hospital.  ID was consulted for further antibiotic guidance.  He was also started on aggressive physiotherapy.    Patient was found to have recurrent pseudomonal pneumonia and has been on Zosyn for entire course of treatment.  Apparently he had failed outpatient Cipro in the past.    Patient's respiratory symptoms have significantly improved.  He has had dialysis during this admission.  He has returned to baseline function and is anxious for discharge home.    We did offer the patient home health, however patient and his wife are refusing home health to limit COVID exposure.  He has near baseline function and is cleared for discharge home.         Therefore, he is discharged in good and stable condition to home with close outpatient follow-up.    The patient met 2-midnight criteria for  an inpatient stay at the time of discharge.    Discharge Date  6/14    FOLLOW UP ITEMS POST DISCHARGE  If dialysis is able to be rescheduled for a.m. or discharge early for outpatient dialysis at 1 PM  Nephrology to arrange  Follow-up with primary care provider/discharge clinic in 1 week  Follow-up with ID in 2 to 3 weeks    DISCHARGE DIAGNOSES  Principal Problem:    Recurrent pneumonia POA: Yes  Active Problems:    Sepsis (HCC) POA: Yes    Dysphagia POA: Unknown    ESRD on dialysis (HCC) (Chronic) POA: Yes    Elevated troponin POA: Yes    Hyperlipidemia POA: Yes    Atrial fibrillation (HCC) POA: Yes    Chronic respiratory failure with hypoxia (HCC) POA: Yes    Wegener's granulomatosis (HCC) (Chronic) POA: Yes  Resolved Problems:    * No resolved hospital problems. *      FOLLOW UP  Future Appointments   Date Time Provider Department Center   6/16/2020  2:30 PM DAVID Agustin None   9/29/2020 10:15 AM Madhuri Tillman M.D. [facility] None         Schedule an appointment as soon as possible for a visit  As needed        Schedule an appointment as soon as possible for a visit  follow up appointmant      MEDICATIONS ON DISCHARGE     Medication List      CONTINUE taking these medications      Instructions   albuterol 108 (90 Base) MCG/ACT Aers inhalation aerosol  Commonly known as:  ProAir HFA   Inhale 2 Puffs by mouth every 6 hours as needed for Shortness of Breath.  Dose:  2 Puff     clopidogrel 75 MG Tabs  Commonly known as:  PLAVIX   Take 75 mg by mouth every evening.  Dose:  75 mg     DIALYVITE TABLET Tabs   Take 1 Tab by mouth every morning.  Dose:  1 Tab     epoetin lucina 32511 UNIT/ML Soln  Commonly known as:  EPOGEN/PROCRIT   Inject 0.5 mL as instructed every Monday, Wednesday, and Friday.  Dose:  5,000 Units     finasteride 5 MG Tabs  Commonly known as:  PROSCAR   Take 5 mg by mouth every morning.  Dose:  5 mg     fluticasone-salmeterol 250-50 MCG/DOSE Aepb  Commonly known as:  Advair Diskus   Inhale 1  Puff by mouth 2 times a day. Rinse mouth after each use.  Dose:  1 Puff     metoprolol SR 25 MG Tb24  Commonly known as:  TOPROL XL   Take 25 mg by mouth 2 Times a Day.  Dose:  25 mg     predniSONE 1 MG Tabs  Commonly known as:  DELTASONE   Take 5 mg by mouth every day.  Dose:  5 mg     rosuvastatin 20 MG Tabs  Commonly known as:  CRESTOR   Take 20 mg by mouth every day.  Dose:  20 mg     sevelamer 800 MG Tabs  Commonly known as:  RENAGEL   Take 800 mg by mouth 3 times a day.  Dose:  800 mg     sodium chloride 7 % Nebu  Commonly known as:  HYPER-SAL   Doctor's comments:  Pt will need office visit for further fills  INHALE 1 VIAL VIA NEBULIZER TWICE DAILY     tamsulosin 0.4 MG capsule  Commonly known as:  FLOMAX   TAKE 1 CAPSULE BY MOUTH EVERY DAY            Allergies  Allergies   Allergen Reactions   • Doxycycline Diarrhea and Nausea     Reports terrible diarrhea   • Erythromycin Diarrhea and Nausea     Abdominal pain.  RXN=before 2011   • Rituximab Unspecified     Flu like syndrome       DIET  Orders Placed This Encounter   Procedures   • Diet Order Regular (Please make sure patient is sitting up 90* during + after meals for 30-min)     Standing Status:   Standing     Number of Occurrences:   1     Order Specific Question:   Diet:     Answer:   Regular [1]     Comments:   Please make sure patient is sitting up 90* during + after meals for 30-min     Order Specific Question:   Miscellaneous modifications:     Answer:   SLP - Deliver to Nursing Station [22]   • Discontinue Diet Tray     Standing Status:   Standing     Number of Occurrences:   1       ACTIVITY  As tolerated.  Weight bearing as tolerated    CONSULTATIONS  Nephrology  Palliative care  Infectious disease    PROCEDURES  Dialysis    LABORATORY  Lab Results   Component Value Date    SODIUM 137 06/15/2020    POTASSIUM 5.2 06/15/2020    CHLORIDE 102 06/15/2020    CO2 24 06/15/2020    GLUCOSE 107 (H) 06/15/2020    BUN 51 (H) 06/15/2020    CREATININE 4.46 (H)  06/15/2020    CREATININE 1.0 09/23/2008        Lab Results   Component Value Date    WBC 5.6 06/11/2020    WBC 7.6 03/19/2012    HEMOGLOBIN 11.6 (L) 06/11/2020    HEMATOCRIT 37.2 (L) 06/11/2020    PLATELETCT 171 06/11/2020        Total time of the discharge process exceeds 45 minutes.

## 2020-06-15 ENCOUNTER — PATIENT OUTREACH (OUTPATIENT)
Dept: HEALTH INFORMATION MANAGEMENT | Facility: OTHER | Age: 80
End: 2020-06-15

## 2020-06-15 VITALS
RESPIRATION RATE: 18 BRPM | TEMPERATURE: 97.5 F | WEIGHT: 121.47 LBS | DIASTOLIC BLOOD PRESSURE: 76 MMHG | OXYGEN SATURATION: 97 % | SYSTOLIC BLOOD PRESSURE: 115 MMHG | HEIGHT: 68 IN | BODY MASS INDEX: 18.41 KG/M2 | HEART RATE: 82 BPM

## 2020-06-15 LAB
ANION GAP SERPL CALC-SCNC: 11 MMOL/L (ref 7–16)
BUN SERPL-MCNC: 51 MG/DL (ref 8–22)
CALCIUM SERPL-MCNC: 9.5 MG/DL (ref 8.5–10.5)
CHLORIDE SERPL-SCNC: 102 MMOL/L (ref 96–112)
CO2 SERPL-SCNC: 24 MMOL/L (ref 20–33)
CREAT SERPL-MCNC: 4.46 MG/DL (ref 0.5–1.4)
GLUCOSE SERPL-MCNC: 107 MG/DL (ref 65–99)
POTASSIUM SERPL-SCNC: 5.2 MMOL/L (ref 3.6–5.5)
SODIUM SERPL-SCNC: 137 MMOL/L (ref 135–145)

## 2020-06-15 PROCEDURE — 94640 AIRWAY INHALATION TREATMENT: CPT

## 2020-06-15 PROCEDURE — 700102 HCHG RX REV CODE 250 W/ 637 OVERRIDE(OP): Performed by: INTERNAL MEDICINE

## 2020-06-15 PROCEDURE — 80048 BASIC METABOLIC PNL TOTAL CA: CPT

## 2020-06-15 PROCEDURE — 36415 COLL VENOUS BLD VENIPUNCTURE: CPT

## 2020-06-15 PROCEDURE — 99239 HOSP IP/OBS DSCHRG MGMT >30: CPT | Performed by: INTERNAL MEDICINE

## 2020-06-15 PROCEDURE — 94669 MECHANICAL CHEST WALL OSCILL: CPT

## 2020-06-15 PROCEDURE — A9270 NON-COVERED ITEM OR SERVICE: HCPCS | Performed by: INTERNAL MEDICINE

## 2020-06-15 PROCEDURE — 94760 N-INVAS EAR/PLS OXIMETRY 1: CPT

## 2020-06-15 PROCEDURE — A9270 NON-COVERED ITEM OR SERVICE: HCPCS | Performed by: HOSPITALIST

## 2020-06-15 PROCEDURE — 700111 HCHG RX REV CODE 636 W/ 250 OVERRIDE (IP): Performed by: HOSPITALIST

## 2020-06-15 PROCEDURE — 700102 HCHG RX REV CODE 250 W/ 637 OVERRIDE(OP): Performed by: HOSPITALIST

## 2020-06-15 PROCEDURE — 700101 HCHG RX REV CODE 250: Performed by: INTERNAL MEDICINE

## 2020-06-15 RX ADMIN — HEPARIN SODIUM 5000 UNITS: 5000 INJECTION, SOLUTION INTRAVENOUS; SUBCUTANEOUS at 05:33

## 2020-06-15 RX ADMIN — PREDNISONE 5 MG: 5 TABLET ORAL at 05:33

## 2020-06-15 RX ADMIN — NYSTATIN 500000 UNITS: 100000 SUSPENSION ORAL at 09:01

## 2020-06-15 RX ADMIN — ALBUTEROL SULFATE 2.5 MG: 2.5 SOLUTION RESPIRATORY (INHALATION) at 08:51

## 2020-06-15 RX ADMIN — ROSUVASTATIN CALCIUM 20 MG: 20 TABLET, FILM COATED ORAL at 05:33

## 2020-06-15 RX ADMIN — SODIUM CHLORIDE 4 ML: 7 NEBU SOLN,3 % NEBU at 08:51

## 2020-06-15 RX ADMIN — BUDESONIDE AND FORMOTEROL FUMARATE DIHYDRATE 2 PUFF: 160; 4.5 AEROSOL RESPIRATORY (INHALATION) at 08:51

## 2020-06-15 RX ADMIN — TAMSULOSIN HYDROCHLORIDE 0.4 MG: 0.4 CAPSULE ORAL at 05:32

## 2020-06-15 RX ADMIN — GUAIFENESIN 600 MG: 600 TABLET, EXTENDED RELEASE ORAL at 05:32

## 2020-06-15 RX ADMIN — SEVELAMER CARBONATE 800 MG: 800 TABLET, FILM COATED ORAL at 09:01

## 2020-06-15 RX ADMIN — FINASTERIDE 5 MG: 5 TABLET, FILM COATED ORAL at 05:33

## 2020-06-15 NOTE — PROGRESS NOTES
Discharge orders acknowledged, Pt aware and compliant with new orders, D/C teaching completed, Pt able to verbalize needs and complaint with discharge orders.  IV removed. Pt transferred to wife's car with oxygen.

## 2020-06-15 NOTE — RESPIRATORY CARE
Oxygen Rounds      Patient found on    O2 L/m:  ___3______    Oxygen device:  ___nc_____   Spo2: ____96_____%        Respiratory device skin site inspection completed.

## 2020-06-15 NOTE — CARE PLAN
Problem: Respiratory:  Goal: Respiratory status will improve  Outcome: PROGRESSING AS EXPECTED     Problem: Safety  Goal: Will remain free from injury  Outcome: PROGRESSING AS EXPECTED     Problem: Urinary Elimination:  Goal: Ability to reestablish a normal urinary elimination pattern will improve  Outcome: PROGRESSING AS EXPECTED

## 2020-06-15 NOTE — PROGRESS NOTES
Received shift report from pavel RN and assumed care of this pt at 0715. Pt AOx4 . Pt reports pain is 0/10.  Pt is up stand by assist, . Pt appropriately uses call light when needing assistance. Bed alarm is off. PIV assessed and is patent, CDI, running IVF. Pt is on 3 liter via NC. RT at bedside.  Discussed POC for day shift,  comfort, and safety. Patient has call light and personal belongings within reach. Safety and fall precautions in place. Reviewed orders, notes, labs, and test results. Hourly rounding in place with RN rounding on odd hours and CNA on even hours.

## 2020-06-15 NOTE — PROGRESS NOTES
This HD RN spoke to Primary Tasneem RN regarding patient. Per Tasneem, patient is discharging today and will have OP dialysis [facility] this afternoon.

## 2020-06-15 NOTE — CARE PLAN
Problem: Nutritional:  Goal: Achieve adequate nutritional intake  Description: Patient will consume ~50% of meals.   Outcome: MET   Per ADL documentation pt is consuming 50% or more of majority of meals. RD available prn - and will monitor per dept policy

## 2020-06-15 NOTE — CARE PLAN
Problem: Respiratory:  Goal: Respiratory status will improve  6/14/2020 2303 by Angela Andrade, Student  PROGRESSING AS EXPECTED  Note: Respiratory assessment for pt has been completed. RT came to floor and a breathing treatment was done. RN will continue to monitor breathing status and effort.      Problem: Safety  Goal: Will remain free from falls    PROGRESSING AS EXPECTED  Note: Pt will remain free from falls during shift. Pt is educated on use of the call light. All belongings as well as the call light are within reach. Bed is in the lowest locked position. Hourly rounding is in place.

## 2020-06-15 NOTE — DISCHARGE PLANNING
Outpatient Dialysis Note    Notified HD clinic that patient will be discharging and at [facility] for HD time.      Devika Bridges- Dialysis Coordinator  [facility] Ph# [phone]

## 2020-06-15 NOTE — DOCUMENTATION QUERY
[facility]                                                                       Query Response Note      PATIENT:               ALETA BUCK  ACCT #:                  [id number]  MRN:                     [id number]  :                      1940  ADMIT DATE:       2020 10:12 PM  DISCH DATE:        6/15/2020 12:36 PM  RESPONDING  PROVIDER #:        [id number]           QUERY TEXT:    Please clarify clinical relevance for the echocardiogram diagnostic findings in the medical record or whether those are insignificant or unable to be further specified (includes probable or suspected).    NOTE:  If an appropriate response is not listed below, please respond with a new note.    The patient's Clinical Indicators include:  Clinical indicators-  20 Echo: LEF 55%, grade II diastolic dysfunction  CXR: May be small bilat pleural effusions    Treatments-   PO Metoprolol   CXR   EKG    Risks-  SOB, cough, LEF 55%, grade II diastolic dysfunction, pleural effusions, pacemaker, aortic valve stenosis, age, Wegener?s granulomatosis, chronic resp failure on continuous O2, COPD, recurrent PNA    Thank you,  MARY Alfred RN  Connect via [employer] Text  Options provided:   -- Chronic Systolic heart failure   -- Chronic Diastolic heart failure   -- Chronic Systolic and Diastolic heart failure   -- Findings of no clinical significance   -- Unable to determine      Query created by: Margoth Alvarez on 6/15/2020 12:29 PM    RESPONSE TEXT:    Chronic Diastolic heart failure          Electronically signed by:  CLIFF JOHANSEN DO 6/15/2020 12:41 PM

## 2020-06-15 NOTE — DISCHARGE INSTRUCTIONS
Discharge Instructions    Discharged to home by car with relative. Discharged via wheelchair, hospital escort: Yes.  Special equipment needed: Not Applicable    Be sure to schedule a follow-up appointment with your primary care doctor or any specialists as instructed.     Discharge Plan:   Diet Plan: Discussed  Activity Level: Discussed  Confirmed Follow up Appointment: Patient to Call and Schedule Appointment  Confirmed Symptoms Management: Discussed  Medication Reconciliation Updated: Yes    I understand that a diet low in cholesterol, fat, and sodium is recommended for good health. Unless I have been given specific instructions below for another diet, I accept this instruction as my diet prescription.   Other diet: Regular Diet as tolerated     Special Instructions: None    · Is patient discharged on Warfarin / Coumadin?   No     Depression / Suicide Risk    As you are discharged from this [facility], it is important to learn how to keep safe from harming yourself.    Recognize the warning signs:  · Abrupt changes in personality, positive or negative- including increase in energy   · Giving away possessions  · Change in eating patterns- significant weight changes-  positive or negative  · Change in sleeping patterns- unable to sleep or sleeping all the time   · Unwillingness or inability to communicate  · Depression  · Unusual sadness, discouragement and loneliness  · Talk of wanting to die  · Neglect of personal appearance   · Rebelliousness- reckless behavior  · Withdrawal from people/activities they love  · Confusion- inability to concentrate     If you or a loved one observes any of these behaviors or has concerns about self-harm, here's what you can do:  · Talk about it- your feelings and reasons for harming yourself  · Remove any means that you might use to hurt yourself (examples: pills, rope, extension cords, firearm)  · Get professional help from the community (Mental Health, Substance Abuse,  psychological counseling)  · Do not be alone:Call your Safe Contact- someone whom you trust who will be there for you.  · Call your local CRISIS HOTLINE 416-6307 or 746-894-1266  · Call your local Children's Mobile Crisis Response Team [place] (518) 010-0119 or www.[employer]  · Call the toll free National Suicide Prevention Hotlines   · National Suicide Prevention Lifeline 984-964-LIOW (7053)  · [place] [employer] Line Network 800-SUICIDE (322-4071)      Discharge Instructions per Tamiko Syed D.O.    If dialysis is able to be rescheduled for a.m. or discharge early for outpatient dialysis at 1 PM  Nephrology to arrange  Follow-up with primary care provider/discharge clinic in 1 week  Follow-up with ID in 2 to 3 weeks    DIET: Regular    ACTIVITY: As tolerated    DIAGNOSIS: Recurrent pneumonia, end-stage renal disease on dialysis    Return to ER if recurrent cough or shortness of breath

## 2020-06-15 NOTE — PROGRESS NOTES
Received report for patient at shift change. Pt is AOx4 and was sitting at edge of bed for dinner. Pt reports no pain. Later, RT came to bedside to complete a breathing treatment. Pt is now sleeping. Bed is in low locked position, all belongings are within reach as well as the katy light. Will continue to monitor.

## 2020-06-15 NOTE — DISCHARGE PLANNING
Care Transition Team Final Discharge Disposition       Patient is discharging home today with his spouse. From [facility] he will go straight to his outpatient dialysis at [facility], [address] [place] [address].     This  spoke with patient's spouse Crys, she stated that she and her  have both declined home health services that have been ordered for patient. They are concerned about COVID. Spouse stated that when patient has dialysis he is checked for bp, lungs, and leg swelling.     Spouse will be at [facility] 12:00pm today to  her , bedside RN, Tasneem has been notified and will have patient downstairs in a wheelchair.     No further needs from case management    Kirstie Silva RN,  Case Manger

## 2020-06-16 ENCOUNTER — OFFICE VISIT (OUTPATIENT)
Dept: RHEUMATOLOGY | Facility: MEDICAL CENTER | Age: 80
End: 2020-06-16
Payer: MEDICARE

## 2020-06-16 VITALS
DIASTOLIC BLOOD PRESSURE: 58 MMHG | OXYGEN SATURATION: 92 % | WEIGHT: 120 LBS | TEMPERATURE: 98.6 F | RESPIRATION RATE: 14 BRPM | SYSTOLIC BLOOD PRESSURE: 102 MMHG | HEART RATE: 98 BPM | BODY MASS INDEX: 18.19 KG/M2

## 2020-06-16 DIAGNOSIS — Z79.01 CHRONIC ANTICOAGULATION: ICD-10-CM

## 2020-06-16 DIAGNOSIS — Z99.2 ESRD ON DIALYSIS (HCC): ICD-10-CM

## 2020-06-16 DIAGNOSIS — I48.0 PAROXYSMAL ATRIAL FIBRILLATION (HCC): ICD-10-CM

## 2020-06-16 DIAGNOSIS — I10 ESSENTIAL HYPERTENSION: ICD-10-CM

## 2020-06-16 DIAGNOSIS — Z95.0 PACEMAKER: ICD-10-CM

## 2020-06-16 DIAGNOSIS — J44.1 COPD EXACERBATION (HCC): ICD-10-CM

## 2020-06-16 DIAGNOSIS — Z22.39 PSEUDOMONAS AERUGINOSA COLONIZATION: ICD-10-CM

## 2020-06-16 DIAGNOSIS — I35.0 AORTIC VALVE STENOSIS, ETIOLOGY OF CARDIAC VALVE DISEASE UNSPECIFIED: ICD-10-CM

## 2020-06-16 DIAGNOSIS — M31.31 GRANULOMATOSIS WITH POLYANGIITIS WITH RENAL INVOLVEMENT (HCC): ICD-10-CM

## 2020-06-16 DIAGNOSIS — Z79.52 LONG TERM CURRENT USE OF SYSTEMIC STEROIDS: ICD-10-CM

## 2020-06-16 DIAGNOSIS — N18.6 ESRD ON DIALYSIS (HCC): ICD-10-CM

## 2020-06-16 PROCEDURE — 99214 OFFICE O/P EST MOD 30 MIN: CPT | Performed by: INTERNAL MEDICINE

## 2020-06-16 RX ORDER — PREDNISONE 1 MG/1
5 TABLET ORAL DAILY
Qty: 450 TAB | Refills: 1 | Status: SHIPPED | OUTPATIENT
Start: 2020-06-16 | End: 2021-01-25

## 2020-06-16 ASSESSMENT — FIBROSIS 4 INDEX: FIB4 SCORE: 1.82

## 2020-06-16 NOTE — PROGRESS NOTES
Chief Complaint- joint pain     Subjective:   Gilberto Brown is a 79 y.o. male here today for follow up of rheumatological issues    This is a follow-up visit for this very complicated patient who we see in this clinic for Wegener's granulomatosis that is now in remission.  Patient is currently on prednisone at 5 mg p.o. daily with good management and negative ANCA levels for some time now.  Patient's Wegener's granulomatosis was initially diagnosed September 2011 manifesting as progressive renal insufficiency.  Wegener's was seen on renal biopsy, patient now on permanent renal dialysis 3 times a week.  Patient is status post a number of DMARDs of Biologics including Cytoxan, CellCept, rituximab all which have resulted in severe infections requiring hospitalizations.        Additional comorbidities include chronic lung problems including COPD and subsequent colonization of Pseudomonas in the patient's lungs.  Patient does have a long history of lung problems since childhood status post whooping cough per patient report.           Additional comorbidities include atrial fibrillation with a pacemaker in place also recent diagnosis CVA and is on permanent anticoagulation. Patient also on oxygen at home 3 L per minute.  Patient also with history of rotator cuff tear and right shoulder in the past.  Patient also recently diagnosed now with severe aortic stenosis and deemed not a surgical candidate.       S/p Cytoxan-n/v  Off of Cellcept 5/2016  Rituximab infusions 1/14/2016, 1/28/2016-patient developed severe infection and was hospitalized     GBM neg 1/2012  ANCA neg 1/2015; ANCA 1:20  11/2015; ANCA neg 2/2016; ANCA neg 5/2016; ANCA neg 12/2016; ANCA neg 3/2017; ANCA neg 7/2017; ANCA neg 2/2018; ANCA neg 5/2018; ANCA neg 12/2018; ANCA neg 3/2019; ANCA neg 9/2019; ANCA neg 3/2020)  C3 90 normal 10/2011  C4 21 10/2011  OFELIA neg 10/2011  Uric acid 4.4 1/2016  HBsAg/HBcAb neg 6/2020  HCV neg 3/2019  Echocardiogram  7/2019  CONCLUSIONS  Normal left ventricular systolic function.  Left ventricular ejection fraction is visually estimated to be 70-75%.  Mild concentric left ventricular hypertrophy.  Severe aortic stenosis.  Moderate aortic insufficiency.  Mild mitral stenosis.  Compared to the images of the study done on 04/13/2017 there is now   severe aortic stenosis.        Of note  Dr Coral Corona nephrology  Dr Gutierrez pulmonology   Dr Chavarria Cardiologist   Dr Nunez Opthalmologist      Current medicines (including changes today)  Current Outpatient Medications   Medication Sig Dispense Refill   • predniSONE (DELTASONE) 1 MG Tab Take 5 Tabs by mouth every day. 450 Tab 1   • clopidogrel (PLAVIX) 75 MG Tab Take 75 mg by mouth every evening.     • fluticasone-salmeterol (ADVAIR DISKUS) 250-50 MCG/DOSE AEROSOL POWDER, BREATH ACTIVATED Inhale 1 Puff by mouth 2 times a day. Rinse mouth after each use. 1 Inhaler 0   • sodium chloride (HYPER-SAL) 7 % Nebu Soln INHALE 1 VIAL VIA NEBULIZER TWICE DAILY 240 mL 0   • sevelamer (RENAGEL) 800 MG Tab Take 800 mg by mouth 3 times a day.     • tamsulosin (FLOMAX) 0.4 MG capsule TAKE 1 CAPSULE BY MOUTH EVERY DAY 90 Cap 2   • rosuvastatin (CRESTOR) 20 MG Tab Take 20 mg by mouth every day.  3   • epoetin lucina (EPOGEN,PROCRIT) 17740 UNIT/ML Solution Inject 0.5 mL as instructed every Monday, Wednesday, and Friday. 30 mL 0   • metoprolol SR (TOPROL XL) 25 MG TABLET SR 24 HR Take 25 mg by mouth 2 Times a Day.  3   • albuterol (PROAIR HFA) 108 (90 Base) MCG/ACT Aero Soln inhalation aerosol Inhale 2 Puffs by mouth every 6 hours as needed for Shortness of Breath. 8.5 g 0   • B Complex-C-Folic Acid (DIALYVITE TABLET) Tab Take 1 Tab by mouth every morning.     • finasteride (PROSCAR) 5 MG Tab Take 5 mg by mouth every morning.       No current facility-administered medications for this visit.      He  has a past medical history of A-fib (HCC), Anemia, Arthritis, ASTHMA, BPH (benign prostatic hyperplasia),  Breath shortness, Bronchitis, Cataract, COPD, Dialysis, Dyslipidemia, EMPHYSEMA, GERD (gastroesophageal reflux disease), Hypertension, Oxygen decrease, Pacemaker (1988), Pain (05/09/2018), Pneumonia (2017), Pseudomonas pneumonia ([facility]), Pulmonary histoplasmosis ([facility]), Renal disorder, Sick sinus syndrome ([facility]), Sleep apnea, Snoring, Stroke ([facility]) (02/2018), Unspecified hemorrhagic conditions, and Wegener's disease, pulmonary ([facility]).    ROS   Other than what is mentioned in HPI or physical exam, there is no history of headaches, double vision or blurred vision. No temporal tenderness or jaw claudication. No trouble swallowing difficulties or sore throats.  No chest complaints including chest pain, cough or sputum production. No GI complaints including nausea, vomiting, change in bowel habits, or past peptic ulcer disease. No history of blood in the stools. No urinary complaints including dysuria or frequency. No history of alopecia, photosensitivity, oral ulcerations, Raynaud's phenomena.       Objective:     /58   Pulse 98   Temp 37 °C (98.6 °F) (Temporal)   Resp 14   Wt 54.4 kg (120 lb)   SpO2 92%  Body mass index is 18.19 kg/m².   Physical Exam:    Constitutional: Alert and oriented X3, patient is talkative with good eye contact, patient's wife in room with patient.Skin: Warm, dry, good turgor, no rashes in visible areas, .Eye: Equal, round and reactive, conjunctiva clear, lids normal EOM intactENMT: Lips without lesions, good dentition, no oropharyngeal ulcers, moist buccal mucosa, pinna without deformity, patient has nasal cannula with oxygen flowing intact neck: Trachea midline, no masses, no thyromegaly.Lymph:  No cervical lymphadenopathy, no axillary lymphadenopathy, no supraclavicular lymphadenopathyRespiratory: Unlabored respiratory effort, lungs clear to auscultation, no wheezes, no ronchi.Cardiovascular: Normal S1, S2, patient has a murmur right left sternal borders about 2-3 out of 6 no  edema.Abdomen: Soft, non-tender, no masses, no hepatosplenomegaly.Psych: Alert and oriented x3, normal affect and mood.Neuro: Cranial nerves 2-12 are grossly intact, no loss of sensation LEExt:no joint laxity noted in bilateral arms, no joint laxity noted in bilateral legs      Lab Results   Component Value Date/Time    HEPBCORIGM Negative 01/27/2020 07:32 PM    HEPBSAG Non-Reactive 06/08/2020 10:37 AM     Lab Results   Component Value Date/Time    HEPCAB Negative 01/27/2020 07:32 PM     Lab Results   Component Value Date/Time    SODIUM 137 06/15/2020 08:12 AM    POTASSIUM 5.2 06/15/2020 08:12 AM    CHLORIDE 102 06/15/2020 08:12 AM    CO2 24 06/15/2020 08:12 AM    GLUCOSE 107 (H) 06/15/2020 08:12 AM    BUN 51 (H) 06/15/2020 08:12 AM    CREATININE 4.46 (H) 06/15/2020 08:12 AM    CREATININE 1.0 09/23/2008 09:36 AM      Lab Results   Component Value Date/Time    WBC 5.6 06/11/2020 03:06 AM    WBC 7.6 03/19/2012 12:00 AM    RBC 4.04 (L) 06/11/2020 03:06 AM    RBC 2.25 (LL) 03/19/2012 12:00 AM    HEMOGLOBIN 11.6 (L) 06/11/2020 03:06 AM    HEMATOCRIT 37.2 (L) 06/11/2020 03:06 AM    MCV 92.1 06/11/2020 03:06 AM     (H) 03/19/2012 12:00 AM    MCH 28.7 06/11/2020 03:06 AM    MCH 33.8 03/19/2012 12:00 AM    MCHC 31.2 (L) 06/11/2020 03:06 AM    MPV 9.5 06/11/2020 03:06 AM    NEUTSPOLYS 76.10 (H) 06/11/2020 03:06 AM    LYMPHOCYTES 5.90 (L) 06/11/2020 03:06 AM    MONOCYTES 9.40 06/11/2020 03:06 AM    EOSINOPHILS 7.40 (H) 06/11/2020 03:06 AM    BASOPHILS 0.70 06/11/2020 03:06 AM    HYPOCHROMIA 1+ 01/07/2020 11:40 AM    ANISOCYTOSIS 2+ 03/16/2020 12:31 PM      Lab Results   Component Value Date/Time    CALCIUM 9.5 06/15/2020 08:12 AM    ASTSGOT 14 06/06/2020 10:50 PM    ALTSGPT 13 06/06/2020 10:50 PM    ALKPHOSPHAT 68 06/06/2020 10:50 PM    TBILIRUBIN 0.8 06/06/2020 10:50 PM    ALBUMIN 3.7 06/06/2020 10:50 PM    ALBUMIN 1.28 (L) 10/02/2011 04:30 AM    TOTPROTEIN 6.5 06/06/2020 10:50 PM    TOTPROTEIN 4.50 (L) 10/02/2011  04:30 AM     Lab Results   Component Value Date/Time    URICACID 4.4 01/21/2016 08:36 AM    ANTINUCAB None Detected 10/02/2011 04:30 AM     Lab Results   Component Value Date/Time    P3KMMZHBTFL 90 10/02/2011 04:30 AM    S7GNWXVBFBW 21 10/02/2011 04:30 AM     Lab Results   Component Value Date/Time    AGBMAB Negative 01/18/2012 03:50 AM    GBMABA Negative 01/18/2012 03:50 AM    ANCAIGG <1:20 03/12/2020 10:27 AM    X6FJLMAECNS 90 10/02/2011 04:30 AM     Lab Results   Component Value Date/Time    SEDRATEWES 48 (H) 04/13/2017 03:25 AM     Lab Results   Component Value Date/Time    CPKTOTAL 18 09/27/2011 05:05 AM     Lab Results   Component Value Date/Time    PTHINTACT 204.5 (H) 10/18/2011 06:21 AM     Results for orders placed during the hospital encounter of 07/24/19   DX-HAND 2- LEFT    Impression Questionable subluxation of the second MCP joint. Correlate clinically.    No definite fracture is seen but evaluation limited due to osseous demineralization.    Widening of the scapholunate interval, likely degenerative.     Results for orders placed during the hospital encounter of 02/05/19   DX-CERVICAL SPINE-2 OR 3 VIEWS    Impression Mild anterolisthesis of C4 on C5, likely due to facet arthropathy.    Moderate degenerative change of the cervical spine.    Diffuse osseous demineralization.     Results for orders placed in visit on 05/02/13   CT-CHEST, HIGH RESOLUTION LUNG    Impression 1. Bilateral lower lobe bronchiectasis, grossly unchanged compared with the prior conventional chest CT 1/14/12.  2. Minimal bilateral lower lobe interstitial opacities as well as confluent opacities in those areas, greater on the left, suggesting active airspace disease and/or atelectasis.  3. Probable uncalcified left lower lobe nodule, an equivocal finding with high-resolution technique.  Conventional chest CT would be needed for confirmation.  Note that no similar lesion was identified on the prior chest scan  1/14/12.            INTERPRETING LOCATION: [address] [place] [address] [place], [address]     Results for orders placed during the hospital encounter of 06/06/20   CT-CHEST (THORAX) W/O    Impression Worsening dependent consolidation, bronchial wall thickening and endobronchial opacity. This is worrisome for aspiration favored over infection and there is some new adenopathy that is more likely reactive than malignant    Extensive chronic pulmonary abnormality with emphysema, bronchiectasis, scarring, remote granulomatous disease    Coronary artery disease, prosthetic aortic valve, cardiac pacer     Assessment and Plan:     1. 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 daily  - predniSONE (DELTASONE) 1 MG Tab; Take 5 Tabs by mouth every day.  Dispense: 450 Tab; Refill: 1    2. Long term current use of systemic steroids  On prednisone 5 mg p.o. daily  Risks reviewed with patient, patient states understanding  - predniSONE (DELTASONE) 1 MG Tab; Take 5 Tabs by mouth every day.  Dispense: 450 Tab; Refill: 1    3. Aortic valve stenosis, etiology of cardiac valve disease unspecified  With echo indicating severe aortic stenosis, status post evaluation by cardiology who recommended TAVR, patient states this is helped him quite a bit  - predniSONE (DELTASONE) 1 MG Tab; Take 5 Tabs by mouth every day.  Dispense: 450 Tab; Refill: 1    4. Paroxysmal atrial fibrillation ([facility])  Followed by cardiology    5. Chronic anticoagulation  On Plavix    6. Pacemaker  Allowed by cardiology    7. ESRD on dialysis ([facility])  By nephrology    8. COPD exacerbation ([facility])  Status post evaluation by Dr. Madhuri Tillman, patient would like second opinion submit referral to Dr. Maciel  - REFERRAL TO PULMONOLOGY    9. Pseudomonas aeruginosa colonization  Followed by pulmonology and infectious disease  - REFERRAL TO PULMONOLOGY    10. Essential hypertension  May impact the type of medications we can use for this  patient's arthritis. We will have to keep this under advisement.    Followup: Return in about 6 months (around 12/16/2020). or sooner power Brown  was seen 30 minutes face-to-face of which more than 50% of the time was spent counseling the patient (excluding time for procedures)  regarding  rheumatological condition and care. Therapy was discussed in detail.      Please note that this dictation was created using voice recognition software. I have made every reasonable attempt to correct obvious errors, but I expect that there are errors of grammar and possibly content that I did not discover before finalizing the note.

## 2020-06-17 RX ORDER — LIDOCAINE HYDROCHLORIDE 20 MG/ML
JELLY TOPICAL
Qty: 60 ML | Refills: 0 | Status: SHIPPED | OUTPATIENT
Start: 2020-06-17

## 2020-06-30 ENCOUNTER — APPOINTMENT (RX ONLY)
Dept: URBAN - METROPOLITAN AREA CLINIC 36 | Facility: CLINIC | Age: 80
Setting detail: DERMATOLOGY
End: 2020-06-30

## 2020-06-30 PROBLEM — C44.91 BASAL CELL CARCINOMA OF SKIN, UNSPECIFIED: Status: ACTIVE | Noted: 2020-06-30

## 2020-06-30 PROBLEM — C44.629 SQUAMOUS CELL CARCINOMA OF SKIN OF LEFT UPPER LIMB, INCLUDING SHOULDER: Status: ACTIVE | Noted: 2020-06-30

## 2020-06-30 PROCEDURE — ? INJECTION

## 2020-06-30 PROCEDURE — ? INTRALESIONAL CHEMOTHERAPY INJECTION

## 2020-06-30 PROCEDURE — 11900 INJECT SKIN LESIONS </W 7: CPT | Mod: 59

## 2020-06-30 PROCEDURE — 96405 CHEMO INTRALESIONAL UP TO 7: CPT

## 2020-06-30 NOTE — PROCEDURE: INJECTION
Procedure Information: Please note that the numeric value listed in the Medication (1) and associated J-code units and Medication (2) and associated J-code units variables are j-code amounts and do not represent either the concentration or the total amount of the medications injected.  I strongly recommend selecting no to the Render J-code information in note question. This will allow your note to be more clear. If you are billing j-codes with your injection codes you need to document the total amount of the medication injected. This amount should match the j-code units. For example, if you are injecting Triamcinolone 40mg as an intramuscular injection you would select 40 for the dose field and mg for the units. This would allow you to document  with 4 units (40mg = 10mg x 4). The total volume is not used to calculate j-codes only the amount of the medication administered.
Treatment Number: 3
Medication (1) And Associated J-Code Units: Bleomycin, 15 units
Consent: The risks of the medication was reviewed with the patient.
Dose Administered (Numbers Only - Mg, G, Mcg, Units, Cc): 0.3
Hide Second Medication?: No
units
Route: IL
Bill J-Code: yes
Detail Level: None
Dose Administered (Numbers Only - Mg, G, Mcg, Units, Cc): 0
Post-Care Instructions: I reviewed with the patient in detail post-care instructions. Patient understands to keep the injection sites clean and call the clinic if there is any redness, swelling or pain.

## 2020-07-02 ENCOUNTER — HOSPITAL ENCOUNTER (OUTPATIENT)
Dept: RADIOLOGY | Facility: MEDICAL CENTER | Age: 80
End: 2020-07-02
Attending: NURSE PRACTITIONER
Payer: MEDICARE

## 2020-07-02 DIAGNOSIS — R51.9 NONINTRACTABLE HEADACHE, UNSPECIFIED CHRONICITY PATTERN, UNSPECIFIED HEADACHE TYPE: ICD-10-CM

## 2020-07-02 PROCEDURE — 70450 CT HEAD/BRAIN W/O DYE: CPT

## 2020-07-06 ENCOUNTER — TELEPHONE (OUTPATIENT)
Dept: RHEUMATOLOGY | Facility: MEDICAL CENTER | Age: 80
End: 2020-07-06

## 2020-07-06 NOTE — TELEPHONE ENCOUNTER
pts wife called wanting to know if you knew of a good neurologist in this area?  She would just like your opinion.   Please advise and thank you

## 2020-07-06 NOTE — TELEPHONE ENCOUNTER
All the neurologist in the area seem to be very good, do not have any particular exceptional neurologist in mind.

## 2020-07-08 LAB
FUNGUS SPEC CULT: NORMAL
SIGNIFICANT IND 70042: NORMAL
SITE SITE: NORMAL
SOURCE SOURCE: NORMAL

## 2020-07-15 RX ORDER — TAMSULOSIN HYDROCHLORIDE 0.4 MG/1
CAPSULE ORAL
Qty: 90 CAP | Refills: 2 | Status: SHIPPED | OUTPATIENT
Start: 2020-07-15

## 2020-07-28 ENCOUNTER — APPOINTMENT (RX ONLY)
Dept: URBAN - METROPOLITAN AREA CLINIC 36 | Facility: CLINIC | Age: 80
Setting detail: DERMATOLOGY
End: 2020-07-28

## 2020-07-28 PROBLEM — C44.629 SQUAMOUS CELL CARCINOMA OF SKIN OF LEFT UPPER LIMB, INCLUDING SHOULDER: Status: ACTIVE | Noted: 2020-07-28

## 2020-07-28 PROCEDURE — 96405 CHEMO INTRALESIONAL UP TO 7: CPT

## 2020-07-28 PROCEDURE — ? INTRALESIONAL CHEMOTHERAPY INJECTION

## 2020-07-28 NOTE — PROCEDURE: INTRALESIONAL CHEMOTHERAPY INJECTION
Detail Level: Detailed
Bill J-Code?: Yes
Size Of Lesion In Cm (Optional): 0
Treatment Number (Optional): 2
Medication Injected: Methotrexate
Concentration (Mg/Ml Or Units/Ml): 25.0
Total Volume (Ccs): 0.3
Render Post-Care Instructions In Note?: no
Post-Care Instructions: I reviewed with the patient in detail post-care instructions. Patient is to keep the site dry overnight, and then apply bacitracin twice daily until healed. Patient may apply hydrogen peroxide soaks to remove any crusting.
Consent: The risks of medication reaction, injection site pain and lesion necrosis were reviewed with the patient.
Bill As A Line Item Or As Units: Line Item

## 2020-08-31 DIAGNOSIS — J47.9 BRONCHIECTASIS WITHOUT COMPLICATION (HCC): ICD-10-CM

## 2020-08-31 RX ORDER — SODIUM CHLORIDE FOR INHALATION 7 %
VIAL, NEBULIZER (ML) INHALATION
Qty: 240 ML | Refills: 0 | Status: SHIPPED | OUTPATIENT
Start: 2020-08-31 | End: 2020-11-02 | Stop reason: SDUPTHER

## 2020-08-31 NOTE — TELEPHONE ENCOUNTER
Have we ever prescribed this med? Yes.  If yes, what date? 04/06/2020    Last OV: 05/26/2020 - Dr. Tillman    Next OV: 09/17/2020 - FELIPA OLMSTEAD    DX: Bronchiectasis     Medications: Sodium Chloride

## 2020-09-01 ENCOUNTER — APPOINTMENT (RX ONLY)
Dept: URBAN - METROPOLITAN AREA CLINIC 20 | Facility: CLINIC | Age: 80
Setting detail: DERMATOLOGY
End: 2020-09-01

## 2020-09-01 DIAGNOSIS — Z85.828 PERSONAL HISTORY OF OTHER MALIGNANT NEOPLASM OF SKIN: ICD-10-CM

## 2020-09-01 DIAGNOSIS — L57.0 ACTINIC KERATOSIS: ICD-10-CM

## 2020-09-01 PROBLEM — C44.91 BASAL CELL CARCINOMA OF SKIN, UNSPECIFIED: Status: ACTIVE | Noted: 2020-09-01

## 2020-09-01 PROCEDURE — ? LIQUID NITROGEN

## 2020-09-01 PROCEDURE — ? ADDITIONAL NOTES

## 2020-09-01 PROCEDURE — 17003 DESTRUCT PREMALG LES 2-14: CPT

## 2020-09-01 PROCEDURE — ? INJECTION

## 2020-09-01 PROCEDURE — 99213 OFFICE O/P EST LOW 20 MIN: CPT | Mod: 25

## 2020-09-01 PROCEDURE — 11900 INJECT SKIN LESIONS </W 7: CPT | Mod: 59

## 2020-09-01 PROCEDURE — 17000 DESTRUCT PREMALG LESION: CPT

## 2020-09-01 PROCEDURE — ? MEDICATION COUNSELING

## 2020-09-01 PROCEDURE — ? COUNSELING

## 2020-09-01 ASSESSMENT — LOCATION DETAILED DESCRIPTION DERM
LOCATION DETAILED: RIGHT RADIAL DORSAL HAND
LOCATION DETAILED: LEFT SUPERIOR FOREHEAD
LOCATION DETAILED: LEFT DISTAL DORSAL FOREARM
LOCATION DETAILED: RIGHT SUPERIOR FOREHEAD
LOCATION DETAILED: LEFT ULNAR DORSAL HAND

## 2020-09-01 ASSESSMENT — LOCATION SIMPLE DESCRIPTION DERM
LOCATION SIMPLE: LEFT FOREHEAD
LOCATION SIMPLE: LEFT HAND
LOCATION SIMPLE: RIGHT FOREHEAD
LOCATION SIMPLE: LEFT FOREARM
LOCATION SIMPLE: RIGHT HAND

## 2020-09-01 ASSESSMENT — LOCATION ZONE DERM
LOCATION ZONE: ARM
LOCATION ZONE: FACE
LOCATION ZONE: HAND

## 2020-09-01 NOTE — PROCEDURE: ADDITIONAL NOTES
Detail Level: Detailed
Additional Notes: Pt was offered Mohs, ED&C and Bleo. Pt states he does not want invasive surgery or C&D. Opts to continue with Bleo

## 2020-09-01 NOTE — PROCEDURE: INJECTION
Procedure Information: Please note that the numeric value listed in the Medication (1) and associated J-code units and Medication (2) and associated J-code units variables are j-code amounts and do not represent either the concentration or the total amount of the medications injected.  I strongly recommend selecting no to the Render J-code information in note question. This will allow your note to be more clear. If you are billing j-codes with your injection codes you need to document the total amount of the medication injected. This amount should match the j-code units. For example, if you are injecting Triamcinolone 40mg as an intramuscular injection you would select 40 for the dose field and mg for the units. This would allow you to document  with 4 units (40mg = 10mg x 4). The total volume is not used to calculate j-codes only the amount of the medication administered.
Treatment Number: 4
Administered By (Optional): Trang Willard
Medication (1) And Associated J-Code Units: Bleomycin, 15 units
Consent: The risks of the medication was reviewed with the patient.
Dose Administered (Numbers Only - Mg, G, Mcg, Units, Cc): 0.4
Hide Second Medication?: No
units
Route: IL
Additional Comments: Treatment 4 for right cheek and treatment 1 for nose. Consulted with Dr. Briceno and he evaluated patient. .2 units to each site
Bill J-Code: yes
Detail Level: None
Dose Administered (Numbers Only - Mg, G, Mcg, Units, Cc): 0
Post-Care Instructions: I reviewed with the patient in detail post-care instructions. Patient understands to keep the injection sites clean and call the clinic if there is any redness, swelling or pain.

## 2020-09-01 NOTE — PROCEDURE: MEDICATION COUNSELING
Niacinamide Pregnancy And Lactation Text: These medications are considered safe during pregnancy.
Cephalexin Pregnancy And Lactation Text: This medication is Pregnancy Category B and considered safe during pregnancy.  It is also excreted in breast milk but can be used safely for shorter doses.
Azathioprine Counseling:  I discussed with the patient the risks of azathioprine including but not limited to myelosuppression, immunosuppression, hepatotoxicity, lymphoma, and infections.  The patient understands that monitoring is required including baseline LFTs, Creatinine, possible TPMP genotyping and weekly CBCs for the first month and then every 2 weeks thereafter.  The patient verbalized understanding of the proper use and possible adverse effects of azathioprine.  All of the patient's questions and concerns were addressed.
Sarecycline Counseling: Patient advised regarding possible photosensitivity and discoloration of the teeth, skin, lips, tongue and gums.  Patient instructed to avoid sunlight, if possible.  When exposed to sunlight, patients should wear protective clothing, sunglasses, and sunscreen.  The patient was instructed to call the office immediately if the following severe adverse effects occur:  hearing changes, easy bruising/bleeding, severe headache, or vision changes.  The patient verbalized understanding of the proper use and possible adverse effects of sarecycline.  All of the patient's questions and concerns were addressed.
Cimzia Counseling:  I discussed with the patient the risks of Cimzia including but not limited to immunosuppression, allergic reactions and infections.  The patient understands that monitoring is required including a PPD at baseline and must alert us or the primary physician if symptoms of infection or other concerning signs are noted.
Arava Counseling:  Patient counseled regarding adverse effects of Arava including but not limited to nausea, vomiting, abnormalities in liver function tests. Patients may develop mouth sores, rash, diarrhea, and abnormalities in blood counts. The patient understands that monitoring is required including LFTs and blood counts.  There is a rare possibility of scarring of the liver and lung problems that can occur when taking methotrexate. Persistent nausea, loss of appetite, pale stools, dark urine, cough, and shortness of breath should be reported immediately. Patient advised to discontinue Arava treatment and consult with a physician prior to attempting conception. The patient will have to undergo a treatment to eliminate Arava from the body prior to conception.
Cosentyx Pregnancy And Lactation Text: This medication is Pregnancy Category B and is considered safe during pregnancy. It is unknown if this medication is excreted in breast milk.
Tazorac Counseling:  Patient advised that medication is irritating and drying.  Patient may need to apply sparingly and wash off after an hour before eventually leaving it on overnight.  The patient verbalized understanding of the proper use and possible adverse effects of tazorac.  All of the patient's questions and concerns were addressed.
Terbinafine Pregnancy And Lactation Text: This medication is Pregnancy Category B and is considered safe during pregnancy. It is also excreted in breast milk and breast feeding isn't recommended.
Acitretin Counseling:  I discussed with the patient the risks of acitretin including but not limited to hair loss, dry lips/skin/eyes, liver damage, hyperlipidemia, depression/suicidal ideation, photosensitivity.  Serious rare side effects can include but are not limited to pancreatitis, pseudotumor cerebri, bony changes, clot formation/stroke/heart attack.  Patient understands that alcohol is contraindicated since it can result in liver toxicity and significantly prolong the elimination of the drug by many years.
Birth Control Pills Counseling: Birth Control Pill Counseling: I discussed with the patient the potential side effects of OCPs including but not limited to increased risk of stroke, heart attack, thrombophlebitis, deep venous thrombosis, hepatic adenomas, breast changes, GI upset, headaches, and depression.  The patient verbalized understanding of the proper use and possible adverse effects of OCPs. All of the patient's questions and concerns were addressed.
Calcipotriene Pregnancy And Lactation Text: This medication has not been proven safe during pregnancy. It is unknown if this medication is excreted in breast milk.
Simponi Counseling:  I discussed with the patient the risks of golimumab including but not limited to myelosuppression, immunosuppression, autoimmune hepatitis, demyelinating diseases, lymphoma, and serious infections.  The patient understands that monitoring is required including a PPD at baseline and must alert us or the primary physician if symptoms of infection or other concerning signs are noted.
Simponi Pregnancy And Lactation Text: The risk during pregnancy and breastfeeding is uncertain with this medication.
5-Fu Counseling: 5-Fluorouracil Counseling:  I discussed with the patient the risks of 5-fluorouracil including but not limited to erythema, scaling, itching, weeping, crusting, and pain.
Azathioprine Pregnancy And Lactation Text: This medication is Pregnancy Category D and isn't considered safe during pregnancy. It is unknown if this medication is excreted in breast milk.
Acitretin Pregnancy And Lactation Text: This medication is Pregnancy Category X and should not be given to women who are pregnant or may become pregnant in the future. This medication is excreted in breast milk.
Minoxidil Pregnancy And Lactation Text: This medication has not been assigned a Pregnancy Risk Category but animal studies failed to show danger with the topical medication. It is unknown if the medication is excreted in breast milk.
Dupixent Counseling: I discussed with the patient the risks of dupilumab including but not limited to eye infection and irritation, cold sores, injection site reactions, worsening of asthma, allergic reactions and increased risk of parasitic infection.  Live vaccines should be avoided while taking dupilumab. Dupilumab will also interact with certain medications such as warfarin and cyclosporine. The patient understands that monitoring is required and they must alert us or the primary physician if symptoms of infection or other concerning signs are noted.
Nsaids Counseling: NSAID Counseling: I discussed with the patient that NSAIDs should be taken with food. Prolonged use of NSAIDs can result in the development of stomach ulcers.  Patient advised to stop taking NSAIDs if abdominal pain occurs.  The patient verbalized understanding of the proper use and possible adverse effects of NSAIDs.  All of the patient's questions and concerns were addressed.
Arava Pregnancy And Lactation Text: This medication is Pregnancy Category X and is absolutely contraindicated during pregnancy. It is unknown if it is excreted in breast milk.
Clindamycin Counseling: I counseled the patient regarding use of clindamycin as an antibiotic for prophylactic and/or therapeutic purposes. Clindamycin is active against numerous classes of bacteria, including skin bacteria. Side effects may include nausea, diarrhea, gastrointestinal upset, rash, hives, yeast infections, and in rare cases, colitis.
Tazorac Pregnancy And Lactation Text: This medication is not safe during pregnancy. It is unknown if this medication is excreted in breast milk.
Birth Control Pills Pregnancy And Lactation Text: This medication should be avoided if pregnant and for the first 30 days post-partum.
Colchicine Pregnancy And Lactation Text: This medication is Pregnancy Category C and isn't considered safe during pregnancy. It is excreted in breast milk.
Dupixent Pregnancy And Lactation Text: This medication likely crosses the placenta but the risk for the fetus is uncertain. This medication is excreted in breast milk.
Nsaids Pregnancy And Lactation Text: These medications are considered safe up to 30 weeks gestation. It is excreted in breast milk.
Clindamycin Pregnancy And Lactation Text: This medication can be used in pregnancy if certain situations. Clindamycin is also present in breast milk.
Spironolactone Counseling: Patient advised regarding risks of diarrhea, abdominal pain, hyperkalemia, birth defects (for female patients), liver toxicity and renal toxicity. The patient may need blood work to monitor liver and kidney function and potassium levels while on therapy. The patient verbalized understanding of the proper use and possible adverse effects of spironolactone.  All of the patient's questions and concerns were addressed.
Topical Clindamycin Counseling: Patient counseled that this medication may cause skin irritation or allergic reactions.  In the event of skin irritation, the patient was advised to reduce the amount of the drug applied or use it less frequently.   The patient verbalized understanding of the proper use and possible adverse effects of clindamycin.  All of the patient's questions and concerns were addressed.
Skyrizi Counseling: I discussed with the patient the risks of risankizumab-rzaa including but not limited to immunosuppression, and serious infections.  The patient understands that monitoring is required including a PPD at baseline and must alert us or the primary physician if symptoms of infection or other concerning signs are noted.
5-Fu Pregnancy And Lactation Text: This medication is Pregnancy Category X and contraindicated in pregnancy and in women who may become pregnant. It is unknown if this medication is excreted in breast milk.
Sarecycline Pregnancy And Lactation Text: This medication is Pregnancy Category D and not consider safe during pregnancy. It is also excreted in breast milk.
Cimetidine Counseling:  I discussed with the patient the risks of Cimetidine including but not limited to gynecomastia, headache, diarrhea, nausea, drowsiness, arrhythmias, pancreatitis, skin rashes, psychosis, bone marrow suppression and kidney toxicity.
Dapsone Counseling: I discussed with the patient the risks of dapsone including but not limited to hemolytic anemia, agranulocytosis, rashes, methemoglobinemia, kidney failure, peripheral neuropathy, headaches, GI upset, and liver toxicity.  Patients who start dapsone require monitoring including baseline LFTs and weekly CBCs for the first month, then every month thereafter.  The patient verbalized understanding of the proper use and possible adverse effects of dapsone.  All of the patient's questions and concerns were addressed.
Bexarotene Counseling:  I discussed with the patient the risks of bexarotene including but not limited to hair loss, dry lips/skin/eyes, liver abnormalities, hyperlipidemia, pancreatitis, depression/suicidal ideation, photosensitivity, drug rash/allergic reactions, hypothyroidism, anemia, leukopenia, infection, cataracts, and teratogenicity.  Patient understands that they will need regular blood tests to check lipid profile, liver function tests, white blood cell count, thyroid function tests and pregnancy test if applicable.
Clofazimine Counseling:  I discussed with the patient the risks of clofazimine including but not limited to skin and eye pigmentation, liver damage, nausea/vomiting, gastrointestinal bleeding and allergy.
Cimzia Pregnancy And Lactation Text: This medication crosses the placenta but can be considered safe in certain situations. Cimzia may be excreted in breast milk.
Mirvaso Counseling: Mirvaso is a topical medication which can decrease superficial blood flow where applied. Side effects are uncommon and include stinging, redness and allergic reactions.
Mirvaso Pregnancy And Lactation Text: This medication has not been assigned a Pregnancy Risk Category. It is unknown if the medication is excreted in breast milk.
Topical Clindamycin Pregnancy And Lactation Text: This medication is Pregnancy Category B and is considered safe during pregnancy. It is unknown if it is excreted in breast milk.
Odomzo Counseling- I discussed with the patient the risks of Odomzo including but not limited to nausea, vomiting, diarrhea, constipation, weight loss, changes in the sense of taste, decreased appetite, muscle spasms, and hair loss.  The patient verbalized understanding of the proper use and possible adverse effects of Odomzo.  All of the patient's questions and concerns were addressed.
Spironolactone Pregnancy And Lactation Text: This medication can cause feminization of the male fetus and should be avoided during pregnancy. The active metabolite is also found in breast milk.
Doxycycline Counseling:  Patient counseled regarding possible photosensitivity and increased risk for sunburn.  Patient instructed to avoid sunlight, if possible.  When exposed to sunlight, patients should wear protective clothing, sunglasses, and sunscreen.  The patient was instructed to call the office immediately if the following severe adverse effects occur:  hearing changes, easy bruising/bleeding, severe headache, or vision changes.  The patient verbalized understanding of the proper use and possible adverse effects of doxycycline.  All of the patient's questions and concerns were addressed.
Cosentyx Counseling:  I discussed with the patient the risks of Cosentyx including but not limited to worsening of Crohn's disease, immunosuppression, allergic reactions and infections.  The patient understands that monitoring is required including a PPD at baseline and must alert us or the primary physician if symptoms of infection or other concerning signs are noted.
Cellcept Counseling:  I discussed with the patient the risks of mycophenolate mofetil including but not limited to infection/immunosuppression, GI upset, hypokalemia, hypercholesterolemia, bone marrow suppression, lymphoproliferative disorders, malignancy, GI ulceration/bleed/perforation, colitis, interstitial lung disease, kidney failure, progressive multifocal leukoencephalopathy, and birth defects.  The patient understands that monitoring is required including a baseline creatinine and regular CBC testing. In addition, patient must alert us immediately if symptoms of infection or other concerning signs are noted.
Enbrel Counseling:  I discussed with the patient the risks of etanercept including but not limited to myelosuppression, immunosuppression, autoimmune hepatitis, demyelinating diseases, lymphoma, and infections.  The patient understands that monitoring is required including a PPD at baseline and must alert us or the primary physician if symptoms of infection or other concerning signs are noted.
Bexarotene Pregnancy And Lactation Text: This medication is Pregnancy Category X and should not be given to women who are pregnant or may become pregnant. This medication should not be used if you are breast feeding.
Tetracycline Counseling: Patient counseled regarding possible photosensitivity and increased risk for sunburn.  Patient instructed to avoid sunlight, if possible.  When exposed to sunlight, patients should wear protective clothing, sunglasses, and sunscreen.  The patient was instructed to call the office immediately if the following severe adverse effects occur:  hearing changes, easy bruising/bleeding, severe headache, or vision changes.  The patient verbalized understanding of the proper use and possible adverse effects of tetracycline.  All of the patient's questions and concerns were addressed. Patient understands to avoid pregnancy while on therapy due to potential birth defects.
Dapsone Pregnancy And Lactation Text: This medication is Pregnancy Category C and is not considered safe during pregnancy or breast feeding.
Doxycycline Pregnancy And Lactation Text: This medication is Pregnancy Category D and not consider safe during pregnancy. It is also excreted in breast milk but is considered safe for shorter treatment courses.
Doxepin Counseling:  Patient advised that the medication is sedating and not to drive a car after taking this medication. Patient informed of potential adverse effects including but not limited to dry mouth, urinary retention, and blurry vision.  The patient verbalized understanding of the proper use and possible adverse effects of doxepin.  All of the patient's questions and concerns were addressed.
Erivedge Counseling- I discussed with the patient the risks of Erivedge including but not limited to nausea, vomiting, diarrhea, constipation, weight loss, changes in the sense of taste, decreased appetite, muscle spasms, and hair loss.  The patient verbalized understanding of the proper use and possible adverse effects of Erivedge.  All of the patient's questions and concerns were addressed.
SSKI Counseling:  I discussed with the patient the risks of SSKI including but not limited to thyroid abnormalities, metallic taste, GI upset, fever, headache, acne, arthralgias, paraesthesias, lymphadenopathy, easy bleeding, arrhythmias, and allergic reaction.
Isotretinoin Counseling: Patient should get monthly blood tests, not donate blood, not drive at night if vision affected, not share medication, and not undergo elective surgery for 6 months after tx completed. Side effects reviewed, pt to contact office should one occur.
Picato Counseling:  I discussed with the patient the risks of Picato including but not limited to erythema, scaling, itching, weeping, crusting, and pain.
Stelara Counseling:  I discussed with the patient the risks of ustekinumab including but not limited to immunosuppression, malignancy, posterior leukoencephalopathy syndrome, and serious infections.  The patient understands that monitoring is required including a PPD at baseline and must alert us or the primary physician if symptoms of infection or other concerning signs are noted.
Drysol Counseling:  I discussed with the patient the risks of drysol/aluminum chloride including but not limited to skin rash, itching, irritation, burning.
Colchicine Counseling:  Patient counseled regarding adverse effects including but not limited to stomach upset (nausea, vomiting, stomach pain, or diarrhea).  Patient instructed to limit alcohol consumption while taking this medication.  Colchicine may reduce blood counts especially with prolonged use.  The patient understands that monitoring of kidney function and blood counts may be required, especially at baseline. The patient verbalized understanding of the proper use and possible adverse effects of colchicine.  All of the patient's questions and concerns were addressed.
Humira Counseling:  I discussed with the patient the risks of adalimumab including but not limited to myelosuppression, immunosuppression, autoimmune hepatitis, demyelinating diseases, lymphoma, and serious infections.  The patient understands that monitoring is required including a PPD at baseline and must alert us or the primary physician if symptoms of infection or other concerning signs are noted.
Otezla Counseling: The side effects of Otezla were discussed with the patient, including but not limited to worsening or new depression, weight loss, diarrhea, nausea, upper respiratory tract infection, and headache. Patient instructed to call the office should any adverse effect occur.  The patient verbalized understanding of the proper use and possible adverse effects of Otezla.  All the patient's questions and concerns were addressed.
Erythromycin Counseling:  I discussed with the patient the risks of erythromycin including but not limited to GI upset, allergic reaction, drug rash, diarrhea, increase in liver enzymes, and yeast infections.
Sski Pregnancy And Lactation Text: This medication is Pregnancy Category D and isn't considered safe during pregnancy. It is excreted in breast milk.
Isotretinoin Pregnancy And Lactation Text: This medication is Pregnancy Category X and is considered extremely dangerous during pregnancy. It is unknown if it is excreted in breast milk.
Topical Sulfur Applications Counseling: Topical Sulfur Counseling: Patient counseled that this medication may cause skin irritation or allergic reactions.  In the event of skin irritation, the patient was advised to reduce the amount of the drug applied or use it less frequently.   The patient verbalized understanding of the proper use and possible adverse effects of topical sulfur application.  All of the patient's questions and concerns were addressed.
Detail Level: Zone
Cyclophosphamide Counseling:  I discussed with the patient the risks of cyclophosphamide including but not limited to hair loss, hormonal abnormalities, decreased fertility, abdominal pain, diarrhea, nausea and vomiting, bone marrow suppression and infection. The patient understands that monitoring is required while taking this medication.
Drysol Pregnancy And Lactation Text: This medication is considered safe during pregnancy and breast feeding.
Doxepin Pregnancy And Lactation Text: This medication is Pregnancy Category C and it isn't known if it is safe during pregnancy. It is also excreted in breast milk and breast feeding isn't recommended.
Thalidomide Counseling: I discussed with the patient the risks of thalidomide including but not limited to birth defects, anxiety, weakness, chest pain, dizziness, cough and severe allergy.
Elidel Counseling: Patient may experience a mild burning sensation during topical application. Elidel is not approved in children less than 2 years of age. There have been case reports of hematologic and skin malignancies in patients using topical calcineurin inhibitors although causality is questionable.
Hydroxyzine Counseling: Patient advised that the medication is sedating and not to drive a car after taking this medication.  Patient informed of potential adverse effects including but not limited to dry mouth, urinary retention, and blurry vision.  The patient verbalized understanding of the proper use and possible adverse effects of hydroxyzine.  All of the patient's questions and concerns were addressed.
Finasteride Male Counseling: Finasteride Counseling:  I discussed with the patient the risks of use of finasteride including but not limited to decreased libido, decreased ejaculate volume, gynecomastia, and depression. Women should not handle medication.  All of the patient's questions and concerns were addressed.
Otezla Pregnancy And Lactation Text: This medication is Pregnancy Category C and it isn't known if it is safe during pregnancy. It is unknown if it is excreted in breast milk.
Taltz Counseling: I discussed with the patient the risks of ixekizumab including but not limited to immunosuppression, serious infections, worsening of inflammatory bowel disease and drug reactions.  The patient understands that monitoring is required including a PPD at baseline and must alert us or the primary physician if symptoms of infection or other concerning signs are noted.
Fluconazole Counseling:  Patient counseled regarding adverse effects of fluconazole including but not limited to headache, diarrhea, nausea, upset stomach, liver function test abnormalities, taste disturbance, and stomach pain.  There is a rare possibility of liver failure that can occur when taking fluconazole.  The patient understands that monitoring of LFTs and kidney function test may be required, especially at baseline. The patient verbalized understanding of the proper use and possible adverse effects of fluconazole.  All of the patient's questions and concerns were addressed.
Picato Pregnancy And Lactation Text: This medication is Pregnancy Category C. It is unknown if this medication is excreted in breast milk.
Topical Sulfur Applications Pregnancy And Lactation Text: This medication is Pregnancy Category C and has an unknown safety profile during pregnancy. It is unknown if this topical medication is excreted in breast milk.
Cyclophosphamide Pregnancy And Lactation Text: This medication is Pregnancy Category D and it isn't considered safe during pregnancy. This medication is excreted in breast milk.
Finasteride Pregnancy And Lactation Text: This medication is absolutely contraindicated during pregnancy. It is unknown if it is excreted in breast milk.
Cyclosporine Counseling:  I discussed with the patient the risks of cyclosporine including but not limited to hypertension, gingival hyperplasia,myelosuppression, immunosuppression, liver damage, kidney damage, neurotoxicity, lymphoma, and serious infections. The patient understands that monitoring is required including baseline blood pressure, CBC, CMP, lipid panel and uric acid, and then 1-2 times monthly CMP and blood pressure.
Oxybutynin Counseling:  I discussed with the patient the risks of oxybutynin including but not limited to skin rash, drowsiness, dry mouth, difficulty urinating, and blurred vision.
Metronidazole Counseling:  I discussed with the patient the risks of metronidazole including but not limited to seizures, nausea/vomiting, a metallic taste in the mouth, nausea/vomiting and severe allergy.
Ilumya Counseling: I discussed with the patient the risks of tildrakizumab including but not limited to immunosuppression, malignancy, posterior leukoencephalopathy syndrome, and serious infections.  The patient understands that monitoring is required including a PPD at baseline and must alert us or the primary physician if symptoms of infection or other concerning signs are noted.
Hydroxyzine Pregnancy And Lactation Text: This medication is not safe during pregnancy and should not be taken. It is also excreted in breast milk and breast feeding isn't recommended.
Wartpeel Counseling:  I discussed with the patient the risks of Wartpeel including but not limited to erythema, scaling, itching, weeping, crusting, and pain.
High Dose Vitamin A Counseling: Side effects reviewed, pt to contact office should one occur.
Erythromycin Pregnancy And Lactation Text: This medication is Pregnancy Category B and is considered safe during pregnancy. It is also excreted in breast milk.
Protopic Counseling: Patient may experience a mild burning sensation during topical application. Protopic is not approved in children less than 2 years of age. There have been case reports of hematologic and skin malignancies in patients using topical calcineurin inhibitors although causality is questionable.
Fluconazole Pregnancy And Lactation Text: This medication is Pregnancy Category C and it isn't know if it is safe during pregnancy. It is also excreted in breast milk.
Griseofulvin Counseling:  I discussed with the patient the risks of griseofulvin including but not limited to photosensitivity, cytopenia, liver damage, nausea/vomiting and severe allergy.  The patient understands that this medication is best absorbed when taken with a fatty meal (e.g., ice cream or french fries).
Cyclosporine Pregnancy And Lactation Text: This medication is Pregnancy Category C and it isn't know if it is safe during pregnancy. This medication is excreted in breast milk.
Gabapentin Counseling: I discussed with the patient the risks of gabapentin including but not limited to dizziness, somnolence, fatigue and ataxia.
Eucrisa Counseling: Patient may experience a mild burning sensation during topical application. Eucrisa is not approved in children less than 2 years of age.
High Dose Vitamin A Pregnancy And Lactation Text: High dose vitamin A therapy is contraindicated during pregnancy and breast feeding.
Protopic Pregnancy And Lactation Text: This medication is Pregnancy Category C. It is unknown if this medication is excreted in breast milk when applied topically.
Include Pregnancy/Lactation Warning?: No
Zyclara Counseling:  I discussed with the patient the risks of imiquimod including but not limited to erythema, scaling, itching, weeping, crusting, and pain.  Patient understands that the inflammatory response to imiquimod is variable from person to person and was educated regarded proper titration schedule.  If flu-like symptoms develop, patient knows to discontinue the medication and contact us.
Griseofulvin Pregnancy And Lactation Text: This medication is Pregnancy Category X and is known to cause serious birth defects. It is unknown if this medication is excreted in breast milk but breast feeding should be avoided.
Tranexamic Acid Pregnancy And Lactation Text: It is unknown if this medication is safe during pregnancy or breast feeding.
Propranolol Counseling:  I discussed with the patient the risks of propranolol including but not limited to low heart rate, low blood pressure, low blood sugar, restlessness and increased cold sensitivity. They should call the office if they experience any of these side effects.
Infliximab Counseling:  I discussed with the patient the risks of infliximab including but not limited to myelosuppression, immunosuppression, autoimmune hepatitis, demyelinating diseases, lymphoma, and serious infections.  The patient understands that monitoring is required including a PPD at baseline and must alert us or the primary physician if symptoms of infection or other concerning signs are noted.
Albendazole Counseling:  I discussed with the patient the risks of albendazole including but not limited to cytopenia, kidney damage, nausea/vomiting and severe allergy.  The patient understands that this medication is being used in an off-label manner.
Metronidazole Pregnancy And Lactation Text: This medication is Pregnancy Category B and considered safe during pregnancy.  It is also excreted in breast milk.
Tranexamic Acid Counseling:  Patient advised of the small risk of bleeding problems with tranexamic acid. They were also instructed to call if they developed any nausea, vomiting or diarrhea. All of the patient's questions and concerns were addressed.
Tremfya Counseling: I discussed with the patient the risks of guselkumab including but not limited to immunosuppression, serious infections, worsening of inflammatory bowel disease and drug reactions.  The patient understands that monitoring is required including a PPD at baseline and must alert us or the primary physician if symptoms of infection or other concerning signs are noted.
Rhofade Counseling: Rhofade is a topical medication which can decrease superficial blood flow where applied. Side effects are uncommon and include stinging, redness and allergic reactions.
Itraconazole Counseling:  I discussed with the patient the risks of itraconazole including but not limited to liver damage, nausea/vomiting, neuropathy, and severe allergy.  The patient understands that this medication is best absorbed when taken with acidic beverages such as non-diet cola or ginger ale.  The patient understands that monitoring is required including baseline LFTs and repeat LFTs at intervals.  The patient understands that they are to contact us or the primary physician if concerning signs are noted.
Albendazole Pregnancy And Lactation Text: This medication is Pregnancy Category C and it isn't known if it is safe during pregnancy. It is also excreted in breast milk.
Azithromycin Counseling:  I discussed with the patient the risks of azithromycin including but not limited to GI upset, allergic reaction, drug rash, diarrhea, and yeast infections.
Glycopyrrolate Counseling:  I discussed with the patient the risks of glycopyrrolate including but not limited to skin rash, drowsiness, dry mouth, difficulty urinating, and blurred vision.
Methotrexate Counseling:  Patient counseled regarding adverse effects of methotrexate including but not limited to nausea, vomiting, abnormalities in liver function tests. Patients may develop mouth sores, rash, diarrhea, and abnormalities in blood counts. The patient understands that monitoring is required including LFT's and blood counts.  There is a rare possibility of scarring of the liver and lung problems that can occur when taking methotrexate. Persistent nausea, loss of appetite, pale stools, dark urine, cough, and shortness of breath should be reported immediately. Patient advised to discontinue methotrexate treatment at least three months before attempting to become pregnant.  I discussed the need for folate supplements while taking methotrexate.  These supplements can decrease side effects during methotrexate treatment. The patient verbalized understanding of the proper use and possible adverse effects of methotrexate.  All of the patient's questions and concerns were addressed.
Benzoyl Peroxide Counseling: Patient counseled that medicine may cause skin irritation and bleach clothing.  In the event of skin irritation, the patient was advised to reduce the amount of the drug applied or use it less frequently.   The patient verbalized understanding of the proper use and possible adverse effects of benzoyl peroxide.  All of the patient's questions and concerns were addressed.
Minocycline Counseling: Patient advised regarding possible photosensitivity and discoloration of the teeth, skin, lips, tongue and gums.  Patient instructed to avoid sunlight, if possible.  When exposed to sunlight, patients should wear protective clothing, sunglasses, and sunscreen.  The patient was instructed to call the office immediately if the following severe adverse effects occur:  hearing changes, easy bruising/bleeding, severe headache, or vision changes.  The patient verbalized understanding of the proper use and possible adverse effects of minocycline.  All of the patient's questions and concerns were addressed.
Benzoyl Peroxide Pregnancy And Lactation Text: This medication is Pregnancy Category C. It is unknown if benzoyl peroxide is excreted in breast milk.
Solaraze Counseling:  I discussed with the patient the risks of Solaraze including but not limited to erythema, scaling, itching, weeping, crusting, and pain.
Ivermectin Counseling:  Patient instructed to take medication on an empty stomach with a full glass of water.  Patient informed of potential adverse effects including but not limited to nausea, diarrhea, dizziness, itching, and swelling of the extremities or lymph nodes.  The patient verbalized understanding of the proper use and possible adverse effects of ivermectin.  All of the patient's questions and concerns were addressed.
Propranolol Pregnancy And Lactation Text: This medication is Pregnancy Category C and it isn't known if it is safe during pregnancy. It is excreted in breast milk.
Valtrex Counseling: I discussed with the patient the risks of valacyclovir including but not limited to kidney damage, nausea, vomiting and severe allergy.  The patient understands that if the infection seems to be worsening or is not improving, they are to call.
Methotrexate Pregnancy And Lactation Text: This medication is Pregnancy Category X and is known to cause fetal harm. This medication is excreted in breast milk.
Xeljanz Counseling: I discussed with the patient the risks of Xeljanz therapy including increased risk of infection, liver issues, headache, diarrhea, or cold symptoms. Live vaccines should be avoided. They were instructed to call if they have any problems.
Hydroquinone Counseling:  Patient advised that medication may result in skin irritation, lightening (hypopigmentation), dryness, and burning.  In the event of skin irritation, the patient was advised to reduce the amount of the drug applied or use it less frequently.  Rarely, spots that are treated with hydroquinone can become darker (pseudoochronosis).  Should this occur, patient instructed to stop medication and call the office. The patient verbalized understanding of the proper use and possible adverse effects of hydroquinone.  All of the patient's questions and concerns were addressed.
Prednisone Counseling:  I discussed with the patient the risks of prolonged use of prednisone including but not limited to weight gain, insomnia, osteoporosis, mood changes, diabetes, susceptibility to infection, glaucoma and high blood pressure.  In cases where prednisone use is prolonged, patients should be monitored with blood pressure checks, serum glucose levels and an eye exam.  Additionally, the patient may need to be placed on GI prophylaxis, PCP prophylaxis, and calcium and vitamin D supplementation and/or a bisphosphonate.  The patient verbalized understanding of the proper use and the possible adverse effects of prednisone.  All of the patient's questions and concerns were addressed.
Xellisaz Pregnancy And Lactation Text: This medication is Pregnancy Category D and is not considered safe during pregnancy.  The risk during breast feeding is also uncertain.
Opioid Counseling: I discussed with the patient the potential side effects of opioids including but not limited to addiction, altered mental status, and depression. I stressed avoiding alcohol, benzodiazepines, muscle relaxants and sleep aids unless specifically okayed by a physician. The patient verbalized understanding of the proper use and possible adverse effects of opioids. All of the patient's questions and concerns were addressed. They were instructed to flush the remaining pills down the toilet if they did not need them for pain.
Carac Counseling:  I discussed with the patient the risks of Carac including but not limited to erythema, scaling, itching, weeping, crusting, and pain.
Ketoconazole Counseling:   Patient counseled regarding improving absorption with orange juice.  Adverse effects include but are not limited to breast enlargement, headache, diarrhea, nausea, upset stomach, liver function test abnormalities, taste disturbance, and stomach pain.  There is a rare possibility of liver failure that can occur when taking ketoconazole. The patient understands that monitoring of LFTs may be required, especially at baseline. The patient verbalized understanding of the proper use and possible adverse effects of ketoconazole.  All of the patient's questions and concerns were addressed.
Solaraze Pregnancy And Lactation Text: This medication is Pregnancy Category B and is considered safe. There is some data to suggest avoiding during the third trimester. It is unknown if this medication is excreted in breast milk.
Glycopyrrolate Pregnancy And Lactation Text: This medication is Pregnancy Category B and is considered safe during pregnancy. It is unknown if it is excreted breast milk.
Azithromycin Pregnancy And Lactation Text: This medication is considered safe during pregnancy and is also secreted in breast milk.
Quinolones Counseling:  I discussed with the patient the risks of fluoroquinolones including but not limited to GI upset, allergic reaction, drug rash, diarrhea, dizziness, photosensitivity, yeast infections, liver function test abnormalities, tendonitis/tendon rupture.
Rituxan Counseling:  I discussed with the patient the risks of Rituxan infusions. Side effects can include infusion reactions, severe drug rashes including mucocutaneous reactions, reactivation of latent hepatitis and other infections and rarely progressive multifocal leukoencephalopathy.  All of the patient's questions and concerns were addressed.
Valtrex Pregnancy And Lactation Text: this medication is Pregnancy Category B and is considered safe during pregnancy. This medication is not directly found in breast milk but it's metabolite acyclovir is present.
Opioid Pregnancy And Lactation Text: These medications can lead to premature delivery and should be avoided during pregnancy. These medications are also present in breast milk in small amounts.
Ketoconazole Pregnancy And Lactation Text: This medication is Pregnancy Category C and it isn't know if it is safe during pregnancy. It is also excreted in breast milk and breast feeding isn't recommended.
Hydroxychloroquine Counseling:  I discussed with the patient that a baseline ophthalmologic exam is needed at the start of therapy and every year thereafter while on therapy. A CBC may also be warranted for monitoring.  The side effects of this medication were discussed with the patient, including but not limited to agranulocytosis, aplastic anemia, seizures, rashes, retinopathy, and liver toxicity. Patient instructed to call the office should any adverse effect occur.  The patient verbalized understanding of the proper use and possible adverse effects of Plaquenil.  All the patient's questions and concerns were addressed.
Rituxan Pregnancy And Lactation Text: This medication is Pregnancy Category C and it isn't know if it is safe during pregnancy. It is unknown if this medication is excreted in breast milk but similar antibodies are known to be excreted.
Imiquimod Counseling:  I discussed with the patient the risks of imiquimod including but not limited to erythema, scaling, itching, weeping, crusting, and pain.  Patient understands that the inflammatory response to imiquimod is variable from person to person and was educated regarded proper titration schedule.  If flu-like symptoms develop, patient knows to discontinue the medication and contact us.
Bactrim Counseling:  I discussed with the patient the risks of sulfa antibiotics including but not limited to GI upset, allergic reaction, drug rash, diarrhea, dizziness, photosensitivity, and yeast infections.  Rarely, more serious reactions can occur including but not limited to aplastic anemia, agranulocytosis, methemoglobinemia, blood dyscrasias, liver or kidney failure, lung infiltrates or desquamative/blistering drug rashes.
Xolair Counseling:  Patient informed of potential adverse effects including but not limited to fever, muscle aches, rash and allergic reactions.  The patient verbalized understanding of the proper use and possible adverse effects of Xolair.  All of the patient's questions and concerns were addressed.
Xolair Pregnancy And Lactation Text: This medication is Pregnancy Category B and is considered safe during pregnancy. This medication is excreted in breast milk.
Rifampin Counseling: I discussed with the patient the risks of rifampin including but not limited to liver damage, kidney damage, red-orange body fluids, nausea/vomiting and severe allergy.
Bactrim Pregnancy And Lactation Text: This medication is Pregnancy Category D and is known to cause fetal risk.  It is also excreted in breast milk.
Hydroxychloroquine Pregnancy And Lactation Text: This medication has been shown to cause fetal harm but it isn't assigned a Pregnancy Risk Category. There are small amounts excreted in breast milk.
Topical Retinoid counseling:  Patient advised to apply a pea-sized amount only at bedtime and wait 30 minutes after washing their face before applying.  If too drying, patient may add a non-comedogenic moisturizer. The patient verbalized understanding of the proper use and possible adverse effects of retinoids.  All of the patient's questions and concerns were addressed.
Siliq Counseling:  I discussed with the patient the risks of Siliq including but not limited to new or worsening depression, suicidal thoughts and behavior, immunosuppression, malignancy, posterior leukoencephalopathy syndrome, and serious infections.  The patient understands that monitoring is required including a PPD at baseline and must alert us or the primary physician if symptoms of infection or other concerning signs are noted. There is also a special program designed to monitor depression which is required with Siliq.
Rifampin Pregnancy And Lactation Text: This medication is Pregnancy Category C and it isn't know if it is safe during pregnancy. It is also excreted in breast milk and should not be used if you are breast feeding.
Minoxidil Counseling: Minoxidil is a topical medication which can increase blood flow where it is applied. It is uncertain how this medication increases hair growth. Side effects are uncommon and include stinging and allergic reactions.
Terbinafine Counseling: Patient counseling regarding adverse effects of terbinafine including but not limited to headache, diarrhea, rash, upset stomach, liver function test abnormalities, itching, taste/smell disturbance, nausea, abdominal pain, and flatulence.  There is a rare possibility of liver failure that can occur when taking terbinafine.  The patient understands that a baseline LFT and kidney function test may be required. The patient verbalized understanding of the proper use and possible adverse effects of terbinafine.  All of the patient's questions and concerns were addressed.
Calcipotriene Counseling:  I discussed with the patient the risks of calcipotriene including but not limited to erythema, scaling, itching, and irritation.
Cephalexin Counseling: I counseled the patient regarding use of cephalexin as an antibiotic for prophylactic and/or therapeutic purposes. Cephalexin (commonly prescribed under brand name Keflex) is a cephalosporin antibiotic which is active against numerous classes of bacteria, including most skin bacteria. Side effects may include nausea, diarrhea, gastrointestinal upset, rash, hives, yeast infections, and in rare cases, hepatitis, kidney disease, seizures, fever, confusion, neurologic symptoms, and others. Patients with severe allergies to penicillin medications are cautioned that there is about a 10% incidence of cross-reactivity with cephalosporins. When possible, patients with penicillin allergies should use alternatives to cephalosporins for antibiotic therapy.
Niacinamide Counseling: I recommended taking niacin or niacinamide, also know as vitamin B3, twice daily. Recent evidence suggests that taking vitamin B3 (500 mg twice daily) can reduce the risk of actinic keratoses and non-melanoma skin cancers. Side effects of vitamin B3 include flushing and headache.

## 2020-09-01 NOTE — HPI: BIOPSY FOLLOW-UP
Additional History: Pt has had two bleo injections with Dr. Briceno
Pathology Results (Optional): [id number]\\nA. Desmoplastic trichilemmoma\\nB. SCC in situ\\nC. AK

## 2020-09-01 NOTE — PROCEDURE: LIQUID NITROGEN
Duration Of Freeze Thaw-Cycle (Seconds): 10
Number Of Freeze-Thaw Cycles: 2 freeze-thaw cycles
Detail Level: Detailed
Render Note In Bullet Format When Appropriate: No
Consent: The patient's consent was obtained including but not limited to risks of crusting, scabbing, blistering, scarring, darker or lighter pigmentary change, recurrence, incomplete removal and infection. RTC in 2 months if lesion(s) persistent.
Render Post-Care Instructions In Note?: yes
Post-Care Instructions: I reviewed with the patient in detail post-care instructions. Patient is to wear sunprotection, and avoid picking at any of the treated lesions. Pt may apply Vaseline to crusted or scabbing areas.

## 2020-09-17 ENCOUNTER — OFFICE VISIT (OUTPATIENT)
Dept: PULMONOLOGY | Facility: HOSPICE | Age: 80
End: 2020-09-17
Payer: MEDICARE

## 2020-09-17 VITALS
SYSTOLIC BLOOD PRESSURE: 122 MMHG | HEART RATE: 92 BPM | OXYGEN SATURATION: 95 % | RESPIRATION RATE: 16 BRPM | DIASTOLIC BLOOD PRESSURE: 80 MMHG | HEIGHT: 68 IN | WEIGHT: 122 LBS | BODY MASS INDEX: 18.49 KG/M2

## 2020-09-17 DIAGNOSIS — E43 PROTEIN-CALORIE MALNUTRITION, SEVERE (HCC): ICD-10-CM

## 2020-09-17 DIAGNOSIS — J47.9 BRONCHIECTASIS WITHOUT COMPLICATION (HCC): ICD-10-CM

## 2020-09-17 DIAGNOSIS — R13.10 DYSPHAGIA, UNSPECIFIED TYPE: ICD-10-CM

## 2020-09-17 DIAGNOSIS — J98.8 CHRONIC RESPIRATORY INFECTION: ICD-10-CM

## 2020-09-17 DIAGNOSIS — J44.9 CHRONIC OBSTRUCTIVE PULMONARY DISEASE, UNSPECIFIED COPD TYPE (HCC): ICD-10-CM

## 2020-09-17 DIAGNOSIS — J96.11 CHRONIC RESPIRATORY FAILURE WITH HYPOXIA (HCC): ICD-10-CM

## 2020-09-17 PROCEDURE — 99214 OFFICE O/P EST MOD 30 MIN: CPT | Performed by: PHYSICIAN ASSISTANT

## 2020-09-17 ASSESSMENT — ENCOUNTER SYMPTOMS
PALPITATIONS: 0
SPUTUM PRODUCTION: 1
HEADACHES: 1
CHILLS: 0
TREMORS: 0
SHORTNESS OF BREATH: 1
FEVER: 0
ORTHOPNEA: 1
SINUS PAIN: 0
ROS GI COMMENTS: NO DENTURES, NO DIFFICULTY SWALLOWING
SORE THROAT: 1
COUGH: 1
DIZZINESS: 0
INSOMNIA: 1
HEARTBURN: 0
WHEEZING: 0

## 2020-09-17 ASSESSMENT — FIBROSIS 4 INDEX: FIB4 SCORE: 1.839269071694180773

## 2020-09-17 NOTE — PROGRESS NOTES
CC: Fatigue    HPI:  Gilberto Brown is a 80 y.o. year old male here today for follow-up on COPD, bronchiectasis, chronic Pseudomonas and chronic respiratory failure.  Patient is accompanied by his wife Crys.  Last seen in clinic 5/26/2020 by Dr. Tillman.  Patient is a former smoker with reported quit date 1990 and 30-pack-year history.    Patient with complicated medical history including Wegener's granulomatosis, followed by rheumatology, on 5 mg prednisone per day.  Other pertinent history includes severe aortic stenosis with prosthetic aortic valve, sepsis, ESRD on dialysis which he has been on since 2011, pacemaker, stroke in 2018, chronic anticoagulation, odynophagia, esophageal stricture, chronic respiratory infection, Pseudomonas aeruginosa colonization, history of MAC.  Has failed outpatient Cipro in the past.    Patient had a hospitalization from 6/6/2020 through 6/15/2020 for recurrent pneumonia.  Patient had single positive sputum culture for Pseudomonas.  6/11/2020 hemoglobin 11.6, hematocrit 37.2,  he was treated with aggressive pulmonary toilet and IV Zosyn and azithromycin.  Patient does have advanced directives, spent a lot of time talking about out living most of his dialysis acquaintances.  He was seen by palliative care during his hospitalization.    Reviewed in clinic vitals including blood pressure 122/80, heart rate of 92, O2 sat of 95% on 3 L.  Patient has BMI of 18.55 kg/m².  Reports no appetite due in part to his esophageal stricture.  Supplementation encouraged.  Patient reports knowing to go to the hospital if running even a low-grade fever.  Patient with known and evaluated dysphasia, blends most of his intake.    Reviewed home medication regimen including prednisone 5 mg/day, clopidogrel, Renvela, omeprazole, metoprolol, famotidine and Breo inhaler which he reports using consistently since hospitalization in June.  Patient home respiratory regimen is once a day DuoNeb, 1-2 x daily  hypertonic saline, vest therapy 1-2 times per day.    Reviewed most recent imaging including    Chest CT obtained 6/7/2020 demonstrating per radiology, worsening dependent consolidation, bronchial wall thickening and endobronchial opacity, worrisome for aspiration over infection and there is some new adenopathy.  Extensive chronic pulmonary abnormality with emphysema, bronchiectasis, scarring, remote granulomatous disease.  Coronary artery disease with prosthetic aortic valve, and cardiac pacer.    Chest x-ray obtained 6/6/2020 demonstrated hazy opacities bilateral lung bases left more than right similar to prior exam, may be small bilateral pleural effusions.  Stable cardiopericardial silhouette.    Echocardiogram performed 1/26/2020 demonstrated severe aortic stenosis, severe leaflet calcification, normal left ventricular systolic function, LV is small in size, LVEF visually estimated to be 55%, moderate concentric left ventricular hypertrophy, grade 2 diastolic dysfunction, normal right ventricular size and systolic function, pacer ICD wires seen in right ventricle, mild pulmonary hypertension.  Estimated RVSP of 45 mmHg.    Most recent pulmonary function testing was obtained 1/10/2017 demonstrating FEV1 of 1.57 L or 61% predicted FVC of 2.15 L or 60% predicted FEV1/FVC ratio of 73, residual volume 109% predicted, TLC 76% predicted, DLCO of 68% predicted.  No significant postbronchodilator dilator change noted. Per pulmonologist interpretation mild restrictive ventilatory defect with total lung capacity 76%, mild diffusion impairment consistent with loss of Alveolar capillary units DLCO 68%.      Review of Systems   Constitutional: Positive for malaise/fatigue and weight loss (advised to gain by nephrology ). Negative for chills and fever.   HENT: Positive for congestion (runny nose), hearing loss, sore throat (dry ) and tinnitus (buzzing ). Negative for nosebleeds and sinus pain.    Eyes:        Prescription  readers   Respiratory: Positive for cough, sputum production (yellowish-green clears with treatment) and shortness of breath (with stairs). Negative for wheezing.         Begins coughing about one hour off oxygen    Cardiovascular: Positive for chest pain (intermittent left anterior ), orthopnea (recliner due to mucus) and leg swelling (Monitored closely, on dialysis). Negative for palpitations (in March, aortic valve replacement).   Gastrointestinal: Negative for heartburn.        No dentures, no difficulty swallowing    Skin: Negative.    Neurological: Positive for headaches (about two hours into dialysis). Negative for dizziness and tremors.        Patient and wife report increased short-term memory loss which is under evaluation   Psychiatric/Behavioral: The patient has insomnia.        Past Medical History:   Diagnosis Date   • A-fib ([facility])        • Anemia    • Arthritis     arms, legs, shoulders   • ASTHMA    • BPH (benign prostatic hyperplasia)    • Breath shortness    • Bronchitis     chronic   • Cataract     removed bilat   • COPD    • Dialysis     [facility],    • Dyslipidemia    • EMPHYSEMA    • GERD (gastroesophageal reflux disease)    • Hypertension    • Oxygen decrease     O2 3liters @  and dialysis   • Pacemaker 1988   • Pain 05/09/2018    shoulders/hips, 5/10   • Pneumonia 2017   • Pseudomonas pneumonia ([facility])    • Pulmonary histoplasmosis ([facility])    • Renal disorder     CKD,    • Sick sinus syndrome ([facility])    • Sleep apnea     uses cpap at [facility]   • Snoring    • Stroke ([facility]) 02/2018   • Unspecified hemorrhagic conditions     bruises easily, fragile skin   • Wegener's disease, pulmonary ([facility])        Past Surgical History:   Procedure Laterality Date   • AV FISTULOGRAM Right 4/12/2016    Procedure: AV FISTULOGRAM , VENOPLASTY X 2;  Surgeon: Nate Syed M.D.;  Location: SURGERY [facility];  Service:    • RECOVERY  9/3/2015    Procedure: IR1 VASCULAR CASE-ELENO RIGHT  DIALYSIS FISTULOGRAM, POSSIBLE INTERVENTION;  Surgeon: Recoveryonshabbir Surgery;  Location: SURGERY PRE-POST PROC UNIT [facility];  Service:    • RECOVERY  2/26/2015    Performed by Ir-Recovery Surgery at SURGERY SAME DAY [facility]   • RECOVERY  10/3/2014    Performed by Ir-Recovery Surgery at SURGERY SAME DAY [facility] ORS   • RECOVERY  8/7/2014    Performed by Ir-Recovery Surgery at SURGERY [facility]   • RECOVERY  12/17/2013    Performed by Ir-Recovery Surgery at SURGERY SAME DAY [facility] ORS   • BRONCHOSCOPY-ENDO  7/18/2013    Performed by Juan F Rubio M.D. at SURGERY [facility]   • RECOVERY  7/17/2013    Performed by Ir-Recovery Surgery at SURGERY SAME DAY [facility] ORS   • AV FISTULA REVISION  6/9/2012    Performed by NESSA JOHANSEN at SURGERY [facility]   • RECOVERY  4/19/2012    Performed by SURGERY, IR-RECOVERY at SURGERY SAME DAY [facility]   • AV FISTULA CREATION  3/8/2012    Performed by NESSA JOHANSEN at SURGERY [facility]   • GASTROSCOPY WITH BIOPSY  1/17/2012    Performed by ZURDO HARRISON at ENDOSCOPY [facility]   • CATH PLACEMENT  10/8/2011    Performed by NESSA JOAHNSEN at [facility]   • GASTROSCOPY WITH BALLOON DILATATION  9/28/2011    Performed by KT FERNANDEZ at [facility]   • PACEMAKER INSERTION  4/2009   • ROTATOR CUFF REPAIR  2003    right   • HERNIA REPAIR  1990    right inguinal hernia   • HIP REPLACEMENT, TOTAL      right   • TONSILLECTOMY         Family History   Problem Relation Age of Onset   • Stroke Father    • Heart Disease Father    • Stroke Mother    • Cancer Other        Social History     Socioeconomic History   • Marital status:      Spouse name: Not on file   • Number of children: Not on file   • Years of education: Not on file   • Highest education level: Not on file   Occupational History   • Not on file   Social Needs   • Financial resource strain: Not on file   • Food insecurity     Worry: Not on  "file     Inability: Not on file   • Transportation needs     Medical: Not on file     Non-medical: Not on file   Tobacco Use   • Smoking status: Former Smoker     Packs/day: 0.00     Years: 30.00     Pack years: 0.00     Types: Pipe     Quit date: 1990     Years since quittin.7   • Smokeless tobacco: Former User     Types: Chew   Substance and Sexual Activity   • Alcohol use: Yes     Alcohol/week: 1.2 oz     Types: 1 Glasses of wine, 1 Cans of beer per week     Frequency: 2-3 times a week     Drinks per session: 1 or 2     Binge frequency: Never     Comment: 1/2 glass Red wine nightly   • Drug use: No   • Sexual activity: Yes     Partners: Female   Lifestyle   • Physical activity     Days per week: Not on file     Minutes per session: Not on file   • Stress: Not on file   Relationships   • Social connections     Talks on phone: Not on file     Gets together: Not on file     Attends [employer] service: Not on file     Active member of club or organization: Not on file     Attends meetings of clubs or organizations: Not on file     Relationship status: Not on file   • Intimate partner violence     Fear of current or ex partner: Not on file     Emotionally abused: Not on file     Physically abused: Not on file     Forced sexual activity: Not on file   Other Topics Concern   • Not on file   Social History Narrative   • Not on file       Allergies as of 2020 - Reviewed 2020   Allergen Reaction Noted   • Doxycycline Diarrhea and Nausea 2014   • Erythromycin Diarrhea and Nausea 2014   • Rituximab Unspecified 08/15/2016        @Vital signs for this encounter:  Vitals:    20 1440   Height: 1.727 m (5' 8\")   Weight: 55.3 kg (122 lb)   Weight % change since last entry.: 0 %   BP: 122/80   Pulse: 92   BMI (Calculated): 18.55   Resp: 16       Current medications as of today   Current Outpatient Medications   Medication Sig Dispense Refill   • BREO ELLIPTA 200-25 MCG/INH AEROSOL POWDER, " BREATH ACTIVATED INL 1 PUFF PO Q DAY. RM AFTER U     • sodium chloride (HYPER-SAL) 7 % Nebu Soln INHALE 1 VIAL VIA NEBULIZER TWICE DAILY 240 mL 0   • predniSONE (DELTASONE) 1 MG Tab Take 5 Tabs by mouth every day. 450 Tab 1   • albuterol (PROAIR HFA) 108 (90 Base) MCG/ACT Aero Soln inhalation aerosol Inhale 2 Puffs by mouth every 6 hours as needed for Shortness of Breath. 8.5 g 0   • tamsulosin (FLOMAX) 0.4 MG capsule TAKE 1 CAPSULE BY MOUTH EVERY DAY 90 Cap 2   • lidocaine 2 % Gel APPLY TO AFFECTED AREA(S) ONCE DAILY AS NEEDED. 60 mL 0   • clopidogrel (PLAVIX) 75 MG Tab Take 75 mg by mouth every evening.     • fluticasone-salmeterol (ADVAIR DISKUS) 250-50 MCG/DOSE AEROSOL POWDER, BREATH ACTIVATED Inhale 1 Puff by mouth 2 times a day. Rinse mouth after each use. 1 Inhaler 0   • sevelamer (RENAGEL) 800 MG Tab Take 800 mg by mouth 3 times a day.     • rosuvastatin (CRESTOR) 20 MG Tab Take 20 mg by mouth every day.  3   • epoetin lucina (EPOGEN,PROCRIT) 97665 UNIT/ML Solution Inject 0.5 mL as instructed every Monday, Wednesday, and Friday. 30 mL 0   • metoprolol SR (TOPROL XL) 25 MG TABLET SR 24 HR Take 25 mg by mouth 2 Times a Day.  3   • B Complex-C-Folic Acid (DIALYVITE TABLET) Tab Take 1 Tab by mouth every morning.     • finasteride (PROSCAR) 5 MG Tab Take 5 mg by mouth every morning.       No current facility-administered medications for this visit.          Physical Exam:  Gen:           Mild pallor.  Alert and oriented, No apparent distress. Mood and affect     appropriate, normal interaction with provider.  Eyes:          sclere white, conjunctive moist.  Hearing:     Grossly intact.  Dentition:    Good dentition.  Oropharynx:   Tongue normal, posterior pharynx without erythema or exudate.  Neck:        Supple, trachea midline, no masses.  Respiratory Effort: No intercostal retractions or use of accessory muscles.   Lung Auscultation:      Diminished; few basilar rales, scattered rhonchi, no wheezing.  CV:             Regular rate and rhythm.  Trace pretibial edema. No murmurs, rubs or gallops.  Digits, Nails, Ext: No clubbing, cyanosis, petechiae, or nodes.   Skin:        No rashes, lesions or ulcers noted on back or exposed skin    surfaces.                     Assessment:  1. Chronic obstructive pulmonary disease, unspecified COPD type (HCC)   patient is on Breo at this time for maintenance inhaler, uses DuoNeb for rescue   2. Chronic respiratory failure with hypoxia (HCC)   patient is on 3 L O2 ATC   3. Chronic respiratory infection   most recent exacerbation June 2020   4. Dysphagia, unspecified type   adaptive swallow, blended food   5. Protein-calorie malnutrition, severe (HCC)   weight is down to 122 pounds, supplementation advised, evaluated by speech therapy during admission   6. Bronchiectasis without complication ([facility])   most recent exacerbation in June 2020, encouraged to increase daily regimen consistently to twice per day, was 1-2 times.       Immunizations:    Flu: 9/16/2020  Pneumovax 23: 12/19/2011  Prevnar 13: 4/14/2017    Plan:  80 y.o. year old male here today for follow-up on COPD, bronchiectasis, chronic Pseudomonas and chronic respiratory failure.  Patient is accompanied by his wife Crys.  Last seen in clinic 5/26/2020 by Dr. Tillman.  Patient is a former smoker with reported quit date 1990 and 30-pack-year history.    Patient with complicated medical history including Wegener's granulomatosis, followed by rheumatology, on 5 mg prednisone per day.  Other pertinent history includes severe aortic stenosis with prosthetic aortic valve, sepsis, ESRD on dialysis which he has been on since 2011, pacemaker, stroke in 2018, chronic anticoagulation, odynophagia, esophageal stricture, chronic respiratory infection, Pseudomonas aeruginosa colonization, history of MAC.  Has failed outpatient Cipro in the past.    Patient had a hospitalization from 6/6/2020 through 6/15/2020 for recurrent pneumonia.  Patient had  single positive sputum culture for Pseudomonas 6/7/2020.  6/11/2020 hemoglobin 11.6, hematocrit 37.2,  he was treated with aggressive pulmonary toilet and IV Zosyn and azithromycin.  Patient does have advanced directives, spent a lot of time talking about out living most of his dialysis acquaintances.  He was seen by palliative care during his hospitalization.    Reviewed in clinic vitals including blood pressure 122/80, heart rate of 92, O2 sat of 95% on 3 L.  Patient has BMI of 18.55 kg/m².  Reports no appetite due in part to his esophageal stricture.  Supplementation encouraged.  Possible aspiration ongoing concern.  Patient with known and evaluated dysphasia, blends most of his intake. Patient reports knowing to go to the hospital if running even a low-grade fever.     Reviewed home medication regimen including prednisone 5 mg/day, clopidogrel, Renvela, omeprazole, metoprolol, famotidine and Breo inhaler which he reports using consistently since hospitalization in June.  Patient home respiratory regimen is once a day DuoNeb, 1-2 x daily hypertonic saline, vest therapy 1-2 times per day.  Patient is strongly encouraged to maintain minimum twice daily hypertonic saline and vest therapy for his bronchiectasis and chronic respiratory infection.    Reviewed most recent imaging including:    Chest CT obtained 6/7/2020 demonstrating per radiology, worsening dependent consolidation, bronchial wall thickening and endobronchial opacity, worrisome for aspiration over infection and there is some new adenopathy. Extensive chronic pulmonary abnormality with emphysema, bronchiectasis, scarring, remote granulomatous disease.  Coronary artery disease with prosthetic aortic valve, and cardiac pacer.    Chest x-ray obtained 6/6/2020 demonstrated hazy opacities bilateral lung bases left more than right similar to prior exam, may be small bilateral pleural effusions.  Stable cardiopericardial silhouette.    Echocardiogram performed  1/26/2020 demonstrated severe aortic stenosis, severe leaflet calcification, normal left ventricular systolic function, LV is small in size, LVEF visually estimated to be 55%, moderate concentric left ventricular hypertrophy, grade 2 diastolic dysfunction, normal right ventricular size and systolic function, pacer ICD wires seen in right ventricle, mild pulmonary hypertension.  Estimated RVSP of 45 mmHg.    Most recent pulmonary function testing was obtained 1/10/2017, patient declines updating.     Patient and wife have been strictly observing recommendations for avoiding COVID-19 including wearing mask, practicing social distancing and frequent handwashing.  They do wear gloves for medical appointments and other outings.  We discussed need to clean/change frequently gloves as well.    Recommended 3-month follow-up however patient requests 6-month follow-up due to the frequency of his other medical appointments including 3 times a week dialysis.  Follow-up sooner is encouraged.    This dictation was created using voice recognition software. The accuracy of the dictation is limited to the abilities of the software. I expect there may be some errors of grammar and possibly content.

## 2020-09-17 NOTE — PATIENT INSTRUCTIONS
1-review covid 19 recommendations including wearing mask, practicing social isolation, frequent hand washing (patient uses gloves to help mitigate his risks), early flu shot (got yesterday)  2-reviewed meds, no refills needed  3-patient frequent medical appts including 3 x per week dialysis  4-requests 6 month follow up but knows he can come in whenever needed, has been directed to report to hospital quickly for any febrile illness

## 2020-09-19 ASSESSMENT — ENCOUNTER SYMPTOMS: WEIGHT LOSS: 1

## 2020-10-05 NOTE — TELEPHONE ENCOUNTER
Have we ever prescribed this med? Yes.  If yes, what date? 09/08/2020    Last OV: 09/17/2020 with Ramona Maciel PA-C    Return in about 6 months (around 3/17/2021) for Return with Ramona Maciel PA-C.      Next OV: No pending appt.     DX:     Medications:   Requested Prescriptions     Pending Prescriptions Disp Refills   • BREO ELLIPTA 200-25 MCG/INH AEROSOL POWDER, BREATH ACTIVATED [Pharmacy Med Name: BREO ELLIPTA 200-25MCG ORAL INH(30)] 60 Each      Sig: INHALE 1 PUFF BY MOUTH EVERY DAY. RINSE MOUTH AFTER USE

## 2020-10-06 ENCOUNTER — HOSPITAL ENCOUNTER (OUTPATIENT)
Facility: MEDICAL CENTER | Age: 80
End: 2020-10-06
Attending: INTERNAL MEDICINE | Admitting: INTERNAL MEDICINE
Payer: MEDICARE

## 2020-10-06 ENCOUNTER — APPOINTMENT (OUTPATIENT)
Dept: RADIOLOGY | Facility: MEDICAL CENTER | Age: 80
End: 2020-10-06
Attending: SURGERY
Payer: MEDICARE

## 2020-10-06 VITALS
TEMPERATURE: 97.6 F | HEART RATE: 84 BPM | RESPIRATION RATE: 18 BRPM | WEIGHT: 123.9 LBS | SYSTOLIC BLOOD PRESSURE: 109 MMHG | BODY MASS INDEX: 18.78 KG/M2 | HEIGHT: 68 IN | OXYGEN SATURATION: 100 % | DIASTOLIC BLOOD PRESSURE: 54 MMHG

## 2020-10-06 DIAGNOSIS — T82.858A STENOSIS OF OTHER VASCULAR PROSTHETIC DEVICES, IMPLANTS AND GRAFTS, INITIAL ENCOUNTER (HCC): ICD-10-CM

## 2020-10-06 DIAGNOSIS — T82.858A BYPASS GRAFT STENOSIS, INITIAL ENCOUNTER (HCC): ICD-10-CM

## 2020-10-06 LAB
ANION GAP SERPL CALC-SCNC: 11 MMOL/L (ref 7–16)
BASOPHILS # BLD AUTO: 0.3 % (ref 0–1.8)
BASOPHILS # BLD: 0.02 K/UL (ref 0–0.12)
BUN SERPL-MCNC: 34 MG/DL (ref 8–22)
CALCIUM SERPL-MCNC: 8.6 MG/DL (ref 8.5–10.5)
CHLORIDE SERPL-SCNC: 98 MMOL/L (ref 96–112)
CO2 SERPL-SCNC: 29 MMOL/L (ref 20–33)
CREAT SERPL-MCNC: 2.42 MG/DL (ref 0.5–1.4)
EOSINOPHIL # BLD AUTO: 0.07 K/UL (ref 0–0.51)
EOSINOPHIL NFR BLD: 1.1 % (ref 0–6.9)
ERYTHROCYTE [DISTWIDTH] IN BLOOD BY AUTOMATED COUNT: 58.9 FL (ref 35.9–50)
GLUCOSE SERPL-MCNC: 109 MG/DL (ref 65–99)
HCT VFR BLD AUTO: 37.6 % (ref 42–52)
HGB BLD-MCNC: 11.6 G/DL (ref 14–18)
IMM GRANULOCYTES # BLD AUTO: 0.03 K/UL (ref 0–0.11)
IMM GRANULOCYTES NFR BLD AUTO: 0.5 % (ref 0–0.9)
LYMPHOCYTES # BLD AUTO: 0.65 K/UL (ref 1–4.8)
LYMPHOCYTES NFR BLD: 9.8 % (ref 22–41)
MCH RBC QN AUTO: 29.8 PG (ref 27–33)
MCHC RBC AUTO-ENTMCNC: 30.9 G/DL (ref 33.7–35.3)
MCV RBC AUTO: 96.7 FL (ref 81.4–97.8)
MONOCYTES # BLD AUTO: 0.48 K/UL (ref 0–0.85)
MONOCYTES NFR BLD AUTO: 7.3 % (ref 0–13.4)
NEUTROPHILS # BLD AUTO: 5.35 K/UL (ref 1.82–7.42)
NEUTROPHILS NFR BLD: 81 % (ref 44–72)
NRBC # BLD AUTO: 0 K/UL
NRBC BLD-RTO: 0 /100 WBC
PLATELET # BLD AUTO: 182 K/UL (ref 164–446)
PMV BLD AUTO: 9 FL (ref 9–12.9)
POTASSIUM SERPL-SCNC: 4.4 MMOL/L (ref 3.6–5.5)
RBC # BLD AUTO: 3.89 M/UL (ref 4.7–6.1)
SODIUM SERPL-SCNC: 138 MMOL/L (ref 135–145)
WBC # BLD AUTO: 6.6 K/UL (ref 4.8–10.8)

## 2020-10-06 PROCEDURE — 160002 HCHG RECOVERY MINUTES (STAT)

## 2020-10-06 PROCEDURE — 80048 BASIC METABOLIC PNL TOTAL CA: CPT

## 2020-10-06 PROCEDURE — 99153 MOD SED SAME PHYS/QHP EA: CPT

## 2020-10-06 PROCEDURE — 700111 HCHG RX REV CODE 636 W/ 250 OVERRIDE (IP)

## 2020-10-06 PROCEDURE — 700105 HCHG RX REV CODE 258: Performed by: SURGERY

## 2020-10-06 PROCEDURE — 700111 HCHG RX REV CODE 636 W/ 250 OVERRIDE (IP): Performed by: SURGERY

## 2020-10-06 PROCEDURE — 85025 COMPLETE CBC W/AUTO DIFF WBC: CPT

## 2020-10-06 PROCEDURE — 700117 HCHG RX CONTRAST REV CODE 255: Performed by: SURGERY

## 2020-10-06 RX ORDER — HEPARIN SODIUM 1000 [USP'U]/ML
3000 INJECTION, SOLUTION INTRAVENOUS; SUBCUTANEOUS ONCE
Status: COMPLETED | OUTPATIENT
Start: 2020-10-06 | End: 2020-10-06

## 2020-10-06 RX ORDER — ONDANSETRON 2 MG/ML
4 INJECTION INTRAMUSCULAR; INTRAVENOUS PRN
Status: ACTIVE | OUTPATIENT
Start: 2020-10-06 | End: 2020-10-06

## 2020-10-06 RX ORDER — MIDAZOLAM HYDROCHLORIDE 1 MG/ML
INJECTION INTRAMUSCULAR; INTRAVENOUS
Status: COMPLETED
Start: 2020-10-06 | End: 2020-10-06

## 2020-10-06 RX ORDER — SODIUM CHLORIDE 9 MG/ML
500 INJECTION, SOLUTION INTRAVENOUS ONCE
Status: COMPLETED | OUTPATIENT
Start: 2020-10-06 | End: 2020-10-06

## 2020-10-06 RX ORDER — CEFAZOLIN SODIUM 1 G/50ML
1 INJECTION, SOLUTION INTRAVENOUS ONCE
Status: DISCONTINUED | OUTPATIENT
Start: 2020-10-06 | End: 2020-10-06

## 2020-10-06 RX ORDER — HEPARIN SODIUM,PORCINE 1000/ML
VIAL (ML) INJECTION
Status: COMPLETED
Start: 2020-10-06 | End: 2020-10-06

## 2020-10-06 RX ORDER — PROTAMINE SULFATE 10 MG/ML
30 INJECTION, SOLUTION INTRAVENOUS ONCE
Status: COMPLETED | OUTPATIENT
Start: 2020-10-06 | End: 2020-10-06

## 2020-10-06 RX ORDER — SODIUM CHLORIDE 9 MG/ML
INJECTION, SOLUTION INTRAVENOUS CONTINUOUS
Status: DISCONTINUED | OUTPATIENT
Start: 2020-10-06 | End: 2020-10-06

## 2020-10-06 RX ORDER — PROTAMINE SULFATE 10 MG/ML
INJECTION, SOLUTION INTRAVENOUS
Status: COMPLETED
Start: 2020-10-06 | End: 2020-10-06

## 2020-10-06 RX ORDER — IODIXANOL 270 MG/ML
30 INJECTION, SOLUTION INTRAVASCULAR ONCE
Status: COMPLETED | OUTPATIENT
Start: 2020-10-06 | End: 2020-10-06

## 2020-10-06 RX ORDER — MIDAZOLAM HYDROCHLORIDE 1 MG/ML
.5-2 INJECTION INTRAMUSCULAR; INTRAVENOUS PRN
Status: ACTIVE | OUTPATIENT
Start: 2020-10-06 | End: 2020-10-06

## 2020-10-06 RX ADMIN — PROTAMINE SULFATE 30 MG: 10 INJECTION, SOLUTION INTRAVENOUS at 12:27

## 2020-10-06 RX ADMIN — HEPARIN SODIUM 3000 UNITS: 1000 INJECTION, SOLUTION INTRAVENOUS; SUBCUTANEOUS at 11:55

## 2020-10-06 RX ADMIN — MIDAZOLAM HYDROCHLORIDE 1 MG: 1 INJECTION, SOLUTION INTRAMUSCULAR; INTRAVENOUS at 11:48

## 2020-10-06 RX ADMIN — VANCOMYCIN HYDROCHLORIDE 1 G: 500 INJECTION, POWDER, LYOPHILIZED, FOR SOLUTION INTRAVENOUS at 11:41

## 2020-10-06 RX ADMIN — MIDAZOLAM HYDROCHLORIDE 1 MG: 1 INJECTION, SOLUTION INTRAMUSCULAR; INTRAVENOUS at 12:07

## 2020-10-06 RX ADMIN — FENTANYL CITRATE 25 MCG: 50 INJECTION INTRAMUSCULAR; INTRAVENOUS at 12:07

## 2020-10-06 RX ADMIN — SODIUM CHLORIDE 500 ML: 9 INJECTION, SOLUTION INTRAVENOUS at 09:40

## 2020-10-06 RX ADMIN — IODIXANOL 30 ML: 270 INJECTION, SOLUTION INTRAVASCULAR at 13:00

## 2020-10-06 ASSESSMENT — FIBROSIS 4 INDEX
FIB4 SCORE: 1.728104457470906111
FIB4 SCORE: 1.839269071694180773

## 2020-10-06 NOTE — PROGRESS NOTES
IR RN note:     Right fistulogram with angioplasty by MD Michael       Sedation given per provider direction. Patient appeared to tolerate procedure, patient awake and talking post procedure.    Report given to LIZETH Faulkner. Pt transported to PPU via IR RN, then transferred care.    IN.PACTdrug coated admiral balloon 6.0mm x 40mm  REF: [id number] LOT: [id number] EXP 05-

## 2020-10-06 NOTE — OR NURSING
1247 Patient arrived to unit, awake and alert.  Right upper arm fistula site clean, dry and soft.  Patient denies pain at this time.  Updated on plan of care, verbalized understanding.   1300 Small amount of blood noted to fistula site, pressure held to site.  Site remains soft, bruit and thrill noted.  Arm elevated and wrapped in warm blanket.  1330 No changes noted to fistula site, patient resting in gurney.  1405 Dr. Syed updated on patient status.  Informed of bleeding to dressing when patient first returned, no new bleeding.  Notified that patient and wife noted some swelling but area is soft with bruit and thrill.  Ok for patient to discharge home when criteria is met.  1430 Patient up to edge of bed, denies discomfort.  Access site unchanged.  All lines and monitors discontinued.  1450 Patient taken down via wheelchair to meet wife Crys.  Reviewed discharge paperwork with pt and Crys. Discussed diet, activity, medications, follow up care and worsening symptoms. No questions at this time.  Pt discharged home with Crys.

## 2020-10-06 NOTE — DISCHARGE INSTRUCTIONS
ACTIVITY: Rest and take it easy for the first 24 hours.  A responsible adult is recommended to remain with you during that time.  It is normal to feel sleepy.  We encourage you to not do anything that requires balance, judgment or coordination.    MILD FLU-LIKE SYMPTOMS ARE NORMAL. YOU MAY EXPERIENCE GENERALIZED MUSCLE ACHES, THROAT IRRITATION, HEADACHE AND/OR SOME NAUSEA.    FOR 24 HOURS DO NOT:  Drive, operate machinery or run household appliances.  Drink beer or alcoholic beverages.   Make important decisions or sign legal documents.    SPECIAL INSTRUCTIONS:     ·   Minimal activity at home for 2 days.   ·   No Pushing, pulling, or heavy lifting (10 LBS) for 2 days.   ·   Keep dressings on for 2 days, then remove.   ·   Keep incisions dry and clean.   ·   Okay to shower after 2 days.   .   Resume home meds.   ·   No bath for 10 days   .  Suture may be removed at dialysis center.  If not, follow up with Dr. Syed in 1 week for suture removal   .  May use fistula for dialysis.        Dialysis Fistulogram, Care After  This sheet gives you information about how to care for yourself after your procedure. Your health care provider may also give you more specific instructions. If you have problems or questions, contact your health care provider.  What can I expect after the procedure?  After the procedure, it is common to have:  · A small amount of discomfort in the area where the small, thin tube (catheter) was placed for the procedure.  · A small amount of bruising around the fistula.  · Sleepiness and tiredness (fatigue).  Follow these instructions at home:  Activity    · Rest at home and do not lift anything that is heavier than 5 lb (2.3 kg) on the day after your procedure.  · Return to your normal activities as told by your health care provider. Ask your health care provider what activities are safe for you.  · Do not drive or use heavy machinery while taking prescription pain medicine.  · Do not drive for 24  hours if you were given a medicine to help you relax (sedative) during your procedure.  Medicines    · Take over-the-counter and prescription medicines only as told by your health care provider.  Puncture site care  · Follow instructions from your health care provider about how to take care of the site where catheters were inserted. Make sure you:  ? Wash your hands with soap and water before you change your bandage (dressing). If soap and water are not available, use hand .  ? Change your dressing as told by your health care provider.  ? Leave stitches (sutures), skin glue, or adhesive strips in place. These skin closures may need to stay in place for 2 weeks or longer. If adhesive strip edges start to loosen and curl up, you may trim the loose edges. Do not remove adhesive strips completely unless your health care provider tells you to do that.  · Check your puncture area every day for signs of infection. Check for:  ? Redness, swelling, or pain.  ? Fluid or blood.  ? Warmth.  ? Pus or a bad smell.  General instructions  · Do not take baths, swim, or use a hot tub until your health care provider approves. Ask your health care provider if you may take showers. You may only be allowed to take sponge baths.  · Monitor your dialysis fistula closely. Check to make sure that you can feel a vibration or buzz (a thrill) when you put your fingers over the fistula.  · Prevent damage to your graft or fistula:  ? Do not wear tight-fitting clothing or jewelry on the arm or leg that has your graft or fistula.  ? Tell all your health care providers that you have a dialysis fistula or graft.  ? Do not allow blood draws, IVs, or blood pressure readings to be done in the arm that has your fistula or graft.  ? Do not allow flu shots or vaccinations in the arm with your fistula or graft.  · Keep all follow-up visits as told by your health care provider. This is important.  Contact a health care provider if:  · You have  redness, swelling, or pain at the site where the catheter was put in.  · You have fluid or blood coming from the catheter site.  · The catheter site feels warm to the touch.  · You have pus or a bad smell coming from the catheter site.  · You have a fever or chills.  Get help right away if:  · You feel weak.  · You have trouble balancing.  · You have trouble moving your arms or legs.  · You have problems with your speech or vision.  · You can no longer feel a vibration or buzz when you put your fingers over your dialysis fistula.  · The limb that was used for the procedure:  ? Swells.  ? Is painful.  ? Is cold.  ? Is discolored, such as blue or pale white.  · You have chest pain or shortness of breath.  Summary  · After a dialysis fistulogram, it is common to have a small amount of discomfort or bruising in the area where the small, thin tube (catheter) was placed.  · Rest at home on the day after your procedure. Return to your normal activities as told by your health care provider.  · Take over-the-counter and prescription medicines only as told by your health care provider.  · Follow instructions from your health care provider about how to take care of the site where the catheter was inserted.  · Keep all follow-up visits as told by your health care provider.  This information is not intended to replace advice given to you by your health care provider. Make sure you discuss any questions you have with your health care provider.  Document Released: 05/04/2015 Document Revised: 01/18/2019 Document Reviewed: 01/18/2019  [employer] Patient Education © 2020 [employer]    DIET: To avoid nausea, slowly advance diet as tolerated, avoiding spicy or greasy foods for the first day.  Add more substantial food to your diet according to your physician's instructions.  INCREASE FLUIDS AND FIBER TO AVOID CONSTIPATION.    SURGICAL DRESSING/BATHING: Keep dressing and incision dry and intact for 48 hours, may remove dressing and  shower after 2pm on 10/8, do not need to replace.  Do not submerge site in water for 7 days.    FOLLOW-UP APPOINTMENT:  A follow-up appointment should be arranged with Dr. Syed; [phone], call to schedule.    You should CALL YOUR PHYSICIAN if you develop:  Fever greater than 101 degrees F.  Pain not relieved by medication, or persistent nausea or vomiting.  Excessive bleeding (blood soaking through dressing) or unexpected drainage from the wound.  Extreme redness or swelling around the incision site, drainage of pus or foul smelling drainage.  Inability to urinate or empty your bladder within 8 hours.  Problems with breathing or chest pain.    You should call 911 if you develop problems with breathing or chest pain.  If you are unable to contact your doctor or surgical center, you should go to the nearest emergency room or urgent care center.  Physician's telephone #: [phone]    If any questions arise, call your doctor.  If your doctor is not available, please feel free to call the Surgical Center at [phone]. The Contact Center is open Monday through Friday 7AM to 5PM and may speak to a nurse at [phone], or toll free at [phone].     A registered nurse may call you a few days after your surgery to see how you are doing after your procedure.    MEDICATIONS: Resume taking daily medication.  Take prescribed pain medication with food.  If no medication is prescribed, you may take non-aspirin pain medication if needed.  PAIN MEDICATION CAN BE VERY CONSTIPATING.  Take a stool softener or laxative such as senokot, pericolace, or milk of magnesia if needed.    If your physician has prescribed pain medication that includes Acetaminophen (Tylenol), do not take additional Acetaminophen (Tylenol) while taking the prescribed medication.    Depression / Suicide Risk    As you are discharged from this [facility] facility, it is important to learn how to keep safe from harming yourself.    Recognize  the warning signs:  · Abrupt changes in personality, positive or negative- including increase in energy   · Giving away possessions  · Change in eating patterns- significant weight changes-  positive or negative  · Change in sleeping patterns- unable to sleep or sleeping all the time   · Unwillingness or inability to communicate  · Depression  · Unusual sadness, discouragement and loneliness  · Talk of wanting to die  · Neglect of personal appearance   · Rebelliousness- reckless behavior  · Withdrawal from people/activities they love  · Confusion- inability to concentrate     If you or a loved one observes any of these behaviors or has concerns about self-harm, here's what you can do:  · Talk about it- your feelings and reasons for harming yourself  · Remove any means that you might use to hurt yourself (examples: pills, rope, extension cords, firearm)  · Get professional help from the community (Mental Health, Substance Abuse, psychological counseling)  · Do not be alone:Call your Safe Contact- someone whom you trust who will be there for you.  · Call your local CRISIS HOTLINE 402-9933 or 967-045-0498  · Call your local Children's Mobile Crisis Response Team [place] (481) 918-5384 or www.[employer].com  · Call the toll free National Suicide Prevention Hotlines   · National Suicide Prevention Lifeline 451-137-CLMX (0757)  · National Hope Line Network 800-SUICIDE (297-9008)

## 2020-10-06 NOTE — OR SURGEON
Immediate Post OP Note    PreOp Diagnosis: R arm dialysis fistula dysfunction.    PostOp Diagnosis: Same.    Procedure(s):  VASCULAR CASE-SYED-RIGHT DIALYSIS FISTULOGRAM, VENOPLASTY X 2.    Surgeon(s):  Nate Syed MD  [facility]    Type of Anesthesia:  IV sedation for 1 hour and local with 3ml 1% Lidocaine.    IR Staff:    Specimens removed if any: None.    Estimated Blood Loss: 15ml.    Contrast: 36ml Visipaque.    Findings: Stenosis right above anastomosis and in upper arm, treated with venoplasty.    Complications: None.        10/6/2020 12:37 PM Nate Syed M.D.

## 2020-10-07 NOTE — OP REPORT
DATE OF SERVICE:  10/06/2020    SURGEON:  Nate Syed MD    ANESTHESIA:  Intravenous sedation with fentanyl and Versed for 1 hour and   local anesthesia with 3 mL 1% lidocaine solution.    PREOPERATIVE DIAGNOSIS:  Right upper extremity arteriovenous fistula   dysfunction.    POSTOPERATIVE DIAGNOSIS:  Right upper extremity arteriovenous fistula   dysfunction.    PROCEDURES:  1.  Percutaneous cannulation of right upper extremity arteriovenous fistula   under ultrasound guidance, 2 separate sites.  2.  Dialysis fistulogram.  3.  Percutaneous, transluminal venoplasty of the fistula stenosis x2, one   above the arterial anastomosis using 6x40 mm drug-coated balloon, followed by   9x40 mm angioplasty balloon and 1 area of stenosis at the junction of the   proximal and mid upper arm using a 9x40 mm angioplasty balloon.    INDICATION FOR PROCEDURE:  This is a pleasant gentleman with multiple medical   problems including end-stage renal disease, on hemodialysis via right upper   arm arteriovenous fistula.  The fistula was working well until recently when   there was a problem with flow as well as prolonged bleeding post-cannulation.    Discussion was made with patient.  He would like to undergo dialysis   fistulogram with possible intervention, fully understanding all risks.    PROCEDURE:  Informed consent was obtained.  Patient was taken to the   interventional radiology suite and was placed in the supine position.  He was   given vancomycin intravenously.  Time-out procedure was done.  Intravenous   sedation was performed with fentanyl and Versed.  The patient was closely   monitored.    Next, his right upper extremity was sterilely prepped and draped in the normal   fashion.  Duplex evaluation of the fistula was performed.  There was one area   of severe stenosis seen above the anastomosis and another area of stenosis   seen, at the junction of the proximal and mid upper arm.    Next, under ultrasound guidance, the  fistula was percutaneously cannulated   below the anastomosis using a micropuncture kit.  The microcatheter was   removed and replaced with a 6-Greek sheath.  Selective catheterization of the   brachial artery was performed.  A brachial arteriogram and dialysis   fistulogram was obtained.  There was severe stenosis seen above the   anastomosis.    Patient was given heparin 3000 units intravenously.  After the heparin was   allowed to circulate systemically for 3 minutes, over the guidewire,   percutaneous, transluminal venoplasty of the fistula stenosis above the   anastomosis was performed using a 6x40 mm drug-coated balloon, followed by   9x40 mm angioplasty balloon.  A followup angiogram was obtained and showed   restoration of luminal patency with prompt flow.  There was severe stenosis   seen at the junction of the proximal and mid upper arm.  The 6-Greek sheath   was removed and hemostasis was achieved by placing a pursestring at the   puncture site using 3-0 nylon suture.    Next, the fistula was cannulated under ultrasound guidance antegrade   throughout the upper arm.  This was done using a micropuncture kit.  The   microcatheter was removed and replaced with a 6-Greek sheath.  Dialysis   fistulogram was obtained.  Over the guidewire, percutaneous, transluminal   venoplasty of the fistula stenosis at the junction of the proximal and mid   upper arm was performed using a 9x40 mm angioplasty balloon.  A followup   fistulogram was obtained and showed restoration of luminal patency with   preservation of flow.  There was no central venous stenosis seen.    Patient was given protamine 30 mg intravenously.  The 6-Greek sheath was   removed.  Hemostasis was achieved by placing a pursestring at the puncture   site using 3-0 nylon suture.  Sterile dressing was applied.    IMPRESSION:  Patent right brachiocephalic fistula with 2 areas of severe   stenosis seen, one above the anastomosis and one at the junction of  the   proximal and mid upper arm.  Following percutaneous, transluminal venoplasty   using 6x40 mm and 9x40 mm angioplasty balloons, there was restoration of   luminal patency.  There is no central venous stenosis seen.    DISPOSITION:  The patient was then awakened and taken to recovery area in   stable condition with excellent thrills over the fistula and intact   neurovascular examination of the right hand.       ____________________________________     MD CHEIKH Dunham / NTS    DD:  10/06/2020 17:45:40  DT:  10/06/2020 20:32:56    D#:  [id number]  Job#:  [id number]

## 2020-10-27 ENCOUNTER — APPOINTMENT (RX ONLY)
Dept: URBAN - METROPOLITAN AREA CLINIC 20 | Facility: CLINIC | Age: 80
Setting detail: DERMATOLOGY
End: 2020-10-27

## 2020-10-27 DIAGNOSIS — D18.0 HEMANGIOMA: ICD-10-CM

## 2020-10-27 DIAGNOSIS — L72.8 OTHER FOLLICULAR CYSTS OF THE SKIN AND SUBCUTANEOUS TISSUE: ICD-10-CM

## 2020-10-27 DIAGNOSIS — L57.0 ACTINIC KERATOSIS: ICD-10-CM

## 2020-10-27 PROBLEM — C44.91 BASAL CELL CARCINOMA OF SKIN, UNSPECIFIED: Status: ACTIVE | Noted: 2020-10-27

## 2020-10-27 PROBLEM — D18.01 HEMANGIOMA OF SKIN AND SUBCUTANEOUS TISSUE: Status: ACTIVE | Noted: 2020-10-27

## 2020-10-27 PROCEDURE — 17000 DESTRUCT PREMALG LESION: CPT

## 2020-10-27 PROCEDURE — 99213 OFFICE O/P EST LOW 20 MIN: CPT | Mod: 25

## 2020-10-27 PROCEDURE — ? ADDITIONAL NOTES

## 2020-10-27 PROCEDURE — ? LIQUID NITROGEN

## 2020-10-27 PROCEDURE — 11900 INJECT SKIN LESIONS </W 7: CPT | Mod: 59

## 2020-10-27 PROCEDURE — ? INJECTION

## 2020-10-27 PROCEDURE — ? MEDICATION COUNSELING

## 2020-10-27 PROCEDURE — 17003 DESTRUCT PREMALG LES 2-14: CPT

## 2020-10-27 PROCEDURE — ? COUNSELING

## 2020-10-27 ASSESSMENT — LOCATION SIMPLE DESCRIPTION DERM
LOCATION SIMPLE: RIGHT FOREHEAD
LOCATION SIMPLE: LEFT CHEEK
LOCATION SIMPLE: LEFT ANTERIOR NECK
LOCATION SIMPLE: SCALP
LOCATION SIMPLE: RIGHT SCALP
LOCATION SIMPLE: RIGHT EAR

## 2020-10-27 ASSESSMENT — LOCATION DETAILED DESCRIPTION DERM
LOCATION DETAILED: LEFT CLAVICULAR NECK
LOCATION DETAILED: LEFT SUPERIOR MEDIAL MALAR CHEEK
LOCATION DETAILED: RIGHT INFERIOR POSTAURICULAR SKIN
LOCATION DETAILED: RIGHT CENTRAL FRONTAL SCALP
LOCATION DETAILED: RIGHT SUPERIOR FOREHEAD
LOCATION DETAILED: RIGHT SUPERIOR HELIX

## 2020-10-27 ASSESSMENT — LOCATION ZONE DERM
LOCATION ZONE: EAR
LOCATION ZONE: FACE
LOCATION ZONE: NECK
LOCATION ZONE: SCALP

## 2020-10-27 NOTE — PROCEDURE: LIQUID NITROGEN
Number Of Freeze-Thaw Cycles: 2 freeze-thaw cycles
Render Note In Bullet Format When Appropriate: No
Render Post-Care Instructions In Note?: yes
Post-Care Instructions: I reviewed with the patient in detail post-care instructions. Patient is to wear sunprotection, and avoid picking at any of the treated lesions. Pt may apply Vaseline to crusted or scabbing areas.
Detail Level: Detailed
Consent: The patient's consent was obtained including but not limited to risks of crusting, scabbing, blistering, scarring, darker or lighter pigmentary change, recurrence, incomplete removal and infection. RTC in 2 months if lesion(s) persistent.
Duration Of Freeze Thaw-Cycle (Seconds): 10

## 2020-10-27 NOTE — PROCEDURE: INJECTION
Procedure Information: Please note that the numeric value listed in the Medication (1) and associated J-code units and Medication (2) and associated J-code units variables are j-code amounts and do not represent either the concentration or the total amount of the medications injected.  I strongly recommend selecting no to the Render J-code information in note question. This will allow your note to be more clear. If you are billing j-codes with your injection codes you need to document the total amount of the medication injected. This amount should match the j-code units. For example, if you are injecting Triamcinolone 40mg as an intramuscular injection you would select 40 for the dose field and mg for the units. This would allow you to document  with 4 units (40mg = 10mg x 4). The total volume is not used to calculate j-codes only the amount of the medication administered.
Treatment Number: 5
Administered By (Optional): Trang Willard
Medication (1) And Associated J-Code Units: Bleomycin, 15 units
Consent: The risks of the medication was reviewed with the patient.
Hide Second Medication?: No
units
Route: IL
Total Volume Injected In Cc (Will Not Affected Billing): 0.4
Additional Comments: Consulted with Dr. Briceno. .2 units to each site
Bill J-Code: yes
Detail Level: None
Dose Administered (Numbers Only - Mg, G, Mcg, Units, Cc): 0
Post-Care Instructions: I reviewed with the patient in detail post-care instructions. Patient understands to keep the injection sites clean and call the clinic if there is any redness, swelling or pain.

## 2020-11-02 DIAGNOSIS — J47.9 BRONCHIECTASIS WITHOUT COMPLICATION (HCC): ICD-10-CM

## 2020-11-02 NOTE — TELEPHONE ENCOUNTER
Have we ever prescribed this med? Yes.  If yes, what date? 8/31/20    Last OV: 5/26/20    Next OV: no pending    DX: COPD    Medications: Sodium Chloride for Nebulizer

## 2020-11-03 RX ORDER — SODIUM CHLORIDE FOR INHALATION 7 %
4 VIAL, NEBULIZER (ML) INHALATION 2 TIMES DAILY
Qty: 240 ML | Refills: 1 | Status: SHIPPED | OUTPATIENT
Start: 2020-11-03 | End: 2021-01-19

## 2020-12-04 NOTE — PROCEDURE: INTRALESIONAL CHEMOTHERAPY INJECTION
Detail Level: Detailed
Bill J-Code?: Yes
Size Of Lesion In Cm (Optional): 0
Treatment Number (Optional): 1
Medication Injected: Methotrexate
Concentration (Mg/Ml Or Units/Ml): 25.0
Total Volume (Ccs): 0.3
Render Post-Care Instructions In Note?: no
Post-Care Instructions: I reviewed with the patient in detail post-care instructions. Patient is to keep the site dry overnight, and then apply bacitracin twice daily until healed. Patient may apply hydrogen peroxide soaks to remove any crusting.
Consent: The risks of medication reaction, injection site pain and lesion necrosis were reviewed with the patient.
Bill As A Line Item Or As Units: Line Item
Levar Huddleston(Attending)

## 2020-12-15 ENCOUNTER — OFFICE VISIT (OUTPATIENT)
Dept: RHEUMATOLOGY | Facility: MEDICAL CENTER | Age: 80
End: 2020-12-15
Payer: MEDICARE

## 2020-12-15 VITALS
OXYGEN SATURATION: 96 % | HEART RATE: 95 BPM | TEMPERATURE: 97.2 F | RESPIRATION RATE: 14 BRPM | SYSTOLIC BLOOD PRESSURE: 110 MMHG | BODY MASS INDEX: 18.55 KG/M2 | DIASTOLIC BLOOD PRESSURE: 62 MMHG | WEIGHT: 122 LBS

## 2020-12-15 DIAGNOSIS — Z22.39 PSEUDOMONAS AERUGINOSA COLONIZATION: ICD-10-CM

## 2020-12-15 DIAGNOSIS — I48.0 PAROXYSMAL ATRIAL FIBRILLATION (HCC): ICD-10-CM

## 2020-12-15 DIAGNOSIS — I35.0 AORTIC VALVE STENOSIS, ETIOLOGY OF CARDIAC VALVE DISEASE UNSPECIFIED: ICD-10-CM

## 2020-12-15 DIAGNOSIS — N18.6 ESRD ON DIALYSIS (HCC): ICD-10-CM

## 2020-12-15 DIAGNOSIS — M31.31 GRANULOMATOSIS WITH POLYANGIITIS WITH RENAL INVOLVEMENT (HCC): ICD-10-CM

## 2020-12-15 DIAGNOSIS — Z99.2 ESRD ON DIALYSIS (HCC): ICD-10-CM

## 2020-12-15 DIAGNOSIS — I10 ESSENTIAL HYPERTENSION: ICD-10-CM

## 2020-12-15 DIAGNOSIS — J44.1 COPD EXACERBATION (HCC): ICD-10-CM

## 2020-12-15 DIAGNOSIS — Z95.0 PACEMAKER: ICD-10-CM

## 2020-12-15 DIAGNOSIS — Z79.52 LONG TERM CURRENT USE OF SYSTEMIC STEROIDS: ICD-10-CM

## 2020-12-15 DIAGNOSIS — Z79.01 CHRONIC ANTICOAGULATION: ICD-10-CM

## 2020-12-15 PROCEDURE — 99214 OFFICE O/P EST MOD 30 MIN: CPT | Performed by: INTERNAL MEDICINE

## 2020-12-15 ASSESSMENT — FIBROSIS 4 INDEX: FIB4 SCORE: 1.728104457470906111

## 2020-12-15 NOTE — PROGRESS NOTES
Chief Complaint- joint pain     Subjective:   Gilberto Brown is a 80 y.o. male here today for follow up of rheumatological issues    This is a follow-up visit for this very complicated patient who we follow in this clinic for Wegener's granulomatosis now in remission with a negative ANCA.  Patient continues to be on prednisone of 5 mg p.o. daily with good management and thus far negative ANCA levels since March 2017.  Patient's Wegener's was diagnosed September 2011 manifesting as progressive renal insufficiency.  Wegener's was seen on renal biopsy, patient now on permanent renal dialysis 3 times a week.      Additional comorbidities include chronic lung problems including COPD and subsequent colonization of Pseudomonas in the patient's lungs.  Patient does have a long history of lung problems since childhood status post whooping cough per patient report.           Additional comorbidities include atrial fibrillation with a pacemaker in place also recent diagnosis CVA and is on permanent anticoagulation. Patient also on oxygen at home 3 L per minute.  Patient also with history of rotator cuff tear and right shoulder in the past.  Patient also recently diagnosed now with severe aortic stenosis with prosthetic aortic valve.  Patient also history of esophageal stricture.       S/p Cytoxan-n/v  Off of Cellcept 5/2016  Rituximab infusions 1/14/2016, 1/28/2016-patient developed severe infection and was hospitalized     GBM neg 1/2012  ANCA neg 1/2015; ANCA 1:20  11/2015; ANCA neg 2/2016; ANCA neg 5/2016; ANCA neg 12/2016; ANCA neg 3/2017; ANCA neg 7/2017; ANCA neg 2/2018; ANCA neg 5/2018; ANCA neg 12/2018; ANCA neg 3/2019; ANCA neg 9/2019; ANCA neg 3/2020; ANCA neg 12/2020  C3 90 normal 10/2011  C4 21 10/2011  OFELIA neg 10/2011  Uric acid 4.4 1/2016  HBsAg/HBcAb neg 6/2020  HCV neg 3/2019  Echocardiogram 7/2019  CONCLUSIONS  Normal left ventricular systolic function.  Left ventricular ejection fraction is visually  estimated to be 70-75%.  Mild concentric left ventricular hypertrophy.  Severe aortic stenosis.  Moderate aortic insufficiency.  Mild mitral stenosis.  Compared to the images of the study done on 04/13/2017 there is now   severe aortic stenosis.        Of note  Dr Coral Corona nephrology  Dr Gutierrez pulmonology   Dr Chavarria Cardiologist   Dr Nunez Opthalmologist      Current medicines (including changes today)  Current Outpatient Medications   Medication Sig Dispense Refill   • sodium chloride (HYPER-SAL) 7 % Nebu Soln 4 mL by Nebulization route 2 Times a Day. 240 mL 1   • BREO ELLIPTA 200-25 MCG/INH AEROSOL POWDER, BREATH ACTIVATED INHALE 1 PUFF BY MOUTH EVERY DAY. RINSE MOUTH AFTER USE 60 Each 5   • tamsulosin (FLOMAX) 0.4 MG capsule TAKE 1 CAPSULE BY MOUTH EVERY DAY 90 Cap 2   • predniSONE (DELTASONE) 1 MG Tab Take 5 Tabs by mouth every day. 450 Tab 1   • clopidogrel (PLAVIX) 75 MG Tab Take 75 mg by mouth every evening.     • sevelamer (RENAGEL) 800 MG Tab Take 800 mg by mouth 3 times a day.     • rosuvastatin (CRESTOR) 20 MG Tab Take 20 mg by mouth every day.  3   • epoetin lucina (EPOGEN,PROCRIT) 99421 UNIT/ML Solution Inject 0.5 mL as instructed every Monday, Wednesday, and Friday. 30 mL 0   • metoprolol SR (TOPROL XL) 25 MG TABLET SR 24 HR Take 25 mg by mouth 2 Times a Day.  3   • albuterol (PROAIR HFA) 108 (90 Base) MCG/ACT Aero Soln inhalation aerosol Inhale 2 Puffs by mouth every 6 hours as needed for Shortness of Breath. 8.5 g 0   • B Complex-C-Folic Acid (DIALYVITE TABLET) Tab Take 1 Tab by mouth every morning.     • finasteride (PROSCAR) 5 MG Tab Take 5 mg by mouth every morning.     • lidocaine 2 % Gel APPLY TO AFFECTED AREA(S) ONCE DAILY AS NEEDED. (Patient not taking: Reported on 12/15/2020) 60 mL 0   • fluticasone-salmeterol (ADVAIR DISKUS) 250-50 MCG/DOSE AEROSOL POWDER, BREATH ACTIVATED Inhale 1 Puff by mouth 2 times a day. Rinse mouth after each use. (Patient not taking: Reported on 12/15/2020) 1  Inhaler 0     No current facility-administered medications for this visit.      He  has a past medical history of A-fib ([facility]), Anemia, Arthritis, ASTHMA, BPH (benign prostatic hyperplasia), Breath shortness, Bronchitis, Cataract, COPD, Dialysis, Dyslipidemia, EMPHYSEMA, GERD (gastroesophageal reflux disease), Heart valve disease, High cholesterol, Hypertension, Oxygen decrease, Pacemaker (1988), Pain (05/09/2018), Pneumonia (2017), Pseudomonas pneumonia ([facility]), Pulmonary histoplasmosis ([facility]), Renal disorder, Seizure ([facility]), Sick sinus syndrome ([facility]), Sleep apnea, Snoring, Stroke ([facility]) (02/2018), Unspecified hemorrhagic conditions, and Wegener's disease, pulmonary ([facility]).    ROS   Other than what is mentioned in HPI or physical exam, there is no history of headaches, double vision or blurred vision. No temporal tenderness or jaw claudication. No trouble swallowing difficulties or sore throats.  No chest complaints including chest pain, cough or sputum production. No GI complaints including nausea, vomiting, change in bowel habits, or past peptic ulcer disease. No history of blood in the stools. No urinary complaints including dysuria or frequency. No history of alopecia, photosensitivity, oral ulcerations, Raynaud's phenomena.       Objective:     /62   Pulse 95   Temp 36.2 °C (97.2 °F) (Temporal)   Resp 14   Wt 55.3 kg (122 lb)   SpO2 96%  Body mass index is 18.55 kg/m².   Physical Exam:    Constitutional: Alert and oriented X3, patient is talkative with good eye contact, but looks tired.Skin: Warm, dry, good turgor, no rashes in visible areas.Eye: Equal, round and reactive, conjunctiva clear, lids normal EOM intactENMT: Lips without lesions, good dentition, no oropharyngeal ulcers, moist buccal mucosa, pinna without deformityNeck: Trachea midline, no masses, no thyromegaly.Lymph:  No cervical lymphadenopathy, no axillary lymphadenopathy, no supraclavicular lymphadenopathyRespiratory: Unlabored respiratory  effort, lungs clear to auscultation, no wheezes, no ronchi.Cardiovascular: Normal S1, S2, no murmur, no edema.Abdomen: Soft, non-tender, no masses, no hepatosplenomegaly.Psych: Alert and oriented x3, normal affect and mood.Neuro: Cranial nerves 2-12 are grossly intact, no loss of sensation LEExt:no joint laxity noted in bilateral arms, no joint laxity noted in bilateral legs    Lab Results   Component Value Date/Time    HEPBCORIGM Negative 01/27/2020 07:32 PM    HEPBSAG Non-Reactive 06/08/2020 10:37 AM     Lab Results   Component Value Date/Time    HEPCAB Negative 01/27/2020 07:32 PM     Lab Results   Component Value Date/Time    SODIUM 138 10/06/2020 10:10 AM    POTASSIUM 4.4 10/06/2020 10:10 AM    CHLORIDE 98 10/06/2020 10:10 AM    CO2 29 10/06/2020 10:10 AM    GLUCOSE 109 (H) 10/06/2020 10:10 AM    BUN 34 (H) 10/06/2020 10:10 AM    CREATININE 2.42 (H) 10/06/2020 10:10 AM    CREATININE 1.0 09/23/2008 09:36 AM      Lab Results   Component Value Date/Time    WBC 6.6 10/06/2020 09:55 AM    WBC 7.6 03/19/2012 12:00 AM    RBC 3.89 (L) 10/06/2020 09:55 AM    RBC 2.25 (LL) 03/19/2012 12:00 AM    HEMOGLOBIN 11.6 (L) 10/06/2020 09:55 AM    HEMATOCRIT 37.6 (L) 10/06/2020 09:55 AM    MCV 96.7 10/06/2020 09:55 AM     (H) 03/19/2012 12:00 AM    MCH 29.8 10/06/2020 09:55 AM    MCH 33.8 03/19/2012 12:00 AM    MCHC 30.9 (L) 10/06/2020 09:55 AM    MPV 9.0 10/06/2020 09:55 AM    NEUTSPOLYS 81.00 (H) 10/06/2020 09:55 AM    LYMPHOCYTES 9.80 (L) 10/06/2020 09:55 AM    MONOCYTES 7.30 10/06/2020 09:55 AM    EOSINOPHILS 1.10 10/06/2020 09:55 AM    BASOPHILS 0.30 10/06/2020 09:55 AM    HYPOCHROMIA 1+ 01/07/2020 11:40 AM    ANISOCYTOSIS 2+ 03/16/2020 12:31 PM      Lab Results   Component Value Date/Time    CALCIUM 8.6 10/06/2020 10:10 AM    ASTSGOT 14 06/06/2020 10:50 PM    ALTSGPT 13 06/06/2020 10:50 PM    ALKPHOSPHAT 68 06/06/2020 10:50 PM    TBILIRUBIN 0.8 06/06/2020 10:50 PM    ALBUMIN 3.7 06/06/2020 10:50 PM    ALBUMIN 1.28 (L)  10/02/2011 04:30 AM    TOTPROTEIN 6.5 06/06/2020 10:50 PM    TOTPROTEIN 4.50 (L) 10/02/2011 04:30 AM     Lab Results   Component Value Date/Time    URICACID 4.4 01/21/2016 08:36 AM    ANTINUCAB None Detected 10/02/2011 04:30 AM     Lab Results   Component Value Date/Time    T2OCQZQORCZ 90 10/02/2011 04:30 AM    O3HZGZCGENZ 21 10/02/2011 04:30 AM     Lab Results   Component Value Date/Time    AGBMAB Negative 01/18/2012 03:50 AM    GBMABA Negative 01/18/2012 03:50 AM    ANCAIGG <1:20 03/12/2020 10:27 AM    L6NUFYRPSVF 90 10/02/2011 04:30 AM     Lab Results   Component Value Date/Time    SEDRATEWES 48 (H) 04/13/2017 03:25 AM     Lab Results   Component Value Date/Time    CPKTOTAL 18 09/27/2011 05:05 AM     Lab Results   Component Value Date/Time    FLTYPE Stool 09/22/2006 01:04 PM     Lab Results   Component Value Date/Time    PTHINTACT 204.5 (H) 10/18/2011 06:21 AM     Results for orders placed during the hospital encounter of 07/24/19   DX-HAND 2- LEFT    Impression Questionable subluxation of the second MCP joint. Correlate clinically.    No definite fracture is seen but evaluation limited due to osseous demineralization.    Widening of the scapholunate interval, likely degenerative.     Results for orders placed during the hospital encounter of 02/05/19   DX-CERVICAL SPINE-2 OR 3 VIEWS    Impression Mild anterolisthesis of C4 on C5, likely due to facet arthropathy.    Moderate degenerative change of the cervical spine.    Diffuse osseous demineralization.     Results for orders placed in visit on 05/02/13   CT-CHEST, HIGH RESOLUTION LUNG    Impression 1. Bilateral lower lobe bronchiectasis, grossly unchanged compared with the prior conventional chest CT 1/14/12.  2. Minimal bilateral lower lobe interstitial opacities as well as confluent opacities in those areas, greater on the left, suggesting active airspace disease and/or atelectasis.  3. Probable uncalcified left lower lobe nodule, an equivocal finding with  high-resolution technique.  Conventional chest CT would be needed for confirmation.  Note that no similar lesion was identified on the prior chest scan 1/14/12.            INTERPRETING LOCATION: [address] [facility], [place], [address]     Results for orders placed during the hospital encounter of 06/06/20   CT-CHEST (THORAX) W/O    Impression Worsening dependent consolidation, bronchial wall thickening and endobronchial opacity. This is worrisome for aspiration favored over infection and there is some new adenopathy that is more likely reactive than malignant    Extensive chronic pulmonary abnormality with emphysema, bronchiectasis, scarring, remote granulomatous disease    Coronary artery disease, prosthetic aortic valve, cardiac pacer     Assessment and Plan:     1. 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 daily, check ANCA levels today if negative consider decreasing prednisone to 4 mg p.o. daily.  - Anti-Neutrophil Cytoplasmic Antibodies Screen; Future    2. Long term current use of systemic steroids  On prednisone 5 mg p.o. daily  Risks reviewed with patient patient states understanding    3. Aortic valve stenosis, etiology of cardiac valve disease unspecified  With echo indicating severe aortic stenosis currently followed by cardiology currently with a prosthetic aortic valve    4. Paroxysmal atrial fibrillation (HCC)  Followed by cardiology    5. Chronic anticoagulation  This will impact with kind of medications we can use for this patient's arthritis, NSAIDs are contraindicated because of increased risk of bleeding while on chronic anticoagulation    6. Pacemaker  Followed by cardiology    7. ESRD on dialysis (HCC)  Managed by nephrology    8. COPD exacerbation ([facility])  Followed by pulmonology    9. Pseudomonas aeruginosa colonization  With chronic colonization requiring hospitalization about twice a year followed by pulmonology    10. Essential  hypertension  May impact the type of medications we can use for this patient's arthritis. We will have to keep this under advisement.    Followup: Return in about 6 months (around 6/15/2021). or sooner power Brown  was seen 30 minutes face-to-face of which more than 50% of the time was spent counseling the patient (excluding time for procedures)  regarding  rheumatological condition and care. Therapy was discussed in detail.      Please note that this dictation was created using voice recognition software. I have made every reasonable attempt to correct obvious errors, but I expect that there are errors of grammar and possibly content that I did not discover before finalizing the note.

## 2020-12-17 ENCOUNTER — HOSPITAL ENCOUNTER (OUTPATIENT)
Dept: LAB | Facility: MEDICAL CENTER | Age: 80
End: 2020-12-17
Attending: INTERNAL MEDICINE
Payer: MEDICARE

## 2020-12-17 DIAGNOSIS — M31.31 GRANULOMATOSIS WITH POLYANGIITIS WITH RENAL INVOLVEMENT (HCC): ICD-10-CM

## 2020-12-17 PROCEDURE — 86255 FLUORESCENT ANTIBODY SCREEN: CPT

## 2020-12-17 PROCEDURE — 36415 COLL VENOUS BLD VENIPUNCTURE: CPT

## 2020-12-20 LAB — ANCA IGG TITR SER IF: NORMAL {TITER}

## 2020-12-21 RX ORDER — SEVELAMER CARBONATE 800 MG/1
TABLET, FILM COATED ORAL
Qty: 270 TAB | Refills: 3 | Status: SHIPPED | OUTPATIENT
Start: 2020-12-21

## 2021-01-14 ENCOUNTER — HOSPITAL ENCOUNTER (OUTPATIENT)
Dept: LAB | Facility: MEDICAL CENTER | Age: 81
End: 2021-01-14
Attending: INTERNAL MEDICINE
Payer: MEDICARE

## 2021-01-14 DIAGNOSIS — Z23 NEED FOR VACCINATION: ICD-10-CM

## 2021-01-14 LAB
ALBUMIN SERPL BCP-MCNC: 4 G/DL (ref 3.2–4.9)
ALBUMIN/GLOB SERPL: 1.3 G/DL
ALP SERPL-CCNC: 72 U/L (ref 30–99)
ALT SERPL-CCNC: 19 U/L (ref 2–50)
ANION GAP SERPL CALC-SCNC: 10 MMOL/L (ref 7–16)
ANISOCYTOSIS BLD QL SMEAR: ABNORMAL
AST SERPL-CCNC: 17 U/L (ref 12–45)
BASOPHILS # BLD AUTO: 0.6 % (ref 0–1.8)
BASOPHILS # BLD: 0.06 K/UL (ref 0–0.12)
BILIRUB SERPL-MCNC: 0.4 MG/DL (ref 0.1–1.5)
BUN SERPL-MCNC: 27 MG/DL (ref 8–22)
CALCIUM SERPL-MCNC: 9.2 MG/DL (ref 8.5–10.5)
CHLORIDE SERPL-SCNC: 96 MMOL/L (ref 96–112)
CHOLEST SERPL-MCNC: 142 MG/DL (ref 100–199)
CO2 SERPL-SCNC: 31 MMOL/L (ref 20–33)
COMMENT 1642: NORMAL
CREAT SERPL-MCNC: 3.03 MG/DL (ref 0.5–1.4)
EOSINOPHIL # BLD AUTO: 0.18 K/UL (ref 0–0.51)
EOSINOPHIL NFR BLD: 1.8 % (ref 0–6.9)
ERYTHROCYTE [DISTWIDTH] IN BLOOD BY AUTOMATED COUNT: 60 FL (ref 35.9–50)
FASTING STATUS PATIENT QL REPORTED: NORMAL
GLOBULIN SER CALC-MCNC: 3.1 G/DL (ref 1.9–3.5)
GLUCOSE SERPL-MCNC: 84 MG/DL (ref 65–99)
HCT VFR BLD AUTO: 36.8 % (ref 42–52)
HDLC SERPL-MCNC: 79 MG/DL
HGB BLD-MCNC: 10.9 G/DL (ref 14–18)
IMM GRANULOCYTES # BLD AUTO: 0.03 K/UL (ref 0–0.11)
IMM GRANULOCYTES NFR BLD AUTO: 0.3 % (ref 0–0.9)
LDLC SERPL CALC-MCNC: 45 MG/DL
LYMPHOCYTES # BLD AUTO: 0.59 K/UL (ref 1–4.8)
LYMPHOCYTES NFR BLD: 5.8 % (ref 22–41)
MACROCYTES BLD QL SMEAR: ABNORMAL
MCH RBC QN AUTO: 29.1 PG (ref 27–33)
MCHC RBC AUTO-ENTMCNC: 29.6 G/DL (ref 33.7–35.3)
MCV RBC AUTO: 98.1 FL (ref 81.4–97.8)
MICROCYTES BLD QL SMEAR: ABNORMAL
MONOCYTES # BLD AUTO: 0.89 K/UL (ref 0–0.85)
MONOCYTES NFR BLD AUTO: 8.7 % (ref 0–13.4)
MORPHOLOGY BLD-IMP: NORMAL
NEUTROPHILS # BLD AUTO: 8.48 K/UL (ref 1.82–7.42)
NEUTROPHILS NFR BLD: 82.8 % (ref 44–72)
NRBC # BLD AUTO: 0 K/UL
NRBC BLD-RTO: 0 /100 WBC
OVALOCYTES BLD QL SMEAR: NORMAL
PLATELET # BLD AUTO: 161 K/UL (ref 164–446)
PLATELET BLD QL SMEAR: NORMAL
PMV BLD AUTO: 9.4 FL (ref 9–12.9)
POIKILOCYTOSIS BLD QL SMEAR: NORMAL
POTASSIUM SERPL-SCNC: 3.8 MMOL/L (ref 3.6–5.5)
PROT SERPL-MCNC: 7.1 G/DL (ref 6–8.2)
RBC # BLD AUTO: 3.75 M/UL (ref 4.7–6.1)
RBC BLD AUTO: PRESENT
SODIUM SERPL-SCNC: 137 MMOL/L (ref 135–145)
TRIGL SERPL-MCNC: 91 MG/DL (ref 0–149)
WBC # BLD AUTO: 10.2 K/UL (ref 4.8–10.8)

## 2021-01-14 PROCEDURE — 80053 COMPREHEN METABOLIC PANEL: CPT

## 2021-01-14 PROCEDURE — 85025 COMPLETE CBC W/AUTO DIFF WBC: CPT

## 2021-01-14 PROCEDURE — 80061 LIPID PANEL: CPT

## 2021-01-14 PROCEDURE — 36415 COLL VENOUS BLD VENIPUNCTURE: CPT

## 2021-01-19 DIAGNOSIS — J47.9 BRONCHIECTASIS WITHOUT COMPLICATION (HCC): ICD-10-CM

## 2021-01-19 RX ORDER — SODIUM CHLORIDE FOR INHALATION 7 %
VIAL, NEBULIZER (ML) INHALATION
Qty: 240 ML | Refills: 1 | Status: SHIPPED | OUTPATIENT
Start: 2021-01-19

## 2021-01-19 NOTE — TELEPHONE ENCOUNTER
Have we ever prescribed this med? Yes.  If yes, what date? 11/03/2020    Last OV: 09/17/2020 - FELIPA OLMSTEAD    Next OV: due back 3/2021    DX: Bronchiectasis     Medications: Sodium Chloride

## 2022-11-10 NOTE — PROCEDURE: INJECTION
Bill J-Code: yes
Hide Second Medication?: No
units
Route: IL
Detail Level: None
Dose Administered (Numbers Only - Mg, G, Mcg, Units, Cc): 0
Procedure Information: Please note that the numeric value listed in the Medication (1) and associated J-code units and Medication (2) and associated J-code units variables are j-code amounts and do not represent either the concentration or the total amount of the medications injected.  I strongly recommend selecting no to the Render J-code information in note question. This will allow your note to be more clear. If you are billing j-codes with your injection codes you need to document the total amount of the medication injected. This amount should match the j-code units. For example, if you are injecting Triamcinolone 40mg as an intramuscular injection you would select 40 for the dose field and mg for the units. This would allow you to document  with 4 units (40mg = 10mg x 4). The total volume is not used to calculate j-codes only the amount of the medication administered.
Dose Administered (Numbers Only - Mg, G, Mcg, Units, Cc): 0.3
Consent: The risks of the medication was reviewed with the patient.
Post-Care Instructions: I reviewed with the patient in detail post-care instructions. Patient understands to keep the injection sites clean and call the clinic if there is any redness, swelling or pain.
Medication (1) And Associated J-Code Units: Bleomycin, 15 units
no